# Patient Record
Sex: FEMALE | Race: WHITE | NOT HISPANIC OR LATINO | Employment: OTHER | URBAN - METROPOLITAN AREA
[De-identification: names, ages, dates, MRNs, and addresses within clinical notes are randomized per-mention and may not be internally consistent; named-entity substitution may affect disease eponyms.]

---

## 2018-11-09 ENCOUNTER — TRANSCRIBE ORDERS (OUTPATIENT)
Dept: ADMINISTRATIVE | Facility: HOSPITAL | Age: 68
End: 2018-11-09

## 2018-11-09 DIAGNOSIS — Z12.2 ENCOUNTER FOR SCREENING FOR LUNG CANCER: Primary | ICD-10-CM

## 2019-11-13 ENCOUNTER — APPOINTMENT (OUTPATIENT)
Dept: GENERAL RADIOLOGY | Facility: HOSPITAL | Age: 69
End: 2019-11-13

## 2019-11-13 ENCOUNTER — HOSPITAL ENCOUNTER (INPATIENT)
Facility: HOSPITAL | Age: 69
LOS: 5 days | Discharge: HOME HEALTH | End: 2019-11-19
Attending: EMERGENCY MEDICINE | Admitting: HOSPITALIST

## 2019-11-13 DIAGNOSIS — J44.1 COPD WITH ACUTE EXACERBATION (HCC): ICD-10-CM

## 2019-11-13 DIAGNOSIS — I33.9 ACUTE ENDOCARDITIS, UNSPECIFIED ENDOCARDITIS TYPE: Primary | ICD-10-CM

## 2019-11-13 DIAGNOSIS — I48.91 NEW ONSET ATRIAL FIBRILLATION (HCC): ICD-10-CM

## 2019-11-13 DIAGNOSIS — I33.0 ACUTE BACTERIAL ENDOCARDITIS: ICD-10-CM

## 2019-11-13 LAB
ALBUMIN SERPL-MCNC: 3.4 G/DL (ref 3.5–5.2)
ALBUMIN/GLOB SERPL: 1.3 G/DL
ALP SERPL-CCNC: 55 U/L (ref 39–117)
ALT SERPL W P-5'-P-CCNC: 23 U/L (ref 1–33)
ANION GAP SERPL CALCULATED.3IONS-SCNC: 11.3 MMOL/L (ref 5–15)
AST SERPL-CCNC: 15 U/L (ref 1–32)
BASOPHILS # BLD AUTO: 0.07 10*3/MM3 (ref 0–0.2)
BASOPHILS NFR BLD AUTO: 0.3 % (ref 0–1.5)
BILIRUB SERPL-MCNC: 0.4 MG/DL (ref 0.2–1.2)
BUN BLD-MCNC: 28 MG/DL (ref 8–23)
BUN/CREAT SERPL: 43.8 (ref 7–25)
CALCIUM SPEC-SCNC: 8.3 MG/DL (ref 8.6–10.5)
CHLORIDE SERPL-SCNC: 98 MMOL/L (ref 98–107)
CO2 SERPL-SCNC: 29.7 MMOL/L (ref 22–29)
CREAT BLD-MCNC: 0.64 MG/DL (ref 0.57–1)
DEPRECATED RDW RBC AUTO: 41.7 FL (ref 37–54)
EOSINOPHIL # BLD AUTO: 0 10*3/MM3 (ref 0–0.4)
EOSINOPHIL NFR BLD AUTO: 0 % (ref 0.3–6.2)
ERYTHROCYTE [DISTWIDTH] IN BLOOD BY AUTOMATED COUNT: 13.3 % (ref 12.3–15.4)
GFR SERPL CREATININE-BSD FRML MDRD: 92 ML/MIN/1.73
GLOBULIN UR ELPH-MCNC: 2.7 GM/DL
GLUCOSE BLD-MCNC: 176 MG/DL (ref 65–99)
HCT VFR BLD AUTO: 38 % (ref 34–46.6)
HGB BLD-MCNC: 13.4 G/DL (ref 12–15.9)
IMM GRANULOCYTES # BLD AUTO: 0.41 10*3/MM3 (ref 0–0.05)
IMM GRANULOCYTES NFR BLD AUTO: 1.9 % (ref 0–0.5)
LYMPHOCYTES # BLD AUTO: 0.89 10*3/MM3 (ref 0.7–3.1)
LYMPHOCYTES NFR BLD AUTO: 4.2 % (ref 19.6–45.3)
MAGNESIUM SERPL-MCNC: 2.3 MG/DL (ref 1.6–2.4)
MCH RBC QN AUTO: 30.7 PG (ref 26.6–33)
MCHC RBC AUTO-ENTMCNC: 35.3 G/DL (ref 31.5–35.7)
MCV RBC AUTO: 87.2 FL (ref 79–97)
MONOCYTES # BLD AUTO: 0.8 10*3/MM3 (ref 0.1–0.9)
MONOCYTES NFR BLD AUTO: 3.8 % (ref 5–12)
NEUTROPHILS # BLD AUTO: 19.16 10*3/MM3 (ref 1.7–7)
NEUTROPHILS NFR BLD AUTO: 89.8 % (ref 42.7–76)
NRBC BLD AUTO-RTO: 0 /100 WBC (ref 0–0.2)
NT-PROBNP SERPL-MCNC: 1716 PG/ML (ref 5–900)
PLATELET # BLD AUTO: 216 10*3/MM3 (ref 140–450)
PMV BLD AUTO: 10.2 FL (ref 6–12)
POTASSIUM BLD-SCNC: 4.3 MMOL/L (ref 3.5–5.2)
PROT SERPL-MCNC: 6.1 G/DL (ref 6–8.5)
RBC # BLD AUTO: 4.36 10*6/MM3 (ref 3.77–5.28)
SODIUM BLD-SCNC: 139 MMOL/L (ref 136–145)
WBC NRBC COR # BLD: 21.33 10*3/MM3 (ref 3.4–10.8)

## 2019-11-13 PROCEDURE — 80053 COMPREHEN METABOLIC PANEL: CPT | Performed by: EMERGENCY MEDICINE

## 2019-11-13 PROCEDURE — G0378 HOSPITAL OBSERVATION PER HR: HCPCS

## 2019-11-13 PROCEDURE — 71046 X-RAY EXAM CHEST 2 VIEWS: CPT

## 2019-11-13 PROCEDURE — 93005 ELECTROCARDIOGRAM TRACING: CPT | Performed by: EMERGENCY MEDICINE

## 2019-11-13 PROCEDURE — 93010 ELECTROCARDIOGRAM REPORT: CPT | Performed by: INTERNAL MEDICINE

## 2019-11-13 PROCEDURE — 83735 ASSAY OF MAGNESIUM: CPT | Performed by: EMERGENCY MEDICINE

## 2019-11-13 PROCEDURE — 85025 COMPLETE CBC W/AUTO DIFF WBC: CPT | Performed by: EMERGENCY MEDICINE

## 2019-11-13 PROCEDURE — 99284 EMERGENCY DEPT VISIT MOD MDM: CPT

## 2019-11-13 PROCEDURE — 25010000002 CEFEPIME PER 500 MG: Performed by: EMERGENCY MEDICINE

## 2019-11-13 PROCEDURE — 83880 ASSAY OF NATRIURETIC PEPTIDE: CPT | Performed by: EMERGENCY MEDICINE

## 2019-11-13 RX ORDER — DILTIAZEM HYDROCHLORIDE 5 MG/ML
10 INJECTION INTRAVENOUS ONCE
Status: COMPLETED | OUTPATIENT
Start: 2019-11-13 | End: 2019-11-13

## 2019-11-13 RX ORDER — ONDANSETRON 2 MG/ML
4 INJECTION INTRAMUSCULAR; INTRAVENOUS EVERY 6 HOURS PRN
Status: DISCONTINUED | OUTPATIENT
Start: 2019-11-13 | End: 2019-11-19 | Stop reason: HOSPADM

## 2019-11-13 RX ORDER — CALCIUM CARBONATE 200(500)MG
2 TABLET,CHEWABLE ORAL 2 TIMES DAILY PRN
Status: DISCONTINUED | OUTPATIENT
Start: 2019-11-13 | End: 2019-11-19 | Stop reason: HOSPADM

## 2019-11-13 RX ORDER — ONDANSETRON 4 MG/1
4 TABLET, FILM COATED ORAL EVERY 6 HOURS PRN
Status: DISCONTINUED | OUTPATIENT
Start: 2019-11-13 | End: 2019-11-19 | Stop reason: HOSPADM

## 2019-11-13 RX ORDER — SODIUM CHLORIDE 0.9 % (FLUSH) 0.9 %
10 SYRINGE (ML) INJECTION EVERY 12 HOURS SCHEDULED
Status: DISCONTINUED | OUTPATIENT
Start: 2019-11-14 | End: 2019-11-19 | Stop reason: HOSPADM

## 2019-11-13 RX ORDER — ACETAMINOPHEN 160 MG/5ML
650 SOLUTION ORAL EVERY 4 HOURS PRN
Status: DISCONTINUED | OUTPATIENT
Start: 2019-11-13 | End: 2019-11-19 | Stop reason: HOSPADM

## 2019-11-13 RX ORDER — SODIUM CHLORIDE 0.9 % (FLUSH) 0.9 %
10 SYRINGE (ML) INJECTION AS NEEDED
Status: DISCONTINUED | OUTPATIENT
Start: 2019-11-13 | End: 2019-11-19 | Stop reason: HOSPADM

## 2019-11-13 RX ORDER — PREDNISONE 20 MG/1
20 TABLET ORAL DAILY
COMMUNITY
End: 2019-11-19 | Stop reason: HOSPADM

## 2019-11-13 RX ORDER — BUDESONIDE 0.5 MG/2ML
0.5 INHALANT ORAL
COMMUNITY

## 2019-11-13 RX ORDER — ACETAMINOPHEN 325 MG/1
650 TABLET ORAL EVERY 4 HOURS PRN
Status: DISCONTINUED | OUTPATIENT
Start: 2019-11-13 | End: 2019-11-19 | Stop reason: HOSPADM

## 2019-11-13 RX ORDER — ACETAMINOPHEN 650 MG/1
650 SUPPOSITORY RECTAL EVERY 4 HOURS PRN
Status: DISCONTINUED | OUTPATIENT
Start: 2019-11-13 | End: 2019-11-19 | Stop reason: HOSPADM

## 2019-11-13 RX ORDER — VANCOMYCIN HYDROCHLORIDE 1 G/200ML
1000 INJECTION, SOLUTION INTRAVENOUS ONCE
Status: COMPLETED | OUTPATIENT
Start: 2019-11-13 | End: 2019-11-14

## 2019-11-13 RX ORDER — BISACODYL 5 MG/1
5 TABLET, DELAYED RELEASE ORAL DAILY PRN
Status: DISCONTINUED | OUTPATIENT
Start: 2019-11-13 | End: 2019-11-19 | Stop reason: HOSPADM

## 2019-11-13 RX ORDER — DILTIAZEM HYDROCHLORIDE 60 MG/1
60 TABLET, FILM COATED ORAL 4 TIMES DAILY
COMMUNITY
End: 2019-11-19 | Stop reason: HOSPADM

## 2019-11-13 RX ORDER — ROPINIROLE 2 MG/1
2 TABLET, FILM COATED ORAL 2 TIMES DAILY
COMMUNITY
End: 2022-03-07 | Stop reason: DRUGHIGH

## 2019-11-13 RX ORDER — NITROGLYCERIN 0.4 MG/1
0.4 TABLET SUBLINGUAL
Status: DISCONTINUED | OUTPATIENT
Start: 2019-11-13 | End: 2019-11-19 | Stop reason: HOSPADM

## 2019-11-13 RX ORDER — SENNA AND DOCUSATE SODIUM 50; 8.6 MG/1; MG/1
1 TABLET, FILM COATED ORAL DAILY
COMMUNITY
End: 2019-11-26 | Stop reason: HOSPADM

## 2019-11-13 RX ORDER — CEFEPIME HYDROCHLORIDE 2 G/1
INJECTION, POWDER, FOR SOLUTION INTRAVENOUS
COMMUNITY
End: 2019-11-19 | Stop reason: HOSPADM

## 2019-11-13 RX ORDER — METRONIDAZOLE 500 MG/1
500 TABLET ORAL 3 TIMES DAILY
COMMUNITY
End: 2019-11-19 | Stop reason: HOSPADM

## 2019-11-13 RX ADMIN — CEFEPIME HYDROCHLORIDE 2 G: 2 INJECTION, POWDER, FOR SOLUTION INTRAVENOUS at 21:08

## 2019-11-13 RX ADMIN — METRONIDAZOLE 500 MG: 500 INJECTION, SOLUTION INTRAVENOUS at 21:11

## 2019-11-13 RX ADMIN — DILTIAZEM HYDROCHLORIDE 10 MG: 5 INJECTION INTRAVENOUS at 20:23

## 2019-11-13 NOTE — ED PROVIDER NOTES
EMERGENCY DEPARTMENT ENCOUNTER    CHIEF COMPLAINT  Chief Complaint: resolved SOA  History given by: patient, travel RN  History limited by: nothing  Room Number: E655/1  PMD: Jose R Franks MD      HPI:  Pt is a 69 y.o. female with a history of COPD who presents complaining of SOA, now resolved, that started while she was on a cruise about 1.5 weeks ago. Travelling RN at bedside assisting with history. She was febrile (105F), fatigued and had a cough on the cruise. Pt was admitted in the [facility] ICU for 5 days and received IV antibiotics and diagonses of influenza A, PNA, endocarditis, and new onset a-fib (admitted November 3, discharged today). She was flown home to [place] this afternoon. She was instructed to present to the ED on arrival to orchestrate Home Health and prescription management. She received doses of all abx this morning-- Flagyl at 800, Cefepime and Vancomycin at 1000. She was not started on anticoagulation, but she was started on Diltiazem. She has a PICC line in her right arm. Pt denies palpitations, CP, abdominal pain, fever and all other complaints at this time. She wears oxygen at nighttime.     Medical Record Review from patient's admission to [facility] ICU:  Discharged today on Flagyl, Cefepime and Vancomycin to continue for 37 days. She was not started on anticoagulation. During admission, workup found a vegetation on the posterior leaflet of the mitral valve     Duration:  1.5 weeks  Onset: gradual  Timing: constant  Location: lungs  Radiation: none  Quality: SOA  Intensity/Severity: moderate  Progression: resolved  Associated Symptoms: none  Aggravating Factors: none  Alleviating Factors: none  Previous Episodes: admitted for 10 days, discharged today  Treatment before arrival: Flagyl, Cefepime and Vancomycin    PAST MEDICAL HISTORY  Active Ambulatory Problems     Diagnosis Date Noted   • No Active Ambulatory Problems     Resolved Ambulatory Problems      Diagnosis Date Noted   • No Resolved Ambulatory Problems     Past Medical History:   Diagnosis Date   • COPD (chronic obstructive pulmonary disease) (CMS/[facility])    • Renal disorder    • Restless leg syndrome        PAST SURGICAL HISTORY  Past Surgical History:   Procedure Laterality Date   • CHOLECYSTECTOMY     • KNEE ARTHROPLASTY         FAMILY HISTORY  History reviewed. No pertinent family history.    SOCIAL HISTORY  Social History     Socioeconomic History   • Marital status:      Spouse name: Not on file   • Number of children: Not on file   • Years of education: Not on file   • Highest education level: Not on file   Tobacco Use   • Smoking status: Current Every Day Smoker     Packs/day: 0.50   • Smokeless tobacco: Former User   Substance and Sexual Activity   • Alcohol use: Yes   • Drug use: No       ALLERGIES  Penicillins    REVIEW OF SYSTEMS  Review of Systems   Constitutional: Negative for fever.   HENT: Negative for sore throat.    Eyes: Negative.    Respiratory: Negative for cough and shortness of breath.    Cardiovascular: Negative for chest pain.   Gastrointestinal: Negative for abdominal pain, diarrhea and vomiting.   Genitourinary: Negative for dysuria.   Musculoskeletal: Negative for neck pain.   Skin: Negative for rash.   Allergic/Immunologic: Negative.    Neurological: Negative for weakness, numbness and headaches.   Hematological: Negative.    Psychiatric/Behavioral: Negative.    All other systems reviewed and are negative.      PHYSICAL EXAM  ED Triage Vitals [11/13/19 1800]   Temp Pulse Resp BP SpO2   98.7 °F (37.1 °C) -- 20 -- --       Physical Exam   Constitutional: She is oriented to person, place, and time. No distress.   HENT:   Head: Normocephalic and atraumatic.   Eyes: EOM are normal. Pupils are equal, round, and reactive to light.   Neck: Normal range of motion. Neck supple.   Cardiovascular: Normal heart sounds. An irregularly irregular rhythm present. Tachycardia present.    Irregularly irregular rhythm with tachycardia.   Pulmonary/Chest: Effort normal and breath sounds normal. No respiratory distress.   CTAB.   Abdominal: Soft. There is no tenderness. There is no rebound and no guarding.   Abdomen is soft and non-tender.   Musculoskeletal: Normal range of motion. She exhibits no edema.   PICC line in RUE. No pedal edema.   Neurological: She is alert and oriented to person, place, and time. She has normal sensation and normal strength.   Skin: Skin is warm and dry. No rash noted.   Psychiatric: Mood and affect normal.   Nursing note and vitals reviewed.      LAB RESULTS  Lab Results (last 24 hours)     Procedure Component Value Units Date/Time    CBC & Differential [[id number]] Collected:  11/13/19 1955    Specimen:  Blood Updated:  11/13/19 2005    Narrative:       The following orders were created for panel order CBC & Differential.  Procedure                               Abnormality         Status                     ---------                               -----------         ------                     CBC Auto Differential[[id number]]        Abnormal            Final result                 Please view results for these tests on the individual orders.    CBC Auto Differential [[id number]]  (Abnormal) Collected:  11/13/19 1955    Specimen:  Blood Updated:  11/13/19 2005     WBC 21.33 10*3/mm3      RBC 4.36 10*6/mm3      Hemoglobin 13.4 g/dL      Hematocrit 38.0 %      MCV 87.2 fL      MCH 30.7 pg      MCHC 35.3 g/dL      RDW 13.3 %      RDW-SD 41.7 fl      MPV 10.2 fL      Platelets 216 10*3/mm3      Neutrophil % 89.8 %      Lymphocyte % 4.2 %      Monocyte % 3.8 %      Eosinophil % 0.0 %      Basophil % 0.3 %      Immature Grans % 1.9 %      Neutrophils, Absolute 19.16 10*3/mm3      Lymphocytes, Absolute 0.89 10*3/mm3      Monocytes, Absolute 0.80 10*3/mm3      Eosinophils, Absolute 0.00 10*3/mm3      Basophils, Absolute 0.07 10*3/mm3      Immature Grans, Absolute 0.41 10*3/mm3       nRBC 0.0 /100 WBC     Comprehensive Metabolic Panel [[id number]]  (Abnormal) Collected:  11/13/19 1956    Specimen:  Blood Updated:  11/13/19 2037     Glucose 176 mg/dL      BUN 28 mg/dL      Creatinine 0.64 mg/dL      Sodium 139 mmol/L      Potassium 4.3 mmol/L      Chloride 98 mmol/L      CO2 29.7 mmol/L      Calcium 8.3 mg/dL      Total Protein 6.1 g/dL      Albumin 3.40 g/dL      ALT (SGPT) 23 U/L      AST (SGOT) 15 U/L      Alkaline Phosphatase 55 U/L      Total Bilirubin 0.4 mg/dL      eGFR Non African Amer 92 mL/min/1.73      Globulin 2.7 gm/dL      A/G Ratio 1.3 g/dL      BUN/Creatinine Ratio 43.8     Anion Gap 11.3 mmol/L     Narrative:       GFR Normal >60  Chronic Kidney Disease <60  Kidney Failure <15    BNP [[id number]]  (Abnormal) Collected:  11/13/19 1956    Specimen:  Blood Updated:  11/13/19 2035     proBNP 1,716.0 pg/mL     Narrative:       Among patients with dyspnea, NT-proBNP is highly sensitive for the detection of acute congestive heart failure. In addition NT-proBNP of <300 pg/ml effectively rules out acute congestive heart failure with 99% negative predictive value.    Magnesium [[id number]]  (Normal) Collected:  11/13/19 1956    Specimen:  Blood Updated:  11/13/19 2037     Magnesium 2.3 mg/dL           I ordered the above labs and reviewed the results.      RADIOLOGY  XR Chest 2 View   Final Result         Reviewed CXR.   FINDINGS: There is a PICC line now in position tip superimposes projected area of the superior vena cava. There is atherosclerotic calcification of the aorta. Cardiac size within normal limits. Mitral  annulus calcification seen.     There is a small opacity within the right mid lung laterally superimposing the posterior right eighth rib. Difficult to determine if this represents callus formation from previous rib injury or parenchymal scarring, pulmonary nodule, atelectasis or small infiltrate. The left lung is clear. No pleural effusion identified. No gross pulmonary edema  seen. Mild interstitial prominence, cannot exclude minimal vascular congestion.    Independently viewed by me. Interpreted by radiologist Dr Flood.       I ordered the above noted radiological studies. Interpreted by radiologist. Reviewed by me in PACS.       PROCEDURES  Procedures    See ED course for interpretation of EKG.       PROGRESS AND CONSULTS  ED Course as of Nov 13 2213 Wed Nov 13, 2019   1845 Old records reviewed.  Patient was last admitted here in January 2016 for sepsis secondary to pyelonephritis and E. coli septicemia.  She had an obstructing renal stone and underwent ureteral stent placement.  She then had a nephrostomy tube placed.  Patient was treated with IV ceftriaxone at that time.  [WH]   1905 I spoke with Jessi, [facility] nurse, and she states there is no way to get the patient's medications ordered and to have home health care arranged tonight.  [WH]   1907 EKG          EKG time: 1858  Rhythm/Rate: A. fib rate 125  P waves and IN: Irregular, varying  QRS, axis: LAD  ST and T waves: Normal    Interpreted Contemporaneously by me, independently viewed  EKG is changed compared to prior EKG done 1/28/2016.  Sinus rhythm was present at that time.    [WH]   2041 Test results and plan for admission discussed with the patient and her family.  Patient is still in A. fib but her heart rate is currently staying between 100-110.  Call will be placed to [facility] for admission.  [WH]      ED Course User Index  [WH] David Mcqueen MD     1911. Ordered labs, EKG and CXR. Ordered Cardizem.      2039. Rechecked the patient who is stable and resting comfortably. Informed the patient of the plan to admit for orchestration of medications and treatment plan. Pt understands and agrees with the plan, all questions answered.    2045. Placed call to [facility].     2055. Spoke with Dr Kearney, [facility], who agrees to admit the patient.         MEDICAL DECISION MAKING  Results were reviewed/discussed with the patient and they were  also made aware of online access. Pt also made aware that some labs, such as cultures, will not be resulted during ER visit and follow up with PMD is necessary.     Patient was diagnosed with influenza [place].  She was subsequently admitted to the [facility] in the ICU.  She was found to have pneumonia as well as endocarditis.  She was treated with IV Flagyl, cefepime, and vancomycin.  She was also found to have new onset atrial fibrillation but was not started on anticoagulation due to concerns of possible septic emboli.  Patient was discharged from the [facility] earlier today and flew home.  Patient was advised to come to the emergency department when she arrived in [place].  Patient denied chest pain or shortness of breath upon arrival here.  She was in atrial fibrillation and was tachycardic.  Labs were remarkable for an elevated white blood cell count.  Chest x-ray showed possible small area of infiltrate in the right lung.  Patient was given her nighttime doses of Flagyl, cefepime, and vancomycin.  Case was discussed with Dr. Kearney and he agreed to admit the patient.  Arrangements will be made for the patient to start on home IV antibiotics.    MDM  Number of Diagnoses or Management Options  Acute endocarditis, unspecified endocarditis type:   New onset atrial fibrillation (CMS/HCC):      Amount and/or Complexity of Data Reviewed  Clinical lab tests: ordered and reviewed (WBC 21.33)  Tests in the radiology section of CPT®: ordered and reviewed (Possible small atelectasis seen on CXR)  Tests in the medicine section of CPT®: reviewed and ordered (See EKG procedure note.)  Decide to obtain previous medical records or to obtain history from someone other than the patient: yes (Epic)  Obtain history from someone other than the patient: yes ([employer] RN)  Review and summarize past medical records: yes (Patient was last admitted here in January 2016  for sepsis secondary to pyelonephritis and E. coli septicemia.  She had an obstructing renal stone and underwent ureteral stent placement.  She then had a nephrostomy tube placed.  Patient was treated with IV ceftriaxone at that time. Discharged today on Flagyl, Cefepime and Vancomycin to continue for 37 days. She was not started on anticoagulation. During admission, workup found a vegetation on the posterior leaflet of the mitral valve.)  Discuss the patient with other providers: yes (Dr Kearney, [facility])    Patient Progress  Patient progress: stable         DIAGNOSIS  Final diagnoses:   Acute endocarditis, unspecified endocarditis type   New onset atrial fibrillation (CMS/HCC)       DISPOSITION  ADMISSION    Discussed treatment plan and reason for admission with pt/family and admitting physician.  Pt/family voiced understanding of the plan for admission for further testing/treatment as needed.       Latest Documented Vital Signs:  As of 10:13 PM  BP- 138/96 HR- (!) 125 Temp- 98.7 °F (37.1 °C) O2 sat- 97%    --  Documentation assistance provided by rogerio Cohen for Dr David Mcqueen MD.  Information recorded by the scribe was done at my direction and has been verified and validated by me.       Vidya Cohen  11/13/19 2102       Daivd Mcqueen MD  11/13/19 3286

## 2019-11-14 ENCOUNTER — APPOINTMENT (OUTPATIENT)
Dept: CARDIOLOGY | Facility: HOSPITAL | Age: 69
End: 2019-11-14

## 2019-11-14 PROBLEM — B37.0 THRUSH, ORAL: Status: ACTIVE | Noted: 2019-11-14

## 2019-11-14 PROBLEM — I48.91 ATRIAL FIBRILLATION WITH RVR: Status: ACTIVE | Noted: 2019-11-14

## 2019-11-14 PROBLEM — J44.1 COPD WITH ACUTE EXACERBATION: Status: ACTIVE | Noted: 2019-11-14

## 2019-11-14 PROBLEM — J11.1 INFLUENZA: Status: ACTIVE | Noted: 2019-11-14

## 2019-11-14 LAB
ANION GAP SERPL CALCULATED.3IONS-SCNC: 8.2 MMOL/L (ref 5–15)
AORTIC DIMENSIONLESS INDEX: 1.1 (DI)
BH CV ECHO MEAS - ACS: 1.3 CM
BH CV ECHO MEAS - AO MEAN PG (FULL): 0 MMHG
BH CV ECHO MEAS - AO MEAN PG: 2 MMHG
BH CV ECHO MEAS - AO ROOT AREA (BSA CORRECTED): 1.6
BH CV ECHO MEAS - AO ROOT AREA: 5.3 CM^2
BH CV ECHO MEAS - AO ROOT DIAM: 2.6 CM
BH CV ECHO MEAS - AO V2 MAX: 83 CM/SEC
BH CV ECHO MEAS - AO V2 MEAN: 60 CM/SEC
BH CV ECHO MEAS - AO V2 VTI: 17.9 CM
BH CV ECHO MEAS - ASC AORTA: 2.5 CM
BH CV ECHO MEAS - AVA(I,A): 3.4 CM^2
BH CV ECHO MEAS - AVA(I,D): 3.4 CM^2
BH CV ECHO MEAS - BSA(HAYCOCK): 1.7 M^2
BH CV ECHO MEAS - BSA: 1.6 M^2
BH CV ECHO MEAS - BZI_BMI: 25.9 KILOGRAMS/M^2
BH CV ECHO MEAS - BZI_METRIC_HEIGHT: 154.9 CM
BH CV ECHO MEAS - BZI_METRIC_WEIGHT: 62.1 KG
BH CV ECHO MEAS - EDV(CUBED): 46.7 ML
BH CV ECHO MEAS - EDV(MOD-SP2): 70 ML
BH CV ECHO MEAS - EDV(MOD-SP4): 69 ML
BH CV ECHO MEAS - EDV(TEICH): 54.4 ML
BH CV ECHO MEAS - EF(CUBED): 47.7 %
BH CV ECHO MEAS - EF(MOD-BP): 71 %
BH CV ECHO MEAS - EF(MOD-SP2): 70 %
BH CV ECHO MEAS - EF(MOD-SP4): 69.6 %
BH CV ECHO MEAS - EF(TEICH): 40.8 %
BH CV ECHO MEAS - ESV(CUBED): 24.4 ML
BH CV ECHO MEAS - ESV(MOD-SP2): 21 ML
BH CV ECHO MEAS - ESV(MOD-SP4): 21 ML
BH CV ECHO MEAS - ESV(TEICH): 32.2 ML
BH CV ECHO MEAS - FS: 19.4 %
BH CV ECHO MEAS - IVS/LVPW: 1
BH CV ECHO MEAS - IVSD: 0.8 CM
BH CV ECHO MEAS - LAT PEAK E' VEL: 9 CM/SEC
BH CV ECHO MEAS - LV DIASTOLIC VOL/BSA (35-75): 42.9 ML/M^2
BH CV ECHO MEAS - LV MASS(C)D: 78.8 GRAMS
BH CV ECHO MEAS - LV MASS(C)DI: 49 GRAMS/M^2
BH CV ECHO MEAS - LV MEAN PG: 2 MMHG
BH CV ECHO MEAS - LV SYSTOLIC VOL/BSA (12-30): 13.1 ML/M^2
BH CV ECHO MEAS - LV V1 MAX: 87 CM/SEC
BH CV ECHO MEAS - LV V1 MEAN: 65.6 CM/SEC
BH CV ECHO MEAS - LV V1 VTI: 19.1 CM
BH CV ECHO MEAS - LVIDD: 3.6 CM
BH CV ECHO MEAS - LVIDS: 2.9 CM
BH CV ECHO MEAS - LVLD AP2: 6.4 CM
BH CV ECHO MEAS - LVLD AP4: 7 CM
BH CV ECHO MEAS - LVLS AP2: 5.6 CM
BH CV ECHO MEAS - LVLS AP4: 5.9 CM
BH CV ECHO MEAS - LVOT AREA (M): 3.1 CM^2
BH CV ECHO MEAS - LVOT AREA: 3.1 CM^2
BH CV ECHO MEAS - LVOT DIAM: 2 CM
BH CV ECHO MEAS - LVPWD: 0.8 CM
BH CV ECHO MEAS - MED PEAK E' VEL: 7 CM/SEC
BH CV ECHO MEAS - MR MAX PG: 88.7 MMHG
BH CV ECHO MEAS - MR MAX VEL: 471 CM/SEC
BH CV ECHO MEAS - MV DEC SLOPE: 713 CM/SEC^2
BH CV ECHO MEAS - MV DEC TIME: 246 SEC
BH CV ECHO MEAS - MV E MAX VEL: 176 CM/SEC
BH CV ECHO MEAS - MV MEAN PG: 5 MMHG
BH CV ECHO MEAS - MV P1/2T MAX VEL: 173 CM/SEC
BH CV ECHO MEAS - MV P1/2T: 71.1 MSEC
BH CV ECHO MEAS - MV V2 MEAN: 95.6 CM/SEC
BH CV ECHO MEAS - MV V2 VTI: 30.8 CM
BH CV ECHO MEAS - MVA P1/2T LCG: 1.3 CM^2
BH CV ECHO MEAS - MVA(P1/2T): 3.1 CM^2
BH CV ECHO MEAS - MVA(VTI): 1.9 CM^2
BH CV ECHO MEAS - PA ACC SLOPE: 799 CM/SEC^2
BH CV ECHO MEAS - PA ACC TIME: 0.11 SEC
BH CV ECHO MEAS - PA MAX PG: 3.2 MMHG
BH CV ECHO MEAS - PA PR(ACCEL): 28.2 MMHG
BH CV ECHO MEAS - PA V2 MAX: 89.2 CM/SEC
BH CV ECHO MEAS - QP/QS: 0.64
BH CV ECHO MEAS - RAP SYSTOLE: 8 MMHG
BH CV ECHO MEAS - RV MEAN PG: 1 MMHG
BH CV ECHO MEAS - RV V1 MEAN: 43.5 CM/SEC
BH CV ECHO MEAS - RV V1 VTI: 12.3 CM
BH CV ECHO MEAS - RVOT AREA: 3.1 CM^2
BH CV ECHO MEAS - RVOT DIAM: 2 CM
BH CV ECHO MEAS - RVSP: 40.7 MMHG
BH CV ECHO MEAS - SI(AO): 59.1 ML/M^2
BH CV ECHO MEAS - SI(CUBED): 13.8 ML/M^2
BH CV ECHO MEAS - SI(LVOT): 37.3 ML/M^2
BH CV ECHO MEAS - SI(MOD-SP2): 30.5 ML/M^2
BH CV ECHO MEAS - SI(MOD-SP4): 29.8 ML/M^2
BH CV ECHO MEAS - SI(TEICH): 13.8 ML/M^2
BH CV ECHO MEAS - SV(AO): 95 ML
BH CV ECHO MEAS - SV(CUBED): 22.3 ML
BH CV ECHO MEAS - SV(LVOT): 60 ML
BH CV ECHO MEAS - SV(MOD-SP2): 49 ML
BH CV ECHO MEAS - SV(MOD-SP4): 48 ML
BH CV ECHO MEAS - SV(RVOT): 38.6 ML
BH CV ECHO MEAS - SV(TEICH): 22.2 ML
BH CV ECHO MEAS - TAPSE (>1.6): 2.3 CM2
BH CV ECHO MEAS - TR MAX PG: 33
BH CV ECHO MEAS - TR MAX VEL: 286 CM/SEC
BH CV ECHO MEASUREMENTS AVERAGE E/E' RATIO: 22
BH CV VAS BP RIGHT ARM: NORMAL MMHG
BH CV XLRA - RV BASE: 3.7 CM
BH CV XLRA - TDI S': 10.5 CM/SEC
BUN BLD-MCNC: 27 MG/DL (ref 8–23)
BUN/CREAT SERPL: 47.4 (ref 7–25)
CALCIUM SPEC-SCNC: 8.1 MG/DL (ref 8.6–10.5)
CHLORIDE SERPL-SCNC: 100 MMOL/L (ref 98–107)
CO2 SERPL-SCNC: 31.8 MMOL/L (ref 22–29)
CREAT BLD-MCNC: 0.57 MG/DL (ref 0.57–1)
D-LACTATE SERPL-SCNC: 1.8 MMOL/L (ref 0.5–2)
DEPRECATED RDW RBC AUTO: 40.1 FL (ref 37–54)
ERYTHROCYTE [DISTWIDTH] IN BLOOD BY AUTOMATED COUNT: 13 % (ref 12.3–15.4)
GFR SERPL CREATININE-BSD FRML MDRD: 105 ML/MIN/1.73
GLUCOSE BLD-MCNC: 105 MG/DL (ref 65–99)
HCT VFR BLD AUTO: 35.6 % (ref 34–46.6)
HGB BLD-MCNC: 12.4 G/DL (ref 12–15.9)
LEFT ATRIUM VOLUME INDEX: 32 ML/M2
LV EF 2D ECHO EST: 70 %
MAXIMAL PREDICTED HEART RATE: 150 BPM
MCH RBC QN AUTO: 30.2 PG (ref 26.6–33)
MCHC RBC AUTO-ENTMCNC: 34.8 G/DL (ref 31.5–35.7)
MCV RBC AUTO: 86.6 FL (ref 79–97)
PLATELET # BLD AUTO: 202 10*3/MM3 (ref 140–450)
PMV BLD AUTO: 10.3 FL (ref 6–12)
POTASSIUM BLD-SCNC: 4 MMOL/L (ref 3.5–5.2)
PROCALCITONIN SERPL-MCNC: 0.07 NG/ML (ref 0.1–0.25)
RBC # BLD AUTO: 4.11 10*6/MM3 (ref 3.77–5.28)
SODIUM BLD-SCNC: 140 MMOL/L (ref 136–145)
STRESS TARGET HR: 128 BPM
WBC NRBC COR # BLD: 20.09 10*3/MM3 (ref 3.4–10.8)

## 2019-11-14 PROCEDURE — 94640 AIRWAY INHALATION TREATMENT: CPT

## 2019-11-14 PROCEDURE — 99204 OFFICE O/P NEW MOD 45 MIN: CPT | Performed by: INTERNAL MEDICINE

## 2019-11-14 PROCEDURE — 25010000002 VANCOMYCIN 750 MG RECONSTITUTED SOLUTION: Performed by: NURSE PRACTITIONER

## 2019-11-14 PROCEDURE — 94799 UNLISTED PULMONARY SVC/PX: CPT

## 2019-11-14 PROCEDURE — 84145 PROCALCITONIN (PCT): CPT | Performed by: NURSE PRACTITIONER

## 2019-11-14 PROCEDURE — 80048 BASIC METABOLIC PNL TOTAL CA: CPT | Performed by: NURSE PRACTITIONER

## 2019-11-14 PROCEDURE — 99223 1ST HOSP IP/OBS HIGH 75: CPT | Performed by: INTERNAL MEDICINE

## 2019-11-14 PROCEDURE — 25010000002 CEFEPIME PER 500 MG: Performed by: NURSE PRACTITIONER

## 2019-11-14 PROCEDURE — 93306 TTE W/DOPPLER COMPLETE: CPT | Performed by: INTERNAL MEDICINE

## 2019-11-14 PROCEDURE — 25010000002 ENOXAPARIN PER 10 MG: Performed by: INTERNAL MEDICINE

## 2019-11-14 PROCEDURE — 83605 ASSAY OF LACTIC ACID: CPT | Performed by: NURSE PRACTITIONER

## 2019-11-14 PROCEDURE — 25010000003 CEFTRIAXONE PER 250 MG: Performed by: INTERNAL MEDICINE

## 2019-11-14 PROCEDURE — 93306 TTE W/DOPPLER COMPLETE: CPT

## 2019-11-14 PROCEDURE — 25010000002 VANCOMYCIN PER 500 MG: Performed by: EMERGENCY MEDICINE

## 2019-11-14 PROCEDURE — 25010000002 ENOXAPARIN PER 10 MG: Performed by: NURSE PRACTITIONER

## 2019-11-14 PROCEDURE — 85027 COMPLETE CBC AUTOMATED: CPT | Performed by: NURSE PRACTITIONER

## 2019-11-14 PROCEDURE — 25010000002 METHYLPREDNISOLONE PER 40 MG: Performed by: NURSE PRACTITIONER

## 2019-11-14 RX ORDER — SENNA AND DOCUSATE SODIUM 50; 8.6 MG/1; MG/1
1 TABLET, FILM COATED ORAL DAILY
Status: DISCONTINUED | OUTPATIENT
Start: 2019-11-14 | End: 2019-11-19 | Stop reason: HOSPADM

## 2019-11-14 RX ORDER — ROPINIROLE 2 MG/1
2 TABLET, FILM COATED ORAL 2 TIMES DAILY
Status: DISCONTINUED | OUTPATIENT
Start: 2019-11-14 | End: 2019-11-14

## 2019-11-14 RX ORDER — ROPINIROLE 2 MG/1
2 TABLET, FILM COATED ORAL 2 TIMES DAILY
Status: DISCONTINUED | OUTPATIENT
Start: 2019-11-14 | End: 2019-11-19 | Stop reason: HOSPADM

## 2019-11-14 RX ORDER — CEFTRIAXONE SODIUM 2 G/50ML
2 INJECTION, SOLUTION INTRAVENOUS EVERY 24 HOURS
Status: DISCONTINUED | OUTPATIENT
Start: 2019-11-14 | End: 2019-11-19 | Stop reason: HOSPADM

## 2019-11-14 RX ORDER — METHYLPREDNISOLONE SODIUM SUCCINATE 40 MG/ML
40 INJECTION, POWDER, LYOPHILIZED, FOR SOLUTION INTRAMUSCULAR; INTRAVENOUS EVERY 8 HOURS
Status: DISCONTINUED | OUTPATIENT
Start: 2019-11-14 | End: 2019-11-16

## 2019-11-14 RX ORDER — CLOTRIMAZOLE 10 MG/1
10 LOZENGE ORAL; TOPICAL
Status: DISCONTINUED | OUTPATIENT
Start: 2019-11-14 | End: 2019-11-19 | Stop reason: HOSPADM

## 2019-11-14 RX ORDER — ARFORMOTEROL TARTRATE 15 UG/2ML
15 SOLUTION RESPIRATORY (INHALATION)
Status: DISCONTINUED | OUTPATIENT
Start: 2019-11-14 | End: 2019-11-19 | Stop reason: HOSPADM

## 2019-11-14 RX ORDER — BUDESONIDE 0.5 MG/2ML
0.5 INHALANT ORAL
Status: DISCONTINUED | OUTPATIENT
Start: 2019-11-14 | End: 2019-11-19 | Stop reason: HOSPADM

## 2019-11-14 RX ORDER — OXYBUTYNIN CHLORIDE 10 MG/1
10 TABLET, EXTENDED RELEASE ORAL DAILY
Status: DISCONTINUED | OUTPATIENT
Start: 2019-11-14 | End: 2019-11-19 | Stop reason: HOSPADM

## 2019-11-14 RX ADMIN — CLOTRIMAZOLE 10 MG: 10 LOZENGE ORAL; TOPICAL at 21:50

## 2019-11-14 RX ADMIN — CLOTRIMAZOLE 10 MG: 10 LOZENGE ORAL; TOPICAL at 12:52

## 2019-11-14 RX ADMIN — ARFORMOTEROL TARTRATE 15 MCG: 15 SOLUTION RESPIRATORY (INHALATION) at 19:32

## 2019-11-14 RX ADMIN — SODIUM CHLORIDE, PRESERVATIVE FREE 10 ML: 5 INJECTION INTRAVENOUS at 00:55

## 2019-11-14 RX ADMIN — ROPINIROLE 2 MG: 2 TABLET, FILM COATED ORAL at 05:00

## 2019-11-14 RX ADMIN — VANCOMYCIN HYDROCHLORIDE 750 MG: 750 INJECTION, POWDER, LYOPHILIZED, FOR SOLUTION INTRAVENOUS at 23:54

## 2019-11-14 RX ADMIN — SODIUM CHLORIDE, PRESERVATIVE FREE 10 ML: 5 INJECTION INTRAVENOUS at 21:50

## 2019-11-14 RX ADMIN — CEFTRIAXONE SODIUM 2 G: 2 INJECTION, SOLUTION INTRAVENOUS at 12:53

## 2019-11-14 RX ADMIN — METRONIDAZOLE 500 MG: 500 INJECTION, SOLUTION INTRAVENOUS at 02:58

## 2019-11-14 RX ADMIN — METHYLPREDNISOLONE SODIUM SUCCINATE 40 MG: 40 INJECTION, POWDER, FOR SOLUTION INTRAMUSCULAR; INTRAVENOUS at 23:54

## 2019-11-14 RX ADMIN — METOPROLOL TARTRATE 25 MG: 25 TABLET ORAL at 17:41

## 2019-11-14 RX ADMIN — OXYBUTYNIN CHLORIDE 10 MG: 10 TABLET, EXTENDED RELEASE ORAL at 17:41

## 2019-11-14 RX ADMIN — METHYLPREDNISOLONE SODIUM SUCCINATE 40 MG: 40 INJECTION, POWDER, FOR SOLUTION INTRAMUSCULAR; INTRAVENOUS at 16:08

## 2019-11-14 RX ADMIN — CLOTRIMAZOLE 10 MG: 10 LOZENGE ORAL; TOPICAL at 17:41

## 2019-11-14 RX ADMIN — ENOXAPARIN SODIUM 60 MG: 60 INJECTION SUBCUTANEOUS at 23:54

## 2019-11-14 RX ADMIN — METOPROLOL TARTRATE 25 MG: 25 TABLET ORAL at 10:37

## 2019-11-14 RX ADMIN — SODIUM CHLORIDE, PRESERVATIVE FREE 10 ML: 5 INJECTION INTRAVENOUS at 10:37

## 2019-11-14 RX ADMIN — IPRATROPIUM BROMIDE 0.5 MG: 0.5 SOLUTION RESPIRATORY (INHALATION) at 15:32

## 2019-11-14 RX ADMIN — ENOXAPARIN SODIUM 40 MG: 40 INJECTION SUBCUTANEOUS at 07:52

## 2019-11-14 RX ADMIN — BUDESONIDE 0.5 MG: 0.5 INHALANT RESPIRATORY (INHALATION) at 15:32

## 2019-11-14 RX ADMIN — SODIUM CHLORIDE 5 MG/HR: 900 INJECTION, SOLUTION INTRAVENOUS at 02:57

## 2019-11-14 RX ADMIN — ENOXAPARIN SODIUM 20 MG: 30 INJECTION SUBCUTANEOUS at 12:53

## 2019-11-14 RX ADMIN — SODIUM CHLORIDE 5 MG/HR: 900 INJECTION, SOLUTION INTRAVENOUS at 17:41

## 2019-11-14 RX ADMIN — VANCOMYCIN HYDROCHLORIDE 750 MG: 750 INJECTION, POWDER, LYOPHILIZED, FOR SOLUTION INTRAVENOUS at 13:46

## 2019-11-14 RX ADMIN — CEFEPIME HYDROCHLORIDE 2 G: 2 INJECTION, POWDER, FOR SOLUTION INTRAVENOUS at 07:52

## 2019-11-14 RX ADMIN — ROPINIROLE 2 MG: 2 TABLET, FILM COATED ORAL at 21:50

## 2019-11-14 RX ADMIN — VANCOMYCIN HYDROCHLORIDE 1000 MG: 1 INJECTION, SOLUTION INTRAVENOUS at 00:54

## 2019-11-14 NOTE — PLAN OF CARE
Problem: Patient Care Overview  Goal: Plan of Care Review  Outcome: Ongoing (interventions implemented as appropriate)   11/14/19 0435   Coping/Psychosocial   Plan of Care Reviewed With patient   Plan of Care Review   Progress no change   OTHER   Outcome Summary No c/o pain or SOA. Daughter at bedside. PICC dressing C/D/I. IV abx administered per orders. Cardizem drip started, BP monitoring, stable. A&Ox4. Up with assist to bathroom. Cardiology and Infectious Disease consulted. HR ranging from 100-160's, asymptomatic. No s/s of distress at this time. Will continue to monitor.      Goal: Individualization and Mutuality  Outcome: Ongoing (interventions implemented as appropriate)    Goal: Discharge Needs Assessment  Outcome: Ongoing (interventions implemented as appropriate)    Goal: Interprofessional Rounds/Family Conf  Outcome: Ongoing (interventions implemented as appropriate)      Problem: Sepsis/Septic Shock (Adult)  Goal: Signs and Symptoms of Listed Potential Problems Will be Absent, Minimized or Managed (Sepsis/Septic Shock)  Outcome: Ongoing (interventions implemented as appropriate)      Problem: Arrhythmia/Dysrhythmia (Symptomatic) (Adult)  Goal: Signs and Symptoms of Listed Potential Problems Will be Absent, Minimized or Managed (Arrhythmia/Dysrhythmia)  Outcome: Ongoing (interventions implemented as appropriate)

## 2019-11-14 NOTE — PROGRESS NOTES
Continued Stay Note  [facility]     Patient Name: Shani Phillip  MRN: [id number]  Today's Date: 11/14/2019    Admit Date: 11/13/2019    Discharge Plan     Row Name 11/14/19 0823       Plan    Plan  Plan home with [facility].   JAMISON Castaneda RN    Patient/Family in Agreement with Plan  yes    Plan Comments  FACE SHEET VERIFIED/ KHAN SIGNED.  Spoke to pt and pt's daughter  (Lottie Arroyo) at bedside with pt's permission.  Pt's PCP is Dr. Jose R Franks.   Pt lives in a single story house with spouse  ( Willis Phillip [phone]).  Pt is independent with ADL's.  Pt has home O2 thru [employer] and a nebulizer.   Pt gets prescriptions at [facility] in [facility].   Pt denies any issues affording medications.  Pt is not current with HH.  Pt has not been in SNF.  Pt provided a HH list and SNF list.  Pt's choice [facility].  [facility] ([id number]) called to follow.  Plan home with [facility].  JAMISON Castaneda RN        Discharge Codes    No documentation.             Ally Castaneda, RN

## 2019-11-14 NOTE — CONSULTS
Patient Name: Shani Phillip  :1950  69 y.o.    Date of Admission: 2019  Encounter Provider: Zenia Tinajero MD  Date of Encounter Visit: 19  Place of Service: [facility] CARDIOLOGY  Referring Provider: Migel Kearney MD  Patient Care Team:  Jose R Franks MD as PCP - General  Jose R Franks MD as PCP - Family Medicine      Chief complaint: Endocarditis    Reason for Consult: Atrial Fibrillation    History of Present Illness:    Shani Phillip is a 69-year-old female with a history of COPD (nocturnal home O2), former smoker, restless leg syndrome, overactive bladder, and chronic pain.  She has no history of any cardiac problems.  She was on a cruise when she had the sudden onset of fevers and chills.  She went to the [facility] and had a temperature 105.  She was taken to [facility] where she was hospitalized from November 3 to .  Yesterday, she was put on a plane and sent home and told to immediately go to emergency department.  She was not sent with any medical records.  She is given me permission to look at her records from the [facility].     I have reviewed the records that I have been able to pull through Care Everywhere. She had an echocardiogram on 2019 which showed normal LV function with an ejection fraction of 75%. She was in atrial fibrillation at that time and her diastolic function could not be determined. She had a calcified mitral annulus causing mild mitral stenosis. There was trace mitral tricuspid. A mass was seen on the anterior mitral valve leaflet suggestive of vegetation. It measured at 1.4 cm. The tricuspid valve had mild tricuspid regurgitation with a right ventricular systolic pressure of 40-45 mmHg. She had a transesophageal echo on 2019. This again showed normal left ventricular systolic function. There was no thrombus in the left atrium. The aortic valve was normal. Again, on the  mitral valve a 1.4 cm vegetation which could be attached to the annulus at the T3 scallop was mobile. On 11/09/2019 she had a 6-minute walk test and at baseline her O2 sat was 83% and she was maintained on 2L of nasal cannula oxygen to maintain her oxygen saturations. Unfortunately, I do not see any notes in Care Everywhere or any laboratory data. They did not send any records with her or a CD of the transthoracic or transesophageal echo.       Currently, she is unaware of the atrial fibrillation.  She denies palpitations.  She feels like her breathing is at baseline.  She denies chest pain or lower extremity edema.    Past Medical History:   Diagnosis Date   • COPD (chronic obstructive pulmonary disease) (CMS/[facility])    • Renal disorder    • Restless leg syndrome        Past Surgical History:   Procedure Laterality Date   • CHOLECYSTECTOMY     • KNEE ARTHROPLASTY     • LAPAROSCOPIC GASTRIC BANDING           Prior to Admission medications    Medication Sig Start Date End Date Taking? Authorizing Provider   budesonide (PULMICORT) 0.5 MG/2ML nebulizer solution Take 0.5 mg by nebulization Daily.   Yes Marnie Monroy MD   cefepime (MAXIPIME) 2 g injection Inject  into the appropriate muscle as directed by prescriber.   Yes Marnie Monroy MD   dilTIAZem (CARDIZEM) 60 MG tablet Take 60 mg by mouth 4 (Four) Times a Day.   Yes Marnie Monroy MD   ipratropium (ATROVENT) 0.02 % nebulizer solution Take 500 mcg by nebulization 4 (Four) Times a Day.   Yes Marnie Monroy MD   metroNIDAZOLE (FLAGYL) 500 MG tablet Take 500 mg by mouth 3 (Three) Times a Day.   Yes Marnie Monroy MD   predniSONE (DELTASONE) 20 MG tablet Take 20 mg by mouth Daily.   Yes Marnie Monroy MD   rOPINIRole (REQUIP) 2 MG tablet Take 2 mg by mouth 2 (Two) Times a Day.   Yes Marnie Monroy MD   TROSPIUM CHLORIDE PO Take 20 mg by mouth 2 (Two) Times a Day.   Yes Marnie Monroy MD   senna-docusate sodium  (SENOKOT-S) 8.6-50 MG tablet Take 1 tablet by mouth Daily.    Provider, Historical, MD       Allergies   Allergen Reactions   • Penicillins Rash     RASH 30 YRS AGO NO HOSPITALIZATION       Social History     Socioeconomic History   • Marital status:      Spouse name: Not on file   • Number of children: Not on file   • Years of education: Not on file   • Highest education level: Not on file   Tobacco Use   • Smoking status: Current Every Day Smoker     Packs/day: 0.50     Types: Cigarettes   • Smokeless tobacco: Former User   • Tobacco comment: LAST CIG NOV 02, 2019   Substance and Sexual Activity   • Alcohol use: No     Frequency: Never   • Drug use: No   • Sexual activity: Defer       History reviewed. No pertinent family history.    REVIEW OF SYSTEMS:   All other systems reviewed and negative.        Objective:     Vitals:    11/14/19 0452 11/14/19 0538 11/14/19 0634 11/14/19 0737   BP: 137/77 128/74 136/90 109/97   BP Location: Left arm Left arm Left arm Left arm   Patient Position:    Lying   Pulse: 115 101 120 (!) 136   Resp:    18   Temp:    98.3 °F (36.8 °C)   TempSrc:    Oral   SpO2: 97%  98% 94%   Weight:       Height:         Body mass index is 25.89 kg/m².    Intake/Output Summary (Last 24 hours) at 11/14/2019 0842  Last data filed at 11/14/2019 0752  Gross per 24 hour   Intake 200 ml   Output --   Net 200 ml     Constitutional: She is oriented to person, place, and time. She appears well-developed. She does not appear ill.   HENT:   Head: Normocephalic and atraumatic. Head is without contusion.   Right Ear: Hearing normal. No drainage.   Left Ear: Hearing normal. No drainage.   Nose: No nasal deformity. No epistaxis.   Eyes: Lids are normal. Right eye exhibits no exudate. Left eye exhibits no exudate.  Neck: No JVD present. Carotid bruit is not present. No tracheal deviation present. No thyroid mass and no thyromegaly present.   Cardiovascular: irreg irreg and tachy  Pulmonary/Chest: wheezes B    Abdominal: Soft. Normal appearance and bowel sounds are normal. There is no tenderness.   Musculoskeletal: Normal range of motion.        Right shoulder: She exhibits no deformity.        Left shoulder: She exhibits no deformity.   Neurological: She is alert and oriented to person, place, and time. She has normal strength.   Skin: Skin is warm, dry and intact. No rash noted.   Psychiatric: She has a normal mood and affect. Her behavior is normal. Thought content normal.   Vitals reviewed      Lab Review:     Results from last 7 days   Lab Units 11/14/19  0543 11/13/19 1956   SODIUM mmol/L 140 139   POTASSIUM mmol/L 4.0 4.3   CHLORIDE mmol/L 100 98   CO2 mmol/L 31.8* 29.7*   BUN mg/dL 27* 28*   CREATININE mg/dL 0.57 0.64   CALCIUM mg/dL 8.1* 8.3*   BILIRUBIN mg/dL  --  0.4   ALK PHOS U/L  --  55   ALT (SGPT) U/L  --  23   AST (SGOT) U/L  --  15   GLUCOSE mg/dL 105* 176*         Results from last 7 days   Lab Units 11/14/19  0543   WBC 10*3/mm3 20.09*   HEMOGLOBIN g/dL 12.4   HEMATOCRIT % 35.6   PLATELETS 10*3/mm3 202         Results from last 7 days   Lab Units 11/13/19 1956   MAGNESIUM mg/dL 2.3           XRAY CHEST 11/13/2019  FINDINGS: There is a PICC line now in position tip superimposes  projected area of the superior vena cava. There is atherosclerotic  calcification of the aorta. Cardiac size within normal limits. Mitral  annulus calcification seen.     There is a small opacity within the right mid lung laterally  superimposing the posterior right eighth rib. Difficult to determine if  this represents callus formation from previous rib injury or parenchymal  scarring, pulmonary nodule, atelectasis or small infiltrate. The left  lung is clear. No pleural effusion identified. No gross pulmonary edema  seen. Mild interstitial prominence, cannot exclude minimal vascular  congestion.    EKG        I personally viewed and interpreted the patient's EKG/Telemetry data.        Assessment and Plan:       1. Atrial  fibrillation. Tachycardic. She is on diltiazem. Lovenox needs to be increased. I am going to add metoprolol. As it seems she has had a lapse in anticoagulation since her transesophageal echo in [place], she will not be able to be cardioverted without a repeat REDD.   2. Presumed endocarditis. Records from [place] are lacking in crucial information. I am going to repeat a transthoracic echocardiogram today and try to get a CD copy of her transesophageal echo from [place].   3. History of COPD.   4. Influenza.       Zenia Tinajero MD  11/14/19  8:42 AM

## 2019-11-14 NOTE — DISCHARGE PLACEMENT REQUEST
"Mehran Phillip (69 y.o. Female)     Date of Birth Social Security Number Address Home Phone MRN    1950  [address]  [place] [id number] [phone] [id number]    Orthodox Marital Status          [place]        Admission Date Admission Type Admitting Provider Attending Provider Department, Room/Bed    11/13/19 Emergency Rodríguez Sandoval MD Lykins, Matthew D, MD [address], [address]/1    Discharge Date Discharge Disposition Discharge Destination                       Attending Provider:  Rodríguez Sandoval MD    Allergies:  Penicillins    Isolation:  None   Infection:  None   Code Status:  CPR    Ht:  154.9 cm (61\")   Wt:  62.1 kg (137 lb)    Admission Cmt:  None   Principal Problem:  None                Active Insurance as of 11/13/2019     Primary Coverage     Payor Plan Insurance Group Employer/Plan Group    MEDICARE MEDICARE A & B      Payor Plan Address Payor Plan Phone Number Payor Plan Fax Number Effective Dates    [address] [phone]  9/1/2014 - None Entered    [facility] [id number]       Subscriber Name Subscriber Birth Date Member ID       MEHRAN PHILLIP 1950 [id number]           Secondary Coverage     Payor Plan Insurance Group Employer/Plan Group    AARP MED SUPP AARP HEALTH CARE OPTIONS      Payor Plan Address Payor Plan Phone Number Payor Plan Fax Number Effective Dates    [facility] [phone]  1/1/2016 - None Entered    [address]       [facility] [id number]       Subscriber Name Subscriber Birth Date Member ID       MEHRAN PHILLIP 1950 [id number]                 Emergency Contacts      (Rel.) Home Phone Work Phone Mobile Phone    Willis Phillip (Spouse) [phone] -- --    Cruz Lottie (Daughter) -- -- [phone]    Cruz Pro (Son) -- -- [phone]              "

## 2019-11-14 NOTE — NURSING NOTE
Spoke with IV Nurse, CRESENCIO Knutson, about existing PICC. IV nurse looked at xray and stated it was okay to use PICC that pt came to ER with. IV nurse stated that if there was a date on the PICC dressing and a Biopatch in place, then the dressing will be due to be changed 7 days from last dressing change.     Biopatch in place. Date on dressing was 11/12/19. Will continue to monitor.

## 2019-11-14 NOTE — ED NOTES
Per MD Richar after reviewing xray. Okay to use Picc line for medications.      Gris Corbin RN  11/13/19 6722

## 2019-11-14 NOTE — CONSULTS
Referring Provider: GENET Bergman    Subjective   History of present illness:    This very nice 69-year-old woman we are asked to provide evaluation opinion regarding sepsis.    Per review of the past medical records, she has a history of a E. coli septicemia managed by my partner Dr. Dilan Parrish and 2016.  She done well since then and went [place] in late October/early November.  Unfortunately while in port in [place], [place] she developed acute onset of shortness of breath and high fevers to 105.  She initially saw the cruise doctor but was then transferred to [facility].  And evaluation the emergency department she was in atrial fibrillation with rapid ventricular response.  She was started on diltiazem drip and became hypotensive.  She required CPAP therapy and was diagnosed with influenza as well as UTI and pneumonia.  She was started on vancomycin, cefepime and metronidazole per infectious diseases recommendations.  A surface echocardiogram was abnormal and a transesophageal echocardiogram was eventually obtained.  Findings were notable for a mobile 1.4 cm vegetation versus calcification.  Blood cultures were negative at [place], but apparently the patient had received antibiotics prior to presentation at their facility.  She was discharged on 6 weeks of vancomycin, cefepime and metronidazole all IV and was told to present to a local ER to establish home health.  CT surgery had evaluated the patient and out of the diagnosis of endocarditis at [place].    Past medical history: Restless leg syndrome, E. coli sepsis from nephrolithiasis, COPD, atrial fibrillation, restless leg, right total knee arthroplasty, cholecystectomy, gastric banding  No family history of infectious diseases  Allergies: Penicillin   Social history: She lives with her  in [place], [place].  Smoker.    Review of Systems  Pertinent items are noted in HPI, all other systems  reviewed and negative    Objective     Physical Exam:   Vital Signs   Temp:  [97.5 °F (36.4 °C)-98.7 °F (37.1 °C)] 98.3 °F (36.8 °C)  Heart Rate:  [101-137] 136  Resp:  [18-20] 18  BP: (106-140)/() 109/97    GENERAL: Awake and alert, in no acute distress.   HEENT: Oropharynx with thrush. Hearing is grossly normal.   EYES: PERRL. No conjunctival injection. No lid lag.   LYMPHATICS: No lymphadenopathy of the neck or inguinal regions.   HEART: Tachycardic and irregular. No peripheral edema.   LUNGS: Coarse with normal respiratory effort.   GI: Soft, nontender, nondistended. No appreciable organomegaly.   SKIN: Warm and dry without cutaneous eruptions   PSYCHIATRIC: Appropriate mood, affect, insight, and judgment.     Results Review:     Creatinine 0.57  White count 20.1  Platelets 202  Hemoglobin 12.4    Microbiology:      Radiology:  Chest x-ray, independently interpreted: No amyank pneumonia.  There may be a very small opacity versus rib abnormality in the right lateral midline    Assessment/Plan   1.  Influenza A status post 7 days of tamiflu  2. Afib RVR  3. COPD exacerbation on steroids  4. Leukocytosis related to above  5. Abnormal MV--endocarditis vs. Calcified mitral plaque.  6. Thrush    She has possible diagnosis of endocarditis at [facility].  Is really paramount that we get this diagnosed accurately to save her a couple months of antibiotics.  The REDD was somewhat equivocal and CT surgery at [place] thought it was a calcified mobile plaque.  Clinically it would not be common to present with influenza A/COPD exacerbation +/- PNA and endocarditis.  I am going to see if cardiology could review the REDD here and if not then I think REDD should be repeated.  If she truly has a 1.8 cm left sided vegetation then she would be probably benefit from surgery. I'll check strep and legionella ag.  I'll keep her on vanc for goal level of 15-20. Anaerobic endocarditis isn't really a thing so we can stop  metronidazole.  She doesn't have impressive pseudomonal risk factors so can also change cefepime to ceftriaxone.  Clotrimazole for thrush ordered    Thank you for this consult.  We will continue to follow along and tailor antibiotics as the patient's clinical course evolves.      Mike Vidal MD  11/14/19  9:16 AM

## 2019-11-14 NOTE — NURSING NOTE
Called Infectious Disease consult and spoke to answering service.    Called Cardiology consult and spoke with Lisa

## 2019-11-14 NOTE — H&P
Patient Name:  Shani Phillip  YOB: 1950  MRN:  [id number]  Admit Date:  11/13/2019  Patient Care Team:  Jose R Franks MD as PCP - General  Jose R Franks MD as PCP - Family Medicine      Subjective   History Present Illness     Chief Complaint   Patient presents with   • Shortness of Breath     HPI  Ms. Phillip is a 69 y.o. current everyday smoker with a history of atrial fibrillation and COPD, that presents to [facility] for ongoing treatment.  Patient went on a cruise earlier this month.  Unfortunately while she was on the cruise she developed shortness of breath and had a fever of 105.  She was transferred to [facility] where she was found to have pneumonia, influenza A, UTI and atrial fibrillation with RVR.  She was admitted to their ICU, started on a diltiazem drip and did not tolerate as she became hypotensive.  Was also placed on a CPAP machine.  Echocardiogram there showed mobile 1.4 cm vegetation versus calcification.  She was able to be safely discharged and was told she needed to be on 6 weeks of vancomycin, cefepime and metronidazole.  She was also instructed to come to an ED immediately after she landed for ongoing treatment of suspected endocarditis possibly requiring surgery.  At present she denies chest pain, palpitations, edema, fever, chills, nausea and vomiting.  She does report wheezing and shortness of breath especially on exertion.       Review of Systems   Constitutional: Negative for chills and fever.   HENT: Negative for congestion and dental problem.    Eyes: Negative for discharge and itching.   Respiratory: Positive for shortness of breath and wheezing.    Cardiovascular: Negative.    Gastrointestinal: Negative for abdominal distention and abdominal pain.   Endocrine: Negative.    Genitourinary: Negative for difficulty urinating and dysuria.   Musculoskeletal: Negative for back pain and gait problem.   Skin: Negative.     Allergic/Immunologic: Negative.    Neurological: Negative for dizziness and headaches.   Hematological: Negative.    Psychiatric/Behavioral: Negative for agitation and behavioral problems.        Personal History     Past Medical History:   Diagnosis Date   • Atrial fibrillation with RVR (CMS/[facility]) 11/14/2019   • COPD (chronic obstructive pulmonary disease) (CMS/[facility])    • COPD with acute exacerbation (CMS/[facility]) 11/14/2019   • Influenza 11/14/2019   • Renal disorder    • Restless leg syndrome    • Thrush, oral 11/14/2019     Past Surgical History:   Procedure Laterality Date   • CHOLECYSTECTOMY     • KNEE ARTHROPLASTY     • LAPAROSCOPIC GASTRIC BANDING       History reviewed. No pertinent family history.  Social History     Tobacco Use   • Smoking status: Current Every Day Smoker     Packs/day: 0.50     Types: Cigarettes   • Smokeless tobacco: Former User   • Tobacco comment: LAST CIG NOV 02, 2019   Substance Use Topics   • Alcohol use: No     Frequency: Never   • Drug use: No     No current facility-administered medications on file prior to encounter.      Current Outpatient Medications on File Prior to Encounter   Medication Sig Dispense Refill   • budesonide (PULMICORT) 0.5 MG/2ML nebulizer solution Take 0.5 mg by nebulization Daily.     • cefepime (MAXIPIME) 2 g injection Inject  into the appropriate muscle as directed by prescriber.     • dilTIAZem (CARDIZEM) 60 MG tablet Take 60 mg by mouth 4 (Four) Times a Day.     • ipratropium (ATROVENT) 0.02 % nebulizer solution Take 500 mcg by nebulization 4 (Four) Times a Day.     • metroNIDAZOLE (FLAGYL) 500 MG tablet Take 500 mg by mouth 3 (Three) Times a Day.     • predniSONE (DELTASONE) 20 MG tablet Take 20 mg by mouth Daily.     • rOPINIRole (REQUIP) 2 MG tablet Take 2 mg by mouth 2 (Two) Times a Day.     • TROSPIUM CHLORIDE PO Take 20 mg by mouth 2 (Two) Times a Day.     • senna-docusate sodium (SENOKOT-S) 8.6-50 MG tablet Take 1 tablet by mouth Daily.        Allergies   Allergen Reactions   • Penicillins Rash     RASH 30 YRS AGO NO HOSPITALIZATION       Objective    Objective     Vital Signs  Temp:  [97.5 °F (36.4 °C)-98.7 °F (37.1 °C)] 97.6 °F (36.4 °C)  Heart Rate:  [101-152] 110  Resp:  [18-20] 18  BP: (100-140)/() 109/96  SpO2:  [91 %-100 %] 91 %  on  Flow (L/min):  [1-2.5] 1;   Device (Oxygen Therapy): nasal cannula  Body mass index is 25.89 kg/m².    Physical Exam   Constitutional: She is oriented to person, place, and time. She appears well-developed and well-nourished. No distress.   HENT:   Head: Normocephalic and atraumatic.   Mouth/Throat: She has dentures.   thrush   Eyes: Conjunctivae and EOM are normal.   Neck: Normal range of motion. Neck supple.   Cardiovascular: Normal rate. An irregularly irregular rhythm present.   Pulmonary/Chest: Effort normal. No respiratory distress. She has wheezes. She has rales.   Abdominal: Soft. Bowel sounds are normal. She exhibits no distension. There is no tenderness.   Musculoskeletal: Normal range of motion. She exhibits no tenderness.   Neurological: She is alert and oriented to person, place, and time.   Skin: Skin is warm and dry.   Psychiatric: She has a normal mood and affect. Her behavior is normal.   Nursing note and vitals reviewed.      Results Review:  I reviewed the patient's new clinical results.  I reviewed the patient's new imaging results and agree with the interpretation.  I reviewed the patient's other test results and agree with the interpretation  I personally viewed and interpreted the patient's EKG/Telemetry data  Discussed with ED provider.    Lab Results (last 24 hours)     Procedure Component Value Units Date/Time    CBC & Differential [[id number]] Collected:  11/13/19 1955    Specimen:  Blood Updated:  11/13/19 2005    Narrative:       The following orders were created for panel order CBC & Differential.  Procedure                               Abnormality         Status                      ---------                               -----------         ------                     CBC Auto Differential[[id number]]        Abnormal            Final result                 Please view results for these tests on the individual orders.    CBC Auto Differential [[id number]]  (Abnormal) Collected:  11/13/19 1955    Specimen:  Blood Updated:  11/13/19 2005     WBC 21.33 10*3/mm3      RBC 4.36 10*6/mm3      Hemoglobin 13.4 g/dL      Hematocrit 38.0 %      MCV 87.2 fL      MCH 30.7 pg      MCHC 35.3 g/dL      RDW 13.3 %      RDW-SD 41.7 fl      MPV 10.2 fL      Platelets 216 10*3/mm3      Neutrophil % 89.8 %      Lymphocyte % 4.2 %      Monocyte % 3.8 %      Eosinophil % 0.0 %      Basophil % 0.3 %      Immature Grans % 1.9 %      Neutrophils, Absolute 19.16 10*3/mm3      Lymphocytes, Absolute 0.89 10*3/mm3      Monocytes, Absolute 0.80 10*3/mm3      Eosinophils, Absolute 0.00 10*3/mm3      Basophils, Absolute 0.07 10*3/mm3      Immature Grans, Absolute 0.41 10*3/mm3      nRBC 0.0 /100 WBC     Comprehensive Metabolic Panel [[id number]]  (Abnormal) Collected:  11/13/19 1956    Specimen:  Blood Updated:  11/13/19 2037     Glucose 176 mg/dL      BUN 28 mg/dL      Creatinine 0.64 mg/dL      Sodium 139 mmol/L      Potassium 4.3 mmol/L      Chloride 98 mmol/L      CO2 29.7 mmol/L      Calcium 8.3 mg/dL      Total Protein 6.1 g/dL      Albumin 3.40 g/dL      ALT (SGPT) 23 U/L      AST (SGOT) 15 U/L      Alkaline Phosphatase 55 U/L      Total Bilirubin 0.4 mg/dL      eGFR Non African Amer 92 mL/min/1.73      Globulin 2.7 gm/dL      A/G Ratio 1.3 g/dL      BUN/Creatinine Ratio 43.8     Anion Gap 11.3 mmol/L     Narrative:       GFR Normal >60  Chronic Kidney Disease <60  Kidney Failure <15    BNP [[id number]]  (Abnormal) Collected:  11/13/19 1956    Specimen:  Blood Updated:  11/13/19 2035     proBNP 1,716.0 pg/mL     Narrative:       Among patients with dyspnea, NT-proBNP is highly sensitive for the detection of acute  "congestive heart failure. In addition NT-proBNP of <300 pg/ml effectively rules out acute congestive heart failure with 99% negative predictive value.    Magnesium [[id number]]  (Normal) Collected:  11/13/19 1956    Specimen:  Blood Updated:  11/13/19 2037     Magnesium 2.3 mg/dL     Lactic Acid, Plasma [[id number]]  (Normal) Collected:  11/14/19 0053    Specimen:  Blood Updated:  11/14/19 0127     Lactate 1.8 mmol/L     Procalcitonin [[id number]]  (Abnormal) Collected:  11/14/19 0053    Specimen:  Blood Updated:  11/14/19 0144     Procalcitonin 0.07 ng/mL     Narrative:       As a Marker for Sepsis (Non-Neonates):   1. <0.5 ng/mL represents a low risk of severe sepsis and/or septic shock.  1. >2 ng/mL represents a high risk of severe sepsis and/or septic shock.    As a Marker for Lower Respiratory Tract Infections that require antibiotic therapy:  PCT on Admission     Antibiotic Therapy             6-12 Hrs later  > 0.5                Strongly Recommended            >0.25 - <0.5         Recommended  0.1 - 0.25           Discouraged                   Remeasure/reassess PCT  <0.1                 Strongly Discouraged          Remeasure/reassess PCT      As 28 day mortality risk marker: \"Change in Procalcitonin Result\" (> 80 % or <=80 %) if Day 0 (or Day 1) and Day 4 values are available. Refer to http://www.[employer]-pct-calculator.com/   Change in PCT <=80 %   A decrease of PCT levels below or equal to 80 % defines a positive change in PCT test result representing a higher risk for 28-day all-cause mortality of patients diagnosed with severe sepsis or septic shock.  Change in PCT > 80 %   A decrease of PCT levels of more than 80 % defines a negative change in PCT result representing a lower risk for 28-day all-cause mortality of patients diagnosed with severe sepsis or septic shock.                Basic Metabolic Panel [[id number]]  (Abnormal) Collected:  11/14/19 0543    Specimen:  Blood Updated:  11/14/19 0648     Glucose " 105 mg/dL      BUN 27 mg/dL      Creatinine 0.57 mg/dL      Sodium 140 mmol/L      Potassium 4.0 mmol/L      Chloride 100 mmol/L      CO2 31.8 mmol/L      Calcium 8.1 mg/dL      eGFR Non African Amer 105 mL/min/1.73      BUN/Creatinine Ratio 47.4     Anion Gap 8.2 mmol/L     Narrative:       GFR Normal >60  Chronic Kidney Disease <60  Kidney Failure <15    CBC (No Diff) [[id number]]  (Abnormal) Collected:  11/14/19 0543    Specimen:  Blood Updated:  11/14/19 0626     WBC 20.09 10*3/mm3      RBC 4.11 10*6/mm3      Hemoglobin 12.4 g/dL      Hematocrit 35.6 %      MCV 86.6 fL      MCH 30.2 pg      MCHC 34.8 g/dL      RDW 13.0 %      RDW-SD 40.1 fl      MPV 10.3 fL      Platelets 202 10*3/mm3           Imaging Results (Last 24 Hours)     Procedure Component Value Units Date/Time    XR Chest 2 View [[id number]] Collected:  11/13/19 1941     Updated:  11/1950    Narrative:       XR CHEST 2 VW-     Clinical: Shortness of breath     COMPARISON 1/8/2016     FINDINGS: There is a PICC line now in position tip superimposes  projected area of the superior vena cava. There is atherosclerotic  calcification of the aorta. Cardiac size within normal limits. Mitral  annulus calcification seen.     There is a small opacity within the right mid lung laterally  superimposing the posterior right eighth rib. Difficult to determine if  this represents callus formation from previous rib injury or parenchymal  scarring, pulmonary nodule, atelectasis or small infiltrate. The left  lung is clear. No pleural effusion identified. No gross pulmonary edema  seen. Mild interstitial prominence, cannot exclude minimal vascular  congestion.     This report was finalized on 11/13/2019 7:46 PM by Dr. Julio Flood M.D.                  SCANNED EKG   Final Result      ECG 12 Lead   Preliminary Result   HEART RATE= 125  bpm   RR Interval= 480  ms   WV Interval= 133  ms   P Horizontal Axis=   deg   P Front Axis= 261  deg   QRSD Interval= 98  ms   QT  Interval= 310  ms   QRS Axis= -56  deg   T Wave Axis= 22  deg   - ABNORMAL ECG -   Sinus or ectopic atrial tachycardia   Multiple premature complexes, vent & supraven   Left axis deviation   Consider right ventricular hypertrophy   Electronically Signed By:    Date and Time of Study: 2019-11-13 18:58:42           Assessment/Plan     Active Hospital Problems    Diagnosis POA   • **Acute endocarditis [I33.9] Yes   • Influenza [J11.1] Yes   • Thrush, oral [B37.0] Yes   • COPD with acute exacerbation (CMS/[facility]) [J44.1] Yes   • Atrial fibrillation with RVR (CMS/[facility]) [I48.91] Yes     Presumed Endocarditis   - appreciate cardiology   - TTE pending ?REDD  - appreciate ID, abx per them    Influenza A  - s/p Tamiflu    COPD with exacerbation   - wean o2 as tolerated  - steroids, brovana,  IS, mucinex, duo-nebs    Atrial Fibrillation  - metoprolol added per cardiology  - continue diltiazem gtt  - lovenox increased    Oral Thrush  - continue clotrimazole     · I discussed the patient's findings and my recommendations with patient, family and nursing staff.    VTE Prophylaxis - SCDs.  Code Status - Full code.       GENET Ponce  [facility]  11/14/19  2:13 PM

## 2019-11-14 NOTE — PROGRESS NOTES
Discharge Planning Assessment  [facility]     Patient Name: Shani Phillip  MRN: [id number]  Today's Date: 11/14/2019    Admit Date: 11/13/2019    Discharge Needs Assessment     Row Name 11/14/19 0821       Living Environment    Lives With  spouse    Name(s) of Who Lives With Patient  Spouse  ( Willis Phillip [phone])    Current Living Arrangements  home/apartment/condo    Primary Care Provided by  self    Provides Primary Care For  no one    Family Caregiver if Needed  child(susana), adult;spouse    Family Caregiver Names  Spouse  ( Willis Phillip [phone])  and Daughter  (Lottie Arroyo)    Quality of Family Relationships  involved;supportive    Able to Return to Prior Arrangements  yes    Living Arrangement Comments  Pt lives in a single story house with spouse  ( Willis Phillip [phone]).       Resource/Environmental Concerns    Resource/Environmental Concerns  none    Transportation Concerns  car, none       Transition Planning    Patient/Family Anticipates Transition to  home with family    Patient/Family Anticipated Services at Transition  none    Transportation Anticipated  family or friend will provide       Discharge Needs Assessment    Readmission Within the Last 30 Days  previous discharge plan unsuccessful    Concerns to be Addressed  denies needs/concerns at this time    Equipment Currently Used at Home  nebulizer;oxygen    Anticipated Changes Related to Illness  none    Equipment Needed After Discharge  nebulizer;oxygen    Offered/Gave Vendor List  yes        Discharge Plan     Row Name 11/14/19 0823       Plan    Plan  Plan home with [facility] MIRTA Castaneda RN    Patient/Family in Agreement with Plan  yes    Plan Comments  FACE SHEET VERIFIED/ KHAN SIGNED.  Spoke to pt and pt's daughter  (Lottie Arroyo) at bedside with pt's permission.  Pt's PCP is Dr. Jose R Franks.   Pt lives in a single story house with spouse  ( Willis Phillip [phone]).  Pt is independent with ADL's.   Pt has home O2 thru [employer] and a nebulizer.   Pt gets prescriptions at [facility] in [facility].   Pt denies any issues affording medications.  Pt is not current with HH.  Pt has not been in SNF.  Pt provided a HH list and SNF list.  Pt's choice [facility].  [facility] ([id number]) called to follow.  Plan home with [facility].  JAMISON Castaneda RN        Destination      No service coordination in this encounter.      Durable Medical Equipment      No service coordination in this encounter.      Dialysis/Infusion      No service coordination in this encounter.      Home Medical Care      Service Provider Request Status Selected Services Address Phone Number Fax Number    [facility] Pending - Request Sent N/A [address] [address] [phone]      Therapy      No service coordination in this encounter.      Community Resources      No service coordination in this encounter.          Demographic Summary     Row Name 11/14/19 0821       General Information    Admission Type  observation    Arrived From  emergency department    Required Notices Provided  Observation Status Notice    Referral Source  admission list    Preferred Language  English     Used During This Interaction  no        Functional Status     Row Name 11/14/19 0821       Functional Status    Usual Activity Tolerance  good    Current Activity Tolerance  moderate       Functional Status, IADL    Medications  independent    Meal Preparation  independent    Housekeeping  independent    Laundry  independent    Shopping  independent       Mental Status    General Appearance WDL  WDL       Mental Status Summary    Recent Changes in Mental Status/Cognitive Functioning  no changes        Psychosocial    No documentation.       Abuse/Neglect    No documentation.       Legal    No documentation.       Substance Abuse    No documentation.       Patient Forms    No documentation.           Ally Castaneda,  RN

## 2019-11-14 NOTE — PROGRESS NOTES
"Pharmacy Consult - Lovenox    Shani Phillip has been consulted for pharmacy to dose enoxaparin for VTE prophylaxis per Zenia Drew's request.         Relevant clinical data and objective history reviewed:  69 y.o. female 154.9 cm (61\") 62.1 kg (137 lb)    Home Anticoagulation: none    Past Medical History:   Diagnosis Date   • COPD (chronic obstructive pulmonary disease) (CMS/HCC)    • Renal disorder    • Restless leg syndrome      is allergic to penicillins.    Lab Results   Component Value Date    WBC 21.33 (H) 11/13/2019    HGB 13.4 11/13/2019    HCT 38.0 11/13/2019    MCV 87.2 11/13/2019     11/13/2019     Lab Results   Component Value Date    GLUCOSE 176 (H) 11/13/2019    CALCIUM 8.3 (L) 11/13/2019     11/13/2019    K 4.3 11/13/2019    CO2 29.7 (H) 11/13/2019    CL 98 11/13/2019    BUN 28 (H) 11/13/2019    CREATININE 0.64 11/13/2019    EGFRIFNONA 92 11/13/2019    BCR 43.8 (H) 11/13/2019    ANIONGAP 11.3 11/13/2019       Estimated Creatinine Clearance: 55.3 mL/min (by C-G formula based on SCr of 0.64 mg/dL).    Active Inpatient Anticoagulation Orders: none    Assessment/Plan    Will start patient on 40 mg subcutaneous every 24 hours, adjusted for renal function. Labs to be ordered by clinical pharmacist in morning. Pharmacy will discontinue the Pharmacy to Dose consult at this time.  Renal function will continue to be monitored and dosing adjustments will be made by pharmacy based on renal function if necessary.       Magdaleno Tobias, [facility]    "

## 2019-11-14 NOTE — PROGRESS NOTES
"Pharmacokinetic Consult - Vancomycin and Cefepime and Metronidazole Dosing (Initial Note)    Shani Kenji has been consulted for pharmacy to dose vancomycin, cefepime and metronidazole for endocarditis.  Pharmacy dosing antibiotics per Zenia Drew's request.   Vancomycin goal trough: 15-20 mg/L   Other antimicrobials: none    Relevant clinical data and objective history reviewed:  69 y.o. female 154.9 cm (61\") 62.1 kg (137 lb)    Past Medical History:   Diagnosis Date   • COPD (chronic obstructive pulmonary disease) (CMS/HCC)    • Renal disorder    • Restless leg syndrome      Creatinine   Date Value Ref Range Status   11/13/2019 0.64 0.57 - 1.00 mg/dL Final   02/02/2016 0.86 0.57 - 1.00 mg/dL Final   01/28/2016 0.80 0.57 - 1.00 mg/dL Final   01/18/2016 0.66 0.57 - 1.00 mg/dL Final     BUN   Date Value Ref Range Status   11/13/2019 28 (H) 8 - 23 mg/dL Final     Estimated Creatinine Clearance: 55.3 mL/min (by C-G formula based on SCr of 0.64 mg/dL).    Lab Results   Component Value Date    WBC 21.33 (H) 11/13/2019     Temp Readings from Last 3 Encounters:   11/14/19 97.6 °F (36.4 °C) (Oral)        Past medical history:@[facility]@  [unfilled]    Lab Results   Component Value Date    GLUCOSE 176 (H) 11/13/2019    CALCIUM 8.3 (L) 11/13/2019     11/13/2019    K 4.3 11/13/2019    CO2 29.7 (H) 11/13/2019    CL 98 11/13/2019    BUN 28 (H) 11/13/2019    EGFRIFNONA 92 11/13/2019    BCR 43.8 (H) 11/13/2019    ANIONGAP 11.3 11/13/2019     Lab Results   Component Value Date    WBC 21.33 (H) 11/13/2019    HGB 13.4 11/13/2019    HCT 38.0 11/13/2019    MCV 87.2 11/13/2019     11/13/2019       No intake/output data recorded.  Blood pressure 140/89, pulse 110, temperature 97.6 °F (36.4 °C), temperature source Oral, resp. rate 18, height 154.9 cm (61\"), weight 62.1 kg (137 lb), SpO2 98 %.          Assessment/Plan for vancomycin  Will start vancomycin 750 mg IV Q12h (LD 1,000mg). Will monitor serum creatinine every 24 " hours for the first 3 days then at least every 48 hours per dosing recommendations. Vancomycin level prior to 4th dose. Pharmacy will continue to follow daily while on vancomycin and adjust as needed.     Assessment/Plan for Cefepime  Will start patient on 2gm Q12h. Pharmacy will discontinue the Pharmacy to Dose consult at this time.  Renal function will continue to be monitored and dosing adjustments will be made by pharmacy based on renal function if necessary.    Assessment/Plan for Metronidazole   Will start at 500mg Q6h. Pharmacy will discontinue the Pharmacy to Dose consult at this time.  Renal function will continue to be monitored and dosing adjustments will be made by pharmacy based on renal function if necessary.        Magdaleno Tobias, [facility]

## 2019-11-14 NOTE — NURSING NOTE
Spoke with GENET Bergman, about pt screening sepsis positive d/t pt's heart rate and WBC count. Made her aware that a lactate was not drawn in the ER. Stated she will let Dr. Kearney know. Will continue to monitor.

## 2019-11-14 NOTE — PLAN OF CARE
Problem: Patient Care Overview  Goal: Plan of Care Review  Outcome: Ongoing (interventions implemented as appropriate)   11/14/19 1640   Coping/Psychosocial   Plan of Care Reviewed With patient   Plan of Care Review   Progress improving   OTHER   Outcome Summary Patient alert and oriented. Up with minimal assist. Family at bedside. Patient went for an echo with an EF of 70%. Patient on solu-medrol. Heart rate and blood pressure better controlled with metoprolol and cardizem drip. No complaints of pain. Vital signs are stable. No acute distress noted. Will continue to monitor.      Goal: Individualization and Mutuality  Outcome: Ongoing (interventions implemented as appropriate)    Goal: Discharge Needs Assessment  Outcome: Ongoing (interventions implemented as appropriate)    Goal: Interprofessional Rounds/Family Conf  Outcome: Ongoing (interventions implemented as appropriate)      Problem: Sepsis/Septic Shock (Adult)  Goal: Signs and Symptoms of Listed Potential Problems Will be Absent, Minimized or Managed (Sepsis/Septic Shock)  Outcome: Ongoing (interventions implemented as appropriate)      Problem: Arrhythmia/Dysrhythmia (Symptomatic) (Adult)  Goal: Signs and Symptoms of Listed Potential Problems Will be Absent, Minimized or Managed (Arrhythmia/Dysrhythmia)  Outcome: Ongoing (interventions implemented as appropriate)

## 2019-11-15 ENCOUNTER — APPOINTMENT (OUTPATIENT)
Dept: CARDIOLOGY | Facility: HOSPITAL | Age: 69
End: 2019-11-15

## 2019-11-15 LAB
BH CV ECHO MEAS - BSA(HAYCOCK): 1.6 M^2
BH CV ECHO MEAS - BSA: 1.6 M^2
BH CV ECHO MEAS - BZI_BMI: 24 KILOGRAMS/M^2
BH CV ECHO MEAS - BZI_METRIC_HEIGHT: 154.9 CM
BH CV ECHO MEAS - BZI_METRIC_WEIGHT: 57.6 KG
BH CV ECHO MEAS - MV MAX PG: 11.4 MMHG
BH CV ECHO MEAS - MV MEAN PG: 4 MMHG
BH CV ECHO MEAS - MV V2 MAX: 169 CM/SEC
BH CV ECHO MEAS - MV V2 MEAN: 82.2 CM/SEC
BH CV ECHO MEAS - MV V2 VTI: 39.2 CM
VANCOMYCIN TROUGH SERPL-MCNC: 17.2 MCG/ML (ref 5–20)

## 2019-11-15 PROCEDURE — 94799 UNLISTED PULMONARY SVC/PX: CPT

## 2019-11-15 PROCEDURE — 93320 DOPPLER ECHO COMPLETE: CPT | Performed by: INTERNAL MEDICINE

## 2019-11-15 PROCEDURE — 93325 DOPPLER ECHO COLOR FLOW MAPG: CPT

## 2019-11-15 PROCEDURE — 92960 CARDIOVERSION ELECTRIC EXT: CPT | Performed by: INTERNAL MEDICINE

## 2019-11-15 PROCEDURE — 93312 ECHO TRANSESOPHAGEAL: CPT

## 2019-11-15 PROCEDURE — 99221 1ST HOSP IP/OBS SF/LOW 40: CPT | Performed by: THORACIC SURGERY (CARDIOTHORACIC VASCULAR SURGERY)

## 2019-11-15 PROCEDURE — 25010000002 METHYLPREDNISOLONE PER 40 MG: Performed by: NURSE PRACTITIONER

## 2019-11-15 PROCEDURE — 93005 ELECTROCARDIOGRAM TRACING: CPT | Performed by: INTERNAL MEDICINE

## 2019-11-15 PROCEDURE — 99152 MOD SED SAME PHYS/QHP 5/>YRS: CPT

## 2019-11-15 PROCEDURE — 25010000003 CEFTRIAXONE PER 250 MG: Performed by: INTERNAL MEDICINE

## 2019-11-15 PROCEDURE — 93010 ELECTROCARDIOGRAM REPORT: CPT | Performed by: INTERNAL MEDICINE

## 2019-11-15 PROCEDURE — 25010000002 VANCOMYCIN 750 MG RECONSTITUTED SOLUTION: Performed by: NURSE PRACTITIONER

## 2019-11-15 PROCEDURE — 93320 DOPPLER ECHO COMPLETE: CPT

## 2019-11-15 PROCEDURE — 25010000002 MIDAZOLAM PER 1 MG: Performed by: INTERNAL MEDICINE

## 2019-11-15 PROCEDURE — 99232 SBSQ HOSP IP/OBS MODERATE 35: CPT | Performed by: INTERNAL MEDICINE

## 2019-11-15 PROCEDURE — 99233 SBSQ HOSP IP/OBS HIGH 50: CPT | Performed by: INTERNAL MEDICINE

## 2019-11-15 PROCEDURE — 80202 ASSAY OF VANCOMYCIN: CPT | Performed by: NURSE PRACTITIONER

## 2019-11-15 PROCEDURE — 93312 ECHO TRANSESOPHAGEAL: CPT | Performed by: INTERNAL MEDICINE

## 2019-11-15 PROCEDURE — 25010000002 FENTANYL CITRATE (PF) 100 MCG/2ML SOLUTION: Performed by: INTERNAL MEDICINE

## 2019-11-15 PROCEDURE — 92960 CARDIOVERSION ELECTRIC EXT: CPT

## 2019-11-15 PROCEDURE — 93325 DOPPLER ECHO COLOR FLOW MAPG: CPT | Performed by: INTERNAL MEDICINE

## 2019-11-15 RX ORDER — FENTANYL CITRATE 50 UG/ML
INJECTION, SOLUTION INTRAMUSCULAR; INTRAVENOUS
Status: DISCONTINUED | OUTPATIENT
Start: 2019-11-15 | End: 2019-11-18

## 2019-11-15 RX ORDER — METOPROLOL TARTRATE 50 MG/1
50 TABLET, FILM COATED ORAL EVERY 8 HOURS
Status: DISCONTINUED | OUTPATIENT
Start: 2019-11-15 | End: 2019-11-16

## 2019-11-15 RX ORDER — LIDOCAINE HYDROCHLORIDE 20 MG/ML
SOLUTION OROPHARYNGEAL
Status: DISCONTINUED | OUTPATIENT
Start: 2019-11-15 | End: 2019-11-19 | Stop reason: HOSPADM

## 2019-11-15 RX ORDER — MIDAZOLAM HYDROCHLORIDE 1 MG/ML
INJECTION INTRAMUSCULAR; INTRAVENOUS
Status: DISCONTINUED | OUTPATIENT
Start: 2019-11-15 | End: 2019-11-18

## 2019-11-15 RX ADMIN — IPRATROPIUM BROMIDE 0.5 MG: 0.5 SOLUTION RESPIRATORY (INHALATION) at 16:12

## 2019-11-15 RX ADMIN — OXYBUTYNIN CHLORIDE 10 MG: 10 TABLET, EXTENDED RELEASE ORAL at 09:48

## 2019-11-15 RX ADMIN — CLOTRIMAZOLE 10 MG: 10 LOZENGE ORAL; TOPICAL at 17:50

## 2019-11-15 RX ADMIN — VANCOMYCIN HYDROCHLORIDE 750 MG: 750 INJECTION, POWDER, LYOPHILIZED, FOR SOLUTION INTRAVENOUS at 13:14

## 2019-11-15 RX ADMIN — SODIUM CHLORIDE, PRESERVATIVE FREE 10 ML: 5 INJECTION INTRAVENOUS at 21:19

## 2019-11-15 RX ADMIN — CLOTRIMAZOLE 10 MG: 10 LOZENGE ORAL; TOPICAL at 06:29

## 2019-11-15 RX ADMIN — IPRATROPIUM BROMIDE 0.5 MG: 0.5 SOLUTION RESPIRATORY (INHALATION) at 10:59

## 2019-11-15 RX ADMIN — FENTANYL CITRATE 50 MCG: 50 INJECTION INTRAMUSCULAR; INTRAVENOUS at 14:07

## 2019-11-15 RX ADMIN — CLOTRIMAZOLE 10 MG: 10 LOZENGE ORAL; TOPICAL at 21:18

## 2019-11-15 RX ADMIN — SODIUM CHLORIDE, PRESERVATIVE FREE 10 ML: 5 INJECTION INTRAVENOUS at 09:47

## 2019-11-15 RX ADMIN — METOPROLOL TARTRATE 50 MG: 25 TABLET ORAL at 09:48

## 2019-11-15 RX ADMIN — CLOTRIMAZOLE 10 MG: 10 LOZENGE ORAL; TOPICAL at 12:16

## 2019-11-15 RX ADMIN — CEFTRIAXONE SODIUM 2 G: 2 INJECTION, SOLUTION INTRAVENOUS at 12:17

## 2019-11-15 RX ADMIN — METHOHEXITAL SODIUM 10 MG: 500 INJECTION, POWDER, LYOPHILIZED, FOR SOLUTION INTRAMUSCULAR; INTRAVENOUS; RECTAL at 14:23

## 2019-11-15 RX ADMIN — ROPINIROLE 2 MG: 2 TABLET, FILM COATED ORAL at 09:48

## 2019-11-15 RX ADMIN — METHYLPREDNISOLONE SODIUM SUCCINATE 40 MG: 40 INJECTION, POWDER, FOR SOLUTION INTRAMUSCULAR; INTRAVENOUS at 09:47

## 2019-11-15 RX ADMIN — ARFORMOTEROL TARTRATE 15 MCG: 15 SOLUTION RESPIRATORY (INHALATION) at 07:52

## 2019-11-15 RX ADMIN — MIDAZOLAM 2 MG: 1 INJECTION INTRAMUSCULAR; INTRAVENOUS at 14:07

## 2019-11-15 RX ADMIN — BUDESONIDE 0.5 MG: 0.5 INHALANT RESPIRATORY (INHALATION) at 07:51

## 2019-11-15 RX ADMIN — METOPROLOL TARTRATE 25 MG: 25 TABLET ORAL at 02:11

## 2019-11-15 RX ADMIN — BENZOCAINE, BUTAMBEN, AND TETRACAINE HYDROCHLORIDE 1 SPRAY: .028; .004; .004 AEROSOL, SPRAY TOPICAL at 14:04

## 2019-11-15 RX ADMIN — LIDOCAINE HYDROCHLORIDE 10 ML: 20 SOLUTION ORAL; TOPICAL at 13:51

## 2019-11-15 RX ADMIN — METOPROLOL TARTRATE 50 MG: 25 TABLET ORAL at 17:49

## 2019-11-15 RX ADMIN — ROPINIROLE 2 MG: 2 TABLET, FILM COATED ORAL at 21:18

## 2019-11-15 RX ADMIN — BENZOCAINE, BUTAMBEN, AND TETRACAINE HYDROCHLORIDE 2 SPRAY: .028; .004; .004 AEROSOL, SPRAY TOPICAL at 13:54

## 2019-11-15 RX ADMIN — METHYLPREDNISOLONE SODIUM SUCCINATE 40 MG: 40 INJECTION, POWDER, FOR SOLUTION INTRAMUSCULAR; INTRAVENOUS at 17:49

## 2019-11-15 NOTE — NURSING NOTE
Received pt in AM, A&O, VSS, afebrile, monitor tracing afib with VR 90s-110, denies c/o, lungs with crackles r base and expiratory wheezes throughout, denies SOA, Cardizem drip infusing at 5mg/hr, voiding qs, spouse at bedside, cardizem drip d/c'd at 1000, PO metoprolol given, afib rate somewhat more uncontrolled, pt sent to cath lab at 1300, transport present to take patient, report given to Agueda DUPONT, received pt back at 1500h, lethargic but arousable to verbal stimuli, VSS, monitor tracing afib rate 100s,  at bedside, maintained NPO, O2 at 2l in place because O2 sats 90, monitored closely.

## 2019-11-15 NOTE — PROGRESS NOTES
Name: Shani Phillip ADMIT: 2019   : 1950  PCP: Jose R Franks MD    MRN: [id number] LOS: 1 days   AGE/SEX: 69 y.o. female  ROOM: [facility]     Subjective   Subjective   no new complaints. NPO for REDD.    Review of Systems   Respiratory: Positive for cough and wheezing. Negative for chest tightness and shortness of breath.    Cardiovascular: Negative for chest pain and palpitations.        Objective   Objective   Vital Signs  Temp:  [97.5 °F (36.4 °C)-97.6 °F (36.4 °C)] 97.6 °F (36.4 °C)  Heart Rate:  [] 119  Resp:  [16-20] 18  BP: (100-127)/(65-96) 127/70  SpO2:  [91 %-100 %] 94 %  on  Flow (L/min):  [1] 1;   Device (Oxygen Therapy): room air  Body mass index is 25.89 kg/m².  Physical Exam   Constitutional: She is oriented to person, place, and time.   Cardiovascular: An irregularly irregular rhythm present. Tachycardia present.   Pulmonary/Chest: Effort normal. She has wheezes. She has rales.   Musculoskeletal: Normal range of motion. She exhibits no edema.   Neurological: She is alert and oriented to person, place, and time.   Nursing note and vitals reviewed.      Results Review:       I reviewed the patient's new clinical results.  Results from last 7 days   Lab Units 19  0519   WBC 10*3/mm3 20.09* 21.33*   HEMOGLOBIN g/dL 12.4 13.4   PLATELETS 10*3/mm3 202 216     Results from last 7 days   Lab Units 19  0543 19   SODIUM mmol/L 140 139   POTASSIUM mmol/L 4.0 4.3   CHLORIDE mmol/L 100 98   CO2 mmol/L 31.8* 29.7*   BUN mg/dL 27* 28*   CREATININE mg/dL 0.57 0.64   GLUCOSE mg/dL 105* 176*   Estimated Creatinine Clearance: 55.3 mL/min (by C-G formula based on SCr of 0.57 mg/dL).  Results from last 7 days   Lab Units 19   ALBUMIN g/dL 3.40*   BILIRUBIN mg/dL 0.4   ALK PHOS U/L 55   AST (SGOT) U/L 15   ALT (SGPT) U/L 23     Results from last 7 days   Lab Units 19  0543 196   CALCIUM mg/dL 8.1* 8.3*   ALBUMIN g/dL  --   3.40*   MAGNESIUM mg/dL  --  2.3     Results from last 7 days   Lab Units 11/14/19  0053   PROCALCITONIN ng/mL 0.07*   LACTATE mmol/L 1.8     No results found for: HGBA1C, POCGLU      arformoterol 15 mcg Nebulization BID - RT   budesonide 0.5 mg Nebulization Daily - RT   cefTRIAXone 2 g Intravenous Q24H   clotrimazole 10 mg Oral 5x Daily   enoxaparin 1 mg/kg Subcutaneous Q12H   ipratropium 500 mcg Nebulization 4x Daily - RT   methylPREDNISolone sodium succinate 40 mg Intravenous Q8H   metoprolol tartrate 50 mg Oral Q8H   oxybutynin XL 10 mg Oral Daily   rOPINIRole 2 mg Oral BID   senna-docusate sodium 1 tablet Oral Daily   sodium chloride 10 mL Intravenous Q12H   vancomycin 12 mg/kg Intravenous Q12H       Pharmacy to dose vancomycin    NPO Diet       Assessment/Plan     Active Hospital Problems    Diagnosis  POA   • **Acute endocarditis [I33.9]  Yes   • Influenza [J11.1]  Yes   • Thrush, oral [B37.0]  Yes   • COPD with acute exacerbation (CMS/HCC) [J44.1]  Yes   • Atrial fibrillation with RVR (CMS/HCC) [I48.91]  Yes      Resolved Hospital Problems   No resolved problems to display.     Presumed Endocarditis   - appreciate cardiology   - TTE noted  - plan for REDD today  - appreciate ID, abx per them     Influenza A  - s/p Tamiflu     COPD with exacerbation   - wean o2 as tolerated  - steroids, brovana,  IS, mucinex, duo-nebs     Atrial Fibrillation  - metoprolol added per cardiology  - continue diltiazem gtt  - lovenox increased, cardiology to transition to oral AC later today  - ? cardioversion      Oral Thrush  - continue clotrimazole       · SCDs for DVT prophylaxis.  · Full code.  · Discussed with patient and nursing staff.  · Anticipate discharge tbd      GENET Ponce  [facility]  11/15/19  1:02 PM

## 2019-11-15 NOTE — PLAN OF CARE
Problem: Patient Care Overview  Goal: Plan of Care Review  Outcome: Ongoing (interventions implemented as appropriate)   11/15/19 2717   Coping/Psychosocial   Plan of Care Reviewed With patient;spouse   Plan of Care Review   Progress no change   OTHER   Outcome Summary See nurse's note for today's events and data, pt resting comfortably with stable VS.     Goal: Individualization and Mutuality  Outcome: Ongoing (interventions implemented as appropriate)    Goal: Discharge Needs Assessment  Outcome: Ongoing (interventions implemented as appropriate)    Goal: Interprofessional Rounds/Family Conf  Outcome: Ongoing (interventions implemented as appropriate)      Problem: Sepsis/Septic Shock (Adult)  Goal: Signs and Symptoms of Listed Potential Problems Will be Absent, Minimized or Managed (Sepsis/Septic Shock)  Outcome: Ongoing (interventions implemented as appropriate)      Problem: Arrhythmia/Dysrhythmia (Symptomatic) (Adult)  Goal: Signs and Symptoms of Listed Potential Problems Will be Absent, Minimized or Managed (Arrhythmia/Dysrhythmia)  Outcome: Ongoing (interventions implemented as appropriate)

## 2019-11-15 NOTE — PROGRESS NOTES
INFECTIOUS DISEASES PROGRESS NOTE    CC: f/u sepsis?    S:   Thursh improved  BReathing better  No f/c/ns    O:  Physical Exam:  Temp:  [97.5 °F (36.4 °C)-97.6 °F (36.4 °C)] 97.6 °F (36.4 °C)  Heart Rate:  [] 112  Resp:  [16-20] 16  BP: (100-127)/(65-96) 127/70  Physical Exam  GENERAL: Awake and alert, in no acute distress.   HEENT: Oropharynx is clear. Hearing is grossly normal.   EYES: . No conjunctival injection. No lid lag.    HEART:  Tachy and irr irr rhythm. 1+ peripheral edema.   LUNGS: basilar rales with normal respiratory effort.   GI: Soft, nontender, nondistended. No appreciable organomegaly.   SKIN: Warm and dry without cutaneous eruptions   PSYCHIATRIC: Appropriate mood, affect, insight, and judgment.        Diagnostics:      BCx from OSH  11/3 BCx 2/2 Neg  11/5 BCx 2/2 Neg        Assessment/Plan   1.  Influenza A status post 7 days of tamiflu  2. Afib RVR  3. COPD exacerbation on steroids  4. Leukocytosis related to above  5. Abnormal MV--endocarditis vs. Calcified mitral plaque.  6. Thrush    TTE with mobile MV mass. D/w Dr Tinajero this AM and tenatively planning for repeat REDD later this afternoon.  It would clinically make more sense for this to be a calcified plaque but if concern for endocarditis we will need to treat empirically.  If negative, we could stop all abx  Cont ceftriaxone and vancomycin for now      Mike Vdial MD  9:43 AM  11/15/19

## 2019-11-15 NOTE — CONSULTS
Patient Care Team:  Jose R Franks MD as PCP - General  Jose R Franks MD as PCP - Family Medicine    Chief complaint: endocarditis     Subjective     History of Present Illness   Ms. Amaral is a 69 year old female we have been asked to see in consultation for cardiac surgery evaluation for mitral valve endocarditis.     Review of Systems   Constitutional: Positive for activity change, chills and fatigue.   HENT: Negative.    Eyes: Negative.    Respiratory: Positive for shortness of breath.    Cardiovascular: Positive for palpitations.   Gastrointestinal: Negative.    Endocrine: Negative.    Genitourinary: Negative.    Musculoskeletal: Negative.    Skin: Negative.    Allergic/Immunologic: Negative.    Neurological: Negative.    Hematological: Negative.    Psychiatric/Behavioral: Negative.         Past Medical History:   Diagnosis Date   • Atrial fibrillation with RVR (CMS/[facility]) 11/14/2019   • COPD (chronic obstructive pulmonary disease) (CMS/[facility])    • COPD with acute exacerbation (CMS/[facility]) 11/14/2019   • Influenza 11/14/2019   • Renal disorder    • Restless leg syndrome    • Thrush, oral 11/14/2019     Past Surgical History:   Procedure Laterality Date   • CHOLECYSTECTOMY     • KNEE ARTHROPLASTY     • LAPAROSCOPIC GASTRIC BANDING       History reviewed. No pertinent family history.  Social History     Tobacco Use   • Smoking status: Current Every Day Smoker     Packs/day: 0.50     Types: Cigarettes   • Smokeless tobacco: Former User   • Tobacco comment: LAST CIG NOV 02, 2019   Substance Use Topics   • Alcohol use: No     Frequency: Never   • Drug use: No     Medications Prior to Admission   Medication Sig Dispense Refill Last Dose   • budesonide (PULMICORT) 0.5 MG/2ML nebulizer solution Take 0.5 mg by nebulization Daily.      • cefepime (MAXIPIME) 2 g injection Inject  into the appropriate muscle as directed by prescriber.      • dilTIAZem (CARDIZEM) 60 MG tablet Take 60 mg by mouth 4 (Four) Times a  "Day.      • ipratropium (ATROVENT) 0.02 % nebulizer solution Take 500 mcg by nebulization 4 (Four) Times a Day.      • metroNIDAZOLE (FLAGYL) 500 MG tablet Take 500 mg by mouth 3 (Three) Times a Day.      • predniSONE (DELTASONE) 20 MG tablet Take 20 mg by mouth Daily.      • rOPINIRole (REQUIP) 2 MG tablet Take 2 mg by mouth 2 (Two) Times a Day.      • TROSPIUM CHLORIDE PO Take 20 mg by mouth 2 (Two) Times a Day.      • senna-docusate sodium (SENOKOT-S) 8.6-50 MG tablet Take 1 tablet by mouth Daily.          arformoterol 15 mcg Nebulization BID - RT   budesonide 0.5 mg Nebulization Daily - RT   cefTRIAXone 2 g Intravenous Q24H   clotrimazole 10 mg Oral 5x Daily   enoxaparin 1 mg/kg Subcutaneous Q12H   ipratropium 500 mcg Nebulization 4x Daily - RT   methylPREDNISolone sodium succinate 40 mg Intravenous Q8H   metoprolol tartrate 50 mg Oral Q8H   oxybutynin XL 10 mg Oral Daily   rOPINIRole 2 mg Oral BID   senna-docusate sodium 1 tablet Oral Daily   sodium chloride 10 mL Intravenous Q12H   vancomycin 12 mg/kg Intravenous Q12H     Allergies:  Penicillins    Objective      Vital Signs  Temp:  [97.5 °F (36.4 °C)-98.2 °F (36.8 °C)] 98.2 °F (36.8 °C)  Heart Rate:  [] 114  Resp:  [2-20] 18  BP: (100-132)/(65-99) 119/81    Flowsheet Rows      First Filed Value   Admission Height  154.9 cm (61\") Documented at 11/13/2019 1800   Admission Weight  62.1 kg (137 lb) Documented at 11/13/2019 1800        154.9 cm (61\")    Physical Exam   Constitutional: She is oriented to person, place, and time. She appears well-developed and well-nourished. No distress.   HENT:   Head: Normocephalic and atraumatic.   Nose: Nose normal.   Mouth/Throat: Oropharynx is clear and moist. No oropharyngeal exudate.   Eyes: Conjunctivae and EOM are normal. Pupils are equal, round, and reactive to light. No scleral icterus.   Neck: Normal range of motion. Neck supple. No JVD present.   Cardiovascular: An irregular rhythm present. Tachycardia present. "   Pulmonary/Chest: Effort normal and breath sounds normal. No respiratory distress.   Abdominal: Soft. Bowel sounds are normal. There is no guarding.   Musculoskeletal: Normal range of motion. She exhibits no edema.   Neurological: She is alert and oriented to person, place, and time. No cranial nerve deficit.   Skin: Skin is warm and dry. She is not diaphoretic. No erythema. No pallor.   Psychiatric: She has a normal mood and affect. Her behavior is normal. Judgment and thought content normal.       Results Review:   Lab Results (last 24 hours)     Procedure Component Value Units Date/Time    Vancomycin, Trough [[id number]]  (Normal) Collected:  11/15/19 1217    Specimen:  Blood Updated:  11/15/19 1314     Vancomycin Trough 17.20 mcg/mL               Assessment/Plan       Acute endocarditis    Influenza    Thrush, oral    COPD with acute exacerbation (CMS/HCC)    Atrial fibrillation with RVR (CMS/HCC)      Assessment & Plan    -Acute endocarditis--Mobile mass mitral valve mass  -COPD   -Atrial fibrillation-new onset  -Influenza A- completed tamiflu  -oral thrush    Dr. Briones to evaluate and provide recommendations.  Will need a cardiac cath if Dr. Briones recommends cardiac surgery.       Lucila Alexx, APRN  11/15/19  4:14 PM  Addendum  Patient seen and examined, note verified by me.  Agree with findings.  I reviewed the REDD and are not very impressive mild amount of mitral regurgitation.  The vegetation in the posterior part of the mitral valve is not large but is mobile.  I believe there is a significant amount of stenosis.  I would favor to manage with IV antibiotics for another 4 weeks and repeat a REDD to address status of the vegetation and mitral valve in general.  Hayes Briones M.D.

## 2019-11-15 NOTE — PROGRESS NOTES
"Pharmacokinetic Consult - Vancomycin Dosing (Follow-up Note)    Shani Phillip is on day 2 pharmacy to dose vancomycin for endocarditis.  Pharmacy dosing vancomycin per Zenia Drew's request.   ID following - Dr. Vidal  Goal trough: 15-20 mg/L   Other Abx: Ceftriaxone 2gm q24h    Relevant clinical data and objective history reviewed:  69 y.o. female 154.9 cm (61\") 62.1 kg (137 lb)    Past Medical History:   Diagnosis Date   • Atrial fibrillation with RVR (CMS/[facility]) 11/14/2019   • COPD (chronic obstructive pulmonary disease) (CMS/[facility])    • COPD with acute exacerbation (CMS/[facility]) 11/14/2019   • Influenza 11/14/2019   • Renal disorder    • Restless leg syndrome    • Thrush, oral 11/14/2019     Creatinine   Date Value Ref Range Status   11/14/2019 0.57 0.57 - 1.00 mg/dL Final   11/13/2019 0.64 0.57 - 1.00 mg/dL Final     BUN   Date Value Ref Range Status   11/14/2019 27 (H) 8 - 23 mg/dL Final     Estimated Creatinine Clearance: 55.3 mL/min (by C-G formula based on SCr of 0.57 mg/dL).    Lab Results   Component Value Date    WBC 20.09 (H) 11/14/2019     Temp Readings from Last 3 Encounters:   11/15/19 98.2 °F (36.8 °C) (Oral)        Vancomycin Dosing History:    11/14 @ 0054 - Vancomycin 1000mg (16mg/kg) once  11/14 @ 1346 - Vancomycin 750mg q12h  11/14 @ 2354 - Vancomycin 750mg q12h            @ 1217 - Vancomycin trough = 17.2mcg/ml      Assessment/Plan    Trough drawn appropriately prior to 4th dose within therapeutic range. Renal function stable. Continue current regimen of vancomycin 750mg q12h.     Will order vancomycin level when therapeutically appropriate.     Will monitor serum creatinine at least every 48 hours per dosing recommendations. Pharmacy will continue to follow daily while on vancomycin and adjust as needed.     Babatunde Whatley, [facility]    "

## 2019-11-15 NOTE — PROGRESS NOTES
"Patient Name: Shani Phillip  :1950  69 y.o.      Patient Care Team:  Jose R Franks MD as PCP - General  Jose R Franks MD as PCP - Family Medicine    Interval History:   Repeat echocardiogram does show a mobile mass on the mitral valve.    Subjective:  Following for atrial fibrillation and endocarditis.    Objective   Vital Signs  Temp:  [97.5 °F (36.4 °C)-97.6 °F (36.4 °C)] 97.6 °F (36.4 °C)  Heart Rate:  [] 104  Resp:  [16-20] 16  BP: (100-127)/(65-96) 127/70    Intake/Output Summary (Last 24 hours) at 11/15/2019 0813  Last data filed at 2019 1346  Gross per 24 hour   Intake 250 ml   Output --   Net 250 ml     Flowsheet Rows      First Filed Value   Admission Height  154.9 cm (61\") Documented at 2019 1800   Admission Weight  62.1 kg (137 lb) Documented at 2019 1800          Physical Exam:   General Appearance:    Alert, cooperative, in no acute distress   Lungs:    Wheezes bilaterally normal respiratory effort and rate.      Heart:   Irregularly irregular and tachycardic.     Chest Wall:    No abnormalities observed   Abdomen:     Soft, nontender, positive bowel sounds.     Extremities:   no cyanosis, clubbing or edema.  No marked joint deformities.  Adequate musculoskeletal strength.       Results Review:    Results from last 7 days   Lab Units 19  0543   SODIUM mmol/L 140   POTASSIUM mmol/L 4.0   CHLORIDE mmol/L 100   CO2 mmol/L 31.8*   BUN mg/dL 27*   CREATININE mg/dL 0.57   GLUCOSE mg/dL 105*   CALCIUM mg/dL 8.1*         Results from last 7 days   Lab Units 19  0543   WBC 10*3/mm3 20.09*   HEMOGLOBIN g/dL 12.4   HEMATOCRIT % 35.6   PLATELETS 10*3/mm3 202             Results from last 7 days   Lab Units 19  1956   MAGNESIUM mg/dL 2.3             Medication Review:     arformoterol 15 mcg Nebulization BID - RT   budesonide 0.5 mg Nebulization Daily - RT   cefTRIAXone 2 g Intravenous Q24H   clotrimazole 10 mg Oral 5x Daily   enoxaparin 1 mg/kg " Subcutaneous Q12H   ipratropium 500 mcg Nebulization 4x Daily - RT   methylPREDNISolone sodium succinate 40 mg Intravenous Q8H   metoprolol tartrate 25 mg Oral Q8H   oxybutynin XL 10 mg Oral Daily   rOPINIRole 2 mg Oral BID   senna-docusate sodium 1 tablet Oral Daily   sodium chloride 10 mL Intravenous Q12H   vancomycin 12 mg/kg Intravenous Q12H          dilTIAZem 5 mg/hr Last Rate: 5 mg/hr (11/14/19 1089)   Pharmacy to dose vancomycin         Assessment/Plan     1.  Atrial fibrillation.  On full dose Lovenox, diltiazem drip and oral metoprolol.  Heart rate is better controlled.  Increase metoprolol today.  2.  Possible endocarditis  3.  COPD exacerbation  4.  Influenza    -Her transesophageal echocardiogram was supposed to be overnighted to me.  -I am leaning towards repeating a transesophageal echocardiogram today to look at this mobile mass and see if there is been any change.  Also, if she does not have a left atrial appendage thrombus, I could cardiovert her.  -N.p.o. as of now.  -Probably will repeat the REDD this afternoon.  -I will change her to oral anticoagulation later today.    Addendum: REDD showed a highly mobile mass on the mitral valve annulus consistent with a vegetation.  I will have cardiothoracic surgery give their opinion.  I think we will have to treat this like it is endocarditis though.  She did not have a left atrial appendage thrombus.  Cardioversion was unsuccessful.  Continue with medical management.    Zenia Tinajero MD, [facility]  11/15/19  8:13 AM

## 2019-11-15 NOTE — PLAN OF CARE
Problem: Patient Care Overview  Goal: Plan of Care Review  Outcome: Ongoing (interventions implemented as appropriate)   11/15/19 0332   Coping/Psychosocial   Plan of Care Reviewed With patient   Plan of Care Review   Progress improving   OTHER   Outcome Summary Denies pain and SOA. A-fib on monitor, rate better controlled. Cardizem drip continued, tolerating well. A&Ox4.  at bedside, helping pt to bathroom. Pt denies dizziness. Medications administered per orders. Rest of VSS. No s/s of distress at this time. Will continue to monitor.     Goal: Individualization and Mutuality  Outcome: Ongoing (interventions implemented as appropriate)    Goal: Discharge Needs Assessment  Outcome: Ongoing (interventions implemented as appropriate)    Goal: Interprofessional Rounds/Family Conf  Outcome: Ongoing (interventions implemented as appropriate)      Problem: Sepsis/Septic Shock (Adult)  Goal: Signs and Symptoms of Listed Potential Problems Will be Absent, Minimized or Managed (Sepsis/Septic Shock)  Outcome: Ongoing (interventions implemented as appropriate)      Problem: Arrhythmia/Dysrhythmia (Symptomatic) (Adult)  Goal: Signs and Symptoms of Listed Potential Problems Will be Absent, Minimized or Managed (Arrhythmia/Dysrhythmia)  Outcome: Ongoing (interventions implemented as appropriate)

## 2019-11-16 LAB
ANION GAP SERPL CALCULATED.3IONS-SCNC: 13.5 MMOL/L (ref 5–15)
BASOPHILS # BLD AUTO: 0.04 10*3/MM3 (ref 0–0.2)
BASOPHILS NFR BLD AUTO: 0.2 % (ref 0–1.5)
BUN BLD-MCNC: 21 MG/DL (ref 8–23)
BUN/CREAT SERPL: 37.5 (ref 7–25)
CALCIUM SPEC-SCNC: 8.4 MG/DL (ref 8.6–10.5)
CHLORIDE SERPL-SCNC: 102 MMOL/L (ref 98–107)
CO2 SERPL-SCNC: 27.5 MMOL/L (ref 22–29)
CREAT BLD-MCNC: 0.56 MG/DL (ref 0.57–1)
DEPRECATED RDW RBC AUTO: 43.3 FL (ref 37–54)
EOSINOPHIL # BLD AUTO: 0 10*3/MM3 (ref 0–0.4)
EOSINOPHIL NFR BLD AUTO: 0 % (ref 0.3–6.2)
ERYTHROCYTE [DISTWIDTH] IN BLOOD BY AUTOMATED COUNT: 13.4 % (ref 12.3–15.4)
GFR SERPL CREATININE-BSD FRML MDRD: 107 ML/MIN/1.73
GLUCOSE BLD-MCNC: 160 MG/DL (ref 65–99)
HCT VFR BLD AUTO: 36.4 % (ref 34–46.6)
HGB BLD-MCNC: 12.1 G/DL (ref 12–15.9)
IMM GRANULOCYTES # BLD AUTO: 0.24 10*3/MM3 (ref 0–0.05)
IMM GRANULOCYTES NFR BLD AUTO: 1.3 % (ref 0–0.5)
LYMPHOCYTES # BLD AUTO: 0.79 10*3/MM3 (ref 0.7–3.1)
LYMPHOCYTES NFR BLD AUTO: 4.4 % (ref 19.6–45.3)
MCH RBC QN AUTO: 29.7 PG (ref 26.6–33)
MCHC RBC AUTO-ENTMCNC: 33.2 G/DL (ref 31.5–35.7)
MCV RBC AUTO: 89.2 FL (ref 79–97)
MONOCYTES # BLD AUTO: 0.48 10*3/MM3 (ref 0.1–0.9)
MONOCYTES NFR BLD AUTO: 2.7 % (ref 5–12)
NEUTROPHILS # BLD AUTO: 16.45 10*3/MM3 (ref 1.7–7)
NEUTROPHILS NFR BLD AUTO: 91.4 % (ref 42.7–76)
NRBC BLD AUTO-RTO: 0 /100 WBC (ref 0–0.2)
PLATELET # BLD AUTO: 233 10*3/MM3 (ref 140–450)
PMV BLD AUTO: 10.3 FL (ref 6–12)
POTASSIUM BLD-SCNC: 4.2 MMOL/L (ref 3.5–5.2)
RBC # BLD AUTO: 4.08 10*6/MM3 (ref 3.77–5.28)
SODIUM BLD-SCNC: 143 MMOL/L (ref 136–145)
VANCOMYCIN TROUGH SERPL-MCNC: 16.2 MCG/ML (ref 5–20)
WBC NRBC COR # BLD: 18 10*3/MM3 (ref 3.4–10.8)

## 2019-11-16 PROCEDURE — 80202 ASSAY OF VANCOMYCIN: CPT | Performed by: NURSE PRACTITIONER

## 2019-11-16 PROCEDURE — 25010000003 CEFTRIAXONE PER 250 MG: Performed by: INTERNAL MEDICINE

## 2019-11-16 PROCEDURE — 99232 SBSQ HOSP IP/OBS MODERATE 35: CPT | Performed by: NURSE PRACTITIONER

## 2019-11-16 PROCEDURE — 80048 BASIC METABOLIC PNL TOTAL CA: CPT | Performed by: NURSE PRACTITIONER

## 2019-11-16 PROCEDURE — 25010000002 METHYLPREDNISOLONE PER 40 MG: Performed by: NURSE PRACTITIONER

## 2019-11-16 PROCEDURE — 25010000002 ENOXAPARIN PER 10 MG: Performed by: INTERNAL MEDICINE

## 2019-11-16 PROCEDURE — 25010000002 VANCOMYCIN 750 MG RECONSTITUTED SOLUTION: Performed by: NURSE PRACTITIONER

## 2019-11-16 PROCEDURE — 85025 COMPLETE CBC W/AUTO DIFF WBC: CPT | Performed by: NURSE PRACTITIONER

## 2019-11-16 PROCEDURE — 94799 UNLISTED PULMONARY SVC/PX: CPT

## 2019-11-16 PROCEDURE — 99232 SBSQ HOSP IP/OBS MODERATE 35: CPT | Performed by: INTERNAL MEDICINE

## 2019-11-16 RX ORDER — FAMOTIDINE 20 MG/1
20 TABLET, FILM COATED ORAL
Status: DISCONTINUED | OUTPATIENT
Start: 2019-11-16 | End: 2019-11-19 | Stop reason: HOSPADM

## 2019-11-16 RX ORDER — PREDNISONE 20 MG/1
40 TABLET ORAL
Status: DISCONTINUED | OUTPATIENT
Start: 2019-11-17 | End: 2019-11-19

## 2019-11-16 RX ADMIN — IPRATROPIUM BROMIDE 0.5 MG: 0.5 SOLUTION RESPIRATORY (INHALATION) at 12:30

## 2019-11-16 RX ADMIN — METOPROLOL TARTRATE 62.5 MG: 25 TABLET ORAL at 17:17

## 2019-11-16 RX ADMIN — IPRATROPIUM BROMIDE 0.5 MG: 0.5 SOLUTION RESPIRATORY (INHALATION) at 20:35

## 2019-11-16 RX ADMIN — METHYLPREDNISOLONE SODIUM SUCCINATE 40 MG: 40 INJECTION, POWDER, FOR SOLUTION INTRAMUSCULAR; INTRAVENOUS at 00:50

## 2019-11-16 RX ADMIN — VANCOMYCIN HYDROCHLORIDE 750 MG: 750 INJECTION, POWDER, LYOPHILIZED, FOR SOLUTION INTRAVENOUS at 00:50

## 2019-11-16 RX ADMIN — SODIUM CHLORIDE, PRESERVATIVE FREE 10 ML: 5 INJECTION INTRAVENOUS at 09:17

## 2019-11-16 RX ADMIN — ARFORMOTEROL TARTRATE 15 MCG: 15 SOLUTION RESPIRATORY (INHALATION) at 08:57

## 2019-11-16 RX ADMIN — CLOTRIMAZOLE 10 MG: 10 LOZENGE ORAL; TOPICAL at 06:05

## 2019-11-16 RX ADMIN — SODIUM CHLORIDE, PRESERVATIVE FREE 10 ML: 5 INJECTION INTRAVENOUS at 20:17

## 2019-11-16 RX ADMIN — METOPROLOL TARTRATE 50 MG: 25 TABLET ORAL at 02:32

## 2019-11-16 RX ADMIN — ARFORMOTEROL TARTRATE 15 MCG: 15 SOLUTION RESPIRATORY (INHALATION) at 20:35

## 2019-11-16 RX ADMIN — CLOTRIMAZOLE 10 MG: 10 LOZENGE ORAL; TOPICAL at 17:17

## 2019-11-16 RX ADMIN — METHYLPREDNISOLONE SODIUM SUCCINATE 40 MG: 40 INJECTION, POWDER, FOR SOLUTION INTRAMUSCULAR; INTRAVENOUS at 08:53

## 2019-11-16 RX ADMIN — CLOTRIMAZOLE 10 MG: 10 LOZENGE ORAL; TOPICAL at 11:44

## 2019-11-16 RX ADMIN — OXYBUTYNIN CHLORIDE 10 MG: 10 TABLET, EXTENDED RELEASE ORAL at 08:53

## 2019-11-16 RX ADMIN — FAMOTIDINE 20 MG: 20 TABLET, FILM COATED ORAL at 20:16

## 2019-11-16 RX ADMIN — IPRATROPIUM BROMIDE 0.5 MG: 0.5 SOLUTION RESPIRATORY (INHALATION) at 08:56

## 2019-11-16 RX ADMIN — METOPROLOL TARTRATE 50 MG: 25 TABLET ORAL at 09:17

## 2019-11-16 RX ADMIN — ROPINIROLE 2 MG: 2 TABLET, FILM COATED ORAL at 08:53

## 2019-11-16 RX ADMIN — BUDESONIDE 0.5 MG: 0.5 INHALANT RESPIRATORY (INHALATION) at 08:58

## 2019-11-16 RX ADMIN — CLOTRIMAZOLE 10 MG: 10 LOZENGE ORAL; TOPICAL at 20:16

## 2019-11-16 RX ADMIN — ROPINIROLE 2 MG: 2 TABLET, FILM COATED ORAL at 20:16

## 2019-11-16 RX ADMIN — ENOXAPARIN SODIUM 60 MG: 60 INJECTION SUBCUTANEOUS at 11:44

## 2019-11-16 RX ADMIN — CEFTRIAXONE SODIUM 2 G: 2 INJECTION, SOLUTION INTRAVENOUS at 11:44

## 2019-11-16 RX ADMIN — METHYLPREDNISOLONE SODIUM SUCCINATE 40 MG: 40 INJECTION, POWDER, FOR SOLUTION INTRAMUSCULAR; INTRAVENOUS at 17:17

## 2019-11-16 RX ADMIN — VANCOMYCIN HYDROCHLORIDE 750 MG: 750 INJECTION, POWDER, LYOPHILIZED, FOR SOLUTION INTRAVENOUS at 12:53

## 2019-11-16 RX ADMIN — ENOXAPARIN SODIUM 60 MG: 60 INJECTION SUBCUTANEOUS at 00:50

## 2019-11-16 NOTE — PROGRESS NOTES
Name: Shani Phillip ADMIT: 2019   : 1950  PCP: Jose R Franks MD    MRN: [id number] LOS: 2 days   AGE/SEX: 69 y.o. female  ROOM: [facility]     Subjective   Subjective   CC: dyspnea  No acute events.  Patient has no new complaints.  Dyspnea is improved.  No CP/palpitations/f/c/n/v/d.    Objective   Objective   Vital Signs  Temp:  [97.4 °F (36.3 °C)-98.9 °F (37.2 °C)] 97.4 °F (36.3 °C)  Heart Rate:  [106-128] 122  Resp:  [16-18] 18  BP: (102-131)/() 109/73  SpO2:  [97 %-99 %] 99 %  on  Flow (L/min):  [1] 1;   Device (Oxygen Therapy): nasal cannula  Body mass index is 25.89 kg/m².  Physical Exam   Constitutional: She is oriented to person, place, and time. No distress.   HENT:   Head: Normocephalic and atraumatic.   Mouth/Throat: Oropharynx is clear and moist.   Eyes: Conjunctivae and EOM are normal. Pupils are equal, round, and reactive to light.   Neck: Normal range of motion. Neck supple. No JVD present.   Cardiovascular: Intact distal pulses. An irregularly irregular rhythm present. Tachycardia present.   Pulmonary/Chest: Effort normal. She has wheezes. She has rales.   Abdominal: Soft. Bowel sounds are normal. There is no tenderness.   Musculoskeletal: Normal range of motion. She exhibits no edema.   Neurological: She is alert and oriented to person, place, and time.   Skin: Skin is warm and dry. Capillary refill takes less than 2 seconds. She is not diaphoretic.   Psychiatric: She has a normal mood and affect. Her behavior is normal.   Nursing note and vitals reviewed.      Results Review:       I reviewed the patient's new clinical results.  Results from last 7 days   Lab Units 19  0453 19  0543 19   WBC 10*3/mm3 18.00* 20.09* 21.33*   HEMOGLOBIN g/dL 12.1 12.4 13.4   PLATELETS 10*3/mm3 233 202 216     Results from last 7 days   Lab Units 19  0453 19  0543 19  1956   SODIUM mmol/L 143 140 139   POTASSIUM mmol/L 4.2 4.0 4.3   CHLORIDE mmol/L  102 100 98   CO2 mmol/L 27.5 31.8* 29.7*   BUN mg/dL 21 27* 28*   CREATININE mg/dL 0.56* 0.57 0.64   GLUCOSE mg/dL 160* 105* 176*   Estimated Creatinine Clearance: 55.3 mL/min (A) (by C-G formula based on SCr of 0.56 mg/dL (L)).  Results from last 7 days   Lab Units 11/13/19 1956   ALBUMIN g/dL 3.40*   BILIRUBIN mg/dL 0.4   ALK PHOS U/L 55   AST (SGOT) U/L 15   ALT (SGPT) U/L 23     Results from last 7 days   Lab Units 11/16/19  0453 11/14/19  0543 11/13/19 1956   CALCIUM mg/dL 8.4* 8.1* 8.3*   ALBUMIN g/dL  --   --  3.40*   MAGNESIUM mg/dL  --   --  2.3     Results from last 7 days   Lab Units 11/14/19  0053   PROCALCITONIN ng/mL 0.07*   LACTATE mmol/L 1.8     No results found for: HGBA1C, POCGLU      arformoterol 15 mcg Nebulization BID - RT   budesonide 0.5 mg Nebulization Daily - RT   cefTRIAXone 2 g Intravenous Q24H   clotrimazole 10 mg Oral 5x Daily   enoxaparin 1 mg/kg Subcutaneous Q12H   ipratropium 500 mcg Nebulization 4x Daily - RT   methylPREDNISolone sodium succinate 40 mg Intravenous Q8H   metoprolol tartrate 62.5 mg Oral Q8H   oxybutynin XL 10 mg Oral Daily   rOPINIRole 2 mg Oral BID   senna-docusate sodium 1 tablet Oral Daily   sodium chloride 10 mL Intravenous Q12H   vancomycin 12 mg/kg Intravenous Q12H       Pharmacy to dose vancomycin    Diet Regular       Assessment/Plan     Active Hospital Problems    Diagnosis  POA   • **Acute endocarditis [I33.9]  Yes   • Influenza [J11.1]  Yes   • Thrush, oral [B37.0]  Yes   • COPD with acute exacerbation (CMS/HCC) [J44.1]  Yes   • Atrial fibrillation with RVR (CMS/HCC) [I48.91]  Yes      Resolved Hospital Problems   No resolved problems to display.   Endocarditis  - had REDD on 11/15/19 showing small mobile vegetation on the mitral valve-there is some stenosis but otherwise valve is functioning well  - 6 weeks of vancomycin and rocephin per ID, appreciate recs  - CT surgery has evaluated and favoring holding off on surgery and repeating REDD following course  of abx-appreciate recs    Influenza A  - s/p Tamiflu     COPD with exacerbation   - doing better  - will transition to oral steroids tomorrow morning, famotidine for GI prophylaxis  - continue nebulized steroids and bronchodilators  - wean oxygen as tolerated     Atrial Fibrillation  - off diltiazem drip, on oral metoprolol-increased to 62.5mg Q8H  - had REDD 11/15/19-no thrombus and cardioversion was attempted but unsuccessful     Oral Thrush  - continue clotrimazole       SCDs for DVT prophylaxis.  Full code.  Discussed with patient and nursing staff.  Anticipate discharge tbd      Rodríguez Sandoval MD  [facility]  11/16/19  6:46 PM

## 2019-11-16 NOTE — PROGRESS NOTES
"Patient Name: Shani Phillip  :1950  69 y.o.      Patient Care Team:  Jose R Franks MD as PCP - General  Jose R Franks MD as PCP - Family Medicine    Interval History:   Repeat echocardiogram does show a mobile mass on the mitral valve. Unable to cardiovert after no ANGUS thrombis noted on REDD.  Plan rate control for now and AC. Awaiting CT surgery consult for their recommendations.     Subjective:  Following for atrial fibrillation and endocarditis.  No issues with swallowing post REDD.    No fever or chills.  Appetite ok.      Objective   Vital Signs  Temp:  [97.6 °F (36.4 °C)-98.9 °F (37.2 °C)] 97.6 °F (36.4 °C)  Heart Rate:  [] 111  Resp:  [16-18] 18  BP: (102-127)/(68-83) 123/68    Intake/Output Summary (Last 24 hours) at 2019 1544  Last data filed at 2019 1300  Gross per 24 hour   Intake 1500 ml   Output --   Net 1500 ml     Flowsheet Rows      First Filed Value   Admission Height  154.9 cm (61\") Documented at 2019 1800   Admission Weight  62.1 kg (137 lb) Documented at 2019 1800          Physical Exam:   General Appearance:    Alert, cooperative, in no acute distress   Lungs:    Wheezes bilaterally normal respiratory effort and rate.      Heart:   Irregularly irregular and tachycardic.   Soft ZINA apical, no rub or gallop    Chest Wall:    No abnormalities observed   Abdomen:     Soft, nontender, positive bowel sounds.     Extremities:   no cyanosis, clubbing or edema.  No marked joint deformities.  Adequate musculoskeletal strength.       Results Review:    Results from last 7 days   Lab Units 19  0453   SODIUM mmol/L 143   POTASSIUM mmol/L 4.2   CHLORIDE mmol/L 102   CO2 mmol/L 27.5   BUN mg/dL 21   CREATININE mg/dL 0.56*   GLUCOSE mg/dL 160*   CALCIUM mg/dL 8.4*         Results from last 7 days   Lab Units 19  0453   WBC 10*3/mm3 18.00*   HEMOGLOBIN g/dL 12.1   HEMATOCRIT % 36.4   PLATELETS 10*3/mm3 233             Results from last 7 days "   Lab Units 11/13/19 1956   MAGNESIUM mg/dL 2.3            Transesophageal Echo Interpretation Summary 11/15/19     · Estimated EF appears to be in the range of 56 - 60%.  · Right ventricular cavity is mildly dilated.  · Left atrial cavity size is dilated.  · Right atrial cavity size is dilated.  · There is mild calcification of the aortic valve.  · Mild to moderate tricuspid valve regurgitation is present.  · Estimated right ventricular systolic pressure from tricuspid regurgitation is mildly elevated (35-45 mmHg).  · There is a 1.2 cm highly mobile mass which appears to be attached to the posterior mitral valve annulus. Cannot exclude an infectious vegetation.  · Moderate MAC is present.Moderate MAC is present.  · Moderate mitral valve regurgitation is present. Mild mitral valve stenosis is present.         Interpretation Summary TTE    · Estimated EF = 70%.  · Left ventricular systolic function is normal.  · Right ventricular cavity is mildly dilated.  · There is calcification of the aortic valve.  · Moderate MAC is present. There is a small mobile element on the atrial side of the mitral valve. It is difficult to tell where it is attached.  · Mild mitral valve regurgitation is present  · Mild mitral valve stenosis is present  · The mitral valve mean gradient is 5.0 mmHg.  · Mild tricuspid valve regurgitation is present.  · Calculated right ventricular systolic pressure from tricuspid regurgitation is 40.7 mmHg.             Medication Review:     arformoterol 15 mcg Nebulization BID - RT   budesonide 0.5 mg Nebulization Daily - RT   cefTRIAXone 2 g Intravenous Q24H   clotrimazole 10 mg Oral 5x Daily   enoxaparin 1 mg/kg Subcutaneous Q12H   ipratropium 500 mcg Nebulization 4x Daily - RT   methylPREDNISolone sodium succinate 40 mg Intravenous Q8H   metoprolol tartrate 50 mg Oral Q8H   oxybutynin XL 10 mg Oral Daily   rOPINIRole 2 mg Oral BID   senna-docusate sodium 1 tablet Oral Daily   sodium chloride 10 mL  Intravenous Q12H   vancomycin 12 mg/kg Intravenous Q12H      Estimated Creatinine Clearance: 55.3 mL/min (A) (by C-G formula based on SCr of 0.56 mg/dL (L)).      Pharmacy to dose vancomycin        Assessment/Plan     1.  Atrial fibrillation.  On full dose Lovenox, diltiazem drip stopped yesterday.  Is on  oral metoprolol 50  q 8 will try and increase to 62.5 mg q8 with hold parameters.  I think going up to 75 mg may be a bit too much with her BP.  She reported noting some swelling to upper thighs while on cardizem gtt.  This was dc'd yesterday. CRCL id 55.3 could consider dig if uptitration of BB not successful.   Heart rate less controlled today up to 130's at times. BP: (102-127)/(68-83) 123/68  NO ANGUS thrombus noted on REDD.   DCCV unsuccessful 11/15.  2.  Possible endocarditis with mobile MV mass noted c/w vegetation. Awaiting CT surgery consult. Continue lovenox q 12 until CT surgery DR. Briones give recommendations.  ID is following and recommending 6 weeks of vanc and rocephin   3.  COPD exacerbation is on steroids.   4.  Influenza      GENET Alves,   Cardiology Service  11/16/19  3:44 PM

## 2019-11-16 NOTE — PROGRESS NOTES
"Pharmacokinetic Consult - Vancomycin Dosing (Follow-up Note)    Shani Phillip is on day 3 pharmacy to dose vancomycin for endocarditis.  Pharmacy dosing vancomycin per Zenia Drew's request.   ID following - Dr. Vidal  Goal trough: 15-20 mg/L   Other Abx: Ceftriaxone 2gm q24h    Relevant clinical data and objective history reviewed:  69 y.o. female 154.9 cm (61\") 62.1 kg (137 lb)  Pt PMH of COPD, former smoker, afib, presented to [facility] with fevers, chill, and pneumonia. after going on a cruise. Pt was found to possibly have endocarditis on 11/9 on her REDD although questionable if not a calcified mobile plaque given the patient's presentation.     Past Medical History:   Diagnosis Date   • Atrial fibrillation with RVR (CMS/[facility]) 11/14/2019   • COPD (chronic obstructive pulmonary disease) (CMS/[facility])    • COPD with acute exacerbation (CMS/[facility]) 11/14/2019   • Influenza 11/14/2019   • Renal disorder    • Restless leg syndrome    • Thrush, oral 11/14/2019     Creatinine   Date Value Ref Range Status   11/16/2019 0.56 (L) 0.57 - 1.00 mg/dL Final   11/14/2019 0.57 0.57 - 1.00 mg/dL Final   11/13/2019 0.64 0.57 - 1.00 mg/dL Final     BUN   Date Value Ref Range Status   11/16/2019 21 8 - 23 mg/dL Final     Estimated Creatinine Clearance: 55.3 mL/min (A) (by C-G formula based on SCr of 0.56 mg/dL (L)).    Lab Results   Component Value Date    WBC 18.00 (H) 11/16/2019     Temp Readings from Last 3 Encounters:   11/16/19 97.6 °F (36.4 °C) (Oral)        Vancomycin Dosing History:    11/14 @ 0054 - Vancomycin 1000mg (16mg/kg) once  11/14 @ 1346 - Vancomycin 750mg q12h  11/14 @ 2354 - Vancomycin 750mg q12h  11/15 @ 1314 - Vancomycin 750mg q12h   11/15 @ 1217 - Vancomycin trough = 17.2mcg/ml  11/15 @ 0050 - Vancomycin 750mg q12h   11/16 @1215  - Vancomycin trough = 16.2 mcg/ml    Assessment/Plan    Trough drawn prior to 4th dose within therapeutic range however, I questioned if the patient was truly at steady state " given the low loading dose that was provided on 11/14 (~16 mg/kg). Renal function stable and the patient's trough that I subsequently ordered was still therapeutic at 16.2 mcg/ml. Given the length of the consult and DOT, will not plan to order any further vancomycin levels unless clinically indicated.     Will monitor serum creatinine at least every 48 hours per dosing recommendations. Pharmacy will continue to follow daily while on vancomycin and adjust as needed.     Andrei Romano, PharmD, BCPS  Clinical Staff Pharmacist

## 2019-11-16 NOTE — PLAN OF CARE
Problem: Patient Care Overview  Goal: Plan of Care Review  Outcome: Ongoing (interventions implemented as appropriate)  Cont treatments as needed - watch HR closely    11/16/19 7858   Coping/Psychosocial   Plan of Care Reviewed With patient;spouse   OTHER   Outcome Summary cont treatments as needed

## 2019-11-16 NOTE — PLAN OF CARE
Problem: Patient Care Overview  Goal: Plan of Care Review  Outcome: Ongoing (interventions implemented as appropriate)   11/16/19 3401   Coping/Psychosocial   Plan of Care Reviewed With patient;spouse   Plan of Care Review   Progress no change   OTHER   Outcome Summary A&O, neuro statues intact,VSS, afebrile, monitor tracing afib with VR 90s to 130s, denies CP, SOA, palpitations. lungs diminished TO, receiving IV antibx, appetite good, +BM, voiding qs, monitored closely.     Goal: Individualization and Mutuality  Outcome: Ongoing (interventions implemented as appropriate)    Goal: Discharge Needs Assessment  Outcome: Ongoing (interventions implemented as appropriate)    Goal: Interprofessional Rounds/Family Conf  Outcome: Ongoing (interventions implemented as appropriate)      Problem: Sepsis/Septic Shock (Adult)  Goal: Signs and Symptoms of Listed Potential Problems Will be Absent, Minimized or Managed (Sepsis/Septic Shock)  Outcome: Ongoing (interventions implemented as appropriate)      Problem: Arrhythmia/Dysrhythmia (Symptomatic) (Adult)  Goal: Signs and Symptoms of Listed Potential Problems Will be Absent, Minimized or Managed (Arrhythmia/Dysrhythmia)  Outcome: Ongoing (interventions implemented as appropriate)

## 2019-11-16 NOTE — PROGRESS NOTES
INFECTIOUS DISEASES PROGRESS NOTE    CC: f/u endocarditis    S:   Feels okay  In RVR but no sx, palpitation, cp  No f/c/ns    O:  Physical Exam:  Temp:  [97.6 °F (36.4 °C)-98.9 °F (37.2 °C)] 97.6 °F (36.4 °C)  Heart Rate:  [] 110  Resp:  [2-20] 18  BP: (100-132)/(68-99) 123/68  Physical Exam   Constitutional: She appears well-developed. No distress.   Pulmonary/Chest: Effort normal and breath sounds normal.   Abdominal: Soft. She exhibits no distension. There is no tenderness.   Neurological: She is alert.   Skin: Skin is warm and dry.   Psychiatric: She has a normal mood and affect. Her behavior is normal.        Diagnostics:    WBC 18  Cr 0.56      Assessment/Plan   1.  Influenza A status post 7 days of tamiflu  2. Afib RVR  3. COPD exacerbation on steroids  4. Leukocytosis related to above  5. Abnormal MV--endocarditis vs. Calcified mitral plaque.  6. Thrush     REDD with apparent MV vegetation.  Odd presentation but there is enough there that we should for culture negative endocarditis.  Recommend 6 weeks of vanc and rocephin.  CV surgery eval underway    Mike Vidal MD  12:28 PM  11/16/19

## 2019-11-16 NOTE — PLAN OF CARE
Problem: Patient Care Overview  Goal: Plan of Care Review  Outcome: Ongoing (interventions implemented as appropriate)   11/16/19 0408   Coping/Psychosocial   Plan of Care Reviewed With patient   Plan of Care Review   Progress no change   OTHER   Outcome Summary Patient alert and oriented. No complaints of pain, SOB or nausea. Continue IV Vancomycin. IV Solu-medrol. Up with stand by assist to toilet. A-fib on monitor, heart rate ranging 100-120's. Vital signs stable. No acute distress noted. Will continue to monitor.        Problem: Sepsis/Septic Shock (Adult)  Goal: Signs and Symptoms of Listed Potential Problems Will be Absent, Minimized or Managed (Sepsis/Septic Shock)  Outcome: Ongoing (interventions implemented as appropriate)      Problem: Arrhythmia/Dysrhythmia (Symptomatic) (Adult)  Goal: Signs and Symptoms of Listed Potential Problems Will be Absent, Minimized or Managed (Arrhythmia/Dysrhythmia)  Outcome: Ongoing (interventions implemented as appropriate)

## 2019-11-17 PROCEDURE — 25010000002 VANCOMYCIN 750 MG RECONSTITUTED SOLUTION: Performed by: NURSE PRACTITIONER

## 2019-11-17 PROCEDURE — 25010000002 ENOXAPARIN PER 10 MG: Performed by: INTERNAL MEDICINE

## 2019-11-17 PROCEDURE — 25010000003 CEFTRIAXONE PER 250 MG: Performed by: INTERNAL MEDICINE

## 2019-11-17 PROCEDURE — 99232 SBSQ HOSP IP/OBS MODERATE 35: CPT | Performed by: INTERNAL MEDICINE

## 2019-11-17 PROCEDURE — 63710000001 PREDNISONE PER 1 MG: Performed by: INTERNAL MEDICINE

## 2019-11-17 PROCEDURE — 94799 UNLISTED PULMONARY SVC/PX: CPT

## 2019-11-17 PROCEDURE — 99232 SBSQ HOSP IP/OBS MODERATE 35: CPT | Performed by: NURSE PRACTITIONER

## 2019-11-17 RX ORDER — METOPROLOL TARTRATE 50 MG/1
50 TABLET, FILM COATED ORAL EVERY 8 HOURS
Status: DISCONTINUED | OUTPATIENT
Start: 2019-11-17 | End: 2019-11-19 | Stop reason: HOSPADM

## 2019-11-17 RX ADMIN — VANCOMYCIN HYDROCHLORIDE 750 MG: 750 INJECTION, POWDER, LYOPHILIZED, FOR SOLUTION INTRAVENOUS at 23:54

## 2019-11-17 RX ADMIN — ENOXAPARIN SODIUM 60 MG: 60 INJECTION SUBCUTANEOUS at 00:24

## 2019-11-17 RX ADMIN — PREDNISONE 40 MG: 20 TABLET ORAL at 08:19

## 2019-11-17 RX ADMIN — IPRATROPIUM BROMIDE 0.5 MG: 0.5 SOLUTION RESPIRATORY (INHALATION) at 19:38

## 2019-11-17 RX ADMIN — ROPINIROLE 2 MG: 2 TABLET, FILM COATED ORAL at 20:45

## 2019-11-17 RX ADMIN — CEFTRIAXONE SODIUM 2 G: 2 INJECTION, SOLUTION INTRAVENOUS at 11:51

## 2019-11-17 RX ADMIN — CLOTRIMAZOLE 10 MG: 10 LOZENGE ORAL; TOPICAL at 22:10

## 2019-11-17 RX ADMIN — IPRATROPIUM BROMIDE 0.5 MG: 0.5 SOLUTION RESPIRATORY (INHALATION) at 07:17

## 2019-11-17 RX ADMIN — IPRATROPIUM BROMIDE 0.5 MG: 0.5 SOLUTION RESPIRATORY (INHALATION) at 14:54

## 2019-11-17 RX ADMIN — IPRATROPIUM BROMIDE 0.5 MG: 0.5 SOLUTION RESPIRATORY (INHALATION) at 10:54

## 2019-11-17 RX ADMIN — VANCOMYCIN HYDROCHLORIDE 750 MG: 750 INJECTION, POWDER, LYOPHILIZED, FOR SOLUTION INTRAVENOUS at 00:24

## 2019-11-17 RX ADMIN — ROPINIROLE 2 MG: 2 TABLET, FILM COATED ORAL at 08:19

## 2019-11-17 RX ADMIN — METOPROLOL TARTRATE 62.5 MG: 25 TABLET ORAL at 02:28

## 2019-11-17 RX ADMIN — CLOTRIMAZOLE 10 MG: 10 LOZENGE ORAL; TOPICAL at 11:52

## 2019-11-17 RX ADMIN — ENOXAPARIN SODIUM 60 MG: 60 INJECTION SUBCUTANEOUS at 11:52

## 2019-11-17 RX ADMIN — METOPROLOL TARTRATE 62.5 MG: 25 TABLET ORAL at 09:15

## 2019-11-17 RX ADMIN — ARFORMOTEROL TARTRATE 15 MCG: 15 SOLUTION RESPIRATORY (INHALATION) at 19:38

## 2019-11-17 RX ADMIN — FAMOTIDINE 20 MG: 20 TABLET, FILM COATED ORAL at 18:54

## 2019-11-17 RX ADMIN — CLOTRIMAZOLE 10 MG: 10 LOZENGE ORAL; TOPICAL at 06:41

## 2019-11-17 RX ADMIN — CLOTRIMAZOLE 10 MG: 10 LOZENGE ORAL; TOPICAL at 18:01

## 2019-11-17 RX ADMIN — OXYBUTYNIN CHLORIDE 10 MG: 10 TABLET, EXTENDED RELEASE ORAL at 08:20

## 2019-11-17 RX ADMIN — SODIUM CHLORIDE, PRESERVATIVE FREE 10 ML: 5 INJECTION INTRAVENOUS at 08:21

## 2019-11-17 RX ADMIN — METOPROLOL TARTRATE 50 MG: 50 TABLET, FILM COATED ORAL at 18:01

## 2019-11-17 RX ADMIN — BUDESONIDE 0.5 MG: 0.5 INHALANT RESPIRATORY (INHALATION) at 07:17

## 2019-11-17 RX ADMIN — VANCOMYCIN HYDROCHLORIDE 750 MG: 750 INJECTION, POWDER, LYOPHILIZED, FOR SOLUTION INTRAVENOUS at 12:47

## 2019-11-17 RX ADMIN — SODIUM CHLORIDE, PRESERVATIVE FREE 10 ML: 5 INJECTION INTRAVENOUS at 20:45

## 2019-11-17 RX ADMIN — ARFORMOTEROL TARTRATE 15 MCG: 15 SOLUTION RESPIRATORY (INHALATION) at 07:18

## 2019-11-17 RX ADMIN — DILTIAZEM HYDROCHLORIDE 30 MG: 30 TABLET, FILM COATED ORAL at 23:54

## 2019-11-17 RX ADMIN — DILTIAZEM HYDROCHLORIDE 30 MG: 30 TABLET, FILM COATED ORAL at 18:00

## 2019-11-17 RX ADMIN — FAMOTIDINE 20 MG: 20 TABLET, FILM COATED ORAL at 06:41

## 2019-11-17 RX ADMIN — ENOXAPARIN SODIUM 60 MG: 60 INJECTION SUBCUTANEOUS at 22:40

## 2019-11-17 NOTE — PROGRESS NOTES
INFECTIOUS DISEASES PROGRESS NOTE    CC: f/u sepsis    S:   Feels okay  No palpitations  No f/c/sn    O:  Physical Exam:  Temp:  [97.2 °F (36.2 °C)-98.9 °F (37.2 °C)] 97.2 °F (36.2 °C)  Heart Rate:  [104-130] 111  Resp:  [16-18] 16  BP: (109-154)/() 154/84  Physical Exam   Constitutional: She appears well-developed. No distress.   Cardiovascular: An irregular rhythm present. Tachycardia present.   Pulmonary/Chest: Effort normal.   Neurological: She is alert.   Skin: Skin is warm and dry.   Psychiatric: She has a normal mood and affect. Her behavior is normal.        Diagnostics:  WBC 18           Assessment/Plan   1.  Influenza A status post 7 days of tamiflu  2. Afib RVR  3. COPD exacerbation on steroids  4. Leukocytosis related to above  5. Abnormal MV--endocarditis vs. Calcified mitral plaque.  6. Thrush     CV surgery recommends medical mgmt and they will repeat REDD later  We will keep on vanc and rocephin for 6 weeks as below  No objection to d/c when okay with others    Final Infectious Diseases treatment recommendations:  The patient should receive the following antibiotics:  1. Vancoymcin for goal level of 15-20  2. Ceftriaxone 2g IV q24  Stop date 12/7/19    Laboratory monitoring:  It is recommended that the patient have the following laboratories while he/she is receiving antibiotic therapy as an inpatient and outpatient:CBC with differential weekly, serum creatinine weekly, vancomycin trough weekly    Please fax the results of all labs to [facility] clinic at [phone]    Follow up:  The patient will follow-up in the Infectious Disease clinic on 12/6         Thank you for allowing us to participate in the care of this patient. [facility] consult service will sign off. Please call or re-consult if we can be of further assistance.         Mike Vidal MD  9:01 AM  11/17/19

## 2019-11-17 NOTE — PLAN OF CARE
Problem: Patient Care Overview  Goal: Plan of Care Review  Outcome: Ongoing (interventions implemented as appropriate)   11/17/19 4555   Coping/Psychosocial   Plan of Care Reviewed With family   Plan of Care Review   Progress no change   OTHER   Outcome Summary A&O, VSS, afebrile, monitor tracing afib with VR 90s-120s, denies CP, SOB, palpitations, lungs coarse but clear, O2 at 1l in place, voiding qs, OOB to BR and encouraged her to sit in chair, receiving IV antibx, Dr Briones in, unit secretary working on getting records and echo results from [facility], monitored closely.     Goal: Individualization and Mutuality  Outcome: Ongoing (interventions implemented as appropriate)    Goal: Discharge Needs Assessment  Outcome: Ongoing (interventions implemented as appropriate)    Goal: Interprofessional Rounds/Family Conf  Outcome: Ongoing (interventions implemented as appropriate)      Problem: Sepsis/Septic Shock (Adult)  Goal: Signs and Symptoms of Listed Potential Problems Will be Absent, Minimized or Managed (Sepsis/Septic Shock)  Outcome: Ongoing (interventions implemented as appropriate)      Problem: Arrhythmia/Dysrhythmia (Symptomatic) (Adult)  Goal: Signs and Symptoms of Listed Potential Problems Will be Absent, Minimized or Managed (Arrhythmia/Dysrhythmia)  Outcome: Ongoing (interventions implemented as appropriate)      Problem: Infection, Risk/Actual (Adult)  Goal: Identify Related Risk Factors and Signs and Symptoms  Outcome: Ongoing (interventions implemented as appropriate)    Goal: Infection Prevention/Resolution  Outcome: Ongoing (interventions implemented as appropriate)

## 2019-11-17 NOTE — PROGRESS NOTES
LOS: 3 days   Patient Care Team:  Jose R Franks MD as PCP - General  Jose R Franks MD as PCP - Family Medicine    Chief Complaint: MV endocarditis    Subjective      Vital Signs  Temp:  [97.2 °F (36.2 °C)-98.9 °F (37.2 °C)] 97.2 °F (36.2 °C)  Heart Rate:  [104-130] 111  Resp:  [16-18] 16  BP: (109-154)/() 154/84  Body mass index is 25.89 kg/m².    Intake/Output Summary (Last 24 hours) at 11/17/2019 0945  Last data filed at 11/17/2019 0550  Gross per 24 hour   Intake 940 ml   Output --   Net 940 ml     No intake/output data recorded.            11/13/19  1800 11/14/19  1229   Weight: 62.1 kg (137 lb) 62.1 kg (137 lb)         Objective    Results Review:        WBC WBC   Date Value Ref Range Status   11/16/2019 18.00 (H) 3.40 - 10.80 10*3/mm3 Final      HGB Hemoglobin   Date Value Ref Range Status   11/16/2019 12.1 12.0 - 15.9 g/dL Final      HCT Hematocrit   Date Value Ref Range Status   11/16/2019 36.4 34.0 - 46.6 % Final      Platelets Platelets   Date Value Ref Range Status   11/16/2019 233 140 - 450 10*3/mm3 Final        PT/INR:  No results found for: PROTIME/No results found for: INR    Sodium Sodium   Date Value Ref Range Status   11/16/2019 143 136 - 145 mmol/L Final      Potassium Potassium   Date Value Ref Range Status   11/16/2019 4.2 3.5 - 5.2 mmol/L Final      Chloride Chloride   Date Value Ref Range Status   11/16/2019 102 98 - 107 mmol/L Final      Bicarbonate CO2   Date Value Ref Range Status   11/16/2019 27.5 22.0 - 29.0 mmol/L Final      BUN BUN   Date Value Ref Range Status   11/16/2019 21 8 - 23 mg/dL Final      Creatinine Creatinine   Date Value Ref Range Status   11/16/2019 0.56 (L) 0.57 - 1.00 mg/dL Final      Calcium Calcium   Date Value Ref Range Status   11/16/2019 8.4 (L) 8.6 - 10.5 mg/dL Final      Magnesium No results found for: MG         arformoterol 15 mcg Nebulization BID - RT   budesonide 0.5 mg Nebulization Daily - RT   cefTRIAXone 2 g Intravenous Q24H    clotrimazole 10 mg Oral 5x Daily   enoxaparin 1 mg/kg Subcutaneous Q12H   famotidine 20 mg Oral BID AC   ipratropium 500 mcg Nebulization 4x Daily - RT   metoprolol tartrate 62.5 mg Oral Q8H   oxybutynin XL 10 mg Oral Daily   predniSONE 40 mg Oral Daily With Breakfast   rOPINIRole 2 mg Oral BID   senna-docusate sodium 1 tablet Oral Daily   sodium chloride 10 mL Intravenous Q12H   vancomycin 12 mg/kg Intravenous Q12H       Pharmacy to dose vancomycin            Patient Active Problem List   Diagnosis Code   • Acute endocarditis I33.9   • Influenza J11.1   • Thrush, oral B37.0   • COPD with acute exacerbation (CMS/[facility]) J44.1   • Atrial fibrillation with RVR (CMS/[facility]) I48.91       Assessment & Plan       -Acute endocarditis--Mobile mass mitral valve mass  -COPD   -Atrial fibrillation-new onset  -Influenza A- completed tamiflu  -oral thrush    Dr. Briones would like to wait and medically manage.  Will need a cath prior to surgery.  Will try to get echo from [facility]  11/17/19  9:45 AM

## 2019-11-17 NOTE — PROGRESS NOTES
LOS: 3 days   Patient Care Team:  Jose R Franks MD as PCP - General  Jose R Franks MD as PCP - Family Medicine    Chief Complaint:  Atrial fibrillation with rapid ventricular response    Interval History:   Patient with a peripheral fibrillation with rapid ventricular response today.  Denies any symptoms.    Objective   Vital Signs  Temp:  [97.2 °F (36.2 °C)-98.9 °F (37.2 °C)] 98.2 °F (36.8 °C)  Heart Rate:  [104-130] 119  Resp:  [16-18] 16  BP: (120-156)/() 156/90    Intake/Output Summary (Last 24 hours) at 11/17/2019 1742  Last data filed at 11/17/2019 1359  Gross per 24 hour   Intake 1300 ml   Output --   Net 1300 ml       Review of Systems   Constitution: Negative.   Cardiovascular: Negative.    Respiratory: Negative.    Gastrointestinal: Negative.        Physical Exam   Constitutional: She is oriented to person, place, and time. No distress.   HENT:   Mouth/Throat: No oropharyngeal exudate.   Eyes: No scleral icterus.   Neck: No tracheal deviation present.   Cardiovascular: An irregularly irregular rhythm present. Tachycardia present.   Pulmonary/Chest: Effort normal and breath sounds normal. No respiratory distress.   Abdominal: Soft. She exhibits no distension.   Neurological: She is alert and oriented to person, place, and time.   Skin: Skin is warm and dry. She is not diaphoretic.   Psychiatric: She has a normal mood and affect. Her behavior is normal. Judgment and thought content normal.   Nursing note and vitals reviewed.        Results Review:      Results from last 7 days   Lab Units 11/16/19 0453 11/14/19 0543 11/13/19 1956   SODIUM mmol/L 143 140 139   POTASSIUM mmol/L 4.2 4.0 4.3   CHLORIDE mmol/L 102 100 98   CO2 mmol/L 27.5 31.8* 29.7*   BUN mg/dL 21 27* 28*   CREATININE mg/dL 0.56* 0.57 0.64   GLUCOSE mg/dL 160* 105* 176*   CALCIUM mg/dL 8.4* 8.1* 8.3*         Results from last 7 days   Lab Units 11/16/19 0453 11/14/19 0543 11/13/19 1955   WBC 10*3/mm3 18.00*  20.09* 21.33*   HEMOGLOBIN g/dL 12.1 12.4 13.4   HEMATOCRIT % 36.4 35.6 38.0   PLATELETS 10*3/mm3 233 202 216             Results from last 7 days   Lab Units 11/13/19 1956   MAGNESIUM mg/dL 2.3           I reviewed the patient's new clinical results.  I personally viewed and interpreted the patient's EKG/Telemetry data        Medication Review:     arformoterol 15 mcg Nebulization BID - RT   budesonide 0.5 mg Nebulization Daily - RT   cefTRIAXone 2 g Intravenous Q24H   clotrimazole 10 mg Oral 5x Daily   dilTIAZem 30 mg Oral Q6H   enoxaparin 1 mg/kg Subcutaneous Q12H   famotidine 20 mg Oral BID AC   ipratropium 500 mcg Nebulization 4x Daily - RT   metoprolol tartrate 50 mg Oral Q8H   oxybutynin XL 10 mg Oral Daily   predniSONE 40 mg Oral Daily With Breakfast   rOPINIRole 2 mg Oral BID   senna-docusate sodium 1 tablet Oral Daily   sodium chloride 10 mL Intravenous Q12H   vancomycin 12 mg/kg Intravenous Q12H         Pharmacy to dose vancomycin        Assessment/Plan   1.  Atrial fibrillation with rapid ventricular response  We will reduce metoprolol to 50 mg twice daily, added Cardizem.  I find a combination often works better than either alone.  If continued issues, or intolerant to oral Cardizem, consider digoxin.  She is currently anticoagulated with Lovenox.  If no plans for surgery, consider transition to NOAC.    2.  Possible endocarditis  Mass noted on mitral valve.  From a review of the outside records, it appears that there is never been any positive blood cultures.  Plan is for 6 weeks of empiric antibiotic treatment, consideration for surgery in the future.    Patient anxious to get home, we can get her rate controlled I suspect she will go home in the next couple of days which I discussed with her.    Keith Burr MD  11/17/19  5:42 PM

## 2019-11-17 NOTE — PLAN OF CARE
Problem: Patient Care Overview  Goal: Plan of Care Review  Outcome: Ongoing (interventions implemented as appropriate)   11/17/19 0650   Coping/Psychosocial   Plan of Care Reviewed With patient   Plan of Care Review   Progress no change   OTHER   Outcome Summary VSS, continiue IV antibiotics, Afib , start Prednisone in am, no complaints of pain, will continue to monitor       Problem: Sepsis/Septic Shock (Adult)  Goal: Signs and Symptoms of Listed Potential Problems Will be Absent, Minimized or Managed (Sepsis/Septic Shock)  Outcome: Ongoing (interventions implemented as appropriate)      Problem: Arrhythmia/Dysrhythmia (Symptomatic) (Adult)  Goal: Signs and Symptoms of Listed Potential Problems Will be Absent, Minimized or Managed (Arrhythmia/Dysrhythmia)  Outcome: Ongoing (interventions implemented as appropriate)

## 2019-11-17 NOTE — PROGRESS NOTES
LOS: 3 days     Name: Shani Phillip  Age/Sex: 69 y.o. female  :  1950        PCP: Jose R Franks MD  Chief Complaint   Patient presents with   • Shortness of Breath      Subjective   Overall she is feeling better she actually sitting today she wants to go home.  She denies any new issues or complaints today.  No chest pain shortness of breath palpitations  General: No Fever or Chills, Cardiac: No Chest Pain or Palpitations, Resp: No Cough or SOA, GI: No Nausea, Vomiting, or Diarrhea and Other: No bleeding      arformoterol 15 mcg Nebulization BID - RT   budesonide 0.5 mg Nebulization Daily - RT   cefTRIAXone 2 g Intravenous Q24H   clotrimazole 10 mg Oral 5x Daily   enoxaparin 1 mg/kg Subcutaneous Q12H   famotidine 20 mg Oral BID AC   ipratropium 500 mcg Nebulization 4x Daily - RT   metoprolol tartrate 62.5 mg Oral Q8H   oxybutynin XL 10 mg Oral Daily   predniSONE 40 mg Oral Daily With Breakfast   rOPINIRole 2 mg Oral BID   senna-docusate sodium 1 tablet Oral Daily   sodium chloride 10 mL Intravenous Q12H   vancomycin 12 mg/kg Intravenous Q12H       Pharmacy to dose vancomycin        Objective   Vital Signs  Temp:  [97.2 °F (36.2 °C)-98.9 °F (37.2 °C)] 98.2 °F (36.8 °C)  Heart Rate:  [104-130] 119  Resp:  [16-18] 16  BP: (109-156)/() 156/90  Body mass index is 25.89 kg/m².    Intake/Output Summary (Last 24 hours) at 2019 1545  Last data filed at 2019 1359  Gross per 24 hour   Intake 1300 ml   Output --   Net 1300 ml       Physical Exam   Constitutional: She appears well-developed and well-nourished. No distress.   HENT:   Head: Normocephalic and atraumatic.   Eyes: EOM are normal.   Neck: Neck supple.   Cardiovascular: An irregular rhythm present. Tachycardia present.   Murmur heard.  Pulmonary/Chest: Effort normal and breath sounds normal.   Abdominal: Soft. Bowel sounds are normal.   Musculoskeletal: She exhibits no edema.   PICC line in right upper extremity   Neurological:  She is alert.   Skin: Skin is warm. Capillary refill takes less than 2 seconds.   Psychiatric: She has a normal mood and affect. Her behavior is normal. Thought content normal.   Nursing note and vitals reviewed.        Results Review:       I reviewed the patient's new clinical results.  Results from last 7 days   Lab Units 11/16/19 0453 11/14/19 0543 11/13/19 1955   WBC 10*3/mm3 18.00* 20.09* 21.33*   HEMOGLOBIN g/dL 12.1 12.4 13.4   PLATELETS 10*3/mm3 233 202 216     Results from last 7 days   Lab Units 11/16/19 0453 11/14/19 0543 11/13/19 1956   SODIUM mmol/L 143 140 139   POTASSIUM mmol/L 4.2 4.0 4.3   CHLORIDE mmol/L 102 100 98   CO2 mmol/L 27.5 31.8* 29.7*   BUN mg/dL 21 27* 28*   CREATININE mg/dL 0.56* 0.57 0.64   CALCIUM mg/dL 8.4* 8.1* 8.3*   MAGNESIUM mg/dL  --   --  2.3   Estimated Creatinine Clearance: 55.3 mL/min (A) (by C-G formula based on SCr of 0.56 mg/dL (L)).      Assessment/Plan     Acute endocarditis    Influenza    Thrush, oral    COPD with acute exacerbation (CMS/[facility])    Atrial fibrillation with RVR (CMS/[facility])      PLAN  Endocarditis  - had REDD on 11/15/19 showing small mobile vegetation on the mitral valve-there is some stenosis but otherwise valve is functioning well  - 6 weeks of vancomycin and rocephin per ID, appreciate recs  - CT surgery has evaluated and favoring holding off on surgery and repeating REDD following course of abx-appreciate recs     Influenza A  - s/p Tamiflu     COPD with exacerbation   - doing better  - will transition to oral steroids tomorrow morning, famotidine for GI prophylaxis  - continue nebulized steroids and bronchodilators  - wean oxygen as tolerated     Atrial Fibrillation  - off diltiazem drip, on oral metoprolol-increased to 62.5mg Q8H  - had REDD 11/15/19-no thrombus and cardioversion was attempted but unsuccessful  - Heart rate still up in the 120s at times.  Blood pressures too low to up titrate the beta-blocker any further.  Could probably benefit  from addition of digoxin but cardiology has some concerns about this.  Deferring rate control to their expertise.     Oral Thrush  - continue clotrimazole      Disposition  Home with IV antibiotics in a day or 2      Migel Olson MD  [facility]  11/17/19  3:45 PM

## 2019-11-18 LAB
ALBUMIN SERPL-MCNC: 3.2 G/DL (ref 3.5–5.2)
ANION GAP SERPL CALCULATED.3IONS-SCNC: 9.9 MMOL/L (ref 5–15)
BUN BLD-MCNC: 22 MG/DL (ref 8–23)
BUN/CREAT SERPL: 36.1 (ref 7–25)
CALCIUM SPEC-SCNC: 8.5 MG/DL (ref 8.6–10.5)
CHLORIDE SERPL-SCNC: 103 MMOL/L (ref 98–107)
CO2 SERPL-SCNC: 28.1 MMOL/L (ref 22–29)
CREAT BLD-MCNC: 0.61 MG/DL (ref 0.57–1)
DEPRECATED RDW RBC AUTO: 45.8 FL (ref 37–54)
ERYTHROCYTE [DISTWIDTH] IN BLOOD BY AUTOMATED COUNT: 13.8 % (ref 12.3–15.4)
GFR SERPL CREATININE-BSD FRML MDRD: 97 ML/MIN/1.73
GLUCOSE BLD-MCNC: 110 MG/DL (ref 65–99)
HCT VFR BLD AUTO: 38.6 % (ref 34–46.6)
HGB BLD-MCNC: 12.2 G/DL (ref 12–15.9)
MAGNESIUM SERPL-MCNC: 2 MG/DL (ref 1.6–2.4)
MCH RBC QN AUTO: 28.6 PG (ref 26.6–33)
MCHC RBC AUTO-ENTMCNC: 31.6 G/DL (ref 31.5–35.7)
MCV RBC AUTO: 90.6 FL (ref 79–97)
PHOSPHATE SERPL-MCNC: 2.5 MG/DL (ref 2.5–4.5)
PLATELET # BLD AUTO: 211 10*3/MM3 (ref 140–450)
PMV BLD AUTO: 10.2 FL (ref 6–12)
POTASSIUM BLD-SCNC: 4.5 MMOL/L (ref 3.5–5.2)
RBC # BLD AUTO: 4.26 10*6/MM3 (ref 3.77–5.28)
SODIUM BLD-SCNC: 141 MMOL/L (ref 136–145)
WBC NRBC COR # BLD: 22.04 10*3/MM3 (ref 3.4–10.8)

## 2019-11-18 PROCEDURE — 99232 SBSQ HOSP IP/OBS MODERATE 35: CPT | Performed by: INTERNAL MEDICINE

## 2019-11-18 PROCEDURE — 25010000002 VANCOMYCIN 750 MG RECONSTITUTED SOLUTION: Performed by: NURSE PRACTITIONER

## 2019-11-18 PROCEDURE — 85027 COMPLETE CBC AUTOMATED: CPT | Performed by: HOSPITALIST

## 2019-11-18 PROCEDURE — 94640 AIRWAY INHALATION TREATMENT: CPT

## 2019-11-18 PROCEDURE — 25010000003 CEFTRIAXONE PER 250 MG: Performed by: INTERNAL MEDICINE

## 2019-11-18 PROCEDURE — 99232 SBSQ HOSP IP/OBS MODERATE 35: CPT | Performed by: PHYSICIAN ASSISTANT

## 2019-11-18 PROCEDURE — 83735 ASSAY OF MAGNESIUM: CPT | Performed by: HOSPITALIST

## 2019-11-18 PROCEDURE — 94799 UNLISTED PULMONARY SVC/PX: CPT

## 2019-11-18 PROCEDURE — 80069 RENAL FUNCTION PANEL: CPT | Performed by: HOSPITALIST

## 2019-11-18 PROCEDURE — 63710000001 PREDNISONE PER 1 MG: Performed by: INTERNAL MEDICINE

## 2019-11-18 RX ORDER — DILTIAZEM HYDROCHLORIDE 180 MG/1
180 CAPSULE, COATED, EXTENDED RELEASE ORAL
Status: DISCONTINUED | OUTPATIENT
Start: 2019-11-18 | End: 2019-11-19 | Stop reason: HOSPADM

## 2019-11-18 RX ADMIN — OXYBUTYNIN CHLORIDE 10 MG: 10 TABLET, EXTENDED RELEASE ORAL at 07:59

## 2019-11-18 RX ADMIN — DILTIAZEM HYDROCHLORIDE 30 MG: 30 TABLET, FILM COATED ORAL at 06:40

## 2019-11-18 RX ADMIN — METOPROLOL TARTRATE 50 MG: 50 TABLET, FILM COATED ORAL at 02:27

## 2019-11-18 RX ADMIN — APIXABAN 5 MG: 5 TABLET, FILM COATED ORAL at 10:00

## 2019-11-18 RX ADMIN — CLOTRIMAZOLE 10 MG: 10 LOZENGE ORAL; TOPICAL at 21:58

## 2019-11-18 RX ADMIN — IPRATROPIUM BROMIDE 0.5 MG: 0.5 SOLUTION RESPIRATORY (INHALATION) at 11:31

## 2019-11-18 RX ADMIN — SODIUM CHLORIDE, PRESERVATIVE FREE 10 ML: 5 INJECTION INTRAVENOUS at 08:00

## 2019-11-18 RX ADMIN — IPRATROPIUM BROMIDE 0.5 MG: 0.5 SOLUTION RESPIRATORY (INHALATION) at 15:14

## 2019-11-18 RX ADMIN — CLOTRIMAZOLE 10 MG: 10 LOZENGE ORAL; TOPICAL at 13:08

## 2019-11-18 RX ADMIN — APIXABAN 5 MG: 5 TABLET, FILM COATED ORAL at 21:58

## 2019-11-18 RX ADMIN — METOPROLOL TARTRATE 50 MG: 50 TABLET, FILM COATED ORAL at 07:59

## 2019-11-18 RX ADMIN — SODIUM CHLORIDE, PRESERVATIVE FREE 10 ML: 5 INJECTION INTRAVENOUS at 21:58

## 2019-11-18 RX ADMIN — CLOTRIMAZOLE 10 MG: 10 LOZENGE ORAL; TOPICAL at 06:40

## 2019-11-18 RX ADMIN — BUDESONIDE 0.5 MG: 0.5 INHALANT RESPIRATORY (INHALATION) at 07:40

## 2019-11-18 RX ADMIN — ARFORMOTEROL TARTRATE 15 MCG: 15 SOLUTION RESPIRATORY (INHALATION) at 21:12

## 2019-11-18 RX ADMIN — PREDNISONE 40 MG: 20 TABLET ORAL at 07:59

## 2019-11-18 RX ADMIN — CLOTRIMAZOLE 10 MG: 10 LOZENGE ORAL; TOPICAL at 10:00

## 2019-11-18 RX ADMIN — DILTIAZEM HYDROCHLORIDE 180 MG: 180 CAPSULE, COATED, EXTENDED RELEASE ORAL at 17:22

## 2019-11-18 RX ADMIN — FAMOTIDINE 20 MG: 20 TABLET, FILM COATED ORAL at 17:22

## 2019-11-18 RX ADMIN — IPRATROPIUM BROMIDE 0.5 MG: 0.5 SOLUTION RESPIRATORY (INHALATION) at 07:40

## 2019-11-18 RX ADMIN — ROPINIROLE 2 MG: 2 TABLET, FILM COATED ORAL at 07:59

## 2019-11-18 RX ADMIN — VANCOMYCIN HYDROCHLORIDE 750 MG: 750 INJECTION, POWDER, LYOPHILIZED, FOR SOLUTION INTRAVENOUS at 11:21

## 2019-11-18 RX ADMIN — METOPROLOL TARTRATE 50 MG: 50 TABLET, FILM COATED ORAL at 17:22

## 2019-11-18 RX ADMIN — IPRATROPIUM BROMIDE 0.5 MG: 0.5 SOLUTION RESPIRATORY (INHALATION) at 21:12

## 2019-11-18 RX ADMIN — CEFTRIAXONE SODIUM 2 G: 2 INJECTION, SOLUTION INTRAVENOUS at 10:00

## 2019-11-18 RX ADMIN — ARFORMOTEROL TARTRATE 15 MCG: 15 SOLUTION RESPIRATORY (INHALATION) at 07:40

## 2019-11-18 RX ADMIN — FAMOTIDINE 20 MG: 20 TABLET, FILM COATED ORAL at 06:40

## 2019-11-18 RX ADMIN — SENNOSIDES AND DOCUSATE SODIUM 1 TABLET: 8.6; 5 TABLET ORAL at 07:59

## 2019-11-18 RX ADMIN — ROPINIROLE 2 MG: 2 TABLET, FILM COATED ORAL at 21:58

## 2019-11-18 RX ADMIN — CLOTRIMAZOLE 10 MG: 10 LOZENGE ORAL; TOPICAL at 17:22

## 2019-11-18 NOTE — PROGRESS NOTES
"Pharmacokinetic Consult - Vancomycin Dosing (Follow-up Note)    Shani Phillip is on day 5 pharmacy to dose vancomycin for endocarditis per Zenia Drew's request.    ID following - Dr. Vidal (consult extended to 12/7/2019)  Goal trough: 15-20 mg/L   Other Abx: Ceftriaxone 2gm q24h    Relevant clinical data and objective history reviewed:  69 y.o. female 154.9 cm (61\") 62.1 kg (137 lb)    Past Medical History:   Diagnosis Date   • Atrial fibrillation with RVR (CMS/[facility]) 11/14/2019   • COPD (chronic obstructive pulmonary disease) (CMS/[facility])    • COPD with acute exacerbation (CMS/[facility]) 11/14/2019   • Influenza 11/14/2019   • Renal disorder    • Restless leg syndrome    • Thrush, oral 11/14/2019     Creatinine   Date Value Ref Range Status   11/18/2019 0.61 0.57 - 1.00 mg/dL Final   11/16/2019 0.56 (L) 0.57 - 1.00 mg/dL Final   11/14/2019 0.57 0.57 - 1.00 mg/dL Final     BUN   Date Value Ref Range Status   11/18/2019 22 8 - 23 mg/dL Final     Estimated Creatinine Clearance: 55.3 mL/min (by C-G formula based on SCr of 0.61 mg/dL).    Lab Results   Component Value Date    WBC 22.04 (H) 11/18/2019     Temp Readings from Last 3 Encounters:   11/18/19 97.3 °F (36.3 °C) (Oral)       Anti-Infectives (From admission, onward)      Ordered     Dose/Rate Route Frequency Start Stop    11/14/19 0117  vancomycin 750 mg/250 mL 0.9% NS add-vantage     Gregory Francois, [facility] reviewed the order on 11/18/19 0546.   Ordering Provider:  Mike Vidal MD    12 mg/kg × 62.1 kg  over 75 Minutes Intravenous Every 12 Hours 11/14/19 1200 12/07/19 2909    11/14/19 0940  cefTRIAXone (ROCEPHIN) IVPB 2 g     Mike Vidal MD reviewed the order on 11/18/19 0951.   Ordering Provider:  Mike Vidal MD    2 g  100 mL/hr over 30 Minutes Intravenous Every 24 Hours 11/14/19 1100 12/07/19 4879    11/13/19 2341  Pharmacy to dose vancomycin     Gregory Francois [facility] reviewed the order on 11/18/19 2418.   Ordering " "Provider:  Mike Vidal MD     Does not apply Continuous PRN 11/13/19 2340 12/07/19 2359    11/13/19 2050  metroNIDAZOLE (FLAGYL) 500 mg/100mL IVPB     Ordering Provider:  David Mcqueen MD    500 mg  over 60 Minutes Intravenous Once 11/13/19 2051 11/14/19 0106    11/13/19 2050  vancomycin (VANCOCIN) in iso-osmotic dextrose IVPB 1 g (premix) 200 mL     Ordering Provider:  David Mcqueen MD    1,000 mg Intravenous Once 11/13/19 2051 11/14/19 0306    11/13/19 2050  cefepime (MAXIPIME) 2 g/100 mL 0.9% NS (mbp)     Ordering Provider:  David Mcqueen MD    2 g  200 mL/hr over 30 Minutes Intravenous Once 11/13/19 2051 11/13/19 2143           Lab Results   Component Value Date    [facility] 16.20 11/16/2019     Vancomycin Dosing History  11/14 @ 0054 - Vancomycin 1000mg (16mg/kg) once  11/14 @ 1346 - Vancomycin 750mg q12h  11/14 @ 2354 - Vancomycin 750mg q12h  11/15 @ 1314 - Vancomycin 750mg q12h              11/15 @ 1217 - Vancomycin trough = 17.2mcg/ml  11/15 @ 0050 - Vancomycin 750mg q12h              11/16 @ 1215  - Vancomycin trough = 16.2 mcg/ml  11/16 @ 1253 - Vancomycin 750 mg q12h  11/17 @ 0024, 1247, 2354 - Vancomycin 750 mg q12h   11/18 @ 1121 - Vancomycin 750 mg IV q12h       Assessment  Per ID 11/18: \"Continue vancomycin as dosed for goal level of 15-20 and ceftriaxone 2 g IV every 24 hours through 12/7/2019.\"    Plan  1. Will check a trough level 11/19 @ 1130 to ensure therapeutic dosing prior to discharge.  2. Renal function stable w/ most recent Scr= 0.61 mg/dL.  3. Pharmacy will continue to follow daily while on vancomycin and adjust as needed.     Thank you for allowing me to participate in the care of your patient,    Gregory Francois, PharmD      "

## 2019-11-18 NOTE — PLAN OF CARE
Problem: Patient Care Overview  Goal: Plan of Care Review  Outcome: Ongoing (interventions implemented as appropriate)   11/18/19 0320   Coping/Psychosocial   Plan of Care Reviewed With patient   Plan of Care Review   Progress improving   OTHER   Outcome Summary Pt A & O, up ad emiliana to the bathroom, family remained at bedside throughout the night. IV abx cont. No c/o pain or SOB, 1 L of O2 worn throughout the night. A call was made to [facility] to request medical record and results from echo. No s/s of distress at this time, manual BPs performed, will cont to monitor.        Problem: Sepsis/Septic Shock (Adult)  Goal: Signs and Symptoms of Listed Potential Problems Will be Absent, Minimized or Managed (Sepsis/Septic Shock)  Outcome: Ongoing (interventions implemented as appropriate)   11/17/19 1445   Goal/Outcome Evaluation   Problems Assessed (Sepsis) all   Problems Present (Sepsis) situational response       Problem: Arrhythmia/Dysrhythmia (Symptomatic) (Adult)  Goal: Signs and Symptoms of Listed Potential Problems Will be Absent, Minimized or Managed (Arrhythmia/Dysrhythmia)  Outcome: Ongoing (interventions implemented as appropriate)   11/17/19 1445 11/18/19 0320   Goal/Outcome Evaluation   Problems Assessed (Arrhythmia/Dysrhythmia) --  all   Problems Present (Dysrhythmia) electrophysiologic conduction defect;situational response --        Problem: Infection, Risk/Actual (Adult)  Goal: Identify Related Risk Factors and Signs and Symptoms  Outcome: Outcome(s) achieved Date Met: 11/18/19 11/17/19 1445   Infection, Risk/Actual (Adult)   Related Risk Factors (Infection, Risk/Actual) blood disorder;exposure to microbes   Signs and Symptoms (Infection, Risk/Actual) body temperature changes;heart rate increase     Goal: Infection Prevention/Resolution  Outcome: Ongoing (interventions implemented as appropriate)   11/18/19 0320   Infection, Risk/Actual (Adult)   Infection Prevention/Resolution making  progress toward outcome

## 2019-11-18 NOTE — PROGRESS NOTES
"Patient Name: Shani Phillip  :1950  69 y.o.      Patient Care Team:  Jose R Franks MD as PCP - General  Jose R Franks MD as PCP - Family Medicine    Interval History:   Medication adjusted for rate control.  Failed cardioversion on Friday.    Subjective:  Following for atrial fibrillation and endocarditis    Objective   Vital Signs  Temp:  [97.2 °F (36.2 °C)-98.2 °F (36.8 °C)] 97.4 °F (36.3 °C)  Heart Rate:  [] 110  Resp:  [16-20] 18  BP: (124-156)/() 128/76    Intake/Output Summary (Last 24 hours) at 2019 1001  Last data filed at 2019 0933  Gross per 24 hour   Intake 1050 ml   Output --   Net 1050 ml     Flowsheet Rows      First Filed Value   Admission Height  154.9 cm (61\") Documented at 2019 1800   Admission Weight  62.1 kg (137 lb) Documented at 2019 1800          Physical Exam:   General Appearance:    Alert, cooperative, in no acute distress   Lungs:     Clear to auscultation.  Normal respiratory effort and rate.      Heart:   Irregularly irregular.     Chest Wall:    No abnormalities observed   Abdomen:     Soft, nontender, positive bowel sounds.     Extremities:   no cyanosis, clubbing or edema.  No marked joint deformities.  Adequate musculoskeletal strength.       Results Review:    Results from last 7 days   Lab Units 19  0642   SODIUM mmol/L 141   POTASSIUM mmol/L 4.5   CHLORIDE mmol/L 103   CO2 mmol/L 28.1   BUN mg/dL 22   CREATININE mg/dL 0.61   GLUCOSE mg/dL 110*   CALCIUM mg/dL 8.5*         Results from last 7 days   Lab Units 19  0642   WBC 10*3/mm3 22.04*   HEMOGLOBIN g/dL 12.2   HEMATOCRIT % 38.6   PLATELETS 10*3/mm3 211             Results from last 7 days   Lab Units 19  0642   MAGNESIUM mg/dL 2.0             Medication Review:     apixaban 5 mg Oral Q12H   arformoterol 15 mcg Nebulization BID - RT   budesonide 0.5 mg Nebulization Daily - RT   cefTRIAXone 2 g Intravenous Q24H   clotrimazole 10 mg Oral 5x Daily "   dilTIAZem 30 mg Oral Q6H   famotidine 20 mg Oral BID AC   ipratropium 500 mcg Nebulization 4x Daily - RT   metoprolol tartrate 50 mg Oral Q8H   oxybutynin XL 10 mg Oral Daily   predniSONE 40 mg Oral Daily With Breakfast   rOPINIRole 2 mg Oral BID   senna-docusate sodium 1 tablet Oral Daily   sodium chloride 10 mL Intravenous Q12H   vancomycin 12 mg/kg Intravenous Q12H          Pharmacy to dose vancomycin    Pharmacy to dose vancomycin        Assessment/Plan     1.  Atrial fibrillation.  On Eliquis.  Heart rate is better.  Currently on diltiazem and metoprolol.  Blood pressure is stable.  2.  Mitral valve vegetation.  Plan is for medical therapy.  Follow-up with me and with cardiothoracic surgery as an outpatient.  3.  COPD exacerbation  4.  Influenza    -I am going to switch over to long-acting diltiazem for better compliance at home.  Slightly increase the dose as well.  -I will arrange for follow-up in my office next week with 1 of our nurse practitioners and I will plan on seeing her back in 6 weeks.  -Okay with discharge today from my standpoint.    Zenia Tinajero MD, [facility]  11/18/19  10:01 AM

## 2019-11-18 NOTE — PROGRESS NOTES
INFECTIOUS DISEASES PROGRESS NOTE    CC: Follow-up endocarditis     S:   She says she wants to go home.  She denies any fevers or chills or NS  Still in afib      O:  Physical Exam:  Temp:  [97.2 °F (36.2 °C)-98.2 °F (36.8 °C)] 97.4 °F (36.3 °C)  Heart Rate:  [] 110  Resp:  [16-20] 18  BP: (124-156)/() 128/76  Physical Exam   Constitutional: She appears well-developed. No distress.   Cardiovascular: An irregularly irregular rhythm present. Tachycardia present.   Pulmonary/Chest: Effort normal.   Abdominal: Soft. She exhibits no distension.   Neurological: She is alert.   Skin: Skin is warm and dry.   Psychiatric: She has a normal mood and affect. Her behavior is normal.        Diagnostics:    Creatinine 0.61  White count 22  Platelets 211  Hemoglobin 12.2    Assessment/Plan   1.  Influenza A status post 7 days of tamiflu  2.  Afib RVR  3. COPD exacerbation on steroids  4. Leukocytosis related to above  5. Abnormal MV--endocarditis vs. Calcified mitral plaque.  6. Thrush     Her white count is a touch high but this is probably secondary to the steroid she is on for COPD exacerbation.  Continue vancomycin as dosed for goal level of 15-20 and ceftriaxone 2 g IV every 24 hours through 12/7/2019.  No objection to discharge when okay with others.    Mike Vidal MD  9:49 AM  11/18/19

## 2019-11-18 NOTE — PROGRESS NOTES
" LOS: 4 days   Patient Care Team:  Jose R Franks MD as PCP - General  Jose R Franks MD as PCP - Family Medicine    Chief Complaint: MV endocarditis    Subjective  No complaints. Hopes to go home soon.     Vital Signs  Temp:  [97.2 °F (36.2 °C)-97.5 °F (36.4 °C)] 97.3 °F (36.3 °C)  Heart Rate:  [] 123  Resp:  [16-20] 18  BP: (124-148)/(70-82) 132/78  Body mass index is 25.89 kg/m².    Intake/Output Summary (Last 24 hours) at 11/18/2019 1518  Last data filed at 11/18/2019 1350  Gross per 24 hour   Intake 930 ml   Output --   Net 930 ml     I/O this shift:  In: 570 [P.O.:570]  Out: -             11/13/19  1800 11/14/19  1229   Weight: 62.1 kg (137 lb) 62.1 kg (137 lb)         Objective:  General Appearance:  Comfortable, well-appearing and in no acute distress.    Vital signs: (most recent): Blood pressure 132/78, pulse (!) 128, temperature 97.3 °F (36.3 °C), temperature source Oral, resp. rate 18, height 154.9 cm (61\"), weight 62.1 kg (137 lb), SpO2 98 %.  Vital signs are normal.    Output: Producing urine.    Lungs:  Normal effort and normal respiratory rate.  Breath sounds clear to auscultation.    Heart: Normal rate.  Irregular rhythm.    Abdomen: Abdomen is soft.    Extremities: There is no dependent edema.    Neurological: Patient is alert and oriented to person, place and time.    Skin:  Warm and dry.            Results Review:        WBC WBC   Date Value Ref Range Status   11/18/2019 22.04 (H) 3.40 - 10.80 10*3/mm3 Final   11/16/2019 18.00 (H) 3.40 - 10.80 10*3/mm3 Final      HGB Hemoglobin   Date Value Ref Range Status   11/18/2019 12.2 12.0 - 15.9 g/dL Final   11/16/2019 12.1 12.0 - 15.9 g/dL Final      HCT Hematocrit   Date Value Ref Range Status   11/18/2019 38.6 34.0 - 46.6 % Final   11/16/2019 36.4 34.0 - 46.6 % Final      Platelets Platelets   Date Value Ref Range Status   11/18/2019 211 140 - 450 10*3/mm3 Final   11/16/2019 233 140 - 450 10*3/mm3 Final        PT/INR:  No " results found for: PROTIME/No results found for: INR    Sodium Sodium   Date Value Ref Range Status   11/18/2019 141 136 - 145 mmol/L Final   11/16/2019 143 136 - 145 mmol/L Final      Potassium Potassium   Date Value Ref Range Status   11/18/2019 4.5 3.5 - 5.2 mmol/L Final   11/16/2019 4.2 3.5 - 5.2 mmol/L Final      Chloride Chloride   Date Value Ref Range Status   11/18/2019 103 98 - 107 mmol/L Final   11/16/2019 102 98 - 107 mmol/L Final      Bicarbonate CO2   Date Value Ref Range Status   11/18/2019 28.1 22.0 - 29.0 mmol/L Final   11/16/2019 27.5 22.0 - 29.0 mmol/L Final      BUN BUN   Date Value Ref Range Status   11/18/2019 22 8 - 23 mg/dL Final   11/16/2019 21 8 - 23 mg/dL Final      Creatinine Creatinine   Date Value Ref Range Status   11/18/2019 0.61 0.57 - 1.00 mg/dL Final   11/16/2019 0.56 (L) 0.57 - 1.00 mg/dL Final      Calcium Calcium   Date Value Ref Range Status   11/18/2019 8.5 (L) 8.6 - 10.5 mg/dL Final   11/16/2019 8.4 (L) 8.6 - 10.5 mg/dL Final      Magnesium Magnesium   Date Value Ref Range Status   11/18/2019 2.0 1.6 - 2.4 mg/dL Final            apixaban 5 mg Oral Q12H   arformoterol 15 mcg Nebulization BID - RT   budesonide 0.5 mg Nebulization Daily - RT   cefTRIAXone 2 g Intravenous Q24H   clotrimazole 10 mg Oral 5x Daily   dilTIAZem  mg Oral Q24H   famotidine 20 mg Oral BID AC   ipratropium 500 mcg Nebulization 4x Daily - RT   metoprolol tartrate 50 mg Oral Q8H   oxybutynin XL 10 mg Oral Daily   predniSONE 40 mg Oral Daily With Breakfast   rOPINIRole 2 mg Oral BID   senna-docusate sodium 1 tablet Oral Daily   sodium chloride 10 mL Intravenous Q12H   vancomycin 12 mg/kg Intravenous Q12H       Pharmacy to dose vancomycin            Patient Active Problem List   Diagnosis Code   • Acute endocarditis I33.9   • Influenza J11.1   • Thrush, oral B37.0   • COPD with acute exacerbation (CMS/[facility]) J44.1   • Atrial fibrillation with RVR (CMS/[facility]) I48.91       Assessment & Plan       -Acute  endocarditis---mobile mitral valve mass  -COPD   -Atrial fibrillation-new onset  -Influenza A- completed tamiflu  -oral thrush      Patient seen and evaluated by Dr. Briones who recommends medical management for now and then repeating an echocardiogram in 2-3 weeks. If surgery is pursued, patient will need a cardiac cath prior to surgical intervention.       TAMAR Monterroso  11/18/19  3:18 PM

## 2019-11-18 NOTE — PROGRESS NOTES
LOS: 4 days     Name: Shani Phillip  Age/Sex: 69 y.o. female  :  1950        PCP: Jose R Franks MD  Chief Complaint   Patient presents with   • Shortness of Breath      Subjective   Overall she is feeling better she actually sitting today she wants to go home.  She denies any new issues or complaints today.  No chest pain shortness of breath palpitations  General: No Fever or Chills, Cardiac: No Chest Pain or Palpitations, Resp: No Cough or SOA, GI: No Nausea, Vomiting, or Diarrhea and Other: No bleeding      apixaban 5 mg Oral Q12H   arformoterol 15 mcg Nebulization BID - RT   budesonide 0.5 mg Nebulization Daily - RT   cefTRIAXone 2 g Intravenous Q24H   clotrimazole 10 mg Oral 5x Daily   dilTIAZem  mg Oral Q24H   famotidine 20 mg Oral BID AC   ipratropium 500 mcg Nebulization 4x Daily - RT   metoprolol tartrate 50 mg Oral Q8H   oxybutynin XL 10 mg Oral Daily   predniSONE 40 mg Oral Daily With Breakfast   rOPINIRole 2 mg Oral BID   senna-docusate sodium 1 tablet Oral Daily   sodium chloride 10 mL Intravenous Q12H   vancomycin 12 mg/kg Intravenous Q12H       Pharmacy to dose vancomycin    [START ON 2019] Pharmacy to dose vancomycin        Objective   Vital Signs  Temp:  [97.2 °F (36.2 °C)-98.2 °F (36.8 °C)] 97.4 °F (36.3 °C)  Heart Rate:  [] 110  Resp:  [16-20] 18  BP: (124-156)/() 128/76  Body mass index is 25.89 kg/m².    Intake/Output Summary (Last 24 hours) at 2019 1139  Last data filed at 2019 0933  Gross per 24 hour   Intake 1050 ml   Output --   Net 1050 ml       Physical Exam   Constitutional: She appears well-developed and well-nourished. No distress.   HENT:   Head: Normocephalic and atraumatic.   Eyes: EOM are normal.   Neck: Neck supple.   Cardiovascular: An irregular rhythm present. Tachycardia present.   Murmur heard.  Pulmonary/Chest: Effort normal and breath sounds normal.   Abdominal: Soft. Bowel sounds are normal.   Musculoskeletal: She  exhibits no edema.   PICC line in right upper extremity   Neurological: She is alert.   Skin: Skin is warm. Capillary refill takes less than 2 seconds.   Psychiatric: She has a normal mood and affect. Her behavior is normal. Thought content normal.   Nursing note and vitals reviewed.        Results Review:       I reviewed the patient's new clinical results.  Results from last 7 days   Lab Units 11/18/19 0642 11/16/19 0453 11/14/19 0543 11/13/19 1955   WBC 10*3/mm3 22.04* 18.00* 20.09* 21.33*   HEMOGLOBIN g/dL 12.2 12.1 12.4 13.4   PLATELETS 10*3/mm3 211 233 202 216     Results from last 7 days   Lab Units 11/18/19 0642 11/16/19 0453 11/14/19 0543 11/13/19 1956   SODIUM mmol/L 141 143 140 139   POTASSIUM mmol/L 4.5 4.2 4.0 4.3   CHLORIDE mmol/L 103 102 100 98   CO2 mmol/L 28.1 27.5 31.8* 29.7*   BUN mg/dL 22 21 27* 28*   CREATININE mg/dL 0.61 0.56* 0.57 0.64   CALCIUM mg/dL 8.5* 8.4* 8.1* 8.3*   MAGNESIUM mg/dL 2.0  --   --  2.3   PHOSPHORUS mg/dL 2.5  --   --   --    Estimated Creatinine Clearance: 55.3 mL/min (by C-G formula based on SCr of 0.61 mg/dL).      Assessment/Plan     Acute endocarditis    Influenza    Thrush, oral    COPD with acute exacerbation (CMS/[facility])    Atrial fibrillation with RVR (CMS/[facility])      PLAN  Endocarditis  - had REDD on 11/15/19 showing small mobile vegetation on the mitral valve-there is some stenosis but otherwise valve is functioning well  - 6 weeks of vancomycin and rocephin per ID, appreciate recs  - CT surgery has evaluated and favoring holding off on surgery and repeating REDD following course of abx-appreciate recs     Influenza A  - s/p Tamiflu     COPD with exacerbation   - doing better  - will transition to oral steroids tomorrow morning, famotidine for GI prophylaxis  - continue nebulized steroids and bronchodilators  - wean oxygen as tolerated     Atrial Fibrillation  - off diltiazem drip, on oral metoprolol and Cardizem  - had REDD 11/15/19-no thrombus and cardioversion  was attempted but unsuccessful  - Heart rate is improved currently.  Cardizem increased today     Oral Thrush  - continue clotrimazole      Disposition  Home with IV antibiotics in a day or 2      Migel Olson MD  [facility]  11/18/19  11:39 AM

## 2019-11-18 NOTE — PROGRESS NOTES
Continued Stay Note  [facility]     Patient Name: Shani Phillip  MRN: [id number]  Today's Date: 11/18/2019    Admit Date: 11/13/2019    Discharge Plan     Row Name 11/18/19 1019       Plan    Plan  Plan home with [facility].  JAMISON Castaneda RN    Patient/Family in Agreement with Plan  yes    Plan Comments  Spoke with pt and pt's spouse  ( Willis Phillip [phone]) at bedside.  Per Zita with [facility] pt's antibiotics will be $400.  Pt and spouse is agreeable to cost.  Plan home with [facility].  JAMISON Castaneda RN        Discharge Codes    No documentation.             Ally Castaneda, RN

## 2019-11-18 NOTE — NURSING NOTE
Pt's afib rate remains 100-140, pt denies palpitations, Dr Burr at bedside and aware, orders noted, Automatic BP reading inaccurate, manual BPs charted, pt ambulated to BR off O2, +JARVIS, O2 sats on r/a high 80s, put back on O2 at 1l, sats increased, lungs with slight expiratory wheezing, monitored closely.

## 2019-11-18 NOTE — PLAN OF CARE
Problem: Patient Care Overview  Goal: Plan of Care Review  Outcome: Ongoing (interventions implemented as appropriate)   11/18/19 1601   Coping/Psychosocial   Plan of Care Reviewed With patient   Plan of Care Review   Progress no change   OTHER   Outcome Summary Pt has not c/o pain or nausea. Cardizem changed to 180 mg Daily. Pt tolerating. ABX TX given. Possible DC tomorrow home if all goes well. VSS. Will cont to monitor.       Problem: Fall Risk (Adult)  Goal: Identify Related Risk Factors and Signs and Symptoms  Outcome: Ongoing (interventions implemented as appropriate)    Goal: Absence of Fall  Outcome: Ongoing (interventions implemented as appropriate)      Problem: Skin Injury Risk (Adult)  Goal: Identify Related Risk Factors and Signs and Symptoms  Outcome: Ongoing (interventions implemented as appropriate)    Goal: Skin Health and Integrity  Outcome: Ongoing (interventions implemented as appropriate)

## 2019-11-19 VITALS
HEIGHT: 61 IN | SYSTOLIC BLOOD PRESSURE: 112 MMHG | WEIGHT: 137 LBS | TEMPERATURE: 97.8 F | DIASTOLIC BLOOD PRESSURE: 80 MMHG | HEART RATE: 86 BPM | RESPIRATION RATE: 18 BRPM | OXYGEN SATURATION: 92 % | BODY MASS INDEX: 25.86 KG/M2

## 2019-11-19 LAB — VANCOMYCIN TROUGH SERPL-MCNC: 17 MCG/ML (ref 5–20)

## 2019-11-19 PROCEDURE — 63710000001 PREDNISONE PER 1 MG: Performed by: INTERNAL MEDICINE

## 2019-11-19 PROCEDURE — 94799 UNLISTED PULMONARY SVC/PX: CPT

## 2019-11-19 PROCEDURE — 25010000003 CEFTRIAXONE PER 250 MG: Performed by: INTERNAL MEDICINE

## 2019-11-19 PROCEDURE — 25010000002 VANCOMYCIN 750 MG RECONSTITUTED SOLUTION: Performed by: INTERNAL MEDICINE

## 2019-11-19 PROCEDURE — 80202 ASSAY OF VANCOMYCIN: CPT | Performed by: INTERNAL MEDICINE

## 2019-11-19 PROCEDURE — 99232 SBSQ HOSP IP/OBS MODERATE 35: CPT | Performed by: INTERNAL MEDICINE

## 2019-11-19 RX ORDER — PREDNISONE 1 MG/1
TABLET ORAL
Qty: 21 TABLET | Refills: 0 | Status: SHIPPED | OUTPATIENT
Start: 2019-11-19 | End: 2020-01-17

## 2019-11-19 RX ORDER — CEFTRIAXONE SODIUM 2 G/50ML
2 INJECTION, SOLUTION INTRAVENOUS EVERY 24 HOURS
Qty: 900 ML | Refills: 0
Start: 2019-11-19 | End: 2019-12-07

## 2019-11-19 RX ORDER — CLOTRIMAZOLE 10 MG/1
10 LOZENGE ORAL; TOPICAL
Qty: 48 TABLET | Refills: 0 | Status: SHIPPED | OUTPATIENT
Start: 2019-11-19 | End: 2019-11-29

## 2019-11-19 RX ORDER — FLUCONAZOLE 150 MG/1
150 TABLET ORAL ONCE
Status: COMPLETED | OUTPATIENT
Start: 2019-11-19 | End: 2019-11-19

## 2019-11-19 RX ORDER — FAMOTIDINE 20 MG/1
20 TABLET, FILM COATED ORAL
Qty: 60 TABLET | Refills: 0 | Status: SHIPPED | OUTPATIENT
Start: 2019-11-19 | End: 2020-01-14 | Stop reason: SDUPTHER

## 2019-11-19 RX ORDER — DILTIAZEM HYDROCHLORIDE 180 MG/1
180 CAPSULE, COATED, EXTENDED RELEASE ORAL
Qty: 30 CAPSULE | Refills: 0 | Status: SHIPPED | OUTPATIENT
Start: 2019-11-20 | End: 2019-11-26 | Stop reason: SDUPTHER

## 2019-11-19 RX ORDER — METOPROLOL TARTRATE 50 MG/1
50 TABLET, FILM COATED ORAL EVERY 8 HOURS
Qty: 90 TABLET | Refills: 0 | Status: SHIPPED | OUTPATIENT
Start: 2019-11-19 | End: 2019-12-16 | Stop reason: SDUPTHER

## 2019-11-19 RX ADMIN — DILTIAZEM HYDROCHLORIDE 180 MG: 180 CAPSULE, COATED, EXTENDED RELEASE ORAL at 08:03

## 2019-11-19 RX ADMIN — APIXABAN 5 MG: 5 TABLET, FILM COATED ORAL at 08:03

## 2019-11-19 RX ADMIN — ARFORMOTEROL TARTRATE 15 MCG: 15 SOLUTION RESPIRATORY (INHALATION) at 08:44

## 2019-11-19 RX ADMIN — BUDESONIDE 0.5 MG: 0.5 INHALANT RESPIRATORY (INHALATION) at 08:44

## 2019-11-19 RX ADMIN — METOPROLOL TARTRATE 50 MG: 50 TABLET, FILM COATED ORAL at 02:13

## 2019-11-19 RX ADMIN — FAMOTIDINE 20 MG: 20 TABLET, FILM COATED ORAL at 06:32

## 2019-11-19 RX ADMIN — CLOTRIMAZOLE 10 MG: 10 LOZENGE ORAL; TOPICAL at 09:59

## 2019-11-19 RX ADMIN — SODIUM CHLORIDE, PRESERVATIVE FREE 10 ML: 5 INJECTION INTRAVENOUS at 08:18

## 2019-11-19 RX ADMIN — OXYBUTYNIN CHLORIDE 10 MG: 10 TABLET, EXTENDED RELEASE ORAL at 08:03

## 2019-11-19 RX ADMIN — VANCOMYCIN HYDROCHLORIDE 750 MG: 750 INJECTION, POWDER, LYOPHILIZED, FOR SOLUTION INTRAVENOUS at 12:01

## 2019-11-19 RX ADMIN — CLOTRIMAZOLE 10 MG: 10 LOZENGE ORAL; TOPICAL at 06:33

## 2019-11-19 RX ADMIN — IPRATROPIUM BROMIDE 0.5 MG: 0.5 SOLUTION RESPIRATORY (INHALATION) at 11:39

## 2019-11-19 RX ADMIN — CEFTRIAXONE SODIUM 2 G: 2 INJECTION, SOLUTION INTRAVENOUS at 09:59

## 2019-11-19 RX ADMIN — METOPROLOL TARTRATE 50 MG: 50 TABLET, FILM COATED ORAL at 08:03

## 2019-11-19 RX ADMIN — VANCOMYCIN HYDROCHLORIDE 750 MG: 750 INJECTION, POWDER, LYOPHILIZED, FOR SOLUTION INTRAVENOUS at 00:06

## 2019-11-19 RX ADMIN — ROPINIROLE 2 MG: 2 TABLET, FILM COATED ORAL at 08:03

## 2019-11-19 RX ADMIN — FLUCONAZOLE 150 MG: 150 TABLET ORAL at 10:34

## 2019-11-19 RX ADMIN — PREDNISONE 40 MG: 20 TABLET ORAL at 08:03

## 2019-11-19 NOTE — PROGRESS NOTES
Continued Stay Note  [facility]     Patient Name: Shani Phillip  MRN: [id number]  Today's Date: 11/19/2019    Admit Date: 11/13/2019    Discharge Plan     Row Name 11/19/19 1146       Plan    Plan  Plan home with [facility] GLORIA.   JAMISON Castaneda RN    Patient/Family in Agreement with Plan  yes    Plan Comments  Spoke with pt at bedside.  Pt voices interest in changing providers for O2.  Pt requesting [facility] to provide O2.  Risa  ( [phone]) called to follow pt and determine if pt can switch providers for O2.   Pt with discharge order.   Zita  ( [phone]) called to follow and arrange the delivery of pt's outpatient antibiotics to room.   Plan home with [facility] MIRTA Castaneda RN        Discharge Codes    No documentation.       Expected Discharge Date and Time     Expected Discharge Date Expected Discharge Time    Nov 19, 2019             Ally Castaneda RN

## 2019-11-19 NOTE — DISCHARGE PLACEMENT REQUEST
"Mehran Phillip (69 y.o. Female)     Date of Birth Social Security Number Address Home Phone MRN    1950  [address]  [place] [id number] [phone] [id number]    Christianity Marital Status          Bahai        Admission Date Admission Type Admitting Provider Attending Provider Department, Room/Bed    11/13/19 Emergency ShannaingMigel MD Richards, Stephen J, MD [address], [address]    Discharge Date Discharge Disposition Discharge Destination         [facility]              Attending Provider:  Migel Olson MD    Allergies:  Penicillins    Isolation:  None   Infection:  None   Code Status:  CPR    Ht:  154.9 cm (61\")   Wt:  62.1 kg (137 lb)    Admission Cmt:  None   Principal Problem:  Acute endocarditis [I33.9]                 Active Insurance as of 11/13/2019     Primary Coverage     Payor Plan Insurance Group Employer/Plan Group    MEDICARE MEDICARE A & B      Payor Plan Address Payor Plan Phone Number Payor Plan Fax Number Effective Dates    [address] [phone]  9/1/2014 - None Entered    [facility] [id number]       Subscriber Name Subscriber Birth Date Member ID       MEHRAN PHILLIP 1950 [id number]           Secondary Coverage     Payor Plan Insurance Group Employer/Plan Group    AARP MED SUPP AARP HEALTH CARE OPTIONS      Payor Plan Address Payor Plan Phone Number Payor Plan Fax Number Effective Dates    [facility] [phone]  1/1/2016 - None Entered    [address]       [facility] [address]       Subscriber Name Subscriber Birth Date Member ID       MEHRAN PHILLIP 1950 [id number]                 Emergency Contacts      (Rel.) Home Phone Work Phone Mobile Phone    Willis Phillip (Spouse) [phone] -- --    CruzAdamie (Daughter) -- -- [phone]    AidenPro jara (Son) -- -- [phone]              "

## 2019-11-19 NOTE — PLAN OF CARE
Problem: Patient Care Overview  Goal: Plan of Care Review  Outcome: Ongoing (interventions implemented as appropriate)   11/19/19 0308   Coping/Psychosocial   Plan of Care Reviewed With patient   Plan of Care Review   Progress no change      11/19/19 0308   Coping/Psychosocial   Plan of Care Reviewed With patient   Plan of Care Review   Progress no change   OTHER   Outcome Summary no complications pt slept most the night will continue to monitor      Goal: Individualization and Mutuality  Outcome: Ongoing (interventions implemented as appropriate)    Goal: Discharge Needs Assessment  Outcome: Ongoing (interventions implemented as appropriate)    Goal: Interprofessional Rounds/Family Conf  Outcome: Ongoing (interventions implemented as appropriate)      Problem: Sepsis/Septic Shock (Adult)  Goal: Signs and Symptoms of Listed Potential Problems Will be Absent, Minimized or Managed (Sepsis/Septic Shock)  Outcome: Ongoing (interventions implemented as appropriate)      Problem: Arrhythmia/Dysrhythmia (Symptomatic) (Adult)  Goal: Signs and Symptoms of Listed Potential Problems Will be Absent, Minimized or Managed (Arrhythmia/Dysrhythmia)  Outcome: Ongoing (interventions implemented as appropriate)      Problem: Infection, Risk/Actual (Adult)  Goal: Infection Prevention/Resolution  Outcome: Ongoing (interventions implemented as appropriate)      Problem: Fall Risk (Adult)  Goal: Identify Related Risk Factors and Signs and Symptoms  Outcome: Ongoing (interventions implemented as appropriate)    Goal: Absence of Fall  Outcome: Ongoing (interventions implemented as appropriate)      Problem: Skin Injury Risk (Adult)  Goal: Identify Related Risk Factors and Signs and Symptoms  Outcome: Ongoing (interventions implemented as appropriate)    Goal: Skin Health and Integrity  Outcome: Ongoing (interventions implemented as appropriate)

## 2019-11-19 NOTE — PROGRESS NOTES
"Pharmacokinetic Consult - Vancomycin Dosing (Follow-up Note)    Shani Phillip is on day 5 pharmacy to dose vancomycin for endocarditis per Zenia Drew's request.     ID following - Dr. Vidal (consult extended to 12/7/2019)  Goal trough: 15-20 mg/L   Other Abx: Ceftriaxone 2gm q24h     Relevant clinical data and objective history reviewed:  69 y.o. female 154.9 cm (61\") 62.1 kg (137 lb)    Past Medical History:   Diagnosis Date   • Atrial fibrillation with RVR (CMS/[facility]) 11/14/2019   • COPD (chronic obstructive pulmonary disease) (CMS/[facility])    • COPD with acute exacerbation (CMS/[facility]) 11/14/2019   • Influenza 11/14/2019   • Renal disorder    • Restless leg syndrome    • Thrush, oral 11/14/2019     Creatinine   Date Value Ref Range Status   11/18/2019 0.61 0.57 - 1.00 mg/dL Final     BUN   Date Value Ref Range Status   11/18/2019 22 8 - 23 mg/dL Final     Estimated Creatinine Clearance: 55.3 mL/min (by C-G formula based on SCr of 0.61 mg/dL).    Lab Results   Component Value Date    WBC 22.04 (H) 11/18/2019     Temp Readings from Last 3 Encounters:   11/19/19 97.8 °F (36.6 °C) (Oral)       Anti-Infectives (From admission, onward)    Ordered     Dose/Rate Route Frequency Start Stop    11/19/19 0858  fluconazole (DIFLUCAN) tablet 150 mg     Ordering Provider:  Mike Vidal MD    150 mg Oral Once 11/19/19 0945 11/19/19 1034    11/19/19 1039  cefTRIAXone (ROCEPHIN) 40 MG/ML IVPB     Ordering Provider:  Migel Olson MD    2 g  100 mL/hr over 30 Minutes Intravenous Every 24 Hours 11/19/19 0000 12/07/19 2359    11/19/19 1039  vancomycin 750 mg/250 mL 0.9% NS add-vantage     Ordering Provider:  Migel Olson MD    750 mg  over 75 Minutes Intravenous Every 12 Hours 11/19/19 0000 12/08/19 2359 11/14/19 0117  vancomycin 750 mg/250 mL 0.9% NS add-vantage     Gregory Francois, [facility] reviewed the order on 11/18/19 9951.   Ordering Provider:  Mike Vidal MD    12 mg/kg × 62.1 kg  over " "75 Minutes Intravenous Every 12 Hours 11/14/19 1200 12/07/19 2359    11/14/19 0940  cefTRIAXone (ROCEPHIN) IVPB 2 g     Mike Vidal MD reviewed the order on 11/18/19 0951.   Ordering Provider:  Mike Vidal MD    2 g  100 mL/hr over 30 Minutes Intravenous Every 24 Hours 11/14/19 1100 12/07/19 2359    11/13/19 2341  Pharmacy to dose vancomycin     Gregory Francois [facility] reviewed the order on 11/18/19 1454.   Ordering Provider:  Mike Vidal MD     Does not apply Continuous PRN 11/13/19 2340 12/07/19 2359 11/13/19 2050  metroNIDAZOLE (FLAGYL) 500 mg/100mL IVPB     Ordering Provider:  David Mcqueen MD    500 mg  over 60 Minutes Intravenous Once 11/13/19 2051 11/14/19 0106    11/13/19 2050  vancomycin (VANCOCIN) in iso-osmotic dextrose IVPB 1 g (premix) 200 mL     Ordering Provider:  David Mcqueen MD    1,000 mg Intravenous Once 11/13/19 2051 11/14/19 0306    11/13/19 2050  cefepime (MAXIPIME) 2 g/100 mL 0.9% NS (mbp)     Ordering Provider:  David Mcqueen MD    2 g  200 mL/hr over 30 Minutes Intravenous Once 11/13/19 2051 11/13/19 2143         Lab Results   Component Value Date    [facility] 17.00 11/19/2019     Vancomycin Dosing History  11/14 @ 0054 - Vancomycin 1000mg (16mg/kg) once  11/14 @ 1346 - Vancomycin 750mg q12h  11/14 @ 2354 - Vancomycin 750mg q12h  11/15 @ 1314 - Vancomycin 750mg q12h              11/15 @ 1217 - Vancomycin trough = 17.2mcg/ml  11/15 @ 0050 - Vancomycin 750mg q12h              11/16 @ 1215  - Vancomycin trough = 16.2 mcg/ml  11/16 @ 1253 - Vancomycin 750 mg q12h  11/17 @ 0024, 1247, 2354 - Vancomycin 750 mg q12h   11/18 @ 1121 - Vancomycin 750 mg IV q12h   11/19 @ 1129 - Vancomycin trough = 17.0 mcg/mL  11/19 @ 0006, 1201 - Vancomycin 750 mg IV q12h        Assessment  Per ID 11/18: \"Continue vancomycin as dosed for goal level of 15-20 and ceftriaxone 2 g IV every 24 hours through 12/7/2019.\"    Plan  1. Vancomycin trough was " drawn appropriately this afternoon ~12 hrs after the previous dose and resulted therapeutic= 17.0 mcg/mL.   2. Will continue current dosing regimen of vancomycin 750 mg IV q12h with no plans for repeat drug levels due to patient planned discharge.  3. Renal function stable w/ most recent Scr=0.61 mg/dL.   4. Pharmacy will continue to follow daily while on vancomycin and adjust as needed.     Thank you for allowing me to participate in the care of your patient,    Gregory Francois, PharmD  11/19/2019  1:16 PM

## 2019-11-19 NOTE — PROGRESS NOTES
Case Management Discharge Note      Final Note: Pt discharged home with [facility] MIRTA Castaneda RN         Destination      No service has been selected for the patient.      Durable Medical Equipment      No service has been selected for the patient.      Dialysis/Infusion      Service Provider Request Status Selected Services Address Phone Number Fax Number    [facility] INFUSION Selected Infusion and IV Therapy [address], [facility] [address] [phone]      Home Medical Care - Selection Complete      Service Provider Request Status Selected Services Address Phone Number Fax Number    [facility] [place] Selected Home Health Services [address] [address] [phone]      Therapy      No service has been selected for the patient.      Community Resources      No service has been selected for the patient.        Transportation Services  Private: Car    Final Discharge Disposition Code: 06 - home with home health care

## 2019-11-19 NOTE — PROGRESS NOTES
INFECTIOUS DISEASES PROGRESS NOTE    CC: Follow-up endocarditis    S:   Complains of vaginal itching consistent with yeast infection.  No fevers or chills or night sweats.  PICC line okay.  No abdominal pain.  Anxious to go to the hospital.    O:  Physical Exam:  Temp:  [97.3 °F (36.3 °C)-98.1 °F (36.7 °C)] 97.8 °F (36.6 °C)  Heart Rate:  [] 96  Resp:  [18-20] 20  BP: (109-136)/(78-94) 112/80  Physical Exam  No acute distress, no significant swelling, abdomen soft nontender nondistended, appropriate mood and affect, alert, pulmonary effort normal, picc in RUE looks okay       Assessment/Plan   1.  Influenza A status post 7 days of tamiflu  2.  Afib RVR  3. COPD exacerbation on steroids  4. Leukocytosis related to above  5. Abnormal MV--endocarditis vs. Calcified mitral plaque.  6. Thrush    Continue vancomycin as dose for goal level of 15-20 as well as ceftriaxone 2 g IV every day through 12/7/2019.  I will give her a dose of oral fluconazole for candidal vulvovaginitis.  No objection to discharge when okay with others.    Mike Vidal MD  8:56 AM  11/19/19

## 2019-11-19 NOTE — DISCHARGE SUMMARY
Patient Name: Shani Phillip  : 1950  MRN: [id number]    Date of Admission: 2019  Date of Discharge:  2019  Primary Care Physician: Jose R Franks MD      Chief Complaint:   Shortness of Breath      Discharge Diagnoses     Active Hospital Problems    Diagnosis  POA   • **Acute endocarditis [I33.9]  Yes   • Influenza [J11.1]  Yes   • Thrush, oral [B37.0]  Yes   • COPD with acute exacerbation (CMS/[facility]) [J44.1]  Yes   • Atrial fibrillation with RVR (CMS/[facility]) [I48.91]  Yes      Resolved Hospital Problems   No resolved problems to display.        Hospital Course     Ms. Phillip is a 69 y.o. female with a history of COPD, and atrial fibrillation who presented to [facility] initially complaining of endocarditis.  Please see the admitting history and physical for further details.  She was found to have endocarditis and was admitted to the hospital for further evaluation and treatment.  She was admitted with mitral valve endocarditis.  She been seen down in [place] at the [facility] after she got sick when she was on a cruise ship.  She was diagnosed with the endocarditis given IV antibiotics at that facility but then discharged to come to the emergency room here to get home health set up and IV antibiotics.  She was started on vancomycin and Rocephin per infectious disease.  Her cultures were negative at the outside hospital but question of her having received antibiotics prior to the cultures.  This made it somewhat difficult work-up.  We are also having some difficulty obtaining records from the outside facility.  She had a transesophageal echo done here that showed a small mobile mitral valve vegetation.  Following this cardiothoracic surgery evaluated the patient and is currently recommending against surgical intervention until antibiotics have been completed.  At that point they recommend repeating her echocardiogram to determine if dysfunction is persistent or if  vegetation is persistent at that point surgery can be considered.  Infectious diseases recommended 6 weeks of IV antibiotic therapy with vancomycin and Rocephin.  PICC line is been placed in the right upper extremity and this is been arranged to be provided through home health services.  She also had some difficulty with atrial fibrillation and rapid ventricular response here in the hospital.  She was started initially on Lovenox for anticoagulation and rate control medications were titrated.  She seems to stabilized on metoprolol and Cardizem with heart rates in the low 90s to low 100s.  She is currently denying any symptoms of cardiac issues no shortness of breath chest pain or other new illness.  She is been fever free all through the output hospitalization.  The plan is for her to discharge home today.  She should have labs drawn weekly through home health and faxed to the infectious disease physician.  Otherwise follow-ups have been arranged with cardiology in 2 weeks to assess her rate control and follow-up with thoracic surgery in 6 weeks after antibiotics have been completed and echocardiogram has been repeated.  During the course the hospitalization she did develop an acute COPD exacerbation secondary to viral upper respiratory infection.  This was treated over the course the hospitalization supportively with breathing treatments and steroids.  She is been provided with a steroid taper at discharge as well.  The plans been discussed with the patient and her family at the bedside she is agreeable with discharge home today    Day of Discharge     She is feeling well wants to go home denies any chest pain or cardiac symptoms.    Physical Exam:  Temp:  [97.5 °F (36.4 °C)-98.1 °F (36.7 °C)] 97.8 °F (36.6 °C)  Heart Rate:  [] 86  Resp:  [18-20] 18  BP: (109-136)/(80-94) 112/80  Body mass index is 25.89 kg/m².  Physical Exam   Constitutional: She is oriented to person, place, and time. She appears  well-developed and well-nourished.   Cardiovascular:   Irregularly irregular rate controlled   Pulmonary/Chest: Effort normal and breath sounds normal.   Abdominal: Soft. Bowel sounds are normal.   Musculoskeletal:   PICC line in right upper extremity   Neurological: She is alert and oriented to person, place, and time.   Skin: Skin is warm.   Psychiatric: She has a normal mood and affect. Her behavior is normal.   Nursing note and vitals reviewed.      Consultants     Consult Orders (all) (From admission, onward)    Start     Ordered    11/15/19 1456  Inpatient Cardiothoracic Surgery Consult  Once     Specialty:  Cardiothoracic Surgery  Provider:  Hayes Briones MD    11/15/19 1456    11/13/19 2338  Inpatient Cardiology Consult  Once     Specialty:  Cardiology  Provider:  Saundra Petit MD    11/13/19 2340 11/13/19 2337  Inpatient Infectious Diseases Consult  Once     Specialty:  Infectious Diseases  Provider:  Dilan Parrish MD    11/13/19 2340 11/13/19 2043  [facility] (on-call MD unless specified) Details  Once     Specialty:  Hospitalist  Provider:  Migel Kearney MD    11/13/19 2042        Procedures     * Surgery not found *      Imaging Results (All)     Procedure Component Value Units Date/Time    XR Chest 2 View [[id number]] Collected:  11/13/19 1941     Updated:  11/1950    Narrative:       XR CHEST 2 VW-     Clinical: Shortness of breath     COMPARISON 1/8/2016     FINDINGS: There is a PICC line now in position tip superimposes  projected area of the superior vena cava. There is atherosclerotic  calcification of the aorta. Cardiac size within normal limits. Mitral  annulus calcification seen.     There is a small opacity within the right mid lung laterally  superimposing the posterior right eighth rib. Difficult to determine if  this represents callus formation from previous rib injury or parenchymal  scarring, pulmonary nodule, atelectasis or small infiltrate. The  left  lung is clear. No pleural effusion identified. No gross pulmonary edema  seen. Mild interstitial prominence, cannot exclude minimal vascular  congestion.     This report was finalized on 11/13/2019 7:46 PM by Dr. Julio Flood M.D.              Results for orders placed during the hospital encounter of 11/13/19   Adult Transesophageal Echo (REDD) W/ Cont if Necessary Per Protocol    Narrative · Estimated EF appears to be in the range of 56 - 60%.  · Right ventricular cavity is mildly dilated.  · Left atrial cavity size is dilated.  · Right atrial cavity size is dilated.  · There is mild calcification of the aortic valve.  · Mild to moderate tricuspid valve regurgitation is present.  · Estimated right ventricular systolic pressure from tricuspid   regurgitation is mildly elevated (35-45 mmHg).  · There is a 1.2 cm highly mobile mass which appears to be attached to the   posterior mitral valve annulus. Cannot exclude an infectious vegetation.  · Moderate MAC is present.Moderate MAC is present.  · Moderate mitral valve regurgitation is present. Mild mitral valve   stenosis is present.        Pertinent Labs     Results from last 7 days   Lab Units 11/18/19  0642 11/16/19 0453 11/14/19 0543 11/13/19 1955   WBC 10*3/mm3 22.04* 18.00* 20.09* 21.33*   HEMOGLOBIN g/dL 12.2 12.1 12.4 13.4   PLATELETS 10*3/mm3 211 233 202 216     Results from last 7 days   Lab Units 11/18/19  0642 11/16/19 0453 11/14/19  0543 11/13/19 1956   SODIUM mmol/L 141 143 140 139   POTASSIUM mmol/L 4.5 4.2 4.0 4.3   CHLORIDE mmol/L 103 102 100 98   CO2 mmol/L 28.1 27.5 31.8* 29.7*   BUN mg/dL 22 21 27* 28*   CREATININE mg/dL 0.61 0.56* 0.57 0.64   GLUCOSE mg/dL 110* 160* 105* 176*   Estimated Creatinine Clearance: 55.3 mL/min (by C-G formula based on SCr of 0.61 mg/dL).  Results from last 7 days   Lab Units 11/18/19  0642 11/13/19 1956   ALBUMIN g/dL 3.20* 3.40*   BILIRUBIN mg/dL  --  0.4   ALK PHOS U/L  --  55   AST (SGOT) U/L  --  15    ALT (SGPT) U/L  --  23     Results from last 7 days   Lab Units 11/18/19  0642 11/16/19  0453 11/14/19  0543 11/13/19 1956   CALCIUM mg/dL 8.5* 8.4* 8.1* 8.3*   ALBUMIN g/dL 3.20*  --   --  3.40*   MAGNESIUM mg/dL 2.0  --   --  2.3   PHOSPHORUS mg/dL 2.5  --   --   --        Results from last 7 days   Lab Units 11/13/19 1956   PROBNP pg/mL 1,716.0*           Invalid input(s): LDLCALC        Test Results Pending at Discharge       Discharge Details        Discharge Medications      New Medications      Instructions Start Date   apixaban 5 MG tablet tablet  Commonly known as:  ELIQUIS   5 mg, Oral, Every 12 Hours Scheduled      cefTRIAXone 40 MG/ML IVPB  Commonly known as:  ROCEPHIN   2 g, Intravenous, Every 24 Hours      clotrimazole 10 MG mayank  Commonly known as:  MYCELEX   10 mg, Oral, 5 Times Daily      dilTIAZem  MG 24 hr capsule  Commonly known as:  CARDIZEM CD   180 mg, Oral, Every 24 Hours Scheduled   Start Date:  11/20/2019     famotidine 20 MG tablet  Commonly known as:  PEPCID   20 mg, Oral, 2 Times Daily Before Meals      metoprolol tartrate 50 MG tablet  Commonly known as:  LOPRESSOR   50 mg, Oral, Every 8 Hours      vancomycin   750 mg, Intravenous, Every 12 Hours         Changes to Medications      Instructions Start Date   predniSONE 5 MG tablet  Commonly known as:  DELTASONE  What changed:    · medication strength  · how much to take  · how to take this  · when to take this  · additional instructions   Take 6 tablets starting tomorrow then 5 tablets then 4 tablets then 3 tablets then 2 tablets then 1 tablet then stop         Continue These Medications      Instructions Start Date   budesonide 0.5 MG/2ML nebulizer solution  Commonly known as:  PULMICORT   0.5 mg, Nebulization, Daily - RT      ipratropium 0.02 % nebulizer solution  Commonly known as:  ATROVENT   500 mcg, Nebulization, 4 Times Daily - RT      rOPINIRole 2 MG tablet  Commonly known as:  REQUIP   2 mg, Oral, 2 Times Daily       senna-docusate sodium 8.6-50 MG tablet  Commonly known as:  SENOKOT-S   1 tablet, Oral, Daily      TROSPIUM CHLORIDE PO   20 mg, Oral, 2 Times Daily         Stop These Medications    cefepime 2 g injection  Commonly known as:  MAXIPIME     dilTIAZem 60 MG tablet  Commonly known as:  CARDIZEM     metroNIDAZOLE 500 MG tablet  Commonly known as:  FLAGYL            Allergies   Allergen Reactions   • Penicillins Rash     RASH 30 YRS AGO NO HOSPITALIZATION         Discharge Disposition:  Home-Health Care Svc    Discharge Diet:  Diet Order   Procedures   • Diet Regular       Discharge Activity:   Activity Instructions     Activity as Tolerated            CODE STATUS:    Code Status and Medical Interventions:   Ordered at: 11/13/19 7467     Code Status:    CPR     Medical Interventions (Level of Support Prior to Arrest):    Full       Future Appointments   Date Time Provider Department Center   11/26/2019  1:00 PM Angy Smith APRN [facility] None   12/6/2019  1:00 PM Mike Vidal MD [facility] None     Additional Instructions for the Follow-ups that You Need to Schedule     Discharge Follow-up with PCP   As directed       Currently Documented PCP:    Jose R Soto MD    PCP Phone Number:    [phone]     Follow Up Details:  2-4 weeks         Discharge Follow-up with Specified Provider: [facility]; 2 Weeks   As directed      To:  [facility]    Follow Up:  2 Weeks         Discharge Follow-up with Specified Provider: [facility]; 6 Weeks   As directed      To:  CTS    Follow Up:  6 Weeks         Discharge Follow-up with Specified Provider: ID; 6 Weeks   As directed      To:  ID    Follow Up:  6 Weeks         Referral to Home Health   As directed      Face to Face Visit Date:  11/19/2019    Follow-up provider for Plan of Care?:  I treated the patient in an acute care facility and will not continue treatment after discharge.    Follow-up provider:  JOSE R SOTO [[id number]]    Reason/Clinical  Findings:  endocarditis    Describe mobility limitations that make leaving home difficult:  IV abx at home    Nursing/Therapeutic Services Requested:  Skilled Nursing    Skilled nursing orders:  Infusion therapy    Frequency:  1 Week 1            Contact information for follow-up providers     Jose R Franks MD Follow up.    Specialties:  Emergency Medicine, General Practice  Why:  2-4 weeks  Contact information:  [address]  [facility] [id number] [phone]             Mike Vidal MD. Schedule an appointment as soon as possible for a visit in 4 week(s).    Specialty:  Infectious Diseases  Why:  Please call for a follow-up appointment 4 weeks out from Discharge.  Contact information:  [address]  [place] [address]  [phone]             Hayes Briones MD. Schedule an appointment as soon as possible for a visit in 6 week(s).    Specialty:  Cardiothoracic Surgery  Why:  Please call to make a follow-up appointment 6 weeks out from Discharge  Contact information:  [address]  [place] [address]  [phone]             Zenia Tinajero MD. Schedule an appointment as soon as possible for a visit in 2 week(s).    Specialty:  Cardiology  Why:  Please call to make a follow-up appointment 2 weeks out from Discharge.  Contact information:  [address]  [place] [address]  [phone]                   Contact information for after-discharge care     Dialysis/Infusion     [facility] .    Service:  Infusion and IV Therapy  Contact information:  [address]  [place] [address]  [phone]                             Additional Instructions for the Follow-ups that You Need to Schedule     Discharge Follow-up with PCP   As directed       Currently Documented PCP:    Jose R Franks MD    PCP Phone Number:    [phone]     Follow Up Details:  2-4 weeks         Discharge Follow-up with Specified  Provider: [facility]; 2 Weeks   As directed      To:  [facility]    Follow Up:  2 Weeks         Discharge Follow-up with Specified Provider: CTS; 6 Weeks   As directed      To:  CTS    Follow Up:  6 Weeks         Discharge Follow-up with Specified Provider: ID; 6 Weeks   As directed      To:  ID    Follow Up:  6 Weeks         Referral to Home Health   As directed      Face to Face Visit Date:  11/19/2019    Follow-up provider for Plan of Care?:  I treated the patient in an acute care facility and will not continue treatment after discharge.    Follow-up provider:  KECIA SOTO [[id number]]    Reason/Clinical Findings:  endocarditis    Describe mobility limitations that make leaving home difficult:  IV abx at home    Nursing/Therapeutic Services Requested:  Skilled Nursing    Skilled nursing orders:  Infusion therapy    Frequency:  1 Week 1           Time Spent on Discharge:  Greater than 30 minutes      Migel Olson MD  [facility]  11/19/19  2:12 PM

## 2019-11-19 NOTE — NURSING NOTE
Pt was sitting on the side of the bed on room air and Oxygen was satting 88%.  Pt was then placed on 2 lpm of Oxygen and satted 94%.

## 2019-11-20 ENCOUNTER — READMISSION MANAGEMENT (OUTPATIENT)
Dept: CALL CENTER | Facility: HOSPITAL | Age: 69
End: 2019-11-20

## 2019-11-20 NOTE — OUTREACH NOTE
Prep Survey      Responses   Facility patient discharged from?  [facility]   Is patient eligible?  Yes   Discharge diagnosis  COPD with acute exacerbation, Acute endocarditis, Influenza   Does the patient have one of the following disease processes/diagnoses(primary or secondary)?  COPD/Pneumonia   Does the patient have Home health ordered?  Yes   What is the [facility] health agency?   [facility]   What DME was ordered?  Nebulizer, Oxygen   Comments regarding appointments  Nov 26 @ 1:00 Angysa Smith   Medication alerts for this patient  Eliquis started see AVS for other meds   Prep survey completed?  Yes          Anna Herrera LPN

## 2019-11-21 ENCOUNTER — READMISSION MANAGEMENT (OUTPATIENT)
Dept: CALL CENTER | Facility: HOSPITAL | Age: 69
End: 2019-11-21

## 2019-11-21 NOTE — OUTREACH NOTE
COPD/PN Week 1 Survey      Responses   Facility patient discharged from?  [facility]   Does the patient have one of the following disease processes/diagnoses(primary or secondary)?  COPD/Pneumonia   Is there a successful TCM telephone encounter documented?  No   Was the primary reason for admission:  COPD exacerbation   Week 1 attempt successful?  No   Unsuccessful attempts  Attempt 1          Connie Menezes RN

## 2019-11-22 ENCOUNTER — READMISSION MANAGEMENT (OUTPATIENT)
Dept: CALL CENTER | Facility: HOSPITAL | Age: 69
End: 2019-11-22

## 2019-11-22 NOTE — OUTREACH NOTE
COPD/PN Week 1 Survey      Responses   Facility patient discharged from?  [facility]   Does the patient have one of the following disease processes/diagnoses(primary or secondary)?  COPD/Pneumonia   Is there a successful TCM telephone encounter documented?  No   Was the primary reason for admission:  COPD exacerbation   Week 1 attempt successful?  Yes   Call start time  1436   Call end time  1439   Discharge diagnosis  COPD with acute exacerbation, Acute endocarditis, Influenza   Meds reviewed with patient/caregiver?  Yes   Is the patient having any side effects they believe may be caused by any medication additions or changes?  No   Does the patient have all medications ordered at discharge?  Yes   Is the patient taking all medications as directed (includes completed medication regime)?  Yes   Does the patient have a primary care provider?   Yes   Comments regarding PCP  seeing ID MD because she is on IV antibiotics   Has the patient kept scheduled appointments due by today?  N/A   What is the Home health agency?   [facility]   Has home health visited the patient within 72 hours of discharge?  Yes   What DME was ordered?  Nebulizer, Oxygen   Has all DME been delivered?  Yes   Psychosocial issues?  No   Did the patient receive a copy of their discharge instructions?  Yes   Nursing interventions  Reviewed instructions with patient   What is the patient's perception of their health status since discharge?  Same   Nursing Interventions  Nurse provided patient education   Is the patient/caregiver able to teach back the hierarchy of who to call/visit for symptoms/problems? PCP, Specialist, Home health nurse, Urgent Care, ED, 911  Yes   Additional teach back comments  Pt says she is still feeling crummy possibly from all the medications she is on, she said the  nurse has been by to see her, no questions or concerns   Week 1 call completed?  Yes   Revoked  No further contact(revokes)-requires comment   Graduated/Revoked  comments  pt is requesting no further calls          Connie Menezes RN

## 2019-11-26 ENCOUNTER — HOSPITAL ENCOUNTER (OUTPATIENT)
Dept: CARDIOLOGY | Facility: HOSPITAL | Age: 69
Discharge: HOME | End: 2019-11-26
Admitting: NURSE PRACTITIONER

## 2019-11-26 ENCOUNTER — TELEPHONE (OUTPATIENT)
Dept: CARDIOLOGY | Facility: CLINIC | Age: 69
End: 2019-11-26

## 2019-11-26 ENCOUNTER — OFFICE VISIT (OUTPATIENT)
Dept: CARDIOLOGY | Facility: CLINIC | Age: 69
End: 2019-11-26

## 2019-11-26 VITALS
SYSTOLIC BLOOD PRESSURE: 130 MMHG | HEART RATE: 91 BPM | BODY MASS INDEX: 30.93 KG/M2 | WEIGHT: 163.8 LBS | DIASTOLIC BLOOD PRESSURE: 68 MMHG | HEIGHT: 61 IN

## 2019-11-26 DIAGNOSIS — Z51.81 ENCOUNTER FOR THERAPEUTIC DRUG LEVEL MONITORING: ICD-10-CM

## 2019-11-26 DIAGNOSIS — I33.0 ACUTE BACTERIAL ENDOCARDITIS: ICD-10-CM

## 2019-11-26 DIAGNOSIS — Z51.81 ENCOUNTER FOR THERAPEUTIC DRUG LEVEL MONITORING: Primary | ICD-10-CM

## 2019-11-26 DIAGNOSIS — I48.19 ATRIAL FIBRILLATION, PERSISTENT (HCC): ICD-10-CM

## 2019-11-26 DIAGNOSIS — J43.9 PULMONARY EMPHYSEMA, UNSPECIFIED EMPHYSEMA TYPE (HCC): ICD-10-CM

## 2019-11-26 PROBLEM — I48.91 ATRIAL FIBRILLATION WITH RVR: Status: RESOLVED | Noted: 2019-11-14 | Resolved: 2019-11-26

## 2019-11-26 PROBLEM — J44.1 COPD WITH ACUTE EXACERBATION (HCC): Status: RESOLVED | Noted: 2019-11-14 | Resolved: 2019-11-26

## 2019-11-26 LAB
ANION GAP SERPL CALCULATED.3IONS-SCNC: 10.1 MMOL/L (ref 5–15)
BASOPHILS # BLD AUTO: 0.03 10*3/MM3 (ref 0–0.2)
BASOPHILS NFR BLD AUTO: 0.2 % (ref 0–1.5)
BUN BLD-MCNC: 10 MG/DL (ref 8–23)
BUN/CREAT SERPL: 16.4 (ref 7–25)
CALCIUM SPEC-SCNC: 8.8 MG/DL (ref 8.6–10.5)
CHLORIDE SERPL-SCNC: 100 MMOL/L (ref 98–107)
CO2 SERPL-SCNC: 29.9 MMOL/L (ref 22–29)
CREAT BLD-MCNC: 0.61 MG/DL (ref 0.57–1)
DEPRECATED RDW RBC AUTO: 44.9 FL (ref 37–54)
EOSINOPHIL # BLD AUTO: 0.17 10*3/MM3 (ref 0–0.4)
EOSINOPHIL NFR BLD AUTO: 1.3 % (ref 0.3–6.2)
ERYTHROCYTE [DISTWIDTH] IN BLOOD BY AUTOMATED COUNT: 13.7 % (ref 12.3–15.4)
GFR SERPL CREATININE-BSD FRML MDRD: 97 ML/MIN/1.73
GLUCOSE BLD-MCNC: 85 MG/DL (ref 65–99)
HCT VFR BLD AUTO: 38.3 % (ref 34–46.6)
HGB BLD-MCNC: 12.5 G/DL (ref 12–15.9)
IMM GRANULOCYTES # BLD AUTO: 0.08 10*3/MM3 (ref 0–0.05)
IMM GRANULOCYTES NFR BLD AUTO: 0.6 % (ref 0–0.5)
LYMPHOCYTES # BLD AUTO: 2.69 10*3/MM3 (ref 0.7–3.1)
LYMPHOCYTES NFR BLD AUTO: 20.1 % (ref 19.6–45.3)
MCH RBC QN AUTO: 29.6 PG (ref 26.6–33)
MCHC RBC AUTO-ENTMCNC: 32.6 G/DL (ref 31.5–35.7)
MCV RBC AUTO: 90.5 FL (ref 79–97)
MONOCYTES # BLD AUTO: 0.61 10*3/MM3 (ref 0.1–0.9)
MONOCYTES NFR BLD AUTO: 4.5 % (ref 5–12)
NEUTROPHILS # BLD AUTO: 9.83 10*3/MM3 (ref 1.7–7)
NEUTROPHILS NFR BLD AUTO: 73.3 % (ref 42.7–76)
NRBC BLD AUTO-RTO: 0 /100 WBC (ref 0–0.2)
PLATELET # BLD AUTO: 112 10*3/MM3 (ref 140–450)
PMV BLD AUTO: 10.5 FL (ref 6–12)
POTASSIUM BLD-SCNC: 4.6 MMOL/L (ref 3.5–5.2)
RBC # BLD AUTO: 4.23 10*6/MM3 (ref 3.77–5.28)
SODIUM BLD-SCNC: 140 MMOL/L (ref 136–145)
VANCOMYCIN TROUGH SERPL-MCNC: 13.3 MCG/ML (ref 5–20)
WBC NRBC COR # BLD: 13.41 10*3/MM3 (ref 3.4–10.8)

## 2019-11-26 PROCEDURE — 85025 COMPLETE CBC W/AUTO DIFF WBC: CPT | Performed by: NURSE PRACTITIONER

## 2019-11-26 PROCEDURE — 80048 BASIC METABOLIC PNL TOTAL CA: CPT | Performed by: NURSE PRACTITIONER

## 2019-11-26 PROCEDURE — 80202 ASSAY OF VANCOMYCIN: CPT | Performed by: NURSE PRACTITIONER

## 2019-11-26 PROCEDURE — 99214 OFFICE O/P EST MOD 30 MIN: CPT | Performed by: NURSE PRACTITIONER

## 2019-11-26 PROCEDURE — 93000 ELECTROCARDIOGRAM COMPLETE: CPT | Performed by: NURSE PRACTITIONER

## 2019-11-26 PROCEDURE — 36415 COLL VENOUS BLD VENIPUNCTURE: CPT

## 2019-11-26 RX ORDER — FUROSEMIDE 20 MG/1
20 TABLET ORAL DAILY
Qty: 14 TABLET | Refills: 0 | Status: SHIPPED | OUTPATIENT
Start: 2019-11-26 | End: 2019-12-09 | Stop reason: SDUPTHER

## 2019-11-26 RX ORDER — POTASSIUM CHLORIDE 750 MG/1
10 TABLET, FILM COATED, EXTENDED RELEASE ORAL DAILY
Qty: 14 TABLET | Refills: 0 | Status: SHIPPED | OUTPATIENT
Start: 2019-11-26 | End: 2019-12-09 | Stop reason: SDUPTHER

## 2019-11-26 RX ORDER — DILTIAZEM HYDROCHLORIDE 120 MG/1
120 CAPSULE, COATED, EXTENDED RELEASE ORAL DAILY
Qty: 30 CAPSULE | Refills: 11 | Status: SHIPPED | OUTPATIENT
Start: 2019-11-26 | End: 2020-03-06

## 2019-11-26 NOTE — PROGRESS NOTES
Date of Office Visit: 19  Encounter Provider: GENET Sanchez  Place of Service: [facility] CARDIOLOGY  Patient Name: Shani Phillip  :1950    Chief Complaint   Patient presents with   • Follow-up   • Atrial Fibrillation   :     HPI: Shani Phillip is a 69 y.o. female  with history of atrial fibrillation, mitral valve endocarditis, COPD, overactive bladder    She was on a cruise and suddenly developed fevers and chills.  She went to [facility] where she was found to have a  temperature of 105 and was taken to [facility] where she was hospitalized 11/3/2019-2019.  She had an echocardiogram which showed normal left ventricular systolic function with an ejection fraction of 75%.  She was in atrial fibrillation at that time and diastolic function could not be determined.  Transesophageal echocardiogram was completed and she was found to have a 1.8 cm vegetation on the P3 scallop of the posterior mitral leaflet which is very mobile flickering between the atrial and ventricular chamber.  Infectious disease also followed.  It was documented that she converted to sinus rhythm without cardioversion anticoagulation was held.  She was also noted to have pneumonia and severe sepsis.  She had a PICC line placed and is being treated with vancomycin, cefepime and Flagyl intravenously.  On  she felt better and was eager to get back to [place] has spoke with her travel insurance company who arrange for flight home.  She had an oxygen concentrator.  She had negative blood cultures.    She returned home and was admitted in 2019 with atrial fibrillation with rapid ventricular response.  She also had COPD exacerbation and influenza.  Cardioversion was unsuccessful with 200 J delivered to patient.  She was treated with metoprolol and diltiazem.  Dr. Arora with electrophysiology also saw her.  Echocardiogram showed normal left ventricular systolic function with  calcification of the aortic valve.  There was moderate mitral annular calcification with mild mitral regurgitation and mild mitral stenosis.  There was a small mobile element on the atrial side of the mitral valve which was difficult to visualize.  RVSP was elevated at 40.7 mmHg.  Blood cultures were negative.  Transesophageal echocardiogram showed ejection fraction 56-60% with mildly dilated right ventricular cavity, mild calcification of the aortic valve with mild to moderate tricuspid regurgitation and mildly elevated RVSP.  There was a 1.2 cm highly mobile mass which appeared to be attached to the posterior mitral valve annulus and infectious vegetation could not be excluded.  Mild mitral stenosis was present with moderate regurgitation.  Infectious disease followed and started her on empiric antibiotics.  She presents today for hospital discharge follow-up.  She has home health services.  She is accompanied by her .  There is issues with the PICC line so she still needs follow-up blood work today.  Her palpitations are only occasional.  She denies shortness of breath, chest pain, fatigue, orthopnea, PND, near-syncope, or syncope.  She has noticed increased lower extremity swelling since hospitalization.  Her legs have started to weep over the last week.    Allergies   Allergen Reactions   • Penicillins Rash     RASH 30 YRS AGO NO HOSPITALIZATION       Past Medical History:   Diagnosis Date   • Acute endocarditis    • Atrial fibrillation with RVR (CMS/HCC) 11/14/2019   • COPD (chronic obstructive pulmonary disease) (CMS/[facility])    • COPD with acute exacerbation (CMS/[facility]) 11/14/2019   • Influenza 11/14/2019   • Renal disorder    • Restless leg syndrome    • Thrush, oral 11/14/2019       Past Surgical History:   Procedure Laterality Date   • CHOLECYSTECTOMY     • KNEE ARTHROPLASTY     • LAPAROSCOPIC GASTRIC BANDING           Family and social history reviewed.     Review of Systems   Cardiovascular: Positive  "for leg swelling and palpitations.   Neurological: Positive for light-headedness.     All other systems were reviewed and are negative          Objective:     Vitals:    11/26/19 1253   BP: 130/68   Pulse: 91   Weight: 74.3 kg (163 lb 12.8 oz)   Height: 154.9 cm (61\")     Body mass index is 30.95 kg/m².    PHYSICAL EXAM:  Physical Exam   Constitutional: She is oriented to person, place, and time. She appears well-developed and well-nourished. No distress.   HENT:   Head: Normocephalic.   Eyes: Conjunctivae are normal.   Neck: Normal range of motion. No JVD present.   Cardiovascular: Normal rate, normal heart sounds and intact distal pulses. An irregularly irregular rhythm present.   No murmur heard.  Pulses:       Carotid pulses are 2+ on the right side, and 2+ on the left side.       Radial pulses are 2+ on the right side, and 2+ on the left side.        Posterior tibial pulses are 2+ on the right side, and 2+ on the left side.   Pulmonary/Chest: Effort normal. No respiratory distress. She has decreased breath sounds in the right lower field and the left lower field. She has no wheezes. She has no rhonchi. She has no rales. She exhibits no tenderness.   Abdominal: Soft. Bowel sounds are normal. She exhibits no distension.   Musculoskeletal: Normal range of motion. She exhibits edema (2+ BLE weeping pedal to tibial bilateral).   Neurological: She is alert and oriented to person, place, and time.   Skin: Skin is warm, dry and intact. No rash noted. She is not diaphoretic. No cyanosis.   Psychiatric: She has a normal mood and affect. Her behavior is normal. Judgment and thought content normal.         ECG 12 Lead  Date/Time: 11/26/2019 5:39 PM  Performed by: Angy Smith APRN  Authorized by: Angy Smith APRN   Comparison: compared with previous ECG   Similar to previous ECG  Rhythm: atrial fibrillation  Rate: normal    Clinical impression: abnormal EKG            Current Outpatient Medications   Medication Sig " Dispense Refill   • apixaban (ELIQUIS) 5 MG tablet tablet Take 1 tablet by mouth Every 12 (Twelve) Hours. 60 tablet 0   • budesonide (PULMICORT) 0.5 MG/2ML nebulizer solution Take 0.5 mg by nebulization Daily.     • cefTRIAXone (ROCEPHIN) 40 MG/ML IVPB Infuse 50 mL into a venous catheter Daily for 18 doses. 900 mL 0   • clotrimazole (MYCELEX) 10 MG mayank Take 1 tablet by mouth 5 (Five) Times a Day for 48 doses. 48 tablet 0   • dilTIAZem CD (CARDIZEM CD) 120 MG 24 hr capsule Take 1 capsule by mouth Daily. 30 capsule 11   • famotidine (PEPCID) 20 MG tablet Take 1 tablet by mouth 2 (Two) Times a Day Before Meals. 60 tablet 0   • ipratropium (ATROVENT) 0.02 % nebulizer solution Take 500 mcg by nebulization 4 (Four) Times a Day.     • metoprolol tartrate (LOPRESSOR) 50 MG tablet Take 1 tablet by mouth Every 8 (Eight) Hours. 90 tablet 0   • predniSONE (DELTASONE) 5 MG tablet Take 6 tablets starting tomorrow then 5 tablets then 4 tablets then 3 tablets then 2 tablets then 1 tablet then stop 21 tablet 0   • rOPINIRole (REQUIP) 2 MG tablet Take 2 mg by mouth 2 (Two) Times a Day.     • TROSPIUM CHLORIDE PO Take 20 mg by mouth 2 (Two) Times a Day.     • vancomycin 750 mg/250 mL 0.9% NS add-vantage Infuse 250 mL into a venous catheter Every 12 (Twelve) Hours for 37 doses.     • furosemide (LASIX) 20 MG tablet Take 1 tablet by mouth Daily. 14 tablet 0   • potassium chloride (K-DUR) 10 MEQ CR tablet Take 1 tablet by mouth Daily. 14 tablet 0     No current facility-administered medications for this visit.      Assessment:       Diagnosis Plan   1. Encounter for therapeutic drug level monitoring  CBC & Differential    Basic Metabolic Panel    Vancomycin, Trough   2. Atrial fibrillation, persistent  Holter Monitor - 24 Hour   3. Acute bacterial endocarditis     4. Pulmonary emphysema, unspecified emphysema type (CMS/HCC)          Orders Placed This Encounter   Procedures   • Basic Metabolic Panel     Standing Status:   Future      Number of Occurrences:   1     Standing Expiration Date:   11/26/2020   • Vancomycin, Trough     Standing Status:   Future     Number of Occurrences:   1     Standing Expiration Date:   11/26/2020   • Holter Monitor - 24 Hour     Standing Status:   Future     Number of Occurrences:   1     Standing Expiration Date:   11/25/2020     Order Specific Question:   Reason for exam?     Answer:   Palpitations     Order Specific Question:   Reason for exam?     Answer:   AFib     Order Specific Question:   AFib specification?     Answer:   Persistent   • ECG 12 Lead     This order was created via procedure documentation   • CBC & Differential     Standing Status:   Future     Number of Occurrences:   1     Standing Expiration Date:   11/26/2020     Order Specific Question:   Manual Differential     Answer:   No         Plan:       1.  69-year-old female with atrial fibrillation and unsuccessful cardioversion November 2019 normal left ventricular systolic function anticoagulated with Eliquis.  Atrial Fibrillation and Atrial Flutter  Assessment  • The patient has persistent atrial fibrillation  • The transient or reversible cause of the patient's arrhythmia is pneumonia  • The patient's CHADS2-VASc score is 3  • A [id number] score of 2 or more is considered a high risk for a thromboembolic event    -Concerned some of her new lower extremity edema is secondary to fluid retention from atrial fibrillation and rapid heart rate as well as calcium channel blocker.  We will increase metoprolol from 50 mg 3 times a day to 100 mg twice daily and cut back on diltiazem from 180 mg daily to 120 mg daily.  She will return for 24-hour Holter monitor next week to evaluate average heart rate.  If needed would add digoxin    2.  endocarditis of the mitral valve with mitral stenosis, moderate insufficiency and calcification.  Blood cultures were negative.  She is under medical therapy being followed by infectious disease.  She is to also  follow-up with cardiothoracic surgery.  They have requested a heart catheterization however we will hold off on that until she is cleared by infectious disease.  I ordered the labs that home health could not obtain today and have forwarded those stable results to infectious disease to review. we are contacting [facility] to obtain copies of her REDD for our review  3.COPD with recent exacerbation she is on nocturnal oxygen    4.Influenza A completed Tamiflu    5.  History of overactive bladder being treated with trospium chloride      6.  Severe restless leg syndrome on therapy with Requip     7. Lower extremity edema with weeping could be related to atrial fibrillation and elevated heart rate but also decreasing diltiazem as listed above.  We will trial furosemide with low-dose potassium for a week.  I have asked her to elevate the lower extremities as often as possible.  She is to call me if symptoms worsen otherwise we will have telephone encounter on Monday she will see me in the office in 2 weeks and we will check BMP.  She will follow-up with Dr. Tinajero in 6 weeks.        It has been a pleasure to participate in this patient's care.      Thank you,  GENET Sanchez      **I used Dragon to dictate this note:**

## 2019-11-26 NOTE — TELEPHONE ENCOUNTER
Fadumo- will you please help me with Dr. Tinajero's patient and call [facility] and obtain the DC summary from 11/3-11/9/2019. We also need copies of the echo and REDD reports AND both CDs from the [facility]TE and [facility].  These were previously requested but Dr. Tinajero stated the CDs that were sent to us only had the chest x-ray.

## 2019-12-02 ENCOUNTER — LAB REQUISITION (OUTPATIENT)
Dept: LAB | Facility: HOSPITAL | Age: 69
End: 2019-12-02

## 2019-12-02 ENCOUNTER — TELEPHONE (OUTPATIENT)
Dept: CARDIOLOGY | Facility: CLINIC | Age: 69
End: 2019-12-02

## 2019-12-02 DIAGNOSIS — Z00.00 ENCOUNTER FOR GENERAL ADULT MEDICAL EXAMINATION WITHOUT ABNORMAL FINDINGS: ICD-10-CM

## 2019-12-02 LAB
ALBUMIN SERPL-MCNC: 3.5 G/DL (ref 3.5–5.2)
ALBUMIN/GLOB SERPL: 1.1 G/DL
ALP SERPL-CCNC: 84 U/L (ref 39–117)
ALT SERPL W P-5'-P-CCNC: 14 U/L (ref 1–33)
ANION GAP SERPL CALCULATED.3IONS-SCNC: 13.9 MMOL/L (ref 5–15)
AST SERPL-CCNC: 18 U/L (ref 1–32)
BASOPHILS # BLD AUTO: 0.02 10*3/MM3 (ref 0–0.2)
BASOPHILS NFR BLD AUTO: 0.4 % (ref 0–1.5)
BILIRUB SERPL-MCNC: 0.3 MG/DL (ref 0.2–1.2)
BUN BLD-MCNC: 15 MG/DL (ref 8–23)
BUN/CREAT SERPL: 18.1 (ref 7–25)
CALCIUM SPEC-SCNC: 9.1 MG/DL (ref 8.6–10.5)
CHLORIDE SERPL-SCNC: 99 MMOL/L (ref 98–107)
CO2 SERPL-SCNC: 28.1 MMOL/L (ref 22–29)
CREAT BLD-MCNC: 0.83 MG/DL (ref 0.57–1)
DEPRECATED RDW RBC AUTO: 45.1 FL (ref 37–54)
EOSINOPHIL # BLD AUTO: 0.24 10*3/MM3 (ref 0–0.4)
EOSINOPHIL NFR BLD AUTO: 4.2 % (ref 0.3–6.2)
ERYTHROCYTE [DISTWIDTH] IN BLOOD BY AUTOMATED COUNT: 13.8 % (ref 12.3–15.4)
GFR SERPL CREATININE-BSD FRML MDRD: 68 ML/MIN/1.73
GLOBULIN UR ELPH-MCNC: 3.2 GM/DL
GLUCOSE BLD-MCNC: 79 MG/DL (ref 65–99)
HCT VFR BLD AUTO: 33.8 % (ref 34–46.6)
HGB BLD-MCNC: 11.1 G/DL (ref 12–15.9)
IMM GRANULOCYTES # BLD AUTO: 0.02 10*3/MM3 (ref 0–0.05)
IMM GRANULOCYTES NFR BLD AUTO: 0.4 % (ref 0–0.5)
LYMPHOCYTES # BLD AUTO: 1.11 10*3/MM3 (ref 0.7–3.1)
LYMPHOCYTES NFR BLD AUTO: 19.6 % (ref 19.6–45.3)
MCH RBC QN AUTO: 29.8 PG (ref 26.6–33)
MCHC RBC AUTO-ENTMCNC: 32.8 G/DL (ref 31.5–35.7)
MCV RBC AUTO: 90.6 FL (ref 79–97)
MONOCYTES # BLD AUTO: 0.48 10*3/MM3 (ref 0.1–0.9)
MONOCYTES NFR BLD AUTO: 8.5 % (ref 5–12)
NEUTROPHILS # BLD AUTO: 3.79 10*3/MM3 (ref 1.7–7)
NEUTROPHILS NFR BLD AUTO: 66.9 % (ref 42.7–76)
NRBC BLD AUTO-RTO: 0 /100 WBC (ref 0–0.2)
PLATELET # BLD AUTO: 154 10*3/MM3 (ref 140–450)
PMV BLD AUTO: 11 FL (ref 6–12)
POTASSIUM BLD-SCNC: 4.2 MMOL/L (ref 3.5–5.2)
PROT SERPL-MCNC: 6.7 G/DL (ref 6–8.5)
RBC # BLD AUTO: 3.73 10*6/MM3 (ref 3.77–5.28)
SODIUM BLD-SCNC: 141 MMOL/L (ref 136–145)
VANCOMYCIN TROUGH SERPL-MCNC: 14 MCG/ML (ref 5–20)
WBC NRBC COR # BLD: 5.66 10*3/MM3 (ref 3.4–10.8)

## 2019-12-02 PROCEDURE — 80053 COMPREHEN METABOLIC PANEL: CPT | Performed by: INTERNAL MEDICINE

## 2019-12-02 PROCEDURE — 80202 ASSAY OF VANCOMYCIN: CPT | Performed by: INTERNAL MEDICINE

## 2019-12-02 PROCEDURE — 85025 COMPLETE CBC W/AUTO DIFF WBC: CPT | Performed by: INTERNAL MEDICINE

## 2019-12-06 ENCOUNTER — OFFICE VISIT (OUTPATIENT)
Dept: INFECTIOUS DISEASES | Facility: CLINIC | Age: 69
End: 2019-12-06

## 2019-12-06 VITALS
DIASTOLIC BLOOD PRESSURE: 67 MMHG | SYSTOLIC BLOOD PRESSURE: 102 MMHG | HEART RATE: 88 BPM | TEMPERATURE: 97.6 F | BODY MASS INDEX: 30.78 KG/M2 | HEIGHT: 61 IN | WEIGHT: 163 LBS

## 2019-12-06 DIAGNOSIS — Z79.2 LONG TERM (CURRENT) USE OF ANTIBIOTICS: ICD-10-CM

## 2019-12-06 DIAGNOSIS — I05.9 MITRAL VALVE DISORDER: ICD-10-CM

## 2019-12-06 DIAGNOSIS — I33.0 ACUTE BACTERIAL ENDOCARDITIS: Primary | ICD-10-CM

## 2019-12-06 PROCEDURE — 99214 OFFICE O/P EST MOD 30 MIN: CPT | Performed by: INTERNAL MEDICINE

## 2019-12-06 NOTE — PROGRESS NOTES
"cc: f/u endocarditis    Patient was hospitalized at [facility] and found to have influenza A and COPD exacerbation.  Echo showed possible calcified mitral plaque versus endocarditis and she was discharged on vancomycin, cefepime and metronidazole told to follow-up in the emergency department here.  She was admitted on 11/13 and repeat REDD showed probable vegetation.  She was switched to vancomycin and ceftriaxone through 12/7/2019.  Since discharge she is done well.  Her prior admission was complicated by atrial fibrillation but this is now controlled.  She has had some lower extremity edema but no constitutional symptoms of infection/fever, chills, night sweats.  She is tolerated long-term use antibiotics without missed doses or side effects.    Past medical history: Restless leg syndrome, E. coli sepsis from nephrolithiasis, COPD, atrial fibrillation, restless leg, right total knee arthroplasty, cholecystectomy, gastric banding  No family history of infectious diseases  Allergies: Penicillin   Social history: She lives with her  in [place], [place].  Smoker.    Review of Systems: All other reviewed and negative except as per HPI    Blood pressure 102/67, pulse 88, temperature 97.6 °F (36.4 °C), height 154.9 cm (60.98\"), weight 73.9 kg (163 lb).  GENERAL: Awake and alert, in no acute distress.   HEENT: hearing is grossly normal.   HEART: Regular rate and rhythm.  2+ lower extremity peripheral edema.   SKIN: Warm and dry without cutaneous eruptions   PSYCHIATRIC: Appropriate mood, affect, insight, and judgment.       DIAGNOSTICS:  Antibiotic monitoring labs reviewed.  Creatinine 0.83.  White count 5.66  Platelets 154  Hematocrit 34  Vancomycin level 13.3 -17    Assessment and Plan  1.  Mitral valve endocarditis  --Status post approximately 5 weeks IV antibiotics.  Discontinue PICC line and IV antibiotics today.  -- Discussed with patient signs and symptoms readdress infection when " to seek medical attention  --Discussed need for prophylactic antibiotics ahead of invasive dental procedures    2.  Long-term current use antibiotics requiring intensive drug monitoring to avoid toxicity  -- She has done very well with her antibiotics.  Discontinue as above    3. Mod MV stenosis and regurgitation  --Cont f/u with cardiology, next appt 12/11  --I would rec repeat REDD if worsening

## 2019-12-08 ENCOUNTER — DOCUMENTATION (OUTPATIENT)
Dept: CARDIOLOGY | Facility: CLINIC | Age: 69
End: 2019-12-08

## 2019-12-09 ENCOUNTER — TELEPHONE (OUTPATIENT)
Dept: CARDIOLOGY | Facility: CLINIC | Age: 69
End: 2019-12-09

## 2019-12-09 RX ORDER — FUROSEMIDE 20 MG/1
20 TABLET ORAL DAILY
Qty: 60 TABLET | Refills: 11 | Status: SHIPPED | OUTPATIENT
Start: 2019-12-09 | End: 2020-01-14

## 2019-12-09 RX ORDER — POTASSIUM CHLORIDE 750 MG/1
10 TABLET, FILM COATED, EXTENDED RELEASE ORAL DAILY
Qty: 60 TABLET | Refills: 11 | Status: SHIPPED | OUTPATIENT
Start: 2019-12-09 | End: 2020-01-14

## 2019-12-09 NOTE — PROGRESS NOTES
Patient called reporting SOA and palpitations. She stated that she has no BP cuff to check bp and HR. Also with mild orthopnea.     I recommended 40mg Lasix po and monitoring of breathing over the following hour. If she did not notice improvement, I recommended coming to the ER as afib with rvr may also be the problem.     She is agreeable to this plan

## 2019-12-09 NOTE — TELEPHONE ENCOUNTER
12/09/19  11:05 AM  Shani Phillip  1950  Home Phone [phone]   Mobile [phone]     Shani Connelly called and spoke to Dr. Acevedo last night. Here is the conversation documentation:      This morning, Shani's HH nurse called and spoke with me. Shani took the extra 40 mg of Lasix last night. She felt better and did not go to the ER. Today she is still SOA and still has 2+ pitting BLE edema. Her B/P is 132/70, HR 88, O2 Sats 93%. She has not been doing daily weights, but the HH RN is starting her on daily wts today.    Shani did not take any extra potassium with the extra doses of furosemide last night. She only has two tablets left of the furosemide and a couple potassium tablets as well. She has no refills. She wanted to know if she could take an extra dose of furosemide tonight also? Does she need any extra potassium doses? Could you give her a small refill on both meds until she sees Angy this Thursday? (Shani wants to stay out of the hospital as long as possible.)    Thank you!    Heidi Abreu, RN  Triage [facility]

## 2019-12-09 NOTE — TELEPHONE ENCOUNTER
Notified pt of orders and recommendations. She verbalized understanding.    Thank you!    Heidi Abreu, RN  Triage [facility]

## 2019-12-11 ENCOUNTER — TELEPHONE (OUTPATIENT)
Dept: CARDIOLOGY | Facility: CLINIC | Age: 69
End: 2019-12-11

## 2019-12-11 ENCOUNTER — OFFICE VISIT (OUTPATIENT)
Dept: CARDIOLOGY | Facility: CLINIC | Age: 69
End: 2019-12-11

## 2019-12-11 VITALS — HEIGHT: 61 IN | BODY MASS INDEX: 28.7 KG/M2 | WEIGHT: 152 LBS

## 2019-12-11 DIAGNOSIS — I48.19 ATRIAL FIBRILLATION, PERSISTENT (HCC): ICD-10-CM

## 2019-12-11 DIAGNOSIS — I50.32 CHRONIC DIASTOLIC HEART FAILURE (HCC): Primary | ICD-10-CM

## 2019-12-11 DIAGNOSIS — I48.0 PAF (PAROXYSMAL ATRIAL FIBRILLATION) (HCC): ICD-10-CM

## 2019-12-11 DIAGNOSIS — Z51.81 ENCOUNTER FOR THERAPEUTIC DRUG LEVEL MONITORING: ICD-10-CM

## 2019-12-11 DIAGNOSIS — I33.0 ACUTE BACTERIAL ENDOCARDITIS: ICD-10-CM

## 2019-12-11 PROCEDURE — 99214 OFFICE O/P EST MOD 30 MIN: CPT | Performed by: NURSE PRACTITIONER

## 2019-12-11 PROCEDURE — 93000 ELECTROCARDIOGRAM COMPLETE: CPT | Performed by: NURSE PRACTITIONER

## 2019-12-11 NOTE — PROGRESS NOTES
Date of Office Visit: 19  Encounter Provider: GENET Sanchez  Place of Service: [facility] CARDIOLOGY  Patient Name: Shani Phillip  :1950    Chief Complaint   Patient presents with   • Atrial Fibrillation     2 week follow up   :     HPI: Shani Phillip is a 69 y.o. female  with history of atrial fibrillation, mitral valve endocarditis, diastolic congestive heart failure, COPD, and overactive bladder.  She is followed by Dr. Tinajero.  I will see her in follow-up today and have reviewed her medical record.     She was on a cruise and suddenly developed fevers and chills.  She went to the [facility] where she was found to have a  temperature of 105 and was taken to [facility] where she was hospitalized 11/3/2019-2019.  She had an echocardiogram which showed normal left ventricular systolic function with an ejection fraction of 75%.  She was in atrial fibrillation at that time and diastolic function could not be determined.  Transesophageal echocardiogram was completed and she was found to have a 1.8 cm vegetation on the P3 scallop of the posterior mitral leaflet which is very mobile flickering between the atrial and ventricular chamber.  Infectious disease also followed.  It was documented that she converted to sinus rhythm without cardioversion anticoagulation was held.  She was also noted to have pneumonia and severe sepsis.  She had a PICC line placed and is being treated with vancomycin, cefepime and Flagyl intravenously.  On  she felt better and was eager to get back to [place] has spoke with her travel insurance company who arrange for flight home.  She had an oxygen concentrator.  She had negative blood cultures.     She returned home and was admitted in 2019 with atrial fibrillation with rapid ventricular response.  She also had COPD exacerbation and influenza.  Cardioversion was unsuccessful with 200 J delivered to patient.  She was  treated with metoprolol and diltiazem.  Dr. Arora with electrophysiology also saw her.  Echocardiogram showed normal left ventricular systolic function with calcification of the aortic valve.  There was moderate mitral annular calcification with mild mitral regurgitation and mild mitral stenosis.  There was a small mobile element on the atrial side of the mitral valve which was difficult to visualize.  RVSP was elevated at 40.7 mmHg.  Blood cultures were negative.  Transesophageal echocardiogram showed ejection fraction 56-60% with mildly dilated right ventricular cavity, mild calcification of the aortic valve with mild to moderate tricuspid regurgitation and mildly elevated RVSP.  There was a 1.2 cm highly mobile mass which appeared to be attached to the posterior mitral valve annulus and infectious vegetation could not be excluded.  Mild mitral stenosis was present with moderate regurgitation.  Infectious disease followed and started her on empiric antibiotics.  At her 1 week follow-up she had new lower extremity edema and was weeping.  Her diltiazem was cut back and metoprolol was increased.  She was prescribed Lasix and potassium x1 week.  Follow-up 24-hour Holter revealed rate controlled atrial fibrillation.    She presents today for 2-week hospital discharge follow-up.  She called with increased shortness of breath and lower extremity swelling after trial of Lasix so we resumed that daily.  There was a misunderstanding so she has not doubled that.  The weeping in her lower extremities has started to dry up.  Her weight is down approximately 11 pounds she no longer has her PICC line and has completed antibiotic therapy.  She has been discharged from infectious disease.  She denies chest pain tightness pressure, lightheadedness, palpitation.  She continues to have some shortness of breath and has some new wheezing today.        Allergies   Allergen Reactions   • Penicillins Rash     RASH 30 YRS AGO NO  "HOSPITALIZATION       Past Medical History:   Diagnosis Date   • Acute endocarditis    • Atrial fibrillation with RVR (CMS/[facility]) 11/14/2019   • COPD (chronic obstructive pulmonary disease) (CMS/[facility])    • COPD with acute exacerbation (CMS/[facility]) 11/14/2019   • Influenza 11/14/2019   • Renal disorder    • Restless leg syndrome    • Thrush, oral 11/14/2019       Past Surgical History:   Procedure Laterality Date   • CHOLECYSTECTOMY     • KNEE ARTHROPLASTY     • LAPAROSCOPIC GASTRIC BANDING           Family and social history reviewed.     Review of Systems   Constitution: Positive for malaise/fatigue.   Cardiovascular: Positive for leg swelling.   Respiratory: Positive for shortness of breath.      All other systems were reviewed and are negative          Objective:     Vitals:    12/11/19 1255   Weight: 68.9 kg (152 lb)   Height: 154.9 cm (61\")     Body mass index is 28.72 kg/m².    PHYSICAL EXAM:  Physical Exam   Constitutional: She is oriented to person, place, and time. She appears well-developed and well-nourished. No distress.   HENT:   Head: Normocephalic.   Eyes: Conjunctivae are normal.   Neck: Normal range of motion. No JVD present.   Cardiovascular: Normal rate, regular rhythm, normal heart sounds and intact distal pulses.   No murmur heard.  Pulses:       Carotid pulses are 2+ on the right side, and 2+ on the left side.       Radial pulses are 2+ on the right side, and 2+ on the left side.        Posterior tibial pulses are 2+ on the right side, and 2+ on the left side.   Pulmonary/Chest: Effort normal. No respiratory distress. She has decreased breath sounds in the right lower field and the left lower field. She has wheezes in the left upper field, the left middle field and the left lower field. She has no rhonchi. She has no rales. She exhibits no tenderness.   Abdominal: Soft. Bowel sounds are normal. She exhibits no distension.   Musculoskeletal: Normal range of motion. She exhibits edema (1-2+ non weeping " BLE to tibial).   Wheelchair    Neurological: She is alert and oriented to person, place, and time.   Skin: Skin is warm, dry and intact. No rash noted. She is not diaphoretic. No cyanosis.   Psychiatric: She has a normal mood and affect. Her behavior is normal. Judgment and thought content normal.         ECG 12 Lead  Date/Time: 12/11/2019 1:12 PM  Performed by: Angy Smith APRN  Authorized by: Angy Smith APRN   Comparison: compared with previous ECG   Similar to previous ECG  Rate: normal  T flattening: III  QRS axis: normal    Clinical impression: non-specific ECG            Current Outpatient Medications   Medication Sig Dispense Refill   • apixaban (ELIQUIS) 5 MG tablet tablet Take 1 tablet by mouth Every 12 (Twelve) Hours. 60 tablet 0   • budesonide (PULMICORT) 0.5 MG/2ML nebulizer solution Take 0.5 mg by nebulization Daily.     • dilTIAZem CD (CARDIZEM CD) 120 MG 24 hr capsule Take 1 capsule by mouth Daily. 30 capsule 11   • famotidine (PEPCID) 20 MG tablet Take 1 tablet by mouth 2 (Two) Times a Day Before Meals. 60 tablet 0   • furosemide (LASIX) 20 MG tablet Take 1 tablet by mouth Daily. 60 tablet 11   • ipratropium (ATROVENT) 0.02 % nebulizer solution Take 500 mcg by nebulization 4 (Four) Times a Day.     • metoprolol tartrate (LOPRESSOR) 50 MG tablet Take 1 tablet by mouth Every 8 (Eight) Hours. 90 tablet 0   • potassium chloride (K-DUR) 10 MEQ CR tablet Take 1 tablet by mouth Daily. 60 tablet 11   • predniSONE (DELTASONE) 5 MG tablet Take 6 tablets starting tomorrow then 5 tablets then 4 tablets then 3 tablets then 2 tablets then 1 tablet then stop 21 tablet 0   • rOPINIRole (REQUIP) 2 MG tablet Take 2 mg by mouth 2 (Two) Times a Day.     • TROSPIUM CHLORIDE PO Take 20 mg by mouth 2 (Two) Times a Day.       No current facility-administered medications for this visit.      Assessment:       Diagnosis Plan   1. Chronic diastolic heart failure (CMS/HCC)     2. Atrial fibrillation, persistent     3.  Acute bacterial endocarditis          No orders of the defined types were placed in this encounter.        Plan:     1.  69-year-old female with atrial fibrillation and unsuccessful cardioversion November 2019 normal left ventricular systolic function anticoagulated with Eliquis.  24-hour Holter confirmed rate controlled paroxysmal atrial fibrillation  Atrial Fibrillation and Atrial Flutter  Assessment  • The patient has persistent atrial fibrillation  • The transient or reversible cause of the patient's arrhythmia is pneumonia  • The patient's CHADS2-VASc score is 3  • A [id number] score of 2 or more is considered a high risk for a thromboembolic event    2.  Endocarditis of the mitral valve with mitral stenosis, moderate insufficiency and calcification.  She has completed antibiotic therapy per infectious disease.  She no longer has PICC line. She is to also follow-up with cardiothoracic surgery who requested a heart catheterization. [facility] was previously contacted for transesophageal echocardiogram CT  3.COPD with recent exacerbation she is on nocturnal oxygen  4.  Congestive heart failure with preserved left ventricular systolic function now on Lasix she is to increase that to 40 mg twice daily with increased potassium and have BMP on Monday.  If that is stable and her weights have decreased will then  arrange for heart catheterization     5.  History of overactive bladder being treated with trospium chloride     6.  Severe restless leg syndrome on therapy with Requip      Follow-up in 1 month as scheduled call with questions or concerns.            It has been a pleasure to participate in this patient's care.      Thank you,  GENET Sanchez      **I used Dragon to dictate this note:**

## 2019-12-12 NOTE — TELEPHONE ENCOUNTER
Called [facility] and gave verbal order for BMP DX- CHF.  Called patient and notified her [facility] will be there on Monday to draw labs.  She verbalized understanding.   [facility] to fax results to us [phone]. / MONO

## 2019-12-16 ENCOUNTER — TELEPHONE (OUTPATIENT)
Dept: CARDIOLOGY | Facility: HOSPITAL | Age: 69
End: 2019-12-16

## 2019-12-16 ENCOUNTER — LAB REQUISITION (OUTPATIENT)
Dept: LAB | Facility: HOSPITAL | Age: 69
End: 2019-12-16

## 2019-12-16 ENCOUNTER — TELEPHONE (OUTPATIENT)
Dept: CARDIOLOGY | Facility: CLINIC | Age: 69
End: 2019-12-16

## 2019-12-16 DIAGNOSIS — Z00.00 ENCOUNTER FOR GENERAL ADULT MEDICAL EXAMINATION WITHOUT ABNORMAL FINDINGS: ICD-10-CM

## 2019-12-16 LAB
ANION GAP SERPL CALCULATED.3IONS-SCNC: 12 MMOL/L (ref 5–15)
BUN BLD-MCNC: 18 MG/DL (ref 8–23)
BUN/CREAT SERPL: 22.5 (ref 7–25)
CALCIUM SPEC-SCNC: 9.4 MG/DL (ref 8.6–10.5)
CHLORIDE SERPL-SCNC: 94 MMOL/L (ref 98–107)
CO2 SERPL-SCNC: 30 MMOL/L (ref 22–29)
CREAT BLD-MCNC: 0.8 MG/DL (ref 0.57–1)
GFR SERPL CREATININE-BSD FRML MDRD: 71 ML/MIN/1.73
GLUCOSE BLD-MCNC: 76 MG/DL (ref 65–99)
POTASSIUM BLD-SCNC: 4.7 MMOL/L (ref 3.5–5.2)
SODIUM BLD-SCNC: 136 MMOL/L (ref 136–145)

## 2019-12-16 PROCEDURE — 80048 BASIC METABOLIC PNL TOTAL CA: CPT | Performed by: INTERNAL MEDICINE

## 2019-12-16 RX ORDER — METOPROLOL TARTRATE 100 MG/1
100 TABLET ORAL 2 TIMES DAILY
Qty: 60 TABLET | Refills: 11 | Status: SHIPPED | OUTPATIENT
Start: 2019-12-16 | End: 2020-12-15

## 2019-12-16 NOTE — TELEPHONE ENCOUNTER
Metoprolol should be 100 mg twice daily in addition to diltiazem 120 mg daily. Furosemide should be 40 mg twice daily which was started 12/11/2019 when I saw her. She should be taking 40 mg twice daily through this morning and once I have her BMP will know if she can stay on 40 BID or if she needs to decrease. I will call patient once I have her BMP results.       AL

## 2019-12-16 NOTE — TELEPHONE ENCOUNTER
S/W patient.  Her legs continue to improve but still not at baseline.  She started weighing herself and of the week and weights are listed below.    12/14 weight 145  12/16 weight 143   Weight today is 143    She confirms taking 100 mg metoprolol tartrate twice daily in addition to diltiazem 120 mg daily.  She is currently taking 40 mg of furosemide twice daily.  She understands I will call her once I receive her BMP results and give further recommendation regarding furosemide. Refill on metoprolol sent in at 100 mg tablet twice daily

## 2019-12-19 ENCOUNTER — TELEPHONE (OUTPATIENT)
Dept: CARDIOLOGY | Facility: CLINIC | Age: 69
End: 2019-12-19

## 2019-12-19 ENCOUNTER — HOSPITAL ENCOUNTER (EMERGENCY)
Facility: HOSPITAL | Age: 69
Discharge: HOME | End: 2019-12-19
Attending: EMERGENCY MEDICINE | Admitting: EMERGENCY MEDICINE

## 2019-12-19 ENCOUNTER — APPOINTMENT (OUTPATIENT)
Dept: CT IMAGING | Facility: HOSPITAL | Age: 69
End: 2019-12-19

## 2019-12-19 ENCOUNTER — APPOINTMENT (OUTPATIENT)
Dept: GENERAL RADIOLOGY | Facility: HOSPITAL | Age: 69
End: 2019-12-19

## 2019-12-19 VITALS
BODY MASS INDEX: 27.34 KG/M2 | OXYGEN SATURATION: 97 % | RESPIRATION RATE: 16 BRPM | SYSTOLIC BLOOD PRESSURE: 102 MMHG | HEART RATE: 70 BPM | DIASTOLIC BLOOD PRESSURE: 52 MMHG | TEMPERATURE: 99.1 F | HEIGHT: 61 IN | WEIGHT: 144.8 LBS

## 2019-12-19 DIAGNOSIS — R09.1 PLEURISY: ICD-10-CM

## 2019-12-19 DIAGNOSIS — I05.9 ENDOCARDITIS OF MITRAL VALVE: Primary | ICD-10-CM

## 2019-12-19 DIAGNOSIS — R50.9 FEVER AND CHILLS: Primary | ICD-10-CM

## 2019-12-19 LAB
ALBUMIN SERPL-MCNC: 4.3 G/DL (ref 3.5–5.2)
ALBUMIN/GLOB SERPL: 1 G/DL
ALP SERPL-CCNC: 118 U/L (ref 39–117)
ALT SERPL W P-5'-P-CCNC: 13 U/L (ref 1–33)
ANION GAP SERPL CALCULATED.3IONS-SCNC: 13.5 MMOL/L (ref 5–15)
AST SERPL-CCNC: 21 U/L (ref 1–32)
B PARAPERT DNA SPEC QL NAA+PROBE: NOT DETECTED
B PERT DNA SPEC QL NAA+PROBE: NOT DETECTED
BACTERIA UR QL AUTO: ABNORMAL /HPF
BASOPHILS # BLD AUTO: 0.06 10*3/MM3 (ref 0–0.2)
BASOPHILS NFR BLD AUTO: 0.3 % (ref 0–1.5)
BILIRUB SERPL-MCNC: 0.6 MG/DL (ref 0.2–1.2)
BILIRUB UR QL STRIP: NEGATIVE
BUN BLD-MCNC: 20 MG/DL (ref 8–23)
BUN/CREAT SERPL: 19.2 (ref 7–25)
C PNEUM DNA NPH QL NAA+NON-PROBE: NOT DETECTED
CALCIUM SPEC-SCNC: 9.9 MG/DL (ref 8.6–10.5)
CHLORIDE SERPL-SCNC: 91 MMOL/L (ref 98–107)
CLARITY UR: CLEAR
CO2 SERPL-SCNC: 31.5 MMOL/L (ref 22–29)
COLOR UR: YELLOW
CREAT BLD-MCNC: 1.04 MG/DL (ref 0.57–1)
D-LACTATE SERPL-SCNC: 1.9 MMOL/L (ref 0.5–2)
DEPRECATED RDW RBC AUTO: 45.5 FL (ref 37–54)
EOSINOPHIL # BLD AUTO: 0.04 10*3/MM3 (ref 0–0.4)
EOSINOPHIL NFR BLD AUTO: 0.2 % (ref 0.3–6.2)
ERYTHROCYTE [DISTWIDTH] IN BLOOD BY AUTOMATED COUNT: 14.3 % (ref 12.3–15.4)
FLUAV H1 2009 PAND RNA NPH QL NAA+PROBE: NOT DETECTED
FLUAV H1 HA GENE NPH QL NAA+PROBE: NOT DETECTED
FLUAV H3 RNA NPH QL NAA+PROBE: NOT DETECTED
FLUAV SUBTYP SPEC NAA+PROBE: NOT DETECTED
FLUBV RNA ISLT QL NAA+PROBE: NOT DETECTED
GFR SERPL CREATININE-BSD FRML MDRD: 52 ML/MIN/1.73
GLOBULIN UR ELPH-MCNC: 4.5 GM/DL
GLUCOSE BLD-MCNC: 120 MG/DL (ref 65–99)
GLUCOSE UR STRIP-MCNC: NEGATIVE MG/DL
HADV DNA SPEC NAA+PROBE: NOT DETECTED
HCOV 229E RNA SPEC QL NAA+PROBE: NOT DETECTED
HCOV HKU1 RNA SPEC QL NAA+PROBE: NOT DETECTED
HCOV NL63 RNA SPEC QL NAA+PROBE: NOT DETECTED
HCOV OC43 RNA SPEC QL NAA+PROBE: NOT DETECTED
HCT VFR BLD AUTO: 40.8 % (ref 34–46.6)
HGB BLD-MCNC: 13.4 G/DL (ref 12–15.9)
HGB UR QL STRIP.AUTO: ABNORMAL
HMPV RNA NPH QL NAA+NON-PROBE: NOT DETECTED
HOLD SPECIMEN: NORMAL
HOLD SPECIMEN: NORMAL
HPIV1 RNA SPEC QL NAA+PROBE: NOT DETECTED
HPIV2 RNA SPEC QL NAA+PROBE: NOT DETECTED
HPIV3 RNA NPH QL NAA+PROBE: NOT DETECTED
HPIV4 P GENE NPH QL NAA+PROBE: NOT DETECTED
HYALINE CASTS UR QL AUTO: ABNORMAL /LPF
IMM GRANULOCYTES # BLD AUTO: 0.15 10*3/MM3 (ref 0–0.05)
IMM GRANULOCYTES NFR BLD AUTO: 0.8 % (ref 0–0.5)
KETONES UR QL STRIP: NEGATIVE
LEUKOCYTE ESTERASE UR QL STRIP.AUTO: ABNORMAL
LYMPHOCYTES # BLD AUTO: 1.95 10*3/MM3 (ref 0.7–3.1)
LYMPHOCYTES NFR BLD AUTO: 10.1 % (ref 19.6–45.3)
M PNEUMO IGG SER IA-ACNC: NOT DETECTED
MCH RBC QN AUTO: 28.6 PG (ref 26.6–33)
MCHC RBC AUTO-ENTMCNC: 32.8 G/DL (ref 31.5–35.7)
MCV RBC AUTO: 87 FL (ref 79–97)
MONOCYTES # BLD AUTO: 1.13 10*3/MM3 (ref 0.1–0.9)
MONOCYTES NFR BLD AUTO: 5.9 % (ref 5–12)
NEUTROPHILS # BLD AUTO: 15.91 10*3/MM3 (ref 1.7–7)
NEUTROPHILS NFR BLD AUTO: 82.7 % (ref 42.7–76)
NITRITE UR QL STRIP: NEGATIVE
NRBC BLD AUTO-RTO: 0 /100 WBC (ref 0–0.2)
PH UR STRIP.AUTO: <=5 [PH] (ref 5–8)
PLATELET # BLD AUTO: 419 10*3/MM3 (ref 140–450)
PMV BLD AUTO: 9.3 FL (ref 6–12)
POTASSIUM BLD-SCNC: 4.7 MMOL/L (ref 3.5–5.2)
PROCALCITONIN SERPL-MCNC: 0.1 NG/ML (ref 0.1–0.25)
PROT SERPL-MCNC: 8.8 G/DL (ref 6–8.5)
PROT UR QL STRIP: NEGATIVE
RBC # BLD AUTO: 4.69 10*6/MM3 (ref 3.77–5.28)
RBC # UR: ABNORMAL /HPF
REF LAB TEST METHOD: ABNORMAL
RHINOVIRUS RNA SPEC NAA+PROBE: NOT DETECTED
RSV RNA NPH QL NAA+NON-PROBE: NOT DETECTED
SODIUM BLD-SCNC: 136 MMOL/L (ref 136–145)
SP GR UR STRIP: 1.02 (ref 1–1.03)
SQUAMOUS #/AREA URNS HPF: ABNORMAL /HPF
TROPONIN T SERPL-MCNC: <0.01 NG/ML (ref 0–0.03)
UROBILINOGEN UR QL STRIP: ABNORMAL
WBC NRBC COR # BLD: 19.24 10*3/MM3 (ref 3.4–10.8)
WBC UR QL AUTO: ABNORMAL /HPF
WHOLE BLOOD HOLD SPECIMEN: NORMAL
WHOLE BLOOD HOLD SPECIMEN: NORMAL

## 2019-12-19 PROCEDURE — 0 IOPAMIDOL PER 1 ML: Performed by: EMERGENCY MEDICINE

## 2019-12-19 PROCEDURE — 85025 COMPLETE CBC W/AUTO DIFF WBC: CPT | Performed by: EMERGENCY MEDICINE

## 2019-12-19 PROCEDURE — 96375 TX/PRO/DX INJ NEW DRUG ADDON: CPT

## 2019-12-19 PROCEDURE — 74177 CT ABD & PELVIS W/CONTRAST: CPT

## 2019-12-19 PROCEDURE — 84145 PROCALCITONIN (PCT): CPT | Performed by: EMERGENCY MEDICINE

## 2019-12-19 PROCEDURE — 25010000002 ONDANSETRON PER 1 MG: Performed by: EMERGENCY MEDICINE

## 2019-12-19 PROCEDURE — 84484 ASSAY OF TROPONIN QUANT: CPT | Performed by: EMERGENCY MEDICINE

## 2019-12-19 PROCEDURE — 71275 CT ANGIOGRAPHY CHEST: CPT

## 2019-12-19 PROCEDURE — 25010000002 MORPHINE PER 10 MG: Performed by: EMERGENCY MEDICINE

## 2019-12-19 PROCEDURE — 0100U HC BIOFIRE FILMARRAY RESP PANEL 2: CPT | Performed by: EMERGENCY MEDICINE

## 2019-12-19 PROCEDURE — 93010 ELECTROCARDIOGRAM REPORT: CPT | Performed by: INTERNAL MEDICINE

## 2019-12-19 PROCEDURE — 36415 COLL VENOUS BLD VENIPUNCTURE: CPT

## 2019-12-19 PROCEDURE — 87040 BLOOD CULTURE FOR BACTERIA: CPT | Performed by: EMERGENCY MEDICINE

## 2019-12-19 PROCEDURE — 93005 ELECTROCARDIOGRAM TRACING: CPT | Performed by: EMERGENCY MEDICINE

## 2019-12-19 PROCEDURE — 83605 ASSAY OF LACTIC ACID: CPT | Performed by: EMERGENCY MEDICINE

## 2019-12-19 PROCEDURE — 71045 X-RAY EXAM CHEST 1 VIEW: CPT

## 2019-12-19 PROCEDURE — 80053 COMPREHEN METABOLIC PANEL: CPT | Performed by: EMERGENCY MEDICINE

## 2019-12-19 PROCEDURE — 96365 THER/PROPH/DIAG IV INF INIT: CPT

## 2019-12-19 PROCEDURE — 81001 URINALYSIS AUTO W/SCOPE: CPT | Performed by: EMERGENCY MEDICINE

## 2019-12-19 PROCEDURE — 25010000002 AZITHROMYCIN PER 500 MG: Performed by: EMERGENCY MEDICINE

## 2019-12-19 PROCEDURE — 99285 EMERGENCY DEPT VISIT HI MDM: CPT

## 2019-12-19 RX ORDER — ACETAMINOPHEN 500 MG
1000 TABLET ORAL ONCE
Status: COMPLETED | OUTPATIENT
Start: 2019-12-19 | End: 2019-12-19

## 2019-12-19 RX ORDER — DOXYCYCLINE 100 MG/1
100 CAPSULE ORAL 2 TIMES DAILY
Qty: 20 CAPSULE | Refills: 0 | Status: SHIPPED | OUTPATIENT
Start: 2019-12-19 | End: 2020-01-17

## 2019-12-19 RX ORDER — SODIUM CHLORIDE 0.9 % (FLUSH) 0.9 %
10 SYRINGE (ML) INJECTION AS NEEDED
Status: DISCONTINUED | OUTPATIENT
Start: 2019-12-19 | End: 2019-12-19 | Stop reason: HOSPADM

## 2019-12-19 RX ORDER — DOXYCYCLINE 100 MG/1
100 CAPSULE ORAL ONCE
Status: COMPLETED | OUTPATIENT
Start: 2019-12-19 | End: 2019-12-19

## 2019-12-19 RX ORDER — ONDANSETRON 2 MG/ML
4 INJECTION INTRAMUSCULAR; INTRAVENOUS ONCE
Status: COMPLETED | OUTPATIENT
Start: 2019-12-19 | End: 2019-12-19

## 2019-12-19 RX ORDER — HYDROCODONE BITARTRATE AND ACETAMINOPHEN 5; 325 MG/1; MG/1
1 TABLET ORAL EVERY 6 HOURS PRN
Qty: 20 TABLET | Refills: 0 | Status: SHIPPED | OUTPATIENT
Start: 2019-12-19 | End: 2020-02-28

## 2019-12-19 RX ORDER — MORPHINE SULFATE 2 MG/ML
2 INJECTION, SOLUTION INTRAMUSCULAR; INTRAVENOUS ONCE
Status: COMPLETED | OUTPATIENT
Start: 2019-12-19 | End: 2019-12-19

## 2019-12-19 RX ADMIN — MORPHINE SULFATE 2 MG: 2 INJECTION, SOLUTION INTRAMUSCULAR; INTRAVENOUS at 16:43

## 2019-12-19 RX ADMIN — ACETAMINOPHEN 1000 MG: 500 TABLET, FILM COATED ORAL at 15:32

## 2019-12-19 RX ADMIN — DOXYCYCLINE 100 MG: 100 CAPSULE ORAL at 19:33

## 2019-12-19 RX ADMIN — ONDANSETRON 4 MG: 2 INJECTION INTRAMUSCULAR; INTRAVENOUS at 16:43

## 2019-12-19 RX ADMIN — IOPAMIDOL 95 ML: 755 INJECTION, SOLUTION INTRAVENOUS at 17:44

## 2019-12-19 RX ADMIN — SODIUM CHLORIDE, PRESERVATIVE FREE 10 ML: 5 INJECTION INTRAVENOUS at 15:33

## 2019-12-19 RX ADMIN — AZITHROMYCIN 500 MG: 500 INJECTION, POWDER, LYOPHILIZED, FOR SOLUTION INTRAVENOUS at 19:34

## 2019-12-19 NOTE — TELEPHONE ENCOUNTER
Spoke with patient BMP stable from 12/16/2019.  She will continue on 40 mg furosemide morning and night.  Potassium is stable.  Reviewed cardiothoracic surgery note from inpatient.  They have recommended repeat REDD to address status of education and mitral valve in general after completing antibiotics.  There was also mention of cardiac catheterization if cardiac surgery was recommended so we will await results of REDD then share those with Dr. Briones for further recommendation.  Patient is agreeable.       Lamar/ efe please call patient to set up REDD outpatient. She is a [facility] patient.       AL

## 2019-12-19 NOTE — ED PROVIDER NOTES
EMERGENCY DEPARTMENT ENCOUNTER    Room Number:  11/11  Date of encounter:  12/19/2019  PCP: Jose R Franks MD  Historian: Patient      HPI:  Chief Complaint: Fever, short of breath and chest pain  A complete HPI/ROS/PMH/PSH/SH/FH are unobtainable due to: Nothing    Context: Shani Phillip is a 69 y.o. female who presents to the ED c/o shortness of breath and chest pain along with a fever that began yesterday.  Patient said that the pain is currently severe, stabbing and nonradiating.  Her shortness of breath is worse with exertion, and she has a nonproductive cough.  She had not taken her temperature at home but had been feeling chills.      PAST MEDICAL HISTORY  Active Ambulatory Problems     Diagnosis Date Noted   • Acute endocarditis 11/13/2019   • Influenza 11/14/2019   • Thrush, oral 11/14/2019     Resolved Ambulatory Problems     Diagnosis Date Noted   • COPD with acute exacerbation (CMS/[facility]) 11/14/2019   • Atrial fibrillation with RVR (CMS/[facility]) 11/14/2019     Past Medical History:   Diagnosis Date   • COPD (chronic obstructive pulmonary disease) (CMS/[facility])    • Renal disorder    • Restless leg syndrome          PAST SURGICAL HISTORY  Past Surgical History:   Procedure Laterality Date   • CHOLECYSTECTOMY     • KNEE ARTHROPLASTY     • LAPAROSCOPIC GASTRIC BANDING           FAMILY HISTORY  Family History   Problem Relation Age of Onset   • Lung cancer Mother          SOCIAL HISTORY  Social History     Socioeconomic History   • Marital status:      Spouse name: Not on file   • Number of children: Not on file   • Years of education: Not on file   • Highest education level: Not on file   Tobacco Use   • Smoking status: Former Smoker     Packs/day: 0.50     Types: Cigarettes     Start date: 10/3/2019   • Smokeless tobacco: Former User   • Tobacco comment: LAST CIG NOV 02, 2019   Substance and Sexual Activity   • Alcohol use: No     Frequency: Never   • Drug use: No   • Sexual activity: Defer    Lifestyle   • Physical activity:     Days per week: 0 days     Minutes per session: 0 min   • Stress: Not on file         ALLERGIES  Penicillins        REVIEW OF SYSTEMS  Review of Systems     All systems reviewed and negative except for those discussed in HPI.       PHYSICAL EXAM    I have reviewed the triage vital signs and nursing notes.    ED Triage Vitals [12/19/19 1442]   Temp Pulse Resp BP SpO2   (!) 102.3 °F (39.1 °C) -- -- -- --      Temp src Heart Rate Source Patient Position BP Location FiO2 (%)   Tympanic -- -- -- --       Physical Exam  GENERAL: Anxious and in distress  HENT: nares patent  EYES: no scleral icterus  CV: regular rhythm, regular rate  RESPIRATORY: normal effort, occasional coarse breath sounds, especially on the left  ABDOMEN: soft  MUSCULOSKELETAL: no deformity  NEURO: alert, moves all extremities, follows commands  SKIN: warm, dry        LAB RESULTS  Recent Results (from the past 24 hour(s))   Comprehensive Metabolic Panel    Collection Time: 12/19/19  3:01 PM   Result Value Ref Range    Glucose 120 (H) 65 - 99 mg/dL    BUN 20 8 - 23 mg/dL    Creatinine 1.04 (H) 0.57 - 1.00 mg/dL    Sodium 136 136 - 145 mmol/L    Potassium 4.7 3.5 - 5.2 mmol/L    Chloride 91 (L) 98 - 107 mmol/L    CO2 31.5 (H) 22.0 - 29.0 mmol/L    Calcium 9.9 8.6 - 10.5 mg/dL    Total Protein 8.8 (H) 6.0 - 8.5 g/dL    Albumin 4.30 3.50 - 5.20 g/dL    ALT (SGPT) 13 1 - 33 U/L    AST (SGOT) 21 1 - 32 U/L    Alkaline Phosphatase 118 (H) 39 - 117 U/L    Total Bilirubin 0.6 0.2 - 1.2 mg/dL    eGFR Non African Amer 52 (L) >60 mL/min/1.73    Globulin 4.5 gm/dL    A/G Ratio 1.0 g/dL    BUN/Creatinine Ratio 19.2 7.0 - 25.0    Anion Gap 13.5 5.0 - 15.0 mmol/L   Troponin    Collection Time: 12/19/19  3:01 PM   Result Value Ref Range    Troponin T <0.010 0.000 - 0.030 ng/mL   Light Blue Top    Collection Time: 12/19/19  3:01 PM   Result Value Ref Range    Extra Tube hold for add-on    Green Top (Gel)    Collection Time: 12/19/19   3:01 PM   Result Value Ref Range    Extra Tube Hold for add-ons.    Lavender Top    Collection Time: 12/19/19  3:01 PM   Result Value Ref Range    Extra Tube hold for add-on    Gold Top - SST    Collection Time: 12/19/19  3:01 PM   Result Value Ref Range    Extra Tube Hold for add-ons.    CBC Auto Differential    Collection Time: 12/19/19  3:01 PM   Result Value Ref Range    WBC 19.24 (H) 3.40 - 10.80 10*3/mm3    RBC 4.69 3.77 - 5.28 10*6/mm3    Hemoglobin 13.4 12.0 - 15.9 g/dL    Hematocrit 40.8 34.0 - 46.6 %    MCV 87.0 79.0 - 97.0 fL    MCH 28.6 26.6 - 33.0 pg    MCHC 32.8 31.5 - 35.7 g/dL    RDW 14.3 12.3 - 15.4 %    RDW-SD 45.5 37.0 - 54.0 fl    MPV 9.3 6.0 - 12.0 fL    Platelets 419 140 - 450 10*3/mm3    Neutrophil % 82.7 (H) 42.7 - 76.0 %    Lymphocyte % 10.1 (L) 19.6 - 45.3 %    Monocyte % 5.9 5.0 - 12.0 %    Eosinophil % 0.2 (L) 0.3 - 6.2 %    Basophil % 0.3 0.0 - 1.5 %    Immature Grans % 0.8 (H) 0.0 - 0.5 %    Neutrophils, Absolute 15.91 (H) 1.70 - 7.00 10*3/mm3    Lymphocytes, Absolute 1.95 0.70 - 3.10 10*3/mm3    Monocytes, Absolute 1.13 (H) 0.10 - 0.90 10*3/mm3    Eosinophils, Absolute 0.04 0.00 - 0.40 10*3/mm3    Basophils, Absolute 0.06 0.00 - 0.20 10*3/mm3    Immature Grans, Absolute 0.15 (H) 0.00 - 0.05 10*3/mm3    nRBC 0.0 0.0 - 0.2 /100 WBC   Lactic Acid, Plasma    Collection Time: 12/19/19  3:01 PM   Result Value Ref Range    Lactate 1.9 0.5 - 2.0 mmol/L   Procalcitonin    Collection Time: 12/19/19  3:01 PM   Result Value Ref Range    Procalcitonin 0.10 0.10 - 0.25 ng/mL   Respiratory Panel, PCR - Swab, Nasopharynx    Collection Time: 12/19/19  3:32 PM   Result Value Ref Range    ADENOVIRUS, PCR Not Detected Not Detected    Coronavirus 229E Not Detected Not Detected    Coronavirus HKU1 Not Detected Not Detected    Coronavirus NL63 Not Detected Not Detected    Coronavirus OC43 Not Detected Not Detected    Human Metapneumovirus Not Detected Not Detected    Human Rhinovirus/Enterovirus Not Detected  Not Detected    Influenza B PCR Not Detected Not Detected    Parainfluenza Virus 1 Not Detected Not Detected    Parainfluenza Virus 2 Not Detected Not Detected    Parainfluenza Virus 3 Not Detected Not Detected    Parainfluenza Virus 4 Not Detected Not Detected    Bordetella pertussis pcr Not Detected Not Detected    Influenza A H1 2009 PCR Not Detected Not Detected    Chlamydophila pneumoniae PCR Not Detected Not Detected    Mycoplasma pneumo by PCR Not Detected Not Detected    Influenza A PCR Not Detected Not Detected    Influenza A H3 Not Detected Not Detected    Influenza A H1 Not Detected Not Detected    RSV, PCR Not Detected Not Detected    Bordetella parapertussis PCR Not Detected Not Detected   Urinalysis With Microscopic If Indicated (No Culture) - Urine, Clean Catch    Collection Time: 12/19/19  6:15 PM   Result Value Ref Range    Color, UA Yellow Yellow, Straw    Appearance, UA Clear Clear    pH, UA <=5.0 5.0 - 8.0    Specific Gravity, UA 1.024 1.005 - 1.030    Glucose, UA Negative Negative    Ketones, UA Negative Negative    Bilirubin, UA Negative Negative    Blood, UA Large (3+) (A) Negative    Protein, UA Negative Negative    Leuk Esterase, UA Moderate (2+) (A) Negative    Nitrite, UA Negative Negative    Urobilinogen, UA 0.2 E.U./dL 0.2 - 1.0 E.U./dL   Urinalysis, Microscopic Only - Urine, Clean Catch    Collection Time: 12/19/19  6:15 PM   Result Value Ref Range    RBC, UA Too Numerous to Count (A) None Seen, 0-2 /HPF    WBC, UA 13-20 (A) None Seen, 0-2 /HPF    Bacteria, UA None Seen None Seen /HPF    Squamous Epithelial Cells, UA 7-12 (A) None Seen, 0-2 /HPF    Hyaline Casts, UA 3-6 None Seen /LPF    Methodology Automated Microscopy        Ordered the above labs and independently reviewed the results.        RADIOLOGY  Ct Abdomen Pelvis With Contrast    Result Date: 12/19/2019  CT ABDOMEN PELVIS W CONTRAST-  CLINICAL HISTORY: Fever. Upper abdominal pain.  TECHNIQUE: Spiral CT images were obtained  through the abdomen and pelvis with IV contrast only and were reconstructed in 3 mm thick axial slices.  Radiation dose reduction techniques were utilized, including automated exposure control and exposure modulation based on body size.  COMPARISON: CT scan of the abdomen and pelvis without contrast dated 01/06/2016.  FINDINGS: The liver, spleen, and pancreas appear within normal limits. Slight soft tissue fullness of the left adrenal gland is noted and is likely due to an adenoma. This is unchanged. 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 The left kidney is atrophic and contains multiple large calculi. This includes an approximately 6 mm diameter calculus at the left ureteropelvic junction. However, there is no hydronephrosis. No calculi are present within the right kidney. There is a tiny cortical cyst in the upper pole of the right kidney. The kidneys are otherwise unremarkable. The previous CT scan demonstrated multiple mild to moderately enlarged lymph nodes in the retroperitoneum to the left of the aorta. These have resolved. Currently there is no lymphadenopathy in the retroperitoneum. A lap band device is in place in satisfactory position. The small bowel is within normal limits. There are multiple diverticuli in the sigmoid colon. There is no CT evidence of diverticulitis.      Horseshoe kidneys. Atrophic left kidney containing numerous calculi including an approximately 6 mm diameter calculus at the left ureteropelvic junction. However, there is no definite evidence of obstruction. There is no hydronephrosis. There is moderate sigmoid diverticulosis without evidence of diverticulitis. Otherwise unremarkable CT scan of the abdomen and pelvis. No acute process is identified.  This report was finalized on 12/19/2019 6:58 PM by Dr. Willis Stein M.D.      Xr Chest 1 View    Result Date: 12/19/2019  XR CHEST 1 VW-  HISTORY: Female who is 69 years-old, short of breath  TECHNIQUE: Frontal view of the chest   COMPARISON: 11/13/2019  FINDINGS: Heart size is normal. Mild prominence of vascular and interstitial markings. No focal pulmonary consolidation, pleural effusion, or pneumothorax. Gastric band procedure changes noted. Chronic right rib deformities.      No focal pulmonary consolidation. Mild prominence of vascular and interstitial markings. Follow-up as clinical indications persist.  This report was finalized on 12/19/2019 3:37 PM by Dr. Atilio Cruz M.D.      Ct Angiogram Chest    Result Date: 12/19/2019  CT ANGIOGRAM CHEST-  CLINICAL HISTORY: Chest pain. Fever.  TECHNIQUE: Spiral CT images were obtained through the chest during rapid IV injection of contrast and were reconstructed in 2 mm thick axial slices. Multiple coronal and sagittal and 3-D reconstructions were obtained.  Radiation dose reduction techniques were utilized, including automated exposure control and exposure modulation based on body size.  COMPARISON: None  FINDINGS: The main pulmonary arteries and their lobar and segmental branches are well opacified, and demonstrate no filling defects. There is no CT evidence of pulmonary embolism. The thoracic aorta was also fairly well opacified and is unremarkable except for multiple calcified atherosclerotic plaques. The heart is mildly. There is no mediastinal or hilar or axillary lymphadenopathy. Lung window images demonstrate moderate to severe emphysematous changes that are most prominent in the upper lung zones. Lung window images demonstrate no areas of focal consolidation. There is patchy ill-defined increased density in the left lower lobe that could represent pneumonia. There are no pleural effusions. Images through the upper abdomen partially show an approximately 6 mm diameter nonobstructing left renal calculus.      There is no CT evidence of pulmonary embolism. There are moderately severe emphysematous changes. There is patchy ill-defined increased density in the left lower lobe that  could represent pneumonia. Correlation with clinical findings is recommended.  This report was finalized on 12/19/2019 6:36 PM by Dr. Willis Stein M.D.        I ordered the above noted radiological studies. Reviewed by me and discussed with radiologist.  See dictation for official radiology interpretation.      PROCEDURES    Procedures      MEDICATIONS GIVEN IN ER    Medications   sodium chloride 0.9 % flush 10 mL (10 mL Intravenous Given 12/19/19 1533)   acetaminophen (TYLENOL) tablet 1,000 mg (1,000 mg Oral Given 12/19/19 1532)   morphine injection 2 mg (2 mg Intravenous Given 12/19/19 1643)   ondansetron (ZOFRAN) injection 4 mg (4 mg Intravenous Given 12/19/19 1643)   iopamidol (ISOVUE-370) 76 % injection 100 mL (95 mL Intravenous Given by Other 12/19/19 1744)   azithromycin (ZITHROMAX) 500 mg 0.9% NaCl (Add-vantage) 250 mL (0 mg Intravenous Stopped 12/19/19 2052)   doxycycline (MONODOX) capsule 100 mg (100 mg Oral Given 12/19/19 1933)         PROGRESS, DATA ANALYSIS, CONSULTS, AND MEDICAL DECISION MAKING    All labs have been independently reviewed by me.  All radiology studies have been reviewed by me and discussed with radiologist dictating the report.   EKG's independently viewed and interpreted by me.  Discussion below represents my analysis of pertinent findings related to patient's condition, differential diagnosis, treatment plan and final disposition.        ED Course as of Dec 19 2232   Thu Dec 19, 2019   2229 CBC shows a leukocytosis, unremarkable chemistry, negative lactic acid, negative troponin, and microscopic hematuria in the urinalysis.    [DP]   2230 Chest x-ray was normal    [DP]   2230 CT scan of the chest showed no PE, there were some increased scattered lung markings on the left, reticular in the left base which could represent a developing pneumonia.  CT scan of the abdomen pelvis showed no active disease.    [DP]   2230 EKG performed at 1457  Sinus rhythm at 80  Normal HI, QRS and  QT  There is significant artifact on the tracing, but I see no acute ST segment abnormalities and this is unchanged when compared to an EKG from 1115 of 2019    [DP]   2231 Patient was given IV fluids and pain medicine here.  She is now resting comfortably.    Her O2 sats are within normal limits, she is in no respiratory distress, and this may represent an early atypical pneumonia.  I have given her a dose of Zithromax here and will discharge her home with doxycycline for close follow-up with her PCP.    [DP]      ED Course User Index  [DP] Wu Chao MD           AS OF 10:32 PM VITALS:    BP - 102/52  HR - 70  TEMP - 99.1 °F (37.3 °C) (Tympanic)  O2 SATS - 97%        DIAGNOSIS  Final diagnoses:   Fever and chills   Pleurisy         DISPOSITION  Discharge           Wu Chao MD  12/19/19 2232

## 2019-12-19 NOTE — ED NOTES
Pt reports substernal chest pressure started at 1pm. Pt reports sudden onset difficulty breathing and recent swelling in bilateral ankles. Hisotry of COPD and CHF per pt.      Lyubov García, RN  12/19/19 [id number]

## 2019-12-24 LAB
BACTERIA SPEC AEROBE CULT: NORMAL
BACTERIA SPEC AEROBE CULT: NORMAL

## 2020-01-09 ENCOUNTER — LAB (OUTPATIENT)
Dept: LAB | Facility: HOSPITAL | Age: 70
End: 2020-01-09

## 2020-01-09 ENCOUNTER — TRANSCRIBE ORDERS (OUTPATIENT)
Dept: LAB | Facility: HOSPITAL | Age: 70
End: 2020-01-09

## 2020-01-09 DIAGNOSIS — Z00.00 ROUTINE GENERAL MEDICAL EXAMINATION AT A HEALTH CARE FACILITY: ICD-10-CM

## 2020-01-09 DIAGNOSIS — Z00.00 ROUTINE GENERAL MEDICAL EXAMINATION AT A HEALTH CARE FACILITY: Primary | ICD-10-CM

## 2020-01-09 LAB
ANION GAP SERPL CALCULATED.3IONS-SCNC: 11 MMOL/L (ref 5–15)
BUN BLD-MCNC: 16 MG/DL (ref 8–23)
BUN/CREAT SERPL: 20.3 (ref 7–25)
CALCIUM SPEC-SCNC: 9.2 MG/DL (ref 8.6–10.5)
CHLORIDE SERPL-SCNC: 92 MMOL/L (ref 98–107)
CO2 SERPL-SCNC: 29 MMOL/L (ref 22–29)
CREAT BLD-MCNC: 0.79 MG/DL (ref 0.57–1)
GFR SERPL CREATININE-BSD FRML MDRD: 72 ML/MIN/1.73
GLUCOSE BLD-MCNC: 114 MG/DL (ref 65–99)
POTASSIUM BLD-SCNC: 4.6 MMOL/L (ref 3.5–5.2)
SODIUM BLD-SCNC: 132 MMOL/L (ref 136–145)

## 2020-01-09 PROCEDURE — 80048 BASIC METABOLIC PNL TOTAL CA: CPT

## 2020-01-09 PROCEDURE — 36415 COLL VENOUS BLD VENIPUNCTURE: CPT

## 2020-01-14 ENCOUNTER — TELEPHONE (OUTPATIENT)
Dept: CARDIOLOGY | Facility: CLINIC | Age: 70
End: 2020-01-14

## 2020-01-14 ENCOUNTER — HOSPITAL ENCOUNTER (OUTPATIENT)
Dept: CARDIOLOGY | Facility: HOSPITAL | Age: 70
Discharge: HOME | End: 2020-01-14
Admitting: NURSE PRACTITIONER

## 2020-01-14 ENCOUNTER — HOSPITAL ENCOUNTER (OUTPATIENT)
Dept: CARDIOLOGY | Facility: HOSPITAL | Age: 70
Discharge: HOME | End: 2020-01-14

## 2020-01-14 VITALS
SYSTOLIC BLOOD PRESSURE: 95 MMHG | DIASTOLIC BLOOD PRESSURE: 68 MMHG | RESPIRATION RATE: 18 BRPM | HEART RATE: 101 BPM | OXYGEN SATURATION: 95 %

## 2020-01-14 VITALS
DIASTOLIC BLOOD PRESSURE: 73 MMHG | HEART RATE: 61 BPM | OXYGEN SATURATION: 100 % | SYSTOLIC BLOOD PRESSURE: 104 MMHG | RESPIRATION RATE: 16 BRPM

## 2020-01-14 DIAGNOSIS — I05.9 ENDOCARDITIS OF MITRAL VALVE: Primary | ICD-10-CM

## 2020-01-14 DIAGNOSIS — I05.9 ENDOCARDITIS OF MITRAL VALVE: ICD-10-CM

## 2020-01-14 LAB
BH CV ECHO MEAS - BSA(HAYCOCK): 1.7 M^2
BH CV ECHO MEAS - BSA: 1.7 M^2
BH CV ECHO MEAS - BZI_BMI: 28.2 KILOGRAMS/M^2
BH CV ECHO MEAS - BZI_METRIC_HEIGHT: 154.9 CM
BH CV ECHO MEAS - BZI_METRIC_WEIGHT: 67.6 KG

## 2020-01-14 PROCEDURE — 93321 DOPPLER ECHO F-UP/LMTD STD: CPT

## 2020-01-14 PROCEDURE — 94760 N-INVAS EAR/PLS OXIMETRY 1: CPT

## 2020-01-14 PROCEDURE — 93312 ECHO TRANSESOPHAGEAL: CPT

## 2020-01-14 PROCEDURE — 93312 ECHO TRANSESOPHAGEAL: CPT | Performed by: INTERNAL MEDICINE

## 2020-01-14 PROCEDURE — 93325 DOPPLER ECHO COLOR FLOW MAPG: CPT | Performed by: INTERNAL MEDICINE

## 2020-01-14 PROCEDURE — 93321 DOPPLER ECHO F-UP/LMTD STD: CPT | Performed by: INTERNAL MEDICINE

## 2020-01-14 PROCEDURE — 25010000002 FENTANYL CITRATE (PF) 100 MCG/2ML SOLUTION: Performed by: INTERNAL MEDICINE

## 2020-01-14 PROCEDURE — 93325 DOPPLER ECHO COLOR FLOW MAPG: CPT

## 2020-01-14 PROCEDURE — 25010000002 MIDAZOLAM PER 1 MG: Performed by: INTERNAL MEDICINE

## 2020-01-14 PROCEDURE — 99152 MOD SED SAME PHYS/QHP 5/>YRS: CPT

## 2020-01-14 RX ORDER — FAMOTIDINE 20 MG/1
20 TABLET, FILM COATED ORAL
Qty: 180 TABLET | Refills: 3 | Status: SHIPPED | OUTPATIENT
Start: 2020-01-14 | End: 2022-03-30 | Stop reason: SDUPTHER

## 2020-01-14 RX ORDER — POTASSIUM CHLORIDE 750 MG/1
10 TABLET, FILM COATED, EXTENDED RELEASE ORAL 2 TIMES DAILY
Qty: 180 TABLET | Refills: 3 | Status: SHIPPED | OUTPATIENT
Start: 2020-01-14 | End: 2020-03-02 | Stop reason: SDUPTHER

## 2020-01-14 RX ORDER — FENTANYL CITRATE 50 UG/ML
INJECTION, SOLUTION INTRAMUSCULAR; INTRAVENOUS
Status: COMPLETED | OUTPATIENT
Start: 2020-01-14 | End: 2020-01-14

## 2020-01-14 RX ORDER — FUROSEMIDE 20 MG/1
20 TABLET ORAL 2 TIMES DAILY
Qty: 180 TABLET | Refills: 3 | Status: SHIPPED | OUTPATIENT
Start: 2020-01-14 | End: 2020-01-17 | Stop reason: SDUPTHER

## 2020-01-14 RX ORDER — MIDAZOLAM HYDROCHLORIDE 1 MG/ML
INJECTION INTRAMUSCULAR; INTRAVENOUS
Status: COMPLETED | OUTPATIENT
Start: 2020-01-14 | End: 2020-01-14

## 2020-01-14 RX ORDER — POTASSIUM CHLORIDE 750 MG/1
10 TABLET, FILM COATED, EXTENDED RELEASE ORAL 2 TIMES DAILY
Qty: 180 TABLET | Refills: 3 | Status: SHIPPED | OUTPATIENT
Start: 2020-01-14 | End: 2020-01-14 | Stop reason: SDUPTHER

## 2020-01-14 RX ORDER — FUROSEMIDE 20 MG/1
20 TABLET ORAL 2 TIMES DAILY
Qty: 180 TABLET | Refills: 3 | Status: SHIPPED | OUTPATIENT
Start: 2020-01-14 | End: 2020-01-14 | Stop reason: SDUPTHER

## 2020-01-14 RX ORDER — FAMOTIDINE 20 MG/1
20 TABLET, FILM COATED ORAL
Qty: 180 TABLET | Refills: 3 | Status: SHIPPED | OUTPATIENT
Start: 2020-01-14 | End: 2020-01-14 | Stop reason: SDUPTHER

## 2020-01-14 RX ADMIN — MIDAZOLAM 1 MG: 1 INJECTION INTRAMUSCULAR; INTRAVENOUS at 08:25

## 2020-01-14 RX ADMIN — MIDAZOLAM 1 MG: 1 INJECTION INTRAMUSCULAR; INTRAVENOUS at 08:30

## 2020-01-14 RX ADMIN — MIDAZOLAM 1 MG: 1 INJECTION INTRAMUSCULAR; INTRAVENOUS at 08:34

## 2020-01-14 RX ADMIN — FENTANYL CITRATE 25 MCG: 50 INJECTION INTRAMUSCULAR; INTRAVENOUS at 08:25

## 2020-01-14 RX ADMIN — FENTANYL CITRATE 25 MCG: 50 INJECTION INTRAMUSCULAR; INTRAVENOUS at 08:30

## 2020-01-14 NOTE — PROGRESS NOTES
"0910 Patient arrived s/p REDD. Vital signs obtained.  Unable to perform O2 Sat at present since hands very cold.  She stated that \"my hands stay cold and discolored all of the time\".  Placed on monitor.  She reports that she is using O2 at 2l/m at home, so O2 placed on patient.    0945 Discharged via wheelchair with .  She plans to contact PCP regarding portable tank for O2 at home and for travel.  "

## 2020-01-14 NOTE — PATIENT INSTRUCTIONS
Transesophageal Echocardiogram  Transesophageal echocardiogram (REDD) is a test that uses sound waves to take pictures of your heart. REDD is done by passing a flexible tube down the esophagus. The esophagus is the tube that carries food from the throat to the stomach. The pictures give detailed images of your heart. This can help your doctor see if there are problems with your heart.  What happens before the procedure?  Staying hydrated  Follow instructions from your doctor about hydration, which may include:  · Up to 3 hours before the procedure - you may continue to drink clear liquids, such as:  ? Water.  ? Clear fruit juice.  ? Black coffee.  ? Plain tea.    Eating and drinking  Follow instructions from your doctor about eating and drinking, which may include:  · 8 hours before the procedure - stop eating heavy meals or foods such as meat, fried foods, or fatty foods.  · 6 hours before the procedure - stop eating light meals or foods, such as toast or cereal.  · 6 hours before the procedure - stop drinking milk or drinks that contain milk.  · 3 hours before the procedure - stop drinking clear liquids.  General instructions  · You will need to take out any dentures or retainers.  · Plan to have someone take you home from the hospital or clinic.  · If you will be going home right after the procedure, plan to have someone with you for 24 hours.  · Ask your doctor about:  ? Changing or stopping your normal medicines. This is important if you take diabetes medicines or blood thinners.  ? Taking over-the-counter medicines, vitamins, herbs, and supplements.  ? Taking medicines such as aspirin and ibuprofen. These medicines can thin your blood. Do not take these medicines unless your doctor tells you to take them.  What happens during the procedure?  · To lower your risk of infection, your doctors will wash or clean their hands.  · An IV will be put into one of your veins.  · You will be given a medicine to help you  relax (sedative).  · A medicine may be sprayed or gargled. This numbs the back of your throat.  · Your blood pressure, heart rate, and breathing will be watched.  · You may be asked to lay on your left side.  · A bite block will be placed in your mouth. This keeps you from biting the tube.  · The tip of the REDD probe will be placed into the back of your mouth.  · You will be asked to swallow.  · Your doctor will take pictures of your heart.  · The probe and bite block will be taken out.  The procedure may vary among doctors and hospitals.  What happens after the procedure?    · Your blood pressure, heart rate, breathing rate, and blood oxygen level will be watched until the medicines you were given have worn off.  · When you first wake up, your throat may feel sore and numb. This will get better over time. You will not be allowed to eat or drink until the numbness has gone away.  · Do not drive for 24 hours if you were given a medicine to help you relax.  Summary  · REDD is a test that uses sound waves to take pictures of your heart.  · You will be given a medicine to help you relax.  · Do not drive for 24 hours if you were given a medicine to help you relax.  This information is not intended to replace advice given to you by your health care provider. Make sure you discuss any questions you have with your health care provider.  Document Released: 10/15/2010 Document Revised: 09/06/2019 Document Reviewed: 03/21/2018  [employer] Interactive Patient Education © 2019 [employer]    Transesophageal Echocardiogram  Transesophageal echocardiogram (REDD) is a test that uses sound waves to take pictures of your heart. REDD is done by passing a flexible tube down the esophagus. The esophagus is the tube that carries food from the throat to the stomach. The pictures give detailed images of your heart. This can help your doctor see if there are problems with your heart.  What happens before the procedure?  Staying  hydrated  Follow instructions from your doctor about hydration, which may include:  · Up to 3 hours before the procedure - you may continue to drink clear liquids, such as:  ? Water.  ? Clear fruit juice.  ? Black coffee.  ? Plain tea.    Eating and drinking  Follow instructions from your doctor about eating and drinking, which may include:  · 8 hours before the procedure - stop eating heavy meals or foods such as meat, fried foods, or fatty foods.  · 6 hours before the procedure - stop eating light meals or foods, such as toast or cereal.  · 6 hours before the procedure - stop drinking milk or drinks that contain milk.  · 3 hours before the procedure - stop drinking clear liquids.  General instructions  · You will need to take out any dentures or retainers.  · Plan to have someone take you home from the hospital or clinic.  · If you will be going home right after the procedure, plan to have someone with you for 24 hours.  · Ask your doctor about:  ? Changing or stopping your normal medicines. This is important if you take diabetes medicines or blood thinners.  ? Taking over-the-counter medicines, vitamins, herbs, and supplements.  ? Taking medicines such as aspirin and ibuprofen. These medicines can thin your blood. Do not take these medicines unless your doctor tells you to take them.  What happens during the procedure?  · To lower your risk of infection, your doctors will wash or clean their hands.  · An IV will be put into one of your veins.  · You will be given a medicine to help you relax (sedative).  · A medicine may be sprayed or gargled. This numbs the back of your throat.  · Your blood pressure, heart rate, and breathing will be watched.  · You may be asked to lay on your left side.  · A bite block will be placed in your mouth. This keeps you from biting the tube.  · The tip of the REDD probe will be placed into the back of your mouth.  · You will be asked to swallow.  · Your doctor will take pictures of  your heart.  · The probe and bite block will be taken out.  The procedure may vary among doctors and hospitals.  What happens after the procedure?    · Your blood pressure, heart rate, breathing rate, and blood oxygen level will be watched until the medicines you were given have worn off.  · When you first wake up, your throat may feel sore and numb. This will get better over time. You will not be allowed to eat or drink until the numbness has gone away.  · Do not drive for 24 hours if you were given a medicine to help you relax.  Summary  · REDD is a test that uses sound waves to take pictures of your heart.  · You will be given a medicine to help you relax.  · Do not drive for 24 hours if you were given a medicine to help you relax.  This information is not intended to replace advice given to you by your health care provider. Make sure you discuss any questions you have with your health care provider.  Document Released: 10/15/2010 Document Revised: 09/06/2019 Document Reviewed: 03/21/2018  [employer] Interactive Patient Education © 2019 [employer]    Moderate Conscious Sedation, Adult, Care After  These instructions provide you with information about caring for yourself after your procedure. Your health care provider may also give you more specific instructions. Your treatment has been planned according to current medical practices, but problems sometimes occur. Call your health care provider if you have any problems or questions after your procedure.  What can I expect after the procedure?  After your procedure, it is common:  · To feel sleepy for several hours.  · To feel clumsy and have poor balance for several hours.  · To have poor judgment for several hours.  · To vomit if you eat too soon.  Follow these instructions at home:  For at least 24 hours after the procedure:    · Do not:  ? Participate in activities where you could fall or become injured.  ? Drive.  ? Use heavy machinery.  ? Drink  alcohol.  ? Take sleeping pills or medicines that cause drowsiness.  ? Make important decisions or sign legal documents.  ? Take care of children on your own.  · Rest.  Eating and drinking  · Follow the diet recommended by your health care provider.  · If you vomit:  ? Drink water, juice, or soup when you can drink without vomiting.  ? Make sure you have little or no nausea before eating solid foods.  General instructions  · Have a responsible adult stay with you until you are awake and alert.  · Take over-the-counter and prescription medicines only as told by your health care provider.  · If you smoke, do not smoke without supervision.  · Keep all follow-up visits as told by your health care provider. This is important.  Contact a health care provider if:  · You keep feeling nauseous or you keep vomiting.  · You feel light-headed.  · You develop a rash.  · You have a fever.  Get help right away if:  · You have trouble breathing.  This information is not intended to replace advice given to you by your health care provider. Make sure you discuss any questions you have with your health care provider.  Document Released: 10/08/2014 Document Revised: 05/22/2017 Document Reviewed: 04/08/2017  [employer] Interactive Patient Education © 2019 [employer]

## 2020-01-14 NOTE — TELEPHONE ENCOUNTER
S/w patient on REDD results. Needs heart cath for mitral assessment and preop prior to CT surgery appt.  Dr. Tinajero ordered cath. She verbalized understanding and needs refills.

## 2020-01-17 ENCOUNTER — OFFICE VISIT (OUTPATIENT)
Dept: CARDIOLOGY | Facility: CLINIC | Age: 70
End: 2020-01-17

## 2020-01-17 VITALS
BODY MASS INDEX: 29.64 KG/M2 | DIASTOLIC BLOOD PRESSURE: 68 MMHG | SYSTOLIC BLOOD PRESSURE: 112 MMHG | HEART RATE: 64 BPM | HEIGHT: 61 IN | WEIGHT: 157 LBS

## 2020-01-17 DIAGNOSIS — I48.19 OTHER PERSISTENT ATRIAL FIBRILLATION (HCC): ICD-10-CM

## 2020-01-17 DIAGNOSIS — J96.21 ACUTE ON CHRONIC RESPIRATORY FAILURE WITH HYPOXIA (HCC): ICD-10-CM

## 2020-01-17 DIAGNOSIS — I39 ENDOCARDITIS DUE TO OTHER ORGANISM, UNSPECIFIED CHRONICITY: Primary | ICD-10-CM

## 2020-01-17 DIAGNOSIS — J43.8 OTHER EMPHYSEMA (HCC): ICD-10-CM

## 2020-01-17 PROBLEM — I33.0 INFECTIVE ENDOCARDITIS: Status: ACTIVE | Noted: 2020-01-17

## 2020-01-17 PROCEDURE — 93000 ELECTROCARDIOGRAM COMPLETE: CPT | Performed by: INTERNAL MEDICINE

## 2020-01-17 PROCEDURE — 99214 OFFICE O/P EST MOD 30 MIN: CPT | Performed by: INTERNAL MEDICINE

## 2020-01-17 RX ORDER — FUROSEMIDE 40 MG/1
40 TABLET ORAL 2 TIMES DAILY
Qty: 180 TABLET | Refills: 3 | Status: SHIPPED | OUTPATIENT
Start: 2020-01-17 | End: 2020-03-02 | Stop reason: SDUPTHER

## 2020-01-17 NOTE — PROGRESS NOTES
PATIENTINFORMATION    Date of Office Visit: 20  Encounter Provider: Zenia Tinajero MD  Place of Service: [facility] CARDIOLOGY  Patient Name: Shani Phillip  : 1950    Subjective:         Patient ID: Shani Phillip is a 69 y.o. female.      History of Present Illness    This is a lady who was on a cruise ship when she developed a febrile illness.  She was taken to [facility] where she was diagnosed with endocarditis.  She left the hospital to return home to [place] I first saw her in 2019.  She has a history of COPD on oxygen, former smoker, restless leg syndrome and chronic pain.  She had a transthoracic echocardiogram suggestive of vegetation on the mitral valve annulus.  She had a transesophageal echo which delineated this more clearly.  Dr. Briones saw her in consultation.  The plan was to treat her with IV antibiotics which occurred.  She came in for a repeat transesophageal echo on 2020 and this was pretty much unchanged.  The mobile element attached to the posterior mitral valve annulus annulus was unchanged.    She comes in today for follow-up.  She is wheezing, tachypneic and hypoxic.  Off oxygen her oxygen sats were 38%.  With 4 L nasal cannula her oxygen sat was 72%.  She had a repeat echocardiogram in May 2018 which put her ejection fraction at 45 to 50% with grade 1A diastolic dysfunction, moderate aortic stenosis, moderate aortic regurgitation, mild tricuspid regurgitation with a normal right ventricular systolic pressure.  She had an echocardiogram in May 2018.  She had an echocardiogram in May 2018.    Review of Systems   Constitution: Positive for malaise/fatigue and weight gain. Negative for fever and weight loss.   HENT: Negative for ear pain, hearing loss, nosebleeds and sore throat.    Eyes: Positive for blurred vision. Negative for double vision, pain, vision loss in left eye and vision loss in right eye.    "  Cardiovascular: Positive for leg swelling.        See history of present illness.   Respiratory: Positive for shortness of breath and wheezing. Negative for cough, sleep disturbances due to breathing and snoring.    Endocrine: Negative for cold intolerance, heat intolerance and polyuria.   Skin: Negative for itching, poor wound healing and rash.   Musculoskeletal: Negative for joint pain, joint swelling and myalgias.   Gastrointestinal: Negative for abdominal pain, diarrhea, hematochezia, nausea and vomiting.   Genitourinary: Negative for hematuria and hesitancy.   Neurological: Negative for numbness, paresthesias and seizures.   Psychiatric/Behavioral: Positive for depression. The patient is not nervous/anxious.            ECG 12 Lead  Date/Time: 1/17/2020 2:12 PM  Performed by: Zenia Tinajero MD  Authorized by: Zenia Tinajero MD   Comparison: compared with previous ECG from 12/11/2019  Similar to previous ECG  Rhythm: sinus rhythm  BPM: 75  Conduction: conduction normal  ST Segments: ST segments normal  T Waves: T waves normal    Clinical impression: normal ECG               Objective:     /68 (BP Location: Left arm)   Pulse 64   Ht 154.9 cm (61\")   Wt 71.2 kg (157 lb)   BMI 29.66 kg/m²  Body mass index is 29.66 kg/m².     Physical Exam   Constitutional: She appears well-developed.   HENT:   Head: Normocephalic and atraumatic.   Eyes: Pupils are equal, round, and reactive to light. Conjunctivae and lids are normal. Lids are everted and swept, no foreign bodies found.   Neck: Normal range of motion. No JVD present. Carotid bruit is not present. No tracheal deviation present. No thyroid mass present.   Cardiovascular: Normal rate, regular rhythm and normal heart sounds.   Pulses:       Dorsalis pedis pulses are 2+ on the right side, and 2+ on the left side.   Pulmonary/Chest: Effort normal and breath sounds normal.   Abdominal: Normal appearance and bowel sounds are normal.   Musculoskeletal: " Normal range of motion.   Neurological: She is alert. She has normal strength.   Skin: Skin is warm, dry and intact.   Psychiatric: She has a normal mood and affect. Her behavior is normal.   Vitals reviewed.          Assessment/Plan:       1.  Atrial fibrillation.  On Eliquis. Cardioversion 11/15/19. Currently in NSR.  2.  Mitral valve vegetation.  S/P antibiotics. Repeat REDD in 1/20 shows no change to the mobile mass on the MV annulus. Will ask her to see Dr Briones again.  3. Lower extremity edema. Increase lasix to 40mg BID.   4.  COPD exacerbation. Not wheezing when I saw her. O2 sat in the upper 80's with finger nail polish on. She needs portable O2.     Orders Placed This Encounter   Procedures   • Ambulatory Referral to Cardiothoracic Surgery     Referral Priority:   Routine     Referral Type:   Consultation     Referral Reason:   Specialty Services Required     Referred to Provider:   Hayes Briones MD     Requested Specialty:   Cardiothoracic Surgery     Number of Visits Requested:   1   • ECG 12 Lead     This order was created via procedure documentation        Discharge Medications           Accurate as of January 17, 2020  2:13 PM. If you have any questions, ask your nurse or doctor.               Changes to Medications      Instructions Start Date   furosemide 40 MG tablet  Commonly known as:  LASIX  What changed:    · medication strength  · how much to take  Changed by:  Zenia Tinajero MD   40 mg, Oral, 2 Times Daily         Continue These Medications      Instructions Start Date   apixaban 5 MG tablet tablet  Commonly known as:  ELIQUIS   5 mg, Oral, Every 12 Hours Scheduled      budesonide 0.5 MG/2ML nebulizer solution  Commonly known as:  PULMICORT   0.5 mg, Nebulization, Daily - RT      dilTIAZem  MG 24 hr capsule  Commonly known as:  CARDIZEM CD   120 mg, Oral, Daily      famotidine 20 MG tablet  Commonly known as:  PEPCID   20 mg, Oral, 2 Times Daily Before Meals       HYDROcodone-acetaminophen 5-325 MG per tablet  Commonly known as:  NORCO   1 tablet, Oral, Every 6 Hours PRN      ipratropium 0.02 % nebulizer solution  Commonly known as:  ATROVENT   500 mcg, Nebulization, 4 Times Daily - RT      metoprolol tartrate 100 MG tablet  Commonly known as:  LOPRESSOR   100 mg, Oral, 2 Times Daily      potassium chloride 10 MEQ CR tablet  Commonly known as:  K-DUR   10 mEq, Oral, 2 Times Daily      rOPINIRole 2 MG tablet  Commonly known as:  REQUIP   2 mg, Oral, 2 Times Daily      TROSPIUM CHLORIDE PO   20 mg, Oral, 2 Times Daily         Stop These Medications    doxycycline 100 MG capsule  Commonly known as:  MONODOX  Stopped by:  Zenia Tinajero MD     predniSONE 5 MG tablet  Commonly known as:  DELTASONE  Stopped by:  MD Zenia Willis MD  01/17/20  2:13 PM

## 2020-01-17 NOTE — H&P (VIEW-ONLY)
PATIENTINFORMATION    Date of Office Visit: 20  Encounter Provider: Zenia Tinajero MD  Place of Service: [facility] CARDIOLOGY  Patient Name: Shani Phillip  : 1950    Subjective:         Patient ID: Shani Phillip is a 69 y.o. female.      History of Present Illness    This is a lady who was on a cruise ship when she developed a febrile illness.  She was taken to [facility] where she was diagnosed with endocarditis.  She left the hospital to return home to [place] I first saw her in 2019.  She has a history of COPD on oxygen, former smoker, restless leg syndrome and chronic pain.  She had a transthoracic echocardiogram suggestive of vegetation on the mitral valve annulus.  She had a transesophageal echo which delineated this more clearly.  Dr. Briones saw her in consultation.  The plan was to treat her with IV antibiotics which occurred.  She came in for a repeat transesophageal echo on 2020 and this was pretty much unchanged.  The mobile element attached to the posterior mitral valve annulus annulus was unchanged.    She comes in today for follow-up.  She is wheezing, tachypneic and hypoxic.  Off oxygen her oxygen sats were 38%.  With 4 L nasal cannula her oxygen sat was 72%.  She had a repeat echocardiogram in May 2018 which put her ejection fraction at 45 to 50% with grade 1A diastolic dysfunction, moderate aortic stenosis, moderate aortic regurgitation, mild tricuspid regurgitation with a normal right ventricular systolic pressure.  She had an echocardiogram in May 2018.  She had an echocardiogram in May 2018.    Review of Systems   Constitution: Positive for malaise/fatigue and weight gain. Negative for fever and weight loss.   HENT: Negative for ear pain, hearing loss, nosebleeds and sore throat.    Eyes: Positive for blurred vision. Negative for double vision, pain, vision loss in left eye and vision loss in right eye.    "  Cardiovascular: Positive for leg swelling.        See history of present illness.   Respiratory: Positive for shortness of breath and wheezing. Negative for cough, sleep disturbances due to breathing and snoring.    Endocrine: Negative for cold intolerance, heat intolerance and polyuria.   Skin: Negative for itching, poor wound healing and rash.   Musculoskeletal: Negative for joint pain, joint swelling and myalgias.   Gastrointestinal: Negative for abdominal pain, diarrhea, hematochezia, nausea and vomiting.   Genitourinary: Negative for hematuria and hesitancy.   Neurological: Negative for numbness, paresthesias and seizures.   Psychiatric/Behavioral: Positive for depression. The patient is not nervous/anxious.            ECG 12 Lead  Date/Time: 1/17/2020 2:12 PM  Performed by: Zenia Tinajero MD  Authorized by: Zenia Tinajero MD   Comparison: compared with previous ECG from 12/11/2019  Similar to previous ECG  Rhythm: sinus rhythm  BPM: 75  Conduction: conduction normal  ST Segments: ST segments normal  T Waves: T waves normal    Clinical impression: normal ECG               Objective:     /68 (BP Location: Left arm)   Pulse 64   Ht 154.9 cm (61\")   Wt 71.2 kg (157 lb)   BMI 29.66 kg/m²  Body mass index is 29.66 kg/m².     Physical Exam   Constitutional: She appears well-developed.   HENT:   Head: Normocephalic and atraumatic.   Eyes: Pupils are equal, round, and reactive to light. Conjunctivae and lids are normal. Lids are everted and swept, no foreign bodies found.   Neck: Normal range of motion. No JVD present. Carotid bruit is not present. No tracheal deviation present. No thyroid mass present.   Cardiovascular: Normal rate, regular rhythm and normal heart sounds.   Pulses:       Dorsalis pedis pulses are 2+ on the right side, and 2+ on the left side.   Pulmonary/Chest: Effort normal and breath sounds normal.   Abdominal: Normal appearance and bowel sounds are normal.   Musculoskeletal: " Normal range of motion.   Neurological: She is alert. She has normal strength.   Skin: Skin is warm, dry and intact.   Psychiatric: She has a normal mood and affect. Her behavior is normal.   Vitals reviewed.          Assessment/Plan:       1.  Atrial fibrillation.  On Eliquis. Cardioversion 11/15/19. Currently in NSR.  2.  Mitral valve vegetation.  S/P antibiotics. Repeat REDD in 1/20 shows no change to the mobile mass on the MV annulus. Will ask her to see Dr Briones again.  3. Lower extremity edema. Increase lasix to 40mg BID.   4.  COPD exacerbation. Not wheezing when I saw her. O2 sat in the upper 80's with finger nail polish on. She needs portable O2.     Orders Placed This Encounter   Procedures   • Ambulatory Referral to Cardiothoracic Surgery     Referral Priority:   Routine     Referral Type:   Consultation     Referral Reason:   Specialty Services Required     Referred to Provider:   Hayes Briones MD     Requested Specialty:   Cardiothoracic Surgery     Number of Visits Requested:   1   • ECG 12 Lead     This order was created via procedure documentation        Discharge Medications           Accurate as of January 17, 2020  2:13 PM. If you have any questions, ask your nurse or doctor.               Changes to Medications      Instructions Start Date   furosemide 40 MG tablet  Commonly known as:  LASIX  What changed:    · medication strength  · how much to take  Changed by:  Zenia Tinajero MD   40 mg, Oral, 2 Times Daily         Continue These Medications      Instructions Start Date   apixaban 5 MG tablet tablet  Commonly known as:  ELIQUIS   5 mg, Oral, Every 12 Hours Scheduled      budesonide 0.5 MG/2ML nebulizer solution  Commonly known as:  PULMICORT   0.5 mg, Nebulization, Daily - RT      dilTIAZem  MG 24 hr capsule  Commonly known as:  CARDIZEM CD   120 mg, Oral, Daily      famotidine 20 MG tablet  Commonly known as:  PEPCID   20 mg, Oral, 2 Times Daily Before Meals       HYDROcodone-acetaminophen 5-325 MG per tablet  Commonly known as:  NORCO   1 tablet, Oral, Every 6 Hours PRN      ipratropium 0.02 % nebulizer solution  Commonly known as:  ATROVENT   500 mcg, Nebulization, 4 Times Daily - RT      metoprolol tartrate 100 MG tablet  Commonly known as:  LOPRESSOR   100 mg, Oral, 2 Times Daily      potassium chloride 10 MEQ CR tablet  Commonly known as:  K-DUR   10 mEq, Oral, 2 Times Daily      rOPINIRole 2 MG tablet  Commonly known as:  REQUIP   2 mg, Oral, 2 Times Daily      TROSPIUM CHLORIDE PO   20 mg, Oral, 2 Times Daily         Stop These Medications    doxycycline 100 MG capsule  Commonly known as:  MONODOX  Stopped by:  Zenia Tinajero MD     predniSONE 5 MG tablet  Commonly known as:  DELTASONE  Stopped by:  MD Zenia Willis MD  01/17/20  2:13 PM

## 2020-01-20 ENCOUNTER — TRANSCRIBE ORDERS (OUTPATIENT)
Dept: CARDIOLOGY | Facility: CLINIC | Age: 70
End: 2020-01-20

## 2020-01-20 ENCOUNTER — TELEPHONE (OUTPATIENT)
Dept: CARDIOLOGY | Facility: CLINIC | Age: 70
End: 2020-01-20

## 2020-01-20 DIAGNOSIS — I05.9 ENDOCARDITIS OF MITRAL VALVE: ICD-10-CM

## 2020-01-20 DIAGNOSIS — Z13.6 SCREENING FOR ISCHEMIC HEART DISEASE: ICD-10-CM

## 2020-01-20 DIAGNOSIS — Z01.810 PRE-OPERATIVE CARDIOVASCULAR EXAMINATION: Primary | ICD-10-CM

## 2020-01-20 RX ORDER — APIXABAN 5 MG/1
TABLET, FILM COATED ORAL
Qty: 60 TABLET | Refills: 0 | Status: SHIPPED | OUTPATIENT
Start: 2020-01-20 | End: 2020-02-18

## 2020-01-20 NOTE — TELEPHONE ENCOUNTER
Generally it says it in the note, but if its not clear or not documented then a problem list would be fine.   Thanks

## 2020-01-20 NOTE — TELEPHONE ENCOUNTER
How do I figure what they are on it for? Or would a picture of their problem list be ok? Thank you

## 2020-01-20 NOTE — TELEPHONE ENCOUNTER
Pt is scheduled for a hearth cath with you on 1/23/2020. She is on eliquis. How long does she need to hold her eliquis? Thank you

## 2020-01-20 NOTE — TELEPHONE ENCOUNTER
48 hours. It is helpful if when you send these messages about cath you say why the patient is on the blood thinner (diagnosis).

## 2020-01-21 ENCOUNTER — LAB (OUTPATIENT)
Dept: LAB | Facility: HOSPITAL | Age: 70
End: 2020-01-21

## 2020-01-21 DIAGNOSIS — Z13.6 SCREENING FOR ISCHEMIC HEART DISEASE: ICD-10-CM

## 2020-01-21 DIAGNOSIS — I05.9 ENDOCARDITIS OF MITRAL VALVE: ICD-10-CM

## 2020-01-21 DIAGNOSIS — Z01.810 PRE-OPERATIVE CARDIOVASCULAR EXAMINATION: ICD-10-CM

## 2020-01-21 LAB
ANION GAP SERPL CALCULATED.3IONS-SCNC: 11.4 MMOL/L (ref 5–15)
BASOPHILS # BLD AUTO: 0.03 10*3/MM3 (ref 0–0.2)
BASOPHILS NFR BLD AUTO: 0.4 % (ref 0–1.5)
BUN BLD-MCNC: 14 MG/DL (ref 8–23)
BUN/CREAT SERPL: 20.3 (ref 7–25)
CALCIUM SPEC-SCNC: 9.3 MG/DL (ref 8.6–10.5)
CHLORIDE SERPL-SCNC: 90 MMOL/L (ref 98–107)
CO2 SERPL-SCNC: 31.6 MMOL/L (ref 22–29)
CREAT BLD-MCNC: 0.69 MG/DL (ref 0.57–1)
DEPRECATED RDW RBC AUTO: 40.8 FL (ref 37–54)
EOSINOPHIL # BLD AUTO: 0.14 10*3/MM3 (ref 0–0.4)
EOSINOPHIL NFR BLD AUTO: 1.7 % (ref 0.3–6.2)
ERYTHROCYTE [DISTWIDTH] IN BLOOD BY AUTOMATED COUNT: 13.9 % (ref 12.3–15.4)
GFR SERPL CREATININE-BSD FRML MDRD: 84 ML/MIN/1.73
GLUCOSE BLD-MCNC: 93 MG/DL (ref 65–99)
HCT VFR BLD AUTO: 31.8 % (ref 34–46.6)
HGB BLD-MCNC: 9.9 G/DL (ref 12–15.9)
IMM GRANULOCYTES # BLD AUTO: 0.03 10*3/MM3 (ref 0–0.05)
IMM GRANULOCYTES NFR BLD AUTO: 0.4 % (ref 0–0.5)
LYMPHOCYTES # BLD AUTO: 1.57 10*3/MM3 (ref 0.7–3.1)
LYMPHOCYTES NFR BLD AUTO: 18.8 % (ref 19.6–45.3)
MCH RBC QN AUTO: 25.4 PG (ref 26.6–33)
MCHC RBC AUTO-ENTMCNC: 31.1 G/DL (ref 31.5–35.7)
MCV RBC AUTO: 81.7 FL (ref 79–97)
MONOCYTES # BLD AUTO: 0.63 10*3/MM3 (ref 0.1–0.9)
MONOCYTES NFR BLD AUTO: 7.6 % (ref 5–12)
NEUTROPHILS # BLD AUTO: 5.93 10*3/MM3 (ref 1.7–7)
NEUTROPHILS NFR BLD AUTO: 71.1 % (ref 42.7–76)
NRBC BLD AUTO-RTO: 0 /100 WBC (ref 0–0.2)
PLATELET # BLD AUTO: 339 10*3/MM3 (ref 140–450)
PMV BLD AUTO: 9.1 FL (ref 6–12)
POTASSIUM BLD-SCNC: 4.3 MMOL/L (ref 3.5–5.2)
RBC # BLD AUTO: 3.89 10*6/MM3 (ref 3.77–5.28)
SODIUM BLD-SCNC: 133 MMOL/L (ref 136–145)
WBC NRBC COR # BLD: 8.33 10*3/MM3 (ref 3.4–10.8)

## 2020-01-21 PROCEDURE — 36415 COLL VENOUS BLD VENIPUNCTURE: CPT

## 2020-01-21 PROCEDURE — 85025 COMPLETE CBC W/AUTO DIFF WBC: CPT

## 2020-01-21 PROCEDURE — 80048 BASIC METABOLIC PNL TOTAL CA: CPT

## 2020-01-23 ENCOUNTER — HOSPITAL ENCOUNTER (OUTPATIENT)
Facility: HOSPITAL | Age: 70
Setting detail: HOSPITAL OUTPATIENT SURGERY
Discharge: HOME | End: 2020-01-23
Attending: INTERNAL MEDICINE | Admitting: INTERNAL MEDICINE

## 2020-01-23 VITALS
DIASTOLIC BLOOD PRESSURE: 62 MMHG | BODY MASS INDEX: 28.32 KG/M2 | WEIGHT: 150 LBS | SYSTOLIC BLOOD PRESSURE: 115 MMHG | HEART RATE: 59 BPM | HEIGHT: 61 IN | OXYGEN SATURATION: 95 % | RESPIRATION RATE: 18 BRPM | TEMPERATURE: 97.6 F

## 2020-01-23 DIAGNOSIS — I05.9 ENDOCARDITIS OF MITRAL VALVE: ICD-10-CM

## 2020-01-23 PROCEDURE — 25010000002 HEPARIN (PORCINE) PER 1000 UNITS: Performed by: INTERNAL MEDICINE

## 2020-01-23 PROCEDURE — 93458 L HRT ARTERY/VENTRICLE ANGIO: CPT | Performed by: INTERNAL MEDICINE

## 2020-01-23 PROCEDURE — C1769 GUIDE WIRE: HCPCS | Performed by: INTERNAL MEDICINE

## 2020-01-23 PROCEDURE — 25010000002 MIDAZOLAM PER 1 MG: Performed by: INTERNAL MEDICINE

## 2020-01-23 PROCEDURE — 99152 MOD SED SAME PHYS/QHP 5/>YRS: CPT | Performed by: INTERNAL MEDICINE

## 2020-01-23 PROCEDURE — 0 IOPAMIDOL PER 1 ML: Performed by: INTERNAL MEDICINE

## 2020-01-23 PROCEDURE — C1894 INTRO/SHEATH, NON-LASER: HCPCS | Performed by: INTERNAL MEDICINE

## 2020-01-23 PROCEDURE — 25010000002 FENTANYL CITRATE (PF) 100 MCG/2ML SOLUTION: Performed by: INTERNAL MEDICINE

## 2020-01-23 RX ORDER — LIDOCAINE HYDROCHLORIDE 10 MG/ML
0.1 INJECTION, SOLUTION EPIDURAL; INFILTRATION; INTRACAUDAL; PERINEURAL ONCE AS NEEDED
Status: DISCONTINUED | OUTPATIENT
Start: 2020-01-23 | End: 2020-01-23 | Stop reason: HOSPADM

## 2020-01-23 RX ORDER — LIDOCAINE HYDROCHLORIDE 20 MG/ML
INJECTION, SOLUTION INFILTRATION; PERINEURAL AS NEEDED
Status: DISCONTINUED | OUTPATIENT
Start: 2020-01-23 | End: 2020-01-23 | Stop reason: HOSPADM

## 2020-01-23 RX ORDER — SODIUM CHLORIDE 0.9 % (FLUSH) 0.9 %
3 SYRINGE (ML) INJECTION EVERY 12 HOURS SCHEDULED
Status: DISCONTINUED | OUTPATIENT
Start: 2020-01-23 | End: 2020-01-23 | Stop reason: HOSPADM

## 2020-01-23 RX ORDER — SODIUM CHLORIDE 9 MG/ML
75 INJECTION, SOLUTION INTRAVENOUS CONTINUOUS
Status: DISCONTINUED | OUTPATIENT
Start: 2020-01-23 | End: 2020-01-23 | Stop reason: HOSPADM

## 2020-01-23 RX ORDER — FENTANYL CITRATE 50 UG/ML
INJECTION, SOLUTION INTRAMUSCULAR; INTRAVENOUS AS NEEDED
Status: DISCONTINUED | OUTPATIENT
Start: 2020-01-23 | End: 2020-01-23 | Stop reason: HOSPADM

## 2020-01-23 RX ORDER — MIDAZOLAM HYDROCHLORIDE 1 MG/ML
INJECTION INTRAMUSCULAR; INTRAVENOUS AS NEEDED
Status: DISCONTINUED | OUTPATIENT
Start: 2020-01-23 | End: 2020-01-23 | Stop reason: HOSPADM

## 2020-01-23 RX ORDER — SODIUM CHLORIDE 0.9 % (FLUSH) 0.9 %
10 SYRINGE (ML) INJECTION AS NEEDED
Status: DISCONTINUED | OUTPATIENT
Start: 2020-01-23 | End: 2020-01-23 | Stop reason: HOSPADM

## 2020-01-23 RX ADMIN — SODIUM CHLORIDE 75 ML/HR: 9 INJECTION, SOLUTION INTRAVENOUS at 09:55

## 2020-01-23 NOTE — INTERVAL H&P NOTE
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 The patient was examined and there are no changes to the H&P.

## 2020-01-23 NOTE — DISCHARGE INSTRUCTIONS
[facility]  [address] [place] [address]    Coronary Angiogram (Radial/Ulnar Approach) After Care    Refer to this sheet in the next few weeks. These instructions provide you with information on caring for yourself after your procedure. Your caregiver may also give you more specific instructions. Your treatment has been planned according to current medical practices, but problems sometimes occur. Call your caregiver if you have any problems or questions after your procedure.    Home Care Instructions:  · You may shower the day after the procedure. Remove the bandage (dressing) and gently wash the site with plain soap and water. Gently pat the site dry. You may apply a band aid daily for 2 days if desired.    · Do not apply powder or lotion to the site.  · Do not submerge the affected site in water for 3 to 5 days or until the site is completely healed.   · Do not lift, push or pull anything over 5 pounds for 5 days after your procedure.  · Inspect the site at least twice daily. You may notice some bruising at the site and it may be tender for 1 to 2 weeks.     · Increase your fluid intake for the next 2 days.    · Keep arm elevated for 24 hours. For the remainder of the day, keep your arm in “Pledge of Allegiance” position when up and about.     · You may drive 24 hours after the procedure unless otherwise instructed by your caregiver.  · Do not operate machinery or power tools for 24 hours.  · A responsible adult should be with you for the first 24 hours after you arrive home. Do not make any important legal decisions or sign legal papers for 24 hours.  Do not drink alcohol for 24 hours.    · Metformin or any medications containing Metformin should not be taken for 48 hours after your procedure.      Call Your Doctor if:   · You have unusual pain at the radial/ulnar (wrist) site.  · You have redness, warmth, swelling, or pain at the radial/ulnar (wrist) site.  · You have drainage (other than  a small amount of blood on the dressing).  · You have chills or a fever > 101.  · Your arm becomes pale or dark, cool, tingly, or numb.  · You have heavy bleeding from the site, hold pressure on the site for 20 minutes.  If the bleeding stops, apply a fresh bandage and call your cardiologist.  However, if you continue to have bleeding, call 911.          SEDATION DISCHARGE INSTRUCTIONS.    IMPORTANT: The following information will help you return to your best level of health.  Moderate Conscious Sedation.  You have had a procedure that called for some medicine to reduce anxiety and pain. This medicine (or medicines) is called  sedation. After receiving the medicine, you may be sleepy, but able to breathe on your own. The effects of the medicine may last for several hours.    Follow these instructions after sedation:   Go right home. Rest quietly at home today, then you can be up and about.   Do not drink alcohol, drive or operate machinery for 24 hours.   Do not do anything where light-headedness or clumsiness would be dangerous.   Do not make important decisions or sign any legal papers for the next 24 hours.   Make sure A RESPONSIBLE PERSON stays with you the rest of today and overnight for your protection and safety.   Start your diet with fluids and light foods (jello, soup, juice, toast). Then, slowly progress to your usual diet if you are not sick to your stomach.  · If you have sleep apnea, surgery and certain medicines can increase your risk for breathing problems. Follow instructions from your health care provider about wearing your sleep device:  ? Anytime you are sleeping, including during daytime naps.  ? While taking prescription pain medicines, sleeping medicines, or medicines that make you drowsy.      Call your doctor if you have:   a gray or blue skin color.   excess sleepiness.   repeated vomiting.   trouble breathing.   any new problems or concerns.

## 2020-01-28 ENCOUNTER — OFFICE VISIT (OUTPATIENT)
Dept: CARDIAC SURGERY | Facility: CLINIC | Age: 70
End: 2020-01-28

## 2020-01-28 VITALS
WEIGHT: 150 LBS | SYSTOLIC BLOOD PRESSURE: 108 MMHG | OXYGEN SATURATION: 91 % | DIASTOLIC BLOOD PRESSURE: 42 MMHG | HEART RATE: 59 BPM | BODY MASS INDEX: 28.32 KG/M2 | HEIGHT: 61 IN

## 2020-01-28 DIAGNOSIS — J96.21 ACUTE ON CHRONIC RESPIRATORY FAILURE WITH HYPOXIA (HCC): ICD-10-CM

## 2020-01-28 DIAGNOSIS — I05.9 ENDOCARDITIS OF MITRAL VALVE: ICD-10-CM

## 2020-01-28 DIAGNOSIS — I33.0 MITRAL VALVE VEGETATION: ICD-10-CM

## 2020-01-28 DIAGNOSIS — I39 ENDOCARDITIS DUE TO OTHER ORGANISM, UNSPECIFIED CHRONICITY: Primary | ICD-10-CM

## 2020-01-28 PROCEDURE — 99214 OFFICE O/P EST MOD 30 MIN: CPT | Performed by: THORACIC SURGERY (CARDIOTHORACIC VASCULAR SURGERY)

## 2020-01-29 PROBLEM — I33.0 MITRAL VALVE VEGETATION: Status: ACTIVE | Noted: 2020-01-29

## 2020-01-29 NOTE — PROGRESS NOTES
1/29/2020    Seen on 1/28/20    Chief Complaint:     Follow-up evaluation of mitral valve vegetation    History of Present Illness:       Dear Dr. Tinajero, Zenia ENGLE MD and Colleagues,  It was nice to see Shani Phillip in follow up evaluation for her presumed mitral valve endocarditis. She is a 69 y.o. female with a history of COPD, restless leg syndrome, atrial fibrillation and endocarditis who was treated medically and we have been following her with echocardiograms.  I saw her 3 months ago after she finished the antibiotics and we discussed the possibility of surgery due to the still presence of a mobile mass in the posterior annulus of the mitral valve.  Apparently the process started during a cruise trip to [place] where she developed flulike and gastrointestinal infection.  There was no evidence of active infection at that time on my repeat visit.  The repeat echocardiogram shows small mobile most likely healed vegetation attached to the valve with mild only MR the ventricular function is normal.  Right ventricle is mildly dilated and there is only mild tricuspid regurgitation.  The estimated right ventricular systolic pressure from tricuspid regurgitation is normal that means less than 35 mmHg she had a cardiac cath that showed no significant coronary artery disease. She denies fever, chills, malaise, embolic signs but she has significant dyspnea and she is using home oxygen.  She has 2+ edema as well.     Patient Active Problem List   Diagnosis   • Acute endocarditis   • Influenza   • Thrush, oral   • Other persistent atrial fibrillation   • Acute on chronic respiratory failure with hypoxia (CMS/Formerly [facility])   • Infective endocarditis   • Endocarditis of mitral valve   • Mitral valve vegetation       Past Medical History:   Diagnosis Date   • Acute endocarditis    • Atrial fibrillation with RVR (CMS/[facility]) 11/14/2019   • COPD (chronic obstructive pulmonary disease) (CMS/[facility])    • Influenza 11/14/2019   • Renal disorder     • Restless leg syndrome    • Thrush, oral 11/14/2019       Past Surgical History:   Procedure Laterality Date   • CARDIAC CATHETERIZATION N/A 1/23/2020    Procedure: Coronary angiography;  Surgeon: Svetlana Grayson MD;  Location:  [facility];  Service: Cardiovascular   • CARDIAC CATHETERIZATION N/A 1/23/2020    Procedure: Left ventriculography;  Surgeon: Svetlana Grayson MD;  Location:  [facility];  Service: Cardiovascular   • CARDIAC CATHETERIZATION N/A 1/23/2020    Procedure: Left Heart Cath;  Surgeon: Svetlana Grayson MD;  Location:  [facility];  Service: Cardiovascular   • CHOLECYSTECTOMY     • KNEE ARTHROPLASTY Right    • LAPAROSCOPIC GASTRIC BANDING         Allergies   Allergen Reactions   • Penicillins Rash     RASH 30 YRS AGO NO HOSPITALIZATION         Current Outpatient Medications:   •  budesonide (PULMICORT) 0.5 MG/2ML nebulizer solution, Take 0.5 mg by nebulization Daily., Disp: , Rfl:   •  dilTIAZem CD (CARDIZEM CD) 120 MG 24 hr capsule, Take 1 capsule by mouth Daily., Disp: 30 capsule, Rfl: 11  •  ELIQUIS 5 MG tablet tablet, TAKE 1 TABLET BY MOUTH EVERY 12 HOURS (Patient taking differently: Take 5 mg by mouth Every 12 (Twelve) Hours.), Disp: 60 tablet, Rfl: 0  •  famotidine (PEPCID) 20 MG tablet, Take 1 tablet by mouth 2 (Two) Times a Day Before Meals., Disp: 180 tablet, Rfl: 3  •  furosemide (LASIX) 40 MG tablet, Take 1 tablet by mouth 2 (Two) Times a Day., Disp: 180 tablet, Rfl: 3  •  HYDROcodone-acetaminophen (NORCO) 5-325 MG per tablet, Take 1 tablet by mouth Every 6 (Six) Hours As Needed for Severe Pain ., Disp: 20 tablet, Rfl: 0  •  ipratropium (ATROVENT) 0.02 % nebulizer solution, Take 500 mcg by nebulization 4 (Four) Times a Day., Disp: , Rfl:   •  metoprolol tartrate (LOPRESSOR) 100 MG tablet, Take 1 tablet by mouth 2 (Two) Times a Day., Disp: 60 tablet, Rfl: 11  •  potassium chloride (K-DUR) 10 MEQ CR tablet, Take 1 tablet by mouth 2 (Two)  Times a Day., Disp: 180 tablet, Rfl: 3  •  rOPINIRole (REQUIP) 2 MG tablet, Take 2 mg by mouth 2 (Two) Times a Day., Disp: , Rfl:   •  TROSPIUM CHLORIDE PO, Take 20 mg by mouth 2 (Two) Times a Day., Disp: , Rfl:     Social History     Socioeconomic History   • Marital status:      Spouse name: Not on file   • Number of children: Not on file   • Years of education: Not on file   • Highest education level: Not on file   Tobacco Use   • Smoking status: Former Smoker     Packs/day: 0.50     Types: Cigarettes     Start date: 10/3/2019   • Smokeless tobacco: Former User   • Tobacco comment: LAST CIG NOV 02, 2019   Substance and Sexual Activity   • Alcohol use: No     Frequency: Never   • Drug use: No   • Sexual activity: Defer   Lifestyle   • Physical activity:     Days per week: 0 days     Minutes per session: 0 min   • Stress: Not on file       Family History   Problem Relation Age of Onset   • Lung cancer Mother      Review of Systems   Constitutional: Positive for activity change and fatigue. Negative for chills and fever.   Respiratory: Positive for shortness of breath.    Cardiovascular: Positive for palpitations and leg swelling. Negative for chest pain.   Gastrointestinal: Negative for blood in stool.   Genitourinary: Negative for dysuria.   Neurological: Positive for weakness. Negative for syncope.   All other systems reviewed and are negative.      Physical Exam:    Vital Signs:  Weight: 68 kg (150 lb)   Body mass index is 28.34 kg/m².      Heart Rate: 59   BP: 108/42     Physical Exam   Constitutional: She is oriented to person, place, and time. She appears well-developed and well-nourished.   HENT:   Head: Normocephalic and atraumatic.   Nose: Nose normal.   Mouth/Throat: Oropharynx is clear and moist.   Eyes: Pupils are equal, round, and reactive to light. Conjunctivae are normal.   Neck: Normal range of motion. Neck supple. No JVD present. No thyromegaly present.   Cardiovascular: Normal rate, regular  rhythm, normal heart sounds and intact distal pulses. Exam reveals no gallop and no friction rub.   No murmur heard.  Pulses:       Radial pulses are 2+ on the right side, and 2+ on the left side.        Posterior tibial pulses are 2+ on the right side, and 2+ on the left side.   Pulmonary/Chest: Effort normal and breath sounds normal. She has no rales.   Abdominal: Soft. Bowel sounds are normal. She exhibits no distension and no mass. There is no tenderness.   Musculoskeletal: Normal range of motion. She exhibits no tenderness or deformity.   Lymphadenopathy:     She has no cervical adenopathy.   Neurological: She is alert and oriented to person, place, and time. She has normal reflexes.   Skin: Skin is warm and dry. No rash noted. No erythema.   Psychiatric: She has a normal mood and affect. Her behavior is normal.   2+ edema in both lower extremities     Assessment:     Problem List Items Addressed This Visit        Cardiovascular and Mediastinum    Infective endocarditis - Primary    Endocarditis of mitral valve    Mitral valve vegetation       Respiratory    Acute on chronic respiratory failure with hypoxia (CMS/HCC)          1. Most likely he will mitral valve endocarditis but with a mobile small vegetation, unchanged  2. No evidence of active infective process  3. Dyspnea and lower extremity edema of unknown origin  4. Respiratory failure, chronic on home oxygen    Recommendation/Plan:     I have reviewed the studies specifically REDD and I believe she has healed endocarditis.  There is no functional involvement of the valve but there is a small mobile vegetation.  She seems to have deteriorated in terms of pulmonary function and dyspnea symptoms.  It seems to me that they are more likely related to lung disease and may be right ventricular dysfunction.  The echocardiogram did not show significant pulmonary hypertension or RV changes.  Investigations for caval obstruction or DVT should be initiated.  I am not  sure she will tolerate any surgery at this point and the risk of operating to prevent embolization from the mitral valve may be higher than treating her medically at this point.  I will refer her to a pulmonary specialist and will treat her medically for now.  Repeat a 2D echocardiogram in 6 months and make further recommendations    Thank you for allowing me to participate in her care.    Regards,    Hayes Briones M.D.

## 2020-02-18 RX ORDER — APIXABAN 5 MG/1
TABLET, FILM COATED ORAL
Qty: 60 TABLET | Refills: 0 | Status: SHIPPED | OUTPATIENT
Start: 2020-02-18 | End: 2020-03-16

## 2020-02-28 ENCOUNTER — HOSPITAL ENCOUNTER (OUTPATIENT)
Dept: CARDIOLOGY | Facility: HOSPITAL | Age: 70
Discharge: HOME | End: 2020-02-28

## 2020-02-28 ENCOUNTER — OFFICE VISIT (OUTPATIENT)
Dept: CARDIOLOGY | Facility: CLINIC | Age: 70
End: 2020-02-28

## 2020-02-28 ENCOUNTER — HOSPITAL ENCOUNTER (OUTPATIENT)
Dept: CARDIOLOGY | Facility: HOSPITAL | Age: 70
Discharge: HOME | End: 2020-02-28
Admitting: NURSE PRACTITIONER

## 2020-02-28 VITALS
WEIGHT: 163 LBS | SYSTOLIC BLOOD PRESSURE: 120 MMHG | BODY MASS INDEX: 30.78 KG/M2 | HEART RATE: 53 BPM | HEIGHT: 61 IN | DIASTOLIC BLOOD PRESSURE: 64 MMHG

## 2020-02-28 DIAGNOSIS — I05.9 ENDOCARDITIS OF MITRAL VALVE: ICD-10-CM

## 2020-02-28 DIAGNOSIS — R06.02 SHORTNESS OF BREATH: ICD-10-CM

## 2020-02-28 DIAGNOSIS — M79.89 PAIN AND SWELLING OF LOWER EXTREMITY, UNSPECIFIED LATERALITY: Primary | ICD-10-CM

## 2020-02-28 DIAGNOSIS — M79.606 PAIN AND SWELLING OF LOWER EXTREMITY, UNSPECIFIED LATERALITY: ICD-10-CM

## 2020-02-28 DIAGNOSIS — M79.606 PAIN AND SWELLING OF LOWER EXTREMITY, UNSPECIFIED LATERALITY: Primary | ICD-10-CM

## 2020-02-28 DIAGNOSIS — I48.0 PAF (PAROXYSMAL ATRIAL FIBRILLATION) (HCC): ICD-10-CM

## 2020-02-28 DIAGNOSIS — I50.33 ACUTE ON CHRONIC DIASTOLIC CHF (CONGESTIVE HEART FAILURE) (HCC): Primary | ICD-10-CM

## 2020-02-28 DIAGNOSIS — M79.89 PAIN AND SWELLING OF LOWER EXTREMITY, UNSPECIFIED LATERALITY: ICD-10-CM

## 2020-02-28 LAB
ANION GAP SERPL CALCULATED.3IONS-SCNC: 13.4 MMOL/L (ref 5–15)
BH CV LOWER VASCULAR LEFT COMMON FEMORAL AUGMENT: NORMAL
BH CV LOWER VASCULAR LEFT COMMON FEMORAL COMPETENT: NORMAL
BH CV LOWER VASCULAR LEFT COMMON FEMORAL COMPRESS: NORMAL
BH CV LOWER VASCULAR LEFT COMMON FEMORAL PHASIC: NORMAL
BH CV LOWER VASCULAR LEFT COMMON FEMORAL SPONT: NORMAL
BH CV LOWER VASCULAR LEFT DISTAL FEMORAL COMPRESS: NORMAL
BH CV LOWER VASCULAR LEFT GASTRONEMIUS COMPRESS: NORMAL
BH CV LOWER VASCULAR LEFT GREATER SAPH AK COMPRESS: NORMAL
BH CV LOWER VASCULAR LEFT GREATER SAPH BK COMPRESS: NORMAL
BH CV LOWER VASCULAR LEFT MID FEMORAL AUGMENT: NORMAL
BH CV LOWER VASCULAR LEFT MID FEMORAL COMPETENT: NORMAL
BH CV LOWER VASCULAR LEFT MID FEMORAL COMPRESS: NORMAL
BH CV LOWER VASCULAR LEFT MID FEMORAL PHASIC: NORMAL
BH CV LOWER VASCULAR LEFT MID FEMORAL SPONT: NORMAL
BH CV LOWER VASCULAR LEFT PERONEAL COMPRESS: NORMAL
BH CV LOWER VASCULAR LEFT POPLITEAL AUGMENT: NORMAL
BH CV LOWER VASCULAR LEFT POPLITEAL COMPETENT: NORMAL
BH CV LOWER VASCULAR LEFT POPLITEAL COMPRESS: NORMAL
BH CV LOWER VASCULAR LEFT POPLITEAL PHASIC: NORMAL
BH CV LOWER VASCULAR LEFT POPLITEAL SPONT: NORMAL
BH CV LOWER VASCULAR LEFT POSTERIOR TIBIAL COMPRESS: NORMAL
BH CV LOWER VASCULAR LEFT PROFUNDA FEMORAL COMPRESS: NORMAL
BH CV LOWER VASCULAR LEFT PROXIMAL FEMORAL COMPRESS: NORMAL
BH CV LOWER VASCULAR LEFT SAPHENOFEMORAL JUNCTION COMPRESS: NORMAL
BH CV LOWER VASCULAR RIGHT COMMON FEMORAL AUGMENT: NORMAL
BH CV LOWER VASCULAR RIGHT COMMON FEMORAL COMPETENT: NORMAL
BH CV LOWER VASCULAR RIGHT COMMON FEMORAL COMPRESS: NORMAL
BH CV LOWER VASCULAR RIGHT COMMON FEMORAL PHASIC: NORMAL
BH CV LOWER VASCULAR RIGHT COMMON FEMORAL SPONT: NORMAL
BH CV LOWER VASCULAR RIGHT DISTAL FEMORAL COMPRESS: NORMAL
BH CV LOWER VASCULAR RIGHT GASTRONEMIUS COMPRESS: NORMAL
BH CV LOWER VASCULAR RIGHT GREATER SAPH AK COMPRESS: NORMAL
BH CV LOWER VASCULAR RIGHT GREATER SAPH BK COMPRESS: NORMAL
BH CV LOWER VASCULAR RIGHT MID FEMORAL AUGMENT: NORMAL
BH CV LOWER VASCULAR RIGHT MID FEMORAL COMPETENT: NORMAL
BH CV LOWER VASCULAR RIGHT MID FEMORAL COMPRESS: NORMAL
BH CV LOWER VASCULAR RIGHT MID FEMORAL PHASIC: NORMAL
BH CV LOWER VASCULAR RIGHT MID FEMORAL SPONT: NORMAL
BH CV LOWER VASCULAR RIGHT PERONEAL COMPRESS: NORMAL
BH CV LOWER VASCULAR RIGHT POPLITEAL AUGMENT: NORMAL
BH CV LOWER VASCULAR RIGHT POPLITEAL COMPETENT: NORMAL
BH CV LOWER VASCULAR RIGHT POPLITEAL COMPRESS: NORMAL
BH CV LOWER VASCULAR RIGHT POPLITEAL PHASIC: NORMAL
BH CV LOWER VASCULAR RIGHT POPLITEAL SPONT: NORMAL
BH CV LOWER VASCULAR RIGHT POSTERIOR TIBIAL COMPRESS: NORMAL
BH CV LOWER VASCULAR RIGHT PROFUNDA FEMORAL COMPRESS: NORMAL
BH CV LOWER VASCULAR RIGHT PROXIMAL FEMORAL COMPRESS: NORMAL
BH CV LOWER VASCULAR RIGHT SAPHENOFEMORAL JUNCTION COMPRESS: NORMAL
BUN BLD-MCNC: 16 MG/DL (ref 8–23)
BUN/CREAT SERPL: 20.3 (ref 7–25)
CALCIUM SPEC-SCNC: 9.2 MG/DL (ref 8.6–10.5)
CHLORIDE SERPL-SCNC: 96 MMOL/L (ref 98–107)
CO2 SERPL-SCNC: 29.6 MMOL/L (ref 22–29)
CREAT BLD-MCNC: 0.79 MG/DL (ref 0.57–1)
GFR SERPL CREATININE-BSD FRML MDRD: 72 ML/MIN/1.73
GLUCOSE BLD-MCNC: 95 MG/DL (ref 65–99)
NT-PROBNP SERPL-MCNC: 1639 PG/ML (ref 5–900)
POTASSIUM BLD-SCNC: 4.1 MMOL/L (ref 3.5–5.2)
SODIUM BLD-SCNC: 139 MMOL/L (ref 136–145)

## 2020-02-28 PROCEDURE — 93000 ELECTROCARDIOGRAM COMPLETE: CPT | Performed by: NURSE PRACTITIONER

## 2020-02-28 PROCEDURE — 99214 OFFICE O/P EST MOD 30 MIN: CPT | Performed by: NURSE PRACTITIONER

## 2020-02-28 PROCEDURE — 83880 ASSAY OF NATRIURETIC PEPTIDE: CPT | Performed by: NURSE PRACTITIONER

## 2020-02-28 PROCEDURE — 93970 EXTREMITY STUDY: CPT

## 2020-02-28 PROCEDURE — 80048 BASIC METABOLIC PNL TOTAL CA: CPT | Performed by: NURSE PRACTITIONER

## 2020-02-28 PROCEDURE — 36415 COLL VENOUS BLD VENIPUNCTURE: CPT

## 2020-03-01 PROBLEM — R60.0 BILATERAL LOWER EXTREMITY EDEMA: Status: ACTIVE | Noted: 2020-03-01

## 2020-03-01 PROBLEM — I48.0 PAF (PAROXYSMAL ATRIAL FIBRILLATION) (HCC): Status: ACTIVE | Noted: 2020-03-01

## 2020-03-01 PROBLEM — J96.11 CHRONIC RESPIRATORY FAILURE WITH HYPOXIA: Status: ACTIVE | Noted: 2020-01-17

## 2020-03-01 PROBLEM — I50.33 ACUTE ON CHRONIC DIASTOLIC CHF (CONGESTIVE HEART FAILURE): Status: ACTIVE | Noted: 2020-03-01

## 2020-03-02 ENCOUNTER — TELEPHONE (OUTPATIENT)
Dept: CARDIOLOGY | Facility: CLINIC | Age: 70
End: 2020-03-02

## 2020-03-02 DIAGNOSIS — I50.33 ACUTE ON CHRONIC DIASTOLIC CHF (CONGESTIVE HEART FAILURE) (HCC): Primary | ICD-10-CM

## 2020-03-02 RX ORDER — POTASSIUM CHLORIDE 750 MG/1
TABLET, FILM COATED, EXTENDED RELEASE ORAL
Qty: 90 TABLET | Refills: 0 | Status: SHIPPED | OUTPATIENT
Start: 2020-03-02 | End: 2020-03-30

## 2020-03-02 RX ORDER — FUROSEMIDE 40 MG/1
TABLET ORAL
Qty: 90 TABLET | Refills: 0 | Status: SHIPPED | OUTPATIENT
Start: 2020-03-02 | End: 2020-03-12

## 2020-03-02 NOTE — TELEPHONE ENCOUNTER
Called pt. Went over results and recommendations. She verbalized understanding. She got compression socks, has been elevating her feet and started taking the extra furosemide yesterday. She said she has finally started noticing a little difference with the swelling.    She is not having any palpitations. B/P and HR have been good since stopping diltiazem:     120/62, 74  125/51, 65  137/50. 63

## 2020-03-02 NOTE — TELEPHONE ENCOUNTER
3/2/20  I spoke with patient - she will keep her weight and bp/hr log and call back on Fri, or sooner if problems.  She will get labs drawn on Friday also./elizabeth    See other ph note and ofc visit with Dr. Tinajero/elizabeth - Done

## 2020-03-02 NOTE — TELEPHONE ENCOUNTER
3/2/20  mahsa left at 2:00 - returning our call ( in [facility] )  Her ph [phone]/elizabeth

## 2020-03-02 NOTE — TELEPHONE ENCOUNTER
Continue with current regimen and call with updated heart rate and blood pressure readings on Friday or sooner for high or low readings or new or worsening issues.    Please have her stop by outpatient lab this Friday to recheck BMP and proBNP.     Please remind her to check daily weights and call if she gains 2 or more pounds in 1 day or 5 more pounds in a week.  Please find out what weight is running today and have her call with updated weights on Friday as well when she calls with heart rate and blood pressure readings.

## 2020-03-02 NOTE — TELEPHONE ENCOUNTER
Please call patient and let her know that lower extremity ultrasounds were normal, no signs of blood clots.    Fluid level test was elevated indicating that swelling is likely due to excess fluid on board.  Kidney function and potassium level stable.      Please see if she has had improvement in her swelling over the weekend with increasing Lasix to 80 mg in the morning with 20 mEq potassium in the morning and continuing 40 mg in the p.m. with 10 mEq potassium.  Please confirm that she has been elevating her lower extremities and using compression socks.  Please also remind her to check daily weights and call if she gains 2 or more pounds in 1 day or 5 or more pounds in a week.  Avoid salt.    Please see how heart rate and blood pressure have been running over the weekend since holding diltiazem.  Please make sure she is not having any palpitations.

## 2020-03-06 ENCOUNTER — LAB (OUTPATIENT)
Dept: LAB | Facility: HOSPITAL | Age: 70
End: 2020-03-06

## 2020-03-06 ENCOUNTER — TELEPHONE (OUTPATIENT)
Dept: CARDIOLOGY | Facility: CLINIC | Age: 70
End: 2020-03-06

## 2020-03-06 DIAGNOSIS — I50.33 ACUTE ON CHRONIC DIASTOLIC CHF (CONGESTIVE HEART FAILURE) (HCC): ICD-10-CM

## 2020-03-06 LAB
ANION GAP SERPL CALCULATED.3IONS-SCNC: 10.8 MMOL/L (ref 5–15)
BUN BLD-MCNC: 23 MG/DL (ref 8–23)
BUN/CREAT SERPL: 27.1 (ref 7–25)
CALCIUM SPEC-SCNC: 9.4 MG/DL (ref 8.6–10.5)
CHLORIDE SERPL-SCNC: 93 MMOL/L (ref 98–107)
CO2 SERPL-SCNC: 31.2 MMOL/L (ref 22–29)
CREAT BLD-MCNC: 0.85 MG/DL (ref 0.57–1)
GFR SERPL CREATININE-BSD FRML MDRD: 66 ML/MIN/1.73
GLUCOSE BLD-MCNC: 94 MG/DL (ref 65–99)
POTASSIUM BLD-SCNC: 4.3 MMOL/L (ref 3.5–5.2)
SODIUM BLD-SCNC: 135 MMOL/L (ref 136–145)

## 2020-03-06 PROCEDURE — 36415 COLL VENOUS BLD VENIPUNCTURE: CPT

## 2020-03-06 PROCEDURE — 80048 BASIC METABOLIC PNL TOTAL CA: CPT

## 2020-03-06 NOTE — TELEPHONE ENCOUNTER
Less cautiously continue the same medication dosages at this time until her follow-up 3/12 with Dr. Tinajero as she is starting to see some improvement in her swelling and weight has been staying the same recently.    However if she starts to notice any lightheadedness or systolic blood pressure readings less than 100 would recommend that she call the office and we will likely decrease diuretic dosages at that time.    Keep follow-up with Dr. Tinajero 3/12 as scheduled for further evaluation and recommendations at that time.

## 2020-03-06 NOTE — TELEPHONE ENCOUNTER
3/6/20  I spoke with patient, reviewed these instructions with her and she voiced her understanding/олег

## 2020-03-06 NOTE — TELEPHONE ENCOUNTER
3/6/20  I spoke with patient - Her weight has gone down 2# and staying there.  She is wearing compression hose and actually sees a slight ankle.  Her bp/hr is:    3/1 125/51  HR 65  3/2 137/50  HR 63  3/3 115/56  HR 63  3/4 110/52  HR 63    Her ph [phone]/elizabeth

## 2020-03-06 NOTE — TELEPHONE ENCOUNTER
Please call patient to find out how heart rate and blood pressure running since stopping diltiazem and make sure she is not having any palpitations.    Please also find out how daily weights have been running and how swelling is doing.    Kidney function is stable on increased Lasix dosing and potassium level is stable.  However sodium level is slightly decreased.  (If she has had significant improvement in swelling and if weight is trending down then may decrease back to 40 mg twice daily).

## 2020-03-12 ENCOUNTER — OFFICE VISIT (OUTPATIENT)
Dept: CARDIOLOGY | Facility: CLINIC | Age: 70
End: 2020-03-12

## 2020-03-12 VITALS
DIASTOLIC BLOOD PRESSURE: 70 MMHG | OXYGEN SATURATION: 96 % | SYSTOLIC BLOOD PRESSURE: 110 MMHG | HEART RATE: 75 BPM | WEIGHT: 161 LBS | BODY MASS INDEX: 30.4 KG/M2 | HEIGHT: 61 IN

## 2020-03-12 DIAGNOSIS — I50.33 ACUTE ON CHRONIC DIASTOLIC CHF (CONGESTIVE HEART FAILURE) (HCC): ICD-10-CM

## 2020-03-12 DIAGNOSIS — I48.0 PAF (PAROXYSMAL ATRIAL FIBRILLATION) (HCC): ICD-10-CM

## 2020-03-12 DIAGNOSIS — I33.0 MITRAL VALVE VEGETATION: ICD-10-CM

## 2020-03-12 DIAGNOSIS — I48.19 OTHER PERSISTENT ATRIAL FIBRILLATION (HCC): Primary | ICD-10-CM

## 2020-03-12 DIAGNOSIS — I39 ENDOCARDITIS DUE TO OTHER ORGANISM, UNSPECIFIED CHRONICITY: ICD-10-CM

## 2020-03-12 PROCEDURE — 99214 OFFICE O/P EST MOD 30 MIN: CPT | Performed by: INTERNAL MEDICINE

## 2020-03-12 PROCEDURE — 93000 ELECTROCARDIOGRAM COMPLETE: CPT | Performed by: INTERNAL MEDICINE

## 2020-03-12 RX ORDER — TORSEMIDE 20 MG/1
40 TABLET ORAL 2 TIMES DAILY
Qty: 120 TABLET | Refills: 3 | Status: SHIPPED | OUTPATIENT
Start: 2020-03-12 | End: 2020-03-12

## 2020-03-12 RX ORDER — FUROSEMIDE 40 MG/1
TABLET ORAL
Qty: 90 TABLET | Refills: 0 | Status: SHIPPED | OUTPATIENT
Start: 2020-03-12 | End: 2020-03-12

## 2020-03-12 RX ORDER — TORSEMIDE 20 MG/1
40 TABLET ORAL DAILY
Qty: 120 TABLET | Refills: 3
Start: 2020-03-12 | End: 2020-05-26 | Stop reason: SDUPTHER

## 2020-03-12 NOTE — PROGRESS NOTES
PATIENTINFORMATION    Date of Office Visit: 20  Encounter Provider: Zenia Tinajero MD  Place of Service: [facility] CARDIOLOGY  Patient Name: Shani Phillip  : 1950    Subjective:         Patient ID: Shani Phillip is a 69 y.o. female.      History of Present Illness    This is a lady who was on a cruise ship when she developed a febrile illness.  She was taken to [facility] where she was diagnosed with endocarditis.  She left the hospital to return home to [place] I first saw her in 2019.  She has a history of COPD on oxygen, former smoker, restless leg syndrome and chronic pain.  She had a transthoracic echocardiogram suggestive of vegetation on the mitral valve annulus.  She had a transesophageal echo which delineated this more clearly.  Dr. Briones saw her in consultation.  The plan was to treat her with IV antibiotics which occurred.  She came in for a repeat transesophageal echo on 2020 and this was pretty much unchanged.  The mobile element attached to the posterior mitral valve annulus annulus was unchanged.     She comes in today for follow-up.  She is wheezing, tachypneic and hypoxic.  Off oxygen her oxygen sats were 38%.  With 4 L nasal cannula her oxygen sat was 72%.  She had a repeat echocardiogram in May 2018 which put her ejection fraction at 45 to 50% with grade 1A diastolic dysfunction, moderate aortic stenosis, moderate aortic regurgitation, mild tricuspid regurgitation with a normal right ventricular systolic pressure.  She had an echocardiogram in May 2018    She is having some increased lower extremity edema.  She denies chest pain or shortness of breath.  She always sleeps propped up.  She does not have paroxysmal nocturnal dyspnea.    Review of Systems   Constitution: Positive for malaise/fatigue and weight gain. Negative for fever and weight loss.   HENT: Negative for ear pain, hearing loss, nosebleeds and  "sore throat.    Eyes: Positive for blurred vision. Negative for double vision, pain, vision loss in left eye and vision loss in right eye.   Cardiovascular:        See history of present illness.   Respiratory: Negative for cough, shortness of breath, sleep disturbances due to breathing, snoring and wheezing.    Endocrine: Negative for cold intolerance, heat intolerance and polyuria.   Skin: Negative for itching, poor wound healing and rash.   Musculoskeletal: Negative for joint pain, joint swelling and myalgias.   Gastrointestinal: Negative for abdominal pain, diarrhea, hematochezia, nausea and vomiting.   Genitourinary: Negative for hematuria and hesitancy.   Neurological: Negative for numbness, paresthesias and seizures.   Psychiatric/Behavioral: Negative for depression. The patient is not nervous/anxious.            ECG 12 Lead  Date/Time: 3/12/2020 12:22 PM  Performed by: Zenia Tinajero MD  Authorized by: Zenia Tinajero MD   Comparison: compared with previous ECG from 1/17/2020  Similar to previous ECG  Rhythm: sinus rhythm  BPM: 51  Conduction: conduction normal  ST Segments: ST segments normal  T Waves: T waves normal    Clinical impression: normal ECG               Objective:     /70 (BP Location: Left arm)   Pulse 75   Ht 154.9 cm (61\")   Wt 73 kg (161 lb)   SpO2 96%   BMI 30.42 kg/m²  Body mass index is 30.42 kg/m².     Physical Exam   Constitutional: She appears well-developed.   HENT:   Head: Normocephalic and atraumatic.   Eyes: Pupils are equal, round, and reactive to light. Conjunctivae and lids are normal. Lids are everted and swept, no foreign bodies found.   Neck: Normal range of motion. No JVD present. Carotid bruit is not present. No tracheal deviation present. No thyroid mass present.   Cardiovascular: Normal rate, regular rhythm and normal heart sounds.   Pulses:       Dorsalis pedis pulses are 2+ on the right side, and 2+ on the left side.   Pulmonary/Chest: Effort normal and " breath sounds normal.   Abdominal: Normal appearance and bowel sounds are normal.   Musculoskeletal: Normal range of motion.   Neurological: She is alert. She has normal strength.   Skin: Skin is warm, dry and intact.   Psychiatric: She has a normal mood and affect. Her behavior is normal.   Vitals reviewed.        Assessment/Plan:       1.  Atrial fibrillation.  On Eliquis. Cardioversion 11/15/19. Currently in NSR.  2.  Mitral valve vegetation.  S/P antibiotics. Repeat REDD in 1/20 shows no change to the mobile mass on the MV annulus.   3. Lower extremity edema.  She continues to complain of lower extremity edema.  I am going to switch her over to torsemide 40 mg a day.  I told her to call the office on Monday and let me know if her swelling is getting better/worse/the same and we can adjust her diuretics based upon that.  4.  COPD exacerbation. Not wheezing when I saw her. O2 sat in the upper 80's with finger nail polish on. She needs portable O2.     I will see her back in 4 weeks to see how she is doing as far as her edema.    Orders Placed This Encounter   Procedures   • ECG 12 Lead     This order was created via procedure documentation        Discharge Medications           Accurate as of March 12, 2020 12:23 PM. If you have any questions, ask your nurse or doctor.               New Medications      Instructions Start Date   torsemide 20 MG tablet  Commonly known as:  DEMADEX  Started by:  Zenia Tinajero MD   40 mg, Oral, Daily         Continue These Medications      Instructions Start Date   budesonide 0.5 MG/2ML nebulizer solution  Commonly known as:  PULMICORT   0.5 mg, Nebulization, Daily - RT      ELIQUIS 5 MG tablet tablet  Generic drug:  apixaban   TAKE 1 TABLET BY MOUTH EVERY 12 HOURS      famotidine 20 MG tablet  Commonly known as:  PEPCID   20 mg, Oral, 2 Times Daily Before Meals      ipratropium 0.02 % nebulizer solution  Commonly known as:  ATROVENT   500 mcg, Nebulization, 4 Times Daily - RT        metoprolol tartrate 100 MG tablet  Commonly known as:  LOPRESSOR   100 mg, Oral, 2 Times Daily      O2  Commonly known as:  OXYGEN   2 L/min, Inhalation, Once      potassium chloride 10 MEQ CR tablet  Commonly known as:  K-DUR   Take 20 mEq (two tablets) in AM and 10 mEq (one tablet) in PM.      rOPINIRole 2 MG tablet  Commonly known as:  REQUIP   2 mg, Oral, 2 Times Daily      TROSPIUM CHLORIDE PO   20 mg, Oral, 2 Times Daily         Stop These Medications    furosemide 40 MG tablet  Commonly known as:  LASIX  Stopped by:  MD Zenia Willis MD  03/12/20  12:23

## 2020-03-16 ENCOUNTER — TELEPHONE (OUTPATIENT)
Dept: CARDIOLOGY | Facility: CLINIC | Age: 70
End: 2020-03-16

## 2020-03-16 RX ORDER — APIXABAN 5 MG/1
TABLET, FILM COATED ORAL
Qty: 60 TABLET | Refills: 4 | Status: SHIPPED | OUTPATIENT
Start: 2020-03-16 | End: 2020-10-27

## 2020-03-16 NOTE — TELEPHONE ENCOUNTER
I spoke with the patient and she is ok with taking torsemide 40mg. She will call back Friday to let us know how things are going and she will also keep her appointment for 4/9/20

## 2020-03-16 NOTE — TELEPHONE ENCOUNTER
The patient called and as I spoke with her she stated that the Torsemide 20mg is not working and she is still retaining water in her legs. She did state that she is not having any SOA.

## 2020-03-16 NOTE — TELEPHONE ENCOUNTER
Okay to increase torsemide to 40 mg a day.  I reviewed her heart studies.  Normal ejection fraction.  No significant valve disease.  No significant coronary artery disease.  If she continues to have issues with fluid overload, I may need to repeat her echocardiogram.  Keep appointment with me on April 9.

## 2020-03-30 DIAGNOSIS — I50.33 ACUTE ON CHRONIC DIASTOLIC CHF (CONGESTIVE HEART FAILURE) (HCC): ICD-10-CM

## 2020-03-30 RX ORDER — POTASSIUM CHLORIDE 750 MG/1
TABLET, FILM COATED, EXTENDED RELEASE ORAL
Qty: 90 TABLET | Refills: 5 | Status: SHIPPED | OUTPATIENT
Start: 2020-03-30 | End: 2021-02-26 | Stop reason: SDUPTHER

## 2020-04-17 ENCOUNTER — OFFICE VISIT (OUTPATIENT)
Dept: CARDIOLOGY | Facility: CLINIC | Age: 70
End: 2020-04-17

## 2020-04-17 VITALS
DIASTOLIC BLOOD PRESSURE: 58 MMHG | HEIGHT: 61 IN | SYSTOLIC BLOOD PRESSURE: 130 MMHG | BODY MASS INDEX: 28.13 KG/M2 | WEIGHT: 149 LBS | HEART RATE: 77 BPM

## 2020-04-17 DIAGNOSIS — R06.02 SHORTNESS OF BREATH: ICD-10-CM

## 2020-04-17 DIAGNOSIS — I05.9 ENDOCARDITIS OF MITRAL VALVE: ICD-10-CM

## 2020-04-17 DIAGNOSIS — I39 ENDOCARDITIS DUE TO OTHER ORGANISM, UNSPECIFIED CHRONICITY: Primary | ICD-10-CM

## 2020-04-17 PROCEDURE — 99212 OFFICE O/P EST SF 10 MIN: CPT | Performed by: INTERNAL MEDICINE

## 2020-04-17 RX ORDER — FUROSEMIDE 20 MG/1
20 TABLET ORAL DAILY
COMMUNITY
Start: 2020-04-04 | End: 2020-04-17

## 2020-04-17 NOTE — PROGRESS NOTES
Palps no  SOA no  Edema yes legs  Lightheaded no  Chest pain no  Fatigue yes has been having issues getting energy.     On O2 2 liters 24/7

## 2020-04-17 NOTE — PROGRESS NOTES
"PATIENTINFORMATION    Date of Office Visit: 20  Encounter Provider: Zenia Tinajero MD  Place of Service: [facility] CARDIOLOGY  Patient Name: Shani Phillip  : 1950    Subjective:         Patient ID: Shani Phillip is a 69 y.o. female.      History of Present Illness    This is a lady who was on a cruise ship when she developed a febrile illness.  She was taken to [facility] where she was diagnosed with endocarditis.  She left the hospital to return home to [place] I first saw her in 2019.  She has a history of COPD on oxygen, former smoker, restless leg syndrome and chronic pain.  She had a transthoracic echocardiogram suggestive of vegetation on the mitral valve annulus.  She had a transesophageal echo which delineated this more clearly.  Dr. Briones saw her in consultation.  The plan was to treat her with IV antibiotics which occurred.  She came in for a repeat transesophageal echo on 2020 and this was pretty much unchanged.  The mobile element attached to the posterior mitral valve annulus annulus was unchanged.    She has been doing well.  She still has some edema in her left leg but overall it is much better on the torsemide.  She has not had any strokelike symptoms.  She denies chest pain.  Her breathing is at baseline.  She is on home oxygen.  She has not seen any hypoxia.     Objective:     /58   Pulse 77   Ht 154.9 cm (61\")   Wt 67.6 kg (149 lb)   BMI 28.15 kg/m²  Body mass index is 28.15 kg/m².       Lab Review:  Reviewed labs from 20      Assessment/Plan:       1.  Atrial fibrillation.  On Eliquis. Cardioversion 11/15/19.  She has apparently maintained sinus rhythm.  She asked about coming off the Eliquis but I recommended that she continue it for now.  2.  Mitral valve vegetation.  S/P antibiotics. Repeat REDD in  shows no change to the mobile mass on the MV annulus. Dr Briones does not recommend " surgery.  3. Lower extremity edema.    Her BNP was elevated at the end of February.  I changed her over to torsemide 40 mg a day.  Her edema is much better and her breathing is also better with no hypoxia.  Continue the same.  Repeat BNP showed kidney function was doing well on the torsemide.  4.  COPD on home oxygen.    You have chosen to receive care through a video telemedicine visit today. Do you consent to use a video telemedicine visit for your medical care today? Yes.    Video telemedicine visit lasted 6 minutes.    Follow-up with Heidi in July.  Repeat echocardiogram in July 2020.    Orders Placed This Encounter   Procedures   • Adult Transthoracic Echo Complete W/ Cont if Necessary Per Protocol     Standing Status:   Future     Standing Expiration Date:   4/17/2021     Order Specific Question:   Reason for exam?     Answer:   Murmur or Click     Order Specific Question:   Reason for exam?     Answer:   Valvular Function        Discharge Medications           Accurate as of April 17, 2020 11:40 AM. If you have any questions, ask your nurse or doctor.               Continue These Medications      Instructions Start Date   budesonide 0.5 MG/2ML nebulizer solution  Commonly known as:  PULMICORT   0.5 mg, Nebulization, Daily - RT      Eliquis 5 MG tablet tablet  Generic drug:  apixaban   TAKE 1 TABLET BY MOUTH EVERY 12 HOURS      famotidine 20 MG tablet  Commonly known as:  PEPCID   20 mg, Oral, 2 Times Daily Before Meals      ipratropium 0.02 % nebulizer solution  Commonly known as:  ATROVENT   500 mcg, Nebulization, 4 Times Daily - RT      metoprolol tartrate 100 MG tablet  Commonly known as:  LOPRESSOR   100 mg, Oral, 2 Times Daily      O2  Commonly known as:  OXYGEN   2 L/min, Inhalation, Once      potassium chloride 10 MEQ CR tablet  Commonly known as:  K-DUR   TAKE 2 TABLETS BY MOUTH EVERY MORNING AND 1 TABLET IN THE EVENING DAILY      rOPINIRole 2 MG tablet  Commonly known as:  REQUIP   2 mg, Oral, 2  Times Daily      torsemide 20 MG tablet  Commonly known as:  DEMADEX   40 mg, Oral, Daily      TROSPIUM CHLORIDE PO   20 mg, Oral, 2 Times Daily         Stop These Medications    furosemide 20 MG tablet  Commonly known as:  LASIX  Stopped by:  MD Zenia Willis MD  04/17/20  11:40

## 2020-04-28 DIAGNOSIS — I50.33 ACUTE ON CHRONIC DIASTOLIC CHF (CONGESTIVE HEART FAILURE) (HCC): ICD-10-CM

## 2020-04-28 RX ORDER — FUROSEMIDE 40 MG/1
TABLET ORAL
Qty: 90 TABLET | Refills: 0 | OUTPATIENT
Start: 2020-04-28

## 2020-05-26 ENCOUNTER — TELEPHONE (OUTPATIENT)
Dept: CARDIOLOGY | Facility: CLINIC | Age: 70
End: 2020-05-26

## 2020-05-26 RX ORDER — TORSEMIDE 20 MG/1
40 TABLET ORAL DAILY
Qty: 120 TABLET | Refills: 3
Start: 2020-05-26 | End: 2020-07-02

## 2020-05-26 NOTE — TELEPHONE ENCOUNTER
The patient called and said she is out of the Torsemide and the pharmacy said the insurance will not cover until June as it is to early. Can she take Furosamide instead? She said she has some of the Furosamide. I am going to try and send a renew of the torsemide to her pharmacy and see if they will fill it that way in the mean time.

## 2020-07-02 RX ORDER — TORSEMIDE 20 MG/1
TABLET ORAL
Qty: 360 TABLET | Refills: 1 | Status: SHIPPED | OUTPATIENT
Start: 2020-07-02 | End: 2021-02-26 | Stop reason: SDUPTHER

## 2020-07-08 ENCOUNTER — TELEPHONE (OUTPATIENT)
Dept: CARDIOLOGY | Facility: CLINIC | Age: 70
End: 2020-07-08

## 2020-07-08 NOTE — TELEPHONE ENCOUNTER
I spoke with the patient and she states that she will wait till her appointment with Heidi in August to talk to her. I will supply her with some samples to hold her till then. rojas

## 2020-07-08 NOTE — TELEPHONE ENCOUNTER
Future Appointments       Provider Department Center    8/3/2020 10:45 AM [facility] ECHO/[facility] OUTPATIENT ECHOCARDIOGRAPHY [facility]    8/3/2020 11:30 AM [facility]us D[facility]           She will need to come in to discuss this as this is a life and death decision.  See if her appointment with Heidi can be moved sooner.

## 2020-07-27 DIAGNOSIS — I50.33 ACUTE ON CHRONIC DIASTOLIC CHF (CONGESTIVE HEART FAILURE) (HCC): ICD-10-CM

## 2020-07-27 RX ORDER — FUROSEMIDE 40 MG/1
TABLET ORAL
Qty: 90 TABLET | Refills: 0 | OUTPATIENT
Start: 2020-07-27

## 2020-08-03 ENCOUNTER — OFFICE VISIT (OUTPATIENT)
Dept: CARDIOLOGY | Facility: CLINIC | Age: 70
End: 2020-08-03

## 2020-08-03 ENCOUNTER — HOSPITAL ENCOUNTER (OUTPATIENT)
Dept: CARDIOLOGY | Facility: HOSPITAL | Age: 70
Discharge: HOME | End: 2020-08-03
Admitting: INTERNAL MEDICINE

## 2020-08-03 VITALS
DIASTOLIC BLOOD PRESSURE: 58 MMHG | HEIGHT: 61 IN | HEART RATE: 55 BPM | SYSTOLIC BLOOD PRESSURE: 129 MMHG | BODY MASS INDEX: 28.13 KG/M2 | WEIGHT: 149 LBS

## 2020-08-03 VITALS
HEIGHT: 61 IN | SYSTOLIC BLOOD PRESSURE: 142 MMHG | WEIGHT: 153 LBS | BODY MASS INDEX: 28.89 KG/M2 | OXYGEN SATURATION: 98 % | DIASTOLIC BLOOD PRESSURE: 72 MMHG | HEART RATE: 59 BPM

## 2020-08-03 DIAGNOSIS — R60.0 BILATERAL LOWER EXTREMITY EDEMA: ICD-10-CM

## 2020-08-03 DIAGNOSIS — I33.0 MITRAL VALVE VEGETATION: ICD-10-CM

## 2020-08-03 DIAGNOSIS — R06.02 SHORTNESS OF BREATH: ICD-10-CM

## 2020-08-03 DIAGNOSIS — I48.0 PAF (PAROXYSMAL ATRIAL FIBRILLATION) (HCC): Primary | ICD-10-CM

## 2020-08-03 DIAGNOSIS — I05.9 ENDOCARDITIS OF MITRAL VALVE: ICD-10-CM

## 2020-08-03 DIAGNOSIS — I39 ENDOCARDITIS DUE TO OTHER ORGANISM, UNSPECIFIED CHRONICITY: ICD-10-CM

## 2020-08-03 DIAGNOSIS — R42 INTERMITTENT LIGHTHEADEDNESS: ICD-10-CM

## 2020-08-03 PROCEDURE — 93306 TTE W/DOPPLER COMPLETE: CPT | Performed by: INTERNAL MEDICINE

## 2020-08-03 PROCEDURE — 93000 ELECTROCARDIOGRAM COMPLETE: CPT | Performed by: NURSE PRACTITIONER

## 2020-08-03 PROCEDURE — 99214 OFFICE O/P EST MOD 30 MIN: CPT | Performed by: NURSE PRACTITIONER

## 2020-08-03 PROCEDURE — 93306 TTE W/DOPPLER COMPLETE: CPT

## 2020-08-03 NOTE — PROGRESS NOTES
Date of Office Visit: 2020  Encounter Provider: Billie Kraus, RICO, APRN  Place of Service: [facility] CARDIOLOGY  Patient Name: Shani Phillip  :1950        Subjective:     Chief Complaint:  Follow-up, mitral valve vegetation, atrial fibrillation      History of Present Illness:  Shani Phillip is a 69 y.o. female patient of Dr. Tinajero.  I am seeing this patient in the office today and I have reviewed her records.    Patient has a history of atrial fibrillation, mitral valve endocarditis, diastolic CHF, COPD, overactive bladder.     PER PREVIOUS OFFICE NOTE:  This is a lady who was on a cruise ship when she developed a febrile illness.  She was taken to [facility] where she was diagnosed with endocarditis.  She left the hospital to return home to [place] I first saw her in 2019.  She has a history of COPD on oxygen, former smoker, restless leg syndrome and chronic pain.  She had a transthoracic echocardiogram suggestive of vegetation on the mitral valve annulus.  She had a transesophageal echo which delineated this more clearly.  Dr. Briones saw her in consultation.  The plan was to treat her with IV antibiotics which occurred.  She came in for a repeat transesophageal echo on 2020 and this was pretty much unchanged.  The mobile element attached to the posterior mitral valve annulus annulus was unchanged. THE FOLLOWING IS MY CONTRIBUTION TO OFFICE NOTE: Patient had REDD 2020 showing normal LV systolic function with EF 56-60%, mildly dilated RV cavity with normal systolic function, no evidence of left atrial appendage thrombus, dilated right atrial cavity, aortic valve sclerosis, moderate MAC with mild mitral regurgitation and stenosis, mild tricuspid regurgitation with normal RVSP. Mobile element attached to posterior mitral valve annulus was felt to be unchanged from 2019 echocardiogram. Patient was seen in office by   Lash 1/17/2020 at which point she exhibited lower extremity edema and Lasix was increased to 40mg BID.    Frusemide was later changed to torsemide.  She was instructed to follow-up with Dr. Briones regarding recent REDD results.      Patient presents to office today for follow-up appointment.  Significant other with patient in the office today, per patient preference.  Patient reports she has been doing okay overall since last visit.  She continues to have some chronic shortness of breath which is not new or worsening since last visit.  She remains on 2 L nasal cannula oxygen.  She reports that her lower extremity swelling has pretty much resolved.  She might get some trace swelling to her left ankle by the end of the day however much improved.  She has been getting some lightheadedness with rapid position changes which she feels like may be occurring more frequently.  She checks daily weights and has been running around 153 pounds.  She denies any chest pain/discomfort, palpitations, symptoms of recurrent atrial fibrillation, falls, or abnormal bleeding.  She continues to have significant chronic fatigue, not new or worsening since last visit.        Past Medical History:   Diagnosis Date   • Acute endocarditis    • Atrial fibrillation with RVR (CMS/[facility]) 11/14/2019   • COPD (chronic obstructive pulmonary disease) (CMS/[facility])    • Influenza 11/14/2019   • Renal disorder    • Restless leg syndrome    • Thrush, oral 11/14/2019     Past Surgical History:   Procedure Laterality Date   • CARDIAC CATHETERIZATION N/A 1/23/2020    Procedure: Coronary angiography;  Surgeon: Svetlana Grayson MD;  Location: [facility] INVASIVE LOCATION;  Service: Cardiovascular   • CARDIAC CATHETERIZATION N/A 1/23/2020    Procedure: Left ventriculography;  Surgeon: Svetlana Grayson MD;  Location: [facility] CATH INVASIVE LOCATION;  Service: Cardiovascular   • CARDIAC CATHETERIZATION N/A 1/23/2020    Procedure: Left Heart Cath;  Surgeon: Svetlana Grayson MD;   Location: [facility] INVASIVE LOCATION;  Service: Cardiovascular   • CHOLECYSTECTOMY     • KNEE ARTHROPLASTY Right    • LAPAROSCOPIC GASTRIC BANDING       Outpatient Medications Prior to Visit   Medication Sig Dispense Refill   • budesonide (PULMICORT) 0.5 MG/2ML nebulizer solution Take 0.5 mg by nebulization Daily.     • ELIQUIS 5 MG tablet tablet TAKE 1 TABLET BY MOUTH EVERY 12 HOURS 60 tablet 4   • famotidine (PEPCID) 20 MG tablet Take 1 tablet by mouth 2 (Two) Times a Day Before Meals. 180 tablet 3   • ipratropium (ATROVENT) 0.02 % nebulizer solution Take 500 mcg by nebulization 4 (Four) Times a Day.     • metoprolol tartrate (LOPRESSOR) 100 MG tablet Take 1 tablet by mouth 2 (Two) Times a Day. 60 tablet 11   • potassium chloride (K-DUR) 10 MEQ CR tablet TAKE 2 TABLETS BY MOUTH EVERY MORNING AND 1 TABLET IN THE EVENING DAILY 90 tablet 5   • rOPINIRole (REQUIP) 2 MG tablet Take 2 mg by mouth 2 (Two) Times a Day.     • torsemide (DEMADEX) 20 MG tablet TAKE 2 TABLETS BY MOUTH TWICE A  tablet 1   • TROSPIUM CHLORIDE PO Take 20 mg by mouth 2 (Two) Times a Day.     • O2 (OXYGEN) Inhale 2 L/min 1 (One) Time.       No facility-administered medications prior to visit.        Allergies as of 08/03/2020 - Reviewed 08/03/2020   Allergen Reaction Noted   • Penicillins Rash 02/26/2013     Social History     Socioeconomic History   • Marital status:      Spouse name: Not on file   • Number of children: Not on file   • Years of education: Not on file   • Highest education level: Not on file   Tobacco Use   • Smoking status: Former Smoker     Packs/day: 0.50     Types: Cigarettes     Start date: 10/3/2019   • Smokeless tobacco: Former User   • Tobacco comment: LAST CIG NOV 02, 2019   Substance and Sexual Activity   • Alcohol use: No     Frequency: Never   • Drug use: No   • Sexual activity: Defer   Lifestyle   • Physical activity:     Days per week: 0 days     Minutes per session: 0 min   • Stress: Not on file     "    Family History   Problem Relation Age of Onset   • Lung cancer Mother        Review of Systems   Constitution: Positive for malaise/fatigue (Chronic, not new or worsening).   HENT:        NC O2   Cardiovascular: Positive for leg swelling (Occasional trace left ankle by end of the day, much improved). Negative for chest pain, palpitations and syncope.   Respiratory: Positive for shortness of breath (Chronic, unchanged since last visit).    Hematologic/Lymphatic: Negative for bleeding problem.   Neurological: Positive for dizziness (With rapid position changes).   Psychiatric/Behavioral: Negative for altered mental status.          Objective:     Vitals:    08/03/20 1207   BP: 142/72   Pulse: 59   SpO2: 98%   Weight: 69.4 kg (153 lb)   Height: 154.9 cm (61\")     Body mass index is 28.91 kg/m².      PHYSICAL EXAM:  Physical Exam   Constitutional: She is oriented to person, place, and time. She appears well-developed and well-nourished. No distress.   HENT:   Head: Normocephalic and atraumatic.   NC O2 in place   Eyes: Pupils are equal, round, and reactive to light.   Neck: Neck supple. No JVD present. Carotid bruit is not present.   Cardiovascular: Normal rate, regular rhythm, normal heart sounds and intact distal pulses. Exam reveals no gallop and no friction rub.   No murmur heard.  Pulses:       Radial pulses are 2+ on the right side, and 2+ on the left side.        Posterior tibial pulses are 2+ on the right side, and 2+ on the left side.   Pulmonary/Chest: Effort normal and breath sounds normal. No respiratory distress. She has no wheezes. She has no rales.   Abdominal: Soft. Bowel sounds are normal. She exhibits no distension.   Musculoskeletal: She exhibits no edema, tenderness or deformity.   Neurological: She is alert and oriented to person, place, and time.   Skin: Skin is warm and dry. She is not diaphoretic. No erythema.   Psychiatric: She has a normal mood and affect. Her behavior is normal. Judgment " normal.           ECG 12 Lead  Date/Time: 8/3/2020 12:14 PM  Performed by: Billie Kraus DNP, GENET  Authorized by: Billie Kraus DNP, APRN   Comparison: compared with previous ECG from 3/1/2020  Similar to previous ECG  Comparison to previous ECG: HR slightly higher on EKG today compared with previous  Rhythm: sinus rhythm  Rate: bradycardic  BPM: 59  Comments: No significant changes from previous EKG              Assessment:       Diagnosis Plan   1. PAF (paroxysmal atrial fibrillation) (CMS/HCC)     2. Bilateral lower extremity edema     3. Mitral valve vegetation     4. Intermittent lightheadedness           Plan:     1. Lightheadedness: Occurring more often with position changes.  Edema has all but resolved.  Weight has remained stable and shortness of breath improved.  At this point I think that she is starting to get a little bit of dry on her current torsemide dose.  We will decrease torsemide and potassium to 2 tablets each in the morning.  Check daily weights, change positions slowly, monitor swelling and shortness of breath symptoms closely.  Call on Friday with an update or call sooner for weight gain of 2 or more pounds in 1 day, 5 more pounds in a week, or increased swelling or shortness of breath.  Discussed in detail with patient she verbalized understanding.  If she remains stable on decreased dose and we will look at checking a BMP in approximately 2 weeks.  2. Elevated blood pressure reading: Recommended that she check blood pressure at home daily for the next week at least 1 to 2 hours after morning medications and after resting calmly about 10 minutes, keep a log, and provide updated readings on Friday when she calls in.  3. Paroxysmal atrial fibrillation: On Eliquis.  Cardioversion done 11/15/2019.  Maintaining sinus rhythm.  Denies falls or abnormal bleeding.  She is currently in the donut hole for Eliquis but she is not out yet.  I asked that she call [phone] to see if she is  eligible for reduced pricing.  If not and if she is not able to afford the donut hole pricing then she will call her insurance company and see if they will cover Xarelto.  If not and unable to afford them may need to switch to warfarin though discussed that this would require more frequent blood draws and dietary restrictions.  We are out of Eliquis samples today but she will call back on Friday to see if we have received more.  4. Mitral valve vegetation: Status post antibiotics.  Repeat REDD 1/20 showed no change to mobile mass on the MV annulus.  Dr. Briones did not recommend surgery.  Patient had a repeat echocardiogram today--- results still pending.  5. Lower extremity edema: BNP was elevated at the end of February.  Patient was changed to torsemide.  Edema improved and breathing improved.  6. COPD on home oxygen.    Patient to follow-up with Dr. Tinajero in 6 months or sooner if needed for any new, recurrent, or worsening symptoms or other issues/concerns.           Your medication list           Accurate as of August 3, 2020 12:44 PM. If you have any questions, ask your nurse or doctor.               CONTINUE taking these medications      Instructions Last Dose Given Next Dose Due   budesonide 0.5 MG/2ML nebulizer solution  Commonly known as:  PULMICORT      Take 0.5 mg by nebulization Daily.       Eliquis 5 MG tablet tablet  Generic drug:  apixaban      TAKE 1 TABLET BY MOUTH EVERY 12 HOURS       famotidine 20 MG tablet  Commonly known as:  PEPCID      Take 1 tablet by mouth 2 (Two) Times a Day Before Meals.       ipratropium 0.02 % nebulizer solution  Commonly known as:  ATROVENT      Take 500 mcg by nebulization 4 (Four) Times a Day.       metoprolol tartrate 100 MG tablet  Commonly known as:  LOPRESSOR      Take 1 tablet by mouth 2 (Two) Times a Day.       O2  Commonly known as:  OXYGEN      Inhale 2 L/min 1 (One) Time.       potassium chloride 10 MEQ CR tablet  Commonly known as:  K-DUR      TAKE 2 TABLETS BY  MOUTH EVERY MORNING AND 1 TABLET IN THE EVENING DAILY       rOPINIRole 2 MG tablet  Commonly known as:  REQUIP      Take 2 mg by mouth 2 (Two) Times a Day.       torsemide 20 MG tablet  Commonly known as:  DEMADEX      TAKE 2 TABLETS BY MOUTH TWICE A DAY       TROSPIUM CHLORIDE PO      Take 20 mg by mouth 2 (Two) Times a Day.              The above medication changes may not have been made by this provider.  Patient's medication list was updated to reflect medications they are currently taking including medication changes made by other providers.        Thanks,    Billie Kraus, RICO, APRN  08/03/2020         Dictated utilizing Dragon dictation

## 2020-08-04 LAB
ASCENDING AORTA: 2.9 CM
BH CV ECHO MEAS - ACS: 1.3 CM
BH CV ECHO MEAS - AO MAX PG (FULL): 3.2 MMHG
BH CV ECHO MEAS - AO MAX PG: 7.4 MMHG
BH CV ECHO MEAS - AO MEAN PG (FULL): 2 MMHG
BH CV ECHO MEAS - AO MEAN PG: 4 MMHG
BH CV ECHO MEAS - AO ROOT AREA (BSA CORRECTED): 1.4
BH CV ECHO MEAS - AO ROOT AREA: 4.5 CM^2
BH CV ECHO MEAS - AO ROOT DIAM: 2.4 CM
BH CV ECHO MEAS - AO V2 MAX: 136 CM/SEC
BH CV ECHO MEAS - AO V2 MEAN: 96.6 CM/SEC
BH CV ECHO MEAS - AO V2 VTI: 37 CM
BH CV ECHO MEAS - ASC AORTA: 2.9 CM
BH CV ECHO MEAS - AVA(I,A): 1.8 CM^2
BH CV ECHO MEAS - AVA(I,D): 1.8 CM^2
BH CV ECHO MEAS - AVA(V,A): 2.1 CM^2
BH CV ECHO MEAS - AVA(V,D): 2.1 CM^2
BH CV ECHO MEAS - BSA(HAYCOCK): 1.7 M^2
BH CV ECHO MEAS - BSA: 1.7 M^2
BH CV ECHO MEAS - BZI_BMI: 28.2 KILOGRAMS/M^2
BH CV ECHO MEAS - BZI_METRIC_HEIGHT: 154.9 CM
BH CV ECHO MEAS - BZI_METRIC_WEIGHT: 67.6 KG
BH CV ECHO MEAS - EDV(MOD-SP2): 48 ML
BH CV ECHO MEAS - EDV(MOD-SP4): 51 ML
BH CV ECHO MEAS - EDV(TEICH): 74.2 ML
BH CV ECHO MEAS - EF(CUBED): 79.9 %
BH CV ECHO MEAS - EF(MOD-BP): 66 %
BH CV ECHO MEAS - EF(MOD-SP2): 68.8 %
BH CV ECHO MEAS - EF(MOD-SP4): 64.7 %
BH CV ECHO MEAS - EF(TEICH): 72.8 %
BH CV ECHO MEAS - ESV(MOD-SP2): 15 ML
BH CV ECHO MEAS - ESV(MOD-SP4): 18 ML
BH CV ECHO MEAS - ESV(TEICH): 20.2 ML
BH CV ECHO MEAS - FS: 41.5 %
BH CV ECHO MEAS - IVS/LVPW: 1
BH CV ECHO MEAS - IVSD: 0.9 CM
BH CV ECHO MEAS - LAT PEAK E' VEL: 6.6 CM/SEC
BH CV ECHO MEAS - LV DIASTOLIC VOL/BSA (35-75): 30.6 ML/M^2
BH CV ECHO MEAS - LV MASS(C)D: 114.1 GRAMS
BH CV ECHO MEAS - LV MASS(C)DI: 68.5 GRAMS/M^2
BH CV ECHO MEAS - LV MAX PG: 4.2 MMHG
BH CV ECHO MEAS - LV MEAN PG: 2 MMHG
BH CV ECHO MEAS - LV SYSTOLIC VOL/BSA (12-30): 10.8 ML/M^2
BH CV ECHO MEAS - LV V1 MAX: 102 CM/SEC
BH CV ECHO MEAS - LV V1 MEAN: 76.4 CM/SEC
BH CV ECHO MEAS - LV V1 VTI: 23.9 CM
BH CV ECHO MEAS - LVIDD: 4.1 CM
BH CV ECHO MEAS - LVIDS: 2.4 CM
BH CV ECHO MEAS - LVLD AP2: 6.8 CM
BH CV ECHO MEAS - LVLD AP4: 6.9 CM
BH CV ECHO MEAS - LVLS AP2: 6 CM
BH CV ECHO MEAS - LVLS AP4: 5.6 CM
BH CV ECHO MEAS - LVOT AREA (M): 2.8 CM^2
BH CV ECHO MEAS - LVOT AREA: 2.8 CM^2
BH CV ECHO MEAS - LVOT DIAM: 1.9 CM
BH CV ECHO MEAS - LVPWD: 0.9 CM
BH CV ECHO MEAS - MED PEAK E' VEL: 5.6 CM/SEC
BH CV ECHO MEAS - MR MAX PG: 57.2 MMHG
BH CV ECHO MEAS - MR MAX VEL: 378 CM/SEC
BH CV ECHO MEAS - MV A DUR: 0.11 SEC
BH CV ECHO MEAS - MV A MAX VEL: 156 CM/SEC
BH CV ECHO MEAS - MV DEC SLOPE: 316 CM/SEC^2
BH CV ECHO MEAS - MV DEC TIME: 0.36 SEC
BH CV ECHO MEAS - MV E MAX VEL: 146 CM/SEC
BH CV ECHO MEAS - MV E/A: 0.94
BH CV ECHO MEAS - MV MAX PG: 11 MMHG
BH CV ECHO MEAS - MV MEAN PG: 4 MMHG
BH CV ECHO MEAS - MV P1/2T MAX VEL: 143 CM/SEC
BH CV ECHO MEAS - MV P1/2T: 132.5 MSEC
BH CV ECHO MEAS - MV V2 MAX: 166 CM/SEC
BH CV ECHO MEAS - MV V2 MEAN: 98.8 CM/SEC
BH CV ECHO MEAS - MV V2 VTI: 58.1 CM
BH CV ECHO MEAS - MVA P1/2T LCG: 1.5 CM^2
BH CV ECHO MEAS - MVA(P1/2T): 1.7 CM^2
BH CV ECHO MEAS - MVA(VTI): 1.2 CM^2
BH CV ECHO MEAS - PA ACC TIME: 0.13 SEC
BH CV ECHO MEAS - PA MAX PG (FULL): 0.86 MMHG
BH CV ECHO MEAS - PA MAX PG: 1.9 MMHG
BH CV ECHO MEAS - PA PR(ACCEL): 19.6 MMHG
BH CV ECHO MEAS - PA V2 MAX: 68.8 CM/SEC
BH CV ECHO MEAS - PULM A REVS DUR: 0.11 SEC
BH CV ECHO MEAS - PULM A REVS VEL: 23.8 CM/SEC
BH CV ECHO MEAS - PULM DIAS VEL: 37.3 CM/SEC
BH CV ECHO MEAS - PULM S/D: 1.6
BH CV ECHO MEAS - PULM SYS VEL: 59.2 CM/SEC
BH CV ECHO MEAS - PVA(V,A): 1.5 CM^2
BH CV ECHO MEAS - PVA(V,D): 1.5 CM^2
BH CV ECHO MEAS - QP/QS: 0.37
BH CV ECHO MEAS - RAP SYSTOLE: 3 MMHG
BH CV ECHO MEAS - RV MAX PG: 1 MMHG
BH CV ECHO MEAS - RV MEAN PG: 1 MMHG
BH CV ECHO MEAS - RV V1 MAX: 50.9 CM/SEC
BH CV ECHO MEAS - RV V1 MEAN: 38.8 CM/SEC
BH CV ECHO MEAS - RV V1 VTI: 12.6 CM
BH CV ECHO MEAS - RVOT AREA: 2 CM^2
BH CV ECHO MEAS - RVOT DIAM: 1.6 CM
BH CV ECHO MEAS - RVSP: 35 MMHG
BH CV ECHO MEAS - SI(AO): 100.4 ML/M^2
BH CV ECHO MEAS - SI(CUBED): 33.1 ML/M^2
BH CV ECHO MEAS - SI(LVOT): 40.7 ML/M^2
BH CV ECHO MEAS - SI(MOD-SP2): 19.8 ML/M^2
BH CV ECHO MEAS - SI(MOD-SP4): 19.8 ML/M^2
BH CV ECHO MEAS - SI(TEICH): 32.4 ML/M^2
BH CV ECHO MEAS - SUP REN AO DIAM: 1.5 CM
BH CV ECHO MEAS - SV(AO): 167.4 ML
BH CV ECHO MEAS - SV(CUBED): 55.1 ML
BH CV ECHO MEAS - SV(LVOT): 67.8 ML
BH CV ECHO MEAS - SV(MOD-SP2): 33 ML
BH CV ECHO MEAS - SV(MOD-SP4): 33 ML
BH CV ECHO MEAS - SV(RVOT): 25.3 ML
BH CV ECHO MEAS - SV(TEICH): 54.1 ML
BH CV ECHO MEAS - TAPSE (>1.6): 2 CM
BH CV ECHO MEAS - TR MAX VEL: 282 CM/SEC
BH CV ECHO MEASUREMENTS AVERAGE E/E' RATIO: 23.93
BH CV XLRA - RV BASE: 2.9 CM
BH CV XLRA - RV LENGTH: 5.4 CM
BH CV XLRA - RV MID: 2.4 CM
BH CV XLRA - TDI S': 12 CM/SEC
LEFT ATRIUM VOLUME INDEX: 35 ML/M2
LV EF 2D ECHO EST: 66 %
MAXIMAL PREDICTED HEART RATE: 150 BPM
SINUS: 2.8 CM
STJ: 2.5 CM
STRESS TARGET HR: 128 BPM

## 2020-08-07 ENCOUNTER — TELEPHONE (OUTPATIENT)
Dept: CARDIOLOGY | Facility: CLINIC | Age: 70
End: 2020-08-07

## 2020-08-07 DIAGNOSIS — R09.89 OTHER SPECIFIED SYMPTOMS AND SIGNS INVOLVING THE CIRCULATORY AND RESPIRATORY SYSTEMS: ICD-10-CM

## 2020-08-07 DIAGNOSIS — G25.81 RESTLESS LEG: ICD-10-CM

## 2020-08-07 DIAGNOSIS — R20.2 PARESTHESIAS: Primary | ICD-10-CM

## 2020-08-07 NOTE — TELEPHONE ENCOUNTER
Patient calling in her weight and BP as you had ask her to  Tue 8/4/20 133/64 p 105 wt 151  Wed 8/5/20 121/66 p 84 wt 151  Thur 8/6/20  101/63 p 70 wt 151  fri  8/7/20  112/65 p 73 wt 153    Patient said she feels about the same  She states she has left leg swelling and numbness for about a month.    [phone]

## 2020-08-10 NOTE — TELEPHONE ENCOUNTER
I called and spoke with patient.  She reports wt this AM 152lb.  No worsening swelling (only trace dependent ankle by end of day) or shortness of breath.  She continues to have some lightheadedness however now she reports that she feels it is occurring mostly when she is walking.  She has not checked her oxygen levels with walking.  She denies any chest pain/discomfort or increased shortness of breath.  At this point I asked her to check her oxygen levels when she is walking to ensure that they are not getting low and follow-up with her pulmonologist given her chronic oxygen use.  She denies any anginal symptoms or concerns and she also had a recent heart cath that looked good.  I also recommended that when she follows up with her pulmonologist she discuss possible sleep apnea testing given history of LVH/ diastolic dysfunction.  She reports a history of restless leg syndrome that PCP managnig and the tingling symptoms are happening at night.  She follows with her primary care provider on this.  To follow-up on this as she feels that symptoms have worsened.  We will also go ahead and check some ABIs.  She will call Friday with update heart rate, blood pressure, and weights or sooner if she develops issues or concerns.

## 2020-08-13 ENCOUNTER — HOSPITAL ENCOUNTER (OUTPATIENT)
Dept: CARDIOLOGY | Facility: HOSPITAL | Age: 70
Discharge: HOME | End: 2020-08-13
Admitting: NURSE PRACTITIONER

## 2020-08-13 ENCOUNTER — TELEPHONE (OUTPATIENT)
Dept: CARDIOLOGY | Facility: CLINIC | Age: 70
End: 2020-08-13

## 2020-08-13 DIAGNOSIS — G25.81 RESTLESS LEG: ICD-10-CM

## 2020-08-13 DIAGNOSIS — R09.89 OTHER SPECIFIED SYMPTOMS AND SIGNS INVOLVING THE CIRCULATORY AND RESPIRATORY SYSTEMS: ICD-10-CM

## 2020-08-13 DIAGNOSIS — R20.2 PARESTHESIAS: ICD-10-CM

## 2020-08-13 LAB
BH CV LOWER ARTERIAL LEFT ABI RATIO: 0.98
BH CV LOWER ARTERIAL LEFT HIGH THIGH SYS MAX: 134 MMHG
BH CV LOWER ARTERIAL LEFT POPLITEAL SYS MAX: 129 MMHG
BH CV LOWER ARTERIAL LEFT POST TIBIAL SYS MAX: 117 MMHG
BH CV LOWER ARTERIAL RIGHT ABI RATIO: 1.15
BH CV LOWER ARTERIAL RIGHT HIGH THIGH SYS MAX: 144 MMHG
BH CV LOWER ARTERIAL RIGHT POPLITEAL SYS MAX: 143 MMHG
BH CV LOWER ARTERIAL RIGHT POST TIBIAL SYS MAX: 138 MMHG
UPPER ARTERIAL LEFT ARM BRACHIAL SYS MAX: 119 MMHG
UPPER ARTERIAL RIGHT ARM BRACHIAL SYS MAX: 120 MMHG

## 2020-08-13 PROCEDURE — 93923 UPR/LXTR ART STDY 3+ LVLS: CPT | Performed by: INTERNAL MEDICINE

## 2020-08-13 PROCEDURE — 93922 UPR/L XTREMITY ART 2 LEVELS: CPT

## 2020-08-13 PROCEDURE — 93923 UPR/LXTR ART STDY 3+ LVLS: CPT

## 2020-08-13 NOTE — TELEPHONE ENCOUNTER
Please let patient know that her arterial ultrasounds looked normal.  She needs to follow-up with her primary care provider on her chronic restless leg symptoms.

## 2020-08-17 NOTE — TELEPHONE ENCOUNTER
8/17/20  I spoke with patient - gave her this information.  She voiced her understanding/elizabeth

## 2020-09-02 DIAGNOSIS — I50.33 ACUTE ON CHRONIC DIASTOLIC CHF (CONGESTIVE HEART FAILURE) (HCC): ICD-10-CM

## 2020-09-04 RX ORDER — FUROSEMIDE 40 MG/1
TABLET ORAL
Qty: 90 TABLET | Refills: 0 | OUTPATIENT
Start: 2020-09-04

## 2020-09-25 ENCOUNTER — HOSPITAL ENCOUNTER (OUTPATIENT)
Dept: CARDIOLOGY | Facility: HOSPITAL | Age: 70
Discharge: HOME | End: 2020-09-25
Admitting: THORACIC SURGERY (CARDIOTHORACIC VASCULAR SURGERY)

## 2020-09-25 VITALS
DIASTOLIC BLOOD PRESSURE: 71 MMHG | SYSTOLIC BLOOD PRESSURE: 152 MMHG | BODY MASS INDEX: 28.89 KG/M2 | WEIGHT: 153 LBS | HEIGHT: 61 IN | HEART RATE: 63 BPM

## 2020-09-25 DIAGNOSIS — I33.0 MITRAL VALVE VEGETATION: ICD-10-CM

## 2020-09-25 DIAGNOSIS — I05.9 ENDOCARDITIS OF MITRAL VALVE: ICD-10-CM

## 2020-09-25 LAB
AORTIC ARCH: 2.2 CM
ASCENDING AORTA: 2.6 CM
BH CV ECHO MEAS - ACS: 1.4 CM
BH CV ECHO MEAS - AO MAX PG (FULL): 9.1 MMHG
BH CV ECHO MEAS - AO MAX PG: 12.8 MMHG
BH CV ECHO MEAS - AO MEAN PG (FULL): 4 MMHG
BH CV ECHO MEAS - AO MEAN PG: 6 MMHG
BH CV ECHO MEAS - AO ROOT AREA (BSA CORRECTED): 1.4
BH CV ECHO MEAS - AO ROOT AREA: 4.5 CM^2
BH CV ECHO MEAS - AO ROOT DIAM: 2.4 CM
BH CV ECHO MEAS - AO V2 MAX: 179 CM/SEC
BH CV ECHO MEAS - AO V2 MEAN: 111 CM/SEC
BH CV ECHO MEAS - AO V2 VTI: 40.3 CM
BH CV ECHO MEAS - ASC AORTA: 2.6 CM
BH CV ECHO MEAS - AVA(I,A): 1.4 CM^2
BH CV ECHO MEAS - AVA(I,D): 1.4 CM^2
BH CV ECHO MEAS - AVA(V,A): 1.7 CM^2
BH CV ECHO MEAS - AVA(V,D): 1.7 CM^2
BH CV ECHO MEAS - BSA(HAYCOCK): 1.8 M^2
BH CV ECHO MEAS - BSA: 1.7 M^2
BH CV ECHO MEAS - BZI_BMI: 28.9 KILOGRAMS/M^2
BH CV ECHO MEAS - BZI_METRIC_HEIGHT: 154.9 CM
BH CV ECHO MEAS - BZI_METRIC_WEIGHT: 69.4 KG
BH CV ECHO MEAS - EDV(CUBED): 32.8 ML
BH CV ECHO MEAS - EDV(MOD-SP2): 43 ML
BH CV ECHO MEAS - EDV(MOD-SP4): 57 ML
BH CV ECHO MEAS - EDV(TEICH): 41 ML
BH CV ECHO MEAS - EF(CUBED): 67.5 %
BH CV ECHO MEAS - EF(MOD-BP): 64 %
BH CV ECHO MEAS - EF(MOD-SP2): 60.5 %
BH CV ECHO MEAS - EF(MOD-SP4): 66.7 %
BH CV ECHO MEAS - EF(TEICH): 60.4 %
BH CV ECHO MEAS - ESV(CUBED): 10.6 ML
BH CV ECHO MEAS - ESV(MOD-SP2): 17 ML
BH CV ECHO MEAS - ESV(MOD-SP4): 19 ML
BH CV ECHO MEAS - ESV(TEICH): 16.2 ML
BH CV ECHO MEAS - FS: 31.3 %
BH CV ECHO MEAS - IVS/LVPW: 0.91
BH CV ECHO MEAS - IVSD: 1 CM
BH CV ECHO MEAS - LAT PEAK E' VEL: 5.3 CM/SEC
BH CV ECHO MEAS - LV DIASTOLIC VOL/BSA (35-75): 33.8 ML/M^2
BH CV ECHO MEAS - LV MASS(C)D: 97.2 GRAMS
BH CV ECHO MEAS - LV MASS(C)DI: 57.7 GRAMS/M^2
BH CV ECHO MEAS - LV MAX PG: 3.8 MMHG
BH CV ECHO MEAS - LV MEAN PG: 2 MMHG
BH CV ECHO MEAS - LV SYSTOLIC VOL/BSA (12-30): 11.3 ML/M^2
BH CV ECHO MEAS - LV V1 MAX: 97 CM/SEC
BH CV ECHO MEAS - LV V1 MEAN: 64.3 CM/SEC
BH CV ECHO MEAS - LV V1 VTI: 18.2 CM
BH CV ECHO MEAS - LVIDD: 3.2 CM
BH CV ECHO MEAS - LVIDS: 2.2 CM
BH CV ECHO MEAS - LVLD AP2: 6.1 CM
BH CV ECHO MEAS - LVLD AP4: 6.3 CM
BH CV ECHO MEAS - LVLS AP2: 5 CM
BH CV ECHO MEAS - LVLS AP4: 5.4 CM
BH CV ECHO MEAS - LVOT AREA (M): 3.1 CM^2
BH CV ECHO MEAS - LVOT AREA: 3.1 CM^2
BH CV ECHO MEAS - LVOT DIAM: 2 CM
BH CV ECHO MEAS - LVPWD: 1.1 CM
BH CV ECHO MEAS - MED PEAK E' VEL: 3.9 CM/SEC
BH CV ECHO MEAS - MR MAX PG: 62.7 MMHG
BH CV ECHO MEAS - MR MAX VEL: 396 CM/SEC
BH CV ECHO MEAS - MV A DUR: 0.13 SEC
BH CV ECHO MEAS - MV A MAX VEL: 153 CM/SEC
BH CV ECHO MEAS - MV DEC SLOPE: 300 CM/SEC^2
BH CV ECHO MEAS - MV DEC TIME: 0.42 SEC
BH CV ECHO MEAS - MV E MAX VEL: 154 CM/SEC
BH CV ECHO MEAS - MV E/A: 1
BH CV ECHO MEAS - MV MAX PG: 12.4 MMHG
BH CV ECHO MEAS - MV MEAN PG: 5 MMHG
BH CV ECHO MEAS - MV P1/2T MAX VEL: 155 CM/SEC
BH CV ECHO MEAS - MV P1/2T: 151.3 MSEC
BH CV ECHO MEAS - MV V2 MAX: 176 CM/SEC
BH CV ECHO MEAS - MV V2 MEAN: 106 CM/SEC
BH CV ECHO MEAS - MV V2 VTI: 71.8 CM
BH CV ECHO MEAS - MVA P1/2T LCG: 1.4 CM^2
BH CV ECHO MEAS - MVA(P1/2T): 1.5 CM^2
BH CV ECHO MEAS - MVA(VTI): 0.8 CM^2
BH CV ECHO MEAS - PA ACC TIME: 0.13 SEC
BH CV ECHO MEAS - PA MAX PG (FULL): 0.38 MMHG
BH CV ECHO MEAS - PA MAX PG: 1.7 MMHG
BH CV ECHO MEAS - PA PR(ACCEL): 21 MMHG
BH CV ECHO MEAS - PA V2 MAX: 65.2 CM/SEC
BH CV ECHO MEAS - PULM A REVS DUR: 0.14 SEC
BH CV ECHO MEAS - PULM A REVS VEL: 17.5 CM/SEC
BH CV ECHO MEAS - PULM DIAS VEL: 37.2 CM/SEC
BH CV ECHO MEAS - PULM S/D: 1.8
BH CV ECHO MEAS - PULM SYS VEL: 65.1 CM/SEC
BH CV ECHO MEAS - PVA(V,A): 2.8 CM^2
BH CV ECHO MEAS - PVA(V,D): 2.8 CM^2
BH CV ECHO MEAS - QP/QS: 0.84
BH CV ECHO MEAS - RAP SYSTOLE: 8 MMHG
BH CV ECHO MEAS - RV MAX PG: 1.3 MMHG
BH CV ECHO MEAS - RV MEAN PG: 1 MMHG
BH CV ECHO MEAS - RV V1 MAX: 57.4 CM/SEC
BH CV ECHO MEAS - RV V1 MEAN: 41.1 CM/SEC
BH CV ECHO MEAS - RV V1 VTI: 15.3 CM
BH CV ECHO MEAS - RVOT AREA: 3.1 CM^2
BH CV ECHO MEAS - RVOT DIAM: 2 CM
BH CV ECHO MEAS - RVSP: 41.4 MMHG
BH CV ECHO MEAS - SI(AO): 108.1 ML/M^2
BH CV ECHO MEAS - SI(CUBED): 13.1 ML/M^2
BH CV ECHO MEAS - SI(LVOT): 33.9 ML/M^2
BH CV ECHO MEAS - SI(MOD-SP2): 15.4 ML/M^2
BH CV ECHO MEAS - SI(MOD-SP4): 22.5 ML/M^2
BH CV ECHO MEAS - SI(TEICH): 14.7 ML/M^2
BH CV ECHO MEAS - SUP REN AO DIAM: 1.8 CM
BH CV ECHO MEAS - SV(AO): 182.3 ML
BH CV ECHO MEAS - SV(CUBED): 22.1 ML
BH CV ECHO MEAS - SV(LVOT): 57.2 ML
BH CV ECHO MEAS - SV(MOD-SP2): 26 ML
BH CV ECHO MEAS - SV(MOD-SP4): 38 ML
BH CV ECHO MEAS - SV(RVOT): 48.1 ML
BH CV ECHO MEAS - SV(TEICH): 24.8 ML
BH CV ECHO MEAS - TAPSE (>1.6): 2 CM
BH CV ECHO MEAS - TR MAX VEL: 289 CM/SEC
BH CV ECHO MEASUREMENTS AVERAGE E/E' RATIO: 33.48
BH CV XLRA - RV BASE: 2.6 CM
BH CV XLRA - RV LENGTH: 5.9 CM
BH CV XLRA - RV MID: 2.9 CM
BH CV XLRA - TDI S': 11 CM/SEC
LEFT ATRIUM VOLUME INDEX: 33 ML/M2
MAXIMAL PREDICTED HEART RATE: 150 BPM
SINUS: 2.4 CM
STJ: 2.7 CM
STRESS TARGET HR: 128 BPM

## 2020-09-25 PROCEDURE — 93306 TTE W/DOPPLER COMPLETE: CPT | Performed by: INTERNAL MEDICINE

## 2020-09-25 PROCEDURE — 93306 TTE W/DOPPLER COMPLETE: CPT

## 2020-10-22 ENCOUNTER — OFFICE VISIT (OUTPATIENT)
Dept: FAMILY MEDICINE CLINIC | Facility: CLINIC | Age: 70
End: 2020-10-22

## 2020-10-22 VITALS
DIASTOLIC BLOOD PRESSURE: 80 MMHG | OXYGEN SATURATION: 98 % | SYSTOLIC BLOOD PRESSURE: 132 MMHG | HEIGHT: 61 IN | BODY MASS INDEX: 29.53 KG/M2 | HEART RATE: 64 BPM | TEMPERATURE: 97.7 F | WEIGHT: 156.4 LBS

## 2020-10-22 DIAGNOSIS — M54.42 CHRONIC MIDLINE LOW BACK PAIN WITH LEFT-SIDED SCIATICA: Primary | ICD-10-CM

## 2020-10-22 DIAGNOSIS — J96.11 CHRONIC RESPIRATORY FAILURE WITH HYPOXIA (HCC): ICD-10-CM

## 2020-10-22 DIAGNOSIS — Z99.81 OXYGEN DEPENDENT: ICD-10-CM

## 2020-10-22 DIAGNOSIS — G89.29 CHRONIC MIDLINE LOW BACK PAIN WITH LEFT-SIDED SCIATICA: Primary | ICD-10-CM

## 2020-10-22 DIAGNOSIS — J44.9 CHRONIC OBSTRUCTIVE PULMONARY DISEASE, UNSPECIFIED COPD TYPE (HCC): ICD-10-CM

## 2020-10-22 PROBLEM — F32.A DEPRESSION: Status: ACTIVE | Noted: 2020-10-22

## 2020-10-22 PROBLEM — N20.0 NEPHROLITHIASIS: Status: ACTIVE | Noted: 2020-10-22

## 2020-10-22 PROCEDURE — 99204 OFFICE O/P NEW MOD 45 MIN: CPT | Performed by: INTERNAL MEDICINE

## 2020-10-22 PROCEDURE — 90694 VACC AIIV4 NO PRSRV 0.5ML IM: CPT | Performed by: INTERNAL MEDICINE

## 2020-10-22 PROCEDURE — G0008 ADMIN INFLUENZA VIRUS VAC: HCPCS | Performed by: INTERNAL MEDICINE

## 2020-10-22 RX ORDER — ALBUTEROL SULFATE 90 UG/1
2 AEROSOL, METERED RESPIRATORY (INHALATION)
COMMUNITY
Start: 2020-10-21

## 2020-10-22 RX ORDER — BUPROPION HYDROCHLORIDE 300 MG/1
300 TABLET ORAL DAILY
COMMUNITY
End: 2021-04-12 | Stop reason: SDUPTHER

## 2020-10-22 RX ORDER — MELATONIN
2000 DAILY
COMMUNITY

## 2020-10-22 RX ORDER — HYDROCODONE BITARTRATE AND ACETAMINOPHEN 7.5; 325 MG/1; MG/1
1 TABLET ORAL EVERY 6 HOURS PRN
Qty: 20 TABLET | Refills: 0 | Status: SHIPPED | OUTPATIENT
Start: 2020-10-22 | End: 2020-10-23 | Stop reason: SDUPTHER

## 2020-10-22 RX ORDER — CYCLOBENZAPRINE HCL 10 MG
10 TABLET ORAL EVERY 8 HOURS PRN
COMMUNITY
Start: 2020-10-14 | End: 2020-10-22 | Stop reason: SDUPTHER

## 2020-10-22 RX ORDER — HYDROCODONE BITARTRATE AND ACETAMINOPHEN 7.5; 325 MG/1; MG/1
1 TABLET ORAL EVERY 6 HOURS PRN
COMMUNITY
End: 2020-10-22 | Stop reason: SDUPTHER

## 2020-10-22 RX ORDER — CYCLOBENZAPRINE HCL 10 MG
10 TABLET ORAL 3 TIMES DAILY PRN
Qty: 40 TABLET | Refills: 1 | Status: SHIPPED | OUTPATIENT
Start: 2020-10-22 | End: 2021-04-12 | Stop reason: SDUPTHER

## 2020-10-22 NOTE — PROGRESS NOTES
Subjective   Shani Phillip is a 70 y.o. female.     Chief Complaint   Patient presents with   • Flu Vaccine       History of Present Illness   This is a 70-year-old female new patient to our group but not to [facility] [place].  Has an extensive history of COPD, atrial fibrillation, history of CHF and endocarditis the end of 2019 (following a pneumonia found while in a cruise and admitted in [facility]) with a mobile mitral valve vegetation and mitral valve regurgitation.  She is currently establishing with our group.  No new issues.  Has been on oxygen 2 L continuous.    Previously had been seeing Dr Franks in [facility].  Has been on flexeril and hydrocodone 5/325 for chronic low back pain with radiculopathy.      The following portions of the patient's history were reviewed and updated as appropriate: allergies, current medications, past family history, past medical history, past social history, past surgical history and problem list.    Depression Screen:  PHQ-2/PHQ-9 Depression Screening 10/22/2020   Little interest or pleasure in doing things 0   Feeling down, depressed, or hopeless 0   Total Score 0       Past Medical History:   Diagnosis Date   • Acute endocarditis    • Atrial fibrillation with RVR (CMS/[facility]) 11/14/2019   • COPD (chronic obstructive pulmonary disease) (CMS/[facility])    • Influenza 11/14/2019   • Renal disorder    • Restless leg syndrome    • Thrush, oral 11/14/2019       Past Surgical History:   Procedure Laterality Date   • CARDIAC CATHETERIZATION N/A 1/23/2020    Procedure: Coronary angiography;  Surgeon: Svetlana Grayson MD;  Location:  [facility];  Service: Cardiovascular   • CARDIAC CATHETERIZATION N/A 1/23/2020    Procedure: Left ventriculography;  Surgeon: Svetlana Grayson MD;  Location:  [facility];  Service: Cardiovascular   • CARDIAC CATHETERIZATION N/A 1/23/2020    Procedure: Left Heart Cath;  Surgeon: Svetlana Grayson MD;  Location:  [facility];  Service: Cardiovascular   • CHOLECYSTECTOMY     • KNEE ARTHROPLASTY Right    • LAPAROSCOPIC GASTRIC BANDING         Family History   Problem Relation Age of Onset   • Lung cancer Mother        Social History     Socioeconomic History   • Marital status:      Spouse name: Not on file   • Number of children: Not on file   • Years of education: Not on file   • Highest education level: Not on file   Tobacco Use   • Smoking status: Former Smoker     Packs/day: 0.50     Types: Cigarettes     Start date: 10/3/2019   • Smokeless tobacco: Former User   • Tobacco comment: LAST CIG NOV 02, 2019   Substance and Sexual Activity   • Alcohol use: No     Frequency: Never   • Drug use: No   • Sexual activity: Defer   Lifestyle   • Physical activity     Days per week: 0 days     Minutes per session: 0 min   • Stress: Not on file       Current Outpatient Medications   Medication Sig Dispense Refill   • budesonide (PULMICORT) 0.5 MG/2ML nebulizer solution Take 0.5 mg by nebulization Daily.     • buPROPion XL (WELLBUTRIN XL) 300 MG 24 hr tablet Take 300 mg by mouth Daily.     • cholecalciferol (VITAMIN D3) 25 MCG (1000 UT) tablet Take 2,000 Units by mouth Daily.     • cyclobenzaprine (FLEXERIL) 10 MG tablet Take 10 mg by mouth Every 8 (Eight) Hours As Needed.     • ELIQUIS 5 MG tablet tablet TAKE 1 TABLET BY MOUTH EVERY 12 HOURS 60 tablet 4   • famotidine (PEPCID) 20 MG tablet Take 1 tablet by mouth 2 (Two) Times a Day Before Meals. 180 tablet 3   • HYDROcodone-acetaminophen (NORCO) 7.5-325 MG per tablet Take 1 tablet by mouth Every 6 (Six) Hours As Needed for Moderate Pain .     • ipratropium (ATROVENT) 0.02 % nebulizer solution Take 500 mcg by nebulization 4 (Four) Times a Day.     • metoprolol tartrate (LOPRESSOR) 100 MG tablet Take 1 tablet by mouth 2 (Two) Times a Day. 60 tablet 11   • O2 (OXYGEN) Inhale 2 L/min 1 (One) Time.     • potassium chloride (K-DUR) 10 MEQ CR tablet TAKE 2 TABLETS BY MOUTH EVERY  "MORNING AND 1 TABLET IN THE EVENING DAILY 90 tablet 5   • rOPINIRole (REQUIP) 2 MG tablet Take 2 mg by mouth 2 (Two) Times a Day.     • torsemide (DEMADEX) 20 MG tablet TAKE 2 TABLETS BY MOUTH TWICE A  tablet 1   • TROSPIUM CHLORIDE PO Take 20 mg by mouth 2 (Two) Times a Day.     • Ventolin  (90 Base) MCG/ACT inhaler        No current facility-administered medications for this visit.        Review of Systems   Constitutional: Negative for activity change, appetite change, fatigue, fever, unexpected weight gain and unexpected weight loss.   HENT: Negative for nosebleeds, rhinorrhea, trouble swallowing and voice change.    Eyes: Negative for visual disturbance.   Respiratory: Negative for cough, chest tightness, shortness of breath and wheezing.    Cardiovascular: Negative for chest pain, palpitations and leg swelling.   Gastrointestinal: Negative for abdominal pain, blood in stool, constipation, diarrhea, nausea, vomiting, GERD and indigestion.   Genitourinary: Negative for dysuria, frequency and hematuria.   Musculoskeletal: Negative for arthralgias, back pain and myalgias.   Skin: Negative for rash and bruise.   Neurological: Negative for dizziness, tremors, weakness, light-headedness, numbness, headache and memory problem.   Hematological: Negative for adenopathy. Does not bruise/bleed easily.   Psychiatric/Behavioral: Negative for sleep disturbance and depressed mood. The patient is not nervous/anxious.        Objective   /80 (BP Location: Left arm, Patient Position: Sitting, Cuff Size: Adult)   Pulse 64   Temp 97.7 °F (36.5 °C) (Temporal)   Ht 154.9 cm (61\")   Wt 70.9 kg (156 lb 6.4 oz)   SpO2 98%   BMI 29.55 kg/m²     Physical Exam  Vitals signs and nursing note reviewed.   Constitutional:       General: She is not in acute distress.     Appearance: She is well-developed. She is not diaphoretic.   HENT:      Head: Normocephalic and atraumatic.      Right Ear: External ear normal.      " Left Ear: External ear normal.      Nose: Nose normal.   Eyes:      Conjunctiva/sclera: Conjunctivae normal.      Pupils: Pupils are equal, round, and reactive to light.   Neck:      Musculoskeletal: Normal range of motion and neck supple.      Thyroid: No thyromegaly.      Trachea: No tracheal deviation.   Cardiovascular:      Rate and Rhythm: Normal rate and regular rhythm.      Heart sounds: Normal heart sounds. No murmur. No friction rub. No gallop.    Pulmonary:      Effort: Pulmonary effort is normal. No respiratory distress.      Breath sounds: Normal breath sounds.      Comments: On 2 L oxygen with o2 concentrator  Abdominal:      General: Bowel sounds are normal.      Palpations: Abdomen is soft. There is no mass.      Tenderness: There is no abdominal tenderness. There is no guarding.   Musculoskeletal: Normal range of motion.   Lymphadenopathy:      Cervical: No cervical adenopathy.   Skin:     General: Skin is warm and dry.      Capillary Refill: Capillary refill takes less than 2 seconds.      Findings: No rash.   Neurological:      Mental Status: She is alert and oriented to person, place, and time.      Motor: No abnormal muscle tone.      Deep Tendon Reflexes: Reflexes normal.   Psychiatric:         Behavior: Behavior normal.         Thought Content: Thought content normal.         Judgment: Judgment normal.         Recent Results (from the past 2016 hour(s))   Adult Transthoracic Echo Complete W/ Cont if Necessary Per Protocol    Collection Time: 08/03/20 11:50 AM   Result Value Ref Range    BSA 1.7 m^2    IVSd 0.9 cm    LVIDd 4.1 cm    LVIDs 2.4 cm    LVPWd 0.9 cm    IVS/LVPW 1.0     FS 41.5 %    EDV(Teich) 74.2 ml    ESV(Teich) 20.2 ml    EF(Teich) 72.8 %    EF(cubed) 79.9 %    LV mass(C)d 114.1 grams    LV mass(C)dI 68.5 grams/m^2    SV(Teich) 54.1 ml    SI(Teich) 32.4 ml/m^2    SV(cubed) 55.1 ml    SI(cubed) 33.1 ml/m^2    Ao root diam 2.4 cm    Ao root area 4.5 cm^2    ACS 1.3 cm    asc Aorta  Diam 2.9 cm    LVOT diam 1.9 cm    LVOT area 2.8 cm^2    LVOT area(traced) 2.8 cm^2    RVOT diam 1.6 cm    RVOT area 2.0 cm^2    LVLd ap4 6.9 cm    EDV(MOD-sp4) 51.0 ml    LVLs ap4 5.6 cm    ESV(MOD-sp4) 18.0 ml    EF(MOD-sp4) 64.7 %    LVLd ap2 6.8 cm    EDV(MOD-sp2) 48.0 ml    LVLs ap2 6.0 cm    ESV(MOD-sp2) 15.0 ml    EF(MOD-sp2) 68.8 %    SV(MOD-sp4) 33.0 ml    SI(MOD-sp4) 19.8 ml/m^2    SV(MOD-sp2) 33.0 ml    SI(MOD-sp2) 19.8 ml/m^2    Ao root area (BSA corrected) 1.4     LV [place] Vol (BSA corrected) 30.6 ml/m^2    LV Sys Vol (BSA corrected) 10.8 ml/m^2    TAPSE (>1.6) 2.0 cm    EF(MOD-bp) 66.0 %    MV A dur 0.11 sec    MV E max shyam 146.0 cm/sec    MV A max shyam 156.0 cm/sec    MV E/A 0.94     MV V2 max 166.0 cm/sec    MV max PG 11.0 mmHg    MV V2 mean 98.8 cm/sec    MV mean PG 4.0 mmHg    MV V2 VTI 58.1 cm    MVA(VTI) 1.2 cm^2    MV P1/2t max shyam 143.0 cm/sec    MV P1/2t 132.5 msec    MVA(P1/2t) 1.7 cm^2    MV dec slope 316.0 cm/sec^2    MV dec time 0.36 sec    Ao pk shyam 136.0 cm/sec    Ao max PG 7.4 mmHg    Ao max PG (full) 3.2 mmHg    Ao V2 mean 96.6 cm/sec    Ao mean PG 4.0 mmHg    Ao mean PG (full) 2.0 mmHg    Ao V2 VTI 37.0 cm    VERENA(I,A) 1.8 cm^2    VERENA(I,D) 1.8 cm^2    VERENA(V,A) 2.1 cm^2    VERENA(V,D) 2.1 cm^2    LV V1 max PG 4.2 mmHg    LV V1 mean PG 2.0 mmHg    LV V1 max 102.0 cm/sec    LV V1 mean 76.4 cm/sec    LV V1 VTI 23.9 cm    MR max shyam 378.0 cm/sec    MR max PG 57.2 mmHg    SV(Ao) 167.4 ml    SI(Ao) 100.4 ml/m^2    SV(LVOT) 67.8 ml    SV(RVOT) 25.3 ml    SI(LVOT) 40.7 ml/m^2    PA V2 max 68.8 cm/sec    PA max PG 1.9 mmHg    PA max PG (full) 0.86 mmHg    BH CV ECHO EM - PVA(V,A) 1.5 cm^2    BH CV ECHO EM - PVA(V,D) 1.5 cm^2    PA acc time 0.13 sec    RV V1 max PG 1.0 mmHg    RV V1 mean PG 1.0 mmHg    RV V1 max 50.9 cm/sec    RV V1 mean 38.8 cm/sec    RV V1 VTI 12.6 cm    TR max shyam 282.0 cm/sec    PA pr(Accel) 19.6 mmHg    Pulm Sys Shyam 59.2 cm/sec    Pulm [place] Shyam 37.3 cm/sec    Pulm S/D 1.6      Qp/Qs 0.37     Pulm A Revs Dur 0.11 sec    Pulm A Revs Shyam 23.8 cm/sec    MVA P1/2T LCG 1.5 cm^2    RV Base 2.9 cm    RV Length 5.4 cm    RV Mid 2.4 cm    Lat Peak E' Shyam 6.6 cm/sec    Med Peak E' Shyam 5.6 cm/sec     CV ECHO EM - BZI_BMI 28.2 kilograms/m^2     CV ECHO EM - BSA([facility]) 1.7 m^2     CV ECHO EM - BZI_METRIC_WEIGHT 67.6 kg     CV ECHO EM - BZI_METRIC_HEIGHT 154.9 cm    Avg E/e' ratio 23.93     RV S' 12.00 cm/sec    Sinus 2.80 cm    STJ 2.50 cm    Ascending aorta 2.90 cm    LA Volume Index 35.0 mL/m2    RAP systole 3 mmHg    RVSP(TR) 35 mmHg    Abdo Ao Diam 1.50 cm    Echo EF Estimated 66 %    Target HR (85%) 128 bpm    Max. Pred. HR (100%) 150 bpm   Doppler Arterial Multi Level Lower Extremity - Bilateral CAR    Collection Time: 08/13/20  1:35 PM   Result Value Ref Range    RIGHT HIGH THIGH SYS  mmHg    RIGHT POPLITEAL SYS  mmHg    RIGHT POST TIBIAL SYS  mmHg    RIGHT RAY RATIO 1.15     LEFT HIGH THIGH SYS  mmHg    LEFT POPLITEAL SYS  mmHg    LEFT POST TIBIAL SYS  mmHg    LEFT RAY RATIO 0.98     Upper arterial right arm brachial sys max 120 mmHg    Upper arterial left arm brachial sys max 119 mmHg   Adult Transthoracic Echo Complete W/ Cont if Necessary Per Protocol    Collection Time: 09/25/20  2:24 PM   Result Value Ref Range    BSA 1.7 m^2    IVSd 1.0 cm    LVIDd 3.2 cm    LVIDs 2.2 cm    LVPWd 1.1 cm    IVS/LVPW 0.91     FS 31.3 %    EDV(Teich) 41.0 ml    ESV(Teich) 16.2 ml    EF(Teich) 60.4 %    EDV(cubed) 32.8 ml    ESV(cubed) 10.6 ml    EF(cubed) 67.5 %    LV mass(C)d 97.2 grams    LV mass(C)dI 57.7 grams/m^2    SV(Teich) 24.8 ml    SI(Teich) 14.7 ml/m^2    SV(cubed) 22.1 ml    SI(cubed) 13.1 ml/m^2    Ao root diam 2.4 cm    Ao root area 4.5 cm^2    ACS 1.4 cm    asc Aorta Diam 2.6 cm    LVOT diam 2.0 cm    LVOT area 3.1 cm^2    LVOT area(traced) 3.1 cm^2    RVOT diam 2.0 cm    RVOT area 3.1 cm^2    LVLd ap4 6.3 cm    EDV(MOD-sp4) 57.0 ml     LVLs ap4 5.4 cm    ESV(MOD-sp4) 19.0 ml    EF(MOD-sp4) 66.7 %    LVLd ap2 6.1 cm    EDV(MOD-sp2) 43.0 ml    LVLs ap2 5.0 cm    ESV(MOD-sp2) 17.0 ml    EF(MOD-sp2) 60.5 %    SV(MOD-sp4) 38.0 ml    SI(MOD-sp4) 22.5 ml/m^2    SV(MOD-sp2) 26.0 ml    SI(MOD-sp2) 15.4 ml/m^2    Ao root area (BSA corrected) 1.4     LV [place] Vol (BSA corrected) 33.8 ml/m^2    LV Sys Vol (BSA corrected) 11.3 ml/m^2    TAPSE (>1.6) 2.0 cm    EF(MOD-bp) 64 %    MV A dur 0.13 sec    MV E max shyam 154.0 cm/sec    MV A max shyam 153.0 cm/sec    MV E/A 1.0     MV V2 max 176.0 cm/sec    MV max PG 12.4 mmHg    MV V2 mean 106.0 cm/sec    MV mean PG 5.0 mmHg    MV V2 VTI 71.8 cm    MVA(VTI) 0.8 cm^2    MV P1/2t max shyam 155.0 cm/sec    MV P1/2t 151.3 msec    MVA(P1/2t) 1.5 cm^2    MV dec slope 300.0 cm/sec^2    MV dec time 0.42 sec    Ao pk shyam 179.0 cm/sec    Ao max PG 12.8 mmHg    Ao max PG (full) 9.1 mmHg    Ao V2 mean 111.0 cm/sec    Ao mean PG 6.0 mmHg    Ao mean PG (full) 4.0 mmHg    Ao V2 VTI 40.3 cm    VERENA(I,A) 1.4 cm^2    VERENA(I,D) 1.4 cm^2    VERENA(V,A) 1.7 cm^2    VERENA(V,D) 1.7 cm^2    LV V1 max PG 3.8 mmHg    LV V1 mean PG 2.0 mmHg    LV V1 max 97.0 cm/sec    LV V1 mean 64.3 cm/sec    LV V1 VTI 18.2 cm    MR max shyam 396.0 cm/sec    MR max PG 62.7 mmHg    SV(Ao) 182.3 ml    SI(Ao) 108.1 ml/m^2    SV(LVOT) 57.2 ml    SV(RVOT) 48.1 ml    SI(LVOT) 33.9 ml/m^2    PA V2 max 65.2 cm/sec    PA max PG 1.7 mmHg    PA max PG (full) 0.38 mmHg    BH CV ECHO EM - PVA(V,A) 2.8 cm^2    BH CV ECHO EM - PVA(V,D) 2.8 cm^2    PA acc time 0.13 sec    RV V1 max PG 1.3 mmHg    RV V1 mean PG 1.0 mmHg    RV V1 max 57.4 cm/sec    RV V1 mean 41.1 cm/sec    RV V1 VTI 15.3 cm    TR max shyam 289.0 cm/sec    RVSP(TR) 41.4 mmHg    RAP systole 8.0 mmHg    PA pr(Accel) 21.0 mmHg    Pulm Sys Shyam 65.1 cm/sec    Pulm [place] Shyam 37.2 cm/sec    Pulm S/D 1.8     Qp/Qs 0.84     Pulm A Revs Dur 0.14 sec    Pulm A Revs Shyam 17.5 cm/sec    MVA P1/2T LCG 1.4 cm^2    RV Base 2.6 cm    RV  Length 5.9 cm    RV Mid 2.9 cm    Lat Peak E' Shyam 5.3 cm/sec    Med Peak E' Shyam 3.9 cm/sec    RV S' 11.0 cm/sec     CV ECHO EM - BZI_BMI 28.9 kilograms/m^2     CV ECHO EM - BSA([facility]) 1.8 m^2     CV ECHO EM - BZI_METRIC_WEIGHT 69.4 kg     CV ECHO EM - BZI_METRIC_HEIGHT 154.9 cm    Avg E/e' ratio 33.48     Target HR (85%) 128 bpm    Max. Pred. HR (100%) 150 bpm    Sinus 2.4 cm    STJ 2.7 cm    Ascending aorta 2.6 cm    Aortic arch 2.2 cm    LA Volume Index 33.0 mL/m2    Abdo Ao Diam 1.8 cm     Assessment/Plan   Diagnoses and all orders for this visit:    1. Chronic midline low back pain with left-sided sciatica (Primary)    2. Chronic obstructive pulmonary disease, unspecified COPD type (CMS/HCC)    3. Chronic respiratory failure with hypoxia (CMS/HCC)    4. Oxygen dependent    Other orders  -     Fluad Quad 65+ yrs ([id number])      Discussed and reviewed history with patient.  Will continue the oxygen and current medications.  Reviewed pain control and will continue the hydrocodone PRN with flexeril.    Controlled substance agreement reviewed with patient and completed in office.  ZHANE run and reviewed.  Risks of the medication include but are not limited to fatigue, somnolence, increased risk of falls, constipation, allergic reaction, dependence, and addiction.  Patient has used only 20 tabs in 10 months of hydrocodone.  Drug screen today and monitor closely.  Will obtain records from Dr Franks previous pmd.  Follow up with cardiology and pulmonology.  Is not a surgical candidate.  Maximal medical treatment at this time.

## 2020-10-23 ENCOUNTER — TELEPHONE (OUTPATIENT)
Dept: FAMILY MEDICINE CLINIC | Facility: CLINIC | Age: 70
End: 2020-10-23

## 2020-10-23 DIAGNOSIS — G89.29 CHRONIC MIDLINE LOW BACK PAIN WITH LEFT-SIDED SCIATICA: ICD-10-CM

## 2020-10-23 DIAGNOSIS — M54.42 CHRONIC MIDLINE LOW BACK PAIN WITH LEFT-SIDED SCIATICA: ICD-10-CM

## 2020-10-23 RX ORDER — HYDROCODONE BITARTRATE AND ACETAMINOPHEN 7.5; 325 MG/1; MG/1
1 TABLET ORAL EVERY 6 HOURS PRN
Qty: 20 TABLET | Refills: 0 | Status: SHIPPED | OUTPATIENT
Start: 2020-10-23 | End: 2021-04-12 | Stop reason: SDUPTHER

## 2020-10-23 RX ORDER — HYDROCODONE BITARTRATE AND ACETAMINOPHEN 7.5; 325 MG/1; MG/1
1 TABLET ORAL EVERY 6 HOURS PRN
Qty: 20 TABLET | Refills: 0 | Status: SHIPPED | OUTPATIENT
Start: 2020-10-23 | End: 2020-10-23 | Stop reason: SDUPTHER

## 2020-10-23 NOTE — TELEPHONE ENCOUNTER
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  Please send to [facility]     HYDROcodone-acetaminophen (NORCO) 7.5-325 MG per tablet [[id number]]     Order Details  Dose: 1 tablet Route: Oral Frequency: Every 6 Hours PRN for Moderate Pain    Dispense Quantity: 20 tablet Refills: 0 Fills remaining: --           Sig: Take 1 tablet by mouth Every 6 (Six) Hours As Needed for Moderate Pain .          Written Date: 10/22/20 Expiration Date: 12/21/20     Start Date: 10/22/20 End Date: --     Earliest Fill Date: 10/22/20             Ordering Provider: Rodríguez Walker MD Phone:  [phone] Fax:  [phone]    Address:  [address] [place] [address] NPI:  [id number]            Authorizing Provider: Rodríguez Walker MD Phone:  [phone] Fax:  [phone]    Address:  [address] [place] [address] NPI:  [id number]

## 2020-10-27 LAB — DRUGS UR: NORMAL

## 2020-10-27 RX ORDER — APIXABAN 5 MG/1
TABLET, FILM COATED ORAL
Qty: 60 TABLET | Refills: 4 | Status: SHIPPED | OUTPATIENT
Start: 2020-10-27 | End: 2021-02-26

## 2020-12-16 RX ORDER — METOPROLOL TARTRATE 100 MG/1
TABLET ORAL
Qty: 180 TABLET | Refills: 1 | Status: SHIPPED | OUTPATIENT
Start: 2020-12-16 | End: 2021-02-26

## 2021-02-26 ENCOUNTER — OFFICE VISIT (OUTPATIENT)
Dept: CARDIOLOGY | Facility: CLINIC | Age: 71
End: 2021-02-26

## 2021-02-26 VITALS
DIASTOLIC BLOOD PRESSURE: 80 MMHG | WEIGHT: 150 LBS | SYSTOLIC BLOOD PRESSURE: 124 MMHG | HEIGHT: 61 IN | TEMPERATURE: 95.4 F | BODY MASS INDEX: 28.32 KG/M2

## 2021-02-26 DIAGNOSIS — I05.9 ENDOCARDITIS OF MITRAL VALVE: ICD-10-CM

## 2021-02-26 DIAGNOSIS — I39 ENDOCARDITIS DUE TO OTHER ORGANISM, UNSPECIFIED CHRONICITY: ICD-10-CM

## 2021-02-26 DIAGNOSIS — I50.33 ACUTE ON CHRONIC DIASTOLIC CHF (CONGESTIVE HEART FAILURE) (HCC): ICD-10-CM

## 2021-02-26 DIAGNOSIS — I48.0 PAF (PAROXYSMAL ATRIAL FIBRILLATION) (HCC): Primary | ICD-10-CM

## 2021-02-26 DIAGNOSIS — I33.0 MITRAL VALVE VEGETATION: ICD-10-CM

## 2021-02-26 PROCEDURE — 99214 OFFICE O/P EST MOD 30 MIN: CPT | Performed by: INTERNAL MEDICINE

## 2021-02-26 RX ORDER — TORSEMIDE 20 MG/1
20 TABLET ORAL DAILY
Qty: 90 TABLET | Refills: 3 | Status: SHIPPED | OUTPATIENT
Start: 2021-02-26 | End: 2021-03-01

## 2021-02-26 RX ORDER — WARFARIN SODIUM 2.5 MG/1
5 TABLET ORAL DAILY
Qty: 60 TABLET | Refills: 3 | Status: SHIPPED | OUTPATIENT
Start: 2021-02-26 | End: 2021-05-21

## 2021-02-26 RX ORDER — POTASSIUM CHLORIDE 750 MG/1
10 TABLET, FILM COATED, EXTENDED RELEASE ORAL DAILY
Qty: 90 TABLET | Refills: 5 | Status: SHIPPED | OUTPATIENT
Start: 2021-02-26 | End: 2021-08-27 | Stop reason: SDUPTHER

## 2021-02-26 NOTE — PROGRESS NOTES
CARDIOLOGY    Zenia Tinajero MD    ENCOUNTER DATE:  02/26/2021    Shani Phillip / 70 y.o. / female        CHIEF COMPLAINT / REASON FOR OFFICE VISIT     PAF (6 month follow up)      HISTORY OF PRESENT ILLNESS       HPI    Shani Phillip is a 70 y.o. female     This is a lady who was on a cruise ship when she developed a febrile illness.  She was taken to [facility] where she was diagnosed with endocarditis.  She left the hospital to return home to [place] I first saw her in January 14, 2019.  She has a history of COPD on oxygen, former smoker, restless leg syndrome and chronic pain.  She had a transthoracic echocardiogram suggestive of vegetation on the mitral valve annulus.  She had a transesophageal echo which delineated this more clearly.  Dr. Briones saw her in consultation.  The plan was to treat her with IV antibiotics which occurred.  She came in for a repeat transesophageal echo on January 14, 2020 and this was pretty much unchanged.  The mobile element attached to the posterior mitral valve annulus annulus was unchanged.    In August 2020, she had normal LV functions and ejection fraction of 66%, moderate left atrial enlargement, severe calcification of the aortic valve without regurgitation or stenosis.  There is moderate bileaflet mitral valve thickening with trace regurgitation and mild stenosis with a mean gradient of 4 mmHg.  There was mild tricuspid regurgitation with a normal right ventricular systolic pressure.  She was seen in the office in August of 2020 and was having lower extremity edema.  Heidi Ambrocio ordered an arterial Doppler which was normal.  She had another echocardiogram in September 2020, again normal left ventricular systolic function, and again bileaflet thickening with this time, mild mitral regurgitation and mitral stenosis was noted and a vegetation on the atrial side of the posterior mitral valve leaflet was also noted.        REVIEW OF SYSTEMS  "    ROS      VITAL SIGNS     Visit Vitals  /80 (BP Location: Left arm)   Temp 95.4 °F (35.2 °C)   Ht 154.9 cm (61\")   Wt 68 kg (150 lb)   BMI 28.34 kg/m²         Wt Readings from Last 3 Encounters:   02/26/21 68 kg (150 lb)   10/22/20 70.9 kg (156 lb 6.4 oz)   09/25/20 69.4 kg (153 lb)     Body mass index is 28.34 kg/m².      PHYSICAL EXAMINATION     Physical Exam      REVIEWED DATA     Procedures          Lab Results   Component Value Date    GLUCOSE 94 03/06/2020    BUN 23 03/06/2020    CREATININE 0.85 03/06/2020    EGFRIFNONA 66 03/06/2020    BCR 27.1 (H) 03/06/2020    K 4.3 03/06/2020    CO2 31.2 (H) 03/06/2020    CALCIUM 9.4 03/06/2020    ALBUMIN 4.30 12/19/2019    AST 21 12/19/2019    ALT 13 12/19/2019       ASSESSMENT & PLAN      Diagnosis Plan   1. PAF (paroxysmal atrial fibrillation) (CMS/[facility])  Ambulatory Referral to Anticoagulation Monitoring   2. Acute on chronic diastolic CHF (congestive heart failure) (CMS/[facility])  potassium chloride 10 MEQ CR tablet   3. Endocarditis of mitral valve     4. Mitral valve vegetation     5. Endocarditis due to other organism, unspecified chronicity         1.  Atrial fibrillation.  On Eliquis. Cardioversion 11/15/19.   No recurrence.  She is having trouble affording Eliquis.  We are going to transition to warfarin.  2.  Mitral valve vegetation.  S/P antibiotics. Repeat REDD in 1/20 shows no change to the mobile mass on the MV annulus. Dr Briones does not recommend surgery.  Follow up echoes continue to show thickening of the valve but no issue with valve function.  3. Lower extremity edema.   This has resolved.  She takes torsemide once a day.  4.  COPD on home oxygen.    Again to have her follow-up in 3 months.  If she is tolerating the warfarin, then we can offer her a home INR monitor.    Orders Placed This Encounter   Procedures   • Ambulatory Referral to Anticoagulation Monitoring     Referral Priority:   Routine     Referral Type:   Monitoring     Referral Reason:   " Specialty Services Required     Number of Visits Requested:   1           MEDICATIONS         Discharge Medications          Accurate as of February 26, 2021  1:46 PM. If you have any questions, ask your nurse or doctor.            New Medications      Instructions Start Date   warfarin 2.5 MG tablet  Commonly known as: COUMADIN  Started by: Zenia Tinajero MD   5 mg, Oral, Daily         Changes to Medications      Instructions Start Date   potassium chloride 10 MEQ CR tablet  What changed:   · how much to take  · how to take this  · when to take this  · additional instructions  Changed by: Zenia Tinajero MD   10 mEq, Oral, Daily      torsemide 20 MG tablet  Commonly known as: DEMADEX  What changed:   · how much to take  · when to take this  Changed by: Zenia Tinajero MD   20 mg, Oral, Daily         Continue These Medications      Instructions Start Date   budesonide 0.5 MG/2ML nebulizer solution  Commonly known as: PULMICORT   0.5 mg, Nebulization, Daily - RT      buPROPion  MG 24 hr tablet  Commonly known as: WELLBUTRIN XL   300 mg, Oral, Daily      cholecalciferol 25 MCG (1000 UT) tablet  Commonly known as: VITAMIN D3   2,000 Units, Oral, Daily      cyclobenzaprine 10 MG tablet  Commonly known as: FLEXERIL   10 mg, Oral, 3 Times Daily PRN      famotidine 20 MG tablet  Commonly known as: PEPCID   20 mg, Oral, 2 Times Daily Before Meals      HYDROcodone-acetaminophen 7.5-325 MG per tablet  Commonly known as: NORCO   1 tablet, Oral, Every 6 Hours PRN      O2  Commonly known as: OXYGEN   2 L/min, Inhalation, Once      rOPINIRole 2 MG tablet  Commonly known as: REQUIP   2 mg, Oral, 2 Times Daily      TROSPIUM CHLORIDE PO   20 mg, Oral, 2 Times Daily      Ventolin  (90 Base) MCG/ACT inhaler  Generic drug: albuterol sulfate HFA   2 puffs, Inhalation, 4 Times Daily - RT         Stop These Medications    Eliquis 5 MG tablet tablet  Generic drug: apixaban  Stopped by: Zenia Tinajero MD      ipratropium 0.02 % nebulizer solution  Commonly known as: ATROVENT  Stopped by: Zenia Tinajero MD     metoprolol tartrate 100 MG tablet  Commonly known as: LOPRESSOR  Stopped by: MD Zenia Willis MD  02/26/21  13:46 EST    **Dragon Disclaimer:   Much of this encounter note is an electronic transcription/translation of spoken language to printed text. The electronic translation of spoken language may permit erroneous, or at times, nonsensical words or phrases to be inadvertently transcribed. Although I have reviewed the note for such errors, some may still exist.

## 2021-03-01 ENCOUNTER — TELEPHONE (OUTPATIENT)
Dept: CARDIOLOGY | Facility: CLINIC | Age: 71
End: 2021-03-01

## 2021-03-01 RX ORDER — FUROSEMIDE 40 MG/1
40 TABLET ORAL DAILY
Qty: 90 TABLET | Refills: 3 | Status: SHIPPED | OUTPATIENT
Start: 2021-03-01 | End: 2021-07-01 | Stop reason: SDUPTHER

## 2021-03-01 NOTE — TELEPHONE ENCOUNTER
I spoke with the patient and she said at her appointment you sent in a Rx for torsemide 20 mg and she said she has been taking furosemide 40 mg. She would like to know what she needs to be taking. She would like the 40 mg of furosemide as it works better. If she can have that can you please send that in to her pharmacy.?

## 2021-03-03 ENCOUNTER — ANTICOAGULATION VISIT (OUTPATIENT)
Dept: PHARMACY | Facility: HOSPITAL | Age: 71
End: 2021-03-03

## 2021-03-03 DIAGNOSIS — I48.0 PAF (PAROXYSMAL ATRIAL FIBRILLATION) (HCC): ICD-10-CM

## 2021-03-03 LAB
INR PPP: 4.8 (ref 0.91–1.09)
PROTHROMBIN TIME: 57.7 SECONDS (ref 10–13.8)

## 2021-03-03 PROCEDURE — G0463 HOSPITAL OUTPT CLINIC VISIT: HCPCS

## 2021-03-03 PROCEDURE — 85610 PROTHROMBIN TIME: CPT

## 2021-03-03 PROCEDURE — 36416 COLLJ CAPILLARY BLOOD SPEC: CPT

## 2021-03-03 NOTE — PROGRESS NOTES
Anticoagulation Clinic Progress Note  Anticoagulation Summary  As of 3/3/2021    INR goal:  2.0-3.0   TTR:  --   INR used for dosin.8 (3/3/2021)   Warfarin maintenance plan:  No maintenance plan   Next INR check:  3/8/2021   Target end date:      Indications    PAF (paroxysmal atrial fibrillation) (CMS/[facility]) [I48.0]             Anticoagulation Episode Summary     INR check location:      Preferred lab:      Send INR reminders to:  [facility]    Comments:  On apixaban; switching to warfarin due to cost; started warfarin per Dr. Tinajero Rx on  at 2x 2.5mg tabs daily.  Still on Eliquis with one tab left      Anticoagulation Care Providers     Provider Role Specialty Phone number    Zenia Tinajero MD Referring Cardiology [phone]          Clinic Interview:  Patient Findings     Positives:  Other complaints    Negatives:  Signs/symptoms of thrombosis, Signs/symptoms of bleeding,   Laboratory test error suspected, Change in health, Change in alcohol use,   Change in activity, Upcoming invasive procedure, Emergency department   visit, Upcoming dental procedure, Missed doses, Extra doses, Change in   medications, Change in diet/appetite, Hospital admission, Bruising    Comments:  Pt reports she has been overlapping warfarin 5 mg QAM and   apixaban since 21. She has already taken today's dose of warfarin.       Clinical Outcomes     Negatives:  Major bleeding event, Thromboembolic event,   Anticoagulation-related hospital admission, Anticoagulation-related ED   visit, Anticoagulation-related fatality    Comments:  Pt reports she has been overlapping warfarin 5 mg QAM and   apixaban since 21. She has already taken today's dose of warfarin.         Education:  Shani Phillip is a new start in the [facility]. We discussed the followin) Warfarin's indication, mechanism, and dosing  2) Enforced the importance of taking warfarin as instructed and at the same time  every day, preferably in the evening so that we can make dose adjustments more easily following subsequent clinic visits  3) What she should do about a missed dose; pts can take missed doses within about 12 hours of their usual scheduled dose, but she was instructed on the importance of not doubling up on doses unless told to do so by the [facility]  4) Explained possible side effects of warfarin therapy, including increased risk of bleeding, s/sx of bleeding and s/sx of any additional clots/PE/CVA.   5) Discussed monitoring of warfarin, the INR, goal INR range, and the frequency of monitoring  6) Reviewed drug/food/tobacco/EtOH interactions and provided written information covering these topics in more detail, explaining that green, leafy vegetables interact most heavily with warfarin  7) Instructed the pt not to take or discontinue any medications without informing her physician/pharmacist and reminded her to inform us of any dietary changes, as well  8) Explained that she would be coming into the clinic more frequently in these first few weeks of therapy as we try to adjust her dose and achieve a therapeutic INR x 2 consecutive readings. Once that is achieved, patient will follow up in clinic every 4 weeks, on average.    She stated no problems with transportation or scheduling clinic appts in this manner. she expressed understanding of the information provided and has no additional questions at this time.    Shani Phillip was presented with a copy of the Patients Rights and Responsibilities. she expressed verbal consent and agreement to receive care in the [facility] under the current collaborative care agreement with [facility].       INR History:  3/3/21 4.8 - started warfarin 5 mg daily on 2/28/21    Plan:  1. INR is Supratherapeutic today- see above in Anticoagulation Summary.   Will instruct Shani Phillip to HOLD their warfarin regimen until INR check in clinic  in 5 days - see above in Anticoagulation Summary.  2. DISCONTINUE apixaban  3. Follow up in 5 days in clinic  4. Patient declines warfarin refills.  5. Verbal and written information provided. To seek immediate medical attention if s/sx of bleeding or fall occurs. Patient expresses understanding and has no further questions at this time.    Keshawn Castaneda [facility]

## 2021-03-03 NOTE — PATIENT INSTRUCTIONS
Please STOP TAKING apixaban (Eliquis)  Do NOT take any warfarin until we check your INR/warfarin on Monday, 3/8/21, at 3 pm.

## 2021-03-08 ENCOUNTER — ANTICOAGULATION VISIT (OUTPATIENT)
Dept: PHARMACY | Facility: HOSPITAL | Age: 71
End: 2021-03-08

## 2021-03-08 DIAGNOSIS — I48.0 PAF (PAROXYSMAL ATRIAL FIBRILLATION) (HCC): ICD-10-CM

## 2021-03-08 LAB
INR PPP: 1.2 (ref 0.91–1.09)
PROTHROMBIN TIME: 14.2 SECONDS (ref 10–13.8)

## 2021-03-08 PROCEDURE — G0463 HOSPITAL OUTPT CLINIC VISIT: HCPCS

## 2021-03-08 PROCEDURE — 36416 COLLJ CAPILLARY BLOOD SPEC: CPT

## 2021-03-08 PROCEDURE — 85610 PROTHROMBIN TIME: CPT

## 2021-03-08 NOTE — PROGRESS NOTES
Anticoagulation Clinic Progress Note    Anticoagulation Summary  As of 3/8/2021    INR goal:  2.0-3.0   TTR:  --   INR used for dosin.2 (3/8/2021)   Warfarin maintenance plan:  2.5 mg every day   Weekly warfarin total:  17.5 mg   Plan last modified:  Nallely Rob RPH (3/8/2021)   Next INR check:  3/12/2021   Target end date:      Indications    PAF (paroxysmal atrial fibrillation) (CMS/HCC) [I48.0]             Anticoagulation Episode Summary     INR check location:      Preferred lab:      Send INR reminders to:   [facility]    Comments:  Apixaban too costly      Anticoagulation Care Providers     Provider Role Specialty Phone number    Zenia Tinajero MD Referring Cardiology [phone]          Clinic Interview:  Patient Findings     Negatives:  Signs/symptoms of thrombosis, Signs/symptoms of bleeding,   Laboratory test error suspected, Change in health, Change in alcohol use,   Change in activity, Upcoming invasive procedure, Emergency department   visit, Upcoming dental procedure, Missed doses, Extra doses, Change in   medications, Change in diet/appetite, Hospital admission, Bruising, Other   complaints      Clinical Outcomes     Negatives:  Major bleeding event, Thromboembolic event,   Anticoagulation-related hospital admission, Anticoagulation-related ED   visit, Anticoagulation-related fatality        INR History:  Anticoagulation Monitoring 3/3/2021 3/8/2021   INR 4.8 1.2   INR Date 3/3/2021 3/8/2021   INR Goal 2.0-3.0 2.0-3.0   Last Week Total 15 mg 15 mg   Next Week Total 5 mg 17.5 mg   Sun Hold (3/7) -   Mon - 2.5 mg   Tue - 2.5 mg   Wed 5 mg (3/3) 2.5 mg   Thu Hold (3/4) 2.5 mg   Fri Hold (3/5) -   Sat Hold (3/6) -   Visit Report - -       Plan:  1. INR is Subtherapeutic today- see above in Anticoagulation Summary.  Will instruct Shani Phillip to Change their warfarin regimen- see above in Anticoagulation Summary.  2. Follow up in 4 days  3. Patient declines warfarin  refills.  4. Verbal and written information provided. Patient expresses understanding and has no further questions at this time.    Nallely Rob [facility]

## 2021-03-12 ENCOUNTER — ANTICOAGULATION VISIT (OUTPATIENT)
Dept: PHARMACY | Facility: HOSPITAL | Age: 71
End: 2021-03-12

## 2021-03-12 DIAGNOSIS — I48.0 PAF (PAROXYSMAL ATRIAL FIBRILLATION) (HCC): ICD-10-CM

## 2021-03-12 LAB
INR PPP: 1.3 (ref 0.91–1.09)
PROTHROMBIN TIME: 16.1 SECONDS (ref 10–13.8)

## 2021-03-12 PROCEDURE — G0463 HOSPITAL OUTPT CLINIC VISIT: HCPCS

## 2021-03-12 PROCEDURE — 85610 PROTHROMBIN TIME: CPT

## 2021-03-12 PROCEDURE — 36416 COLLJ CAPILLARY BLOOD SPEC: CPT

## 2021-03-12 NOTE — PROGRESS NOTES
Anticoagulation Clinic Progress Note    Anticoagulation Summary  As of 3/12/2021    INR goal:  2.0-3.0   TTR:  0.0 % (4 d)   INR used for dosin.3 (3/12/2021)   Warfarin maintenance plan:  2.5 mg every day   Weekly warfarin total:  17.5 mg   Plan last modified:  Nallely Rob RPH (3/8/2021)   Next INR check:  3/16/2021   Target end date:      Indications    PAF (paroxysmal atrial fibrillation) (CMS/HCC) [I48.0]             Anticoagulation Episode Summary     INR check location:      Preferred lab:      [facility]    Comments:  Apixaban too costly      Anticoagulation Care Providers     Provider Role Specialty Phone number    Zenia Tinajero MD Referring Cardiology [phone]          Clinic Interview:  Patient Findings     Negatives:  Signs/symptoms of thrombosis, Signs/symptoms of bleeding,   Laboratory test error suspected, Change in health, Change in alcohol use,   Change in activity, Upcoming invasive procedure, Emergency department   visit, Upcoming dental procedure, Missed doses, Extra doses, Change in   medications, Change in diet/appetite, Hospital admission, Bruising, Other   complaints      Clinical Outcomes     Negatives:  Major bleeding event, Thromboembolic event,   Anticoagulation-related hospital admission, Anticoagulation-related ED   visit, Anticoagulation-related fatality        INR History:  Anticoagulation Monitoring 3/3/2021 3/8/2021 3/12/2021   INR 4.8 1.2 1.3   INR Date 3/3/2021 3/8/2021 3/12/2021   INR Goal 2.0-3.0 2.0-3.0 2.0-3.0   Trend - - Same   Last Week Total 15 mg 15 mg 10 mg   Next Week Total 5 mg 17.5 mg 22.5 mg   Sun Hold (3/7) - 3.75 mg (3/14)   Mon - 2.5 mg 3.75 mg (3/15)   Tue - 2.5 mg -   Wed 5 mg (3/3) 2.5 mg -   Thu Hold (3/4) 2.5 mg -   Fri Hold (3/5) - 3.75 mg (3/12)   Sat Hold (3/6) - 3.75 mg (3/13)   Visit Report - - -   Some recent data might be hidden       Plan:  1. INR is Subtherapeutic today- see above in  Anticoagulation Summary.  Will instruct Shani Phillip to Increase their warfarin regimen- see above in Anticoagulation Summary.  2. Follow up in 5 days  3. Patient declines warfarin refills.  4. Verbal and written information provided. Patient expresses understanding and has no further questions at this time.    Nallely Rob [facility]

## 2021-03-16 ENCOUNTER — ANTICOAGULATION VISIT (OUTPATIENT)
Dept: PHARMACY | Facility: HOSPITAL | Age: 71
End: 2021-03-16

## 2021-03-16 DIAGNOSIS — I48.0 PAF (PAROXYSMAL ATRIAL FIBRILLATION) (HCC): ICD-10-CM

## 2021-03-16 LAB
INR PPP: 1.9 (ref 0.91–1.09)
PROTHROMBIN TIME: 22.9 SECONDS (ref 10–13.8)

## 2021-03-16 PROCEDURE — G0463 HOSPITAL OUTPT CLINIC VISIT: HCPCS

## 2021-03-16 PROCEDURE — 36416 COLLJ CAPILLARY BLOOD SPEC: CPT

## 2021-03-16 PROCEDURE — 85610 PROTHROMBIN TIME: CPT

## 2021-03-16 NOTE — PROGRESS NOTES
Anticoagulation Clinic Progress Note    Anticoagulation Summary  As of 3/16/2021    INR goal:  2.0-3.0   TTR:  0.0 % (1.1 wk)   INR used for dosin.9 (3/16/2021)   Warfarin maintenance plan:  2.5 mg every Wed, Sat; 3.75 mg all other days   Weekly warfarin total:  23.75 mg   Plan last modified:  Nallely Rob RPH (3/16/2021)   Next INR check:  3/22/2021   Target end date:      Indications    PAF (paroxysmal atrial fibrillation) (CMS/HCC) [I48.0]             Anticoagulation Episode Summary     INR check location:      Preferred lab:      Send INR reminders to:   [facility]    Comments:  Apixaban too costly      Anticoagulation Care Providers     Provider Role Specialty Phone number    Zenia Tinajero MD Referring Cardiology [phone]          Clinic Interview:  Patient Findings     Negatives:  Signs/symptoms of thrombosis, Signs/symptoms of bleeding,   Laboratory test error suspected, Change in health, Change in alcohol use,   Change in activity, Upcoming invasive procedure, Emergency department   visit, Upcoming dental procedure, Missed doses, Extra doses, Change in   medications, Change in diet/appetite, Hospital admission, Bruising, Other   complaints      Clinical Outcomes     Negatives:  Major bleeding event, Thromboembolic event,   Anticoagulation-related hospital admission, Anticoagulation-related ED   visit, Anticoagulation-related fatality        INR History:  Anticoagulation Monitoring 3/8/2021 3/12/2021 3/16/2021   INR 1.2 1.3 1.9   INR Date 3/8/2021 3/12/2021 3/16/2021   INR Goal 2.0-3.0 2.0-3.0 2.0-3.0   Trend - Same Up   Last Week Total 15 mg 10 mg 22.5 mg   Next Week Total 17.5 mg 22.5 mg 23.75 mg   Sun - 3.75 mg (3/14) 3.75 mg   Mon 2.5 mg 3.75 mg (3/15) -   Tue 2.5 mg - 3.75 mg   Wed 2.5 mg - 2.5 mg   Thu 2.5 mg - 3.75 mg   Fri - 3.75 mg (3/12) 3.75 mg   Sat - 3.75 mg (3/13) 2.5 mg   Visit Report - - -   Some recent data might be hidden       Plan:  1. INR is Subtherapeutic  today- see above in Anticoagulation Summary.  Will instruct Shani Phillip to Change their warfarin regimen- see above in Anticoagulation Summary.  2. Follow up in 6 days  3. Patient declines warfarin refills.  4. Verbal and written information provided. Patient expresses understanding and has no further questions at this time.    Nallely Rob [facility]

## 2021-03-22 ENCOUNTER — ANTICOAGULATION VISIT (OUTPATIENT)
Dept: PHARMACY | Facility: HOSPITAL | Age: 71
End: 2021-03-22

## 2021-03-22 DIAGNOSIS — I48.0 PAF (PAROXYSMAL ATRIAL FIBRILLATION) (HCC): ICD-10-CM

## 2021-03-22 LAB
INR PPP: 2.3 (ref 0.91–1.09)
PROTHROMBIN TIME: 27.1 SECONDS (ref 10–13.8)

## 2021-03-22 PROCEDURE — 36416 COLLJ CAPILLARY BLOOD SPEC: CPT

## 2021-03-22 PROCEDURE — 85610 PROTHROMBIN TIME: CPT

## 2021-03-22 NOTE — PROGRESS NOTES
Anticoagulation Clinic Progress Note    Anticoagulation Summary  As of 3/22/2021    INR goal:  2.0-3.0   TTR:  31.0 % (2 wk)   INR used for dosin.3 (3/22/2021)   Warfarin maintenance plan:  2.5 mg every Wed, Sat; 3.75 mg all other days   Weekly warfarin total:  23.75 mg   No change documented:  Felicia Benson   Plan last modified:  Nallely Rob RPH (3/16/2021)   Next INR check:  3/29/2021   Target end date:      Indications    PAF (paroxysmal atrial fibrillation) (CMS/[facility]) [I48.0]             Anticoagulation Episode Summary     INR check location:      Preferred lab:      Send INR reminders to:   [facility]    Comments:  Apixaban too costly      Anticoagulation Care Providers     Provider Role Specialty Phone number    Zenia Tinajero MD Referring Cardiology [phone]          Clinic Interview:  Patient Findings     Negatives:  Signs/symptoms of thrombosis, Signs/symptoms of bleeding,   Laboratory test error suspected, Change in health, Change in alcohol use,   Change in activity, Upcoming invasive procedure, Emergency department   visit, Upcoming dental procedure, Missed doses, Extra doses, Change in   medications, Change in diet/appetite, Hospital admission, Bruising, Other   complaints      Clinical Outcomes     Negatives:  Major bleeding event, Thromboembolic event,   Anticoagulation-related hospital admission, Anticoagulation-related ED   visit, Anticoagulation-related fatality        INR History:  Anticoagulation Monitoring 3/12/2021 3/16/2021 3/22/2021   INR 1.3 1.9 2.3   INR Date 3/12/2021 3/16/2021 3/22/2021   INR Goal 2.0-3.0 2.0-3.0 2.0-3.0   Trend Same Up Same   Last Week Total 10 mg 22.5 mg 23.75 mg   Next Week Total 22.5 mg 23.75 mg 23.75 mg   Sun 3.75 mg (3/14) 3.75 mg 3.75 mg   Mon 3.75 mg (3/15) - 3.75 mg   Tue - 3.75 mg 3.75 mg   Wed - 2.5 mg 2.5 mg   Thu - 3.75 mg 3.75 mg   Fri 3.75 mg (3/12) 3.75 mg 3.75 mg   Sat 3.75 mg (3/13) 2.5 mg 2.5 mg   Visit Report - - -    Some recent data might be hidden       Plan:  1. INR is therapeutic today- see above in Anticoagulation Summary.   Will instruct Shani Phillip to continue their warfarin regimen- see above in Anticoagulation Summary.  2. Follow up in 1 weeks.  3. Patient declines warfarin refills.  4. Verbal and written information provided. Patient expresses understanding and has no further questions at this time.    Felicia Benson

## 2021-03-30 ENCOUNTER — ANTICOAGULATION VISIT (OUTPATIENT)
Dept: PHARMACY | Facility: HOSPITAL | Age: 71
End: 2021-03-30

## 2021-03-30 DIAGNOSIS — I48.0 PAF (PAROXYSMAL ATRIAL FIBRILLATION) (HCC): ICD-10-CM

## 2021-03-30 LAB
INR PPP: 2.7 (ref 0.91–1.09)
PROTHROMBIN TIME: 32.5 SECONDS (ref 10–13.8)

## 2021-03-30 PROCEDURE — 85610 PROTHROMBIN TIME: CPT

## 2021-03-30 PROCEDURE — 36416 COLLJ CAPILLARY BLOOD SPEC: CPT

## 2021-03-30 NOTE — PROGRESS NOTES
Anticoagulation Clinic Progress Note    Anticoagulation Summary  As of 3/30/2021    INR goal:  2.0-3.0   TTR:  55.5 % (3.1 wk)   INR used for dosin.7 (3/30/2021)   Warfarin maintenance plan:  2.5 mg every Wed, Sat; 3.75 mg all other days   Weekly warfarin total:  23.75 mg   No change documented:  Gabbi Cooley, Pharmacy Intern   Plan last modified:  Nallely Rob RPH (3/16/2021)   Next INR check:  2021   Target end date:      Indications    PAF (paroxysmal atrial fibrillation) (CMS/HCC) [I48.0]             Anticoagulation Episode Summary     INR check location:      Preferred lab:      Send INR reminders to:  [facility]    Comments:  Apixaban too costly      Anticoagulation Care Providers     Provider Role Specialty Phone number    Zenia Tinajero MD Referring Cardiology [phone]          Clinic Interview:  Patient Findings     Negatives:  Signs/symptoms of thrombosis, Signs/symptoms of bleeding,   Laboratory test error suspected, Change in health, Change in alcohol use,   Change in activity, Upcoming invasive procedure, Emergency department   visit, Upcoming dental procedure, Missed doses, Extra doses, Change in   medications, Change in diet/appetite, Hospital admission, Bruising, Other   complaints      Clinical Outcomes     Negatives:  Major bleeding event, Thromboembolic event,   Anticoagulation-related hospital admission, Anticoagulation-related ED   visit, Anticoagulation-related fatality        INR History:  Anticoagulation Monitoring 3/16/2021 3/22/2021 3/30/2021   INR 1.9 2.3 2.7   INR Date 3/16/2021 3/22/2021 3/30/2021   INR Goal 2.0-3.0 2.0-3.0 2.0-3.0   Trend Up Same Same   Last Week Total 22.5 mg 23.75 mg 23.75 mg   Next Week Total 23.75 mg 23.75 mg 23.75 mg   Sun 3.75 mg 3.75 mg 3.75 mg   Mon - 3.75 mg 3.75 mg   Tue 3.75 mg 3.75 mg 3.75 mg   Wed 2.5 mg 2.5 mg 2.5 mg   Thu 3.75 mg 3.75 mg 3.75 mg   Fri 3.75 mg 3.75 mg 3.75 mg   Sat 2.5 mg 2.5 mg 2.5 mg   Visit Report  - - -   Some recent data might be hidden       Plan:  1. INR is Therapeutic today- see above in Anticoagulation Summary.  Will instruct Shani Phillip to Continue their warfarin regimen- see above in Anticoagulation Summary.  2. Follow up in 2 weeks  3. Patient declines warfarin refills.  4. Verbal and written information provided. Patient expresses understanding and has no further questions at this time.    Keshawn Castaneda [facility]

## 2021-04-12 ENCOUNTER — OFFICE VISIT (OUTPATIENT)
Dept: FAMILY MEDICINE CLINIC | Facility: CLINIC | Age: 71
End: 2021-04-12

## 2021-04-12 VITALS
HEART RATE: 62 BPM | BODY MASS INDEX: 28.32 KG/M2 | SYSTOLIC BLOOD PRESSURE: 118 MMHG | HEIGHT: 61 IN | DIASTOLIC BLOOD PRESSURE: 68 MMHG | WEIGHT: 150 LBS | TEMPERATURE: 97.4 F | OXYGEN SATURATION: 100 %

## 2021-04-12 DIAGNOSIS — G89.29 CHRONIC MIDLINE LOW BACK PAIN WITH LEFT-SIDED SCIATICA: ICD-10-CM

## 2021-04-12 DIAGNOSIS — Z78.0 POST-MENOPAUSAL: ICD-10-CM

## 2021-04-12 DIAGNOSIS — Z12.31 SCREENING MAMMOGRAM, ENCOUNTER FOR: ICD-10-CM

## 2021-04-12 DIAGNOSIS — R73.01 ELEVATED FASTING BLOOD SUGAR: ICD-10-CM

## 2021-04-12 DIAGNOSIS — M54.42 CHRONIC MIDLINE LOW BACK PAIN WITH LEFT-SIDED SCIATICA: ICD-10-CM

## 2021-04-12 DIAGNOSIS — I50.33 ACUTE ON CHRONIC DIASTOLIC CHF (CONGESTIVE HEART FAILURE) (HCC): ICD-10-CM

## 2021-04-12 DIAGNOSIS — R60.0 BILATERAL LOWER EXTREMITY EDEMA: ICD-10-CM

## 2021-04-12 DIAGNOSIS — Z23 IMMUNIZATION DUE: ICD-10-CM

## 2021-04-12 DIAGNOSIS — F32.A DEPRESSION, UNSPECIFIED DEPRESSION TYPE: ICD-10-CM

## 2021-04-12 DIAGNOSIS — Z11.59 ENCOUNTER FOR HEPATITIS C SCREENING TEST FOR LOW RISK PATIENT: ICD-10-CM

## 2021-04-12 DIAGNOSIS — Z00.00 MEDICARE ANNUAL WELLNESS VISIT, SUBSEQUENT: Primary | ICD-10-CM

## 2021-04-12 PROCEDURE — 90732 PPSV23 VACC 2 YRS+ SUBQ/IM: CPT | Performed by: INTERNAL MEDICINE

## 2021-04-12 PROCEDURE — G0009 ADMIN PNEUMOCOCCAL VACCINE: HCPCS | Performed by: INTERNAL MEDICINE

## 2021-04-12 PROCEDURE — G0439 PPPS, SUBSEQ VISIT: HCPCS | Performed by: INTERNAL MEDICINE

## 2021-04-12 RX ORDER — BUPROPION HYDROCHLORIDE 300 MG/1
300 TABLET ORAL DAILY
Qty: 30 TABLET | Refills: 5 | Status: SHIPPED | OUTPATIENT
Start: 2021-04-12 | End: 2022-08-25 | Stop reason: SDUPTHER

## 2021-04-12 RX ORDER — CYCLOBENZAPRINE HCL 10 MG
10 TABLET ORAL 3 TIMES DAILY PRN
Qty: 40 TABLET | Refills: 1 | Status: SHIPPED | OUTPATIENT
Start: 2021-04-12 | End: 2022-08-25 | Stop reason: SDUPTHER

## 2021-04-12 RX ORDER — HYDROCODONE BITARTRATE AND ACETAMINOPHEN 7.5; 325 MG/1; MG/1
1 TABLET ORAL EVERY 6 HOURS PRN
Qty: 20 TABLET | Refills: 0 | Status: SHIPPED | OUTPATIENT
Start: 2021-04-12 | End: 2022-08-25 | Stop reason: SDUPTHER

## 2021-04-12 NOTE — PATIENT INSTRUCTIONS
Medicare Wellness  Personal Prevention Plan of Service     Date of Office Visit:  2021  Encounter Provider:  Rodríguez Walker MD  Place of Service:  [facility]  Patient Name: Shani BLANDON:  1950    As part of the Medicare Wellness portion of your visit today, we are providing you with this personalized preventive plan of services (PPPS). This plan is based upon recommendations of the [place] Preventive Services Task Force (USPSTF) and the Advisory Committee on Immunization Practices (ACIP).    This lists the preventive care services that should be considered, and provides dates of when you are due. Items listed as completed are up-to-date and do not require any further intervention.    Health Maintenance   Topic Date Due   • MAMMOGRAM  Never done   • DXA SCAN  Never done   • COLONOSCOPY  Never done   • TDAP/TD VACCINES (1 - Tdap) Never done   • ZOSTER VACCINE (1 of 2) Never done   • Pneumococcal Vaccine 65+ (2 of 2 - PPSV23) 2017   • HEPATITIS C SCREENING  Never done   • INFLUENZA VACCINE  2021   • ANNUAL WELLNESS VISIT  2022   • COVID-19 Vaccine  Completed   • MENINGOCOCCAL VACCINE  Aged Out       Orders Placed This Encounter   Procedures   • Mammo Screening Bilateral With CAD     Standing Status:   Future     Standing Expiration Date:   2022     Order Specific Question:   Reason for Exam:     Answer:   screening mammogram   • DEXA Bone Density Axial     Standing Status:   Future     Standing Expiration Date:   2022     Order Specific Question:   Reason for Exam:     Answer:   post menopause   • Pneumococcal Polysaccharide Vaccine 23-Valent Greater Than or Equal To 1yo Subcutaneous / IM   • Hepatitis C Antibody     Order Specific Question:   Release to patient     Answer:   Immediate   • Comprehensive Metabolic Panel     Order Specific Question:   Release to patient     Answer:   Immediate   • Lipid Panel   • Hemoglobin A1c      Order Specific Question:   Release to patient     Answer:   Immediate       Return in about 6 months (around 10/12/2021) for Next scheduled follow up.

## 2021-04-12 NOTE — PROGRESS NOTES
Subsequent Medicare Wellness Visit   The ABC's of the Annual Wellness Visit    Chief Complaint   Patient presents with   • Annual Exam       HPI:  Shani Phillip, -1950, is a 70 y.o. female who presents for a Subsequent Medicare Wellness Visit.    Recent Hospitalizations:  No hospitalization(s) within the last year..    Current Medical Providers:  Patient Care Team:  Rodríguez Walker MD as PCP - General (Internal Medicine)  Mike Atkins MD as Surgeon (General Surgery)  Hayes Briones MD as Surgeon (Cardiothoracic Surgery)  Zenia Tinajero MD as Consulting Physician (Cardiology)    Health Habits and Functional and Cognitive Screening and Depression Screening:  Functional & Cognitive Status 2021   Do you have difficulty preparing food and eating? No   Do you have difficulty bathing yourself, getting dressed or grooming yourself? No   Do you have difficulty using the toilet? No   Do you have difficulty moving around from place to place? No   Do you have trouble with steps or getting out of a bed or a chair? No   Current Diet Well Balanced Diet   Dental Exam Not up to date   Eye Exam Not up to date   Exercise (times per week) 0 times per week   Do you need help using the phone?  No   Are you deaf or do you have serious difficulty hearing?  No   Do you need help with transportation? No   Do you need help shopping? No   Do you need help preparing meals?  No   Do you need help with housework?  No   Do you need help with laundry? No   Do you need help taking your medications? No   Do you need help managing money? No   Do you ever drive or ride in a car without wearing a seat belt? No   Have you felt unusual stress, anger or loneliness in the last month? No   Who do you live with? Spouse   If you need help, do you have trouble finding someone available to you? No   Have you been bothered in the last four weeks by sexual problems? No   Do you have difficulty concentrating, remembering or making  decisions? No       Compared to one year ago, the patient feels her physical health is the same and her mental health is the same.    Depression Screen:  PHQ-2/PHQ-9 Depression Screening 4/12/2021   Little interest or pleasure in doing things 0   Feeling down, depressed, or hopeless 0   Trouble falling or staying asleep, or sleeping too much 0   Feeling tired or having little energy 0   Poor appetite or overeating 0   Feeling bad about yourself - or that you are a failure or have let yourself or your family down 0   Trouble concentrating on things, such as reading the newspaper or watching television 0   Moving or speaking so slowly that other people could have noticed. Or the opposite - being so fidgety or restless that you have been moving around a lot more than usual 0   Thoughts that you would be better off dead, or of hurting yourself in some way 0   Total Score 0       Falls Risk Assessment:  ROSSI Fall Risk Clinician Key Questions   Have you fallen in the past year?: No  Do you feel unsteady with walking?: No  Are you worried about falling?: No      Past Medical/Family/Social History:  The following portions of the patient's history were reviewed and updated as appropriate: allergies, current medications, past family history, past medical history, past social history, past surgical history and problem list.    Allergies   Allergen Reactions   • Penicillins Rash     RASH 30 YRS AGO NO HOSPITALIZATION         Current Outpatient Medications:   •  budesonide (PULMICORT) 0.5 MG/2ML nebulizer solution, Take 0.5 mg by nebulization Daily., Disp: , Rfl:   •  buPROPion XL (WELLBUTRIN XL) 300 MG 24 hr tablet, Take 1 tablet by mouth Daily., Disp: 30 tablet, Rfl: 5  •  cholecalciferol (VITAMIN D3) 25 MCG (1000 UT) tablet, Take 2,000 Units by mouth Daily., Disp: , Rfl:   •  cyclobenzaprine (FLEXERIL) 10 MG tablet, Take 1 tablet by mouth 3 (Three) Times a Day As Needed (back pain)., Disp: 40 tablet, Rfl: 1  •  famotidine  (PEPCID) 20 MG tablet, Take 1 tablet by mouth 2 (Two) Times a Day Before Meals., Disp: 180 tablet, Rfl: 3  •  furosemide (LASIX) 40 MG tablet, Take 1 tablet by mouth Daily., Disp: 90 tablet, Rfl: 3  •  HYDROcodone-acetaminophen (NORCO) 7.5-325 MG per tablet, Take 1 tablet by mouth Every 6 (Six) Hours As Needed for Moderate Pain ., Disp: 20 tablet, Rfl: 0  •  O2 (OXYGEN), Inhale 2 L/min 1 (One) Time., Disp: , Rfl:   •  potassium chloride 10 MEQ CR tablet, Take 1 tablet by mouth Daily., Disp: 90 tablet, Rfl: 5  •  rOPINIRole (REQUIP) 2 MG tablet, Take 2 mg by mouth 2 (Two) Times a Day., Disp: , Rfl:   •  TROSPIUM CHLORIDE PO, Take 20 mg by mouth 2 (Two) Times a Day., Disp: , Rfl:   •  Ventolin  (90 Base) MCG/ACT inhaler, Inhale 2 puffs 4 (Four) Times a Day., Disp: , Rfl:   •  warfarin (COUMADIN) 2.5 MG tablet, Take 2 tablets by mouth Daily., Disp: 60 tablet, Rfl: 3    Aspirin use counseling: Does not need ASA (and currently is not on it)    Current medication list contains no high risk medications.  No harmful drug interactions have been identified.     Family History   Problem Relation Age of Onset   • Lung cancer Mother        Social History     Tobacco Use   • Smoking status: Former Smoker     Packs/day: 0.50     Types: Cigarettes     Start date: 10/3/2019   • Smokeless tobacco: Former User   • Tobacco comment: LAST CIG NOV 02, 2019   Substance Use Topics   • Alcohol use: No     Comment: daily caffine       Past Surgical History:   Procedure Laterality Date   • CARDIAC CATHETERIZATION N/A 1/23/2020    Procedure: Coronary angiography;  Surgeon: Svetlana Grayson MD;  Location:  [facility];  Service: Cardiovascular   • CARDIAC CATHETERIZATION N/A 1/23/2020    Procedure: Left ventriculography;  Surgeon: Svetlana Grayson MD;  Location:  [facility];  Service: Cardiovascular   • CARDIAC CATHETERIZATION N/A 1/23/2020    Procedure: Left Heart Cath;  Surgeon: Svetlana Grayson MD;   Location: [facility] INVASIVE LOCATION;  Service: Cardiovascular   • CHOLECYSTECTOMY     • KNEE ARTHROPLASTY Right    • LAPAROSCOPIC GASTRIC BANDING         Patient Active Problem List   Diagnosis   • Acute endocarditis   • Influenza   • Thrush, oral   • COPD (chronic obstructive pulmonary disease) (CMS/HCC)   • Other persistent atrial fibrillation (CMS/HCC)   • Chronic respiratory failure with hypoxia (CMS/HCC)   • Infective endocarditis   • Endocarditis of mitral valve   • Mitral valve vegetation   • Acute on chronic diastolic CHF (congestive heart failure) (CMS/HCC)   • PAF (paroxysmal atrial fibrillation) (CMS/[facility])   • Bilateral lower extremity edema   • Intermittent lightheadedness   • Depression   • Chronic midline low back pain with left-sided sciatica   • Nephrolithiasis   • Oxygen dependent   • Medicare annual wellness visit, subsequent       Review of Systems   Constitutional: Negative for activity change, appetite change, fatigue, fever, unexpected weight gain and unexpected weight loss.   HENT: Negative for nosebleeds, rhinorrhea, trouble swallowing and voice change.    Eyes: Negative for visual disturbance.   Respiratory: Negative for cough, chest tightness, shortness of breath and wheezing.    Cardiovascular: Negative for chest pain, palpitations and leg swelling.   Gastrointestinal: Negative for abdominal pain, blood in stool, constipation, diarrhea, nausea, vomiting, GERD and indigestion.   Genitourinary: Negative for dysuria, frequency and hematuria.   Musculoskeletal: Negative for arthralgias, back pain and myalgias.   Skin: Negative for rash and bruise.   Neurological: Negative for dizziness, tremors, weakness, light-headedness, numbness, headache and memory problem.   Hematological: Negative for adenopathy. Does not bruise/bleed easily.   Psychiatric/Behavioral: Negative for sleep disturbance and depressed mood. The patient is not nervous/anxious.        Objective     Vitals:    04/12/21 1510  "  BP: 118/68   BP Location: Right arm   Patient Position: Sitting   Cuff Size: Adult   Pulse: 62   Temp: 97.4 °F (36.3 °C)   TempSrc: Temporal   SpO2: 100%   Weight: 68 kg (150 lb)   Height: 154.9 cm (60.98\")       Patient's Body mass index is 28.36 kg/m². BMI is above normal parameters. Recommendations include: exercise counseling and nutrition counseling.      No exam data present    The patient has no evidence of cognitve impairment.     Physical Exam  Vitals and nursing note reviewed.   Constitutional:       General: She is not in acute distress.     Appearance: She is well-developed. She is not diaphoretic.   HENT:      Head: Normocephalic and atraumatic.      Right Ear: External ear normal.      Left Ear: External ear normal.      Nose: Nose normal.   Eyes:      Conjunctiva/sclera: Conjunctivae normal.      Pupils: Pupils are equal, round, and reactive to light.   Neck:      Thyroid: No thyromegaly.      Trachea: No tracheal deviation.   Cardiovascular:      Rate and Rhythm: Normal rate and regular rhythm.      Heart sounds: Normal heart sounds. No murmur heard.   No friction rub. No gallop.    Pulmonary:      Effort: Pulmonary effort is normal. No respiratory distress.      Breath sounds: Normal breath sounds.      Comments: On oxygen concentrator per nasal canula.  Abdominal:      General: Bowel sounds are normal.      Palpations: Abdomen is soft. There is no mass.      Tenderness: There is no abdominal tenderness. There is no guarding.   Musculoskeletal:         General: Normal range of motion.      Cervical back: Normal range of motion and neck supple.   Lymphadenopathy:      Cervical: No cervical adenopathy.   Skin:     General: Skin is warm and dry.      Capillary Refill: Capillary refill takes less than 2 seconds.      Findings: No rash.   Neurological:      Mental Status: She is alert and oriented to person, place, and time.      Motor: No abnormal muscle tone.      Deep Tendon Reflexes: Reflexes " normal.   Psychiatric:         Behavior: Behavior normal.         Thought Content: Thought content normal.         Judgment: Judgment normal.         Recent Lab Results:          Assessment/Plan   Age-appropriate Screening Schedule:  Refer to the list below for future screening recommendations based on patient's age, sex and/or medical conditions.      Health Maintenance   Topic Date Due   • MAMMOGRAM  Never done   • DXA SCAN  Never done   • COLONOSCOPY  Never done   • TDAP/TD VACCINES (1 - Tdap) Never done   • ZOSTER VACCINE (1 of 2) Never done   • INFLUENZA VACCINE  08/01/2021       Medicare Risks and Personalized Health Plan:  Fall Risk  Glaucoma Risk  Immunizations Discussed/Encouraged (specific immunizations; adacel Tdap, Pneumococcal 23, Shingrix and COVID-19 )  Obesity/Overweight       CMS-Preventive Services Quick Reference  Medicare Preventive Services Addressed:  Annual Wellness Visit (AWV)  Cardiovascular Disease Screening Tests (may do this order every 5 years in beneficiaries without signs or symptoms of cardiovascular disease)  Colorectal Cancer Screening, Cologuard Test   Glaucoma screening (for individuals with diabetes mellitus, family history of glaucoma, -Americans (> or =) age 50, -Americans (> or =) age 65)    Advance Care Planning:  ACP discussion was held with the patient during this visit. Patient has an advance directive in EMR which is still valid.     Diagnoses and all orders for this visit:    1. Medicare annual wellness visit, subsequent (Primary)  -     Comprehensive Metabolic Panel  -     Lipid Panel    2. Chronic midline low back pain with left-sided sciatica  -     cyclobenzaprine (FLEXERIL) 10 MG tablet; Take 1 tablet by mouth 3 (Three) Times a Day As Needed (back pain).  Dispense: 40 tablet; Refill: 1  -     HYDROcodone-acetaminophen (NORCO) 7.5-325 MG per tablet; Take 1 tablet by mouth Every 6 (Six) Hours As Needed for Moderate Pain .  Dispense: 20 tablet; Refill:  0    3. Depression, unspecified depression type  -     buPROPion XL (WELLBUTRIN XL) 300 MG 24 hr tablet; Take 1 tablet by mouth Daily.  Dispense: 30 tablet; Refill: 5    4. Bilateral lower extremity edema  -     Comprehensive Metabolic Panel    5. Acute on chronic diastolic CHF (congestive heart failure) (CMS/HCC)  -     Comprehensive Metabolic Panel  -     Lipid Panel    6. Elevated fasting blood sugar  -     Comprehensive Metabolic Panel  -     Lipid Panel  -     Hemoglobin A1c    7. Screening mammogram, encounter for  -     Mammo Screening Bilateral With CAD; Future    8. Post-menopausal  -     DEXA Bone Density Axial; Future    9. Encounter for hepatitis C screening test for low risk patient  -     Hepatitis C Antibody    10. Immunization due  -     Pneumococcal Polysaccharide Vaccine 23-Valent Greater Than or Equal To 1yo Subcutaneous / IM      Reviewed history and annual wellness visit with patient during office time.  Medications reviewed as appropriate.  Discussed advanced directives and living will.  Patient has living will: Living will: Directive already scanned in chart.  Discussed fall risk and precautions encourage removing throw rugs and using grab bars within the home and bathroom.  Will check the labs as ordered above to evaluate the blood sugars, kidney, liver, cholesterol for screening.  Discussed flu shot recommended to get the high-dose influenza vaccine annually in the fall.  Prevnar-13 up to date and appropriate but no evidence of a Pneumovax 23 in the past.  Will give the pneumovax 23 today.  Tdap and Shingrix vaccination series recommended.  Encourage follow-up with the eye doctor on annual basis for glaucoma evaluation.  Discussed weight and encouraged exercise as tolerated while following a healthy diet.  Colon cancer screening discussed and current status:  cologuard done in the past.  Recommend to get annual mammograms.    We discussed the medications specifically the bupropion and the  cost.  It is costing her over $100 for a month supply at the local pharmacy with her insurance.  However it appears that with the good Rx savings card she can likely get this for about $20 at [facility].  Given a prescription to take with her to try and get that at a cheaper price and get it restarted.    An After Visit Summary and PPPS with all of these plans were given to the patient.      Follow Up:  No follow-ups on file.           · COVID-19 Precautions - Patient was compliant in wearing a mask. When I saw the patient, I used appropriate personal protective equipment (PPE) including mask and eye shield (standard procedure).  Additionally, I used gown and gloves if indicated.  Hand hygiene was completed before and after seeing the patient.  · Dictated utilizing Dragon Dictation

## 2021-04-13 ENCOUNTER — ANTICOAGULATION VISIT (OUTPATIENT)
Dept: PHARMACY | Facility: HOSPITAL | Age: 71
End: 2021-04-13

## 2021-04-13 ENCOUNTER — TELEPHONE (OUTPATIENT)
Dept: FAMILY MEDICINE CLINIC | Facility: CLINIC | Age: 71
End: 2021-04-13

## 2021-04-13 DIAGNOSIS — I48.0 PAF (PAROXYSMAL ATRIAL FIBRILLATION) (HCC): Primary | ICD-10-CM

## 2021-04-13 LAB
ALBUMIN SERPL-MCNC: 4 G/DL (ref 3.7–4.7)
ALBUMIN/GLOB SERPL: 1.5 {RATIO} (ref 1.2–2.2)
ALP SERPL-CCNC: 133 IU/L (ref 39–117)
ALT SERPL-CCNC: 16 IU/L (ref 0–32)
AST SERPL-CCNC: 22 IU/L (ref 0–40)
BILIRUB SERPL-MCNC: 0.4 MG/DL (ref 0–1.2)
BUN SERPL-MCNC: 18 MG/DL (ref 8–27)
BUN/CREAT SERPL: 14 (ref 12–28)
CALCIUM SERPL-MCNC: 9.3 MG/DL (ref 8.7–10.3)
CHLORIDE SERPL-SCNC: 98 MMOL/L (ref 96–106)
CHOLEST SERPL-MCNC: 194 MG/DL (ref 100–199)
CO2 SERPL-SCNC: 28 MMOL/L (ref 20–29)
CREAT SERPL-MCNC: 1.33 MG/DL (ref 0.57–1)
GLOBULIN SER CALC-MCNC: 2.7 G/DL (ref 1.5–4.5)
GLUCOSE SERPL-MCNC: 92 MG/DL (ref 65–99)
HBA1C MFR BLD: 5.6 % (ref 4.8–5.6)
HCV AB S/CO SERPL IA: <0.1 S/CO RATIO (ref 0–0.9)
HDLC SERPL-MCNC: 48 MG/DL
INR PPP: 1.8 (ref 0.91–1.09)
LDLC SERPL CALC-MCNC: 115 MG/DL (ref 0–99)
POTASSIUM SERPL-SCNC: 3.8 MMOL/L (ref 3.5–5.2)
PROT SERPL-MCNC: 6.7 G/DL (ref 6–8.5)
PROTHROMBIN TIME: 21.1 SECONDS (ref 10–13.8)
SODIUM SERPL-SCNC: 142 MMOL/L (ref 134–144)
TRIGL SERPL-MCNC: 174 MG/DL (ref 0–149)
VLDLC SERPL CALC-MCNC: 31 MG/DL (ref 5–40)

## 2021-04-13 PROCEDURE — G0463 HOSPITAL OUTPT CLINIC VISIT: HCPCS

## 2021-04-13 PROCEDURE — 85610 PROTHROMBIN TIME: CPT

## 2021-04-13 PROCEDURE — 36416 COLLJ CAPILLARY BLOOD SPEC: CPT

## 2021-04-13 NOTE — TELEPHONE ENCOUNTER
OK FOR HUB TO READ    LMFCB      Your blood work returned as normal findings other than the cholesterol slightly high and your kidney function is decreased from your baseline from 1 year ago.  This may be due to your diuretics or water pills.  I would recommend repeating these levels for your kidneys with a BMP in approximately 1 month.

## 2021-04-13 NOTE — PROGRESS NOTES
Anticoagulation Clinic Progress Note    Anticoagulation Summary  As of 2021    INR goal:  2.0-3.0   TTR:  64.0 % (1.2 mo)   INR used for dosin.8 (2021)   Warfarin maintenance plan:  2.5 mg every Wed, Sat; 3.75 mg all other days   Weekly warfarin total:  23.75 mg   Plan last modified:  Nallely Rob RPH (3/16/2021)   Next INR check:  2021   Target end date:      Indications    PAF (paroxysmal atrial fibrillation) (CMS/HCC) [I48.0]             Anticoagulation Episode Summary     INR check location:      Preferred lab:      Send INR reminders to:  [facility]    Comments:  Apixaban too costly      Anticoagulation Care Providers     Provider Role Specialty Phone number    Zenia Tinajero MD Referring Cardiology [phone]          Clinic Interview:  Patient Findings     Positives:  Change in diet/appetite    Negatives:  Signs/symptoms of thrombosis, Signs/symptoms of bleeding,   Laboratory test error suspected, Change in health, Change in alcohol use,   Change in activity, Upcoming invasive procedure, Emergency department   visit, Upcoming dental procedure, Missed doses, Extra doses, Change in   medications, Hospital admission, Bruising, Other complaints    Comments:  More vit k than usual      Clinical Outcomes     Negatives:  Major bleeding event, Thromboembolic event,   Anticoagulation-related hospital admission, Anticoagulation-related ED   visit, Anticoagulation-related fatality    Comments:  More vit k than usual        INR History:  Anticoagulation Monitoring 3/22/2021 3/30/2021 2021   INR 2.3 2.7 1.8   INR Date 3/22/2021 3/30/2021 2021   INR Goal 2.0-3.0 2.0-3.0 2.0-3.0   Trend Same Same Same   Last Week Total 23.75 mg 23.75 mg 23.75 mg   Next Week Total 23.75 mg 23.75 mg 25 mg   Sun 3.75 mg 3.75 mg 3.75 mg   Mon 3.75 mg 3.75 mg 3.75 mg   Tue 3.75 mg 3.75 mg 5 mg (); Otherwise 3.75 mg   Wed 2.5 mg 2.5 mg 2.5 mg   Thu 3.75 mg 3.75 mg 3.75 mg   Fri 3.75 mg  3.75 mg 3.75 mg   Sat 2.5 mg 2.5 mg 2.5 mg   Visit Report - - -   Some recent data might be hidden       Plan:  1. INR is Subtherapeutic today- see above in Anticoagulation Summary.  Will instruct Shani Phillip to Change their warfarin regimen- see above in Anticoagulation Summary.  2. Follow up in 2 weeks  3. Patient declines warfarin refills.  4. Verbal and written information provided. Patient expresses understanding and has no further questions at this time.    Keshawn Castaneda [facility]

## 2021-04-15 ENCOUNTER — TRANSCRIBE ORDERS (OUTPATIENT)
Dept: FAMILY MEDICINE CLINIC | Facility: CLINIC | Age: 71
End: 2021-04-15

## 2021-04-15 DIAGNOSIS — Z12.31 ENCOUNTER FOR SCREENING MAMMOGRAM FOR HIGH-RISK PATIENT: Primary | ICD-10-CM

## 2021-04-27 ENCOUNTER — ANTICOAGULATION VISIT (OUTPATIENT)
Dept: PHARMACY | Facility: HOSPITAL | Age: 71
End: 2021-04-27

## 2021-04-27 DIAGNOSIS — I48.0 PAF (PAROXYSMAL ATRIAL FIBRILLATION) (HCC): Primary | ICD-10-CM

## 2021-04-27 LAB
INR PPP: 2.8 (ref 0.91–1.09)
PROTHROMBIN TIME: 33.8 SECONDS (ref 10–13.8)

## 2021-04-27 PROCEDURE — 36416 COLLJ CAPILLARY BLOOD SPEC: CPT

## 2021-04-27 PROCEDURE — 85610 PROTHROMBIN TIME: CPT

## 2021-04-27 NOTE — PROGRESS NOTES
Anticoagulation Clinic Progress Note    Anticoagulation Summary  As of 2021    INR goal:  2.0-3.0   TTR:  68.4 % (1.7 mo)   INR used for dosin.8 (2021)   Warfarin maintenance plan:  2.5 mg every Wed, Sat; 3.75 mg all other days   Weekly warfarin total:  23.75 mg   No change documented:  Jaida Delgado   Plan last modified:  Nallely Rob RP (3/16/2021)   Next INR check:  2021   Priority:  High   Target end date:      Indications    PAF (paroxysmal atrial fibrillation) (CMS/HCC) [I48.0]             Anticoagulation Episode Summary     INR check location:      Preferred lab:      Send INR reminders to:   [facility]    Comments:  Apixaban too costly      Anticoagulation Care Providers     Provider Role Specialty Phone number    Zenia Tinajero MD Referring Cardiology [phone]          Clinic Interview:      INR History:  Anticoagulation Monitoring 3/30/2021 2021 2021   INR 2.7 1.8 2.8   INR Date 3/30/2021 2021 2021   INR Goal 2.0-3.0 2.0-3.0 2.0-3.0   Trend Same Same Same   Last Week Total 23.75 mg 23.75 mg 23.75 mg   Next Week Total 23.75 mg 25 mg 23.75 mg   Sun 3.75 mg 3.75 mg 3.75 mg   Mon 3.75 mg 3.75 mg 3.75 mg   Tue 3.75 mg 5 mg (); Otherwise 3.75 mg 3.75 mg   Wed 2.5 mg 2.5 mg 2.5 mg   Thu 3.75 mg 3.75 mg 3.75 mg   Fri 3.75 mg 3.75 mg 3.75 mg   Sat 2.5 mg 2.5 mg 2.5 mg   Visit Report - - -   Some recent data might be hidden       Plan:  1. INR is therapeutic today- see above in Anticoagulation Summary.   Will instruct Shani Phillip to continue their warfarin regimen- see above in Anticoagulation Summary.  2. Follow up in 2 weeks.  3. Patient declines warfarin refills.  4. Verbal and written information provided. Patient expresses understanding and has no further questions at this time.    Jaida Delgado

## 2021-05-11 ENCOUNTER — ANTICOAGULATION VISIT (OUTPATIENT)
Dept: PHARMACY | Facility: HOSPITAL | Age: 71
End: 2021-05-11

## 2021-05-11 DIAGNOSIS — I48.0 PAF (PAROXYSMAL ATRIAL FIBRILLATION) (HCC): Primary | ICD-10-CM

## 2021-05-11 LAB
INR PPP: 3.8 (ref 0.91–1.09)
PROTHROMBIN TIME: 45.3 SECONDS (ref 10–13.8)

## 2021-05-11 PROCEDURE — 85610 PROTHROMBIN TIME: CPT

## 2021-05-11 PROCEDURE — G0463 HOSPITAL OUTPT CLINIC VISIT: HCPCS

## 2021-05-11 PROCEDURE — 36416 COLLJ CAPILLARY BLOOD SPEC: CPT

## 2021-05-11 NOTE — PROGRESS NOTES
Anticoagulation Clinic Progress Note    Anticoagulation Summary  As of 5/11/2021    INR goal:  2.0-3.0   TTR:  57.9 % (2.1 mo)   INR used for dosing:     Warfarin maintenance plan:  2.5 mg every Wed, Sat; 3.75 mg all other days   Weekly warfarin total:  23.75 mg   Plan last modified:  Nallely Rob RPH (3/16/2021)   Next INR check:  5/18/2021   Priority:  High   Target end date:      Indications    PAF (paroxysmal atrial fibrillation) (CMS/HCC) [I48.0]             Anticoagulation Episode Summary     INR check location:      Preferred lab:      [facility]    Comments:  Apixaban too costly      Anticoagulation Care Providers     Provider Role Specialty Phone number    Zenia Tinajero MD Referring Cardiology [phone]          Clinic Interview:      INR History:  Anticoagulation Monitoring 4/13/2021 4/27/2021 5/11/2021   INR 1.8 2.8 -   INR Date 4/13/2021 4/27/2021 -   INR Goal 2.0-3.0 2.0-3.0 2.0-3.0   Trend Same Same Same   Last Week Total 23.75 mg 23.75 mg 23.75 mg   Next Week Total 25 mg 23.75 mg 21.25 mg   Sun 3.75 mg 3.75 mg 3.75 mg   Mon 3.75 mg 3.75 mg 3.75 mg   Tue 5 mg (4/13); Otherwise 3.75 mg 3.75 mg 1.25 mg (5/11)   Wed 2.5 mg 2.5 mg 2.5 mg   Thu 3.75 mg 3.75 mg 3.75 mg   Fri 3.75 mg 3.75 mg 3.75 mg   Sat 2.5 mg 2.5 mg 2.5 mg   Visit Report - - -   Some recent data might be hidden        Plan:  1. INR is Supratherapeutic today- see above in Anticoagulation Summary.  Will instruct Shani Phillip to Continue their warfarin regimen with the exception of giving 1.25 mg today instead of 3.75- see above in Anticoagulation Summary.  2. Follow up in 1 week  3. Patient declines warfarin refills.  4. Verbal and written information provided. Patient expresses understanding and has no further questions at this time.    Andrei Romano [facility]

## 2021-05-18 ENCOUNTER — ANTICOAGULATION VISIT (OUTPATIENT)
Dept: PHARMACY | Facility: HOSPITAL | Age: 71
End: 2021-05-18

## 2021-05-18 DIAGNOSIS — I48.0 PAF (PAROXYSMAL ATRIAL FIBRILLATION) (HCC): Primary | ICD-10-CM

## 2021-05-18 LAB
INR PPP: 3.9 (ref 0.91–1.09)
PROTHROMBIN TIME: 47.2 SECONDS (ref 10–13.8)

## 2021-05-18 PROCEDURE — G0463 HOSPITAL OUTPT CLINIC VISIT: HCPCS

## 2021-05-18 PROCEDURE — 85610 PROTHROMBIN TIME: CPT

## 2021-05-18 PROCEDURE — 36416 COLLJ CAPILLARY BLOOD SPEC: CPT

## 2021-05-18 NOTE — PROGRESS NOTES
Anticoagulation Clinic Progress Note    Anticoagulation Summary  As of 5/18/2021    INR goal:  2.0-3.0   TTR:  52.2 % (2.4 mo)   INR used for dosing:  3.9 (5/18/2021)   Warfarin maintenance plan:  3.75 mg every Sun, Tue, Thu; 2.5 mg all other days   Weekly warfarin total:  21.25 mg   Plan last modified:  Keshawn Castaneda RPH (5/18/2021)   Next INR check:  5/26/2021   Priority:  High   Target end date:      Indications    PAF (paroxysmal atrial fibrillation) (CMS/[facility]) [I48.0]             Anticoagulation Episode Summary     INR check location:      Preferred lab:      Send INR reminders to:   [facility]    Comments:  Apixaban too costly      Anticoagulation Care Providers     Provider Role Specialty Phone number    Zenia Tinajero MD Referring Cardiology [phone]          Clinic Interview:  Patient Findings     Negatives:  Signs/symptoms of thrombosis, Signs/symptoms of bleeding,   Laboratory test error suspected, Change in health, Change in alcohol use,   Change in activity, Upcoming invasive procedure, Emergency department   visit, Upcoming dental procedure, Missed doses, Extra doses, Change in   medications, Change in diet/appetite, Hospital admission, Bruising, Other   complaints      Clinical Outcomes     Negatives:  Major bleeding event, Thromboembolic event,   Anticoagulation-related hospital admission, Anticoagulation-related ED   visit, Anticoagulation-related fatality        INR History:  Anticoagulation Monitoring 4/27/2021 5/11/2021 5/18/2021   INR 2.8 3.8 3.9   INR Date 4/27/2021 5/11/2021 5/18/2021   INR Goal 2.0-3.0 2.0-3.0 2.0-3.0   Trend Same Same Down   Last Week Total 23.75 mg 23.75 mg 21.25 mg   Next Week Total 23.75 mg 21.25 mg 18.75 mg   Sun 3.75 mg 3.75 mg 3.75 mg   Mon 3.75 mg 3.75 mg 2.5 mg   Tue 3.75 mg 1.25 mg (5/11) 1.25 mg (5/18); Otherwise 3.75 mg   Wed 2.5 mg 2.5 mg 2.5 mg   Thu 3.75 mg 3.75 mg 3.75 mg   Fri 3.75 mg 3.75 mg 2.5 mg   Sat 2.5 mg 2.5 mg 2.5 mg   Visit  Report - - -   Some recent data might be hidden       Plan:  1. INR is Supratherapeutic today- see above in Anticoagulation Summary.  Will instruct Shani Phillip to Change their warfarin regimen- see above in Anticoagulation Summary.  2. Follow up in 1 week  3. Patient declines warfarin refills.  4. Verbal and written information provided. Patient expresses understanding and has no further questions at this time.    Keshawn Castaneda [facility]

## 2021-05-21 RX ORDER — WARFARIN SODIUM 2.5 MG/1
TABLET ORAL
Qty: 120 TABLET | Refills: 0 | Status: SHIPPED | OUTPATIENT
Start: 2021-05-21 | End: 2021-08-16

## 2021-05-26 ENCOUNTER — OFFICE VISIT (OUTPATIENT)
Dept: CARDIOLOGY | Facility: CLINIC | Age: 71
End: 2021-05-26

## 2021-05-26 ENCOUNTER — ANTICOAGULATION VISIT (OUTPATIENT)
Dept: PHARMACY | Facility: HOSPITAL | Age: 71
End: 2021-05-26

## 2021-05-26 VITALS
DIASTOLIC BLOOD PRESSURE: 100 MMHG | HEIGHT: 61 IN | HEART RATE: 59 BPM | WEIGHT: 148.4 LBS | BODY MASS INDEX: 28.02 KG/M2 | SYSTOLIC BLOOD PRESSURE: 138 MMHG

## 2021-05-26 DIAGNOSIS — R94.31 ABNORMAL EKG: ICD-10-CM

## 2021-05-26 DIAGNOSIS — I48.0 PAF (PAROXYSMAL ATRIAL FIBRILLATION) (HCC): Primary | ICD-10-CM

## 2021-05-26 DIAGNOSIS — I48.0 PAF (PAROXYSMAL ATRIAL FIBRILLATION) (HCC): ICD-10-CM

## 2021-05-26 DIAGNOSIS — I34.0 MITRAL VALVE INSUFFICIENCY, UNSPECIFIED ETIOLOGY: Primary | ICD-10-CM

## 2021-05-26 DIAGNOSIS — I05.0 MITRAL VALVE STENOSIS, UNSPECIFIED ETIOLOGY: ICD-10-CM

## 2021-05-26 LAB
INR PPP: 2.7 (ref 0.91–1.09)
PROTHROMBIN TIME: 32.9 SECONDS (ref 10–13.8)

## 2021-05-26 PROCEDURE — 85610 PROTHROMBIN TIME: CPT

## 2021-05-26 PROCEDURE — 99214 OFFICE O/P EST MOD 30 MIN: CPT | Performed by: NURSE PRACTITIONER

## 2021-05-26 PROCEDURE — 93000 ELECTROCARDIOGRAM COMPLETE: CPT | Performed by: NURSE PRACTITIONER

## 2021-05-26 PROCEDURE — 36416 COLLJ CAPILLARY BLOOD SPEC: CPT

## 2021-05-26 NOTE — PROGRESS NOTES
Date of Office Visit: 2021  Encounter Provider: Billie Kraus, RICO, APRN  Place of Service: [facility] CARDIOLOGY  Patient Name: Shani Phillip  :1950        Subjective:     Chief Complaint:  Paroxysmal atrial fibrillation, hx mitral valve vegetation, incomplete RBBB      History of Present Illness:  Shani Phillip is a 70 y.o. female patient of Dr. Tinajero.  I am seeing this patient in the office today and I have reviewed her records.    Patient has a history of atrial fibrillation, mitral valve endocarditis, diastolic CHF, COPD, overactive bladder.     PER PREVIOUS OFFICE NOTE:  This is a lady who was on a cruise ship when she developed a febrile illness.  She was taken to [facility] where she was diagnosed with endocarditis.  She left the hospital to return home to [place] I first saw her in 2019.  She has a history of COPD on oxygen, former smoker, restless leg syndrome and chronic pain.  She had a transthoracic echocardiogram suggestive of vegetation on the mitral valve annulus.  She had a transesophageal echo which delineated this more clearly.  Dr. Briones saw her in consultation.  The plan was to treat her with IV antibiotics which occurred.  She came in for a repeat transesophageal echo on 2020 and this was pretty much unchanged.  The mobile element attached to the posterior mitral valve annulus annulus was unchanged. THE FOLLOWING IS MY CONTRIBUTION TO OFFICE NOTE: Patient had REDD 2020 showing normal LV systolic function with EF 56-60%, mildly dilated RV cavity with normal systolic function, no evidence of left atrial appendage thrombus, dilated right atrial cavity, aortic valve sclerosis, moderate MAC with mild mitral regurgitation and stenosis, mild tricuspid regurgitation with normal RVSP. Mobile element attached to posterior mitral valve annulus was felt to be unchanged from 2019 echocardiogram. Patient was seen  in office by Dr. Tinajero 1/17/2020 at which point she exhibited lower extremity edema and Lasix was increased to 40mg BID.    Furosemide was later changed to torsemide.  She was instructed to follow-up with Dr. Briones regarding recent REDD results.  She had bilateral normal arterial Doppler 8/2020.  Patient had a repeat echo 9/2020 through cardiothoracic surgery showing normal LV systolic function with EF of 64%, grade 1A diastolic dysfunction, mild to moderately dilated left atrial cavity, moderate mitral annular calcification with bileaflet mitral valve thickening and mild mitral regurgitation with mild mitral stenosis and mobile vegetation of the left atrial surface of the posterior mitral valve leaflet.  She was last seen in the office 2/2021 by Dr. Tinajero at which point she was having issues with Eliquis coverage and was changed to warfarin.      Patient presents to office today for follow-up appointment.  Significant other is with patient in the office today, per patient preference.  Patient reports she is doing well overall since last visit.  She feels like her chronic shortness of breath is at baseline.  She remains on 2 L nasal cannula oxygen.  She continues to follow with pulmonology though does not believe she has seen them recently.  Over the last 4 months or so she has noticed some lightheadedness at times, sometimes with position changes.  Recent labs showed elevated creatinine from baseline and she has not had any lower extremity swelling since last visit, this is completely resolved without recurrence.  It looks like she might be starting to get a little dry though not currently orthostatic in the office though also not lightheaded with position changes today.  Denies any chest pain or discomfort, palpitations, racing heartbeat sensation, syncope, near syncope, falls, or abnormal bleeding.        Past Medical History:   Diagnosis Date   • Acute endocarditis    • Atrial fibrillation with RVR (CMS/[facility])  11/14/2019   • COPD (chronic obstructive pulmonary disease) (CMS/[facility])    • Influenza 11/14/2019   • Renal disorder    • Restless leg syndrome    • Thrush, oral 11/14/2019     Past Surgical History:   Procedure Laterality Date   • CARDIAC CATHETERIZATION N/A 1/23/2020    Procedure: Coronary angiography;  Surgeon: Svetlana Grayson MD;  Location:  [facility];  Service: Cardiovascular   • CARDIAC CATHETERIZATION N/A 1/23/2020    Procedure: Left ventriculography;  Surgeon: Svetlana Grayson MD;  Location:  [facility];  Service: Cardiovascular   • CARDIAC CATHETERIZATION N/A 1/23/2020    Procedure: Left Heart Cath;  Surgeon: Svetlana Grayson MD;  Location:  [facility];  Service: Cardiovascular   • CHOLECYSTECTOMY     • KNEE ARTHROPLASTY Right    • LAPAROSCOPIC GASTRIC BANDING       Outpatient Medications Prior to Visit   Medication Sig Dispense Refill   • budesonide (PULMICORT) 0.5 MG/2ML nebulizer solution Take 0.5 mg by nebulization Daily.     • buPROPion XL (WELLBUTRIN XL) 300 MG 24 hr tablet Take 1 tablet by mouth Daily. 30 tablet 5   • cholecalciferol (VITAMIN D3) 25 MCG (1000 UT) tablet Take 2,000 Units by mouth Daily.     • cyclobenzaprine (FLEXERIL) 10 MG tablet Take 1 tablet by mouth 3 (Three) Times a Day As Needed (back pain). 40 tablet 1   • famotidine (PEPCID) 20 MG tablet Take 1 tablet by mouth 2 (Two) Times a Day Before Meals. 180 tablet 3   • furosemide (LASIX) 40 MG tablet Take 1 tablet by mouth Daily. 90 tablet 3   • HYDROcodone-acetaminophen (NORCO) 7.5-325 MG per tablet Take 1 tablet by mouth Every 6 (Six) Hours As Needed for Moderate Pain . 20 tablet 0   • O2 (OXYGEN) Inhale 2 L/min 1 (One) Time.     • potassium chloride 10 MEQ CR tablet Take 1 tablet by mouth Daily. 90 tablet 5   • rOPINIRole (REQUIP) 2 MG tablet Take 2 mg by mouth 2 (Two) Times a Day.     • TROSPIUM CHLORIDE PO Take 20 mg by mouth 2 (Two) Times a Day.     • Ventolin  (90 Base)  "MCG/ACT inhaler Inhale 2 puffs 4 (Four) Times a Day.     • warfarin (COUMADIN) 2.5 MG tablet Take one and one-half tablets (3.75mg) by mouth every Sunday, Tuesday, and Thursday and take one tablet (2.5mg) all other days of the week or as directed 120 tablet 0     No facility-administered medications prior to visit.       Allergies as of 05/26/2021 - Reviewed 05/26/2021   Allergen Reaction Noted   • Penicillins Rash 02/26/2013     Social History     Socioeconomic History   • Marital status:      Spouse name: Not on file   • Number of children: Not on file   • Years of education: Not on file   • Highest education level: Not on file   Tobacco Use   • Smoking status: Former Smoker     Packs/day: 0.50     Types: Cigarettes     Start date: 10/3/2019   • Smokeless tobacco: Former User   • Tobacco comment: LAST CIG NOV 02, 2019   Substance and Sexual Activity   • Alcohol use: No     Comment: daily caffine   • Drug use: No   • Sexual activity: Defer     Family History   Problem Relation Age of Onset   • Lung cancer Mother        Review of Systems   Constitutional: Positive for malaise/fatigue.   Cardiovascular:        SEE HPI   Respiratory: Positive for shortness of breath (Chronic, at baseline).    Hematologic/Lymphatic: Negative for bleeding problem.   Musculoskeletal: Negative for falls.   Gastrointestinal: Negative for melena.   Genitourinary: Negative for hematuria.   Neurological: Positive for light-headedness.   Psychiatric/Behavioral: Negative for altered mental status.          Objective:     Vitals:    05/26/21 1440   BP: 138/100   BP Location: Left arm   Patient Position: Sitting   Cuff Size: Adult   Pulse: 59   Weight: 67.3 kg (148 lb 6.4 oz)   Height: 154.9 cm (61\")     Body mass index is 28.04 kg/m².      PHYSICAL EXAM:  Constitutional:       General: Not in acute distress.     Appearance: Well-developed. Not diaphoretic.   Eyes:      Pupils: Pupils are equal, round, and reactive to light.   HENT:      " Head: Normocephalic and atraumatic.   Neck:      Vascular: No carotid bruit or JVD.   Pulmonary:      Effort: Pulmonary effort is normal. No respiratory distress.      Breath sounds: Normal breath sounds. No wheezing. No rales.   Cardiovascular:      Normal rate. Regular rhythm.      Murmurs: There is no murmur.      No gallop. No click. No rub.   Edema:     Peripheral edema absent.   Abdominal:      General: Bowel sounds are normal. There is no distension.      Palpations: Abdomen is soft.   Musculoskeletal:         General: No tenderness or deformity.      Cervical back: Neck supple. Skin:     General: Skin is warm and dry.      Findings: No erythema or rash.   Neurological:      Mental Status: Alert and oriented to person, place, and time.   Psychiatric:         Behavior: Behavior normal.         Judgment: Judgment normal.              ECG 12 Lead    Date/Time: 5/26/2021 3:53 PM  Performed by: Billie Kraus DNP, APRN  Authorized by: Billie Kraus DNP, APRN   Comparison: compared with previous ECG from 2/26/2021  Rhythm: sinus rhythm  BPM: 58  Conduction: incomplete right bundle branch block    Clinical impression: abnormal EKG              Assessment:       Diagnosis Plan   1. Mitral valve insufficiency, unspecified etiology  Adult Transthoracic Echo Complete w/ Color, Spectral and Contrast if Necessary Per Protocol   2. Mitral valve stenosis, unspecified etiology  Adult Transthoracic Echo Complete w/ Color, Spectral and Contrast if Necessary Per Protocol   3. Abnormal EKG  Adult Transthoracic Echo Complete w/ Color, Spectral and Contrast if Necessary Per Protocol         Plan:     1. Lightheadedness: Sounds like she may be getting a little dry as this can happen sometimes when she stands up quickly.  She does have recent elevated creatinine and no recent edema, has completely resolved.  I think she may be getting a little dry.  We are going to decrease her Lasix to 20 mg a day.  She is going to check  daily weights and call if she gains more than 2 pounds in 1 day or 5 pounds in a week where she has recurrent swelling or increased shortness of breath after decreasing the diuretic.  She will call in 1 week with an update or sooner for issues or concerns.  May be able to decrease further if she does well on the decreased dose.    2. Incomplete right bundle branch block: In the setting of known lung disease but also with hx valvular heart disease.  Discussed with Dr. Tinajero and we will look at repeating an echocardiogram given her history.  If no significant changes and will have her follow-up with pulmonology on history of sleep apnea and COPD.  3. Paroxysmal atrial fibrillation: Status post cardioversion 11/2019.  Maintaining sinus rhythm in the office today.  Now anticoagulated with warfarin given cost issues with Eliquis.  Last several INRs have been therapeutic.  4. Mitral valve vegetation: Status post antibiotics.  Follow-up echo did not show a change.  Cardiothoracic surgeon did not recommend surgery.  Last echo 9/2020 showed some mitral valve regurgitation and mild stenosis  5. Diastolic dysfunction: Grade 1 and 9/2020 echo.  Recheck echo as above.  Actually looks like she is getting a little dry, as above.  6. Lower extremity edema: Resolved with torsemide  7. COPD on home oxygen: Due for follow-up with pulmonology.  Recommended calling and scheduling this.  Also recommended continuing to monitor pulse ox at home to ensure O2 sats are stable on current regimen.    Plan of care discussed with Dr. Lior MD.    Patient to follow-up with Dr. Tinajero in 3 months or sooner if needed for any new, recurrent, or worsening symptoms or other issues or concerns.  Discussed in detail signs/symptoms that warrant sooner call or follow-up to the office or ER visit.      Records reviewed including but not limited to 2/2021 EKG, 8/2020 EKG, 3/2020 EKG, 9/2020 echo, 4/2021 metabolic panel.           Your medication list           Accurate as of May 26, 2021  4:19 PM. If you have any questions, ask your nurse or doctor.            CONTINUE taking these medications      Instructions Last Dose Given Next Dose Due   budesonide 0.5 MG/2ML nebulizer solution  Commonly known as: PULMICORT      Take 0.5 mg by nebulization Daily.       buPROPion  MG 24 hr tablet  Commonly known as: WELLBUTRIN XL      Take 1 tablet by mouth Daily.       cholecalciferol 25 MCG (1000 UT) tablet  Commonly known as: VITAMIN D3      Take 2,000 Units by mouth Daily.       cyclobenzaprine 10 MG tablet  Commonly known as: FLEXERIL      Take 1 tablet by mouth 3 (Three) Times a Day As Needed (back pain).       famotidine 20 MG tablet  Commonly known as: PEPCID      Take 1 tablet by mouth 2 (Two) Times a Day Before Meals.       furosemide 40 MG tablet  Commonly known as: LASIX      Take 1 tablet by mouth Daily.       HYDROcodone-acetaminophen 7.5-325 MG per tablet  Commonly known as: NORCO      Take 1 tablet by mouth Every 6 (Six) Hours As Needed for Moderate Pain .       O2  Commonly known as: OXYGEN      Inhale 2 L/min 1 (One) Time.       potassium chloride 10 MEQ CR tablet      Take 1 tablet by mouth Daily.       rOPINIRole 2 MG tablet  Commonly known as: REQUIP      Take 2 mg by mouth 2 (Two) Times a Day.       TROSPIUM CHLORIDE PO      Take 20 mg by mouth 2 (Two) Times a Day.       Ventolin  (90 Base) MCG/ACT inhaler  Generic drug: albuterol sulfate HFA      Inhale 2 puffs 4 (Four) Times a Day.       warfarin 2.5 MG tablet  Commonly known as: COUMADIN      Take one and one-half tablets (3.75mg) by mouth every Sunday, Tuesday, and Thursday and take one tablet (2.5mg) all other days of the week or as directed              The above medication changes may not have been made by this provider.  Patient's medication list was updated to reflect medications they are currently taking including medication changes made by other providers.            Thanks,    Billie ERIC  RICO Kraus, APRN  05/26/2021         Dictated utilizing Dragon dictation

## 2021-05-26 NOTE — PROGRESS NOTES
Anticoagulation Clinic Progress Note    Anticoagulation Summary  As of 2021    INR goal:  2.0-3.0   TTR:  49.5 % (2.6 mo)   INR used for dosin.7 (2021)   Warfarin maintenance plan:  3.75 mg every Sun, Tue, Thu; 2.5 mg all other days   Weekly warfarin total:  21.25 mg   No change documented:  Keshawn Castaneda RPH   Plan last modified:  Keshawn Castaneda RPH (2021)   Next INR check:  2021   Priority:  High   Target end date:      Indications    PAF (paroxysmal atrial fibrillation) (CMS/[facility]) [I48.0]             Anticoagulation Episode Summary     INR check location:      Preferred lab:      Send INR reminders to:   [facility]    Comments:  Apixaban too costly      Anticoagulation Care Providers     Provider Role Specialty Phone number    Zenia Tinajero MD Referring Cardiology [phone]          Clinic Interview:  Patient Findings     Negatives:  Signs/symptoms of thrombosis, Signs/symptoms of bleeding,   Laboratory test error suspected, Change in health, Change in alcohol use,   Change in activity, Upcoming invasive procedure, Emergency department   visit, Upcoming dental procedure, Missed doses, Extra doses, Change in   medications, Change in diet/appetite, Hospital admission, Bruising, Other   complaints      Clinical Outcomes     Negatives:  Major bleeding event, Thromboembolic event,   Anticoagulation-related hospital admission, Anticoagulation-related ED   visit, Anticoagulation-related fatality        INR History:  Anticoagulation Monitoring 2021   INR 3.8 3.9 2.7   INR Date 2021   INR Goal 2.0-3.0 2.0-3.0 2.0-3.0   Trend Same Down Same   Last Week Total 23.75 mg 21.25 mg 21.25 mg   Next Week Total 21.25 mg 18.75 mg 21.25 mg   Sun 3.75 mg 3.75 mg 3.75 mg   Mon 3.75 mg 2.5 mg 2.5 mg   Tue 1.25 mg () 1.25 mg (); Otherwise 3.75 mg 3.75 mg   Wed 2.5 mg 2.5 mg 2.5 mg   Thu 3.75 mg 3.75 mg 3.75 mg   Fri 3.75 mg 2.5 mg 2.5  mg   Sat 2.5 mg 2.5 mg 2.5 mg   Visit Report - - -   Some recent data might be hidden       Plan:  1. INR is Therapeutic today- see above in Anticoagulation Summary.  Will instruct Shani Phillip to Continue their warfarin regimen- see above in Anticoagulation Summary.  2. Follow up in 2 weeks  3. Patient declines warfarin refills.  4. Verbal and written information provided. Patient expresses understanding and has no further questions at this time.    Keshawn Castaneda [facility]

## 2021-06-09 RX ORDER — METOPROLOL TARTRATE 100 MG/1
TABLET ORAL
Qty: 180 TABLET | Refills: 1 | OUTPATIENT
Start: 2021-06-09

## 2021-06-17 ENCOUNTER — ANTICOAGULATION VISIT (OUTPATIENT)
Dept: PHARMACY | Facility: HOSPITAL | Age: 71
End: 2021-06-17

## 2021-06-17 DIAGNOSIS — I48.0 PAF (PAROXYSMAL ATRIAL FIBRILLATION) (HCC): Primary | ICD-10-CM

## 2021-06-17 LAB
INR PPP: 1.8 (ref 0.91–1.09)
PROTHROMBIN TIME: 21.3 SECONDS (ref 10–13.8)

## 2021-06-17 PROCEDURE — G0463 HOSPITAL OUTPT CLINIC VISIT: HCPCS

## 2021-06-17 PROCEDURE — 36416 COLLJ CAPILLARY BLOOD SPEC: CPT

## 2021-06-17 PROCEDURE — 85610 PROTHROMBIN TIME: CPT

## 2021-06-17 NOTE — PROGRESS NOTES
Anticoagulation Clinic Progress Note    Anticoagulation Summary  As of 2021    INR goal:  2.0-3.0   TTR:  55.6 % (3.4 mo)   INR used for dosin.8 (2021)   Warfarin maintenance plan:  2.5 mg every Mon, Wed, Fri; 3.75 mg all other days   Weekly warfarin total:  22.5 mg   Plan last modified:  Keshawn Castaneda RPH (2021)   Next INR check:  2021   Priority:  High   Target end date:      Indications    PAF (paroxysmal atrial fibrillation) (CMS/HCC) [I48.0]             Anticoagulation Episode Summary     INR check location:      Preferred lab:      Send INR reminders to:   [facility]    Comments:  Apixaban too costly      Anticoagulation Care Providers     Provider Role Specialty Phone number    Zenia Tinajero MD Referring Cardiology [phone]          Clinic Interview:  Patient Findings     Positives:  Change in diet/appetite    Negatives:  Signs/symptoms of thrombosis, Signs/symptoms of bleeding,   Laboratory test error suspected, Change in health, Change in alcohol use,   Change in activity, Upcoming invasive procedure, Emergency department   visit, Upcoming dental procedure, Missed doses, Extra doses, Change in   medications, Hospital admission, Bruising, Other complaints    Comments:  A little more cabello than usual.       Clinical Outcomes     Negatives:  Major bleeding event, Thromboembolic event,   Anticoagulation-related hospital admission, Anticoagulation-related ED   visit, Anticoagulation-related fatality    Comments:  A little more cabello than usual.         INR History:  Anticoagulation Monitoring 2021   INR 3.9 2.7 1.8   INR Date 2021   INR Goal 2.0-3.0 2.0-3.0 2.0-3.0   Trend Down Same Up   Last Week Total 21.25 mg 21.25 mg 21.25 mg   Next Week Total 18.75 mg 21.25 mg 22.5 mg   Sun 3.75 mg 3.75 mg 3.75 mg   Mon 2.5 mg 2.5 mg 2.5 mg   Tue 1.25 mg (); Otherwise 3.75 mg 3.75 mg 3.75 mg   Wed 2.5 mg 2.5 mg 2.5 mg      Thu 3.75 mg 3.75 mg 3.75 mg   Fri 2.5 mg 2.5 mg 2.5 mg   Sat 2.5 mg 2.5 mg 3.75 mg   Visit Report - - -   Some recent data might be hidden       Plan:  1. INR is Subtherapeutic today- see above in Anticoagulation Summary.  Will instruct Shani Phillip to Increase their warfarin regimen- see above in Anticoagulation Summary.  2. Follow up in 2 weeks  3. Patient declines warfarin refills.  4. Verbal and written information provided. Patient expresses understanding and has no further questions at this time.    Keshawn Castaneda [facility]

## 2021-06-30 ENCOUNTER — HOSPITAL ENCOUNTER (OUTPATIENT)
Dept: CARDIOLOGY | Facility: HOSPITAL | Age: 71
Discharge: HOME | End: 2021-06-30
Admitting: NURSE PRACTITIONER

## 2021-06-30 ENCOUNTER — ANTICOAGULATION VISIT (OUTPATIENT)
Dept: PHARMACY | Facility: HOSPITAL | Age: 71
End: 2021-06-30

## 2021-06-30 VITALS
DIASTOLIC BLOOD PRESSURE: 70 MMHG | HEART RATE: 70 BPM | SYSTOLIC BLOOD PRESSURE: 136 MMHG | BODY MASS INDEX: 27.94 KG/M2 | WEIGHT: 148 LBS | HEIGHT: 61 IN

## 2021-06-30 DIAGNOSIS — I48.0 PAF (PAROXYSMAL ATRIAL FIBRILLATION) (HCC): Primary | ICD-10-CM

## 2021-06-30 DIAGNOSIS — R94.31 ABNORMAL EKG: ICD-10-CM

## 2021-06-30 DIAGNOSIS — I34.0 MITRAL VALVE INSUFFICIENCY, UNSPECIFIED ETIOLOGY: ICD-10-CM

## 2021-06-30 DIAGNOSIS — I05.0 MITRAL VALVE STENOSIS, UNSPECIFIED ETIOLOGY: ICD-10-CM

## 2021-06-30 LAB
AORTIC DIMENSIONLESS INDEX: 0.6 (DI)
ASCENDING AORTA: 2.7 CM
BH CV ECHO MEAS - ACS: 1.7 CM
BH CV ECHO MEAS - AI DEC SLOPE: 73.8 CM/SEC^2
BH CV ECHO MEAS - AI MAX PG: 33.2 MMHG
BH CV ECHO MEAS - AI MAX VEL: 288 CM/SEC
BH CV ECHO MEAS - AI P1/2T: 1143 MSEC
BH CV ECHO MEAS - AO MAX PG (FULL): 5.5 MMHG
BH CV ECHO MEAS - AO MAX PG: 9.1 MMHG
BH CV ECHO MEAS - AO MEAN PG (FULL): 2 MMHG
BH CV ECHO MEAS - AO MEAN PG: 4 MMHG
BH CV ECHO MEAS - AO ROOT AREA (BSA CORRECTED): 2
BH CV ECHO MEAS - AO ROOT AREA: 8.6 CM^2
BH CV ECHO MEAS - AO ROOT DIAM: 3.3 CM
BH CV ECHO MEAS - AO V2 MAX: 151 CM/SEC
BH CV ECHO MEAS - AO V2 MEAN: 92.9 CM/SEC
BH CV ECHO MEAS - AO V2 VTI: 35.1 CM
BH CV ECHO MEAS - ASC AORTA: 2.7 CM
BH CV ECHO MEAS - AVA(I,A): 1.6 CM^2
BH CV ECHO MEAS - AVA(I,D): 1.6 CM^2
BH CV ECHO MEAS - AVA(V,A): 1.6 CM^2
BH CV ECHO MEAS - AVA(V,D): 1.6 CM^2
BH CV ECHO MEAS - BSA(HAYCOCK): 1.7 M^2
BH CV ECHO MEAS - BSA: 1.7 M^2
BH CV ECHO MEAS - BZI_BMI: 28 KILOGRAMS/M^2
BH CV ECHO MEAS - BZI_METRIC_HEIGHT: 154.9 CM
BH CV ECHO MEAS - BZI_METRIC_WEIGHT: 67.1 KG
BH CV ECHO MEAS - EDV(MOD-SP2): 30 ML
BH CV ECHO MEAS - EDV(MOD-SP4): 36 ML
BH CV ECHO MEAS - EDV(TEICH): 50.9 ML
BH CV ECHO MEAS - EF(CUBED): 67.8 %
BH CV ECHO MEAS - EF(MOD-BP): 72 %
BH CV ECHO MEAS - EF(MOD-SP2): 63.3 %
BH CV ECHO MEAS - EF(MOD-SP4): 77.8 %
BH CV ECHO MEAS - EF(TEICH): 60.4 %
BH CV ECHO MEAS - ESV(MOD-SP2): 11 ML
BH CV ECHO MEAS - ESV(MOD-SP4): 8 ML
BH CV ECHO MEAS - ESV(TEICH): 20.2 ML
BH CV ECHO MEAS - FS: 31.4 %
BH CV ECHO MEAS - IVS/LVPW: 1.1
BH CV ECHO MEAS - IVSD: 0.8 CM
BH CV ECHO MEAS - LAT PEAK E' VEL: 6.6 CM/SEC
BH CV ECHO MEAS - LV DIASTOLIC VOL/BSA (35-75): 21.7 ML/M^2
BH CV ECHO MEAS - LV MASS(C)D: 68.9 GRAMS
BH CV ECHO MEAS - LV MASS(C)DI: 41.5 GRAMS/M^2
BH CV ECHO MEAS - LV MAX PG: 3.6 MMHG
BH CV ECHO MEAS - LV MEAN PG: 2 MMHG
BH CV ECHO MEAS - LV SYSTOLIC VOL/BSA (12-30): 4.8 ML/M^2
BH CV ECHO MEAS - LV V1 MAX: 95.5 CM/SEC
BH CV ECHO MEAS - LV V1 MEAN: 64.1 CM/SEC
BH CV ECHO MEAS - LV V1 VTI: 22.3 CM
BH CV ECHO MEAS - LVIDD: 3.5 CM
BH CV ECHO MEAS - LVIDS: 2.4 CM
BH CV ECHO MEAS - LVLD AP2: 5.4 CM
BH CV ECHO MEAS - LVLD AP4: 6.1 CM
BH CV ECHO MEAS - LVLS AP2: 4.7 CM
BH CV ECHO MEAS - LVLS AP4: 5 CM
BH CV ECHO MEAS - LVOT AREA (M): 2.5 CM^2
BH CV ECHO MEAS - LVOT AREA: 2.5 CM^2
BH CV ECHO MEAS - LVOT DIAM: 1.8 CM
BH CV ECHO MEAS - LVPWD: 0.7 CM
BH CV ECHO MEAS - MED PEAK E' VEL: 5.1 CM/SEC
BH CV ECHO MEAS - MR MAX PG: 52.7 MMHG
BH CV ECHO MEAS - MR MAX VEL: 363 CM/SEC
BH CV ECHO MEAS - MV A DUR: 0.15 SEC
BH CV ECHO MEAS - MV A MAX VEL: 144 CM/SEC
BH CV ECHO MEAS - MV DEC SLOPE: 573.5 CM/SEC^2
BH CV ECHO MEAS - MV DEC TIME: 0.33 SEC
BH CV ECHO MEAS - MV E MAX VEL: 161.5 CM/SEC
BH CV ECHO MEAS - MV E/A: 1.1
BH CV ECHO MEAS - MV MAX PG: 11.8 MMHG
BH CV ECHO MEAS - MV MEAN PG: 5 MMHG
BH CV ECHO MEAS - MV P1/2T MAX VEL: 180 CM/SEC
BH CV ECHO MEAS - MV P1/2T: 91.9 MSEC
BH CV ECHO MEAS - MV V2 MAX: 172 CM/SEC
BH CV ECHO MEAS - MV V2 MEAN: 107 CM/SEC
BH CV ECHO MEAS - MV V2 VTI: 65.9 CM
BH CV ECHO MEAS - MVA P1/2T LCG: 1.2 CM^2
BH CV ECHO MEAS - MVA(P1/2T): 2.4 CM^2
BH CV ECHO MEAS - MVA(VTI): 0.86 CM^2
BH CV ECHO MEAS - PA MAX PG (FULL): 0.79 MMHG
BH CV ECHO MEAS - PA MAX PG: 2.5 MMHG
BH CV ECHO MEAS - PA V2 MAX: 78.7 CM/SEC
BH CV ECHO MEAS - PULM A REVS DUR: 0.11 SEC
BH CV ECHO MEAS - PULM A REVS VEL: 30.3 CM/SEC
BH CV ECHO MEAS - PULM DIAS VEL: 49.9 CM/SEC
BH CV ECHO MEAS - PULM S/D: 0.97
BH CV ECHO MEAS - PULM SYS VEL: 48.3 CM/SEC
BH CV ECHO MEAS - PVA(V,A): 1.9 CM^2
BH CV ECHO MEAS - PVA(V,D): 1.9 CM^2
BH CV ECHO MEAS - QP/QS: 0.54
BH CV ECHO MEAS - RAP SYSTOLE: 3 MMHG
BH CV ECHO MEAS - RV MAX PG: 1.7 MMHG
BH CV ECHO MEAS - RV MEAN PG: 1 MMHG
BH CV ECHO MEAS - RV V1 MAX: 65 CM/SEC
BH CV ECHO MEAS - RV V1 MEAN: 46.4 CM/SEC
BH CV ECHO MEAS - RV V1 VTI: 13.6 CM
BH CV ECHO MEAS - RVOT AREA: 2.3 CM^2
BH CV ECHO MEAS - RVOT DIAM: 1.7 CM
BH CV ECHO MEAS - RVSP: 39.2 MMHG
BH CV ECHO MEAS - SI(AO): 180.6 ML/M^2
BH CV ECHO MEAS - SI(CUBED): 17.5 ML/M^2
BH CV ECHO MEAS - SI(LVOT): 34.1 ML/M^2
BH CV ECHO MEAS - SI(MOD-SP2): 11.4 ML/M^2
BH CV ECHO MEAS - SI(MOD-SP4): 16.8 ML/M^2
BH CV ECHO MEAS - SI(TEICH): 18.5 ML/M^2
BH CV ECHO MEAS - SUP REN AO DIAM: 1.4 CM
BH CV ECHO MEAS - SV(AO): 300.2 ML
BH CV ECHO MEAS - SV(CUBED): 29.1 ML
BH CV ECHO MEAS - SV(LVOT): 56.7 ML
BH CV ECHO MEAS - SV(MOD-SP2): 19 ML
BH CV ECHO MEAS - SV(MOD-SP4): 28 ML
BH CV ECHO MEAS - SV(RVOT): 30.9 ML
BH CV ECHO MEAS - SV(TEICH): 30.7 ML
BH CV ECHO MEAS - TAPSE (>1.6): 1.8 CM
BH CV ECHO MEAS - TR MAX VEL: 301 CM/SEC
BH CV ECHO MEASUREMENTS AVERAGE E/E' RATIO: 27.61
BH CV XLRA - RV BASE: 3.4 CM
BH CV XLRA - RV LENGTH: 5.6 CM
BH CV XLRA - RV MID: 2.3 CM
BH CV XLRA - TDI S': 8.8 CM/SEC
INR PPP: 3.4 (ref 0.91–1.09)
LEFT ATRIUM VOLUME INDEX: 35 ML/M2
LV EF 2D ECHO EST: 70 %
PROTHROMBIN TIME: 40.8 SECONDS (ref 10–13.8)
SINUS: 2.5 CM
STJ: 2.5 CM

## 2021-06-30 PROCEDURE — 93306 TTE W/DOPPLER COMPLETE: CPT

## 2021-06-30 PROCEDURE — 85610 PROTHROMBIN TIME: CPT

## 2021-06-30 PROCEDURE — G0463 HOSPITAL OUTPT CLINIC VISIT: HCPCS

## 2021-06-30 PROCEDURE — 36416 COLLJ CAPILLARY BLOOD SPEC: CPT

## 2021-06-30 PROCEDURE — 93306 TTE W/DOPPLER COMPLETE: CPT | Performed by: INTERNAL MEDICINE

## 2021-06-30 NOTE — PROGRESS NOTES
Anticoagulation Clinic Progress Note    Anticoagulation Summary  As of 6/30/2021    INR goal:  2.0-3.0   TTR:  56.4 % (3.8 mo)   INR used for dosing:  3.4 (6/30/2021)   Warfarin maintenance plan:  3.75 mg every Sun, Tue, Thu; 2.5 mg all other days   Weekly warfarin total:  21.25 mg   Plan last modified:  Keshawn Castaneda RPH (6/30/2021)   Next INR check:  7/14/2021   Priority:  High   Target end date:      Indications    PAF (paroxysmal atrial fibrillation) (CMS/[facility]) [I48.0]             Anticoagulation Episode Summary     INR check location:      Preferred lab:      Send INR reminders to:   [facility]    Comments:  Apixaban too costly      Anticoagulation Care Providers     Provider Role Specialty Phone number    Zenia Tinajero MD Referring Cardiology [phone]          Clinic Interview:  Patient Findings     Negatives:  Signs/symptoms of thrombosis, Signs/symptoms of bleeding,   Laboratory test error suspected, Change in health, Change in alcohol use,   Change in activity, Upcoming invasive procedure, Emergency department   visit, Upcoming dental procedure, Missed doses, Extra doses, Change in   medications, Change in diet/appetite, Hospital admission, Bruising, Other   complaints      Clinical Outcomes     Negatives:  Major bleeding event, Thromboembolic event,   Anticoagulation-related hospital admission, Anticoagulation-related ED   visit, Anticoagulation-related fatality        INR History:  Anticoagulation Monitoring 5/26/2021 6/17/2021 6/30/2021   INR 2.7 1.8 3.4   INR Date 5/26/2021 6/17/2021 6/30/2021   INR Goal 2.0-3.0 2.0-3.0 2.0-3.0   Trend Same Up Down   Last Week Total 21.25 mg 21.25 mg 22.5 mg   Next Week Total 21.25 mg 22.5 mg 21.25 mg   Sun 3.75 mg 3.75 mg 3.75 mg   Mon 2.5 mg 2.5 mg 2.5 mg   Tue 3.75 mg 3.75 mg 3.75 mg   Wed 2.5 mg 2.5 mg 2.5 mg   Thu 3.75 mg 3.75 mg 3.75 mg   Fri 2.5 mg 2.5 mg 2.5 mg   Sat 2.5 mg 3.75 mg 2.5 mg   Visit Report - - -   Some recent data might be  hidden       Plan:  1. INR is Supratherapeutic today- see above in Anticoagulation Summary.  Will instruct Shani Phillip to Change their warfarin regimen- see above in Anticoagulation Summary.  2. Follow up in 2 weeks  3. Patient declines warfarin refills.  4. Verbal and written information provided. Patient expresses understanding and has no further questions at this time.    Keshawn Castaneda RP

## 2021-07-01 ENCOUNTER — TELEPHONE (OUTPATIENT)
Dept: CARDIOLOGY | Facility: CLINIC | Age: 71
End: 2021-07-01

## 2021-07-01 RX ORDER — FUROSEMIDE 20 MG/1
20 TABLET ORAL DAILY
Qty: 30 TABLET | Refills: 1 | Status: SHIPPED | OUTPATIENT
Start: 2021-07-01 | End: 2021-07-29

## 2021-07-01 NOTE — TELEPHONE ENCOUNTER
Called and discussed echo results with patient.  Diastolic dysfunction a little bit worse.  She reports blood pressure on average is well controlled.  She will continue to monitor and call if staying greater than 130/80 on average.  She is going to avoid the high salt foods as this could be why she occasionally will see a spike.  I also highly recommended that she get back into see her pulmonologist and discuss possible sleep apnea testing.  This definitely could be playing a role here as well.  She thinks she is doing well on the decreased dose of Lasix at 20 mg daily.  Feels like her lightheadedness with position changes has improved.  She is going to continue to monitor and will call if she has any recurrence of swelling or any increase in her chronic shortness of breath or if she gains more than 2 pounds in 1 day or 5 pounds in a week.        BP readings:  122/52, 125/57, 141/57, 148/56, 126/52, 140/57, 125/50, 116/55.

## 2021-07-12 ENCOUNTER — TELEPHONE (OUTPATIENT)
Dept: FAMILY MEDICINE CLINIC | Facility: CLINIC | Age: 71
End: 2021-07-12

## 2021-07-12 NOTE — TELEPHONE ENCOUNTER
OK for HUB to read and schedule:    LMTCB-  Your provider recommended a 6 month follow up during your last visit.  Please call our office to schedule.

## 2021-07-14 ENCOUNTER — ANTICOAGULATION VISIT (OUTPATIENT)
Dept: PHARMACY | Facility: HOSPITAL | Age: 71
End: 2021-07-14

## 2021-07-14 DIAGNOSIS — I48.0 PAF (PAROXYSMAL ATRIAL FIBRILLATION) (HCC): Primary | ICD-10-CM

## 2021-07-14 LAB
INR PPP: 3.3 (ref 0.91–1.09)
PROTHROMBIN TIME: 39.8 SECONDS (ref 10–13.8)

## 2021-07-14 PROCEDURE — 85610 PROTHROMBIN TIME: CPT

## 2021-07-14 PROCEDURE — G0463 HOSPITAL OUTPT CLINIC VISIT: HCPCS

## 2021-07-14 PROCEDURE — 36416 COLLJ CAPILLARY BLOOD SPEC: CPT

## 2021-07-14 NOTE — PROGRESS NOTES
Anticoagulation Clinic Progress Note    Anticoagulation Summary  As of 7/14/2021    INR goal:  2.0-3.0   TTR:  50.2 % (4.3 mo)   INR used for dosing:  3.3 (7/14/2021)   Warfarin maintenance plan:  3.75 mg every Tue, Sat; 2.5 mg all other days   Weekly warfarin total:  20 mg   Plan last modified:  Lizandro Ramirez, PharmD (7/14/2021)   Next INR check:  7/23/2021   Priority:  High   Target end date:      Indications    PAF (paroxysmal atrial fibrillation) (CMS/HCC) [I48.0]             Anticoagulation Episode Summary     INR check location:      Preferred lab:      Send INR reminders to:   [facility]    Comments:  Apixaban too costly      Anticoagulation Care Providers     Provider Role Specialty Phone number    Zenia Tinajero MD Referring Cardiology [phone]        Clinic Interview:  Patient Findings     Negatives:  Signs/symptoms of thrombosis, Signs/symptoms of bleeding,   Laboratory test error suspected, Change in health, Change in alcohol use,   Change in activity, Upcoming invasive procedure, Emergency department   visit, Upcoming dental procedure, Missed doses, Extra doses, Change in   medications, Change in diet/appetite, Hospital admission, Bruising, Other   complaints    Comments:  Accompanied by .  Denies any bleeding at this time      Clinical Outcomes     Negatives:  Major bleeding event, Thromboembolic event,   Anticoagulation-related hospital admission, Anticoagulation-related ED   visit, Anticoagulation-related fatality      INR History:  Anticoagulation Monitoring 6/17/2021 6/30/2021 7/14/2021   INR 1.8 3.4 3.3   INR Date 6/17/2021 6/30/2021 7/14/2021   INR Goal 2.0-3.0 2.0-3.0 2.0-3.0   Trend Up Down Down   Last Week Total 21.25 mg 22.5 mg 21.25 mg   Next Week Total 22.5 mg 21.25 mg 18.75 mg   Sun 3.75 mg 3.75 mg 2.5 mg   Mon 2.5 mg 2.5 mg 2.5 mg   Tue 3.75 mg 3.75 mg 3.75 mg   Wed 2.5 mg 2.5 mg 1.25 mg (7/14); Otherwise 2.5 mg   Thu 3.75 mg 3.75 mg 2.5 mg   Fri 2.5 mg  2.5 mg 2.5 mg   Sat 3.75 mg 2.5 mg 3.75 mg   Visit Report - - -   Some recent data might be hidden     Plan:  1. INR is Supratherapeutic today- see above in Anticoagulation Summary.  Will instruct Shani Phillip to Change their warfarin regimen- see above in Anticoagulation Summary. Reduce Warfarin to 1.25mg today vs usual 2.5mg dose, starting 7/15 adjust to 3.75mg on Tuesdays and Saturdays, 2.5mg all other days of the week for a weekly reduction of 6%  2. Follow up in ~1.5 weeks  3. Patient declines warfarin refills.  4. Verbal and written information provided. Patient expresses understanding and has no further questions at this time.    Lizandro Ramirez, PharmD

## 2021-07-23 ENCOUNTER — ANTICOAGULATION VISIT (OUTPATIENT)
Dept: PHARMACY | Facility: HOSPITAL | Age: 71
End: 2021-07-23

## 2021-07-23 DIAGNOSIS — I48.0 PAF (PAROXYSMAL ATRIAL FIBRILLATION) (HCC): Primary | ICD-10-CM

## 2021-07-23 LAB
INR PPP: 2.3 (ref 0.91–1.09)
PROTHROMBIN TIME: 27.3 SECONDS (ref 10–13.8)

## 2021-07-23 PROCEDURE — 36416 COLLJ CAPILLARY BLOOD SPEC: CPT

## 2021-07-23 PROCEDURE — 85610 PROTHROMBIN TIME: CPT

## 2021-07-23 NOTE — PROGRESS NOTES
Anticoagulation Clinic Progress Note    Anticoagulation Summary  As of 2021    INR goal:  2.0-3.0   TTR:  51.5 % (4.6 mo)   INR used for dosin.3 (2021)   Warfarin maintenance plan:  3.75 mg every Tue, Sat; 2.5 mg all other days   Weekly warfarin total:  20 mg   No change documented:  Felicia Benson   Plan last modified:  Lizandro Ramirez, PharmD (2021)   Next INR check:  2021   Priority:  High   Target end date:      Indications    PAF (paroxysmal atrial fibrillation) (CMS/HCC) [I48.0]             Anticoagulation Episode Summary     INR check location:      Preferred lab:      Send INR reminders to:   [facility]    Comments:  Apixaban too costly      Anticoagulation Care Providers     Provider Role Specialty Phone number    Zenia Tinajero MD Referring Cardiology [phone]          Clinic Interview:      INR History:  Anticoagulation Monitoring 2021   INR 3.4 3.3 2.3   INR Date 2021   INR Goal 2.0-3.0 2.0-3.0 2.0-3.0   Trend Down Down Same   Last Week Total 22.5 mg 21.25 mg 20 mg   Next Week Total 21.25 mg 18.75 mg 20 mg   Sun 3.75 mg 2.5 mg 2.5 mg   Mon 2.5 mg 2.5 mg 2.5 mg   Tue 3.75 mg 3.75 mg 3.75 mg   Wed 2.5 mg 1.25 mg (); Otherwise 2.5 mg 2.5 mg   Thu 3.75 mg 2.5 mg 2.5 mg   Fri 2.5 mg 2.5 mg 2.5 mg   Sat 2.5 mg 3.75 mg 3.75 mg   Visit Report - - -   Some recent data might be hidden       Plan:  1. INR is therapeutic today- see above in Anticoagulation Summary.   Will instruct Shani Phillip to continue their warfarin regimen- see above in Anticoagulation Summary.  2. Follow up in 1 weeks.  3. Patient declines warfarin refills.  4. Verbal and written information provided. Patient expresses understanding and has no further questions at this time.    Felicia Benson

## 2021-07-29 ENCOUNTER — ANTICOAGULATION VISIT (OUTPATIENT)
Dept: PHARMACY | Facility: HOSPITAL | Age: 71
End: 2021-07-29

## 2021-07-29 DIAGNOSIS — I48.0 PAF (PAROXYSMAL ATRIAL FIBRILLATION) (HCC): Primary | ICD-10-CM

## 2021-07-29 RX ORDER — FUROSEMIDE 20 MG/1
TABLET ORAL
Qty: 30 TABLET | Refills: 1 | Status: SHIPPED | OUTPATIENT
Start: 2021-07-29 | End: 2021-08-23

## 2021-07-30 ENCOUNTER — ANTICOAGULATION VISIT (OUTPATIENT)
Dept: PHARMACY | Facility: HOSPITAL | Age: 71
End: 2021-07-30

## 2021-07-30 DIAGNOSIS — I48.0 PAF (PAROXYSMAL ATRIAL FIBRILLATION) (HCC): Primary | ICD-10-CM

## 2021-07-30 LAB — INR PPP: 2.7

## 2021-07-30 PROCEDURE — G0249 PROVIDE INR TEST MATER/EQUIP: HCPCS

## 2021-07-30 PROCEDURE — G0248 DEMONSTRATE USE HOME INR MON: HCPCS

## 2021-07-30 NOTE — PROGRESS NOTES
Anticoagulation Clinic Progress Note - Initial Training for Home Testing    Anticoagulation Summary  As of 2021    INR goal:  2.0-3.0   TTR:  53.7 % (4.8 mo)   INR used for dosin.70 (2021)   Warfarin maintenance plan:  3.75 mg every Tue, Sat; 2.5 mg all other days   Weekly warfarin total:  20 mg   No change documented:  Jaida Delgado   Plan last modified:  Lizandro Ramirez, PharmD (2021)   Next INR check:  2021   Priority:  High   Target end date:      Indications    PAF (paroxysmal atrial fibrillation) (CMS/[facility]) [I48.0]             Anticoagulation Episode Summary     INR check location:      Preferred lab:      Send INR reminders to:   [facility]    Comments:   Home Testing as of 21      Anticoagulation Care Providers     Provider Role Specialty Phone number    Zenia Tinajero MD Referring Cardiology [phone]          Clinic Interview:      INR History:  Anticoagulation Monitoring 2021   INR - 2.70 -   INR Date - 2021 -   INR Goal 2.0-3.0 2.0-3.0 2.0-3.0   Trend Same Same Same   Last Week Total 17.5 mg 18.75 mg 18.75 mg   Next Week Total 21.25 mg 20 mg 20 mg   Sun - 2.5 mg -   Mon - 2.5 mg -   Tue - 3.75 mg -   Wed - 2.5 mg -   Thu 3.75 mg () 2.5 mg -   Fri - 2.5 mg -   Sat - 3.75 mg -   Visit Report - - -   Some recent data might be hidden       Equipment Provided to Patient:   Coag-Sense - Serial Number: [id number]    Coag-Sense POC INR performed by patient and/or caregiver: 2.7  Patient successfully completed self test on 1st attempt.    Education:  Shani Phillip is newly starting home testing through the [facility] home testing service. she expressed consent to adhere to the Home INR Monitoring guidelines as prescribed by the [facility].      Home testing training was provided in clinic. The following items were discussed and demonstrated:  1. Explained to patient that any  cost not covered by her insurance is the responsibility of the patient. It is the responsibility of Shani Phillip to determine the cost of the service prior to proceeding.  2. Reviewed contents included within the Coag-Sense testing box including reference materials, meter, power cord, carrying case, and lancets. Discussed proper usage of all materials including turning on and setting up meter.  3. Instructed patient to get supplies out and ready prior to testing, including lancet, test strip, transfer tube, alcohol pad, bandage (optional). Instructed patient that if alcohol pads not available to wash hands with soap and water.  4. Reviewed use of test strips and process to confirm correct lot number and barcode number.   5. Reviewed how to use lancet including where on the finger to prick and recommendations for collecting the sample.  6. Instructed patient to contact [facility] after first failed attempt for further instruction in order to avoid wasting supplies.  7. Reviewed how to dispose of all materials used during testing (i.e. sharps container or strong plastic container)  8. Explained that weekly testing is required, results should be reported to the [facility], and INR results will be addressed within one business day.  9. Instructed patient to call her referring physician if INR results require immediate attention and it is after clinic hours.  10. Informed patient that the meter is a loan rather than being owned by the patient. Should home testing services be discontinued for any reason, the meter must be returned to the [facility]. Repeated failure to comply with recommended testing intervals or non-adherence to recommendations will result in the patient being transitioned back to in-clinic visits.    Patient and/or caregiver successfully demonstrated correct use of Coag-Sense machine. All patient questions were answered satisfactorily.     Patient  has agreed to only be called if INR is out of range - Call every time for now (except in the case when patient reports a change or concern that would affect warfarin management upon submitting her INR result).     Plan:  1. INR is Therapeutic today. Instructed Shani Phillip to Continue their warfarin regimen- see above in Anticoagulation Summary.  2. Follow-up with home test in 1 week. Follow-up in clinic at least once per year.   3. Coag-Sense machine was loaned to patient as above. Test strips (#6), lancets (#8), transfer tubes (#54) were provided.   4. Patient declines warfarin refills.  5. Verbal and printed information was provided. They have been instructed to call if any changes in medications, doses, concerns, etc. Patient expresses understanding and has no further questions at this time.    Jaida Delgado

## 2021-07-30 NOTE — PROGRESS NOTES
I have supervised and reviewed the notes, assessments, and/or procedures performed by our Jaida Delgado. I concur with the documentation of this patient encounter.    Keshawn Castaneda, PharmD

## 2021-07-30 NOTE — PROGRESS NOTES
Anticoagulation Clinic Progress Note    Clinic Interview:  Patient Findings     Positives:  Missed doses    Negatives:  Signs/symptoms of thrombosis, Signs/symptoms of bleeding,   Laboratory test error suspected, Change in health, Change in alcohol use,   Change in activity, Upcoming invasive procedure, Emergency department   visit, Upcoming dental procedure, Extra doses, Change in medications,   Change in diet/appetite, Hospital admission, Bruising, Other complaints      Clinical Outcomes     Negatives:  Major bleeding event, Thromboembolic event,   Anticoagulation-related hospital admission, Anticoagulation-related ED   visit, Anticoagulation-related fatality        INR History:  Anticoagulation Monitoring 7/23/2021 7/29/2021 7/30/2021   INR 2.3 - -   INR Date 7/23/2021 - -   INR Goal 2.0-3.0 2.0-3.0 2.0-3.0   Trend Same Same Same   Last Week Total 20 mg 17.5 mg 18.75 mg   Next Week Total 20 mg 21.25 mg 20 mg   Sun 2.5 mg - 2.5 mg   Mon 2.5 mg - 2.5 mg   Tue 3.75 mg - 3.75 mg   Wed 2.5 mg - 2.5 mg   Thu 2.5 mg 3.75 mg (7/29) 2.5 mg   Fri 2.5 mg - 2.5 mg   Sat 3.75 mg - 3.75 mg   Visit Report - - -   Some recent data might be hidden         Plan:  Patient called and reported missing her warfarin dose on 7/28.  Returned call and instructed Shani Phillip to take an additional half tablet on 7/29 (3.75 mg).  Patient has an appointment scheduled for 7/30 to have INR checked. Will provide further instruction at this time if needed.    Gayatri Escobar, ShavonD

## 2021-08-05 ENCOUNTER — ANTICOAGULATION VISIT (OUTPATIENT)
Dept: PHARMACY | Facility: HOSPITAL | Age: 71
End: 2021-08-05

## 2021-08-05 DIAGNOSIS — I48.0 PAF (PAROXYSMAL ATRIAL FIBRILLATION) (HCC): Primary | ICD-10-CM

## 2021-08-05 LAB — INR PPP: 5.1

## 2021-08-05 NOTE — PROGRESS NOTES
Anticoagulation Clinic Progress Note    Anticoagulation Summary  As of 2021    INR goal:  2.0-3.0   TTR:  52.1 % (5 mo)   INR used for dosin.10 (2021)   Warfarin maintenance plan:  2.5 mg every day   Weekly warfarin total:  17.5 mg   Plan last modified:  Keshawn Castaneda, PharmD (2021)   Next INR check:  2021   Priority:  High   Target end date:      Indications    PAF (paroxysmal atrial fibrillation) (CMS/formerly [facility]) [I48.0]             Anticoagulation Episode Summary     INR check location:      Preferred lab:      Send INR reminders to:   [facility] [employer] [facility] POOL    Comments:   Home Testing as of 21 *call every time for now*      Anticoagulation Care Providers     Provider Role Specialty Phone number    Zenia Tinajero MD Referring Cardiology [phone]          Clinic Interview:  Patient Findings     Positives:  Other complaints    Negatives:  Signs/symptoms of thrombosis, Signs/symptoms of bleeding,   Laboratory test error suspected, Change in health, Change in alcohol use,   Change in activity, Upcoming invasive procedure, Emergency department   visit, Upcoming dental procedure, Missed doses, Extra doses, Change in   medications, Change in diet/appetite, Hospital admission, Bruising    Comments:  Denies any changes. Confirmed tablet strength/color. Pt   concerned that her home testing machine may be inaccurate. Requests to   bring her machine to clinic next week to compare its result to ours.   However, first she requested that she check her INR at home again   tomorrow.      Clinical Outcomes     Negatives:  Major bleeding event, Thromboembolic event,   Anticoagulation-related hospital admission, Anticoagulation-related ED   visit, Anticoagulation-related fatality    Comments:  Denies any changes. Confirmed tablet strength/color. Pt   concerned that her home testing machine may be inaccurate. Requests to   bring her machine to clinic next week to compare its result to ours.    However, first she requested that she check her INR at home again   tomorrow.        INR History:  Anticoagulation Monitoring 7/30/2021 7/30/2021 8/5/2021   INR 2.7 - 5.10   INR Date 7/30/2021 - 8/5/2021   INR Goal 2.0-3.0 2.0-3.0 2.0-3.0   Trend Same Same Down   Last Week Total 18.75 mg 18.75 mg 21.25 mg   Next Week Total 20 mg 20 mg 13.75 mg   Sun 2.5 mg - -   Mon 2.5 mg - -   Tue 3.75 mg - -   Wed 2.5 mg - -   Thu 2.5 mg - Hold (8/5)   Fri 2.5 mg - -   Sat 3.75 mg - -   Visit Report - - -   Some recent data might be hidden       Plan:  1. INR is Supratherapeutic today- see above in Anticoagulation Summary.   Will instruct Shani Phillip to HOLD their warfarin regimen today- see above in Anticoagulation Summary.  2. Follow up in 1 day with another home test. Per pt request, will plan for visit to clinic next week to compare her Coag-Sense machine to our clinic's Coag-Sense machine to confirm accuracy.   3. They have been instructed to call if any changes in medications, doses, concerns, etc. Patient expresses understanding and has no further questions at this time.    Keshawn aCstaneda, PharmD

## 2021-08-06 ENCOUNTER — ANTICOAGULATION VISIT (OUTPATIENT)
Dept: PHARMACY | Facility: HOSPITAL | Age: 71
End: 2021-08-06

## 2021-08-06 DIAGNOSIS — I48.0 PAF (PAROXYSMAL ATRIAL FIBRILLATION) (HCC): Primary | ICD-10-CM

## 2021-08-06 LAB — INR PPP: 3.6

## 2021-08-06 NOTE — PROGRESS NOTES
Anticoagulation Clinic Progress Note    Anticoagulation Summary  As of 8/6/2021    INR goal:  2.0-3.0   TTR:  51.7 % (5 mo)   INR used for dosing:  3.60 (8/6/2021)   Warfarin maintenance plan:  2.5 mg every day   Weekly warfarin total:  17.5 mg   No change documented:  Keshawn Castaneda PharmD   Plan last modified:  Keshawn Castaneda PharmD (8/5/2021)   Next INR check:  8/10/2021   Priority:  High   Target end date:      Indications    PAF (paroxysmal atrial fibrillation) (CMS/HCC) [I48.0]             Anticoagulation Episode Summary     INR check location:      Preferred lab:      Send INR reminders to:   [email or web address] HOME TEST POOL    Comments:   Home Testing as of 7/30/21 *call every time for now*      Anticoagulation Care Providers     Provider Role Specialty Phone number    Zenia Tinajero MD Referring Cardiology [phone]          Clinic Interview:  Patient Findings     Positives:  Other complaints    Negatives:  Signs/symptoms of thrombosis, Signs/symptoms of bleeding,   Laboratory test error suspected, Change in health, Change in alcohol use,   Change in activity, Upcoming invasive procedure, Emergency department   visit, Upcoming dental procedure, Missed doses, Extra doses, Change in   medications, Change in diet/appetite, Hospital admission, Bruising    Comments:  As noted previously, pt concerned that her machine may be   inaccurate. Requests to bring her machine to clinic to compare its result   to ours.       Clinical Outcomes     Negatives:  Major bleeding event, Thromboembolic event,   Anticoagulation-related hospital admission, Anticoagulation-related ED   visit, Anticoagulation-related fatality    Comments:  As noted previously, pt concerned that her machine may be   inaccurate. Requests to bring her machine to clinic to compare its result   to ours.         INR History:  Anticoagulation Monitoring 7/30/2021 8/5/2021 8/6/2021 8/6/2021   INR - 5.10 - 3.60   INR Date - 8/5/2021 - 8/6/2021   INR Goal  2.0-3.0 2.0-3.0 2.0-3.0 2.0-3.0   Trend Same Down - Same   Last Week Total 18.75 mg 21.25 mg - 17.5 mg   Next Week Total 20 mg 13.75 mg - 16.25 mg   Sun - - - 2.5 mg   Mon - - - 2.5 mg   Tue - - - -   Wed - - - -   Thu - Hold (8/5) - -   Fri - - - 1.25 mg (8/6)   Sat - - - 2.5 mg   Visit Report - - - -   Some recent data might be hidden       Plan:  1. INR is Supratherapeutic today- see above in Anticoagulation Summary.   Will instruct Shani Phillip to Change their warfarin regimen- see above in Anticoagulation Summary.  2. Follow up in clinic in 4 days to compare result of her Coag-Sense machine to our machine per pt request.  3. They have been instructed to call if any changes in medications, doses, concerns, etc. Patient expresses understanding and has no further questions at this time.     Keshawn Castaneda, PharmD

## 2021-08-10 ENCOUNTER — HOSPITAL ENCOUNTER (OUTPATIENT)
Dept: MAMMOGRAPHY | Facility: HOSPITAL | Age: 71
Discharge: HOME | End: 2021-08-10
Admitting: INTERNAL MEDICINE

## 2021-08-10 ENCOUNTER — ANTICOAGULATION VISIT (OUTPATIENT)
Dept: PHARMACY | Facility: HOSPITAL | Age: 71
End: 2021-08-10

## 2021-08-10 ENCOUNTER — HOSPITAL ENCOUNTER (OUTPATIENT)
Dept: BONE DENSITY | Facility: HOSPITAL | Age: 71
Discharge: HOME | End: 2021-08-10
Admitting: INTERNAL MEDICINE

## 2021-08-10 DIAGNOSIS — Z78.0 POST-MENOPAUSAL: ICD-10-CM

## 2021-08-10 DIAGNOSIS — I48.0 PAF (PAROXYSMAL ATRIAL FIBRILLATION) (HCC): Primary | ICD-10-CM

## 2021-08-10 DIAGNOSIS — Z12.31 ENCOUNTER FOR SCREENING MAMMOGRAM FOR HIGH-RISK PATIENT: ICD-10-CM

## 2021-08-10 LAB
INR PPP: 2.6 (ref 0.91–1.09)
PROTHROMBIN TIME: 31 SECONDS (ref 10–13.8)

## 2021-08-10 PROCEDURE — 36416 COLLJ CAPILLARY BLOOD SPEC: CPT

## 2021-08-10 PROCEDURE — 77063 BREAST TOMOSYNTHESIS BI: CPT

## 2021-08-10 PROCEDURE — 77067 SCR MAMMO BI INCL CAD: CPT

## 2021-08-10 PROCEDURE — 77080 DXA BONE DENSITY AXIAL: CPT

## 2021-08-10 PROCEDURE — 85610 PROTHROMBIN TIME: CPT

## 2021-08-10 NOTE — PROGRESS NOTES
Anticoagulation Clinic Progress Note    Anticoagulation Summary  As of 8/10/2021    INR goal:  2.0-3.0   TTR:  51.4 % (5.2 mo)   INR used for dosin.6 (8/10/2021)   Warfarin maintenance plan:  2.5 mg every day   Weekly warfarin total:  17.5 mg   No change documented:  Jaida Delgado   Plan last modified:  Keshawn Castaneda, PharmD (2021)   Next INR check:  2021   Priority:  High   Target end date:      Indications    PAF (paroxysmal atrial fibrillation) (CMS/HCC) [I48.0]             Anticoagulation Episode Summary     INR check location:      Preferred lab:      Send INR reminders to:  [facility] ANTICOAG HOME TEST POOL    Comments:   Home Testing as of 21 *call every time for now*      Anticoagulation Care Providers     Provider Role Specialty Phone number    Zenia Tinajero MD Referring Cardiology [phone]          Clinic Interview:      INR History:  Anticoagulation Monitoring 2021 2021 2021 8/10/2021   INR 5.10 - 3.60 2.6   INR Date 2021 - 2021 8/10/2021   INR Goal 2.0-3.0 2.0-3.0 2.0-3.0 2.0-3.0   Trend Down - Same Same   Last Week Total 21.25 mg - 17.5 mg 15 mg   Next Week Total 13.75 mg - 16.25 mg 17.5 mg   Sun - - 2.5 mg -   Mon - - 2.5 mg -   Tue - - - 2.5 mg   Wed - - - 2.5 mg   Thu Hold () - - 2.5 mg   Fri - - 1.25 mg () -   Sat - - 2.5 mg -   Visit Report - - - -   Some recent data might be hidden       Plan:  1. INR is therapeutic today- see above in Anticoagulation Summary.   Will instruct Shani Phillip to continue their warfarin regimen- see above in Anticoagulation Summary.  2. Follow up in 3 days.  3. Patient declines warfarin refills.  4. Verbal and written information provided. Patient expresses understanding and has no further questions at this time.    Jaida Delgado

## 2021-08-11 ENCOUNTER — ANTICOAGULATION VISIT (OUTPATIENT)
Dept: PHARMACY | Facility: HOSPITAL | Age: 71
End: 2021-08-11

## 2021-08-11 DIAGNOSIS — M81.0 AGE-RELATED OSTEOPOROSIS WITHOUT CURRENT PATHOLOGICAL FRACTURE: Primary | ICD-10-CM

## 2021-08-11 DIAGNOSIS — N18.30 STAGE 3 CHRONIC KIDNEY DISEASE, UNSPECIFIED WHETHER STAGE 3A OR 3B CKD (HCC): ICD-10-CM

## 2021-08-11 DIAGNOSIS — M81.0 AGE-RELATED OSTEOPOROSIS WITHOUT CURRENT PATHOLOGICAL FRACTURE: ICD-10-CM

## 2021-08-11 DIAGNOSIS — I48.0 PAF (PAROXYSMAL ATRIAL FIBRILLATION) (HCC): Primary | ICD-10-CM

## 2021-08-11 LAB — INR PPP: 2.3

## 2021-08-11 NOTE — PROGRESS NOTES
Anticoagulation Clinic Progress Note    Anticoagulation Summary  As of 2021    INR goal:  2.0-3.0   TTR:  51.5 % (5.2 mo)   INR used for dosin.30 (2021)   Warfarin maintenance plan:  2.5 mg every day   Weekly warfarin total:  17.5 mg   No change documented:  Keshawn Castaneda PharmD   Plan last modified:  Keshawn Castaneda PharmD (2021)   Next INR check:  2021   Priority:  High   Target end date:      Indications    PAF (paroxysmal atrial fibrillation) (CMS/HCC) [I48.0]             Anticoagulation Episode Summary     INR check location:      Preferred lab:      Send INR reminders to:   [facility] TEST PO[facility]    Comments:   Home Testing as of 21 *call every time for now*      Anticoagulation Care Providers     Provider Role Specialty Phone number    Zenia Tinajero MD Referring Cardiology [phone]          Clinic Interview:  Patient Findings     Negatives:  Signs/symptoms of thrombosis, Signs/symptoms of bleeding,   Laboratory test error suspected, Change in health, Change in alcohol use,   Change in activity, Upcoming invasive procedure, Emergency department   visit, Upcoming dental procedure, Missed doses, Extra doses, Change in   medications, Change in diet/appetite, Hospital admission, Bruising, Other   complaints      Clinical Outcomes     Negatives:  Major bleeding event, Thromboembolic event,   Anticoagulation-related hospital admission, Anticoagulation-related ED   visit, Anticoagulation-related fatality        INR History:  Anticoagulation Monitoring 2021 8/10/2021 2021   INR 3.60 2.6 2.30   INR Date 2021 8/10/2021 2021   INR Goal 2.0-3.0 2.0-3.0 2.0-3.0   Trend Same Same Same   Last Week Total 17.5 mg 15 mg 13.75 mg   Next Week Total 16.25 mg 17.5 mg 17.5 mg   Sun 2.5 mg - 2.5 mg   Mon 2.5 mg - 2.5 mg   Tue - 2.5 mg 2.5 mg   Wed - 2.5 mg 2.5 mg   Thu - 2.5 mg 2.5 mg   Fri 1.25 mg () - 2.5 mg   Sat 2.5 mg - 2.5 mg   Visit Report - - -   Some recent  data might be hidden       Plan:  1. INR is Therapeutic today- see above in Anticoagulation Summary.   Will instruct Shani Phillip to Continue their warfarin regimen- see above in Anticoagulation Summary.  2. Follow up in 1 week  3. They have been instructed to call if any changes in medications, doses, concerns, etc. Patient expresses understanding and has no further questions at this time.    Keshawn Castaneda, PharmD

## 2021-08-12 DIAGNOSIS — M81.0 AGE-RELATED OSTEOPOROSIS WITHOUT CURRENT PATHOLOGICAL FRACTURE: Primary | ICD-10-CM

## 2021-08-12 LAB — 25(OH)D3+25(OH)D2 SERPL-MCNC: 47.1 NG/ML (ref 30–100)

## 2021-08-16 RX ORDER — WARFARIN SODIUM 2.5 MG/1
TABLET ORAL
Qty: 90 TABLET | Refills: 1 | Status: SHIPPED | OUTPATIENT
Start: 2021-08-16 | End: 2022-03-31 | Stop reason: SDUPTHER

## 2021-08-19 ENCOUNTER — ANTICOAGULATION VISIT (OUTPATIENT)
Dept: PHARMACY | Facility: HOSPITAL | Age: 71
End: 2021-08-19

## 2021-08-19 DIAGNOSIS — I48.0 PAF (PAROXYSMAL ATRIAL FIBRILLATION) (HCC): Primary | ICD-10-CM

## 2021-08-19 LAB — INR PPP: 2.4

## 2021-08-19 NOTE — PROGRESS NOTES
Anticoagulation Clinic Progress Note    Anticoagulation Summary  As of 2021    INR goal:  2.0-3.0   TTR:  53.9 % (5.5 mo)   INR used for dosin.40 (2021)   Warfarin maintenance plan:  2.5 mg every day   Weekly warfarin total:  17.5 mg   No change documented:  Keshawn Castaneda PharmD   Plan last modified:  Keshawn Castaneda PharmD (2021)   Next INR check:  2021   Priority:  High   Target end date:      Indications    PAF (paroxysmal atrial fibrillation) (CMS/HCC) [I48.0]             Anticoagulation Episode Summary     INR check location:      Preferred lab:      Send INR reminders to:   [facility] HOME TEST POOL    Comments:   Home Testing as of 21 *call every time for now*      Anticoagulation Care Providers     Provider Role Specialty Phone number    Zenia Tinajero MD Referring Cardiology [phone]          Clinic Interview:  Patient Findings     Negatives:  Signs/symptoms of thrombosis, Signs/symptoms of bleeding,   Laboratory test error suspected, Change in health, Change in alcohol use,   Change in activity, Upcoming invasive procedure, Emergency department   visit, Upcoming dental procedure, Missed doses, Extra doses, Change in   medications, Change in diet/appetite, Hospital admission, Bruising, Other   complaints      Clinical Outcomes     Negatives:  Major bleeding event, Thromboembolic event,   Anticoagulation-related hospital admission, Anticoagulation-related ED   visit, Anticoagulation-related fatality        INR History:  Anticoagulation Monitoring 8/10/2021 2021 2021   INR 2.6 2.30 2.40   INR Date 8/10/2021 2021 2021   INR Goal 2.0-3.0 2.0-3.0 2.0-3.0   Trend Same Same Same   Last Week Total 15 mg 13.75 mg 17.5 mg   Next Week Total 17.5 mg 17.5 mg 17.5 mg   Sun - 2.5 mg 2.5 mg   Mon - 2.5 mg 2.5 mg   Tue 2.5 mg 2.5 mg 2.5 mg   Wed 2.5 mg 2.5 mg 2.5 mg   Thu 2.5 mg 2.5 mg 2.5 mg   Fri - 2.5 mg 2.5 mg   Sat - 2.5 mg 2.5 mg   Visit Report - - -   Some  recent data might be hidden       Plan:  1. INR is Therapeutic today- see above in Anticoagulation Summary.   Will instruct Shani Phillip to Continue their warfarin regimen- see above in Anticoagulation Summary.  2. Follow up in 1 week  3. They have been instructed to call if any changes in medications, doses, concerns, etc. Patient expresses understanding and has no further questions at this time.    Ksehawn Castaneda, PharmD

## 2021-08-23 ENCOUNTER — OFFICE VISIT (OUTPATIENT)
Dept: CARDIOLOGY | Facility: CLINIC | Age: 71
End: 2021-08-23

## 2021-08-23 VITALS
HEIGHT: 61 IN | WEIGHT: 148 LBS | BODY MASS INDEX: 27.94 KG/M2 | DIASTOLIC BLOOD PRESSURE: 60 MMHG | HEART RATE: 84 BPM | SYSTOLIC BLOOD PRESSURE: 140 MMHG

## 2021-08-23 DIAGNOSIS — I05.9 ENDOCARDITIS OF MITRAL VALVE: ICD-10-CM

## 2021-08-23 DIAGNOSIS — I48.19 OTHER PERSISTENT ATRIAL FIBRILLATION (HCC): ICD-10-CM

## 2021-08-23 DIAGNOSIS — Z79.01 LONG TERM (CURRENT) USE OF ANTICOAGULANTS: ICD-10-CM

## 2021-08-23 DIAGNOSIS — I50.33 ACUTE ON CHRONIC DIASTOLIC CHF (CONGESTIVE HEART FAILURE) (HCC): Primary | ICD-10-CM

## 2021-08-23 PROBLEM — I33.9 ACUTE ENDOCARDITIS: Status: RESOLVED | Noted: 2019-11-13 | Resolved: 2021-08-23

## 2021-08-23 PROCEDURE — 99214 OFFICE O/P EST MOD 30 MIN: CPT | Performed by: INTERNAL MEDICINE

## 2021-08-23 PROCEDURE — 93000 ELECTROCARDIOGRAM COMPLETE: CPT | Performed by: INTERNAL MEDICINE

## 2021-08-23 RX ORDER — FUROSEMIDE 40 MG/1
40 TABLET ORAL DAILY
Qty: 90 TABLET | Refills: 3 | Status: SHIPPED | OUTPATIENT
Start: 2021-08-23 | End: 2022-03-30 | Stop reason: SDUPTHER

## 2021-08-23 NOTE — PROGRESS NOTES
CARDIOLOGY    Zenia Tinajero MD    ENCOUNTER DATE:  08/23/2021    Shani Phillip / 70 y.o. / female        CHIEF COMPLAINT / REASON FOR OFFICE VISIT     Acute on chronic diastolic CHF (congestive heart failure; PAF (paroxysmal atrial fibrillation) (CMS/[facility]); Mitral valve insufficiency, unspecified etiology; and Follow-up      HISTORY OF PRESENT ILLNESS       HPI    Shani Phillip is a 70 y.o. female     This is a lady who was on a cruise ship when she developed a febrile illness.  She was taken to [facility] where she was diagnosed with endocarditis.  She left the hospital to return home to [place] I first saw her in January 14, 2019.  She has a history of COPD on oxygen, former smoker, restless leg syndrome and chronic pain.  She had a transthoracic echocardiogram suggestive of vegetation on the mitral valve annulus.  She had a transesophageal echo which delineated this more clearly.  Dr. Briones saw her in consultation.  The plan was to treat her with IV antibiotics which occurred.  She came in for a repeat transesophageal echo on January 14, 2020 and this was pretty much unchanged.  The mobile element attached to the posterior mitral valve annulus annulus was unchanged.     In August 2020, she had normal LV functions and ejection fraction of 66%, moderate left atrial enlargement, severe calcification of the aortic valve without regurgitation or stenosis.  There is moderate bileaflet mitral valve thickening with trace regurgitation and mild stenosis with a mean gradient of 4 mmHg.  There was mild tricuspid regurgitation with a normal right ventricular systolic pressure.  She was seen in the office in August of 2020 and was having lower extremity edema.  Heidi Ambrocio ordered an arterial Doppler which was normal.  She had another echocardiogram in September 2020, again normal left ventricular systolic function, and again bileaflet thickening with this time, mild mitral regurgitation and  "mitral stenosis was noted and a vegetation on the atrial side of the posterior mitral valve leaflet was also noted.    Repeat echo in June 2021 showed normal LV systolic function, grade 2 diastolic dysfunction, mild left atrial enlargement, calcification of the mitral leaflets with mild mitral stenosis.    She is not having any chest pain or palpitations.  She denies fatigue.  She has had worsening lower extremity edema since her Lasix was cut back to 20 mg a day.  She would like to go back up to 40 mg a day.  She has chronic shortness of breath and is on oxygen.    REVIEW OF SYSTEMS     Review of Systems   Constitutional: Positive for weight gain. Negative for fever, malaise/fatigue and weight loss.   HENT: Negative for ear pain, hearing loss, nosebleeds and sore throat.    Eyes: Negative for double vision, pain, vision loss in left eye and vision loss in right eye.   Cardiovascular: Positive for leg swelling.        See history of present illness.   Respiratory: Negative for cough, shortness of breath, sleep disturbances due to breathing, snoring and wheezing.    Endocrine: Negative for cold intolerance, heat intolerance and polyuria.   Skin: Negative for itching, poor wound healing and rash.   Musculoskeletal: Negative for joint pain, joint swelling and myalgias.   Gastrointestinal: Negative for abdominal pain, diarrhea, hematochezia, nausea and vomiting.   Genitourinary: Negative for hematuria and hesitancy.   Neurological: Negative for numbness, paresthesias and seizures.   Psychiatric/Behavioral: Negative for depression. The patient is not nervous/anxious.          VITAL SIGNS     Visit Vitals  /60 (BP Location: Left arm, Patient Position: Sitting)   Pulse 84   Ht 154.9 cm (61\")   Wt 67.1 kg (148 lb)   BMI 27.96 kg/m²         Wt Readings from Last 3 Encounters:   08/23/21 67.1 kg (148 lb)   06/30/21 67.1 kg (148 lb)   05/26/21 67.3 kg (148 lb 6.4 oz)     Body mass index is 27.96 kg/m².      PHYSICAL " EXAMINATION     Constitutional:       General: Not in acute distress.  Neck:      Vascular: No carotid bruit or JVD.   Pulmonary:      Effort: Pulmonary effort is normal.      Breath sounds: Normal breath sounds.   Cardiovascular:      Normal rate. Regular rhythm.      Murmurs: There is no murmur.   Psychiatric:         Mood and Affect: Mood and affect normal.           REVIEWED DATA       ECG 12 Lead    Date/Time: 8/23/2021 12:22 PM  Performed by: Zenia Tinajero MD  Authorized by: Zenia Tinajero MD   Comparison: compared with previous ECG from 5/26/2021  Similar to previous ECG  Rhythm: sinus rhythm  BPM: 84  Conduction: conduction normal  ST Segments: ST segments normal  T Waves: T waves normal    Clinical impression: normal ECG              Lipid Panel    Lipid Panel 4/12/21   Total Cholesterol 194   Triglycerides 174 (A)   HDL Cholesterol 48   VLDL Cholesterol 31   LDL Cholesterol  115 (A)   (A) Abnormal value              Lab Results   Component Value Date    GLUCOSE 94 03/06/2020    BUN 18 04/12/2021    CREATININE 1.33 (H) 04/12/2021    EGFRIFNONA 40 (L) 04/12/2021    EGFRIFAFRI 46 (L) 04/12/2021    BCR 14 04/12/2021    K 3.8 04/12/2021    CO2 28 04/12/2021    CALCIUM 9.3 04/12/2021    PROTENTOTREF 6.7 04/12/2021    ALBUMIN 4.0 04/12/2021    LABIL2 1.5 04/12/2021    AST 22 04/12/2021    ALT 16 04/12/2021       ASSESSMENT & PLAN      Diagnosis Plan   1. Acute on chronic diastolic CHF (congestive heart failure) (CMS/HCC)     2. Endocarditis of mitral valve     3. Other persistent atrial fibrillation (CMS/[facility])     4. Long term (current) use of anticoagulants         1.  Atrial fibrillation.  She is on warfarin and has a home monitor and is followed by the anticoagulation clinic.  Cardioversion 11/15/19.   No recurrence.    She had trouble affording Eliquis.    2.  Mitral valve vegetation.  S/P antibiotics.  Repeat echoes have shown this to be stable.  No fever or chills.  3. Lower extremity edema.  Increase  Lasix back to 40 mg a day.  4.  COPD on home oxygen.    Follow-up in 6 months.    Orders Placed This Encounter   Procedures   • ECG 12 Lead     This order was created via procedure documentation     Order Specific Question:   Release to patient     Answer:   Immediate           MEDICATIONS         Discharge Medications          Accurate as of August 23, 2021 12:25 PM. If you have any questions, ask your nurse or doctor.            Changes to Medications      Instructions Start Date   furosemide 40 MG tablet  Commonly known as: LASIX  What changed:   · medication strength  · how much to take  Changed by: Zenia Tinajero MD   40 mg, Oral, Daily         Continue These Medications      Instructions Start Date   budesonide 0.5 MG/2ML nebulizer solution  Commonly known as: PULMICORT   0.5 mg, Nebulization, Daily - RT      buPROPion  MG 24 hr tablet  Commonly known as: WELLBUTRIN XL   300 mg, Oral, Daily      cholecalciferol 25 MCG (1000 UT) tablet  Commonly known as: VITAMIN D3   2,000 Units, Oral, Daily      cyclobenzaprine 10 MG tablet  Commonly known as: FLEXERIL   10 mg, Oral, 3 Times Daily PRN      famotidine 20 MG tablet  Commonly known as: PEPCID   20 mg, Oral, 2 Times Daily Before Meals      HYDROcodone-acetaminophen 7.5-325 MG per tablet  Commonly known as: NORCO   1 tablet, Oral, Every 6 Hours PRN      O2  Commonly known as: OXYGEN   2 L/min, Inhalation, Once      potassium chloride 10 MEQ CR tablet   10 mEq, Oral, Daily      rOPINIRole 2 MG tablet  Commonly known as: REQUIP   2 mg, Oral, 2 Times Daily      TROSPIUM CHLORIDE PO   20 mg, Oral, 2 Times Daily      Ventolin  (90 Base) MCG/ACT inhaler  Generic drug: albuterol sulfate HFA   2 puffs, Inhalation, 4 Times Daily - RT      warfarin 2.5 MG tablet  Commonly known as: COUMADIN   Take one tablet by mouth daily or as directed.               Zenia Tinajero MD  08/23/21  12:25 EDT    **Dominick Disclaimer:   Much of this encounter note is an  electronic transcription/translation of spoken language to printed text. The electronic translation of spoken language may permit erroneous, or at times, nonsensical words or phrases to be inadvertently transcribed. Although I have reviewed the note for such errors, some may still exist.

## 2021-08-24 RX ORDER — FUROSEMIDE 20 MG/1
TABLET ORAL
Qty: 30 TABLET | Refills: 1 | OUTPATIENT
Start: 2021-08-24

## 2021-08-26 ENCOUNTER — INFUSION (OUTPATIENT)
Dept: ONCOLOGY | Facility: HOSPITAL | Age: 71
End: 2021-08-26

## 2021-08-26 ENCOUNTER — TELEPHONE (OUTPATIENT)
Dept: CARDIOLOGY | Facility: CLINIC | Age: 71
End: 2021-08-26

## 2021-08-26 ENCOUNTER — ANTICOAGULATION VISIT (OUTPATIENT)
Dept: PHARMACY | Facility: HOSPITAL | Age: 71
End: 2021-08-26

## 2021-08-26 ENCOUNTER — LAB (OUTPATIENT)
Dept: OTHER | Facility: HOSPITAL | Age: 71
End: 2021-08-26

## 2021-08-26 VITALS — BODY MASS INDEX: 27.96 KG/M2 | TEMPERATURE: 96.9 F | RESPIRATION RATE: 18 BRPM | HEIGHT: 61 IN

## 2021-08-26 DIAGNOSIS — I50.33 ACUTE ON CHRONIC DIASTOLIC CHF (CONGESTIVE HEART FAILURE) (HCC): ICD-10-CM

## 2021-08-26 DIAGNOSIS — I48.0 PAF (PAROXYSMAL ATRIAL FIBRILLATION) (HCC): Primary | ICD-10-CM

## 2021-08-26 DIAGNOSIS — M81.0 AGE-RELATED OSTEOPOROSIS WITHOUT CURRENT PATHOLOGICAL FRACTURE: Primary | ICD-10-CM

## 2021-08-26 LAB — INR PPP: 2.5

## 2021-08-26 PROCEDURE — 84100 ASSAY OF PHOSPHORUS: CPT | Performed by: INTERNAL MEDICINE

## 2021-08-26 PROCEDURE — 96372 THER/PROPH/DIAG INJ SC/IM: CPT

## 2021-08-26 PROCEDURE — 80053 COMPREHEN METABOLIC PANEL: CPT | Performed by: INTERNAL MEDICINE

## 2021-08-26 PROCEDURE — 83735 ASSAY OF MAGNESIUM: CPT | Performed by: INTERNAL MEDICINE

## 2021-08-26 PROCEDURE — 25010000002 DENOSUMAB 60 MG/ML SOLUTION PREFILLED SYRINGE: Performed by: INTERNAL MEDICINE

## 2021-08-26 RX ADMIN — DENOSUMAB 60 MG: 60 INJECTION SUBCUTANEOUS at 14:05

## 2021-08-26 NOTE — PROGRESS NOTES
Anticoagulation Clinic Progress Note    Anticoagulation Summary  As of 2021    INR goal:  2.0-3.0   TTR:  55.8 % (5.7 mo)   INR used for dosin.50 (2021)   Warfarin maintenance plan:  2.5 mg every day   Weekly warfarin total:  17.5 mg   No change documented:  Marleny Black RPH   Plan last modified:  Keshawn Castaneda, PharmD (2021)   Next INR check:  2021   Priority:  High   Target end date:      Indications    PAF (paroxysmal atrial fibrillation) (CMS/HCC) [I48.0]             Anticoagulation Episode Summary     INR check location:      Preferred lab:      Send INR reminders to:  [facility] [employer] HOME TEST POOL    Comments:   Home Testing as of 21 *call every time for now*      Anticoagulation Care Providers     Provider Role Specialty Phone number    Zenia Tinajero MD Referring Cardiology [phone]          Clinic Interview:  No pertinent clinical findings have been reported.    INR History:  Anticoagulation Monitoring 2021   INR 2.30 2.40 2.50   INR Date 2021   INR Goal 2.0-3.0 2.0-3.0 2.0-3.0   Trend Same Same Same   Last Week Total 13.75 mg 17.5 mg 17.5 mg   Next Week Total 17.5 mg 17.5 mg 17.5 mg   Sun 2.5 mg 2.5 mg 2.5 mg   Mon 2.5 mg 2.5 mg 2.5 mg   Tue 2.5 mg 2.5 mg 2.5 mg   Wed 2.5 mg 2.5 mg 2.5 mg   Thu 2.5 mg 2.5 mg 2.5 mg   Fri 2.5 mg 2.5 mg 2.5 mg   Sat 2.5 mg 2.5 mg 2.5 mg   Visit Report - - -   Some recent data might be hidden       Plan:  1. INR is therapeutic today- see above in Anticoagulation Summary.    Shani Phillip to continue their warfarin regimen- see above in Anticoagulation Summary.  2. Follow up in 1 week  3. Pt has agreed to only be called if INR out of range. They have been instructed to call if any changes in medications, doses, concerns, etc. Patient expresses understanding and has no further questions at this time.    Marleny Black, RP

## 2021-08-26 NOTE — TELEPHONE ENCOUNTER
Can you confirm if she is taking potassium replacement.  Looks like maybe 10 mEq a day?  She may need to go up on that dose.  JL

## 2021-08-26 NOTE — TELEPHONE ENCOUNTER
Debbie from [facility] [place] called and states that they were doing labs for a Prolia injection and the want to report her potassium is a 3.3. She is on Lasix, is there anything you would like for me to advise the patient on?

## 2021-08-26 NOTE — TELEPHONE ENCOUNTER
I called and left a voice mail for her to call me back and left me know if she is taking a potassium tablet and how much if so.

## 2021-08-27 RX ORDER — POTASSIUM CHLORIDE 1500 MG/1
20 TABLET, FILM COATED, EXTENDED RELEASE ORAL DAILY
Qty: 90 TABLET | Refills: 3 | Status: SHIPPED | OUTPATIENT
Start: 2021-08-27 | End: 2022-03-30 | Stop reason: SDUPTHER

## 2021-08-27 NOTE — TELEPHONE ENCOUNTER
Spoke with the patient and informed her to increase her potassium to 20 meq. I advised her that she should take 2 tablets of the 10 meq until she runs out and that I am sending a refill of 20 amber that she can take once she runs out of the 10 meq

## 2021-09-02 ENCOUNTER — APPOINTMENT (OUTPATIENT)
Dept: PHARMACY | Facility: HOSPITAL | Age: 71
End: 2021-09-02

## 2021-09-10 ENCOUNTER — ANTICOAGULATION VISIT (OUTPATIENT)
Dept: PHARMACY | Facility: HOSPITAL | Age: 71
End: 2021-09-10

## 2021-09-10 DIAGNOSIS — I48.0 PAF (PAROXYSMAL ATRIAL FIBRILLATION) (HCC): Primary | ICD-10-CM

## 2021-09-10 LAB — INR PPP: 3

## 2021-09-10 NOTE — PROGRESS NOTES
Anticoagulation Clinic Progress Note    Anticoagulation Summary  As of 9/10/2021    INR goal:  2.0-3.0   TTR:  59.3 % (6.2 mo)   INR used for dosing:  3.00 (9/10/2021)   Warfarin maintenance plan:  2.5 mg every day   Weekly warfarin total:  17.5 mg   No change documented:  Marleny Black RPH   Plan last modified:  Keshawn Castaneda, PharmD (8/5/2021)   Next INR check:  9/17/2021   Priority:  High   Target end date:      Indications    PAF (paroxysmal atrial fibrillation) (CMS/HCC) [I48.0]             Anticoagulation Episode Summary     INR check location:      Preferred lab:      Send INR reminders to:  [facility] [employer] HOME TEST POOL    Comments:   Home Testing as of 7/30/21 *call every time for now*      Anticoagulation Care Providers     Provider Role Specialty Phone number    Zenia Tinajero MD Referring Cardiology [phone]          Clinic Interview:  No pertinent clinical findings have been reported.    INR History:  Anticoagulation Monitoring 8/19/2021 8/26/2021 9/10/2021   INR 2.40 2.50 3.00   INR Date 8/19/2021 8/26/2021 9/10/2021   INR Goal 2.0-3.0 2.0-3.0 2.0-3.0   Trend Same Same Same   Last Week Total 17.5 mg 17.5 mg 17.5 mg   Next Week Total 17.5 mg 17.5 mg 17.5 mg   Sun 2.5 mg 2.5 mg 2.5 mg   Mon 2.5 mg 2.5 mg 2.5 mg   Tue 2.5 mg 2.5 mg 2.5 mg   Wed 2.5 mg 2.5 mg 2.5 mg   Thu 2.5 mg 2.5 mg 2.5 mg   Fri 2.5 mg 2.5 mg 2.5 mg   Sat 2.5 mg 2.5 mg 2.5 mg   Visit Report - - -   Some recent data might be hidden       Plan:  1. INR is therapeutic today- see above in Anticoagulation Summary.    Shani Phillip to continue their warfarin regimen- see above in Anticoagulation Summary.  2. Follow up in 1 week  3. Pt has agreed to only be called if INR out of range. They have been instructed to call if any changes in medications, doses, concerns, etc. Patient expresses understanding and has no further questions at this time.    Marleny Black, RP

## 2021-09-16 ENCOUNTER — ANTICOAGULATION VISIT (OUTPATIENT)
Dept: PHARMACY | Facility: HOSPITAL | Age: 71
End: 2021-09-16

## 2021-09-16 DIAGNOSIS — I48.0 PAF (PAROXYSMAL ATRIAL FIBRILLATION) (HCC): Primary | ICD-10-CM

## 2021-09-16 LAB — INR PPP: 3.1

## 2021-09-16 NOTE — PROGRESS NOTES
Anticoagulation Clinic Progress Note    Anticoagulation Summary  As of 9/16/2021    INR goal:  2.0-3.0   TTR:  57.5 % (6.4 mo)   INR used for dosing:  3.10 (9/16/2021)   Warfarin maintenance plan:  2.5 mg every day   Weekly warfarin total:  17.5 mg   No change documented:  Keshawn Castaneda PharmD   Plan last modified:  Keshawn Castaneda PharmD (8/5/2021)   Next INR check:  9/23/2021   Priority:  High   Target end date:      Indications    PAF (paroxysmal atrial fibrillation) (CMS/HCC) [I48.0]             Anticoagulation Episode Summary     INR check location:      Preferred lab:      Send INR reminders to:   [facility] [employer] HOME TEST POOL    Comments:   Home Testing as of 7/30/21 *call every time for now*      Anticoagulation Care Providers     Provider Role Specialty Phone number    Zenia Tinajero MD Referring Cardiology [phone]          Clinic Interview:  Patient Findings     Negatives:  Signs/symptoms of thrombosis, Signs/symptoms of bleeding,   Laboratory test error suspected, Change in health, Change in alcohol use,   Change in activity, Upcoming invasive procedure, Emergency department   visit, Upcoming dental procedure, Missed doses, Extra doses, Change in   medications, Change in diet/appetite, Hospital admission, Bruising, Other   complaints      Clinical Outcomes     Negatives:  Major bleeding event, Thromboembolic event,   Anticoagulation-related hospital admission, Anticoagulation-related ED   visit, Anticoagulation-related fatality        INR History:  Anticoagulation Monitoring 8/26/2021 9/10/2021 9/16/2021   INR 2.50 3.00 3.10   INR Date 8/26/2021 9/10/2021 9/16/2021   INR Goal 2.0-3.0 2.0-3.0 2.0-3.0   Trend Same Same Same   Last Week Total 17.5 mg 17.5 mg 17.5 mg   Next Week Total 17.5 mg 17.5 mg 17.5 mg   Sun 2.5 mg 2.5 mg 2.5 mg   Mon 2.5 mg 2.5 mg 2.5 mg   Tue 2.5 mg 2.5 mg 2.5 mg   Wed 2.5 mg 2.5 mg 2.5 mg   Thu 2.5 mg 2.5 mg 2.5 mg   Fri 2.5 mg 2.5 mg 2.5 mg   Sat 2.5 mg 2.5 mg 2.5 mg   Visit  Report - - -   Some recent data might be hidden       Plan:  1. INR is Supratherapeutic today- see above in Anticoagulation Summary.   Will instruct Shani Phillip to Continue their warfarin regimen- see above in Anticoagulation Summary.  2. Follow up in 1 week  3. They have been instructed to call if any changes in medications, doses, concerns, etc. Patient expresses understanding and has no further questions at this time.    Keshawn Castaneda, PharmD

## 2021-09-23 ENCOUNTER — ANTICOAGULATION VISIT (OUTPATIENT)
Dept: PHARMACY | Facility: HOSPITAL | Age: 71
End: 2021-09-23

## 2021-09-23 DIAGNOSIS — I48.0 PAF (PAROXYSMAL ATRIAL FIBRILLATION) (HCC): Primary | ICD-10-CM

## 2021-09-23 LAB — INR PPP: 2.2

## 2021-09-23 NOTE — PROGRESS NOTES
Anticoagulation Clinic Progress Note    Anticoagulation Summary  As of 2021    INR goal:  2.0-3.0   TTR:  58.6 % (6.6 mo)   INR used for dosin.20 (2021)   Warfarin maintenance plan:  2.5 mg every day   Weekly warfarin total:  17.5 mg   No change documented:  Marleny Black RPH   Plan last modified:  Keshawn Castaneda, PharmD (2021)   Next INR check:  2021   Priority:  High   Target end date:      Indications    PAF (paroxysmal atrial fibrillation) (CMS/HCC) [I48.0]             Anticoagulation Episode Summary     INR check location:      Preferred lab:      Send INR reminders to:  [facility] [employer] HOME TEST POOL    Comments:   Home Testing as of 21 *call every time for now*      Anticoagulation Care Providers     Provider Role Specialty Phone number    Zenia Tinajero MD Referring Cardiology [phone]          Clinic Interview:  No pertinent clinical findings have been reported.    INR History:  Anticoagulation Monitoring 9/10/2021 2021 2021   INR 3.00 3.10 2.20   INR Date 9/10/2021 2021 2021   INR Goal 2.0-3.0 2.0-3.0 2.0-3.0   Trend Same Same Same   Last Week Total 17.5 mg 17.5 mg 17.5 mg   Next Week Total 17.5 mg 17.5 mg 17.5 mg   Sun 2.5 mg 2.5 mg 2.5 mg   Mon 2.5 mg 2.5 mg 2.5 mg   Tue 2.5 mg 2.5 mg 2.5 mg   Wed 2.5 mg 2.5 mg 2.5 mg   Thu 2.5 mg 2.5 mg 2.5 mg   Fri 2.5 mg 2.5 mg 2.5 mg   Sat 2.5 mg 2.5 mg 2.5 mg   Visit Report - - -   Some recent data might be hidden       Plan:  1. INR is therapeutic today- see above in Anticoagulation Summary.    Shani Phillip to continue their warfarin regimen- see above in Anticoagulation Summary.  2. Follow up in 1 week  3. Pt has agreed to only be called if INR out of range. They have been instructed to call if any changes in medications, doses, concerns, etc. Patient expresses understanding and has no further questions at this time.    Marleny Black, RP

## 2021-09-30 ENCOUNTER — ANTICOAGULATION VISIT (OUTPATIENT)
Dept: PHARMACY | Facility: HOSPITAL | Age: 71
End: 2021-09-30

## 2021-09-30 DIAGNOSIS — I48.0 PAF (PAROXYSMAL ATRIAL FIBRILLATION) (HCC): Primary | ICD-10-CM

## 2021-09-30 LAB — INR PPP: 2.8

## 2021-09-30 PROCEDURE — G0249 PROVIDE INR TEST MATER/EQUIP: HCPCS

## 2021-09-30 NOTE — PROGRESS NOTES
Anticoagulation Clinic Progress Note    Anticoagulation Summary  As of 2021    INR goal:  2.0-3.0   TTR:  60.0 % (6.9 mo)   INR used for dosin.80 (2021)   Warfarin maintenance plan:  2.5 mg every day   Weekly warfarin total:  17.5 mg   No change documented:  Gayatri Escobar, Fahad   Plan last modified:  Keshawn Castaneda PharmD (2021)   Next INR check:  10/7/2021   Priority:  High   Target end date:      Indications    PAF (paroxysmal atrial fibrillation) (CMS/[facility]) [I48.0]             Anticoagulation Episode Summary     INR check location:      Preferred lab:      Send INR reminders to:   [facility] [employer] HOME TEST POOL    Comments:   Home Testing as of 21 *call every time for now*      Anticoagulation Care Providers     Provider Role Specialty Phone number    Zenia Tinajero MD Referring Cardiology [phone]          Clinic Interview:  Patient Findings     Negatives:  Signs/symptoms of thrombosis, Signs/symptoms of bleeding,   Laboratory test error suspected, Change in health, Change in alcohol use,   Change in activity, Upcoming invasive procedure, Emergency department   visit, Upcoming dental procedure, Missed doses, Extra doses, Change in   medications, Change in diet/appetite, Hospital admission, Bruising, Other   complaints      Clinical Outcomes     Negatives:  Major bleeding event, Thromboembolic event,   Anticoagulation-related hospital admission, Anticoagulation-related ED   visit, Anticoagulation-related fatality        INR History:  Anticoagulation Monitoring 2021   INR 3.10 2.20 2.80   INR Date 2021   INR Goal 2.0-3.0 2.0-3.0 2.0-3.0   Trend Same Same Same   Last Week Total 17.5 mg 17.5 mg 17.5 mg   Next Week Total 17.5 mg 17.5 mg 17.5 mg   Sun 2.5 mg 2.5 mg 2.5 mg   Mon 2.5 mg 2.5 mg 2.5 mg   Tue 2.5 mg 2.5 mg 2.5 mg   Wed 2.5 mg 2.5 mg 2.5 mg   Thu 2.5 mg 2.5 mg 2.5 mg   Fri 2.5 mg 2.5 mg 2.5 mg   Sat 2.5 mg 2.5 mg 2.5 mg    Visit Report - - -   Some recent data might be hidden       Plan:  1. INR is Therapeutic today- see above in Anticoagulation Summary.   Will instruct Shani Phillip to Continue their warfarin regimen- see above in Anticoagulation Summary.  2. Follow up in 1 week  3. Pt has agreed to only be called if INR out of range. They have been instructed to call if any changes in medications, doses, concerns, etc. Patient expresses understanding and has no further questions at this time.    Gayatri Escobar, PharmD

## 2021-10-07 ENCOUNTER — ANTICOAGULATION VISIT (OUTPATIENT)
Dept: PHARMACY | Facility: HOSPITAL | Age: 71
End: 2021-10-07

## 2021-10-07 DIAGNOSIS — I48.0 PAF (PAROXYSMAL ATRIAL FIBRILLATION) (HCC): Primary | ICD-10-CM

## 2021-10-07 LAB — INR PPP: 2.2

## 2021-10-07 NOTE — PROGRESS NOTES
Anticoagulation Clinic Progress Note    Anticoagulation Summary  As of 10/7/2021    INR goal:  2.0-3.0   TTR:  61.3 % (7.1 mo)   INR used for dosin.20 (10/7/2021)   Warfarin maintenance plan:  2.5 mg every day   Weekly warfarin total:  17.5 mg   No change documented:  Marleny Black RPH   Plan last modified:  Keshawn Castaneda, PharmD (2021)   Next INR check:  10/14/2021   Priority:  High   Target end date:      Indications    PAF (paroxysmal atrial fibrillation) (HCC) [I48.0]             Anticoagulation Episode Summary     INR check location:      Preferred lab:      Send INR reminders to:  [facility] [employer] HOME TEST POOL    Comments:   Home Testing as of 21 *call only when out of range*      Anticoagulation Care Providers     Provider Role Specialty Phone number    Zenia Tinajero MD Referring Cardiology [phone]          Clinic Interview:  No pertinent clinical findings have been reported.    INR History:  Anticoagulation Monitoring 2021 2021 10/7/2021   INR 2.20 2.80 2.20   INR Date 2021 2021 10/7/2021   INR Goal 2.0-3.0 2.0-3.0 2.0-3.0   Trend Same Same Same   Last Week Total 17.5 mg 17.5 mg 17.5 mg   Next Week Total 17.5 mg 17.5 mg 17.5 mg   Sun 2.5 mg 2.5 mg 2.5 mg   Mon 2.5 mg 2.5 mg 2.5 mg   Tue 2.5 mg 2.5 mg 2.5 mg   Wed 2.5 mg 2.5 mg 2.5 mg   Thu 2.5 mg 2.5 mg 2.5 mg   Fri 2.5 mg 2.5 mg 2.5 mg   Sat 2.5 mg 2.5 mg 2.5 mg   Visit Report - - -   Some recent data might be hidden       Plan:  1. INR is therapeutic today- see above in Anticoagulation Summary.    Shani Phillip to continue their warfarin regimen- see above in Anticoagulation Summary.  2. Follow up in 1 week  3. Pt has agreed to only be called if INR out of range. They have been instructed to call if any changes in medications, doses, concerns, etc. Patient expresses understanding and has no further questions at this time.    Marleny Black, RP

## 2021-10-14 ENCOUNTER — ANTICOAGULATION VISIT (OUTPATIENT)
Dept: PHARMACY | Facility: HOSPITAL | Age: 71
End: 2021-10-14

## 2021-10-14 DIAGNOSIS — I48.0 PAF (PAROXYSMAL ATRIAL FIBRILLATION) (HCC): Primary | ICD-10-CM

## 2021-10-14 LAB — INR PPP: 2.8

## 2021-10-14 NOTE — PROGRESS NOTES
Anticoagulation Clinic Progress Note    Anticoagulation Summary  As of 10/14/2021    INR goal:  2.0-3.0   TTR:  62.5 % (7.3 mo)   INR used for dosin.80 (10/14/2021)   Warfarin maintenance plan:  2.5 mg every day   Weekly warfarin total:  17.5 mg   No change documented:  Keshawn Castaneda PharmD   Plan last modified:  Keshawn Castaneda PharmD (2021)   Next INR check:  10/21/2021   Priority:  High   Target end date:      Indications    PAF (paroxysmal atrial fibrillation) (HCC) [I48.0]             Anticoagulation Episode Summary     INR check location:      Preferred lab:      Send INR reminders to:   [facility] [employer] HOME TEST POOL    Comments:   Home Testing as of 21 *call only when out of range*      Anticoagulation Care Providers     Provider Role Specialty Phone number    Zenia Tinajero MD Referring Cardiology [phone]          Clinic Interview:  Patient Findings     Negatives:  Signs/symptoms of thrombosis, Signs/symptoms of bleeding,   Laboratory test error suspected, Change in health, Change in alcohol use,   Change in activity, Upcoming invasive procedure, Emergency department   visit, Upcoming dental procedure, Missed doses, Extra doses, Change in   medications, Change in diet/appetite, Hospital admission, Bruising, Other   complaints      Clinical Outcomes     Negatives:  Major bleeding event, Thromboembolic event,   Anticoagulation-related hospital admission, Anticoagulation-related ED   visit, Anticoagulation-related fatality        INR History:  Anticoagulation Monitoring 2021 10/7/2021 10/14/2021   INR 2.80 2.20 2.80   INR Date 2021 10/7/2021 10/14/2021   INR Goal 2.0-3.0 2.0-3.0 2.0-3.0   Trend Same Same Same   Last Week Total 17.5 mg 17.5 mg 17.5 mg   Next Week Total 17.5 mg 17.5 mg 17.5 mg   Sun 2.5 mg 2.5 mg 2.5 mg   Mon 2.5 mg 2.5 mg 2.5 mg   Tue 2.5 mg 2.5 mg 2.5 mg   Wed 2.5 mg 2.5 mg 2.5 mg   Thu 2.5 mg 2.5 mg 2.5 mg   Fri 2.5 mg 2.5 mg 2.5 mg   Sat 2.5 mg 2.5 mg 2.5 mg    Visit Report - - -   Some recent data might be hidden       Plan:  1. INR is Therapeutic today- see above in Anticoagulation Summary.   Will instruct Shani Phillip to Continue their warfarin regimen- see above in Anticoagulation Summary.  2. Follow up in 1 week  3. They have been instructed to call if any changes in medications, doses, concerns, etc. Patient expresses understanding and has no further questions at this time.    Keshawn Castaneda, PharmD

## 2021-10-22 ENCOUNTER — ANTICOAGULATION VISIT (OUTPATIENT)
Dept: PHARMACY | Facility: HOSPITAL | Age: 71
End: 2021-10-22

## 2021-10-22 DIAGNOSIS — I48.0 PAF (PAROXYSMAL ATRIAL FIBRILLATION) (HCC): Primary | ICD-10-CM

## 2021-10-22 LAB — INR PPP: 2

## 2021-10-22 NOTE — PROGRESS NOTES
Anticoagulation Clinic Progress Note    Anticoagulation Summary  As of 10/22/2021    INR goal:  2.0-3.0   TTR:  63.8 % (7.6 mo)   INR used for dosin.00 (10/22/2021)   Warfarin maintenance plan:  2.5 mg every day   Weekly warfarin total:  17.5 mg   No change documented:  Keshawn Castaneda PharmD   Plan last modified:  Keshawn Castaneda PharmD (2021)   Next INR check:  10/28/2021   Priority:  High   Target end date:      Indications    PAF (paroxysmal atrial fibrillation) (HCC) [I48.0]             Anticoagulation Episode Summary     INR check location:      Preferred lab:      Send INR reminders to:  [facility] [employer] HOME TEST POOL    Comments:   Home Testing as of 21 *call only when out of range*      Anticoagulation Care Providers     Provider Role Specialty Phone number    Zenia Tinajero MD Referring Cardiology [phone]          Clinic Interview:  No pertinent clinical findings have been reported.    INR History:  Anticoagulation Monitoring 10/7/2021 10/14/2021 10/22/2021   INR 2.20 2.80 2.00   INR Date 10/7/2021 10/14/2021 10/22/2021   INR Goal 2.0-3.0 2.0-3.0 2.0-3.0   Trend Same Same Same   Last Week Total 17.5 mg 17.5 mg 17.5 mg   Next Week Total 17.5 mg 17.5 mg 17.5 mg   Sun 2.5 mg 2.5 mg 2.5 mg   Mon 2.5 mg 2.5 mg 2.5 mg   Tue 2.5 mg 2.5 mg 2.5 mg   Wed 2.5 mg 2.5 mg 2.5 mg   Thu 2.5 mg 2.5 mg -   Fri 2.5 mg 2.5 mg 2.5 mg   Sat 2.5 mg 2.5 mg 2.5 mg   Visit Report - - -   Some recent data might be hidden       Plan:  1. INR is therapeutic today- see above in Anticoagulation Summary.    Shani Phillip to continue their warfarin regimen- see above in Anticoagulation Summary.  2. Follow up in 1 week  3. Pt has agreed to only be called if INR out of range. They have been instructed to call if any changes in medications, doses, concerns, etc. Patient expresses understanding and has no further questions at this time.    Keshawn Castaneda, PharmD

## 2021-10-28 ENCOUNTER — ANTICOAGULATION VISIT (OUTPATIENT)
Dept: PHARMACY | Facility: HOSPITAL | Age: 71
End: 2021-10-28

## 2021-10-28 DIAGNOSIS — I48.0 PAF (PAROXYSMAL ATRIAL FIBRILLATION) (HCC): Primary | ICD-10-CM

## 2021-10-28 LAB — INR PPP: 1.8

## 2021-10-28 PROCEDURE — G0249 PROVIDE INR TEST MATER/EQUIP: HCPCS

## 2021-10-28 NOTE — PROGRESS NOTES
Anticoagulation Clinic Progress Note  Anticoagulation Summary  As of 10/28/2021    INR goal:  2.0-3.0   TTR:  62.2 % (7.8 mo)   INR used for dosin.80 (10/28/2021)   Warfarin maintenance plan:  2.5 mg every day   Weekly warfarin total:  17.5 mg   Plan last modified:  Keshawn Castaneda, PharmD (2021)   Next INR check:  2021   Priority:  High   Target end date:      Indications    PAF (paroxysmal atrial fibrillation) (HCC) [I48.0]             Anticoagulation Episode Summary     INR check location:      Preferred lab:      Send INR reminders to:   [facility] [employer] HOME TEST POOL    Comments:   Home Testing as of 21 *call only when out of range*      Anticoagulation Care Providers     Provider Role Specialty Phone number    Zenia Tinajero MD Referring Cardiology [phone]        Clinic Interview:  Patient Findings     Positives:  Change in diet/appetite    Negatives:  Signs/symptoms of thrombosis, Signs/symptoms of bleeding,   Laboratory test error suspected, Change in health, Change in alcohol use,   Change in activity, Upcoming invasive procedure, Emergency department   visit, Upcoming dental procedure, Missed doses, Extra doses, Change in   medications, Hospital admission, Bruising, Other complaints    Comments:  Had Tuna with Herrera two times last week.  Denies any other diet   changes, medication changes, or bleeding.        Clinical Outcomes     Negatives:  Major bleeding event, Thromboembolic event,   Anticoagulation-related hospital admission, Anticoagulation-related ED   visit, Anticoagulation-related fatality      INR History:  Anticoagulation Monitoring 10/14/2021 10/22/2021 10/28/2021   INR 2.80 2.00 1.80   INR Date 10/14/2021 10/22/2021 10/28/2021   INR Goal 2.0-3.0 2.0-3.0 2.0-3.0   Trend Same Same Same   Last Week Total 17.5 mg 17.5 mg 17.5 mg   Next Week Total 17.5 mg 17.5 mg 18.75 mg   Sun 2.5 mg 2.5 mg 2.5 mg   Mon 2.5 mg 2.5 mg 2.5 mg   Tue 2.5 mg 2.5 mg 2.5 mg   Wed 2.5 mg 2.5 mg 2.5  mg   Thu 2.5 mg - 3.75 mg (10/28)   Fri 2.5 mg 2.5 mg 2.5 mg   Sat 2.5 mg 2.5 mg 2.5 mg   Visit Report - - -   Some recent data might be hidden     Plan:  1. INR is Subtherapeutic today- see above in Anticoagulation Summary.   Will instruct Shani Phillip to Change their warfarin regimen- see above in Anticoagulation Summary.  2. Follow up in 1 week  3. They have been instructed to call if any changes in medications, doses, concerns, etc. Patient expresses understanding and has no further questions at this time.    Lizandro Ramirez, PharmD

## 2021-11-04 ENCOUNTER — ANTICOAGULATION VISIT (OUTPATIENT)
Dept: PHARMACY | Facility: HOSPITAL | Age: 71
End: 2021-11-04

## 2021-11-04 DIAGNOSIS — I48.0 PAF (PAROXYSMAL ATRIAL FIBRILLATION) (HCC): Primary | ICD-10-CM

## 2021-11-04 LAB — INR PPP: 1.7

## 2021-11-04 NOTE — PROGRESS NOTES
Anticoagulation Clinic Progress Note    Anticoagulation Summary  As of 2021    INR goal:  2.0-3.0   TTR:  60.4 % (8 mo)   INR used for dosin.70 (2021)   Warfarin maintenance plan:  3.75 mg every Mon; 2.5 mg all other days   Weekly warfarin total:  18.75 mg   Plan last modified:  Radha Santos, Fahad (2021)   Next INR check:  2021   Priority:  High   Target end date:      Indications    PAF (paroxysmal atrial fibrillation) (HCC) [I48.0]             Anticoagulation Episode Summary     INR check location:      Preferred lab:      Send INR reminders to:   [facility] [employer] HOME TEST POOL    Comments:   Home Testing as of 21 *call only when out of range*      Anticoagulation Care Providers     Provider Role Specialty Phone number    Zenia Tinajero MD Referring Cardiology [phone]          Clinic Interview:  Patient Findings     Negatives:  Signs/symptoms of thrombosis, Signs/symptoms of bleeding,   Laboratory test error suspected, Change in health, Change in alcohol use,   Change in activity, Upcoming invasive procedure, Emergency department   visit, Upcoming dental procedure, Missed doses, Extra doses, Change in   medications, Change in diet/appetite, Hospital admission, Bruising, Other   complaints      Clinical Outcomes     Negatives:  Major bleeding event, Thromboembolic event,   Anticoagulation-related hospital admission, Anticoagulation-related ED   visit, Anticoagulation-related fatality        INR History:  Anticoagulation Monitoring 10/22/2021 10/28/2021 2021   INR 2.00 1.80 1.70   INR Date 10/22/2021 10/28/2021 2021   INR Goal 2.0-3.0 2.0-3.0 2.0-3.0   Trend Same Same Up   Last Week Total 17.5 mg 17.5 mg 18.75 mg   Next Week Total 17.5 mg 18.75 mg 21.25 mg   Sun 2.5 mg 2.5 mg 2.5 mg   Mon 2.5 mg 2.5 mg 3.75 mg   Tue 2.5 mg 2.5 mg 2.5 mg   Wed 2.5 mg 2.5 mg 2.5 mg   Thu - 3.75 mg (10/28) 5 mg ()   Fri 2.5 mg 2.5 mg 2.5 mg   Sat 2.5 mg 2.5 mg 2.5 mg   Visit  Report - - -   Some recent data might be hidden       Plan:  1. INR is Subtherapeutic today- see above in Anticoagulation Summary.   Will instruct Shani Phillip to Increase their warfarin regimen- see above in Anticoagulation Summary. Give 5 mg boost dose today and then increase regimen to 3.75 mg on Mondays and 2.5 mg on AOD  2. Follow up in 1 week  3. Pt has agreed to only be called if INR out of range. They have been instructed to call if any changes in medications, doses, concerns, etc. Patient expresses understanding and has no further questions at this time.    Radha Santos, PharmD

## 2021-11-11 ENCOUNTER — ANTICOAGULATION VISIT (OUTPATIENT)
Dept: PHARMACY | Facility: HOSPITAL | Age: 71
End: 2021-11-11

## 2021-11-11 DIAGNOSIS — I48.0 PAF (PAROXYSMAL ATRIAL FIBRILLATION) (HCC): Primary | ICD-10-CM

## 2021-11-11 LAB — INR PPP: 1.7

## 2021-11-11 NOTE — PROGRESS NOTES
Anticoagulation Clinic Progress Note    Anticoagulation Summary  As of 2021    INR goal:  2.0-3.0   TTR:  58.7 % (8.3 mo)   INR used for dosin.70 (2021)   Warfarin maintenance plan:  3.75 mg every Mon; 2.5 mg all other days   Weekly warfarin total:  18.75 mg   Plan last modified:  Radha Santos, PharmD (2021)   Next INR check:  2021   Priority:  High   Target end date:      Indications    PAF (paroxysmal atrial fibrillation) (HCC) [I48.0]             Anticoagulation Episode Summary     INR check location:      Preferred lab:      Send INR reminders to:   [facility] [employer] HOME TEST POOL    Comments:   Home Testing as of 21 *call only when out of range*      Anticoagulation Care Providers     Provider Role Specialty Phone number    Zenia Tinajero MD Referring Cardiology [phone]          Clinic Interview:  Patient Findings     Negatives:  Signs/symptoms of thrombosis, Signs/symptoms of bleeding,   Laboratory test error suspected, Change in health, Change in alcohol use,   Change in activity, Upcoming invasive procedure, Emergency department   visit, Upcoming dental procedure, Missed doses, Extra doses, Change in   medications, Change in diet/appetite, Hospital admission, Bruising, Other   complaints      Clinical Outcomes     Negatives:  Major bleeding event, Thromboembolic event,   Anticoagulation-related hospital admission, Anticoagulation-related ED   visit, Anticoagulation-related fatality        INR History:  Anticoagulation Monitoring 10/28/2021 2021 2021   INR 1.80 1.70 1.70   INR Date 10/28/2021 2021 2021   INR Goal 2.0-3.0 2.0-3.0 2.0-3.0   Trend Same Up Same   Last Week Total 17.5 mg 18.75 mg 21.25 mg   Next Week Total 18.75 mg 21.25 mg 21.25 mg   Sun 2.5 mg 2.5 mg 2.5 mg   Mon 2.5 mg 3.75 mg 3.75 mg   Tue 2.5 mg 2.5 mg 2.5 mg   Wed 2.5 mg 2.5 mg 2.5 mg   Thu 3.75 mg (10/28) 5 mg () 5 mg ()   Fri 2.5 mg 2.5 mg 2.5 mg   Sat 2.5 mg 2.5 mg  2.5 mg   Visit Report - - -   Some recent data might be hidden       Plan:  1. INR is Subtherapeutic today- see above in Anticoagulation Summary.   Will instruct Shani Phillip to Increase their warfarin regimen- see above in Anticoagulation Summary.  2. Follow up in 1 week  3. Pt has agreed to only be called if INR out of range. They have been instructed to call if any changes in medications, doses, concerns, etc. Patient expresses understanding and has no further questions at this time.    Marleny Black, [facility]

## 2021-11-18 ENCOUNTER — ANTICOAGULATION VISIT (OUTPATIENT)
Dept: PHARMACY | Facility: HOSPITAL | Age: 71
End: 2021-11-18

## 2021-11-18 DIAGNOSIS — I48.0 PAF (PAROXYSMAL ATRIAL FIBRILLATION) (HCC): Primary | ICD-10-CM

## 2021-11-18 LAB — INR PPP: 1.9

## 2021-11-22 NOTE — PROGRESS NOTES
Anticoagulation Clinic Progress Note    Anticoagulation Summary  As of 2021    INR goal:  2.0-3.0   TTR:  57.1 % (8.5 mo)   INR used for dosin.90 (2021)   Warfarin maintenance plan:  3.75 mg every Mon; 2.5 mg all other days   Weekly warfarin total:  18.75 mg   Plan last modified:  Radha Santos, PharmD (2021)   Next INR check:  2021   Priority:  High   Target end date:      Indications    PAF (paroxysmal atrial fibrillation) (HCC) [I48.0]             Anticoagulation Episode Summary     INR check location:      Preferred lab:      Send INR reminders to:   [facility] [employer] HOME TEST POOL    Comments:   Home Testing as of 21 *call only when out of range*      Anticoagulation Care Providers     Provider Role Specialty Phone number    Zenia Tinajero MD Referring Cardiology [phone]          Clinic Interview:  Patient Findings     Negatives:  Signs/symptoms of thrombosis, Signs/symptoms of bleeding,   Laboratory test error suspected, Change in health, Change in alcohol use,   Change in activity, Upcoming invasive procedure, Emergency department   visit, Upcoming dental procedure, Missed doses, Extra doses, Change in   medications, Change in diet/appetite, Hospital admission, Bruising, Other   complaints      Clinical Outcomes     Negatives:  Major bleeding event, Thromboembolic event,   Anticoagulation-related hospital admission, Anticoagulation-related ED   visit, Anticoagulation-related fatality        INR History:  Anticoagulation Monitoring 2021   INR 1.70 1.70 1.90   INR Date 2021   INR Goal 2.0-3.0 2.0-3.0 2.0-3.0   Trend Up Same Same   Last Week Total 18.75 mg 21.25 mg 21.25 mg   Next Week Total 21.25 mg 21.25 mg 22.5 mg   Sun 2.5 mg 2.5 mg 3.75 mg ()   Mon 3.75 mg 3.75 mg 3.75 mg   Tue 2.5 mg 2.5 mg 2.5 mg   Wed 2.5 mg 2.5 mg -   Thu 5 mg () 5 mg () 2.5 mg   Fri 2.5 mg 2.5 mg 5 mg ()   Sat 2.5 mg 2.5  mg 2.5 mg   Visit Report - - -   Some recent data might be hidden       Plan:  1. INR is Subtherapeutic today- see above in Anticoagulation Summary.   Will instruct Shani Phillip to Change their warfarin regimen- see above in Anticoagulation Summary.  2. Follow up in 2 days (was unable to reach until 11/22)   3. They have been instructed to call if any changes in medications, doses, concerns, etc. Patient expresses understanding and has no further questions at this time.    Marleny Black, [facility]

## 2021-11-24 ENCOUNTER — ANTICOAGULATION VISIT (OUTPATIENT)
Dept: PHARMACY | Facility: HOSPITAL | Age: 71
End: 2021-11-24

## 2021-11-24 DIAGNOSIS — I48.0 PAF (PAROXYSMAL ATRIAL FIBRILLATION) (HCC): Primary | ICD-10-CM

## 2021-11-24 LAB — INR PPP: 1.9

## 2021-11-24 NOTE — PROGRESS NOTES
Anticoagulation Clinic Progress Note    Anticoagulation Summary  As of 2021    INR goal:  2.0-3.0   TTR:  55.8 % (8.7 mo)   INR used for dosin.90 (2021)   Warfarin maintenance plan:  2.5 mg every Tue, Thu, Sat; 3.75 mg all other days   Weekly warfarin total:  22.5 mg   Plan last modified:  Marleny Black RPH (2021)   Next INR check:  2021   Priority:  High   Target end date:      Indications    PAF (paroxysmal atrial fibrillation) (HCC) [I48.0]             Anticoagulation Episode Summary     INR check location:      Preferred lab:      Send INR reminders to:   [facility] [employer] HOME TEST POOL    Comments:   Home Testing as of 21 *call only when out of range*      Anticoagulation Care Providers     Provider Role Specialty Phone number    Zenia Tinajero MD Referring Cardiology [phone]          Clinic Interview:  Patient Findings     Negatives:  Signs/symptoms of thrombosis, Signs/symptoms of bleeding,   Laboratory test error suspected, Change in health, Change in alcohol use,   Change in activity, Upcoming invasive procedure, Emergency department   visit, Upcoming dental procedure, Missed doses, Extra doses, Change in   medications, Change in diet/appetite, Hospital admission, Bruising, Other   complaints      Clinical Outcomes     Negatives:  Major bleeding event, Thromboembolic event,   Anticoagulation-related hospital admission, Anticoagulation-related ED   visit, Anticoagulation-related fatality        INR History:  Anticoagulation Monitoring 2021   INR 1.70 1.90 1.90   INR Date 2021   INR Goal 2.0-3.0 2.0-3.0 2.0-3.0   Trend Same Same Up   Last Week Total 21.25 mg 21.25 mg 22.5 mg   Next Week Total 21.25 mg 22.5 mg 22.5 mg   Sun 2.5 mg 3.75 mg () 3.75 mg   Mon 3.75 mg 3.75 mg 3.75 mg   Tue 2.5 mg 2.5 mg 2.5 mg   Wed 2.5 mg - 3.75 mg ()   Thu 5 mg () 2.5 mg 2.5 mg   Fri 2.5 mg 5 mg () 3.75 mg  (11/26)   Sat 2.5 mg 2.5 mg 2.5 mg   Visit Report - - -   Some recent data might be hidden       Plan:  1. INR is Subtherapeutic today- see above in Anticoagulation Summary.   Will instruct Shani Phillip to Increase their warfarin regimen- see above in Anticoagulation Summary.  2. Follow up in 1 weeks  3.  They have been instructed to call if any changes in medications, doses, concerns, etc. Patient expresses understanding and has no further questions at this time.    Marleny Black [facility]

## 2021-12-02 ENCOUNTER — ANTICOAGULATION VISIT (OUTPATIENT)
Dept: PHARMACY | Facility: HOSPITAL | Age: 71
End: 2021-12-02

## 2021-12-02 DIAGNOSIS — I48.0 PAF (PAROXYSMAL ATRIAL FIBRILLATION) (HCC): Primary | ICD-10-CM

## 2021-12-02 LAB — INR PPP: 1.6

## 2021-12-02 PROCEDURE — G0249 PROVIDE INR TEST MATER/EQUIP: HCPCS

## 2021-12-02 NOTE — PROGRESS NOTES
Anticoagulation Clinic Progress Note    Anticoagulation Summary  As of 2021    INR goal:  2.0-3.0   TTR:  54.1 % (9 mo)   INR used for dosin.60 (2021)   Warfarin maintenance plan:  2.5 mg every Tue, Thu, Sat; 3.75 mg all other days   Weekly warfarin total:  22.5 mg   Plan last modified:  Marleny Black RPH (2021)   Next INR check:  2021   Priority:  High   Target end date:      Indications    PAF (paroxysmal atrial fibrillation) (HCC) [I48.0]             Anticoagulation Episode Summary     INR check location:      Preferred lab:      Send INR reminders to:   [facility] HOME TEST POOL    Comments:   Home Testing as of 21 *call only when out of range*      Anticoagulation Care Providers     Provider Role Specialty Phone number    Zenia Tinajero MD Referring Cardiology [phone]          Clinic Interview:  Patient Findings     Positives:  Change in diet/appetite    Negatives:  Signs/symptoms of thrombosis, Signs/symptoms of bleeding,   Laboratory test error suspected, Change in health, Change in alcohol use,   Change in activity, Upcoming invasive procedure, Emergency department   visit, Upcoming dental procedure, Missed doses, Extra doses, Change in   medications, Hospital admission, Bruising, Other complaints    Comments:  Increase in greens last week       Clinical Outcomes     Negatives:  Major bleeding event, Thromboembolic event,   Anticoagulation-related hospital admission, Anticoagulation-related ED   visit, Anticoagulation-related fatality    Comments:  Increase in greens last week         INR History:  Anticoagulation Monitoring 2021   INR 1.90 1.90 1.60   INR Date 2021   INR Goal 2.0-3.0 2.0-3.0 2.0-3.0   Trend Same Up Same   Last Week Total 21.25 mg 22.5 mg 22.5 mg   Next Week Total 22.5 mg 22.5 mg 25 mg   Sun 3.75 mg () 3.75 mg 3.75 mg   Mon 3.75 mg 3.75 mg 3.75 mg   Tue 2.5 mg 2.5 mg 2.5 mg   Wed - 3.75  mg (11/24) 3.75 mg   Thu 2.5 mg 2.5 mg 5 mg (12/2)   Fri 5 mg (11/19) 3.75 mg (11/26) 3.75 mg   Sat 2.5 mg 2.5 mg 2.5 mg   Visit Report - - -   Some recent data might be hidden       Plan:  1. INR is Subtherapeutic today- see above in Anticoagulation Summary.   Will instruct Shani Phillip to Increase their warfarin regimen- see above in Anticoagulation Summary.  2. Follow up in 1 week  3. They have been instructed to call if any changes in medications, doses, concerns, etc. Patient expresses understanding and has no further questions at this time.    Marleny Black [facility]

## 2021-12-09 ENCOUNTER — ANTICOAGULATION VISIT (OUTPATIENT)
Dept: PHARMACY | Facility: HOSPITAL | Age: 71
End: 2021-12-09

## 2021-12-09 DIAGNOSIS — I48.0 PAF (PAROXYSMAL ATRIAL FIBRILLATION) (HCC): Primary | ICD-10-CM

## 2021-12-09 LAB — INR PPP: 2.1

## 2021-12-09 NOTE — PROGRESS NOTES
Anticoagulation Clinic Progress Note    Anticoagulation Summary  As of 2021    INR goal:  2.0-3.0   TTR:  53.2 % (9.2 mo)   INR used for dosin.10 (2021)   Warfarin maintenance plan:  2.5 mg every Tue, Thu, Sat; 3.75 mg all other days   Weekly warfarin total:  22.5 mg   No change documented:  Marleny Black RPH   Plan last modified:  Marleny Black RPH (2021)   Next INR check:  2021   Priority:  High   Target end date:      Indications    PAF (paroxysmal atrial fibrillation) (HCC) [I48.0]             Anticoagulation Episode Summary     INR check location:      Preferred lab:      [facility]   [facility]    Comments:   Home Testing as of 21 *call only when out of range*      Anticoagulation Care Providers     Provider Role Specialty Phone number    Zenia Tinajero MD Referring Cardiology [phone]          Clinic Interview:  No pertinent clinical findings have been reported.    INR History:  Anticoagulation Monitoring 2021   INR 1.90 1.60 2.10   INR Date 2021   INR Goal 2.0-3.0 2.0-3.0 2.0-3.0   Trend Up Same Same   Last Week Total 22.5 mg 22.5 mg 25 mg   Next Week Total 22.5 mg 25 mg 22.5 mg   Sun 3.75 mg 3.75 mg 3.75 mg   Mon 3.75 mg 3.75 mg 3.75 mg   Tue 2.5 mg 2.5 mg 2.5 mg   Wed 3.75 mg () 3.75 mg 3.75 mg   Thu 2.5 mg 5 mg () 2.5 mg   Fri 3.75 mg () 3.75 mg 3.75 mg   Sat 2.5 mg 2.5 mg 2.5 mg   Visit Report - - -   Some recent data might be hidden       Plan:  1. INR is therapeutic today- see above in Anticoagulation Summary.    Shani Phillip to continue their warfarin regimen of 2.5 mg on tues, th, and  and 3.75 mg all other days of the week - see above in Anticoagulation Summary.  2. Follow up in 1 week  3. They have been instructed to call if any changes in medications, doses, concerns, etc. Patient expresses understanding and has no further questions at this  time.    Marleny Black, [facility]

## 2021-12-16 ENCOUNTER — ANTICOAGULATION VISIT (OUTPATIENT)
Dept: PHARMACY | Facility: HOSPITAL | Age: 71
End: 2021-12-16

## 2021-12-16 DIAGNOSIS — I48.0 PAF (PAROXYSMAL ATRIAL FIBRILLATION) (HCC): Primary | ICD-10-CM

## 2021-12-16 LAB — INR PPP: 2.4

## 2021-12-16 NOTE — PROGRESS NOTES
Anticoagulation Clinic Progress Note    Anticoagulation Summary  As of 2021    INR goal:  2.0-3.0   TTR:  54.4 % (9.4 mo)   INR used for dosin.40 (2021)   Warfarin maintenance plan:  2.5 mg every Tue, Thu, Sat; 3.75 mg all other days   Weekly warfarin total:  22.5 mg   No change documented:  Marleny Black RPH   Plan last modified:  Marleny Black RPH (2021)   Next INR check:  2021   Priority:  High   Target end date:      Indications    PAF (paroxysmal atrial fibrillation) (HCC) [I48.0]             Anticoagulation Episode Summary     INR check location:      Preferred lab:      Send INR reminders to:   [facility] HOME TEST POOL    Comments:   Home Testing as of 21 *call only when out of range*      Anticoagulation Care Providers     Provider Role Specialty Phone number    Zenia Tinajero MD Referring Cardiology [phone]          Clinic Interview:  No pertinent clinical findings have been reported.    INR History:  Anticoagulation Monitoring 2021   INR 1.60 2.10 2.40   INR Date 2021   INR Goal 2.0-3.0 2.0-3.0 2.0-3.0   Trend Same Same Same   Last Week Total 22.5 mg 25 mg 22.5 mg   Next Week Total 25 mg 22.5 mg 22.5 mg   Sun 3.75 mg 3.75 mg 3.75 mg   Mon 3.75 mg 3.75 mg 3.75 mg   Tue 2.5 mg 2.5 mg 2.5 mg   Wed 3.75 mg 3.75 mg 3.75 mg   Thu 5 mg () 2.5 mg 2.5 mg   Fri 3.75 mg 3.75 mg 3.75 mg   Sat 2.5 mg 2.5 mg 2.5 mg   Visit Report - - -   Some recent data might be hidden       Plan:  1. INR is therapeutic today- see above in Anticoagulation Summary.    Shani Kenji to continue their warfarin regimen- see above in Anticoagulation Summary.  2. Follow up in 1 week  3. Pt has agreed to only be called if INR out of range. They have been instructed to call if any changes in medications, doses, concerns, etc. Patient expresses understanding and has no further questions at this time.    Marleny Black, [facility]

## 2021-12-23 ENCOUNTER — ANTICOAGULATION VISIT (OUTPATIENT)
Dept: PHARMACY | Facility: HOSPITAL | Age: 71
End: 2021-12-23

## 2021-12-23 DIAGNOSIS — I48.0 PAF (PAROXYSMAL ATRIAL FIBRILLATION) (HCC): Primary | ICD-10-CM

## 2021-12-23 LAB — INR PPP: 2.1

## 2021-12-23 NOTE — PROGRESS NOTES
Anticoagulation Clinic Progress Note    Anticoagulation Summary  As of 2021    INR goal:  2.0-3.0   TTR:  55.5 % (9.7 mo)   INR used for dosin.10 (2021)   Warfarin maintenance plan:  2.5 mg every Tue, Thu, Sat; 3.75 mg all other days   Weekly warfarin total:  22.5 mg   No change documented:  Marleny Black RPH   Plan last modified:  Marleny Black RPH (2021)   Next INR check:  2021   Priority:  High   Target end date:      Indications    PAF (paroxysmal atrial fibrillation) (HCC) [I48.0]             Anticoagulation Episode Summary     INR check location:      Preferred lab:      Send INR reminders to:   [facility] HOME TEST POOL    Comments:   Home Testing as of 21 *call only when out of range*      Anticoagulation Care Providers     Provider Role Specialty Phone number    Zenia Tinajero MD Referring Cardiology [phone]          Clinic Interview:  No pertinent clinical findings have been reported.    INR History:  Anticoagulation Monitoring 2021   INR 2.10 2.40 2.10   INR Date 2021   INR Goal 2.0-3.0 2.0-3.0 2.0-3.0   Trend Same Same Same   Last Week Total 25 mg 22.5 mg 22.5 mg   Next Week Total 22.5 mg 22.5 mg 22.5 mg   Sun 3.75 mg 3.75 mg 3.75 mg   Mon 3.75 mg 3.75 mg 3.75 mg   Tue 2.5 mg 2.5 mg 2.5 mg   Wed 3.75 mg 3.75 mg 3.75 mg   Thu 2.5 mg 2.5 mg 2.5 mg   Fri 3.75 mg 3.75 mg 3.75 mg   Sat 2.5 mg 2.5 mg 2.5 mg   Visit Report - - -   Some recent data might be hidden       Plan:  1. INR is therapeutic today- see above in Anticoagulation Summary.    Shani Phillip to continue their warfarin regimen- see above in Anticoagulation Summary.  2. Follow up in 1 week  3. Pt has agreed to only be called if INR out of range. They have been instructed to call if any changes in medications, doses, concerns, etc. Patient expresses understanding and has no further questions at this time.    Marleny Black, [facility]

## 2021-12-30 ENCOUNTER — ANTICOAGULATION VISIT (OUTPATIENT)
Dept: PHARMACY | Facility: HOSPITAL | Age: 71
End: 2021-12-30

## 2021-12-30 DIAGNOSIS — I48.0 PAF (PAROXYSMAL ATRIAL FIBRILLATION) (HCC): Primary | ICD-10-CM

## 2021-12-30 LAB — INR PPP: 3.3

## 2021-12-30 PROCEDURE — G0249 PROVIDE INR TEST MATER/EQUIP: HCPCS

## 2021-12-30 NOTE — PROGRESS NOTES
Anticoagulation Clinic Progress Note    Anticoagulation Summary  As of 12/30/2021    INR goal:  2.0-3.0   TTR:  56.0 % (9.9 mo)   INR used for dosing:  3.30 (12/30/2021)   Warfarin maintenance plan:  2.5 mg every Tue, Thu, Sat; 3.75 mg all other days   Weekly warfarin total:  22.5 mg   Plan last modified:  Marleny Black RPH (11/24/2021)   Next INR check:  1/6/2022   Priority:  High   Target end date:      Indications    PAF (paroxysmal atrial fibrillation) (HCC) [I48.0]             Anticoagulation Episode Summary     INR check location:      Preferred lab:      Send INR reminders to:  [facility] ANTICOAG HOME TEST POOL    Comments:   Home Testing as of 7/30/21 *call only when out of range*      Anticoagulation Care Providers     Provider Role Specialty Phone number    Zenia Tinajero MD Referring Cardiology [phone]          Clinic Interview:  Patient Findings     Positives:  Change in diet/appetite    Negatives:  Signs/symptoms of thrombosis, Signs/symptoms of bleeding,   Laboratory test error suspected, Change in health, Change in alcohol use,   Change in activity, Upcoming invasive procedure, Emergency department   visit, Upcoming dental procedure, Missed doses, Extra doses, Change in   medications, Hospital admission, Bruising, Other complaints    Comments:  Reports more salad than usual actually.      Clinical Outcomes     Negatives:  Major bleeding event, Thromboembolic event,   Anticoagulation-related hospital admission, Anticoagulation-related ED   visit, Anticoagulation-related fatality    Comments:  Reports more salad than usual actually.        INR History:  Anticoagulation Monitoring 12/16/2021 12/23/2021 12/30/2021   INR 2.40 2.10 3.30   INR Date 12/16/2021 12/23/2021 12/30/2021   INR Goal 2.0-3.0 2.0-3.0 2.0-3.0   Trend Same Same Same   Last Week Total 22.5 mg 22.5 mg 22.5 mg   Next Week Total 22.5 mg 22.5 mg 21.25 mg   Sun 3.75 mg 3.75 mg 3.75 mg   Mon 3.75 mg 3.75 mg 3.75 mg   Tue 2.5 mg 2.5  mg 2.5 mg   Wed 3.75 mg 3.75 mg 3.75 mg   Thu 2.5 mg 2.5 mg 1.25 mg (12/30)   Fri 3.75 mg 3.75 mg 3.75 mg   Sat 2.5 mg 2.5 mg 2.5 mg   Visit Report - - -   Some recent data might be hidden       Plan:  1. INR is Supratherapeutic today- see above in Anticoagulation Summary.   Will instruct Shani Phillip to Change their warfarin regimen- see above in Anticoagulation Summary.  2. Follow up in 1 week  3. They have been instructed to call if any changes in medications, doses, concerns, etc. Patient expresses understanding and has no further questions at this time.    Keshawn Castaneda, PharmD

## 2022-01-06 ENCOUNTER — ANTICOAGULATION VISIT (OUTPATIENT)
Dept: PHARMACY | Facility: HOSPITAL | Age: 72
End: 2022-01-06

## 2022-01-06 DIAGNOSIS — I48.0 PAF (PAROXYSMAL ATRIAL FIBRILLATION): Primary | ICD-10-CM

## 2022-01-06 LAB — INR PPP: 2.1

## 2022-01-06 NOTE — PROGRESS NOTES
Anticoagulation Clinic Progress Note    Anticoagulation Summary  As of 2022    INR goal:  2.0-3.0   TTR:  56.4 % (10.1 mo)   INR used for dosin.10 (2022)   Warfarin maintenance plan:  2.5 mg every Tue, Thu, Sat; 3.75 mg all other days   Weekly warfarin total:  22.5 mg   No change documented:  Keshawn Castaneda, PharmD   Plan last modified:  Marleny Black RP (2021)   Next INR check:  2022   Priority:  High   Target end date:      Indications    PAF (paroxysmal atrial fibrillation) (HCC) [I48.0]             Anticoagulation Episode Summary     INR check location:      Preferred lab:      [facility]    Comments:   Home Testing as of 21 *call only when out of range*      Anticoagulation Care Providers     Provider Role Specialty Phone number    Zenia Tinajero MD Referring Cardiology [phone]          Clinic Interview:  No pertinent clinical findings have been reported.    INR History:  Anticoagulation Monitoring 2021   INR 2.10 3.30 2.10   INR Date 2021   INR Goal 2.0-3.0 2.0-3.0 2.0-3.0   Trend Same Same Same   Last Week Total 22.5 mg 22.5 mg 21.25 mg   Next Week Total 22.5 mg 21.25 mg 22.5 mg   Sun 3.75 mg 3.75 mg 3.75 mg   Mon 3.75 mg 3.75 mg 3.75 mg   Tue 2.5 mg 2.5 mg 2.5 mg   Wed 3.75 mg 3.75 mg 3.75 mg   Thu 2.5 mg 1.25 mg () 2.5 mg   Fri 3.75 mg 3.75 mg 3.75 mg   Sat 2.5 mg 2.5 mg 2.5 mg   Visit Report - - -   Some recent data might be hidden       Plan:  1. INR is therapeutic today- see above in Anticoagulation Summary.    Shani Kenji to continue their warfarin regimen- see above in Anticoagulation Summary.  2. Follow up in 1 week  3. Pt has agreed to only be called if INR out of range. They have been instructed to call if any changes in medications, doses, concerns, etc. Patient expresses understanding and has no further questions at this time.    Keshawn Castaneda, PharmD

## 2022-01-13 ENCOUNTER — ANTICOAGULATION VISIT (OUTPATIENT)
Dept: PHARMACY | Facility: HOSPITAL | Age: 72
End: 2022-01-13

## 2022-01-13 DIAGNOSIS — I48.0 PAF (PAROXYSMAL ATRIAL FIBRILLATION): Primary | ICD-10-CM

## 2022-01-13 LAB — INR PPP: 2.5

## 2022-01-13 NOTE — PROGRESS NOTES
Anticoagulation Clinic Progress Note    Anticoagulation Summary  As of 2022    INR goal:  2.0-3.0   TTR:  57.4 % (10.4 mo)   INR used for dosin.50 (2022)   Warfarin maintenance plan:  2.5 mg every Tue, Thu, Sat; 3.75 mg all other days   Weekly warfarin total:  22.5 mg   No change documented:  Marleny Black RPH   Plan last modified:  Marleny Black RPH (2021)   Next INR check:  2022   Priority:  High   Target end date:      Indications    PAF (paroxysmal atrial fibrillation) (HCC) [I48.0]             Anticoagulation Episode Summary     INR check location:      Preferred lab:      [facility]    Comments:   Home Testing as of 21 *call only when out of range*      Anticoagulation Care Providers     Provider Role Specialty Phone number    Zenia Tinajero MD Referring Cardiology [phone]          Clinic Interview:  No pertinent clinical findings have been reported.    INR History:  Anticoagulation Monitoring 2021   INR 3.30 2.10 2.50   INR Date 2021   INR Goal 2.0-3.0 2.0-3.0 2.0-3.0   Trend Same Same Same   Last Week Total 22.5 mg 21.25 mg 22.5 mg   Next Week Total 21.25 mg 22.5 mg 22.5 mg   Sun 3.75 mg 3.75 mg 3.75 mg   Mon 3.75 mg 3.75 mg 3.75 mg   Tue 2.5 mg 2.5 mg 2.5 mg   Wed 3.75 mg 3.75 mg 3.75 mg   Thu 1.25 mg () 2.5 mg 2.5 mg   Fri 3.75 mg 3.75 mg 3.75 mg   Sat 2.5 mg 2.5 mg 2.5 mg   Visit Report - - -   Some recent data might be hidden       Plan:  1. INR is therapeutic today- see above in Anticoagulation Summary.    Shani Kenji to continue their warfarin regimen- see above in Anticoagulation Summary.  2. Follow up in 1 week  3. Pt has agreed to only be called if INR out of range. They have been instructed to call if any changes in medications, doses, concerns, etc. Patient expresses understanding and has no further questions at this time.    Marleny Black RP

## 2022-01-20 ENCOUNTER — ANTICOAGULATION VISIT (OUTPATIENT)
Dept: PHARMACY | Facility: HOSPITAL | Age: 72
End: 2022-01-20

## 2022-01-20 DIAGNOSIS — I48.0 PAF (PAROXYSMAL ATRIAL FIBRILLATION): Primary | ICD-10-CM

## 2022-01-20 LAB — INR PPP: 3.8

## 2022-01-20 NOTE — PROGRESS NOTES
Anticoagulation Clinic Progress Note    Anticoagulation Summary  As of 1/20/2022    INR goal:  2.0-3.0   TTR:  57.0 % (10.6 mo)   INR used for dosing:  3.80 (1/20/2022)   Warfarin maintenance plan:  2.5 mg every Tue, Thu, Sat; 3.75 mg all other days   Weekly warfarin total:  22.5 mg   Plan last modified:  Marleny Black RPH (11/24/2021)   Next INR check:  1/27/2022   Priority:  High   Target end date:      Indications    PAF (paroxysmal atrial fibrillation) (HCC) [I48.0]             Anticoagulation Episode Summary     INR check location:      Preferred lab:      Send INR reminders to:   [facility] HOME TEST POOL    Comments:   Home Testing as of 7/30/21 *call only when out of range*      Anticoagulation Care Providers     Provider Role Specialty Phone number    Zenia Tinajero MD Referring Cardiology [phone]          Clinic Interview:  Patient Findings     Negatives:  Signs/symptoms of thrombosis, Signs/symptoms of bleeding,   Laboratory test error suspected, Change in health, Change in alcohol use,   Change in activity, Upcoming invasive procedure, Emergency department   visit, Upcoming dental procedure, Missed doses, Extra doses, Change in   medications, Change in diet/appetite, Hospital admission, Bruising, Other   complaints      Clinical Outcomes     Negatives:  Major bleeding event, Thromboembolic event,   Anticoagulation-related hospital admission, Anticoagulation-related ED   visit, Anticoagulation-related fatality        INR History:  Anticoagulation Monitoring 1/6/2022 1/13/2022 1/20/2022   INR 2.10 2.50 3.80   INR Date 1/6/2022 1/13/2022 1/20/2022   INR Goal 2.0-3.0 2.0-3.0 2.0-3.0   Trend Same Same Same   Last Week Total 21.25 mg 22.5 mg 22.5 mg   Next Week Total 22.5 mg 22.5 mg 20 mg   Sun 3.75 mg 3.75 mg 3.75 mg   Mon 3.75 mg 3.75 mg 3.75 mg   Tue 2.5 mg 2.5 mg 2.5 mg   Wed 3.75 mg 3.75 mg 3.75 mg   Thu 2.5 mg 2.5 mg Hold (1/20)   Fri 3.75 mg 3.75 mg 3.75 mg   Sat 2.5 mg 2.5 mg 2.5 mg    Visit Report - - -   Some recent data might be hidden       Plan:  1. INR is Supratherapeutic today- see above in Anticoagulation Summary.   Will instruct Shani Phillip to Change their warfarin regimen- see above in Anticoagulation Summary.  2. Follow up in 1 week  3.  They have been instructed to call if any changes in medications, doses, concerns, etc. Patient expresses understanding and has no further questions at this time.    Marleny Black, [facility]

## 2022-01-27 ENCOUNTER — ANTICOAGULATION VISIT (OUTPATIENT)
Dept: PHARMACY | Facility: HOSPITAL | Age: 72
End: 2022-01-27

## 2022-01-27 DIAGNOSIS — I48.0 PAF (PAROXYSMAL ATRIAL FIBRILLATION): Primary | ICD-10-CM

## 2022-01-27 LAB — INR PPP: 3.2

## 2022-01-27 PROCEDURE — G0249 PROVIDE INR TEST MATER/EQUIP: HCPCS

## 2022-01-27 NOTE — PROGRESS NOTES
Anticoagulation Clinic Progress Note    Anticoagulation Summary  As of 1/27/2022    INR goal:  2.0-3.0   TTR:  55.7 % (10.8 mo)   INR used for dosing:  3.20 (1/27/2022)   Warfarin maintenance plan:  3.75 mg every Mon, Wed, Fri; 2.5 mg all other days   Weekly warfarin total:  21.25 mg   Plan last modified:  Marleny Black RPH (1/27/2022)   Next INR check:  2/3/2022   Priority:  High   Target end date:      Indications    PAF (paroxysmal atrial fibrillation) (HCC) [I48.0]             Anticoagulation Episode Summary     INR check location:      Preferred lab:      Send INR reminders to:   [facility] ANTICOAG HOME TEST POOL    Comments:   Home Testing as of 7/30/21 *call only when out of range*      Anticoagulation Care Providers     Provider Role Specialty Phone number    Zenia Tinajero MD Referring Cardiology [phone]          Clinic Interview:  Patient Findings     Negatives:  Signs/symptoms of thrombosis, Signs/symptoms of bleeding,   Laboratory test error suspected, Change in health, Change in alcohol use,   Change in activity, Upcoming invasive procedure, Emergency department   visit, Upcoming dental procedure, Missed doses, Extra doses, Change in   medications, Change in diet/appetite, Hospital admission, Bruising, Other   complaints      Clinical Outcomes     Negatives:  Major bleeding event, Thromboembolic event,   Anticoagulation-related hospital admission, Anticoagulation-related ED   visit, Anticoagulation-related fatality        INR History:  Anticoagulation Monitoring 1/13/2022 1/20/2022 1/27/2022   INR 2.50 3.80 3.20   INR Date 1/13/2022 1/20/2022 1/27/2022   INR Goal 2.0-3.0 2.0-3.0 2.0-3.0   Trend Same Same Down   Last Week Total 22.5 mg 22.5 mg 20 mg   Next Week Total 22.5 mg 20 mg 20 mg   Sun 3.75 mg 3.75 mg 2.5 mg   Mon 3.75 mg 3.75 mg 3.75 mg   Tue 2.5 mg 2.5 mg 2.5 mg   Wed 3.75 mg 3.75 mg 3.75 mg   Thu 2.5 mg Hold (1/20) 1.25 mg (1/27)   Fri 3.75 mg 3.75 mg 3.75 mg   Sat 2.5 mg 2.5 mg 2.5 mg    Visit Report - - -   Some recent data might be hidden       Plan:  1. INR is Supratherapeutic today- see above in Anticoagulation Summary.   Will instruct Shani Phillip to Change their warfarin regimen- see above in Anticoagulation Summary.  2. Follow up in 1 weeks  3. They have been instructed to call if any changes in medications, doses, concerns, etc. Patient expresses understanding and has no further questions at this time.    Marleny Black, [facility]

## 2022-02-03 ENCOUNTER — ANTICOAGULATION VISIT (OUTPATIENT)
Dept: PHARMACY | Facility: HOSPITAL | Age: 72
End: 2022-02-03

## 2022-02-03 DIAGNOSIS — I48.0 PAF (PAROXYSMAL ATRIAL FIBRILLATION): Primary | ICD-10-CM

## 2022-02-03 LAB — INR PPP: 3.3

## 2022-02-03 NOTE — PROGRESS NOTES
Anticoagulation Clinic Progress Note    Anticoagulation Summary  As of 2/3/2022    INR goal:  2.0-3.0   TTR:  54.6 % (11.1 mo)   INR used for dosing:  3.30 (2/3/2022)   Warfarin maintenance plan:  3.75 mg every Mon, Wed; 2.5 mg all other days   Weekly warfarin total:  20 mg   Plan last modified:  Keshawn Castaneda, PharmD (2/3/2022)   Next INR check:  2/10/2022   Priority:  High   Target end date:      Indications    PAF (paroxysmal atrial fibrillation) (HCC) [I48.0]             Anticoagulation Episode Summary     INR check location:      Preferred lab:      Send INR reminders to:   [facility] HOME TEST POOL    Comments:   Home Testing as of 7/30/21 *call only when out of range*      Anticoagulation Care Providers     Provider Role Specialty Phone number    Zenia Tinajero MD Referring Cardiology [phone]          Clinic Interview:  Patient Findings     Negatives:  Signs/symptoms of thrombosis, Signs/symptoms of bleeding,   Laboratory test error suspected, Change in health, Change in alcohol use,   Change in activity, Upcoming invasive procedure, Emergency department   visit, Upcoming dental procedure, Missed doses, Extra doses, Change in   medications, Change in diet/appetite, Hospital admission, Bruising, Other   complaints      Clinical Outcomes     Negatives:  Major bleeding event, Thromboembolic event,   Anticoagulation-related hospital admission, Anticoagulation-related ED   visit, Anticoagulation-related fatality        INR History:  Anticoagulation Monitoring 1/20/2022 1/27/2022 2/3/2022   INR 3.80 3.20 3.30   INR Date 1/20/2022 1/27/2022 2/3/2022   INR Goal 2.0-3.0 2.0-3.0 2.0-3.0   Trend Same Down Down   Last Week Total 22.5 mg 20 mg 20 mg   Next Week Total 20 mg 20 mg 20 mg   Sun 3.75 mg 2.5 mg 2.5 mg   Mon 3.75 mg 3.75 mg 3.75 mg   Tue 2.5 mg 2.5 mg 2.5 mg   Wed 3.75 mg 3.75 mg 3.75 mg   Thu Hold (1/20) 1.25 mg (1/27) 2.5 mg   Fri 3.75 mg 3.75 mg 2.5 mg   Sat 2.5 mg 2.5 mg 2.5 mg   Visit Report -  - -   Some recent data might be hidden       Plan:  1. INR is Supratherapeutic today- see above in Anticoagulation Summary.   Will instruct Shani Phillip to Change their warfarin regimen- see above in Anticoagulation Summary.  2. Follow up in 1 week  3. They have been instructed to call if any changes in medications, doses, concerns, etc. Patient expresses understanding and has no further questions at this time.    Keshawn Castaneda, PharmD

## 2022-02-10 ENCOUNTER — ANTICOAGULATION VISIT (OUTPATIENT)
Dept: PHARMACY | Facility: HOSPITAL | Age: 72
End: 2022-02-10

## 2022-02-10 DIAGNOSIS — I48.0 PAF (PAROXYSMAL ATRIAL FIBRILLATION): Primary | ICD-10-CM

## 2022-02-10 LAB — INR PPP: 2.9

## 2022-02-10 NOTE — PROGRESS NOTES
Anticoagulation Clinic Progress Note    Anticoagulation Summary  As of 2/10/2022    INR goal:  2.0-3.0   TTR:  54.0 % (11.3 mo)   INR used for dosin.90 (2/10/2022)   Warfarin maintenance plan:  3.75 mg every Mon, Wed; 2.5 mg all other days   Weekly warfarin total:  20 mg   No change documented:  Keshawn Castaneda PharmD   Plan last modified:  Keshawn Castaneda PharmD (2/3/2022)   Next INR check:  2022   Priority:  High   Target end date:      Indications    PAF (paroxysmal atrial fibrillation) (HCC) [I48.0]             Anticoagulation Episode Summary     INR check location:      Preferred lab:      Send INR reminders to:   [facility] HOME TEST POOL    Comments:   Home Testing as of 21 *call only when out of range*      Anticoagulation Care Providers     Provider Role Specialty Phone number    Zenia Tinajero MD Referring Cardiology [phone]          Clinic Interview:  Patient Findings     Positives:  Other complaints    Negatives:  Signs/symptoms of thrombosis, Signs/symptoms of bleeding,   Laboratory test error suspected, Change in health, Change in alcohol use,   Change in activity, Upcoming invasive procedure, Emergency department   visit, Upcoming dental procedure, Missed doses, Extra doses, Change in   medications, Change in diet/appetite, Hospital admission, Bruising    Comments:  She cannot remember if she took 2.5 mg or 3.75 mg on Fri.      Clinical Outcomes     Negatives:  Major bleeding event, Thromboembolic event,   Anticoagulation-related hospital admission, Anticoagulation-related ED   visit, Anticoagulation-related fatality    Comments:  She cannot remember if she took 2.5 mg or 3.75 mg on Fri.        INR History:  Anticoagulation Monitoring 2022 2/3/2022 2/10/2022   INR 3.20 3.30 2.90   INR Date 2022 2/3/2022 2/10/2022   INR Goal 2.0-3.0 2.0-3.0 2.0-3.0   Trend Down Down Same   Last Week Total 20 mg 20 mg 20 mg   Next Week Total 20 mg 20 mg 20 mg   Sun 2.5 mg 2.5 mg 2.5 mg    Mon 3.75 mg 3.75 mg 3.75 mg   Tue 2.5 mg 2.5 mg 2.5 mg   Wed 3.75 mg 3.75 mg 3.75 mg   Thu 1.25 mg (1/27) 2.5 mg 2.5 mg   Fri 3.75 mg 2.5 mg 2.5 mg   Sat 2.5 mg 2.5 mg 2.5 mg   Visit Report - - -   Some recent data might be hidden       Plan:  1. INR is Therapeutic today- see above in Anticoagulation Summary.   Will instruct Shani Phillip to take warfarin 3.75 mg Mon/Wed, and 2.5 mg all other days - see above in Anticoagulation Summary.  2. Follow up in 1 week  3. They have been instructed to call if any changes in medications, doses, concerns, etc. Patient expresses understanding and has no further questions at this time.    Keshawn Castaneda, PharmD

## 2022-02-17 ENCOUNTER — ANTICOAGULATION VISIT (OUTPATIENT)
Dept: PHARMACY | Facility: HOSPITAL | Age: 72
End: 2022-02-17

## 2022-02-17 DIAGNOSIS — I48.0 PAF (PAROXYSMAL ATRIAL FIBRILLATION): Primary | ICD-10-CM

## 2022-02-17 LAB — INR PPP: 2.5

## 2022-02-17 NOTE — PROGRESS NOTES
Anticoagulation Clinic Progress Note    Anticoagulation Summary  As of 2022    INR goal:  2.0-3.0   TTR:  54.9 % (11.5 mo)   INR used for dosin.50 (2022)   Warfarin maintenance plan:  3.75 mg every Mon, Wed; 2.5 mg all other days   Weekly warfarin total:  20 mg   No change documented:  Marleny Black RPH   Plan last modified:  Keshawn Castaneda, PharmD (2/3/2022)   Next INR check:  2022   Priority:  High   Target end date:      Indications    PAF (paroxysmal atrial fibrillation) (HCC) [I48.0]             Anticoagulation Episode Summary     INR check location:      Preferred lab:      Send INR reminders to:   [facility] HOME TEST POOL    Comments:   Home Testing as of 21 *call only when out of range*      Anticoagulation Care Providers     Provider Role Specialty Phone number    Zenia Tinajero MD Referring Cardiology [phone]          Clinic Interview:  Patient Findings     Positives:  Missed doses    Negatives:  Signs/symptoms of thrombosis, Signs/symptoms of bleeding,   Laboratory test error suspected, Change in health, Change in alcohol use,   Change in activity, Upcoming invasive procedure, Emergency department   visit, Upcoming dental procedure, Extra doses, Change in medications,   Change in diet/appetite, Hospital admission, Bruising, Other complaints    Comments:  Missed dose on Friday       Clinical Outcomes     Negatives:  Major bleeding event, Thromboembolic event,   Anticoagulation-related hospital admission, Anticoagulation-related ED   visit, Anticoagulation-related fatality    Comments:  Missed dose on Friday         INR History:  Anticoagulation Monitoring 2/3/2022 2/10/2022 2022   INR 3.30 2.90 2.50   INR Date 2/3/2022 2/10/2022 2022   INR Goal 2.0-3.0 2.0-3.0 2.0-3.0   Trend Down Same Same   Last Week Total 20 mg 20 mg 17.5 mg   Next Week Total 20 mg 20 mg 20 mg   Sun 2.5 mg 2.5 mg 2.5 mg   Mon 3.75 mg 3.75 mg 3.75 mg   Tue 2.5 mg 2.5 mg 2.5 mg   Wed 3.75  mg 3.75 mg 3.75 mg   Thu 2.5 mg 2.5 mg 2.5 mg   Fri 2.5 mg 2.5 mg 2.5 mg   Sat 2.5 mg 2.5 mg 2.5 mg   Visit Report - - -   Some recent data might be hidden       Plan:  1. INR is Therapeutic today- see above in Anticoagulation Summary.   Will instruct Shani Phillip to Continue their warfarin regimen- see above in Anticoagulation Summary.  2. Follow up in 1 weeks  3.  They have been instructed to call if any changes in medications, doses, concerns, etc. Patient expresses understanding and has no further questions at this time.    Marleny Black, [facility]

## 2022-02-21 ENCOUNTER — TELEPHONE (OUTPATIENT)
Dept: FAMILY MEDICINE CLINIC | Facility: CLINIC | Age: 72
End: 2022-02-21

## 2022-02-21 DIAGNOSIS — Z12.11 SCREENING FOR COLON CANCER: Primary | ICD-10-CM

## 2022-02-21 NOTE — TELEPHONE ENCOUNTER
Caller: Shani Phillip    Relationship to patient: Self    Best call back number: [phone]     Patient is needing: PATIENT IS CALLING TO REQUEST A REFERRAL FOR A COLOGUARD TEST.      PLEASE ADVISE.

## 2022-02-24 ENCOUNTER — OFFICE VISIT (OUTPATIENT)
Dept: CARDIOLOGY | Facility: CLINIC | Age: 72
End: 2022-02-24

## 2022-02-24 VITALS
DIASTOLIC BLOOD PRESSURE: 76 MMHG | HEIGHT: 61 IN | WEIGHT: 159 LBS | HEART RATE: 135 BPM | BODY MASS INDEX: 30.02 KG/M2 | SYSTOLIC BLOOD PRESSURE: 130 MMHG

## 2022-02-24 DIAGNOSIS — I48.0 PAF (PAROXYSMAL ATRIAL FIBRILLATION): Primary | ICD-10-CM

## 2022-02-24 DIAGNOSIS — Z79.01 CHRONIC ANTICOAGULATION: ICD-10-CM

## 2022-02-24 LAB — INR PPP: 2.2

## 2022-02-24 PROCEDURE — 99214 OFFICE O/P EST MOD 30 MIN: CPT | Performed by: NURSE PRACTITIONER

## 2022-02-24 PROCEDURE — 93000 ELECTROCARDIOGRAM COMPLETE: CPT | Performed by: NURSE PRACTITIONER

## 2022-02-24 NOTE — PROGRESS NOTES
Date of Office Visit: 2022  Encounter Provider: Billie Kraus, R[place], APRN  Place of Service: [facility] CARDIOLOGY  Patient Name: Shani Phillip  :1950        Subjective:     Chief Complaint:  Atrial fibrillation, chronic anticoagulation, hx mitral valve endocarditis       History of Present Illness:  Shani Phillip is a 71 y.o. female patient of Dr. Tinajero.  I am seeing this patient in the office today and I reviewed her records.    Patient has a history of atrial fibrillation, mitral valve endocarditis, diastolic CHF, COPD, overactive bladder.     PER PREVIOUS OFFICE NOTE:  This is a lady who was on a cruise ship when she developed a febrile illness.  She was taken to [facility] where she was diagnosed with endocarditis.  She left the hospital to return home to [place] I first saw her in 2019.  She has a history of COPD on oxygen, former smoker, restless leg syndrome and chronic pain.  She had a transthoracic echocardiogram suggestive of vegetation on the mitral valve annulus.  She had a transesophageal echo which delineated this more clearly.  Dr. Briones saw her in consultation.  The plan was to treat her with IV antibiotics which occurred.  She came in for a repeat transesophageal echo on 2020 and this was pretty much unchanged.  The mobile element attached to the posterior mitral valve annulus annulus was unchanged. THE FOLLOWING IS MY CONTRIBUTION TO OFFICE NOTE: Patient had REDD 2020 showing normal LV systolic function with EF 56-60%, mildly dilated RV cavity with normal systolic function, no evidence of left atrial appendage thrombus, dilated right atrial cavity, aortic valve sclerosis, moderate MAC with mild mitral regurgitation and stenosis, mild tricuspid regurgitation with normal RVSP. Mobile element attached to posterior mitral valve annulus was felt to be unchanged from 2019 echocardiogram. Patient was seen in  office by Dr. Tinajero 1/17/2020 at which point she exhibited lower extremity edema and Lasix was increased to 40mg BID.    Furosemide was later changed to torsemide.  She was instructed to follow-up with Dr. Briones regarding recent REDD results.  She had bilateral normal arterial Doppler 8/2020.  Patient had a repeat echo 9/2020 through cardiothoracic surgery showing normal LV systolic function with EF of 64%, grade 1A diastolic dysfunction, mild to moderately dilated left atrial cavity, moderate mitral annular calcification with bileaflet mitral valve thickening and mild mitral regurgitation with mild mitral stenosis and mobile vegetation of the left atrial surface of the posterior mitral valve leaflet.  She was last seen in the office 2/2021 by Dr. Tinajero at which point she was having issues with Eliquis coverage and was changed to warfarin.  Repeat echo 6/2021 showed normal LV systolic function with grade 2 diastolic dysfunction, mild left atrial enlargement, calcification of the mitral valve leaflets with mild mitral stenosis.  She was seen in the office 8/2021 by Dr. Tinajero which point Lasix was increased back to 40 mg a day due to edema.      Patient presents to office today for follow-up appointment.  Patient reports she is doing okay overall since last visit.  She continues to deal with chronic COPD.  She has chronic dyspnea which improves with neb treatments.  She reports she is now on 3 L of nasal cannula oxygen.  She has an appointment with pulmonary next month.  She is not sure of the name of her pulmonologist.  She denies any chest pain or discomfort, palpitations, racing heartbeat sensation, syncope, near syncope, falls, or abnormal bleeding.  She continues to have chronic bilateral lower extremity edema which has been somewhat worse in the last couple of weeks.  She has compression socks but has not been using them.  Denies orthopnea.           Past Medical History:   Diagnosis Date   • Acute endocarditis    •  Atrial fibrillation with RVR ([facility]) 11/14/2019   • CKD (chronic kidney disease) stage 3, GFR 30-59 ml/min ([facility])    • COPD (chronic obstructive pulmonary disease) ([facility])    • Influenza 11/14/2019   • Osteoporosis    • Renal disorder    • Restless leg syndrome    • Thrush, oral 11/14/2019     Past Surgical History:   Procedure Laterality Date   • CARDIAC CATHETERIZATION N/A 1/23/2020    Procedure: Coronary angiography;  Surgeon: Svetlana Grayson MD;  Location:  [facility] CATH INVASIVE [facility];  Service: Cardiovascular   • CARDIAC CATHETERIZATION N/A 1/23/2020    Procedure: Left ventriculography;  Surgeon: Svetlana Grayson MD;  Location:  [facility];  Service: Cardiovascular   • CARDIAC CATHETERIZATION N/A 1/23/2020    Procedure: Left Heart Cath;  Surgeon: Svetlana Grayson MD;  Location:  [facility] CATH [facility]VASIVE [facility]ATION;  Service: Cardiovascular   • CHOLECYSTECTOMY     • KNEE ARTHROPLASTY Right    • LAPAROSCOPIC GASTRIC BANDING       Outpatient Medications Prior to Visit   Medication Sig Dispense Refill   • budesonide (PULMICORT) 0.5 MG/2ML nebulizer solution Take 0.5 mg by nebulization Daily.     • buPROPion XL (WELLBUTRIN XL) 300 MG 24 hr tablet Take 1 tablet by mouth Daily. 30 tablet 5   • cholecalciferol (VITAMIN D3) 25 MCG (1000 UT) tablet Take 2,000 Units by mouth Daily.     • cyclobenzaprine (FLEXERIL) 10 MG tablet Take 1 tablet by mouth 3 (Three) Times a Day As Needed (back pain). 40 tablet 1   • famotidine (PEPCID) 20 MG tablet Take 1 tablet by mouth 2 (Two) Times a Day Before Meals. 180 tablet 3   • furosemide (LASIX) 40 MG tablet Take 1 tablet by mouth Daily. 90 tablet 3   • HYDROcodone-acetaminophen (NORCO) 7.5-325 MG per tablet Take 1 tablet by mouth Every 6 (Six) Hours As Needed for Moderate Pain . 20 tablet 0   • O2 (OXYGEN) Inhale 2 L/min 1 (One) Time.     • potassium chloride (K-TAB) 20 MEQ tablet controlled-release ER tablet Take 1 tablet by mouth Daily. 90 tablet 3   • rOPINIRole (REQUIP) 2 MG  "tablet Take 2 mg by mouth 2 (Two) Times a Day.     • TROSPIUM CHLORIDE PO Take 20 mg by mouth 2 (Two) Times a Day.     • Ventolin  (90 Base) MCG/ACT inhaler Inhale 2 puffs 4 (Four) Times a Day.     • warfarin (COUMADIN) 2.5 MG tablet Take one tablet by mouth daily or as directed. 90 tablet 1     No facility-administered medications prior to visit.       Allergies as of 02/24/2022 - Reviewed 02/24/2022   Allergen Reaction Noted   • Penicillins Rash 02/26/2013     Social History     Socioeconomic History   • Marital status:    Tobacco Use   • Smoking status: Former Smoker     Packs/day: 0.50     Types: Cigarettes     Start date: 10/3/2019   • Smokeless tobacco: Former User   • Tobacco comment: LAST CIG NOV 02, 2019   Substance and Sexual Activity   • Alcohol use: No     Comment: caffeine - 1/2 cup coffee daily    • Drug use: No   • Sexual activity: Defer     Family History   Problem Relation Age of Onset   • Lung cancer Mother          Review of Systems   Constitutional: Positive for malaise/fatigue (Chronic).   Cardiovascular:        SEE HPI   Respiratory: Positive for shortness of breath (Chronic).    Hematologic/Lymphatic: Negative for bleeding problem.   Musculoskeletal: Negative for falls.   Gastrointestinal: Negative for melena.   Genitourinary: Negative for hematuria.   Psychiatric/Behavioral: Negative for altered mental status.            Objective:     Vitals:    02/24/22 1304   BP: 130/76   BP Location: Left arm   Patient Position: Sitting   Pulse: (!) 135   Weight: 72.1 kg (159 lb)   Height: 154.9 cm (61\")     Body mass index is 30.04 kg/m².      PHYSICAL EXAM:  Constitutional:       General: Not in acute distress.     Appearance: Well-developed. Not diaphoretic.   Eyes:      Pupils: Pupils are equal, round, and reactive to light.   HENT:      Head: Normocephalic and atraumatic.   Neck:      Comments: NC O2 in place  Pulmonary:      Effort: Pulmonary effort is normal. No respiratory distress. "      Breath sounds: Normal breath sounds. No wheezing. No rales.   Cardiovascular:      Tachycardia present. Irregularly irregular rhythm.      Murmurs: There is no murmur.      No gallop. No click. No rub.   Edema:     Peripheral edema (Trace, bilateral ankles) present.  Abdominal:      General: Bowel sounds are normal. There is no distension.      Palpations: Abdomen is soft.   Musculoskeletal:         General: No tenderness or deformity.      Cervical back: Neck supple. Skin:     General: Skin is warm and dry.      Findings: No erythema or rash.   Neurological:      Mental Status: Alert and oriented to person, place, and time.   Psychiatric:         Behavior: Behavior normal.         Judgment: Judgment normal.             ECG 12 Lead    Date/Time: 2/24/2022 1:31 PM  Performed by: Billie Kraus DNP, APRN  Authorized by: Billie Kraus DNP, APRN   Comparison: compared with previous ECG from 8/23/2021  Rhythm: atrial fibrillation  Ectopy comments: PVC  Rate: tachycardic  BPM: 135  Conduction: incomplete right bundle branch block    Clinical impression: abnormal EKG  Comments: Now in rapid atrial fibrillation on EKG              Assessment:       Diagnosis Plan   1. PAF (paroxysmal atrial fibrillation) (formerly [facility])     2. Chronic anticoagulation           Plan:     1. Paroxysmal atrial fibrillation: With history of cardioversion in 2019.  Could not afford Eliquis.  Anticoagulated with warfarin.  Last INRs have been therapeutic.  Patient is in atrial fibrillation with rapid rates in the office today, completely unaware and overall asymptomatic.  Does not appear in overt heart failure.  /76.  Would like to avoid beta-blocker given lung disease if possible.  Will start short acting diltiazem and increase as need for better rate control, as BP tolerates.  Will have her come back on Monday for follow-up BP/ EKG check.  ER if she becomes symptomatic over the weekend.     2. Mitral valve endocarditis: Status post  antibiotics.  Repeat echo felt to be stable 6/2021.   3. Mitral stenosis: mild on 6/2021 echo  4. Diastolic dysfunction  5. Chronic bilateral lower extremity edema: on lasix.  Recommend resuming use of compression socks. Recheck BMP.  Ok to take an extra half of a lasix pill in the PM for the next 2-3 days if swelling fails to improve with compression socks but watch for lightheadedness.  Call if symptoms worsen or fail to improve.  6. Normal bilateral lower extremity arterial ultrasounds 8/2020  7. COPD on home oxygen: has follow-up with [facility] this month.  She is not sure of name of cardiologist.   8. Chronic renal insufficiency    Plan of care reviewed with Dr. Lior MD.    Patient to follow-up on Monday or sooner if needed for any new, recurrent, or worsening symptoms or concerns.  Discussed in detail signs/symptoms that warrant sooner call or follow-up to the office or ER visit.             Your medication list          Accurate as of February 24, 2022  1:54 PM. If you have any questions, ask your nurse or doctor.            CONTINUE taking these medications      Instructions Last Dose Given Next Dose Due   budesonide 0.5 MG/2ML nebulizer solution  Commonly known as: PULMICORT      Take 0.5 mg by nebulization Daily.       buPROPion  MG 24 hr tablet  Commonly known as: WELLBUTRIN XL      Take 1 tablet by mouth Daily.       cholecalciferol 25 MCG (1000 UT) tablet  Commonly known as: VITAMIN D3      Take 2,000 Units by mouth Daily.       cyclobenzaprine 10 MG tablet  Commonly known as: FLEXERIL      Take 1 tablet by mouth 3 (Three) Times a Day As Needed (back pain).       famotidine 20 MG tablet  Commonly known as: PEPCID      Take 1 tablet by mouth 2 (Two) Times a Day Before Meals.       furosemide 40 MG tablet  Commonly known as: LASIX      Take 1 tablet by mouth Daily.       HYDROcodone-acetaminophen 7.5-325 MG per tablet  Commonly known as: NORCO      Take 1 tablet by mouth Every 6 (Six)  Hours As Needed for Moderate Pain .       O2  Commonly known as: OXYGEN      Inhale 2 L/min 1 (One) Time.       potassium chloride ER 20 MEQ tablet controlled-release ER tablet  Commonly known as: K-TAB      Take 1 tablet by mouth Daily.       rOPINIRole 2 MG tablet  Commonly known as: REQUIP      Take 2 mg by mouth 2 (Two) Times a Day.       TROSPIUM CHLORIDE PO      Take 20 mg by mouth 2 (Two) Times a Day.       Ventolin  (90 Base) MCG/ACT inhaler  Generic drug: albuterol sulfate HFA      Inhale 2 puffs 4 (Four) Times a Day.       warfarin 2.5 MG tablet  Commonly known as: COUMADIN      Take one tablet by mouth daily or as directed.              The above medication changes may not have been made by this provider.  Patient's medication list was updated to reflect medications they are currently taking including medication changes made by other providers.            Thanks,    Billie Kraus, RICO, APRN  02/24/2022         Dictated utilizing Dragon dictation

## 2022-02-25 ENCOUNTER — ANTICOAGULATION VISIT (OUTPATIENT)
Dept: PHARMACY | Facility: HOSPITAL | Age: 72
End: 2022-02-25

## 2022-02-25 DIAGNOSIS — I48.0 PAF (PAROXYSMAL ATRIAL FIBRILLATION): Primary | ICD-10-CM

## 2022-02-25 PROCEDURE — G0249 PROVIDE INR TEST MATER/EQUIP: HCPCS

## 2022-02-25 NOTE — PROGRESS NOTES
Anticoagulation Clinic Progress Note    Anticoagulation Summary  As of 2022    INR goal:  2.0-3.0   TTR:  55.8 % (11.8 mo)   INR used for dosin.20 (2022)   Warfarin maintenance plan:  3.75 mg every Mon, Wed; 2.5 mg all other days   Weekly warfarin total:  20 mg   No change documented:  Risa Adams [facility]   Plan last modified:  Keshawn Castaneda, PharmD (2/3/2022)   Next INR check:  3/4/2022   Priority:  High   Target end date:      Indications    PAF (paroxysmal atrial fibrillation) (HCC) [I48.0]             Anticoagulation Episode Summary     INR check location:      Preferred lab:      Send INR reminders to:  [facility] HOME TEST POOL    Comments:   Home Testing as of 21 *call only when out of range*      Anticoagulation Care Providers     Provider Role Specialty Phone number    Zenia Tinajero MD Referring Cardiology [phone]          Clinic Interview:  No pertinent clinical findings have been reported.    INR History:  Anticoagulation Monitoring 2/10/2022 2022 2022   INR 2.90 2.50 2.20   INR Date 2/10/2022 2022 2022   INR Goal 2.0-3.0 2.0-3.0 2.0-3.0   Trend Same Same Same   Last Week Total 20 mg 17.5 mg 20 mg   Next Week Total 20 mg 20 mg 20 mg   Sun 2.5 mg 2.5 mg 2.5 mg   Mon 3.75 mg 3.75 mg 3.75 mg   Tue 2.5 mg 2.5 mg 2.5 mg   Wed 3.75 mg 3.75 mg 3.75 mg   Thu 2.5 mg 2.5 mg 2.5 mg   Fri 2.5 mg 2.5 mg 2.5 mg   Sat 2.5 mg 2.5 mg 2.5 mg   Visit Report - - -   Some recent data might be hidden       Plan:  1. INR is therapeutic today- see above in Anticoagulation Summary.    Shani Phillip to continue their warfarin regimen- see above in Anticoagulation Summary.  2. Follow up in 1 week  3. Pt has agreed to only be called if INR out of range. They have been instructed to call if any changes in medications, doses, concerns, etc.     Risa Adams [facility]

## 2022-02-28 ENCOUNTER — HOSPITAL ENCOUNTER (OUTPATIENT)
Dept: CARDIOLOGY | Facility: HOSPITAL | Age: 72
Discharge: HOME | End: 2022-02-28
Admitting: NURSE PRACTITIONER

## 2022-02-28 ENCOUNTER — OFFICE VISIT (OUTPATIENT)
Dept: CARDIOLOGY | Facility: CLINIC | Age: 72
End: 2022-02-28

## 2022-02-28 VITALS
HEIGHT: 61 IN | SYSTOLIC BLOOD PRESSURE: 110 MMHG | DIASTOLIC BLOOD PRESSURE: 78 MMHG | WEIGHT: 159 LBS | BODY MASS INDEX: 30.02 KG/M2 | HEART RATE: 128 BPM

## 2022-02-28 DIAGNOSIS — I34.2 NONRHEUMATIC MITRAL VALVE STENOSIS: ICD-10-CM

## 2022-02-28 DIAGNOSIS — I48.91 ATRIAL FIBRILLATION WITH RVR: Primary | ICD-10-CM

## 2022-02-28 LAB
ANION GAP SERPL CALCULATED.3IONS-SCNC: 12.5 MMOL/L (ref 5–15)
BUN SERPL-MCNC: 19 MG/DL (ref 8–23)
BUN/CREAT SERPL: 17.9 (ref 7–25)
CALCIUM SPEC-SCNC: 9.6 MG/DL (ref 8.6–10.5)
CHLORIDE SERPL-SCNC: 97 MMOL/L (ref 98–107)
CO2 SERPL-SCNC: 29.5 MMOL/L (ref 22–29)
CREAT SERPL-MCNC: 1.06 MG/DL (ref 0.57–1)
EGFRCR SERPLBLD CKD-EPI 2021: 55.9 ML/MIN/1.73
GLUCOSE SERPL-MCNC: 107 MG/DL (ref 65–99)
POTASSIUM SERPL-SCNC: 3.8 MMOL/L (ref 3.5–5.2)
SODIUM SERPL-SCNC: 139 MMOL/L (ref 136–145)

## 2022-02-28 PROCEDURE — 99214 OFFICE O/P EST MOD 30 MIN: CPT | Performed by: NURSE PRACTITIONER

## 2022-02-28 PROCEDURE — 36415 COLL VENOUS BLD VENIPUNCTURE: CPT

## 2022-02-28 PROCEDURE — 93000 ELECTROCARDIOGRAM COMPLETE: CPT | Performed by: NURSE PRACTITIONER

## 2022-02-28 PROCEDURE — 80048 BASIC METABOLIC PNL TOTAL CA: CPT | Performed by: NURSE PRACTITIONER

## 2022-02-28 RX ORDER — DILTIAZEM HYDROCHLORIDE 60 MG/1
60 TABLET, FILM COATED ORAL 3 TIMES DAILY
Qty: 90 TABLET | Refills: 0 | Status: SHIPPED | OUTPATIENT
Start: 2022-02-28 | End: 2022-03-03 | Stop reason: SDUPTHER

## 2022-02-28 NOTE — PROGRESS NOTES
Date of Office Visit: 2022  Encounter Provider: Billie Kraus, R[place], APRN  Place of Service: [facility] CARDIOLOGY  Patient Name: Shani Phillip  :1950        Subjective:     Chief Complaint:  Atrial fibrillation with RVR, chronic anticoagulation      History of Present Illness:  Shani Phillip is a 71 y.o. female patient of Dr. Tinajero.  I am seeing this patient in the office today and I have reviewed her records    Patient has a history of atrial fibrillation, mitral valve endocarditis, diastolic CHF, COPD, overactive bladder.     PER PREVIOUS OFFICE NOTE:  This is a lady who was on a cruise ship when she developed a febrile illness.  She was taken to [facility] where she was diagnosed with endocarditis.  She left the hospital to return home to [place] I first saw her in 2019.  She has a history of COPD on oxygen, former smoker, restless leg syndrome and chronic pain.  She had a transthoracic echocardiogram suggestive of vegetation on the mitral valve annulus.  She had a transesophageal echo which delineated this more clearly.  Dr. Briones saw her in consultation.  The plan was to treat her with IV antibiotics which occurred.  She came in for a repeat transesophageal echo on 2020 and this was pretty much unchanged.  The mobile element attached to the posterior mitral valve annulus annulus was unchanged. THE FOLLOWING IS MY CONTRIBUTION TO OFFICE NOTE: Patient had REDD 2020 showing normal LV systolic function with EF 56-60%, mildly dilated RV cavity with normal systolic function, no evidence of left atrial appendage thrombus, dilated right atrial cavity, aortic valve sclerosis, moderate MAC with mild mitral regurgitation and stenosis, mild tricuspid regurgitation with normal RVSP. Mobile element attached to posterior mitral valve annulus was felt to be unchanged from 2019 echocardiogram. Patient was seen in office by Dr. Tinajero  1/17/2020 at which point she exhibited lower extremity edema and Lasix was increased to 40mg BID.    Furosemide was later changed to torsemide.  She was instructed to follow-up with Dr. Briones regarding recent REDD results.  She had bilateral normal arterial Doppler 8/2020.  Patient had a repeat echo 9/2020 through cardiothoracic surgery showing normal LV systolic function with EF of 64%, grade 1A diastolic dysfunction, mild to moderately dilated left atrial cavity, moderate mitral annular calcification with bileaflet mitral valve thickening and mild mitral regurgitation with mild mitral stenosis and mobile vegetation of the left atrial surface of the posterior mitral valve leaflet.  She was last seen in the office 2/2021 by Dr. Tinajero at which point she was having issues with Eliquis coverage and was changed to warfarin.  Repeat echo 6/2021 showed normal LV systolic function with grade 2 diastolic dysfunction, mild left atrial enlargement, calcification of the mitral valve leaflets with mild mitral stenosis.  She was seen in the office 8/2021 by Dr. Tinajero which point Lasix was increased back to 40 mg a day due to edema.    When patient was seen in the office 2/24/2022 she was noted to be in atrial fibrillation with rapid rates of unknown duration.  She reported she could not tell she was in atrial fibrillation.  She strongly felt she could be treated as an outpatient.  She was started on short acting diltiazem with plans to increase further.  Beta-blocker was avoided due to chronic lung issues.  She remained on anticoagulation with warfarin.        Patient presents to office today for follow-up appointment.  Significant other with patient in the office today, per patient preference.  Patient reports she is doing well since last visit.  Heart rate at home ranging , however most readings below 120.  BP mostly 120s-140s systolic.  She continues to get some chronic fatigue with activities.  She has chronic shortness  of breath which she feels is at baseline (COPD, follows with pulmonology).  She has chronic lower extremity edema bilaterally which appears to have improved with compression socks.  She denies chest pain, palpitations, syncope, near syncope, or abnormal bleeding.  Appears euvolemic on exam.          Past Medical History:   Diagnosis Date   • Acute endocarditis    • Atrial fibrillation with RVR ([facility]) 11/14/2019   • CKD (chronic kidney disease) stage 3, GFR 30-59 ml/min ([facility])    • COPD (chronic obstructive pulmonary disease) ([facility])    • Influenza 11/14/2019   • Osteoporosis    • Renal disorder    • Restless leg syndrome    • Thrush, oral 11/14/2019     Past Surgical History:   Procedure Laterality Date   • CARDIAC CATHETERIZATION N/A 1/23/2020    Procedure: Coronary angiography;  Surgeon: Svetlana Grayson MD;  Location:  [facility];  Service: Cardiovascular   • CARDIAC CATHETERIZATION N/A 1/23/2020    Procedure: Left ventriculography;  Surgeon: Svetlana Grayson MD;  Location:  [facility];  Service: Cardiovascular   • CARDIAC CATHETERIZATION N/A 1/23/2020    Procedure: Left Heart Cath;  Surgeon: Svetlana Grayson MD;  Location:  [facility];  Service: Cardiovascular   • CHOLECYSTECTOMY     • KNEE ARTHROPLASTY Right    • LAPAROSCOPIC GASTRIC BANDING       Outpatient Medications Prior to Visit   Medication Sig Dispense Refill   • budesonide (PULMICORT) 0.5 MG/2ML nebulizer solution Take 0.5 mg by nebulization Daily.     • buPROPion XL (WELLBUTRIN XL) 300 MG 24 hr tablet Take 1 tablet by mouth Daily. 30 tablet 5   • cholecalciferol (VITAMIN D3) 25 MCG (1000 UT) tablet Take 2,000 Units by mouth Daily.     • cyclobenzaprine (FLEXERIL) 10 MG tablet Take 1 tablet by mouth 3 (Three) Times a Day As Needed (back pain). 40 tablet 1   • famotidine (PEPCID) 20 MG tablet Take 1 tablet by mouth 2 (Two) Times a Day Before Meals. 180 tablet 3   • furosemide (LASIX) 40 MG tablet Take 1 tablet  by mouth Daily. 90 tablet 3   • HYDROcodone-acetaminophen (NORCO) 7.5-325 MG per tablet Take 1 tablet by mouth Every 6 (Six) Hours As Needed for Moderate Pain . 20 tablet 0   • O2 (OXYGEN) Inhale 2 L/min 1 (One) Time.     • potassium chloride (K-TAB) 20 MEQ tablet controlled-release ER tablet Take 1 tablet by mouth Daily. 90 tablet 3   • rOPINIRole (REQUIP) 2 MG tablet Take 2 mg by mouth 2 (Two) Times a Day.     • TROSPIUM CHLORIDE PO Take 20 mg by mouth 2 (Two) Times a Day.     • Ventolin  (90 Base) MCG/ACT inhaler Inhale 2 puffs 4 (Four) Times a Day.     • warfarin (COUMADIN) 2.5 MG tablet Take one tablet by mouth daily or as directed. 90 tablet 1   • dilTIAZem (Cardizem) 30 MG tablet Take 1 tablet by mouth 3 (Three) Times a Day. 90 tablet 0     No facility-administered medications prior to visit.       Allergies as of 02/28/2022 - Reviewed 02/28/2022   Allergen Reaction Noted   • Penicillins Rash 02/26/2013     Social History     Socioeconomic History   • Marital status:    Tobacco Use   • Smoking status: Former Smoker     Packs/day: 0.50     Types: Cigarettes     Start date: 10/3/2019   • Smokeless tobacco: Former User   • Tobacco comment: LAST CIG NOV 02, 2019   Substance and Sexual Activity   • Alcohol use: No     Comment: caffeine - 1/2 cup coffee daily    • Drug use: No   • Sexual activity: Defer     Family History   Problem Relation Age of Onset   • Lung cancer Mother        Review of Systems   Constitutional: Positive for malaise/fatigue (Chronic).   Cardiovascular:        SEE HPI   Respiratory: Positive for shortness of breath (Chronic, at baseline (COPD, on home O2)).    Hematologic/Lymphatic: Negative for bleeding problem.   Musculoskeletal: Negative for falls.   Gastrointestinal: Negative for melena.   Genitourinary: Negative for hematuria.   Psychiatric/Behavioral: Negative for altered mental status.          Objective:     Vitals:    02/28/22 1043   BP: 110/78   BP Location: Left arm  "  Patient Position: Sitting   Pulse: (!) 128   Weight: 72.1 kg (159 lb)   Height: 154.9 cm (61\")     Body mass index is 30.04 kg/m².      PHYSICAL EXAM:  Constitutional:       General: Not in acute distress.     Appearance: Well-developed. Not diaphoretic.   Eyes:      Pupils: Pupils are equal, round, and reactive to light.   HENT:      Head: Normocephalic and atraumatic.      Comments: NC O2 in place  Neck:      Vascular: No JVD.   Pulmonary:      Effort: Pulmonary effort is normal. No respiratory distress.      Breath sounds: Normal breath sounds. No wheezing. No rales.   Cardiovascular:      Tachycardia present. Irregularly irregular rhythm.      Murmurs: There is no murmur.      No gallop. No click. No rub.   Edema:     Peripheral edema absent.   Abdominal:      General: Bowel sounds are normal. There is no distension.      Palpations: Abdomen is soft.   Musculoskeletal:         General: No tenderness or deformity.      Cervical back: Neck supple. Skin:     General: Skin is warm and dry.      Findings: No erythema or rash.   Neurological:      Mental Status: Alert and oriented to person, place, and time.   Psychiatric:         Behavior: Behavior normal.         Judgment: Judgment normal.             ECG 12 Lead    Date/Time: 2/28/2022 12:28 PM  Performed by: Billie Kraus DNP, APRN  Authorized by: Billie Kraus DNP, GENET   Comparison: compared with previous ECG from 2/24/2022  Rhythm: atrial fibrillation  BPM: 126  Conduction: incomplete right bundle branch block  Comments: No significant changes from previous EKG              Assessment:       Diagnosis Plan   1. Atrial fibrillation with RVR ([facility])     2. Nonrheumatic mitral valve stenosis           Plan:     1. Atrial fibrillation with RVR: Patient has known history of paroxysmal atrial fibrillation.  Anticoagulated with warfarin.  Had cardioversion in 2019 and seemed to be maintaining sinus rhythm thereafter.  Noted to be back in atrial fibrillation " with rapid rates of unknown duration last visit.  Started on diltiazem.  Remains in atrial fibrillation with rapid rates in the office today. HR has had improvement at home but still above goal.  Patient case and EKG reviewed with Dr. Lior MD.  Patient denies any significant symptoms, reports she really can not tell she is in a fib.  Heart rate with some improvement at home though still above goal.  Will increase diltiazem to 60 mg 3 times a day.  Watch for low blood pressure readings.  Will check BMP level as well today.  Could consider digoxin if kidney function okay if rate remains elevated despite increase diltiazem dose or if BP does not tolerate.  She will call with some updated heart rate and blood pressure readings in a couple of days and we will look at rechecking EKG later this week or early next week.  Patient to call sooner for any symptoms or concerns.    2. Chronic anticoagulation: With warfarin.  Follows with anticoagulation clinic.  Denies bleeding issues.  Weekly INRs have been therapeutic since 12/9/2021.  3. Mitral stenosis: Mild on 6/2021 echo  4. History of mitral valve endocarditis: Status post antibiotics.  Repeat echo 6/2021 was felt to be stable  5. Diastolic dysfunction  6. Chronic bilateral lower extremity edema  7. Normal bilateral lower extremity arterial ultrasound 8/2021  8. COPD on home oxygen: Follows with local pulmonary  9. Chronic renal insufficiency    EKG and plan of care reviewed with Dr. Lior MD.  Patient will call us with updated heart rate and blood pressure readings in a couple of days checking EKG later this week early next week.  She will call sooner if she develops symptoms or concerns.  ER for any significant or unrelieved symptoms.             Your medication list          Accurate as of February 28, 2022 11:16 AM. If you have any questions, ask your nurse or doctor.            CHANGE how you take these medications      Instructions Last Dose Given Next Dose Due    dilTIAZem 60 MG tablet  Commonly known as: Cardizem  What changed:   · medication strength  · how much to take  Changed by: Billie Kraus, DNP, APRN      Take 1 tablet by mouth 3 (Three) Times a Day.          CONTINUE taking these medications      Instructions Last Dose Given Next Dose Due   budesonide 0.5 MG/2ML nebulizer solution  Commonly known as: PULMICORT      Take 0.5 mg by nebulization Daily.       buPROPion  MG 24 hr tablet  Commonly known as: WELLBUTRIN XL      Take 1 tablet by mouth Daily.       cholecalciferol 25 MCG (1000 UT) tablet  Commonly known as: VITAMIN D3      Take 2,000 Units by mouth Daily.       cyclobenzaprine 10 MG tablet  Commonly known as: FLEXERIL      Take 1 tablet by mouth 3 (Three) Times a Day As Needed (back pain).       famotidine 20 MG tablet  Commonly known as: PEPCID      Take 1 tablet by mouth 2 (Two) Times a Day Before Meals.       furosemide 40 MG tablet  Commonly known as: LASIX      Take 1 tablet by mouth Daily.       HYDROcodone-acetaminophen 7.5-325 MG per tablet  Commonly known as: NORCO      Take 1 tablet by mouth Every 6 (Six) Hours As Needed for Moderate Pain .       O2  Commonly known as: OXYGEN      Inhale 2 L/min 1 (One) Time.       potassium chloride ER 20 MEQ tablet controlled-release ER tablet  Commonly known as: K-TAB      Take 1 tablet by mouth Daily.       rOPINIRole 2 MG tablet  Commonly known as: REQUIP      Take 2 mg by mouth 2 (Two) Times a Day.       TROSPIUM CHLORIDE PO      Take 20 mg by mouth 2 (Two) Times a Day.       Ventolin  (90 Base) MCG/ACT inhaler  Generic drug: albuterol sulfate HFA      Inhale 2 puffs 4 (Four) Times a Day.       warfarin 2.5 MG tablet  Commonly known as: COUMADIN      Take one tablet by mouth daily or as directed.             Where to Get Your Medications      These medications were sent to [facility]/pharmacy #[id number] - [place], [place] - [address] - [phone]  - [phone]   [address],  [facility] [address]    Phone: [phone]   · dilTIAZem 60 MG tablet         The above medication changes may not have been made by this provider.  Patient's medication list was updated to reflect medications they are currently taking including medication changes made by other providers.            Thanks,    Billie Kraus, DNP, APRN  02/28/2022         Dictated utilizing Dragon dictation

## 2022-03-03 ENCOUNTER — ANTICOAGULATION VISIT (OUTPATIENT)
Dept: PHARMACY | Facility: HOSPITAL | Age: 72
End: 2022-03-03

## 2022-03-03 ENCOUNTER — TELEPHONE (OUTPATIENT)
Dept: CARDIOLOGY | Facility: CLINIC | Age: 72
End: 2022-03-03

## 2022-03-03 DIAGNOSIS — I48.0 PAF (PAROXYSMAL ATRIAL FIBRILLATION): Primary | ICD-10-CM

## 2022-03-03 LAB — INR PPP: 2

## 2022-03-03 RX ORDER — DILTIAZEM HCL 90 MG
90 TABLET ORAL 3 TIMES DAILY
Qty: 90 TABLET | Refills: 0 | Status: SHIPPED | OUTPATIENT
Start: 2022-03-03 | End: 2022-03-07

## 2022-03-03 NOTE — PROGRESS NOTES
Anticoagulation Clinic Progress Note    Anticoagulation Summary  As of 3/3/2022    INR goal:  2.0-3.0   TTR:  56.6 % (1 y)   INR used for dosin.00 (3/3/2022)   Warfarin maintenance plan:  3.75 mg every Mon, Wed; 2.5 mg all other days   Weekly warfarin total:  20 mg   No change documented:  Marleny Black RPH   Plan last modified:  Keshawn Castaneda, PharmD (2/3/2022)   Next INR check:  3/10/2022   Priority:  High   Target end date:      Indications    PAF (paroxysmal atrial fibrillation) (HCC) [I48.0]             Anticoagulation Episode Summary     INR check location:      Preferred lab:      Send INR reminders to:  [facility] [employer] HOME TEST POOL    Comments:   Home Testing as of 21 *call only when out of range*      Anticoagulation Care Providers     Provider Role Specialty Phone number    Zenia Tinajero MD Referring Cardiology [phone]          Clinic Interview:  No pertinent clinical findings have been reported.    INR History:  Anticoagulation Monitoring 2022 2022 3/3/2022   INR 2.50 2.20 2.00   INR Date 2022 2022 3/3/2022   INR Goal 2.0-3.0 2.0-3.0 2.0-3.0   Trend Same Same Same   Last Week Total 17.5 mg 20 mg 20 mg   Next Week Total 20 mg 20 mg 20 mg   Sun 2.5 mg 2.5 mg 2.5 mg   Mon 3.75 mg 3.75 mg 3.75 mg   Tue 2.5 mg 2.5 mg 2.5 mg   Wed 3.75 mg 3.75 mg 3.75 mg   Thu 2.5 mg 2.5 mg 2.5 mg   Fri 2.5 mg 2.5 mg 2.5 mg   Sat 2.5 mg 2.5 mg 2.5 mg   Visit Report - - -   Some recent data might be hidden       Plan:  1. INR is therapeutic today- see above in Anticoagulation Summary.    Shani Phillip to continue their warfarin regimen- see above in Anticoagulation Summary.  2. Follow up in 1 week  3. Pt has agreed to only be called if INR out of range. They have been instructed to call if any changes in medications, doses, concerns, etc. Patient expresses understanding and has no further questions at this time.    Marleny Black RPH

## 2022-03-07 ENCOUNTER — CLINICAL SUPPORT (OUTPATIENT)
Dept: CARDIOLOGY | Facility: CLINIC | Age: 72
End: 2022-03-07

## 2022-03-07 VITALS — HEART RATE: 139 BPM

## 2022-03-07 DIAGNOSIS — I48.91 ATRIAL FIBRILLATION WITH RVR: Primary | ICD-10-CM

## 2022-03-07 PROCEDURE — 93000 ELECTROCARDIOGRAM COMPLETE: CPT | Performed by: NURSE PRACTITIONER

## 2022-03-07 RX ORDER — DILTIAZEM HYDROCHLORIDE 360 MG/1
360 CAPSULE, EXTENDED RELEASE ORAL DAILY
Qty: 30 CAPSULE | Refills: 0 | Status: SHIPPED | OUTPATIENT
Start: 2022-03-07 | End: 2022-03-30

## 2022-03-07 RX ORDER — ROPINIROLE 3 MG/1
3 TABLET, FILM COATED ORAL 3 TIMES DAILY
COMMUNITY
Start: 2021-12-06 | End: 2022-06-28 | Stop reason: SDUPTHER

## 2022-03-07 NOTE — PROGRESS NOTES
Procedure     ECG 12 Lead    Date/Time: 3/7/2022 11:47 AM  Performed by: Billie Kraus DNP, APRN  Authorized by: Billie Kraus DNP, GENET   Comparison: compared with previous ECG from 2/28/2022  Rhythm: atrial fibrillation  Ectopy comments: PVC  Rate: tachycardic  Comments: No significant changes from previous EKG                  Dr. Tinjaero out of the office today.  Reviewed EKGs and patient case with EP provider, Dr. Phan.  HR still high in the office but seeing some improvement at home (into the 80s at times but other times still tachycardic).  BP tolerating diltiazem.  Will increase diltiazem from 90mg TID to 360mg ER once daily.  Will check 24 hour holter in a few days.  For this patient would set HR goal of <110 bpm, per MD.  If HR above goal on 24 hour holter then could consider adding low dose digoxin 0.125 every other day, per MD.  Patient to call sooner if she becomes symptomatic, discussed in detail.        (Patient remains anticoagulated with warfarin).

## 2022-03-10 ENCOUNTER — ANTICOAGULATION VISIT (OUTPATIENT)
Dept: PHARMACY | Facility: HOSPITAL | Age: 72
End: 2022-03-10

## 2022-03-10 DIAGNOSIS — I48.0 PAF (PAROXYSMAL ATRIAL FIBRILLATION): Primary | ICD-10-CM

## 2022-03-10 LAB — INR PPP: 2.6

## 2022-03-10 NOTE — PROGRESS NOTES
Anticoagulation Clinic Progress Note    Anticoagulation Summary  As of 3/10/2022    INR goal:  2.0-3.0   TTR:  57.6 % (1 y)   INR used for dosin.60 (3/10/2022)   Warfarin maintenance plan:  3.75 mg every Mon, Wed; 2.5 mg all other days   Weekly warfarin total:  20 mg   No change documented:  Vidya Nuñez, [facility]   Plan last modified:  Keshawn Castaneda, PharmD (2/3/2022)   Next INR check:  3/17/2022   Priority:  High   Target end date:      Indications    PAF (paroxysmal atrial fibrillation) (HCC) [I48.0]             Anticoagulation Episode Summary     INR check location:      Preferred lab:      Send INR reminders to:  [facility] [employer] HOME TEST POOL    Comments:   Home Testing as of 21 *call only when out of range*      Anticoagulation Care Providers     Provider Role Specialty Phone number    Zenia Tinajero MD Referring Cardiology [phone]          Clinic Interview:  No pertinent clinical findings have been reported.    INR History:  Anticoagulation Monitoring 2022 3/3/2022 3/10/2022   INR 2.20 2.00 2.60   INR Date 2022 3/3/2022 3/10/2022   INR Goal 2.0-3.0 2.0-3.0 2.0-3.0   Trend Same Same Same   Last Week Total 20 mg 20 mg 20 mg   Next Week Total 20 mg 20 mg 20 mg   Sun 2.5 mg 2.5 mg 2.5 mg   Mon 3.75 mg 3.75 mg 3.75 mg   Tue 2.5 mg 2.5 mg 2.5 mg   Wed 3.75 mg 3.75 mg 3.75 mg   Thu 2.5 mg 2.5 mg 2.5 mg   Fri 2.5 mg 2.5 mg 2.5 mg   Sat 2.5 mg 2.5 mg 2.5 mg   Visit Report - - -   Some recent data might be hidden       Plan:  1. INR is therapeutic today- see above in Anticoagulation Summary.    Shani Phillip to continue their warfarin regimen- see above in Anticoagulation Summary.  2. Follow up in 1 week  3. Pt has agreed to only be called if INR out of range. They have been instructed to call if any changes in medications, doses, concerns, etc. Patient expresses understanding and has no further questions at this time.    Vidya Nuñez, [facility]

## 2022-03-17 ENCOUNTER — ANTICOAGULATION VISIT (OUTPATIENT)
Dept: PHARMACY | Facility: HOSPITAL | Age: 72
End: 2022-03-17

## 2022-03-17 DIAGNOSIS — I48.0 PAF (PAROXYSMAL ATRIAL FIBRILLATION): Primary | ICD-10-CM

## 2022-03-17 LAB — INR PPP: 3.8

## 2022-03-17 NOTE — PROGRESS NOTES
Anticoagulation Clinic Progress Note    Anticoagulation Summary  As of 3/17/2022    INR goal:  2.0-3.0   TTR:  57.1 % (1 y)   INR used for dosing:  3.80 (3/17/2022)   Warfarin maintenance plan:  3.75 mg every Mon, Wed; 2.5 mg all other days   Weekly warfarin total:  20 mg   Plan last modified:  Keshawn Castaneda, PharmD (2/3/2022)   Next INR check:  3/24/2022   Priority:  High   Target end date:      Indications    PAF (paroxysmal atrial fibrillation) (HCC) [I48.0]             Anticoagulation Episode Summary     INR check location:      Preferred lab:      Send INR reminders to:   [facility] [employer] HOME TEST POOL    Comments:   Home Testing as of 7/30/21 *call only when out of range*      Anticoagulation Care Providers     Provider Role Specialty Phone number    Zenia Tinajero MD Referring Cardiology [phone]          Clinic Interview:  Patient Findings     Positives:  Change in health    Negatives:  Signs/symptoms of thrombosis, Signs/symptoms of bleeding,   Laboratory test error suspected, Change in alcohol use, Change in   activity, Upcoming invasive procedure, Emergency department visit,   Upcoming dental procedure, Missed doses, Extra doses, Change in   medications, Change in diet/appetite, Hospital admission, Bruising, Other   complaints    Comments:  Increase in swelling       Clinical Outcomes     Negatives:  Major bleeding event, Thromboembolic event,   Anticoagulation-related hospital admission, Anticoagulation-related ED   visit, Anticoagulation-related fatality    Comments:  Increase in swelling         INR History:  Anticoagulation Monitoring 3/3/2022 3/10/2022 3/17/2022   INR 2.00 2.60 3.80   INR Date 3/3/2022 3/10/2022 3/17/2022   INR Goal 2.0-3.0 2.0-3.0 2.0-3.0   Trend Same Same Same   Last Week Total 20 mg 20 mg 20 mg   Next Week Total 20 mg 20 mg 17.5 mg   Sun 2.5 mg 2.5 mg 2.5 mg   Mon 3.75 mg 3.75 mg 3.75 mg   Tue 2.5 mg 2.5 mg 2.5 mg   Wed 3.75 mg 3.75 mg 3.75 mg   Thu 2.5 mg 2.5 mg Hold  (3/17)   Fri 2.5 mg 2.5 mg 2.5 mg   Sat 2.5 mg 2.5 mg 2.5 mg   Visit Report - - -   Some recent data might be hidden       Plan:  1. INR is Supratherapeutic today- see above in Anticoagulation Summary.   Will instruct Shani Phillip to Change their warfarin regimen- see above in Anticoagulation Summary.  2. Follow up in 1 week  3.  They have been instructed to call if any changes in medications, doses, concerns, etc. Patient expresses understanding and has no further questions at this time.    Marleny Black, [facility]

## 2022-03-18 ENCOUNTER — TELEPHONE (OUTPATIENT)
Dept: CARDIOLOGY | Facility: CLINIC | Age: 72
End: 2022-03-18

## 2022-03-18 NOTE — TELEPHONE ENCOUNTER
Please let patient know that monitor study shows average heart rate is well controlled.    Please find out how blood pressure running on diltiazem.    Would have her keep upcoming appointment with Dr. Tinajero as scheduled or follow-up sooner for issues or concerns.

## 2022-03-18 NOTE — TELEPHONE ENCOUNTER
These are fluctuating somewhat but overall appears well controlled.    Would continue current regimen and would keep upcoming appointment with Dr. Tinajero as scheduled or call sooner for issues or concerns.  Would call if blood pressure staying greater than 130/80 on average or for systolic blood pressure less than 100.

## 2022-03-18 NOTE — TELEPHONE ENCOUNTER
Reviewed recommendations with Shani Phillip and the patient verbalized understanding.    Patient stated that she has noticed increased swelling in her legs and feet over the past couple of weeks. Patient also reports increased shortness of breath.  Patient is requesting that you increase her dosage of furosemide.      Patient does not weigh daily.  Educated patient on the purpose and importance of daily weights.  Patient verbalized understanding.     Please let me know how you would like me to proceed.    Thank you,  Tawanna Hyde RN  Triage Nurse [facility]

## 2022-03-18 NOTE — TELEPHONE ENCOUNTER
Called and spoke with patient.  Diltiazem can worsen swelling.  Was trying to avoid beta-blocker due to chronic lung issues.  Recommend compression socks and lower extremity elevation.  Avoid high salt foods.  Check daily weights and call if she gains more than 2 pounds in 1 day or 5 pounds in a week.    She will increase lasix to 40mg BID today and tomorrow only then resume normal once a day dosing thereafter.  She will call with an update next week.    We will also call in a request copy of last note from pulmonology.  She cannot remember who she sees but sees [facility].

## 2022-03-18 NOTE — TELEPHONE ENCOUNTER
Please call and request copy of last pulmonology note from [facility].  Patient believes she was last seen in February

## 2022-03-18 NOTE — TELEPHONE ENCOUNTER
Reviewed results and recommendations with Shani Phillip.  Patient verbalized understanding.    3/11: /87, HR 70; /68, HR 87  3/12: /64, HR 79  3/13: /112, HR 62  3/14: /67, HR 64  3/15: /63, HR 82; /54, HR 80  3/16: /71, ; /77, HR 95  3/17: /78, HR 91    Thank you,  Tawanna Hyde RN  Triage Nurse [facility]

## 2022-03-25 ENCOUNTER — ANTICOAGULATION VISIT (OUTPATIENT)
Dept: PHARMACY | Facility: HOSPITAL | Age: 72
End: 2022-03-25

## 2022-03-25 DIAGNOSIS — I48.0 PAF (PAROXYSMAL ATRIAL FIBRILLATION): Primary | ICD-10-CM

## 2022-03-25 LAB — INR PPP: 2.9

## 2022-03-25 PROCEDURE — G0249 PROVIDE INR TEST MATER/EQUIP: HCPCS

## 2022-03-25 NOTE — PROGRESS NOTES
Anticoagulation Clinic Progress Note    Anticoagulation Summary  As of 3/25/2022    INR goal:  2.0-3.0   TTR:  56.2 % (1 y)   INR used for dosin.90 (3/25/2022)   Warfarin maintenance plan:  3.75 mg every Mon, Wed; 2.5 mg all other days   Weekly warfarin total:  20 mg   No change documented:  Marleny Black RPH   Plan last modified:  Keshawn Castaneda, PharmD (2/3/2022)   Next INR check:  2022   Priority:  High   Target end date:      Indications    PAF (paroxysmal atrial fibrillation) (HCC) [I48.0]             Anticoagulation Episode Summary     INR check location:      Preferred lab:      Send INR reminders to:  [facility] [employer] HOME TEST POOL    Comments:   Home Testing as of 21 *call only when out of range*      Anticoagulation Care Providers     Provider Role Specialty Phone number    Zenia Tinajero MD Referring Cardiology [phone]          Clinic Interview:  No pertinent clinical findings have been reported.    INR History:  Anticoagulation Monitoring 3/10/2022 3/17/2022 3/25/2022   INR 2.60 3.80 2.90   INR Date 3/10/2022 3/17/2022 3/25/2022   INR Goal 2.0-3.0 2.0-3.0 2.0-3.0   Trend Same Same Same   Last Week Total 20 mg 20 mg 20 mg   Next Week Total 20 mg 17.5 mg 20 mg   Sun 2.5 mg 2.5 mg 2.5 mg   Mon 3.75 mg 3.75 mg 3.75 mg   Tue 2.5 mg 2.5 mg 2.5 mg   Wed 3.75 mg 3.75 mg 3.75 mg   Thu 2.5 mg Hold (3/17) 2.5 mg   Fri 2.5 mg 2.5 mg 2.5 mg   Sat 2.5 mg 2.5 mg 2.5 mg   Visit Report - - -   Some recent data might be hidden       Plan:  1. INR is therapeutic today- see above in Anticoagulation Summary.    Shani Phillip to continue their warfarin regimen- see above in Anticoagulation Summary.  2. Follow up in 1 week  3.  They have been instructed to call if any changes in medications, doses, concerns, etc. Patient expresses understanding and has no further questions at this time.    Marleny Black RPH

## 2022-03-30 ENCOUNTER — OFFICE VISIT (OUTPATIENT)
Dept: CARDIOLOGY | Facility: CLINIC | Age: 72
End: 2022-03-30

## 2022-03-30 VITALS
BODY MASS INDEX: 29.83 KG/M2 | HEART RATE: 112 BPM | HEIGHT: 61 IN | SYSTOLIC BLOOD PRESSURE: 146 MMHG | WEIGHT: 158 LBS | DIASTOLIC BLOOD PRESSURE: 82 MMHG

## 2022-03-30 DIAGNOSIS — Z79.01 CHRONIC ANTICOAGULATION: ICD-10-CM

## 2022-03-30 DIAGNOSIS — I48.20 ATRIAL FIBRILLATION, CHRONIC: Primary | ICD-10-CM

## 2022-03-30 DIAGNOSIS — I50.33 ACUTE ON CHRONIC DIASTOLIC CHF (CONGESTIVE HEART FAILURE): ICD-10-CM

## 2022-03-30 DIAGNOSIS — Z99.81 OXYGEN DEPENDENT: ICD-10-CM

## 2022-03-30 DIAGNOSIS — J44.9 CHRONIC OBSTRUCTIVE PULMONARY DISEASE, UNSPECIFIED COPD TYPE: ICD-10-CM

## 2022-03-30 DIAGNOSIS — R60.0 BILATERAL LOWER EXTREMITY EDEMA: ICD-10-CM

## 2022-03-30 DIAGNOSIS — I05.9 ENDOCARDITIS OF MITRAL VALVE: ICD-10-CM

## 2022-03-30 PROBLEM — I48.19 OTHER PERSISTENT ATRIAL FIBRILLATION (HCC): Status: RESOLVED | Noted: 2020-01-17 | Resolved: 2022-03-30

## 2022-03-30 PROBLEM — I48.0 PAF (PAROXYSMAL ATRIAL FIBRILLATION) (HCC): Status: RESOLVED | Noted: 2020-03-01 | Resolved: 2022-03-30

## 2022-03-30 PROBLEM — I33.0 INFECTIVE ENDOCARDITIS: Status: RESOLVED | Noted: 2020-01-17 | Resolved: 2022-03-30

## 2022-03-30 PROCEDURE — 93000 ELECTROCARDIOGRAM COMPLETE: CPT | Performed by: INTERNAL MEDICINE

## 2022-03-30 PROCEDURE — 99214 OFFICE O/P EST MOD 30 MIN: CPT | Performed by: INTERNAL MEDICINE

## 2022-03-30 RX ORDER — DILTIAZEM HYDROCHLORIDE 360 MG/1
CAPSULE, EXTENDED RELEASE ORAL
Qty: 30 CAPSULE | Refills: 0 | Status: SHIPPED | OUTPATIENT
Start: 2022-03-30 | End: 2022-03-30 | Stop reason: SDUPTHER

## 2022-03-30 RX ORDER — POTASSIUM CHLORIDE 1500 MG/1
20 TABLET, FILM COATED, EXTENDED RELEASE ORAL DAILY
Qty: 90 TABLET | Refills: 3 | Status: SHIPPED | OUTPATIENT
Start: 2022-03-30 | End: 2022-05-26 | Stop reason: SDUPTHER

## 2022-03-30 RX ORDER — FAMOTIDINE 20 MG/1
20 TABLET, FILM COATED ORAL
Qty: 180 TABLET | Refills: 3 | Status: SHIPPED | OUTPATIENT
Start: 2022-03-30 | End: 2023-04-03

## 2022-03-30 RX ORDER — FUROSEMIDE 40 MG/1
40 TABLET ORAL DAILY
Qty: 90 TABLET | Refills: 3 | Status: SHIPPED | OUTPATIENT
Start: 2022-03-30 | End: 2022-05-26 | Stop reason: SDUPTHER

## 2022-03-30 RX ORDER — DILTIAZEM HYDROCHLORIDE 360 MG/1
360 CAPSULE, EXTENDED RELEASE ORAL DAILY
Qty: 90 CAPSULE | Refills: 3 | Status: SHIPPED | OUTPATIENT
Start: 2022-03-30 | End: 2022-04-25

## 2022-03-30 NOTE — PROGRESS NOTES
CARDIOLOGY    Zenia Tinajero MD    ENCOUNTER DATE:  03/30/2022    Shani Phillip / 71 y.o. / female        CHIEF COMPLAINT / REASON FOR OFFICE VISIT     Atrial Fibrillation      HISTORY OF PRESENT ILLNESS       HPI    Shani Phillip is a 71 y.o. female     This is a lady who was on a cruise ship when she developed a febrile illness.  She was taken to [facility] where she was diagnosed with endocarditis.  She left the hospital to return home to [place] I first saw her in January 14, 2019.  She has a history of COPD on oxygen, former smoker, restless leg syndrome and chronic pain.  She had a transthoracic echocardiogram suggestive of vegetation on the mitral valve annulus.  She had a transesophageal echo which delineated this more clearly.  Dr. Briones saw her in consultation.  The plan was to treat her with IV antibiotics which occurred.  She came in for a repeat transesophageal echo on January 14, 2020 and this was pretty much unchanged.  The mobile element attached to the posterior mitral valve annulus annulus was unchanged.     In August 2020, she had normal LV functions and ejection fraction of 66%, moderate left atrial enlargement, severe calcification of the aortic valve without regurgitation or stenosis.  There is moderate bileaflet mitral valve thickening with trace regurgitation and mild stenosis with a mean gradient of 4 mmHg.  There was mild tricuspid regurgitation with a normal right ventricular systolic pressure.  She was seen in the office in August of 2020 and was having lower extremity edema.  Heidi Ambrocio ordered an arterial Doppler which was normal.  She had another echocardiogram in September 2020, again normal left ventricular systolic function, and again bileaflet thickening with this time, mild mitral regurgitation and mitral stenosis was noted and a vegetation on the atrial side of the posterior mitral valve leaflet was also noted.     Repeat echo in June 2021 showed  "normal LV systolic function, grade 2 diastolic dysfunction, mild left atrial enlargement, calcification of the mitral leaflets with mild mitral stenosis.    She saw Heidi Kraus in February 2022 and was found to be in asymptomatic atrial fibrillation with rapid ventricular response.  Her INRs have been therapeutic.  Diltiazem was resumed.  24-hour monitor in March 2022 showed that she was predominantly in atrial fibrillation.  Her average heart rate was 86 bpm with a range from 50 to 124 bpm.    She is sometimes aware of palpitation sensation.  She has chronic shortness of breath due to COPD and oxygen dependence.    REVIEW OF SYSTEMS     Review of Systems   Constitutional: Positive for malaise/fatigue. Negative for chills, fever, weight gain and weight loss.   Cardiovascular: Negative for leg swelling.   Respiratory: Positive for shortness of breath. Negative for cough, snoring and wheezing.    Hematologic/Lymphatic: Negative for bleeding problem. Does not bruise/bleed easily.   Skin: Negative for color change.   Musculoskeletal: Positive for joint pain and myalgias. Negative for falls.   Gastrointestinal: Negative for melena.   Genitourinary: Negative for hematuria.   Neurological: Negative for excessive daytime sleepiness.   Psychiatric/Behavioral: Negative for depression. The patient is not nervous/anxious.          VITAL SIGNS     Visit Vitals  /82   Pulse 112   Ht 154.9 cm (61\")   Wt 71.7 kg (158 lb)   BMI 29.85 kg/m²         Wt Readings from Last 3 Encounters:   03/30/22 71.7 kg (158 lb)   02/28/22 72.1 kg (159 lb)   02/24/22 72.1 kg (159 lb)     Body mass index is 29.85 kg/m².      PHYSICAL EXAMINATION     Constitutional:       General: Not in acute distress.  Neck:      Vascular: No carotid bruit or JVD.   Pulmonary:      Effort: Pulmonary effort is normal.      Breath sounds: Wheezing present.   Cardiovascular:      Normal rate. Irregularly irregular rhythm.      Murmurs: There is no murmur. "   Psychiatric:         Mood and Affect: Mood and affect normal.           REVIEWED DATA       ECG 12 Lead    Date/Time: 3/30/2022 11:08 AM  Performed by: Zenia Tinajero MD  Authorized by: Zenia Tinajero MD   Comparison: compared with previous ECG from 3/7/2022  Rhythm: atrial fibrillation  BPM: 112  Conduction: incomplete right bundle branch block    Clinical impression: abnormal EKG              Lipid Panel    Lipid Panel 4/12/21   Total Cholesterol 194   Triglycerides 174 (A)   HDL Cholesterol 48   VLDL Cholesterol 31   LDL Cholesterol  115 (A)   (A) Abnormal value              Lab Results   Component Value Date    GLUCOSE 107 (H) 02/28/2022    BUN 19 02/28/2022    CREATININE 1.06 (H) 02/28/2022    EGFRIFNONA 48 (L) 08/26/2021    EGFRIFAFRI 46 (L) 04/12/2021    BCR 17.9 02/28/2022    K 3.8 02/28/2022    CO2 29.5 (H) 02/28/2022    CALCIUM 9.6 02/28/2022    PROTENTOTREF 6.7 04/12/2021    ALBUMIN 3.90 08/26/2021    LABIL2 1.5 04/12/2021    AST 18 08/26/2021    ALT 8 08/26/2021       ASSESSMENT & PLAN      Diagnosis Plan   1. Atrial fibrillation, chronic (HCC)  ECG 12 Lead   2. Acute on chronic diastolic CHF (congestive heart failure) ([facility])  potassium chloride ER (K-TAB) 20 MEQ tablet controlled-release ER tablet    ECG 12 Lead   3. Endocarditis of mitral valve     4. Bilateral lower extremity edema     5. Chronic anticoagulation     6. Chronic obstructive pulmonary disease, unspecified COPD type ([facility])     7. Oxygen dependent           1.  Atrial fibrillation.  She is on warfarin and has a home monitor and is followed by the anticoagulation clinic.  Cardioversion 11/15/19.  She had trouble affording Eliquis.    She was back in A. fib in February 2022 and diltiazem was adjusted and Holter monitor in March 2022 confirmed that her heart rate is controlled.  I also reviewed blood pressure and heart rate numbers from home that shows that her heart rate is overall controlled.  2.  Mitral valve vegetation.  S/P  antibiotics.  Repeat echoes have shown this to be stable.  No fever or chills.  3. Lower extremity edema.   4.  COPD on home oxygen.  Avoid beta-blockers.     Follow-up with Angy in 8 weeks.  Continue anticoagulation with warfarin and rate controlled with diltiazem.  I told her if she has worsening edema she could take an extra Lasix 6 hours after her initial dose.  She should always take an additional potassium with her Lasix.    Orders Placed This Encounter   Procedures   • ECG 12 Lead     This order was created via procedure documentation     Order Specific Question:   Release to patient     Answer:   Immediate           MEDICATIONS         Discharge Medications          Accurate as of March 30, 2022 11:10 AM. If you have any questions, ask your nurse or doctor.            Continue These Medications      Instructions Start Date   budesonide 0.5 MG/2ML nebulizer solution  Commonly known as: PULMICORT   0.5 mg, Nebulization, Daily - RT      buPROPion  MG 24 hr tablet  Commonly known as: WELLBUTRIN XL   300 mg, Oral, Daily      cholecalciferol 25 MCG (1000 UT) tablet  Commonly known as: VITAMIN D3   2,000 Units, Oral, Daily      cyclobenzaprine 10 MG tablet  Commonly known as: FLEXERIL   10 mg, Oral, 3 Times Daily PRN      dilTIAZem  MG 24 hr capsule  Commonly known as: CARDIZEM CD   360 mg, Oral, Daily      famotidine 20 MG tablet  Commonly known as: PEPCID   20 mg, Oral, 2 Times Daily Before Meals      furosemide 40 MG tablet  Commonly known as: LASIX   40 mg, Oral, Daily      HYDROcodone-acetaminophen 7.5-325 MG per tablet  Commonly known as: NORCO   1 tablet, Oral, Every 6 Hours PRN      O2  Commonly known as: OXYGEN   2 L/min, Inhalation, Once      potassium chloride ER 20 MEQ tablet controlled-release ER tablet  Commonly known as: K-TAB   20 mEq, Oral, Daily      rOPINIRole 3 MG tablet  Commonly known as: REQUIP   3 mg, Oral, 3 Times Daily      TROSPIUM CHLORIDE PO   20 mg, Oral, 2 Times Daily       Ventolin  (90 Base) MCG/ACT inhaler  Generic drug: albuterol sulfate HFA   2 puffs, Inhalation, 4 Times Daily - RT      warfarin 2.5 MG tablet  Commonly known as: COUMADIN   Take one tablet by mouth daily or as directed.               Zenia Tinajero MD  03/30/22  11:10 EDT    **Dominick Disclaimer:   Much of this encounter note is an electronic transcription/translation of spoken language to printed text. The electronic translation of spoken language may permit erroneous, or at times, nonsensical words or phrases to be inadvertently transcribed. Although I have reviewed the note for such errors, some may still exist.

## 2022-03-31 ENCOUNTER — ANTICOAGULATION VISIT (OUTPATIENT)
Dept: PHARMACY | Facility: HOSPITAL | Age: 72
End: 2022-03-31

## 2022-03-31 LAB — INR PPP: 2.7

## 2022-03-31 RX ORDER — WARFARIN SODIUM 2.5 MG/1
TABLET ORAL
Qty: 105 TABLET | Refills: 1 | Status: SHIPPED | OUTPATIENT
Start: 2022-03-31 | End: 2022-05-17 | Stop reason: SDUPTHER

## 2022-03-31 NOTE — PROGRESS NOTES
Anticoagulation Clinic Progress Note    Anticoagulation Summary  As of 3/31/2022    INR goal:  2.0-3.0   TTR:  56.9 % (1.1 y)   INR used for dosin.70 (3/31/2022)   Warfarin maintenance plan:  3.75 mg every Mon, Wed; 2.5 mg all other days   Weekly warfarin total:  20 mg   No change documented:  Marleny Black RPH   Plan last modified:  Keshawn Castaneda, PharmD (2/3/2022)   Next INR check:  2022   Priority:  High   Target end date:      Indications    PAF (paroxysmal atrial fibrillation) (HCC) (Resolved) [I48.0]             Anticoagulation Episode Summary     INR check location:      Preferred lab:      Send INR reminders to:  [facility] HOME TEST POOL    Comments:   Home Testing as of 21 *call only when out of range*      Anticoagulation Care Providers     Provider Role Specialty Phone number    Zenia Tinajero MD Referring Cardiology [phone]          Clinic Interview:  No pertinent clinical findings have been reported.    INR History:  Anticoagulation Monitoring 3/17/2022 3/25/2022 3/31/2022   INR 3.80 2.90 2.70   INR Date 3/17/2022 3/25/2022 3/31/2022   INR Goal 2.0-3.0 2.0-3.0 2.0-3.0   Trend Same Same Same   Last Week Total 20 mg 20 mg 20 mg   Next Week Total 17.5 mg 20 mg 20 mg   Sun 2.5 mg 2.5 mg 2.5 mg   Mon 3.75 mg 3.75 mg 3.75 mg   Tue 2.5 mg 2.5 mg 2.5 mg   Wed 3.75 mg 3.75 mg 3.75 mg   Thu Hold (3/17) 2.5 mg 2.5 mg   Fri 2.5 mg 2.5 mg 2.5 mg   Sat 2.5 mg 2.5 mg 2.5 mg   Visit Report - - -   Some recent data might be hidden       Plan:  1. INR is therapeutic today- see above in Anticoagulation Summary.    Shani Phillip to continue their warfarin regimen- see above in Anticoagulation Summary.  2. Follow up in 1 week  3. Pt has agreed to only be called if INR out of range. They have been instructed to call if any changes in medications, doses, concerns, etc. Patient expresses understanding and has no further questions at this time.    Marleny Black, [facility]

## 2022-04-07 ENCOUNTER — ANTICOAGULATION VISIT (OUTPATIENT)
Dept: PHARMACY | Facility: HOSPITAL | Age: 72
End: 2022-04-07

## 2022-04-07 LAB — INR PPP: 2.6

## 2022-04-07 NOTE — PROGRESS NOTES
Anticoagulation Clinic Progress Note    Anticoagulation Summary  As of 2022    INR goal:  2.0-3.0   TTR:  57.7 % (1.1 y)   INR used for dosin.60 (2022)   Warfarin maintenance plan:  3.75 mg every Mon, Wed; 2.5 mg all other days   Weekly warfarin total:  20 mg   No change documented:  Marleny Black RPH   Plan last modified:  Keshawn Castaneda, PharmD (2/3/2022)   Next INR check:  2022   Priority:  High   Target end date:      Indications    PAF (paroxysmal atrial fibrillation) (HCC) (Resolved) [I48.0]             Anticoagulation Episode Summary     INR check location:      Preferred lab:      Send INR reminders to:  [facility] [employer] HOME TEST POOL    Comments:   Home Testing as of 21 *call only when out of range*      Anticoagulation Care Providers     Provider Role Specialty Phone number    Zenia Tinajero MD Referring Cardiology [phone]          Clinic Interview:  No pertinent clinical findings have been reported.    INR History:  Anticoagulation Monitoring 3/25/2022 3/31/2022 2022   INR 2.90 2.70 2.60   INR Date 3/25/2022 3/31/2022 2022   INR Goal 2.0-3.0 2.0-3.0 2.0-3.0   Trend Same Same Same   Last Week Total 20 mg 20 mg 20 mg   Next Week Total 20 mg 20 mg 20 mg   Sun 2.5 mg 2.5 mg 2.5 mg   Mon 3.75 mg 3.75 mg 3.75 mg   Tue 2.5 mg 2.5 mg 2.5 mg   Wed 3.75 mg 3.75 mg 3.75 mg   Thu 2.5 mg 2.5 mg 2.5 mg   Fri 2.5 mg 2.5 mg 2.5 mg   Sat 2.5 mg 2.5 mg 2.5 mg   Visit Report - - -   Some recent data might be hidden       Plan:  1. INR is therapeutic today- see above in Anticoagulation Summary.    Shani Phillip to continue their warfarin regimen- see above in Anticoagulation Summary.  2. Follow up in 1 week  3. Pt has agreed to only be called if INR out of range. They have been instructed to call if any changes in medications, doses, concerns, etc. Patient expresses understanding and has no further questions at this time.    Marleny Black, RP

## 2022-04-14 ENCOUNTER — ANTICOAGULATION VISIT (OUTPATIENT)
Dept: PHARMACY | Facility: HOSPITAL | Age: 72
End: 2022-04-14

## 2022-04-14 LAB — INR PPP: 2.2

## 2022-04-14 NOTE — PROGRESS NOTES
Anticoagulation Clinic Progress Note    Anticoagulation Summary  As of 2022    INR goal:  2.0-3.0   TTR:  58.4 % (1.1 y)   INR used for dosin.20 (2022)   Warfarin maintenance plan:  3.75 mg every Mon, Wed; 2.5 mg all other days   Weekly warfarin total:  20 mg   No change documented:  Marleny Black RPH   Plan last modified:  Keshawn Castaneda, PharmD (2/3/2022)   Next INR check:  2022   Priority:  High   Target end date:      Indications    PAF (paroxysmal atrial fibrillation) (HCC) (Resolved) [I48.0]             Anticoagulation Episode Summary     INR check location:      Preferred lab:      Send INR reminders to:  [facility] [employer] HOME TEST POOL    Comments:   Home Testing as of 21 *call only when out of range*      Anticoagulation Care Providers     Provider Role Specialty Phone number    Zenia Tinajero MD Referring Cardiology [phone]          Clinic Interview:  No pertinent clinical findings have been reported.    INR History:  Anticoagulation Monitoring 3/31/2022 2022 2022   INR 2.70 2.60 2.20   INR Date 3/31/2022 2022 2022   INR Goal 2.0-3.0 2.0-3.0 2.0-3.0   Trend Same Same Same   Last Week Total 20 mg 20 mg 20 mg   Next Week Total 20 mg 20 mg 20 mg   Sun 2.5 mg 2.5 mg 2.5 mg   Mon 3.75 mg 3.75 mg 3.75 mg   Tue 2.5 mg 2.5 mg 2.5 mg   Wed 3.75 mg 3.75 mg 3.75 mg   Thu 2.5 mg 2.5 mg 2.5 mg   Fri 2.5 mg 2.5 mg 2.5 mg   Sat 2.5 mg 2.5 mg 2.5 mg   Visit Report - - -   Some recent data might be hidden       Plan:  1. INR is therapeutic today- see above in Anticoagulation Summary.    Shani Phillip to continue their warfarin regimen- see above in Anticoagulation Summary.  2. Follow up in 1 week  3. Pt has agreed to only be called if INR out of range. They have been instructed to call if any changes in medications, doses, concerns, etc. Patient expresses understanding and has no further questions at this time.    Marleny Black, RP

## 2022-04-21 ENCOUNTER — ANTICOAGULATION VISIT (OUTPATIENT)
Dept: PHARMACY | Facility: HOSPITAL | Age: 72
End: 2022-04-21

## 2022-04-21 LAB — INR PPP: 2.5

## 2022-04-21 NOTE — PROGRESS NOTES
Anticoagulation Clinic Progress Note    Anticoagulation Summary  As of 2022    INR goal:  2.0-3.0   TTR:  59.1 % (1.1 y)   INR used for dosin.50 (2022)   Warfarin maintenance plan:  3.75 mg every Mon, Wed; 2.5 mg all other days   Weekly warfarin total:  20 mg   No change documented:  Marleny Black RPH   Plan last modified:  Keshawn Castaneda, PharmD (2/3/2022)   Next INR check:  2022   Priority:  High   Target end date:      Indications    PAF (paroxysmal atrial fibrillation) (HCC) (Resolved) [I48.0]             Anticoagulation Episode Summary     INR check location:      Preferred lab:      Send INR reminders to:  [facility] [employer] HOME TEST POOL    Comments:   Home Testing as of 21 *call only when out of range*      Anticoagulation Care Providers     Provider Role Specialty Phone number    Zenia Tinajero MD Referring Cardiology [phone]          Clinic Interview:  No pertinent clinical findings have been reported.    INR History:  Anticoagulation Monitoring 2022   INR 2.60 2.20 2.50   INR Date 2022   INR Goal 2.0-3.0 2.0-3.0 2.0-3.0   Trend Same Same Same   Last Week Total 20 mg 20 mg 20 mg   Next Week Total 20 mg 20 mg 20 mg   Sun 2.5 mg 2.5 mg 2.5 mg   Mon 3.75 mg 3.75 mg 3.75 mg   Tue 2.5 mg 2.5 mg 2.5 mg   Wed 3.75 mg 3.75 mg 3.75 mg   Thu 2.5 mg 2.5 mg 2.5 mg   Fri 2.5 mg 2.5 mg 2.5 mg   Sat 2.5 mg 2.5 mg 2.5 mg   Visit Report - - -   Some recent data might be hidden       Plan:  1. INR is therapeutic today- see above in Anticoagulation Summary.    hSani Phillip to continue their warfarin regimen- see above in Anticoagulation Summary.  2. Follow up in 1 week  3. Pt has agreed to only be called if INR out of range. They have been instructed to call if any changes in medications, doses, concerns, etc. Patient expresses understanding and has no further questions at this time.    Marleny Black, RP

## 2022-04-25 RX ORDER — DILTIAZEM HYDROCHLORIDE 360 MG/1
CAPSULE, EXTENDED RELEASE ORAL
Qty: 30 CAPSULE | Refills: 2 | Status: SHIPPED | OUTPATIENT
Start: 2022-04-25 | End: 2022-05-06 | Stop reason: SDUPTHER

## 2022-04-28 ENCOUNTER — ANTICOAGULATION VISIT (OUTPATIENT)
Dept: PHARMACY | Facility: HOSPITAL | Age: 72
End: 2022-04-28

## 2022-04-28 LAB — INR PPP: 2.2

## 2022-04-28 PROCEDURE — G0249 PROVIDE INR TEST MATER/EQUIP: HCPCS

## 2022-04-28 NOTE — PROGRESS NOTES
Anticoagulation Clinic Progress Note    Anticoagulation Summary  As of 2022    INR goal:  2.0-3.0   TTR:  59.7 % (1.1 y)   INR used for dosin.20 (2022)   Warfarin maintenance plan:  3.75 mg every Mon, Wed; 2.5 mg all other days   Weekly warfarin total:  20 mg   No change documented:  Zenia Myers RPH   Plan last modified:  Keshawn Castaneda, PharmD (2/3/2022)   Next INR check:  2022   Priority:  High   Target end date:      Indications    PAF (paroxysmal atrial fibrillation) (HCC) (Resolved) [I48.0]             Anticoagulation Episode Summary     INR check location:      Preferred lab:      Send INR reminders to:   [facility] [employer] HOME TEST POOL    Comments:   Home Testing as of 21 *call only when out of range*      Anticoagulation Care Providers     Provider Role Specialty Phone number    Zenia Tinajero MD Referring Cardiology [phone]          Clinic Interview:  Patient Findings     Negatives:  Signs/symptoms of thrombosis, Signs/symptoms of bleeding,   Laboratory test error suspected, Change in health, Change in alcohol use,   Change in activity, Upcoming invasive procedure, Emergency department   visit, Upcoming dental procedure, Missed doses, Extra doses, Change in   medications, Change in diet/appetite, Hospital admission, Bruising, Other   complaints      Clinical Outcomes     Negatives:  Major bleeding event, Thromboembolic event,   Anticoagulation-related hospital admission, Anticoagulation-related ED   visit, Anticoagulation-related fatality        INR History:  Anticoagulation Monitoring 2022   INR 2.20 2.50 2.20   INR Date 2022   INR Goal 2.0-3.0 2.0-3.0 2.0-3.0   Trend Same Same Same   Last Week Total 20 mg 20 mg 20 mg   Next Week Total 20 mg 20 mg 20 mg   Sun 2.5 mg 2.5 mg 2.5 mg   Mon 3.75 mg 3.75 mg 3.75 mg   Tue 2.5 mg 2.5 mg 2.5 mg   Wed 3.75 mg 3.75 mg 3.75 mg   Thu 2.5 mg 2.5 mg 2.5 mg   Fri 2.5 mg 2.5 mg 2.5 mg    Sat 2.5 mg 2.5 mg 2.5 mg   Visit Report - - -   Some recent data might be hidden       Plan:  1. INR is Therapeutic today- see above in Anticoagulation Summary.   Will instruct Shani Phillip to Continue their warfarin regimen- see above in Anticoagulation Summary.  2. Follow up in 1 weeks  3.  They have been instructed to call if any changes in medications, doses, concerns, etc. Patient expresses understanding and has no further questions at this time.    Zenia Myers RP

## 2022-05-05 ENCOUNTER — ANTICOAGULATION VISIT (OUTPATIENT)
Dept: PHARMACY | Facility: HOSPITAL | Age: 72
End: 2022-05-05

## 2022-05-05 LAB — INR PPP: 1.8

## 2022-05-05 NOTE — PROGRESS NOTES
Anticoagulation Clinic Progress Note    Anticoagulation Summary  As of 2022    INR goal:  2.0-3.0   TTR:  59.5 % (1.2 y)   INR used for dosin.80 (2022)   Warfarin maintenance plan:  3.75 mg every Mon, Wed; 2.5 mg all other days   Weekly warfarin total:  20 mg   Plan last modified:  Keshawn Castaneda, PharmD (2/3/2022)   Next INR check:  2022   Priority:  High   Target end date:      Indications    PAF (paroxysmal atrial fibrillation) (HCC) (Resolved) [I48.0]             Anticoagulation Episode Summary     INR check location:      Preferred lab:      Send INR reminders to:   [facility] [employer] HOME TEST POOL    Comments:   Home Testing as of 21 *call only when out of range*      Anticoagulation Care Providers     Provider Role Specialty Phone number    Zenia Tinajero MD Referring Cardiology [phone]          Clinic Interview:  Patient Findings     Positives:  Change in diet/appetite    Negatives:  Signs/symptoms of thrombosis, Signs/symptoms of bleeding,   Laboratory test error suspected, Change in health, Change in alcohol use,   Change in activity, Upcoming invasive procedure, Emergency department   visit, Upcoming dental procedure, Missed doses, Extra doses, Change in   medications, Hospital admission, Bruising, Other complaints    Comments:  She has been craving salads this week, thinks that may   decreasing her numbers.       Clinical Outcomes     Negatives:  Major bleeding event, Thromboembolic event,   Anticoagulation-related hospital admission, Anticoagulation-related ED   visit, Anticoagulation-related fatality    Comments:  She has been craving salads this week, thinks that may   decreasing her numbers.         INR History:  Anticoagulation Monitoring 2022   INR 2.50 2.20 1.80   INR Date 2022   INR Goal 2.0-3.0 2.0-3.0 2.0-3.0   Trend Same Same Same   Last Week Total 20 mg 20 mg 20 mg   Next Week Total 20 mg 20 mg 21.25 mg   Sun 2.5 mg  2.5 mg 2.5 mg   Mon 3.75 mg 3.75 mg 3.75 mg   Tue 2.5 mg 2.5 mg 2.5 mg   Wed 3.75 mg 3.75 mg 3.75 mg   Thu 2.5 mg 2.5 mg 3.75 mg (5/5)   Fri 2.5 mg 2.5 mg 2.5 mg   Sat 2.5 mg 2.5 mg 2.5 mg   Visit Report - - -   Some recent data might be hidden       Plan:  1. INR is Subtherapeutic today- see above in Anticoagulation Summary.   Will instruct Shani Phillip to Increase their warfarin regimen- see above in Anticoagulation Summary.  2. Follow up in 1 weeks  3. They have been instructed to call if any changes in medications, doses, concerns, etc. Patient expresses understanding and has no further questions at this time.    Zenia Myers [facility]

## 2022-05-06 RX ORDER — DILTIAZEM HYDROCHLORIDE 360 MG/1
360 CAPSULE, EXTENDED RELEASE ORAL DAILY
Qty: 30 CAPSULE | Refills: 2 | Status: SHIPPED | OUTPATIENT
Start: 2022-05-06 | End: 2022-06-28 | Stop reason: DRUGHIGH

## 2022-05-06 NOTE — TELEPHONE ENCOUNTER
Rx Refill Note  Requested Prescriptions     Pending Prescriptions Disp Refills   • dilTIAZem CD (CARDIZEM CD) 360 MG 24 hr capsule 30 capsule 2     Sig: Take 1 capsule by mouth Daily.      Last office visit with prescribing clinician: 3/30/2022      Next office visit with prescribing clinician: Visit date not found            Laisha Franco MA  05/06/22, 11:40 EDT       Patient requesting medication be sent to [facility] in [facility]. States it will only cost her a little over $100 there using [facility]. [facility] was going to charge her over $400. -- EKH

## 2022-05-12 ENCOUNTER — ANTICOAGULATION VISIT (OUTPATIENT)
Dept: PHARMACY | Facility: HOSPITAL | Age: 72
End: 2022-05-12

## 2022-05-12 LAB — INR PPP: 1.6

## 2022-05-12 NOTE — PROGRESS NOTES
Anticoagulation Clinic Progress Note    Anticoagulation Summary  As of 2022    INR goal:  2.0-3.0   TTR:  58.6 % (1.2 y)   INR used for dosin.60 (2022)   Warfarin maintenance plan:  2.5 mg every Tue, Thu, Sat; 3.75 mg all other days   Weekly warfarin total:  22.5 mg   Plan last modified:  Zenia Myers, PharmD (2022)   Next INR check:  2022   Priority:  High   Target end date:      Indications    PAF (paroxysmal atrial fibrillation) (HCC) (Resolved) [I48.0]             Anticoagulation Episode Summary     INR check location:      Preferred lab:      Send INR reminders to:  [facility] HOME TEST POOL    Comments:   Home Testing as of 21 *call only when out of range*      Anticoagulation Care Providers     Provider Role Specialty Phone number    Zenia Tinajero MD Referring Cardiology [phone]          Clinic Interview:  Patient Findings     Negatives:  Signs/symptoms of thrombosis, Signs/symptoms of bleeding,   Laboratory test error suspected, Change in health, Change in alcohol use,   Change in activity, Upcoming invasive procedure, Emergency department   visit, Upcoming dental procedure, Missed doses, Extra doses, Change in   medications, Change in diet/appetite, Hospital admission, Bruising, Other   complaints      Clinical Outcomes     Negatives:  Major bleeding event, Thromboembolic event,   Anticoagulation-related hospital admission, Anticoagulation-related ED   visit, Anticoagulation-related fatality        INR History:  Anticoagulation Monitoring 2022   INR 2.20 1.80 1.60   INR Date 2022   INR Goal 2.0-3.0 2.0-3.0 2.0-3.0   Trend Same Same Up   Last Week Total 20 mg 20 mg 21.25 mg   Next Week Total 20 mg 21.25 mg 23.75 mg   Sun 2.5 mg 2.5 mg 3.75 mg   Mon 3.75 mg 3.75 mg 3.75 mg   Tue 2.5 mg 2.5 mg 2.5 mg   Wed 3.75 mg 3.75 mg 3.75 mg   Thu 2.5 mg 3.75 mg () 3.75 mg ()   Fri 2.5 mg 2.5 mg 3.75 mg   Sat 2.5 mg 2.5  mg 2.5 mg   Visit Report - - -   Some recent data might be hidden       Plan:  1. INR is Subtherapeutic today- see above in Anticoagulation Summary.   Will instruct Shani Phillip to Change their warfarin regimen- see above in Anticoagulation Summary.  2. Follow up in 1 weeks  3. They have been instructed to call if any changes in medications, doses, concerns, etc. Patient expresses understanding and has no further questions at this time.    Zenia Myers, PharmD

## 2022-05-17 RX ORDER — WARFARIN SODIUM 2.5 MG/1
TABLET ORAL
Qty: 105 TABLET | Refills: 1 | Status: SHIPPED | OUTPATIENT
Start: 2022-05-17 | End: 2022-12-22

## 2022-05-19 ENCOUNTER — ANTICOAGULATION VISIT (OUTPATIENT)
Dept: PHARMACY | Facility: HOSPITAL | Age: 72
End: 2022-05-19

## 2022-05-19 LAB — INR PPP: 2.1

## 2022-05-19 NOTE — PROGRESS NOTES
Anticoagulation Clinic Progress Note    Anticoagulation Summary  As of 2022    INR goal:  2.0-3.0   TTR:  58.0 % (1.2 y)   INR used for dosin.10 (2022)   Warfarin maintenance plan:  2.5 mg every Tue, Sat; 3.75 mg all other days   Weekly warfarin total:  23.75 mg   Plan last modified:  Keshawn Castaneda, PharmD (2022)   Next INR check:  2022   Priority:  High   Target end date:      Indications    PAF (paroxysmal atrial fibrillation) (HCC) (Resolved) [I48.0]             Anticoagulation Episode Summary     INR check location:      Preferred lab:      Send INR reminders to:   [facility] HOME TEST POOL    Comments:   Home Testing as of 21 *call only when out of range*      Anticoagulation Care Providers     Provider Role Specialty Phone number    Zenia Tinajero MD Referring Cardiology [phone]          Clinic Interview:  Patient Findings     Negatives:  Signs/symptoms of thrombosis, Signs/symptoms of bleeding,   Laboratory test error suspected, Change in health, Change in alcohol use,   Change in activity, Upcoming invasive procedure, Emergency department   visit, Upcoming dental procedure, Missed doses, Extra doses, Change in   medications, Change in diet/appetite, Hospital admission, Bruising, Other   complaints      Clinical Outcomes     Negatives:  Major bleeding event, Thromboembolic event,   Anticoagulation-related hospital admission, Anticoagulation-related ED   visit, Anticoagulation-related fatality        INR History:  Anticoagulation Monitoring 2022   INR 1.80 1.60 2.10   INR Date 2022   INR Goal 2.0-3.0 2.0-3.0 2.0-3.0   Trend Same Up Up   Last Week Total 20 mg 21.25 mg 23.75 mg   Next Week Total 21.25 mg 23.75 mg 23.75 mg   Sun 2.5 mg 3.75 mg 3.75 mg   Mon 3.75 mg 3.75 mg 3.75 mg   Tue 2.5 mg 2.5 mg 2.5 mg   Wed 3.75 mg 3.75 mg 3.75 mg   Thu 3.75 mg () 3.75 mg () 3.75 mg   Fri 2.5 mg 3.75 mg 3.75 mg   Sat 2.5 mg 2.5  mg 2.5 mg   Visit Report - - -   Some recent data might be hidden       Plan:  1. INR is Therapeutic today- see above in Anticoagulation Summary.   Will instruct Shani Phillip to Increase their warfarin regimen- see above in Anticoagulation Summary. Increase to 2.5 mg Tues/Sat, 3.75 mg all other days (same weekly dose as taken over past 7 days).  2. Follow up in 1 week  3. They have been instructed to call if any changes in medications, doses, concerns, etc. Patient expresses understanding and has no further questions at this time.    Keshawn Castaneda, PharmD

## 2022-05-25 ENCOUNTER — OFFICE VISIT (OUTPATIENT)
Dept: CARDIOLOGY | Facility: CLINIC | Age: 72
End: 2022-05-25

## 2022-05-25 ENCOUNTER — HOSPITAL ENCOUNTER (OUTPATIENT)
Dept: CARDIOLOGY | Facility: HOSPITAL | Age: 72
Discharge: HOME | End: 2022-05-25
Admitting: NURSE PRACTITIONER

## 2022-05-25 ENCOUNTER — TELEPHONE (OUTPATIENT)
Dept: CARDIOLOGY | Facility: CLINIC | Age: 72
End: 2022-05-25

## 2022-05-25 VITALS
BODY MASS INDEX: 30.21 KG/M2 | SYSTOLIC BLOOD PRESSURE: 150 MMHG | HEIGHT: 61 IN | DIASTOLIC BLOOD PRESSURE: 80 MMHG | WEIGHT: 160 LBS

## 2022-05-25 DIAGNOSIS — I50.31 ACUTE DIASTOLIC CHF (CONGESTIVE HEART FAILURE): ICD-10-CM

## 2022-05-25 DIAGNOSIS — I50.33 ACUTE ON CHRONIC DIASTOLIC CHF (CONGESTIVE HEART FAILURE): ICD-10-CM

## 2022-05-25 DIAGNOSIS — I48.20 ATRIAL FIBRILLATION, CHRONIC: ICD-10-CM

## 2022-05-25 DIAGNOSIS — I48.20 ATRIAL FIBRILLATION, CHRONIC: Primary | ICD-10-CM

## 2022-05-25 LAB
ANION GAP SERPL CALCULATED.3IONS-SCNC: 10 MMOL/L (ref 5–15)
BUN SERPL-MCNC: 17 MG/DL (ref 8–23)
BUN/CREAT SERPL: 17.3 (ref 7–25)
CALCIUM SPEC-SCNC: 9.7 MG/DL (ref 8.6–10.5)
CHLORIDE SERPL-SCNC: 96 MMOL/L (ref 98–107)
CO2 SERPL-SCNC: 31 MMOL/L (ref 22–29)
CREAT SERPL-MCNC: 0.98 MG/DL (ref 0.57–1)
EGFRCR SERPLBLD CKD-EPI 2021: 61.5 ML/MIN/1.73
GLUCOSE SERPL-MCNC: 82 MG/DL (ref 65–99)
POTASSIUM SERPL-SCNC: 3.4 MMOL/L (ref 3.5–5.2)
SODIUM SERPL-SCNC: 137 MMOL/L (ref 136–145)

## 2022-05-25 PROCEDURE — 99214 OFFICE O/P EST MOD 30 MIN: CPT | Performed by: NURSE PRACTITIONER

## 2022-05-25 PROCEDURE — 93000 ELECTROCARDIOGRAM COMPLETE: CPT | Performed by: NURSE PRACTITIONER

## 2022-05-25 PROCEDURE — 80048 BASIC METABOLIC PNL TOTAL CA: CPT | Performed by: NURSE PRACTITIONER

## 2022-05-25 PROCEDURE — 36415 COLL VENOUS BLD VENIPUNCTURE: CPT

## 2022-05-25 NOTE — TELEPHONE ENCOUNTER
Left voicemail for patient.  I will try again tomorrow to discuss her lab results, potassium dosing as well as dosing for Lasix from here on out

## 2022-05-25 NOTE — PROGRESS NOTES
Date of Office Visit: 22  Encounter Provider: GENET Sanchez  Place of Service: [facility] CARDIOLOGY  Patient Name: Shani Phillip  :1950    Chief Complaint   Patient presents with   • Chronic diastolic heart failure    • Atrial fibrillation, chronic    • Congestive Heart Failure   • Shortness of Breath   • Edema   • Follow-up   :     HPI: Shani Phillip is a 71 y.o. female  with chronic atrial fibrillation, mitral valve vegetation, COPD, lower extremity edema, diastolic congestive heart failure, and hypertension.      She is followed by Dr. Tinajero.  I will visit her for the first time have reviewed her medical record.  She has history of mitral valve endocarditis which was treated with antibiotics.  In 2021 she had an echocardiogram which showed normal left ventricular systolic function, grade 2 diastolic dysfunction, moderate calcification of the aortic valve, moderate calcification of the mitral valve and mild mitral valve stenoses.  She had cardioversion 2019 but then had recurrent atrial fibrillation 2022.  Her Lasix was increased to 2 tablets every other day.  She presents today for reassessment.  Her swelling reportedly has not improved with increased Lasix.  Her weight actually remains the same as it was roughly 2 months ago.  She denies chest pain or shortness of breath.  She remains on 3 L of oxygen which is her normal dosing.  She does not exercise.  She checks her blood pressure and heart rate at home and reports her heart rate is mostly less than 100 bpm and her blood pressure is usually 130-140 systolic.  She no longer has weeping in her legs which shows improvement.      Allergies   Allergen Reactions   • Penicillins Rash     RASH 30 YRS AGO NO HOSPITALIZATION           Family and social history reviewed.     Review of Systems   Cardiovascular: Positive for dyspnea on exertion and leg swelling.   Respiratory: Positive for wheezing.   "    All other systems were reviewed and are negative          Objective:     Vitals:    05/25/22 1343   BP: 150/80   BP Location: Left arm   Patient Position: Sitting   Weight: 72.6 kg (160 lb)   Height: 154.9 cm (61\")     Body mass index is 30.23 kg/m².    PHYSICAL EXAM:  Pulmonary:      Breath sounds: Examination of the right-lower field reveals decreased breath sounds. Examination of the left-lower field reveals decreased breath sounds. Decreased breath sounds present. Wheezing present.      Comments: Scattered wheeze expiratory   Cardiovascular:      Irregularly irregular rhythm.      Murmurs: There is a grade 2/6 mid frequency systolic murmur at the LLSB.      Comments: No more weeping  Edema:     Pretibial: bilateral 1+ edema of the pretibial area.     Ankle: bilateral 1+ edema of the ankle.          ECG 12 Lead    Date/Time: 5/25/2022 2:37 PM  Performed by: Angy Smith APRN  Authorized by: Angy Smith APRN   Comparison: compared with previous ECG   Similar to previous ECG  Rhythm: atrial fibrillation  Rate: tachycardic    Clinical impression: abnormal EKG              Current Outpatient Medications   Medication Sig Dispense Refill   • budesonide (PULMICORT) 0.5 MG/2ML nebulizer solution Take 0.5 mg by nebulization Daily.     • buPROPion XL (WELLBUTRIN XL) 300 MG 24 hr tablet Take 1 tablet by mouth Daily. 30 tablet 5   • cholecalciferol (VITAMIN D3) 25 MCG (1000 UT) tablet Take 2,000 Units by mouth Daily.     • cyclobenzaprine (FLEXERIL) 10 MG tablet Take 1 tablet by mouth 3 (Three) Times a Day As Needed (back pain). 40 tablet 1   • dilTIAZem CD (CARDIZEM CD) 360 MG 24 hr capsule Take 1 capsule by mouth Daily. 30 capsule 2   • famotidine (PEPCID) 20 MG tablet Take 1 tablet by mouth 2 (Two) Times a Day Before Meals. 180 tablet 3   • furosemide (LASIX) 40 MG tablet Take 1 tablet by mouth Daily. 90 tablet 3   • HYDROcodone-acetaminophen (NORCO) 7.5-325 MG per tablet Take 1 tablet by mouth Every 6 (Six) " Hours As Needed for Moderate Pain . 20 tablet 0   • O2 (OXYGEN) Inhale 2 L/min 1 (One) Time.     • potassium chloride ER (K-TAB) 20 MEQ tablet controlled-release ER tablet Take 1 tablet by mouth Daily. 90 tablet 3   • rOPINIRole (REQUIP) 3 MG tablet Take 3 mg by mouth 3 (Three) Times a Day.     • TROSPIUM CHLORIDE PO Take 20 mg by mouth 2 (Two) Times a Day.     • Ventolin  (90 Base) MCG/ACT inhaler Inhale 2 puffs 4 (Four) Times a Day.     • warfarin (COUMADIN) 2.5 MG tablet Take 2.5mg (1 Tablet) on Tues, Thursday and Saturday; Take 3.75mg (1 and 1/2 Tablets) on all other days OR as directed by the [facility]. 105 tablet 1     No current facility-administered medications for this visit.     Assessment:       Diagnosis Plan   1. Atrial fibrillation, chronic (HCC)  Basic Metabolic Panel    ECG 12 Lead   2. Acute diastolic CHF (congestive heart failure) (HCC)  Basic Metabolic Panel        Orders Placed This Encounter   Procedures   • Basic Metabolic Panel     Standing Status:   Future     Number of Occurrences:   1     Standing Expiration Date:   5/25/2023     Order Specific Question:   Release to patient     Answer:   Immediate   • ECG 12 Lead     This order was created via procedure documentation     Order Specific Question:   Release to patient     Answer:   Immediate         Plan:       1.  71-year-old female with chronic atrial fibrillation.  Anticoagulated with warfarin.  She had a cardioversion November 2019.  She remains in atrial fibrillation.  She is following her heart rate at home.  Believe the higher dose of diltiazem is causing increased lower extremity edema.  Renal function today is stable despite mildly low potassium.  See plan below/ follow up telephone encounter  2.  History of mitral valve vegetation in 2019 treated with antibiotics.  Last echo June 2021 showed no evidence of vegetation.  She has mild mitral valve stenoses  3.  COPD on home oxygen-avoid beta-blockers  4.  Grade 2  diastolic dysfunction with lower extremity edema and chronic diastolic heart failure.  She is on Lasix.  She is alternating between 40 mg and 80 mg every other day with 20 mg and alternating 40 mg respectively.  As above I believe higher dose diltiazem is contributing to lower extremity swelling.  We will try furosemide 80 mg daily with 40 M EQ potassium daily and see her back in 2 weeks.  5.  No evidence of obstructive coronary artery disease on cardiac cath June 2020  See me again in 2 weeks.  May need to consider lowering diltiazem and initiating digoxin            It has been a pleasure to participate in this patient's care.      Thank you,  GENET Sanchez      **I used Dragon to dictate this note:**

## 2022-05-26 ENCOUNTER — ANTICOAGULATION VISIT (OUTPATIENT)
Dept: PHARMACY | Facility: HOSPITAL | Age: 72
End: 2022-05-26

## 2022-05-26 LAB — INR PPP: 2.4

## 2022-05-26 PROCEDURE — G0249 PROVIDE INR TEST MATER/EQUIP: HCPCS

## 2022-05-26 RX ORDER — POTASSIUM CHLORIDE 1500 MG/1
20 TABLET, FILM COATED, EXTENDED RELEASE ORAL 2 TIMES DAILY
Qty: 180 TABLET | Refills: 2
Start: 2022-05-26 | End: 2022-07-25 | Stop reason: SDUPTHER

## 2022-05-26 RX ORDER — FUROSEMIDE 40 MG/1
40 TABLET ORAL 2 TIMES DAILY
Qty: 180 TABLET | Refills: 1
Start: 2022-05-26 | End: 2023-04-03

## 2022-05-26 NOTE — TELEPHONE ENCOUNTER
Called and spoke with patient.  Reviewed labs and low potassium.  She will increase Lasix to 2 tablets daily as well as potassium will be increased to 2 tablets daily.  We will plan to recheck renal function in 2 weeks    Please call to schedule II week follow-up with me

## 2022-05-26 NOTE — PROGRESS NOTES
Anticoagulation Clinic Progress Note    Anticoagulation Summary  As of 2022    INR goal:  2.0-3.0   TTR:  58.8 % (1.2 y)   INR used for dosin.40 (2022)   Warfarin maintenance plan:  2.5 mg every Tue, Sat; 3.75 mg all other days   Weekly warfarin total:  23.75 mg   No change documented:  Zenia Myers PharmD   Plan last modified:  Keshawn Castaneda PharmD (2022)   Next INR check:  2022   Priority:  High   Target end date:      Indications    PAF (paroxysmal atrial fibrillation) (HCC) (Resolved) [I48.0]             Anticoagulation Episode Summary     INR check location:      Preferred lab:      Send INR reminders to:   [facility] HOME TEST POOL    Comments:   Home Testing as of 21 *call only when out of range*      Anticoagulation Care Providers     Provider Role Specialty Phone number    Zenia Tinajero MD Referring Cardiology [phone]          Clinic Interview:  Patient Findings     Negatives:  Signs/symptoms of thrombosis, Signs/symptoms of bleeding,   Laboratory test error suspected, Change in health, Change in alcohol use,   Change in activity, Upcoming invasive procedure, Emergency department   visit, Upcoming dental procedure, Missed doses, Extra doses, Change in   medications, Change in diet/appetite, Hospital admission, Bruising, Other   complaints      Clinical Outcomes     Negatives:  Major bleeding event, Thromboembolic event,   Anticoagulation-related hospital admission, Anticoagulation-related ED   visit, Anticoagulation-related fatality        INR History:  Anticoagulation Monitoring 2022   INR 1.60 2.10 2.40   INR Date 2022   INR Goal 2.0-3.0 2.0-3.0 2.0-3.0   Trend Up Up Same   Last Week Total 21.25 mg 23.75 mg 23.75 mg   Next Week Total 23.75 mg 23.75 mg 23.75 mg   Sun 3.75 mg 3.75 mg 3.75 mg   Mon 3.75 mg 3.75 mg 3.75 mg   Tue 2.5 mg 2.5 mg 2.5 mg   Wed 3.75 mg 3.75 mg 3.75 mg   Thu 3.75 mg () 3.75 mg 3.75  mg   Fri 3.75 mg 3.75 mg 3.75 mg   Sat 2.5 mg 2.5 mg 2.5 mg   Visit Report - - -   Some recent data might be hidden       Plan:  1. INR is Therapeutic today- see above in Anticoagulation Summary.   Will instruct Shani Phillip to Continue their warfarin regimen- see above in Anticoagulation Summary.  2. Follow up in 1 weeks  3.They have been instructed to call if any changes in medications, doses, concerns, etc. Patient expresses understanding and has no further questions at this time.    Zenia Myers, PharmD

## 2022-06-02 ENCOUNTER — ANTICOAGULATION VISIT (OUTPATIENT)
Dept: PHARMACY | Facility: HOSPITAL | Age: 72
End: 2022-06-02

## 2022-06-02 LAB — INR PPP: 2.7

## 2022-06-02 NOTE — PROGRESS NOTES
Anticoagulation Clinic Progress Note    Anticoagulation Summary  As of 2022    INR goal:  2.0-3.0   TTR:  59.4 % (1.2 y)   INR used for dosin.70 (2022)   Warfarin maintenance plan:  2.5 mg every Tue, Sat; 3.75 mg all other days   Weekly warfarin total:  23.75 mg   No change documented:  Marleny Black RPH   Plan last modified:  Keshawn Castaneda, PharmD (2022)   Next INR check:  2022   Priority:  High   Target end date:      Indications    PAF (paroxysmal atrial fibrillation) (HCC) (Resolved) [I48.0]             Anticoagulation Episode Summary     INR check location:      Preferred lab:      Send INR reminders to:   [facility] HOME TEST POOL    Comments:   Home Testing as of 21 *call only when out of range*      Anticoagulation Care Providers     Provider Role Specialty Phone number    Zenia Tinajero MD Referring Cardiology [phone]          Clinic Interview:  No pertinent clinical findings have been reported.    INR History:  Anticoagulation Monitoring 2022   INR 2.10 2.40 2.70   INR Date 2022   INR Goal 2.0-3.0 2.0-3.0 2.0-3.0   Trend Up Same Same   Last Week Total 23.75 mg 23.75 mg 23.75 mg   Next Week Total 23.75 mg 23.75 mg 23.75 mg   Sun 3.75 mg 3.75 mg 3.75 mg   Mon 3.75 mg 3.75 mg 3.75 mg   Tue 2.5 mg 2.5 mg 2.5 mg   Wed 3.75 mg 3.75 mg 3.75 mg   Thu 3.75 mg 3.75 mg 3.75 mg   Fri 3.75 mg 3.75 mg 3.75 mg   Sat 2.5 mg 2.5 mg 2.5 mg   Visit Report - - -   Some recent data might be hidden       Plan:  1. INR is therapeutic today- see above in Anticoagulation Summary.    Shani Kenji to continue their warfarin regimen- see above in Anticoagulation Summary.  2. Follow up in 1 week  3. Pt has agreed to only be called if INR out of range. They have been instructed to call if any changes in medications, doses, concerns, etc. Patient expresses understanding and has no further questions at this time.    Marleny Black, [facility]

## 2022-06-09 ENCOUNTER — ANTICOAGULATION VISIT (OUTPATIENT)
Dept: PHARMACY | Facility: HOSPITAL | Age: 72
End: 2022-06-09

## 2022-06-09 LAB — INR PPP: 2.1

## 2022-06-09 NOTE — PROGRESS NOTES
Anticoagulation Clinic Progress Note    Anticoagulation Summary  As of 2022    INR goal:  2.0-3.0   TTR:  60.1 % (1.3 y)   INR used for dosin.10 (2022)   Warfarin maintenance plan:  2.5 mg every Tue, Sat; 3.75 mg all other days   Weekly warfarin total:  23.75 mg   Plan last modified:  Keshawn Castaneda, PharmD (2022)   Next INR check:  2022   Priority:  High   Target end date:      Indications    PAF (paroxysmal atrial fibrillation) (HCC) (Resolved) [I48.0]             Anticoagulation Episode Summary     INR check location:      Preferred lab:      Send INR reminders to:   [facility] [employer] HOME TEST POOL    Comments:   Home Testing as of 21 *call only when out of range*      Anticoagulation Care Providers     Provider Role Specialty Phone number    Zenia Tinajero MD Referring Cardiology [phone]          Clinic Interview:  Patient Findings     Negatives:  Signs/symptoms of thrombosis, Signs/symptoms of bleeding,   Laboratory test error suspected, Change in health, Change in alcohol use,   Change in activity, Upcoming invasive procedure, Emergency department   visit, Upcoming dental procedure, Missed doses, Extra doses, Change in   medications, Change in diet/appetite, Hospital admission, Bruising, Other   complaints      Clinical Outcomes     Negatives:  Major bleeding event, Thromboembolic event,   Anticoagulation-related hospital admission, Anticoagulation-related ED   visit, Anticoagulation-related fatality        INR History:  Anticoagulation Monitoring 2022   INR 2.40 2.70 2.10   INR Date 2022   INR Goal 2.0-3.0 2.0-3.0 2.0-3.0   Trend Same Same Same   Last Week Total 23.75 mg 23.75 mg 23.75 mg   Next Week Total 23.75 mg 23.75 mg 23.75 mg   Sun 3.75 mg 3.75 mg 3.75 mg   Mon 3.75 mg 3.75 mg 3.75 mg   Tue 2.5 mg 2.5 mg 2.5 mg   Wed 3.75 mg 3.75 mg 3.75 mg   Thu 3.75 mg 3.75 mg 3.75 mg   Fri 3.75 mg 3.75 mg 3.75 mg   Sat 2.5 mg 2.5 mg 2.5  mg   Visit Report - - -   Some recent data might be hidden       Plan:  1. INR is Therapeutic today- see above in Anticoagulation Summary.   Will instruct Shani Phillip to Continue their warfarin regimen- see above in Anticoagulation Summary.  2. Follow up in 1 weeks  3. Pt has agreed to only be called if INR out of range. They have been instructed to call if any changes in medications, doses, concerns, etc. Patient expresses understanding and has no further questions at this time.    Zenia Myers, PharmD

## 2022-06-15 ENCOUNTER — HOSPITAL ENCOUNTER (OUTPATIENT)
Dept: CARDIOLOGY | Facility: HOSPITAL | Age: 72
Discharge: HOME | End: 2022-06-15
Admitting: NURSE PRACTITIONER

## 2022-06-15 ENCOUNTER — OFFICE VISIT (OUTPATIENT)
Dept: CARDIOLOGY | Facility: CLINIC | Age: 72
End: 2022-06-15

## 2022-06-15 ENCOUNTER — TELEPHONE (OUTPATIENT)
Dept: CARDIOLOGY | Facility: CLINIC | Age: 72
End: 2022-06-15

## 2022-06-15 VITALS
SYSTOLIC BLOOD PRESSURE: 121 MMHG | BODY MASS INDEX: 29.68 KG/M2 | WEIGHT: 157.2 LBS | HEART RATE: 112 BPM | HEIGHT: 61 IN | DIASTOLIC BLOOD PRESSURE: 60 MMHG

## 2022-06-15 DIAGNOSIS — I50.31 ACUTE DIASTOLIC CHF (CONGESTIVE HEART FAILURE): Primary | ICD-10-CM

## 2022-06-15 DIAGNOSIS — I48.91 UNCONTROLLED ATRIAL FIBRILLATION: Primary | ICD-10-CM

## 2022-06-15 DIAGNOSIS — Z51.81 ENCOUNTER FOR THERAPEUTIC DRUG LEVEL MONITORING: ICD-10-CM

## 2022-06-15 DIAGNOSIS — I50.31 ACUTE DIASTOLIC CHF (CONGESTIVE HEART FAILURE): ICD-10-CM

## 2022-06-15 DIAGNOSIS — J44.9 CHRONIC OBSTRUCTIVE PULMONARY DISEASE, UNSPECIFIED COPD TYPE: ICD-10-CM

## 2022-06-15 DIAGNOSIS — R06.09 DOE (DYSPNEA ON EXERTION): ICD-10-CM

## 2022-06-15 LAB
ANION GAP SERPL CALCULATED.3IONS-SCNC: 11 MMOL/L (ref 5–15)
BUN SERPL-MCNC: 15 MG/DL (ref 8–23)
BUN/CREAT SERPL: 13.9 (ref 7–25)
CALCIUM SPEC-SCNC: 9.3 MG/DL (ref 8.6–10.5)
CHLORIDE SERPL-SCNC: 98 MMOL/L (ref 98–107)
CO2 SERPL-SCNC: 27 MMOL/L (ref 22–29)
CREAT SERPL-MCNC: 1.08 MG/DL (ref 0.57–1)
EGFRCR SERPLBLD CKD-EPI 2021: 54.7 ML/MIN/1.73
GLUCOSE SERPL-MCNC: 83 MG/DL (ref 65–99)
NT-PROBNP SERPL-MCNC: 856 PG/ML (ref 0–900)
POTASSIUM SERPL-SCNC: 3.9 MMOL/L (ref 3.5–5.2)
SODIUM SERPL-SCNC: 136 MMOL/L (ref 136–145)

## 2022-06-15 PROCEDURE — 93000 ELECTROCARDIOGRAM COMPLETE: CPT | Performed by: NURSE PRACTITIONER

## 2022-06-15 PROCEDURE — 99214 OFFICE O/P EST MOD 30 MIN: CPT | Performed by: NURSE PRACTITIONER

## 2022-06-15 PROCEDURE — 83880 ASSAY OF NATRIURETIC PEPTIDE: CPT | Performed by: NURSE PRACTITIONER

## 2022-06-15 PROCEDURE — 36415 COLL VENOUS BLD VENIPUNCTURE: CPT

## 2022-06-15 PROCEDURE — 80048 BASIC METABOLIC PNL TOTAL CA: CPT | Performed by: NURSE PRACTITIONER

## 2022-06-15 RX ORDER — DIGOXIN 125 MCG
TABLET ORAL
Qty: 30 TABLET | Refills: 11 | Status: SHIPPED | OUTPATIENT
Start: 2022-06-15 | End: 2022-06-28 | Stop reason: SDUPTHER

## 2022-06-15 RX ORDER — DILTIAZEM HYDROCHLORIDE 180 MG/1
180 CAPSULE, EXTENDED RELEASE ORAL DAILY
Qty: 30 CAPSULE | Refills: 11 | Status: SHIPPED | OUTPATIENT
Start: 2022-06-15 | End: 2022-06-28 | Stop reason: SDUPTHER

## 2022-06-15 NOTE — PROGRESS NOTES
"Date of Office Visit: 06/15/22  Encounter Provider: GENET Sanchez  Place of Service: [facility] CARDIOLOGY  Patient Name: Shani Phillip  :1950    Chief Complaint   Patient presents with   • Leg Swelling     Both ,mostly more in left them her right   • Shortness of Breath   • Follow-up   :     HPI: Shani Phillip is a 71 y.o. female  with chronic atrial fibrillation, mitral valve vegetation, COPD, lower extremity edema, diastolic congestive heart failure, and hypertension.        She is followed by Dr. Tinajero.  I will visit her for the first time have reviewed her medical record.  She has history of mitral valve endocarditis which was treated with antibiotics.  In 2021 she had an echocardiogram which showed normal left ventricular systolic function, grade 2 diastolic dysfunction, moderate calcification of the aortic valve, moderate calcification of the mitral valve and mild mitral valve stenoses.  She had cardioversion 2019 but then had recurrent atrial fibrillation 2022.  Her Lasix was increased to 40 mg twice daily in late 2022 due to continued swelling.     She presents today in follow up. Her heart rate at home has been mainly 90 - low 100s. She continues to complain that despite increased lasix, swelling remains the same. She has no weeping. No chest pain. Unchanged shortness of breath. She has wheezing which is now chronic.                   Allergies   Allergen Reactions   • Penicillins Rash     RASH 30 YRS AGO NO HOSPITALIZATION           Family and social history reviewed.     ROS  All other systems were reviewed and are negative          Objective:     Vitals:    06/15/22 1311   BP: 121/60   BP Location: Left arm   Patient Position: Sitting   Cuff Size: Adult   Pulse: 112   Weight: 71.3 kg (157 lb 3.2 oz)   Height: 154.9 cm (61\")     Body mass index is 29.7 kg/m².    PHYSICAL EXAM:  Pulmonary:      Breath sounds: Examination of the right-lower " field reveals decreased breath sounds. Examination of the left-lower field reveals decreased breath sounds. Decreased breath sounds present. Wheezing present.      Comments: Expiratory wheezing  Oxygen in place  Cardiovascular:      Tachycardia present. Irregularly irregular rhythm.      Comments: Cap refill toes > 3 secs  Edema:     Ankle: bilateral 1+ edema of the ankle.     Feet: bilateral 1+ edema of the feet.          ECG 12 Lead    Date/Time: 6/15/2022 1:23 PM  Performed by: Angy Smith APRN  Authorized by: Angy Smith APRN   Comparison: compared with previous ECG   Similar to previous ECG  Rhythm: atrial fibrillation  Rate: tachycardic    Clinical impression: abnormal EKG              Current Outpatient Medications   Medication Sig Dispense Refill   • budesonide (PULMICORT) 0.5 MG/2ML nebulizer solution Take 0.5 mg by nebulization Daily.     • buPROPion XL (WELLBUTRIN XL) 300 MG 24 hr tablet Take 1 tablet by mouth Daily. 30 tablet 5   • cholecalciferol (VITAMIN D3) 25 MCG (1000 UT) tablet Take 2,000 Units by mouth Daily.     • cyclobenzaprine (FLEXERIL) 10 MG tablet Take 1 tablet by mouth 3 (Three) Times a Day As Needed (back pain). 40 tablet 1   • dilTIAZem CD (CARDIZEM CD) 360 MG 24 hr capsule Take 1 capsule by mouth Daily. 30 capsule 2   • famotidine (PEPCID) 20 MG tablet Take 1 tablet by mouth 2 (Two) Times a Day Before Meals. 180 tablet 3   • furosemide (LASIX) 40 MG tablet Take 1 tablet by mouth 2 (Two) Times a Day. 180 tablet 1   • HYDROcodone-acetaminophen (NORCO) 7.5-325 MG per tablet Take 1 tablet by mouth Every 6 (Six) Hours As Needed for Moderate Pain . 20 tablet 0   • O2 (OXYGEN) Inhale 2 L/min 1 (One) Time.     • potassium chloride ER (K-TAB) 20 MEQ tablet controlled-release ER tablet Take 1 tablet by mouth 2 (Two) Times a Day. 180 tablet 2   • rOPINIRole (REQUIP) 3 MG tablet Take 3 mg by mouth 3 (Three) Times a Day.     • TROSPIUM CHLORIDE PO Take 20 mg by mouth 2 (Two) Times a Day.     •  Ventolin  (90 Base) MCG/ACT inhaler Inhale 2 puffs 4 (Four) Times a Day.     • warfarin (COUMADIN) 2.5 MG tablet Take 2.5mg (1 Tablet) on Tues, Thursday and Saturday; Take 3.75mg (1 and 1/2 Tablets) on all other days OR as directed by the [facility]. 105 tablet 1   • digoxin (LANOXIN) 125 MCG tablet TAke 2 tablets for the first 3 days then one tablet daily. 30 tablet 11   • dilTIAZem XR (DILACOR XR) 180 MG 24 hr capsule Take 1 capsule by mouth Daily. 30 capsule 11     No current facility-administered medications for this visit.     Assessment:       Diagnosis Plan   1. Uncontrolled atrial fibrillation (HCC)  ECG 12 Lead    Basic Metabolic Panel   2. JARVIS (dyspnea on exertion)  proBNP   3. Chronic obstructive pulmonary disease, unspecified COPD type (HCC)          Orders Placed This Encounter   Procedures   • Basic Metabolic Panel     Standing Status:   Future     Standing Expiration Date:   6/15/2023     Order Specific Question:   Release to patient     Answer:   Immediate   • proBNP     Order Specific Question:   Release to patient     Answer:   Immediate   • ECG 12 Lead     This order was created via procedure documentation     Order Specific Question:   Release to patient     Answer:   Immediate         Plan:           1.  71-year-old female with chronic atrial fibrillation.  Anticoagulated with warfarin.  She had a cardioversion November 2019 but now was predominately atrial fibrillation 93 % of the time on 2 week mobile teletry 03/2022 with average HR 86. Concerned some swelling is result of higher dose diltiazem I will decrease dose to 180 mg daily and add digoxin. She will see me again in 2 weeks to follow up and track BP and pulse at home   2.  History of mitral valve vegetation in 2019 treated with antibiotics.  Last echo June 2021 showed no evidence of vegetation.  She has mild mitral valve stenoses  3.  COPD on home oxygen-avoid beta-blockers  4.  Grade 2 diastolic dysfunction with lower  extremity edema and chronic diastolic heart failure.  She is on Lasix 40 mg BID. Will check renal function today and if stable continue current dose  5.  No evidence of obstructive coronary artery disease on cardiac cath June 2020  6. JARVIS- likely COPD but will check PRoBNP  See me again in 2 weeks.               It has been a pleasure to participate in this patient's care.      Thank you,  GENET Sanchez      **I used Dragon to dictate this note:**

## 2022-06-15 NOTE — TELEPHONE ENCOUNTER
Please inform patient probnp normal and kidney function and potassium are ok on current regimen. I want her to continue lasix 40 twice daily including potassium as she is taking them. She needs to start new diltiazem dose and digoxin which is completely new and see me as scheduled in 2 weeks.

## 2022-06-16 ENCOUNTER — ANTICOAGULATION VISIT (OUTPATIENT)
Dept: PHARMACY | Facility: HOSPITAL | Age: 72
End: 2022-06-16

## 2022-06-16 LAB — INR PPP: 2.8

## 2022-06-16 NOTE — TELEPHONE ENCOUNTER
Reviewed results and recommendations with Shani Phillip.  Patient verbalized understanding of results and recommendations, with repeat back.    Angy,  Did you want the patient to have the BMP drawn before her appointment on the 28th?      Thank you,  Tawanna Hyde RN  Triage Nurse [facility]

## 2022-06-23 ENCOUNTER — ANTICOAGULATION VISIT (OUTPATIENT)
Dept: PHARMACY | Facility: HOSPITAL | Age: 72
End: 2022-06-23

## 2022-06-23 LAB — INR PPP: 2.1

## 2022-06-23 PROCEDURE — G0249 PROVIDE INR TEST MATER/EQUIP: HCPCS

## 2022-06-23 NOTE — PROGRESS NOTES
Anticoagulation Clinic Progress Note    Anticoagulation Summary  As of 2022    INR goal:  2.0-3.0   TTR:  61.2 % (1.3 y)   INR used for dosin.10 (2022)   Warfarin maintenance plan:  2.5 mg every Tue, Sat; 3.75 mg all other days   Weekly warfarin total:  23.75 mg   No change documented:  Keshawn Castaneda, Fahad   Plan last modified:  Keshawn Castaneda PharmD (2022)   Next INR check:  2022   Priority:  High   Target end date:      Indications    PAF (paroxysmal atrial fibrillation) (HCC) (Resolved) [I48.0]             Anticoagulation Episode Summary     INR check location:      Preferred lab:      Send INR reminders to:   [facility] HOME TEST POOL    Comments:   Home Testing as of 21 *call only when out of range*      Anticoagulation Care Providers     Provider Role Specialty Phone number    Zenia Tinajero MD Referring Cardiology [phone]          Clinic Interview:  No pertinent clinical findings have been reported.    INR History:  Anticoagulation Monitoring 2022   INR 2.10 2.80 2.10   INR Date 2022   INR Goal 2.0-3.0 2.0-3.0 2.0-3.0   Trend Same Same Same   Last Week Total 23.75 mg 23.75 mg 23.75 mg   Next Week Total 23.75 mg 23.75 mg 23.75 mg   Sun 3.75 mg 3.75 mg 3.75 mg   Mon 3.75 mg 3.75 mg 3.75 mg   Tue 2.5 mg 2.5 mg 2.5 mg   Wed 3.75 mg 3.75 mg 3.75 mg   Thu 3.75 mg 3.75 mg 3.75 mg   Fri 3.75 mg 3.75 mg 3.75 mg   Sat 2.5 mg 2.5 mg 2.5 mg   Visit Report - - -   Some recent data might be hidden       Plan:  1. INR is therapeutic today- see above in Anticoagulation Summary.    Shani Kenji to continue their warfarin regimen- see above in Anticoagulation Summary.  2. Follow up in 1 week  3. Pt has agreed to only be called if INR out of range. They have been instructed to call if any changes in medications, doses, concerns, etc. Patient expresses understanding and has no further questions at this time.    Keshawn Castaneda, PharmD

## 2022-06-28 ENCOUNTER — OFFICE VISIT (OUTPATIENT)
Dept: CARDIOLOGY | Facility: CLINIC | Age: 72
End: 2022-06-28

## 2022-06-28 VITALS
DIASTOLIC BLOOD PRESSURE: 66 MMHG | HEART RATE: 60 BPM | BODY MASS INDEX: 28.7 KG/M2 | SYSTOLIC BLOOD PRESSURE: 140 MMHG | HEIGHT: 61 IN | WEIGHT: 152 LBS

## 2022-06-28 DIAGNOSIS — R07.89 CHEST PAIN, ATYPICAL: ICD-10-CM

## 2022-06-28 DIAGNOSIS — I25.810 CORONARY ARTERY DISEASE INVOLVING CORONARY BYPASS GRAFT OF NATIVE HEART WITHOUT ANGINA PECTORIS: ICD-10-CM

## 2022-06-28 DIAGNOSIS — I25.708 CORONARY ARTERY DISEASE INVOLVING CORONARY BYPASS GRAFT OF NATIVE HEART WITH OTHER FORMS OF ANGINA PECTORIS: ICD-10-CM

## 2022-06-28 DIAGNOSIS — I48.91 UNCONTROLLED ATRIAL FIBRILLATION: Primary | ICD-10-CM

## 2022-06-28 PROCEDURE — 93000 ELECTROCARDIOGRAM COMPLETE: CPT | Performed by: NURSE PRACTITIONER

## 2022-06-28 PROCEDURE — 99214 OFFICE O/P EST MOD 30 MIN: CPT | Performed by: NURSE PRACTITIONER

## 2022-06-28 RX ORDER — DIGOXIN 125 MCG
125 TABLET ORAL DAILY
Qty: 90 TABLET | Refills: 2 | Status: SHIPPED | OUTPATIENT
Start: 2022-06-28 | End: 2023-04-03

## 2022-06-28 RX ORDER — DILTIAZEM HYDROCHLORIDE 180 MG/1
180 CAPSULE, EXTENDED RELEASE ORAL DAILY
Qty: 90 CAPSULE | Refills: 3 | Status: SHIPPED | OUTPATIENT
Start: 2022-06-28 | End: 2023-01-12

## 2022-06-28 RX ORDER — ROPINIROLE 4 MG/1
4 TABLET, FILM COATED ORAL 3 TIMES DAILY
Qty: 90 TABLET | Refills: 5 | Status: SHIPPED | OUTPATIENT
Start: 2022-06-28 | End: 2022-09-20

## 2022-06-28 NOTE — PROGRESS NOTES
Date of Office Visit: 22  Encounter Provider: GENET Sanchez  Place of Service: [facility] CARDIOLOGY  Patient Name: Shani Phillip  :1950    Chief Complaint   Patient presents with   • Atrial Fibrillation   • Congestive Heart Failure   • Follow-up   :     HPI: Shani Phillip is a 71 y.o. female  with chronic atrial fibrillation, mitral valve vegetation, COPD, lower extremity edema, diastolic congestive heart failure, and hypertension.        She is followed by Dr. Tinajero.  I will visit her for the first time have reviewed her medical record.  She has history of mitral valve endocarditis which was treated with antibiotics.  In 2021 she had an echocardiogram which showed normal left ventricular systolic function, grade 2 diastolic dysfunction, moderate calcification of the aortic valve, moderate calcification of the mitral valve and mild mitral valve stenoses.  She had cardioversion 2019 but then had recurrent atrial fibrillation 2022.  Her Lasix was increased to 40 mg twice daily in late 2022 due to continued swelling.  Her swelling did not significantly initially change with that it was felt that higher dose diltiazem was contributing.  I lowered diltiazem to 180 mg daily and started digoxin with a load x3 days.    She presents today for 2-week follow-up.  Her swelling has improved on lower dose diltiazem.  She brought a list of her blood pressure readings which are anywhere from 113-123/60-80 with pulse most often 80-90.  On Saturday morning she woke up with chest pain her blood pressure was 180/.  She does not recall what she ate that night.  It has not happened since.  She denies near-syncope or syncope.          Allergies   Allergen Reactions   • Penicillins Rash     RASH 30 YRS AGO NO HOSPITALIZATION           Family and social history reviewed.     ROS  All other systems were reviewed and are negative          Objective:     Vitals:     "06/28/22 1209   BP: 140/66   BP Location: Left arm   Patient Position: Sitting   Pulse: 60   Weight: 68.9 kg (152 lb)   Height: 154.9 cm (61\")     Body mass index is 28.72 kg/m².    PHYSICAL EXAM:  Cardiovascular:      Normal rate. Irregularly irregular rhythm.           ECG 12 Lead    Date/Time: 6/28/2022 1:19 PM  Performed by: Angy Smith APRN  Authorized by: Angy Smith APRN   Comparison: compared with previous ECG   Similar to previous ECG  Rhythm: atrial fibrillation  Conduction: incomplete right bundle branch block and left anterior fascicular block    Clinical impression: abnormal EKG              Current Outpatient Medications   Medication Sig Dispense Refill   • budesonide (PULMICORT) 0.5 MG/2ML nebulizer solution Take 0.5 mg by nebulization Daily.     • buPROPion XL (WELLBUTRIN XL) 300 MG 24 hr tablet Take 1 tablet by mouth Daily. 30 tablet 5   • cholecalciferol (VITAMIN D3) 25 MCG (1000 UT) tablet Take 2,000 Units by mouth Daily.     • cyclobenzaprine (FLEXERIL) 10 MG tablet Take 1 tablet by mouth 3 (Three) Times a Day As Needed (back pain). 40 tablet 1   • digoxin (LANOXIN) 125 MCG tablet Take 1 tablet by mouth Daily. 90 tablet 2   • dilTIAZem XR (DILACOR XR) 180 MG 24 hr capsule Take 1 capsule by mouth Daily. 90 capsule 3   • famotidine (PEPCID) 20 MG tablet Take 1 tablet by mouth 2 (Two) Times a Day Before Meals. 180 tablet 3   • furosemide (LASIX) 40 MG tablet Take 1 tablet by mouth 2 (Two) Times a Day. 180 tablet 1   • HYDROcodone-acetaminophen (NORCO) 7.5-325 MG per tablet Take 1 tablet by mouth Every 6 (Six) Hours As Needed for Moderate Pain . 20 tablet 0   • O2 (OXYGEN) Inhale 2 L/min 1 (One) Time.     • potassium chloride ER (K-TAB) 20 MEQ tablet controlled-release ER tablet Take 1 tablet by mouth 2 (Two) Times a Day. 180 tablet 2   • rOPINIRole (REQUIP) 4 MG tablet Take 1 tablet by mouth 3 (Three) Times a Day. 90 tablet 5   • TROSPIUM CHLORIDE PO Take 20 mg by mouth 2 (Two) Times a Day.   "   • Ventolin  (90 Base) MCG/ACT inhaler Inhale 2 puffs 4 (Four) Times a Day.     • warfarin (COUMADIN) 2.5 MG tablet Take 2.5mg (1 Tablet) on Tues, Thursday and Saturday; Take 3.75mg (1 and 1/2 Tablets) on all other days OR as directed by the [facility]. 105 tablet 1     No current facility-administered medications for this visit.     Assessment:       Diagnosis Plan   1. Uncontrolled atrial fibrillation (HCC)     2. Coronary artery disease involving coronary bypass graft of native heart without angina pectoris  Stress Test With Myocardial Perfusion One Day   3. Chest pain, atypical  Stress Test With Myocardial Perfusion One Day        Orders Placed This Encounter   Procedures   • Stress Test With Myocardial Perfusion One Day     Standing Status:   Future     Standing Expiration Date:   6/28/2023     Order Specific Question:   What stress agent will be used?     Answer:   Regadenoson (Lexiscan)     Order Specific Question:   Difficulty walking criteria?     Answer:   Poor Exercise Tolerance     Order Specific Question:   Difficulty walking criteria?     Answer:   Severe COPD O2 dependence, CHF, Dyspnea     Order Specific Question:   Reason for exam?     Answer:   Chest Pain     Order Specific Question:   Chest pain specification?     Answer:   Ischemic Equivalent     Order Specific Question:   Release to patient     Answer:   Immediate   • ECG 12 Lead     This order was created via procedure documentation     Order Specific Question:   Release to patient     Answer:   Immediate         Plan:       1.  71-year-old female with chronic atrial fibrillation.  Anticoagulated with warfarin.  She had a cardioversion November 2019 but now was predominately atrial fibrillation 93 % of the time on 2 week mobile teletry 03/2022 with average HR 86.   Swelling is better on low-dose diltiazem 180 mg daily.  She will continue digoxin in addition.2.  History of mitral valve vegetation in 2019 treated with  antibiotics.  Last echo June 2021 showed no evidence of vegetation.  She has mild mitral valve stenoses  3.  COPD on home oxygen-avoid beta-blockers  4.  Grade 2 diastolic dysfunction with lower extremity edema and chronic diastolic heart failure.  She is on Lasix 40 mg BID.   Continue the same  5.  Mild, not on obstructive coronary artery disease on cardiac cath June 2020 now with atypical chest pain complaint within the past week.  No ischemic changes on EKG plan nonwalking perfusion stress test  Providing perfusion stress test negative for ischemia she will follow-up in 4 to 6 months          It has been a pleasure to participate in this patient's care.      Thank you,  GENET Sanchez      **I used Dragon to dictate this note:**

## 2022-06-30 ENCOUNTER — ANTICOAGULATION VISIT (OUTPATIENT)
Dept: PHARMACY | Facility: HOSPITAL | Age: 72
End: 2022-06-30

## 2022-06-30 LAB — INR PPP: 2.1

## 2022-06-30 NOTE — PROGRESS NOTES
Anticoagulation Clinic Progress Note    Anticoagulation Summary  As of 2022    INR goal:  2.0-3.0   TTR:  61.8 % (1.3 y)   INR used for dosin.10 (2022)   Warfarin maintenance plan:  2.5 mg every Tue, Sat; 3.75 mg all other days   Weekly warfarin total:  23.75 mg   Plan last modified:  Keshawn Castaneda, PharmD (2022)   Next INR check:  2022   Priority:  High   Target end date:      Indications    PAF (paroxysmal atrial fibrillation) (HCC) (Resolved) [I48.0]             Anticoagulation Episode Summary     INR check location:      Preferred lab:      Send INR reminders to:   [facility] HOME TEST POOL    Comments:   Home Testing as of 21 *call only when out of range*      Anticoagulation Care Providers     Provider Role Specialty Phone number    Zenia Tinajero MD Referring Cardiology [phone]          Clinic Interview:  Patient Findings     Negatives:  Signs/symptoms of thrombosis, Signs/symptoms of bleeding,   Laboratory test error suspected, Change in health, Change in alcohol use,   Change in activity, Upcoming invasive procedure, Emergency department   visit, Upcoming dental procedure, Missed doses, Extra doses, Change in   medications, Change in diet/appetite, Hospital admission, Bruising, Other   complaints      Clinical Outcomes     Negatives:  Major bleeding event, Thromboembolic event,   Anticoagulation-related hospital admission, Anticoagulation-related ED   visit, Anticoagulation-related fatality        INR History:  Anticoagulation Monitoring 2022   INR 2.80 2.10 2.10   INR Date 2022   INR Goal 2.0-3.0 2.0-3.0 2.0-3.0   Trend Same Same Same   Last Week Total 23.75 mg 23.75 mg 23.75 mg   Next Week Total 23.75 mg 23.75 mg 23.75 mg   Sun 3.75 mg 3.75 mg 3.75 mg   Mon 3.75 mg 3.75 mg 3.75 mg   Tue 2.5 mg 2.5 mg 2.5 mg   Wed 3.75 mg 3.75 mg 3.75 mg   Thu 3.75 mg 3.75 mg 3.75 mg   Fri 3.75 mg 3.75 mg 3.75 mg   Sat 2.5 mg 2.5 mg  2.5 mg   Visit Report - - -   Some recent data might be hidden       Plan:  1. INR is Therapeutic today- see above in Anticoagulation Summary.   Will instruct Shani Phillip to Continue their warfarin regimen- see above in Anticoagulation Summary.  2. Follow up in 1 weeks  3. Pt has agreed to only be called if INR out of range. They have been instructed to call if any changes in medications, doses, concerns, etc. Patient expresses understanding and has no further questions at this time.    Zenia Myers, PharmD

## 2022-07-07 ENCOUNTER — ANTICOAGULATION VISIT (OUTPATIENT)
Dept: PHARMACY | Facility: HOSPITAL | Age: 72
End: 2022-07-07

## 2022-07-07 LAB — INR PPP: 2.1

## 2022-07-07 NOTE — PROGRESS NOTES
Anticoagulation Clinic Progress Note    Anticoagulation Summary  As of 2022    INR goal:  2.0-3.0   TTR:  62.3 % (1.3 y)   INR used for dosin.10 (2022)   Warfarin maintenance plan:  2.5 mg every Tue, Sat; 3.75 mg all other days   Weekly warfarin total:  23.75 mg   No change documented:  Marleny Black RPH   Plan last modified:  Keshawn Castaneda, PharmD (2022)   Next INR check:  2022   Priority:  High   Target end date:      Indications    PAF (paroxysmal atrial fibrillation) (HCC) (Resolved) [I48.0]             Anticoagulation Episode Summary     INR check location:      Preferred lab:      Send INR reminders to:   [facility] HOME TEST POOL    Comments:   Home Testing as of 21 *call only when out of range*      Anticoagulation Care Providers     Provider Role Specialty Phone number    Zenia Tinajero MD Referring Cardiology [phone]          Clinic Interview:  No pertinent clinical findings have been reported.    INR History:  Anticoagulation Monitoring 2022   INR 2.10 2.10 2.10   INR Date 2022   INR Goal 2.0-3.0 2.0-3.0 2.0-3.0   Trend Same Same Same   Last Week Total 23.75 mg 23.75 mg 23.75 mg   Next Week Total 23.75 mg 23.75 mg 23.75 mg   Sun 3.75 mg 3.75 mg 3.75 mg   Mon 3.75 mg 3.75 mg 3.75 mg   Tue 2.5 mg 2.5 mg 2.5 mg   Wed 3.75 mg 3.75 mg 3.75 mg   Thu 3.75 mg 3.75 mg 3.75 mg   Fri 3.75 mg 3.75 mg 3.75 mg   Sat 2.5 mg 2.5 mg 2.5 mg   Visit Report - - -   Some recent data might be hidden       Plan:  1. INR is therapeutic today- see above in Anticoagulation Summary.    Shani Kenji to continue their warfarin regimen- see above in Anticoagulation Summary.  2. Follow up in 1 week  3. Pt has agreed to only be called if INR out of range. They have been instructed to call if any changes in medications, doses, concerns, etc. Patient expresses understanding and has no further questions at this time.    Marleny Black, [facility]

## 2022-07-08 ENCOUNTER — HOSPITAL ENCOUNTER (OUTPATIENT)
Dept: CARDIOLOGY | Facility: HOSPITAL | Age: 72
Discharge: HOME | End: 2022-07-08
Admitting: NURSE PRACTITIONER

## 2022-07-08 VITALS — HEIGHT: 59 IN | BODY MASS INDEX: 30.62 KG/M2 | WEIGHT: 151.9 LBS

## 2022-07-08 DIAGNOSIS — I25.810 CORONARY ARTERY DISEASE INVOLVING CORONARY BYPASS GRAFT OF NATIVE HEART WITHOUT ANGINA PECTORIS: ICD-10-CM

## 2022-07-08 DIAGNOSIS — R07.89 CHEST PAIN, ATYPICAL: ICD-10-CM

## 2022-07-08 LAB
BH CV NUCLEAR PRIOR STUDY: 2
BH CV REST NUCLEAR ISOTOPE DOSE: 60 MCI
BH CV STRESS BP STAGE 1: NORMAL
BH CV STRESS COMMENTS STAGE 1: NORMAL
BH CV STRESS DOSE REGADENOSON STAGE 1: 0.4
BH CV STRESS DURATION MIN STAGE 1: 0
BH CV STRESS DURATION SEC STAGE 1: 10
BH CV STRESS HR STAGE 1: 93
BH CV STRESS NUCLEAR ISOTOPE DOSE: 60 MCI
BH CV STRESS PROTOCOL 1: NORMAL
BH CV STRESS RECOVERY BP: NORMAL MMHG
BH CV STRESS RECOVERY HR: 86 BPM
BH CV STRESS STAGE 1: 1
LV EF NUC BP: 89 %
MAXIMAL PREDICTED HEART RATE: 148 BPM
PERCENT MAX PREDICTED HR: 62.84 %
STRESS BASELINE BP: NORMAL MMHG
STRESS BASELINE HR: 89 BPM
STRESS PERCENT HR: 74 %
STRESS POST EXERCISE DUR SEC: 10 SEC
STRESS POST PEAK BP: NORMAL MMHG
STRESS POST PEAK HR: 93 BPM
STRESS TARGET HR: 126 BPM

## 2022-07-08 PROCEDURE — 0 RUBIDIUM CHLORIDE: Performed by: NURSE PRACTITIONER

## 2022-07-08 PROCEDURE — 93016 CV STRESS TEST SUPVJ ONLY: CPT | Performed by: INTERNAL MEDICINE

## 2022-07-08 PROCEDURE — 78492 MYOCRD IMG PET MLT RST&STRS: CPT | Performed by: INTERNAL MEDICINE

## 2022-07-08 PROCEDURE — 93017 CV STRESS TEST TRACING ONLY: CPT

## 2022-07-08 PROCEDURE — 78492 MYOCRD IMG PET MLT RST&STRS: CPT

## 2022-07-08 PROCEDURE — A9555 RB82 RUBIDIUM: HCPCS | Performed by: NURSE PRACTITIONER

## 2022-07-08 PROCEDURE — 25010000002 REGADENOSON 0.4 MG/5ML SOLUTION: Performed by: NURSE PRACTITIONER

## 2022-07-08 PROCEDURE — 93018 CV STRESS TEST I&R ONLY: CPT | Performed by: INTERNAL MEDICINE

## 2022-07-08 RX ADMIN — REGADENOSON 0.4 MG: 0.08 INJECTION, SOLUTION INTRAVENOUS at 08:50

## 2022-07-14 ENCOUNTER — ANTICOAGULATION VISIT (OUTPATIENT)
Dept: PHARMACY | Facility: HOSPITAL | Age: 72
End: 2022-07-14

## 2022-07-14 LAB — INR PPP: 2.1

## 2022-07-14 NOTE — PROGRESS NOTES
Anticoagulation Clinic Progress Note    Anticoagulation Summary  As of 2022    INR goal:  2.0-3.0   TTR:  62.9 % (1.4 y)   INR used for dosin.10 (2022)   Warfarin maintenance plan:  2.5 mg every Tue, Sat; 3.75 mg all other days   Weekly warfarin total:  23.75 mg   No change documented:  Marleny Black RPH   Plan last modified:  Keshawn Castaneda, PharmD (2022)   Next INR check:  2022   Priority:  High   Target end date:      Indications    PAF (paroxysmal atrial fibrillation) (HCC) (Resolved) [I48.0]             Anticoagulation Episode Summary     INR check location:      Preferred lab:      Send INR reminders to:   [facility] HOME TEST POOL    Comments:   Home Testing as of 21 *call only when out of range*      Anticoagulation Care Providers     Provider Role Specialty Phone number    Zenia Tinajero MD Referring Cardiology [phone]          Clinic Interview:  No pertinent clinical findings have been reported.    INR History:  Anticoagulation Monitoring 2022   INR 2.10 2.10 2.10   INR Date 2022   INR Goal 2.0-3.0 2.0-3.0 2.0-3.0   Trend Same Same Same   Last Week Total 23.75 mg 23.75 mg 23.75 mg   Next Week Total 23.75 mg 23.75 mg 23.75 mg   Sun 3.75 mg 3.75 mg 3.75 mg   Mon 3.75 mg 3.75 mg 3.75 mg   Tue 2.5 mg 2.5 mg 2.5 mg   Wed 3.75 mg 3.75 mg 3.75 mg   Thu 3.75 mg 3.75 mg 3.75 mg   Fri 3.75 mg 3.75 mg 3.75 mg   Sat 2.5 mg 2.5 mg 2.5 mg   Visit Report - - -   Some recent data might be hidden       Plan:  1. INR is therapeutic today- see above in Anticoagulation Summary.    Shani Kenji to continue their warfarin regimen- see above in Anticoagulation Summary.  2. Follow up in 1 week  3.They have been instructed to call if any changes in medications, doses, concerns, etc. Patient expresses understanding and has no further questions at this time.    Marleny Black, RP

## 2022-07-21 ENCOUNTER — ANTICOAGULATION VISIT (OUTPATIENT)
Dept: PHARMACY | Facility: HOSPITAL | Age: 72
End: 2022-07-21

## 2022-07-21 LAB — INR PPP: 3.2

## 2022-07-21 PROCEDURE — G0249 PROVIDE INR TEST MATER/EQUIP: HCPCS

## 2022-07-21 NOTE — PROGRESS NOTES
Anticoagulation Clinic Progress Note    Anticoagulation Summary  As of 7/21/2022    INR goal:  2.0-3.0   TTR:  63.1 % (1.4 y)   INR used for dosing:  3.20 (7/21/2022)   Warfarin maintenance plan:  2.5 mg every Tue, Sat; 3.75 mg all other days   Weekly warfarin total:  23.75 mg   Plan last modified:  Keshawn Castaneda, PharmD (5/19/2022)   Next INR check:  7/28/2022   Priority:  High   Target end date:      Indications    PAF (paroxysmal atrial fibrillation) (HCC) (Resolved) [I48.0]             Anticoagulation Episode Summary     INR check location:      Preferred lab:      Send INR reminders to:   [facility] [place] HOME TEST POOL    Comments:   Home Testing as of 7/30/21 *call only when out of range*      Anticoagulation Care Providers     Provider Role Specialty Phone number    Zenia Tinajero MD Referring Cardiology [phone]          Clinic Interview:  Patient Findings     Negatives:  Signs/symptoms of thrombosis, Signs/symptoms of bleeding,   Laboratory test error suspected, Change in health, Change in alcohol use,   Change in activity, Upcoming invasive procedure, Emergency department   visit, Upcoming dental procedure, Missed doses, Extra doses, Change in   medications, Change in diet/appetite, Hospital admission, Bruising, Other   complaints      Clinical Outcomes     Negatives:  Major bleeding event, Thromboembolic event,   Anticoagulation-related hospital admission, Anticoagulation-related ED   visit, Anticoagulation-related fatality        INR History:  Anticoagulation Monitoring 7/7/2022 7/14/2022 7/21/2022   INR 2.10 2.10 3.20   INR Date 7/7/2022 7/14/2022 7/21/2022   INR Goal 2.0-3.0 2.0-3.0 2.0-3.0   Trend Same Same Same   Last Week Total 23.75 mg 23.75 mg 23.75 mg   Next Week Total 23.75 mg 23.75 mg 22.5 mg   Sun 3.75 mg 3.75 mg 3.75 mg   Mon 3.75 mg 3.75 mg 3.75 mg   Tue 2.5 mg 2.5 mg 2.5 mg   Wed 3.75 mg 3.75 mg 3.75 mg   Thu 3.75 mg 3.75 mg 2.5 mg (7/21)   Fri 3.75 mg 3.75 mg 3.75 mg   Sat 2.5 mg  2.5 mg 2.5 mg   Visit Report - - -   Some recent data might be hidden       Plan:  1. INR is Supratherapeutic today- see above in Anticoagulation Summary.   Will instruct Shani Phillip to Change their warfarin regimen- see above in Anticoagulation Summary.  2. Follow up in 1 week  3. They have been instructed to call if any changes in medications, doses, concerns, etc. Patient expresses understanding and has no further questions at this time.    Marleny Black, [facility]

## 2022-07-25 ENCOUNTER — TELEPHONE (OUTPATIENT)
Dept: CARDIOLOGY | Facility: CLINIC | Age: 72
End: 2022-07-25

## 2022-07-25 DIAGNOSIS — I50.33 ACUTE ON CHRONIC DIASTOLIC CHF (CONGESTIVE HEART FAILURE): ICD-10-CM

## 2022-07-25 RX ORDER — POTASSIUM CHLORIDE 1500 MG/1
20 TABLET, FILM COATED, EXTENDED RELEASE ORAL 2 TIMES DAILY
Qty: 180 TABLET | Refills: 2 | Status: SHIPPED | OUTPATIENT
Start: 2022-07-25 | End: 2023-04-03

## 2022-07-25 NOTE — TELEPHONE ENCOUNTER
Pt states that she needs a Rx for Potassium Chloride ER (k-tab) 2 meq tab controlled - release  1 tab by mouth 2x's a day .

## 2022-07-28 ENCOUNTER — TELEPHONE (OUTPATIENT)
Dept: FAMILY MEDICINE CLINIC | Facility: CLINIC | Age: 72
End: 2022-07-28

## 2022-07-28 ENCOUNTER — ANTICOAGULATION VISIT (OUTPATIENT)
Dept: PHARMACY | Facility: HOSPITAL | Age: 72
End: 2022-07-28

## 2022-07-28 DIAGNOSIS — L98.9 SKIN LESION OF LEFT ARM: Primary | ICD-10-CM

## 2022-07-28 LAB — INR PPP: 2.9

## 2022-07-28 NOTE — PROGRESS NOTES
Anticoagulation Clinic Progress Note    Anticoagulation Summary  As of 2022    INR goal:  2.0-3.0   TTR:  62.7 % (1.4 y)   INR used for dosin.90 (2022)   Warfarin maintenance plan:  2.5 mg every Tue, Sat; 3.75 mg all other days   Weekly warfarin total:  23.75 mg   No change documented:  Zenia Myers, [place]   Plan last modified:  Keshawn Castaneda PharmD (2022)   Next INR check:  2022   Priority:  High   Target end date:      Indications    PAF (paroxysmal atrial fibrillation) (HCC) (Resolved) [I48.0]             Anticoagulation Episode Summary     INR check location:      Preferred lab:      Send INR reminders to:   [facility] HOME TEST POOL    Comments:   Home Testing as of 21 *call only when out of range*      Anticoagulation Care Providers     Provider Role Specialty Phone number    Zenia Tinajero MD Referring Cardiology [phone]          Clinic Interview:  Patient Findings     Negatives:  Signs/symptoms of thrombosis, Signs/symptoms of bleeding,   Laboratory test error suspected, Change in health, Change in alcohol use,   Change in activity, Upcoming invasive procedure, Emergency department   visit, Upcoming dental procedure, Missed doses, Extra doses, Change in   medications, Change in diet/appetite, Hospital admission, Bruising, Other   complaints      Clinical Outcomes     Negatives:  Major bleeding event, Thromboembolic event,   Anticoagulation-related hospital admission, Anticoagulation-related ED   visit, Anticoagulation-related fatality        INR History:  Anticoagulation Monitoring 2022   INR 2.10 3.20 2.90   INR Date 2022   INR Goal 2.0-3.0 2.0-3.0 2.0-3.0   Trend Same Same Same   Last Week Total 23.75 mg 23.75 mg 22.5 mg   Next Week Total 23.75 mg 22.5 mg 23.75 mg   Sun 3.75 mg 3.75 mg 3.75 mg   Mon 3.75 mg 3.75 mg 3.75 mg   Tue 2.5 mg 2.5 mg 2.5 mg   Wed 3.75 mg 3.75 mg 3.75 mg   Thu 3.75 mg 2.5 mg () 3.75  mg   Fri 3.75 mg 3.75 mg 3.75 mg   Sat 2.5 mg 2.5 mg 2.5 mg   Visit Report - - -   Some recent data might be hidden       Plan:  1. INR is Therapeutic today- see above in Anticoagulation Summary.   Will instruct Shani Phillip to Continue their warfarin regimen- see above in Anticoagulation Summary.  2. Follow up in 1 weeks  3. They have been instructed to call if any changes in medications, doses, concerns, etc. Patient expresses understanding and has no further questions at this time.    Zenia Myers, PharmD

## 2022-08-04 ENCOUNTER — ANTICOAGULATION VISIT (OUTPATIENT)
Dept: PHARMACY | Facility: HOSPITAL | Age: 72
End: 2022-08-04

## 2022-08-04 LAB — INR PPP: 2.2

## 2022-08-11 ENCOUNTER — ANTICOAGULATION VISIT (OUTPATIENT)
Dept: PHARMACY | Facility: HOSPITAL | Age: 72
End: 2022-08-11

## 2022-08-11 LAB — INR PPP: 4.4

## 2022-08-11 NOTE — PROGRESS NOTES
Anticoagulation Clinic Progress Note    Anticoagulation Summary  As of 2022    INR goal:  2.0-3.0   TTR:  62.8 % (1.4 y)   INR used for dosin.40 (2022)   Warfarin maintenance plan:  2.5 mg every Tue, Sat; 3.75 mg all other days   Weekly warfarin total:  23.75 mg   Plan last modified:  Keshawn Castaneda, PharmD (2022)   Next INR check:  2022   Priority:  High   Target end date:      Indications    PAF (paroxysmal atrial fibrillation) (HCC) (Resolved) [I48.0]             Anticoagulation Episode Summary     INR check location:      Preferred lab:      Send INR reminders to:   [facility] HOME TEST POOL    Comments:   Home Testing as of 21 *call only when out of range*      Anticoagulation Care Providers     Provider Role Specialty Phone number    Zenia Tinajero MD Referring Cardiology [phone]          Clinic Interview:  Patient Findings     Positives:  Change in alcohol use    Negatives:  Signs/symptoms of thrombosis, Signs/symptoms of bleeding,   Laboratory test error suspected, Change in health, Change in activity,   Upcoming invasive procedure, Emergency department visit, Upcoming dental   procedure, Missed doses, Extra doses, Change in medications, Change in   diet/appetite, Hospital admission, Bruising, Other complaints    Comments:  Reports enjoying a cindy with her  on Tuesday.       Clinical Outcomes     Negatives:  Major bleeding event, Thromboembolic event,   Anticoagulation-related hospital admission, Anticoagulation-related ED   visit, Anticoagulation-related fatality    Comments:  Reports enjoying a cindy with her  on Tuesday.         INR History:  Anticoagulation Monitoring 2022   INR 2.90 2.20 4.40   INR Date 2022   INR Goal 2.0-3.0 2.0-3.0 2.0-3.0   Trend Same Same Same   Last Week Total 22.5 mg 23.75 mg 23.75 mg   Next Week Total 23.75 mg 23.75 mg 18.75 mg   Sun 3.75 mg 3.75 mg 3.75 mg   Mon 3.75  mg 3.75 mg 3.75 mg   Tue 2.5 mg 2.5 mg -   Wed 3.75 mg 3.75 mg -   Thu 3.75 mg 3.75 mg Hold (8/11)   Fri 3.75 mg 3.75 mg 2.5 mg (8/12)   Sat 2.5 mg 2.5 mg 2.5 mg   Visit Report - - -   Some recent data might be hidden       Plan:  1. INR is Supratherapeutic today- see above in Anticoagulation Summary.   Will instruct Shani Phillip to Change their warfarin regimen- see above in Anticoagulation Summary.  Hold today's dose, then take only 2.5mg tomorrow and then resume previous weekly dosing until next INR.  2. Follow up in 4 days   3. They have been instructed to call if any changes in medications, doses, concerns, etc. Patient expresses understanding and has no further questions at this time.    Zenia Myers, PharmD

## 2022-08-15 ENCOUNTER — ANTICOAGULATION VISIT (OUTPATIENT)
Dept: PHARMACY | Facility: HOSPITAL | Age: 72
End: 2022-08-15

## 2022-08-15 LAB — INR PPP: 2.5

## 2022-08-15 NOTE — PROGRESS NOTES
Anticoagulation Clinic Progress Note    Anticoagulation Summary  As of 8/15/2022    INR goal:  2.0-3.0   TTR:  62.6 % (1.4 y)   INR used for dosin.50 (8/15/2022)   Warfarin maintenance plan:  2.5 mg every Tue, Sat; 3.75 mg all other days   Weekly warfarin total:  23.75 mg   No change documented:  Zenia Myers, [place]   Plan last modified:  Keshawn Castaneda PharmD (2022)   Next INR check:  2022   Priority:  High   Target end date:      Indications    PAF (paroxysmal atrial fibrillation) (HCC) (Resolved) [I48.0]             Anticoagulation Episode Summary     INR check location:      Preferred lab:      Send INR reminders to:   [facility] [employer] HOME TEST POOL    Comments:   Home Testing as of 21 *call only when out of range*      Anticoagulation Care Providers     Provider Role Specialty Phone number    Zenia Tinajero MD Referring Cardiology [phone]          Clinic Interview:  Patient Findings     Negatives:  Signs/symptoms of thrombosis, Signs/symptoms of bleeding,   Laboratory test error suspected, Change in health, Change in alcohol use,   Change in activity, Upcoming invasive procedure, Emergency department   visit, Upcoming dental procedure, Missed doses, Extra doses, Change in   medications, Change in diet/appetite, Hospital admission, Bruising, Other   complaints      Clinical Outcomes     Negatives:  Major bleeding event, Thromboembolic event,   Anticoagulation-related hospital admission, Anticoagulation-related ED   visit, Anticoagulation-related fatality        INR History:  Anticoagulation Monitoring 2022 2022 8/15/2022   INR 2.20 4.40 2.50   INR Date 2022 2022 8/15/2022   INR Goal 2.0-3.0 2.0-3.0 2.0-3.0   Trend Same Same Same   Last Week Total 23.75 mg 23.75 mg 18.75 mg   Next Week Total 23.75 mg 18.75 mg 23.75 mg   Sun 3.75 mg 3.75 mg 3.75 mg   Mon 3.75 mg 3.75 mg 3.75 mg   Tue 2.5 mg - 2.5 mg   Wed 3.75 mg - 3.75 mg   Thu 3.75 mg Hold () 3.75 mg   Fri  3.75 mg 2.5 mg (8/12) 3.75 mg   Sat 2.5 mg 2.5 mg 2.5 mg   Visit Report - - -   Some recent data might be hidden       Plan:  1. INR is Therapeutic today- see above in Anticoagulation Summary.   Will instruct Shani Phillip to Continue their warfarin regimen- see above in Anticoagulation Summary.  2. Follow up in 1 weeks  3. Pt has agreed to only be called if INR out of range. They have been instructed to call if any changes in medications, doses, concerns, etc. Patient expresses understanding and has no further questions at this time.    Zenia Myers, PharmD

## 2022-08-18 ENCOUNTER — ANTICOAGULATION VISIT (OUTPATIENT)
Dept: PHARMACY | Facility: HOSPITAL | Age: 72
End: 2022-08-18

## 2022-08-18 LAB — INR PPP: 3

## 2022-08-18 PROCEDURE — G0249 PROVIDE INR TEST MATER/EQUIP: HCPCS

## 2022-08-18 NOTE — PROGRESS NOTES
Anticoagulation Clinic Progress Note    Anticoagulation Summary  As of 8/18/2022    INR goal:  2.0-3.0   TTR:  62.8 % (1.4 y)   INR used for dosing:  3.00 (8/18/2022)   Warfarin maintenance plan:  2.5 mg every Tue, Thu, Sat; 3.75 mg all other days   Weekly warfarin total:  22.5 mg   Plan last modified:  Marleny Black RPH (8/18/2022)   Next INR check:  8/25/2022   Priority:  High   Target end date:      Indications    PAF (paroxysmal atrial fibrillation) (HCC) (Resolved) [I48.0]             Anticoagulation Episode Summary     INR check location:      Preferred lab:      Send INR reminders to:   [facility] TEST POOL    Comments:   Home Testing as of 7/30/21 *call only when out of range*      Anticoagulation Care Providers     Provider Role Specialty Phone number    Zenia Tinajero MD Referring Cardiology [phone]          Clinic Interview:  Patient Findings     Negatives:  Signs/symptoms of thrombosis, Signs/symptoms of bleeding,   Laboratory test error suspected, Change in health, Change in alcohol use,   Change in activity, Upcoming invasive procedure, Emergency department   visit, Upcoming dental procedure, Missed doses, Extra doses, Change in   medications, Change in diet/appetite, Hospital admission, Bruising, Other   complaints      Clinical Outcomes     Negatives:  Major bleeding event, Thromboembolic event,   Anticoagulation-related hospital admission, Anticoagulation-related ED   visit, Anticoagulation-related fatality        INR History:  Anticoagulation Monitoring 8/11/2022 8/15/2022 8/18/2022   INR 4.40 2.50 3.00   INR Date 8/11/2022 8/15/2022 8/18/2022   INR Goal 2.0-3.0 2.0-3.0 2.0-3.0   Trend Same Same Down   Last Week Total 23.75 mg 18.75 mg 18.75 mg   Next Week Total 18.75 mg 23.75 mg 22.5 mg   Sun 3.75 mg 3.75 mg 3.75 mg   Mon 3.75 mg 3.75 mg 3.75 mg   Tue - 2.5 mg 2.5 mg   Wed - 3.75 mg 3.75 mg   Thu Hold (8/11) 3.75 mg 2.5 mg   Fri 2.5 mg (8/12) 3.75 mg 3.75 mg   Sat 2.5 mg 2.5  Patient is a 51y old  Female who presents with a chief complaint of sob, cough, weakness (14 Feb 2020 21:19)      SUBJECTIVE / OVERNIGHT EVENTS: Patient seen and examined at bedside. She complains of productive cough, states there is improvement ins SOB. Denies any CP, Chills/fevers, abd pain and n/v.    ROS:  All other review of systems negative    Allergies    morphine (Anaphylaxis)  nickel, copper , brass (Rash)    Intolerances        MEDICATIONS  (STANDING):  albuterol/ipratropium for Nebulization 3 milliLiter(s) Nebulizer every 6 hours  dextrose 50% Injectable 12.5 Gram(s) IV Push once  gabapentin 800 milliGRAM(s) Oral two times a day  insulin lispro (HumaLOG) corrective regimen sliding scale   SubCutaneous three times a day before meals  insulin lispro (HumaLOG) corrective regimen sliding scale   SubCutaneous at bedtime  lamoTRIgine 150 milliGRAM(s) Oral at bedtime  levoFLOXacin IVPB 500 milliGRAM(s) IV Intermittent every 24 hours  levoFLOXacin IVPB      oseltamivir 75 milliGRAM(s) Oral two times a day  pantoprazole    Tablet 40 milliGRAM(s) Oral before breakfast  venlafaxine XR. 225 milliGRAM(s) Oral daily    MEDICATIONS  (PRN):  clonazePAM  Tablet 1 milliGRAM(s) Oral two times a day PRN anxiety  dextrose 40% Gel 15 Gram(s) Oral once PRN Blood Glucose LESS THAN 70 milliGRAM(s)/deciliter  glucagon  Injectable 1 milliGRAM(s) IntraMuscular once PRN Glucose LESS THAN 70 milligrams/deciliter  guaiFENesin   Syrup  (Sugar-Free) 200 milliGRAM(s) Oral every 6 hours PRN Cough      Vital Signs Last 24 Hrs  T(C): 36.7 (15 Feb 2020 11:29), Max: 36.8 (14 Feb 2020 19:37)  T(F): 98.1 (15 Feb 2020 11:29), Max: 98.3 (14 Feb 2020 19:37)  HR: 78 (15 Feb 2020 11:29) (71 - 98)  BP: 117/70 (15 Feb 2020 11:29) (103/68 - 133/85)  BP(mean): --  RR: 20 (15 Feb 2020 11:29) (20 - 20)  SpO2: 96% (15 Feb 2020 11:29) (96% - 99%)  CAPILLARY BLOOD GLUCOSE      POCT Blood Glucose.: 94 mg/dL (15 Feb 2020 14:13)  POCT Blood Glucose.: 91 mg/dL (15 Feb 2020 09:04)  POCT Blood Glucose.: 91 mg/dL (14 Feb 2020 22:51)    I&O's Summary    14 Feb 2020 07:01  -  15 Feb 2020 07:00  --------------------------------------------------------  IN: 280 mL / OUT: 0 mL / NET: 280 mL        PHYSICAL EXAM:  GENERAL: NAD, well-developed  HEAD:  Atraumatic, Normocephalic  EYES: EOMI, PERRLA, conjunctiva and sclera clear  NECK: Supple, No JVD  CHEST/LUNG: + rhonchi LLL, not in distress, saturating 96% on 2L NC  HEART: Regular rate and rhythm; No murmurs, rubs, or gallops  ABDOMEN: Soft, Nontender, Nondistended; Bowel sounds present  EXTREMITIES:  2+ Peripheral Pulses, No clubbing, cyanosis, or edema  NEUROLOGY: AAOx3, non-focal  PSYCH: calm  SKIN: No rashes or lesions    LABS:                        11.9   3.30  )-----------( 215      ( 15 Feb 2020 05:32 )             38.4     02-15    141  |  105  |  12  ----------------------------<  107<H>  3.8   |  23  |  0.77    Ca    8.5      15 Feb 2020 05:32  Phos  4.1     02-15  Mg     2.2     02-15    TPro  6.1  /  Alb  3.6  /  TBili  0.2  /  DBili  x   /  AST  42<H>  /  ALT  45  /  AlkPhos  108  02-15              RADIOLOGY & ADDITIONAL TESTS:    Imaging Personally Reviewed: CT chest reviewed     Care Discussed with Consultants/Other Providers: Medicine PA mg 2.5 mg   Visit Report - - -   Some recent data might be hidden       Plan:  1. INR is Therapeutic today- see above in Anticoagulation Summary.   Will instruct Shnai Phillip to change their warfarin regimen- see above in Anticoagulation Summary.  2. Follow up in 1 week  3.They have been instructed to call if any changes in medications, doses, concerns, etc. Patient expresses understanding and has no further questions at this time.    Marleny Black [facility]

## 2022-08-25 ENCOUNTER — OFFICE VISIT (OUTPATIENT)
Dept: FAMILY MEDICINE CLINIC | Facility: CLINIC | Age: 72
End: 2022-08-25

## 2022-08-25 ENCOUNTER — ANTICOAGULATION VISIT (OUTPATIENT)
Dept: PHARMACY | Facility: HOSPITAL | Age: 72
End: 2022-08-25

## 2022-08-25 VITALS
BODY MASS INDEX: 27.75 KG/M2 | HEIGHT: 61 IN | HEART RATE: 96 BPM | OXYGEN SATURATION: 98 % | SYSTOLIC BLOOD PRESSURE: 120 MMHG | DIASTOLIC BLOOD PRESSURE: 82 MMHG | TEMPERATURE: 97.8 F | WEIGHT: 147 LBS

## 2022-08-25 DIAGNOSIS — G25.81 RLS (RESTLESS LEGS SYNDROME): ICD-10-CM

## 2022-08-25 DIAGNOSIS — M54.42 CHRONIC MIDLINE LOW BACK PAIN WITH LEFT-SIDED SCIATICA: ICD-10-CM

## 2022-08-25 DIAGNOSIS — F32.A DEPRESSION, UNSPECIFIED DEPRESSION TYPE: ICD-10-CM

## 2022-08-25 DIAGNOSIS — G89.29 CHRONIC MIDLINE LOW BACK PAIN WITH LEFT-SIDED SCIATICA: ICD-10-CM

## 2022-08-25 DIAGNOSIS — Z00.00 MEDICARE ANNUAL WELLNESS VISIT, SUBSEQUENT: Primary | ICD-10-CM

## 2022-08-25 LAB — INR PPP: 4.9

## 2022-08-25 PROCEDURE — G0439 PPPS, SUBSEQ VISIT: HCPCS | Performed by: INTERNAL MEDICINE

## 2022-08-25 PROCEDURE — 99213 OFFICE O/P EST LOW 20 MIN: CPT | Performed by: INTERNAL MEDICINE

## 2022-08-25 PROCEDURE — 1170F FXNL STATUS ASSESSED: CPT | Performed by: INTERNAL MEDICINE

## 2022-08-25 PROCEDURE — 1159F MED LIST DOCD IN RCRD: CPT | Performed by: INTERNAL MEDICINE

## 2022-08-25 RX ORDER — PRAMIPEXOLE DIHYDROCHLORIDE 0.5 MG/1
0.5 TABLET ORAL NIGHTLY
Qty: 30 TABLET | Refills: 1 | Status: SHIPPED | OUTPATIENT
Start: 2022-08-25 | End: 2022-09-20

## 2022-08-25 RX ORDER — HYDROCODONE BITARTRATE AND ACETAMINOPHEN 7.5; 325 MG/1; MG/1
1 TABLET ORAL EVERY 6 HOURS PRN
Qty: 20 TABLET | Refills: 0 | Status: SHIPPED | OUTPATIENT
Start: 2022-08-25 | End: 2022-11-17 | Stop reason: SDUPTHER

## 2022-08-25 RX ORDER — BUPROPION HYDROCHLORIDE 300 MG/1
300 TABLET ORAL DAILY
Qty: 30 TABLET | Refills: 5 | Status: SHIPPED | OUTPATIENT
Start: 2022-08-25 | End: 2022-09-20 | Stop reason: SDUPTHER

## 2022-08-25 RX ORDER — CYCLOBENZAPRINE HCL 10 MG
10 TABLET ORAL 3 TIMES DAILY PRN
Qty: 40 TABLET | Refills: 1 | Status: SHIPPED | OUTPATIENT
Start: 2022-08-25 | End: 2022-10-07 | Stop reason: SDUPTHER

## 2022-08-25 RX ORDER — BUPROPION HYDROCHLORIDE 300 MG/1
300 TABLET ORAL DAILY
Qty: 30 TABLET | Refills: 5 | Status: SHIPPED | OUTPATIENT
Start: 2022-08-25 | End: 2022-08-25

## 2022-08-25 NOTE — PROGRESS NOTES
Subsequent Medicare Wellness Visit   The ABC's of the Annual Wellness Visit    Chief Complaint   Patient presents with   • Medicare Wellness-subsequent       HPI:  Shani Phillip, -1950, is a 71 y.o. female who presents for a Subsequent Medicare Wellness Visit.  Patient with afib and CHF and followed by cardiology.  Was having CP but then diltiazem and dig added and feeling well at this time.  COPD with chronic resp failure with hypoxia requiring oxygen and on 2L NC per oxygen concentrator through [facility].  Doing well and no new issues.  Patient with RLS and already on requip 4 mg TID and still with severe RLS issues that are not improving.    Recent Hospitalizations:  No hospitalization(s) within the last year..    Current Medical Providers:  Patient Care Team:  Rodríguez Walker MD as PCP - General (Internal Medicine)  Mike Atkins MD as Surgeon (General Surgery)  Hayes Briones MD as Surgeon (Cardiothoracic Surgery)  Zenia Tinajero MD as Consulting Physician (Cardiology)    Health Habits and Functional and Cognitive Screening and Depression Screening:  Functional & Cognitive Status 2022   Do you have difficulty preparing food and eating? No   Do you have difficulty bathing yourself, getting dressed or grooming yourself? No   Do you have difficulty using the toilet? No   Do you have difficulty moving around from place to place? No   Do you have trouble with steps or getting out of a bed or a chair? Yes   Current Diet Other   Dental Exam Up to date   Eye Exam Up to date   Exercise (times per week) 0 times per week   Current Exercises Include No Regular Exercise   Do you need help using the phone?  No   Are you deaf or do you have serious difficulty hearing?  No   Do you need help with transportation? Yes   Do you need help shopping? Yes   Do you need help preparing meals?  No   Do you need help with housework?  Yes   Do you need help with laundry? Yes   Do you need help taking your  medications? No   Do you need help managing money? No   Do you ever drive or ride in a car without wearing a seat belt? No   Have you felt unusual stress, anger or loneliness in the last month? No   Who do you live with? Spouse   If you need help, do you have trouble finding someone available to you? No   Have you been bothered in the last four weeks by sexual problems? -   Do you have difficulty concentrating, remembering or making decisions? No       Compared to one year ago, the patient feels her physical health is the same and her mental health is the same.    Depression Screen:  PHQ-2/PHQ-9 Depression Screening 8/25/2022   Retired PHQ-9 Total Score -   Retired Total Score -   Little Interest or Pleasure in Doing Things 0-->not at all   Feeling Down, Depressed or Hopeless 1-->several days   Trouble Falling or Staying Asleep, or Sleeping Too Much 3-->nearly every day   Feeling Tired or Having Little Energy 0-->not at all   Poor Appetite or Overeating 0-->not at all   Feeling Bad about Yourself - or that You are a Failure or Have Let Yourself or Your Family Down 0-->not at all   Trouble Concentrating on Things, Such as Reading the Newspaper or Watching Television 0-->not at all   Moving or Speaking So Slowly that Other People Could Have Noticed? Or the Opposite - Being So Fidgety 0-->not at all   Thoughts that You Would be Better Off Dead or of Hurting Yourself in Some Way 0-->not at all   PHQ-9: Brief Depression Severity Measure Score 4   If You Checked Off Any Problems, How Difficult Have These Problems Made It For You to Do Your Work, Take Care of Things at Home, or Get Along with Other People? not difficult at all     Patient screened positive for depression based on a PHQ-9 score of  on . Follow-up recommendations include: PCP managing depression.  Screening is scued secondary to the RLS disrupting sleep.    Falls Risk Assessment:  ROSSI Fall Risk Clinician Key Questions   Have you fallen in the past year?:  No  Do you feel unsteady with walking?: Yes  Are you worried about falling?: Yes      Past Medical/Family/Social History:  The following portions of the patient's history were reviewed and updated as appropriate: allergies, current medications, past family history, past medical history, past social history, past surgical history and problem list.    Allergies   Allergen Reactions   • Penicillins Rash     RASH 30 YRS AGO NO HOSPITALIZATION         Current Outpatient Medications:   •  budesonide (PULMICORT) 0.5 MG/2ML nebulizer solution, Take 0.5 mg by nebulization Daily., Disp: , Rfl:   •  buPROPion XL (WELLBUTRIN XL) 300 MG 24 hr tablet, Take 1 tablet by mouth Daily., Disp: 30 tablet, Rfl: 5  •  cholecalciferol (VITAMIN D3) 25 MCG (1000 UT) tablet, Take 2,000 Units by mouth Daily., Disp: , Rfl:   •  cyclobenzaprine (FLEXERIL) 10 MG tablet, Take 1 tablet by mouth 3 (Three) Times a Day As Needed (back pain)., Disp: 40 tablet, Rfl: 1  •  digoxin (LANOXIN) 125 MCG tablet, Take 1 tablet by mouth Daily., Disp: 90 tablet, Rfl: 2  •  dilTIAZem XR (DILACOR XR) 180 MG 24 hr capsule, Take 1 capsule by mouth Daily., Disp: 90 capsule, Rfl: 3  •  famotidine (PEPCID) 20 MG tablet, Take 1 tablet by mouth 2 (Two) Times a Day Before Meals., Disp: 180 tablet, Rfl: 3  •  furosemide (LASIX) 40 MG tablet, Take 1 tablet by mouth 2 (Two) Times a Day., Disp: 180 tablet, Rfl: 1  •  HYDROcodone-acetaminophen (NORCO) 7.5-325 MG per tablet, Take 1 tablet by mouth Every 6 (Six) Hours As Needed for Moderate Pain ., Disp: 20 tablet, Rfl: 0  •  O2 (OXYGEN), Inhale 2 L/min 1 (One) Time., Disp: , Rfl:   •  potassium chloride ER (K-TAB) 20 MEQ tablet controlled-release ER tablet, Take 1 tablet by mouth 2 (Two) Times a Day., Disp: 180 tablet, Rfl: 2  •  rOPINIRole (REQUIP) 4 MG tablet, Take 1 tablet by mouth 3 (Three) Times a Day., Disp: 90 tablet, Rfl: 5  •  TROSPIUM CHLORIDE PO, Take 20 mg by mouth 2 (Two) Times a Day., Disp: , Rfl:   •  warfarin  (COUMADIN) 2.5 MG tablet, Take 2.5mg (1 Tablet) on Tues, Thursday and Saturday; Take 3.75mg (1 and 1/2 Tablets) on all other days OR as directed by the [facility]., Disp: 105 tablet, Rfl: 1  •  pramipexole (Mirapex) 0.5 MG tablet, Take 1 tablet by mouth Every Night., Disp: 30 tablet, Rfl: 1  •  Ventolin  (90 Base) MCG/ACT inhaler, Inhale 2 puffs 4 (Four) Times a Day., Disp: , Rfl:     Aspirin use counseling: Does not need ASA (and currently is not on it)    Current medication list contains no high risk medications.  No harmful drug interactions have been identified.     Family History   Problem Relation Age of Onset   • Lung cancer Mother        Social History     Tobacco Use   • Smoking status: Former Smoker     Packs/day: 0.50     Years: 50.00     Pack years: 25.00     Types: Cigarettes     Start date: 10/3/2019   • Smokeless tobacco: Never Used   • Tobacco comment: LAST CIG NOV 02, 2019   Substance Use Topics   • Alcohol use: No     Comment: caffeine - 1/2 cup coffee daily        Past Surgical History:   Procedure Laterality Date   • CARDIAC CATHETERIZATION N/A 1/23/2020    Procedure: Coronary angiography;  Surgeon: Svetlana Grayson MD;  Location: [facility] CATH INVASIVE LOCATION;  Service: Cardiovascular   • CARDIAC CATHETERIZATION N/A 1/23/2020    Procedure: Left ventriculography;  Surgeon: Svetlana Grayson MD;  Location: [facility] CATH INVASIVE LOCATION;  Service: Cardiovascular   • CARDIAC CATHETERIZATION N/A 1/23/2020    Procedure: Left Heart Cath;  Surgeon: Svetlana Grayson MD;  Location: [facility] CATH INVASIVE LOCATION;  Service: Cardiovascular   • CHOLECYSTECTOMY     • KNEE ARTHROPLASTY Right    • LAPAROSCOPIC GASTRIC BANDING         Patient Active Problem List   Diagnosis   • Influenza   • Thrush, oral   • COPD (chronic obstructive pulmonary disease) (HCC)   • Atrial fibrillation with RVR (HCC)   • Chronic respiratory failure with hypoxia (HCC)   • Endocarditis of mitral valve   • Mitral valve  "vegetation   • Acute on chronic diastolic CHF (congestive heart failure) ([facility])   • Bilateral lower extremity edema   • Intermittent lightheadedness   • Depression   • Chronic midline low back pain with left-sided sciatica   • Nephrolithiasis   • Oxygen dependent   • Medicare annual wellness visit, subsequent   • Osteoporosis   • CKD (chronic kidney disease) stage 3, GFR 30-59 ml/min ([facility])   • Chronic anticoagulation   • Nonrheumatic mitral valve stenosis       Review of Systems   Constitutional: Positive for fatigue. Negative for activity change, appetite change, fever, unexpected weight gain and unexpected weight loss.   HENT: Negative for nosebleeds, rhinorrhea, trouble swallowing and voice change.    Eyes: Negative for visual disturbance.   Respiratory: Positive for shortness of breath. Negative for cough, chest tightness and wheezing.    Cardiovascular: Negative for chest pain, palpitations and leg swelling.   Gastrointestinal: Negative for abdominal pain, blood in stool, constipation, diarrhea, nausea, vomiting, GERD and indigestion.   Genitourinary: Negative for dysuria, frequency and hematuria.   Musculoskeletal: Negative for arthralgias, back pain and myalgias.        Chronic restless leg syndrome and pain.   Skin: Negative for rash and wound.   Neurological: Positive for weakness. Negative for dizziness, tremors, light-headedness, numbness, headache and memory problem.   Hematological: Negative for adenopathy. Does not bruise/bleed easily.   Psychiatric/Behavioral: Negative for sleep disturbance and depressed mood. The patient is not nervous/anxious.        Objective     Vitals:    08/25/22 1602   BP: 120/82   Pulse: 96   Temp: 97.8 °F (36.6 °C)   TempSrc: Infrared   SpO2: 98%   Weight: 66.7 kg (147 lb)   Height: 154.9 cm (61\")       BMI is >= 25 and <30. (Overweight) The following options were offered after discussion;: weight loss educational material (shared in after visit summary)    The patient has no " evidence of cognitve impairment.     Physical Exam  Vitals and nursing note reviewed.   Constitutional:       General: She is not in acute distress.     Appearance: She is well-developed. She is not diaphoretic.   HENT:      Head: Normocephalic and atraumatic.      Right Ear: External ear normal.      Left Ear: External ear normal.      Nose: Nose normal.   Eyes:      Conjunctiva/sclera: Conjunctivae normal.      Pupils: Pupils are equal, round, and reactive to light.   Neck:      Thyroid: No thyromegaly.      Trachea: No tracheal deviation.   Cardiovascular:      Rate and Rhythm: Normal rate and regular rhythm.      Heart sounds: Normal heart sounds. No murmur heard.    No friction rub. No gallop.   Pulmonary:      Effort: Pulmonary effort is normal. No respiratory distress.      Breath sounds: Wheezing present.      Comments: Utilizing oxygen nasal cannula 2 L via oxygen concentrator.  Has bilateral end expiratory wheezes but no crackles.  Abdominal:      General: Bowel sounds are normal.      Palpations: Abdomen is soft. There is no mass.      Tenderness: There is no abdominal tenderness. There is no guarding.   Musculoskeletal:         General: Normal range of motion.      Cervical back: Normal range of motion and neck supple.      Comments: Utilizing wheelchair and unable to walk on her own.   Lymphadenopathy:      Cervical: No cervical adenopathy.   Skin:     General: Skin is warm and dry.      Capillary Refill: Capillary refill takes less than 2 seconds.      Findings: No rash.   Neurological:      Mental Status: She is alert and oriented to person, place, and time.      Motor: No abnormal muscle tone.      Deep Tendon Reflexes: Reflexes normal.   Psychiatric:         Behavior: Behavior normal.         Thought Content: Thought content normal.         Judgment: Judgment normal.     Recent Lab Results:  Lab Results   Component Value Date     (H) 11/12/2019     Lab Results   Component Value Date    TRIG  174 (H) 04/12/2021    HDL 48 04/12/2021    VLDL 31 04/12/2021       Assessment & Plan   Age-appropriate Screening Schedule:  Refer to the list below for future screening recommendations based on patient's age, sex and/or medical conditions.      Health Maintenance   Topic Date Due   • TDAP/TD VACCINES (1 - Tdap) Never done   • ZOSTER VACCINE (1 of 2) Never done   • INFLUENZA VACCINE  10/01/2022   • MAMMOGRAM  08/10/2023   • DXA SCAN  08/10/2023       Medicare Risks and Personalized Health Plan:  Advance Directive Discussion  Fall Risk  Glaucoma Risk  Immunizations Discussed/Encouraged (specific immunizations; Tdap, Shingrix and COVID19 )      CMS-Preventive Services Quick Reference  Medicare Preventive Services Addressed:  Annual Wellness Visit (AWV)  Glaucoma screening (for individuals with diabetes mellitus, family history of glaucoma, -Americans (> or =) age 50, -Americans (> or =) age 65)    Advance Care Planning:  ACP discussion was held with the patient during this visit. Patient has an advance directive (not in EMR), copy requested.    Diagnoses and all orders for this visit:    1. Medicare annual wellness visit, subsequent (Primary)    2. Depression, unspecified depression type  -     Discontinue: buPROPion XL (WELLBUTRIN XL) 300 MG 24 hr tablet; Take 1 tablet by mouth Daily.  Dispense: 30 tablet; Refill: 5  -     buPROPion XL (WELLBUTRIN XL) 300 MG 24 hr tablet; Take 1 tablet by mouth Daily.  Dispense: 30 tablet; Refill: 5    3. Chronic midline low back pain with left-sided sciatica  -     cyclobenzaprine (FLEXERIL) 10 MG tablet; Take 1 tablet by mouth 3 (Three) Times a Day As Needed (back pain).  Dispense: 40 tablet; Refill: 1  -     HYDROcodone-acetaminophen (NORCO) 7.5-325 MG per tablet; Take 1 tablet by mouth Every 6 (Six) Hours As Needed for Moderate Pain .  Dispense: 20 tablet; Refill: 0  -     ToxASSURE Select 13 (MW) - Urine, Clean Catch    4. RLS (restless legs syndrome)  -      pramipexole (Mirapex) 0.5 MG tablet; Take 1 tablet by mouth Every Night.  Dispense: 30 tablet; Refill: 1      Reviewed history and annual wellness visit with patient during office time.  Medications reviewed as appropriate.  Discussed advanced directives and living will.  Patient has living will: Living will: yes and patient will bring copy to office.  Discussed fall risk and precautions encourage removing throw rugs and using grab bars within the home and bathroom.  Will check the labs as ordered above to evaluate the blood sugars, kidney, liver, cholesterol for screening.  Discussed flu shot recommended to get the high-dose influenza vaccine annually in the fall.  The patient has started, but not completed, their COVID-19 vaccination series.  Prevnar-13 and pneumovax-23 up to date and appropriate.  Tdap, covid, Shingrix vaccination series recommended.  Encourage follow-up with the eye doctor on annual basis for glaucoma evaluation.  Discussed weight and encouraged exercise as tolerated while following a healthy diet.  Colon cancer screening discussed and current status: cologuard 3/15/22 completed and negative.  Recommend to get annual mammograms last completed 8/10/21.  Follow-up with gynecology and specialists as scheduled.      Uncontrolled RLS Will try mirapex at night for the RLS and not take the requip at night.  If she has significant improvement with the Mirapex then we will discontinue the Requip and we will continue with the Mirapex.  Continue other medications unchanged.  Continue follow-up with her specialist.    ZHANE run and reviewed.  Risks of the medication include but are not limited to fatigue, somnolence, increased risk of falls, constipation, allergic reaction, dependence, and addiction.    An After Visit Summary and PPPS with all of these plans were given to the patient.      Follow Up:  No follow-ups on file.           · COVID-19 Precautions - Patient was compliant in wearing a mask. When I  saw the patient, I used appropriate personal protective equipment (PPE) including mask and eye shield (standard procedure).  Additionally, I used gown and gloves if indicated.  Hand hygiene was completed before and after seeing the patient.  · Dictated utilizing Dragon Dictation

## 2022-08-25 NOTE — PATIENT INSTRUCTIONS
Medicare Wellness  Personal Prevention Plan of Service     Date of Office Visit:    Encounter Provider:  Rodríguez Walker MD  Place of Service:  [facility]  Patient Name: Shani Phillip  :  1950    As part of the Medicare Wellness portion of your visit today, we are providing you with this personalized preventive plan of services (PPPS). This plan is based upon recommendations of the [place] Preventive Services Task Force (USPSTF) and the Advisory Committee on Immunization Practices (ACIP).    This lists the preventive care services that should be considered, and provides dates of when you are due. Items listed as completed are up-to-date and do not require any further intervention.    Health Maintenance   Topic Date Due    TDAP/TD VACCINES (1 - Tdap) Never done    ZOSTER VACCINE (1 of 2) Never done    COVID-19 Vaccine (3 - Booster for Paul series) 2022    INFLUENZA VACCINE  10/01/2022    MAMMOGRAM  08/10/2023    DXA SCAN  08/10/2023    ANNUAL WELLNESS VISIT  2023    COLORECTAL CANCER SCREENING  03/15/2025    HEPATITIS C SCREENING  Completed    Pneumococcal Vaccine 65+  Completed       Orders Placed This Encounter   Procedures    ToxASSURE Select 13 (MW) - Urine, Clean Catch     Order Specific Question:   Release to patient     Answer:   Routine Release       Return if symptoms worsen or fail to improve.

## 2022-08-25 NOTE — PROGRESS NOTES
Anticoagulation Clinic Progress Note    Anticoagulation Summary  As of 2022    INR goal:  2.0-3.0   TTR:  62.0 % (1.5 y)   INR used for dosin.90 (2022)   Warfarin maintenance plan:  3.75 mg every Sun, Wed; 2.5 mg all other days   Weekly warfarin total:  20 mg   Plan last modified:  Keshawn Castaneda, PharmD (2022)   Next INR check:  2022   Priority:  High   Target end date:      Indications    PAF (paroxysmal atrial fibrillation) (HCC) (Resolved) [I48.0]             Anticoagulation Episode Summary     INR check location:      Preferred lab:      Send INR reminders to:   [facility] HOME TEST POOL    Comments:   Home Testing as of 21 *call only when out of range*      Anticoagulation Care Providers     Provider Role Specialty Phone number    Zenia Tinajero MD Referring Cardiology [phone]          Clinic Interview:  Patient Findings     Positives:  Change in health, Change in medications    Negatives:  Signs/symptoms of thrombosis, Signs/symptoms of bleeding,   Laboratory test error suspected, Change in alcohol use, Change in   activity, Upcoming invasive procedure, Emergency department visit,   Upcoming dental procedure, Missed doses, Extra doses, Change in   diet/appetite, Hospital admission, Bruising, Other complaints    Comments:  Denies alcohol this past week. Reports diarrhea several times   in the middle of the night last night. Reports she took ASA 81 mg x 3   doses one day due to chest pain she was experiencing.       Clinical Outcomes     Negatives:  Major bleeding event, Thromboembolic event,   Anticoagulation-related hospital admission, Anticoagulation-related ED   visit, Anticoagulation-related fatality    Comments:  Denies alcohol this past week. Reports diarrhea several times   in the middle of the night last night. Reports she took ASA 81 mg x 3   doses one day due to chest pain she was experiencing.         INR History:  Anticoagulation Monitoring 8/15/2022  8/18/2022 8/25/2022   INR 2.50 3.00 4.90   INR Date 8/15/2022 8/18/2022 8/25/2022   INR Goal 2.0-3.0 2.0-3.0 2.0-3.0   Trend Same Down Down   Last Week Total 18.75 mg 18.75 mg 22.5 mg   Next Week Total 23.75 mg 22.5 mg 17.5 mg   Sun 3.75 mg 3.75 mg 3.75 mg   Mon 3.75 mg 3.75 mg 2.5 mg   Tue 2.5 mg 2.5 mg 2.5 mg   Wed 3.75 mg 3.75 mg -   Thu 3.75 mg 2.5 mg Hold (8/25)   Fri 3.75 mg 3.75 mg 2.5 mg   Sat 2.5 mg 2.5 mg 2.5 mg   Visit Report - - -   Some recent data might be hidden       Plan:  1. INR is Supratherapeutic today- see above in Anticoagulation Summary.   Will instruct Shani Phillip to Change their warfarin regimen- see above in Anticoagulation Summary.  2. Follow up in 6 days.  3. They have been instructed to call if any changes in medications, doses, concerns, etc. To seek immediate medical attention if s/sx of bleeding develop or fall occurs. Patient expresses understanding and has no further questions at this time.    Keshawn Castaneda, PharmD

## 2022-08-30 ENCOUNTER — PRIOR AUTHORIZATION (OUTPATIENT)
Dept: FAMILY MEDICINE CLINIC | Facility: CLINIC | Age: 72
End: 2022-08-30

## 2022-08-31 ENCOUNTER — ANTICOAGULATION VISIT (OUTPATIENT)
Dept: PHARMACY | Facility: HOSPITAL | Age: 72
End: 2022-08-31

## 2022-08-31 LAB — INR PPP: 4.1

## 2022-08-31 NOTE — PROGRESS NOTES
Anticoagulation Clinic Progress Note    Anticoagulation Summary  As of 2022    INR goal:  2.0-3.0   TTR:  61.3 % (1.5 y)   INR used for dosin.10 (2022)   Warfarin maintenance plan:  3.75 mg every Wed; 2.5 mg all other days   Weekly warfarin total:  18.75 mg   Plan last modified:  Zenia Myers, PharmD (2022)   Next INR check:  2022   Priority:  High   Target end date:      Indications    PAF (paroxysmal atrial fibrillation) (HCC) (Resolved) [I48.0]             Anticoagulation Episode Summary     INR check location:      Preferred lab:      Send INR reminders to:   [facility] HOME TEST POOL    Comments:   Home Testing as of 21 *call only when out of range*      Anticoagulation Care Providers     Provider Role Specialty Phone number    Zenia Tinajero MD Referring Cardiology [phone]          Clinic Interview:  Patient Findings     Positives:  Change in health, Change in medications    Negatives:  Signs/symptoms of thrombosis, Signs/symptoms of bleeding,   Laboratory test error suspected, Change in alcohol use, Change in   activity, Upcoming invasive procedure, Emergency department visit,   Upcoming dental procedure, Missed doses, Extra doses, Change in   diet/appetite, Hospital admission, Bruising, Other complaints    Comments:  Reports still feeling poorly; still having diarrhea and   yesterday had nausea as well.  Reports chest pain has resolved.  Started   on pramiprexole about 4 days ago (no interaction).       Clinical Outcomes     Negatives:  Major bleeding event, Thromboembolic event,   Anticoagulation-related hospital admission, Anticoagulation-related ED   visit, Anticoagulation-related fatality    Comments:  Reports still feeling poorly; still having diarrhea and   yesterday had nausea as well.  Reports chest pain has resolved.  Started   on pramiprexole about 4 days ago (no interaction).         INR History:  Anticoagulation Monitoring 2022  8/31/2022   INR 3.00 4.90 4.10   INR Date 8/18/2022 8/25/2022 8/31/2022   INR Goal 2.0-3.0 2.0-3.0 2.0-3.0   Trend Down Down Down   Last Week Total 18.75 mg 22.5 mg 17.5 mg   Next Week Total 22.5 mg 17.5 mg 15 mg   Sun 3.75 mg 3.75 mg 2.5 mg   Mon 3.75 mg 2.5 mg 2.5 mg   Tue 2.5 mg 2.5 mg 2.5 mg   Wed 3.75 mg - Hold (8/31)   Thu 2.5 mg Hold (8/25) 2.5 mg   Fri 3.75 mg 2.5 mg 2.5 mg   Sat 2.5 mg 2.5 mg 2.5 mg   Visit Report - - -   Some recent data might be hidden       Plan:  1. INR is Supratherapeutic today- see above in Anticoagulation Summary.   Will instruct Shani Phillip to Change their warfarin regimen- see above in Anticoagulation Summary.  2. Follow up in 1 weeks  3. They have been instructed to call if any changes in medications, doses, concerns, etc. Patient expresses understanding and has no further questions at this time.    Zenia Myers, PharmD

## 2022-09-02 LAB — DRUGS UR: NORMAL

## 2022-09-08 ENCOUNTER — ANTICOAGULATION VISIT (OUTPATIENT)
Dept: PHARMACY | Facility: HOSPITAL | Age: 72
End: 2022-09-08

## 2022-09-08 LAB — INR PPP: 3.6

## 2022-09-08 NOTE — PROGRESS NOTES
Anticoagulation Clinic Progress Note    Anticoagulation Summary  As of 9/8/2022    INR goal:  2.0-3.0   TTR:  60.3 % (1.5 y)   INR used for dosing:  3.60 (9/8/2022)   Warfarin maintenance plan:  2.5 mg every day   Weekly warfarin total:  17.5 mg   Plan last modified:  Marleny Black RPH (9/8/2022)   Next INR check:  9/15/2022   Priority:  High   Target end date:      Indications    PAF (paroxysmal atrial fibrillation) (HCC) (Resolved) [I48.0]             Anticoagulation Episode Summary     INR check location:      Preferred lab:      Send INR reminders to:   [employer] HOME TEST POOL    Comments:   Home Testing as of 7/30/21 *call only when out of range*      Anticoagulation Care Providers     Provider Role Specialty Phone number    Zenia Tinajero MD Referring Cardiology [phone]          Clinic Interview:  Patient Findings     Negatives:  Signs/symptoms of thrombosis, Signs/symptoms of bleeding,   Laboratory test error suspected, Change in health, Change in alcohol use,   Change in activity, Upcoming invasive procedure, Emergency department   visit, Upcoming dental procedure, Missed doses, Extra doses, Change in   medications, Change in diet/appetite, Hospital admission, Bruising, Other   complaints      Clinical Outcomes     Negatives:  Major bleeding event, Thromboembolic event,   Anticoagulation-related hospital admission, Anticoagulation-related ED   visit, Anticoagulation-related fatality        INR History:  Anticoagulation Monitoring 8/25/2022 8/31/2022 9/8/2022   INR 4.90 4.10 3.60   INR Date 8/25/2022 8/31/2022 9/8/2022   INR Goal 2.0-3.0 2.0-3.0 2.0-3.0   Trend Down Down Down   Last Week Total 22.5 mg 17.5 mg 18.75 mg   Next Week Total 17.5 mg 15 mg 15 mg   Sun 3.75 mg 2.5 mg 2.5 mg   Mon 2.5 mg 2.5 mg 2.5 mg   Tue 2.5 mg 2.5 mg 2.5 mg   Wed - Hold (8/31) 2.5 mg   Thu Hold (8/25) 2.5 mg Hold (9/8)   Fri 2.5 mg 2.5 mg 2.5 mg   Sat 2.5 mg 2.5 mg 2.5 mg   Visit Report - - -   Some recent data  might be hidden       Plan:  1. INR is Supratherapeutic today- see above in Anticoagulation Summary.   Will instruct Shani Phillip to Change their warfarin regimen- see above in Anticoagulation Summary.  2. Follow up in 1 week  3. They have been instructed to call if any changes in medications, doses, concerns, etc. Patient expresses understanding and has no further questions at this time.    Marleny Black, [facility]

## 2022-09-15 ENCOUNTER — ANTICOAGULATION VISIT (OUTPATIENT)
Dept: PHARMACY | Facility: HOSPITAL | Age: 72
End: 2022-09-15

## 2022-09-15 LAB — INR PPP: 1.4

## 2022-09-15 PROCEDURE — G0463 HOSPITAL OUTPT CLINIC VISIT: HCPCS

## 2022-09-15 NOTE — PROGRESS NOTES
Anticoagulation Clinic Progress Note    Anticoagulation Summary  As of 9/15/2022    INR goal:  2.0-3.0   TTR:  60.1 % (1.5 y)   INR used for dosin.40 (9/15/2022)   Warfarin maintenance plan:  2.5 mg every day   Weekly warfarin total:  17.5 mg   Plan last modified:  Marleny Black RPH (2022)   Next INR check:  2022   Priority:  High   Target end date:      Indications    PAF (paroxysmal atrial fibrillation) (HCC) (Resolved) [I48.0]             Anticoagulation Episode Summary     INR check location:      Preferred lab:      Send INR reminders to:   [employer] HOME TEST POOL    Comments:   Home Testing as of 21 *call only when out of range*      Anticoagulation Care Providers     Provider Role Specialty Phone number    Zenia Tinajero MD Referring Cardiology [phone]          Clinic Interview:  Patient Findings     Negatives:  Signs/symptoms of thrombosis, Signs/symptoms of bleeding,   Laboratory test error suspected, Change in health, Change in alcohol use,   Change in activity, Upcoming invasive procedure, Emergency department   visit, Upcoming dental procedure, Missed doses, Extra doses, Change in   medications, Change in diet/appetite, Hospital admission, Bruising, Other   complaints      Clinical Outcomes     Negatives:  Major bleeding event, Thromboembolic event,   Anticoagulation-related hospital admission, Anticoagulation-related ED   visit, Anticoagulation-related fatality        INR History:  Anticoagulation Monitoring 2022 2022 9/15/2022   INR 4.10 3.60 1.40   INR Date 2022 2022 9/15/2022   INR Goal 2.0-3.0 2.0-3.0 2.0-3.0   Trend Down Down Same   Last Week Total 17.5 mg 18.75 mg 15 mg   Next Week Total 15 mg 15 mg 18.75 mg   Sun 2.5 mg 2.5 mg 2.5 mg   Mon 2.5 mg 2.5 mg 2.5 mg   Tue 2.5 mg 2.5 mg 2.5 mg   Wed Hold () 2.5 mg 2.5 mg   Thu 2.5 mg Hold () 3.75 mg (9/15)   Fri 2.5 mg 2.5 mg 2.5 mg   Sat 2.5 mg 2.5 mg 2.5 mg   Visit Report - - -   Some  recent data might be hidden       Plan:  1. INR is Subtherapeutic today- see above in Anticoagulation Summary.   Will instruct Shani Phillip to Change their warfarin regimen- see above in Anticoagulation Summary.  2. Follow up in 1 weeks  3. They have been instructed to call if any changes in medications, doses, concerns, etc. Patient expresses understanding and has no further questions at this time.    Zenia Myers, PharmD

## 2022-09-17 DIAGNOSIS — G25.81 RLS (RESTLESS LEGS SYNDROME): ICD-10-CM

## 2022-09-20 DIAGNOSIS — F32.A DEPRESSION, UNSPECIFIED DEPRESSION TYPE: ICD-10-CM

## 2022-09-20 RX ORDER — PRAMIPEXOLE DIHYDROCHLORIDE 0.5 MG/1
TABLET ORAL
Qty: 30 TABLET | Refills: 1 | Status: SHIPPED | OUTPATIENT
Start: 2022-09-20 | End: 2022-10-13

## 2022-09-20 RX ORDER — BUPROPION HYDROCHLORIDE 300 MG/1
300 TABLET ORAL DAILY
Qty: 90 TABLET | Refills: 3 | Status: SHIPPED | OUTPATIENT
Start: 2022-09-20

## 2022-09-20 NOTE — TELEPHONE ENCOUNTER
Rx Refill Note  Requested Prescriptions     Pending Prescriptions Disp Refills   • buPROPion XL (WELLBUTRIN XL) 300 MG 24 hr tablet 90 tablet 1     Sig: Take 1 tablet by mouth Daily.      Last office visit with prescribing clinician: 8/25/2022 [facility] [place]     Next office visit with prescribing clinician: jovanni 8/26/23    Patito Pringle MA  09/20/22, 16:07 EDT

## 2022-09-22 ENCOUNTER — ANTICOAGULATION VISIT (OUTPATIENT)
Dept: PHARMACY | Facility: HOSPITAL | Age: 72
End: 2022-09-22

## 2022-09-22 LAB — INR PPP: 1.8

## 2022-09-22 NOTE — PROGRESS NOTES
Anticoagulation Clinic Progress Note    Anticoagulation Summary  As of 2022    INR goal:  2.0-3.0   TTR:  59.4 % (1.5 y)   INR used for dosin.80 (2022)   Warfarin maintenance plan:  2.5 mg every day   Weekly warfarin total:  17.5 mg   Plan last modified:  Marleny Black RPH (2022)   Next INR check:  2022   Priority:  High   Target end date:      Indications    PAF (paroxysmal atrial fibrillation) (HCC) (Resolved) [I48.0]             Anticoagulation Episode Summary     INR check location:      Preferred lab:      Send INR reminders to:   [facility] [employer] HOME TEST POOL    Comments:   Home Testing as of 21 *call only when out of range*      Anticoagulation Care Providers     Provider Role Specialty Phone number    Zenia Tinajero MD Referring Cardiology [phone]          Clinic Interview:  Patient Findings     Negatives:  Signs/symptoms of thrombosis, Signs/symptoms of bleeding,   Laboratory test error suspected, Change in health, Change in alcohol use,   Change in activity, Upcoming invasive procedure, Emergency department   visit, Upcoming dental procedure, Missed doses, Extra doses, Change in   medications, Change in diet/appetite, Hospital admission, Bruising, Other   complaints    Comments:  Patient states she is feeling much better, no missed doses,   new meds, and diet is the same.      Clinical Outcomes     Negatives:  Major bleeding event, Thromboembolic event,   Anticoagulation-related hospital admission, Anticoagulation-related ED   visit, Anticoagulation-related fatality    Comments:  Patient states she is feeling much better, no missed doses,   new meds, and diet is the same.        INR History:  Anticoagulation Monitoring 2022 9/15/2022 2022   INR 3.60 1.40 1.80   INR Date 2022 9/15/2022 2022   INR Goal 2.0-3.0 2.0-3.0 2.0-3.0   Trend Down Same Same   Last Week Total 18.75 mg 15 mg 18.75 mg   Next Week Total 15 mg 18.75 mg 17.5 mg   Sun 2.5 mg 2.5 mg  2.5 mg   Mon 2.5 mg 2.5 mg 2.5 mg   Tue 2.5 mg 2.5 mg 2.5 mg   Wed 2.5 mg 2.5 mg 2.5 mg   Thu Hold (9/8) 3.75 mg (9/15) 2.5 mg   Fri 2.5 mg 2.5 mg 2.5 mg   Sat 2.5 mg 2.5 mg 2.5 mg   Visit Report - - -   Some recent data might be hidden       Plan:  1. INR is Subtherapeutic today- see above in Anticoagulation Summary.   Will instruct Shani Phillip to Change their warfarin regimen- see above in Anticoagulation Summary. Will boost with half a tablet today (3.75mg).  2. Follow up in 1 weeks  3. Pt has agreed to only be called if INR out of range. They have been instructed to call if any changes in medications, doses, concerns, etc. Patient expresses understanding and has no further questions at this time.    Sin Alanis, PharmD  Pharmacy Resident

## 2022-09-29 ENCOUNTER — ANTICOAGULATION VISIT (OUTPATIENT)
Dept: PHARMACY | Facility: HOSPITAL | Age: 72
End: 2022-09-29

## 2022-09-29 LAB — INR PPP: 1.1

## 2022-09-29 NOTE — PROGRESS NOTES
Anticoagulation Clinic Progress Note    Anticoagulation Summary  As of 2022    INR goal:  2.0-3.0   TTR:  58.6 % (1.6 y)   INR used for dosin.1 (2022)   Warfarin maintenance plan:  2.5 mg every day   Weekly warfarin total:  17.5 mg   Plan last modified:  Marleny Black RPH (2022)   Next INR check:     Priority:  High   Target end date:      Indications    PAF (paroxysmal atrial fibrillation) (HCC) (Resolved) [I48.0]             Anticoagulation Episode Summary     INR check location:      Preferred lab:      Send INR reminders to:   [facility] [employer] HOME TEST POOL    Comments:   Home Testing as of 21 *call only when out of range*      Anticoagulation Care Providers     Provider Role Specialty Phone number    Zenia Tinajero MD Referring Cardiology [phone]          Clinic Interview:  Patient Findings     Negatives:  Signs/symptoms of thrombosis, Signs/symptoms of bleeding,   Laboratory test error suspected, Change in health, Change in alcohol use,   Change in activity, Upcoming invasive procedure, Emergency department   visit, Upcoming dental procedure, Missed doses, Extra doses, Change in   medications, Change in diet/appetite, Hospital admission, Bruising, Other   complaints    Comments:  patient states no missed doses, and no greens intake.   Confirmed dosing with the patient and she states she has been taking her   medication every night. Reports no other changes.      Clinical Outcomes     Negatives:  Major bleeding event, Thromboembolic event,   Anticoagulation-related hospital admission, Anticoagulation-related ED   visit, Anticoagulation-related fatality    Comments:  patient states no missed doses, and no greens intake.   Confirmed dosing with the patient and she states she has been taking her   medication every night. Reports no other changes.        INR History:  Anticoagulation Monitoring 9/15/2022 2022 2022   INR 1.40 1.80 1.1   INR Date 9/15/2022 2022  9/29/2022   INR Goal 2.0-3.0 2.0-3.0 2.0-3.0   Trend Same Same Same   Last Week Total 15 mg 18.75 mg 18.75 mg   Next Week Total 18.75 mg 18.75 mg 22.5 mg   Sun 2.5 mg 2.5 mg 2.5 mg   Mon 2.5 mg 2.5 mg 2.5 mg   Tue 2.5 mg 2.5 mg 2.5 mg   Wed 2.5 mg 2.5 mg 2.5 mg   Thu 3.75 mg (9/15) 3.75 mg (9/22) 5 mg (9/29)   Fri 2.5 mg 2.5 mg 5 mg (9/30)   Sat 2.5 mg 2.5 mg 2.5 mg   Visit Report - - -   Some recent data might be hidden       Plan:  1. INR is Subtherapeutic today- see above in Anticoagulation Summary.   Will instruct Shani Phillip to Change their warfarin regimen- see above in Anticoagulation Summary.  2. Follow up in 4 days  3. Pt has agreed to only be called if INR out of range. They have been instructed to call if any changes in medications, doses, concerns, etc. Patient expresses understanding and has no further questions at this time.    Sin Alanis, PharmD  Pharmacy Resident

## 2022-09-29 NOTE — PROGRESS NOTES
I have supervised and reviewed the notes, assessments, and/or procedures performed by our pharmacy resident, Sin Alanis.. The documented assessment and plan were developed cooperatively, and the plan was not implemented in my presence. I concur with the documentation of this patient encounter.

## 2022-10-03 ENCOUNTER — ANTICOAGULATION VISIT (OUTPATIENT)
Dept: PHARMACY | Facility: HOSPITAL | Age: 72
End: 2022-10-03

## 2022-10-03 LAB — INR PPP: 2.1

## 2022-10-03 NOTE — PROGRESS NOTES
Anticoagulation Clinic Progress Note    Anticoagulation Summary  As of 10/3/2022    INR goal:  2.0-3.0   TTR:  58.5 % (1.6 y)   INR used for dosin.10 (10/3/2022)   Warfarin maintenance plan:  5 mg every Mon; 2.5 mg all other days   Weekly warfarin total:  20 mg   Plan last modified:  Marleny Black RPH (10/3/2022)   Next INR check:  10/6/2022   Priority:  High   Target end date:      Indications    PAF (paroxysmal atrial fibrillation) (HCC) (Resolved) [I48.0]             Anticoagulation Episode Summary     INR check location:      Preferred lab:      Send INR reminders to:   [facility] [employer] HOME TEST POOL    Comments:   Home Testing as of 21 *call only when out of range*      Anticoagulation Care Providers     Provider Role Specialty Phone number    Zenia Tinajero MD Referring Cardiology [phone]          Clinic Interview:  Patient Findings     Negatives:  Signs/symptoms of thrombosis, Signs/symptoms of bleeding,   Laboratory test error suspected, Change in health, Change in alcohol use,   Change in activity, Upcoming invasive procedure, Emergency department   visit, Upcoming dental procedure, Missed doses, Extra doses, Change in   medications, Change in diet/appetite, Hospital admission, Bruising, Other   complaints      Clinical Outcomes     Negatives:  Major bleeding event, Thromboembolic event,   Anticoagulation-related hospital admission, Anticoagulation-related ED   visit, Anticoagulation-related fatality        INR History:  Anticoagulation Monitoring 2022 2022 10/3/2022   INR 1.80 1.1 2.10   INR Date 2022 2022 10/3/2022   INR Goal 2.0-3.0 2.0-3.0 2.0-3.0   Trend Same Same Up   Last Week Total 18.75 mg 18.75 mg 22.5 mg   Next Week Total 18.75 mg 22.5 mg 20 mg   Sun 2.5 mg 2.5 mg -   Mon 2.5 mg - 5 mg   Tue 2.5 mg - 2.5 mg   Wed 2.5 mg - 2.5 mg   Thu 3.75 mg () 5 mg () -   Fri 2.5 mg 5 mg () -   Sat 2.5 mg 2.5 mg -   Visit Report - - -   Some recent data  might be hidden       Plan:  1. INR is Therapeutic today- see above in Anticoagulation Summary.   Will instruct Shani Phillip to Change their warfarin regimen- see above in Anticoagulation Summary. INR increased very quickly so hesitant to continue 2 days of 5 mg. Trial 5 mg on Mondays and 2.5 mg all other days of the week.   2. Follow up in 3 days   3.  They have been instructed to call if any changes in medications, doses, concerns, etc. Patient expresses understanding and has no further questions at this time.    Marleny Black, [facility]

## 2022-10-06 ENCOUNTER — ANTICOAGULATION VISIT (OUTPATIENT)
Dept: PHARMACY | Facility: HOSPITAL | Age: 72
End: 2022-10-06

## 2022-10-06 LAB — INR PPP: 2.6

## 2022-10-07 ENCOUNTER — TELEPHONE (OUTPATIENT)
Dept: FAMILY MEDICINE CLINIC | Facility: CLINIC | Age: 72
End: 2022-10-07

## 2022-10-07 DIAGNOSIS — M54.42 CHRONIC MIDLINE LOW BACK PAIN WITH LEFT-SIDED SCIATICA: ICD-10-CM

## 2022-10-07 DIAGNOSIS — G89.29 CHRONIC MIDLINE LOW BACK PAIN WITH LEFT-SIDED SCIATICA: ICD-10-CM

## 2022-10-07 NOTE — TELEPHONE ENCOUNTER
Rx Refill Note  Requested Prescriptions     Pending Prescriptions Disp Refills   • cyclobenzaprine (FLEXERIL) 10 MG tablet 40 tablet 1     Sig: Take 1 tablet by mouth 3 (Three) Times a Day As Needed (back pain).      Last office visit with prescribing clinician: 8/25/2022      Next office visit with prescribing clinician: Visit date not found  LAST REFILL 08/25/2022    I Don't see where our office every refilled this medication after 08/25/2022

## 2022-10-07 NOTE — TELEPHONE ENCOUNTER
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 SHE HAS BEEN OUT OF MEDICATION FOR 1 MONTH     Do you require a callback: YES         [facility]/pharmacy #[id number] - [place], [place] - [address] - [phone] [facility] [phone]   [phone]

## 2022-10-10 RX ORDER — CYCLOBENZAPRINE HCL 10 MG
10 TABLET ORAL 3 TIMES DAILY PRN
Qty: 40 TABLET | Refills: 1 | Status: SHIPPED | OUTPATIENT
Start: 2022-10-10

## 2022-10-13 ENCOUNTER — ANTICOAGULATION VISIT (OUTPATIENT)
Dept: PHARMACY | Facility: HOSPITAL | Age: 72
End: 2022-10-13

## 2022-10-13 DIAGNOSIS — G25.81 RLS (RESTLESS LEGS SYNDROME): ICD-10-CM

## 2022-10-13 LAB — INR PPP: 1.9

## 2022-10-13 PROCEDURE — G0249 PROVIDE INR TEST MATER/EQUIP: HCPCS

## 2022-10-13 RX ORDER — PRAMIPEXOLE DIHYDROCHLORIDE 0.5 MG/1
TABLET ORAL
Qty: 30 TABLET | Refills: 3 | Status: SHIPPED | OUTPATIENT
Start: 2022-10-13 | End: 2023-01-09

## 2022-10-13 NOTE — TELEPHONE ENCOUNTER
Rx Refill Note  Requested Prescriptions     Pending Prescriptions Disp Refills   • pramipexole (MIRAPEX) 0.5 MG tablet [Pharmacy Med Name: PRAMIPEXOLE 0.5 MG TABLET] 30 tablet 1     Sig: TAKE 1 TABLET BY MOUTH EVERY DAY AT NIGHT      Last office visit with prescribing clinician: 8/25/2022      Next office visit with prescribing clinician: Visit date not found  LAST REFILL 09/20/2022     Hypertension Diabetes mellitus

## 2022-10-13 NOTE — PROGRESS NOTES
Anticoagulation Clinic Progress Note    Anticoagulation Summary  As of 10/13/2022    INR goal:  2.0-3.0   TTR:  59.0 % (1.6 y)   INR used for dosin.90 (10/13/2022)   Warfarin maintenance plan:  5 mg every Mon; 2.5 mg all other days   Weekly warfarin total:  20 mg   No change documented:  Marleny Black RPH   Plan last modified:  Marleny Black RPH (10/3/2022)   Next INR check:  10/20/2022   Priority:  High   Target end date:      Indications    PAF (paroxysmal atrial fibrillation) (HCC) (Resolved) [I48.0]             Anticoagulation Episode Summary     INR check location:      Preferred lab:      Send INR reminders to:   [facility] [employer] HOME TEST POOL    Comments:   Home Testing as of 21 *call only when out of range*      Anticoagulation Care Providers     Provider Role Specialty Phone number    Zenia Tinajero MD Referring Cardiology [phone]          Clinic Interview:  Patient Findings     Positives:  Missed doses    Negatives:  Signs/symptoms of thrombosis, Signs/symptoms of bleeding,   Laboratory test error suspected, Change in health, Change in alcohol use,   Change in activity, Upcoming invasive procedure, Emergency department   visit, Upcoming dental procedure, Extra doses, Change in medications,   Change in diet/appetite, Hospital admission, Bruising, Other complaints    Comments:  Only took 2.5 mg on Monday instead of 5 mg       Clinical Outcomes     Negatives:  Major bleeding event, Thromboembolic event,   Anticoagulation-related hospital admission, Anticoagulation-related ED   visit, Anticoagulation-related fatality    Comments:  Only took 2.5 mg on Monday instead of 5 mg         INR History:  Anticoagulation Monitoring 10/3/2022 10/6/2022 10/13/2022   INR 2.10 2.6 1.90   INR Date 10/3/2022 10/6/2022 10/13/2022   INR Goal 2.0-3.0 2.0-3.0 2.0-3.0   Trend Up Same Same   Last Week Total 22.5 mg 25 mg 17.5 mg   Next Week Total 20 mg 20 mg 20 mg   Sun - 2.5 mg 2.5 mg   Mon 5 mg 5 mg 5 mg    Tue 2.5 mg 2.5 mg 2.5 mg   Wed 2.5 mg 2.5 mg 2.5 mg   Thu - 2.5 mg 2.5 mg   Fri - 2.5 mg 2.5 mg   Sat - 2.5 mg 2.5 mg   Visit Report - - -   Some recent data might be hidden       Plan:  1. INR is Subtherapeutic today- see above in Anticoagulation Summary.   Will instruct Shani Phillip to Change their warfarin regimen- see above in Anticoagulation Summary. Trial 5 mg on Monday, 2.5 mg all other days as previously instructed   2. Follow up in 1 week  3. They have been instructed to call if any changes in medications, doses, concerns, etc. Patient expresses understanding and has no further questions at this time.    Marleny Black [facility]

## 2022-10-20 ENCOUNTER — ANTICOAGULATION VISIT (OUTPATIENT)
Dept: PHARMACY | Facility: HOSPITAL | Age: 72
End: 2022-10-20

## 2022-10-20 LAB — INR PPP: 2.9

## 2022-10-20 NOTE — PROGRESS NOTES
Anticoagulation Clinic Progress Note    Anticoagulation Summary  As of 10/20/2022    INR goal:  2.0-3.0   TTR:  59.3 % (1.6 y)   INR used for dosin.90 (10/20/2022)   Warfarin maintenance plan:  5 mg every Mon; 2.5 mg all other days   Weekly warfarin total:  20 mg   No change documented:  Marleny Black RPH   Plan last modified:  Marleny Black RPH (10/3/2022)   Next INR check:  10/27/2022   Priority:  High   Target end date:      Indications    PAF (paroxysmal atrial fibrillation) (HCC) (Resolved) [I48.0]             Anticoagulation Episode Summary     INR check location:      Preferred lab:      Send INR reminders to:  [facility] [employer] HOME TEST POOL    Comments:   Home Testing as of 21 *call only when out of range*      Anticoagulation Care Providers     Provider Role Specialty Phone number    Zenia Tinajero MD Referring Cardiology [phone]          Clinic Interview:  No pertinent clinical findings have been reported.    INR History:  Anticoagulation Monitoring 10/6/2022 10/13/2022 10/20/2022   INR 2.6 1.90 2.90   INR Date 10/6/2022 10/13/2022 10/20/2022   INR Goal 2.0-3.0 2.0-3.0 2.0-3.0   Trend Same Same Same   Last Week Total 25 mg 17.5 mg 20 mg   Next Week Total 20 mg 20 mg 20 mg   Sun 2.5 mg 2.5 mg 2.5 mg   Mon 5 mg 5 mg 5 mg   Tue 2.5 mg 2.5 mg 2.5 mg   Wed 2.5 mg 2.5 mg 2.5 mg   Thu 2.5 mg 2.5 mg 2.5 mg   Fri 2.5 mg 2.5 mg 2.5 mg   Sat 2.5 mg 2.5 mg 2.5 mg   Visit Report - - -   Some recent data might be hidden       Plan:  1. INR is therapeutic today- see above in Anticoagulation Summary.    Shani Kenji to continue their warfarin regimen- see above in Anticoagulation Summary.  2. Follow up in 1 week  3. They have been instructed to call if any changes in medications, doses, concerns, etc. Patient expresses understanding and has no further questions at this time.    Marleny Black RPH

## 2022-10-28 ENCOUNTER — ANTICOAGULATION VISIT (OUTPATIENT)
Dept: PHARMACY | Facility: HOSPITAL | Age: 72
End: 2022-10-28

## 2022-10-28 LAB — INR PPP: 2

## 2022-10-28 NOTE — PROGRESS NOTES
Anticoagulation Clinic Progress Note    Anticoagulation Summary  As of 10/28/2022    INR goal:  2.0-3.0   TTR:  59.9 % (1.6 y)   INR used for dosin.00 (10/28/2022)   Warfarin maintenance plan:  5 mg every Mon; 2.5 mg all other days   Weekly warfarin total:  20 mg   No change documented:  Marleny Black RPH   Plan last modified:  Marleny Black RPH (10/3/2022)   Next INR check:  2022   Priority:  High   Target end date:      Indications    PAF (paroxysmal atrial fibrillation) (HCC) (Resolved) [I48.0]             Anticoagulation Episode Summary     INR check location:      Preferred lab:      Send INR reminders to:  [facility] [employer] HOME TEST POOL    Comments:   Home Testing as of 21 *call only when out of range*      Anticoagulation Care Providers     Provider Role Specialty Phone number    Zenia Tinajero MD Referring Cardiology [phone]          Clinic Interview:  No pertinent clinical findings have been reported.    INR History:  Anticoagulation Monitoring 10/13/2022 10/20/2022 10/28/2022   INR 1.90 2.90 2.00   INR Date 10/13/2022 10/20/2022 10/28/2022   INR Goal 2.0-3.0 2.0-3.0 2.0-3.0   Trend Same Same Same   Last Week Total 17.5 mg 20 mg 20 mg   Next Week Total 20 mg 20 mg 20 mg   Sun 2.5 mg 2.5 mg 2.5 mg   Mon 5 mg 5 mg 5 mg   Tue 2.5 mg 2.5 mg 2.5 mg   Wed 2.5 mg 2.5 mg 2.5 mg   Thu 2.5 mg 2.5 mg 2.5 mg   Fri 2.5 mg 2.5 mg 2.5 mg   Sat 2.5 mg 2.5 mg 2.5 mg   Visit Report - - -   Some recent data might be hidden       Plan:  1. INR is therapeutic today- see above in Anticoagulation Summary.    Shani Kenji to continue their warfarin regimen- see above in Anticoagulation Summary.  2. Follow up in 1 week  3. Pt has agreed to only be called if INR out of range. They have been instructed to call if any changes in medications, doses, concerns, etc. Patient expresses understanding and has no further questions at this time.    Marleny Black RPH

## 2022-11-03 ENCOUNTER — ANTICOAGULATION VISIT (OUTPATIENT)
Dept: PHARMACY | Facility: HOSPITAL | Age: 72
End: 2022-11-03

## 2022-11-03 LAB — INR PPP: 2.4

## 2022-11-03 NOTE — PROGRESS NOTES
Anticoagulation Clinic Progress Note    Anticoagulation Summary  As of 11/3/2022    INR goal:  2.0-3.0   TTR:  60.2 % (1.7 y)   INR used for dosin.40 (11/3/2022)   Warfarin maintenance plan:  5 mg every Mon; 2.5 mg all other days   Weekly warfarin total:  20 mg   No change documented:  Marleny Black RPH   Plan last modified:  Marleny Black RPH (10/3/2022)   Next INR check:  11/10/2022   Priority:  High   Target end date:      Indications    PAF (paroxysmal atrial fibrillation) (HCC) (Resolved) [I48.0]             Anticoagulation Episode Summary     INR check location:      Preferred lab:      Send INR reminders to:  [facility] [employer] HOME TEST POOL    Comments:   Home Testing as of 21 *call only when out of range*      Anticoagulation Care Providers     Provider Role Specialty Phone number    Zenia Tinajero MD Referring Cardiology [phone]          Clinic Interview:  No pertinent clinical findings have been reported.    INR History:  Anticoagulation Monitoring 10/20/2022 10/28/2022 11/3/2022   INR 2.90 2.00 2.40   INR Date 10/20/2022 10/28/2022 11/3/2022   INR Goal 2.0-3.0 2.0-3.0 2.0-3.0   Trend Same Same Same   Last Week Total 20 mg 20 mg 20 mg   Next Week Total 20 mg 20 mg 20 mg   Sun 2.5 mg 2.5 mg 2.5 mg   Mon 5 mg 5 mg 5 mg   Tue 2.5 mg 2.5 mg 2.5 mg   Wed 2.5 mg 2.5 mg 2.5 mg   Thu 2.5 mg 2.5 mg 2.5 mg   Fri 2.5 mg 2.5 mg 2.5 mg   Sat 2.5 mg 2.5 mg 2.5 mg   Visit Report - - -   Some recent data might be hidden       Plan:  1. INR is therapeutic today- see above in Anticoagulation Summary.    Shani Phillip to continue their warfarin regimen- see above in Anticoagulation Summary.  2. Follow up in 1 week  3. Pt has agreed to only be called if INR out of range. They have been instructed to call if any changes in medications, doses, concerns, etc. Patient expresses understanding and has no further questions at this time.    Marleny Black RPH

## 2022-11-12 LAB — INR PPP: 1.7

## 2022-11-14 ENCOUNTER — OFFICE VISIT (OUTPATIENT)
Dept: CARDIOLOGY | Facility: CLINIC | Age: 72
End: 2022-11-14

## 2022-11-14 ENCOUNTER — ANTICOAGULATION VISIT (OUTPATIENT)
Dept: PHARMACY | Facility: HOSPITAL | Age: 72
End: 2022-11-14

## 2022-11-14 VITALS
SYSTOLIC BLOOD PRESSURE: 118 MMHG | RESPIRATION RATE: 16 BRPM | DIASTOLIC BLOOD PRESSURE: 82 MMHG | BODY MASS INDEX: 27.38 KG/M2 | OXYGEN SATURATION: 100 % | HEART RATE: 66 BPM | HEIGHT: 61 IN | WEIGHT: 145 LBS

## 2022-11-14 DIAGNOSIS — I50.33 ACUTE ON CHRONIC DIASTOLIC CHF (CONGESTIVE HEART FAILURE): Primary | ICD-10-CM

## 2022-11-14 DIAGNOSIS — Z99.81 OXYGEN DEPENDENT: ICD-10-CM

## 2022-11-14 DIAGNOSIS — I33.0 MITRAL VALVE VEGETATION: ICD-10-CM

## 2022-11-14 DIAGNOSIS — I34.2 NONRHEUMATIC MITRAL VALVE STENOSIS: ICD-10-CM

## 2022-11-14 DIAGNOSIS — I48.91 ATRIAL FIBRILLATION WITH RVR: ICD-10-CM

## 2022-11-14 DIAGNOSIS — I05.9 ENDOCARDITIS OF MITRAL VALVE: ICD-10-CM

## 2022-11-14 PROCEDURE — G0249 PROVIDE INR TEST MATER/EQUIP: HCPCS

## 2022-11-14 PROCEDURE — 99214 OFFICE O/P EST MOD 30 MIN: CPT | Performed by: INTERNAL MEDICINE

## 2022-11-14 RX ORDER — ROPINIROLE 4 MG/1
4 TABLET, FILM COATED ORAL 3 TIMES DAILY
COMMUNITY
Start: 2022-10-02 | End: 2023-01-09

## 2022-11-14 NOTE — PROGRESS NOTES
CARDIOLOGY    Zenia Tinajero MD    ENCOUNTER DATE:  11/14/2022    Shani Phillip / 72 y.o. / female        CHIEF COMPLAINT / REASON FOR OFFICE VISIT     Heart Problem (4 Month follow up /Atrial Fib/PAF /)      HISTORY OF PRESENT ILLNESS       HPI    Shani Phillip is a 72 y.o. female     This is a lady who was on a cruise ship when she developed a febrile illness.  She was taken to [facility] where she was diagnosed with endocarditis.  She left the hospital to return home to [place] I first saw her in January 14, 2019.  She has a history of COPD on oxygen, former smoker, restless leg syndrome and chronic pain.  She had a transthoracic echocardiogram suggestive of vegetation on the mitral valve annulus.  She had a transesophageal echo which delineated this more clearly.  Dr. Briones saw her in consultation.  The plan was to treat her with IV antibiotics which occurred.  She came in for a repeat transesophageal echo on January 14, 2020 and this was pretty much unchanged.  The mobile element attached to the posterior mitral valve annulus annulus was unchanged.     In August 2020, she had normal LV functions and ejection fraction of 66%, moderate left atrial enlargement, severe calcification of the aortic valve without regurgitation or stenosis.  There is moderate bileaflet mitral valve thickening with trace regurgitation and mild stenosis with a mean gradient of 4 mmHg.  There was mild tricuspid regurgitation with a normal right ventricular systolic pressure.  She was seen in the office in August of 2020 and was having lower extremity edema.  Heidi Ambrocio ordered an arterial Doppler which was normal.  She had another echocardiogram in September 2020, again normal left ventricular systolic function, and again bileaflet thickening with this time, mild mitral regurgitation and mitral stenosis was noted and a vegetation on the atrial side of the posterior mitral valve leaflet was also  "noted.     Repeat echo in June 2021 showed normal LV systolic function, grade 2 diastolic dysfunction, mild left atrial enlargement, calcification of the mitral leaflets with mild mitral stenosis.     She saw Heidi Kraus in February 2022 and was found to be in asymptomatic atrial fibrillation with rapid ventricular response.  Her INRs have been therapeutic.  Diltiazem was resumed.  24-hour monitor in March 2022 showed that she was predominantly in atrial fibrillation.  Her average heart rate was 86 bpm with a range from 50 to 124 bpm.    She is having problems with left greater than right lower extremity edema.  She takes Lasix but does not feel like it helps.  She denies any palpitations or chest pain.     REVIEW OF SYSTEMS     Review of Systems   Constitutional: Negative for chills, fever, weight gain and weight loss.   Cardiovascular: Negative for leg swelling.   Respiratory: Negative for cough, snoring and wheezing.    Hematologic/Lymphatic: Negative for bleeding problem. Does not bruise/bleed easily.   Skin: Negative for color change.   Musculoskeletal: Negative for falls, joint pain and myalgias.   Gastrointestinal: Negative for melena.   Genitourinary: Negative for hematuria.   Neurological: Negative for excessive daytime sleepiness.   Psychiatric/Behavioral: Negative for depression. The patient is not nervous/anxious.          VITAL SIGNS     Visit Vitals  /82   Pulse 66   Resp 16   Ht 154.9 cm (61\")   Wt 65.8 kg (145 lb)   SpO2 100%   BMI 27.40 kg/m²         Wt Readings from Last 3 Encounters:   11/14/22 65.8 kg (145 lb)   08/25/22 66.7 kg (147 lb)   07/08/22 68.9 kg (151 lb 14.4 oz)     Body mass index is 27.4 kg/m².      PHYSICAL EXAMINATION     Constitutional:       General: Not in acute distress.  Neck:      Vascular: No carotid bruit or JVD.   Pulmonary:      Effort: Pulmonary effort is normal.      Breath sounds: Normal breath sounds.   Cardiovascular:      Normal rate. Irregularly irregular " rhythm.      Murmurs: There is no murmur.   Psychiatric:         Mood and Affect: Mood and affect normal.           REVIEWED DATA     Procedures    We attempted an EKG but the leads would not stick to her chest.  I reviewed her old EKG from June 2022       Lab Results   Component Value Date    GLUCOSE 83 06/15/2022    BUN 15 06/15/2022    CREATININE 1.08 (H) 06/15/2022    EGFRIFNONA 48 (L) 08/26/2021    EGFRIFAFRI 46 (L) 04/12/2021    BCR 13.9 06/15/2022    K 3.9 06/15/2022    CO2 27.0 06/15/2022    CALCIUM 9.3 06/15/2022    PROTENTOTREF 6.7 04/12/2021    ALBUMIN 3.90 08/26/2021    LABIL2 1.5 04/12/2021    AST 18 08/26/2021    ALT 8 08/26/2021       ASSESSMENT & PLAN      Diagnosis Plan   1. Acute on chronic diastolic CHF (congestive heart failure) ([facility])        2. Atrial fibrillation with RVR ([facility])        3. Endocarditis of mitral valve        4. Mitral valve vegetation        5. Nonrheumatic mitral valve stenosis        6. Oxygen dependent              1.  Atrial fibrillation.  She is on warfarin and has a home monitor and is followed by the anticoagulation clinic.  Cardioversion 11/15/19.  She had trouble affording Eliquis. She was back in A. fib in February 2022 and diltiazem was adjusted and Holter monitor in March 2022 confirmed that her heart rate is controlled.   2.  Mitral valve vegetation.  S/P antibiotics.  Repeat echoes have shown this to be stable.  No fever or chills.  3. Lower extremity edema.  I am going to stop the diltiazem for a week and she is going to call me with heart rate and blood pressure numbers and let me know if her swelling has improved at all off of diltiazem.  I am reluctant to do more diuretics as the diuretic she is on right now do not seem to help her edema and I am concerned about her kidney function  4.  COPD on home oxygen.  Avoid beta-blockers.    Follow-up with Angy in about 6 weeks to see if she is able to stay off diltiazem or if we need to get it restarted and see how her  edema is doing.    No orders of the defined types were placed in this encounter.          MEDICATIONS         Discharge Medications          Accurate as of November 14, 2022  2:01 PM. If you have any questions, ask your nurse or doctor.            Continue These Medications      Instructions Start Date   budesonide 0.5 MG/2ML nebulizer solution  Commonly known as: PULMICORT   0.5 mg, Nebulization, Daily - RT      buPROPion  MG 24 hr tablet  Commonly known as: WELLBUTRIN XL   300 mg, Oral, Daily      cholecalciferol 25 MCG (1000 UT) tablet  Commonly known as: VITAMIN D3   2,000 Units, Oral, Daily      cyclobenzaprine 10 MG tablet  Commonly known as: FLEXERIL   10 mg, Oral, 3 Times Daily PRN      digoxin 125 MCG tablet  Commonly known as: LANOXIN   125 mcg, Oral, Daily      dilTIAZem  MG 24 hr capsule  Commonly known as: DILACOR XR   180 mg, Oral, Daily      famotidine 20 MG tablet  Commonly known as: PEPCID   20 mg, Oral, 2 Times Daily Before Meals      furosemide 40 MG tablet  Commonly known as: LASIX   40 mg, Oral, 2 Times Daily      HYDROcodone-acetaminophen 7.5-325 MG per tablet  Commonly known as: NORCO   1 tablet, Oral, Every 6 Hours PRN      O2  Commonly known as: OXYGEN   2 L/min, Inhalation, Once      potassium chloride ER 20 MEQ tablet controlled-release ER tablet  Commonly known as: K-TAB   20 mEq, Oral, 2 Times Daily      pramipexole 0.5 MG tablet  Commonly known as: MIRAPEX   TAKE 1 TABLET BY MOUTH EVERY DAY AT NIGHT      rOPINIRole 4 MG tablet  Commonly known as: REQUIP   4 mg, Oral, 3 Times Daily      TROSPIUM CHLORIDE PO   20 mg, Oral, 2 Times Daily      Ventolin  (90 Base) MCG/ACT inhaler  Generic drug: albuterol sulfate HFA   2 puffs, Inhalation, 4 Times Daily - RT      warfarin 2.5 MG tablet  Commonly known as: COUMADIN   Take 2.5mg (1 Tablet) on Tues, Thursday and Saturday; Take 3.75mg (1 and 1/2 Tablets) on all other days OR as directed by the [facility].                Zenia Tinajero MD  11/14/22  14:01 EST    Part of this note may be an electronic transcription/translation of spoken language to printed text using the Dragon dictation system.

## 2022-11-14 NOTE — PROGRESS NOTES
Anticoagulation Clinic Progress Note    Anticoagulation Summary  As of 2022    INR goal:  2.0-3.0   TTR:  60.1 % (1.7 y)   INR used for dosin.70 (2022)   Warfarin maintenance plan:  5 mg every Mon; 2.5 mg all other days; Starting 2022   Weekly warfarin total:  20 mg   Plan last modified:  Marleny Black RPH (10/3/2022)   Next INR check:  2022   Priority:  High   Target end date:      Indications    PAF (paroxysmal atrial fibrillation) (HCC) (Resolved) [I48.0]             Anticoagulation Episode Summary     INR check location:      Preferred lab:      Send INR reminders to:   [facility] [employer] HOME TEST POOL    Comments:   Home Testing as of 21 *call only when out of range*      Anticoagulation Care Providers     Provider Role Specialty Phone number    Zenia Tinajero MD Referring Cardiology [phone]          Clinic Interview:  Patient Findings     Negatives:  Signs/symptoms of thrombosis, Signs/symptoms of bleeding,   Laboratory test error suspected, Change in health, Change in alcohol use,   Change in activity, Upcoming invasive procedure, Emergency department   visit, Upcoming dental procedure, Missed doses, Extra doses, Change in   medications, Change in diet/appetite, Hospital admission, Bruising, Other   complaints      Clinical Outcomes     Negatives:  Major bleeding event, Thromboembolic event,   Anticoagulation-related hospital admission, Anticoagulation-related ED   visit, Anticoagulation-related fatality        INR History:  Anticoagulation Monitoring 10/28/2022 11/3/2022 2022   INR 2.00 2.40 1.70   INR Date 10/28/2022 11/3/2022 2022   INR Goal 2.0-3.0 2.0-3.0 2.0-3.0   Trend Same Same Same   Last Week Total 20 mg 20 mg 20 mg   Next Week Total 20 mg 20 mg 22.5 mg   Sun 2.5 mg 2.5 mg -   Mon 5 mg 5 mg 5 mg   Tue 2.5 mg 2.5 mg 5 mg (11/15)   Wed 2.5 mg 2.5 mg 2.5 mg   Thu 2.5 mg 2.5 mg 2.5 mg   Fri 2.5 mg 2.5 mg 2.5 mg   Sat 2.5 mg 2.5 mg -   Visit Report  - - -   Some recent data might be hidden       Plan:  1. INR is Subtherapeutic today- see above in Anticoagulation Summary.   Will instruct Shani Phillip to Change their warfarin regimen- see above in Anticoagulation Summary.  2. Follow up in 1 weeks  3. They have been instructed to call if any changes in medications, doses, concerns, etc. Patient expresses understanding and has no further questions at this time.    Zenia Myers, PharmD

## 2022-11-17 ENCOUNTER — ANTICOAGULATION VISIT (OUTPATIENT)
Dept: PHARMACY | Facility: HOSPITAL | Age: 72
End: 2022-11-17

## 2022-11-17 DIAGNOSIS — G89.29 CHRONIC MIDLINE LOW BACK PAIN WITH LEFT-SIDED SCIATICA: ICD-10-CM

## 2022-11-17 DIAGNOSIS — M54.42 CHRONIC MIDLINE LOW BACK PAIN WITH LEFT-SIDED SCIATICA: ICD-10-CM

## 2022-11-17 LAB — INR PPP: 4.2

## 2022-11-17 RX ORDER — HYDROCODONE BITARTRATE AND ACETAMINOPHEN 7.5; 325 MG/1; MG/1
1 TABLET ORAL EVERY 6 HOURS PRN
Qty: 20 TABLET | Refills: 0 | Status: SHIPPED | OUTPATIENT
Start: 2022-11-17

## 2022-11-17 NOTE — TELEPHONE ENCOUNTER
Caller: Shani Phillip    Relationship: Self    Best call back number: [phone]    Requested Prescriptions:   Requested Prescriptions     Pending Prescriptions Disp Refills   • HYDROcodone-acetaminophen (NORCO) 7.5-325 MG per tablet 20 tablet 0     Sig: Take 1 tablet by mouth Every 6 (Six) Hours As Needed for Moderate Pain.        Pharmacy where request should be sent: [facility] #[id number] [place], [place] - [address] .  [phone] [facility] [phone] FX     Additional details provided by patient: THE PATIENT IS CURRENTLY OUT OF THIS MEDICATION.    Does the patient have less than a 3 day supply:  [x] Yes  [] No    Tran Wren Rep   11/17/22 13:06 EST

## 2022-11-17 NOTE — PROGRESS NOTES
Anticoagulation Clinic Progress Note    Anticoagulation Summary  As of 2022    INR goal:  2.0-3.0   TTR:  59.9 % (1.7 y)   INR used for dosin.20 (2022)   Warfarin maintenance plan:  5 mg every Mon; 2.5 mg all other days; Starting 2022   Weekly warfarin total:  20 mg   Plan last modified:  Marleny Black RPH (10/3/2022)   Next INR check:  2022   Priority:  High   Target end date:      Indications    PAF (paroxysmal atrial fibrillation) (HCC) (Resolved) [I48.0]             Anticoagulation Episode Summary     INR check location:      Preferred lab:      Send INR reminders to:   [facility] [employer] HOME TEST POOL    Comments:   Home Testing as of 21 *call only when out of range*      Anticoagulation Care Providers     Provider Role Specialty Phone number    Zenia Tinajero MD Referring Cardiology [phone]          Clinic Interview:  Patient Findings     Negatives:  Signs/symptoms of thrombosis, Signs/symptoms of bleeding,   Laboratory test error suspected, Change in health, Change in alcohol use,   Change in activity, Upcoming invasive procedure, Emergency department   visit, Upcoming dental procedure, Missed doses, Extra doses, Change in   medications, Change in diet/appetite, Hospital admission, Bruising, Other   complaints      Clinical Outcomes     Negatives:  Major bleeding event, Thromboembolic event,   Anticoagulation-related hospital admission, Anticoagulation-related ED   visit, Anticoagulation-related fatality        INR History:  Anticoagulation Monitoring 11/3/2022 2022 2022   INR 2.40 1.70 4.20   INR Date 11/3/2022 2022 2022   INR Goal 2.0-3.0 2.0-3.0 2.0-3.0   Trend Same Same Same   Last Week Total 20 mg 20 mg 22.5 mg   Next Week Total 20 mg 22.5 mg 17.5 mg   Sun 2.5 mg - 2.5 mg   Mon 5 mg 5 mg 5 mg   Tue 2.5 mg 5 mg (11/15) 2.5 mg   Wed 2.5 mg 2.5 mg -   Thu 2.5 mg 2.5 mg Hold ()   Fri 2.5 mg 2.5 mg 2.5 mg   Sat 2.5 mg - 2.5 mg   Visit  Report - - -   Some recent data might be hidden       Plan:  1. INR is Supratherapeutic today- see above in Anticoagulation Summary.   Will instruct Shani Phillip to Change their warfarin regimen (HOLD today, then resume 5 mg Mon, 2.5 mg all other days) - see above in Anticoagulation Summary.  2. Follow up in 1 week  3. They have been instructed to call if any changes in medications, doses, concerns, etc. Patient expresses understanding and has no further questions at this time.    Keshawn Castaneda, PharmD

## 2022-11-22 ENCOUNTER — ANTICOAGULATION VISIT (OUTPATIENT)
Dept: PHARMACY | Facility: HOSPITAL | Age: 72
End: 2022-11-22

## 2022-11-22 ENCOUNTER — TELEPHONE (OUTPATIENT)
Dept: CARDIOLOGY | Facility: CLINIC | Age: 72
End: 2022-11-22

## 2022-11-22 LAB — INR PPP: 1.7

## 2022-11-22 NOTE — TELEPHONE ENCOUNTER
Stay off the diltiazem.  Continue to monitor blood pressure.  Bring those numbers to her follow-up appointment with Angy in January

## 2022-11-22 NOTE — TELEPHONE ENCOUNTER
Overall heart rate and blood pressure look to be okay.  Is the swelling any better since she stopped diltiazem?

## 2022-11-22 NOTE — TELEPHONE ENCOUNTER
Spoke with patient and she states the swelling has got much better since stopping the Diltiazem. States she is still having some swelling, but it is nothing compared to what is was previously. Patient is asking if she needs to resume taking the Diltiazem?     Please advise  JAVIER Interiano   11/22/2022

## 2022-11-22 NOTE — PROGRESS NOTES
Anticoagulation Clinic Progress Note    Anticoagulation Summary  As of 2022    INR goal:  2.0-3.0   TTR:  59.7 % (1.7 y)   INR used for dosin.70 (2022)   Warfarin maintenance plan:  5 mg every Mon; 2.5 mg all other days; Starting 2022   Weekly warfarin total:  20 mg   Plan last modified:  Marleny Black RPH (10/3/2022)   Next INR check:  2022   Priority:  High   Target end date:      Indications    PAF (paroxysmal atrial fibrillation) (HCC) (Resolved) [I48.0]             Anticoagulation Episode Summary     INR check location:      Preferred lab:      Send INR reminders to:   [facility] [employer] HOME TEST POOL    Comments:   Home Testing as of 21 *call only when out of range*      Anticoagulation Care Providers     Provider Role Specialty Phone number    Zenia Tinajero MD Referring Cardiology [phone]          Clinic Interview:  Patient Findings     Negatives:  Signs/symptoms of thrombosis, Signs/symptoms of bleeding,   Laboratory test error suspected, Change in health, Change in alcohol use,   Change in activity, Upcoming invasive procedure, Emergency department   visit, Upcoming dental procedure, Missed doses, Extra doses, Change in   medications, Change in diet/appetite, Hospital admission, Bruising, Other   complaints      Clinical Outcomes     Negatives:  Major bleeding event, Thromboembolic event,   Anticoagulation-related hospital admission, Anticoagulation-related ED   visit, Anticoagulation-related fatality        INR History:  Anticoagulation Monitoring 2022   INR 1.70 4.20 1.70   INR Date 2022   INR Goal 2.0-3.0 2.0-3.0 2.0-3.0   Trend Same Same Same   Last Week Total 20 mg 22.5 mg 20 mg   Next Week Total 22.5 mg 17.5 mg 22.5 mg   Sun - 2.5 mg 2.5 mg   Mon 5 mg 5 mg 5 mg   Tue 5 mg (11/15) 2.5 mg 2.5 mg   Wed 2.5 mg - 2.5 mg   Thu 2.5 mg Hold () 2.5 mg   Fri 2.5 mg 2.5 mg 5 mg ()   Sat - 2.5 mg 2.5 mg    Visit Report - - -   Some recent data might be hidden       Plan:  1. INR is Subtherapeutic today- see above in Anticoagulation Summary.   Will instruct Shani Phillip to Change their warfarin regimen- see above in Anticoagulation Summary.  Take 5 mg on Friday and Monday, and take 2.5 mg on all other days until next INR.  2. Follow up in 1 weeks  3.  They have been instructed to call if any changes in medications, doses, concerns, etc. Patient expresses understanding and has no further questions at this time.    Zenia Myers, PharmD

## 2022-11-22 NOTE — TELEPHONE ENCOUNTER
Patient called to give blood pressure readings for the past week    135/66 Pulse 90  128/64 Pulse 87  136/63 Pulse 86  127/70 Pulse 93  150/79 Pulse 90  133/64 Pulse 86  136/68 Pulse 83  139/74 Pulse 96  149/71 Pulse 92  146/69 Pulse 88  163/76 Pulse 95  135/65 Pulse 85

## 2022-12-01 ENCOUNTER — ANTICOAGULATION VISIT (OUTPATIENT)
Dept: PHARMACY | Facility: HOSPITAL | Age: 72
End: 2022-12-01

## 2022-12-01 LAB — INR PPP: 2.2

## 2022-12-01 NOTE — PROGRESS NOTES
Anticoagulation Clinic Progress Note    Anticoagulation Summary  As of 2022    INR goal:  2.0-3.0   TTR:  59.5 % (1.7 y)   INR used for dosin.20 (2022)   Warfarin maintenance plan:  5 mg every Mon; 2.5 mg all other days; Starting 2022   Weekly warfarin total:  20 mg   No change documented:  Marleny Black RPH   Plan last modified:  Marleny Black RPH (10/3/2022)   Next INR check:  2022   Priority:  High   Target end date:      Indications    PAF (paroxysmal atrial fibrillation) (HCC) (Resolved) [I48.0]             Anticoagulation Episode Summary     INR check location:      Preferred lab:      Send INR reminders to:   [facility] HOME TEST POOL    Comments:   Home Testing as of 21 *call only when out of range*      Anticoagulation Care Providers     Provider Role Specialty Phone number    Zenia Tinajero MD Referring Cardiology [phone]          Clinic Interview:  No pertinent clinical findings have been reported.    INR History:  Anticoagulation Monitoring 2022   INR 4.20 1.70 2.20   INR Date 2022   INR Goal 2.0-3.0 2.0-3.0 2.0-3.0   Trend Same Same Same   Last Week Total 22.5 mg 20 mg 22.5 mg   Next Week Total 17.5 mg 22.5 mg 20 mg   Sun 2.5 mg 2.5 mg 2.5 mg   Mon 5 mg 5 mg 5 mg   Tue 2.5 mg 2.5 mg 2.5 mg   Wed - 2.5 mg 2.5 mg   Thu Hold () 2.5 mg 2.5 mg   Fri 2.5 mg 5 mg () 2.5 mg   Sat 2.5 mg 2.5 mg 2.5 mg   Visit Report - - -   Some recent data might be hidden       Plan:  1. INR is therapeutic today- see above in Anticoagulation Summary.    Shani Phillip to continue their warfarin regimen- see above in Anticoagulation Summary.  2. Follow up in 1 week  3. They have been instructed to call if any changes in medications, doses, concerns, etc. Patient expresses understanding and has no further questions at this time.    Marleny Black RPH

## 2022-12-08 ENCOUNTER — ANTICOAGULATION VISIT (OUTPATIENT)
Dept: PHARMACY | Facility: HOSPITAL | Age: 72
End: 2022-12-08

## 2022-12-08 LAB — INR PPP: 2.7

## 2022-12-08 NOTE — PROGRESS NOTES
Anticoagulation Clinic Progress Note    Anticoagulation Summary  As of 2022    INR goal:  2.0-3.0   TTR:  59.9 % (1.8 y)   INR used for dosin.70 (2022)   Warfarin maintenance plan:  5 mg every Mon; 2.5 mg all other days; Starting 2022   Weekly warfarin total:  20 mg   No change documented:  Marleny Black RPH   Plan last modified:  Marleny Black RPH (10/3/2022)   Next INR check:  12/15/2022   Priority:  High   Target end date:      Indications    PAF (paroxysmal atrial fibrillation) (HCC) (Resolved) [I48.0]             Anticoagulation Episode Summary     INR check location:      Preferred lab:      Send INR reminders to:   [facility] [employer] HOME TEST POOL    Comments:   Home Testing as of 21 *call only when out of range*      Anticoagulation Care Providers     Provider Role Specialty Phone number    Zenia Tinajero MD Referring Cardiology [phone]          Clinic Interview:  No pertinent clinical findings have been reported.    INR History:  Anticoagulation Monitoring 2022   INR 1.70 2.20 2.70   INR Date 2022   INR Goal 2.0-3.0 2.0-3.0 2.0-3.0   Trend Same Same Same   Last Week Total 20 mg 22.5 mg 20 mg   Next Week Total 22.5 mg 20 mg 20 mg   Sun 2.5 mg 2.5 mg 2.5 mg   Mon 5 mg 5 mg 5 mg   Tue 2.5 mg 2.5 mg 2.5 mg   Wed 2.5 mg 2.5 mg 2.5 mg   Thu 2.5 mg 2.5 mg 2.5 mg   Fri 5 mg () 2.5 mg 2.5 mg   Sat 2.5 mg 2.5 mg 2.5 mg   Visit Report - - -   Some recent data might be hidden       Plan:  1. INR is therapeutic today- see above in Anticoagulation Summary.    Shani Phillip to continue their warfarin regimen- see above in Anticoagulation Summary.  2. Follow up in 1 week  3. Pt has agreed to only be called if INR out of range. They have been instructed to call if any changes in medications, doses, concerns, etc. Patient expresses understanding and has no further questions at this time.    Marleny Black, [facility]

## 2022-12-15 ENCOUNTER — ANTICOAGULATION VISIT (OUTPATIENT)
Dept: PHARMACY | Facility: HOSPITAL | Age: 72
End: 2022-12-15

## 2022-12-15 LAB — INR PPP: 3.6

## 2022-12-15 PROCEDURE — G0249 PROVIDE INR TEST MATER/EQUIP: HCPCS

## 2022-12-15 NOTE — PROGRESS NOTES
Anticoagulation Clinic Progress Note    Anticoagulation Summary  As of 12/15/2022    INR goal:  2.0-3.0   TTR:  59.6 % (1.8 y)   INR used for dosing:  3.60 (12/15/2022)   Warfarin maintenance plan:  5 mg every Mon; 2.5 mg all other days; Starting 12/15/2022   Weekly warfarin total:  20 mg   Plan last modified:  Marleny Black RPH (10/3/2022)   Next INR check:  12/22/2022   Priority:  High   Target end date:      Indications    PAF (paroxysmal atrial fibrillation) (HCC) (Resolved) [I48.0]             Anticoagulation Episode Summary     INR check location:      Preferred lab:      Send INR reminders to:   [facility] [employer] HOME TEST POOL    Comments:   Home Testing as of 7/30/21 *call only when out of range*      Anticoagulation Care Providers     Provider Role Specialty Phone number    Zenia Tinajero MD Referring Cardiology [phone]          Clinic Interview:  Patient Findings     Negatives:  Signs/symptoms of thrombosis, Signs/symptoms of bleeding,   Laboratory test error suspected, Change in health, Change in alcohol use,   Change in activity, Upcoming invasive procedure, Emergency department   visit, Upcoming dental procedure, Missed doses, Extra doses, Change in   medications, Change in diet/appetite, Hospital admission, Bruising, Other   complaints      Clinical Outcomes     Negatives:  Major bleeding event, Thromboembolic event,   Anticoagulation-related hospital admission, Anticoagulation-related ED   visit, Anticoagulation-related fatality        INR History:  Anticoagulation Monitoring 12/1/2022 12/8/2022 12/15/2022   INR 2.20 2.70 3.60   INR Date 12/1/2022 12/8/2022 12/15/2022   INR Goal 2.0-3.0 2.0-3.0 2.0-3.0   Trend Same Same Same   Last Week Total 22.5 mg 20 mg 20 mg   Next Week Total 20 mg 20 mg 17.5 mg   Sun 2.5 mg 2.5 mg 2.5 mg   Mon 5 mg 5 mg 5 mg   Tue 2.5 mg 2.5 mg 2.5 mg   Wed 2.5 mg 2.5 mg 2.5 mg   Thu 2.5 mg 2.5 mg Hold (12/15)   Fri 2.5 mg 2.5 mg 2.5 mg   Sat 2.5 mg 2.5 mg 2.5 mg    Visit Report - - -   Some recent data might be hidden       Plan:  1. INR is Supratherapeutic today- see above in Anticoagulation Summary.   Will instruct Shani Phillip to Change their warfarin regimen- see above in Anticoagulation Summary.  2. Follow up in 1 weeks  3. They have been instructed to call if any changes in medications, doses, concerns, etc. Patient expresses understanding and has no further questions at this time.    Zenia Myers, PharmD

## 2022-12-22 ENCOUNTER — ANTICOAGULATION VISIT (OUTPATIENT)
Dept: PHARMACY | Facility: HOSPITAL | Age: 72
End: 2022-12-22

## 2022-12-22 LAB — INR PPP: 3.4

## 2022-12-22 RX ORDER — WARFARIN SODIUM 2.5 MG/1
TABLET ORAL
Qty: 105 TABLET | Refills: 1 | Status: SHIPPED | OUTPATIENT
Start: 2022-12-22 | End: 2023-01-16 | Stop reason: SDUPTHER

## 2022-12-22 NOTE — PROGRESS NOTES
Anticoagulation Clinic Progress Note    Anticoagulation Summary  As of 12/22/2022    INR goal:  2.0-3.0   TTR:  58.9 % (1.8 y)   INR used for dosing:  3.40 (12/22/2022)   Warfarin maintenance plan:  2.5 mg every day; Starting 12/22/2022   Weekly warfarin total:  17.5 mg   Plan last modified:  Marleny Black RPH (12/22/2022)   Next INR check:  12/29/2022   Priority:  High   Target end date:      Indications    PAF (paroxysmal atrial fibrillation) (HCC) (Resolved) [I48.0]             Anticoagulation Episode Summary     INR check location:      Preferred lab:      Send INR reminders to:   [id number] HOME TEST POOL    Comments:   Home Testing as of 7/30/21 *call only when out of range*      Anticoagulation Care Providers     Provider Role Specialty Phone number    Zenia Tinajero MD Referring Cardiology [phone]          Clinic Interview:  Patient Findings     Negatives:  Signs/symptoms of thrombosis, Signs/symptoms of bleeding,   Laboratory test error suspected, Change in health, Change in alcohol use,   Change in activity, Upcoming invasive procedure, Emergency department   visit, Upcoming dental procedure, Missed doses, Extra doses, Change in   medications, Change in diet/appetite, Hospital admission, Bruising, Other   complaints      Clinical Outcomes     Negatives:  Major bleeding event, Thromboembolic event,   Anticoagulation-related hospital admission, Anticoagulation-related ED   visit, Anticoagulation-related fatality        INR History:  Anticoagulation Monitoring 12/8/2022 12/15/2022 12/22/2022   INR 2.70 3.60 3.40   INR Date 12/8/2022 12/15/2022 12/22/2022   INR Goal 2.0-3.0 2.0-3.0 2.0-3.0   Trend Same Same Down   Last Week Total 20 mg 20 mg 17.5 mg   Next Week Total 20 mg 17.5 mg 15 mg   Sun 2.5 mg 2.5 mg 2.5 mg   Mon 5 mg 5 mg 2.5 mg   Tue 2.5 mg 2.5 mg 2.5 mg   Wed 2.5 mg 2.5 mg 2.5 mg   Thu 2.5 mg Hold (12/15) Hold (12/22)   Fri 2.5 mg 2.5 mg 2.5 mg   Sat 2.5 mg 2.5 mg 2.5 mg   Visit  Report - - -   Some recent data might be hidden       Plan:  1. INR is Supratherapeutic today- see above in Anticoagulation Summary.   Will instruct Shani Phillip to Change their warfarin regimen- see above in Anticoagulation Summary.  2. Follow up in 1 week  3.  They have been instructed to call if any changes in medications, doses, concerns, etc. Patient expresses understanding and has no further questions at this time.    Marleny Black, [facility]

## 2022-12-29 ENCOUNTER — ANTICOAGULATION VISIT (OUTPATIENT)
Dept: PHARMACY | Facility: HOSPITAL | Age: 72
End: 2022-12-29

## 2022-12-29 LAB — INR PPP: 2.5

## 2022-12-29 NOTE — PROGRESS NOTES
Anticoagulation Clinic Progress Note    Anticoagulation Summary  As of 2022    INR goal:  2.0-3.0   TTR:  59.0 % (1.8 y)   INR used for dosin.50 (2022)   Warfarin maintenance plan:  2.5 mg every day; Starting 2022   Weekly warfarin total:  17.5 mg   No change documented:  Marleny Black RPH   Plan last modified:  Marleny Black RPH (2022)   Next INR check:  2023   Priority:  High   Target end date:      Indications    PAF (paroxysmal atrial fibrillation) (HCC) (Resolved) [I48.0]             Anticoagulation Episode Summary     INR check location:      Preferred lab:      Send INR reminders to:  [facility] [place] HOME TEST POOL    Comments:   Home Testing as of 21 *call only when out of range*      Anticoagulation Care Providers     Provider Role Specialty Phone number    Zenia Tinajero MD Referring Cardiology [phone]          Clinic Interview:  No pertinent clinical findings have been reported.    INR History:  Anticoagulation Monitoring 12/15/2022 2022 2022   INR 3.60 3.40 2.50   INR Date 12/15/2022 2022 2022   INR Goal 2.0-3.0 2.0-3.0 2.0-3.0   Trend Same Down Same   Last Week Total 20 mg 17.5 mg 15 mg   Next Week Total 17.5 mg 15 mg 17.5 mg   Sun 2.5 mg 2.5 mg 2.5 mg   Mon 5 mg 2.5 mg 2.5 mg   Tue 2.5 mg 2.5 mg 2.5 mg   Wed 2.5 mg 2.5 mg 2.5 mg   Thu Hold (12/15) Hold () 2.5 mg   Fri 2.5 mg 2.5 mg 2.5 mg   Sat 2.5 mg 2.5 mg 2.5 mg   Visit Report - - -   Some recent data might be hidden       Plan:  1. INR is therapeutic today- see above in Anticoagulation Summary.    Shani Phillip to continue their warfarin regimen- see above in Anticoagulation Summary.  2. Follow up in 1 week  3. They have been instructed to call if any changes in medications, doses, concerns, etc. Patient expresses understanding and has no further questions at this time.    Marleny Black RPH

## 2023-01-05 ENCOUNTER — ANTICOAGULATION VISIT (OUTPATIENT)
Dept: PHARMACY | Facility: HOSPITAL | Age: 73
End: 2023-01-05
Payer: MEDICARE

## 2023-01-05 LAB — INR PPP: 2.6

## 2023-01-05 NOTE — PROGRESS NOTES
Anticoagulation Clinic Progress Note    Anticoagulation Summary  As of 2023    INR goal:  2.0-3.0   TTR:  59.4 % (1.8 y)   INR used for dosin.60 (2023)   Warfarin maintenance plan:  2.5 mg every day; Starting 2023   Weekly warfarin total:  17.5 mg   No change documented:  Marleny Black RPH   Plan last modified:  Marleny Black RPH (2022)   Next INR check:  2023   Priority:  High   Target end date:      Indications    PAF (paroxysmal atrial fibrillation) (HCC) (Resolved) [I48.0]             Anticoagulation Episode Summary     INR check location:      Preferred lab:      [facility]   [facility]    Comments:   Home Testing as of 21 *call only when out of range*      Anticoagulation Care Providers     Provider Role Specialty Phone number    Zenia Tinajero MD Referring Cardiology [phone]          Clinic Interview:  No pertinent clinical findings have been reported.    INR History:  Anticoagulation Monitoring 2022   INR 3.40 2.50 2.60   INR Date 2022   INR Goal 2.0-3.0 2.0-3.0 2.0-3.0   Trend Down Same Same   Last Week Total 17.5 mg 15 mg 17.5 mg   Next Week Total 15 mg 17.5 mg 17.5 mg   Sun 2.5 mg 2.5 mg 2.5 mg   Mon 2.5 mg 2.5 mg 2.5 mg   Tue 2.5 mg 2.5 mg 2.5 mg   Wed 2.5 mg 2.5 mg 2.5 mg   Thu Hold () 2.5 mg 2.5 mg   Fri 2.5 mg 2.5 mg 2.5 mg   Sat 2.5 mg 2.5 mg 2.5 mg   Visit Report - - -   Some recent data might be hidden       Plan:  1. INR is therapeutic today- see above in Anticoagulation Summary.    Shani Phillip to continue their warfarin regimen- see above in Anticoagulation Summary.  2. Follow up in 1 week  3. Pt has agreed to only be called if INR out of range. They have been instructed to call if any changes in medications, doses, concerns, etc. Patient expresses understanding and has no further questions at this time.    Marleny Black, [facility]

## 2023-01-09 DIAGNOSIS — G25.81 RLS (RESTLESS LEGS SYNDROME): ICD-10-CM

## 2023-01-09 RX ORDER — PRAMIPEXOLE DIHYDROCHLORIDE 0.5 MG/1
TABLET ORAL
Qty: 90 TABLET | Refills: 1 | Status: SHIPPED | OUTPATIENT
Start: 2023-01-09

## 2023-01-09 NOTE — TELEPHONE ENCOUNTER
Rx Refill Note  Requested Prescriptions     Pending Prescriptions Disp Refills   • pramipexole (MIRAPEX) 0.5 MG tablet [Pharmacy Med Name: PRAMIPEXOLE 0.5 MG TABLET] 90 tablet 1     Sig: TAKE 1 TABLET BY MOUTH EVERY DAY AT NIGHT      Last office visit with prescribing clinician: 8/25/2022   Last telemedicine visit with prescribing clinician: Visit date not found   Next office visit with prescribing clinician: Visit date not found  Last refill 10/13/2022

## 2023-01-12 ENCOUNTER — OFFICE VISIT (OUTPATIENT)
Dept: CARDIOLOGY | Facility: CLINIC | Age: 73
End: 2023-01-12
Payer: MEDICARE

## 2023-01-12 VITALS
HEIGHT: 61 IN | WEIGHT: 140 LBS | HEART RATE: 96 BPM | BODY MASS INDEX: 26.43 KG/M2 | SYSTOLIC BLOOD PRESSURE: 121 MMHG | DIASTOLIC BLOOD PRESSURE: 70 MMHG

## 2023-01-12 DIAGNOSIS — I34.2 NONRHEUMATIC MITRAL VALVE STENOSIS: ICD-10-CM

## 2023-01-12 DIAGNOSIS — I48.20 CHRONIC ATRIAL FIBRILLATION: ICD-10-CM

## 2023-01-12 DIAGNOSIS — R06.09 DOE (DYSPNEA ON EXERTION): ICD-10-CM

## 2023-01-12 DIAGNOSIS — I05.9 ENDOCARDITIS OF MITRAL VALVE: ICD-10-CM

## 2023-01-12 DIAGNOSIS — I25.810 CORONARY ARTERY DISEASE INVOLVING CORONARY BYPASS GRAFT OF NATIVE HEART WITHOUT ANGINA PECTORIS: Primary | ICD-10-CM

## 2023-01-12 LAB — INR PPP: 2.4

## 2023-01-12 PROCEDURE — 93000 ELECTROCARDIOGRAM COMPLETE: CPT | Performed by: NURSE PRACTITIONER

## 2023-01-12 PROCEDURE — 99214 OFFICE O/P EST MOD 30 MIN: CPT | Performed by: NURSE PRACTITIONER

## 2023-01-12 NOTE — PROGRESS NOTES
Date of Office Visit: 23  Encounter Provider: GENET Sanchez  Place of Service: [facility] CARDIOLOGY  Patient Name: Shani Phillip  :1950    Chief Complaint   Patient presents with   • Uncontrolled atrial fibrillation (HCC)   • Atrial fibrillation, chronic (HCC   • Acute on chronic diastolic CHF (congestive heart failure) (   • Atrial fibrillation with RVR (HCC   • Follow-up   :     HPI: Shani Phillip is a 72 y.o. female  with  chronic atrial fibrillation, mitral valve vegetation, COPD, lower extremity edema, diastolic congestive heart failure, and hypertension.        She is followed by Dr. Tinajero.  I will visit with her in follow-up and have reviewed her medical record.  She has history of mitral valve endocarditis which was treated with antibiotics.  In 2021 she had an echocardiogram which showed normal left ventricular systolic function, grade 2 diastolic dysfunction, moderate calcification of the aortic valve, moderate calcification of the mitral valve and mild mitral valve stenoses.  She had cardioversion 2019 but then had recurrent atrial fibrillation 2022.  Her Lasix was increased to 40 mg twice daily in late 2022 due to continued swelling.  Her swelling did not significantly initially change with that it was felt that higher dose diltiazem was contributing.  I lowered diltiazem to 180 mg daily and started digoxin with a load x3 days.      She continues to have issues with swelling with diltiazem so that was stopped in November.  She sent a list of her blood pressure and pulse readings which showed pulse 80s to 90s.  She reported her swelling was better.    She presents today for reassessment.  She remains on 2 L of oxygen .  She has no shortness of breath complaint.  She denies current chest pain.  She reports intermittent left-sided chest pain that radiates from the left across her upper chest.  This is not new.  She has had this  "intermittently for months.  Her last episode was 1 week ago when she took a baby aspirin and symptoms subsided.  She denies that these atypical chest pains or any more frequent than in the past.  She reports lightheadedness at times but no near-syncope or syncope.  She has very minimal swelling since she has been off diltiazem.  No blood in the urine or stool.     Allergies   Allergen Reactions   • Penicillins Rash     RASH 30 YRS AGO NO HOSPITALIZATION           Family and social history reviewed.     ROS  All other systems were reviewed and are negative          Objective:     Vitals:    01/12/23 1108   BP: 121/70   BP Location: Left arm   Patient Position: Sitting   Pulse: 96   Weight: 63.5 kg (140 lb)   Height: 154.9 cm (61\")     Body mass index is 26.45 kg/m².    PHYSICAL EXAM:  Cardiovascular:      Normal rate. Irregularly irregular rhythm.      Murmurs: There is a grade 1/6 systolic murmur at the LLSB and ULSB.   Edema:     Ankle: bilateral trace edema of the ankle.          ECG 12 Lead    Date/Time: 1/12/2023 11:33 AM  Performed by: Angy Smith APRN  Authorized by: Angy Smith APRN   Comparison: compared with previous ECG   Similar to previous ECG  Rhythm: atrial fibrillation  Rate: normal  Conduction: right bundle branch block  QRS axis: left    Clinical impression: abnormal EKG              Current Outpatient Medications   Medication Sig Dispense Refill   • budesonide (PULMICORT) 0.5 MG/2ML nebulizer solution Take 0.5 mg by nebulization Daily.     • buPROPion XL (WELLBUTRIN XL) 300 MG 24 hr tablet Take 1 tablet by mouth Daily. 90 tablet 3   • cholecalciferol (VITAMIN D3) 25 MCG (1000 UT) tablet Take 2,000 Units by mouth Daily.     • cyclobenzaprine (FLEXERIL) 10 MG tablet Take 1 tablet by mouth 3 (Three) Times a Day As Needed (back pain). 40 tablet 1   • digoxin (LANOXIN) 125 MCG tablet Take 1 tablet by mouth Daily. 90 tablet 2   • famotidine (PEPCID) 20 MG tablet Take 1 tablet by mouth 2 (Two) Times " a Day Before Meals. 180 tablet 3   • furosemide (LASIX) 40 MG tablet Take 1 tablet by mouth 2 (Two) Times a Day. 180 tablet 1   • HYDROcodone-acetaminophen (NORCO) 7.5-325 MG per tablet Take 1 tablet by mouth Every 6 (Six) Hours As Needed for Moderate Pain. 20 tablet 0   • O2 (OXYGEN) Inhale 2 L/min 1 (One) Time.     • potassium chloride ER (K-TAB) 20 MEQ tablet controlled-release ER tablet Take 1 tablet by mouth 2 (Two) Times a Day. 180 tablet 2   • pramipexole (MIRAPEX) 0.5 MG tablet TAKE 1 TABLET BY MOUTH EVERY DAY AT NIGHT 90 tablet 1   • TROSPIUM CHLORIDE PO Take 20 mg by mouth 2 (Two) Times a Day.     • Ventolin  (90 Base) MCG/ACT inhaler Inhale 2 puffs 4 (Four) Times a Day.     • warfarin (COUMADIN) 2.5 MG tablet TAKE TWO TABLETS ON MON, AND TAKE ONE TABLET BY MOUTH ALL OTHER DAYS OR AS DIRECTED 105 tablet 1     No current facility-administered medications for this visit.     Assessment:       Diagnosis Plan   1. Coronary artery disease involving coronary bypass graft of native heart without angina pectoris  ECG 12 Lead      2. Chronic atrial fibrillation (HCC)  ECG 12 Lead      3. Endocarditis of mitral valve  ECG 12 Lead    Adult Transthoracic Echo Complete W/ Cont if Necessary Per Protocol      4. Nonrheumatic mitral valve stenosis  Adult Transthoracic Echo Complete W/ Cont if Necessary Per Protocol      5. JARVIS (dyspnea on exertion)  Adult Transthoracic Echo Complete W/ Cont if Necessary Per Protocol           Orders Placed This Encounter   Procedures   • ECG 12 Lead     This order was created via procedure documentation     Order Specific Question:   Release to patient     Answer:   Routine Release   • Adult Transthoracic Echo Complete W/ Cont if Necessary Per Protocol     Standing Status:   Future     Standing Expiration Date:   1/12/2024     Order Specific Question:   Reason for exam?     Answer:   Valvular Function         Plan:       1.  72-year-old female with chronic atrial fibrillation.   Anticoagulated with warfarin and INR is followed in our anticoagulation clinic.  She had a cardioversion November 2019 but now was predominately atrial fibrillation 93 % of the time on 2 week mobile teletry 03/2022 with average HR 86.     She is off diltiazem due to swelling.    2.  History of mitral valve vegetation in 2019 treated with antibiotics.  Last echo June 2021 showed no evidence of vegetation.  She has mild mitral valve stenoses  - she requires antibiotic for all dental work and verbalized understanding of this today.  3.  COPD on home oxygen-avoid beta-blockers.  She is on 24-hour oxygen  4.  Grade 2 diastolic dysfunction with lower extremity edema and chronic diastolic heart failure.  She is on Lasix 40 mg BID.   Continue the same  5.  Mild, non obstructive coronary artery disease on cardiac cath June 2020.  Negative perfusion stress test July 2022  6.  Mitral valve stenosis.  She will have repeat echocardiogram when she returns in 6 months       Call questions or concerns            It has been a pleasure to participate in this patient's care.      Thank you,  GENET Sanchez      **I used Dragon to dictate this note:**

## 2023-01-13 ENCOUNTER — ANTICOAGULATION VISIT (OUTPATIENT)
Dept: PHARMACY | Facility: HOSPITAL | Age: 73
End: 2023-01-13
Payer: MEDICARE

## 2023-01-13 PROCEDURE — G0249 PROVIDE INR TEST MATER/EQUIP: HCPCS

## 2023-01-13 NOTE — PROGRESS NOTES
Anticoagulation Clinic Progress Note    Anticoagulation Summary  As of 2023    INR goal:  2.0-3.0   TTR:  59.8 % (1.8 y)   INR used for dosin.40 (2023)   Warfarin maintenance plan:  2.5 mg every day; Starting 2023   Weekly warfarin total:  17.5 mg   No change documented:  Marleny Black RPH   Plan last modified:  Marleny Black RPH (2022)   Next INR check:  2023   Priority:  High   Target end date:      Indications    PAF (paroxysmal atrial fibrillation) (HCC) (Resolved) [I48.0]             Anticoagulation Episode Summary     INR check location:      Preferred lab:      [facility]   [facility]    Comments:   Home Testing as of 21 *call only when out of range*      Anticoagulation Care Providers     Provider Role Specialty Phone number    Zenia Tinajero MD Referring Cardiology [phone]          Clinic Interview:  No pertinent clinical findings have been reported.    INR History:  Anticoagulation Monitoring 2022   INR 2.50 2.60 2.40   INR Date 2022   INR Goal 2.0-3.0 2.0-3.0 2.0-3.0   Trend Same Same Same   Last Week Total 15 mg 17.5 mg 17.5 mg   Next Week Total 17.5 mg 17.5 mg 17.5 mg   Sun 2.5 mg 2.5 mg 2.5 mg   Mon 2.5 mg 2.5 mg 2.5 mg   Tue 2.5 mg 2.5 mg 2.5 mg   Wed 2.5 mg 2.5 mg 2.5 mg   Thu 2.5 mg 2.5 mg -   Fri 2.5 mg 2.5 mg 2.5 mg   Sat 2.5 mg 2.5 mg 2.5 mg   Visit Report - - -   Some recent data might be hidden       Plan:  1. INR is therapeutic today- see above in Anticoagulation Summary.    Shani Phillip to continue their warfarin regimen- see above in Anticoagulation Summary.  2. Follow up in 1 week  3. Pt has agreed to only be called if INR out of range. They have been instructed to call if any changes in medications, doses, concerns, etc. Patient expresses understanding and has no further questions at this time.    Marleny Black, [facility]

## 2023-01-16 RX ORDER — WARFARIN SODIUM 2.5 MG/1
TABLET ORAL
Qty: 90 TABLET | Refills: 1 | Status: SHIPPED | OUTPATIENT
Start: 2023-01-16

## 2023-01-19 ENCOUNTER — ANTICOAGULATION VISIT (OUTPATIENT)
Dept: PHARMACY | Facility: HOSPITAL | Age: 73
End: 2023-01-19
Payer: MEDICARE

## 2023-01-19 LAB — INR PPP: 3.5

## 2023-01-19 NOTE — PROGRESS NOTES
Anticoagulation Clinic Progress Note    Anticoagulation Summary  As of 1/19/2023    INR goal:  2.0-3.0   TTR:  59.6 % (1.9 y)   INR used for dosing:  3.50 (1/19/2023)   Warfarin maintenance plan:  2.5 mg every day; Starting 1/19/2023   Weekly warfarin total:  17.5 mg   Plan last modified:  Marleny Black RPH (12/22/2022)   Next INR check:  1/26/2023   Priority:  High   Target end date:      Indications    PAF (paroxysmal atrial fibrillation) (HCC) (Resolved) [I48.0]             Anticoagulation Episode Summary     INR check location:      Preferred lab:      Send INR reminders to:   [facility] [employer] HOME TEST POOL    Comments:   Home Testing as of 7/30/21 *call only when out of range*      Anticoagulation Care Providers     Provider Role Specialty Phone number    Zenia Tinajero MD Referring Cardiology [phone]          Clinic Interview:  Patient Findings     Negatives:  Signs/symptoms of thrombosis, Signs/symptoms of bleeding,   Laboratory test error suspected, Change in health, Change in alcohol use,   Change in activity, Upcoming invasive procedure, Emergency department   visit, Upcoming dental procedure, Missed doses, Extra doses, Change in   medications, Change in diet/appetite, Hospital admission, Bruising, Other   complaints      Clinical Outcomes     Negatives:  Major bleeding event, Thromboembolic event,   Anticoagulation-related hospital admission, Anticoagulation-related ED   visit, Anticoagulation-related fatality        INR History:  Anticoagulation Monitoring 1/5/2023 1/13/2023 1/19/2023   INR 2.60 2.40 3.50   INR Date 1/5/2023 1/12/2023 1/19/2023   INR Goal 2.0-3.0 2.0-3.0 2.0-3.0   Trend Same Same Same   Last Week Total 17.5 mg 17.5 mg 17.5 mg   Next Week Total 17.5 mg 17.5 mg 15 mg   Sun 2.5 mg 2.5 mg 2.5 mg   Mon 2.5 mg 2.5 mg 2.5 mg   Tue 2.5 mg 2.5 mg 2.5 mg   Wed 2.5 mg 2.5 mg 2.5 mg   Thu 2.5 mg - Hold (1/19)   Fri 2.5 mg 2.5 mg 2.5 mg   Sat 2.5 mg 2.5 mg 2.5 mg   Visit Report - - -    Some recent data might be hidden       Plan:  1. INR is Supratherapeutic today- see above in Anticoagulation Summary.   Will instruct Shani Phillip to Change their warfarin regimen- see above in Anticoagulation Summary.  2. Follow up in 1 week.  3. They have been instructed to call if any changes in medications, doses, concerns, etc. Patient expresses understanding and has no further questions at this time.    Keshawn Castaneda, PharmD

## 2023-01-26 ENCOUNTER — ANTICOAGULATION VISIT (OUTPATIENT)
Dept: PHARMACY | Facility: HOSPITAL | Age: 73
End: 2023-01-26
Payer: MEDICARE

## 2023-01-26 LAB — INR PPP: 3.5

## 2023-01-26 NOTE — PROGRESS NOTES
Anticoagulation Clinic Progress Note    Anticoagulation Summary  As of 1/26/2023    INR goal:  2.0-3.0   TTR:  59.0 % (1.9 y)   INR used for dosing:  3.50 (1/26/2023)   Warfarin maintenance plan:  2.5 mg every day; Starting 1/26/2023   Weekly warfarin total:  17.5 mg   Plan last modified:  Marleny Black RPH (12/22/2022)   Next INR check:     Priority:  High   Target end date:      Indications    PAF (paroxysmal atrial fibrillation) (HCC) (Resolved) [I48.0]             Anticoagulation Episode Summary     INR check location:      Preferred lab:      Send INR reminders to:   [facility] HOME TEST POOL    Comments:   Home Testing as of 7/30/21 *call only when out of range*      Anticoagulation Care Providers     Provider Role Specialty Phone number    Zenia Tinajero MD Referring Cardiology [phone]          Clinic Interview:  Patient Findings     Negatives:  Signs/symptoms of thrombosis, Signs/symptoms of bleeding,   Laboratory test error suspected, Change in health, Change in alcohol use,   Change in activity, Upcoming invasive procedure, Emergency department   visit, Upcoming dental procedure, Missed doses, Extra doses, Change in   medications, Change in diet/appetite, Hospital admission, Bruising, Other   complaints      Clinical Outcomes     Negatives:  Major bleeding event, Thromboembolic event,   Anticoagulation-related hospital admission, Anticoagulation-related ED   visit, Anticoagulation-related fatality        INR History:  Anticoagulation Monitoring 1/13/2023 1/19/2023 1/26/2023   INR 2.40 3.50 3.50   INR Date 1/12/2023 1/19/2023 1/26/2023   INR Goal 2.0-3.0 2.0-3.0 2.0-3.0   Trend Same Same Same   Last Week Total 17.5 mg 17.5 mg 15 mg   Next Week Total 17.5 mg 15 mg 13.75 mg   Sun 2.5 mg 2.5 mg 2.5 mg   Mon 2.5 mg 2.5 mg 1.25 mg (1/30)   Tue 2.5 mg 2.5 mg 2.5 mg   Wed 2.5 mg 2.5 mg 2.5 mg   Thu - Hold (1/19) Hold (1/26)   Fri 2.5 mg 2.5 mg 2.5 mg   Sat 2.5 mg 2.5 mg 2.5 mg   Visit Report - - -    Some recent data might be hidden       Plan:  1. INR is Supratherapeutic today- see above in Anticoagulation Summary.   Will instruct Shani Phillip to Change their warfarin regimen- see above in Anticoagulation Summary.  2. Follow up in 1 weeks  3.  They have been instructed to call if any changes in medications, doses, concerns, etc. Patient expresses understanding and has no further questions at this time.    Zenia Myers, PharmD

## 2023-02-02 ENCOUNTER — ANTICOAGULATION VISIT (OUTPATIENT)
Dept: PHARMACY | Facility: HOSPITAL | Age: 73
End: 2023-02-02
Payer: MEDICARE

## 2023-02-02 LAB — INR PPP: 3.1

## 2023-02-02 NOTE — PROGRESS NOTES
Anticoagulation Clinic Progress Note    Anticoagulation Summary  As of 2/2/2023    INR goal:  2.0-3.0   TTR:  58.5 % (1.9 y)   INR used for dosing:  3.10 (2/2/2023)   Warfarin maintenance plan:  2.5 mg every day; Starting 2/2/2023   Weekly warfarin total:  17.5 mg   Plan last modified:  Marleny Black RPH (12/22/2022)   Next INR check:  2/9/2023   Priority:  High   Target end date:      Indications    PAF (paroxysmal atrial fibrillation) (HCC) (Resolved) [I48.0]             Anticoagulation Episode Summary     INR check location:      Preferred lab:      Send INR reminders to:   [facility] [employer] HOME TEST POOL    Comments:   Home Testing as of 7/30/21 *call only when out of range*      Anticoagulation Care Providers     Provider Role Specialty Phone number    Zenia Tinajero MD Referring Cardiology [phone]          Clinic Interview:  Patient Findings     Negatives:  Signs/symptoms of thrombosis, Signs/symptoms of bleeding,   Laboratory test error suspected, Change in health, Change in alcohol use,   Change in activity, Upcoming invasive procedure, Emergency department   visit, Upcoming dental procedure, Missed doses, Extra doses, Change in   medications, Change in diet/appetite, Hospital admission, Bruising, Other   complaints      Clinical Outcomes     Negatives:  Major bleeding event, Thromboembolic event,   Anticoagulation-related hospital admission, Anticoagulation-related ED   visit, Anticoagulation-related fatality        INR History:  Anticoagulation Monitoring 1/19/2023 1/26/2023 2/2/2023   INR 3.50 3.50 3.10   INR Date 1/19/2023 1/26/2023 2/2/2023   INR Goal 2.0-3.0 2.0-3.0 2.0-3.0   Trend Same Same Same   Last Week Total 17.5 mg 15 mg 13.75 mg   Next Week Total 15 mg 13.75 mg 15 mg   Sun 2.5 mg 2.5 mg 2.5 mg   Mon 2.5 mg 1.25 mg (1/30) 1.25 mg (2/6)   Tue 2.5 mg 2.5 mg 2.5 mg   Wed 2.5 mg 2.5 mg 2.5 mg   Thu Hold (1/19) Hold (1/26) 1.25 mg (2/2)   Fri 2.5 mg 2.5 mg 2.5 mg   Sat 2.5 mg 2.5 mg 2.5 mg    Visit Report - - -   Some recent data might be hidden       Plan:  1. INR is Supratherapeutic today- see above in Anticoagulation Summary.   Will instruct Shani Phillip to Change their warfarin regimen- see above in Anticoagulation Summary.  Take 1.25 mg today, then continue 1.25 mg on Monday, and take 2.5 mg on all other days.   2. Follow up in 1 weeks  3. They have been instructed to call if any changes in medications, doses, concerns, etc. Patient expresses understanding and has no further questions at this time.    Zenia Myers, PharmD

## 2023-02-09 LAB — INR PPP: 2.8

## 2023-02-10 ENCOUNTER — ANTICOAGULATION VISIT (OUTPATIENT)
Dept: PHARMACY | Facility: HOSPITAL | Age: 73
End: 2023-02-10
Payer: MEDICARE

## 2023-02-10 PROCEDURE — G0249 PROVIDE INR TEST MATER/EQUIP: HCPCS

## 2023-02-10 NOTE — PROGRESS NOTES
Anticoagulation Clinic Progress Note    Anticoagulation Summary  As of 2/10/2023    INR goal:  2.0-3.0   TTR:  58.6 % (1.9 y)   INR used for dosin.80 (2023)   Warfarin maintenance plan:  1.25 mg every Mon; 2.5 mg all other days; Starting 2/10/2023   Weekly warfarin total:  16.25 mg   Plan last modified:  Zenia Myers, PharmD (2/10/2023)   Next INR check:  2023   Priority:  High   Target end date:      Indications    PAF (paroxysmal atrial fibrillation) (HCC) (Resolved) [I48.0]             Anticoagulation Episode Summary     INR check location:      Preferred lab:      Send INR reminders to:  [facility] [employer] HOME TEST POOL    Comments:   Home Testing as of 21 *call only when out of range*      Anticoagulation Care Providers     Provider Role Specialty Phone number    Zenia Tinajero MD Referring Cardiology [phone]          Clinic Interview:  Patient Findings     Negatives:  Signs/symptoms of thrombosis, Signs/symptoms of bleeding,   Laboratory test error suspected, Change in health, Change in alcohol use,   Change in activity, Upcoming invasive procedure, Emergency department   visit, Upcoming dental procedure, Missed doses, Extra doses, Change in   medications, Change in diet/appetite, Hospital admission, Bruising, Other   complaints      Clinical Outcomes     Negatives:  Major bleeding event, Thromboembolic event,   Anticoagulation-related hospital admission, Anticoagulation-related ED   visit, Anticoagulation-related fatality        INR History:  Anticoagulation Monitoring 2023 2023 2/10/2023   INR 3.50 3.10 2.80   INR Date 2023   INR Goal 2.0-3.0 2.0-3.0 2.0-3.0   Trend Same Same Down   Last Week Total 15 mg 13.75 mg 16.25 mg   Next Week Total 13.75 mg 15 mg 16.25 mg   Sun 2.5 mg 2.5 mg 2.5 mg   Mon 1.25 mg () 1.25 mg () 1.25 mg   Tue 2.5 mg 2.5 mg 2.5 mg   Wed 2.5 mg 2.5 mg 2.5 mg   Thu Hold () 1.25 mg () 2.5 mg   Fri 2.5 mg 2.5 mg 2.5  mg   Sat 2.5 mg 2.5 mg 2.5 mg   Visit Report - - -   Some recent data might be hidden       Plan:  1. INR is Therapeutic today- see above in Anticoagulation Summary.   Will instruct Shani Phillip to Continue their warfarin regimen- see above in Anticoagulation Summary.  2. Follow up in 1 weeks  3. They have been instructed to call if any changes in medications, doses, concerns, etc. Patient expresses understanding and has no further questions at this time.    Zenia Myers, PharmD

## 2023-02-16 LAB — INR PPP: 2.6

## 2023-02-17 ENCOUNTER — ANTICOAGULATION VISIT (OUTPATIENT)
Dept: PHARMACY | Facility: HOSPITAL | Age: 73
End: 2023-02-17
Payer: MEDICARE

## 2023-02-17 NOTE — PROGRESS NOTES
Anticoagulation Clinic Progress Note    Anticoagulation Summary  As of 2023    INR goal:  2.0-3.0   TTR:  59.0 % (1.9 y)   INR used for dosin.60 (2023)   Warfarin maintenance plan:  1.25 mg every Mon; 2.5 mg all other days; Starting 2023   Weekly warfarin total:  16.25 mg   No change documented:  Marleny Black RPH   Plan last modified:  Zenia Myers, PharmD (2/10/2023)   Next INR check:  2023   Priority:  High   Target end date:      Indications    PAF (paroxysmal atrial fibrillation) (HCC) (Resolved) [I48.0]             Anticoagulation Episode Summary     INR check location:      Preferred lab:      [facility]   [facility]    Comments:   Home Testing as of 21 *call only when out of range*      Anticoagulation Care Providers     Provider Role Specialty Phone number    Zenia Tinajero MD Referring Cardiology [phone]          Clinic Interview:  No pertinent clinical findings have been reported.    INR History:  Anticoagulation Monitoring 2023 2/10/2023 2023   INR 3.10 2.80 2.60   INR Date 2023   INR Goal 2.0-3.0 2.0-3.0 2.0-3.0   Trend Same Down Same   Last Week Total 13.75 mg 16.25 mg 16.25 mg   Next Week Total 15 mg 16.25 mg 16.25 mg   Sun 2.5 mg 2.5 mg 2.5 mg   Mon 1.25 mg () 1.25 mg 1.25 mg   Tue 2.5 mg 2.5 mg 2.5 mg   Wed 2.5 mg 2.5 mg 2.5 mg   Thu 1.25 mg () 2.5 mg -   Fri 2.5 mg 2.5 mg 2.5 mg   Sat 2.5 mg 2.5 mg 2.5 mg   Visit Report - - -   Some recent data might be hidden       Plan:  1. INR is therapeutic today- see above in Anticoagulation Summary.    Shani Kenji to continue their warfarin regimen- see above in Anticoagulation Summary.  2. Follow up in 1 week  3. Pt has agreed to only be called if INR out of range. They have been instructed to call if any changes in medications, doses, concerns, etc. Patient expresses understanding and has no further questions at this time.    Marleny Black,  RPH

## 2023-02-23 ENCOUNTER — ANTICOAGULATION VISIT (OUTPATIENT)
Dept: PHARMACY | Facility: HOSPITAL | Age: 73
End: 2023-02-23
Payer: MEDICARE

## 2023-02-23 LAB — INR PPP: 2.4

## 2023-02-23 NOTE — PROGRESS NOTES
Anticoagulation Clinic Progress Note    Anticoagulation Summary  As of 2023    INR goal:  2.0-3.0   TTR:  59.4 % (2 y)   INR used for dosin.40 (2023)   Warfarin maintenance plan:  1.25 mg every Mon; 2.5 mg all other days; Starting 2023   Weekly warfarin total:  16.25 mg   No change documented:  Marleny Black RPH   Plan last modified:  Zenia Myers, PharmD (2/10/2023)   Next INR check:  3/2/2023   Priority:  High   Target end date:      Indications    PAF (paroxysmal atrial fibrillation) (HCC) (Resolved) [I48.0]             Anticoagulation Episode Summary     INR check location:      Preferred lab:      [facility]   [facility]    Comments:   Home Testing as of 21 *call only when out of range*      Anticoagulation Care Providers     Provider Role Specialty Phone number    Zenia Tinajero MD Referring Cardiology [phone]          Clinic Interview:  No pertinent clinical findings have been reported.    INR History:  Anticoagulation Monitoring 2/10/2023 2023 2023   INR 2.80 2.60 2.40   INR Date 2023   INR Goal 2.0-3.0 2.0-3.0 2.0-3.0   Trend Down Same Same   Last Week Total 16.25 mg 16.25 mg 16.25 mg   Next Week Total 16.25 mg 16.25 mg 16.25 mg   Sun 2.5 mg 2.5 mg 2.5 mg   Mon 1.25 mg 1.25 mg 1.25 mg   Tue 2.5 mg 2.5 mg 2.5 mg   Wed 2.5 mg 2.5 mg 2.5 mg   Thu 2.5 mg - 2.5 mg   Fri 2.5 mg 2.5 mg 2.5 mg   Sat 2.5 mg 2.5 mg 2.5 mg   Visit Report - - -   Some recent data might be hidden       Plan:  1. INR is therapeutic today- see above in Anticoagulation Summary.    Shani Kenji to continue their warfarin regimen- see above in Anticoagulation Summary.  2. Follow up in 1 week  3. Pt has agreed to only be called if INR out of range. They have been instructed to call if any changes in medications, doses, concerns, etc. Patient expresses understanding and has no further questions at this time.    Marleny Black, [facility]

## 2023-03-02 ENCOUNTER — ANTICOAGULATION VISIT (OUTPATIENT)
Dept: PHARMACY | Facility: HOSPITAL | Age: 73
End: 2023-03-02
Payer: MEDICARE

## 2023-03-02 LAB — INR PPP: 1.8

## 2023-03-02 NOTE — PROGRESS NOTES
Anticoagulation Clinic Progress Note    Anticoagulation Summary  As of 3/2/2023    INR goal:  2.0-3.0   TTR:  59.4 % (2 y)   INR used for dosin.80 (3/2/2023)   Warfarin maintenance plan:  1.25 mg every Mon; 2.5 mg all other days; Starting 3/2/2023   Weekly warfarin total:  16.25 mg   Plan last modified:  Zenia Myers, PharmD (2/10/2023)   Next INR check:     Priority:  High   Target end date:      Indications    PAF (paroxysmal atrial fibrillation) (HCC) (Resolved) [I48.0]             Anticoagulation Episode Summary     INR check location:      Preferred lab:      Send INR reminders to:   [facility] [employer] HOME TEST POOL    Comments:   Home Testing as of 21 *call only when out of range*      Anticoagulation Care Providers     Provider Role Specialty Phone number    Zenia Tinajero MD Referring Cardiology [phone]          Clinic Interview:  Patient Findings     Positives:  Missed doses    Negatives:  Signs/symptoms of thrombosis, Signs/symptoms of bleeding,   Laboratory test error suspected, Change in health, Change in alcohol use,   Change in activity, Upcoming invasive procedure, Emergency department   visit, Upcoming dental procedure, Extra doses, Change in medications,   Change in diet/appetite, Hospital admission, Bruising, Other complaints      Clinical Outcomes     Negatives:  Major bleeding event, Thromboembolic event,   Anticoagulation-related hospital admission, Anticoagulation-related ED   visit, Anticoagulation-related fatality        INR History:  Anticoagulation Monitoring 2023 2023 3/2/2023   INR 2.60 2.40 1.80   INR Date 2023 2023 3/2/2023   INR Goal 2.0-3.0 2.0-3.0 2.0-3.0   Trend Same Same Same   Last Week Total 16.25 mg 16.25 mg 13.75 mg   Next Week Total 16.25 mg 16.25 mg 18.75 mg   Sun 2.5 mg 2.5 mg 2.5 mg   Mon 1.25 mg 1.25 mg 1.25 mg   Tue 2.5 mg 2.5 mg 2.5 mg   Wed 2.5 mg 2.5 mg 2.5 mg   Thu - 2.5 mg 5 mg (3/2)   Fri 2.5 mg 2.5 mg 2.5 mg   Sat 2.5 mg 2.5  mg 2.5 mg   Visit Report - - -   Some recent data might be hidden       Plan:  1. INR is Subtherapeutic today- see above in Anticoagulation Summary.   Will instruct Shani Phillip to Change their warfarin regimen- see above in Anticoagulation Summary.  2. Follow up in 1 weeks  3.  They have been instructed to call if any changes in medications, doses, concerns, etc. Patient expresses understanding and has no further questions at this time.    Zenia Myers, PharmD

## 2023-03-09 ENCOUNTER — ANTICOAGULATION VISIT (OUTPATIENT)
Dept: PHARMACY | Facility: HOSPITAL | Age: 73
End: 2023-03-09
Payer: MEDICARE

## 2023-03-09 LAB — INR PPP: 1

## 2023-03-09 NOTE — PROGRESS NOTES
Anticoagulation Clinic Progress Note    Anticoagulation Summary  As of 3/9/2023    INR goal:  2.0-3.0   TTR:  58.8 % (2 y)   INR used for dosin.00 (3/9/2023)   Warfarin maintenance plan:  1.25 mg every Mon; 2.5 mg all other days; Starting 3/9/2023   Weekly warfarin total:  16.25 mg   Plan last modified:  Zenia Myers, PharmD (2/10/2023)   Next INR check:  3/13/2023   Priority:  High   Target end date:      Indications    PAF (paroxysmal atrial fibrillation) (HCC) (Resolved) [I48.0]             Anticoagulation Episode Summary     INR check location:      Preferred lab:      Send INR reminders to:  [facility] [employer] HOME TEST POOL    Comments:   Home Testing as of 21 *call only when out of range*      Anticoagulation Care Providers     Provider Role Specialty Phone number    Zenia Tinajero MD Referring Cardiology [phone]          Clinic Interview:  Patient Findings     Negatives:  Signs/symptoms of thrombosis, Signs/symptoms of bleeding,   Laboratory test error suspected, Change in health, Change in alcohol use,   Change in activity, Upcoming invasive procedure, Emergency department   visit, Upcoming dental procedure, Missed doses, Extra doses, Change in   medications, Change in diet/appetite, Hospital admission, Bruising, Other   complaints      Clinical Outcomes     Negatives:  Major bleeding event, Thromboembolic event,   Anticoagulation-related hospital admission, Anticoagulation-related ED   visit, Anticoagulation-related fatality        INR History:  Anticoagulation Monitoring 2023 3/2/2023 3/9/2023   INR 2.40 1.80 1.00   INR Date 2023 3/2/2023 3/9/2023   INR Goal 2.0-3.0 2.0-3.0 2.0-3.0   Trend Same Same Same   Last Week Total 16.25 mg 13.75 mg 16.25 mg   Next Week Total 16.25 mg 18.75 mg 21.25 mg   Sun 2.5 mg 2.5 mg 2.5 mg   Mon 1.25 mg 1.25 mg -   Tue 2.5 mg 2.5 mg -   Wed 2.5 mg 2.5 mg -   Thu 2.5 mg 5 mg (3/2) 5 mg (3/9)   Fri 2.5 mg 2.5 mg 5 mg (3/10)   Sat 2.5 mg 2.5 mg 2.5  mg   Visit Report - - -   Some recent data might be hidden       Plan:  1. INR is Subtherapeutic today- see above in Anticoagulation Summary.   Will instruct Shani Phillip to Change their warfarin regimen- see above in Anticoagulation Summary.  2. Follow up in 4 days   3. They have been instructed to call if any changes in medications, doses, concerns, etc. Patient expresses understanding and has no further questions at this time.    Zenia Myers, PharmD

## 2023-03-13 ENCOUNTER — ANTICOAGULATION VISIT (OUTPATIENT)
Dept: PHARMACY | Facility: HOSPITAL | Age: 73
End: 2023-03-13
Payer: MEDICARE

## 2023-03-13 LAB — INR PPP: 2.4

## 2023-03-13 PROCEDURE — G0249 PROVIDE INR TEST MATER/EQUIP: HCPCS

## 2023-03-13 NOTE — PROGRESS NOTES
Anticoagulation Clinic Progress Note    Anticoagulation Summary  As of 3/13/2023    INR goal:  2.0-3.0   TTR:  58.7 % (2 y)   INR used for dosin.40 (3/13/2023)   Warfarin maintenance plan:  1.25 mg every Mon; 2.5 mg all other days; Starting 3/13/2023   Weekly warfarin total:  16.25 mg   No change documented:  Zenia Myers PharmD   Plan last modified:  Zenia Myers PharmD (2/10/2023)   Next INR check:  3/20/2023   Priority:  High   Target end date:      Indications    PAF (paroxysmal atrial fibrillation) (HCC) (Resolved) [I48.0]             Anticoagulation Episode Summary     INR check location:      Preferred lab:      Send INR reminders to:   [facility] HOME TEST POOL    Comments:   Home Testing as of 21 *call only when out of range*      Anticoagulation Care Providers     Provider Role Specialty Phone number    Zenia Tinajero MD Referring Cardiology [phone]          Clinic Interview:  Patient Findings     Negatives:  Signs/symptoms of thrombosis, Signs/symptoms of bleeding,   Laboratory test error suspected, Change in health, Change in alcohol use,   Change in activity, Upcoming invasive procedure, Emergency department   visit, Upcoming dental procedure, Missed doses, Extra doses, Change in   medications, Change in diet/appetite, Hospital admission, Bruising, Other   complaints      Clinical Outcomes     Negatives:  Major bleeding event, Thromboembolic event,   Anticoagulation-related hospital admission, Anticoagulation-related ED   visit, Anticoagulation-related fatality        INR History:  Anticoagulation Monitoring 3/2/2023 3/9/2023 3/13/2023   INR 1.80 1.00 2.40   INR Date 3/2/2023 3/9/2023 3/13/2023   INR Goal 2.0-3.0 2.0-3.0 2.0-3.0   Trend Same Same Same   Last Week Total 13.75 mg 16.25 mg 18.75 mg   Next Week Total 18.75 mg 21.25 mg 16.25 mg   Sun 2.5 mg 2.5 mg 2.5 mg   Mon 1.25 mg - 1.25 mg   Tue 2.5 mg - 2.5 mg   Wed 2.5 mg - 2.5 mg   Thu 5 mg (3/2) 5 mg (3/9) 2.5 mg    Fri 2.5 mg 5 mg (3/10) 2.5 mg   Sat 2.5 mg 2.5 mg 2.5 mg   Visit Report - - -   Some recent data might be hidden       Plan:  1. INR is Therapeutic today- see above in Anticoagulation Summary.   Will instruct Shani Phillip to Continue their warfarin regimen- see above in Anticoagulation Summary.  2. Follow up in 1 weeks  3. Pt has agreed to only be called if INR out of range. They have been instructed to call if any changes in medications, doses, concerns, etc. Patient expresses understanding and has no further questions at this time.    Zenia Myers, PharmD

## 2023-03-16 LAB — INR PPP: 3.9

## 2023-03-17 ENCOUNTER — ANTICOAGULATION VISIT (OUTPATIENT)
Dept: PHARMACY | Facility: HOSPITAL | Age: 73
End: 2023-03-17
Payer: MEDICARE

## 2023-03-17 NOTE — PROGRESS NOTES
Anticoagulation Clinic Progress Note    Anticoagulation Summary  As of 3/17/2023    INR goal:  2.0-3.0   TTR:  58.6 % (2 y)   INR used for dosing:  3.90 (3/16/2023)   Warfarin maintenance plan:  1.25 mg every Mon; 2.5 mg all other days; Starting 3/17/2023   Weekly warfarin total:  16.25 mg   Plan last modified:  Zenia Myers, PharmD (2/10/2023)   Next INR check:  3/23/2023   Priority:  High   Target end date:      Indications    PAF (paroxysmal atrial fibrillation) (HCC) (Resolved) [I48.0]             Anticoagulation Episode Summary     INR check location:      Preferred lab:      Send INR reminders to:  [facility] [employer] HOME TEST POOL    Comments:   Home Testing as of 7/30/21 *call only when out of range*      Anticoagulation Care Providers     Provider Role Specialty Phone number    Zenia Tinajero MD Referring Cardiology [phone]          Clinic Interview:  Patient Findings     Negatives:  Signs/symptoms of thrombosis, Signs/symptoms of bleeding,   Laboratory test error suspected, Change in health, Change in alcohol use,   Change in activity, Upcoming invasive procedure, Emergency department   visit, Upcoming dental procedure, Missed doses, Extra doses, Change in   medications, Change in diet/appetite, Hospital admission, Bruising, Other   complaints      Clinical Outcomes     Negatives:  Major bleeding event, Thromboembolic event,   Anticoagulation-related hospital admission, Anticoagulation-related ED   visit, Anticoagulation-related fatality        INR History:  Anticoagulation Monitoring 3/9/2023 3/13/2023 3/17/2023   INR 1.00 2.40 3.90   INR Date 3/9/2023 3/13/2023 3/16/2023   INR Goal 2.0-3.0 2.0-3.0 2.0-3.0   Trend Same Same Same   Last Week Total 16.25 mg 18.75 mg 18.75 mg   Next Week Total 21.25 mg 16.25 mg 13.75 mg   Sun 2.5 mg 2.5 mg 2.5 mg   Mon - 1.25 mg 1.25 mg   Tue - 2.5 mg 2.5 mg   Wed - 2.5 mg 2.5 mg   Thu 5 mg (3/9) 2.5 mg -   Fri 5 mg (3/10) 2.5 mg Hold (3/17)   Sat 2.5 mg 2.5 mg 2.5  mg   Visit Report - - -   Some recent data might be hidden       Plan:  1. INR was Supratherapeutic yesterday- see above in Anticoagulation Summary.   Will instruct Shani Phillip to Change their warfarin regimen (HOLD today, then resume 1.25 mg Mon, 2.5 mg all other days) - see above in Anticoagulation Summary.  2. Follow up in 1 week.  3. They have been instructed to call if any changes in medications, doses, concerns, etc. Patient expresses understanding and has no further questions at this time.    Keshawn Castaneda, PharmD

## 2023-03-23 ENCOUNTER — ANTICOAGULATION VISIT (OUTPATIENT)
Dept: PHARMACY | Facility: HOSPITAL | Age: 73
End: 2023-03-23
Payer: MEDICARE

## 2023-03-23 LAB — INR PPP: 2.9

## 2023-03-23 NOTE — PROGRESS NOTES
Anticoagulation Clinic Progress Note    Anticoagulation Summary  As of 3/23/2023    INR goal:  2.0-3.0   TTR:  58.3 % (2 y)   INR used for dosin.90 (3/23/2023)   Warfarin maintenance plan:  1.25 mg every Mon; 2.5 mg all other days; Starting 3/23/2023   Weekly warfarin total:  16.25 mg   No change documented:  Marleny Black RPH   Plan last modified:  Zenia Myers, PharmD (2/10/2023)   Next INR check:  3/30/2023   Priority:  High   Target end date:      Indications    PAF (paroxysmal atrial fibrillation) (HCC) (Resolved) [I48.0]             Anticoagulation Episode Summary     INR check location:      Preferred lab:      [facility]   [facility]    Comments:   Home Testing as of 21 *call only when out of range*      Anticoagulation Care Providers     Provider Role Specialty Phone number    Zenia Tinajero MD Referring Cardiology [phone]          Clinic Interview:  No pertinent clinical findings have been reported.    INR History:  Anticoagulation Monitoring 3/13/2023 3/17/2023 3/23/2023   INR 2.40 3.90 2.90   INR Date 3/13/2023 3/16/2023 3/23/2023   INR Goal 2.0-3.0 2.0-3.0 2.0-3.0   Trend Same Same Same   Last Week Total 18.75 mg 18.75 mg 13.75 mg   Next Week Total 16.25 mg 13.75 mg 16.25 mg   Sun 2.5 mg 2.5 mg 2.5 mg   Mon 1.25 mg 1.25 mg 1.25 mg   Tue 2.5 mg 2.5 mg 2.5 mg   Wed 2.5 mg 2.5 mg 2.5 mg   Thu 2.5 mg - 2.5 mg   Fri 2.5 mg Hold (3/17) 2.5 mg   Sat 2.5 mg 2.5 mg 2.5 mg   Visit Report - - -   Some recent data might be hidden       Plan:  1. INR is therapeutic today- see above in Anticoagulation Summary.    Shani Kenji to continue their warfarin regimen- see above in Anticoagulation Summary.  2. Follow up in 1 week  3. Pt has agreed to only be called if INR out of range. They have been instructed to call if any changes in medications, doses, concerns, etc. Patient expresses understanding and has no further questions at this time.    Marleny Black,  RPH

## 2023-03-30 ENCOUNTER — ANTICOAGULATION VISIT (OUTPATIENT)
Dept: PHARMACY | Facility: HOSPITAL | Age: 73
End: 2023-03-30
Payer: MEDICARE

## 2023-03-30 LAB — INR PPP: 2.9

## 2023-03-30 NOTE — PROGRESS NOTES
Anticoagulation Clinic Progress Note    Anticoagulation Summary  As of 3/30/2023    INR goal:  2.0-3.0   TTR:  58.7 % (2.1 y)   INR used for dosin.90 (3/30/2023)   Warfarin maintenance plan:  1.25 mg every Mon; 2.5 mg all other days; Starting 3/30/2023   Weekly warfarin total:  16.25 mg   No change documented:  Marleny Black RPH   Plan last modified:  Zenia Myers, PharmD (2/10/2023)   Next INR check:  2023   Priority:  High   Target end date:      Indications    PAF (paroxysmal atrial fibrillation) (HCC) (Resolved) [I48.0]             Anticoagulation Episode Summary     INR check location:      Preferred lab:      [facility]   [facility]    Comments:   Home Testing as of 21 *call only when out of range*      Anticoagulation Care Providers     Provider Role Specialty Phone number    Zenia Tinajero MD Referring Cardiology [phone]          Clinic Interview:  No pertinent clinical findings have been reported.    INR History:  Anticoagulation Monitoring 3/17/2023 3/23/2023 3/30/2023   INR 3.90 2.90 2.90   INR Date 3/16/2023 3/23/2023 3/30/2023   INR Goal 2.0-3.0 2.0-3.0 2.0-3.0   Trend Same Same Same   Last Week Total 18.75 mg 13.75 mg 16.25 mg   Next Week Total 13.75 mg 16.25 mg 16.25 mg   Sun 2.5 mg 2.5 mg 2.5 mg   Mon 1.25 mg 1.25 mg 1.25 mg   Tue 2.5 mg 2.5 mg 2.5 mg   Wed 2.5 mg 2.5 mg 2.5 mg   Thu - 2.5 mg 2.5 mg   Fri Hold (3/17) 2.5 mg 2.5 mg   Sat 2.5 mg 2.5 mg 2.5 mg   Visit Report - - -   Some recent data might be hidden       Plan:  1. INR is therapeutic today- see above in Anticoagulation Summary.    Shani Kenji to continue their warfarin regimen- see above in Anticoagulation Summary.  2. Follow up in 1 week  3. Pt has agreed to only be called if INR out of range. They have been instructed to call if any changes in medications, doses, concerns, etc. Patient expresses understanding and has no further questions at this time.    Marleny Black,  RPH

## 2023-04-02 DIAGNOSIS — I50.33 ACUTE ON CHRONIC DIASTOLIC CHF (CONGESTIVE HEART FAILURE): ICD-10-CM

## 2023-04-03 RX ORDER — POTASSIUM CHLORIDE 1500 MG/1
TABLET, FILM COATED, EXTENDED RELEASE ORAL
Qty: 90 TABLET | Refills: 3 | Status: SHIPPED | OUTPATIENT
Start: 2023-04-03

## 2023-04-03 RX ORDER — DIGOXIN 125 MCG
TABLET ORAL
Qty: 90 TABLET | Refills: 2 | Status: SHIPPED | OUTPATIENT
Start: 2023-04-03

## 2023-04-03 RX ORDER — FAMOTIDINE 20 MG/1
TABLET, FILM COATED ORAL
Qty: 180 TABLET | Refills: 3 | Status: SHIPPED | OUTPATIENT
Start: 2023-04-03

## 2023-04-03 RX ORDER — FUROSEMIDE 40 MG/1
TABLET ORAL
Qty: 90 TABLET | Refills: 3 | Status: SHIPPED | OUTPATIENT
Start: 2023-04-03

## 2023-04-03 NOTE — TELEPHONE ENCOUNTER
Rx Refill Note  Requested Prescriptions     Pending Prescriptions Disp Refills    furosemide (LASIX) 40 MG tablet [Pharmacy Med Name: FUROSEMIDE 40 MG TABLET] 90 tablet 3     Sig: TAKE 1 TABLET BY MOUTH EVERY DAY    potassium chloride ER (K-TAB) 20 MEQ tablet controlled-release ER tablet [Pharmacy Med Name: POTASSIUM CL ER 20 MEQ TABLET] 90 tablet 3     Sig: TAKE 1 TABLET BY MOUTH EVERY DAY    famotidine (PEPCID) 20 MG tablet [Pharmacy Med Name: FAMOTIDINE 20 MG TABLET] 180 tablet 3     Sig: TAKE 1 TABLET BY MOUTH 2 TIMES A DAY BEFORE MEALS.      Last office visit with prescribing clinician: 11/14/2022   Last telemedicine visit with prescribing clinician: 4/2/2023   Next office visit with prescribing clinician: 8/2/2023                         Would you like a call back once the refill request has been completed: [] Yes [] No    If the office needs to give you a call back, can they leave a voicemail: [] Yes [] No    Laisha Franco MA  04/03/23, 13:33 EDT

## 2023-04-06 LAB — INR PPP: 3.7

## 2023-04-07 ENCOUNTER — ANTICOAGULATION VISIT (OUTPATIENT)
Dept: PHARMACY | Facility: HOSPITAL | Age: 73
End: 2023-04-07
Payer: MEDICARE

## 2023-04-07 NOTE — PROGRESS NOTES
Anticoagulation Clinic Progress Note    Anticoagulation Summary  As of 4/7/2023    INR goal:  2.0-3.0   TTR:  58.3 % (2.1 y)   INR used for dosing:  3.70 (4/6/2023)   Warfarin maintenance plan:  1.25 mg every Mon; 2.5 mg all other days; Starting 4/7/2023   Weekly warfarin total:  16.25 mg   Plan last modified:  Zenia Myers, PharmD (2/10/2023)   Next INR check:  4/13/2023   Priority:  High   Target end date:      Indications    PAF (paroxysmal atrial fibrillation) (HCC) (Resolved) [I48.0]             Anticoagulation Episode Summary     INR check location:      Preferred lab:      Send INR reminders to:   [facility] [employer] HOME TEST POOL    Comments:   Home Testing as of 7/30/21 *call only when out of range*      Anticoagulation Care Providers     Provider Role Specialty Phone number    Zenia Tinajero MD Referring Cardiology [phone]          Clinic Interview:  Patient Findings     Negatives:  Signs/symptoms of thrombosis, Signs/symptoms of bleeding,   Laboratory test error suspected, Change in health, Change in alcohol use,   Change in activity, Upcoming invasive procedure, Emergency department   visit, Upcoming dental procedure, Missed doses, Extra doses, Change in   medications, Change in diet/appetite, Hospital admission, Bruising, Other   complaints      Clinical Outcomes     Negatives:  Major bleeding event, Thromboembolic event,   Anticoagulation-related hospital admission, Anticoagulation-related ED   visit, Anticoagulation-related fatality        INR History:  Anticoagulation Monitoring 3/23/2023 3/30/2023 4/7/2023   INR 2.90 2.90 3.70   INR Date 3/23/2023 3/30/2023 4/6/2023   INR Goal 2.0-3.0 2.0-3.0 2.0-3.0   Trend Same Same Same   Last Week Total 13.75 mg 16.25 mg 16.25 mg   Next Week Total 16.25 mg 16.25 mg 13.75 mg   Sun 2.5 mg 2.5 mg 2.5 mg   Mon 1.25 mg 1.25 mg 1.25 mg   Tue 2.5 mg 2.5 mg 2.5 mg   Wed 2.5 mg 2.5 mg 2.5 mg   Thu 2.5 mg 2.5 mg -   Fri 2.5 mg 2.5 mg Hold (4/7)   Sat 2.5 mg 2.5  mg 2.5 mg   Visit Report - - -   Some recent data might be hidden       Plan:  1. INR is Supratherapeutic today- see above in Anticoagulation Summary.   Will instruct Shani Phillip to Change their warfarin regimen- see above in Anticoagulation Summary.  Hold warfarin tonight then resume regular warfarin dosing   2. Follow up in 1 weeks  3. They have been instructed to call if any changes in medications, doses, concerns, etc. Patient expresses understanding and has no further questions at this time.    Marleny Black, [facility]

## 2023-04-13 ENCOUNTER — ANTICOAGULATION VISIT (OUTPATIENT)
Dept: PHARMACY | Facility: HOSPITAL | Age: 73
End: 2023-04-13
Payer: MEDICARE

## 2023-04-13 LAB — INR PPP: 2.3

## 2023-04-13 PROCEDURE — G0249 PROVIDE INR TEST MATER/EQUIP: HCPCS

## 2023-04-13 NOTE — PROGRESS NOTES
Anticoagulation Clinic Progress Note    Anticoagulation Summary  As of 2023    INR goal:  2.0-3.0   TTR:  58.2 % (2.1 y)   INR used for dosin.30 (2023)   Warfarin maintenance plan:  1.25 mg every Mon; 2.5 mg all other days; Starting 2023   Weekly warfarin total:  16.25 mg   No change documented:  Marleny Black RPH   Plan last modified:  Zenia Myers, PharmD (2/10/2023)   Next INR check:  2023   Priority:  High   Target end date:      Indications    PAF (paroxysmal atrial fibrillation) (HCC) (Resolved) [I48.0]             Anticoagulation Episode Summary     INR check location:      Preferred lab:      Send INR reminders to:   [facility] [employer] HOME TEST POOL    Comments:   Home Testing as of 21 *call only when out of range*      Anticoagulation Care Providers     Provider Role Specialty Phone number    Zenia Tinajero MD Referring Cardiology [phone]          Clinic Interview:  Patient Findings     Negatives:  Signs/symptoms of thrombosis, Signs/symptoms of bleeding,   Laboratory test error suspected, Change in health, Change in alcohol use,   Change in activity, Upcoming invasive procedure, Emergency department   visit, Upcoming dental procedure, Missed doses, Extra doses, Change in   medications, Change in diet/appetite, Hospital admission, Bruising, Other   complaints      Clinical Outcomes     Negatives:  Major bleeding event, Thromboembolic event,   Anticoagulation-related hospital admission, Anticoagulation-related ED   visit, Anticoagulation-related fatality        INR History:      3/23/2023    10:42 AM 3/30/2023    12:00 AM 3/30/2023     9:45 AM 2023    12:00 AM 2023     3:53 PM 2023    12:00 AM 2023     3:30 PM   Anticoagulation Monitoring   INR 2.90  2.90  3.70  2.30   INR Date 3/23/2023  3/30/2023  2023  2023   INR Goal 2.0-3.0  2.0-3.0  2.0-3.0  2.0-3.0   Trend Same  Same  Same  Same   Last Week Total 13.75 mg  16.25 mg  16.25 mg  13.75  mg   Next Week Total 16.25 mg  16.25 mg  13.75 mg  16.25 mg   Sun 2.5 mg  2.5 mg  2.5 mg  2.5 mg   Mon 1.25 mg  1.25 mg  1.25 mg  1.25 mg   Tue 2.5 mg  2.5 mg  2.5 mg  2.5 mg   Wed 2.5 mg  2.5 mg  2.5 mg  2.5 mg   Thu 2.5 mg  2.5 mg  -  2.5 mg   Fri 2.5 mg  2.5 mg  Hold (4/7)  2.5 mg   Sat 2.5 mg  2.5 mg  2.5 mg  2.5 mg   Historical INR  2.90       3.70  C     2.30            C Corrected result    This result is from an external source.       Plan:  1. INR is Therapeutic today- see above in Anticoagulation Summary.   Will instruct Shani Phillip to Continue their warfarin regimen- see above in Anticoagulation Summary.  2. Follow up in 1 weeks  3. They have been instructed to call if any changes in medications, doses, concerns, etc. Patient expresses understanding and has no further questions at this time.    Marleny Black [facility]

## 2023-04-19 ENCOUNTER — TELEPHONE (OUTPATIENT)
Dept: FAMILY MEDICINE CLINIC | Facility: CLINIC | Age: 73
End: 2023-04-19
Payer: MEDICARE

## 2023-04-20 ENCOUNTER — ANTICOAGULATION VISIT (OUTPATIENT)
Dept: PHARMACY | Facility: HOSPITAL | Age: 73
End: 2023-04-20
Payer: MEDICARE

## 2023-04-20 DIAGNOSIS — F32.A DEPRESSION, UNSPECIFIED DEPRESSION TYPE: ICD-10-CM

## 2023-04-20 LAB — INR PPP: 3.4

## 2023-04-20 RX ORDER — BUPROPION HYDROCHLORIDE 300 MG/1
300 TABLET ORAL DAILY
Qty: 90 TABLET | Refills: 0 | Status: SHIPPED | OUTPATIENT
Start: 2023-04-20

## 2023-04-20 RX ORDER — WARFARIN SODIUM 2.5 MG/1
TABLET ORAL
Qty: 80 TABLET | Refills: 1 | Status: SHIPPED | OUTPATIENT
Start: 2023-04-20

## 2023-04-20 RX ORDER — BUDESONIDE 0.5 MG/2ML
0.5 INHALANT ORAL
Qty: 60 EACH | Refills: 1 | Status: SHIPPED | OUTPATIENT
Start: 2023-04-20 | End: 2023-05-01 | Stop reason: SDUPTHER

## 2023-04-20 NOTE — TELEPHONE ENCOUNTER
Patient has appointment on 06/08/2023   and is going to be out of these medications.   Patient reported she takes Wellbutrin 4 times a day.  Please advise.

## 2023-04-28 ENCOUNTER — ANTICOAGULATION VISIT (OUTPATIENT)
Dept: PHARMACY | Facility: HOSPITAL | Age: 73
End: 2023-04-28
Payer: MEDICARE

## 2023-04-28 LAB — INR PPP: 2.7

## 2023-04-28 NOTE — PROGRESS NOTES
Anticoagulation Clinic Progress Note    Anticoagulation Summary  As of 2023    INR goal:  2.0-3.0   TTR:  58.1 % (2.1 y)   INR used for dosin.70 (2023)   Warfarin maintenance plan:  1.25 mg every Mon, Thu; 2.5 mg all other days; Starting 2023   Weekly warfarin total:  15 mg   No change documented:  Zenia Myers, PharmD   Plan last modified:  Marleny Black RPH (2023)   Next INR check:  2023   Priority:  High   Target end date:      Indications    PAF (paroxysmal atrial fibrillation) (HCC) (Resolved) [I48.0]             Anticoagulation Episode Summary     INR check location:      Preferred lab:      Send INR reminders to:   [facility] [employer] HOME TEST POOL    Comments:   Home Testing as of 21 *call only when out of range*      Anticoagulation Care Providers     Provider Role Specialty Phone number    Zenia Tinajero MD Referring Cardiology [phone]          Clinic Interview:  Patient Findings     Negatives:  Signs/symptoms of thrombosis, Signs/symptoms of bleeding,   Laboratory test error suspected, Change in health, Change in alcohol use,   Change in activity, Upcoming invasive procedure, Emergency department   visit, Upcoming dental procedure, Missed doses, Extra doses, Change in   medications, Change in diet/appetite, Hospital admission, Bruising, Other   complaints      Clinical Outcomes     Negatives:  Major bleeding event, Thromboembolic event,   Anticoagulation-related hospital admission, Anticoagulation-related ED   visit, Anticoagulation-related fatality        INR History:      2023     3:53 PM 2023    12:00 AM 2023     3:30 PM 2023    12:00 AM 2023     3:43 PM 2023    12:00 AM 2023     1:05 PM   Anticoagulation Monitoring   INR 3.70  2.30  3.40  2.70   INR Date 2023   INR Goal 2.0-3.0  2.0-3.0  2.0-3.0  2.0-3.0   Trend Same  Same  Down  Same   Last Week Total 16.25 mg  13.75 mg  16.25 mg  15  mg   Next Week Total 13.75 mg  16.25 mg  15 mg  15 mg   Sun 2.5 mg  2.5 mg  2.5 mg  2.5 mg   Mon 1.25 mg  1.25 mg  1.25 mg  1.25 mg   Tue 2.5 mg  2.5 mg  2.5 mg  2.5 mg   Wed 2.5 mg  2.5 mg  2.5 mg  2.5 mg   Thu -  2.5 mg  1.25 mg  1.25 mg   Fri Hold (4/7)  2.5 mg  2.5 mg  2.5 mg   Sat 2.5 mg  2.5 mg  2.5 mg  2.5 mg   Historical INR  2.30       3.40       2.70             This result is from an external source.       Plan:  1. INR is Therapeutic today- see above in Anticoagulation Summary.   Will instruct Shani Phillip to Continue their warfarin regimen- see above in Anticoagulation Summary.  2. Follow up in 1 weeks  3. Pt has agreed to only be called if INR out of range. They have been instructed to call if any changes in medications, doses, concerns, etc. Patient expresses understanding and has no further questions at this time.    Zenia Myers, PharmD

## 2023-05-01 RX ORDER — BUDESONIDE 0.5 MG/2ML
0.5 INHALANT ORAL
Qty: 60 EACH | Refills: 1 | Status: SHIPPED | OUTPATIENT
Start: 2023-05-01

## 2023-05-01 RX ORDER — ALBUTEROL SULFATE 90 UG/1
2 AEROSOL, METERED RESPIRATORY (INHALATION)
Qty: 18 G | Refills: 2 | Status: SHIPPED | OUTPATIENT
Start: 2023-05-01

## 2023-05-01 NOTE — TELEPHONE ENCOUNTER
Caller: Shani Phillip     Relationship: Self     Best call back number: [phone]     Requested Prescriptions: budesonide (PULMICORT) 0.5 MG/2ML nebulizer    Requested Prescriptions             Pending Prescriptions Disp Refills                    Pharmacy where request should be sent: [place] [id number] [facility] [place]. - [phone]  - [phone] FX      Last office visit with prescribing clinician: 8/25/2022   Last telemedicine visit with prescribing clinician: Visit date not found   Next office visit with prescribing clinician: 6/8/2023      Additional details provided by patient: OUT     Does the patient have less than a 3 day supply:  [x]? Yes  []? No     Would you like a call back once the refill request has been completed: []? Yes [x]? No     If the office needs to give you a call back, can they leave a voicemail: []? Yes [x]? No     Santino Gould   05/01/23 11:47 EDT

## 2023-05-01 NOTE — TELEPHONE ENCOUNTER
Caller: Shani Phillip    Relationship: Self    Best call back number: [phone]    Requested Prescriptions:   Requested Prescriptions     Pending Prescriptions Disp Refills   • Ventolin  (90 Base) MCG/ACT inhaler       Sig: Inhale 2 puffs 4 (Four) Times a Day.        Pharmacy where request should be sent: [facility] [place] [facility] [place] [id number] [facility]. - [phone]  - [phone] FX     Last office visit with prescribing clinician: 8/25/2022   Last telemedicine visit with prescribing clinician: Visit date not found   Next office visit with prescribing clinician: 6/8/2023     Additional details provided by patient: OUT    Does the patient have less than a 3 day supply:  [x] Yes  [] No    Would you like a call back once the refill request has been completed: [] Yes [x] No    If the office needs to give you a call back, can they leave a voicemail: [] Yes [x] No    Santino Gould   05/01/23 11:47 EDT

## 2023-05-01 NOTE — TELEPHONE ENCOUNTER
LOV             8/25/2022   NOV            6/8/2023   Last RF       Ventolin 11/21/20                       Budesonide  Signed 1 week ago (4/20/2023):   budesonide (PULMICORT) 0.5 MG/2ML nebulizer solution   Sig: Take 2 mL by nebulization Daily.   Disp:  60 each    Refills:  1   Signed by: Rodríguez Walker MD Donna King, NRCMA/LMR

## 2023-05-04 ENCOUNTER — ANTICOAGULATION VISIT (OUTPATIENT)
Dept: PHARMACY | Facility: HOSPITAL | Age: 73
End: 2023-05-04
Payer: MEDICARE

## 2023-05-04 LAB — INR PPP: 4.2

## 2023-05-04 NOTE — PROGRESS NOTES
Anticoagulation Clinic Progress Note    Anticoagulation Summary  As of 2023    INR goal:  2.0-3.0   TTR:  57.8 % (2.2 y)   INR used for dosin.20 (2023)   Warfarin maintenance plan:  1.25 mg every Mon, Thu; 2.5 mg all other days; Starting 2023   Weekly warfarin total:  15 mg   Plan last modified:  Marleny Black RPH (2023)   Next INR check:  2023   Priority:  High   Target end date:      Indications    PAF (paroxysmal atrial fibrillation) (HCC) (Resolved) [I48.0]             Anticoagulation Episode Summary     INR check location:      Preferred lab:      Send INR reminders to:  [facility] [email or web address] HOME TEST POOL    Comments:   Home Testing as of 21 *call only when out of range*      Anticoagulation Care Providers     Provider Role Specialty Phone number    Zenia Tinajero MD Referring Cardiology [phone]          Clinic Interview:  Patient Findings     Negatives:  Signs/symptoms of thrombosis, Signs/symptoms of bleeding,   Laboratory test error suspected, Change in health, Change in alcohol use,   Change in activity, Upcoming invasive procedure, Emergency department   visit, Upcoming dental procedure, Missed doses, Extra doses, Change in   medications, Change in diet/appetite, Hospital admission, Bruising, Other   complaints      Clinical Outcomes     Negatives:  Major bleeding event, Thromboembolic event,   Anticoagulation-related hospital admission, Anticoagulation-related ED   visit, Anticoagulation-related fatality        INR History:      2023     3:30 PM 2023    12:00 AM 2023     3:43 PM 2023    12:00 AM 2023     1:05 PM 2023    12:00 AM 2023     2:34 PM   Anticoagulation Monitoring   INR 2.30  3.40  2.70  4.20   INR Date 2023   INR Goal 2.0-3.0  2.0-3.0  2.0-3.0  2.0-3.0   Trend Same  Down  Same  Same   Last Week Total 13.75 mg  16.25 mg  15 mg  15 mg   Next Week Total 16.25 mg  15 mg  15 mg  12.5 mg    Sun 2.5 mg  2.5 mg  2.5 mg  2.5 mg   Mon 1.25 mg  1.25 mg  1.25 mg  1.25 mg   Tue 2.5 mg  2.5 mg  2.5 mg  2.5 mg   Wed 2.5 mg  2.5 mg  2.5 mg  2.5 mg   Thu 2.5 mg  1.25 mg  1.25 mg  Hold (5/4)   Fri 2.5 mg  2.5 mg  2.5 mg  1.25 mg (5/5)   Sat 2.5 mg  2.5 mg  2.5 mg  2.5 mg   Historical INR  3.40       2.70       4.20             This result is from an external source.       Plan:  1. INR is Supratherapeutic today- see above in Anticoagulation Summary.   Will instruct Shani Phillip to Change their warfarin regimen- see above in Anticoagulation Summary.  2. Follow up in 1 weeks  3. They have been instructed to call if any changes in medications, doses, concerns, etc. Patient expresses understanding and has no further questions at this time.    Zenia Myers, PharmD

## 2023-05-05 ENCOUNTER — TELEPHONE (OUTPATIENT)
Dept: FAMILY MEDICINE CLINIC | Facility: CLINIC | Age: 73
End: 2023-05-05

## 2023-05-05 ENCOUNTER — TELEPHONE (OUTPATIENT)
Dept: FAMILY MEDICINE CLINIC | Facility: CLINIC | Age: 73
End: 2023-05-05
Payer: MEDICARE

## 2023-05-05 NOTE — TELEPHONE ENCOUNTER
Patient was prescribed rx of Albuterol by Dr Odell but had picked up Rx of Budesonide by Dr Walker, she has an appt 6/8/2023.  Mr Phillip is wanting to know which one to take because he believes she needs to take the Albuterol not the Budesonide.  Please advise.  Patient/spouse can be reached at [phone].

## 2023-05-05 NOTE — TELEPHONE ENCOUNTER
Caller: Shani Phillip    Relationship: Self    Best call back number: [phone]     What is the best time to reach you: ANY TIME    Who are you requesting to speak with (clinical staff, provider,  specific staff member): DR JACKSON'S MA    Do you know the name of the person who called: SHANI PHILLIP    What was the call regarding: MEDICATION CLARIFICATION.    Do you require a callback: YES

## 2023-05-10 ENCOUNTER — TELEPHONE (OUTPATIENT)
Dept: FAMILY MEDICINE CLINIC | Facility: CLINIC | Age: 73
End: 2023-05-10
Payer: MEDICARE

## 2023-05-10 NOTE — TELEPHONE ENCOUNTER
Spoke with patient, she's upset that her pulmicort nebulizer solution was sent into pharmacy wrong, patient states she takes this four times a day, please resend to her mail in pharmacy [facility] the corrected dose and please send in an extra one to her local [facility] on file so that she doesn't run out of this medication.

## 2023-05-10 NOTE — TELEPHONE ENCOUNTER
Patient picked up her inhaler and the directions say only one time per day.  She is used to taking it 4 times per day.  Please advise.

## 2023-05-11 ENCOUNTER — TELEPHONE (OUTPATIENT)
Dept: FAMILY MEDICINE CLINIC | Facility: CLINIC | Age: 73
End: 2023-05-11
Payer: MEDICARE

## 2023-05-11 ENCOUNTER — ANTICOAGULATION VISIT (OUTPATIENT)
Dept: PHARMACY | Facility: HOSPITAL | Age: 73
End: 2023-05-11
Payer: MEDICARE

## 2023-05-11 LAB — INR PPP: 2.2

## 2023-05-11 PROCEDURE — G0249 PROVIDE INR TEST MATER/EQUIP: HCPCS

## 2023-05-11 NOTE — PROGRESS NOTES
Anticoagulation Clinic Progress Note    Anticoagulation Summary  As of 2023    INR goal:  2.0-3.0   TTR:  57.7 % (2.2 y)   INR used for dosin.20 (2023)   Warfarin maintenance plan:  1.25 mg every Mon, Thu; 2.5 mg all other days; Starting 2023   Weekly warfarin total:  15 mg   No change documented:  Marleny Black RPH   Plan last modified:  Marleny Black RPH (2023)   Next INR check:  2023   Priority:  High   Target end date:      Indications    PAF (paroxysmal atrial fibrillation) (HCC) (Resolved) [I48.0]             Anticoagulation Episode Summary     INR check location:      Preferred lab:      Send INR reminders to:   [facility] [employer] HOME TEST POOL    Comments:   Home Testing as of 21 *call only when out of range*      Anticoagulation Care Providers     Provider Role Specialty Phone number    Zenia Tinajero MD Referring Cardiology [phone]          Clinic Interview:  No pertinent clinical findings have been reported.    INR History:      2023     3:43 PM 2023    12:00 AM 2023     1:05 PM 2023    12:00 AM 2023     2:34 PM 2023    12:00 AM 2023     2:30 PM   Anticoagulation Monitoring   INR 3.40  2.70  4.20  2.20   INR Date 2023   INR Goal 2.0-3.0  2.0-3.0  2.0-3.0  2.0-3.0   Trend Down  Same  Same  Same   Last Week Total 16.25 mg  15 mg  15 mg  12.5 mg   Next Week Total 15 mg  15 mg  12.5 mg  15 mg   Sun 2.5 mg  2.5 mg  2.5 mg  2.5 mg   Mon 1.25 mg  1.25 mg  1.25 mg  1.25 mg   Tue 2.5 mg  2.5 mg  2.5 mg  2.5 mg   Wed 2.5 mg  2.5 mg  2.5 mg  2.5 mg   Thu 1.25 mg  1.25 mg  Hold ()  1.25 mg   Fri 2.5 mg  2.5 mg  1.25 mg (5/5)  2.5 mg   Sat 2.5 mg  2.5 mg  2.5 mg  2.5 mg   Historical INR  2.70       4.20       2.20             This result is from an external source.       Plan:  1. INR is therapeutic today- see above in Anticoagulation Summary.    Shani Phillip to continue their warfarin regimen- see  above in Anticoagulation Summary.  2. Follow up in 1 week  3. Pt has agreed to only be called if INR out of range. They have been instructed to call if any changes in medications, doses, concerns, etc. Patient expresses understanding and has no further questions at this time.    Marleny Black, [facility]

## 2023-05-11 NOTE — TELEPHONE ENCOUNTER
Patient called with [facility] on the line wanting to know if you could switch her back to her original inhaler because her insurance does not cover her new one.  Patient can be reached at [phone].

## 2023-05-12 NOTE — TELEPHONE ENCOUNTER
I scanned to your email the list of medication patients pharmacy sent to office  PLEASE call patient and discuss with patient.

## 2023-05-16 NOTE — TELEPHONE ENCOUNTER
Caller: Shani Phillip    Relationship: Self    Best call back number: [phone]    What is the best time to reach you: ANY    Who are you requesting to speak with (clinical staff, provider,  specific staff member): CLINICAL STAFF    What was the call regarding: PATIENT IS STILL WAITING TO HEAR BACK ABOUT THE NEBULIZER. PATIENT STATES SHE NEEDS IT 4 TIMES A DAY, NOT ONCE DAILY.     Do you require a callback: YES

## 2023-05-17 ENCOUNTER — TELEPHONE (OUTPATIENT)
Dept: FAMILY MEDICINE CLINIC | Facility: CLINIC | Age: 73
End: 2023-05-17

## 2023-05-17 NOTE — TELEPHONE ENCOUNTER
PATIENT'S DAUGHTER LINDSEY CALLED AND WANTED TO DISCUSS THE MEDICATION   budesonide (PULMICORT) 0.5 MG/2ML nebulizer solution    SHE IS WANTING TO KNOW WHY THE DOSAGE HAS BEEN CHANGED FROM 4 TIMES A DAY TO ONE TIME A DAY.     PLEASE CALL [phone]    SHE IS LISTED ON  [facility] BUT CHECK BOXES ARE NOT CHECKED

## 2023-05-18 ENCOUNTER — ANTICOAGULATION VISIT (OUTPATIENT)
Dept: PHARMACY | Facility: HOSPITAL | Age: 73
End: 2023-05-18
Payer: MEDICARE

## 2023-05-18 LAB — INR PPP: 4.3

## 2023-05-18 NOTE — PROGRESS NOTES
Anticoagulation Clinic Progress Note    Anticoagulation Summary  As of 2023    INR goal:  2.0-3.0   TTR:  57.4 % (2.2 y)   INR used for dosin.30 (2023)   Warfarin maintenance plan:  1.25 mg every Mon, Wed, Fri; 2.5 mg all other days; Starting 2023   Weekly warfarin total:  13.75 mg   Plan last modified:  Marleny Black RPH (2023)   Next INR check:  2023   Priority:  High   Target end date:      Indications    PAF (paroxysmal atrial fibrillation) (HCC) (Resolved) [I48.0]             Anticoagulation Episode Summary     INR check location:      Preferred lab:      Send INR reminders to:   [email or web address] [email or web address] HOME TEST POOL    Comments:   Home Testing as of 21 *call only when out of range*      Anticoagulation Care Providers     Provider Role Specialty Phone number    Zenia Tinajero MD Referring Cardiology [phone]          Clinic Interview:  Patient Findings     Negatives:  Signs/symptoms of thrombosis, Signs/symptoms of bleeding,   Laboratory test error suspected, Change in health, Change in alcohol use,   Change in activity, Upcoming invasive procedure, Emergency department   visit, Upcoming dental procedure, Missed doses, Extra doses, Change in   medications, Change in diet/appetite, Hospital admission, Bruising, Other   complaints      Clinical Outcomes     Negatives:  Major bleeding event, Thromboembolic event,   Anticoagulation-related hospital admission, Anticoagulation-related ED   visit, Anticoagulation-related fatality        INR History:      2023     1:05 PM 2023    12:00 AM 2023     2:34 PM 2023    12:00 AM 2023     2:30 PM 2023    12:00 AM 2023     1:30 PM   Anticoagulation Monitoring   INR 2.70  4.20  2.20  4.30   INR Date 2023   INR Goal 2.0-3.0  2.0-3.0  2.0-3.0  2.0-3.0   Trend Same  Same  Same  Down   Last Week Total 15 mg  15 mg  12.5 mg  15 mg   Next Week Total 15 mg  12.5 mg  15 mg  11.25  mg   Sun 2.5 mg  2.5 mg  2.5 mg  2.5 mg   Mon 1.25 mg  1.25 mg  1.25 mg  1.25 mg   Tue 2.5 mg  2.5 mg  2.5 mg  2.5 mg   Wed 2.5 mg  2.5 mg  2.5 mg  1.25 mg   Thu 1.25 mg  Hold (5/4)  1.25 mg  Hold (5/18)   Fri 2.5 mg  1.25 mg (5/5)  2.5 mg  1.25 mg   Sat 2.5 mg  2.5 mg  2.5 mg  2.5 mg   Historical INR  4.20       2.20       4.30             This result is from an external source.       Plan:  1. INR is Supratherapeutic today- see above in Anticoagulation Summary.   Will instruct Shani Phillip to Change their warfarin regimen- see above in Anticoagulation Summary.  Hold and then trial on 1.25 mg MWF, 2.5 mg AOD, rck 1 week   2. Follow up in 1 weeks  3. They have been instructed to call if any changes in medications, doses, concerns, etc. Patient expresses understanding and has no further questions at this time.    Marleny Black [facility]

## 2023-05-25 ENCOUNTER — ANTICOAGULATION VISIT (OUTPATIENT)
Dept: PHARMACY | Facility: HOSPITAL | Age: 73
End: 2023-05-25
Payer: MEDICARE

## 2023-05-25 LAB — INR PPP: 1.4

## 2023-05-25 NOTE — PROGRESS NOTES
Anticoagulation Clinic Progress Note    Anticoagulation Summary  As of 2023    INR goal:  2.0-3.0   TTR:  57.2 % (2.2 y)   INR used for dosin.40 (2023)   Warfarin maintenance plan:  1.25 mg every Mon, Wed, Fri; 2.5 mg all other days; Starting 2023   Weekly warfarin total:  13.75 mg   Plan last modified:  Marleny Black RPH (2023)   Next INR check:  2023   Priority:  High   Target end date:      Indications    PAF (paroxysmal atrial fibrillation) (HCC) (Resolved) [I48.0]             Anticoagulation Episode Summary     INR check location:      Preferred lab:      Send INR reminders to:   [facility] [employer] HOME TEST POOL    Comments:   Home Testing as of 21 *call only when out of range*      Anticoagulation Care Providers     Provider Role Specialty Phone number    Zenia Tinajero MD Referring Cardiology [phone]          Clinic Interview:  Patient Findings     Positives:  Change in diet/appetite    Negatives:  Signs/symptoms of thrombosis, Signs/symptoms of bleeding,   Laboratory test error suspected, Change in health, Change in alcohol use,   Change in activity, Upcoming invasive procedure, Emergency department   visit, Upcoming dental procedure, Missed doses, Extra doses, Change in   medications, Hospital admission, Bruising, Other complaints    Comments:  Reports more salads than usual this past week, but she   indicates that may become more routine.       Clinical Outcomes     Negatives:  Major bleeding event, Thromboembolic event,   Anticoagulation-related hospital admission, Anticoagulation-related ED   visit, Anticoagulation-related fatality    Comments:  Reports more salads than usual this past week, but she   indicates that may become more routine.         INR History:      2023     2:34 PM 2023    12:00 AM 2023     2:30 PM 2023    12:00 AM 2023     1:30 PM 2023    12:00 AM 2023    11:58 AM   Anticoagulation Monitoring   INR 4.20  2.20   4.30  1.40   INR Date 5/4/2023 5/11/2023 5/18/2023 5/25/2023   INR Goal 2.0-3.0  2.0-3.0  2.0-3.0  2.0-3.0   Trend Same  Same  Down  Same   Last Week Total 15 mg  12.5 mg  15 mg  11.25 mg   Next Week Total 12.5 mg  15 mg  11.25 mg  15 mg   Sun 2.5 mg  2.5 mg  2.5 mg  2.5 mg   Mon 1.25 mg  1.25 mg  1.25 mg  1.25 mg   Tue 2.5 mg  2.5 mg  2.5 mg  2.5 mg   Wed 2.5 mg  2.5 mg  1.25 mg  1.25 mg   Thu Hold (5/4)  1.25 mg  Hold (5/18)  3.75 mg (5/25)   Fri 1.25 mg (5/5)  2.5 mg  1.25 mg  1.25 mg   Sat 2.5 mg  2.5 mg  2.5 mg  2.5 mg   Historical INR  2.20       4.30       1.40             This result is from an external source.       Plan:  1. INR is Subtherapeutic today- see above in Anticoagulation Summary.   Will instruct Shani Phillip to Change their warfarin regimen (3.75 mg today, then resume trial of 1.25 mg MWF, 2.5 mg all other days) - see above in Anticoagulation Summary.  2. Follow up in 1 week.  3. They have been instructed to call if any changes in medications, doses, concerns, etc. Patient expresses understanding and has no further questions at this time.    Keshawn Castaneda, PharmD

## 2023-06-01 ENCOUNTER — ANTICOAGULATION VISIT (OUTPATIENT)
Dept: PHARMACY | Facility: HOSPITAL | Age: 73
End: 2023-06-01

## 2023-06-01 LAB — INR PPP: 2.2

## 2023-06-01 NOTE — PROGRESS NOTES
Anticoagulation Clinic Progress Note    Anticoagulation Summary  As of 2023    INR goal:  2.0-3.0   TTR:  57.0 % (2.2 y)   INR used for dosin.20 (2023)   Warfarin maintenance plan:  1.25 mg every Mon, Wed, Fri; 2.5 mg all other days; Starting 2023   Weekly warfarin total:  13.75 mg   No change documented:  Zenia Myers, Fahad   Plan last modified:  Marleny Black RPH (2023)   Next INR check:     Priority:  High   Target end date:      Indications    PAF (paroxysmal atrial fibrillation) (HCC) (Resolved) [I48.0]             Anticoagulation Episode Summary     INR check location:      Preferred lab:      Send INR reminders to:   [facility]    Comments:   Home Testing as of 21 *call only when out of range*      Anticoagulation Care Providers     Provider Role Specialty Phone number    Zenia Tinajero MD Referring Cardiology [phone]          Clinic Interview:  Patient Findings     Negatives:  Signs/symptoms of thrombosis, Signs/symptoms of bleeding,   Laboratory test error suspected, Change in health, Change in alcohol use,   Change in activity, Upcoming invasive procedure, Emergency department   visit, Upcoming dental procedure, Missed doses, Extra doses, Change in   medications, Change in diet/appetite, Hospital admission, Bruising, Other   complaints      Clinical Outcomes     Negatives:  Major bleeding event, Thromboembolic event,   Anticoagulation-related hospital admission, Anticoagulation-related ED   visit, Anticoagulation-related fatality        INR History:      2023     2:30 PM 2023    12:00 AM 2023     1:30 PM 2023    12:00 AM 2023    11:58 AM 2023    12:00 AM 2023    11:29 AM   Anticoagulation Monitoring   INR 2.20  4.30  1.40  2.20   INR Date 2023   INR Goal 2.0-3.0  2.0-3.0  2.0-3.0  2.0-3.0   Trend Same  Down  Same  Same   Last Week Total 12.5 mg  15 mg  11.25 mg  15 mg   Next  Week Total 15 mg  11.25 mg  15 mg  13.75 mg   Sun 2.5 mg  2.5 mg  2.5 mg  2.5 mg   Mon 1.25 mg  1.25 mg  1.25 mg  1.25 mg   Tue 2.5 mg  2.5 mg  2.5 mg  2.5 mg   Wed 2.5 mg  1.25 mg  1.25 mg  1.25 mg   Thu 1.25 mg  Hold (5/18)  3.75 mg (5/25)  2.5 mg   Fri 2.5 mg  1.25 mg  1.25 mg  1.25 mg   Sat 2.5 mg  2.5 mg  2.5 mg  2.5 mg   Historical INR  4.30       1.40       2.20             This result is from an external source.       Plan:  1. INR is Therapeutic today- see above in Anticoagulation Summary.   Will instruct Shani Phillip to Continue their warfarin regimen- see above in Anticoagulation Summary.  2. Follow up in 1 weeks  3. Pt has agreed to only be called if INR out of range. They have been instructed to call if any changes in medications, doses, concerns, etc. Patient expresses understanding and has no further questions at this time.    Zenia Myers, PharmD

## 2023-06-04 ENCOUNTER — APPOINTMENT (OUTPATIENT)
Dept: GENERAL RADIOLOGY | Facility: HOSPITAL | Age: 73
End: 2023-06-04
Payer: MEDICARE

## 2023-06-04 ENCOUNTER — HOSPITAL ENCOUNTER (OUTPATIENT)
Facility: HOSPITAL | Age: 73
Setting detail: OBSERVATION
Discharge: HOME | End: 2023-06-05
Attending: EMERGENCY MEDICINE | Admitting: HOSPITALIST
Payer: MEDICARE

## 2023-06-04 DIAGNOSIS — Z86.79 HISTORY OF CHF (CONGESTIVE HEART FAILURE): ICD-10-CM

## 2023-06-04 DIAGNOSIS — Z87.09 HISTORY OF COPD: ICD-10-CM

## 2023-06-04 DIAGNOSIS — R07.9 CHEST PAIN, UNSPECIFIED TYPE: Primary | ICD-10-CM

## 2023-06-04 LAB
ALBUMIN SERPL-MCNC: 4.3 G/DL (ref 3.5–5.2)
ALBUMIN/GLOB SERPL: 1.2 G/DL
ALP SERPL-CCNC: 147 U/L (ref 39–117)
ALT SERPL W P-5'-P-CCNC: 14 U/L (ref 1–33)
ANION GAP SERPL CALCULATED.3IONS-SCNC: 10.1 MMOL/L (ref 5–15)
AST SERPL-CCNC: 18 U/L (ref 1–32)
B PARAPERT DNA SPEC QL NAA+PROBE: NOT DETECTED
B PERT DNA SPEC QL NAA+PROBE: NOT DETECTED
BASOPHILS # BLD AUTO: 0.03 10*3/MM3 (ref 0–0.2)
BASOPHILS NFR BLD AUTO: 0.3 % (ref 0–1.5)
BILIRUB SERPL-MCNC: 0.4 MG/DL (ref 0–1.2)
BUN SERPL-MCNC: 13 MG/DL (ref 8–23)
BUN/CREAT SERPL: 11.1 (ref 7–25)
C PNEUM DNA NPH QL NAA+NON-PROBE: NOT DETECTED
CALCIUM SPEC-SCNC: 9.4 MG/DL (ref 8.6–10.5)
CHLORIDE SERPL-SCNC: 96 MMOL/L (ref 98–107)
CO2 SERPL-SCNC: 30.9 MMOL/L (ref 22–29)
CREAT SERPL-MCNC: 1.17 MG/DL (ref 0.57–1)
D DIMER PPP FEU-MCNC: 0.33 MCGFEU/ML (ref 0–0.73)
DEPRECATED RDW RBC AUTO: 43.6 FL (ref 37–54)
DIGOXIN SERPL-MCNC: 1.6 NG/ML (ref 0.6–1.2)
EGFRCR SERPLBLD CKD-EPI 2021: 49.4 ML/MIN/1.73
EOSINOPHIL # BLD AUTO: 0.15 10*3/MM3 (ref 0–0.4)
EOSINOPHIL NFR BLD AUTO: 1.5 % (ref 0.3–6.2)
ERYTHROCYTE [DISTWIDTH] IN BLOOD BY AUTOMATED COUNT: 14.7 % (ref 12.3–15.4)
FLUAV SUBTYP SPEC NAA+PROBE: NOT DETECTED
FLUBV RNA ISLT QL NAA+PROBE: NOT DETECTED
GEN 5 2HR TROPONIN T REFLEX: 26 NG/L
GLOBULIN UR ELPH-MCNC: 3.7 GM/DL
GLUCOSE SERPL-MCNC: 113 MG/DL (ref 65–99)
HADV DNA SPEC NAA+PROBE: NOT DETECTED
HCOV 229E RNA SPEC QL NAA+PROBE: NOT DETECTED
HCOV HKU1 RNA SPEC QL NAA+PROBE: NOT DETECTED
HCOV NL63 RNA SPEC QL NAA+PROBE: NOT DETECTED
HCOV OC43 RNA SPEC QL NAA+PROBE: NOT DETECTED
HCT VFR BLD AUTO: 40.5 % (ref 34–46.6)
HGB BLD-MCNC: 12.8 G/DL (ref 12–15.9)
HMPV RNA NPH QL NAA+NON-PROBE: NOT DETECTED
HPIV1 RNA ISLT QL NAA+PROBE: NOT DETECTED
HPIV2 RNA SPEC QL NAA+PROBE: NOT DETECTED
HPIV3 RNA NPH QL NAA+PROBE: NOT DETECTED
HPIV4 P GENE NPH QL NAA+PROBE: NOT DETECTED
IMM GRANULOCYTES # BLD AUTO: 0.03 10*3/MM3 (ref 0–0.05)
IMM GRANULOCYTES NFR BLD AUTO: 0.3 % (ref 0–0.5)
INR PPP: 1.85 (ref 0.9–1.1)
INR PPP: 1.98 (ref 0.9–1.1)
LYMPHOCYTES # BLD AUTO: 0.89 10*3/MM3 (ref 0.7–3.1)
LYMPHOCYTES NFR BLD AUTO: 9.1 % (ref 19.6–45.3)
M PNEUMO IGG SER IA-ACNC: NOT DETECTED
MCH RBC QN AUTO: 26.1 PG (ref 26.6–33)
MCHC RBC AUTO-ENTMCNC: 31.6 G/DL (ref 31.5–35.7)
MCV RBC AUTO: 82.7 FL (ref 79–97)
MONOCYTES # BLD AUTO: 0.38 10*3/MM3 (ref 0.1–0.9)
MONOCYTES NFR BLD AUTO: 3.9 % (ref 5–12)
NEUTROPHILS NFR BLD AUTO: 8.35 10*3/MM3 (ref 1.7–7)
NEUTROPHILS NFR BLD AUTO: 84.9 % (ref 42.7–76)
NRBC BLD AUTO-RTO: 0 /100 WBC (ref 0–0.2)
NT-PROBNP SERPL-MCNC: 523 PG/ML (ref 0–900)
PLATELET # BLD AUTO: 216 10*3/MM3 (ref 140–450)
PMV BLD AUTO: 9.6 FL (ref 6–12)
POTASSIUM SERPL-SCNC: 3.9 MMOL/L (ref 3.5–5.2)
PROT SERPL-MCNC: 8 G/DL (ref 6–8.5)
PROTHROMBIN TIME: 21.7 SECONDS (ref 11.7–14.2)
PROTHROMBIN TIME: 22.9 SECONDS (ref 11.7–14.2)
RBC # BLD AUTO: 4.9 10*6/MM3 (ref 3.77–5.28)
RHINOVIRUS RNA SPEC NAA+PROBE: NOT DETECTED
RSV RNA NPH QL NAA+NON-PROBE: NOT DETECTED
SARS-COV-2 RNA NPH QL NAA+NON-PROBE: NOT DETECTED
SODIUM SERPL-SCNC: 137 MMOL/L (ref 136–145)
TROPONIN T DELTA: -1 NG/L
TROPONIN T SERPL HS-MCNC: 27 NG/L
WBC NRBC COR # BLD: 9.83 10*3/MM3 (ref 3.4–10.8)

## 2023-06-04 PROCEDURE — 0202U NFCT DS 22 TRGT SARS-COV-2: CPT | Performed by: EMERGENCY MEDICINE

## 2023-06-04 PROCEDURE — G0378 HOSPITAL OBSERVATION PER HR: HCPCS

## 2023-06-04 PROCEDURE — 83880 ASSAY OF NATRIURETIC PEPTIDE: CPT | Performed by: EMERGENCY MEDICINE

## 2023-06-04 PROCEDURE — 94799 UNLISTED PULMONARY SVC/PX: CPT

## 2023-06-04 PROCEDURE — 84484 ASSAY OF TROPONIN QUANT: CPT | Performed by: EMERGENCY MEDICINE

## 2023-06-04 PROCEDURE — 93005 ELECTROCARDIOGRAM TRACING: CPT

## 2023-06-04 PROCEDURE — 85025 COMPLETE CBC W/AUTO DIFF WBC: CPT | Performed by: EMERGENCY MEDICINE

## 2023-06-04 PROCEDURE — 94640 AIRWAY INHALATION TREATMENT: CPT

## 2023-06-04 PROCEDURE — 80162 ASSAY OF DIGOXIN TOTAL: CPT | Performed by: EMERGENCY MEDICINE

## 2023-06-04 PROCEDURE — 71045 X-RAY EXAM CHEST 1 VIEW: CPT

## 2023-06-04 PROCEDURE — 85379 FIBRIN DEGRADATION QUANT: CPT | Performed by: EMERGENCY MEDICINE

## 2023-06-04 PROCEDURE — 80053 COMPREHEN METABOLIC PANEL: CPT | Performed by: EMERGENCY MEDICINE

## 2023-06-04 PROCEDURE — 94761 N-INVAS EAR/PLS OXIMETRY MLT: CPT

## 2023-06-04 PROCEDURE — 93005 ELECTROCARDIOGRAM TRACING: CPT | Performed by: EMERGENCY MEDICINE

## 2023-06-04 PROCEDURE — 85610 PROTHROMBIN TIME: CPT | Performed by: INTERNAL MEDICINE

## 2023-06-04 PROCEDURE — 99285 EMERGENCY DEPT VISIT HI MDM: CPT

## 2023-06-04 PROCEDURE — 85610 PROTHROMBIN TIME: CPT | Performed by: EMERGENCY MEDICINE

## 2023-06-04 PROCEDURE — 94760 N-INVAS EAR/PLS OXIMETRY 1: CPT

## 2023-06-04 RX ORDER — ACETAMINOPHEN 325 MG/1
650 TABLET ORAL EVERY 4 HOURS PRN
Status: DISCONTINUED | OUTPATIENT
Start: 2023-06-04 | End: 2023-06-05 | Stop reason: HOSPADM

## 2023-06-04 RX ORDER — CYCLOBENZAPRINE HCL 10 MG
10 TABLET ORAL 3 TIMES DAILY PRN
Status: DISCONTINUED | OUTPATIENT
Start: 2023-06-04 | End: 2023-06-05 | Stop reason: HOSPADM

## 2023-06-04 RX ORDER — BISACODYL 5 MG/1
5 TABLET, DELAYED RELEASE ORAL DAILY PRN
Status: DISCONTINUED | OUTPATIENT
Start: 2023-06-04 | End: 2023-06-05 | Stop reason: HOSPADM

## 2023-06-04 RX ORDER — SODIUM CHLORIDE 0.9 % (FLUSH) 0.9 %
10 SYRINGE (ML) INJECTION AS NEEDED
Status: DISCONTINUED | OUTPATIENT
Start: 2023-06-04 | End: 2023-06-05 | Stop reason: HOSPADM

## 2023-06-04 RX ORDER — BUPROPION HYDROCHLORIDE 300 MG/1
300 TABLET ORAL DAILY
Status: DISCONTINUED | OUTPATIENT
Start: 2023-06-05 | End: 2023-06-05 | Stop reason: HOSPADM

## 2023-06-04 RX ORDER — MELATONIN
2000 DAILY
Status: DISCONTINUED | OUTPATIENT
Start: 2023-06-05 | End: 2023-06-05 | Stop reason: HOSPADM

## 2023-06-04 RX ORDER — HYDROCODONE BITARTRATE AND ACETAMINOPHEN 7.5; 325 MG/1; MG/1
1 TABLET ORAL EVERY 6 HOURS PRN
Status: DISCONTINUED | OUTPATIENT
Start: 2023-06-04 | End: 2023-06-05 | Stop reason: HOSPADM

## 2023-06-04 RX ORDER — UREA 10 %
3 LOTION (ML) TOPICAL NIGHTLY PRN
Status: DISCONTINUED | OUTPATIENT
Start: 2023-06-04 | End: 2023-06-05 | Stop reason: HOSPADM

## 2023-06-04 RX ORDER — WARFARIN SODIUM 3 MG/1
3 TABLET ORAL
Status: DISCONTINUED | OUTPATIENT
Start: 2023-06-04 | End: 2023-06-04 | Stop reason: DRUGHIGH

## 2023-06-04 RX ORDER — ALBUTEROL SULFATE 2.5 MG/3ML
2.5 SOLUTION RESPIRATORY (INHALATION)
Status: DISCONTINUED | OUTPATIENT
Start: 2023-06-04 | End: 2023-06-05 | Stop reason: HOSPADM

## 2023-06-04 RX ORDER — WARFARIN SODIUM 2.5 MG/1
1.25 TABLET ORAL
Status: DISCONTINUED | OUTPATIENT
Start: 2023-06-05 | End: 2023-06-05 | Stop reason: HOSPADM

## 2023-06-04 RX ORDER — AMOXICILLIN 250 MG
2 CAPSULE ORAL 2 TIMES DAILY
Status: DISCONTINUED | OUTPATIENT
Start: 2023-06-04 | End: 2023-06-05 | Stop reason: HOSPADM

## 2023-06-04 RX ORDER — FUROSEMIDE 40 MG/1
40 TABLET ORAL DAILY
Status: DISCONTINUED | OUTPATIENT
Start: 2023-06-05 | End: 2023-06-05 | Stop reason: HOSPADM

## 2023-06-04 RX ORDER — ONDANSETRON 2 MG/ML
4 INJECTION INTRAMUSCULAR; INTRAVENOUS EVERY 6 HOURS PRN
Status: DISCONTINUED | OUTPATIENT
Start: 2023-06-04 | End: 2023-06-05 | Stop reason: HOSPADM

## 2023-06-04 RX ORDER — ACETAMINOPHEN 325 MG/1
650 TABLET ORAL ONCE
Status: COMPLETED | OUTPATIENT
Start: 2023-06-04 | End: 2023-06-04

## 2023-06-04 RX ORDER — BUDESONIDE 0.5 MG/2ML
0.5 INHALANT ORAL
Status: DISCONTINUED | OUTPATIENT
Start: 2023-06-05 | End: 2023-06-05 | Stop reason: HOSPADM

## 2023-06-04 RX ORDER — POLYETHYLENE GLYCOL 3350 17 G/17G
17 POWDER, FOR SOLUTION ORAL DAILY PRN
Status: DISCONTINUED | OUTPATIENT
Start: 2023-06-04 | End: 2023-06-05 | Stop reason: HOSPADM

## 2023-06-04 RX ORDER — FAMOTIDINE 20 MG/1
20 TABLET, FILM COATED ORAL
Status: DISCONTINUED | OUTPATIENT
Start: 2023-06-05 | End: 2023-06-05

## 2023-06-04 RX ORDER — WARFARIN SODIUM 2.5 MG/1
2.5 TABLET ORAL
Status: DISCONTINUED | OUTPATIENT
Start: 2023-06-04 | End: 2023-06-05 | Stop reason: HOSPADM

## 2023-06-04 RX ORDER — ONDANSETRON 4 MG/1
4 TABLET, FILM COATED ORAL EVERY 6 HOURS PRN
Status: DISCONTINUED | OUTPATIENT
Start: 2023-06-04 | End: 2023-06-05 | Stop reason: HOSPADM

## 2023-06-04 RX ORDER — PRAMIPEXOLE DIHYDROCHLORIDE 0.5 MG/1
0.5 TABLET ORAL NIGHTLY
Status: DISCONTINUED | OUTPATIENT
Start: 2023-06-04 | End: 2023-06-05 | Stop reason: HOSPADM

## 2023-06-04 RX ORDER — DIGOXIN 125 MCG
125 TABLET ORAL DAILY
Status: DISCONTINUED | OUTPATIENT
Start: 2023-06-05 | End: 2023-06-05 | Stop reason: HOSPADM

## 2023-06-04 RX ORDER — POTASSIUM CHLORIDE 750 MG/1
20 TABLET, FILM COATED, EXTENDED RELEASE ORAL DAILY
Status: DISCONTINUED | OUTPATIENT
Start: 2023-06-05 | End: 2023-06-05 | Stop reason: HOSPADM

## 2023-06-04 RX ORDER — BISACODYL 10 MG
10 SUPPOSITORY, RECTAL RECTAL DAILY PRN
Status: DISCONTINUED | OUTPATIENT
Start: 2023-06-04 | End: 2023-06-05 | Stop reason: HOSPADM

## 2023-06-04 RX ADMIN — PRAMIPEXOLE DIHYDROCHLORIDE 0.5 MG: 0.5 TABLET ORAL at 23:18

## 2023-06-04 RX ADMIN — WARFARIN 2.5 MG: 2.5 TABLET ORAL at 23:18

## 2023-06-04 RX ADMIN — ALBUTEROL SULFATE 2.5 MG: 2.5 SOLUTION RESPIRATORY (INHALATION) at 23:22

## 2023-06-04 RX ADMIN — DOCUSATE SODIUM 50MG AND SENNOSIDES 8.6MG 2 TABLET: 8.6; 5 TABLET, FILM COATED ORAL at 23:19

## 2023-06-04 RX ADMIN — ACETAMINOPHEN 650 MG: 325 TABLET ORAL at 16:50

## 2023-06-04 NOTE — H&P
HISTORY AND PHYSICAL   [facility]        Date of Admission: 2023  Patient Identification:  Name: Shani Phillip  Age: 72 y.o.  Sex: female  :  1950  MRN: [id number]                     Primary Care Physician: Rodríguez Walker MD    Chief Complaint:  72 year old female who presented to the emergency room with chest pain which started prior to coming in and lasted for an hour or so; she took aspirin which relieved the pain; she has had leg swelling as well; she has had a cough but denies fever or chills; she has a history of atrial fibrillation;     History of Present Illness:   As above    Past Medical History:  Past Medical History:   Diagnosis Date    Acute endocarditis     Atrial fibrillation with RVR 2019    CKD (chronic kidney disease) stage 3, GFR 30-59 ml/min     COPD (chronic obstructive pulmonary disease)     Influenza 2019    Osteoporosis     Renal disorder     Restless leg syndrome     Thrush, oral 2019     Past Surgical History:  Past Surgical History:   Procedure Laterality Date    CARDIAC CATHETERIZATION N/A 2020    Procedure: Coronary angiography;  Surgeon: Svetlana Grayson MD;  Location:  [facility];  Service: Cardiovascular    CARDIAC CATHETERIZATION N/A 2020    Procedure: Left ventriculography;  Surgeon: Svetlana Grayson MD;  Location:  [facility];  Service: Cardiovascular    CARDIAC CATHETERIZATION N/A 2020    Procedure: Left Heart Cath;  Surgeon: Svetlana Grayson MD;  Location:  [facility];  Service: Cardiovascular    CHOLECYSTECTOMY      KNEE ARTHROPLASTY Right     LAPAROSCOPIC GASTRIC BANDING        Home Meds:  (Not in a hospital admission)      Allergies:  Allergies   Allergen Reactions    Penicillins Rash     RASH 30 YRS AGO NO HOSPITALIZATION     Immunizations:  Immunization History   Administered Date(s) Administered    COVID-19 (AMAIRANI) 03/10/2021    COVID-19 (MODERNA) Monovalent  Original Booster 2021    Fluad Quad 65+ 10/22/2020    Fluzone High Dose =>65 Years ([employer] ONLY) 2021    Influenza TIV (IM) 10/01/2019    Influenza, Unspecified 10/14/2022    Pneumococcal Conjugate 13-Valent (PCV13) 2016    Pneumococcal Polysaccharide (PPSV23) 2021     Social History:   Social History     Social History Narrative    Not on file     Social History     Socioeconomic History    Marital status:    Tobacco Use    Smoking status: Former     Packs/day: 0.50     Years: 50.00     Pack years: 25.00     Types: Cigarettes     Quit date: 11/3/2019     Years since quitting: 3.5    Smokeless tobacco: Never    Tobacco comments:     LAST CIG 2019   Substance and Sexual Activity    Alcohol use: No     Comment: caffeine - 1/2 cup coffee daily     Drug use: No    Sexual activity: Defer       Family History:  Family History   Problem Relation Age of Onset    Lung cancer Mother         Review of Systems  See history of present illness and past medical history.  Patient denies headache, dizziness, syncope, falls, trauma, change in vision, change in hearing, change in taste, changes in weight, changes in appetite, focal weakness, numbness, or paresthesia.  Patient denies   sinus pressure, rhinorrhea, epistaxis, hemoptysis, nausea, vomiting,hematemesis, diarrhea, constipation or hematochezia.  Denies cold or heat intolerance, polydipsia, polyuria, polyphagia. Denies hematuria, pyuria, dysuria, hesitancy, frequency or urgency. Denies consumption of raw and under cooked meats foods or change in water source.  Denies fever, chills, sweats, night sweats.  Denies missing any routine medications. Remainder of ROS is negative.    Objective:  T Max 24 hrs: Temp (24hrs), Av.2 °F (35.7 °C), Min:96.2 °F (35.7 °C), Max:96.2 °F (35.7 °C)    Vitals Ranges:   Temp:  [96.2 °F (35.7 °C)] 96.2 °F (35.7 °C)  Heart Rate:  [] 102  Resp:  [16] 16  BP: (112-129)/(59-84) 122/80      Exam:  BP  "122/80   Pulse 102   Temp 96.2 °F (35.7 °C) (Tympanic)   Resp 16   Ht 154.9 cm (61\")   Wt 63.5 kg (140 lb)   SpO2 97%   BMI 26.45 kg/m²     General Appearance:    Alert, cooperative, no distress, appears stated age   Head:    Normocephalic, without obvious abnormality, atraumatic   Eyes:    PERRL, conjunctivae/corneas clear, EOM's intact, both eyes   Ears:    Normal external ear canals, both ears   Nose:   Nares normal, septum midline, mucosa normal, no drainage    or sinus tenderness   Throat:   Lips, mucosa, and tongue normal   Neck:   Supple, symmetrical, trachea midline, no adenopathy;     thyroid:  no enlargement/tenderness/nodules; no carotid    bruit or JVD   Back:     Symmetric, no curvature, ROM normal, no CVA tenderness   Lungs:     Clear to auscultation bilaterally, respirations unlabored   Chest Wall:    No tenderness or deformity    Heart:    Regular rate and rhythm, S1 and S2 normal, no murmur, rub   or gallop   Abdomen:     Soft, nontender, bowel sounds active all four quadrants,     no masses, no hepatomegaly, no splenomegaly   Extremities:   Extremities normal, atraumatic, no cyanosis or edema                       .    Data Review:  Labs in chart were reviewed.  WBC   Date Value Ref Range Status   06/04/2023 9.83 3.40 - 10.80 10*3/mm3 Final     Hemoglobin   Date Value Ref Range Status   06/04/2023 12.8 12.0 - 15.9 g/dL Final     Hematocrit   Date Value Ref Range Status   06/04/2023 40.5 34.0 - 46.6 % Final     Platelets   Date Value Ref Range Status   06/04/2023 216 140 - 450 10*3/mm3 Final     Sodium   Date Value Ref Range Status   06/04/2023 137 136 - 145 mmol/L Final     Potassium   Date Value Ref Range Status   06/04/2023 3.9 3.5 - 5.2 mmol/L Final     Chloride   Date Value Ref Range Status   06/04/2023 96 (L) 98 - 107 mmol/L Final     CO2   Date Value Ref Range Status   06/04/2023 30.9 (H) 22.0 - 29.0 mmol/L Final     BUN   Date Value Ref Range Status   06/04/2023 13 8 - 23 mg/dL Final "     Creatinine   Date Value Ref Range Status   06/04/2023 1.17 (H) 0.57 - 1.00 mg/dL Final     Glucose   Date Value Ref Range Status   06/04/2023 113 (H) 65 - 99 mg/dL Final     Calcium   Date Value Ref Range Status   06/04/2023 9.4 8.6 - 10.5 mg/dL Final                Imaging Results (All)       Procedure Component Value Units Date/Time    XR Chest 1 View [[id number]] Collected: 06/04/23 1542     Updated: 06/04/23 1546    Narrative:      XR CHEST 1 VW-     HISTORY: Female who is 72 years-old,  chest pain     TECHNIQUE: Frontal view of the chest     COMPARISON: 12/19/2019     FINDINGS: The heart size is borderline. Annular mitral calcification is  apparent. Aorta is calcified. Pulmonary vasculature is unremarkable. No  focal pulmonary consolidation, pleural effusion, or pneumothorax.  Chronic rib deformities are apparent. No acute osseous process.       Impression:      No focal pulmonary consolidation. Borderline heart size.  Follow-up as clinical indications persist.     This report was finalized on 6/4/2023 3:43 PM by Dr. Atilio Cruz M.D.                 Assessment:  Active Hospital Problems    Diagnosis  POA    **Chest pain, unspecified type [R07.9]  Yes      Resolved Hospital Problems   No resolved problems to display.   Ckd3  Atrial fibrillation  Chronic hypoxic respiratory failure  hyperglycemia    Plan:  Has ruled out for acs  Negative stress test in July 22  Will await cardiology input  Monitor on telemetry  Trend blood sugar  Dw patient and ed provider as well as family     Raquel Senior MD  6/4/2023  19:00 EDT

## 2023-06-04 NOTE — ED PROVIDER NOTES
Subjective   History of Present Illness  71-year-old female with past medical history of atrial fibrillation, CKD, stage III, COPD for chief complaint of chest pain.  History was obtained from the patient and the daughter.  According them, earlier today patient had some substernal chest pain that lasted for an hour or so.  She states that the pain went away after she took multiple baby aspirins.  She states that this is happened previously.  Of note, patient is on 2 L nasal cannula for COPD.  She is on warfarin for A-fib according to her her last INR was 2.2.  Patient states that she always has some swelling in her legs and is on Lasix.  She states that her left leg swelling is always worse than the right one and the daughter confirms that this is not new.  Denies any nausea, vomiting, abdominal pain, fevers, chills, rashes, or other symptoms.  Patient states that she does have a clear productive cough.    Review of Systems   All other systems reviewed and are negative.    Past Medical History:   Diagnosis Date    Acute endocarditis     Atrial fibrillation with RVR 11/14/2019    CKD (chronic kidney disease) stage 3, GFR 30-59 ml/min     COPD (chronic obstructive pulmonary disease)     Influenza 11/14/2019    Osteoporosis     Renal disorder     Restless leg syndrome     Thrush, oral 11/14/2019       Allergies   Allergen Reactions    Penicillins Rash     RASH 30 YRS AGO NO HOSPITALIZATION       Past Surgical History:   Procedure Laterality Date    CARDIAC CATHETERIZATION N/A 1/23/2020    Procedure: Coronary angiography;  Surgeon: Svetlana Grayson MD;  Location: [facility] CATH INVASIVE LOCATION;  Service: Cardiovascular    CARDIAC CATHETERIZATION N/A 1/23/2020    Procedure: Left ventriculography;  Surgeon: Svetlana Grayson MD;  Location: [facility] CATH INVASIVE LOCATION;  Service: Cardiovascular    CARDIAC CATHETERIZATION N/A 1/23/2020    Procedure: Left Heart Cath;  Surgeon: Svetlana Grayson MD;  Location: [facility] CATH INVASIVE  LOCATION;  Service: Cardiovascular    CHOLECYSTECTOMY      KNEE ARTHROPLASTY Right     LAPAROSCOPIC GASTRIC BANDING         Family History   Problem Relation Age of Onset    Lung cancer Mother        Social History     Socioeconomic History    Marital status:    Tobacco Use    Smoking status: Former     Packs/day: 0.50     Years: 50.00     Pack years: 25.00     Types: Cigarettes     Quit date: 11/3/2019     Years since quitting: 3.5    Smokeless tobacco: Never    Tobacco comments:     LAST CIG NOV 02, 2019   Substance and Sexual Activity    Alcohol use: No     Comment: caffeine - 1/2 cup coffee daily     Drug use: No    Sexual activity: Defer           Objective   Physical Exam  Constitutional:       General: She is not in acute distress.     Appearance: She is not toxic-appearing or diaphoretic.      Comments: chronically sick appearing   HENT:      Head: Normocephalic and atraumatic.   Neck:      Vascular: No JVD.   Cardiovascular:      Rate and Rhythm: Normal rate and regular rhythm.      Pulses:           Carotid pulses are 2+ on the right side and 2+ on the left side.       Radial pulses are 2+ on the right side and 2+ on the left side.        Dorsalis pedis pulses are 2+ on the right side and 2+ on the left side.        Posterior tibial pulses are 2+ on the right side and 2+ on the left side.      Heart sounds: Normal heart sounds. Heart sounds not distant. No murmur heard.    No friction rub. No gallop.   Pulmonary:      Effort: Pulmonary effort is normal. No tachypnea, accessory muscle usage or respiratory distress.      Breath sounds: Normal breath sounds. No stridor. No decreased breath sounds, wheezing, rhonchi or rales.   Chest:      Chest wall: No deformity, tenderness, crepitus or edema.   Abdominal:      Palpations: Abdomen is soft. There is no fluid wave.      Tenderness: There is no abdominal tenderness. There is no guarding or rebound.   Musculoskeletal:         General: Normal range of  motion.      Cervical back: Normal range of motion and neck supple.      Right lower leg: No tenderness. Edema (1+) present.      Left lower leg: No tenderness. Edema (1+) present.   Skin:     General: Skin is warm and dry.      Coloration: Skin is not cyanotic.   Neurological:      General: No focal deficit present.      Mental Status: She is alert and oriented to person, place, and time.   Psychiatric:         Mood and Affect: Mood normal.       Procedures           ED Course  ED Course as of 06/04/23 1808   Sun Jun 04, 2023   1417 I reviewed the EKG, this was a poor test, the patient has ripen rash block that is previously present. [KS]      ED Course User Index  [KS] Keven Gordillo MD                                           Medical Decision Making  When I first saw the patient, the patient appeared nontoxic.  Patient's chest pain had resolved.  She had already taken multiple baby aspirin's prior to coming.  IV was started labs were obtained.  Patient's high since her troponin was elevated.  I got a second troponin that had a delta of -1.  Patient's D-dimer was added on after the INR was not therapeutic.  Patient's D-dimer was negative therefore I think PE is less likely cause.  Patient's chest x-ray was negative for pneumonia pneumothorax.  I think dissection is less likely given that the patient's pain was substernal and nonradiating and was not sudden onset and has improved on its own.  I think ACS is a possibility.  Given this, I spoke to Dr. Ray with [place] [facility].  He was going to get Dr. Tinajero to see the patient tomorrow.  He was comfortable with the patient being admitted.  At this point the patient is admitted to the hospital given her history of CHF, COPD, and chest pain with slightly elevated troponin.  I spoke with Dr. Senior with [facility]. She accepted the patient.  She is aware of the pending cardiology consultation and will followed up.  I defer all further management of this patient to  the inpatient hospitalist and cardiologist.    Problems Addressed:  Chest pain, unspecified type: complicated acute illness or injury  History of CHF (congestive heart failure): complicated acute illness or injury  History of COPD: complicated acute illness or injury    Amount and/or Complexity of Data Reviewed  Labs: ordered.  Radiology: ordered.  ECG/medicine tests: ordered.    Risk  OTC drugs.  Prescription drug management.  Decision regarding hospitalization.        Final diagnoses:   Chest pain, unspecified type   History of COPD   History of CHF (congestive heart failure)       ED Disposition  ED Disposition       ED Disposition   Decision to Admit    Condition   --    Comment   Level of Care: Telemetry [5]   Diagnosis: Chest pain, unspecified type [[id number]]   Admitting Physician: ANALI MCFARLANE [[id number]]   Attending Physician: ANALI MCFARLANE [[id number]]                 No follow-up provider specified.       Medication List      No changes were made to your prescriptions during this visit.            Keven Gordillo MD  06/04/23 9232

## 2023-06-04 NOTE — ED NOTES
Nursing report ED to floor  Shani Phillip  72 y.o.  female    HPI (triage note):   Chief Complaint   Patient presents with    Chest Pain       Admitting doctor:   Raquel Senior MD    Admitting diagnosis:   The primary encounter diagnosis was Chest pain, unspecified type. Diagnoses of History of COPD and History of CHF (congestive heart failure) were also pertinent to this visit.    Code status:   Current Code Status       Date Active Code Status Order ID Comments User Context       Prior            Allergies:   Penicillins    Past Medical History:  Past Medical History:   Diagnosis Date    Acute endocarditis     Atrial fibrillation with RVR 11/14/2019    CKD (chronic kidney disease) stage 3, GFR 30-59 ml/min     COPD (chronic obstructive pulmonary disease)     Influenza 11/14/2019    Osteoporosis     Renal disorder     Restless leg syndrome     Thrush, oral 11/14/2019        Weight:       06/04/23  1221   Weight: 63.5 kg (140 lb)       Most recent vitals:   Vitals:    06/04/23 1415 06/04/23 1516 06/04/23 1701 06/04/23 1731   BP:   112/79 118/59   Pulse: 106 90 110 98   Resp:       Temp:       TempSrc:       SpO2: 95% 100% 97% 100%   Weight:       Height:           Active LDAs/IV Access:   Lines, Drains & Airways       Active LDAs       Name Placement date Placement time Site Days    Peripheral IV 06/04/23 1410 Anterior;Right Forearm 06/04/23  1410  Forearm  less than 1                    Labs (abnormal labs have a star):   Labs Reviewed   COMPREHENSIVE METABOLIC PANEL - Abnormal; Notable for the following components:       Result Value    Glucose 113 (*)     Creatinine 1.17 (*)     Chloride 96 (*)     CO2 30.9 (*)     Alkaline Phosphatase 147 (*)     eGFR 49.4 (*)     All other components within normal limits    Narrative:     GFR Normal >60  Chronic Kidney Disease <60  Kidney Failure <15    The GFR formula is only valid for adults with stable renal function between ages 18 and 70.   PROTIME-INR - Abnormal;  Notable for the following components:    Protime 21.7 (*)     INR 1.85 (*)     All other components within normal limits   DIGOXIN LEVEL - Abnormal; Notable for the following components:    Digoxin 1.60 (*)     All other components within normal limits   CBC WITH AUTO DIFFERENTIAL - Abnormal; Notable for the following components:    MCH 26.1 (*)     Neutrophil % 84.9 (*)     Lymphocyte % 9.1 (*)     Monocyte % 3.9 (*)     Neutrophils, Absolute 8.35 (*)     All other components within normal limits   TROPONIN - Abnormal; Notable for the following components:    HS Troponin T 27 (*)     All other components within normal limits    Narrative:     High Sensitive Troponin T Reference Range:  <10.0 ng/L- Negative Female for AMI  <15.0 ng/L- Negative Male for AMI  >=10 - Abnormal Female indicating possible myocardial injury.  >=15 - Abnormal Male indicating possible myocardial injury.   Clinicians would have to utilize clinical acumen, EKG, Troponin, and serial changes to determine if it is an Acute Myocardial Infarction or myocardial injury due to an underlying chronic condition.        HIGH SENSITIVITIY TROPONIN T 2HR - Abnormal; Notable for the following components:    HS Troponin T 26 (*)     All other components within normal limits    Narrative:     High Sensitive Troponin T Reference Range:  <10.0 ng/L- Negative Female for AMI  <15.0 ng/L- Negative Male for AMI  >=10 - Abnormal Female indicating possible myocardial injury.  >=15 - Abnormal Male indicating possible myocardial injury.   Clinicians would have to utilize clinical acumen, EKG, Troponin, and serial changes to determine if it is an Acute Myocardial Infarction or myocardial injury due to an underlying chronic condition.        RESPIRATORY PANEL PCR W/ COVID-19 (SARS-COV-2) [facility] IN-HOUSE, NP [facility] IN [facility], 3-4 HR TAT - Normal    Narrative:     In the setting of a positive respiratory panel with a viral infection PLUS a negative  "procalcitonin without other underlying concern for bacterial infection, consider observing off antibiotics or discontinuation of antibiotics and continue supportive care. If the respiratory panel is positive for atypical bacterial infection (Bordetella pertussis, Chlamydophila pneumoniae, or Mycoplasma pneumoniae), consider antibiotic de-escalation to target atypical bacterial infection.   BNP (IN-HOUSE) - Normal    Narrative:     Among patients with dyspnea, NT-proBNP is highly sensitive for the detection of acute congestive heart failure. In addition NT-proBNP of <300 pg/ml effectively rules out acute congestive heart failure with 99% negative predictive value.    Results may be falsely decreased if patient taking Biotin.     D-DIMER, QUANTITATIVE - Normal    Narrative:     According to the assay 's published package insert, a normal (<0.50 MCGFEU/mL) D-dimer result in conjunction with a non-high clinical probability assessment, excludes deep vein thrombosis (DVT) and pulmonary embolism (PE) with high sensitivity.    D-dimer values increase with age and this can make VTE exclusion of an older population difficult. To address this, the [employer], based on best available evidence and recent guidelines, recommends that clinicians use age-adjusted D-dimer thresholds in patients greater than 50 years of age with: a) a low probability of PE who do not meet all Pulmonary Embolism Rule Out Criteria, or b) in those with intermediate probability of PE.   The formula for an age-adjusted D-dimer cut-off is \"age/100\".  For example, a 60 year old patient would have an age-adjusted cut-off of 0.60 MCGFEU/mL and an 80 year old 0.80 MCGFEU/mL.   CBC AND DIFFERENTIAL    Narrative:     The following orders were created for panel order CBC & Differential.  Procedure                               Abnormality         Status                     ---------                               -----------         " ------                     CBC Auto Differential[[id number]]        Abnormal            Final result                 Please view results for these tests on the individual orders.       EKG:   ECG 12 Lead Chest Pain   Preliminary Result   HEART RATE= 97  bpm   RR Interval= 619  ms   NM Interval=   ms   P Horizontal Axis=   deg   P Front Axis=   deg   QRSD Interval= 127  ms   QT Interval= 356  ms   QRS Axis= -15  deg   T Wave Axis=   deg   - ABNORMAL ECG -   Incomplete analysis due to missing data in precordial lead(s)   Atrial fibrillation   Right bundle branch block   Nonspecific T abnormalities, lateral leads   Baseline wander in lead(s) V1   Missing lead(s): V5   Electronically Signed By:    Date and Time of Study: 2023-06-04 12:35:57          Meds given in ED:   Medications   sodium chloride 0.9 % flush 10 mL (has no administration in time range)   acetaminophen (TYLENOL) tablet 650 mg (650 mg Oral Given 6/4/23 1650)       Imaging results:  XR Chest 1 View    Result Date: 6/4/2023  No focal pulmonary consolidation. Borderline heart size. Follow-up as clinical indications persist.  This report was finalized on 6/4/2023 3:43 PM by Dr. Atilio Cruz M.D.       Ambulatory status:   - assist x1, uses wheelchair for longer distances    Social issues:   Social History     Socioeconomic History    Marital status:    Tobacco Use    Smoking status: Former     Packs/day: 0.50     Years: 50.00     Pack years: 25.00     Types: Cigarettes     Quit date: 11/3/2019     Years since quitting: 3.5    Smokeless tobacco: Never    Tobacco comments:     LAST CIG NOV 02, 2019   Substance and Sexual Activity    Alcohol use: No     Comment: caffeine - 1/2 cup coffee daily     Drug use: No    Sexual activity: Defer          NIH Stroke Scale:         Babatunde Johnson RN  06/04/23 18:11 EDT

## 2023-06-05 ENCOUNTER — APPOINTMENT (OUTPATIENT)
Dept: NUCLEAR MEDICINE | Facility: HOSPITAL | Age: 73
End: 2023-06-05
Payer: MEDICARE

## 2023-06-05 ENCOUNTER — READMISSION MANAGEMENT (OUTPATIENT)
Dept: CALL CENTER | Facility: HOSPITAL | Age: 73
End: 2023-06-05
Payer: MEDICARE

## 2023-06-05 VITALS
OXYGEN SATURATION: 99 % | WEIGHT: 145.2 LBS | DIASTOLIC BLOOD PRESSURE: 66 MMHG | HEART RATE: 95 BPM | RESPIRATION RATE: 17 BRPM | TEMPERATURE: 98.8 F | BODY MASS INDEX: 27.41 KG/M2 | SYSTOLIC BLOOD PRESSURE: 131 MMHG | HEIGHT: 61 IN

## 2023-06-05 PROBLEM — I50.32 CHRONIC DIASTOLIC CHF (CONGESTIVE HEART FAILURE): Status: ACTIVE | Noted: 2020-03-01

## 2023-06-05 PROBLEM — I50.32 CHRONIC HEART FAILURE WITH PRESERVED EJECTION FRACTION (HFPEF): Status: ACTIVE | Noted: 2023-06-05

## 2023-06-05 PROBLEM — I48.20 ATRIAL FIBRILLATION, CHRONIC: Chronic | Status: ACTIVE | Noted: 2020-03-01

## 2023-06-05 LAB
ANION GAP SERPL CALCULATED.3IONS-SCNC: 9.4 MMOL/L (ref 5–15)
BH CV REST NUCLEAR ISOTOPE DOSE: 10.5 MCI
BH CV STRESS BP STAGE 1: NORMAL
BH CV STRESS COMMENTS STAGE 1: NORMAL
BH CV STRESS DOSE REGADENOSON STAGE 1: 0.4
BH CV STRESS DURATION MIN STAGE 1: 3
BH CV STRESS DURATION SEC STAGE 1: 59
BH CV STRESS HR STAGE 1: 102
BH CV STRESS NUCLEAR ISOTOPE DOSE: 33.7 MCI
BH CV STRESS PROTOCOL 1: NORMAL
BH CV STRESS RECOVERY BP: NORMAL MMHG
BH CV STRESS RECOVERY HR: 94 BPM
BH CV STRESS STAGE 1: 1
BUN SERPL-MCNC: 13 MG/DL (ref 8–23)
BUN/CREAT SERPL: 12 (ref 7–25)
CALCIUM SPEC-SCNC: 9.1 MG/DL (ref 8.6–10.5)
CHLORIDE SERPL-SCNC: 101 MMOL/L (ref 98–107)
CO2 SERPL-SCNC: 30.6 MMOL/L (ref 22–29)
CREAT SERPL-MCNC: 1.08 MG/DL (ref 0.57–1)
DEPRECATED RDW RBC AUTO: 43.4 FL (ref 37–54)
EGFRCR SERPLBLD CKD-EPI 2021: 54.3 ML/MIN/1.73
ERYTHROCYTE [DISTWIDTH] IN BLOOD BY AUTOMATED COUNT: 14.2 % (ref 12.3–15.4)
GLUCOSE SERPL-MCNC: 102 MG/DL (ref 65–99)
HCT VFR BLD AUTO: 36.3 % (ref 34–46.6)
HGB BLD-MCNC: 11.7 G/DL (ref 12–15.9)
INR PPP: 2.13 (ref 0.9–1.1)
LV EF NUC BP: 70 %
MAXIMAL PREDICTED HEART RATE: 147 BPM
MCH RBC QN AUTO: 27 PG (ref 26.6–33)
MCHC RBC AUTO-ENTMCNC: 32.2 G/DL (ref 31.5–35.7)
MCV RBC AUTO: 83.8 FL (ref 79–97)
PERCENT MAX PREDICTED HR: 72.11 %
PLATELET # BLD AUTO: 198 10*3/MM3 (ref 140–450)
PMV BLD AUTO: 10.2 FL (ref 6–12)
POTASSIUM SERPL-SCNC: 3.9 MMOL/L (ref 3.5–5.2)
PROTHROMBIN TIME: 24.2 SECONDS (ref 11.7–14.2)
QT INTERVAL: 356 MS
RBC # BLD AUTO: 4.33 10*6/MM3 (ref 3.77–5.28)
SODIUM SERPL-SCNC: 141 MMOL/L (ref 136–145)
STRESS BASELINE BP: NORMAL MMHG
STRESS BASELINE HR: 85 BPM
STRESS PERCENT HR: 85 %
STRESS POST PEAK BP: NORMAL MMHG
STRESS POST PEAK HR: 106 BPM
STRESS TARGET HR: 125 BPM
WBC NRBC COR # BLD: 7.34 10*3/MM3 (ref 3.4–10.8)

## 2023-06-05 PROCEDURE — 85027 COMPLETE CBC AUTOMATED: CPT | Performed by: INTERNAL MEDICINE

## 2023-06-05 PROCEDURE — 36415 COLL VENOUS BLD VENIPUNCTURE: CPT | Performed by: INTERNAL MEDICINE

## 2023-06-05 PROCEDURE — 25010000002 REGADENOSON 0.4 MG/5ML SOLUTION: Performed by: HOSPITALIST

## 2023-06-05 PROCEDURE — 85610 PROTHROMBIN TIME: CPT | Performed by: INTERNAL MEDICINE

## 2023-06-05 PROCEDURE — 94799 UNLISTED PULMONARY SVC/PX: CPT

## 2023-06-05 PROCEDURE — 93017 CV STRESS TEST TRACING ONLY: CPT

## 2023-06-05 PROCEDURE — G0378 HOSPITAL OBSERVATION PER HR: HCPCS

## 2023-06-05 PROCEDURE — 94664 DEMO&/EVAL PT USE INHALER: CPT

## 2023-06-05 PROCEDURE — 0 TECHNETIUM SESTAMIBI: Performed by: HOSPITALIST

## 2023-06-05 PROCEDURE — 78452 HT MUSCLE IMAGE SPECT MULT: CPT

## 2023-06-05 PROCEDURE — A9500 TC99M SESTAMIBI: HCPCS | Performed by: HOSPITALIST

## 2023-06-05 PROCEDURE — 94761 N-INVAS EAR/PLS OXIMETRY MLT: CPT

## 2023-06-05 PROCEDURE — 80048 BASIC METABOLIC PNL TOTAL CA: CPT | Performed by: INTERNAL MEDICINE

## 2023-06-05 RX ORDER — DIGOXIN 125 MCG
125 TABLET ORAL EVERY OTHER DAY
Qty: 90 TABLET | Refills: 2 | Status: SHIPPED | OUTPATIENT
Start: 2023-06-05

## 2023-06-05 RX ORDER — REGADENOSON 0.08 MG/ML
0.4 INJECTION, SOLUTION INTRAVENOUS
Status: COMPLETED | OUTPATIENT
Start: 2023-06-05 | End: 2023-06-05

## 2023-06-05 RX ORDER — FAMOTIDINE 20 MG/1
20 TABLET, FILM COATED ORAL DAILY
Status: DISCONTINUED | OUTPATIENT
Start: 2023-06-06 | End: 2023-06-05 | Stop reason: HOSPADM

## 2023-06-05 RX ORDER — PANTOPRAZOLE SODIUM 40 MG/1
40 TABLET, DELAYED RELEASE ORAL DAILY
Qty: 30 TABLET | Refills: 1 | Status: SHIPPED | OUTPATIENT
Start: 2023-06-05 | End: 2023-06-08 | Stop reason: SDUPTHER

## 2023-06-05 RX ORDER — BUDESONIDE 0.5 MG/2ML
INHALANT ORAL
Qty: 30 EACH | Refills: 11 | OUTPATIENT
Start: 2023-06-05

## 2023-06-05 RX ADMIN — Medication 2000 UNITS: at 08:11

## 2023-06-05 RX ADMIN — REGADENOSON 0.4 MG: 0.08 INJECTION, SOLUTION INTRAVENOUS at 13:30

## 2023-06-05 RX ADMIN — BUDESONIDE 0.5 MG: 0.5 INHALANT RESPIRATORY (INHALATION) at 06:56

## 2023-06-05 RX ADMIN — ALBUTEROL SULFATE 2.5 MG: 2.5 SOLUTION RESPIRATORY (INHALATION) at 10:44

## 2023-06-05 RX ADMIN — BUPROPION HYDROCHLORIDE 300 MG: 300 TABLET, EXTENDED RELEASE ORAL at 08:11

## 2023-06-05 RX ADMIN — TECHNETIUM TC 99M SESTAMIBI 1 DOSE: 1 INJECTION INTRAVENOUS at 13:30

## 2023-06-05 RX ADMIN — ALBUTEROL SULFATE 2.5 MG: 2.5 SOLUTION RESPIRATORY (INHALATION) at 14:51

## 2023-06-05 RX ADMIN — POTASSIUM CHLORIDE 20 MEQ: 750 TABLET, EXTENDED RELEASE ORAL at 08:11

## 2023-06-05 RX ADMIN — DOCUSATE SODIUM 50MG AND SENNOSIDES 8.6MG 2 TABLET: 8.6; 5 TABLET, FILM COATED ORAL at 08:11

## 2023-06-05 RX ADMIN — FAMOTIDINE 20 MG: 20 TABLET, FILM COATED ORAL at 06:31

## 2023-06-05 RX ADMIN — TECHNETIUM TC 99M SESTAMIBI 1 DOSE: 1 INJECTION INTRAVENOUS at 10:14

## 2023-06-05 RX ADMIN — ALBUTEROL SULFATE 2.5 MG: 2.5 SOLUTION RESPIRATORY (INHALATION) at 06:56

## 2023-06-05 RX ADMIN — DIGOXIN 125 MCG: 125 TABLET ORAL at 08:11

## 2023-06-05 RX ADMIN — FUROSEMIDE 40 MG: 40 TABLET ORAL at 08:11

## 2023-06-05 NOTE — CONSULTS
Patient Name: Shani Phillip  :1950  72 y.o.    Date of Admission: 2023  Encounter Provider: Zenia Tinajero MD  Date of Encounter Visit: 23  Place of Service: [facility] CARDIOLOGY  Referring Provider: Raquel Senior MD  Patient Care Team:  Rodríguez Walker MD as PCP - General (Internal Medicine)  Mike Atkins MD as Surgeon (General Surgery)  Hayes Briones MD as Surgeon (Cardiothoracic Surgery)  Zenia Tinajero MD as Consulting Physician (Cardiology)      Chief complaint: chest pain       History of Present Illness:     This is a patient of mine with atrial fibrillation on warfarin, chronic kidney disease stage III, COPD on home oxygen.    This is a lady who was on a cruise ship when she developed a febrile illness.  She was taken to [facility] where she was diagnosed with endocarditis.  She left the hospital to return home to [place] I first saw her in 2019.  She has a history of COPD on oxygen, former smoker, restless leg syndrome and chronic pain.  She had a transthoracic echocardiogram suggestive of vegetation on the mitral valve annulus.  She had a transesophageal echo which delineated this more clearly.  Dr. Briones saw her in consultation.  The plan was to treat her with IV antibiotics which occurred.  She came in for a repeat transesophageal echo on 2020 and this was pretty much unchanged.  The mobile element attached to the posterior mitral valve annulus annulus was unchanged.     In 2020, she had normal LV functions and ejection fraction of 66%, moderate left atrial enlargement, severe calcification of the aortic valve without regurgitation or stenosis.  There is moderate bileaflet mitral valve thickening with trace regurgitation and mild stenosis with a mean gradient of 4 mmHg.  There was mild tricuspid regurgitation with a normal right ventricular systolic pressure.  She was seen in  the office in August of 2020 and was having lower extremity edema.  Heidi Ryansavana ordered an arterial Doppler which was normal.  She had another echocardiogram in September 2020, again normal left ventricular systolic function, and again bileaflet thickening with this time, mild mitral regurgitation and mitral stenosis was noted and a vegetation on the atrial side of the posterior mitral valve leaflet was also noted.     Repeat echo in June 2021 showed normal LV systolic function, grade 2 diastolic dysfunction, mild left atrial enlargement, calcification of the mitral leaflets with mild mitral stenosis.     She saw Heidi Kraus in February 2022 and was found to be in asymptomatic atrial fibrillation with rapid ventricular response.  Her INRs have been therapeutic.  Diltiazem was resumed.  24-hour monitor in March 2022 showed that she was predominantly in atrial fibrillation.  Her average heart rate was 86 bpm with a range from 50 to 124 bpm.    She was admitted on June 4 with chest pain lasting about an hour.  She took a baby aspirin.  She continues to complain of right greater than left lower extremity edema.  High-sensitivity troponin was slightly elevated but no significant delta.  proBNP normal.  EKG shows A-fib but no ST or T wave abnormalities.  The chest pain came on suddenly.  There were no associated symptoms.  It resolved on its own.  She has not had any other episodes of chest pain of late.        Stress test 7/8/22  Baseline EKG is atrial fibrillation with right bundle branch block. Post regadenoson EKG is negative for myocardial ischemia.  Left ventricular ejection fraction is hyperdynamic (Calculated EF > 70%). .  Myocardial perfusion imaging indicates a normal myocardial perfusion study with no evidence of ischemia.  Impressions are consistent with a low risk study.  There is no prior study available for comparison.    ECHO 6/30/21  Mild mitral valve stenosis is present.  There is moderate calcification  of the aortic valve.  There is moderate calcification of the mitral valve anterior and posterior leaflet(s).  Estimated left ventricular EF = 70% Left ventricular systolic function is normal.  Left ventricular diastolic function is consistent with (grade II w/high LAP) pseudonormalization.  Left atrial volume is mildly increased.      CATH 1/23/20  SUMMARY: No evidence of obstructive coronary artery disease.  Normal LV filling pressures and function.     RECOMMENDATIONS: Continue evaluation for mitral valve replacement/repair    Past Medical History:   Diagnosis Date    Acute endocarditis     Atrial fibrillation with RVR 11/14/2019    CKD (chronic kidney disease) stage 3, GFR 30-59 ml/min     COPD (chronic obstructive pulmonary disease)     Influenza 11/14/2019    Osteoporosis     Renal disorder     Restless leg syndrome     Thrush, oral 11/14/2019       Past Surgical History:   Procedure Laterality Date    CARDIAC CATHETERIZATION N/A 1/23/2020    Procedure: Coronary angiography;  Surgeon: Svetlana Grayson MD;  Location:  [facility]AS[facility];  Service: Cardiovascular    CARDIAC CATHETERIZATION N/A 1/23/2020    Procedure: Left ventriculography;  Surgeon: Svetlana Grayson MD;  Location:  [facility];  Service: Cardiovascular    CARDIAC CATHETERIZATION N/A 1/23/2020    Procedure: Left Heart Cath;  Surgeon: Svetlana Grayson MD;  Location:  [facility]VAS[facility];  Service: Cardiovascular    CHOLECYSTECTOMY      KNEE ARTHROPLASTY Right     LAPAROSCOPIC GASTRIC BANDING           Prior to Admission medications    Medication Sig Start Date End Date Taking? Authorizing Provider   budesonide (PULMICORT) 0.5 MG/2ML nebulizer solution Take 2 mL by nebulization Daily. 5/1/23  Yes Rodríguez Walker MD   buPROPion XL (WELLBUTRIN XL) 300 MG 24 hr tablet Take 1 tablet by mouth Daily. 4/20/23  Yes Rodríguez Walker MD   cholecalciferol (VITAMIN D3) 25 MCG (1000 UT) tablet Take 2 tablets by mouth Daily.    Yes Marnie Monroy MD   cyclobenzaprine (FLEXERIL) 10 MG tablet Take 1 tablet by mouth 3 (Three) Times a Day As Needed (back pain). 10/10/22  Yes Rodríguez Walker MD   digoxin (LANOXIN) 125 MCG tablet TAKE 1 TABLET BY MOUTH EVERY DAY 4/3/23  Yes Angy Smith APRN   famotidine (PEPCID) 20 MG tablet TAKE 1 TABLET BY MOUTH 2 TIMES A DAY BEFORE MEALS. 4/3/23  Yes Zenia Tinajero MD   furosemide (LASIX) 40 MG tablet TAKE 1 TABLET BY MOUTH EVERY DAY 4/3/23  Yes Zenia Tinajero MD   HYDROcodone-acetaminophen (NORCO) 7.5-325 MG per tablet Take 1 tablet by mouth Every 6 (Six) Hours As Needed for Moderate Pain. 11/17/22  Yes Rodríguez Walker MD   O2 (OXYGEN) Inhale 2 L/min 1 (One) Time.   Yes Marnie Monroy MD   potassium chloride ER (K-TAB) 20 MEQ tablet controlled-release ER tablet TAKE 1 TABLET BY MOUTH EVERY DAY 4/3/23  Yes Zenia Tinajero MD   pramipexole (MIRAPEX) 0.5 MG tablet TAKE 1 TABLET BY MOUTH EVERY DAY AT NIGHT 1/9/23  Yes Rodríguez Walker MD   TROSPIUM CHLORIDE PO Take 20 mg by mouth 2 (Two) Times a Day.   Yes Marnie Monroy MD   Ventolin  (90 Base) MCG/ACT inhaler Inhale 2 puffs 4 (Four) Times a Day. 5/1/23  Yes Rodríguez Walker MD   warfarin (COUMADIN) 2.5 MG tablet Take one-half of a tablet by mouth on mon and thurs and take one tablet by mouth all other days or as directed 4/20/23  Yes Zenia Tinajero MD       Allergies   Allergen Reactions    Penicillins Rash     RASH 30 YRS AGO NO HOSPITALIZATION       Social History     Socioeconomic History    Marital status:    Tobacco Use    Smoking status: Former     Packs/day: 0.50     Years: 50.00     Pack years: 25.00     Types: Cigarettes     Quit date: 11/3/2019     Years since quitting: 3.5    Smokeless tobacco: Never    Tobacco comments:     LAST CIG NOV 02, 2019   Substance and Sexual Activity    Alcohol use: No     Comment: caffeine - 1/2 cup coffee daily     Drug use: No    Sexual activity: Defer        Family History   Problem Relation Age of Onset    Lung cancer Mother        REVIEW OF SYSTEMS:   All other systems reviewed and negative.        Objective:     Vitals:    06/04/23 2322 06/04/23 2326 06/05/23 0555 06/05/23 0656   BP:       BP Location:       Patient Position:       Pulse:       Resp: 16 16  16   Temp:       TempSrc:       SpO2:       Weight:   65.9 kg (145 lb 3.2 oz)    Height:         Body mass index is 27.44 kg/m².  No intake or output data in the 24 hours ending 06/05/23 0846    Constitutional: She is oriented to person, place, and time. She appears well-developed. She does not appear ill.   HENT:   Head: Normocephalic and atraumatic. Head is without contusion.   Right Ear: Hearing normal. No drainage.   Left Ear: Hearing normal. No drainage.   Nose: No nasal deformity. No epistaxis.   Eyes: Lids are normal. Right eye exhibits no exudate. Left eye exhibits no exudate.  Neck: No JVD present. Carotid bruit is not present. No tracheal deviation present. No thyroid mass and no thyromegaly present.   Cardiovascular: Normal rate, regular rhythm and normal heart sounds.    Pulses:       Posterior tibial pulses are 2+ on the right side, and 2+ on the left side.   Pulmonary/Chest: Effort normal and breath sounds normal.   Abdominal: Soft. Normal appearance and bowel sounds are normal. There is no tenderness.   Musculoskeletal: Normal range of motion.        Right shoulder: She exhibits no deformity.        Left shoulder: She exhibits no deformity.   Neurological: She is alert and oriented to person, place, and time. She has normal strength.   Skin: Skin is warm, dry and intact. No rash noted.   Psychiatric: She has a normal mood and affect. Her behavior is normal. Thought content normal.   Vitals reviewed      Lab Review:     Results from last 7 days   Lab Units 06/05/23  0434 06/04/23  1408   SODIUM mmol/L 141 137   POTASSIUM mmol/L 3.9 3.9   CHLORIDE mmol/L 101 96*   CO2 mmol/L 30.6* 30.9*   BUN  mg/dL 13 13   CREATININE mg/dL 1.08* 1.17*   CALCIUM mg/dL 9.1 9.4   BILIRUBIN mg/dL  --  0.4   ALK PHOS U/L  --  147*   ALT (SGPT) U/L  --  14   AST (SGOT) U/L  --  18   GLUCOSE mg/dL 102* 113*     Results from last 7 days   Lab Units 06/04/23  1702 06/04/23  1408   HSTROP T ng/L 26* 27*     Results from last 7 days   Lab Units 06/05/23  0434   WBC 10*3/mm3 7.34   HEMOGLOBIN g/dL 11.7*   HEMATOCRIT % 36.3   PLATELETS 10*3/mm3 198     Results from last 7 days   Lab Units 06/05/23  0434 06/04/23  2142 06/04/23  1408   INR  2.13* 1.98* 1.85*                               I personally viewed and interpreted the patient's EKG/Telemetry data.        Assessment and Plan:       1.  Chest pain.  No significant delta.  No ST or T wave changes.  Heart cath in 2020 showed only mild RCA disease.  Negative stress test July 2022.  2.  Permanent atrial fibrillation.  Rate controlled.  On warfarin.  INR therapeutic.  3.  Right bundle branch block  4.  Mitral valve vegetation status post antibiotics.  Repeat echoes have shown this to be stable.  5.  Chronic lower extremity edema.  6.  COPD on home oxygen.    -Lexiscan nuclear stress test today.  Discharge if normal.    Zenia Tinajero MD  06/05/23  08:46 EDT    Addendum: Nuclear stress test is normal.  Okay for discharge from a cardiac standpoint.  Zenia Tinajero MD

## 2023-06-05 NOTE — PLAN OF CARE
Goal Outcome Evaluation:  Plan of Care Reviewed With: patient        Progress: no change  Outcome Evaluation: VSS. Alert and oriented x4. Pt denies CP this shift. Pt turning self. Pt reports ability to walk some at home but mainly wheelchair dependent r/t generalized weakness. 2L O2 nc at baseline. Daughter remains at bedside. Cardiology consulted and will see this AM. Will continue to provide supportive care.

## 2023-06-05 NOTE — DISCHARGE SUMMARY
Patient Name: Shani Phillip  : 1950  MRN: [id number]    Date of Admission: 2023  Date of Discharge:  2023  Primary Care Physician: Rodríguez Walker MD      Chief Complaint:   Chest Pain      Discharge Diagnoses     Active Hospital Problems    Diagnosis  POA    **Chest pain, unspecified type [R07.9]  Yes    Chronic anticoagulation [Z79.01]  Not Applicable    CKD (chronic kidney disease) stage 3, GFR 30-59 ml/min [N18.30]  Yes    Atrial fibrillation, chronic [I48.20]  Yes    Chronic diastolic CHF (congestive heart failure) [I50.32]  Yes    Chronic respiratory failure with hypoxia [J96.11]  Yes    COPD (chronic obstructive pulmonary disease) [J44.9]  Yes      Resolved Hospital Problems   No resolved problems to display.        Hospital Course     Ms. Phillip is a 72 y.o. female with a history of chronic A-fib, diastolic CHF, COPD with chronic hypoxic respiratory failure and CKD 3 who presented to [facility] initially complaining of chest pain which lasted about an hour.  Please see the admitting history and physical for further details.  She was found to have troponin of 27 with subsequent recheck at 26.  D-dimer was normal.  EKG showed no acute changes.  Her INR was nearly therapeutic at 1.85 and today came up to 2.1.  Digoxin level was mildly elevated at 1.6.  She experienced no further chest pain after admission.  She was seen by cardiology who performed stress test today which was unremarkable.  She should be stable for discharge with outpatient management at this time.  Would recommend that she start a PPI and follow-up with PCP in the next week or so.      Day of Discharge     Subjective:  No chest pain today    Physical Exam:  Temp:  [97.8 °F (36.6 °C)-98.1 °F (36.7 °C)] 97.8 °F (36.6 °C)  Heart Rate:  [] 88  Resp:  [16-20] 16  BP: (100-151)/(57-80) 121/59  Body mass index is 27.44 kg/m².  Physical Exam  Vitals and nursing note reviewed.   Cardiovascular:      Rate and  Rhythm: Normal rate. Rhythm irregular.   Pulmonary:      Effort: No respiratory distress.      Breath sounds: No wheezing.      Comments: Diminished breath sounds  Abdominal:      General: Bowel sounds are normal. There is no distension.      Palpations: Abdomen is soft.      Tenderness: There is no abdominal tenderness.   Neurological:      Mental Status: She is alert and oriented to person, place, and time.   Psychiatric:         Mood and Affect: Mood normal.       Consultants     Consult Orders (all) (From admission, onward)       Start     Ordered    06/04/23 1734  [facility] (on-call MD unless specified) Details  Once        Specialty:  Hospitalist  Provider:  Raquel Senior MD    06/04/23 1733    06/04/23 1716  Cardiology (on-call MD unless specified)  Once        Specialty:  Cardiology  Provider:  Magdaleno Ray MD    06/04/23 1715                  Procedures       Imaging Results (All)       Procedure Component Value Units Date/Time    XR Chest 1 View [[id number]] Collected: 06/04/23 1542     Updated: 06/04/23 1546    Narrative:      XR CHEST 1 VW-     HISTORY: Female who is 72 years-old,  chest pain     TECHNIQUE: Frontal view of the chest     COMPARISON: 12/19/2019     FINDINGS: The heart size is borderline. Annular mitral calcification is  apparent. Aorta is calcified. Pulmonary vasculature is unremarkable. No  focal pulmonary consolidation, pleural effusion, or pneumothorax.  Chronic rib deformities are apparent. No acute osseous process.       Impression:      No focal pulmonary consolidation. Borderline heart size.  Follow-up as clinical indications persist.     This report was finalized on 6/4/2023 3:43 PM by Dr. Atilio Cruz M.D.               Pertinent Labs     Results from last 7 days   Lab Units 06/05/23  0434 06/04/23  1408   WBC 10*3/mm3 7.34 9.83   HEMOGLOBIN g/dL 11.7* 12.8   PLATELETS 10*3/mm3 198 216     Results from last 7 days   Lab Units 06/05/23  0434 06/04/23  1408   SODIUM  mmol/L 141 137   POTASSIUM mmol/L 3.9 3.9   CHLORIDE mmol/L 101 96*   CO2 mmol/L 30.6* 30.9*   BUN mg/dL 13 13   CREATININE mg/dL 1.08* 1.17*   GLUCOSE mg/dL 102* 113*   EGFR mL/min/1.73 54.3* 49.4*     Results from last 7 days   Lab Units 06/04/23  1408   ALBUMIN g/dL 4.3   BILIRUBIN mg/dL 0.4   ALK PHOS U/L 147*   AST (SGOT) U/L 18   ALT (SGPT) U/L 14     Results from last 7 days   Lab Units 06/05/23  0434 06/04/23  1408   CALCIUM mg/dL 9.1 9.4   ALBUMIN g/dL  --  4.3       Results from last 7 days   Lab Units 06/04/23  1702 06/04/23  1408   HSTROP T ng/L 26* 27*   PROBNP pg/mL  --  523.0   D DIMER QUANT MCGFEU/mL  --  0.33   DIGOXIN LVL ng/mL  --  1.60*           Invalid input(s): LDLCALC      Results from last 7 days   Lab Units 06/04/23  1409   COVID19  Not Detected       Test Results Pending at Discharge       Discharge Details        Discharge Medications        New Medications        Instructions Start Date   pantoprazole 40 MG EC tablet  Commonly known as: PROTONIX   40 mg, Oral, Daily             Changes to Medications        Instructions Start Date   digoxin 125 MCG tablet  Commonly known as: LANOXIN  What changed: when to take this   125 mcg, Oral, Every Other Day             Continue These Medications        Instructions Start Date   budesonide 0.5 MG/2ML nebulizer solution  Commonly known as: PULMICORT   0.5 mg, Nebulization, Daily - RT      buPROPion  MG 24 hr tablet  Commonly known as: WELLBUTRIN XL   300 mg, Oral, Daily      cholecalciferol 25 MCG (1000 UT) tablet  Commonly known as: VITAMIN D3   2,000 Units, Oral, Daily      cyclobenzaprine 10 MG tablet  Commonly known as: FLEXERIL   10 mg, Oral, 3 Times Daily PRN      furosemide 40 MG tablet  Commonly known as: LASIX   TAKE 1 TABLET BY MOUTH EVERY DAY      HYDROcodone-acetaminophen 7.5-325 MG per tablet  Commonly known as: NORCO   1 tablet, Oral, Every 6 Hours PRN      O2  Commonly known as: OXYGEN   2 L/min, Inhalation, Once      potassium  chloride ER 20 MEQ tablet controlled-release ER tablet  Commonly known as: K-TAB   TAKE 1 TABLET BY MOUTH EVERY DAY      pramipexole 0.5 MG tablet  Commonly known as: MIRAPEX   TAKE 1 TABLET BY MOUTH EVERY DAY AT NIGHT      TROSPIUM CHLORIDE PO   20 mg, Oral, 2 Times Daily      Ventolin  (90 Base) MCG/ACT inhaler  Generic drug: albuterol sulfate HFA   2 puffs, Inhalation, 4 Times Daily - RT      warfarin 2.5 MG tablet  Commonly known as: COUMADIN   Take one-half of a tablet by mouth on mon and thurs and take one tablet by mouth all other days or as directed             Stop These Medications      famotidine 20 MG tablet  Commonly known as: PEPCID              Allergies   Allergen Reactions    Penicillins Rash     RASH 30 YRS AGO NO HOSPITALIZATION       Discharge Disposition:  Home or Self Care      Discharge Diet:  Diet Order   Procedures    Diet: Regular/House Diet, Cardiac Diets; Healthy Heart (2-3 Na+); Texture: Regular Texture (IDDSI 7); Fluid Consistency: Thin (IDDSI 0)       Discharge Activity:       CODE STATUS:    Code Status and Medical Interventions:   Ordered at: 06/04/23 1812     Code Status (Patient has no pulse and is not breathing):    CPR (Attempt to Resuscitate)     Medical Interventions (Patient has pulse or is breathing):    Full       Future Appointments   Date Time Provider Department Center   6/8/2023  1:15 PM [facility]uez Walker [facility]   8/2/2023  8:30 AM [facility] ECHO/VAS FRONT [facility]   8/2/2023  9:00 AM [facility]      Follow-up Information       Rodríguez Walker MD .    Specialty: Internal Medicine  Contact information:  [address]  [address]  [place] [address] [phone]                             Time Spent on Discharge:  Greater than 30 minutes      Rodríguez Sears MD  [facility]  06/05/23  15:35 EDT

## 2023-06-05 NOTE — PROGRESS NOTES
[facility] Clinical Pharmacy Services: Warfarin Dosing/Monitoring Consult    Shani Phillip is a 72 y.o. female, estimated creatinine clearance is 40.3 mL/min (A) (by C-G formula based on SCr of 1.08 mg/dL (H)). weighing 66.4 kg (146 lb 4.8 oz).    Results from last 7 days   Lab Units 06/05/23  0434 06/04/23  2142 06/04/23  1408 06/01/23  0000   INR  2.13* 1.98* 1.85* 2.20   HEMOGLOBIN g/dL 11.7*  --  12.8  --    HEMATOCRIT % 36.3  --  40.5  --    PLATELETS 10*3/mm3 198  --  216  --      Prior to admission anticoagulation:  Warfarin 1.25 mg every Mon, Wed, Fri; 2.5 mg all other days; Starting 6/1/2023     Hospital Anticoagulation:  Consulting provider: Dr. Senior  Start date: 6/4  Indication: A Fib - requiring full anticoagulation  Target INR: 2 - 3  Expected duration: indefininte   Bridge Therapy: No      Potential food or drug interactions: NO new medications that can potentially interact with Warfarin    Education complete?/Date: Yes; followed at [facility]    Assessment/Plan:  Dose: INR therapeutic, continue previous home regimen  Monitor for any signs or symptoms of bleeding  Follow up daily INRs and dose adjustments    Pharmacy will continue to follow until discharge or discontinuation of warfarin.     Babatunde Whatley, [facility]  Clinical Pharmacist

## 2023-06-05 NOTE — CASE MANAGEMENT/SOCIAL WORK
Discharge Planning Assessment  [facility]     Patient Name: Shani Phillip  MRN: [id number]  Today's Date: 6/5/2023    Admit Date: 6/4/2023    Plan: Home with family   Discharge Needs Assessment       Row Name 06/05/23 5908       Living Environment    People in Home spouse    Name(s) of People in Home Willis Rodriguez    Current Living Arrangements home    Potentially Unsafe Housing Conditions none    Primary Care Provided by self    Provides Primary Care For no one    Family Caregiver if Needed child(susana), adult;spouse    Family Caregiver Names SpouseWillis [phone] and DaughterLottie [phone]    Quality of Family Relationships helpful;involved;supportive    Able to Return to Prior Arrangements yes       Resource/Environmental Concerns    Resource/Environmental Concerns none       Transition Planning    Patient/Family Anticipates Transition to home with family    Patient/Family Anticipated Services at Transition none    Transportation Anticipated family or friend will provide       Discharge Needs Assessment    Equipment Currently Used at Home oxygen;walker, rolling    Concerns to be Addressed discharge planning                   Discharge Plan       Row Name 06/05/23 0963       Plan    Plan Home with family    Patient/Family in Agreement with Plan yes    Plan Comments CCP spoke with patient, patient's spouse, Willis, and patient's daughter, Lottie, at bedside; CCP role explained, face sheet verified,and discharge plan discussed. Patient resides with spouse in two level home with four steps to enter. Patient reports she no longer goes into basement. Patient uses walker and 2L continuous oxygen supplied through [employer]. Confirms pharmacy is [facility] on [address]. Patient has used HH services in the past but cannot recall name of agency. Patient denies any history of SNF. Patient denies any known discharge needs at this time and anticipates home with family. Family will transport home at discharge.  CCP to follow. Rinku ERIC LCSW                  Continued Care and Services - Admitted Since 6/4/2023    Coordination has not been started for this encounter.       Expected Discharge Date and Time       Expected Discharge Date Expected Discharge Time    Jun 5, 2023            Demographic Summary       Row Name 06/05/23 1533       General Information    Admission Type observation    Arrived From home    Reason for Consult discharge planning    Preferred Language English                   Functional Status       Row Name 06/05/23 1533       Functional Status    Usual Activity Tolerance moderate    Current Activity Tolerance moderate       Functional Status, IADL    Medications assistive equipment    Meal Preparation assistive equipment    Housekeeping assistive equipment    Laundry assistive equipment    Shopping assistive equipment       Mental Status    General Appearance WDL WDL                   Psychosocial    No documentation.                  Abuse/Neglect    No documentation.                  Legal    No documentation.                  Substance Abuse    No documentation.                  Patient Forms    No documentation.                     Rinku ERIC LCSW

## 2023-06-05 NOTE — OUTREACH NOTE
Prep Survey      Flowsheet Row Responses   [facility] facility patient discharged from? [facility]   Is LACE score < 7 ? No   Eligibility [facility]   Date of Admission 06/04/23   Date of Discharge 06/05/23   Discharge Disposition Home or Self Care   Discharge diagnosis chest pain, stress test unremarkable   Does the patient have one of the following disease processes/diagnoses(primary or secondary)? Other   Does the patient have Home health ordered? No   Is there a DME ordered? No   Prep survey completed? Yes            Lorena SEALS - Registered Nurse

## 2023-06-05 NOTE — PROGRESS NOTES
[facility] Clinical Pharmacy Services: Warfarin Dosing/Monitoring Consult    Shani Phillip is a 72 y.o. female, estimated creatinine clearance is 37.3 mL/min (A) (by C-G formula based on SCr of 1.17 mg/dL (H)). weighing 66.4 kg (146 lb 4.8 oz).    Results from last 7 days   Lab Units 06/04/23  1408 06/01/23  0000   INR  1.85* 2.20   HEMOGLOBIN g/dL 12.8  --    HEMATOCRIT % 40.5  --    PLATELETS 10*3/mm3 216  --      Prior to admission anticoagulation:  Warfarin 1.25 mg every Mon, Wed, Fri; 2.5 mg all other days; Starting 6/1/2023     Hospital Anticoagulation:  Consulting provider: Dr. Senior  Start date: 6/4  Indication: A Fib - requiring full anticoagulation  Target INR: 2 - 3  Expected duration: indefininte   Bridge Therapy: No      Potential food or drug interactions: NO new medications that can potentially interact with Warfarin    Education complete?/Date: Yes; followed at [facility]    Assessment/Plan:  Dose: Will restart her home regime  Monitor for any signs or symptoms of bleeding  Follow up daily INRs and dose adjustments    Pharmacy will continue to follow until discharge or discontinuation of warfarin.     Faustino Andujar [facility]  Clinical Pharmacist

## 2023-06-06 ENCOUNTER — TRANSITIONAL CARE MANAGEMENT TELEPHONE ENCOUNTER (OUTPATIENT)
Dept: CALL CENTER | Facility: HOSPITAL | Age: 73
End: 2023-06-06
Payer: MEDICARE

## 2023-06-06 NOTE — CASE MANAGEMENT/SOCIAL WORK
Case Management Discharge Note      Final Note: Discharged home         Selected Continued Care - Discharged on 6/5/2023 Admission date: 6/4/2023 - Discharge disposition: Home or Self Care      Destination    No services have been selected for the patient.                Durable Medical Equipment    No services have been selected for the patient.                Dialysis/Infusion    No services have been selected for the patient.                Home Medical Care    No services have been selected for the patient.                Therapy    No services have been selected for the patient.                Community Resources    No services have been selected for the patient.                Community & DME    No services have been selected for the patient.                    Transportation Services  Private: Car    Final Discharge Disposition Code: 01 - home or self-care

## 2023-06-06 NOTE — OUTREACH NOTE
Call Center TCM Note      Flowsheet Row Responses   [facility] patient discharged from? [place]   Does the patient have one of the following disease processes/diagnoses(primary or secondary)? Other   TCM attempt successful? Yes   Call start time 0956   Call end time 0959   Discharge diagnosis chest pain, stress test unremarkable   Meds reviewed with patient/caregiver? Yes   Is the patient having any side effects they believe may be caused by any medication additions or changes? No   Does the patient have all medications ordered at discharge? Yes   Is the patient taking all medications as directed (includes completed medication regime)? Yes   Comments appt with Dr. Walker on 6/8 @ 1:15   Does the patient have an appointment with their PCP within 7 days of discharge? Yes   Psychosocial issues? No   Did the patient receive a copy of their discharge instructions? Yes   Nursing interventions Reviewed instructions with patient   What is the patient's perception of their health status since discharge? Same   Is the patient/caregiver able to teach back signs and symptoms related to disease process for when to call PCP? Yes   Is the patient/caregiver able to teach back signs and symptoms related to disease process for when to call 911? Yes   Is the patient/caregiver able to teach back the hierarchy of who to call/visit for symptoms/problems? PCP, Specialist, Home health nurse, Urgent Care, ED, 911 Yes   If the patient is a current smoker, are they able to teach back resources for cessation? Not a smoker   Additional teach back comments States she is about the same but denies chest pain.   TCM call completed? Yes   Wrap up additional comments Denie questions or needs at this time.   Call end time 0959            Anna Middleton LPN    6/6/2023, 09:59 EDT

## 2023-06-08 ENCOUNTER — ANTICOAGULATION VISIT (OUTPATIENT)
Dept: PHARMACY | Facility: HOSPITAL | Age: 73
End: 2023-06-08
Payer: MEDICARE

## 2023-06-08 ENCOUNTER — OFFICE VISIT (OUTPATIENT)
Dept: FAMILY MEDICINE CLINIC | Facility: CLINIC | Age: 73
End: 2023-06-08
Payer: MEDICARE

## 2023-06-08 VITALS
HEIGHT: 61 IN | BODY MASS INDEX: 26.13 KG/M2 | DIASTOLIC BLOOD PRESSURE: 72 MMHG | HEART RATE: 57 BPM | SYSTOLIC BLOOD PRESSURE: 124 MMHG | OXYGEN SATURATION: 99 % | WEIGHT: 138.4 LBS

## 2023-06-08 DIAGNOSIS — J96.11 CHRONIC RESPIRATORY FAILURE WITH HYPOXIA: ICD-10-CM

## 2023-06-08 DIAGNOSIS — K21.9 GASTROESOPHAGEAL REFLUX DISEASE WITHOUT ESOPHAGITIS: ICD-10-CM

## 2023-06-08 DIAGNOSIS — J44.9 CHRONIC OBSTRUCTIVE PULMONARY DISEASE, UNSPECIFIED COPD TYPE: Primary | ICD-10-CM

## 2023-06-08 DIAGNOSIS — G25.81 RLS (RESTLESS LEGS SYNDROME): ICD-10-CM

## 2023-06-08 DIAGNOSIS — I48.20 ATRIAL FIBRILLATION, CHRONIC: Primary | Chronic | ICD-10-CM

## 2023-06-08 DIAGNOSIS — Z99.81 OXYGEN DEPENDENT: ICD-10-CM

## 2023-06-08 DIAGNOSIS — F32.A DEPRESSION, UNSPECIFIED DEPRESSION TYPE: ICD-10-CM

## 2023-06-08 LAB — INR PPP: 2.9

## 2023-06-08 PROCEDURE — G0249 PROVIDE INR TEST MATER/EQUIP: HCPCS

## 2023-06-08 RX ORDER — IPRATROPIUM BROMIDE AND ALBUTEROL SULFATE 2.5; .5 MG/3ML; MG/3ML
3 SOLUTION RESPIRATORY (INHALATION) EVERY 4 HOURS PRN
Qty: 360 ML | Refills: 1 | Status: SHIPPED | OUTPATIENT
Start: 2023-06-08

## 2023-06-08 RX ORDER — REVEFENACIN 175 UG/3ML
175 SOLUTION RESPIRATORY (INHALATION)
COMMUNITY

## 2023-06-08 RX ORDER — KETOCONAZOLE 20 MG/ML
SHAMPOO TOPICAL
COMMUNITY
Start: 2023-05-23

## 2023-06-08 RX ORDER — BUPROPION HYDROCHLORIDE 300 MG/1
300 TABLET ORAL DAILY
Qty: 90 TABLET | Refills: 1 | Status: SHIPPED | OUTPATIENT
Start: 2023-06-08

## 2023-06-08 RX ORDER — PRAMIPEXOLE DIHYDROCHLORIDE 0.5 MG/1
0.5 TABLET ORAL 2 TIMES DAILY
Qty: 180 TABLET | Refills: 1 | Status: SHIPPED | OUTPATIENT
Start: 2023-06-08

## 2023-06-08 RX ORDER — PANTOPRAZOLE SODIUM 40 MG/1
40 TABLET, DELAYED RELEASE ORAL DAILY
Qty: 90 TABLET | Refills: 1 | Status: SHIPPED | OUTPATIENT
Start: 2023-06-08

## 2023-06-08 NOTE — PROGRESS NOTES
Subjective   Shani Phillip is a 72 y.o. female.     Chief Complaint   Patient presents with    Hospital Follow Up Visit       History of Present Illness   Within 48 business hours after discharge our office contacted her via telephone to coordinate her care and needs.      6/5/2023     6:07 PM   Date of TCM Phone Call   [facility]   Date of Admission 6/4/2023   Date of Discharge 6/5/2023   Discharge Disposition Home or Self Care     Risk for Readmission (LACE) Score: 7 (6/5/2023  6:00 AM)      Patient was admitted to [facility]  ALL records were obtained and reviewed and /or discussed with admitting physician  Date of admission 6/4/2023  Date of discharge 6/5/2023  Diagnosis Chest pain with chronic kidney disease and atrial fibrillation and chronic diastolic congestive heart failure with COPD.  Hospital Course Ms. Phillip is a 72 y.o. female with a history of chronic A-fib, diastolic CHF, COPD with chronic hypoxic respiratory failure and CKD 3 who presented to [facility] initially complaining of chest pain which lasted about an hour.  Please see the admitting history and physical for further details.  She was found to have troponin of 27 with subsequent recheck at 26.  D-dimer was normal.  EKG showed no acute changes.  Her INR was nearly therapeutic at 1.85 and today came up to 2.1.  Digoxin level was mildly elevated at 1.6.  She experienced no further chest pain after admission.  She was seen by cardiology who performed stress test today which was unremarkable.  She should be stable for discharge with outpatient management at this time.  Would recommend that she start a PPI and follow-up with PCP in the next week or so.   Medications upon discharge Pantoprazole 40 mg daily, digoxin 125 mcg daily, Pulmicort nebulized daily, bupropion  mg daily, vitamin D 1000 units daily, Flexeril 10 mg as needed, furosemide 40 mg daily, potassium chloride 20 mEq daily, Mirapex 0.5 mg  nightly, warfarin 2.5 mg half tab Monday and Thursday and full tab rest of the week  Disposition Home with family  Follow up with PCP only  Currently c/o No further chest pain.  Has chronic short of breath on oxygen.  Condition stable    I reviewed and requested records labs and diagnostics from the hospital with the patient and family.  The patient is to follow-up with specialist as discussed and directed.  If any problems arise or further questions develop patient is to call or to contact us for any needs.    Patient with afib and CHF and followed by cardiology.  Was having CP but then diltiazem and dig added and feeling well at this time.  COPD with chronic resp failure with hypoxia requiring oxygen and on 2L NC per oxygen concentrator through [facility].  Doing well and no new issues.  Patient with RLS and already on requip 4 mg TID and still with severe RLS issues that were not improving.  Changed to Mirapex 0.5 mg nightly 12/2022  History of depression utilizing the Wellbutrin is tolerating well without any problems.    The following portions of the patient's history were reviewed and updated as appropriate: allergies, current medications, past family history, past medical history, past social history, past surgical history and problem list.    Past Medical History:   Diagnosis Date    Acute endocarditis     Atrial fibrillation with RVR 11/14/2019    CKD (chronic kidney disease) stage 3, GFR 30-59 ml/min     COPD (chronic obstructive pulmonary disease)     Influenza 11/14/2019    Osteoporosis     Renal disorder     Restless leg syndrome     Thrush, oral 11/14/2019       Past Surgical History:   Procedure Laterality Date    CARDIAC CATHETERIZATION N/A 1/23/2020    Procedure: Coronary angiography;  Surgeon: Sveltana Grayson MD;  Location: [facility] CATH INVASIVE LOCATION;  Service: Cardiovascular    CARDIAC CATHETERIZATION N/A 1/23/2020    Procedure: Left ventriculography;  Surgeon: Svetlana Grayson MD;  Location: [facility]  CATH INVASIVE LOCATION;  Service: Cardiovascular    CARDIAC CATHETERIZATION N/A 1/23/2020    Procedure: Left Heart Cath;  Surgeon: Svetlaan Grayson MD;  Location: [facility] CATH INVASIVE LOCATION;  Service: Cardiovascular    CHOLECYSTECTOMY      KNEE ARTHROPLASTY Right     LAPAROSCOPIC GASTRIC BANDING         Family History   Problem Relation Age of Onset    Lung cancer Mother        Social History     Socioeconomic History    Marital status:    Tobacco Use    Smoking status: Former     Packs/day: 0.50     Years: 50.00     Pack years: 25.00     Types: Cigarettes     Quit date: 11/3/2019     Years since quitting: 3.5    Smokeless tobacco: Never    Tobacco comments:     LAST CIG NOV 02, 2019   Substance and Sexual Activity    Alcohol use: No     Comment: caffeine - 1/2 cup coffee daily     Drug use: No    Sexual activity: Defer       Current Outpatient Medications   Medication Sig Dispense Refill    budesonide (PULMICORT) 0.5 MG/2ML nebulizer solution Take 2 mL by nebulization Daily. 60 each 1    buPROPion XL (WELLBUTRIN XL) 300 MG 24 hr tablet Take 1 tablet by mouth Daily. 90 tablet 1    cholecalciferol (VITAMIN D3) 25 MCG (1000 UT) tablet Take 2 tablets by mouth Daily.      cyclobenzaprine (FLEXERIL) 10 MG tablet Take 1 tablet by mouth 3 (Three) Times a Day As Needed (back pain). 40 tablet 1    digoxin (LANOXIN) 125 MCG tablet Take 1 tablet by mouth Every Other Day. 90 tablet 2    furosemide (LASIX) 40 MG tablet TAKE 1 TABLET BY MOUTH EVERY DAY 90 tablet 3    HYDROcodone-acetaminophen (NORCO) 7.5-325 MG per tablet Take 1 tablet by mouth Every 6 (Six) Hours As Needed for Moderate Pain. 20 tablet 0    ketoconazole (NIZORAL) 2 % shampoo WASH SCALP TWICE WEEKLY. LET SIT FOR 5 MIN THEN RINSE OUT      O2 (OXYGEN) Inhale 2 L/min 1 (One) Time.      pantoprazole (PROTONIX) 40 MG EC tablet Take 1 tablet by mouth Daily. 90 tablet 1    potassium chloride ER (K-TAB) 20 MEQ tablet controlled-release ER tablet TAKE 1 TABLET BY  MOUTH EVERY DAY 90 tablet 3    pramipexole (MIRAPEX) 0.5 MG tablet Take 1 tablet by mouth 2 (Two) Times a Day. 180 tablet 1    TROSPIUM CHLORIDE PO Take 20 mg by mouth 2 (Two) Times a Day.      warfarin (COUMADIN) 2.5 MG tablet Take one-half of a tablet by mouth on mon and thurs and take one tablet by mouth all other days or as directed 80 tablet 1    ipratropium-albuterol (DUO-NEB) 0.5-2.5 mg/3 ml nebulizer Take 3 mL by nebulization Every 4 (Four) Hours As Needed for Wheezing. 360 mL 1    revefenacin (Yupelri) 175 MCG/3ML nebulizer solution Take 3 mL by nebulization Daily.       No current facility-administered medications for this visit.       Review of Systems   Constitutional:  Positive for fatigue. Negative for activity change, appetite change, fever, unexpected weight gain and unexpected weight loss.   HENT:  Negative for nosebleeds, rhinorrhea, trouble swallowing and voice change.    Eyes:  Negative for visual disturbance.   Respiratory:  Positive for shortness of breath. Negative for cough, chest tightness and wheezing.    Cardiovascular:  Negative for chest pain, palpitations and leg swelling.   Gastrointestinal:  Negative for abdominal pain, blood in stool, constipation, diarrhea, nausea, vomiting, GERD and indigestion.   Genitourinary:  Negative for dysuria, frequency and hematuria.   Musculoskeletal:  Negative for arthralgias, back pain and myalgias.   Skin:  Negative for rash and wound.   Neurological:  Positive for weakness. Negative for dizziness, tremors, light-headedness, numbness, headache and memory problem.   Hematological:  Negative for adenopathy. Does not bruise/bleed easily.   Psychiatric/Behavioral:  Negative for sleep disturbance and depressed mood. The patient is not nervous/anxious.      Objective   Vitals:    06/08/23 1324   BP: 124/72   Pulse: 57   SpO2: 99%     Body mass index is 26.15 kg/m².  Physical Exam  Vitals and nursing note reviewed.   Constitutional:       General: She is not  in acute distress.     Appearance: She is well-developed. She is not diaphoretic.   HENT:      Head: Normocephalic and atraumatic.      Right Ear: External ear normal.      Left Ear: External ear normal.      Nose: Nose normal.   Eyes:      Conjunctiva/sclera: Conjunctivae normal.      Pupils: Pupils are equal, round, and reactive to light.   Neck:      Thyroid: No thyromegaly.      Trachea: No tracheal deviation.   Cardiovascular:      Rate and Rhythm: Normal rate and regular rhythm.      Heart sounds: Normal heart sounds. No murmur heard.    No friction rub. No gallop.   Pulmonary:      Effort: Pulmonary effort is normal. No respiratory distress.      Breath sounds: Normal breath sounds.      Comments: Using oxygen per nasal canula  Abdominal:      General: Bowel sounds are normal.      Palpations: Abdomen is soft. There is no mass.      Tenderness: There is no abdominal tenderness. There is no guarding.   Musculoskeletal:         General: Normal range of motion.      Cervical back: Normal range of motion and neck supple.      Comments: Using wheelchair   Lymphadenopathy:      Cervical: No cervical adenopathy.   Skin:     General: Skin is warm and dry.      Capillary Refill: Capillary refill takes less than 2 seconds.      Findings: No rash.   Neurological:      Mental Status: She is alert and oriented to person, place, and time.      Motor: No abnormal muscle tone.      Deep Tendon Reflexes: Reflexes normal.   Psychiatric:         Behavior: Behavior normal.         Thought Content: Thought content normal.         Judgment: Judgment normal.     Assessment & Plan   Diagnoses and all orders for this visit:    1. Chronic obstructive pulmonary disease, unspecified COPD type (Primary)  -     ipratropium-albuterol (DUO-NEB) 0.5-2.5 mg/3 ml nebulizer; Take 3 mL by nebulization Every 4 (Four) Hours As Needed for Wheezing.  Dispense: 360 mL; Refill: 1    2. RLS (restless legs syndrome)  -     pramipexole (MIRAPEX) 0.5 MG  tablet; Take 1 tablet by mouth 2 (Two) Times a Day.  Dispense: 180 tablet; Refill: 1    3. Depression, unspecified depression type  -     buPROPion XL (WELLBUTRIN XL) 300 MG 24 hr tablet; Take 1 tablet by mouth Daily.  Dispense: 90 tablet; Refill: 1    4. Chronic respiratory failure with hypoxia    5. Oxygen dependent    6. Gastroesophageal reflux disease without esophagitis  -     pantoprazole (PROTONIX) 40 MG EC tablet; Take 1 tablet by mouth Daily.  Dispense: 90 tablet; Refill: 1    Increase the mirapex dosing.  Discussed the hospitalization and records reviewed.  Continue the current medications and try to get her duoneb back again.  Follow up with Dr Zamora.          COVID-19 Precautions - Patient was compliant in wearing a mask. When I saw the patient, I used appropriate personal protective equipment (PPE) including mask and eye shield (standard procedure).  Additionally, I used gown and gloves if indicated.  Hand hygiene was completed before and after seeing the patient.  Dictated utilizing Dragon Dictation

## 2023-06-08 NOTE — PROGRESS NOTES
Anticoagulation Clinic Progress Note    Anticoagulation Summary  As of 2023      INR goal:  2.0-3.0   TTR:  57.2 % (2.2 y)   INR used for dosin.90 (2023)   Warfarin maintenance plan:  1.25 mg every Mon, Wed, Fri; 2.5 mg all other days; Starting 2023   Weekly warfarin total:  13.75 mg   No change documented:  Zenia Myers, PharmD   Plan last modified:  Marleny Black RPH (2023)   Next INR check:  6/15/2023   Priority:  High   Target end date:      Indications    Atrial fibrillation  chronic [I48.20]                 Anticoagulation Episode Summary       INR check location:      Preferred lab:      Send INR reminders to:   [facility] TEST POOL    Comments:   Home Testing as of 21 *call only when out of range*          Anticoagulation Care Providers       Provider Role Specialty Phone number    Zenia Tinajero MD Referring Cardiology [phone]            Clinic Interview:  Patient Findings     Negatives:  Signs/symptoms of thrombosis, Signs/symptoms of bleeding,   Laboratory test error suspected, Change in health, Change in alcohol use,   Change in activity, Upcoming invasive procedure, Emergency department   visit, Upcoming dental procedure, Missed doses, Extra doses, Change in   medications, Change in diet/appetite, Hospital admission, Bruising, Other   complaints      Clinical Outcomes     Negatives:  Major bleeding event, Thromboembolic event,   Anticoagulation-related hospital admission, Anticoagulation-related ED   visit, Anticoagulation-related fatality        INR History:      2023    12:00 AM 2023    11:29 AM 2023     2:08 PM 2023     9:42 PM 2023     4:34 AM 2023    12:00 AM 2023    12:00 PM   Anticoagulation Monitoring   INR  2.20     2.90   INR Date  2023   INR Goal  2.0-3.0     2.0-3.0   Trend  Same     Same   Last Week Total  15 mg     13.75 mg   Next Week Total  13.75 mg     13.75 mg   Sun  2.5 mg     2.5 mg   Mon   1.25 mg     1.25 mg   Tue  2.5 mg     2.5 mg   Wed  1.25 mg     1.25 mg   Thu  2.5 mg     2.5 mg   Fri  1.25 mg     1.25 mg   Sat  2.5 mg     2.5 mg   Historical INR 2.20      1.85  1.98  2.13  2.90            This result is from an external source.       Plan:  1. INR is Therapeutic today- see above in Anticoagulation Summary.   Will instruct Shani Phillip to Continue their warfarin regimen- see above in Anticoagulation Summary.  2. Follow up in 1 weeks  3. Pt has agreed to only be called if INR out of range. They have been instructed to call if any changes in medications, doses, concerns, etc. Patient expresses understanding and has no further questions at this time.    Zenia Myers, PharmD

## 2023-06-15 ENCOUNTER — ANTICOAGULATION VISIT (OUTPATIENT)
Dept: PHARMACY | Facility: HOSPITAL | Age: 73
End: 2023-06-15
Payer: MEDICARE

## 2023-06-15 ENCOUNTER — TELEPHONE (OUTPATIENT)
Dept: FAMILY MEDICINE CLINIC | Facility: CLINIC | Age: 73
End: 2023-06-15

## 2023-06-15 DIAGNOSIS — I48.20 ATRIAL FIBRILLATION, CHRONIC: Primary | Chronic | ICD-10-CM

## 2023-06-15 LAB — INR PPP: 2.7

## 2023-06-15 NOTE — PROGRESS NOTES
Anticoagulation Clinic Progress Note    Anticoagulation Summary  As of 6/15/2023      INR goal:  2.0-3.0   TTR:  57.6 % (2.3 y)   INR used for dosin.70 (6/15/2023)   Warfarin maintenance plan:  1.25 mg every Mon, Wed, Fri; 2.5 mg all other days; Starting 6/15/2023   Weekly warfarin total:  13.75 mg   No change documented:  Marleny Black RPH   Plan last modified:  Marleny Black RPH (2023)   Next INR check:  2023   Priority:  High   Target end date:      Indications    Atrial fibrillation  chronic [I48.20]                 Anticoagulation Episode Summary       INR check location:      Preferred lab:      Send INR reminders to:   [facility] TEST POOL    Comments:   Home Testing as of 21 *call only when out of range*          Anticoagulation Care Providers       Provider Role Specialty Phone number    Zenia Tinajero MD Referring Cardiology [phone]            Clinic Interview:  No pertinent clinical findings have been reported.    INR History:      2023     2:08 PM 2023     9:42 PM 2023     4:34 AM 2023    12:00 AM 2023    12:00 PM 6/15/2023    12:00 AM 6/15/2023    11:02 AM   Anticoagulation Monitoring   INR     2.90  2.70   INR Date     2023  6/15/2023   INR Goal     2.0-3.0  2.0-3.0   Trend     Same  Same   Last Week Total     13.75 mg  13.75 mg   Next Week Total     13.75 mg  13.75 mg   Sun     2.5 mg  2.5 mg   Mon     1.25 mg  1.25 mg   Tue     2.5 mg  2.5 mg   Wed     1.25 mg  1.25 mg   Thu     2.5 mg  2.5 mg   Fri     1.25 mg  1.25 mg   Sat     2.5 mg  2.5 mg   Historical INR 1.85  1.98  2.13  2.90      2.70        Visit Report    Report Report         This result is from an external source.       Plan:  1. INR is therapeutic today- see above in Anticoagulation Summary.    Shani Kenji to continue their warfarin regimen- see above in Anticoagulation Summary.  2. Follow up in 1 week  3. Pt has agreed to only be called if INR out of range. They  have been instructed to call if any changes in medications, doses, concerns, etc. Patient expresses understanding and has no further questions at this time.    Marleny Black RP

## 2023-06-15 NOTE — TELEPHONE ENCOUNTER
Caller: Shani Phillip    Relationship to patient: Self      Patient is needing: PATIENT WANTS DR. JACKSON TO KNOW THAT HER BREATHING MEDICINE, ALBUTEROL, SHE TAKES 4 TIMES PER DAY, BUT THE PHARMACY RAN OUT AND SHE STATES THAT THE PHARMACY IS SENDING A MESSAGE TO DR. JACKSON THAT IT IS BACK IN AND THEY PHARMACY WILL BE CONTACTING HIM TO GET THIS MEDICATION REFILLED FOR THE PATIENT.

## 2023-06-27 NOTE — TELEPHONE ENCOUNTER
Patient informed prescription was sent to pharmacy    [Time Spent: ___ minutes] : I have spent [unfilled] minutes of time on the encounter.

## 2023-06-28 ENCOUNTER — TELEPHONE (OUTPATIENT)
Dept: FAMILY MEDICINE CLINIC | Facility: CLINIC | Age: 73
End: 2023-06-28

## 2023-06-28 NOTE — TELEPHONE ENCOUNTER
Caller: Shani Phillip    Relationship: Self    Best call back number:     What is the best time to reach you:     Who are you requesting to speak with (clinical staff, provider,  specific staff member):     Do you know the name of the person who called:     What was the call regarding: PATIENT IS CALLING IN TO REQUEST A CALL BACK FROM DR JACKSON OR STAFF TO DISCUSS A MEDICATION THAT WAS SENT IN BY [facility].  SHE SAID THAT ITS IN REGARDS TO HER NEBULIZER.    Is it okay if the provider responds through MyChart:

## 2023-06-29 NOTE — TELEPHONE ENCOUNTER
SPOKE WITH SAMIR AT [facility] THEY DID RECEIVE MEDICATION FROM THE 06/26 AND WILL BE FILLING IT FOR PATENT

## 2023-06-29 NOTE — TELEPHONE ENCOUNTER
PATIENT CALLED BACK AND STATED THAT [facility] HAS NOT RECEIVED THE ORDER FOR THE PATIENT'S NEBULIZER MEDICATION. PLEASE SEND MEDICATION TO [facility].

## 2023-06-29 NOTE — TELEPHONE ENCOUNTER
Patient states the pharmacy did not get her medication the duoneb sent. Informed her that it was confirmed by the pharmacy and she would need to call them and see

## 2023-07-27 ENCOUNTER — ANTICOAGULATION VISIT (OUTPATIENT)
Dept: PHARMACY | Facility: HOSPITAL | Age: 73
End: 2023-07-27
Payer: MEDICARE

## 2023-07-27 DIAGNOSIS — I48.20 ATRIAL FIBRILLATION, CHRONIC: Primary | Chronic | ICD-10-CM

## 2023-07-27 LAB — INR PPP: 2.1

## 2023-07-27 NOTE — PROGRESS NOTES
Anticoagulation Clinic Progress Note    Anticoagulation Summary  As of 2023      INR goal:  2.0-3.0   TTR:  56.7 % (2.4 y)   INR used for dosin.10 (2023)   Warfarin maintenance plan:  1.25 mg every Wed, Fri; 2.5 mg all other days   Weekly warfarin total:  15 mg   No change documented:  Marleny Black RPH   Plan last modified:  Zenia Myers PharmD (2023)   Next INR check:  8/3/2023   Priority:  High   Target end date:      Indications    Atrial fibrillation  chronic [I48.20]                 Anticoagulation Episode Summary       INR check location:      Preferred lab:      Send INR reminders to:   [facility] TEST POOL    Comments:   Home Testing as of 21 *call only when out of range*          Anticoagulation Care Providers       Provider Role Specialty Phone number    Zenia Tinajero MD Referring Cardiology [phone]            Clinic Interview:  No pertinent clinical findings have been reported.    INR History:      2023    11:30 AM 2023    12:00 AM 2023     1:17 PM 2023    12:00 AM 2023     1:19 PM 2023    12:00 AM 2023    11:51 AM   Anticoagulation Monitoring   INR 2.10  2.00  1.90  2.10   INR Date 2023   INR Goal 2.0-3.0  2.0-3.0  2.0-3.0  2.0-3.0   Trend Same  Same  Same  Same   Last Week Total 16.25 mg  15 mg  12.5 mg  16.25 mg   Next Week Total 15 mg  15 mg  16.25 mg  15 mg   Sun 2.5 mg  2.5 mg  2.5 mg  2.5 mg   Mon 2.5 mg  2.5 mg  2.5 mg  2.5 mg   Tue 2.5 mg  2.5 mg  2.5 mg  2.5 mg   Wed 1.25 mg  1.25 mg  1.25 mg  1.25 mg   Thu 2.5 mg  2.5 mg  3.75 mg ()  2.5 mg   Fri 1.25 mg  1.25 mg  1.25 mg  1.25 mg   Sat 2.5 mg  2.5 mg  2.5 mg  2.5 mg   Historical INR  2.00      1.90      2.10            This result is from an external source.       Plan:  1. INR is therapeutic today- see above in Anticoagulation Summary.    Shani Phillip to continue their warfarin regimen- see above in Anticoagulation  Summary.  2. Follow up in 1 week  3. Pt has agreed to only be called if INR out of range. They have been instructed to call if any changes in medications, doses, concerns, etc. Patient expresses understanding and has no further questions at this time.    Marleny Black, [facility]

## 2023-08-02 ENCOUNTER — HOSPITAL ENCOUNTER (OUTPATIENT)
Dept: CARDIOLOGY | Facility: HOSPITAL | Age: 73
Discharge: HOME | End: 2023-08-02
Admitting: NURSE PRACTITIONER
Payer: MEDICARE

## 2023-08-02 ENCOUNTER — TELEPHONE (OUTPATIENT)
Dept: CARDIOLOGY | Facility: CLINIC | Age: 73
End: 2023-08-02

## 2023-08-02 VITALS
WEIGHT: 138 LBS | DIASTOLIC BLOOD PRESSURE: 60 MMHG | HEIGHT: 61 IN | HEART RATE: 93 BPM | SYSTOLIC BLOOD PRESSURE: 120 MMHG | BODY MASS INDEX: 26.06 KG/M2

## 2023-08-02 DIAGNOSIS — I34.2 NONRHEUMATIC MITRAL VALVE STENOSIS: ICD-10-CM

## 2023-08-02 DIAGNOSIS — R06.09 DOE (DYSPNEA ON EXERTION): ICD-10-CM

## 2023-08-02 DIAGNOSIS — I05.9 ENDOCARDITIS OF MITRAL VALVE: ICD-10-CM

## 2023-08-02 LAB
AORTIC ARCH: 2.5 CM
AORTIC DIMENSIONLESS INDEX: 0.6 (DI)
ASCENDING AORTA: 2.7 CM
AV HCM GRAD VALS: 15 MMHG
AV LVOT PEAK GRADIENT: 12 MMHG
BH CV ECHO LEFT VENTRICLE GLOBAL LONGITUDINAL STRAIN: -20 %
BH CV ECHO MEAS - ACS: 1.17 CM
BH CV ECHO MEAS - AO MAX PG: 11.8 MMHG
BH CV ECHO MEAS - AO MEAN PG: 6.6 MMHG
BH CV ECHO MEAS - AO ROOT DIAM: 2.5 CM
BH CV ECHO MEAS - AO V2 MAX: 171.9 CM/SEC
BH CV ECHO MEAS - AO V2 VTI: 25.5 CM
BH CV ECHO MEAS - AVA(I,D): 1.54 CM2
BH CV ECHO MEAS - EDV(CUBED): 32.8 ML
BH CV ECHO MEAS - EDV(MOD-SP2): 34 ML
BH CV ECHO MEAS - EDV(MOD-SP4): 33 ML
BH CV ECHO MEAS - EF(MOD-BP): 67.8 %
BH CV ECHO MEAS - EF(MOD-SP2): 76.5 %
BH CV ECHO MEAS - EF(MOD-SP4): 63.6 %
BH CV ECHO MEAS - ESV(CUBED): 14.2 ML
BH CV ECHO MEAS - ESV(MOD-SP2): 8 ML
BH CV ECHO MEAS - ESV(MOD-SP4): 12 ML
BH CV ECHO MEAS - FS: 24.3 %
BH CV ECHO MEAS - IVS/LVPW: 1 CM
BH CV ECHO MEAS - IVSD: 1 CM
BH CV ECHO MEAS - LAT PEAK E' VEL: 5.3 CM/SEC
BH CV ECHO MEAS - LV DIASTOLIC VOL/BSA (35-75): 20.5 CM2
BH CV ECHO MEAS - LV MASS(C)D: 90.3 GRAMS
BH CV ECHO MEAS - LV MAX PG: 3.8 MMHG
BH CV ECHO MEAS - LV MEAN PG: 2.6 MMHG
BH CV ECHO MEAS - LV SYSTOLIC VOL/BSA (12-30): 7.4 CM2
BH CV ECHO MEAS - LV V1 MAX: 98.1 CM/SEC
BH CV ECHO MEAS - LV V1 VTI: 16.2 CM
BH CV ECHO MEAS - LVIDD: 3.2 CM
BH CV ECHO MEAS - LVIDS: 2.42 CM
BH CV ECHO MEAS - LVOT AREA: 2.42 CM2
BH CV ECHO MEAS - LVOT DIAM: 1.76 CM
BH CV ECHO MEAS - LVPWD: 1 CM
BH CV ECHO MEAS - MED PEAK E' VEL: 5.5 CM/SEC
BH CV ECHO MEAS - MR MAX PG: 40.1 MMHG
BH CV ECHO MEAS - MR MAX VEL: 316.7 CM/SEC
BH CV ECHO MEAS - MV DEC SLOPE: 522.5 CM/SEC2
BH CV ECHO MEAS - MV DEC TIME: 0.35 MSEC
BH CV ECHO MEAS - MV E MAX VEL: 164 CM/SEC
BH CV ECHO MEAS - MV MAX PG: 11.6 MMHG
BH CV ECHO MEAS - MV MEAN PG: 4.4 MMHG
BH CV ECHO MEAS - MV P1/2T: 93.8 MSEC
BH CV ECHO MEAS - MV V2 VTI: 35.9 CM
BH CV ECHO MEAS - MVA(P1/2T): 2.34 CM2
BH CV ECHO MEAS - MVA(VTI): 1.1 CM2
BH CV ECHO MEAS - PA ACC TIME: 0.08 SEC
BH CV ECHO MEAS - PA V2 MAX: 85.5 CM/SEC
BH CV ECHO MEAS - PULM DIAS VEL: 53.6 CM/SEC
BH CV ECHO MEAS - PULM S/D: 0.94
BH CV ECHO MEAS - PULM SYS VEL: 50.1 CM/SEC
BH CV ECHO MEAS - QP/QS: 0.79
BH CV ECHO MEAS - RAP SYSTOLE: 3 MMHG
BH CV ECHO MEAS - RV MAX PG: 2.07 MMHG
BH CV ECHO MEAS - RV V1 MAX: 72 CM/SEC
BH CV ECHO MEAS - RV V1 VTI: 12 CM
BH CV ECHO MEAS - RVOT DIAM: 1.81 CM
BH CV ECHO MEAS - RVSP: 42.3 MMHG
BH CV ECHO MEAS - SI(MOD-SP2): 16.1 ML/M2
BH CV ECHO MEAS - SI(MOD-SP4): 13 ML/M2
BH CV ECHO MEAS - SUP REN AO DIAM: 1.7 CM
BH CV ECHO MEAS - SV(LVOT): 39.4 ML
BH CV ECHO MEAS - SV(MOD-SP2): 26 ML
BH CV ECHO MEAS - SV(MOD-SP4): 21 ML
BH CV ECHO MEAS - SV(RVOT): 31.1 ML
BH CV ECHO MEAS - TAPSE (>1.6): 1.96 CM
BH CV ECHO MEAS - TR MAX PG: 39.3 MMHG
BH CV ECHO MEAS - TR MAX VEL: 313.6 CM/SEC
BH CV ECHO MEASUREMENTS AVERAGE E/E' RATIO: 30.37
BH CV VAS BP LEFT ARM: NORMAL MMHG
BH CV XLRA - RV BASE: 2.31 CM
BH CV XLRA - RV LENGTH: 5.3 CM
BH CV XLRA - RV MID: 2.19 CM
BH CV XLRA - TDI S': 8.4 CM/SEC
LEFT ATRIUM VOLUME INDEX: 32.1 ML/M2
SINUS: 2.7 CM
STJ: 2.5 CM

## 2023-08-02 PROCEDURE — 93356 MYOCRD STRAIN IMG SPCKL TRCK: CPT

## 2023-08-02 PROCEDURE — 93306 TTE W/DOPPLER COMPLETE: CPT

## 2023-08-03 ENCOUNTER — ANTICOAGULATION VISIT (OUTPATIENT)
Dept: PHARMACY | Facility: HOSPITAL | Age: 73
End: 2023-08-03
Payer: MEDICARE

## 2023-08-03 DIAGNOSIS — I48.20 ATRIAL FIBRILLATION, CHRONIC: Primary | Chronic | ICD-10-CM

## 2023-08-03 LAB — INR PPP: 3.2

## 2023-08-03 PROCEDURE — G0249 PROVIDE INR TEST MATER/EQUIP: HCPCS

## 2023-08-09 ENCOUNTER — TELEPHONE (OUTPATIENT)
Dept: FAMILY MEDICINE CLINIC | Facility: CLINIC | Age: 73
End: 2023-08-09
Payer: MEDICARE

## 2023-08-09 DIAGNOSIS — G89.29 CHRONIC MIDLINE LOW BACK PAIN WITH LEFT-SIDED SCIATICA: ICD-10-CM

## 2023-08-09 DIAGNOSIS — M54.42 CHRONIC MIDLINE LOW BACK PAIN WITH LEFT-SIDED SCIATICA: ICD-10-CM

## 2023-08-09 RX ORDER — CYCLOBENZAPRINE HCL 10 MG
10 TABLET ORAL 3 TIMES DAILY PRN
Qty: 40 TABLET | Refills: 1 | Status: SHIPPED | OUTPATIENT
Start: 2023-08-09

## 2023-08-09 NOTE — TELEPHONE ENCOUNTER
Caller: Shani Phillip    Relationship: Self    Requested Prescriptions:   Requested Prescriptions     Pending Prescriptions Disp Refills    cyclobenzaprine (FLEXERIL) 10 MG tablet 40 tablet 1     Sig: Take 1 tablet by mouth 3 (Three) Times a Day As Needed (back pain).      Pharmacy where request should be sent: [facility] #[id number] - [place], [place] - [address] . - [phone]  - [phone] FX     Last office visit with prescribing clinician: 6/8/2023   Last telemedicine visit with prescribing clinician: Visit date not found   Next office visit with prescribing clinician: 10/5/2023     Additional details provided by patient: PATIENT HAS 2 LEFT.     Does the patient have less than a 3 day supply:  [x] Yes  [] No    Would you like a call back once the refill request has been completed: [] Yes [x] No    If the office needs to give you a call back, can they leave a voicemail: [] Yes [x] No    Tran Boothe Rep   08/09/23 11:57 EDT

## 2023-08-10 ENCOUNTER — PRIOR AUTHORIZATION (OUTPATIENT)
Dept: FAMILY MEDICINE CLINIC | Facility: CLINIC | Age: 73
End: 2023-08-10
Payer: MEDICARE

## 2023-08-10 ENCOUNTER — ANTICOAGULATION VISIT (OUTPATIENT)
Dept: PHARMACY | Facility: HOSPITAL | Age: 73
End: 2023-08-10
Payer: MEDICARE

## 2023-08-10 DIAGNOSIS — I48.20 ATRIAL FIBRILLATION, CHRONIC: Primary | Chronic | ICD-10-CM

## 2023-08-10 LAB — INR PPP: 3.5

## 2023-08-10 NOTE — TELEPHONE ENCOUNTER
I put in a PA for her Flexeril and it has been approved. I called and informed the patient.     PA Approved

## 2023-08-10 NOTE — PROGRESS NOTES
Anticoagulation Clinic Progress Note    Anticoagulation Summary  As of 8/10/2023      INR goal:  2.0-3.0   TTR:  56.4 % (2.4 y)   INR used for dosing:  3.50 (8/10/2023)   Warfarin maintenance plan:  1.25 mg every Mon, Wed, Fri; 2.5 mg all other days   Weekly warfarin total:  13.75 mg   Plan last modified:  Zenia Myers, PharmD (8/10/2023)   Next INR check:  8/17/2023   Priority:  High   Target end date:      Indications    Atrial fibrillation  chronic [I48.20]                 Anticoagulation Episode Summary       INR check location:      Preferred lab:      Send INR reminders to:   [facility] TEST POOL    Comments:   Home Testing as of 7/30/21 *call only when out of range*          Anticoagulation Care Providers       Provider Role Specialty Phone number    Zenia Tinajero MD Referring Cardiology [phone]            Clinic Interview:  Patient Findings     Negatives:  Signs/symptoms of thrombosis, Signs/symptoms of bleeding,   Laboratory test error suspected, Change in health, Change in alcohol use,   Change in activity, Upcoming invasive procedure, Emergency department   visit, Upcoming dental procedure, Missed doses, Extra doses, Change in   medications, Change in diet/appetite, Hospital admission, Bruising, Other   complaints      Clinical Outcomes     Negatives:  Major bleeding event, Thromboembolic event,   Anticoagulation-related hospital admission, Anticoagulation-related ED   visit, Anticoagulation-related fatality        INR History:      7/20/2023     1:19 PM 7/27/2023    12:00 AM 7/27/2023    11:51 AM 8/3/2023    12:00 AM 8/3/2023    10:00 AM 8/10/2023    12:00 AM 8/10/2023     3:18 PM   Anticoagulation Monitoring   INR 1.90  2.10  3.20  3.50   INR Date 7/20/2023  7/27/2023  8/3/2023  8/10/2023   INR Goal 2.0-3.0  2.0-3.0  2.0-3.0  2.0-3.0   Trend Same  Same  Same  Down   Last Week Total 12.5 mg  16.25 mg  15 mg  13.75 mg   Next Week Total 16.25 mg  15 mg  13.75 mg  11.25 mg   Sun 2.5  mg  2.5 mg  2.5 mg  2.5 mg   Mon 2.5 mg  2.5 mg  2.5 mg  1.25 mg   Tue 2.5 mg  2.5 mg  2.5 mg  2.5 mg   Wed 1.25 mg  1.25 mg  1.25 mg  1.25 mg   Thu 3.75 mg (7/20)  2.5 mg  1.25 mg (8/3)  Hold (8/10)   Fri 1.25 mg  1.25 mg  1.25 mg  1.25 mg   Sat 2.5 mg  2.5 mg  2.5 mg  2.5 mg   Historical INR  2.10      3.20      3.50            This result is from an external source.       Plan:  1. INR is Supratherapeutic today- see above in Anticoagulation Summary.   Will instruct Shani Phillip to Change their warfarin regimen- see above in Anticoagulation Summary.  Hold today, then decrease to 1.25mg Monday,Wednesday,Friday and  2.5 mg all other days.   2. Follow up in 1 weeks  3.  They have been instructed to call if any changes in medications, doses, concerns, etc. Patient expresses understanding and has no further questions at this time.    Zenia Myers, PharmD

## 2023-08-11 DIAGNOSIS — G25.81 RLS (RESTLESS LEGS SYNDROME): ICD-10-CM

## 2023-08-11 RX ORDER — PRAMIPEXOLE DIHYDROCHLORIDE 0.5 MG/1
TABLET ORAL
Qty: 90 TABLET | Refills: 1 | OUTPATIENT
Start: 2023-08-11

## 2023-08-11 NOTE — TELEPHONE ENCOUNTER
Tom from [facility] was calling about the patient's Flexeril. He said that it was contraindicated for increased risk for heart failure and wanted to make sure that Dr. Walker wanted to proceed with the medication. Please advise [phone].

## 2023-08-14 ENCOUNTER — TELEPHONE (OUTPATIENT)
Dept: FAMILY MEDICINE CLINIC | Facility: CLINIC | Age: 73
End: 2023-08-14

## 2023-08-14 DIAGNOSIS — G89.29 CHRONIC MIDLINE LOW BACK PAIN WITH LEFT-SIDED SCIATICA: ICD-10-CM

## 2023-08-14 DIAGNOSIS — M54.42 CHRONIC MIDLINE LOW BACK PAIN WITH LEFT-SIDED SCIATICA: ICD-10-CM

## 2023-08-14 NOTE — TELEPHONE ENCOUNTER
Caller: Shani Phillip    Relationship: Self    Best call back number: [phone]    What medication are you requesting: PAIN MEDICINE    If a prescription is needed, what is your preferred pharmacy and phone number: [facility] #[id number] - [place], [place] - [address] - [phone] [facility] [phone] FX     Additional notes: PATIENT STATED SHE DOES NOT WANT THE CYCLOBENZAPRINE, SHE WOULD LIKE TO REQUEST PAIN MEDICATION.    PLEASE CALL.

## 2023-08-14 NOTE — TELEPHONE ENCOUNTER
Called the patient to inform her that we could not prescribe her pain medication without seeing her first. I have scheduled her for an appointment with Dr Walker for Aug 28th. Until then she says she does not want the flexeril she wants to wait and see if he will prescribe something for pain.

## 2023-08-14 NOTE — TELEPHONE ENCOUNTER
Patient is no longer wanting the flexeril she is wanting to come back in and see him to see about getting pain medication.

## 2023-08-18 ENCOUNTER — ANTICOAGULATION VISIT (OUTPATIENT)
Dept: PHARMACY | Facility: HOSPITAL | Age: 73
End: 2023-08-18
Payer: MEDICARE

## 2023-08-18 DIAGNOSIS — I48.20 ATRIAL FIBRILLATION, CHRONIC: Primary | Chronic | ICD-10-CM

## 2023-08-18 LAB — INR PPP: 1.7

## 2023-08-18 NOTE — PROGRESS NOTES
Anticoagulation Clinic Progress Note    Anticoagulation Summary  As of 2023      INR goal:  2.0-3.0   TTR:  56.4 % (2.4 y)   INR used for dosin.70 (2023)   Warfarin maintenance plan:  1.25 mg every Mon, Wed, Fri; 2.5 mg all other days   Weekly warfarin total:  13.75 mg   Plan last modified:  Zenia Myers, PharmD (8/10/2023)   Next INR check:  2023   Priority:  High   Target end date:      Indications    Atrial fibrillation  chronic [I48.20]                 Anticoagulation Episode Summary       INR check location:      Preferred lab:      Send INR reminders to:   [facility] [id number] HOME TEST POOL    Comments:   Home Testing as of 21 *call only when out of range*          Anticoagulation Care Providers       Provider Role Specialty Phone number    Zenia Tinajero MD Referring Cardiology [phone]            Clinic Interview:  Patient Findings     Negatives:  Signs/symptoms of thrombosis, Signs/symptoms of bleeding,   Laboratory test error suspected, Change in health, Change in alcohol use,   Change in activity, Upcoming invasive procedure, Emergency department   visit, Upcoming dental procedure, Missed doses, Extra doses, Change in   medications, Change in diet/appetite, Hospital admission, Bruising, Other   complaints      Clinical Outcomes     Negatives:  Major bleeding event, Thromboembolic event,   Anticoagulation-related hospital admission, Anticoagulation-related ED   visit, Anticoagulation-related fatality        INR History:      2023    11:51 AM 8/3/2023    12:00 AM 8/3/2023    10:00 AM 8/10/2023    12:00 AM 8/10/2023     3:18 PM 2023    12:00 AM 2023     9:11 AM   Anticoagulation Monitoring   INR 2.10  3.20  3.50  1.70   INR Date 2023  8/3/2023  8/10/2023  2023   INR Goal 2.0-3.0  2.0-3.0  2.0-3.0  2.0-3.0   Trend Same  Same  Down  Same   Last Week Total 16.25 mg  15 mg  13.75 mg  13.75 mg   Next Week Total 15 mg  13.75 mg  11.25 mg  15 mg   Sun 2.5 mg   2.5 mg  2.5 mg  2.5 mg   Mon 2.5 mg  2.5 mg  1.25 mg  1.25 mg   Tue 2.5 mg  2.5 mg  2.5 mg  2.5 mg   Wed 1.25 mg  1.25 mg  1.25 mg  1.25 mg   Thu 2.5 mg  1.25 mg (8/3)  Hold (8/10)  2.5 mg   Fri 1.25 mg  1.25 mg  1.25 mg  2.5 mg (8/18)   Sat 2.5 mg  2.5 mg  2.5 mg  2.5 mg   Historical INR  3.20      3.50      1.70            This result is from an external source.       Plan:  1. INR is Subtherapeutic today- see above in Anticoagulation Summary.   Will instruct Shani Phillip to Change their warfarin regimen- see above in Anticoagulation Summary.  boost to 2.5 mg today then resume, rck 1 week. May consider a different tablet strength, Appears that 13.75 mg is too much but 12.5 is too little. Could do a 2 mg tablet to trial 13 mg per week (1 day of 1 mg and 6 days of 2 mg)   2. Follow up in 1 weeks  3. They have been instructed to call if any changes in medications, doses, concerns, etc. Patient expresses understanding and has no further questions at this time.    Marleny Black RP

## 2023-08-19 DIAGNOSIS — G25.81 RLS (RESTLESS LEGS SYNDROME): ICD-10-CM

## 2023-08-21 RX ORDER — PRAMIPEXOLE DIHYDROCHLORIDE 0.5 MG/1
TABLET ORAL
Qty: 90 TABLET | Refills: 1 | OUTPATIENT
Start: 2023-08-21

## 2023-08-24 ENCOUNTER — ANTICOAGULATION VISIT (OUTPATIENT)
Dept: PHARMACY | Facility: HOSPITAL | Age: 73
End: 2023-08-24
Payer: MEDICARE

## 2023-08-24 DIAGNOSIS — I48.20 ATRIAL FIBRILLATION, CHRONIC: Primary | Chronic | ICD-10-CM

## 2023-08-24 LAB — INR PPP: 2

## 2023-08-24 NOTE — PROGRESS NOTES
Anticoagulation Clinic Progress Note    Anticoagulation Summary  As of 2023      INR goal:  2.0-3.0   TTR:  56.0 % (2.5 y)   INR used for dosin.00 (2023)   Warfarin maintenance plan:  1.25 mg every Mon, Wed; 2.5 mg all other days   Weekly warfarin total:  15 mg   Plan last modified:  Zenia Myers, PharmD (2023)   Next INR check:  2023   Priority:  High   Target end date:      Indications    Atrial fibrillation  chronic [I48.20]                 Anticoagulation Episode Summary       INR check location:      Preferred lab:      Send INR reminders to:   [facility] [employer] HOME TEST POOL    Comments:   Home Testing as of 21 *call only when out of range*          Anticoagulation Care Providers       Provider Role Specialty Phone number    Zenia Tinajero MD Referring Cardiology [phone]            Clinic Interview:  Patient Findings     Negatives:  Signs/symptoms of thrombosis, Signs/symptoms of bleeding,   Laboratory test error suspected, Change in health, Change in alcohol use,   Change in activity, Upcoming invasive procedure, Emergency department   visit, Upcoming dental procedure, Missed doses, Extra doses, Change in   medications, Change in diet/appetite, Hospital admission, Bruising, Other   complaints      Clinical Outcomes     Negatives:  Major bleeding event, Thromboembolic event,   Anticoagulation-related hospital admission, Anticoagulation-related ED   visit, Anticoagulation-related fatality        INR History:      8/3/2023    10:00 AM 8/10/2023    12:00 AM 8/10/2023     3:18 PM 2023    12:00 AM 2023     9:11 AM 2023    12:00 AM 2023    12:25 PM   Anticoagulation Monitoring   INR 3.20  3.50  1.70  2.00   INR Date 8/3/2023  8/10/2023  2023  2023   INR Goal 2.0-3.0  2.0-3.0  2.0-3.0  2.0-3.0   Trend Same  Down  Same  Up   Last Week Total 15 mg  13.75 mg  13.75 mg  15 mg   Next Week Total 13.75 mg  11.25 mg  15 mg  15 mg   Sun 2.5 mg  2.5 mg  2.5  mg  2.5 mg   Mon 2.5 mg  1.25 mg  1.25 mg  1.25 mg   Tue 2.5 mg  2.5 mg  2.5 mg  2.5 mg   Wed 1.25 mg  1.25 mg  1.25 mg  1.25 mg   Thu 1.25 mg (8/3)  Hold (8/10)  2.5 mg  2.5 mg   Fri 1.25 mg  1.25 mg  2.5 mg (8/18)  2.5 mg (8/25)   Sat 2.5 mg  2.5 mg  2.5 mg  2.5 mg   Historical INR  3.50      1.70      2.00            This result is from an external source.       Plan:  1. INR is Therapeutic today- see above in Anticoagulation Summary.   Will instruct Shani Phillip to Continue their warfarin regimen- see above in Anticoagulation Summary.  2. Follow up in 1 weeks  3.They have been instructed to call if any changes in medications, doses, concerns, etc. Patient expresses understanding and has no further questions at this time.    Zenia Myers, PharmD

## 2023-08-28 ENCOUNTER — TELEPHONE (OUTPATIENT)
Dept: FAMILY MEDICINE CLINIC | Facility: CLINIC | Age: 73
End: 2023-08-28

## 2023-08-28 DIAGNOSIS — G25.81 RLS (RESTLESS LEGS SYNDROME): ICD-10-CM

## 2023-08-28 DIAGNOSIS — M54.42 CHRONIC MIDLINE LOW BACK PAIN WITH LEFT-SIDED SCIATICA: ICD-10-CM

## 2023-08-28 DIAGNOSIS — G89.29 CHRONIC MIDLINE LOW BACK PAIN WITH LEFT-SIDED SCIATICA: ICD-10-CM

## 2023-08-28 RX ORDER — CYCLOBENZAPRINE HCL 10 MG
10 TABLET ORAL 3 TIMES DAILY PRN
Qty: 40 TABLET | Refills: 1 | Status: SHIPPED | OUTPATIENT
Start: 2023-08-28

## 2023-08-28 RX ORDER — PRAMIPEXOLE DIHYDROCHLORIDE 0.5 MG/1
0.5 TABLET ORAL 2 TIMES DAILY
Qty: 180 TABLET | Refills: 1 | Status: SHIPPED | OUTPATIENT
Start: 2023-08-28

## 2023-08-28 NOTE — TELEPHONE ENCOUNTER
The patient is needing a refill on her Flexeril 10 MG and her Pramipexole 0.5 MG.  Please send those to the pharmacy on file. She is completely out of both medications.

## 2023-08-31 ENCOUNTER — OFFICE VISIT (OUTPATIENT)
Dept: FAMILY MEDICINE CLINIC | Facility: CLINIC | Age: 73
End: 2023-08-31
Payer: MEDICARE

## 2023-08-31 ENCOUNTER — ANTICOAGULATION VISIT (OUTPATIENT)
Dept: PHARMACY | Facility: HOSPITAL | Age: 73
End: 2023-08-31
Payer: MEDICARE

## 2023-08-31 VITALS
HEIGHT: 61 IN | OXYGEN SATURATION: 96 % | BODY MASS INDEX: 26.06 KG/M2 | WEIGHT: 138 LBS | SYSTOLIC BLOOD PRESSURE: 118 MMHG | HEART RATE: 61 BPM | DIASTOLIC BLOOD PRESSURE: 62 MMHG

## 2023-08-31 DIAGNOSIS — L85.3 XEROSIS OF SKIN: ICD-10-CM

## 2023-08-31 DIAGNOSIS — I48.20 ATRIAL FIBRILLATION, CHRONIC: Primary | Chronic | ICD-10-CM

## 2023-08-31 DIAGNOSIS — E66.3 OVERWEIGHT WITH BODY MASS INDEX (BMI) 25.0-29.9: ICD-10-CM

## 2023-08-31 DIAGNOSIS — G25.81 RLS (RESTLESS LEGS SYNDROME): Primary | ICD-10-CM

## 2023-08-31 DIAGNOSIS — M54.42 CHRONIC MIDLINE LOW BACK PAIN WITH LEFT-SIDED SCIATICA: ICD-10-CM

## 2023-08-31 DIAGNOSIS — G89.29 CHRONIC MIDLINE LOW BACK PAIN WITH LEFT-SIDED SCIATICA: ICD-10-CM

## 2023-08-31 DIAGNOSIS — Z78.0 POSTMENOPAUSAL: ICD-10-CM

## 2023-08-31 DIAGNOSIS — Z12.31 SCREENING MAMMOGRAM FOR BREAST CANCER: ICD-10-CM

## 2023-08-31 LAB — INR PPP: 2.5

## 2023-08-31 PROCEDURE — G0249 PROVIDE INR TEST MATER/EQUIP: HCPCS

## 2023-08-31 RX ORDER — AMMONIUM LACTATE 12 G/100G
LOTION TOPICAL AS NEEDED
Qty: 225 G | Refills: 6 | Status: SHIPPED | OUTPATIENT
Start: 2023-08-31

## 2023-08-31 RX ORDER — ONDANSETRON 4 MG/1
4 TABLET, FILM COATED ORAL EVERY 8 HOURS PRN
Qty: 30 TABLET | Refills: 1 | Status: SHIPPED | OUTPATIENT
Start: 2023-08-31

## 2023-08-31 RX ORDER — HYDROCODONE BITARTRATE AND ACETAMINOPHEN 7.5; 325 MG/1; MG/1
1 TABLET ORAL EVERY 6 HOURS PRN
Qty: 20 TABLET | Refills: 0 | Status: SHIPPED | OUTPATIENT
Start: 2023-08-31

## 2023-08-31 NOTE — PROGRESS NOTES
Subjective   Shani Phillip is a 72 y.o. female.     Chief Complaint   Patient presents with    Med Refill       History of Present Illness   History of RLS, chronic A-fib, diastolic CHF, COPD with chronic hypoxic respiratory failure/oxygen dependent and CKD 3, chronic back pain with sciatica for which she uses the hydrocodone rarely (maybe once a week.  Patient with history of lap band and should not vomit.  Sometimes has nausea and asking for phenergan for nausea as needed.    The following portions of the patient's history were reviewed and updated as appropriate: allergies, current medications, past family history, past medical history, past social history, past surgical history and problem list.    Depression Screen:      8/31/2023     3:48 PM   PHQ-2/PHQ-9 Depression Screening   Little Interest or Pleasure in Doing Things 0-->not at all   Feeling Down, Depressed or Hopeless 0-->not at all   PHQ-9: Brief Depression Severity Measure Score 0       Past Medical History:   Diagnosis Date    Acute endocarditis     Atrial fibrillation with RVR 11/14/2019    CKD (chronic kidney disease) stage 3, GFR 30-59 ml/min     COPD (chronic obstructive pulmonary disease)     Influenza 11/14/2019    Osteoporosis     Renal disorder     Restless leg syndrome     Thrush, oral 11/14/2019       Past Surgical History:   Procedure Laterality Date    CARDIAC CATHETERIZATION N/A 1/23/2020    Procedure: Coronary angiography;  Surgeon: Svetlana Grayson MD;  Location: [facility] CATH INVASIVE LOCATION;  Service: Cardiovascular    CARDIAC CATHETERIZATION N/A 1/23/2020    Procedure: Left ventriculography;  Surgeon: Svetlana Grayson MD;  Location:  [facility] CATH INVASIVE LOCATION;  Service: Cardiovascular    CARDIAC CATHETERIZATION N/A 1/23/2020    Procedure: Left Heart Cath;  Surgeon: vSetlana Grayson MD;  Location: [facility] CATH INVASIVE LOCATION;  Service: Cardiovascular    CHOLECYSTECTOMY      KNEE ARTHROPLASTY Right     LAPAROSCOPIC GASTRIC BANDING          Family History   Problem Relation Age of Onset    Lung cancer Mother        Social History     Socioeconomic History    Marital status:    Tobacco Use    Smoking status: Former     Packs/day: 0.50     Years: 50.00     Pack years: 25.00     Types: Cigarettes     Quit date: 11/3/2019     Years since quitting: 3.8    Smokeless tobacco: Never    Tobacco comments:     LAST CIG NOV 02, 2019   Substance and Sexual Activity    Alcohol use: No     Comment: caffeine - 1/2 cup coffee daily     Drug use: No    Sexual activity: Defer       Current Outpatient Medications   Medication Sig Dispense Refill    HYDROcodone-acetaminophen (NORCO) 7.5-325 MG per tablet Take 1 tablet by mouth Every 6 (Six) Hours As Needed for Moderate Pain. 20 tablet 0    ammonium lactate (AmLactin) 12 % lotion Apply  topically to the appropriate area as directed As Needed for Dry Skin. 225 g 6    budesonide (PULMICORT) 0.5 MG/2ML nebulizer solution Take 2 mL by nebulization Daily. 60 each 1    buPROPion XL (WELLBUTRIN XL) 300 MG 24 hr tablet Take 1 tablet by mouth Daily. 90 tablet 1    cholecalciferol (VITAMIN D3) 25 MCG (1000 UT) tablet Take 2 tablets by mouth Daily.      cyclobenzaprine (FLEXERIL) 10 MG tablet Take 1 tablet by mouth 3 (Three) Times a Day As Needed (back pain). 40 tablet 1    digoxin (LANOXIN) 125 MCG tablet Take 1 tablet by mouth Every Other Day. 90 tablet 2    furosemide (LASIX) 40 MG tablet TAKE 1 TABLET BY MOUTH EVERY DAY 90 tablet 3    ipratropium-albuterol (DUO-NEB) 0.5-2.5 mg/3 ml nebulizer Take 3 mL by nebulization Every 4 (Four) Hours As Needed for Wheezing. 360 mL 1    ketoconazole (NIZORAL) 2 % shampoo WASH SCALP TWICE WEEKLY. LET SIT FOR 5 MIN THEN RINSE OUT      O2 (OXYGEN) Inhale 2 L/min 1 (One) Time.      ondansetron (ZOFRAN) 4 MG tablet Take 1 tablet by mouth Every 8 (Eight) Hours As Needed for Nausea or Vomiting. 30 tablet 1    pantoprazole (PROTONIX) 40 MG EC tablet Take 1 tablet by mouth Daily. 90 tablet  "1    potassium chloride ER (K-TAB) 20 MEQ tablet controlled-release ER tablet TAKE 1 TABLET BY MOUTH EVERY DAY 90 tablet 3    pramipexole (MIRAPEX) 0.5 MG tablet Take 1 tablet by mouth 2 (Two) Times a Day. 180 tablet 1    revefenacin (Yupelri) 175 MCG/3ML nebulizer solution Take 3 mL by nebulization Daily.      TROSPIUM CHLORIDE PO Take 20 mg by mouth 2 (Two) Times a Day.      warfarin (COUMADIN) 2.5 MG tablet Take one-half of a tablet by mouth on mon and thurs and take one tablet by mouth all other days or as directed 80 tablet 1     No current facility-administered medications for this visit.       Review of Systems   Constitutional:  Negative for activity change, appetite change, fatigue, fever, unexpected weight gain and unexpected weight loss.   HENT:  Negative for nosebleeds, rhinorrhea, trouble swallowing and voice change.    Eyes:  Negative for visual disturbance.   Respiratory:  Positive for shortness of breath. Negative for cough, chest tightness and wheezing.    Cardiovascular:  Negative for chest pain, palpitations and leg swelling.   Gastrointestinal:  Negative for abdominal pain, blood in stool, constipation, diarrhea, nausea, vomiting, GERD and indigestion.   Genitourinary:  Negative for dysuria, frequency and hematuria.   Musculoskeletal:  Positive for back pain. Negative for arthralgias and myalgias.   Skin:  Negative for rash and wound.        Dry skin in legs and feet.  Cyanotic toes bilaterally   Neurological:  Negative for dizziness, tremors, weakness, light-headedness, numbness, headache and memory problem.   Hematological:  Negative for adenopathy. Does not bruise/bleed easily.   Psychiatric/Behavioral:  Negative for sleep disturbance and depressed mood. The patient is not nervous/anxious.      Objective   /62 (BP Location: Left arm, Patient Position: Sitting, Cuff Size: Adult)   Pulse 61   Ht 154.9 cm (61\")   Wt 62.6 kg (138 lb) Comment: wheelchair  SpO2 96% Comment: 2 liters  BMI " 26.07 kg/mý     Physical Exam  Vitals and nursing note reviewed.   Constitutional:       General: She is not in acute distress.     Appearance: She is well-developed. She is not diaphoretic.   HENT:      Head: Normocephalic and atraumatic.      Right Ear: External ear normal.      Left Ear: External ear normal.      Nose: Nose normal.   Eyes:      Conjunctiva/sclera: Conjunctivae normal.      Pupils: Pupils are equal, round, and reactive to light.   Neck:      Thyroid: No thyromegaly.      Trachea: No tracheal deviation.   Cardiovascular:      Rate and Rhythm: Normal rate and regular rhythm. Rhythm irregular.      Heart sounds: Normal heart sounds. No murmur heard.    No friction rub. No gallop.   Pulmonary:      Effort: Pulmonary effort is normal. No respiratory distress.      Breath sounds: Normal breath sounds.      Comments: On Oxygen.  Faint expiratory wheeze in bases.  Abdominal:      General: Bowel sounds are normal.      Palpations: Abdomen is soft. There is no mass.      Tenderness: There is no abdominal tenderness. There is no guarding.   Musculoskeletal:         General: Normal range of motion.      Cervical back: Normal range of motion and neck supple.   Lymphadenopathy:      Cervical: No cervical adenopathy.   Skin:     General: Skin is warm and dry.      Capillary Refill: Capillary refill takes less than 2 seconds.      Findings: No rash.      Comments: Severe dry skin in legs and feet.   Neurological:      Mental Status: She is alert and oriented to person, place, and time.      Motor: No abnormal muscle tone.      Deep Tendon Reflexes: Reflexes normal.   Psychiatric:         Behavior: Behavior normal.         Thought Content: Thought content normal.         Judgment: Judgment normal.     Recent Results (from the past 2016 hour(s))   Protime-INR    Collection Time: 06/15/23 12:00 AM    Specimen: Blood   Result Value Ref Range    INR 2.70    Protime-INR    Collection Time: 06/22/23 12:00 AM     Specimen: Blood   Result Value Ref Range    INR 1.90    Protime-INR    Collection Time: 06/29/23 12:00 AM    Specimen: Blood   Result Value Ref Range    INR 1.40    Protime-INR    Collection Time: 07/06/23 12:00 AM    Specimen: Blood   Result Value Ref Range    INR 2.10    Protime-INR    Collection Time: 07/13/23 12:00 AM    Specimen: Blood   Result Value Ref Range    INR 2.00    Protime-INR    Collection Time: 07/20/23 12:00 AM    Specimen: Blood   Result Value Ref Range    INR 1.90    Protime-INR    Collection Time: 07/27/23 12:00 AM    Specimen: Blood   Result Value Ref Range    INR 2.10    Adult Transthoracic Echo Complete W/ Cont if Necessary Per Protocol    Collection Time: 08/02/23  9:02 AM   Result Value Ref Range    EF(MOD-bp) 67.8 %    LV GLOBAL STRAIN  -20.0 %    LVIDd 3.2 cm    LVIDs 2.42 cm    IVSd 1.00 cm    LVPWd 1.00 cm    FS 24.3 %    IVS/LVPW 1.00 cm    ESV(cubed) 14.2 ml    LV Sys Vol (BSA corrected) 7.4 cm2    EDV(cubed) 32.8 ml    LV [place] Vol (BSA corrected) 20.5 cm2    LVOT area 2.42 cm2    LV mass(C)d 90.3 grams    LVOT diam 1.76 cm    EDV(MOD-sp2) 34.0 ml    EDV(MOD-sp4) 33.0 ml    ESV(MOD-sp2) 8.0 ml    ESV(MOD-sp4) 12.0 ml    SV(MOD-sp2) 26.0 ml    SV(MOD-sp4) 21.0 ml    SI(MOD-sp2) 16.1 ml/m2    SI(MOD-sp4) 13.0 ml/m2    EF(MOD-sp2) 76.5 %    EF(MOD-sp4) 63.6 %    MV E max shyam 164.0 cm/sec    MV dec time 0.35 msec    LA ESV Index (BP) 32.1 ml/m2    Med Peak E' Shyam 5.5 cm/sec    Lat Peak E' Shyam 5.3 cm/sec    Avg E/e' ratio 30.37     SV(LVOT) 39.4 ml    SV(RVOT) 31.1 ml    Qp/Qs 0.79     RV Base 2.31 cm    RV Mid 2.19 cm    RV Length 5.3 cm    TAPSE (>1.6) 1.96 cm    RV S' 8.4 cm/sec    Pulm Sys Shyam 50.1 cm/sec    Pulm [place] Shyam 53.6 cm/sec    Pulm S/D 0.94     LV V1 max 98.1 cm/sec    LV V1 max PG 3.8 mmHg    LV V1 mean PG 2.6 mmHg    LV V1 VTI 16.2 cm    Ao pk shyam 171.9 cm/sec    Ao max PG 11.8 mmHg    Ao mean PG 6.6 mmHg    Ao V2 VTI 25.5 cm    VERENA(I,D) 1.54 cm2    MV max PG 11.6 mmHg     MV mean PG 4.4 mmHg    MV V2 VTI 35.9 cm    MV P1/2t 93.8 msec    MVA(P1/2t) 2.34 cm2    MVA(VTI) 1.10 cm2    MV dec slope 522.5 cm/sec2    MR max lucius 316.7 cm/sec    MR max PG 40.1 mmHg    TR max lucius 313.6 cm/sec    TR max PG 39.3 mmHg    RVSP(TR) 42.3 mmHg    RAP systole 3.0 mmHg    RVOT diam 1.81 cm    RV V1 max PG 2.07 mmHg    RV V1 max 72.0 cm/sec    RV V1 VTI 12.0 cm    PA V2 max 85.5 cm/sec    PA acc time 0.08 sec    Ao root diam 2.5 cm    ACS 1.17 cm    Sinus 2.7 cm    STJ 2.5 cm    AV LVOT peak gradient 12 mmHg    LVOT peak grad valsalva 15 mmHg    Dimensionless Index 0.60 (DI)    Ascending aorta 2.7 cm    Aortic arch 2.5 cm    Abdo Ao Diam 1.7 cm    BH CV VAS BP LEFT /60 mmHg   Protime-INR    Collection Time: 08/03/23 12:00 AM    Specimen: Blood   Result Value Ref Range    INR 3.20    Protime-INR    Collection Time: 08/10/23 12:00 AM    Specimen: Blood   Result Value Ref Range    INR 3.50    Protime-INR    Collection Time: 08/18/23 12:00 AM    Specimen: Blood   Result Value Ref Range    INR 1.70    Protime-INR    Collection Time: 08/24/23 12:00 AM    Specimen: Blood   Result Value Ref Range    INR 2.00    Protime-INR    Collection Time: 08/31/23 12:00 AM    Specimen: Blood   Result Value Ref Range    INR 2.50    BMI is >= 25 and <30. (Overweight) The following options were offered after discussion;: exercise counseling/recommendations and nutrition counseling/recommendations   Assessment & Plan   Diagnoses and all orders for this visit:    1. RLS (restless legs syndrome) (Primary)    2. Chronic midline low back pain with left-sided sciatica  -     HYDROcodone-acetaminophen (NORCO) 7.5-325 MG per tablet; Take 1 tablet by mouth Every 6 (Six) Hours As Needed for Moderate Pain.  Dispense: 20 tablet; Refill: 0    3. Screening mammogram for breast cancer  -     Mammo Screening Digital Tomosynthesis Bilateral With CAD; Future    4. Postmenopausal  -     DEXA Bone Density Axial; Future    5. Overweight with  body mass index (BMI) 25.0-29.9    6. Xerosis of skin  -     ammonium lactate (AmLactin) 12 % lotion; Apply  topically to the appropriate area as directed As Needed for Dry Skin.  Dispense: 225 g; Refill: 6    Other orders  -     ondansetron (ZOFRAN) 4 MG tablet; Take 1 tablet by mouth Every 8 (Eight) Hours As Needed for Nausea or Vomiting.  Dispense: 30 tablet; Refill: 1    We discussed restless leg syndrome and is usually well-tolerated when using the pramipexole will continue at this time.  Chronic midline low back pain with sciatica.  Utilizing hydrocodone as needed.  He is using very seldomly.  Discussed risk medication.  ZHANE run and reviewed.  Risks of the medication include but are not limited to fatigue, somnolence, increased risk of falls, constipation, allergic reaction, dependence, and addiction.  Dry skin we will utilize the Lac-Hydrin lotion.           COVID-19 Precautions - Patient was compliant in wearing a mask. When I saw the patient, I used appropriate personal protective equipment (PPE) including mask and eye shield (standard procedure).  Additionally, I used gown and gloves if indicated.  Hand hygiene was completed before and after seeing the patient.  Dictated utilizing Dragon Dictation

## 2023-08-31 NOTE — PROGRESS NOTES
Anticoagulation Clinic Progress Note    Anticoagulation Summary  As of 2023      INR goal:  2.0-3.0   TTR:  56.4 % (2.5 y)   INR used for dosin.50 (2023)   Warfarin maintenance plan:  1.25 mg every Mon, Wed; 2.5 mg all other days   Weekly warfarin total:  15 mg   No change documented:  Marleny Black RPH   Plan last modified:  Zenia Myers, PharmD (2023)   Next INR check:  2023   Priority:  High   Target end date:      Indications    Atrial fibrillation  chronic [I48.20]                 Anticoagulation Episode Summary       INR check location:      Preferred lab:      Send INR reminders to:   [facility] [employer] HOME TEST POOL    Comments:   Home Testing as of 21 *call only when out of range*          Anticoagulation Care Providers       Provider Role Specialty Phone number    Zenia Tinajero MD Referring Cardiology [phone]            Clinic Interview:  No pertinent clinical findings have been reported.    INR History:      8/10/2023     3:18 PM 2023    12:00 AM 2023     9:11 AM 2023    12:00 AM 2023    12:25 PM 2023    12:00 AM 2023    11:15 AM   Anticoagulation Monitoring   INR 3.50  1.70  2.00  2.50   INR Date 8/10/2023  2023  2023  2023   INR Goal 2.0-3.0  2.0-3.0  2.0-3.0  2.0-3.0   Trend Down  Same  Up  Same   Last Week Total 13.75 mg  13.75 mg  15 mg  15 mg   Next Week Total 11.25 mg  15 mg  15 mg  15 mg   Sun 2.5 mg  2.5 mg  2.5 mg  2.5 mg   Mon 1.25 mg  1.25 mg  1.25 mg  1.25 mg   Tue 2.5 mg  2.5 mg  2.5 mg  2.5 mg   Wed 1.25 mg  1.25 mg  1.25 mg  1.25 mg   Thu Hold (8/10)  2.5 mg  2.5 mg  2.5 mg   Fri 1.25 mg  2.5 mg ()  2.5 mg ()  2.5 mg   Sat 2.5 mg  2.5 mg  2.5 mg  2.5 mg   Historical INR  1.70      2.00      2.50            This result is from an external source.       Plan:  1. INR is therapeutic today- see above in Anticoagulation Summary.    Shani Phillip to continue their warfarin regimen- see above in  Anticoagulation Summary.  2. Follow up in 1 week  3. Pt has agreed to only be called if INR out of range. They have been instructed to call if any changes in medications, doses, concerns, etc. Patient expresses understanding and has no further questions at this time.    Marleny Black, [facility]

## 2023-09-08 ENCOUNTER — ANTICOAGULATION VISIT (OUTPATIENT)
Dept: PHARMACY | Facility: HOSPITAL | Age: 73
End: 2023-09-08
Payer: MEDICARE

## 2023-09-08 DIAGNOSIS — I48.20 ATRIAL FIBRILLATION, CHRONIC: Primary | Chronic | ICD-10-CM

## 2023-09-08 LAB — INR PPP: 2

## 2023-09-08 NOTE — PROGRESS NOTES
Anticoagulation Clinic Progress Note    Anticoagulation Summary  As of 2023      INR goal:  2.0-3.0   TTR:  56.8 % (2.5 y)   INR used for dosin.00 (2023)   Warfarin maintenance plan:  1.25 mg every Mon, Wed; 2.5 mg all other days   Weekly warfarin total:  15 mg   No change documented:  Marleny Black RPH   Plan last modified:  Zenia Myers, PharmD (2023)   Next INR check:  9/15/2023   Priority:  High   Target end date:      Indications    Atrial fibrillation  chronic [I48.20]                 Anticoagulation Episode Summary       INR check location:      Preferred lab:      Send INR reminders to:  [facility] [employer] HOME TEST POOL    Comments:   Home Testing as of 21 *call only when out of range*          Anticoagulation Care Providers       Provider Role Specialty Phone number    Zenia Tinajero MD Referring Cardiology [phone]            Clinic Interview:  No pertinent clinical findings have been reported.    INR History:      2023     9:11 AM 2023    12:00 AM 2023    12:25 PM 2023    12:00 AM 2023    11:15 AM 2023    12:00 AM 2023    11:31 AM   Anticoagulation Monitoring   INR 1.70  2.00  2.50  2.00   INR Date 2023   INR Goal 2.0-3.0  2.0-3.0  2.0-3.0  2.0-3.0   Trend Same  Up  Same  Same   Last Week Total 13.75 mg  15 mg  15 mg  15 mg   Next Week Total 15 mg  15 mg  15 mg  15 mg   Sun 2.5 mg  2.5 mg  2.5 mg  2.5 mg   Mon 1.25 mg  1.25 mg  1.25 mg  1.25 mg   Tue 2.5 mg  2.5 mg  2.5 mg  2.5 mg   Wed 1.25 mg  1.25 mg  1.25 mg  1.25 mg   Thu 2.5 mg  2.5 mg  2.5 mg  2.5 mg   Fri 2.5 mg ()  2.5 mg ()  2.5 mg  2.5 mg   Sat 2.5 mg  2.5 mg  2.5 mg  2.5 mg   Historical INR  2.00      2.50      2.00        Visit Report    Report Report         This result is from an external source.       Plan:  1. INR is therapeutic today- see above in Anticoagulation Summary.    Shani Phillip to continue their warfarin regimen-  see above in Anticoagulation Summary.  2. Follow up in 1 week  3. Pt has agreed to only be called if INR out of range. They have been instructed to call if any changes in medications, doses, concerns, etc. Patient expresses understanding and has no further questions at this time.    Marleny Black [facility]

## 2023-09-14 ENCOUNTER — ANTICOAGULATION VISIT (OUTPATIENT)
Dept: PHARMACY | Facility: HOSPITAL | Age: 73
End: 2023-09-14
Payer: MEDICARE

## 2023-09-14 DIAGNOSIS — I48.20 ATRIAL FIBRILLATION, CHRONIC: Primary | Chronic | ICD-10-CM

## 2023-09-14 LAB — INR PPP: 1.6

## 2023-09-14 NOTE — PROGRESS NOTES
Anticoagulation Clinic Progress Note    Anticoagulation Summary  As of 2023      INR goal:  2.0-3.0   TTR:  56.4 % (2.5 y)   INR used for dosin.60 (2023)   Warfarin maintenance plan:  1.25 mg every Mon, Wed; 2.5 mg all other days   Weekly warfarin total:  15 mg   Plan last modified:  Zenia Myers, PharmD (2023)   Next INR check:  2023   Priority:  High   Target end date:      Indications    Atrial fibrillation  chronic [I48.20]                 Anticoagulation Episode Summary       INR check location:      Preferred lab:      Send INR reminders to:   [facility] [employer] HOME TEST POOL    Comments:   Home Testing as of 21 *call only when out of range*          Anticoagulation Care Providers       Provider Role Specialty Phone number    Zenia Tinajero MD Referring Cardiology [phone]            Clinic Interview:  Patient Findings     Negatives:  Signs/symptoms of thrombosis, Signs/symptoms of bleeding,   Laboratory test error suspected, Change in health, Change in alcohol use,   Change in activity, Upcoming invasive procedure, Emergency department   visit, Upcoming dental procedure, Missed doses, Extra doses, Change in   medications, Change in diet/appetite, Hospital admission, Bruising, Other   complaints      Clinical Outcomes     Negatives:  Major bleeding event, Thromboembolic event,   Anticoagulation-related hospital admission, Anticoagulation-related ED   visit, Anticoagulation-related fatality        INR History:      2023    12:25 PM 2023    12:00 AM 2023    11:15 AM 2023    12:00 AM 2023    11:31 AM 2023    12:00 AM 2023    11:23 AM   Anticoagulation Monitoring   INR 2.00  2.50  2.00  1.60   INR Date 2023   INR Goal 2.0-3.0  2.0-3.0  2.0-3.0  2.0-3.0   Trend Up  Same  Same  Same   Last Week Total 15 mg  15 mg  15 mg  15 mg   Next Week Total 15 mg  15 mg  15 mg  16.25 mg   Sun 2.5 mg  2.5 mg  2.5 mg  2.5  mg   Mon 1.25 mg  1.25 mg  1.25 mg  1.25 mg   Tue 2.5 mg  2.5 mg  2.5 mg  2.5 mg   Wed 1.25 mg  1.25 mg  1.25 mg  1.25 mg   Thu 2.5 mg  2.5 mg  2.5 mg  3.75 mg (9/14)   Fri 2.5 mg (8/25)  2.5 mg  2.5 mg  2.5 mg   Sat 2.5 mg  2.5 mg  2.5 mg  2.5 mg   Historical INR  2.50      2.00      1.60        Visit Report  Report Report           This result is from an external source.       Plan:  1. INR is Subtherapeutic today- see above in Anticoagulation Summary.   Will instruct Shani Phillip to Change their warfarin regimen- see above in Anticoagulation Summary.  2. Follow up in 1 weeks  3.  They have been instructed to call if any changes in medications, doses, concerns, etc. Patient expresses understanding and has no further questions at this time.    Zenia Myers, PharmD

## 2023-09-21 ENCOUNTER — ANTICOAGULATION VISIT (OUTPATIENT)
Dept: PHARMACY | Facility: HOSPITAL | Age: 73
End: 2023-09-21
Payer: MEDICARE

## 2023-09-21 DIAGNOSIS — I48.20 ATRIAL FIBRILLATION, CHRONIC: Primary | Chronic | ICD-10-CM

## 2023-09-21 LAB — INR PPP: 2.4

## 2023-09-21 NOTE — PROGRESS NOTES
Anticoagulation Clinic Progress Note    Anticoagulation Summary  As of 2023      INR goal:  2.0-3.0   TTR:  56.3 % (2.5 y)   INR used for dosin.40 (2023)   Warfarin maintenance plan:  1.25 mg every Wed; 2.5 mg all other days   Weekly warfarin total:  16.25 mg   Plan last modified:  Marleny Black RPH (2023)   Next INR check:  2023   Priority:  High   Target end date:      Indications    Atrial fibrillation  chronic [I48.20]                 Anticoagulation Episode Summary       INR check location:      Preferred lab:      Send INR reminders to:   [facility] [employer] HOME TEST POOL    Comments:   Home Testing as of 21 *call only when out of range*          Anticoagulation Care Providers       Provider Role Specialty Phone number    Zenia Tinajero MD Referring Cardiology [phone]            Clinic Interview:  Patient Findings     Negatives:  Signs/symptoms of thrombosis, Signs/symptoms of bleeding,   Laboratory test error suspected, Change in health, Change in alcohol use,   Change in activity, Upcoming invasive procedure, Emergency department   visit, Upcoming dental procedure, Missed doses, Extra doses, Change in   medications, Change in diet/appetite, Hospital admission, Bruising, Other   complaints      Clinical Outcomes     Negatives:  Major bleeding event, Thromboembolic event,   Anticoagulation-related hospital admission, Anticoagulation-related ED   visit, Anticoagulation-related fatality        INR History:      2023    11:15 AM 2023    12:00 AM 2023    11:31 AM 2023    12:00 AM 2023    11:23 AM 2023    12:00 AM 2023     9:31 AM   Anticoagulation Monitoring   INR 2.50  2.00  1.60  2.40   INR Date 2023   INR Goal 2.0-3.0  2.0-3.0  2.0-3.0  2.0-3.0   Trend Same  Same  Same  Up   Last Week Total 15 mg  15 mg  15 mg  16.25 mg   Next Week Total 15 mg  15 mg  16.25 mg  16.25 mg   Sun 2.5 mg  2.5 mg  2.5 mg  2.5  mg   Mon 1.25 mg  1.25 mg  1.25 mg  2.5 mg   Tue 2.5 mg  2.5 mg  2.5 mg  2.5 mg   Wed 1.25 mg  1.25 mg  1.25 mg  1.25 mg   Thu 2.5 mg  2.5 mg  3.75 mg (9/14)  2.5 mg   Fri 2.5 mg  2.5 mg  2.5 mg  2.5 mg   Sat 2.5 mg  2.5 mg  2.5 mg  2.5 mg   Historical INR  2.00      1.60      2.40        Visit Report Report             This result is from an external source.       Plan:  1. INR is Therapeutic today- see above in Anticoagulation Summary.   Will instruct Shani Phillip to Change their warfarin regimen- see above in Anticoagulation Summary.   trial on 1.25 wed, 2.5 mg sheeba KEARNS 1 week   2. Follow up in 1 weeks  3.  They have been instructed to call if any changes in medications, doses, concerns, etc. Patient expresses understanding and has no further questions at this time.    Marleny Black [facility]

## 2023-09-28 ENCOUNTER — ANTICOAGULATION VISIT (OUTPATIENT)
Dept: PHARMACY | Facility: HOSPITAL | Age: 73
End: 2023-09-28
Payer: MEDICARE

## 2023-09-28 DIAGNOSIS — I48.20 ATRIAL FIBRILLATION, CHRONIC: Primary | Chronic | ICD-10-CM

## 2023-09-28 LAB — INR PPP: 2.8

## 2023-09-28 PROCEDURE — G0249 PROVIDE INR TEST MATER/EQUIP: HCPCS

## 2023-09-28 NOTE — PROGRESS NOTES
Anticoagulation Clinic Progress Note    Anticoagulation Summary  As of 2023      INR goal:  2.0-3.0   TTR:  56.7 % (2.6 y)   INR used for dosin.80 (2023)   Warfarin maintenance plan:  1.25 mg every Wed; 2.5 mg all other days   Weekly warfarin total:  16.25 mg   No change documented:  Marleny Black RPH   Plan last modified:  Marleny Black RPH (2023)   Next INR check:  10/5/2023   Priority:  High   Target end date:      Indications    Atrial fibrillation  chronic [I48.20]                 Anticoagulation Episode Summary       INR check location:      Preferred lab:      Send INR reminders to:   [facility] [employer] HOME TEST POOL    Comments:   Home Testing as of 21 *call only when out of range*          Anticoagulation Care Providers       Provider Role Specialty Phone number    Zenia Tinajero MD Referring Cardiology [phone]            Clinic Interview:  No pertinent clinical findings have been reported.    INR History:      2023    11:31 AM 2023    12:00 AM 2023    11:23 AM 2023    12:00 AM 2023     9:31 AM 2023    12:00 AM 2023    11:15 AM   Anticoagulation Monitoring   INR 2.00  1.60  2.40  2.80   INR Date 2023   INR Goal 2.0-3.0  2.0-3.0  2.0-3.0  2.0-3.0   Trend Same  Same  Up  Same   Last Week Total 15 mg  15 mg  16.25 mg  16.25 mg   Next Week Total 15 mg  16.25 mg  16.25 mg  16.25 mg   Sun 2.5 mg  2.5 mg  2.5 mg  2.5 mg   Mon 1.25 mg  1.25 mg  2.5 mg  2.5 mg   Tue 2.5 mg  2.5 mg  2.5 mg  2.5 mg   Wed 1.25 mg  1.25 mg  1.25 mg  1.25 mg   Thu 2.5 mg  3.75 mg ()  2.5 mg  2.5 mg   Fri 2.5 mg  2.5 mg  2.5 mg  2.5 mg   Sat 2.5 mg  2.5 mg  2.5 mg  2.5 mg   Historical INR  1.60      2.40      2.80            This result is from an external source.       Plan:  1. INR is therapeutic today- see above in Anticoagulation Summary.    Shani Phillip to continue their warfarin regimen- see above in Anticoagulation  Summary.  2. Follow up in 1 week  3. Pt has agreed to only be called if INR out of range. They have been instructed to call if any changes in medications, doses, concerns, etc. Patient expresses understanding and has no further questions at this time.    Marleny Black, [facility]

## 2023-10-05 ENCOUNTER — ANTICOAGULATION VISIT (OUTPATIENT)
Dept: PHARMACY | Facility: HOSPITAL | Age: 73
End: 2023-10-05
Payer: MEDICARE

## 2023-10-05 ENCOUNTER — OFFICE VISIT (OUTPATIENT)
Dept: FAMILY MEDICINE CLINIC | Facility: CLINIC | Age: 73
End: 2023-10-05
Payer: MEDICARE

## 2023-10-05 VITALS
WEIGHT: 140.2 LBS | TEMPERATURE: 97.7 F | SYSTOLIC BLOOD PRESSURE: 118 MMHG | OXYGEN SATURATION: 92 % | BODY MASS INDEX: 26.47 KG/M2 | HEART RATE: 99 BPM | DIASTOLIC BLOOD PRESSURE: 54 MMHG | HEIGHT: 61 IN

## 2023-10-05 DIAGNOSIS — Z00.00 MEDICARE ANNUAL WELLNESS VISIT, SUBSEQUENT: Primary | ICD-10-CM

## 2023-10-05 DIAGNOSIS — I48.20 ATRIAL FIBRILLATION, CHRONIC: Primary | Chronic | ICD-10-CM

## 2023-10-05 LAB — INR PPP: 1.9

## 2023-10-05 PROCEDURE — G0439 PPPS, SUBSEQ VISIT: HCPCS | Performed by: INTERNAL MEDICINE

## 2023-10-05 PROCEDURE — 1159F MED LIST DOCD IN RCRD: CPT | Performed by: INTERNAL MEDICINE

## 2023-10-05 PROCEDURE — 1160F RVW MEDS BY RX/DR IN RCRD: CPT | Performed by: INTERNAL MEDICINE

## 2023-10-05 PROCEDURE — 1170F FXNL STATUS ASSESSED: CPT | Performed by: INTERNAL MEDICINE

## 2023-10-05 RX ORDER — WARFARIN SODIUM 2.5 MG/1
TABLET ORAL
Qty: 85 TABLET | Refills: 1 | Status: SHIPPED | OUTPATIENT
Start: 2023-10-05

## 2023-10-05 RX ORDER — FAMOTIDINE 20 MG/1
TABLET, FILM COATED ORAL
COMMUNITY
Start: 2023-10-01

## 2023-10-05 RX ORDER — TROSPIUM CHLORIDE 20 MG/1
20 TABLET, FILM COATED ORAL 2 TIMES DAILY
Qty: 90 TABLET | Refills: 1 | Status: SHIPPED | OUTPATIENT
Start: 2023-10-05

## 2023-10-05 NOTE — PROGRESS NOTES
Anticoagulation Clinic Progress Note    Anticoagulation Summary  As of 10/5/2023      INR goal:  2.0-3.0   TTR:  56.9 % (2.6 y)   INR used for dosin.90 (10/5/2023)   Warfarin maintenance plan:  1.25 mg every Wed; 2.5 mg all other days   Weekly warfarin total:  16.25 mg   No change documented:  Marleny Black RPH   Plan last modified:  Marleny Black RPH (2023)   Next INR check:  10/12/2023   Priority:  High   Target end date:      Indications    Atrial fibrillation  chronic [I48.20]                 Anticoagulation Episode Summary       INR check location:      Preferred lab:      Send INR reminders to:   [facility] [employer] HOME TEST POOL    Comments:   Home Testing as of 21 *call only when out of range*          Anticoagulation Care Providers       Provider Role Specialty Phone number    Zenia Tinajero MD Referring Cardiology [phone]            Clinic Interview:  Patient Findings     Negatives:  Signs/symptoms of thrombosis, Signs/symptoms of bleeding,   Laboratory test error suspected, Change in health, Change in alcohol use,   Change in activity, Upcoming invasive procedure, Emergency department   visit, Upcoming dental procedure, Missed doses, Extra doses, Change in   medications, Change in diet/appetite, Hospital admission, Bruising, Other   complaints      Clinical Outcomes     Negatives:  Major bleeding event, Thromboembolic event,   Anticoagulation-related hospital admission, Anticoagulation-related ED   visit, Anticoagulation-related fatality        INR History:      2023    11:23 AM 2023    12:00 AM 2023     9:31 AM 2023    12:00 AM 2023    11:15 AM 10/5/2023    12:00 AM 10/5/2023     9:23 AM   Anticoagulation Monitoring   INR 1.60  2.40  2.80  1.90   INR Date 2023  2023  2023  10/5/2023   INR Goal 2.0-3.0  2.0-3.0  2.0-3.0  2.0-3.0   Trend Same  Up  Same  Same   Last Week Total 15 mg  16.25 mg  16.25 mg  16.25 mg   Next Week Total 16.25 mg   16.25 mg  16.25 mg  16.25 mg   Sun 2.5 mg  2.5 mg  2.5 mg  2.5 mg   Mon 1.25 mg  2.5 mg  2.5 mg  2.5 mg   Tue 2.5 mg  2.5 mg  2.5 mg  2.5 mg   Wed 1.25 mg  1.25 mg  1.25 mg  1.25 mg   Thu 3.75 mg (9/14)  2.5 mg  2.5 mg  2.5 mg   Fri 2.5 mg  2.5 mg  2.5 mg  2.5 mg   Sat 2.5 mg  2.5 mg  2.5 mg  2.5 mg   Historical INR  2.40      2.80      1.90        Visit Report      Report Report       This result is from an external source.       Plan:  1. INR is Subtherapeutic today- see above in Anticoagulation Summary.   Will instruct Shani Phillip to Continue their warfarin regimen as INR is very close to goal- see above in Anticoagulation Summary.  2. Follow up in 1 weeks  3. They have been instructed to call if any changes in medications, doses, concerns, etc. Patient expresses understanding and has no further questions at this time.    Marleny Black [facility]

## 2023-10-05 NOTE — PATIENT INSTRUCTIONS
Medicare Wellness  Personal Prevention Plan of Service     Date of Office Visit:    Encounter Provider:  Rodríguez Walker MD  Place of Service:  [facility]  Patient Name: Shani Phillip YOB: 1950    As part of the Medicare Wellness portion of your visit today, we are providing you with this personalized preventive plan of services (PPPS). This plan is based upon recommendations of the [place] Preventive Services Task Force (USPSTF) and the Advisory Committee on Immunization Practices (ACIP).    This lists the preventive care services that should be considered, and provides dates of when you are due. Items listed as completed are up-to-date and do not require any further intervention.    Health Maintenance   Topic Date Due    TDAP/TD VACCINES (1 - Tdap) Never done    ZOSTER VACCINE (1 of 2) Never done    INFLUENZA VACCINE  2023    MAMMOGRAM  08/10/2023    DXA SCAN  08/10/2023    COVID-19 Vaccine (4 - -24 season) 2023    BMI FOLLOWUP  2024    ANNUAL WELLNESS VISIT  10/05/2024    COLORECTAL CANCER SCREENING  2025    HEPATITIS C SCREENING  Completed    Pneumococcal Vaccine 65+  Completed    LUNG CANCER SCREENING  Discontinued       No orders of the defined types were placed in this encounter.      Return in about 1 year (around 10/5/2024) for Medicare Wellness.

## 2023-10-05 NOTE — PROGRESS NOTES
The ABCs of the Annual Wellness Visit  Subsequent Medicare Wellness Visit    Subjective    Shani Phillip is a 73 y.o. female who presents for a Subsequent Medicare Wellness Visit.    History of RLS, chronic A-fib, diastolic CHF, COPD with chronic hypoxic respiratory failure/oxygen dependent and CKD 3, chronic back pain with sciatica for which she uses the hydrocodone rarely (maybe once a week.  Patient with history of lap band and should not vomit.     The following portions of the patient's history were reviewed and   updated as appropriate: allergies, current medications, past family history, past medical history, past social history, past surgical history, and problem list.    Compared to one year ago, the patient feels her physical   health is the same.    Compared to one year ago, the patient feels her mental   health is the same.    Recent Hospitalizations:  This patient has had a [facility] admission record on file within the last 365 days.  Admission to [facility] 6/4/2023 with chest pain and unremarkable stress test.    Current Medical Providers:  Patient Care Team:  Rodríguez Walker MD as PCP - General (Internal Medicine)  Mike Atkins MD as Surgeon (General Surgery)  Hayes Briones MD as Surgeon (Cardiothoracic Surgery)  Zenia Tinajero MD as Consulting Physician (Cardiology)  Clover Zamora MD as Consulting Physician (Pulmonary Disease)    Outpatient Medications Prior to Visit   Medication Sig Dispense Refill    ammonium lactate (AmLactin) 12 % lotion Apply  topically to the appropriate area as directed As Needed for Dry Skin. 225 g 6    budesonide (PULMICORT) 0.5 MG/2ML nebulizer solution Take 2 mL by nebulization Daily. 60 each 1    buPROPion XL (WELLBUTRIN XL) 300 MG 24 hr tablet Take 1 tablet by mouth Daily. 90 tablet 1    cholecalciferol (VITAMIN D3) 25 MCG (1000 UT) tablet Take 2 tablets by mouth Daily.      cyclobenzaprine (FLEXERIL) 10 MG tablet Take 1 tablet by mouth 3  (Three) Times a Day As Needed (back pain). 40 tablet 1    digoxin (LANOXIN) 125 MCG tablet Take 1 tablet by mouth Every Other Day. 90 tablet 2    famotidine (PEPCID) 20 MG tablet       furosemide (LASIX) 40 MG tablet TAKE 1 TABLET BY MOUTH EVERY DAY 90 tablet 3    HYDROcodone-acetaminophen (NORCO) 7.5-325 MG per tablet Take 1 tablet by mouth Every 6 (Six) Hours As Needed for Moderate Pain. 20 tablet 0    ipratropium-albuterol (DUO-NEB) 0.5-2.5 mg/3 ml nebulizer Take 3 mL by nebulization Every 4 (Four) Hours As Needed for Wheezing. 360 mL 1    O2 (OXYGEN) Inhale 2 L/min 1 (One) Time.      ondansetron (ZOFRAN) 4 MG tablet Take 1 tablet by mouth Every 8 (Eight) Hours As Needed for Nausea or Vomiting. 30 tablet 1    pantoprazole (PROTONIX) 40 MG EC tablet Take 1 tablet by mouth Daily. 90 tablet 1    potassium chloride ER (K-TAB) 20 MEQ tablet controlled-release ER tablet TAKE 1 TABLET BY MOUTH EVERY DAY 90 tablet 3    pramipexole (MIRAPEX) 0.5 MG tablet Take 1 tablet by mouth 2 (Two) Times a Day. 180 tablet 1    revefenacin (Yupelri) 175 MCG/3ML nebulizer solution Take 3 mL by nebulization Daily.      warfarin (COUMADIN) 2.5 MG tablet Take one-half of a tablet by mouth on wed, take one tablet by mouth all other days or as directed 85 tablet 1    ketoconazole (NIZORAL) 2 % shampoo WASH SCALP TWICE WEEKLY. LET SIT FOR 5 MIN THEN RINSE OUT      TROSPIUM CHLORIDE PO Take 20 mg by mouth 2 (Two) Times a Day.       No facility-administered medications prior to visit.       Opioid medication/s are on active medication list.  and I have evaluated her active treatment plan and pain score trends (see table).  There were no vitals filed for this visit.  I have reviewed the chart for potential of high risk medication and harmful drug interactions in the elderly.        Aspirin is not on active medication list.  Aspirin use is not indicated based on review of current medical condition/s. Risk of harm outweighs potential benefits.   ".    Patient Active Problem List   Diagnosis    Influenza    Thrush, oral    COPD (chronic obstructive pulmonary disease)    Atrial fibrillation with RVR    Chronic respiratory failure with hypoxia    Endocarditis of mitral valve    Mitral valve vegetation    Chronic diastolic CHF (congestive heart failure)    Atrial fibrillation, chronic    Bilateral lower extremity edema    Intermittent lightheadedness    Depression    Chronic midline low back pain with left-sided sciatica    Nephrolithiasis    Oxygen dependent    Medicare annual wellness visit, subsequent    Osteoporosis    CKD (chronic kidney disease) stage 3, GFR 30-59 ml/min    Chronic anticoagulation    Nonrheumatic mitral valve stenosis    Chest pain, unspecified type    Chronic heart failure with preserved ejection fraction (HFpEF)    Overweight with body mass index (BMI) 25.0-29.9     Advance Care Planning   Advance Care Planning     Advance Directive is on file.  ACP discussion was held with the patient during this visit. Patient has an advance directive in EMR which is still valid.      Objective    Vitals:    10/05/23 0917   BP: 118/54   BP Location: Left arm   Patient Position: Sitting   Cuff Size: Adult   Pulse: 99   Temp: 97.7 °F (36.5 °C)   TempSrc: Temporal   SpO2: 92%   Weight: 63.6 kg (140 lb 3.2 oz)   Height: 154.9 cm (60.98\")     Estimated body mass index is 26.5 kg/m² as calculated from the following:    Height as of this encounter: 154.9 cm (60.98\").    Weight as of this encounter: 63.6 kg (140 lb 3.2 oz).    Does the patient have evidence of cognitive impairment? No        HEALTH RISK ASSESSMENT    Smoking Status:  Social History     Tobacco Use   Smoking Status Former    Packs/day: 0.50    Years: 50.00    Pack years: 25.00    Types: Cigarettes    Quit date: 11/3/2019    Years since quitting: 3.9   Smokeless Tobacco Never   Tobacco Comments    LAST CIG NOV 02, 2019     Alcohol Consumption:  Social History     Substance and Sexual Activity "   Alcohol Use No    Comment: caffeine - 1/2 cup coffee daily      Fall Risk Screen:    ROSSI Fall Risk Assessment was completed, and patient is at MODERATE risk for falls. Assessment completed on:10/5/2023  Depression Screening:      10/5/2023     9:22 AM   PHQ-2/PHQ-9 Depression Screening   Little Interest or Pleasure in Doing Things 0-->not at all   Feeling Down, Depressed or Hopeless 0-->not at all   PHQ-9: Brief Depression Severity Measure Score 0     Health Habits and Functional and Cognitive Screening:      10/5/2023     9:21 AM   Functional & Cognitive Status   Do you have difficulty preparing food and eating? Yes   Do you have difficulty bathing yourself, getting dressed or grooming yourself? No   Do you have difficulty using the toilet? No   Do you have difficulty moving around from place to place? No   Do you have trouble with steps or getting out of a bed or a chair? Yes   Current Diet Well Balanced Diet   Dental Exam Up to date   Eye Exam Up to date   Exercise (times per week) 0 times per week   Current Exercises Include No Regular Exercise   Do you need help using the phone?  No   Are you deaf or do you have serious difficulty hearing?  No   Do you need help to go to places out of walking distance? Yes   Do you need help shopping? No   Do you need help preparing meals?  Yes   Do you need help with housework?  Yes   Do you need help with laundry? Yes   Do you need help taking your medications? No   Do you need help managing money? No   Do you ever drive or ride in a car without wearing a seat belt? No   Have you felt unusual stress, anger or loneliness in the last month? No   Who do you live with? Spouse   If you need help, do you have trouble finding someone available to you? No   Have you been bothered in the last four weeks by sexual problems? No   Do you have difficulty concentrating, remembering or making decisions? No    cares for patient in the home    Age-appropriate Screening  Schedule:  Refer to the list below for future screening recommendations based on patient's age, sex and/or medical conditions. Orders for these recommended tests are listed in the plan section. The patient has been provided with a written plan.    Health Maintenance   Topic Date Due    TDAP/TD VACCINES (1 - Tdap) Never done    ZOSTER VACCINE (1 of 2) Never done    INFLUENZA VACCINE  08/01/2023    MAMMOGRAM  08/10/2023    DXA SCAN  08/10/2023    COVID-19 Vaccine (4 - 2023-24 season) 09/01/2023    BMI FOLLOWUP  08/31/2024    ANNUAL WELLNESS VISIT  10/05/2024    COLORECTAL CANCER SCREENING  03/21/2025    HEPATITIS C SCREENING  Completed    Pneumococcal Vaccine 65+  Completed    LUNG CANCER SCREENING  Discontinued        CMS Preventative Services Quick Reference  Risk Factors Identified During Encounter  Immunizations Discussed/Encouraged: Tdap, Shingrix, COVID19, and RSV  The above risks/problems have been discussed with the patient.  Pertinent information has been shared with the patient in the After Visit Summary.  An After Visit Summary and PPPS were made available to the patient.    Reviewed history and annual wellness visit with patient during office time.  Medications reviewed as appropriate.  Discussed advanced directives and living will.  Patient has living will: Living will: Directive already scanned in chart.  Discussed fall risk and precautions encourage removing throw rugs and using grab bars within the home and bathroom.  Will check the labs as ordered above to evaluate the blood sugars, kidney, liver, cholesterol for screening.  Discussed flu shot recommended to get the high-dose influenza vaccine annually in the fall.  The patient has started, but not completed, their COVID-19 vaccination series..  Prevnar-13 and pneumovax-23 up to date and appropriate.  Tdap, COVID booster, RSV, and Shingrix vaccination series recommended.  Encourage follow-up with the eye doctor on annual basis for glaucoma evaluation.   Discussed weight and encouraged exercise as tolerated while following a healthy diet.  Colon cancer screening discussed and current status: marlin completed 3/21/2022 with repeat after 3 years recommended.  Recommend to get annual mammograms and patient has mammogram and bone density apparently already scheduled.  History of past smoking for which she has not smoked any cigarettes since 2019 and formerly a 25-pack-year smoker.  Followed by and to follow up with pulmonology and cardiology.        Follow Up:   Next Medicare Wellness visit to be scheduled in 1 year.

## 2023-10-12 ENCOUNTER — ANTICOAGULATION VISIT (OUTPATIENT)
Dept: PHARMACY | Facility: HOSPITAL | Age: 73
End: 2023-10-12
Payer: MEDICARE

## 2023-10-12 DIAGNOSIS — I48.20 ATRIAL FIBRILLATION, CHRONIC: Primary | Chronic | ICD-10-CM

## 2023-10-12 LAB — INR PPP: 2.6

## 2023-10-12 NOTE — PROGRESS NOTES
Anticoagulation Clinic Progress Note    Anticoagulation Summary  As of 10/12/2023      INR goal:  2.0-3.0   TTR:  57.1% (2.6 y)   INR used for dosin.60 (10/12/2023)   Warfarin maintenance plan:  1.25 mg every Wed; 2.5 mg all other days   Weekly warfarin total:  16.25 mg   No change documented:  Zenia Myers, PharmD   Plan last modified:  Marleny Black RPH (2023)   Next INR check:  10/19/2023   Priority:  High   Target end date:      Indications    Atrial fibrillation  chronic [I48.20]                 Anticoagulation Episode Summary       INR check location:      Preferred lab:      Send INR reminders to:   [facility] HOME TEST POOL    Comments:   Home Testing as of 21 *call only when out of range*          Anticoagulation Care Providers       Provider Role Specialty Phone number    Zenia Tinajero MD Referring Cardiology [phone]            Clinic Interview:  No pertinent clinical findings have been reported.    INR History:      2023     9:31 AM 2023    12:00 AM 2023    11:15 AM 10/5/2023    12:00 AM 10/5/2023     9:23 AM 10/12/2023    12:00 AM 10/12/2023    10:56 AM   Anticoagulation Monitoring   INR 2.40  2.80  1.90  2.60   INR Date 2023  2023  10/5/2023  10/12/2023   INR Goal 2.0-3.0  2.0-3.0  2.0-3.0  2.0-3.0   Trend Up  Same  Same  Same   Last Week Total 16.25 mg  16.25 mg  16.25 mg  16.25 mg   Next Week Total 16.25 mg  16.25 mg  16.25 mg  16.25 mg   Sun 2.5 mg  2.5 mg  2.5 mg  2.5 mg   Mon 2.5 mg  2.5 mg  2.5 mg  2.5 mg   Tue 2.5 mg  2.5 mg  2.5 mg  2.5 mg   Wed 1.25 mg  1.25 mg  1.25 mg  1.25 mg   Thu 2.5 mg  2.5 mg  2.5 mg  2.5 mg   Fri 2.5 mg  2.5 mg  2.5 mg  2.5 mg   Sat 2.5 mg  2.5 mg  2.5 mg  2.5 mg   Historical INR  2.80      1.90      2.60        Visit Report    Report Report         This result is from an external source.       Plan:  1. INR is therapeutic today- see above in Anticoagulation Summary.    Shani Phillip to continue their warfarin  regimen- see above in Anticoagulation Summary.  2. Follow up in 1 week  3. Pt has agreed to only be called if INR out of range. They have been instructed to call if any changes in medications, doses, concerns, etc. Patient expresses understanding and has no further questions at this time.    Zenia Myers, PharmD

## 2023-10-19 ENCOUNTER — ANTICOAGULATION VISIT (OUTPATIENT)
Dept: PHARMACY | Facility: HOSPITAL | Age: 73
End: 2023-10-19
Payer: MEDICARE

## 2023-10-19 DIAGNOSIS — I48.20 ATRIAL FIBRILLATION, CHRONIC: Primary | Chronic | ICD-10-CM

## 2023-10-19 LAB — INR PPP: 1.7

## 2023-10-19 NOTE — PROGRESS NOTES
Anticoagulation Clinic Progress Note    Anticoagulation Summary  As of 10/19/2023      INR goal:  2.0-3.0   TTR:  57.2% (2.6 y)   INR used for dosin.70 (10/19/2023)   Warfarin maintenance plan:  1.25 mg every Wed; 2.5 mg all other days   Weekly warfarin total:  16.25 mg   Plan last modified:  Marleny Black RPH (2023)   Next INR check:  10/26/2023   Priority:  High   Target end date:      Indications    Atrial fibrillation  chronic [I48.20]                 Anticoagulation Episode Summary       INR check location:      Preferred lab:      Send INR reminders to:   [facility] HOME TEST POOL    Comments:   Home Testing as of 21 *call only when out of range*          Anticoagulation Care Providers       Provider Role Specialty Phone number    Zenia Tinajero MD Referring Cardiology [phone]            Clinic Interview:  Patient Findings     Negatives:  Signs/symptoms of thrombosis, Signs/symptoms of bleeding,   Laboratory test error suspected, Change in health, Change in alcohol use,   Change in activity, Upcoming invasive procedure, Emergency department   visit, Upcoming dental procedure, Missed doses, Extra doses, Change in   medications, Change in diet/appetite, Hospital admission, Bruising, Other   complaints      Clinical Outcomes     Negatives:  Major bleeding event, Thromboembolic event,   Anticoagulation-related hospital admission, Anticoagulation-related ED   visit, Anticoagulation-related fatality        INR History:      2023    11:15 AM 10/5/2023    12:00 AM 10/5/2023     9:23 AM 10/12/2023    12:00 AM 10/12/2023    10:56 AM 10/19/2023    12:00 AM 10/19/2023    11:19 AM   Anticoagulation Monitoring   INR 2.80  1.90  2.60  1.70   INR Date 2023  10/5/2023  10/12/2023  10/19/2023   INR Goal 2.0-3.0  2.0-3.0  2.0-3.0  2.0-3.0   Trend Same  Same  Same  Same   Last Week Total 16.25 mg  16.25 mg  16.25 mg  16.25 mg   Next Week Total 16.25 mg  16.25 mg  16.25 mg  17.5 mg   Sun  2.5 mg  2.5 mg  2.5 mg  2.5 mg   Mon 2.5 mg  2.5 mg  2.5 mg  2.5 mg   Tue 2.5 mg  2.5 mg  2.5 mg  2.5 mg   Wed 1.25 mg  1.25 mg  1.25 mg  1.25 mg   Thu 2.5 mg  2.5 mg  2.5 mg  3.75 mg (10/19)   Fri 2.5 mg  2.5 mg  2.5 mg  2.5 mg   Sat 2.5 mg  2.5 mg  2.5 mg  2.5 mg   Historical INR  1.90      2.60      1.70        Visit Report  Report Report           This result is from an external source.       Plan:  1. INR is Subtherapeutic today- see above in Anticoagulation Summary.   Will instruct Shani Phillip to Increase their warfarin regimen- see above in Anticoagulation Summary.  Boost to 3.75 mg today, then resume 1.25 mg on wed, 2.5 mg AOD, rck 1 week   2. Follow up in 1 weeks  3.They have been instructed to call if any changes in medications, doses, concerns, etc. Patient expresses understanding and has no further questions at this time.    Marleny Black [facility]

## 2023-10-26 ENCOUNTER — ANTICOAGULATION VISIT (OUTPATIENT)
Dept: PHARMACY | Facility: HOSPITAL | Age: 73
End: 2023-10-26
Payer: MEDICARE

## 2023-10-26 DIAGNOSIS — I48.20 ATRIAL FIBRILLATION, CHRONIC: Primary | Chronic | ICD-10-CM

## 2023-10-26 LAB — INR PPP: 1.1

## 2023-10-26 PROCEDURE — G0249 PROVIDE INR TEST MATER/EQUIP: HCPCS

## 2023-10-26 NOTE — PROGRESS NOTES
Anticoagulation Clinic Progress Note    Anticoagulation Summary  As of 10/26/2023      INR goal:  2.0-3.0   TTR:  56.8% (2.6 y)   INR used for dosin.10 (10/26/2023)   Warfarin maintenance plan:  3.75 mg every Wed, Sat; 2.5 mg all other days   Weekly warfarin total:  20 mg   Plan last modified:  Marleny Black RPH (10/26/2023)   Next INR check:  2023   Priority:  High   Target end date:      Indications    Atrial fibrillation  chronic [I48.20]                 Anticoagulation Episode Summary       INR check location:      Preferred lab:      Send INR reminders to:   [facility] HOME TEST POOL    Comments:   Home Testing as of 21 *call only when out of range*          Anticoagulation Care Providers       Provider Role Specialty Phone number    Zenia Tinajero MD Referring Cardiology [phone]            Clinic Interview:  Patient Findings     Positives:  Extra doses    Negatives:  Signs/symptoms of thrombosis, Signs/symptoms of bleeding,   Laboratory test error suspected, Change in health, Change in alcohol use,   Change in activity, Upcoming invasive procedure, Emergency department   visit, Upcoming dental procedure, Missed doses, Change in medications,   Change in diet/appetite, Hospital admission, Bruising, Other complaints    Comments:  Confirmed she boosted to 3.75 mg on Thursday. She reports she   has been taking 3.75 mg on wed, 2.5 mg AOD,       Clinical Outcomes     Negatives:  Major bleeding event, Thromboembolic event,   Anticoagulation-related hospital admission, Anticoagulation-related ED   visit, Anticoagulation-related fatality    Comments:  Confirmed she boosted to 3.75 mg on Thursday. She reports she   has been taking 3.75 mg on wed, 2.5 mg AOD       INR History:      10/5/2023     9:23 AM 10/12/2023    12:00 AM 10/12/2023    10:56 AM 10/19/2023    12:00 AM 10/19/2023    11:19 AM 10/26/2023    12:00 AM 10/26/2023    10:45 AM   Anticoagulation Monitoring   INR 1.90  2.60  1.70   1.10   INR Date 10/5/2023  10/12/2023  10/19/2023  10/26/2023   INR Goal 2.0-3.0  2.0-3.0  2.0-3.0  2.0-3.0   Trend Same  Same  Same  Up   Last Week Total 16.25 mg  16.25 mg  16.25 mg  20 mg   Next Week Total 16.25 mg  16.25 mg  17.5 mg  22.5 mg   Sun 2.5 mg  2.5 mg  2.5 mg  2.5 mg   Mon 2.5 mg  2.5 mg  2.5 mg  2.5 mg   Tue 2.5 mg  2.5 mg  2.5 mg  2.5 mg   Wed 1.25 mg  1.25 mg  1.25 mg  3.75 mg   Thu 2.5 mg  2.5 mg  3.75 mg (10/19)  5 mg (10/26)   Fri 2.5 mg  2.5 mg  2.5 mg  2.5 mg   Sat 2.5 mg  2.5 mg  2.5 mg  3.75 mg   Historical INR  2.60      1.70      1.10        Visit Report Report             This result is from an external source.       Plan:  1. INR is Subtherapeutic today- see above in Anticoagulation Summary.   Will instruct Shani Phillip to Increase their warfarin regimen- see above in Anticoagulation Summary.  boost to 5 mg today then trial on 3.75 mg on sat and wed, 2.5 mg AOD.   2. Follow up in 1 weeks  3. They have been instructed to call if any changes in medications, doses, concerns, etc. Patient expresses understanding and has no further questions at this time.    Marleny Black [facility]

## 2023-11-02 ENCOUNTER — ANTICOAGULATION VISIT (OUTPATIENT)
Dept: PHARMACY | Facility: HOSPITAL | Age: 73
End: 2023-11-02
Payer: MEDICARE

## 2023-11-02 DIAGNOSIS — I48.20 ATRIAL FIBRILLATION, CHRONIC: Primary | Chronic | ICD-10-CM

## 2023-11-02 LAB — INR PPP: 2.5

## 2023-11-02 NOTE — PROGRESS NOTES
Anticoagulation Clinic Progress Note    Anticoagulation Summary  As of 2023      INR goal:  2.0-3.0   TTR:  56.6% (2.7 y)   INR used for dosin.50 (2023)   Warfarin maintenance plan:  3.75 mg every Wed, Sat; 2.5 mg all other days   Weekly warfarin total:  20 mg   No change documented:  Zenia Myers, PharmD   Plan last modified:  Marleny Black RPH (10/26/2023)   Next INR check:  2023   Priority:  High   Target end date:      Indications    Atrial fibrillation  chronic [I48.20]                 Anticoagulation Episode Summary       INR check location:      Preferred lab:      Send INR reminders to:   [facility] HOME TEST POOL    Comments:   Home Testing as of 21 *call only when out of range*          Anticoagulation Care Providers       Provider Role Specialty Phone number    Zenia Tinajero MD Referring Cardiology [phone]            Clinic Interview:  No pertinent clinical findings have been reported.    INR History:      10/12/2023    10:56 AM 10/19/2023    12:00 AM 10/19/2023    11:19 AM 10/26/2023    12:00 AM 10/26/2023    10:45 AM 2023    12:00 AM 2023     2:48 PM   Anticoagulation Monitoring   INR 2.60  1.70  1.10  2.50   INR Date 10/12/2023  10/19/2023  10/26/2023  2023   INR Goal 2.0-3.0  2.0-3.0  2.0-3.0  2.0-3.0   Trend Same  Same  Up  Same   Last Week Total 16.25 mg  16.25 mg  20 mg  22.5 mg   Next Week Total 16.25 mg  17.5 mg  22.5 mg  20 mg   Sun 2.5 mg  2.5 mg  2.5 mg  2.5 mg   Mon 2.5 mg  2.5 mg  2.5 mg  2.5 mg   Tue 2.5 mg  2.5 mg  2.5 mg  2.5 mg   Wed 1.25 mg  1.25 mg  3.75 mg  3.75 mg   Thu 2.5 mg  3.75 mg (10/19)  5 mg (10/26)  2.5 mg   Fri 2.5 mg  2.5 mg  2.5 mg  2.5 mg   Sat 2.5 mg  2.5 mg  3.75 mg  3.75 mg   Historical INR  1.70      1.10      2.50            This result is from an external source.       Plan:  1. INR is therapeutic today- see above in Anticoagulation Summary.    Shani Phillip to continue their warfarin regimen- see above  in Anticoagulation Summary.  2. Follow up in 1 week  3. Pt has agreed to only be called if INR out of range. They have been instructed to call if any changes in medications, doses, concerns, etc. Patient expresses understanding and has no further questions at this time.    Zenia Myers, PharmD

## 2023-11-09 ENCOUNTER — ANTICOAGULATION VISIT (OUTPATIENT)
Dept: PHARMACY | Facility: HOSPITAL | Age: 73
End: 2023-11-09
Payer: MEDICARE

## 2023-11-09 DIAGNOSIS — I48.20 ATRIAL FIBRILLATION, CHRONIC: Primary | Chronic | ICD-10-CM

## 2023-11-09 LAB — INR PPP: 3.7

## 2023-11-09 NOTE — PROGRESS NOTES
Anticoagulation Clinic Progress Note    Anticoagulation Summary  As of 11/9/2023      INR goal:  2.0-3.0   TTR:  56.5% (2.7 y)   INR used for dosing:  3.70 (11/9/2023)   Warfarin maintenance plan:  3.75 mg every Mon, Wed; 2.5 mg all other days   Weekly warfarin total:  20 mg   Plan last modified:  Marleny Black RPH (11/9/2023)   Next INR check:  11/16/2023   Priority:  High   Target end date:      Indications    Atrial fibrillation  chronic [I48.20]                 Anticoagulation Episode Summary       INR check location:      Preferred lab:      Send INR reminders to:   [facility] HOME TEST POOL    Comments:   Home Testing as of 7/30/21 *call only when out of range*          Anticoagulation Care Providers       Provider Role Specialty Phone number    Zenia Tinajero MD Referring Cardiology [phone]            Clinic Interview:  Patient Findings     Negatives:  Signs/symptoms of thrombosis, Signs/symptoms of bleeding,   Laboratory test error suspected, Change in health, Change in alcohol use,   Change in activity, Upcoming invasive procedure, Emergency department   visit, Upcoming dental procedure, Missed doses, Extra doses, Change in   medications, Change in diet/appetite, Hospital admission, Bruising, Other   complaints      Clinical Outcomes     Negatives:  Major bleeding event, Thromboembolic event,   Anticoagulation-related hospital admission, Anticoagulation-related ED   visit, Anticoagulation-related fatality        INR History:      10/19/2023    11:19 AM 10/26/2023    12:00 AM 10/26/2023    10:45 AM 11/2/2023    12:00 AM 11/2/2023     2:48 PM 11/9/2023    12:00 AM 11/9/2023     1:13 PM   Anticoagulation Monitoring   INR 1.70  1.10  2.50  3.70   INR Date 10/19/2023  10/26/2023  11/2/2023  11/9/2023   INR Goal 2.0-3.0  2.0-3.0  2.0-3.0  2.0-3.0   Trend Same  Up  Same  Same   Last Week Total 16.25 mg  20 mg  22.5 mg  20 mg   Next Week Total 17.5 mg  22.5 mg  20 mg  17.5 mg   Sun 2.5 mg  2.5 mg   2.5 mg  2.5 mg   Mon 2.5 mg  2.5 mg  2.5 mg  3.75 mg   Tue 2.5 mg  2.5 mg  2.5 mg  2.5 mg   Wed 1.25 mg  3.75 mg  3.75 mg  3.75 mg   Thu 3.75 mg (10/19)  5 mg (10/26)  2.5 mg  Hold (11/9)   Fri 2.5 mg  2.5 mg  2.5 mg  2.5 mg   Sat 2.5 mg  3.75 mg  3.75 mg  2.5 mg   Historical INR  1.10      2.50      3.70            This result is from an external source.       Plan:  1. INR is Supratherapeutic today- see above in Anticoagulation Summary.   Will instruct Shani Phillip to Change their warfarin regimen- see above in Anticoagulation Summary.  patient reports she has been taking 3.75 mg on mon and wed, 2.5 mg AOD, hold and rck 1 week   2. Follow up in 1 weeks  3. They have been instructed to call if any changes in medications, doses, concerns, etc. Patient expresses understanding and has no further questions at this time.    Marleny Black [facility]

## 2023-11-16 ENCOUNTER — ANTICOAGULATION VISIT (OUTPATIENT)
Dept: PHARMACY | Facility: HOSPITAL | Age: 73
End: 2023-11-16
Payer: MEDICARE

## 2023-11-16 DIAGNOSIS — I48.20 ATRIAL FIBRILLATION, CHRONIC: Primary | Chronic | ICD-10-CM

## 2023-11-16 LAB — INR PPP: 3.7

## 2023-11-16 NOTE — PROGRESS NOTES
Anticoagulation Clinic Progress Note    Anticoagulation Summary  As of 11/16/2023      INR goal:  2.0-3.0   TTR:  56.1% (2.7 y)   INR used for dosing:  3.70 (11/16/2023)   Warfarin maintenance plan:  3.75 mg every Wed; 2.5 mg all other days   Weekly warfarin total:  18.75 mg   Plan last modified:  Zenia Myers, PharmD (11/16/2023)   Next INR check:  11/22/2023   Priority:  High   Target end date:      Indications    Atrial fibrillation  chronic [I48.20]                 Anticoagulation Episode Summary       INR check location:      Preferred lab:      Send INR reminders to:   [facility] [employer] HOME TEST POOL    Comments:   Home Testing as of 7/30/21 *call only when out of range*          Anticoagulation Care Providers       Provider Role Specialty Phone number    Zenia Tinajero MD Referring Cardiology [phone]            Clinic Interview:  Patient Findings     Negatives:  Signs/symptoms of thrombosis, Signs/symptoms of bleeding,   Laboratory test error suspected, Change in health, Change in alcohol use,   Change in activity, Upcoming invasive procedure, Emergency department   visit, Upcoming dental procedure, Missed doses, Extra doses, Change in   medications, Change in diet/appetite, Hospital admission, Bruising, Other   complaints      Clinical Outcomes     Negatives:  Major bleeding event, Thromboembolic event,   Anticoagulation-related hospital admission, Anticoagulation-related ED   visit, Anticoagulation-related fatality        INR History:      10/26/2023    10:45 AM 11/2/2023    12:00 AM 11/2/2023     2:48 PM 11/9/2023    12:00 AM 11/9/2023     1:13 PM 11/16/2023    12:00 AM 11/16/2023     9:01 AM   Anticoagulation Monitoring   INR 1.10  2.50  3.70  3.70   INR Date 10/26/2023  11/2/2023  11/9/2023  11/16/2023   INR Goal 2.0-3.0  2.0-3.0  2.0-3.0  2.0-3.0   Trend Up  Same  Same  Down   Last Week Total 20 mg  22.5 mg  20 mg  17.5 mg   Next Week Total 22.5 mg  20 mg  17.5 mg  16.25 mg   Sun 2.5 mg  2.5  mg  2.5 mg  2.5 mg   Mon 2.5 mg  2.5 mg  3.75 mg  2.5 mg   Tue 2.5 mg  2.5 mg  2.5 mg  2.5 mg   Wed 3.75 mg  3.75 mg  3.75 mg  -   Thu 5 mg (10/26)  2.5 mg  Hold (11/9)  Hold (11/16)   Fri 2.5 mg  2.5 mg  2.5 mg  2.5 mg   Sat 3.75 mg  3.75 mg  2.5 mg  2.5 mg   Historical INR  2.50      3.70      3.70            This result is from an external source.       Plan:  1. INR is Supratherapeutic today- see above in Anticoagulation Summary.   Will instruct Shani Phillip to Change their warfarin regimen- see above in Anticoagulation Summary.  2. Follow up in 1 weeks  3. They have been instructed to call if any changes in medications, doses, concerns, etc. Patient expresses understanding and has no further questions at this time.    Zenia Myers, PharmD

## 2023-11-22 ENCOUNTER — ANTICOAGULATION VISIT (OUTPATIENT)
Dept: PHARMACY | Facility: HOSPITAL | Age: 73
End: 2023-11-22
Payer: MEDICARE

## 2023-11-22 DIAGNOSIS — I48.20 ATRIAL FIBRILLATION, CHRONIC: Primary | Chronic | ICD-10-CM

## 2023-11-22 LAB — INR PPP: 3.1

## 2023-11-22 NOTE — PROGRESS NOTES
Anticoagulation Clinic Progress Note    Anticoagulation Summary  As of 11/22/2023      INR goal:  2.0-3.0   TTR:  55.8% (2.7 y)   INR used for dosing:  3.10 (11/22/2023)   Warfarin maintenance plan:  1.25 mg every Wed; 2.5 mg all other days   Weekly warfarin total:  16.25 mg   Plan last modified:  Keshawn Castaneda, PharmD (11/22/2023)   Next INR check:  11/29/2023   Priority:  High   Target end date:      Indications    Atrial fibrillation  chronic [I48.20]                 Anticoagulation Episode Summary       INR check location:      Preferred lab:      Send INR reminders to:   [facility] [employer] HOME TEST POOL    Comments:   Home Testing as of 7/30/21 *call only when out of range*          Anticoagulation Care Providers       Provider Role Specialty Phone number    Zenia Tinajero MD Referring Cardiology [phone]            Clinic Interview:  Patient Findings     Negatives:  Signs/symptoms of thrombosis, Signs/symptoms of bleeding,   Laboratory test error suspected, Change in health, Change in alcohol use,   Change in activity, Upcoming invasive procedure, Emergency department   visit, Upcoming dental procedure, Missed doses, Extra doses, Change in   medications, Change in diet/appetite, Hospital admission, Bruising, Other   complaints      Clinical Outcomes     Negatives:  Major bleeding event, Thromboembolic event,   Anticoagulation-related hospital admission, Anticoagulation-related ED   visit, Anticoagulation-related fatality        INR History:      11/2/2023     2:48 PM 11/9/2023    12:00 AM 11/9/2023     1:13 PM 11/16/2023    12:00 AM 11/16/2023     9:01 AM 11/22/2023    12:00 AM 11/22/2023    10:10 AM   Anticoagulation Monitoring   INR 2.50  3.70  3.70  3.10   INR Date 11/2/2023 11/9/2023 11/16/2023 11/22/2023   INR Goal 2.0-3.0  2.0-3.0  2.0-3.0  2.0-3.0   Trend Same  Same  Down  Down   Last Week Total 22.5 mg  20 mg  17.5 mg  16.25 mg   Next Week Total 20 mg  17.5 mg  16.25 mg  16.25 mg   Sun 2.5 mg   2.5 mg  2.5 mg  2.5 mg   Mon 2.5 mg  3.75 mg  2.5 mg  2.5 mg   Tue 2.5 mg  2.5 mg  2.5 mg  2.5 mg   Wed 3.75 mg  3.75 mg  -  1.25 mg   Thu 2.5 mg  Hold (11/9)  Hold (11/16)  2.5 mg   Fri 2.5 mg  2.5 mg  2.5 mg  2.5 mg   Sat 3.75 mg  2.5 mg  2.5 mg  2.5 mg   Historical INR  3.70      3.70      3.10            This result is from an external source.       Plan:  1. INR is Supratherapeutic today (after receiving 16.25 mg over the past 7 days) - see above in Anticoagulation Summary.   Will instruct Shani Phillip to Change their warfarin regimen to 1.25 mg Wed, 2.5 mg all other days (16.25 mg/week) - see above in Anticoagulation Summary.  2. Follow up in 1 week.  3. They have been instructed to call if any changes in medications, doses, concerns, etc. Patient expresses understanding and has no further questions at this time.    Keshawn Castaneda, PharmD

## 2023-11-30 ENCOUNTER — ANTICOAGULATION VISIT (OUTPATIENT)
Dept: PHARMACY | Facility: HOSPITAL | Age: 73
End: 2023-11-30
Payer: MEDICARE

## 2023-11-30 DIAGNOSIS — I48.20 ATRIAL FIBRILLATION, CHRONIC: Primary | Chronic | ICD-10-CM

## 2023-11-30 LAB — INR PPP: 2.2

## 2023-11-30 PROCEDURE — G0249 PROVIDE INR TEST MATER/EQUIP: HCPCS

## 2023-11-30 NOTE — PROGRESS NOTES
Anticoagulation Clinic Progress Note    Anticoagulation Summary  As of 2023      INR goal:  2.0-3.0   TTR:  56.0% (2.7 y)   INR used for dosin.20 (2023)   Warfarin maintenance plan:  1.25 mg every Wed; 2.5 mg all other days   Weekly warfarin total:  16.25 mg   No change documented:  Marleny Black RPH   Plan last modified:  Keshawn Castaneda, PharmD (2023)   Next INR check:  2023   Priority:  High   Target end date:      Indications    Atrial fibrillation  chronic [I48.20]                 Anticoagulation Episode Summary       INR check location:      Preferred lab:      Send INR reminders to:  [facility] HOME TEST PO[facility]    Comments:   Home Testing as of 21 *call only when out of range*          Anticoagulation Care Providers       Provider Role Specialty Phone number    Zenia Tinajero MD Referring Cardiology [phone]            Clinic Interview:  No pertinent clinical findings have been reported.    INR History:      2023     1:13 PM 2023    12:00 AM 2023     9:01 AM 2023    12:00 AM 2023    10:10 AM 2023    12:00 AM 2023     1:15 PM   Anticoagulation Monitoring   INR 3.70  3.70  3.10  2.20   INR Date 2023   INR Goal 2.0-3.0  2.0-3.0  2.0-3.0  2.0-3.0   Trend Same  Down  Down  Same   Last Week Total 20 mg  17.5 mg  16.25 mg  16.25 mg   Next Week Total 17.5 mg  16.25 mg  16.25 mg  16.25 mg   Sun 2.5 mg  2.5 mg  2.5 mg  2.5 mg   Mon 3.75 mg  2.5 mg  2.5 mg  2.5 mg   Tue 2.5 mg  2.5 mg  2.5 mg  2.5 mg   Wed 3.75 mg  -  1.25 mg  1.25 mg   Thu Hold ()  Hold ()  2.5 mg  2.5 mg   Fri 2.5 mg  2.5 mg  2.5 mg  2.5 mg   Sat 2.5 mg  2.5 mg  2.5 mg  2.5 mg   Historical INR  3.70      3.10      2.20            This result is from an external source.       Plan:  1. INR is therapeutic today- see above in Anticoagulation Summary.    Shani Phillip to continue their warfarin regimen- see above in  Anticoagulation Summary.  2. Follow up in 1 week  3. Pt has agreed to only be called if INR out of range. They have been instructed to call if any changes in medications, doses, concerns, etc. Patient expresses understanding and has no further questions at this time.    Marleny Black, [facility]

## 2023-12-07 ENCOUNTER — ANTICOAGULATION VISIT (OUTPATIENT)
Dept: PHARMACY | Facility: HOSPITAL | Age: 73
End: 2023-12-07
Payer: MEDICARE

## 2023-12-07 DIAGNOSIS — I48.20 ATRIAL FIBRILLATION, CHRONIC: Primary | Chronic | ICD-10-CM

## 2023-12-07 LAB — INR PPP: 3.5

## 2023-12-07 NOTE — PROGRESS NOTES
Anticoagulation Clinic Progress Note    Anticoagulation Summary  As of 12/7/2023      INR goal:  2.0-3.0   TTR:  56.1% (2.8 y)   INR used for dosing:  3.50 (12/7/2023)   Warfarin maintenance plan:  1.25 mg every Wed; 2.5 mg all other days   Weekly warfarin total:  16.25 mg   Plan last modified:  Keshawn Castaneda, PharmD (11/22/2023)   Next INR check:  12/14/2023   Priority:  High   Target end date:      Indications    Atrial fibrillation  chronic [I48.20]                 Anticoagulation Episode Summary       INR check location:      Preferred lab:      Send INR reminders to:   [facility] [employer] HOME TEST POOL    Comments:   Home Testing as of 7/30/21 *call only when out of range*          Anticoagulation Care Providers       Provider Role Specialty Phone number    Zenia Tinajero MD Referring Cardiology [phone]            Clinic Interview:  Patient Findings     Negatives:  Signs/symptoms of thrombosis, Signs/symptoms of bleeding,   Laboratory test error suspected, Change in health, Change in alcohol use,   Change in activity, Upcoming invasive procedure, Emergency department   visit, Upcoming dental procedure, Missed doses, Extra doses, Change in   medications, Change in diet/appetite, Hospital admission, Bruising, Other   complaints      Clinical Outcomes     Negatives:  Major bleeding event, Thromboembolic event,   Anticoagulation-related hospital admission, Anticoagulation-related ED   visit, Anticoagulation-related fatality        INR History:      11/16/2023     9:01 AM 11/22/2023    12:00 AM 11/22/2023    10:10 AM 11/30/2023    12:00 AM 11/30/2023     1:15 PM 12/7/2023    12:00 AM 12/7/2023     1:18 PM   Anticoagulation Monitoring   INR 3.70  3.10  2.20  3.50   INR Date 11/16/2023 11/22/2023 11/30/2023 12/7/2023   INR Goal 2.0-3.0  2.0-3.0  2.0-3.0  2.0-3.0   Trend Down  Down  Same  Same   Last Week Total 17.5 mg  16.25 mg  16.25 mg  16.25 mg   Next Week Total 16.25 mg  16.25 mg  16.25 mg  15 mg   Sun 2.5  mg  2.5 mg  2.5 mg  2.5 mg   Mon 2.5 mg  2.5 mg  2.5 mg  2.5 mg   Tue 2.5 mg  2.5 mg  2.5 mg  2.5 mg   Wed -  1.25 mg  1.25 mg  1.25 mg   Thu Hold (11/16)  2.5 mg  2.5 mg  1.25 mg (12/7)   Fri 2.5 mg  2.5 mg  2.5 mg  2.5 mg   Sat 2.5 mg  2.5 mg  2.5 mg  2.5 mg   Historical INR  3.10      2.20      3.50            This result is from an external source.       Plan:  1. INR is Supratherapeutic today- see above in Anticoagulation Summary.   Will instruct Shani Phillip to Change their warfarin regimen- see above in Anticoagulation Summary.  partial to 1.25 mg then resume, rck 1 week    2. Follow up in 1 weeks  3. They have been instructed to call if any changes in medications, doses, concerns, etc. Patient expresses understanding and has no further questions at this time.    Marleny Black [facility]

## 2023-12-14 ENCOUNTER — ANTICOAGULATION VISIT (OUTPATIENT)
Dept: PHARMACY | Facility: HOSPITAL | Age: 73
End: 2023-12-14
Payer: MEDICARE

## 2023-12-14 DIAGNOSIS — I48.20 ATRIAL FIBRILLATION, CHRONIC: Primary | Chronic | ICD-10-CM

## 2023-12-14 LAB — INR PPP: 1.3

## 2023-12-14 NOTE — PROGRESS NOTES
Anticoagulation Clinic Progress Note    Anticoagulation Summary  As of 2023      INR goal:  2.0-3.0   TTR:  56.0% (2.8 y)   INR used for dosin.30 (2023)   Warfarin maintenance plan:  1.25 mg every Wed; 2.5 mg all other days   Weekly warfarin total:  16.25 mg   Plan last modified:  Keshawn Castaneda, PharmD (2023)   Next INR check:  2023   Priority:  High   Target end date:      Indications    Atrial fibrillation  chronic [I48.20]                 Anticoagulation Episode Summary       INR check location:      Preferred lab:      Send INR reminders to:   [facility] [employer] HOME TEST POOL    Comments:   Home Testing as of 21 *call only when out of range*          Anticoagulation Care Providers       Provider Role Specialty Phone number    Zenia Tinajero MD Referring Cardiology [phone]            Clinic Interview:  Patient Findings     Negatives:  Signs/symptoms of thrombosis, Signs/symptoms of bleeding,   Laboratory test error suspected, Change in health, Change in alcohol use,   Change in activity, Upcoming invasive procedure, Emergency department   visit, Upcoming dental procedure, Missed doses, Extra doses, Change in   medications, Change in diet/appetite, Hospital admission, Bruising, Other   complaints    Comments:  Denies missed doses or any changes in diet or meds.       Clinical Outcomes     Negatives:  Major bleeding event, Thromboembolic event,   Anticoagulation-related hospital admission, Anticoagulation-related ED   visit, Anticoagulation-related fatality    Comments:  Denies missed doses or any changes in diet or meds.         INR History:      2023    10:10 AM 2023    12:00 AM 2023     1:15 PM 2023    12:00 AM 2023     1:18 PM 2023    12:00 AM 2023    11:19 AM   Anticoagulation Monitoring   INR 3.10  2.20  3.50  1.30   INR Date 2023   INR Goal 2.0-3.0  2.0-3.0  2.0-3.0  2.0-3.0   Trend Down   Same  Same  Same   Last Week Total 16.25 mg  16.25 mg  16.25 mg  15 mg   Next Week Total 16.25 mg  16.25 mg  15 mg  17.5 mg   Sun 2.5 mg  2.5 mg  2.5 mg  2.5 mg   Mon 2.5 mg  2.5 mg  2.5 mg  2.5 mg   Tue 2.5 mg  2.5 mg  2.5 mg  2.5 mg   Wed 1.25 mg  1.25 mg  1.25 mg  1.25 mg   Thu 2.5 mg  2.5 mg  1.25 mg (12/7)  3.75 mg (12/14)   Fri 2.5 mg  2.5 mg  2.5 mg  2.5 mg   Sat 2.5 mg  2.5 mg  2.5 mg  2.5 mg   Historical INR  2.20      3.50      1.30            This result is from an external source.       Plan:  1. INR is Subtherapeutic today- see above in Anticoagulation Summary.   Will instruct Shani Phillip to Change their warfarin regimen (3.75 mg today, then resume 1.25 mg Wed, 2.5 mg all other days) - see above in Anticoagulation Summary.  2. Follow up in 1 week.  3. They have been instructed to call if any changes in medications, doses, concerns, etc. Patient expresses understanding and has no further questions at this time.    Keshawn Castaneda, PharmD

## 2023-12-21 ENCOUNTER — ANTICOAGULATION VISIT (OUTPATIENT)
Dept: PHARMACY | Facility: HOSPITAL | Age: 73
End: 2023-12-21
Payer: MEDICARE

## 2023-12-21 DIAGNOSIS — I48.20 ATRIAL FIBRILLATION, CHRONIC: Primary | Chronic | ICD-10-CM

## 2023-12-21 LAB — INR PPP: 2.1

## 2023-12-21 NOTE — PROGRESS NOTES
Anticoagulation Clinic Progress Note    Anticoagulation Summary  As of 2023      INR goal:  2.0-3.0   TTR:  55.7% (2.8 y)   INR used for dosin.10 (2023)   Warfarin maintenance plan:  1.25 mg every Wed; 2.5 mg all other days   Weekly warfarin total:  16.25 mg   Plan last modified:  Keshawn Castaneda, PharmD (2023)   Next INR check:  2023   Priority:  High   Target end date:      Indications    Atrial fibrillation  chronic [I48.20]                 Anticoagulation Episode Summary       INR check location:      Preferred lab:      Send INR reminders to:   [facility] [employer] HOME TEST POOL    Comments:   Home Testing as of 21 *call only when out of range*          Anticoagulation Care Providers       Provider Role Specialty Phone number    Zenia Tinajero MD Referring Cardiology [phone]            Clinic Interview:  No pertinent clinical findings have been reported.    INR History:      2023     1:15 PM 2023    12:00 AM 2023     1:18 PM 2023    12:00 AM 2023    11:19 AM 2023    12:00 AM 2023     1:09 PM   Anticoagulation Monitoring   INR 2.20  3.50  1.30  2.10   INR Date 2023   INR Goal 2.0-3.0  2.0-3.0  2.0-3.0  2.0-3.0   Trend Same  Same  Same  Same   Last Week Total 16.25 mg  16.25 mg  15 mg  17.5 mg   Next Week Total 16.25 mg  15 mg  17.5 mg  16.25 mg   Sun 2.5 mg  2.5 mg  2.5 mg  2.5 mg   Mon 2.5 mg  2.5 mg  2.5 mg  2.5 mg   Tue 2.5 mg  2.5 mg  2.5 mg  2.5 mg   Wed 1.25 mg  1.25 mg  1.25 mg  1.25 mg   Thu 2.5 mg  1.25 mg ()  3.75 mg ()  2.5 mg   Fri 2.5 mg  2.5 mg  2.5 mg  2.5 mg   Sat 2.5 mg  2.5 mg  2.5 mg  2.5 mg   Historical INR  3.50      1.30      2.10            This result is from an external source.       Plan:  1. INR is therapeutic today- see above in Anticoagulation Summary.    Shani Phillip to continue their warfarin regimen- see above in Anticoagulation Summary.  2. Follow up in 1  week  3.  They have been instructed to call if any changes in medications, doses, concerns, etc. Patient expresses understanding and has no further questions at this time.    Marleny Black formerly [facility]

## 2023-12-27 ENCOUNTER — OFFICE VISIT (OUTPATIENT)
Dept: CARDIOLOGY | Facility: CLINIC | Age: 73
End: 2023-12-27
Payer: MEDICARE

## 2023-12-27 VITALS
BODY MASS INDEX: 24.92 KG/M2 | SYSTOLIC BLOOD PRESSURE: 120 MMHG | OXYGEN SATURATION: 95 % | HEART RATE: 94 BPM | WEIGHT: 132 LBS | DIASTOLIC BLOOD PRESSURE: 68 MMHG | HEIGHT: 61 IN

## 2023-12-27 DIAGNOSIS — R60.0 BILATERAL LOWER EXTREMITY EDEMA: ICD-10-CM

## 2023-12-27 DIAGNOSIS — I48.91 ATRIAL FIBRILLATION WITH RVR: Primary | ICD-10-CM

## 2023-12-27 DIAGNOSIS — I33.0 MITRAL VALVE VEGETATION: ICD-10-CM

## 2023-12-27 DIAGNOSIS — Z99.81 OXYGEN DEPENDENT: ICD-10-CM

## 2023-12-27 PROCEDURE — 93000 ELECTROCARDIOGRAM COMPLETE: CPT | Performed by: INTERNAL MEDICINE

## 2023-12-27 PROCEDURE — 99214 OFFICE O/P EST MOD 30 MIN: CPT | Performed by: INTERNAL MEDICINE

## 2023-12-27 PROCEDURE — 1159F MED LIST DOCD IN RCRD: CPT | Performed by: INTERNAL MEDICINE

## 2023-12-27 PROCEDURE — 1160F RVW MEDS BY RX/DR IN RCRD: CPT | Performed by: INTERNAL MEDICINE

## 2023-12-27 NOTE — PROGRESS NOTES
CARDIOLOGY    Zenia Tinajero MD    ENCOUNTER DATE:  12/27/2023    Shani Phillip / 73 y.o. / female        CHIEF COMPLAINT / REASON FOR OFFICE VISIT     Follow-up      HISTORY OF PRESENT ILLNESS       HPI    Shani Phillip is a 73 y.o. female     This is a lady who was on a cruise ship when she developed a febrile illness.  She was taken to [facility] where she was diagnosed with endocarditis.  She left the hospital to return home to [place] I first saw her in January 14, 2019.  She has a history of COPD on oxygen, former smoker, restless leg syndrome and chronic pain.  She had a transthoracic echocardiogram suggestive of vegetation on the mitral valve annulus.  She had a transesophageal echo which delineated this more clearly.  Dr. Briones saw her in consultation.  The plan was to treat her with IV antibiotics which occurred.  She came in for a repeat transesophageal echo on January 14, 2020 and this was pretty much unchanged.  The mobile element attached to the posterior mitral valve annulus annulus was unchanged.     In August 2020, she had normal LV functions and ejection fraction of 66%, moderate left atrial enlargement, severe calcification of the aortic valve without regurgitation or stenosis.  There is moderate bileaflet mitral valve thickening with trace regurgitation and mild stenosis with a mean gradient of 4 mmHg.  There was mild tricuspid regurgitation with a normal right ventricular systolic pressure.  She was seen in the office in August of 2020 and was having lower extremity edema.  Heidi Ambrcoio ordered an arterial Doppler which was normal.  She had another echocardiogram in September 2020, again normal left ventricular systolic function, and again bileaflet thickening with this time, mild mitral regurgitation and mitral stenosis was noted and a vegetation on the atrial side of the posterior mitral valve leaflet was also noted.     Repeat echo in June 2021 showed normal LV  "systolic function, grade 2 diastolic dysfunction, mild left atrial enlargement, calcification of the mitral leaflets with mild mitral stenosis.     She saw Heidi Kraus in February 2022 and was found to be in asymptomatic atrial fibrillation with rapid ventricular response.  Her INRs have been therapeutic.  Diltiazem was resumed.  24-hour monitor in March 2022 showed that she was predominantly in atrial fibrillation.  Her average heart rate was 86 bpm with a range from 50 to 124 bpm.    She follows with Dr. Zamora for her lung disease.  She is on oxygen.  She has chronic left lower extremity edema.  She has not been having fevers or chills.  She gets a chest discomfort across the center of her chest.  No exacerbating factors.  Seems to be relieved with aspirin.  She had a heart catheterization in 2020 which showed no significant coronary disease.  Her most recent nuclear stress test was in June 2023 and showed no evidence of ischemia.    REVIEW OF SYSTEMS     Review of Systems   Constitutional: Negative for chills, fever, weight gain and weight loss.   Cardiovascular:  Negative for leg swelling.   Respiratory:  Negative for cough, snoring and wheezing.    Hematologic/Lymphatic: Negative for bleeding problem. Does not bruise/bleed easily.   Skin:  Negative for color change.   Musculoskeletal:  Negative for falls, joint pain and myalgias.   Gastrointestinal:  Negative for melena.   Genitourinary:  Negative for hematuria.   Neurological:  Negative for excessive daytime sleepiness.   Psychiatric/Behavioral:  Negative for depression. The patient is not nervous/anxious.          VITAL SIGNS     Visit Vitals  /68   Pulse 94   Ht 154.9 cm (60.98\")   Wt 59.9 kg (132 lb)   SpO2 95%   BMI 24.96 kg/m²         Wt Readings from Last 3 Encounters:   12/27/23 59.9 kg (132 lb)   10/05/23 63.6 kg (140 lb 3.2 oz)   08/31/23 62.6 kg (138 lb)     Body mass index is 24.96 kg/m².      PHYSICAL EXAMINATION     Constitutional:       " General: Not in acute distress.  Neck:      Vascular: No carotid bruit or JVD.   Pulmonary:      Effort: Pulmonary effort is normal.      Breath sounds: Normal breath sounds.   Cardiovascular:      Normal rate. Irregularly irregular rhythm.      Murmurs: There is no murmur.   Psychiatric:         Mood and Affect: Mood and affect normal.           REVIEWED DATA       ECG 12 Lead    Date/Time: 12/27/2023 11:41 AM  Performed by: Zenia Tinajero MD    Authorized by: Zenia Tinajero MD  Comparison: compared with previous ECG from 6/4/2023  Similar to previous ECG  Rhythm: atrial fibrillation  BPM: 94  Conduction: right bundle branch block    Clinical impression: abnormal EKG                Lab Results   Component Value Date    GLUCOSE 102 (H) 06/05/2023    BUN 13 06/05/2023    CREATININE 1.08 (H) 06/05/2023    EGFR 54.3 (L) 06/05/2023    BCR 12.0 06/05/2023    K 3.9 06/05/2023    CO2 30.6 (H) 06/05/2023    CALCIUM 9.1 06/05/2023    PROTENTOTREF 6.7 04/12/2021    ALBUMIN 4.3 06/04/2023    BILITOT 0.4 06/04/2023    AST 18 06/04/2023    ALT 14 06/04/2023       ASSESSMENT & PLAN     No diagnosis found.    1.  Permanent atrial fibrillation.  She is rate controlled.  She is on warfarin and follows with the anticoagulation clinic.  She could not afford Eliquis.  She had a cardioversion in November 2019 but was back in A-fib by February 2022.  She continues in rate controlled A-fib with anticoagulation.  2.  Mitral valve vegetation.  S/P antibiotics.  I reviewed her most recent echo and she has a lot of mitral annular calcification and aortic valve calcification though her valve function is fairly normal.  I do not see anything to indicate endocarditis at this point in time.  She does not have any infectious symptoms.  3. Lower extremity edema.  Chronic  4.  COPD on home oxygen.  Avoid beta-blockers.     Follow-up in 6 months.      Orders Placed This Encounter   Procedures    ECG 12 Lead     This order was created via  procedure documentation     Order Specific Question:   Release to patient     Answer:   Routine Release [[id number]]           MEDICATIONS         Discharge Medications            Accurate as of December 27, 2023 11:42 AM. If you have any questions, ask your nurse or doctor.                Continue These Medications        Instructions Start Date   ammonium lactate 12 % lotion  Commonly known as: AmLactin   Topical, As Needed      budesonide 0.5 MG/2ML nebulizer solution  Commonly known as: PULMICORT   0.5 mg, Nebulization, Daily - RT      buPROPion  MG 24 hr tablet  Commonly known as: WELLBUTRIN XL   300 mg, Oral, Daily      cholecalciferol 25 MCG (1000 UT) tablet  Commonly known as: VITAMIN D3   2,000 Units, Oral, Daily      cyclobenzaprine 10 MG tablet  Commonly known as: FLEXERIL   10 mg, Oral, 3 Times Daily PRN      digoxin 125 MCG tablet  Commonly known as: LANOXIN   125 mcg, Oral, Every Other Day      famotidine 20 MG tablet  Commonly known as: PEPCID       furosemide 40 MG tablet  Commonly known as: LASIX   TAKE 1 TABLET BY MOUTH EVERY DAY      HYDROcodone-acetaminophen 7.5-325 MG per tablet  Commonly known as: NORCO   1 tablet, Oral, Every 6 Hours PRN      ipratropium-albuterol 0.5-2.5 mg/3 ml nebulizer  Commonly known as: DUO-NEB   3 mL, Nebulization, Every 4 Hours PRN      O2  Commonly known as: OXYGEN   2 L/min, Inhalation, Once      ondansetron 4 MG tablet  Commonly known as: ZOFRAN   4 mg, Oral, Every 8 Hours PRN      pantoprazole 40 MG EC tablet  Commonly known as: PROTONIX   40 mg, Oral, Daily      potassium chloride ER 20 MEQ tablet controlled-release ER tablet  Commonly known as: K-TAB   TAKE 1 TABLET BY MOUTH EVERY DAY      pramipexole 0.5 MG tablet  Commonly known as: MIRAPEX   0.5 mg, Oral, 2 Times Daily      trospium 20 MG tablet  Commonly known as: SANCTURA   20 mg, Oral, 2 Times Daily      warfarin 2.5 MG tablet  Commonly known as: COUMADIN   Take one-half of a tablet by mouth on wed,  take one tablet by mouth all other days or as directed      Yupelri 175 MCG/3ML nebulizer solution  Generic drug: revefenacin   175 mcg, Nebulization, Daily - RT                 Zenia Tinajero MD  12/27/23  11:42 EST    Part of this note may be an electronic transcription/translation of spoken language to printed text using the Dragon dictation system.

## 2023-12-28 ENCOUNTER — ANTICOAGULATION VISIT (OUTPATIENT)
Dept: PHARMACY | Facility: HOSPITAL | Age: 73
End: 2023-12-28
Payer: MEDICARE

## 2023-12-28 DIAGNOSIS — I48.20 ATRIAL FIBRILLATION, CHRONIC: Primary | Chronic | ICD-10-CM

## 2023-12-28 LAB — INR PPP: 2.1

## 2023-12-28 PROCEDURE — G0249 PROVIDE INR TEST MATER/EQUIP: HCPCS

## 2023-12-28 NOTE — PROGRESS NOTES
Anticoagulation Clinic Progress Note    Anticoagulation Summary  As of 2023      INR goal:  2.0-3.0   TTR:  56.0% (2.8 y)   INR used for dosin.10 (2023)   Warfarin maintenance plan:  1.25 mg every Wed; 2.5 mg all other days   Weekly warfarin total:  16.25 mg   No change documented:  Keshawn Castaneda PharmD   Plan last modified:  Keshawn Castaneda PharmD (2023)   Next INR check:  2024   Priority:  High   Target end date:      Indications    Atrial fibrillation  chronic [I48.20]                 Anticoagulation Episode Summary       INR check location:      Preferred lab:      Send INR reminders to:  [facility] HOME TEST POOL    Comments:   Home Testing as of 21 *call only when out of range*          Anticoagulation Care Providers       Provider Role Specialty Phone number    Zenia Tinajero MD Referring Cardiology [phone]            Clinic Interview:  No pertinent clinical findings have been reported.    INR History:      2023     1:18 PM 2023    12:00 AM 2023    11:19 AM 2023    12:00 AM 2023     1:09 PM 2023    12:00 AM 2023    10:45 AM   Anticoagulation Monitoring   INR 3.50  1.30  2.10  2.10   INR Date 2023   INR Goal 2.0-3.0  2.0-3.0  2.0-3.0  2.0-3.0   Trend Same  Same  Same  Same   Last Week Total 16.25 mg  15 mg  17.5 mg  16.25 mg   Next Week Total 15 mg  17.5 mg  16.25 mg  16.25 mg   Sun 2.5 mg  2.5 mg  2.5 mg  2.5 mg   Mon 2.5 mg  2.5 mg  2.5 mg  2.5 mg   Tue 2.5 mg  2.5 mg  2.5 mg  2.5 mg   Wed 1.25 mg  1.25 mg  1.25 mg  1.25 mg   Thu 1.25 mg ()  3.75 mg ()  2.5 mg  2.5 mg   Fri 2.5 mg  2.5 mg  2.5 mg  2.5 mg   Sat 2.5 mg  2.5 mg  2.5 mg  2.5 mg   Historical INR  1.30      2.10      2.10            This result is from an external source.       Plan:  1. INR is therapeutic today- see above in Anticoagulation Summary.    Shani Phillip to continue their warfarin regimen- see above  in Anticoagulation Summary.  2. Follow up in 1 week  3. Pt has agreed to only be called if INR out of range. They have been instructed to call if any changes in medications, doses, concerns, etc. Patient expresses understanding and has no further questions at this time.    Keshawn Castaneda, PharmD

## 2023-12-31 DIAGNOSIS — K21.9 GASTROESOPHAGEAL REFLUX DISEASE WITHOUT ESOPHAGITIS: ICD-10-CM

## 2024-01-02 DIAGNOSIS — M54.42 CHRONIC MIDLINE LOW BACK PAIN WITH LEFT-SIDED SCIATICA: ICD-10-CM

## 2024-01-02 DIAGNOSIS — G89.29 CHRONIC MIDLINE LOW BACK PAIN WITH LEFT-SIDED SCIATICA: ICD-10-CM

## 2024-01-02 RX ORDER — HYDROCODONE BITARTRATE AND ACETAMINOPHEN 7.5; 325 MG/1; MG/1
1 TABLET ORAL EVERY 6 HOURS PRN
Qty: 20 TABLET | Refills: 0 | OUTPATIENT
Start: 2024-01-02

## 2024-01-02 RX ORDER — PANTOPRAZOLE SODIUM 40 MG/1
40 TABLET, DELAYED RELEASE ORAL DAILY
Qty: 90 TABLET | Refills: 1 | Status: SHIPPED | OUTPATIENT
Start: 2024-01-02

## 2024-01-02 NOTE — TELEPHONE ENCOUNTER
Caller: KenjiOpheliaie    Relationship: Self    Best call back number: [phone]     Requested Prescriptions:   Requested Prescriptions     Pending Prescriptions Disp Refills    HYDROcodone-acetaminophen (NORCO) 7.5-325 MG per tablet 20 tablet 0     Sig: Take 1 tablet by mouth Every 6 (Six) Hours As Needed for Moderate Pain.        Pharmacy where request should be sent: [facility]/PHARMACY #[id number] - [place], [place] - [address] [facility] [phone] [facility] [phone]      Last office visit with prescribing clinician: 10/5/2023   Last telemedicine visit with prescribing clinician: Visit date not found   Next office visit with prescribing clinician: 10/7/2024     Additional details provided by patient: PATIENT STATES SHE IS OUT     Does the patient have less than a 3 day supply:  [x] Yes  [] No      Tran Arce Rep   01/02/24 11:24 EST

## 2024-01-04 ENCOUNTER — ANTICOAGULATION VISIT (OUTPATIENT)
Dept: PHARMACY | Facility: HOSPITAL | Age: 74
End: 2024-01-04
Payer: MEDICARE

## 2024-01-04 DIAGNOSIS — I48.20 ATRIAL FIBRILLATION, CHRONIC: Primary | Chronic | ICD-10-CM

## 2024-01-04 LAB — INR PPP: 1.8

## 2024-01-04 NOTE — PROGRESS NOTES
Anticoagulation Clinic Progress Note    Anticoagulation Summary  As of 2024      INR goal:  2.0-3.0   TTR:  55.8% (2.8 y)   INR used for dosin.80 (2024)   Warfarin maintenance plan:  1.25 mg every Wed; 2.5 mg all other days   Weekly warfarin total:  16.25 mg   Plan last modified:  Keshawn Castaneda, PharmD (2023)   Next INR check:  2024   Priority:  High   Target end date:      Indications    Atrial fibrillation  chronic [I48.20]                 Anticoagulation Episode Summary       INR check location:      Preferred lab:      Send INR reminders to:   [facility] HOME TEST POOL    Comments:   Home Testing as of 21 *call only when out of range*          Anticoagulation Care Providers       Provider Role Specialty Phone number    Zenia Tinajero MD Referring Cardiology [phone]            Clinic Interview:  Patient Findings     Negatives:  Signs/symptoms of thrombosis, Signs/symptoms of bleeding,   Laboratory test error suspected, Change in health, Change in alcohol use,   Change in activity, Upcoming invasive procedure, Emergency department   visit, Upcoming dental procedure, Missed doses, Extra doses, Change in   medications, Change in diet/appetite, Hospital admission, Bruising, Other   complaints      Clinical Outcomes     Negatives:  Major bleeding event, Thromboembolic event,   Anticoagulation-related hospital admission, Anticoagulation-related ED   visit, Anticoagulation-related fatality        INR History:      2023    11:19 AM 2023    12:00 AM 2023     1:09 PM 2023    12:00 AM 2023    10:45 AM 2024    12:00 AM 2024    11:05 AM   Anticoagulation Monitoring   INR 1.30  2.10  2.10  1.80   INR Date 2023   INR Goal 2.0-3.0  2.0-3.0  2.0-3.0  2.0-3.0   Trend Same  Same  Same  Same   Last Week Total 15 mg  17.5 mg  16.25 mg  16.25 mg   Next Week Total 17.5 mg  16.25 mg  16.25 mg  17.5 mg   Sun 2.5 mg  2.5  mg  2.5 mg  2.5 mg   Mon 2.5 mg  2.5 mg  2.5 mg  2.5 mg   Tue 2.5 mg  2.5 mg  2.5 mg  2.5 mg   Wed 1.25 mg  1.25 mg  1.25 mg  1.25 mg   Thu 3.75 mg (12/14)  2.5 mg  2.5 mg  3.75 mg (1/4)   Fri 2.5 mg  2.5 mg  2.5 mg  2.5 mg   Sat 2.5 mg  2.5 mg  2.5 mg  2.5 mg   Historical INR  2.10      2.10      1.80            This result is from an external source.       Plan:  1. INR is Subtherapeutic today- see above in Anticoagulation Summary.   Will instruct Shani Phillip to Change their warfarin regimen (3.75 mg today, then resume 1.25 mg Wed, 2.5 mg all other days) - see above in Anticoagulation Summary.   2. Follow up in 1 week.  3. They have been instructed to call if any changes in medications, doses, concerns, etc. Patient expresses understanding and has no further questions at this time.    Keshawn Castaneda, PharmD

## 2024-01-08 DIAGNOSIS — M54.42 CHRONIC MIDLINE LOW BACK PAIN WITH LEFT-SIDED SCIATICA: ICD-10-CM

## 2024-01-08 DIAGNOSIS — I50.33 ACUTE ON CHRONIC DIASTOLIC CHF (CONGESTIVE HEART FAILURE): ICD-10-CM

## 2024-01-08 DIAGNOSIS — G89.29 CHRONIC MIDLINE LOW BACK PAIN WITH LEFT-SIDED SCIATICA: ICD-10-CM

## 2024-01-08 RX ORDER — TROSPIUM CHLORIDE 20 MG/1
20 TABLET, FILM COATED ORAL 2 TIMES DAILY
Qty: 180 TABLET | Refills: 1 | Status: SHIPPED | OUTPATIENT
Start: 2024-01-08

## 2024-01-08 RX ORDER — HYDROCODONE BITARTRATE AND ACETAMINOPHEN 7.5; 325 MG/1; MG/1
1 TABLET ORAL EVERY 6 HOURS PRN
Qty: 20 TABLET | Refills: 0 | OUTPATIENT
Start: 2024-01-08

## 2024-01-08 NOTE — TELEPHONE ENCOUNTER
I called and spoke with the patient and let her know that in order for this medication to be refilled she would need to have an appointment with her provider. I scheduled her for next Monday at January and let her know we can fill it that day. She verbalized understanding and will call back if she needs anything before then.    [employer] Safety Interventions

## 2024-01-08 NOTE — TELEPHONE ENCOUNTER
Caller: Kenji Shani    Relationship: Self    Best call back number: [phone]     Requested Prescriptions:   Requested Prescriptions     Pending Prescriptions Disp Refills    HYDROcodone-acetaminophen (NORCO) 7.5-325 MG per tablet 20 tablet 0     Sig: Take 1 tablet by mouth Every 6 (Six) Hours As Needed for Moderate Pain.        Pharmacy where request should be sent: [facility]/PHARMACY #[id number] - [place], [place] - [address] [facility] [phone] [facility] [phone] FX     Last office visit with prescribing clinician: 10/5/2023   Last telemedicine visit with prescribing clinician: Visit date not found   Next office visit with prescribing clinician: 10/7/2024     Does the patient have less than a 3 day supply:  [x] Yes  [] No    Tran Rico Rep   01/08/24 15:55 EST

## 2024-01-09 RX ORDER — FAMOTIDINE 20 MG/1
20 TABLET, FILM COATED ORAL
Qty: 180 TABLET | Refills: 3 | Status: SHIPPED | OUTPATIENT
Start: 2024-01-09

## 2024-01-09 RX ORDER — FUROSEMIDE 40 MG/1
TABLET ORAL
Qty: 90 TABLET | Refills: 3 | Status: SHIPPED | OUTPATIENT
Start: 2024-01-09

## 2024-01-09 RX ORDER — POTASSIUM CHLORIDE 1500 MG/1
TABLET, EXTENDED RELEASE ORAL
Qty: 90 TABLET | Refills: 3 | Status: SHIPPED | OUTPATIENT
Start: 2024-01-09

## 2024-01-11 ENCOUNTER — ANTICOAGULATION VISIT (OUTPATIENT)
Dept: PHARMACY | Facility: HOSPITAL | Age: 74
End: 2024-01-11
Payer: MEDICARE

## 2024-01-11 DIAGNOSIS — I48.20 ATRIAL FIBRILLATION, CHRONIC: Primary | Chronic | ICD-10-CM

## 2024-01-11 LAB — INR PPP: 2.3

## 2024-01-11 NOTE — PROGRESS NOTES
Anticoagulation Clinic Progress Note    Anticoagulation Summary  As of 2024      INR goal:  2.0-3.0   TTR:  55.9% (2.8 y)   INR used for dosin.30 (2024)   Warfarin maintenance plan:  1.25 mg every Wed; 2.5 mg all other days   Weekly warfarin total:  16.25 mg   No change documented:  Zenia Myers, Fahad   Plan last modified:  Keshawn Castaneda PharmD (2023)   Next INR check:  2024   Priority:  High   Target end date:      Indications    Atrial fibrillation  chronic [I48.20]                 Anticoagulation Episode Summary       INR check location:      Preferred lab:      Send INR reminders to:   [facility] [employer] HOME TEST POOL    Comments:   Home Testing as of 21 *call only when out of range*          Anticoagulation Care Providers       Provider Role Specialty Phone number    Zenia Tinajero MD Referring Cardiology [phone]            Clinic Interview:  No pertinent clinical findings have been reported.    INR History:      2023     1:09 PM 2023    12:00 AM 2023    10:45 AM 2024    12:00 AM 2024    11:05 AM 2024    12:00 AM 2024    12:28 PM   Anticoagulation Monitoring   INR 2.10  2.10  1.80  2.30   INR Date 2023   INR Goal 2.0-3.0  2.0-3.0  2.0-3.0  2.0-3.0   Trend Same  Same  Same  Same   Last Week Total 17.5 mg  16.25 mg  16.25 mg  17.5 mg   Next Week Total 16.25 mg  16.25 mg  17.5 mg  16.25 mg   Sun 2.5 mg  2.5 mg  2.5 mg  2.5 mg   Mon 2.5 mg  2.5 mg  2.5 mg  2.5 mg   Tue 2.5 mg  2.5 mg  2.5 mg  2.5 mg   Wed 1.25 mg  1.25 mg  1.25 mg  1.25 mg   Thu 2.5 mg  2.5 mg  3.75 mg ()  2.5 mg   Fri 2.5 mg  2.5 mg  2.5 mg  2.5 mg   Sat 2.5 mg  2.5 mg  2.5 mg  2.5 mg   Historical INR  2.10      1.80      2.30            This result is from an external source.       Plan:  1. INR is therapeutic today- see above in Anticoagulation Summary.    Shani Phillip to continue their warfarin regimen- see above in  Anticoagulation Summary.  2. Follow up in 1 week  3. Pt has agreed to only be called if INR out of range. They have been instructed to call if any changes in medications, doses, concerns, etc. Patient expresses understanding and has no further questions at this time.    Zenia Myers, PharmD

## 2024-01-15 ENCOUNTER — TELEMEDICINE (OUTPATIENT)
Dept: FAMILY MEDICINE CLINIC | Facility: CLINIC | Age: 74
End: 2024-01-15
Payer: MEDICARE

## 2024-01-15 DIAGNOSIS — G89.29 CHRONIC MIDLINE LOW BACK PAIN WITH LEFT-SIDED SCIATICA: ICD-10-CM

## 2024-01-15 DIAGNOSIS — M54.42 CHRONIC MIDLINE LOW BACK PAIN WITH LEFT-SIDED SCIATICA: ICD-10-CM

## 2024-01-15 PROCEDURE — 99213 OFFICE O/P EST LOW 20 MIN: CPT | Performed by: INTERNAL MEDICINE

## 2024-01-15 PROCEDURE — 1160F RVW MEDS BY RX/DR IN RCRD: CPT | Performed by: INTERNAL MEDICINE

## 2024-01-15 PROCEDURE — 1159F MED LIST DOCD IN RCRD: CPT | Performed by: INTERNAL MEDICINE

## 2024-01-15 RX ORDER — HYDROCODONE BITARTRATE AND ACETAMINOPHEN 7.5; 325 MG/1; MG/1
1 TABLET ORAL EVERY 6 HOURS PRN
Qty: 20 TABLET | Refills: 0 | Status: SHIPPED | OUTPATIENT
Start: 2024-01-15

## 2024-01-15 NOTE — PROGRESS NOTES
Subjective   Shani Phillip is a 73 y.o. female.     No chief complaint on file.      History of Present Illness   You have chosen to receive care through a telehealth visit.  Do you consent to use a video/audio connection for your medical care today? Yes  Video visit completed utilizing [employer] video conferencing.  Patient location in home in [facility].  Physician location in office in [facility].    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  And bileaflet thickening of mitral valve.  COPD with chronic hypoxic respiratory failure/oxygen dependent, CKD stage 3a, chronic back pain with sciatica for which she uses the hydrocodone rarely (maybe once a week.  Patient with history of lap band and should not vomit.      Having persistent back pain worse the longer she stands and is up.  Last took her last dose 2 weeks ago of the last fill of hydrocodone 7.5 on 8/21/23 of #20 tabs.  Uses as needed only.  Otherwise feels unchanged and at her baseline.    The following portions of the patient's history were reviewed and updated as appropriate: allergies, current medications, past family history, past medical history, past social history, past surgical history and problem list.    Depression Screen:      10/5/2023     9:22 AM   PHQ-2/PHQ-9 Depression Screening   Little Interest or Pleasure in Doing Things 0-->not at all   Feeling Down, Depressed or Hopeless 0-->not at all   PHQ-9: Brief Depression Severity Measure Score 0       Past Medical History:   Diagnosis Date    Acute endocarditis     Atrial fibrillation with RVR 11/14/2019    CKD (chronic kidney disease) stage 3, GFR 30-59 ml/min     COPD (chronic obstructive pulmonary disease)     Influenza 11/14/2019    Medicare annual wellness visit, subsequent 04/12/2021    Osteoporosis     Renal disorder     Restless leg syndrome     Thrush, oral 11/14/2019       Past Surgical History:   Procedure  Laterality Date    CARDIAC CATHETERIZATION N/A 2020    Procedure: Coronary angiography;  Surgeon: Svetlana Grayson MD;  Location:  [facility];  Service: Cardiovascular    CARDIAC CATHETERIZATION N/A 2020    Procedure: Left ventriculography;  Surgeon: Svetlana Grayson MD;  Location:  [facility];  Service: Cardiovascular    CARDIAC CATHETERIZATION N/A 2020    Procedure: Left Heart Cath;  Surgeon: Svetlana Grayson MD;  Location:  [facility];  Service: Cardiovascular    CHOLECYSTECTOMY      KNEE ARTHROPLASTY Right     LAPAROSCOPIC GASTRIC BANDING         Family History   Problem Relation Age of Onset    Lung cancer Mother        Social History     Socioeconomic History    Marital status:    Tobacco Use    Smoking status: Former     Packs/day: 0.50     Years: 50.00     Additional pack years: 0.00     Total pack years: 25.00     Types: Cigarettes     Quit date: 11/3/2019     Years since quittin.2    Smokeless tobacco: Never    Tobacco comments:     LAST CIG 2019   Substance and Sexual Activity    Alcohol use: No     Comment: caffeine - 1/2 cup coffee daily     Drug use: No    Sexual activity: Defer       Current Outpatient Medications   Medication Sig Dispense Refill    trospium (SANCTURA) 20 MG tablet TAKE 1 TABLET BY MOUTH TWICE A  tablet 1    ammonium lactate (AmLactin) 12 % lotion Apply  topically to the appropriate area as directed As Needed for Dry Skin. 225 g 6    budesonide (PULMICORT) 0.5 MG/2ML nebulizer solution Take 2 mL by nebulization Daily. 60 each 1    buPROPion XL (WELLBUTRIN XL) 300 MG 24 hr tablet Take 1 tablet by mouth Daily. 90 tablet 1    cholecalciferol (VITAMIN D3) 25 MCG (1000 UT) tablet Take 2 tablets by mouth Daily.      cyclobenzaprine (FLEXERIL) 10 MG tablet Take 1 tablet by mouth 3 (Three) Times a Day As Needed (back pain). 40 tablet 1    digoxin (LANOXIN) 125 MCG tablet Take 1 tablet by mouth Every Other  Day. 90 tablet 2    famotidine (PEPCID) 20 MG tablet TAKE 1 TABLET BY MOUTH 2 TIMES A DAY BEFORE MEALS. 180 tablet 3    furosemide (LASIX) 40 MG tablet TAKE 1 TABLET BY MOUTH EVERY DAY 90 tablet 3    HYDROcodone-acetaminophen (NORCO) 7.5-325 MG per tablet Take 1 tablet by mouth Every 6 (Six) Hours As Needed for Moderate Pain. 20 tablet 0    ipratropium-albuterol (DUO-NEB) 0.5-2.5 mg/3 ml nebulizer Take 3 mL by nebulization Every 4 (Four) Hours As Needed for Wheezing. 360 mL 1    O2 (OXYGEN) Inhale 2 L/min 1 (One) Time.      ondansetron (ZOFRAN) 4 MG tablet Take 1 tablet by mouth Every 8 (Eight) Hours As Needed for Nausea or Vomiting. 30 tablet 1    pantoprazole (PROTONIX) 40 MG EC tablet TAKE 1 TABLET BY MOUTH EVERY DAY 90 tablet 1    potassium chloride ER (K-TAB) 20 MEQ tablet controlled-release ER tablet TAKE 1 TABLET BY MOUTH EVERY DAY 90 tablet 3    pramipexole (MIRAPEX) 0.5 MG tablet Take 1 tablet by mouth 2 (Two) Times a Day. 180 tablet 1    revefenacin (Yupelri) 175 MCG/3ML nebulizer solution Take 3 mL by nebulization Daily.      warfarin (COUMADIN) 2.5 MG tablet Take one-half of a tablet by mouth on wed, take one tablet by mouth all other days or as directed 85 tablet 1     No current facility-administered medications for this visit.       Review of Systems   Respiratory:  Positive for shortness of breath.    Musculoskeletal:  Positive for back pain.       Objective   There were no vitals taken for this visit.    Physical Exam   Constitutional: She appears well-developed and well-nourished. No distress.   Patient is a very pleasant and calm sitting in chair throughout the entirety of the visit.   HENT:   Head: Normocephalic and atraumatic.   Eyes: Pupils are equal, round, and reactive to light.   Pulmonary/Chest: Effort normal.  No respiratory distress.  Utilizing nasal cannula oxygen   Neurological: She is alert.   Skin: She is not diaphoretic.   Psychiatric: She has a normal mood and affect.       Recent  Results (from the past 2016 hour(s))   Protime-INR    Collection Time: 10/26/23 12:00 AM    Specimen: Blood   Result Value Ref Range    INR 1.10    Protime-INR    Collection Time: 11/02/23 12:00 AM    Specimen: Blood   Result Value Ref Range    INR 2.50    Protime-INR    Collection Time: 11/09/23 12:00 AM    Specimen: Blood   Result Value Ref Range    INR 3.70    Protime-INR    Collection Time: 11/16/23 12:00 AM    Specimen: Blood   Result Value Ref Range    INR 3.70    Protime-INR    Collection Time: 11/22/23 12:00 AM    Specimen: Blood   Result Value Ref Range    INR 3.10    Protime-INR    Collection Time: 11/30/23 12:00 AM    Specimen: Blood   Result Value Ref Range    INR 2.20    Protime-INR    Collection Time: 12/07/23 12:00 AM    Specimen: Blood   Result Value Ref Range    INR 3.50    Protime-INR    Collection Time: 12/14/23 12:00 AM    Specimen: Blood   Result Value Ref Range    INR 1.30    Protime-INR    Collection Time: 12/21/23 12:00 AM    Specimen: Blood   Result Value Ref Range    INR 2.10    Protime-INR    Collection Time: 12/28/23 12:00 AM    Specimen: Blood   Result Value Ref Range    INR 2.10    Protime-INR    Collection Time: 01/04/24 12:00 AM    Specimen: Blood   Result Value Ref Range    INR 1.80    Protime-INR    Collection Time: 01/11/24 12:00 AM    Specimen: Blood   Result Value Ref Range    INR 2.30      Assessment & Plan   Diagnoses and all orders for this visit:    1. Chronic midline low back pain with left-sided sciatica        ZHANE run and reviewed.  Risks of the medication include but are not limited to fatigue, somnolence, increased risk of falls, constipation, allergic reaction, dependence, and addiction.  Discussed and reviewed the hydrocodone risk and benefits and she is to continue use only as needed.  Will need to be seen in person on next visit.  Continue all of her other medications unchanged.  Video visit completed.       I spent 20 minutes caring for Shani on this date of  service. This time includes time spent by me in the following activities:preparing for the visit, reviewing tests, obtaining and/or reviewing a separately obtained history, performing a medically appropriate examination and/or evaluation , counseling and educating the patient/family/caregiver, ordering medications, tests, or procedures, and documenting information in the medical record

## 2024-01-18 ENCOUNTER — ANTICOAGULATION VISIT (OUTPATIENT)
Dept: PHARMACY | Facility: HOSPITAL | Age: 74
End: 2024-01-18
Payer: MEDICARE

## 2024-01-18 DIAGNOSIS — I48.20 ATRIAL FIBRILLATION, CHRONIC: Primary | Chronic | ICD-10-CM

## 2024-01-18 LAB — INR PPP: 3

## 2024-01-18 NOTE — PROGRESS NOTES
Anticoagulation Clinic Progress Note    Anticoagulation Summary  As of 1/18/2024      INR goal:  2.0-3.0   TTR:  56.2% (2.9 y)   INR used for dosing:  3.00 (1/18/2024)   Warfarin maintenance plan:  1.25 mg every Wed; 2.5 mg all other days   Weekly warfarin total:  16.25 mg   No change documented:  Marleny Black RPH   Plan last modified:  Keshawn Castaneda, PharmD (11/22/2023)   Next INR check:  1/25/2024   Priority:  High   Target end date:      Indications    Atrial fibrillation  chronic [I48.20]                 Anticoagulation Episode Summary       INR check location:      Preferred lab:      Send INR reminders to:   [facility] [employer] HOME TEST POOL    Comments:   Home Testing as of 7/30/21 *call only when out of range*          Anticoagulation Care Providers       Provider Role Specialty Phone number    Zenia Tinajero MD Referring Cardiology [phone]            Clinic Interview:  No pertinent clinical findings have been reported.    INR History:      12/28/2023    10:45 AM 1/4/2024    12:00 AM 1/4/2024    11:05 AM 1/11/2024    12:00 AM 1/11/2024    12:28 PM 1/18/2024    12:00 AM 1/18/2024     1:05 PM   Anticoagulation Monitoring   INR 2.10  1.80  2.30  3.00   INR Date 12/28/2023 1/4/2024 1/11/2024 1/18/2024   INR Goal 2.0-3.0  2.0-3.0  2.0-3.0  2.0-3.0   Trend Same  Same  Same  Same   Last Week Total 16.25 mg  16.25 mg  17.5 mg  16.25 mg   Next Week Total 16.25 mg  17.5 mg  16.25 mg  16.25 mg   Sun 2.5 mg  2.5 mg  2.5 mg  2.5 mg   Mon 2.5 mg  2.5 mg  2.5 mg  2.5 mg   Tue 2.5 mg  2.5 mg  2.5 mg  2.5 mg   Wed 1.25 mg  1.25 mg  1.25 mg  1.25 mg   Thu 2.5 mg  3.75 mg (1/4)  2.5 mg  2.5 mg   Fri 2.5 mg  2.5 mg  2.5 mg  2.5 mg   Sat 2.5 mg  2.5 mg  2.5 mg  2.5 mg   Historical INR  1.80      2.30      3.00            This result is from an external source.       Plan:  1. INR is therapeutic today- see above in Anticoagulation Summary.    Shani Phillip to continue their warfarin regimen- see above in  Anticoagulation Summary.  2. Follow up in 1 week  3. Pt has agreed to only be called if INR out of range. They have been instructed to call if any changes in medications, doses, concerns, etc. Patient expresses understanding and has no further questions at this time.    Marleny Black, [facility]

## 2024-01-25 ENCOUNTER — ANTICOAGULATION VISIT (OUTPATIENT)
Dept: PHARMACY | Facility: HOSPITAL | Age: 74
End: 2024-01-25
Payer: MEDICARE

## 2024-01-25 DIAGNOSIS — I48.20 ATRIAL FIBRILLATION, CHRONIC: Primary | Chronic | ICD-10-CM

## 2024-01-25 LAB — INR PPP: 2.3

## 2024-01-25 PROCEDURE — G0249 PROVIDE INR TEST MATER/EQUIP: HCPCS

## 2024-01-25 NOTE — PROGRESS NOTES
Anticoagulation Clinic Progress Note    Anticoagulation Summary  As of 2024      INR goal:  2.0-3.0   TTR:  56.5% (2.9 y)   INR used for dosin.30 (2024)   Warfarin maintenance plan:  1.25 mg every Wed; 2.5 mg all other days   Weekly warfarin total:  16.25 mg   No change documented:  Marleny Black RPH   Plan last modified:  Keshawn Castaneda, PharmD (2023)   Next INR check:  2024   Priority:  High   Target end date:      Indications    Atrial fibrillation  chronic [I48.20]                 Anticoagulation Episode Summary       INR check location:      Preferred lab:      Send INR reminders to:   [facility] [employer] HOME TEST POOL    Comments:   Home Testing as of 21 *call only when out of range*          Anticoagulation Care Providers       Provider Role Specialty Phone number    Zenia Tinajero MD Referring Cardiology [phone]            Clinic Interview:  No pertinent clinical findings have been reported.    INR History:      2024    11:05 AM 2024    12:00 AM 2024    12:28 PM 2024    12:00 AM 2024     1:05 PM 2024    12:00 AM 2024     9:15 AM   Anticoagulation Monitoring   INR 1.80  2.30  3.00  2.30   INR Date 2024   INR Goal 2.0-3.0  2.0-3.0  2.0-3.0  2.0-3.0   Trend Same  Same  Same  Same   Last Week Total 16.25 mg  17.5 mg  16.25 mg  16.25 mg   Next Week Total 17.5 mg  16.25 mg  16.25 mg  16.25 mg   Sun 2.5 mg  2.5 mg  2.5 mg  2.5 mg   Mon 2.5 mg  2.5 mg  2.5 mg  2.5 mg   Tue 2.5 mg  2.5 mg  2.5 mg  2.5 mg   Wed 1.25 mg  1.25 mg  1.25 mg  1.25 mg   Thu 3.75 mg ()  2.5 mg  2.5 mg  2.5 mg   Fri 2.5 mg  2.5 mg  2.5 mg  2.5 mg   Sat 2.5 mg  2.5 mg  2.5 mg  2.5 mg   Historical INR  2.30      3.00      2.30            This result is from an external source.       Plan:  1. INR is therapeutic today- see above in Anticoagulation Summary.    Shani Phillip to continue their warfarin regimen- see above in Anticoagulation  Summary.  2. Follow up in 1 week  3. Pt has agreed to only be called if INR out of range. They have been instructed to call if any changes in medications, doses, concerns, etc. Patient expresses understanding and has no further questions at this time.    Marleny Black, [facility]

## 2024-01-30 DIAGNOSIS — I48.91 ATRIAL FIBRILLATION WITH RVR: Primary | ICD-10-CM

## 2024-01-30 DIAGNOSIS — Z79.01 CHRONIC ANTICOAGULATION: ICD-10-CM

## 2024-01-30 DIAGNOSIS — T46.0X4D: ICD-10-CM

## 2024-01-30 DIAGNOSIS — I50.33 ACUTE ON CHRONIC DIASTOLIC CHF (CONGESTIVE HEART FAILURE): ICD-10-CM

## 2024-01-30 RX ORDER — DIGOXIN 125 MCG
125 TABLET ORAL EVERY OTHER DAY
Qty: 30 TABLET | Refills: 0 | Status: SHIPPED | OUTPATIENT
Start: 2024-01-30 | End: 2024-02-05 | Stop reason: SDUPTHER

## 2024-01-30 NOTE — TELEPHONE ENCOUNTER
Caller: Shani Phillip    Relationship: Self    Best call back number: [phone]     Requested Prescriptions:   Requested Prescriptions     Pending Prescriptions Disp Refills    digoxin (LANOXIN) 125 MCG tablet 90 tablet 2     Sig: Take 1 tablet by mouth Every Other Day.        Pharmacy where request should be sent: [facility]/PHARMACY #[id number] [place], [place] - [address] - [phone]  - [phone] FX     Last office visit with prescribing clinician: 12/27/2023   Last telemedicine visit with prescribing clinician: Visit date not found   Next office visit with prescribing clinician: Visit date not found     Additional details provided by patient: COMPLETELY OUT    Does the patient have less than a 3 day supply:  [x] Yes  [] No    Would you like a call back once the refill request has been completed: [] Yes [] No    If the office needs to give you a call back, can they leave a voicemail: [] Yes [] No    Tran Sanchez Rep   01/30/24 10:01 EST

## 2024-01-30 NOTE — TELEPHONE ENCOUNTER
I sent in a 30-day supply but she is due for laboratory data.  Labs were placed.  Please call her and let her know she needs to come in for blood work.  Last dig level was 1.6 in June

## 2024-02-01 ENCOUNTER — ANTICOAGULATION VISIT (OUTPATIENT)
Dept: PHARMACY | Facility: HOSPITAL | Age: 74
End: 2024-02-01
Payer: MEDICARE

## 2024-02-01 DIAGNOSIS — I48.20 ATRIAL FIBRILLATION, CHRONIC: Primary | Chronic | ICD-10-CM

## 2024-02-01 LAB — INR PPP: 1.4

## 2024-02-02 NOTE — PROGRESS NOTES
Anticoagulation Clinic Progress Note    Anticoagulation Summary  As of 2024      INR goal:  2.0-3.0   TTR:  56.3% (2.9 y)   INR used for dosin.40 (2024)   Warfarin maintenance plan:  1.25 mg every Wed; 2.5 mg all other days   Weekly warfarin total:  16.25 mg   Plan last modified:  Keshawn Castaneda, PharmD (2023)   Next INR check:  2024   Priority:  High   Target end date:      Indications    Atrial fibrillation  chronic [I48.20]                 Anticoagulation Episode Summary       INR check location:      Preferred lab:      Send INR reminders to:   [facility] [employer] HOME TEST POOL    Comments:   Home Testing as of 21 *call only when out of range*          Anticoagulation Care Providers       Provider Role Specialty Phone number    Zenia Tinajero MD Referring Cardiology [phone]            Clinic Interview:  Patient Findings     Negatives:  Signs/symptoms of thrombosis, Signs/symptoms of bleeding,   Laboratory test error suspected, Change in health, Change in alcohol use,   Change in activity, Upcoming invasive procedure, Emergency department   visit, Upcoming dental procedure, Missed doses, Extra doses, Change in   medications, Change in diet/appetite, Hospital admission, Bruising, Other   complaints      Clinical Outcomes     Negatives:  Major bleeding event, Thromboembolic event,   Anticoagulation-related hospital admission, Anticoagulation-related ED   visit, Anticoagulation-related fatality        INR History:      2024    12:00 AM 2024     1:05 PM 2024    12:00 AM 2024     9:15 AM 2024    12:00 AM 2024     1:18 PM 2024     1:19 PM   Anticoagulation Monitoring   INR  3.00  2.30  1.40    INR Date  2024    INR Goal  2.0-3.0  2.0-3.0  2.0-3.0 2.0-3.0   Trend  Same  Same  Same    Last Week Total  16.25 mg  16.25 mg  16.25 mg    Next Week Total  16.25 mg  16.25 mg  17.5 mg    Sun  2.5 mg  2.5 mg  2.5 mg    Mon  2.5 mg  2.5 mg   2.5 mg    Tue  2.5 mg  2.5 mg  2.5 mg    Wed  1.25 mg  1.25 mg  1.25 mg    Thu  2.5 mg  2.5 mg  2.5 mg    Fri  2.5 mg  2.5 mg  3.75 mg (2/2)    Sat  2.5 mg  2.5 mg  2.5 mg    Historical INR 3.00      2.30      1.40             This result is from an external source.       Plan:  1. INR is Subtherapeutic today- see above in Anticoagulation Summary.   Will instruct Shani Phillip to Increase their warfarin regimen- see above in Anticoagulation Summary.  boost to 3.75 mg today then resume, rck 1 week   2. Follow up in 1 weeks  3. They have been instructed to call if any changes in medications, doses, concerns, etc. Patient expresses understanding and has no further questions at this time.    Marleny Black [facility]

## 2024-02-05 ENCOUNTER — TELEPHONE (OUTPATIENT)
Dept: CARDIOLOGY | Facility: CLINIC | Age: 74
End: 2024-02-05
Payer: MEDICARE

## 2024-02-05 ENCOUNTER — LAB (OUTPATIENT)
Dept: LAB | Facility: HOSPITAL | Age: 74
End: 2024-02-05
Payer: MEDICARE

## 2024-02-05 DIAGNOSIS — I48.91 ATRIAL FIBRILLATION WITH RVR: ICD-10-CM

## 2024-02-05 DIAGNOSIS — T46.0X4D: ICD-10-CM

## 2024-02-05 DIAGNOSIS — Z79.899 ENCOUNTER FOR MONITORING DIGOXIN THERAPY: ICD-10-CM

## 2024-02-05 DIAGNOSIS — Z51.81 ENCOUNTER FOR MONITORING DIGOXIN THERAPY: ICD-10-CM

## 2024-02-05 DIAGNOSIS — Z79.01 CHRONIC ANTICOAGULATION: ICD-10-CM

## 2024-02-05 DIAGNOSIS — I48.91 ATRIAL FIBRILLATION WITH RVR: Primary | ICD-10-CM

## 2024-02-05 DIAGNOSIS — I50.33 ACUTE ON CHRONIC DIASTOLIC CHF (CONGESTIVE HEART FAILURE): ICD-10-CM

## 2024-02-05 LAB
ALBUMIN SERPL-MCNC: 4 G/DL (ref 3.5–5.2)
ALBUMIN/GLOB SERPL: 1.3 G/DL
ALP SERPL-CCNC: 135 U/L (ref 39–117)
ALT SERPL W P-5'-P-CCNC: 14 U/L (ref 1–33)
ANION GAP SERPL CALCULATED.3IONS-SCNC: 10 MMOL/L (ref 5–15)
AST SERPL-CCNC: 50 U/L (ref 1–32)
BILIRUB SERPL-MCNC: 0.4 MG/DL (ref 0–1.2)
BUN SERPL-MCNC: 16 MG/DL (ref 8–23)
BUN/CREAT SERPL: 13.9 (ref 7–25)
CALCIUM SPEC-SCNC: 9.2 MG/DL (ref 8.6–10.5)
CHLORIDE SERPL-SCNC: 99 MMOL/L (ref 98–107)
CO2 SERPL-SCNC: 28 MMOL/L (ref 22–29)
CREAT SERPL-MCNC: 1.15 MG/DL (ref 0.57–1)
DEPRECATED RDW RBC AUTO: 44.8 FL (ref 37–54)
DIGOXIN SERPL-MCNC: 1.2 NG/ML (ref 0.6–1.2)
EGFRCR SERPLBLD CKD-EPI 2021: 50.1 ML/MIN/1.73
ERYTHROCYTE [DISTWIDTH] IN BLOOD BY AUTOMATED COUNT: 14.7 % (ref 12.3–15.4)
GLOBULIN UR ELPH-MCNC: 3.2 GM/DL
GLUCOSE SERPL-MCNC: 110 MG/DL (ref 65–99)
HCT VFR BLD AUTO: 38.8 % (ref 34–46.6)
HGB BLD-MCNC: 12.4 G/DL (ref 12–15.9)
MCH RBC QN AUTO: 26.5 PG (ref 26.6–33)
MCHC RBC AUTO-ENTMCNC: 32 G/DL (ref 31.5–35.7)
MCV RBC AUTO: 82.9 FL (ref 79–97)
PLATELET # BLD AUTO: 255 10*3/MM3 (ref 140–450)
PMV BLD AUTO: 9.5 FL (ref 6–12)
POTASSIUM SERPL-SCNC: 3.8 MMOL/L (ref 3.5–5.2)
PROT SERPL-MCNC: 7.2 G/DL (ref 6–8.5)
RBC # BLD AUTO: 4.68 10*6/MM3 (ref 3.77–5.28)
SODIUM SERPL-SCNC: 137 MMOL/L (ref 136–145)
WBC NRBC COR # BLD AUTO: 9.27 10*3/MM3 (ref 3.4–10.8)

## 2024-02-05 PROCEDURE — 85027 COMPLETE CBC AUTOMATED: CPT

## 2024-02-05 PROCEDURE — 36415 COLL VENOUS BLD VENIPUNCTURE: CPT

## 2024-02-05 PROCEDURE — 80162 ASSAY OF DIGOXIN TOTAL: CPT

## 2024-02-05 PROCEDURE — 80053 COMPREHEN METABOLIC PANEL: CPT

## 2024-02-05 RX ORDER — DIGOXIN 125 MCG
125 TABLET ORAL
Qty: 90 TABLET | Refills: 1 | Status: SHIPPED | OUTPATIENT
Start: 2024-02-05

## 2024-02-05 NOTE — TELEPHONE ENCOUNTER
I called and spoke with patient.  She has not yet taken digoxin today.  She will come in for digoxin level today and I will call her later with further instruction.

## 2024-02-05 NOTE — TELEPHONE ENCOUNTER
Results and recommendations called to pt.  Instructed to call with any further questions or concerns.  Verbalized understanding.  Pt states that she will take digoxin daily, and call if HR consistently above 110.    Leta Mann RN  Triage Nurse, [facility] [place]  02/05/24 12:28 EST

## 2024-02-05 NOTE — TELEPHONE ENCOUNTER
Please inform patient blood counts are overall stable.  Kidney function in normal range.  One of the liver enzymes is a little elevated.  Digoxin level was within normal limits so at this time she may continue digoxin every day and will keep her neck scheduled office follow-up appointment with me in July.  I need her to call if her pulse starts to sustain above 110 routinely.  Otherwise, please let her know that I will arrange for repeat blood work to check her liver enzymes and also to repeat digoxin level in July when I see her

## 2024-02-05 NOTE — TELEPHONE ENCOUNTER
Pt called in this morning.  States that she refilled her digoxin prescription on 1/30, and when she picked it up, it was only written for 0.125mcg every other day.  She states that on the days that she doesn't take it, she has CP and increased SOA.  Denies any dizziness/lightheadedness/palpitations or change in swelling.  States BP runs 130's/60's, HR on days she takes dig runs mostly 130-140's (occasionally down to 115, but states that is rare).  On days she doesn't take dig, states that HR running 140-170.  The last dig level I see in [facility] is from 6/4/23 and was 1.6.  Pt has taken it daily, and states that she was not told to decrease dose.    Recommendations?    Leta Mann, RN  Triage RN  02/05/24 08:24 EST

## 2024-02-08 ENCOUNTER — ANTICOAGULATION VISIT (OUTPATIENT)
Dept: PHARMACY | Facility: HOSPITAL | Age: 74
End: 2024-02-08
Payer: MEDICARE

## 2024-02-08 DIAGNOSIS — I48.20 ATRIAL FIBRILLATION, CHRONIC: Primary | Chronic | ICD-10-CM

## 2024-02-08 LAB — INR PPP: 2.8

## 2024-02-08 NOTE — PROGRESS NOTES
Anticoagulation Clinic Progress Note    Anticoagulation Summary  As of 2024      INR goal:  2.0-3.0   TTR:  56.3% (2.9 y)   INR used for dosin.80 (2024)   Warfarin maintenance plan:  1.25 mg every Wed; 2.5 mg all other days   Weekly warfarin total:  16.25 mg   No change documented:  Marleny Black RPH   Plan last modified:  Keshawn Castaneda, PharmD (2023)   Next INR check:  2/15/2024   Priority:  High   Target end date:      Indications    Atrial fibrillation  chronic [I48.20]                 Anticoagulation Episode Summary       INR check location:      Preferred lab:      Send INR reminders to:   [facility] [email or web address] HOME TEST POOL    Comments:   Home Testing as of 21 *call only when out of range*          Anticoagulation Care Providers       Provider Role Specialty Phone number    Zenia Tinajero MD Referring Cardiology [phone]            Clinic Interview:  No pertinent clinical findings have been reported.    INR History:      2024    12:00 AM 2024     9:15 AM 2024    12:00 AM 2024     1:18 PM 2024     1:19 PM 2024    12:00 AM 2024    10:46 AM   Anticoagulation Monitoring   INR  2.30  1.40   2.80   INR Date  2024   INR Goal  2.0-3.0  2.0-3.0 2.0-3.0  2.0-3.0   Trend  Same  Same   Same   Last Week Total  16.25 mg  16.25 mg   17.5 mg   Next Week Total  16.25 mg  17.5 mg   16.25 mg   Sun  2.5 mg  2.5 mg   2.5 mg   Mon  2.5 mg  2.5 mg   2.5 mg   Tue  2.5 mg  2.5 mg   2.5 mg   Wed  1.25 mg  1.25 mg   1.25 mg   Thu  2.5 mg  2.5 mg   2.5 mg   Fri  2.5 mg  3.75 mg (2/)   2.5 mg   Sat  2.5 mg  2.5 mg   2.5 mg   Historical INR 2.30      1.40       2.80            This result is from an external source.       Plan:  1. INR is therapeutic today- see above in Anticoagulation Summary.    Shani Phillip to continue their warfarin regimen- see above in Anticoagulation Summary.  2. Follow up in 1 week  3. They have been instructed to call if any  changes in medications, doses, concerns, etc. Patient expresses understanding and has no further questions at this time.    Marleny Black, [facility]

## 2024-02-15 ENCOUNTER — ANTICOAGULATION VISIT (OUTPATIENT)
Dept: PHARMACY | Facility: HOSPITAL | Age: 74
End: 2024-02-15
Payer: MEDICARE

## 2024-02-15 DIAGNOSIS — I48.20 ATRIAL FIBRILLATION, CHRONIC: Primary | Chronic | ICD-10-CM

## 2024-02-15 LAB — INR PPP: 3

## 2024-02-22 ENCOUNTER — ANTICOAGULATION VISIT (OUTPATIENT)
Dept: PHARMACY | Facility: HOSPITAL | Age: 74
End: 2024-02-22
Payer: MEDICARE

## 2024-02-22 DIAGNOSIS — I48.20 ATRIAL FIBRILLATION, CHRONIC: Primary | Chronic | ICD-10-CM

## 2024-02-22 LAB — INR PPP: 2.9

## 2024-02-22 PROCEDURE — G0249 PROVIDE INR TEST MATER/EQUIP: HCPCS

## 2024-02-22 NOTE — PROGRESS NOTES
Anticoagulation Clinic Progress Note    Anticoagulation Summary  As of 2024      INR goal:  2.0-3.0   TTR:  56.9% (3 y)   INR used for dosin.90 (2024)   Warfarin maintenance plan:  1.25 mg every Wed; 2.5 mg all other days   Weekly warfarin total:  16.25 mg   No change documented:  Zenia Myers PharmD   Plan last modified:  Keshawn Castaneda PharmD (2023)   Next INR check:  2024   Priority:  High   Target end date:      Indications    Atrial fibrillation  chronic [I48.20]                 Anticoagulation Episode Summary       INR check location:      Preferred lab:      Send INR reminders to:   [employer] [employer] HOME TEST POOL    Comments:   Home Testing as of 21 *call only when out of range*          Anticoagulation Care Providers       Provider Role Specialty Phone number    Zenia Tinajero MD Referring Cardiology [phone]            Clinic Interview:  Patient Findings     Negatives:  Signs/symptoms of thrombosis, Signs/symptoms of bleeding,   Laboratory test error suspected, Change in health, Change in alcohol use,   Change in activity, Upcoming invasive procedure, Emergency department   visit, Upcoming dental procedure, Missed doses, Extra doses, Change in   medications, Change in diet/appetite, Hospital admission, Bruising, Other   complaints      Clinical Outcomes     Negatives:  Major bleeding event, Thromboembolic event,   Anticoagulation-related hospital admission, Anticoagulation-related ED   visit, Anticoagulation-related fatality        INR History:      2024    12:00 AM 2024    10:46 AM 2/15/2024    12:00 AM 2/15/2024    10:24 AM 2/15/2024    10:47 AM 2024    12:00 AM 2024    11:30 AM   Anticoagulation Monitoring   INR  2.80   3.00  2.90   INR Date  2024   2/15/2024  2024   INR Goal  2.0-3.0  2.0-3.0 2.0-3.0  2.0-3.0   Trend  Same   Same  Same   Last Week Total  17.5 mg   16.25 mg  16.25 mg   Next Week Total  16.25 mg   16.25 mg  16.25 mg    Sun  2.5 mg   2.5 mg  2.5 mg   Mon  2.5 mg   2.5 mg  2.5 mg   Tue  2.5 mg   2.5 mg  2.5 mg   Wed  1.25 mg   1.25 mg  1.25 mg   Thu  2.5 mg   2.5 mg  2.5 mg   Fri  2.5 mg   2.5 mg  2.5 mg   Sat  2.5 mg   2.5 mg  2.5 mg   Historical INR 2.80      3.00       2.90  C          C Corrected result    This result is from an external source.       Plan:  1. INR is Therapeutic today- see above in Anticoagulation Summary.   Will instruct Shani Phillip to Continue their warfarin regimen- see above in Anticoagulation Summary.  2. Follow up in 1 weeks  3. They have been instructed to call if any changes in medications, doses, concerns, etc. Patient expresses understanding and has no further questions at this time.    Zenia Myers, PharmD

## 2024-02-29 ENCOUNTER — ANTICOAGULATION VISIT (OUTPATIENT)
Dept: PHARMACY | Facility: HOSPITAL | Age: 74
End: 2024-02-29
Payer: MEDICARE

## 2024-02-29 DIAGNOSIS — I48.20 ATRIAL FIBRILLATION, CHRONIC: Primary | Chronic | ICD-10-CM

## 2024-02-29 LAB — INR PPP: 3.8

## 2024-02-29 RX ORDER — WARFARIN SODIUM 2.5 MG/1
TABLET ORAL
Qty: 85 TABLET | Refills: 1 | Status: SHIPPED | OUTPATIENT
Start: 2024-02-29

## 2024-02-29 NOTE — PROGRESS NOTES
Anticoagulation Clinic Progress Note    Anticoagulation Summary  As of 2/29/2024      INR goal:  2.0-3.0   TTR:  56.6% (3 y)   INR used for dosing:  3.80 (2/29/2024)   Warfarin maintenance plan:  1.25 mg every Wed; 2.5 mg all other days   Weekly warfarin total:  16.25 mg   Plan last modified:  Nicole Frazier RPH (2/29/2024)   Next INR check:  3/7/2024   Priority:  High   Target end date:      Indications    Atrial fibrillation  chronic [I48.20]                 Anticoagulation Episode Summary       INR check location:      Preferred lab:      Send INR reminders to:   [facility] [employer] HOME TEST POOL    Comments:   Home Testing as of 7/30/21 *call only when out of range*          Anticoagulation Care Providers       Provider Role Specialty Phone number    Zenia Tinajero MD Referring Cardiology [phone]            Clinic Interview:  Patient Findings     Negatives:  Signs/symptoms of thrombosis, Signs/symptoms of bleeding,   Laboratory test error suspected, Change in health, Change in alcohol use,   Change in activity, Upcoming invasive procedure, Emergency department   visit, Upcoming dental procedure, Missed doses, Extra doses, Change in   medications, Change in diet/appetite, Hospital admission, Bruising, Other   complaints      Clinical Outcomes     Negatives:  Major bleeding event, Thromboembolic event,   Anticoagulation-related hospital admission, Anticoagulation-related ED   visit, Anticoagulation-related fatality        INR History:      2/15/2024    12:00 AM 2/15/2024    10:24 AM 2/15/2024    10:47 AM 2/22/2024    12:00 AM 2/22/2024    11:30 AM 2/29/2024    12:00 AM 2/29/2024    10:21 AM   Anticoagulation Monitoring   INR   3.00  2.90  3.80   INR Date   2/15/2024  2/22/2024  2/29/2024   INR Goal  2.0-3.0 2.0-3.0  2.0-3.0  2.0-3.0   Trend   Same  Same  Same   Last Week Total   16.25 mg  16.25 mg  16.25 mg   Next Week Total   16.25 mg  16.25 mg  15 mg   Sun   2.5 mg  2.5 mg  2.5 mg   Mon   2.5 mg  2.5 mg   2.5 mg   Tue   2.5 mg  2.5 mg  2.5 mg   Wed   1.25 mg  1.25 mg  1.25 mg   Thu   2.5 mg  2.5 mg  1.25 mg (2/29)   Fri   2.5 mg  2.5 mg  2.5 mg   Sat   2.5 mg  2.5 mg  2.5 mg   Historical INR 3.00       2.90  C     3.80           C Corrected result    This result is from an external source.       Plan:  1. INR is Supratherapeutic today- see above in Anticoagulation Summary.   Will instruct Shani Phillip to Continue their warfarin regimen, but will decrease dose tonight 2/29/24 to 1.25 mg see above in Anticoagulation Summary.  2. Follow up in 1 week  3. They have been instructed to call if any changes in medications, doses, concerns, etc. Patient expresses understanding and has no further questions at this time.    Nicole Frazier RP

## 2024-03-07 ENCOUNTER — ANTICOAGULATION VISIT (OUTPATIENT)
Dept: PHARMACY | Facility: HOSPITAL | Age: 74
End: 2024-03-07
Payer: MEDICARE

## 2024-03-07 DIAGNOSIS — I48.20 ATRIAL FIBRILLATION, CHRONIC: Primary | Chronic | ICD-10-CM

## 2024-03-07 LAB — INR PPP: 3.8

## 2024-03-07 NOTE — PROGRESS NOTES
Anticoagulation Clinic Progress Note    Anticoagulation Summary  As of 3/7/2024      INR goal:  2.0-3.0   TTR:  56.2% (3 y)   INR used for dosing:  3.80 (3/7/2024)   Warfarin maintenance plan:  1.25 mg every Wed, Sat; 2.5 mg all other days   Weekly warfarin total:  15 mg   Plan last modified:  Marleny Black RPH (3/7/2024)   Next INR check:  3/14/2024   Priority:  High   Target end date:      Indications    Atrial fibrillation  chronic [I48.20]                 Anticoagulation Episode Summary       INR check location:      Preferred lab:      Send INR reminders to:   [facility] [employer] HOME TEST POOL    Comments:   Home Testing as of 7/30/21 *call only when out of range*          Anticoagulation Care Providers       Provider Role Specialty Phone number    Zenia Tinajero MD Referring Cardiology [phone]            Clinic Interview:  Patient Findings     Negatives:  Signs/symptoms of thrombosis, Signs/symptoms of bleeding,   Laboratory test error suspected, Change in health, Change in alcohol use,   Change in activity, Upcoming invasive procedure, Emergency department   visit, Upcoming dental procedure, Missed doses, Extra doses, Change in   medications, Change in diet/appetite, Hospital admission, Bruising, Other   complaints      Clinical Outcomes     Negatives:  Major bleeding event, Thromboembolic event,   Anticoagulation-related hospital admission, Anticoagulation-related ED   visit, Anticoagulation-related fatality        INR History:      2/15/2024    10:47 AM 2/22/2024    12:00 AM 2/22/2024    11:30 AM 2/29/2024    12:00 AM 2/29/2024    10:21 AM 3/7/2024    12:00 AM 3/7/2024     2:37 PM   Anticoagulation Monitoring   INR 3.00  2.90  3.80  3.80   INR Date 2/15/2024  2/22/2024  2/29/2024  3/7/2024   INR Goal 2.0-3.0  2.0-3.0  2.0-3.0  2.0-3.0   Trend Same  Same  Same  Down   Last Week Total 16.25 mg  16.25 mg  16.25 mg  15 mg   Next Week Total 16.25 mg  16.25 mg  15 mg  12.5 mg   Sun 2.5 mg  2.5 mg  2.5 mg   2.5 mg   Mon 2.5 mg  2.5 mg  2.5 mg  2.5 mg   Tue 2.5 mg  2.5 mg  2.5 mg  2.5 mg   Wed 1.25 mg  1.25 mg  1.25 mg  1.25 mg   Thu 2.5 mg  2.5 mg  1.25 mg (2/29)  Hold (3/7)   Fri 2.5 mg  2.5 mg  2.5 mg  2.5 mg   Sat 2.5 mg  2.5 mg  2.5 mg  1.25 mg   Historical INR  2.90  C     3.80      3.80           C Corrected result    This result is from an external source.       Plan:  1. INR is Supratherapeutic today- see above in Anticoagulation Summary.   Will instruct Shani Phillip to Change their warfarin regimen- see above in Anticoagulation Summary.  hold warfarin today, then trial on 1.25 mg wed, sat and 2.5 mg URMILA, rck 1 week   2. Follow up in 1 weeks  3. They have been instructed to call if any changes in medications, doses, concerns, etc. Patient expresses understanding and has no further questions at this time.    Marleny Black [facility]

## 2024-03-10 DIAGNOSIS — F32.A DEPRESSION, UNSPECIFIED DEPRESSION TYPE: ICD-10-CM

## 2024-03-11 RX ORDER — BUPROPION HYDROCHLORIDE 300 MG/1
300 TABLET ORAL DAILY
Qty: 90 TABLET | Refills: 1 | Status: SHIPPED | OUTPATIENT
Start: 2024-03-11

## 2024-03-14 ENCOUNTER — ANTICOAGULATION VISIT (OUTPATIENT)
Dept: PHARMACY | Facility: HOSPITAL | Age: 74
End: 2024-03-14
Payer: MEDICARE

## 2024-03-14 DIAGNOSIS — I48.20 ATRIAL FIBRILLATION, CHRONIC: Primary | Chronic | ICD-10-CM

## 2024-03-14 LAB — INR PPP: 3.8

## 2024-03-14 NOTE — PROGRESS NOTES
Anticoagulation Clinic Progress Note    Anticoagulation Summary  As of 3/14/2024      INR goal:  2.0-3.0   TTR:  55.9% (3 y)   INR used for dosing:  3.80 (3/14/2024)   Warfarin maintenance plan:  1.25 mg every Mon, Wed, Sat; 2.5 mg all other days   Weekly warfarin total:  13.75 mg   Plan last modified:  Keshawn Castaneda, PharmD (3/14/2024)   Next INR check:  3/21/2024   Priority:  High   Target end date:      Indications    Atrial fibrillation  chronic [I48.20]                 Anticoagulation Episode Summary       INR check location:      Preferred lab:      Send INR reminders to:   [facility] [employer] HOME TEST POOL    Comments:   Home Testing as of 7/30/21 *call only when out of range*          Anticoagulation Care Providers       Provider Role Specialty Phone number    Zenia Tinajero MD Referring Cardiology [phone]            Clinic Interview:  Patient Findings     Negatives:  Signs/symptoms of thrombosis, Signs/symptoms of bleeding,   Laboratory test error suspected, Change in health, Change in alcohol use,   Change in activity, Upcoming invasive procedure, Emergency department   visit, Upcoming dental procedure, Missed doses, Extra doses, Change in   medications, Change in diet/appetite, Hospital admission, Bruising, Other   complaints      Clinical Outcomes     Negatives:  Major bleeding event, Thromboembolic event,   Anticoagulation-related hospital admission, Anticoagulation-related ED   visit, Anticoagulation-related fatality        INR History:      2/22/2024    11:30 AM 2/29/2024    12:00 AM 2/29/2024    10:21 AM 3/7/2024    12:00 AM 3/7/2024     2:37 PM 3/14/2024    12:00 AM 3/14/2024    10:15 AM   Anticoagulation Monitoring   INR 2.90  3.80  3.80  3.80   INR Date 2/22/2024  2/29/2024  3/7/2024  3/14/2024   INR Goal 2.0-3.0  2.0-3.0  2.0-3.0  2.0-3.0   Trend Same  Same  Down  Down   Last Week Total 16.25 mg  16.25 mg  15 mg  12.5 mg   Next Week Total 16.25 mg  15 mg  12.5 mg  11.25 mg   Sun 2.5 mg  2.5  mg  2.5 mg  2.5 mg   Mon 2.5 mg  2.5 mg  2.5 mg  1.25 mg   Tue 2.5 mg  2.5 mg  2.5 mg  2.5 mg   Wed 1.25 mg  1.25 mg  1.25 mg  1.25 mg   Thu 2.5 mg  1.25 mg (2/29)  Hold (3/7)  Hold (3/14)   Fri 2.5 mg  2.5 mg  2.5 mg  2.5 mg   Sat 2.5 mg  2.5 mg  1.25 mg  1.25 mg   Historical INR  3.80      3.80      3.80            This result is from an external source.       Plan:  1. INR is Supratherapeutic today- see above in Anticoagulation Summary.   Will instruct Shani Phillip to Change their warfarin regimen (HOLD today, then decrease to 1.25 mg Mon/Wed/Sat, 2.5 mg all other days) - see above in Anticoagulation Summary.  2. Follow up in 1 week.  3. They have been instructed to call if any changes in medications, doses, concerns, etc. Patient expresses understanding and has no further questions at this time.     Keshawn Castaneda, PharmD

## 2024-03-21 ENCOUNTER — ANTICOAGULATION VISIT (OUTPATIENT)
Dept: PHARMACY | Facility: HOSPITAL | Age: 74
End: 2024-03-21
Payer: MEDICARE

## 2024-03-21 DIAGNOSIS — I48.20 ATRIAL FIBRILLATION, CHRONIC: Primary | Chronic | ICD-10-CM

## 2024-03-21 LAB — INR PPP: 3.3

## 2024-03-21 PROCEDURE — G0249 PROVIDE INR TEST MATER/EQUIP: HCPCS

## 2024-03-21 NOTE — PROGRESS NOTES
Anticoagulation Clinic Progress Note    Anticoagulation Summary  As of 3/21/2024      INR goal:  2.0-3.0   TTR:  55.5% (3 y)   INR used for dosing:  3.30 (3/21/2024)   Warfarin maintenance plan:  2.5 mg every Sun, Tue, Fri; 1.25 mg all other days   Weekly warfarin total:  12.5 mg   Plan last modified:  Priscilla Villanueva, Fahad (3/21/2024)   Next INR check:  3/28/2024   Priority:  High   Target end date:      Indications    Atrial fibrillation  chronic [I48.20]                 Anticoagulation Episode Summary       INR check location:      Preferred lab:      Send INR reminders to:   [facility] [employer] HOME TEST POOL    Comments:   Home Testing as of 7/30/21 *call only when out of range*          Anticoagulation Care Providers       Provider Role Specialty Phone number    Zenia Tinajero MD Referring Cardiology [phone]            Clinic Interview:  Patient Findings     Negatives:  Signs/symptoms of thrombosis, Signs/symptoms of bleeding,   Laboratory test error suspected, Change in health, Change in alcohol use,   Change in activity, Upcoming invasive procedure, Emergency department   visit, Upcoming dental procedure, Missed doses, Extra doses, Change in   medications, Change in diet/appetite, Hospital admission, Bruising, Other   complaints      Clinical Outcomes     Negatives:  Major bleeding event, Thromboembolic event,   Anticoagulation-related hospital admission, Anticoagulation-related ED   visit, Anticoagulation-related fatality        INR History:      2/29/2024    10:21 AM 3/7/2024    12:00 AM 3/7/2024     2:37 PM 3/14/2024    12:00 AM 3/14/2024    10:15 AM 3/21/2024    12:00 AM 3/21/2024    10:45 AM   Anticoagulation Monitoring   INR 3.80  3.80  3.80  3.30   INR Date 2/29/2024  3/7/2024  3/14/2024  3/21/2024   INR Goal 2.0-3.0  2.0-3.0  2.0-3.0  2.0-3.0   Trend Same  Down  Down  Down   Last Week Total 16.25 mg  15 mg  12.5 mg  11.25 mg   Next Week Total 15 mg  12.5 mg  11.25 mg  12.5 mg   Sun 2.5 mg   2.5 mg  2.5 mg  2.5 mg   Mon 2.5 mg  2.5 mg  1.25 mg  1.25 mg   Tue 2.5 mg  2.5 mg  2.5 mg  2.5 mg   Wed 1.25 mg  1.25 mg  1.25 mg  1.25 mg   Thu 1.25 mg (2/29)  Hold (3/7)  Hold (3/14)  1.25 mg   Fri 2.5 mg  2.5 mg  2.5 mg  2.5 mg   Sat 2.5 mg  1.25 mg  1.25 mg  1.25 mg   Historical INR  3.80      3.80      3.30            This result is from an external source.       Plan:  1. INR is Supratherapeutic today- see above in Anticoagulation Summary.   Will instruct Shani Phillip to Change their warfarin regimen (1.25mg on M/W/Th/Sa, 2.5mg on all other days) - see above in Anticoagulation Summary.  2. Follow up in 1 week  3. They have been instructed to call if any changes in medications, doses, concerns, etc. Patient expresses understanding and has no further questions at this time.    Priscilla Villanueva, PharmD

## 2024-03-21 NOTE — PROGRESS NOTES
I have supervised and reviewed the notes, assessments, and/or procedures performed. The documented assessment and plan were developed cooperatively, and the plan was implemented in my presence. I concur with the documentation of this patient encounter.    Keshawn Castaneda, ShavonD

## 2024-03-28 ENCOUNTER — ANTICOAGULATION VISIT (OUTPATIENT)
Dept: PHARMACY | Facility: HOSPITAL | Age: 74
End: 2024-03-28
Payer: MEDICARE

## 2024-03-28 DIAGNOSIS — I48.20 ATRIAL FIBRILLATION, CHRONIC: Primary | Chronic | ICD-10-CM

## 2024-03-28 LAB — INR PPP: 1.6

## 2024-03-28 NOTE — PROGRESS NOTES
Anticoagulation Clinic Progress Note    Anticoagulation Summary  As of 3/28/2024      INR goal:  2.0-3.0   TTR:  55.5% (3.1 y)   INR used for dosin.60 (3/28/2024)   Warfarin maintenance plan:  1.25 mg every Mon, Wed, Sat; 2.5 mg all other days   Weekly warfarin total:  13.75 mg   Plan last modified:  Priscilla Villanueva, Fahad (3/28/2024)   Next INR check:  2024   Priority:  High   Target end date:      Indications    Atrial fibrillation  chronic [I48.20]                 Anticoagulation Episode Summary       INR check location:      Preferred lab:      Send INR reminders to:   [facility] [employer] HOME TEST POOL    Comments:   Home Testing as of 21 *call only when out of range*          Anticoagulation Care Providers       Provider Role Specialty Phone number    Zenia Tinajero MD Referring Cardiology [phone]            Clinic Interview:  Patient Findings     Negatives:  Signs/symptoms of thrombosis, Signs/symptoms of bleeding,   Laboratory test error suspected, Change in health, Change in alcohol use,   Change in activity, Upcoming invasive procedure, Emergency department   visit, Upcoming dental procedure, Missed doses, Extra doses, Change in   medications, Change in diet/appetite, Hospital admission, Bruising, Other   complaints      Clinical Outcomes     Negatives:  Major bleeding event, Thromboembolic event,   Anticoagulation-related hospital admission, Anticoagulation-related ED   visit, Anticoagulation-related fatality        INR History:      3/7/2024     2:37 PM 3/14/2024    12:00 AM 3/14/2024    10:15 AM 3/21/2024    12:00 AM 3/21/2024    10:45 AM 3/28/2024    12:00 AM 3/28/2024     1:51 PM   Anticoagulation Monitoring   INR 3.80  3.80  3.30  1.60   INR Date 3/7/2024  3/14/2024  3/21/2024  3/28/2024   INR Goal 2.0-3.0  2.0-3.0  2.0-3.0  2.0-3.0   Trend Down  Down  Down  Up   Last Week Total 15 mg  12.5 mg  11.25 mg  12.5 mg   Next Week Total 12.5 mg  11.25 mg  12.5 mg  13.75 mg   Sun 2.5  mg  2.5 mg  2.5 mg  2.5 mg   Mon 2.5 mg  1.25 mg  1.25 mg  1.25 mg   Tue 2.5 mg  2.5 mg  2.5 mg  2.5 mg   Wed 1.25 mg  1.25 mg  1.25 mg  1.25 mg   Thu Hold (3/7)  Hold (3/14)  1.25 mg  2.5 mg   Fri 2.5 mg  2.5 mg  2.5 mg  2.5 mg   Sat 1.25 mg  1.25 mg  1.25 mg  1.25 mg   Historical INR  3.80      3.30      1.60            This result is from an external source.       Plan:  1. INR is Subtherapeutic today- see above in Anticoagulation Summary.  Will instruct Shani Phillip to Change their warfarin regimen- see above in Anticoagulation Summary.   Increase warfarin today with 2.5mg, then take 1.25mg on M/W/Sa, and 2.5mg AOD.  2. Follow up in 1 week  3. Patient declines warfarin refills.  4. Verbal and written information provided. Patient expresses understanding and has no further questions at this time.    Priscilla Villanueva, PharmD

## 2024-03-28 NOTE — PROGRESS NOTES
Anticoagulation Clinic Progress Note    Anticoagulation Summary  As of 3/28/2024      INR goal:  2.0-3.0   TTR:  55.5% (3.1 y)   INR used for dosin.60 (3/28/2024)   Warfarin maintenance plan:  1.25 mg every Mon, Wed, Sat; 2.5 mg all other days   Weekly warfarin total:  13.75 mg   Plan last modified:  Priscilla Villanueva, Fahad (3/28/2024)   Next INR check:  2024   Priority:  High   Target end date:      Indications    Atrial fibrillation  chronic [I48.20]                 Anticoagulation Episode Summary       INR check location:      Preferred lab:      Send INR reminders to:   [facility] HOME TEST POOL    Comments:   Home Testing as of 21 *call only when out of range*          Anticoagulation Care Providers       Provider Role Specialty Phone number    Zenia Tinajero MD Referring Cardiology [phone]            Clinic Interview:  Patient Findings     Negatives:  Signs/symptoms of thrombosis, Signs/symptoms of bleeding,   Laboratory test error suspected, Change in health, Change in alcohol use,   Change in activity, Upcoming invasive procedure, Emergency department   visit, Upcoming dental procedure, Missed doses, Extra doses, Change in   medications, Change in diet/appetite, Hospital admission, Bruising, Other   complaints      Clinical Outcomes     Negatives:  Major bleeding event, Thromboembolic event,   Anticoagulation-related hospital admission, Anticoagulation-related ED   visit, Anticoagulation-related fatality        INR History:      3/7/2024     2:37 PM 3/14/2024    12:00 AM 3/14/2024    10:15 AM 3/21/2024    12:00 AM 3/21/2024    10:45 AM 3/28/2024    12:00 AM 3/28/2024     1:51 PM   Anticoagulation Monitoring   INR 3.80  3.80  3.30  1.60   INR Date 3/7/2024  3/14/2024  3/21/2024  3/28/2024   INR Goal 2.0-3.0  2.0-3.0  2.0-3.0  2.0-3.0   Trend Down  Down  Down  Up   Last Week Total 15 mg  12.5 mg  11.25 mg  12.5 mg   Next Week Total 12.5 mg  11.25 mg  12.5 mg  13.75 mg   Sun 2.5  mg  2.5 mg  2.5 mg  2.5 mg   Mon 2.5 mg  1.25 mg  1.25 mg  1.25 mg   Tue 2.5 mg  2.5 mg  2.5 mg  2.5 mg   Wed 1.25 mg  1.25 mg  1.25 mg  1.25 mg   Thu Hold (3/7)  Hold (3/14)  1.25 mg  2.5 mg   Fri 2.5 mg  2.5 mg  2.5 mg  2.5 mg   Sat 1.25 mg  1.25 mg  1.25 mg  1.25 mg   Historical INR  3.80      3.30      1.60            This result is from an external source.       Plan:  1. INR is Subtherapeutic today- see above in Anticoagulation Summary.   Will instruct Shani Phillip to Change their warfarin regimen- see above in Anticoagulation Summary.   Increase warfarin to 2.5mg today, then adjust regimen to 1.25mg M/W/Sa, and 2.5 mg AOD  2. Follow up in 1 week  3. They have been instructed to call if any changes in medications, doses, concerns, etc. Patient expresses understanding and has no further questions at this time.    Priscilla Villanueva, PharmD

## 2024-03-28 NOTE — PROGRESS NOTES
I have supervised and reviewed the notes, assessments, and/or procedures performed. The documented assessment and plan were developed cooperatively. I concur with the documentation of this patient encounter.    Keshawn Castaneda, PharmD

## 2024-04-02 ENCOUNTER — TELEPHONE (OUTPATIENT)
Dept: FAMILY MEDICINE CLINIC | Facility: CLINIC | Age: 74
End: 2024-04-02
Payer: MEDICARE

## 2024-04-02 NOTE — TELEPHONE ENCOUNTER
I contacted patient to confirm that she requested this testing through [facility]. Patient stated she never requested any such testing and any further contact from [facility] can be disregarded until further notice.

## 2024-04-02 NOTE — TELEPHONE ENCOUNTER
Caller: JODIE    Relationship: Other    Best call back number:   What is the best time to reach you: ANY    Who are you requesting to speak with (clinical staff, provider,  specific staff member): CLINICAL    Do you know the name of the person who called: JODIE FROM [facility]    What was the call regarding: FAX SENT ON 03/21 REQUESTING AUTOIMMUNE TESTING.    Is it okay if the provider responds through MyChart: NO

## 2024-04-04 ENCOUNTER — ANTICOAGULATION VISIT (OUTPATIENT)
Dept: PHARMACY | Facility: HOSPITAL | Age: 74
End: 2024-04-04
Payer: MEDICARE

## 2024-04-04 DIAGNOSIS — I48.20 ATRIAL FIBRILLATION, CHRONIC: Primary | Chronic | ICD-10-CM

## 2024-04-04 LAB — INR PPP: 2.3

## 2024-04-04 NOTE — PROGRESS NOTES
Anticoagulation Clinic Progress Note    Anticoagulation Summary  As of 2024      INR goal:  2.0-3.0   TTR:  55.5% (3.1 y)   INR used for dosin.30 (2024)   Warfarin maintenance plan:  1.25 mg every Mon, Wed, Sat; 2.5 mg all other days   Weekly warfarin total:  13.75 mg   No change documented:  Marleny Black RPH   Plan last modified:  Priscilla Villanueva, PharmD (3/28/2024)   Next INR check:  2024   Priority:  High   Target end date:      Indications    Atrial fibrillation  chronic [I48.20]                 Anticoagulation Episode Summary       INR check location:      Preferred lab:      Send INR reminders to:  [facility] HOME TEST POOL    Comments:   Home Testing as of 21 *call only when out of range*          Anticoagulation Care Providers       Provider Role Specialty Phone number    Zenia Tinajero MD Referring Cardiology [phone]            Clinic Interview:  No pertinent clinical findings have been reported.    INR History:      3/14/2024    10:15 AM 3/21/2024    12:00 AM 3/21/2024    10:45 AM 3/28/2024    12:00 AM 3/28/2024     1:51 PM 2024    12:00 AM 2024    11:45 AM   Anticoagulation Monitoring   INR 3.80  3.30  1.60  2.30   INR Date 3/14/2024  3/21/2024  3/28/2024  2024   INR Goal 2.0-3.0  2.0-3.0  2.0-3.0  2.0-3.0   Trend Down  Down  Up  Same   Last Week Total 12.5 mg  11.25 mg  12.5 mg  13.75 mg   Next Week Total 11.25 mg  12.5 mg  13.75 mg  13.75 mg   Sun 2.5 mg  2.5 mg  2.5 mg  2.5 mg   Mon 1.25 mg  1.25 mg  1.25 mg  1.25 mg   Tue 2.5 mg  2.5 mg  2.5 mg  2.5 mg   Wed 1.25 mg  1.25 mg  1.25 mg  1.25 mg   Thu Hold (3/14)  1.25 mg  2.5 mg  2.5 mg   Fri 2.5 mg  2.5 mg  2.5 mg  2.5 mg   Sat 1.25 mg  1.25 mg  1.25 mg  1.25 mg   Historical INR  3.30      1.60      2.30            This result is from an external source.       Plan:  1. INR is therapeutic today- see above in Anticoagulation Summary.    Shani Phillip to continue their warfarin regimen- see above  in Anticoagulation Summary.  2. Follow up in 1 week  3.  They have been instructed to call if any changes in medications, doses, concerns, etc. Patient expresses understanding and has no further questions at this time.    Marleny Black RP

## 2024-04-07 DIAGNOSIS — K21.9 GASTROESOPHAGEAL REFLUX DISEASE WITHOUT ESOPHAGITIS: ICD-10-CM

## 2024-04-07 RX ORDER — PANTOPRAZOLE SODIUM 40 MG/1
40 TABLET, DELAYED RELEASE ORAL DAILY
Qty: 90 TABLET | Refills: 1 | Status: SHIPPED | OUTPATIENT
Start: 2024-04-07

## 2024-04-08 ENCOUNTER — HOSPITAL ENCOUNTER (EMERGENCY)
Facility: HOSPITAL | Age: 74
Discharge: HOME | End: 2024-04-08
Attending: EMERGENCY MEDICINE | Admitting: EMERGENCY MEDICINE
Payer: MEDICARE

## 2024-04-08 ENCOUNTER — APPOINTMENT (OUTPATIENT)
Dept: MRI IMAGING | Facility: HOSPITAL | Age: 74
End: 2024-04-08
Payer: MEDICARE

## 2024-04-08 VITALS
TEMPERATURE: 97.1 F | WEIGHT: 140 LBS | HEART RATE: 90 BPM | SYSTOLIC BLOOD PRESSURE: 100 MMHG | HEIGHT: 61 IN | OXYGEN SATURATION: 100 % | DIASTOLIC BLOOD PRESSURE: 80 MMHG | RESPIRATION RATE: 18 BRPM | BODY MASS INDEX: 26.43 KG/M2

## 2024-04-08 DIAGNOSIS — R93.89 ABNORMAL MRI: ICD-10-CM

## 2024-04-08 DIAGNOSIS — N39.0 URINARY TRACT INFECTION IN FEMALE: Primary | ICD-10-CM

## 2024-04-08 LAB
ALBUMIN SERPL-MCNC: 4 G/DL (ref 3.5–5.2)
ALBUMIN/GLOB SERPL: 1.3 G/DL
ALP SERPL-CCNC: 147 U/L (ref 39–117)
ALT SERPL W P-5'-P-CCNC: 15 U/L (ref 1–33)
ANION GAP SERPL CALCULATED.3IONS-SCNC: 9 MMOL/L (ref 5–15)
APTT PPP: 34.1 SECONDS (ref 22.7–35.4)
AST SERPL-CCNC: 12 U/L (ref 1–32)
BACTERIA UR QL AUTO: ABNORMAL /HPF
BASOPHILS # BLD AUTO: 0.04 10*3/MM3 (ref 0–0.2)
BASOPHILS NFR BLD AUTO: 0.5 % (ref 0–1.5)
BILIRUB SERPL-MCNC: 0.3 MG/DL (ref 0–1.2)
BILIRUB UR QL STRIP: NEGATIVE
BUN SERPL-MCNC: 19 MG/DL (ref 8–23)
BUN/CREAT SERPL: 14.5 (ref 7–25)
CA-I BLD-MCNC: 4.7 MG/DL (ref 4.6–5.4)
CA-I SERPL ISE-MCNC: 1.17 MMOL/L (ref 1.15–1.35)
CALCIUM SPEC-SCNC: 9 MG/DL (ref 8.6–10.5)
CHLORIDE SERPL-SCNC: 97 MMOL/L (ref 98–107)
CLARITY UR: ABNORMAL
CO2 SERPL-SCNC: 31 MMOL/L (ref 22–29)
COLOR UR: YELLOW
CREAT SERPL-MCNC: 1.31 MG/DL (ref 0.57–1)
DEPRECATED RDW RBC AUTO: 42.3 FL (ref 37–54)
DIGOXIN SERPL-MCNC: 1.7 NG/ML (ref 0.6–1.2)
EGFRCR SERPLBLD CKD-EPI 2021: 42.8 ML/MIN/1.73
EOSINOPHIL # BLD AUTO: 0.21 10*3/MM3 (ref 0–0.4)
EOSINOPHIL NFR BLD AUTO: 2.4 % (ref 0.3–6.2)
ERYTHROCYTE [DISTWIDTH] IN BLOOD BY AUTOMATED COUNT: 14.5 % (ref 12.3–15.4)
FOLATE SERPL-MCNC: 5.08 NG/ML (ref 4.78–24.2)
GLOBULIN UR ELPH-MCNC: 3 GM/DL
GLUCOSE SERPL-MCNC: 113 MG/DL (ref 65–99)
GLUCOSE UR STRIP-MCNC: NEGATIVE MG/DL
HCT VFR BLD AUTO: 35 % (ref 34–46.6)
HGB BLD-MCNC: 10.9 G/DL (ref 12–15.9)
HGB UR QL STRIP.AUTO: ABNORMAL
HYALINE CASTS UR QL AUTO: ABNORMAL /LPF
IMM GRANULOCYTES # BLD AUTO: 0.04 10*3/MM3 (ref 0–0.05)
IMM GRANULOCYTES NFR BLD AUTO: 0.5 % (ref 0–0.5)
INR PPP: 1.61 (ref 0.9–1.1)
KETONES UR QL STRIP: NEGATIVE
LEUKOCYTE ESTERASE UR QL STRIP.AUTO: ABNORMAL
LYMPHOCYTES # BLD AUTO: 0.86 10*3/MM3 (ref 0.7–3.1)
LYMPHOCYTES NFR BLD AUTO: 10 % (ref 19.6–45.3)
MAGNESIUM SERPL-MCNC: 2.3 MG/DL (ref 1.6–2.4)
MCH RBC QN AUTO: 24.9 PG (ref 26.6–33)
MCHC RBC AUTO-ENTMCNC: 31.1 G/DL (ref 31.5–35.7)
MCV RBC AUTO: 80.1 FL (ref 79–97)
MONOCYTES # BLD AUTO: 0.46 10*3/MM3 (ref 0.1–0.9)
MONOCYTES NFR BLD AUTO: 5.3 % (ref 5–12)
NEUTROPHILS NFR BLD AUTO: 7.02 10*3/MM3 (ref 1.7–7)
NEUTROPHILS NFR BLD AUTO: 81.3 % (ref 42.7–76)
NITRITE UR QL STRIP: POSITIVE
NRBC BLD AUTO-RTO: 0 /100 WBC (ref 0–0.2)
PH UR STRIP.AUTO: 6 [PH] (ref 5–8)
PLATELET # BLD AUTO: 260 10*3/MM3 (ref 140–450)
PMV BLD AUTO: 9.3 FL (ref 6–12)
POTASSIUM SERPL-SCNC: 4.8 MMOL/L (ref 3.5–5.2)
PROT SERPL-MCNC: 7 G/DL (ref 6–8.5)
PROT UR QL STRIP: ABNORMAL
PROTHROMBIN TIME: 19.4 SECONDS (ref 11.7–14.2)
RBC # BLD AUTO: 4.37 10*6/MM3 (ref 3.77–5.28)
RBC # UR STRIP: ABNORMAL /HPF
REF LAB TEST METHOD: ABNORMAL
SODIUM SERPL-SCNC: 137 MMOL/L (ref 136–145)
SP GR UR STRIP: 1.02 (ref 1–1.03)
SQUAMOUS #/AREA URNS HPF: ABNORMAL /HPF
TSH SERPL DL<=0.05 MIU/L-ACNC: 5.88 UIU/ML (ref 0.27–4.2)
UROBILINOGEN UR QL STRIP: ABNORMAL
VIT B12 BLD-MCNC: 559 PG/ML (ref 211–946)
WBC # UR STRIP: ABNORMAL /HPF
WBC NRBC COR # BLD AUTO: 8.63 10*3/MM3 (ref 3.4–10.8)

## 2024-04-08 PROCEDURE — 82607 VITAMIN B-12: CPT | Performed by: STUDENT IN AN ORGANIZED HEALTH CARE EDUCATION/TRAINING PROGRAM

## 2024-04-08 PROCEDURE — 85025 COMPLETE CBC W/AUTO DIFF WBC: CPT | Performed by: EMERGENCY MEDICINE

## 2024-04-08 PROCEDURE — 82746 ASSAY OF FOLIC ACID SERUM: CPT | Performed by: STUDENT IN AN ORGANIZED HEALTH CARE EDUCATION/TRAINING PROGRAM

## 2024-04-08 PROCEDURE — 85730 THROMBOPLASTIN TIME PARTIAL: CPT | Performed by: EMERGENCY MEDICINE

## 2024-04-08 PROCEDURE — 87086 URINE CULTURE/COLONY COUNT: CPT | Performed by: EMERGENCY MEDICINE

## 2024-04-08 PROCEDURE — 94761 N-INVAS EAR/PLS OXIMETRY MLT: CPT

## 2024-04-08 PROCEDURE — 25010000002 CEFTRIAXONE PER 250 MG: Performed by: EMERGENCY MEDICINE

## 2024-04-08 PROCEDURE — 96365 THER/PROPH/DIAG IV INF INIT: CPT

## 2024-04-08 PROCEDURE — 80053 COMPREHEN METABOLIC PANEL: CPT | Performed by: EMERGENCY MEDICINE

## 2024-04-08 PROCEDURE — 94799 UNLISTED PULMONARY SVC/PX: CPT

## 2024-04-08 PROCEDURE — 70551 MRI BRAIN STEM W/O DYE: CPT

## 2024-04-08 PROCEDURE — 83735 ASSAY OF MAGNESIUM: CPT | Performed by: EMERGENCY MEDICINE

## 2024-04-08 PROCEDURE — 80162 ASSAY OF DIGOXIN TOTAL: CPT | Performed by: STUDENT IN AN ORGANIZED HEALTH CARE EDUCATION/TRAINING PROGRAM

## 2024-04-08 PROCEDURE — 85610 PROTHROMBIN TIME: CPT | Performed by: EMERGENCY MEDICINE

## 2024-04-08 PROCEDURE — 81001 URINALYSIS AUTO W/SCOPE: CPT | Performed by: EMERGENCY MEDICINE

## 2024-04-08 PROCEDURE — 99284 EMERGENCY DEPT VISIT MOD MDM: CPT

## 2024-04-08 PROCEDURE — 82330 ASSAY OF CALCIUM: CPT | Performed by: STUDENT IN AN ORGANIZED HEALTH CARE EDUCATION/TRAINING PROGRAM

## 2024-04-08 PROCEDURE — 94640 AIRWAY INHALATION TREATMENT: CPT

## 2024-04-08 PROCEDURE — 84443 ASSAY THYROID STIM HORMONE: CPT | Performed by: STUDENT IN AN ORGANIZED HEALTH CARE EDUCATION/TRAINING PROGRAM

## 2024-04-08 RX ORDER — IPRATROPIUM BROMIDE AND ALBUTEROL SULFATE 2.5; .5 MG/3ML; MG/3ML
3 SOLUTION RESPIRATORY (INHALATION) ONCE
Status: COMPLETED | OUTPATIENT
Start: 2024-04-08 | End: 2024-04-08

## 2024-04-08 RX ORDER — CEFUROXIME AXETIL 500 MG/1
500 TABLET ORAL 2 TIMES DAILY
Qty: 14 TABLET | Refills: 0 | Status: SHIPPED | OUTPATIENT
Start: 2024-04-08 | End: 2024-04-12

## 2024-04-08 RX ORDER — SODIUM CHLORIDE 0.9 % (FLUSH) 0.9 %
10 SYRINGE (ML) INJECTION AS NEEDED
Status: DISCONTINUED | OUTPATIENT
Start: 2024-04-08 | End: 2024-04-08 | Stop reason: HOSPADM

## 2024-04-08 RX ADMIN — CEFTRIAXONE SODIUM 1000 MG: 1 INJECTION, POWDER, FOR SOLUTION INTRAMUSCULAR; INTRAVENOUS at 04:29

## 2024-04-08 RX ADMIN — IPRATROPIUM BROMIDE AND ALBUTEROL SULFATE 3 ML: .5; 3 SOLUTION RESPIRATORY (INHALATION) at 06:50

## 2024-04-08 NOTE — ED PROVIDER NOTES
EMERGENCY DEPARTMENT ENCOUNTER    Room Number:  15/15  PCP: Rodríguez Walker MD  Patient Care Team:  Rodríguez Walker MD as PCP - General (Internal Medicine)  Mike Atkins MD as Surgeon (General Surgery)  Hayes Briones MD as Surgeon (Cardiothoracic Surgery)  Zenia Tinajero MD as Consulting Physician (Cardiology)  Clover Zamora MD as Consulting Physician (Pulmonary Disease)   Independent Historians: Patient, family member    HPI:  Chief Complaint: Paresthesia    A complete HPI/ROS/PMH/PSH/SH/FH are unobtainable due to: None    Chronic or social conditions impacting patient care (Social Determinants of Health): None  (Financial Resource Strain / Food Insecurity / Transportation Needs / Physical Activity / Stress / Social Connections / Intimate Partner Violence / Housing Stability)    Context: Shani Phillip is a 73 y.o. female who presents to the ED c/o acute paresthesia which she states was by the night at midnight.  Describes a tingling sensation across all of her skin.  Denies numbness or weakness.  Patient states that this lasted about an hour.  This is recurred multiple times since then each time lasting maybe 5.  Describes the sensation as radiating up from her feet times encompassing her entire body.  No shortness of breath no fever or chills.  Patient has history of COPD.  Is on 2 L oxygen at all times.  Also with history of A-fib anticoagulated on warfarin.    Review of prior external notes (non-ED) -and- Review of prior external test results outside of this encounter: Patient's cardiology note from 12/27/2023    Prescription drug monitoring program review:         PAST MEDICAL HISTORY  Active Ambulatory Problems     Diagnosis Date Noted    Influenza 11/14/2019    Thrush, oral 11/14/2019    COPD (chronic obstructive pulmonary disease) 11/14/2019    Atrial fibrillation with RVR 11/14/2019    Chronic respiratory failure with hypoxia 01/17/2020    Endocarditis of mitral valve 01/20/2020     Mitral valve vegetation 01/29/2020    Chronic diastolic CHF (congestive heart failure) 03/01/2020    Atrial fibrillation, chronic 03/01/2020    Bilateral lower extremity edema 03/01/2020    Intermittent lightheadedness 08/03/2020    Depression 10/22/2020    Chronic midline low back pain with left-sided sciatica 10/22/2020    Nephrolithiasis 10/22/2020    Oxygen dependent 10/22/2020    Medicare annual wellness visit, subsequent 04/12/2021    Osteoporosis     CKD (chronic kidney disease) stage 3, GFR 30-59 ml/min     Chronic anticoagulation 08/23/2021    Nonrheumatic mitral valve stenosis 02/28/2022    Chest pain, unspecified type 06/04/2023    Chronic heart failure with preserved ejection fraction (HFpEF) 06/05/2023    Overweight with body mass index (BMI) 25.0-29.9 08/31/2023     Resolved Ambulatory Problems     Diagnosis Date Noted    Acute endocarditis 11/13/2019    Other persistent atrial fibrillation 01/17/2020    Infective endocarditis 01/17/2020     Past Medical History:   Diagnosis Date    Renal disorder     Restless leg syndrome          PAST SURGICAL HISTORY  Past Surgical History:   Procedure Laterality Date    CARDIAC CATHETERIZATION N/A 1/23/2020    Procedure: Coronary angiography;  Surgeon: Svetlana Grayson MD;  Location:  [facility];  Service: Cardiovascular    CARDIAC CATHETERIZATION N/A 1/23/2020    Procedure: Left ventriculography;  Surgeon: Svetlana Grayson MD;  Location:  [facility];  Service: Cardiovascular    CARDIAC CATHETERIZATION N/A 1/23/2020    Procedure: Left Heart Cath;  Surgeon: Svetlana Grayson MD;  Location:  [facility];  Service: Cardiovascular    CHOLECYSTECTOMY      KNEE ARTHROPLASTY Right     LAPAROSCOPIC GASTRIC BANDING           FAMILY HISTORY  Family History   Problem Relation Age of Onset    Lung cancer Mother          SOCIAL HISTORY  Social History     Socioeconomic History    Marital status:    Tobacco Use    Smoking  status: Former     Current packs/day: 0.00     Average packs/day: 0.5 packs/day for 50.0 years (25.0 ttl pk-yrs)     Types: Cigarettes     Start date: 11/3/1969     Quit date: 11/3/2019     Years since quittin.4    Smokeless tobacco: Never    Tobacco comments:     LAST CIG 2019   Substance and Sexual Activity    Alcohol use: No     Comment: caffeine - 1/2 cup coffee daily     Drug use: No    Sexual activity: Defer         ALLERGIES  Penicillins        REVIEW OF SYSTEMS  Review of Systems  Included in HPI  All systems reviewed and negative except for those discussed in HPI.      PHYSICAL EXAM    I have reviewed the triage vital signs and nursing notes.    ED Triage Vitals [24 0104]   Temp Heart Rate Resp BP SpO2   97.1 °F (36.2 °C) 60 18 106/52 99 %      Temp src Heart Rate Source Patient Position BP Location FiO2 (%)   Tympanic Monitor Lying Right arm --       Physical Exam  GENERAL: alert, no acute distress  SKIN: Warm, dry  HENT: Normocephalic, atraumatic  EYES: no scleral icterus  CV: regular rhythm, regular rate  RESPIRATORY: normal effort, lungs clear  ABDOMEN: soft, nontender, nondistended  MUSCULOSKELETAL: no deformity  NEURO: alert, moves all extremities, follows commands                                                               LAB RESULTS  Recent Results (from the past 24 hour(s))   TSH    Collection Time: 24  8:31 AM    Specimen: Blood   Result Value Ref Range    TSH 5.880 (H) 0.270 - 4.200 uIU/mL   Vitamin B12    Collection Time: 24  8:31 AM    Specimen: Blood   Result Value Ref Range    Vitamin B-12 559 211 - 946 pg/mL   Calcium, Ionized    Collection Time: 24  8:31 AM    Specimen: Blood   Result Value Ref Range    Ionized Calcium 1.17 1.15 - 1.35 mmol/L    Ionized Calcium 4.7 4.6 - 5.4 mg/dL   Folate    Collection Time: 24  8:31 AM    Specimen: Blood   Result Value Ref Range    Folate 5.08 4.78 - 24.20 ng/mL   Digoxin Level    Collection Time: 24   8:31 AM    Specimen: Blood   Result Value Ref Range    Digoxin 1.70 (H) 0.60 - 1.20 ng/mL       I ordered the above labs and independently reviewed the results.        RADIOLOGY  MRI Brain Without Contrast    Result Date: 4/8/2024  EMERGENCY MRI OF THE BRAIN WITHOUT CONTRAST ON 04/08/2024  CLINICAL HISTORY: Patient has diffuse paresthesias.  TECHNIQUE: Axial T1, FLAIR, fat-suppressed T2, axial diffusion and gradient echo T2 and sagittal T1-weighted images were obtained of the entire head.  There are no prior MRIs of the head or head CTs from [facility] for comparison or on EPIC for comparison.  FINDINGS: There are multiple patchy nodular foci of T2 and FLAIR hyperintensity in the periventricular and subcortical white matter of the cerebral hemispheres and in addition to multiple small nodular foci in the superior frontal juxtacortical white matter are likely all manifestation mild-moderate small vessel disease. There is some unusual patchy T2 and FLAIR hyperintensity in the anterior temporal lobes bilaterally. There is a 16 x 10 mm focus in the anterior left temporal lobe juxtacortical white matter several small patchy foci in the anterior right temporal lobe juxtacortical white matter. This is an unusual location for small vessel disease and is nonspecific. The remainder of the brain parenchyma is normal in signal intensity. Specifically no diffusion weighted abnormality is identified with no acute infarct seen. On the gradient echo T2 weighted images no acute or old blood breakdown products are seen intracranially. The ventricles are normal in size. I see no mass effect or midline shift. No extra-axial fluid collections are identified. Paranasal sinuses mastoid air cells and middle ear cavities are clear. The calvarium and skull base and cervical spine demonstrate normal marrow signal intensity. The orbits are normal in appearance. Good flow voids are demonstrated in the cerebral vessels and in the  dural venous sinuses.      1. No convincing acute intracranial abnormality is identified. Specifically, no acute infarct is identified. 2. There are multiple small patchy nodular foci of T2 high signal in the periventricular and subcortical white matter of the cerebral hemispheres in the superior frontal juxtacortical white matter bilaterally likely all a manifestation of mild-moderate small vessel disease. However, there are are unusual bilateral areas of patchy T2 high signal in the anterior temporal lobes with a 16 x 10 mm confluent focus in the anterior left temporal lobe juxtacortical white matter and several small patchy foci in the anterior right temporal lobe. This is an unusual finding and typically small vessel disease does not target the temporal lobe white matter. There is no volume loss and there is no positive mass effect. At this point the temporal lobe findings are indeterminate and may merely be a manifestation of atypical small vessel disease and unlikely to be areas of posttraumatic encephalomalacia given the lack of significant volume loss. Given the lack of significant cortical involvement, I do not think it is encephalitis. There is probably bilateral chronic gliosis in the anterior temporal lobe white matter. Could consider a contrast-enhanced MRI of the brain for a more complete assessment. The results were communicated to Dr. Martinez Fitzpatrick in the ER by telephone on 04/08/2024 at 11:48 a.m.  This report was finalized on 4/8/2024 3:38 PM by Dr. Regan Huerta M.D on Workstation: [id number]       I ordered the above noted radiological studies. Reviewed by me and discussed with radiologist.  See dictation for official radiology interpretation.      PROCEDURES    Procedures      MEDICATIONS GIVEN IN ER    Medications   cefTRIAXone (ROCEPHIN) 1,000 mg in sodium chloride 0.9 % 100 mL MBP (0 mg Intravenous Stopped 4/8/24 9746)   ipratropium-albuterol (DUO-NEB) nebulizer solution 3 mL (3 mL Nebulization  Given 4/8/24 0650)         ORDERS PLACED DURING THIS VISIT:  Orders Placed This Encounter   Procedures    Urine Culture - Urine, Urine, Clean Catch    MRI Brain Without Contrast    Comprehensive Metabolic Panel    Protime-INR    aPTT    Urinalysis With Microscopic If Indicated (No Culture) - Urine, Clean Catch    Magnesium    CBC Auto Differential    Urinalysis, Microscopic Only - Urine, Clean Catch    TSH    Vitamin B12    Calcium, Ionized    Folate    Digoxin Level    Monitor Blood Pressure    Continuous Pulse Oximetry    CBC & Differential         PROGRESS, DATA ANALYSIS, CONSULTS, AND MEDICAL DECISION MAKING    All labs have been independently interpreted by me.  All radiology studies have been reviewed by me and discussed with radiologist dictating the report.   EKG's independently viewed and interpreted by me.  Discussion below represents my analysis of pertinent findings related to patient's condition, differential diagnosis, treatment plan and final disposition.    Differential diagnosis includes but is not limited to paresthesia, MS, peripheral nerve disorder.    Clinical Scores:              ED Course as of 04/09/24 0610   Mon Apr 08, 2024   0459 D/w Dr Will - general neuro - recommends MRI brain.  No admission at this time. [TJ]   0627 Patient tolerates lying flat with oxygen.  Maintain sats of 100%.  Will pretreat with DuoNeb.  Plan for MRI afterwards. [TJ]   0728 WBC, UA(!): Too Numerous to Count [TJ]   0728 Nitrite, UA(!): Positive [TJ]   0728 WBC: 8.63 [TJ]   0728 Hemoglobin(!): 10.9 [TJ]   0728 Creatinine(!): 1.31  Care to Dr. Fitzpatrick.  Pending MRI. [TJ]   1243 At reevaluation patient is currently asymptomatic, no paresthesias.  Discussed unclear abnormality on MRI, will discharge with follow-up in neurology clinic.  Can consider additional testing depending on patient's symptoms.  Unclear clinical significance of findings on MRI.  Given return precautions and discharged home. [FS]      ED Course  User Index  [FS] Martinez Fitzpatrick MD  [TJ] Jose R Liz MD             PPE: The patient wore a mask and I wore an N95 mask throughout the entire patient encounter.       AS OF 06:10 EDT VITALS:    BP - 100/80  HR - 90  TEMP - 97.1 °F (36.2 °C) (Tympanic)  O2 SATS - 100%        DIAGNOSIS  Final diagnoses:   Urinary tract infection in female   Abnormal MRI         DISPOSITION  ED Disposition       ED Disposition   Discharge    Condition   Stable    Comment   --                  Note Disclaimer: At [facility], we believe that sharing information builds trust and better relationships. You are receiving this note because you recently visited [facility]. It is possible you will see health information before a provider has talked with you about it. This kind of information can be easy to misunderstand. To help you fully understand what it means for your health, we urge you to discuss this note with your provider.         Jose R Liz MD  04/08/24 0729       Jose R Liz MD  04/09/24 0610

## 2024-04-08 NOTE — ED NOTES
"Pt arrives from home via EMS.    EMS states \"At 11:50 Pt had a full body tingling sensation. Stroke exam is negative. NIH of 0, Pt denies any tingling sensations at this time.Pt noted to be in afib, normal for her and she is on warfarin. Pt AxOx4.\"  "

## 2024-04-09 LAB — BACTERIA SPEC AEROBE CULT: NORMAL

## 2024-04-10 ENCOUNTER — TELEPHONE (OUTPATIENT)
Dept: FAMILY MEDICINE CLINIC | Facility: CLINIC | Age: 74
End: 2024-04-10

## 2024-04-11 ENCOUNTER — OFFICE VISIT (OUTPATIENT)
Dept: FAMILY MEDICINE CLINIC | Facility: CLINIC | Age: 74
End: 2024-04-11
Payer: MEDICARE

## 2024-04-11 ENCOUNTER — ANTICOAGULATION VISIT (OUTPATIENT)
Dept: PHARMACY | Facility: HOSPITAL | Age: 74
End: 2024-04-11
Payer: MEDICARE

## 2024-04-11 VITALS
SYSTOLIC BLOOD PRESSURE: 110 MMHG | TEMPERATURE: 97.8 F | HEART RATE: 109 BPM | DIASTOLIC BLOOD PRESSURE: 70 MMHG | OXYGEN SATURATION: 96 %

## 2024-04-11 DIAGNOSIS — I48.20 ATRIAL FIBRILLATION, CHRONIC: Primary | Chronic | ICD-10-CM

## 2024-04-11 DIAGNOSIS — R42 DIZZINESS: Primary | ICD-10-CM

## 2024-04-11 DIAGNOSIS — D64.9 ANEMIA, UNSPECIFIED TYPE: ICD-10-CM

## 2024-04-11 DIAGNOSIS — R20.2 PARESTHESIA OF SKIN: ICD-10-CM

## 2024-04-11 DIAGNOSIS — N30.01 ACUTE CYSTITIS WITH HEMATURIA: ICD-10-CM

## 2024-04-11 DIAGNOSIS — R79.89 ABNORMAL TSH: ICD-10-CM

## 2024-04-11 LAB — INR PPP: 1.6

## 2024-04-11 PROCEDURE — 99214 OFFICE O/P EST MOD 30 MIN: CPT | Performed by: NURSE PRACTITIONER

## 2024-04-11 PROCEDURE — 1160F RVW MEDS BY RX/DR IN RCRD: CPT | Performed by: NURSE PRACTITIONER

## 2024-04-11 PROCEDURE — 1159F MED LIST DOCD IN RCRD: CPT | Performed by: NURSE PRACTITIONER

## 2024-04-11 RX ORDER — CIPROFLOXACIN 500 MG/1
500 TABLET, FILM COATED ORAL 2 TIMES DAILY
Qty: 6 TABLET | Refills: 0 | Status: SHIPPED | OUTPATIENT
Start: 2024-04-11

## 2024-04-11 RX ORDER — FERROUS SULFATE 325(65) MG
325 TABLET ORAL EVERY OTHER DAY
Qty: 45 TABLET | Refills: 1 | Status: SHIPPED | OUTPATIENT
Start: 2024-04-11

## 2024-04-11 RX ORDER — MECLIZINE HCL 12.5 MG/1
12.5 TABLET ORAL 3 TIMES DAILY PRN
Qty: 12 TABLET | Refills: 0 | Status: SHIPPED | OUTPATIENT
Start: 2024-04-11

## 2024-04-11 NOTE — PROGRESS NOTES
"Chief Complaint  Dizziness (Seen in ER 04/08/24 - given ceftin and has been dizzy since taking medication)    Subjective        Shani Phillip presents to [facility]  History of Present Illness  Patient is here to discuss antibiotics given for uti in ER. She states they gave her ceftin and she has been dizzy ever since. She stopped taking today. She states she is still dizzy and it gets worse when she moves or stands up. She is wanting a different antibiotic. She did have a period of nausea this morning. The original symptoms which sent her to the ER has resolved.     She has an appointment in May with neurology for abnormal MRI and symptoms.   Objective   Vital Signs:  /70 (BP Location: Left arm, Patient Position: Sitting, Cuff Size: Adult)   Pulse 109   Temp 97.8 °F (36.6 °C) (Temporal)   SpO2 96%   Estimated body mass index is 26.45 kg/m² as calculated from the following:    Height as of 4/8/24: 154.9 cm (61\").    Weight as of 4/8/24: 63.5 kg (140 lb).               Physical Exam  Constitutional:       Appearance: Normal appearance.   HENT:      Head: Normocephalic.   Eyes:      Extraocular Movements: Extraocular movements intact.      Pupils: Pupils are equal, round, and reactive to light.   Cardiovascular:      Rate and Rhythm: Normal rate and regular rhythm.   Pulmonary:      Effort: Pulmonary effort is normal.      Breath sounds: Normal breath sounds.   Musculoskeletal:         General: Normal range of motion.   Skin:     General: Skin is warm and dry.   Neurological:      General: No focal deficit present.      Mental Status: She is alert and oriented to person, place, and time.   Psychiatric:         Mood and Affect: Mood normal.        Result Review :                     Assessment and Plan     Diagnoses and all orders for this visit:    1. Dizziness (Primary)  -     meclizine (ANTIVERT) 12.5 MG tablet; Take 1 tablet by mouth 3 (Three) Times a Day As Needed for " Dizziness.  Dispense: 12 tablet; Refill: 0    2. Acute cystitis with hematuria  -     ciprofloxacin (Cipro) 500 MG tablet; Take 1 tablet by mouth 2 (Two) Times a Day.  Dispense: 6 tablet; Refill: 0    3. Abnormal TSH  -     TSH Rfx On Abnormal To Free T4    4. Paresthesia of skin  -     TSH Rfx On Abnormal To Free T4    5. Anemia, unspecified type  -     ferrous sulfate 325 (65 FE) MG tablet; Take 1 tablet by mouth Every Other Day.  Dispense: 45 tablet; Refill: 1             Follow Up     Return if symptoms worsen or fail to improve.  Patient was given instructions and counseling regarding her condition or for health maintenance advice. Please see specific information pulled into the AVS if appropriate.

## 2024-04-11 NOTE — PROGRESS NOTES
Anticoagulation Clinic Progress Note    Anticoagulation Summary  As of 2024      INR goal:  2.0-3.0   TTR:  55.3% (3.1 y)   INR used for dosin.60 (2024)   Warfarin maintenance plan:  1.25 mg every Mon, Wed, Sat; 2.5 mg all other days   Weekly warfarin total:  13.75 mg   Plan last modified:  Priscilla Villanueva, PharmD (3/28/2024)   Next INR check:  2024   Priority:  High   Target end date:      Indications    Atrial fibrillation  chronic [I48.20]                 Anticoagulation Episode Summary       INR check location:      Preferred lab:      Send INR reminders to:   [facility] HOME TEST POOL    Comments:   Home Testing as of 21 *call only when out of range*          Anticoagulation Care Providers       Provider Role Specialty Phone number    Zenia Tinajero MD Referring Cardiology [phone]            Clinic Interview:  Patient Findings     Positives:  Emergency department visit    Negatives:  Signs/symptoms of thrombosis, Signs/symptoms of bleeding,   Laboratory test error suspected, Change in health, Change in alcohol use,   Change in activity, Upcoming invasive procedure, Upcoming dental   procedure, Missed doses, Extra doses, Change in medications, Change in   diet/appetite, Hospital admission, Bruising, Other complaints    Comments:  ED visit on  for paraesthesias; no changes to   medications.  States she has a UTI and is starting cipro.      Clinical Outcomes     Negatives:  Major bleeding event, Thromboembolic event,   Anticoagulation-related hospital admission, Anticoagulation-related ED   visit, Anticoagulation-related fatality    Comments:  ED visit on  for paraesthesias; no changes to   medications.  States she has a UTI and is starting cipro.        INR History:      3/28/2024    12:00 AM 3/28/2024     1:51 PM 2024    12:00 AM 2024    11:45 AM 2024     4:16 AM 2024    12:00 AM 2024     2:36 PM   Anticoagulation Monitoring    INR  1.60  2.30   1.60   INR Date  3/28/2024  4/4/2024   4/11/2024   INR Goal  2.0-3.0  2.0-3.0   2.0-3.0   Trend  Up  Same   Same   Last Week Total  12.5 mg  13.75 mg   13.75 mg   Next Week Total  13.75 mg  13.75 mg   15 mg   Sun  2.5 mg  2.5 mg   2.5 mg   Mon  1.25 mg  1.25 mg   1.25 mg   Tue  2.5 mg  2.5 mg   2.5 mg   Wed  1.25 mg  1.25 mg   1.25 mg   Thu  2.5 mg  2.5 mg   3.75 mg (4/11)   Fri  2.5 mg  2.5 mg   2.5 mg   Sat  1.25 mg  1.25 mg   1.25 mg   Historical INR 1.60      2.30      1.61  1.60        Visit Report      Report Report       This result is from an external source.       Plan:  1. INR is Subtherapeutic today- see above in Anticoagulation Summary.   Will instruct Shani Phillip to Change their warfarin regimen- see above in Anticoagulation Summary.  2. Follow up in 1 weeks  3. They have been instructed to call if any changes in medications, doses, concerns, etc. Patient expresses understanding and has no further questions at this time.    Zenia Myers, PharmD

## 2024-04-12 ENCOUNTER — PATIENT OUTREACH (OUTPATIENT)
Dept: CASE MANAGEMENT | Facility: OTHER | Age: 74
End: 2024-04-12
Payer: MEDICARE

## 2024-04-12 DIAGNOSIS — E03.9 HYPOTHYROIDISM, UNSPECIFIED TYPE: Primary | ICD-10-CM

## 2024-04-12 LAB
T4 FREE SERPL-MCNC: 1.21 NG/DL (ref 0.93–1.7)
TSH SERPL DL<=0.005 MIU/L-ACNC: 5.57 UIU/ML (ref 0.27–4.2)

## 2024-04-12 RX ORDER — LEVOTHYROXINE SODIUM 0.03 MG/1
25 TABLET ORAL
Qty: 30 TABLET | Refills: 2 | Status: SHIPPED | OUTPATIENT
Start: 2024-04-12

## 2024-04-12 NOTE — OUTREACH NOTE
"AMBULATORY CASE MANAGEMENT NOTE    Name and Relationship of Patient/Support Person: Shani Phillip - Self    CCM Interim Update    Called and spoke to patient for CCM services after ER visit. Introduced self and role. Discussed CCM services, its goal and benefits. She followed up with PCP office and needs were addressed. Patient states, \"I think I'm managing at this time\" regarding CCM. Patient also states \"I will think about it\". Patient agreeable to receive CCM information via mail for future reference.     Education Documentation  No documentation found.        Lester BUTT  Ambulatory Case Management    4/12/2024, 10:22 EDT  "

## 2024-04-18 ENCOUNTER — ANTICOAGULATION VISIT (OUTPATIENT)
Dept: PHARMACY | Facility: HOSPITAL | Age: 74
End: 2024-04-18
Payer: MEDICARE

## 2024-04-18 DIAGNOSIS — I48.20 ATRIAL FIBRILLATION, CHRONIC: Primary | Chronic | ICD-10-CM

## 2024-04-18 LAB — INR PPP: 1.5

## 2024-04-18 PROCEDURE — G0249 PROVIDE INR TEST MATER/EQUIP: HCPCS

## 2024-04-18 NOTE — PROGRESS NOTES
Anticoagulation Clinic Progress Note    Anticoagulation Summary  As of 2024      INR goal:  2.0-3.0   TTR:  54.9% (3.1 y)   INR used for dosin.50 (2024)   Warfarin maintenance plan:  1.25 mg every Mon, Wed, Sat; 2.5 mg all other days   Weekly warfarin total:  13.75 mg   Plan last modified:  Priscilla Villanueva, PharmD (3/28/2024)   Next INR check:  2024   Priority:  High   Target end date:      Indications    Atrial fibrillation  chronic [I48.20]                 Anticoagulation Episode Summary       INR check location:      Preferred lab:      Send INR reminders to:   [facility] HOME TEST POOL    Comments:   Home Testing as of 21 *call only when out of range*          Anticoagulation Care Providers       Provider Role Specialty Phone number    Zenia Tinajero MD Referring Cardiology [phone]            Clinic Interview:  Patient Findings     Positives:  Change in medications    Negatives:  Signs/symptoms of thrombosis, Signs/symptoms of bleeding,   Laboratory test error suspected, Change in health, Change in alcohol use,   Change in activity, Upcoming invasive procedure, Emergency department   visit, Upcoming dental procedure, Missed doses, Extra doses, Change in   diet/appetite, Hospital admission, Bruising, Other complaints    Comments:  Started levothyroxine about 1 week ago.        Clinical Outcomes     Negatives:  Major bleeding event, Thromboembolic event,   Anticoagulation-related hospital admission, Anticoagulation-related ED   visit, Anticoagulation-related fatality    Comments:  Started levothyroxine about 1 week ago.          INR History:      2024    12:00 AM 2024    11:45 AM 2024     4:16 AM 2024    12:00 AM 2024     2:36 PM 2024    12:00 AM 2024    11:15 AM   Anticoagulation Monitoring   INR  2.30   1.60  1.50   INR Date  2024   INR Goal  2.0-3.0   2.0-3.0  2.0-3.0   Trend  Same   Same  Same   Last Week  Total  13.75 mg   13.75 mg  15 mg   Next Week Total  13.75 mg   15 mg  16.25 mg   Sun  2.5 mg   2.5 mg  2.5 mg   Mon  1.25 mg   1.25 mg  1.25 mg   Tue  2.5 mg   2.5 mg  2.5 mg   Wed  1.25 mg   1.25 mg  1.25 mg   Thu  2.5 mg   3.75 mg (4/11)  3.75 mg (4/18)   Fri  2.5 mg   2.5 mg  2.5 mg   Sat  1.25 mg   1.25 mg  2.5 mg (4/20)   Historical INR 2.30      1.61  1.60      1.50        Visit Report    Report Report         This result is from an external source.       Plan:  1. INR is Subtherapeutic today- see above in Anticoagulation Summary.   Will instruct Shani Phillip to Change their warfarin regimen- see above in Anticoagulation Summary.  Take 3.75 mg today then increase to 1.25mg on Monday and Wedensday and 2.5 mg on all other days.  2. Follow up in 1 weeks  3. They have been instructed to call if any changes in medications, doses, concerns, etc. Patient expresses understanding and has no further questions at this time.    Zenia Myers, PharmD

## 2024-04-25 ENCOUNTER — ANTICOAGULATION VISIT (OUTPATIENT)
Dept: PHARMACY | Facility: HOSPITAL | Age: 74
End: 2024-04-25
Payer: MEDICARE

## 2024-04-25 DIAGNOSIS — I48.20 ATRIAL FIBRILLATION, CHRONIC: Primary | Chronic | ICD-10-CM

## 2024-04-25 LAB — INR PPP: 2.6

## 2024-04-25 NOTE — PROGRESS NOTES
Anticoagulation Clinic Progress Note    Anticoagulation Summary  As of 2024      INR goal:  2.0-3.0   TTR:  54.9% (3.1 y)   INR used for dosin.60 (2024)   Warfarin maintenance plan:  1.25 mg every Mon, Wed, Sat; 2.5 mg all other days   Weekly warfarin total:  13.75 mg   No change documented:  Zenia Myers PharmD   Plan last modified:  Priscilla Villanueva PharmD (3/28/2024)   Next INR check:  2024   Priority:  High   Target end date:      Indications    Atrial fibrillation  chronic [I48.20]                 Anticoagulation Episode Summary       INR check location:      Preferred lab:      Send INR reminders to:  [facility] HOME TEST POOL    Comments:   Home Testing as of 21 *call only when out of range*          Anticoagulation Care Providers       Provider Role Specialty Phone number    Zenia Tinajero MD Referring Cardiology [phone]            Clinic Interview:  No pertinent clinical findings have been reported.    INR History:      2024     4:16 AM 2024    12:00 AM 2024     2:36 PM 2024    12:00 AM 2024    11:15 AM 2024    12:00 AM 2024    11:15 AM   Anticoagulation Monitoring   INR   1.60  1.50  2.60   INR Date   2024   INR Goal   2.0-3.0  2.0-3.0  2.0-3.0   Trend   Same  Same  Same   Last Week Total   13.75 mg  15 mg  16.25 mg   Next Week Total   15 mg  16.25 mg  13.75 mg   Sun   2.5 mg  2.5 mg  2.5 mg   Mon   1.25 mg  1.25 mg  1.25 mg   Tue   2.5 mg  2.5 mg  2.5 mg   Wed   1.25 mg  1.25 mg  1.25 mg   Thu   3.75 mg ()  3.75 mg ()  2.5 mg   Fri   2.5 mg  2.5 mg  2.5 mg   Sat   1.25 mg  2.5 mg ()  1.25 mg   Historical INR 1.61  1.60      1.50      2.60        Visit Report  Report Report           This result is from an external source.       Plan:  1. INR is therapeutic today- see above in Anticoagulation Summary.    Shani Phillip to continue their warfarin regimen- see above in Anticoagulation  Summary.  2. Follow up in 1 week  3. Pt has agreed to only be called if INR out of range. They have been instructed to call if any changes in medications, doses, concerns, etc. Patient expresses understanding and has no further questions at this time.    Zenia Myers, PharmD

## 2024-04-29 DIAGNOSIS — J44.9 CHRONIC OBSTRUCTIVE PULMONARY DISEASE, UNSPECIFIED COPD TYPE: ICD-10-CM

## 2024-04-29 RX ORDER — BUDESONIDE 0.5 MG/2ML
INHALANT ORAL
Qty: 30 EACH | Refills: 11 | Status: SHIPPED | OUTPATIENT
Start: 2024-04-29

## 2024-04-29 RX ORDER — IPRATROPIUM BROMIDE AND ALBUTEROL SULFATE 2.5; .5 MG/3ML; MG/3ML
SOLUTION RESPIRATORY (INHALATION)
Qty: 360 ML | Refills: 11 | Status: SHIPPED | OUTPATIENT
Start: 2024-04-29

## 2024-05-09 ENCOUNTER — ANTICOAGULATION VISIT (OUTPATIENT)
Dept: PHARMACY | Facility: HOSPITAL | Age: 74
End: 2024-05-09
Payer: MEDICARE

## 2024-05-09 DIAGNOSIS — I48.20 ATRIAL FIBRILLATION, CHRONIC: Primary | Chronic | ICD-10-CM

## 2024-05-09 LAB — INR PPP: 2.4

## 2024-05-16 ENCOUNTER — ANTICOAGULATION VISIT (OUTPATIENT)
Dept: PHARMACY | Facility: HOSPITAL | Age: 74
End: 2024-05-16
Payer: MEDICARE

## 2024-05-16 DIAGNOSIS — I48.20 ATRIAL FIBRILLATION, CHRONIC: Primary | Chronic | ICD-10-CM

## 2024-05-16 LAB — INR PPP: 2.6

## 2024-05-16 NOTE — PROGRESS NOTES
Anticoagulation Clinic Progress Note    Anticoagulation Summary  As of 2024      INR goal:  2.0-3.0   TTR:  55.7% (3.2 y)   INR used for dosin.60 (2024)   Warfarin maintenance plan:  1.25 mg every Mon, Wed, Sat; 2.5 mg all other days   Weekly warfarin total:  13.75 mg   No change documented:  Marleny Black RPH   Plan last modified:  Priscilla Villanueva, PharmD (3/28/2024)   Next INR check:  2024   Priority:  High   Target end date:      Indications    Atrial fibrillation  chronic [I48.20]                 Anticoagulation Episode Summary       INR check location:      Preferred lab:      Send INR reminders to:   [facility] HOME TEST POOL    Comments:   Home Testing as of 21 *call only when out of range*          Anticoagulation Care Providers       Provider Role Specialty Phone number    Zenia Tinajero MD Referring Cardiology [phone]            Clinic Interview:  No pertinent clinical findings have been reported.    INR History:      2024    11:15 AM 2024    12:00 AM 2024    11:15 AM 2024    12:00 AM 2024    10:22 AM 2024    12:00 AM 2024     1:01 PM   Anticoagulation Monitoring   INR 1.50  2.60  2.40  2.60   INR Date 2024   INR Goal 2.0-3.0  2.0-3.0  2.0-3.0  2.0-3.0   Trend Same  Same  Same  Same   Last Week Total 15 mg  16.25 mg  13.75 mg  13.75 mg   Next Week Total 16.25 mg  13.75 mg  13.75 mg  13.75 mg   Sun 2.5 mg  2.5 mg  2.5 mg  2.5 mg   Mon 1.25 mg  1.25 mg  1.25 mg  1.25 mg   Tue 2.5 mg  2.5 mg  2.5 mg  2.5 mg   Wed 1.25 mg  1.25 mg  1.25 mg  1.25 mg   Thu 3.75 mg ()  2.5 mg  2.5 mg  2.5 mg   Fri 2.5 mg  2.5 mg  2.5 mg  2.5 mg   Sat 2.5 mg ()  1.25 mg  1.25 mg  1.25 mg   Historical INR  2.60      2.40      2.60            This result is from an external source.       Plan:  1. INR is therapeutic today- see above in Anticoagulation Summary.    Shani Phillip to continue their warfarin  regimen- see above in Anticoagulation Summary.  2. Follow up in 1 week  3. Pt has agreed to only be called if INR out of range. They have been instructed to call if any changes in medications, doses, concerns, etc. Patient expresses understanding and has no further questions at this time.    Mraleny Black, [facility]

## 2024-05-22 DIAGNOSIS — G25.81 RLS (RESTLESS LEGS SYNDROME): ICD-10-CM

## 2024-05-22 RX ORDER — PRAMIPEXOLE DIHYDROCHLORIDE 0.5 MG/1
0.5 TABLET ORAL 2 TIMES DAILY
Qty: 180 TABLET | Refills: 1 | Status: SHIPPED | OUTPATIENT
Start: 2024-05-22

## 2024-05-22 RX ORDER — TROSPIUM CHLORIDE 20 MG/1
20 TABLET, FILM COATED ORAL 2 TIMES DAILY
Qty: 180 TABLET | Refills: 1 | Status: SHIPPED | OUTPATIENT
Start: 2024-05-22

## 2024-05-23 DIAGNOSIS — E03.9 ACQUIRED HYPOTHYROIDISM: ICD-10-CM

## 2024-05-23 DIAGNOSIS — E03.9 ACQUIRED HYPOTHYROIDISM: Primary | ICD-10-CM

## 2024-05-24 ENCOUNTER — ANTICOAGULATION VISIT (OUTPATIENT)
Dept: PHARMACY | Facility: HOSPITAL | Age: 74
End: 2024-05-24
Payer: MEDICARE

## 2024-05-24 DIAGNOSIS — I48.20 ATRIAL FIBRILLATION, CHRONIC: Primary | Chronic | ICD-10-CM

## 2024-05-24 LAB
INR PPP: 2
T4 FREE SERPL-MCNC: 1.45 NG/DL (ref 0.93–1.7)
TSH SERPL DL<=0.005 MIU/L-ACNC: 4.86 UIU/ML (ref 0.27–4.2)

## 2024-05-24 PROCEDURE — G0249 PROVIDE INR TEST MATER/EQUIP: HCPCS

## 2024-05-24 NOTE — PROGRESS NOTES
Anticoagulation Clinic Progress Note    Anticoagulation Summary  As of 2024      INR goal:  2.0-3.0   TTR:  56.0% (3.2 y)   INR used for dosin.00 (2024)   Warfarin maintenance plan:  1.25 mg every Mon, Wed, Sat; 2.5 mg all other days   Weekly warfarin total:  13.75 mg   No change documented:  Marleny Black RPH   Plan last modified:  Priscilla Villanueva, PharmD (3/28/2024)   Next INR check:  2024   Priority:  High   Target end date:      Indications    Atrial fibrillation  chronic [I48.20]                 Anticoagulation Episode Summary       INR check location:      Preferred lab:      Send INR reminders to:   [facility] HOME TEST POOL    Comments:   Home Testing as of 21 *call only when out of range*          Anticoagulation Care Providers       Provider Role Specialty Phone number    Zenia Tinajero MD Referring Cardiology [phone]            Clinic Interview:  No pertinent clinical findings have been reported.    INR History:      2024    11:15 AM 2024    12:00 AM 2024    10:22 AM 2024    12:00 AM 2024     1:01 PM 2024    12:00 AM 2024    11:30 AM   Anticoagulation Monitoring   INR 2.60  2.40  2.60  2.00   INR Date 2024   INR Goal 2.0-3.0  2.0-3.0  2.0-3.0  2.0-3.0   Trend Same  Same  Same  Same   Last Week Total 16.25 mg  13.75 mg  13.75 mg  13.75 mg   Next Week Total 13.75 mg  13.75 mg  13.75 mg  13.75 mg   Sun 2.5 mg  2.5 mg  2.5 mg  2.5 mg   Mon 1.25 mg  1.25 mg  1.25 mg  1.25 mg   Tue 2.5 mg  2.5 mg  2.5 mg  2.5 mg   Wed 1.25 mg  1.25 mg  1.25 mg  1.25 mg   Thu 2.5 mg  2.5 mg  2.5 mg  2.5 mg   Fri 2.5 mg  2.5 mg  2.5 mg  2.5 mg   Sat 1.25 mg  1.25 mg  1.25 mg  1.25 mg   Historical INR  2.40      2.60      2.00            This result is from an external source.       Plan:  1. INR is therapeutic today- see above in Anticoagulation Summary.    Shani Phillip to continue their warfarin regimen- see above  in Anticoagulation Summary.  2. Follow up in 1 week  3. Pt has agreed to only be called if INR out of range. They have been instructed to call if any changes in medications, doses, concerns, etc. Patient expresses understanding and has no further questions at this time.    Marleny Black, [facility]

## 2024-05-28 DIAGNOSIS — E03.9 HYPOTHYROIDISM, UNSPECIFIED TYPE: ICD-10-CM

## 2024-05-28 RX ORDER — LEVOTHYROXINE SODIUM 0.05 MG/1
50 TABLET ORAL
Qty: 90 TABLET | Refills: 1 | Status: SHIPPED | OUTPATIENT
Start: 2024-05-28

## 2024-05-30 ENCOUNTER — TELEPHONE (OUTPATIENT)
Dept: FAMILY MEDICINE CLINIC | Facility: CLINIC | Age: 74
End: 2024-05-30
Payer: MEDICARE

## 2024-05-30 ENCOUNTER — ANTICOAGULATION VISIT (OUTPATIENT)
Dept: PHARMACY | Facility: HOSPITAL | Age: 74
End: 2024-05-30
Payer: MEDICARE

## 2024-05-30 DIAGNOSIS — I48.20 ATRIAL FIBRILLATION, CHRONIC: Primary | Chronic | ICD-10-CM

## 2024-05-30 LAB — INR PPP: 2.3

## 2024-05-30 NOTE — TELEPHONE ENCOUNTER
I called and spoke with the patient and she was wanting to know what the results of her last labs were on her thyroid. Per Dr Walker I let her know the TSH or thyroid-stimulating hormone has improved slightly but is still elevated.  An increase in the dose of the levothyroxine 50 mcg daily has been sent to the pharmacy and we should recheck her levels in 2 months. She verbalized understanding and I confirmed the pharmacy it was sent to. She had no further questions at this time and will call back if needed.

## 2024-05-30 NOTE — PROGRESS NOTES
Anticoagulation Clinic Progress Note    Anticoagulation Summary  As of 2024      INR goal:  2.0-3.0   TTR:  56.3% (3.2 y)   INR used for dosin.30 (2024)   Warfarin maintenance plan:  1.25 mg every Mon, Wed, Sat; 2.5 mg all other days   Weekly warfarin total:  13.75 mg   No change documented:  Zenia Myers PharmD   Plan last modified:  Priscilla Villanueva PharmD (3/28/2024)   Next INR check:  2024   Priority:  High   Target end date:      Indications    Atrial fibrillation  chronic [I48.20]                 Anticoagulation Episode Summary       INR check location:      Preferred lab:      Send INR reminders to:   [facility] [employer] HOME TEST POOL    Comments:   Home Testing as of 21 *call only when out of range*          Anticoagulation Care Providers       Provider Role Specialty Phone number    Zenia Tinajero MD Referring Cardiology [phone]            Clinic Interview:  No pertinent clinical findings have been reported.    INR History:      2024    10:22 AM 2024    12:00 AM 2024     1:01 PM 2024    12:00 AM 2024    11:30 AM 2024    12:00 AM 2024    10:47 AM   Anticoagulation Monitoring   INR 2.40  2.60  2.00  2.30   INR Date 2024   INR Goal 2.0-3.0  2.0-3.0  2.0-3.0  2.0-3.0   Trend Same  Same  Same  Same   Last Week Total 13.75 mg  13.75 mg  13.75 mg  13.75 mg   Next Week Total 13.75 mg  13.75 mg  13.75 mg  13.75 mg   Sun 2.5 mg  2.5 mg  2.5 mg  2.5 mg   Mon 1.25 mg  1.25 mg  1.25 mg  1.25 mg   Tue 2.5 mg  2.5 mg  2.5 mg  2.5 mg   Wed 1.25 mg  1.25 mg  1.25 mg  1.25 mg   Thu 2.5 mg  2.5 mg  2.5 mg  2.5 mg   Fri 2.5 mg  2.5 mg  2.5 mg  2.5 mg   Sat 1.25 mg  1.25 mg  1.25 mg  1.25 mg   Historical INR  2.60      2.00      2.30            This result is from an external source.       Plan:  1. INR is therapeutic today- see above in Anticoagulation Summary.    Shani Phillip to continue their warfarin regimen- see  above in Anticoagulation Summary.  2. Follow up in 1 week  3. Pt has agreed to only be called if INR out of range. They have been instructed to call if any changes in medications, doses, concerns, etc. Patient expresses understanding and has no further questions at this time.    Zenia Myers, PharmD

## 2024-05-30 NOTE — TELEPHONE ENCOUNTER
Caller: Shani Phillip    Relationship: Self    Best call back number: [phone]     What test was performed: LABS    When was the test performed: 5/23/24    Where was the test performed: [facility]    PLEASE ADVISE

## 2024-06-04 RX ORDER — DIGOXIN 125 MCG
125 TABLET ORAL DAILY
Qty: 90 TABLET | Refills: 2 | Status: SHIPPED | OUTPATIENT
Start: 2024-06-04

## 2024-06-06 ENCOUNTER — ANTICOAGULATION VISIT (OUTPATIENT)
Dept: PHARMACY | Facility: HOSPITAL | Age: 74
End: 2024-06-06
Payer: MEDICARE

## 2024-06-06 DIAGNOSIS — I48.20 ATRIAL FIBRILLATION, CHRONIC: Primary | Chronic | ICD-10-CM

## 2024-06-06 LAB — INR PPP: 2.4

## 2024-06-06 NOTE — PROGRESS NOTES
Anticoagulation Clinic Progress Note    Anticoagulation Summary  As of 2024      INR goal:  2.0-3.0   TTR:  56.5% (3.2 y)   INR used for dosin.40 (2024)   Warfarin maintenance plan:  1.25 mg every Mon, Wed, Sat; 2.5 mg all other days   Weekly warfarin total:  13.75 mg   No change documented:  Marleny Black RPH   Plan last modified:  Priscilla Villanueva, PharmD (3/28/2024)   Next INR check:  2024   Priority:  High   Target end date:      Indications    Atrial fibrillation  chronic [I48.20]                 Anticoagulation Episode Summary       INR check location:      Preferred lab:      Send INR reminders to:   [facility] [employer] HOME TEST POOL    Comments:   Home Testing as of 21 *call only when out of range*          Anticoagulation Care Providers       Provider Role Specialty Phone number    Zenia Tinajero MD Referring Cardiology [phone]            Clinic Interview:  No pertinent clinical findings have been reported.    INR History:      2024     1:01 PM 2024    12:00 AM 2024    11:30 AM 2024    12:00 AM 2024    10:47 AM 2024    12:00 AM 2024     3:20 PM   Anticoagulation Monitoring   INR 2.60  2.00  2.30  2.40   INR Date 2024   INR Goal 2.0-3.0  2.0-3.0  2.0-3.0  2.0-3.0   Trend Same  Same  Same  Same   Last Week Total 13.75 mg  13.75 mg  13.75 mg  13.75 mg   Next Week Total 13.75 mg  13.75 mg  13.75 mg  13.75 mg   Sun 2.5 mg  2.5 mg  2.5 mg  2.5 mg   Mon 1.25 mg  1.25 mg  1.25 mg  1.25 mg   Tue 2.5 mg  2.5 mg  2.5 mg  2.5 mg   Wed 1.25 mg  1.25 mg  1.25 mg  1.25 mg   Thu 2.5 mg  2.5 mg  2.5 mg  2.5 mg   Fri 2.5 mg  2.5 mg  2.5 mg  2.5 mg   Sat 1.25 mg  1.25 mg  1.25 mg  1.25 mg   Historical INR  2.00      2.30      2.40            This result is from an external source.       Plan:  1. INR is therapeutic today- see above in Anticoagulation Summary.    Shani Phillip to continue their warfarin regimen- see above  in Anticoagulation Summary.  2. Follow up in 1 week  3. Pt has agreed to only be called if INR out of range. They have been instructed to call if any changes in medications, doses, concerns, etc. Patient expresses understanding and has no further questions at this time.    Marleny Black, [facility]

## 2024-06-10 ENCOUNTER — OFFICE VISIT (OUTPATIENT)
Dept: NEUROLOGY | Facility: CLINIC | Age: 74
End: 2024-06-10
Payer: MEDICARE

## 2024-06-10 VITALS
SYSTOLIC BLOOD PRESSURE: 150 MMHG | OXYGEN SATURATION: 98 % | BODY MASS INDEX: 26.45 KG/M2 | HEIGHT: 61 IN | DIASTOLIC BLOOD PRESSURE: 78 MMHG | HEART RATE: 81 BPM

## 2024-06-10 DIAGNOSIS — R20.0 NUMBNESS AND TINGLING: ICD-10-CM

## 2024-06-10 DIAGNOSIS — R90.89 ABNORMAL FINDING ON MRI OF BRAIN: Primary | ICD-10-CM

## 2024-06-10 DIAGNOSIS — R20.2 NUMBNESS AND TINGLING: ICD-10-CM

## 2024-06-10 PROCEDURE — 99213 OFFICE O/P EST LOW 20 MIN: CPT | Performed by: NURSE PRACTITIONER

## 2024-06-10 NOTE — PROGRESS NOTES
[facility] NEUROLOGY         Date of Visit: 6/10/2024    Name: Shani Phillip    :  1950    PCP: Rodríguez Walker MD    Visit Type: an initial evaluation         Subjective     Patient ID: Shani is a 73 y.o. female.         History of Present Illness  I had the pleasure of seeing your patient for the first time today.  As you may know she is a 73-year-old female here today for spittle follow-up for abnormal MRI of the brain with history of previous whole-body paresthesias.    History:    Patient does have history of COPD, atrial fibrillation, chronic heart failure, chronic kidney disease, hypothyroid.    Patient presented to the emergency room on 2024 for evaluation for shortness of breath and whole body paresthesias.  She did end up undergoing MRI of the brain at that time.  MRI revealed the following:    IMPRESSION:  1. No convincing acute intracranial abnormality is identified.  Specifically, no acute infarct is identified.  2. There are multiple small patchy nodular foci of T2 high signal in the  periventricular and subcortical white matter of the cerebral hemispheres  in the superior frontal juxtacortical white matter bilaterally likely  all a manifestation of mild-moderate small vessel disease. However,  there are are unusual bilateral areas of patchy T2 high signal in the  anterior temporal lobes with a 16 x 10 mm confluent focus in the  anterior left temporal lobe juxtacortical white matter and several small  patchy foci in the anterior right temporal lobe. This is an unusual  finding and typically small vessel disease does not target the temporal  lobe white matter. There is no volume loss and there is no positive mass  effect. At this point the temporal lobe findings are indeterminate and  may merely be a manifestation of atypical small vessel disease and  unlikely to be areas of posttraumatic encephalomalacia given the lack of  significant volume loss. Given the lack of  significant cortical  involvement, I do not think it is encephalitis. There is probably  bilateral chronic gliosis in the anterior temporal lobe white matter.  Could consider a contrast-enhanced MRI of the brain for a more complete  assessment.    Neurology was consulted and recommended outpatient evaluation.  Patient was discharged within 24 hours after treatment for COPD exacerbation and urinary tract infection.    Patient is never had any additional episodes of symptoms like this in the past.  She denies any family history of any kind of autoimmune disorders, stroke, multiple sclerosis.    Current:    Patient states that she has not had any additional symptoms of numbness, tingling, weakness since the hospital.  She states that numbness prior to the hospital was whole body tingling.  She states that it did not start on 1 side or the other.  She states that she had no weakness associated with it.  Her  who presents with her today states that there was no cognitive changes during the episode.  She denies vision changes, headaches, speech issues.  She denied dizziness or lightheadedness.  She has not had any additional symptoms since the hospitalization.  No other new neurological complaints at today's visit.      The following portions of the patient's history were reviewed and updated as appropriate: allergies, current medications, past family history, past medical history, past social history, past surgical history, and problem list.                 Review of Systems   Constitutional:  Negative for activity change, appetite change, fatigue and unexpected weight change.   HENT:  Negative for tinnitus and trouble swallowing.    Eyes:  Negative for visual disturbance.   Respiratory:  Positive for chest tightness and shortness of breath.    Gastrointestinal:  Negative for constipation, diarrhea, nausea and vomiting.   Genitourinary:  Negative for difficulty urinating, frequency and urgency.   Musculoskeletal:   "Positive for gait problem. Negative for back pain and neck pain.   Neurological:  Positive for numbness. Negative for dizziness, tremors, seizures, syncope, facial asymmetry, speech difficulty, weakness, light-headedness and headaches.   Psychiatric/Behavioral:  Negative for confusion and sleep disturbance.             Current Medications:    Current Outpatient Medications   Medication Instructions    ammonium lactate (AmLactin) 12 % lotion Topical, As Needed    budesonide (PULMICORT) 0.5 MG/2ML nebulizer solution USE 1 VIAL IN NEBULIZER DAILY - rinse mouth after treatment    buPROPion XL (WELLBUTRIN XL) 300 mg, Oral, Daily    cholecalciferol (VITAMIN D3) 2,000 Units, Daily    ciprofloxacin (CIPRO) 500 mg, Oral, 2 Times Daily    cyclobenzaprine (FLEXERIL) 10 mg, Oral, 3 Times Daily PRN    digoxin (LANOXIN) 125 mcg, Oral, Daily    famotidine (PEPCID) 20 mg, Oral, 2 Times Daily Before Meals    ferrous sulfate 325 mg, Oral, Every Other Day    furosemide (LASIX) 40 MG tablet TAKE 1 TABLET BY MOUTH EVERY DAY    HYDROcodone-acetaminophen (NORCO) 7.5-325 MG per tablet 1 tablet, Oral, Every 6 Hours PRN    ipratropium-albuterol (DUO-NEB) 0.5-2.5 mg/3 ml nebulizer USE 1 VIAL IN NEBULIZER 4 TIMES DAILY - as directed    levothyroxine (SYNTHROID, LEVOTHROID) 50 mcg, Oral, Every Early Morning    meclizine (ANTIVERT) 12.5 mg, Oral, 3 Times Daily PRN    O2 (OXYGEN) 2 L/min, Inhalation, Once    ondansetron (ZOFRAN) 4 mg, Oral, Every 8 Hours PRN    pantoprazole (PROTONIX) 40 mg, Oral, Daily    potassium chloride ER (K-TAB) 20 MEQ tablet controlled-release ER tablet TAKE 1 TABLET BY MOUTH EVERY DAY    pramipexole (MIRAPEX) 0.5 mg, Oral, 2 Times Daily    trospium (SANCTURA) 20 mg, Oral, 2 Times Daily    warfarin (COUMADIN) 2.5 MG tablet Take one-half of a tablet by mouth on wednesday, take one tablet by mouth all other days or as directed    Yupelri 175 mcg, Nebulization, Daily - RT          /78   Pulse 81   Ht 154.9 cm (61\")  "  SpO2 98%   BMI 26.45 kg/m²                Objective     Neurological Exam  Mental Status  Awake, alert and oriented to person, place and time. Speech is normal. Language is fluent with no aphasia.    Cranial Nerves  CN II: Visual fields full to confrontation.  CN III, IV, VI: Extraocular movements intact bilaterally. Normal lids and orbits bilaterally. Pupils equal round and reactive to light bilaterally.  CN V: Facial sensation is normal.  CN VII: Full and symmetric facial movement.  CN IX, X: Palate elevates symmetrically  CN XI: Shoulder shrug strength is normal.  CN XII: Tongue midline without atrophy or fasciculations.    Motor  Normal muscle bulk throughout. Normal muscle tone. No abnormal involuntary movements. Strength is 5/5 throughout all four extremities.    Sensory  Sensation is intact to light touch, pinprick, vibration and proprioception in all four extremities.    Reflexes  Deep tendon reflexes are 2+ and symmetric in all four extremities.    Coordination    Finger-to-nose, rapid alternating movements and heel-to-shin normal bilaterally without dysmetria.    Gait    Patient currently in wheelchair.      Physical Exam  Eyes:      General: Lids are normal.      Extraocular Movements: Extraocular movements intact.      Pupils: Pupils are equal, round, and reactive to light.   Neurological:      Motor: Motor strength is normal.     Coordination: Coordination is intact.      Deep Tendon Reflexes: Reflexes are normal and symmetric.   Psychiatric:         Speech: Speech normal.                     Assessment & Plan     Diagnoses and all orders for this visit:    1. Abnormal finding on MRI of brain (Primary)  -     MRI Brain With & Without Contrast; Future    2. Numbness and tingling  -     MRI Brain With & Without Contrast; Future       At this time we will go ahead and order MRI brain with and without contrast for repeat imaging due to the abnormal imaging seen in April.  I do however feel like this is  most likely microvascular changes based on opinion from radiologist.    Plan for follow-up via telephone with test results with appointment for follow-up as necessary if results are abnormal.            Shonda Brown [facility]    Neurology    [facility] [place]    Phone: [phone]    6/10/2024 , 14:54 EDT

## 2024-06-10 NOTE — LETTER
Camila 10, 2024       No Recipients    Patient: Shani Phillip   YOB: 1950   Date of Visit: 6/10/2024     Dear Rodríguez Walker MD:       Thank you for referring Shani Phillip to me for evaluation. Below are the relevant portions of my assessment and plan of care.    If you have questions, please do not hesitate to call me. I look forward to following Shani along with you.         Sincerely,        GENET Horner        CC:   No Recipients

## 2024-06-13 ENCOUNTER — ANTICOAGULATION VISIT (OUTPATIENT)
Dept: PHARMACY | Facility: HOSPITAL | Age: 74
End: 2024-06-13
Payer: MEDICARE

## 2024-06-13 DIAGNOSIS — I48.20 ATRIAL FIBRILLATION, CHRONIC: Primary | Chronic | ICD-10-CM

## 2024-06-13 LAB — INR PPP: 1.7

## 2024-06-13 NOTE — PROGRESS NOTES
Anticoagulation Clinic Progress Note    Anticoagulation Summary  As of 2024      INR goal:  2.0-3.0   TTR:  56.5% (3.3 y)   INR used for dosin.70 (2024)   Warfarin maintenance plan:  1.25 mg every Mon, Wed, Sat; 2.5 mg all other days   Weekly warfarin total:  13.75 mg   Plan last modified:  Priscilla Villanueva, PharmD (3/28/2024)   Next INR check:  2024   Priority:  High   Target end date:      Indications    Atrial fibrillation  chronic [I48.20]                 Anticoagulation Episode Summary       INR check location:      Preferred lab:      Send INR reminders to:   [employer] [employer] HOME TEST POOL    Comments:   Home Testing as of 21 *call only when out of range*          Anticoagulation Care Providers       Provider Role Specialty Phone number    Zenia Tinajero MD Referring Cardiology [phone]            Clinic Interview:  Patient Findings     Negatives:  Signs/symptoms of thrombosis, Signs/symptoms of bleeding,   Laboratory test error suspected, Change in health, Change in alcohol use,   Change in activity, Upcoming invasive procedure, Emergency department   visit, Upcoming dental procedure, Missed doses, Extra doses, Change in   medications, Change in diet/appetite, Hospital admission, Bruising, Other   complaints      Clinical Outcomes     Negatives:  Major bleeding event, Thromboembolic event,   Anticoagulation-related hospital admission, Anticoagulation-related ED   visit, Anticoagulation-related fatality        INR History:      2024    11:30 AM 2024    12:00 AM 2024    10:47 AM 2024    12:00 AM 2024     3:20 PM 2024    12:00 AM 2024     1:26 PM   Anticoagulation Monitoring   INR 2.00  2.30  2.40  1.70   INR Date 2024   INR Goal 2.0-3.0  2.0-3.0  2.0-3.0  2.0-3.0   Trend Same  Same  Same  Same   Last Week Total 13.75 mg  13.75 mg  13.75 mg  13.75 mg   Next Week Total 13.75 mg  13.75 mg  13.75 mg  15 mg    Sun 2.5 mg  2.5 mg  2.5 mg  2.5 mg   Mon 1.25 mg  1.25 mg  1.25 mg  1.25 mg   Tue 2.5 mg  2.5 mg  2.5 mg  2.5 mg   Wed 1.25 mg  1.25 mg  1.25 mg  1.25 mg   Thu 2.5 mg  2.5 mg  2.5 mg  3.75 mg (6/13)   Fri 2.5 mg  2.5 mg  2.5 mg  2.5 mg   Sat 1.25 mg  1.25 mg  1.25 mg  1.25 mg   Historical INR  2.30      2.40      1.70            This result is from an external source.       Plan:  1. INR is Subtherapeutic today- see above in Anticoagulation Summary.   Will instruct Shani Phillip to Increase their warfarin regimen- see above in Anticoagulation Summary.  boost to 3.75 mg then resume, rck 1 week   2. Follow up in 1 weeks  3. They have been instructed to call if any changes in medications, doses, concerns, etc. Patient expresses understanding and has no further questions at this time.    Marleny Black [facility]

## 2024-06-17 ENCOUNTER — HOSPITAL ENCOUNTER (OUTPATIENT)
Dept: MRI IMAGING | Facility: HOSPITAL | Age: 74
Discharge: HOME | End: 2024-06-17
Admitting: NURSE PRACTITIONER
Payer: MEDICARE

## 2024-06-17 DIAGNOSIS — R90.89 ABNORMAL FINDING ON MRI OF BRAIN: ICD-10-CM

## 2024-06-17 DIAGNOSIS — R20.2 NUMBNESS AND TINGLING: ICD-10-CM

## 2024-06-17 DIAGNOSIS — R20.0 NUMBNESS AND TINGLING: ICD-10-CM

## 2024-06-17 PROCEDURE — 70553 MRI BRAIN STEM W/O & W/DYE: CPT

## 2024-06-17 PROCEDURE — 0 GADOBENATE DIMEGLUMINE 529 MG/ML SOLUTION: Performed by: NURSE PRACTITIONER

## 2024-06-17 PROCEDURE — 82565 ASSAY OF CREATININE: CPT

## 2024-06-17 PROCEDURE — A9577 INJ MULTIHANCE: HCPCS | Performed by: NURSE PRACTITIONER

## 2024-06-17 RX ADMIN — GADOBENATE DIMEGLUMINE 13 ML: 529 INJECTION, SOLUTION INTRAVENOUS at 14:50

## 2024-06-20 ENCOUNTER — ANTICOAGULATION VISIT (OUTPATIENT)
Dept: PHARMACY | Facility: HOSPITAL | Age: 74
End: 2024-06-20
Payer: MEDICARE

## 2024-06-20 DIAGNOSIS — I48.20 ATRIAL FIBRILLATION, CHRONIC: Primary | Chronic | ICD-10-CM

## 2024-06-20 LAB — INR PPP: 3.3

## 2024-06-20 NOTE — PROGRESS NOTES
Anticoagulation Clinic Progress Note    Anticoagulation Summary  As of 6/20/2024      INR goal:  2.0-3.0   TTR:  56.6% (3.3 y)   INR used for dosing:  3.30 (6/20/2024)   Warfarin maintenance plan:  1.25 mg every Mon, Wed, Sat; 2.5 mg all other days   Weekly warfarin total:  13.75 mg   Plan last modified:  Priscilla Villanueva, Fahad (3/28/2024)   Next INR check:  6/27/2024   Priority:  High   Target end date:      Indications    Atrial fibrillation  chronic [I48.20]                 Anticoagulation Episode Summary       INR check location:      Preferred lab:      Send INR reminders to:   [employer] [facility] [employer]    Comments:   Home Testing as of 7/30/21 *call only when out of range*          Anticoagulation Care Providers       Provider Role Specialty Phone number    Zenia Tinajero MD Referring Cardiology [phone]            Clinic Interview:  Patient Findings     Negatives:  Signs/symptoms of thrombosis, Signs/symptoms of bleeding,   Laboratory test error suspected, Change in health, Change in alcohol use,   Change in activity, Upcoming invasive procedure, Emergency department   visit, Upcoming dental procedure, Missed doses, Extra doses, Change in   medications, Change in diet/appetite, Hospital admission, Bruising, Other   complaints      Clinical Outcomes     Negatives:  Major bleeding event, Thromboembolic event,   Anticoagulation-related hospital admission, Anticoagulation-related ED   visit, Anticoagulation-related fatality        INR History:      5/30/2024    10:47 AM 6/6/2024    12:00 AM 6/6/2024     3:20 PM 6/13/2024    12:00 AM 6/13/2024     1:26 PM 6/20/2024    12:00 AM 6/20/2024    10:42 AM   Anticoagulation Monitoring   INR 2.30  2.40  1.70  3.30   INR Date 5/30/2024 6/6/2024 6/13/2024 6/20/2024   INR Goal 2.0-3.0  2.0-3.0  2.0-3.0  2.0-3.0   Trend Same  Same  Same  Same   Last Week Total 13.75 mg  13.75 mg  13.75 mg  15 mg   Next Week Total 13.75 mg  13.75 mg  15 mg  12.5 mg   Sun  2.5 mg  2.5 mg  2.5 mg  2.5 mg   Mon 1.25 mg  1.25 mg  1.25 mg  1.25 mg   Tue 2.5 mg  2.5 mg  2.5 mg  2.5 mg   Wed 1.25 mg  1.25 mg  1.25 mg  1.25 mg   Thu 2.5 mg  2.5 mg  3.75 mg (6/13)  1.25 mg (6/20)   Fri 2.5 mg  2.5 mg  2.5 mg  2.5 mg   Sat 1.25 mg  1.25 mg  1.25 mg  1.25 mg   Historical INR  2.40      1.70      3.30            This result is from an external source.       Plan:  1. INR is Supratherapeutic today- see above in Anticoagulation Summary.   Will instruct Shani Phillip to Change their warfarin regimen- see above in Anticoagulation Summary.  2. Follow up in 1 weeks  3. They have been instructed to call if any changes in medications, doses, concerns, etc. Patient expresses understanding and has no further questions at this time.    Zenia Myers, PharmD

## 2024-06-21 LAB — CREAT BLDA-MCNC: 1.2 MG/DL (ref 0.6–1.3)

## 2024-06-21 RX ORDER — WARFARIN SODIUM 2.5 MG/1
TABLET ORAL
Qty: 75 TABLET | Refills: 1 | Status: SHIPPED | OUTPATIENT
Start: 2024-06-21

## 2024-06-27 LAB — INR PPP: 3

## 2024-06-28 ENCOUNTER — ANTICOAGULATION VISIT (OUTPATIENT)
Dept: PHARMACY | Facility: HOSPITAL | Age: 74
End: 2024-06-28
Payer: MEDICARE

## 2024-06-28 DIAGNOSIS — I48.20 ATRIAL FIBRILLATION, CHRONIC: Primary | Chronic | ICD-10-CM

## 2024-06-28 PROCEDURE — G0249 PROVIDE INR TEST MATER/EQUIP: HCPCS

## 2024-06-28 NOTE — PROGRESS NOTES
Anticoagulation Clinic Progress Note    Anticoagulation Summary  As of 6/28/2024      INR goal:  2.0-3.0   TTR:  56.2% (3.3 y)   INR used for dosing:  3.00 (6/27/2024)   Warfarin maintenance plan:  1.25 mg every Mon, Wed, Sat; 2.5 mg all other days   Weekly warfarin total:  13.75 mg   No change documented:  Zenia Myers PharmD   Plan last modified:  Priscilla Villanueva PharmD (3/28/2024)   Next INR check:  7/4/2024   Priority:  High   Target end date:      Indications    Atrial fibrillation  chronic [I48.20]                 Anticoagulation Episode Summary       INR check location:      Preferred lab:      Send INR reminders to:  [facility] HOME TEST POOL    Comments:   Home Testing as of 7/30/21 *call only when out of range*          Anticoagulation Care Providers       Provider Role Specialty Phone number    Zenia Tinajero MD Referring Cardiology [phone]            Clinic Interview:  No pertinent clinical findings have been reported.    INR History:      6/6/2024     3:20 PM 6/13/2024    12:00 AM 6/13/2024     1:26 PM 6/20/2024    12:00 AM 6/20/2024    10:42 AM 6/27/2024    12:00 AM 6/28/2024     8:15 AM   Anticoagulation Monitoring   INR 2.40  1.70  3.30  3.00   INR Date 6/6/2024 6/13/2024 6/20/2024 6/27/2024   INR Goal 2.0-3.0  2.0-3.0  2.0-3.0  2.0-3.0   Trend Same  Same  Same  Same   Last Week Total 13.75 mg  13.75 mg  15 mg  13.75 mg   Next Week Total 13.75 mg  15 mg  12.5 mg  13.75 mg   Sun 2.5 mg  2.5 mg  2.5 mg  2.5 mg   Mon 1.25 mg  1.25 mg  1.25 mg  1.25 mg   Tue 2.5 mg  2.5 mg  2.5 mg  2.5 mg   Wed 1.25 mg  1.25 mg  1.25 mg  1.25 mg   Thu 2.5 mg  3.75 mg (6/13)  1.25 mg (6/20)  -   Fri 2.5 mg  2.5 mg  2.5 mg  2.5 mg   Sat 1.25 mg  1.25 mg  1.25 mg  1.25 mg   Historical INR  1.70      3.30      3.00            This result is from an external source.       Plan:  1. INR is therapeutic today- see above in Anticoagulation Summary.    Shani Phillip to continue their warfarin regimen-  see above in Anticoagulation Summary.  2. Follow up in 1 week  3. Pt has agreed to only be called if INR out of range. They have been instructed to call if any changes in medications, doses, concerns, etc. Patient expresses understanding and has no further questions at this time.    Zenia Myers, PharmD

## 2024-07-02 ENCOUNTER — TELEPHONE (OUTPATIENT)
Dept: CARDIOLOGY | Facility: CLINIC | Age: 74
End: 2024-07-02

## 2024-07-02 ENCOUNTER — HOSPITAL ENCOUNTER (OUTPATIENT)
Dept: CARDIOLOGY | Facility: HOSPITAL | Age: 74
Discharge: HOME | End: 2024-07-02
Admitting: NURSE PRACTITIONER
Payer: MEDICARE

## 2024-07-02 ENCOUNTER — OFFICE VISIT (OUTPATIENT)
Dept: CARDIOLOGY | Facility: CLINIC | Age: 74
End: 2024-07-02
Payer: MEDICARE

## 2024-07-02 VITALS
HEART RATE: 93 BPM | WEIGHT: 143 LBS | DIASTOLIC BLOOD PRESSURE: 80 MMHG | HEIGHT: 61 IN | BODY MASS INDEX: 27 KG/M2 | SYSTOLIC BLOOD PRESSURE: 150 MMHG

## 2024-07-02 DIAGNOSIS — I48.91 ATRIAL FIBRILLATION WITH RVR: ICD-10-CM

## 2024-07-02 DIAGNOSIS — I48.20 ATRIAL FIBRILLATION, CHRONIC: ICD-10-CM

## 2024-07-02 DIAGNOSIS — I50.33 ACUTE ON CHRONIC DIASTOLIC CHF (CONGESTIVE HEART FAILURE): ICD-10-CM

## 2024-07-02 DIAGNOSIS — R78.89 ELEVATED DIGOXIN LEVEL: Primary | ICD-10-CM

## 2024-07-02 DIAGNOSIS — R06.09 DOE (DYSPNEA ON EXERTION): ICD-10-CM

## 2024-07-02 DIAGNOSIS — Z51.81 ENCOUNTER FOR THERAPEUTIC DRUG LEVEL MONITORING: ICD-10-CM

## 2024-07-02 DIAGNOSIS — I48.91 ATRIAL FIBRILLATION WITH RVR: Primary | ICD-10-CM

## 2024-07-02 LAB
ALBUMIN SERPL-MCNC: 3.7 G/DL (ref 3.5–5.2)
ALBUMIN/GLOB SERPL: 1.1 G/DL
ALP SERPL-CCNC: 125 U/L (ref 39–117)
ALT SERPL W P-5'-P-CCNC: 12 U/L (ref 1–33)
ANION GAP SERPL CALCULATED.3IONS-SCNC: 11 MMOL/L (ref 5–15)
AST SERPL-CCNC: 16 U/L (ref 1–32)
BILIRUB SERPL-MCNC: 0.4 MG/DL (ref 0–1.2)
BUN SERPL-MCNC: 21 MG/DL (ref 8–23)
BUN/CREAT SERPL: 20.4 (ref 7–25)
CALCIUM SPEC-SCNC: 9.1 MG/DL (ref 8.6–10.5)
CHLORIDE SERPL-SCNC: 99 MMOL/L (ref 98–107)
CO2 SERPL-SCNC: 29 MMOL/L (ref 22–29)
CREAT SERPL-MCNC: 1.03 MG/DL (ref 0.57–1)
DIGOXIN SERPL-MCNC: 2 NG/ML (ref 0.6–1.2)
EGFRCR SERPLBLD CKD-EPI 2021: 57.2 ML/MIN/1.73
GLOBULIN UR ELPH-MCNC: 3.3 GM/DL
GLUCOSE SERPL-MCNC: 98 MG/DL (ref 65–99)
NT-PROBNP SERPL-MCNC: 900 PG/ML (ref 0–900)
POTASSIUM SERPL-SCNC: 4.1 MMOL/L (ref 3.5–5.2)
PROT SERPL-MCNC: 7 G/DL (ref 6–8.5)
SODIUM SERPL-SCNC: 139 MMOL/L (ref 136–145)

## 2024-07-02 PROCEDURE — 80053 COMPREHEN METABOLIC PANEL: CPT | Performed by: NURSE PRACTITIONER

## 2024-07-02 PROCEDURE — 36415 COLL VENOUS BLD VENIPUNCTURE: CPT

## 2024-07-02 PROCEDURE — 80162 ASSAY OF DIGOXIN TOTAL: CPT | Performed by: NURSE PRACTITIONER

## 2024-07-02 PROCEDURE — 83880 ASSAY OF NATRIURETIC PEPTIDE: CPT | Performed by: NURSE PRACTITIONER

## 2024-07-02 RX ORDER — FUROSEMIDE 40 MG/1
40 TABLET ORAL 2 TIMES DAILY
Qty: 180 TABLET | Refills: 2 | Status: SHIPPED | OUTPATIENT
Start: 2024-07-02

## 2024-07-02 RX ORDER — DIGOXIN 125 MCG
125 TABLET ORAL NIGHTLY
Qty: 90 TABLET | Refills: 2 | Status: SHIPPED | OUTPATIENT
Start: 2024-07-02

## 2024-07-02 RX ORDER — POTASSIUM CHLORIDE 1500 MG/1
20 TABLET, EXTENDED RELEASE ORAL 2 TIMES DAILY
Qty: 180 TABLET | Refills: 1 | Status: SHIPPED | OUTPATIENT
Start: 2024-07-02

## 2024-07-02 NOTE — PROGRESS NOTES
Date of Office Visit: 24  Encounter Provider: GENET Sanchez  Place of Service: [facility] CARDIOLOGY  Patient Name: Shani Phillip  :1950    Chief Complaint   Patient presents with    <9>Coronary artery disease involving coronary bypass graft of    Edema    Uncontrolled atrial fibrillation   :     HPI: Shani Phillip is a 73 y.o. female  with chronic atrial fibrillation, mitral valve vegetation, COPD, lower extremity edema, diastolic congestive heart failure, and hypertension.        She is followed by Dr. Tinajero.  I will visit with her in follow-up and have reviewed her medical record.  She has history of mitral valve endocarditis which was treated with antibiotics.  In 2021 she had an echocardiogram which showed normal left ventricular systolic function, grade 2 diastolic dysfunction, moderate calcification of the aortic valve, moderate calcification of the mitral valve and mild mitral valve stenoses.  She had cardioversion 2019 but then had recurrent atrial fibrillation 2022.  Her Lasix was increased to 40 mg twice daily in late 2022 due to continued swelling.  Her swelling did not significantly initially change with that it was felt that higher dose diltiazem was contributing.  I lowered diltiazem to 180 mg daily and started digoxin with a load x3 days.        She continues to have issues with swelling with diltiazem so that was stopped in November.  She sent a list of her blood pressure and pulse readings which showed pulse 80s to 90s.  She reported her swelling was better.  Perfusion stress test 2023 showed no evidence of ischemia.  In 2023 she had repeat echo which showed preserved LV systolic function, mildly reduced RV systolic function, mild aortic valve stenosis, elevated RVSP of 42 mmHg consistent with mild pulmonary hypertension and severe mitral annular calcification with trace to mild mitral valve regurgitation and no  "significant concerns for endocarditis and lack of fever or chills.  Her white blood cell count was normal just prior.    She presents today for reassessment.  She has chronic shortness of breath with exertion.  Her oxygen requirement remains at 2 L.  She has no chest pain tightness or pressure dizziness or palpitation sensation.  No blood in the urine or stool with warfarin.  She continues to follow with our anticoagulation clinic over the past 7 to 10 days she has noticed some increased lower extremity swelling.  She admits to skipping her water pill when she is out of the house for majority of the day.  Most days, she takes 2 Lasix as well as 2 potassium reportedly.  No near-syncope or syncope.        Allergies   Allergen Reactions    Ceftin [Cefuroxime] Dizziness    Penicillins Rash     RASH 30 YRS AGO NO HOSPITALIZATION           Family and social history reviewed.     ROS  All other systems were reviewed and are negative          Objective:     Vitals:    07/02/24 1115   BP: 150/80   BP Location: Left arm   Patient Position: Sitting   Pulse: 93   Weight: 64.9 kg (143 lb)   Height: 154.9 cm (61\")     Body mass index is 27.02 kg/m².    PHYSICAL EXAM:  Pulmonary:      Effort: Pulmonary effort is normal.      Breath sounds: Examination of the right-lower field reveals decreased breath sounds. Examination of the left-lower field reveals decreased breath sounds. Decreased breath sounds present.      Comments: Oxygen in place  Cardiovascular:      Normal rate. Irregularly irregular rhythm.      Comments: Left greater than right  Edema:     Ankle: trace edema of the left ankle and 1+ edema of the right ankle.  Skin:     Capillary Refill: Capillary refill takes more than 3 seconds.      Comments: Toes             ECG 12 Lead    Date/Time: 7/2/2024 11:32 AM  Performed by: Angy Smith APRN    Authorized by: Angy Smith APRN  Comparison: compared with previous ECG   Similar to previous ECG  Rhythm: atrial " fibrillation  Rate: normal  Conduction: right bundle branch block    Clinical impression: abnormal EKG            Current Outpatient Medications   Medication Sig Dispense Refill    ammonium lactate (AmLactin) 12 % lotion Apply  topically to the appropriate area as directed As Needed for Dry Skin. 225 g 6    budesonide (PULMICORT) 0.5 MG/2ML nebulizer solution USE 1 VIAL IN NEBULIZER DAILY - rinse mouth after treatment 30 each 11    buPROPion XL (WELLBUTRIN XL) 300 MG 24 hr tablet TAKE 1 TABLET BY MOUTH EVERY DAY 90 tablet 1    cholecalciferol (VITAMIN D3) 25 MCG (1000 UT) tablet Take 2 tablets by mouth Daily.      cyclobenzaprine (FLEXERIL) 10 MG tablet Take 1 tablet by mouth 3 (Three) Times a Day As Needed (back pain). 40 tablet 1    digoxin (LANOXIN) 125 MCG tablet Take 1 tablet by mouth Every Night. 90 tablet 2    famotidine (PEPCID) 20 MG tablet TAKE 1 TABLET BY MOUTH 2 TIMES A DAY BEFORE MEALS. 180 tablet 3    ferrous sulfate 325 (65 FE) MG tablet Take 1 tablet by mouth Every Other Day. 45 tablet 1    furosemide (LASIX) 40 MG tablet Take 1 tablet by mouth 2 (Two) Times a Day. 180 tablet 2    HYDROcodone-acetaminophen (NORCO) 7.5-325 MG per tablet Take 1 tablet by mouth Every 6 (Six) Hours As Needed for Moderate Pain. 20 tablet 0    ipratropium-albuterol (DUO-NEB) 0.5-2.5 mg/3 ml nebulizer USE 1 VIAL IN NEBULIZER 4 TIMES DAILY - as directed 360 mL 11    levothyroxine (SYNTHROID, LEVOTHROID) 50 MCG tablet Take 1 tablet by mouth Every Morning. 90 tablet 1    meclizine (ANTIVERT) 12.5 MG tablet Take 1 tablet by mouth 3 (Three) Times a Day As Needed for Dizziness. 12 tablet 0    O2 (OXYGEN) Inhale 2 L/min 1 (One) Time.      ondansetron (ZOFRAN) 4 MG tablet Take 1 tablet by mouth Every 8 (Eight) Hours As Needed for Nausea or Vomiting. 30 tablet 1    pantoprazole (PROTONIX) 40 MG EC tablet TAKE 1 TABLET BY MOUTH EVERY DAY 90 tablet 1    potassium chloride ER (K-TAB) 20 MEQ tablet controlled-release ER tablet Take 1  tablet by mouth 2 (Two) Times a Day. 180 tablet 1    pramipexole (MIRAPEX) 0.5 MG tablet TAKE 1 TABLET BY MOUTH TWICE A  tablet 1    revefenacin (Yupelri) 175 MCG/3ML nebulizer solution Take 3 mL by nebulization Daily.      trospium (SANCTURA) 20 MG tablet TAKE 1 TABLET BY MOUTH TWICE A  tablet 1    warfarin (COUMADIN) 2.5 MG tablet Take one-half of a tablet (1.25 MG) by mouth on Monday, Wednesday and Saturday and take one tablet (2.5 mg)  by mouth all other days or as directed 75 tablet 1     No current facility-administered medications for this visit.     Assessment:       Diagnosis Plan   1. Atrial fibrillation with RVR  Digoxin Level      2. Atrial fibrillation, chronic  ECG 12 Lead    Comprehensive Metabolic Panel    proBNP      3. Acute on chronic diastolic CHF (congestive heart failure)  potassium chloride ER (K-TAB) 20 MEQ tablet controlled-release ER tablet    Comprehensive Metabolic Panel      4. Encounter for therapeutic drug level monitoring  Comprehensive Metabolic Panel    Digoxin Level      5. JARVIS (dyspnea on exertion)  proBNP           Orders Placed This Encounter   Procedures    Comprehensive Metabolic Panel     Standing Status:   Future     Number of Occurrences:   1     Standing Expiration Date:   7/3/2025     Order Specific Question:   Release to patient     Answer:   Routine Release [[id number]]    Digoxin Level     Standing Status:   Future     Number of Occurrences:   1     Standing Expiration Date:   7/2/2025     Order Specific Question:   Release to patient     Answer:   Routine Release [[id number]]    proBNP     Order Specific Question:   Release to patient     Answer:   Routine Release [[id number]]    ECG 12 Lead     This order was created via procedure documentation     Order Specific Question:   Release to patient     Answer:   Routine Release [[id number]]         Plan:           1.  73-year-old female with chronic atrial fibrillation.  Anticoagulated with warfarin and INR  is followed in our anticoagulation clinic.  CHADS2 Vascor of 3 she had a cardioversion November 2019 but now was predominately atrial fibrillation 93 % of the time on 2 week mobile teletry 03/2022 with average HR 86.     She is off diltiazem due to history of swelling.  I have advised that she take digoxin at night.  She had digoxin around 9 this a.m. but I am going to check a digoxin level regardless since we have the opportunity  2.  History of mitral valve vegetation in 2019 treated with antibiotics.  Last echo June 2023 showed severe mitral annular calcification with mild regurgitation however patient had no clinical signs of endocarditis and we continue to follow her clinically  - she requires antibiotic for all dental work and verbalized understanding of this today.  3.  COPD on home oxygen-avoid beta-blockers.  She is on 24-hour oxygen at 2 L.  She follows with Dr. Zamora  4.  Grade 2 diastolic dysfunction with lower extremity edema and chronic diastolic heart failure.  She is on Lasix 40 mg twice daily.  She had increased swelling that started last week.  She does give her water pill if she is out of the house for prolonged period of time.  She knows to take her potassium with Lasix.  She lives about 40 minutes from our office so I have decided not to give IV Lasix in clinic today.  I will check proBNP and chemistries to see if we may be able to increase her home furosemide a little further   5.  Mild, non obstructive coronary artery disease on cardiac cath June 2020.  Negative perfusion stress test July 2022 no angina.  6.  Mild aortic valve stenoses on echo August 2023  7.  Chronic right bundle branch block  8.  Normal bilateral lower extremity arterial study August 2020-she has significant cyanosis in the feet.  This could be related to chronic hypoxia despite oxygen use.  She has no complaint of claudications at this time I will hold off on repeat RAY            It has been a pleasure to participate in  this patient's care.      Thank you,  GENET Sanchez      **I used Dragon to dictate this note:**

## 2024-07-02 NOTE — TELEPHONE ENCOUNTER
I called and spoke with patient.  Digoxin level 2.0 however I feel that this is skewed because she had a dose this morning.  She will be moving digoxin to nighttime dosing starting tomorrow evening.  I will plan plan to repeat digoxin level next week.  Renal function is stable.      We also discussed proBNP is within normal limits however approximately doubled the value from last year.  Given her increased swelling, I will increase furosemide from 1 tablet twice daily to 1.5 tablets twice daily for 1 week.  She will also increase potassium from 2 tablets daily to a total of 3 tablets daily for 1 week as well.  She verbalized understanding and states she has enough pills to make this temporary change.  Repeat orders have been placed for dig level and BMP.  I will call her personally at the end of next week after she has the labs drawn and get an update on her swelling.

## 2024-07-05 ENCOUNTER — ANTICOAGULATION VISIT (OUTPATIENT)
Dept: PHARMACY | Facility: HOSPITAL | Age: 74
End: 2024-07-05
Payer: MEDICARE

## 2024-07-05 DIAGNOSIS — I48.20 ATRIAL FIBRILLATION, CHRONIC: Primary | Chronic | ICD-10-CM

## 2024-07-05 LAB — INR PPP: 2.1

## 2024-07-05 NOTE — PROGRESS NOTES
Anticoagulation Clinic Progress Note    Anticoagulation Summary  As of 2024      INR goal:  2.0-3.0   TTR:  56.5% (3.3 y)   INR used for dosin.10 (2024)   Warfarin maintenance plan:  1.25 mg every Mon, Wed, Sat; 2.5 mg all other days   Weekly warfarin total:  13.75 mg   No change documented:  Marleny Black RPH   Plan last modified:  Priscilla Villanueva, PharmD (3/28/2024)   Next INR check:  2024   Priority:  High   Target end date:      Indications    Atrial fibrillation  chronic [I48.20]                 Anticoagulation Episode Summary       INR check location:      Preferred lab:      Send INR reminders to:   [facility] [employer] HOME TEST POOL    Comments:   Home Testing as of 21 *call only when out of range*          Anticoagulation Care Providers       Provider Role Specialty Phone number    Zenia Tinajero MD Referring Cardiology [phone]            Clinic Interview:  No pertinent clinical findings have been reported.    INR History:      2024     1:26 PM 2024    12:00 AM 2024    10:42 AM 2024    12:00 AM 2024     8:15 AM 2024    12:00 AM 2024    10:24 AM   Anticoagulation Monitoring   INR 1.70  3.30  3.00  2.10   INR Date 2024   INR Goal 2.0-3.0  2.0-3.0  2.0-3.0  2.0-3.0   Trend Same  Same  Same  Same   Last Week Total 13.75 mg  15 mg  13.75 mg  13.75 mg   Next Week Total 15 mg  12.5 mg  13.75 mg  13.75 mg   Sun 2.5 mg  2.5 mg  2.5 mg  2.5 mg   Mon 1.25 mg  1.25 mg  1.25 mg  1.25 mg   Tue 2.5 mg  2.5 mg  2.5 mg  2.5 mg   Wed 1.25 mg  1.25 mg  1.25 mg  1.25 mg   Thu 3.75 mg ()  1.25 mg ()  -  2.5 mg   Fri 2.5 mg  2.5 mg  2.5 mg  2.5 mg   Sat 1.25 mg  1.25 mg  1.25 mg  1.25 mg   Historical INR  3.30      3.00      2.10            This result is from an external source.       Plan:  1. INR is therapeutic today- see above in Anticoagulation Summary.    Shani Phillip to continue their warfarin regimen- see  above in Anticoagulation Summary.  2. Follow up in 1 week  3. Pt has agreed to only be called if INR out of range. They have been instructed to call if any changes in medications, doses, concerns, etc. Patient expresses understanding and has no further questions at this time.    Marleny Black, [facility]

## 2024-07-08 DIAGNOSIS — E03.9 HYPOTHYROIDISM, UNSPECIFIED TYPE: ICD-10-CM

## 2024-07-08 RX ORDER — LEVOTHYROXINE SODIUM 0.03 MG/1
25 TABLET ORAL EVERY MORNING
Qty: 90 TABLET | OUTPATIENT
Start: 2024-07-08

## 2024-07-11 ENCOUNTER — ANTICOAGULATION VISIT (OUTPATIENT)
Dept: PHARMACY | Facility: HOSPITAL | Age: 74
End: 2024-07-11
Payer: MEDICARE

## 2024-07-11 DIAGNOSIS — I48.20 ATRIAL FIBRILLATION, CHRONIC: Primary | Chronic | ICD-10-CM

## 2024-07-11 LAB — INR PPP: 1.5

## 2024-07-11 NOTE — PROGRESS NOTES
Anticoagulation Clinic Progress Note    Anticoagulation Summary  As of 2024      INR goal:  2.0-3.0   TTR:  56.3% (3.3 y)   INR used for dosin.50 (2024)   Warfarin maintenance plan:  1.25 mg every Mon, Wed, Sat; 2.5 mg all other days   Weekly warfarin total:  13.75 mg   Plan last modified:  Priscilla Villanueva, Fahad (3/28/2024)   Next INR check:  2024   Priority:  High   Target end date:      Indications    Atrial fibrillation  chronic [I48.20]                 Anticoagulation Episode Summary       INR check location:      Preferred lab:      Send INR reminders to:   [facility] [employer] HOME TEST POOL    Comments:   Home Testing as of 21 *call only when out of range*          Anticoagulation Care Providers       Provider Role Specialty Phone number    Zenia Tinajero MD Referring Cardiology [phone]            Clinic Interview:  Patient Findings     Negatives:  Signs/symptoms of thrombosis, Signs/symptoms of bleeding,   Laboratory test error suspected, Change in health, Change in alcohol use,   Change in activity, Upcoming invasive procedure, Emergency department   visit, Upcoming dental procedure, Missed doses, Extra doses, Change in   medications, Change in diet/appetite, Hospital admission, Bruising, Other   complaints      Clinical Outcomes     Negatives:  Major bleeding event, Thromboembolic event,   Anticoagulation-related hospital admission, Anticoagulation-related ED   visit, Anticoagulation-related fatality        INR History:      2024    10:42 AM 2024    12:00 AM 2024     8:15 AM 2024    12:00 AM 2024    10:24 AM 2024    12:00 AM 2024    11:35 AM   Anticoagulation Monitoring   INR 3.30  3.00  2.10  1.50   INR Date 2024   INR Goal 2.0-3.0  2.0-3.0  2.0-3.0  2.0-3.0   Trend Same  Same  Same  Same   Last Week Total 15 mg  13.75 mg  13.75 mg  13.75 mg   Next Week Total 12.5 mg  13.75 mg  13.75 mg  15 mg   Sun  2.5 mg  2.5 mg  2.5 mg  2.5 mg   Mon 1.25 mg  1.25 mg  1.25 mg  1.25 mg   Tue 2.5 mg  2.5 mg  2.5 mg  2.5 mg   Wed 1.25 mg  1.25 mg  1.25 mg  1.25 mg   Thu 1.25 mg (6/20)  -  2.5 mg  3.75 mg (7/11)   Fri 2.5 mg  2.5 mg  2.5 mg  2.5 mg   Sat 1.25 mg  1.25 mg  1.25 mg  1.25 mg   Historical INR  3.00      2.10      1.50            This result is from an external source.       Plan:  1. INR is Subtherapeutic today- see above in Anticoagulation Summary.   Will instruct Shani Phillip to Increase their warfarin regimen- see above in Anticoagulation Summary.  Denies any changes or missed doses, boost to 3.75 mg then resume, rck 1 week   2. Follow up in 1 weeks  3. They have been instructed to call if any changes in medications, doses, concerns, etc. Patient expresses understanding and has no further questions at this time.    Marleny Black [facility]

## 2024-07-17 ENCOUNTER — TELEPHONE (OUTPATIENT)
Dept: CARDIOLOGY | Facility: CLINIC | Age: 74
End: 2024-07-17

## 2024-07-17 NOTE — TELEPHONE ENCOUNTER
I spoke with pt who shares that she had misunderstood Angy's instructions; pt has only been taking 1.5 tablets 60 mg furosemide QD since conversation on 7/2/24.    I reiterated a couple of times instructions to take furosemide 1.5 tablets (60 mg) BID, and then to have labs drawn on 7/24 in 1 week to assess kidney function & electrolytes.  Pt v/u.    Thank you,    Meme CLEMENTS RN  Triage [facility]  07/17/24 12:40 EDT

## 2024-07-17 NOTE — TELEPHONE ENCOUNTER
Caller: Shani Phillip     Best call back number: [phone]     What is your medical concern? PT STATES SHE IS STILL ON LASIX AND HAS STILL BEEN HAVING THE SWELLING SHE HAS BEEN DISCUSSING WITH PROVIDERS - PT STATES SHE HAD SOME LABS ORDERED AND DONE TO CHECK HER KIDNEY FUNCTION AND SEE IF SHE CAN TOLERATE SOMETHING ELSE - PT STATES SHE IS NOT SEEING ANY IMPROVEMENT AND IS INQUIRING WHAT THE LABS SHOWED - PT STATES THE SWELLING IS STILL IN BOTH LEGS BUT IT IS REALLY BAD IN HER LEFT, PT DENIES BREAKING OF SKIN OR BLISTERS BUT STATES THERE IS SOMETIMES LEAKING FROM IT, BUT IT IS NOT CONSTANT - PT DENIES HEAT WHEN TOUCHING - PT SEEKING ADVISEMENT

## 2024-07-18 ENCOUNTER — ANTICOAGULATION VISIT (OUTPATIENT)
Dept: PHARMACY | Facility: HOSPITAL | Age: 74
End: 2024-07-18
Payer: MEDICARE

## 2024-07-18 DIAGNOSIS — I48.20 ATRIAL FIBRILLATION, CHRONIC: Primary | Chronic | ICD-10-CM

## 2024-07-18 LAB — INR PPP: 2.5

## 2024-07-18 NOTE — PROGRESS NOTES
Anticoagulation Clinic Progress Note    Anticoagulation Summary  As of 2024      INR goal:  2.0-3.0   TTR:  56.3% (3.4 y)   INR used for dosin.50 (2024)   Warfarin maintenance plan:  1.25 mg every Mon, Wed, Sat; 2.5 mg all other days   Weekly warfarin total:  13.75 mg   No change documented:  Keshawn Castaneda PharmD   Plan last modified:  Priscilla Villanueva PharmD (3/28/2024)   Next INR check:  2024   Priority:  High   Target end date:      Indications    Atrial fibrillation  chronic [I48.20]                 Anticoagulation Episode Summary       INR check location:      Preferred lab:      Send INR reminders to:   [facility] [employer] [facility] HOME TEST POOL    Comments:   Home Testing as of 21 *call only when out of range*          Anticoagulation Care Providers       Provider Role Specialty Phone number    Zenia Tinajero MD Referring Cardiology [phone]            Clinic Interview:  Patient Findings     Negatives:  Signs/symptoms of thrombosis, Signs/symptoms of bleeding,   Laboratory test error suspected, Change in health, Change in alcohol use,   Change in activity, Upcoming invasive procedure, Emergency department   visit, Upcoming dental procedure, Missed doses, Extra doses, Change in   medications, Change in diet/appetite, Hospital admission, Bruising, Other   complaints      Clinical Outcomes     Negatives:  Major bleeding event, Thromboembolic event,   Anticoagulation-related hospital admission, Anticoagulation-related ED   visit, Anticoagulation-related fatality        INR History:      2024     8:15 AM 2024    12:00 AM 2024    10:24 AM 2024    12:00 AM 2024    11:35 AM 2024    12:00 AM 2024    10:25 AM   Anticoagulation Monitoring   INR 3.00  2.10  1.50  2.50   INR Date 2024   INR Goal 2.0-3.0  2.0-3.0  2.0-3.0  2.0-3.0   Trend Same  Same  Same  Same   Last Week Total 13.75 mg  13.75 mg  13.75 mg  15 mg   Next Week  Total 13.75 mg  13.75 mg  15 mg  13.75 mg   Sun 2.5 mg  2.5 mg  2.5 mg  2.5 mg   Mon 1.25 mg  1.25 mg  1.25 mg  1.25 mg   Tue 2.5 mg  2.5 mg  2.5 mg  2.5 mg   Wed 1.25 mg  1.25 mg  1.25 mg  1.25 mg   Thu -  2.5 mg  3.75 mg (7/11)  2.5 mg   Fri 2.5 mg  2.5 mg  2.5 mg  2.5 mg   Sat 1.25 mg  1.25 mg  1.25 mg  1.25 mg   Historical INR  2.10      1.50      2.50            This result is from an external source.       Plan:  1. INR is Therapeutic today- see above in Anticoagulation Summary.   Will instruct Shani Phillip to Continue their warfarin regimen - see above in Anticoagulation Summary.  2. Follow up in 1 week.  3. They have been instructed to call if any changes in medications, doses, concerns, etc. Patient expresses understanding and has no further questions at this time.    Keshawn Castaneda, PharmD

## 2024-07-24 ENCOUNTER — LAB (OUTPATIENT)
Dept: LAB | Facility: HOSPITAL | Age: 74
End: 2024-07-24
Payer: MEDICARE

## 2024-07-24 DIAGNOSIS — I48.91 ATRIAL FIBRILLATION WITH RVR: ICD-10-CM

## 2024-07-24 DIAGNOSIS — R78.89 ELEVATED DIGOXIN LEVEL: ICD-10-CM

## 2024-07-24 DIAGNOSIS — Z51.81 ENCOUNTER FOR THERAPEUTIC DRUG LEVEL MONITORING: ICD-10-CM

## 2024-07-24 DIAGNOSIS — I50.33 ACUTE ON CHRONIC DIASTOLIC CHF (CONGESTIVE HEART FAILURE): ICD-10-CM

## 2024-07-24 LAB
ANION GAP SERPL CALCULATED.3IONS-SCNC: 12.8 MMOL/L (ref 5–15)
BUN SERPL-MCNC: 17 MG/DL (ref 8–23)
BUN/CREAT SERPL: 14.2 (ref 7–25)
CALCIUM SPEC-SCNC: 9.6 MG/DL (ref 8.6–10.5)
CHLORIDE SERPL-SCNC: 96 MMOL/L (ref 98–107)
CO2 SERPL-SCNC: 28.2 MMOL/L (ref 22–29)
CREAT SERPL-MCNC: 1.2 MG/DL (ref 0.57–1)
DIGOXIN SERPL-MCNC: 1.4 NG/ML (ref 0.6–1.2)
EGFRCR SERPLBLD CKD-EPI 2021: 47.6 ML/MIN/1.73
GLUCOSE SERPL-MCNC: 107 MG/DL (ref 65–99)
POTASSIUM SERPL-SCNC: 4.1 MMOL/L (ref 3.5–5.2)
SODIUM SERPL-SCNC: 137 MMOL/L (ref 136–145)

## 2024-07-24 PROCEDURE — 36415 COLL VENOUS BLD VENIPUNCTURE: CPT

## 2024-07-24 PROCEDURE — 80048 BASIC METABOLIC PNL TOTAL CA: CPT

## 2024-07-24 PROCEDURE — 80162 ASSAY OF DIGOXIN TOTAL: CPT

## 2024-07-25 ENCOUNTER — ANTICOAGULATION VISIT (OUTPATIENT)
Dept: PHARMACY | Facility: HOSPITAL | Age: 74
End: 2024-07-25
Payer: MEDICARE

## 2024-07-25 ENCOUNTER — TELEPHONE (OUTPATIENT)
Dept: CARDIOLOGY | Facility: CLINIC | Age: 74
End: 2024-07-25
Payer: MEDICARE

## 2024-07-25 DIAGNOSIS — I48.20 ATRIAL FIBRILLATION, CHRONIC: Primary | Chronic | ICD-10-CM

## 2024-07-25 LAB — INR PPP: 1.9

## 2024-07-25 NOTE — PROGRESS NOTES
Please call patient.  Digoxin level stable according to Dr. Tinajero.  Kidney function mildly elevated.  Make sure she is drinking enough water.  Blood sugar mildly elevated at 107.  Please ask her to monitor her blood pressure and heart rates for the next 2 weeks.  If her blood pressure remains elevated, she may benefit from a beta-blocker.  Please call with an update to Angy.  Thank you

## 2024-07-25 NOTE — PROGRESS NOTES
Zenia Tinajero MD  to Prudence Charlton APRN    I recommend continuing the same.  Since her blood pressure was elevated at her last office visit, I would encourage her to monitor her blood pressure at home.  She could benefit from beta-blocker if her blood pressure is consistently elevated.    JL

## 2024-07-25 NOTE — PROGRESS NOTES
Anticoagulation Clinic Progress Note    Anticoagulation Summary  As of 2024      INR goal:  2.0-3.0   TTR:  56.4% (3.4 y)   INR used for dosin.90 (2024)   Warfarin maintenance plan:  1.25 mg every Mon, Wed; 2.5 mg all other days   Weekly warfarin total:  15 mg   Plan last modified:  Keshawn Castaneda, PharmD (2024)   Next INR check:  2024   Priority:  High   Target end date:      Indications    Atrial fibrillation  chronic [I48.20]                 Anticoagulation Episode Summary       INR check location:      Preferred lab:      Send INR reminders to:   [facility] [employer] HOME TEST POOL    Comments:   Home Testing as of 21 *call only when out of range*          Anticoagulation Care Providers       Provider Role Specialty Phone number    Zenia Tinajero MD Referring Cardiology [phone]            Clinic Interview:  Patient Findings     Negatives:  Signs/symptoms of thrombosis, Signs/symptoms of bleeding,   Laboratory test error suspected, Change in health, Change in alcohol use,   Change in activity, Upcoming invasive procedure, Emergency department   visit, Upcoming dental procedure, Missed doses, Extra doses, Change in   medications, Change in diet/appetite, Hospital admission, Bruising, Other   complaints      Clinical Outcomes     Negatives:  Major bleeding event, Thromboembolic event,   Anticoagulation-related hospital admission, Anticoagulation-related ED   visit, Anticoagulation-related fatality        INR History:      2024    10:24 AM 2024    12:00 AM 2024    11:35 AM 2024    12:00 AM 2024    10:25 AM 2024    12:00 AM 2024    11:33 AM   Anticoagulation Monitoring   INR 2.10  1.50  2.50  1.90   INR Date 2024   INR Goal 2.0-3.0  2.0-3.0  2.0-3.0  2.0-3.0   Trend Same  Same  Same  Up   Last Week Total 13.75 mg  13.75 mg  15 mg  13.75 mg   Next Week Total 13.75 mg  15 mg  13.75 mg  15 mg   Sun 2.5 mg  2.5 mg  2.5  mg  2.5 mg   Mon 1.25 mg  1.25 mg  1.25 mg  1.25 mg   Tue 2.5 mg  2.5 mg  2.5 mg  2.5 mg   Wed 1.25 mg  1.25 mg  1.25 mg  1.25 mg   Thu 2.5 mg  3.75 mg (7/11)  2.5 mg  2.5 mg   Fri 2.5 mg  2.5 mg  2.5 mg  2.5 mg   Sat 1.25 mg  1.25 mg  1.25 mg  2.5 mg   Historical INR  1.50      2.50      1.90            This result is from an external source.       Plan:  1. INR is Subtherapeutic today- see above in Anticoagulation Summary.   Will instruct Shani Phillip to Change their warfarin regimen to 1.25 mg MW, 2.5 mg all other days- see above in Anticoagulation Summary.  2. Follow up in 1 week.  3. They have been instructed to call if any changes in medications, doses, concerns, etc. Patient expresses understanding and has no further questions at this time.    Keshawn Castaneda, PharmD

## 2024-07-25 NOTE — PROGRESS NOTES
I am covering Angy's in basket.    She has permanent atrial fibrillation.  Last visit in July /80 and heart rate 93.  She had dig the morning she saw Angy and the level was 2.0.  Repeat today 1.4.  Her furosemide was increased for shortness of breath and edema.  Kidney function mildly elevated, but I think we can tolerate some azotemia.    She has been intolerant to diltiazem due to edema.  I am not sure why she is not on a beta-blocker.  Do want her to continue digoxin 125 mcg daily?

## 2024-07-31 DIAGNOSIS — L85.3 XEROSIS OF SKIN: ICD-10-CM

## 2024-07-31 RX ORDER — AMMONIUM LACTATE 12 G/100G
LOTION TOPICAL AS NEEDED
Qty: 225 G | Refills: 6 | Status: SHIPPED | OUTPATIENT
Start: 2024-07-31

## 2024-08-01 ENCOUNTER — ANTICOAGULATION VISIT (OUTPATIENT)
Dept: PHARMACY | Facility: HOSPITAL | Age: 74
End: 2024-08-01
Payer: MEDICARE

## 2024-08-01 ENCOUNTER — TELEPHONE (OUTPATIENT)
Dept: CARDIOLOGY | Facility: CLINIC | Age: 74
End: 2024-08-01
Payer: MEDICARE

## 2024-08-01 DIAGNOSIS — I48.20 ATRIAL FIBRILLATION, CHRONIC: Primary | Chronic | ICD-10-CM

## 2024-08-01 LAB — INR PPP: 3.5

## 2024-08-01 PROCEDURE — G0249 PROVIDE INR TEST MATER/EQUIP: HCPCS

## 2024-08-01 RX ORDER — FUROSEMIDE 40 MG/1
40 TABLET ORAL 2 TIMES DAILY
Qty: 180 TABLET | Refills: 2 | Status: SHIPPED | OUTPATIENT
Start: 2024-08-01

## 2024-08-01 NOTE — PROGRESS NOTES
Anticoagulation Clinic Progress Note    Anticoagulation Summary  As of 8/1/2024      INR goal:  2.0-3.0   TTR:  56.5% (3.4 y)   INR used for dosing:  3.50 (8/1/2024)   Warfarin maintenance plan:  1.25 mg every Mon, Wed; 2.5 mg all other days   Weekly warfarin total:  15 mg   Plan last modified:  Keshawn Castaneda, PharmD (7/25/2024)   Next INR check:  8/8/2024   Priority:  High   Target end date:      Indications    Atrial fibrillation  chronic [I48.20]                 Anticoagulation Episode Summary       INR check location:      Preferred lab:      Send INR reminders to:   [facility] [employer] HOME TEST POOL    Comments:   Home Testing as of 7/30/21 *call only when out of range*          Anticoagulation Care Providers       Provider Role Specialty Phone number    Zenia Tinajero MD Referring Cardiology [phone]            Clinic Interview:  Patient Findings     Negatives:  Signs/symptoms of thrombosis, Signs/symptoms of bleeding,   Laboratory test error suspected, Change in health, Change in alcohol use,   Change in activity, Upcoming invasive procedure, Emergency department   visit, Upcoming dental procedure, Missed doses, Extra doses, Change in   medications, Change in diet/appetite, Hospital admission, Bruising, Other   complaints      Clinical Outcomes     Negatives:  Major bleeding event, Thromboembolic event,   Anticoagulation-related hospital admission, Anticoagulation-related ED   visit, Anticoagulation-related fatality        INR History:      7/11/2024    11:35 AM 7/18/2024    12:00 AM 7/18/2024    10:25 AM 7/25/2024    12:00 AM 7/25/2024    11:33 AM 8/1/2024    12:00 AM 8/1/2024    11:30 AM   Anticoagulation Monitoring   INR 1.50  2.50  1.90  3.50   INR Date 7/11/2024 7/18/2024 7/25/2024 8/1/2024   INR Goal 2.0-3.0  2.0-3.0  2.0-3.0  2.0-3.0   Trend Same  Same  Up  Same   Last Week Total 13.75 mg  15 mg  13.75 mg  15 mg   Next Week Total 15 mg  13.75 mg  15 mg  13.75 mg   Sun 2.5 mg  2.5 mg  2.5 mg  2.5  mg   Mon 1.25 mg  1.25 mg  1.25 mg  1.25 mg   Tue 2.5 mg  2.5 mg  2.5 mg  2.5 mg   Wed 1.25 mg  1.25 mg  1.25 mg  1.25 mg   Thu 3.75 mg (7/11)  2.5 mg  2.5 mg  1.25 mg (8/1)   Fri 2.5 mg  2.5 mg  2.5 mg  2.5 mg   Sat 1.25 mg  1.25 mg  2.5 mg  2.5 mg   Historical INR  2.50      1.90      3.50            This result is from an external source.       Plan:  1. INR is Supratherapeutic today- see above in Anticoagulation Summary.   Will instruct Shani hPillip to Change their warfarin regimen- see above in Anticoagulation Summary.  partial to 1.25 mg today then resume, rck 1 week   2. Follow up in 1 weeks  3. They have been instructed to call if any changes in medications, doses, concerns, etc. Patient expresses understanding and has no further questions at this time.    Marleny Black [facility]

## 2024-08-01 NOTE — TELEPHONE ENCOUNTER
Please inform patient I have reviewed her readings which are overall okay.  I will refill her Lasix and I do not recommend any changes at this time

## 2024-08-01 NOTE — TELEPHONE ENCOUNTER
Caller: Shani Phillip    Relationship: Self    Best call back number: [phone]    What is the best time to reach you: ANYTIME TODAY    Who are you requesting to speak with (clinical staff, provider,  specific staff member): CLINICAL    Do you know the name of the person who called:     What was the call regarding: PT SAID  WANTS PT TO TAKE HER BLOOD PRESSURE MEDICINE AND PT SAID DR SAID TO HAVE HER CALL AND KEEP RECORD OF HER READINGS FOR A WEEK    Is it okay if the provider responds through MyChart: CALL

## 2024-08-01 NOTE — TELEPHONE ENCOUNTER
"I called Ms. Phillip back to get her BP readings.  7/25  149/83, HR-95  7/27   124/80, HR-86  7/28   141/83, HR-109  7/29    148/80, HR-106  7/30    128/85, HR- 98  7/31   224/91, HR- 101  8/1   136/74, HR- 88    Pt states her edema is the \"same\", she has SOA but has COPD and is on 2 liters of oxygen.  Pt will need a refill on her Lasix because she will run out due to the increase.([facility]  She states she does not take any BP medications.  Would you like to make any changes?  Thanks/jlf  "

## 2024-08-07 DIAGNOSIS — G89.29 CHRONIC MIDLINE LOW BACK PAIN WITH LEFT-SIDED SCIATICA: ICD-10-CM

## 2024-08-07 DIAGNOSIS — M54.42 CHRONIC MIDLINE LOW BACK PAIN WITH LEFT-SIDED SCIATICA: ICD-10-CM

## 2024-08-07 RX ORDER — HYDROCODONE BITARTRATE AND ACETAMINOPHEN 7.5; 325 MG/1; MG/1
1 TABLET ORAL EVERY 6 HOURS PRN
Qty: 20 TABLET | Refills: 0 | Status: SHIPPED | OUTPATIENT
Start: 2024-08-07

## 2024-08-07 NOTE — TELEPHONE ENCOUNTER
Caller: Shani Phillip    Relationship: Self    Best call back number:    [phone] (Home)       Requested Prescriptions:   HYDROcodone-acetaminophen (NORCO) 7.5-325 MG per tablet        Pharmacy where request should be sent:  [facility]/pharmacy #[id number] - [place], [place] - [address] - [phone]  - [phone] FX     Last office visit with prescribing clinician: 10/5/2023   Last telemedicine visit with prescribing clinician: Visit date not found   Next office visit with prescribing clinician: 10/7/2024     Additional details provided by patient: PATIENT CALLED TO REQUEST A MEDICATION REFILL ON MEDICATION. PATIENT HAS A 1 DAY SUPPLY LEFT.      Does the patient have less than a 3 day supply:  [x] Yes  [] No    Would you like a call back once the refill request has been completed: [] Yes [] No    If the office needs to give you a call back, can they leave a voicemail: [] Yes [] No    Tran Vázquez   08/07/24 12:34 EDT         THANKS

## 2024-08-08 ENCOUNTER — ANTICOAGULATION VISIT (OUTPATIENT)
Dept: PHARMACY | Facility: HOSPITAL | Age: 74
End: 2024-08-08
Payer: MEDICARE

## 2024-08-08 DIAGNOSIS — I48.20 ATRIAL FIBRILLATION, CHRONIC: Primary | Chronic | ICD-10-CM

## 2024-08-08 LAB — INR PPP: 4

## 2024-08-08 NOTE — PROGRESS NOTES
Anticoagulation Clinic Progress Note    Anticoagulation Summary  As of 2024      INR goal:  2.0-3.0   TTR:  56.2% (3.4 y)   INR used for dosin.00 (2024)   Warfarin maintenance plan:  1.25 mg every Mon, Wed, Fri; 2.5 mg all other days   Weekly warfarin total:  13.75 mg   Plan last modified:  Marleny Black RPH (2024)   Next INR check:  8/15/2024   Priority:  High   Target end date:      Indications    Atrial fibrillation  chronic [I48.20]                 Anticoagulation Episode Summary       INR check location:      Preferred lab:      Send INR reminders to:   [facility] HOME TEST POOL    Comments:   Home Testing as of 21 *call only when out of range*          Anticoagulation Care Providers       Provider Role Specialty Phone number    Zenia Tinajero MD Referring Cardiology [phone]            Clinic Interview:  Patient Findings     Negatives:  Signs/symptoms of thrombosis, Signs/symptoms of bleeding,   Laboratory test error suspected, Change in health, Change in alcohol use,   Change in activity, Upcoming invasive procedure, Emergency department   visit, Upcoming dental procedure, Missed doses, Extra doses, Change in   medications, Change in diet/appetite, Hospital admission, Bruising, Other   complaints      Clinical Outcomes     Negatives:  Major bleeding event, Thromboembolic event,   Anticoagulation-related hospital admission, Anticoagulation-related ED   visit, Anticoagulation-related fatality        INR History:      2024    10:25 AM 2024    12:00 AM 2024    11:33 AM 2024    12:00 AM 2024    11:30 AM 2024    12:00 AM 2024     1:14 PM   Anticoagulation Monitoring   INR 2.50  1.90  3.50  4.00   INR Date 2024   INR Goal 2.0-3.0  2.0-3.0  2.0-3.0  2.0-3.0   Trend Same  Up  Same  Down   Last Week Total 15 mg  13.75 mg  15 mg  13.75 mg   Next Week Total 13.75 mg  15 mg  13.75 mg  11.25 mg   Sun 2.5 mg  2.5 mg  2.5  mg  2.5 mg   Mon 1.25 mg  1.25 mg  1.25 mg  1.25 mg   Tue 2.5 mg  2.5 mg  2.5 mg  2.5 mg   Wed 1.25 mg  1.25 mg  1.25 mg  1.25 mg   Thu 2.5 mg  2.5 mg  1.25 mg (8/1)  Hold (8/8)   Fri 2.5 mg  2.5 mg  2.5 mg  1.25 mg   Sat 1.25 mg  2.5 mg  2.5 mg  2.5 mg   Historical INR  1.90      3.50      4.00            This result is from an external source.       Plan:  1. INR is Supratherapeutic today- see above in Anticoagulation Summary.   Will instruct Shani Phillip to Change their warfarin regimen- see above in Anticoagulation Summary.  Hold and decrease to 1.25 mg MWF, 2.5 mg AOD, rck 1 week   2. Follow up in 1 weeks  3. They have been instructed to call if any changes in medications, doses, concerns, etc. Patient expresses understanding and has no further questions at this time.    Marleny Black [facility]

## 2024-08-15 ENCOUNTER — ANTICOAGULATION VISIT (OUTPATIENT)
Dept: PHARMACY | Facility: HOSPITAL | Age: 74
End: 2024-08-15
Payer: MEDICARE

## 2024-08-15 DIAGNOSIS — I48.20 ATRIAL FIBRILLATION, CHRONIC: Primary | Chronic | ICD-10-CM

## 2024-08-15 LAB — INR PPP: 1.3

## 2024-08-15 NOTE — PROGRESS NOTES
Anticoagulation Clinic Progress Note    Anticoagulation Summary  As of 8/15/2024      INR goal:  2.0-3.0   TTR:  56.1% (3.4 y)   INR used for dosin.30 (8/15/2024)   Warfarin maintenance plan:  1.25 mg every Mon, Wed, Fri; 2.5 mg all other days   Weekly warfarin total:  13.75 mg   Plan last modified:  Marleny Black RPH (8/15/2024)   Next INR check:     Priority:  High   Target end date:      Indications    Atrial fibrillation  chronic [I48.20]                 Anticoagulation Episode Summary       INR check location:      Preferred lab:      Send INR reminders to:   [facility] [employer] HOME TEST POOL    Comments:   Home Testing as of 21 *call only when out of range*          Anticoagulation Care Providers       Provider Role Specialty Phone number    Zenia Tinajero MD Referring Cardiology [phone]            Clinic Interview:  Patient Findings     Positives:  Signs/symptoms of bleeding    Negatives:  Signs/symptoms of thrombosis, Laboratory test error   suspected, Change in health, Change in alcohol use, Change in activity,   Upcoming invasive procedure, Emergency department visit, Upcoming dental   procedure, Missed doses, Extra doses, Change in medications, Change in   diet/appetite, Hospital admission, Bruising, Other complaints      Clinical Outcomes     Negatives:  Major bleeding event, Thromboembolic event,   Anticoagulation-related hospital admission, Anticoagulation-related ED   visit, Anticoagulation-related fatality        INR History:      2024    11:33 AM 2024    12:00 AM 2024    11:30 AM 2024    12:00 AM 2024     1:14 PM 8/15/2024    12:00 AM 8/15/2024     9:41 AM   Anticoagulation Monitoring   INR 1.90  3.50  4.00  1.30   INR Date 2024  2024  2024  8/15/2024   INR Goal 2.0-3.0  2.0-3.0  2.0-3.0  2.0-3.0   Trend Up  Same  Down  Same   Last Week Total 13.75 mg  15 mg  13.75 mg  11.25 mg   Next Week Total 15 mg  13.75 mg  11.25 mg  15 mg   Sun 2.5 mg  2.5  mg  2.5 mg  2.5 mg   Mon 1.25 mg  1.25 mg  1.25 mg  1.25 mg   Tue 2.5 mg  2.5 mg  2.5 mg  2.5 mg   Wed 1.25 mg  1.25 mg  1.25 mg  1.25 mg   Thu 2.5 mg  1.25 mg (8/1)  Hold (8/8)  3.75 mg (8/15)   Fri 2.5 mg  2.5 mg  1.25 mg  1.25 mg   Sat 2.5 mg  2.5 mg  2.5 mg  2.5 mg   Historical INR  3.50      4.00      1.30            This result is from an external source.       Plan:  1. INR is Subtherapeutic today- see above in Anticoagulation Summary.   Will instruct Shani Phillip to Increase their warfarin regimen- see above in Anticoagulation Summary.  Confirmed dose, two nosebleeds but pt is unsure when this occurred. Boost to 3.75 mg then resume, rck 1 week    2. Follow up in 1 weeks  3. They have been instructed to call if any changes in medications, doses, concerns, etc. Patient expresses understanding and has no further questions at this time.    Marleny Black [facility]

## 2024-08-20 ENCOUNTER — TELEPHONE (OUTPATIENT)
Dept: CARDIOLOGY | Facility: CLINIC | Age: 74
End: 2024-08-20

## 2024-08-20 DIAGNOSIS — G89.29 CHRONIC MIDLINE LOW BACK PAIN WITH LEFT-SIDED SCIATICA: ICD-10-CM

## 2024-08-20 DIAGNOSIS — M54.42 CHRONIC MIDLINE LOW BACK PAIN WITH LEFT-SIDED SCIATICA: ICD-10-CM

## 2024-08-20 RX ORDER — HYDROCODONE BITARTRATE AND ACETAMINOPHEN 7.5; 325 MG/1; MG/1
1 TABLET ORAL EVERY 6 HOURS PRN
Qty: 30 TABLET | Refills: 0 | Status: SHIPPED | OUTPATIENT
Start: 2024-08-20

## 2024-08-20 NOTE — TELEPHONE ENCOUNTER
Caller: Shani Phillip    Relationship: Self    Best call back number: [phone] (Home)     Requested Prescriptions:   Requested Prescriptions     Pending Prescriptions Disp Refills    HYDROcodone-acetaminophen (NORCO) 7.5-325 MG per tablet 20 tablet 0     Sig: Take 1 tablet by mouth Every 6 (Six) Hours As Needed for Moderate Pain.        Pharmacy where request should be sent: [facility]/PHARMACY #[id number] - [place], [place] - [address].  [phone] [facility] [phone]      Last office visit with prescribing clinician: 10/5/2023   Last telemedicine visit with prescribing clinician: Visit date not found   Next office visit with prescribing clinician: 10/7/2024     Additional details provided by patient: PATIENT ONLY HAS ONE.     Does the patient have less than a 3 day supply:  [x] Yes  [] No    Would you like a call back once the refill request has been completed: [] Yes [x] No    If the office needs to give you a call back, can they leave a voicemail: [] Yes [x] No    Tran Baeza Rep   08/20/24 11:13 EDT

## 2024-08-20 NOTE — TELEPHONE ENCOUNTER
LOV 4/11/24  NOV 10/07/24  LF 8/7/24-#20    Protocol  Please advise    [facility]

## 2024-08-21 ENCOUNTER — TELEPHONE (OUTPATIENT)
Dept: CARDIOLOGY | Facility: CLINIC | Age: 74
End: 2024-08-21

## 2024-08-21 ENCOUNTER — OFFICE VISIT (OUTPATIENT)
Dept: CARDIOLOGY | Facility: CLINIC | Age: 74
End: 2024-08-21
Payer: MEDICARE

## 2024-08-21 ENCOUNTER — HOSPITAL ENCOUNTER (OUTPATIENT)
Dept: CARDIOLOGY | Facility: HOSPITAL | Age: 74
Discharge: HOME | End: 2024-08-21
Payer: MEDICARE

## 2024-08-21 VITALS
HEIGHT: 61 IN | BODY MASS INDEX: 27.38 KG/M2 | DIASTOLIC BLOOD PRESSURE: 66 MMHG | HEART RATE: 86 BPM | WEIGHT: 145 LBS | OXYGEN SATURATION: 90 % | SYSTOLIC BLOOD PRESSURE: 130 MMHG

## 2024-08-21 DIAGNOSIS — Z79.899 ENCOUNTER FOR MONITORING DIGOXIN THERAPY: ICD-10-CM

## 2024-08-21 DIAGNOSIS — Z51.81 ENCOUNTER FOR THERAPEUTIC DRUG LEVEL MONITORING: ICD-10-CM

## 2024-08-21 DIAGNOSIS — R22.43 LOCALIZED SWELLING OF BOTH LOWER LEGS: Primary | ICD-10-CM

## 2024-08-21 DIAGNOSIS — R22.43 LOCALIZED SWELLING OF BOTH LOWER LEGS: ICD-10-CM

## 2024-08-21 DIAGNOSIS — I50.33 ACUTE ON CHRONIC DIASTOLIC CHF (CONGESTIVE HEART FAILURE): ICD-10-CM

## 2024-08-21 DIAGNOSIS — Z51.81 ENCOUNTER FOR MONITORING DIGOXIN THERAPY: ICD-10-CM

## 2024-08-21 DIAGNOSIS — I48.91 ATRIAL FIBRILLATION WITH RVR: ICD-10-CM

## 2024-08-21 LAB
ALBUMIN SERPL-MCNC: 4.1 G/DL (ref 3.5–5.2)
ALBUMIN/GLOB SERPL: 1.1 G/DL
ALP SERPL-CCNC: 135 U/L (ref 39–117)
ALT SERPL W P-5'-P-CCNC: 14 U/L (ref 1–33)
ANION GAP SERPL CALCULATED.3IONS-SCNC: 12.6 MMOL/L (ref 5–15)
AST SERPL-CCNC: 17 U/L (ref 1–32)
BH CV LOWER VASCULAR LEFT COMMON FEMORAL AUGMENT: NORMAL
BH CV LOWER VASCULAR LEFT COMMON FEMORAL COMPETENT: NORMAL
BH CV LOWER VASCULAR LEFT COMMON FEMORAL COMPRESS: NORMAL
BH CV LOWER VASCULAR LEFT COMMON FEMORAL PHASIC: NORMAL
BH CV LOWER VASCULAR LEFT COMMON FEMORAL SPONT: NORMAL
BH CV LOWER VASCULAR LEFT DISTAL FEMORAL COMPRESS: NORMAL
BH CV LOWER VASCULAR LEFT GASTRONEMIUS COMPRESS: NORMAL
BH CV LOWER VASCULAR LEFT GREATER SAPH AK COMPRESS: NORMAL
BH CV LOWER VASCULAR LEFT GREATER SAPH BK COMPRESS: NORMAL
BH CV LOWER VASCULAR LEFT LESSER SAPH COMPRESS: NORMAL
BH CV LOWER VASCULAR LEFT MID FEMORAL AUGMENT: NORMAL
BH CV LOWER VASCULAR LEFT MID FEMORAL COMPETENT: NORMAL
BH CV LOWER VASCULAR LEFT MID FEMORAL COMPRESS: NORMAL
BH CV LOWER VASCULAR LEFT MID FEMORAL PHASIC: NORMAL
BH CV LOWER VASCULAR LEFT MID FEMORAL SPONT: NORMAL
BH CV LOWER VASCULAR LEFT PERONEAL COMPRESS: NORMAL
BH CV LOWER VASCULAR LEFT POPLITEAL AUGMENT: NORMAL
BH CV LOWER VASCULAR LEFT POPLITEAL COMPETENT: NORMAL
BH CV LOWER VASCULAR LEFT POPLITEAL COMPRESS: NORMAL
BH CV LOWER VASCULAR LEFT POPLITEAL PHASIC: NORMAL
BH CV LOWER VASCULAR LEFT POPLITEAL SPONT: NORMAL
BH CV LOWER VASCULAR LEFT POSTERIOR TIBIAL COMPRESS: NORMAL
BH CV LOWER VASCULAR LEFT PROFUNDA FEMORAL COMPRESS: NORMAL
BH CV LOWER VASCULAR LEFT PROXIMAL FEMORAL COMPRESS: NORMAL
BH CV LOWER VASCULAR LEFT SAPHENOFEMORAL JUNCTION COMPRESS: NORMAL
BH CV LOWER VASCULAR RIGHT COMMON FEMORAL AUGMENT: NORMAL
BH CV LOWER VASCULAR RIGHT COMMON FEMORAL COMPETENT: NORMAL
BH CV LOWER VASCULAR RIGHT COMMON FEMORAL COMPRESS: NORMAL
BH CV LOWER VASCULAR RIGHT COMMON FEMORAL PHASIC: NORMAL
BH CV LOWER VASCULAR RIGHT COMMON FEMORAL SPONT: NORMAL
BH CV LOWER VASCULAR RIGHT DISTAL FEMORAL COMPRESS: NORMAL
BH CV LOWER VASCULAR RIGHT GASTRONEMIUS COMPRESS: NORMAL
BH CV LOWER VASCULAR RIGHT GREATER SAPH AK COMPRESS: NORMAL
BH CV LOWER VASCULAR RIGHT GREATER SAPH BK COMPRESS: NORMAL
BH CV LOWER VASCULAR RIGHT LESSER SAPH COMPRESS: NORMAL
BH CV LOWER VASCULAR RIGHT MID FEMORAL AUGMENT: NORMAL
BH CV LOWER VASCULAR RIGHT MID FEMORAL COMPETENT: NORMAL
BH CV LOWER VASCULAR RIGHT MID FEMORAL COMPRESS: NORMAL
BH CV LOWER VASCULAR RIGHT MID FEMORAL PHASIC: NORMAL
BH CV LOWER VASCULAR RIGHT MID FEMORAL SPONT: NORMAL
BH CV LOWER VASCULAR RIGHT PERONEAL COMPRESS: NORMAL
BH CV LOWER VASCULAR RIGHT POPLITEAL AUGMENT: NORMAL
BH CV LOWER VASCULAR RIGHT POPLITEAL COMPETENT: NORMAL
BH CV LOWER VASCULAR RIGHT POPLITEAL COMPRESS: NORMAL
BH CV LOWER VASCULAR RIGHT POPLITEAL PHASIC: NORMAL
BH CV LOWER VASCULAR RIGHT POPLITEAL SPONT: NORMAL
BH CV LOWER VASCULAR RIGHT POSTERIOR TIBIAL COMPRESS: NORMAL
BH CV LOWER VASCULAR RIGHT PROFUNDA FEMORAL COMPRESS: NORMAL
BH CV LOWER VASCULAR RIGHT PROXIMAL FEMORAL COMPRESS: NORMAL
BH CV LOWER VASCULAR RIGHT SAPHENOFEMORAL JUNCTION COMPRESS: NORMAL
BH CV VAS PRELIMINARY FINDINGS SCRIPTING: 1
BILIRUB SERPL-MCNC: 0.5 MG/DL (ref 0–1.2)
BUN SERPL-MCNC: 16 MG/DL (ref 8–23)
BUN/CREAT SERPL: 13.8 (ref 7–25)
CALCIUM SPEC-SCNC: 10 MG/DL (ref 8.6–10.5)
CHLORIDE SERPL-SCNC: 96 MMOL/L (ref 98–107)
CO2 SERPL-SCNC: 29.4 MMOL/L (ref 22–29)
CREAT SERPL-MCNC: 1.16 MG/DL (ref 0.57–1)
DIGOXIN SERPL-MCNC: 1.5 NG/ML (ref 0.6–1.2)
EGFRCR SERPLBLD CKD-EPI 2021: 49.6 ML/MIN/1.73
GLOBULIN UR ELPH-MCNC: 3.6 GM/DL
GLUCOSE SERPL-MCNC: 102 MG/DL (ref 65–99)
NT-PROBNP SERPL-MCNC: 589 PG/ML (ref 0–900)
POTASSIUM SERPL-SCNC: 4.1 MMOL/L (ref 3.5–5.2)
PROT SERPL-MCNC: 7.7 G/DL (ref 6–8.5)
SODIUM SERPL-SCNC: 138 MMOL/L (ref 136–145)

## 2024-08-21 PROCEDURE — 83880 ASSAY OF NATRIURETIC PEPTIDE: CPT | Performed by: NURSE PRACTITIONER

## 2024-08-21 PROCEDURE — 36415 COLL VENOUS BLD VENIPUNCTURE: CPT

## 2024-08-21 PROCEDURE — 25010000002 BUMETANIDE PER 0.5 MG: Performed by: NURSE PRACTITIONER

## 2024-08-21 PROCEDURE — 93970 EXTREMITY STUDY: CPT

## 2024-08-21 PROCEDURE — 96374 THER/PROPH/DIAG INJ IV PUSH: CPT

## 2024-08-21 PROCEDURE — 80162 ASSAY OF DIGOXIN TOTAL: CPT | Performed by: NURSE PRACTITIONER

## 2024-08-21 PROCEDURE — 80053 COMPREHEN METABOLIC PANEL: CPT | Performed by: NURSE PRACTITIONER

## 2024-08-21 RX ORDER — BUMETANIDE 2 MG/1
2 TABLET ORAL 2 TIMES DAILY
Qty: 60 TABLET | Refills: 1 | Status: SHIPPED | OUTPATIENT
Start: 2024-08-21

## 2024-08-21 RX ORDER — BUMETANIDE 0.25 MG/ML
4 INJECTION INTRAMUSCULAR; INTRAVENOUS ONCE
Status: COMPLETED | OUTPATIENT
Start: 2024-08-21 | End: 2024-08-21

## 2024-08-21 RX ADMIN — BUMETANIDE 4 MG: 0.25 INJECTION INTRAMUSCULAR; INTRAVENOUS at 08:59

## 2024-08-21 NOTE — PROGRESS NOTES
Date of Office Visit: 24  Encounter Provider: GENET Sanchez  Place of Service: Psychiatric CARDIOLOGY  Patient Name: Shani Phillip  :1950    Chief Complaint   Patient presents with    <9>Atrial fibrillation with RVR    Coronary artery disease involving coronary bypass graft of     Acute on chronic diastolic CHF (congestive heart failure)    Follow-up   :     HPI: Shani Phillip is a 73 y.o. female  with chronic atrial fibrillation, mitral valve vegetation, COPD, lower extremity edema, diastolic congestive heart failure, and hypertension.        She is followed by Dr. Zenia Tinajero.  I will visit with her in follow-up and have reviewed her medical record.    She has history of mitral valve endocarditis which was treated with antibiotics.  In 2021 she had an echocardiogram which showed normal left ventricular systolic function, grade 2 diastolic dysfunction, moderate calcification of the aortic valve, moderate calcification of the mitral valve and mild mitral valve stenoses.  She had cardioversion 2019 but then had recurrent atrial fibrillation 2022.  Her Lasix was increased to 40 mg twice daily in late 2022 due to continued swelling.  Her swelling did not significantly initially change with that it was felt that higher dose diltiazem was contributing.  I lowered diltiazem to 180 mg daily and started digoxin with a load x3 days.        She continues to have issues with swelling with diltiazem so that was stopped in November.  She sent a list of her blood pressure and pulse readings which showed pulse 80s to 90s.  She reported her swelling was better.  Perfusion stress test 2023 showed no evidence of ischemia.  In 2023 she had repeat echo which showed preserved LV systolic function, mildly reduced RV systolic function, mild aortic valve stenosis, elevated RVSP of 42 mmHg consistent with mild pulmonary hypertension and severe mitral  "annular calcification with trace to mild mitral valve regurgitation and no significant concerns for endocarditis and lack of fever or chills.       On 7/2/2024 she presented with increased swelling.  I increase furosemide from 40 mg daily to 40 mg twice daily.  proBNP was 900.     She presents today due to her swelling persisting.  She has had no improvement with increased furosemide.  She denies chest pain or palpitations.  She is always short of breath and remains on 2 L of oxygen which has not changed.            Allergies   Allergen Reactions    Ceftin [Cefuroxime] Dizziness    Penicillins Rash     RASH 30 YRS AGO NO HOSPITALIZATION           Family and social history reviewed.     ROS  All other systems were reviewed and are negative          Objective:     Vitals:    08/21/24 0814   BP: 130/66   BP Location: Left arm   Patient Position: Sitting   Pulse: 86   SpO2: 90%   Weight: 65.8 kg (145 lb)   Height: 154.9 cm (61\")     Body mass index is 27.4 kg/m².    PHYSICAL EXAM:  Pulmonary:      Effort: Pulmonary effort is normal.      Breath sounds: Normal breath sounds.   Cardiovascular:      Normal rate. Irregularly irregular rhythm.         Procedures      Current Outpatient Medications   Medication Sig Dispense Refill    ammonium lactate (LAC-HYDRIN) 12 % lotion APPLY TOPICALLY TO THE APPROPRIATE AREA AS DIRECTED AS NEEDED FOR DRY SKIN. 225 g 6    budesonide (PULMICORT) 0.5 MG/2ML nebulizer solution USE 1 VIAL IN NEBULIZER DAILY - rinse mouth after treatment 30 each 11    buPROPion XL (WELLBUTRIN XL) 300 MG 24 hr tablet TAKE 1 TABLET BY MOUTH EVERY DAY 90 tablet 1    cyclobenzaprine (FLEXERIL) 10 MG tablet Take 1 tablet by mouth 3 (Three) Times a Day As Needed (back pain). 40 tablet 1    digoxin (LANOXIN) 125 MCG tablet Take 1 tablet by mouth Every Night. 90 tablet 2    famotidine (PEPCID) 20 MG tablet TAKE 1 TABLET BY MOUTH 2 TIMES A DAY BEFORE MEALS. 180 tablet 3    ferrous sulfate 325 (65 FE) MG tablet Take 1 " tablet by mouth Every Other Day. 45 tablet 1    HYDROcodone-acetaminophen (NORCO) 7.5-325 MG per tablet Take 1 tablet by mouth Every 6 (Six) Hours As Needed for Moderate Pain. 30 tablet 0    ipratropium-albuterol (DUO-NEB) 0.5-2.5 mg/3 ml nebulizer USE 1 VIAL IN NEBULIZER 4 TIMES DAILY - as directed 360 mL 11    levothyroxine (SYNTHROID, LEVOTHROID) 50 MCG tablet Take 1 tablet by mouth Every Morning. 90 tablet 1    meclizine (ANTIVERT) 12.5 MG tablet Take 1 tablet by mouth 3 (Three) Times a Day As Needed for Dizziness. 12 tablet 0    O2 (OXYGEN) Inhale 2 L/min 1 (One) Time.      ondansetron (ZOFRAN) 4 MG tablet Take 1 tablet by mouth Every 8 (Eight) Hours As Needed for Nausea or Vomiting. 30 tablet 1    pantoprazole (PROTONIX) 40 MG EC tablet TAKE 1 TABLET BY MOUTH EVERY DAY 90 tablet 1    potassium chloride ER (K-TAB) 20 MEQ tablet controlled-release ER tablet Take 1 tablet by mouth 2 (Two) Times a Day. 180 tablet 1    pramipexole (MIRAPEX) 0.5 MG tablet TAKE 1 TABLET BY MOUTH TWICE A  tablet 1    revefenacin (Yupelri) 175 MCG/3ML nebulizer solution Take 3 mL by nebulization Daily.      trospium (SANCTURA) 20 MG tablet TAKE 1 TABLET BY MOUTH TWICE A  tablet 1    warfarin (COUMADIN) 2.5 MG tablet Take one-half of a tablet (1.25 MG) by mouth on Monday, Wednesday and Saturday and take one tablet (2.5 mg)  by mouth all other days or as directed 75 tablet 1    bumetanide (BUMEX) 2 MG tablet Take 1 tablet by mouth 2 (Two) Times a Day. 60 tablet 1     Current Facility-Administered Medications   Medication Dose Route Frequency Provider Last Rate Last Admin    bumetanide (BUMEX) injection 4 mg  4 mg Intravenous Once Angy Smith APRN         Assessment:       Diagnosis Plan   1. Localized swelling of both lower legs  Duplex Venous Lower Extremity - Bilateral CAR    proBNP      2. Acute on chronic diastolic CHF (congestive heart failure)  bumetanide (BUMEX) injection 4 mg    proBNP    Adult Transthoracic Echo  Complete W/ Cont if Necessary Per Protocol      3. Atrial fibrillation with RVR  Digoxin Level      4. Encounter for monitoring digoxin therapy  Digoxin Level      5. Encounter for therapeutic drug level monitoring  Comprehensive Metabolic Panel    Digoxin Level           Orders Placed This Encounter   Procedures    Comprehensive Metabolic Panel     Standing Status:   Future     Number of Occurrences:   1     Standing Expiration Date:   8/22/2025     Order Specific Question:   Release to patient     Answer:   Routine Release [[id number]]    proBNP     Order Specific Question:   Release to patient     Answer:   Routine Release [[id number]]    Digoxin Level     Standing Status:   Future     Number of Occurrences:   1     Standing Expiration Date:   8/21/2025     Order Specific Question:   Release to patient     Answer:   Routine Release [[id number]]    Adult Transthoracic Echo Complete W/ Cont if Necessary Per Protocol     Standing Status:   Future     Standing Expiration Date:   8/21/2025     Order Specific Question:   Reason for exam?     Answer:   Dyspnea     Order Specific Question:   Reason for exam?     Answer:   Heart Failure, Cardiomyopathy, or Sytemic or Pulmonary Hypertension     Order Specific Question:   Hypertension, Heart Failure, or Cardiomyopathy specification?     Answer:   Known Heart Failure     Order Specific Question:   Release to patient     Answer:   Routine Release [[id number]]         Plan:   1.  73-year-old female with chronic atrial fibrillation.  Anticoagulated with warfarin and INR is followed in our anticoagulation clinic.  CHADS2 Vascor of 3 she had a cardioversion November 2019 but now was predominately atrial fibrillation 93 % of the time on 2 week mobile teletry 03/2022 with average HR 86.     She is off diltiazem due to history of swelling.   -Heart rate is controlled.  I will recheck digoxin level today.  She confirms taking digoxin daily at night.  2.  History of mitral valve  vegetation in 2019 treated with antibiotics.  Last echo June 2023 showed severe mitral annular calcification with mild regurgitation however patient had no clinical signs of endocarditis and we continue to follow her clinically  - she requires antibiotic for all dental work and verbalized understanding of this today.  3.  COPD on home oxygen-avoid beta-blockers.  She is on 24-hour oxygen at 2 L.  She follows with Dr. Zamora  4.  Grade 2 diastolic dysfunction with lower extremity edema and chronic diastolic heart failure.  She has been on Lasix 40 mg twice daily.  She continues have swelling above baseline despite higher dose Lasix for approximately 6 weeks.  I am going to obtain labs today including CMP, proBNP and digoxin level as listed above.  I will give her a dose of IV bumetanide 4 mg and switch her Lasix at home to bumetanide 2 mg twice daily.  She was seen again next week on 8/27/2024 to follow-up.  I will recheck her renal function at that time and we will try to coordinate echocardiogram to be performed on same day.  -This is considered complex management of a chronic medical condition.   5.  Mild, non obstructive coronary artery disease on cardiac cath June 2020.  Negative perfusion stress test July 2022 no angina.  6.  Mild aortic valve stenoses on echo August 2023  7.  Chronic right bundle branch block  8.  Normal bilateral lower extremity arterial study August 2020-she has significant cyanosis in the feet.  This could be related to chronic hypoxia despite oxygen use.  She has no complaint of claudications at this time I will hold off on repeat RAY  9.  Persistent lower extremity edema.  Left greater than the right.  I will check venous duplex today stat to rule out DVT given recent low INR and leg pain    Follow-up in 1 week            It has been a pleasure to participate in this patient's care.      Thank you,  GENET Sanchez      **I used Dragon to dictate this note:**

## 2024-08-21 NOTE — TELEPHONE ENCOUNTER
I called and spoke with patient.  Discussed negative venous duplex bilateral.  Discussed stable renal function.  Digoxin level remains slightly elevated but stable.  She will have her echo as scheduled next week and see me the day after to discuss and  follow-up.

## 2024-08-22 ENCOUNTER — ANTICOAGULATION VISIT (OUTPATIENT)
Dept: PHARMACY | Facility: HOSPITAL | Age: 74
End: 2024-08-22
Payer: MEDICARE

## 2024-08-22 DIAGNOSIS — I48.20 ATRIAL FIBRILLATION, CHRONIC: Primary | Chronic | ICD-10-CM

## 2024-08-22 LAB — INR PPP: 2

## 2024-08-22 NOTE — PROGRESS NOTES
Anticoagulation Clinic Progress Note    Anticoagulation Summary  As of 2024      INR goal:  2.0-3.0   TTR:  55.7% (3.5 y)   INR used for dosin.00 (2024)   Warfarin maintenance plan:  1.25 mg every Mon, Wed, Fri; 2.5 mg all other days   Weekly warfarin total:  13.75 mg   No change documented:  Keshawn Castaneda, PharmD   Plan last modified:  Marleny Black RPH (8/15/2024)   Next INR check:  2024   Priority:  High   Target end date:      Indications    Atrial fibrillation  chronic [I48.20]                 Anticoagulation Episode Summary       INR check location:      Preferred lab:      Send INR reminders to:   [facility] HOME TEST POOL    Comments:   Home Testing as of 21 *call only when out of range*          Anticoagulation Care Providers       Provider Role Specialty Phone number    Zenia Tinajero MD Referring Cardiology [phone]            Clinic Interview:  Patient Findings     Negatives:  Signs/symptoms of thrombosis, Signs/symptoms of bleeding,   Laboratory test error suspected, Change in health, Change in alcohol use,   Change in activity, Upcoming invasive procedure, Emergency department   visit, Upcoming dental procedure, Missed doses, Extra doses, Change in   medications, Change in diet/appetite, Hospital admission, Bruising, Other   complaints      Clinical Outcomes     Negatives:  Major bleeding event, Thromboembolic event,   Anticoagulation-related hospital admission, Anticoagulation-related ED   visit, Anticoagulation-related fatality        INR History:      2024    11:30 AM 2024    12:00 AM 2024     1:14 PM 8/15/2024    12:00 AM 8/15/2024     9:41 AM 2024    12:00 AM 2024    11:16 AM   Anticoagulation Monitoring   INR 3.50  4.00  1.30  2.00   INR Date 2024  2024  8/15/2024  2024   INR Goal 2.0-3.0  2.0-3.0  2.0-3.0  2.0-3.0   Trend Same  Down  Same  Same   Last Week Total 15 mg  13.75 mg  11.25 mg  15 mg   Next Week Total 13.75 mg   11.25 mg  15 mg  13.75 mg   Sun 2.5 mg  2.5 mg  2.5 mg  2.5 mg   Mon 1.25 mg  1.25 mg  1.25 mg  1.25 mg   Tue 2.5 mg  2.5 mg  2.5 mg  2.5 mg   Wed 1.25 mg  1.25 mg  1.25 mg  1.25 mg   Thu 1.25 mg (8/1)  Hold (8/8)  3.75 mg (8/15)  2.5 mg   Fri 2.5 mg  1.25 mg  1.25 mg  1.25 mg   Sat 2.5 mg  2.5 mg  2.5 mg  2.5 mg   Historical INR  4.00      1.30      2.00            This result is from an external source.       Plan:  1. INR is Therapeutic today- see above in Anticoagulation Summary.   Will instruct Shani Phillip to Continue their warfarin regimen - see above in Anticoagulation Summary.  2. Follow up in 1 week.  3. They have been instructed to call if any changes in medications, doses, concerns, etc. Patient expresses understanding and has no further questions at this time.    Keshawn Castaneda, PharmD

## 2024-08-26 ENCOUNTER — HOSPITAL ENCOUNTER (OUTPATIENT)
Dept: CARDIOLOGY | Facility: HOSPITAL | Age: 74
Discharge: HOME | End: 2024-08-26
Admitting: NURSE PRACTITIONER
Payer: MEDICARE

## 2024-08-26 VITALS
BODY MASS INDEX: 27.38 KG/M2 | HEIGHT: 61 IN | SYSTOLIC BLOOD PRESSURE: 122 MMHG | DIASTOLIC BLOOD PRESSURE: 64 MMHG | WEIGHT: 145 LBS

## 2024-08-26 DIAGNOSIS — I50.33 ACUTE ON CHRONIC DIASTOLIC CHF (CONGESTIVE HEART FAILURE): ICD-10-CM

## 2024-08-26 LAB
AORTIC DIMENSIONLESS INDEX: 0.7 (DI)
ASCENDING AORTA: 2.7 CM
BH CV ECHO MEAS - ACS: 0.88 CM
BH CV ECHO MEAS - AO MAX PG: 12.3 MMHG
BH CV ECHO MEAS - AO MEAN PG: 6 MMHG
BH CV ECHO MEAS - AO ROOT DIAM: 2.8 CM
BH CV ECHO MEAS - AO V2 MAX: 175 CM/SEC
BH CV ECHO MEAS - AO V2 VTI: 23.5 CM
BH CV ECHO MEAS - AVA(I,D): 2.19 CM2
BH CV ECHO MEAS - EDV(CUBED): 22 ML
BH CV ECHO MEAS - EDV(MOD-SP2): 53 ML
BH CV ECHO MEAS - EDV(MOD-SP4): 56 ML
BH CV ECHO MEAS - EF(MOD-BP): 78.7 %
BH CV ECHO MEAS - EF(MOD-SP2): 79.2 %
BH CV ECHO MEAS - EF(MOD-SP4): 80.4 %
BH CV ECHO MEAS - ESV(CUBED): 5.5 ML
BH CV ECHO MEAS - ESV(MOD-SP2): 11 ML
BH CV ECHO MEAS - ESV(MOD-SP4): 11 ML
BH CV ECHO MEAS - FS: 37 %
BH CV ECHO MEAS - IVS/LVPW: 1 CM
BH CV ECHO MEAS - IVSD: 1.1 CM
BH CV ECHO MEAS - LAT PEAK E' VEL: 7 CM/SEC
BH CV ECHO MEAS - LV DIASTOLIC VOL/BSA (35-75): 34 CM2
BH CV ECHO MEAS - LV MASS(C)D: 86.3 GRAMS
BH CV ECHO MEAS - LV MAX PG: 4.1 MMHG
BH CV ECHO MEAS - LV MEAN PG: 2 MMHG
BH CV ECHO MEAS - LV SYSTOLIC VOL/BSA (12-30): 6.7 CM2
BH CV ECHO MEAS - LV V1 MAX: 101 CM/SEC
BH CV ECHO MEAS - LV V1 VTI: 17.1 CM
BH CV ECHO MEAS - LVIDD: 2.8 CM
BH CV ECHO MEAS - LVIDS: 1.76 CM
BH CV ECHO MEAS - LVOT AREA: 3 CM2
BH CV ECHO MEAS - LVOT DIAM: 1.96 CM
BH CV ECHO MEAS - LVPWD: 1.1 CM
BH CV ECHO MEAS - MED PEAK E' VEL: 6.4 CM/SEC
BH CV ECHO MEAS - MV DEC SLOPE: 1113 CM/SEC2
BH CV ECHO MEAS - MV DEC TIME: 0.31 SEC
BH CV ECHO MEAS - MV E MAX VEL: 192 CM/SEC
BH CV ECHO MEAS - MV MAX PG: 28.9 MMHG
BH CV ECHO MEAS - MV MEAN PG: 14.6 MMHG
BH CV ECHO MEAS - MV P1/2T: 72.9 MSEC
BH CV ECHO MEAS - MV V2 VTI: 44.6 CM
BH CV ECHO MEAS - MVA(P1/2T): 3 CM2
BH CV ECHO MEAS - MVA(VTI): 1.15 CM2
BH CV ECHO MEAS - PA ACC TIME: 0.08 SEC
BH CV ECHO MEAS - PA V2 MAX: 107.2 CM/SEC
BH CV ECHO MEAS - QP/QS: 0.76
BH CV ECHO MEAS - RAP SYSTOLE: 8 MMHG
BH CV ECHO MEAS - RV MAX PG: 2.7 MMHG
BH CV ECHO MEAS - RV V1 MAX: 82.6 CM/SEC
BH CV ECHO MEAS - RV V1 VTI: 13.6 CM
BH CV ECHO MEAS - RVOT DIAM: 1.91 CM
BH CV ECHO MEAS - RVSP: 53 MMHG
BH CV ECHO MEAS - SV(LVOT): 51.4 ML
BH CV ECHO MEAS - SV(MOD-SP2): 42 ML
BH CV ECHO MEAS - SV(MOD-SP4): 45 ML
BH CV ECHO MEAS - SV(RVOT): 39.1 ML
BH CV ECHO MEAS - SVI(LVOT): 31.2 ML/M2
BH CV ECHO MEAS - SVI(MOD-SP2): 25.5 ML/M2
BH CV ECHO MEAS - SVI(MOD-SP4): 27.3 ML/M2
BH CV ECHO MEAS - TAPSE (>1.6): 1.69 CM
BH CV ECHO MEAS - TR MAX PG: 45 MMHG
BH CV ECHO MEAS - TR MAX VEL: 335.3 CM/SEC
BH CV ECHO MEASUREMENTS AVERAGE E/E' RATIO: 28.66
BH CV XLRA - RV BASE: 3 CM
BH CV XLRA - RV LENGTH: 7.1 CM
BH CV XLRA - RV MID: 2.42 CM
BH CV XLRA - TDI S': 11.7 CM/SEC
LEFT ATRIUM VOLUME INDEX: 42.7 ML/M2
SINUS: 2.9 CM
STJ: 2.49 CM

## 2024-08-26 PROCEDURE — 93306 TTE W/DOPPLER COMPLETE: CPT | Performed by: INTERNAL MEDICINE

## 2024-08-26 PROCEDURE — 25510000001 PERFLUTREN 6.52 MG/ML SUSPENSION 2 ML VIAL: Performed by: NURSE PRACTITIONER

## 2024-08-26 PROCEDURE — 93306 TTE W/DOPPLER COMPLETE: CPT

## 2024-08-26 RX ADMIN — PERFLUTREN 2 ML: 6.52 INJECTION, SUSPENSION INTRAVENOUS at 11:28

## 2024-08-27 ENCOUNTER — TELEPHONE (OUTPATIENT)
Dept: CARDIOLOGY | Facility: CLINIC | Age: 74
End: 2024-08-27

## 2024-08-27 ENCOUNTER — HOSPITAL ENCOUNTER (OUTPATIENT)
Dept: CARDIOLOGY | Facility: HOSPITAL | Age: 74
Discharge: HOME | End: 2024-08-27
Admitting: NURSE PRACTITIONER
Payer: MEDICARE

## 2024-08-27 ENCOUNTER — OFFICE VISIT (OUTPATIENT)
Dept: CARDIOLOGY | Facility: CLINIC | Age: 74
End: 2024-08-27
Payer: MEDICARE

## 2024-08-27 VITALS
SYSTOLIC BLOOD PRESSURE: 130 MMHG | BODY MASS INDEX: 27.4 KG/M2 | OXYGEN SATURATION: 99 % | DIASTOLIC BLOOD PRESSURE: 60 MMHG | HEART RATE: 91 BPM | HEIGHT: 61 IN

## 2024-08-27 DIAGNOSIS — I50.33 ACUTE ON CHRONIC DIASTOLIC CHF (CONGESTIVE HEART FAILURE): ICD-10-CM

## 2024-08-27 DIAGNOSIS — Z51.81 ENCOUNTER FOR THERAPEUTIC DRUG LEVEL MONITORING: ICD-10-CM

## 2024-08-27 DIAGNOSIS — I50.33 ACUTE ON CHRONIC DIASTOLIC CHF (CONGESTIVE HEART FAILURE): Primary | ICD-10-CM

## 2024-08-27 LAB
ANION GAP SERPL CALCULATED.3IONS-SCNC: 12.8 MMOL/L (ref 5–15)
BUN SERPL-MCNC: 14 MG/DL (ref 8–23)
BUN/CREAT SERPL: 12.1 (ref 7–25)
CALCIUM SPEC-SCNC: 9.7 MG/DL (ref 8.6–10.5)
CHLORIDE SERPL-SCNC: 97 MMOL/L (ref 98–107)
CO2 SERPL-SCNC: 29.2 MMOL/L (ref 22–29)
CREAT SERPL-MCNC: 1.16 MG/DL (ref 0.57–1)
EGFRCR SERPLBLD CKD-EPI 2021: 49.6 ML/MIN/1.73
GLUCOSE SERPL-MCNC: 104 MG/DL (ref 65–99)
POTASSIUM SERPL-SCNC: 3.8 MMOL/L (ref 3.5–5.2)
SODIUM SERPL-SCNC: 139 MMOL/L (ref 136–145)

## 2024-08-27 PROCEDURE — 36415 COLL VENOUS BLD VENIPUNCTURE: CPT

## 2024-08-27 PROCEDURE — 25010000002 BUMETANIDE PER 0.5 MG: Performed by: NURSE PRACTITIONER

## 2024-08-27 PROCEDURE — 80048 BASIC METABOLIC PNL TOTAL CA: CPT | Performed by: NURSE PRACTITIONER

## 2024-08-27 PROCEDURE — 96374 THER/PROPH/DIAG INJ IV PUSH: CPT

## 2024-08-27 RX ORDER — BUMETANIDE 0.25 MG/ML
4 INJECTION INTRAMUSCULAR; INTRAVENOUS ONCE
Status: COMPLETED | OUTPATIENT
Start: 2024-08-27 | End: 2024-08-27

## 2024-08-27 RX ORDER — POTASSIUM CHLORIDE 1500 MG/1
20 TABLET, EXTENDED RELEASE ORAL DAILY
Start: 2024-08-27

## 2024-08-27 RX ORDER — SPIRONOLACTONE 25 MG/1
25 TABLET ORAL DAILY
Qty: 30 TABLET | Refills: 3 | Status: SHIPPED | OUTPATIENT
Start: 2024-08-27

## 2024-08-27 RX ADMIN — BUMETANIDE 4 MG: 0.25 INJECTION INTRAMUSCULAR; INTRAVENOUS at 08:43

## 2024-08-27 NOTE — TELEPHONE ENCOUNTER
I spoke with patient and discussed renal function has not yet resulted.  I will finalize plan for possibly adding spironolactone after I review her renal function.  She verbalized understanding

## 2024-08-27 NOTE — TELEPHONE ENCOUNTER
Notified patient of results/recommendations. Patient verbalized understanding.    Dee Dee Castillo RN  Triage [facility] [place]

## 2024-08-27 NOTE — PROGRESS NOTES
Date of Office Visit: 24  Encounter Provider: GENET Sanchez  Place of Service: [facility] CARDIOLOGY  Patient Name: Shani Phillip  :1950    Chief Complaint   Patient presents with    Coronary artery disease involving coronary bypass graft of     Acute on chronic diastolic CHF (congestive heart failure)    Atrial fibrillation, chronic    Follow-up    Edema   :     HPI: Shani Phillip is a 73 y.o. female  with chronic atrial fibrillation, mitral valve stenoses, history of mitral valve vegetation, COPD, hypertension, lower extremity edema, diastolic congestive heart failure, and hypertension.        She is followed by Dr. Zenia Tinajero.  I will visit with her in follow-up and have reviewed her medical record.     She has history of mitral valve endocarditis which was treated with antibiotics.  In 2021 she had an echocardiogram which showed normal left ventricular systolic function, grade 2 diastolic dysfunction, moderate calcification of the aortic valve, moderate calcification of the mitral valve and mild mitral valve stenoses.  She had cardioversion 2019 but then had recurrent atrial fibrillation 2022.  Her Lasix was increased to 40 mg twice daily in late 2022 due to continued swelling.  Her swelling did not significantly initially change with that it was felt that higher dose diltiazem was contributing.  I lowered diltiazem to 180 mg daily and started digoxin with a load x3 days.        She continues to have issues with swelling with diltiazem so that was stopped in November.  She sent a list of her blood pressure and pulse readings which showed pulse 80s to 90s.  She reported her swelling was better.  Perfusion stress test 2023 showed no evidence of ischemia.  In 2023 she had repeat echo which showed preserved LV systolic function, mildly reduced RV systolic function, mild aortic valve stenosis, elevated RVSP of 42 mmHg consistent  "with mild pulmonary hypertension and severe mitral annular calcification with trace to mild mitral valve regurgitation and no significant concerns for endocarditis and lack of fever or chills.         On 7/2/2024 she presented with increased swelling.  I increase furosemide from 40 mg daily to 40 mg twice daily.  proBNP was 900.     Continue to have unusual swelling and I sent her for venous duplex 8/21/2024 which was negative for DVT.  She was given IV bumetanide 4 mg in our cardiac evaluation clinic and I switched her from oral furosemide to bumetanide 2 mg twice daily at home.  She presents today in follow-up and reportedly is urinating well with bumetanide.  Her swelling is slightly better.  Her dizziness is not worse.  No chest pain or palpitations.  She has intermittent wheezing but that is not new.          Allergies   Allergen Reactions    Ceftin [Cefuroxime] Dizziness    Penicillins Rash     RASH 30 YRS AGO NO HOSPITALIZATION           Family and social history reviewed.     ROS  All other systems were reviewed and are negative          Objective:     Vitals:    08/27/24 0810   BP: 130/60   BP Location: Left arm   Patient Position: Sitting   Pulse: 91   SpO2: 99%   Height: 154.9 cm (61\")     Body mass index is 27.4 kg/m².    PHYSICAL EXAM:  Pulmonary:      Effort: Pulmonary effort is normal.      Breath sounds: Decreased breath sounds present. Wheezing present.   Cardiovascular:      Normal rate. Irregularly irregular rhythm.      Murmurs: There is a grade 2/6 systolic murmur.      Comments: Left greater than right  Edema:     Ankle: 1+ edema of the left ankle and trace edema of the right ankle.        Procedures      Current Outpatient Medications   Medication Sig Dispense Refill    ammonium lactate (LAC-HYDRIN) 12 % lotion APPLY TOPICALLY TO THE APPROPRIATE AREA AS DIRECTED AS NEEDED FOR DRY SKIN. 225 g 6    budesonide (PULMICORT) 0.5 MG/2ML nebulizer solution USE 1 VIAL IN NEBULIZER DAILY - rinse mouth " after treatment 30 each 11    bumetanide (BUMEX) 2 MG tablet Take 1 tablet by mouth 2 (Two) Times a Day. 60 tablet 1    buPROPion XL (WELLBUTRIN XL) 300 MG 24 hr tablet TAKE 1 TABLET BY MOUTH EVERY DAY 90 tablet 1    cyclobenzaprine (FLEXERIL) 10 MG tablet Take 1 tablet by mouth 3 (Three) Times a Day As Needed (back pain). 40 tablet 1    digoxin (LANOXIN) 125 MCG tablet Take 1 tablet by mouth Every Night. 90 tablet 2    famotidine (PEPCID) 20 MG tablet TAKE 1 TABLET BY MOUTH 2 TIMES A DAY BEFORE MEALS. 180 tablet 3    ferrous sulfate 325 (65 FE) MG tablet Take 1 tablet by mouth Every Other Day. 45 tablet 1    HYDROcodone-acetaminophen (NORCO) 7.5-325 MG per tablet Take 1 tablet by mouth Every 6 (Six) Hours As Needed for Moderate Pain. 30 tablet 0    ipratropium-albuterol (DUO-NEB) 0.5-2.5 mg/3 ml nebulizer USE 1 VIAL IN NEBULIZER 4 TIMES DAILY - as directed 360 mL 11    levothyroxine (SYNTHROID, LEVOTHROID) 50 MCG tablet Take 1 tablet by mouth Every Morning. 90 tablet 1    meclizine (ANTIVERT) 12.5 MG tablet Take 1 tablet by mouth 3 (Three) Times a Day As Needed for Dizziness. 12 tablet 0    O2 (OXYGEN) Inhale 2 L/min 1 (One) Time.      ondansetron (ZOFRAN) 4 MG tablet Take 1 tablet by mouth Every 8 (Eight) Hours As Needed for Nausea or Vomiting. 30 tablet 1    pantoprazole (PROTONIX) 40 MG EC tablet TAKE 1 TABLET BY MOUTH EVERY DAY 90 tablet 1    potassium chloride ER (K-TAB) 20 MEQ tablet controlled-release ER tablet Take 1 tablet by mouth 2 (Two) Times a Day. 180 tablet 1    pramipexole (MIRAPEX) 0.5 MG tablet TAKE 1 TABLET BY MOUTH TWICE A  tablet 1    revefenacin (Yupelri) 175 MCG/3ML nebulizer solution Take 3 mL by nebulization Daily.      trospium (SANCTURA) 20 MG tablet TAKE 1 TABLET BY MOUTH TWICE A  tablet 1    warfarin (COUMADIN) 2.5 MG tablet Take one-half of a tablet (1.25 MG) by mouth on Monday, Wednesday and Saturday and take one tablet (2.5 mg)  by mouth all other days or as directed 75  tablet 1     Current Facility-Administered Medications   Medication Dose Route Frequency Provider Last Rate Last Admin    bumetanide (BUMEX) injection 4 mg  4 mg Intravenous Once Angy Smith APRN         Assessment:       Diagnosis Plan   1. Acute on chronic diastolic CHF (congestive heart failure)  bumetanide (BUMEX) injection 4 mg    Basic Metabolic Panel      2. Encounter for therapeutic drug level monitoring  bumetanide (BUMEX) injection 4 mg    Basic Metabolic Panel           Orders Placed This Encounter   Procedures    Basic Metabolic Panel     Standing Status:   Future     Number of Occurrences:   1     Standing Expiration Date:   8/27/2025     Order Specific Question:   Release to patient     Answer:   Routine Release [[id number]]         Plan:       1.  73-year-old female with chronic atrial fibrillation.  Anticoagulated with warfarin and INR is followed in our anticoagulation clinic.  CHADS2 Vascor of 3 she had a cardioversion November 2019 but now was predominately atrial fibrillation 93 % of the time on 2 week mobile teletry 03/2022 with average HR 86.     She is off diltiazem due to history of swelling.   -Heart rate is adequate.  Digoxin level minimally elevated but she has no symptoms of toxicity.  We will continue the same but may need to consider another 24-48-hour Holter monitor to reassess average heart rate.  2.  History of mitral valve vegetation in 2019 treated with antibiotics.  Last echo 8/26/24 shows moderate to severe mitral valve stenoses.  Will continue to adjust medication for symptom improvement.  - she requires antibiotic for all dental work and verbalized understanding of this today.  3.  COPD on home oxygen-avoid beta-blockers.  She is on 24-hour oxygen at 2 L.  She follows with Dr. Zamora  4.  Grade 2 diastolic dysfunction with lower extremity edema and chronic diastolic heart failure.  She was switched from furosemide 40 mg twice daily to bumetanide 2 mg twice daily last  week.  -Her weights at home remained stable without significant decline in the past week.  -Her swelling has improved with bumetanide 2 mg twice daily.  She has been taking 40 mEq potassium daily..  I will give her another dose of IV bumetanide 4 mg today.  If renal function allows, I plan to add spironolactone and have close follow-up with patient at home.   5.  Mild, non obstructive coronary artery disease on cardiac cath June 2020.  Negative perfusion stress test July 2022 no angina.  6.  Mild aortic valve stenoses on echo August 2023  7.  Chronic right bundle branch block  8.  Normal bilateral lower extremity arterial study August 2020-she has significant cyanosis in the feet.  This could be related to chronic hypoxia despite oxygen use.  She has no complaint of claudications at this time I will hold off on repeat RAY  9.  Persistent lower extremity edema.  Left greater than the right.  DVT was ruled out last week.  Slightly better since switching to bumetanide.      Follow-up in 1 week with me.  See follow-up telephone note          It has been a pleasure to participate in this patient's care.      Thank you,  GENET Sanchez      **I used Dragon to dictate this note:**

## 2024-08-27 NOTE — TELEPHONE ENCOUNTER
Please inform patient renal function is stable to start spironolactone 25 mg daily ( sent to [facility]).  She may decrease potassium supplementation from 2 tablets down to 1 tablet with this change.  I do want her to have 2 tablets of potassium today and providing she is able to get spironolactone, she will take her first dose of spironolactone tomorrow with current dose bumetanide and just 1 potassium supplement tomorrow.    Med list updated to reflect this change

## 2024-08-27 NOTE — TELEPHONE ENCOUNTER
Caller: Shani Phillip    Relationship to patient: Self    Best call back number: [phone]    Patient is needing: PATIENT WENT TO PHARMACY DIRECTLY AFTER APPT. STATES THE WATER PILL WAS NOT CALLED IN YET. PLEASE CALL PATIENT TO ADVISE.

## 2024-08-30 ENCOUNTER — ANTICOAGULATION VISIT (OUTPATIENT)
Dept: PHARMACY | Facility: HOSPITAL | Age: 74
End: 2024-08-30
Payer: MEDICARE

## 2024-08-30 DIAGNOSIS — I48.20 ATRIAL FIBRILLATION, CHRONIC: Primary | Chronic | ICD-10-CM

## 2024-08-30 LAB — INR PPP: 1.8

## 2024-08-30 PROCEDURE — G0249 PROVIDE INR TEST MATER/EQUIP: HCPCS

## 2024-08-30 NOTE — PROGRESS NOTES
Anticoagulation Clinic Progress Note    Anticoagulation Summary  As of 2024      INR goal:  2.0-3.0   TTR:  55.4% (3.5 y)   INR used for dosin.80 (2024)   Warfarin maintenance plan:  1.25 mg every Mon, Wed, Fri; 2.5 mg all other days   Weekly warfarin total:  13.75 mg   Plan last modified:  Marleny Black RPH (8/15/2024)   Next INR check:  2024   Priority:  High   Target end date:      Indications    Atrial fibrillation  chronic [I48.20]                 Anticoagulation Episode Summary       INR check location:      Preferred lab:      Send INR reminders to:   [facility] HOME TEST POOL    Comments:   Home Testing as of 21 *call only when out of range*          Anticoagulation Care Providers       Provider Role Specialty Phone number    Zenia Tinajero MD Referring Cardiology [phone]            Clinic Interview:  Patient Findings     Negatives:  Signs/symptoms of thrombosis, Signs/symptoms of bleeding,   Laboratory test error suspected, Change in health, Change in alcohol use,   Change in activity, Upcoming invasive procedure, Emergency department   visit, Upcoming dental procedure, Missed doses, Extra doses, Change in   medications, Change in diet/appetite, Hospital admission, Bruising, Other   complaints      Clinical Outcomes     Negatives:  Major bleeding event, Thromboembolic event,   Anticoagulation-related hospital admission, Anticoagulation-related ED   visit, Anticoagulation-related fatality        INR History:      2024     1:14 PM 8/15/2024    12:00 AM 8/15/2024     9:41 AM 2024    12:00 AM 2024    11:16 AM 2024    12:00 AM 2024    10:15 AM   Anticoagulation Monitoring   INR 4.00  1.30  2.00  1.80   INR Date 2024  8/15/2024  2024  2024   INR Goal 2.0-3.0  2.0-3.0  2.0-3.0  2.0-3.0   Trend Down  Same  Same  Same   Last Week Total 13.75 mg  11.25 mg  15 mg  13.75 mg   Next Week Total 11.25 mg  15 mg  13.75 mg  15 mg   Sun 2.5 mg  2.5  mg  2.5 mg  2.5 mg   Mon 1.25 mg  1.25 mg  1.25 mg  1.25 mg   Tue 2.5 mg  2.5 mg  2.5 mg  2.5 mg   Wed 1.25 mg  1.25 mg  1.25 mg  1.25 mg   Thu Hold (8/8)  3.75 mg (8/15)  2.5 mg  2.5 mg   Fri 1.25 mg  1.25 mg  1.25 mg  2.5 mg (8/30)   Sat 2.5 mg  2.5 mg  2.5 mg  2.5 mg   Historical INR  1.30      2.00      1.80            This result is from an external source.       Plan:  1. INR is Supratherapeutic today- see above in Anticoagulation Summary.   Will instruct Shani Phillip to Change their warfarin regimen- see above in Anticoagulation Summary.  Boost to 2.5 mg then resume, rck 1 week   2. Follow up in 1 weeks  3. They have been instructed to call if any changes in medications, doses, concerns, etc. Patient expresses understanding and has no further questions at this time.    Marleny Black [facility]

## 2024-09-04 ENCOUNTER — OFFICE VISIT (OUTPATIENT)
Dept: CARDIOLOGY | Facility: CLINIC | Age: 74
End: 2024-09-04
Payer: MEDICARE

## 2024-09-04 VITALS
HEIGHT: 61 IN | OXYGEN SATURATION: 97 % | BODY MASS INDEX: 27.4 KG/M2 | HEART RATE: 73 BPM | SYSTOLIC BLOOD PRESSURE: 140 MMHG | DIASTOLIC BLOOD PRESSURE: 70 MMHG

## 2024-09-04 DIAGNOSIS — I50.33 ACUTE ON CHRONIC DIASTOLIC CHF (CONGESTIVE HEART FAILURE): ICD-10-CM

## 2024-09-04 DIAGNOSIS — Z51.81 ENCOUNTER FOR THERAPEUTIC DRUG LEVEL MONITORING: Primary | ICD-10-CM

## 2024-09-04 NOTE — PROGRESS NOTES
Date of Office Visit: 24  Encounter Provider: GENET Sanchez  Place of Service: [facility] CARDIOLOGY  Patient Name: Shani Phillip  :1950    Chief Complaint   Patient presents with    Acute on chronic diastolic CHF (congestive heart failure)    Localized swelling of both lower legs    Atrial fibrillation with RVR    Follow-up   :     HPI: Shani Phillip is a 73 y.o. female  with chronic atrial fibrillation, mitral valve stenoses, history of mitral valve vegetation, COPD, hypertension, lower extremity edema, diastolic congestive heart failure, and hypertension.        She is followed by Dr. Zenia Tinajero.  I will visit with her in follow-up and have reviewed her medical record.     She has history of mitral valve endocarditis which was treated with antibiotics.  In 2021 she had an echocardiogram which showed normal left ventricular systolic function, grade 2 diastolic dysfunction, moderate calcification of the aortic valve, moderate calcification of the mitral valve and mild mitral valve stenoses.  She had cardioversion 2019 but then had recurrent atrial fibrillation 2022.  Her Lasix was increased to 40 mg twice daily in late 2022 due to continued swelling.  Her swelling did not significantly initially change with that it was felt that higher dose diltiazem was contributing.  I lowered diltiazem to 180 mg daily and started digoxin with a load x3 days.        She continues to have issues with swelling with diltiazem so that was stopped in November.  She sent a list of her blood pressure and pulse readings which showed pulse 80s to 90s.  She reported her swelling was better.  Perfusion stress test 2023 showed no evidence of ischemia.  In 2023 she had repeat echo which showed preserved LV systolic function, mildly reduced RV systolic function, mild aortic valve stenosis, elevated RVSP of 42 mmHg consistent with mild pulmonary  "hypertension and severe mitral annular calcification with trace to mild mitral valve regurgitation and no significant concerns for endocarditis and lack of fever or chills.         On 7/2/2024 she presented with increased swelling.  I increase furosemide from 40 mg daily to 40 mg twice daily.  proBNP was 900.   She did not feel she was urinating much with furosemide so she was switched to bumetanide.  Spironolactone was added to her regimen.  She presents today for reassessment.  Unfortunately, there was miscommunication and she stopped bumetanide over the past week.  Her legs remain unchanged.  She denies chest pain, dizziness or palpitation.  She remains on oxygen and has chronic shortness of breath.  No blood in the urine or stool.              Allergies   Allergen Reactions    Ceftin [Cefuroxime] Dizziness    Penicillins Rash     RASH 30 YRS AGO NO HOSPITALIZATION           Family and social history reviewed.     ROS  All other systems were reviewed and are negative          Objective:     Vitals:    09/04/24 0948   BP: 140/70   BP Location: Left arm   Patient Position: Sitting   Pulse: 73   SpO2: 97%   Height: 154.9 cm (61\")     Body mass index is 27.4 kg/m².    PHYSICAL EXAM:  Cardiovascular:      Irregularly irregular rhythm.      Murmurs: There is a grade 2/6 systolic murmur.      Comments: No pitting but tight and red        Procedures      Current Outpatient Medications   Medication Sig Dispense Refill    ammonium lactate (LAC-HYDRIN) 12 % lotion APPLY TOPICALLY TO THE APPROPRIATE AREA AS DIRECTED AS NEEDED FOR DRY SKIN. 225 g 6    budesonide (PULMICORT) 0.5 MG/2ML nebulizer solution USE 1 VIAL IN NEBULIZER DAILY - rinse mouth after treatment 30 each 11    buPROPion XL (WELLBUTRIN XL) 300 MG 24 hr tablet TAKE 1 TABLET BY MOUTH EVERY DAY 90 tablet 1    cyclobenzaprine (FLEXERIL) 10 MG tablet Take 1 tablet by mouth 3 (Three) Times a Day As Needed (back pain). 40 tablet 1    digoxin (LANOXIN) 125 MCG tablet " Take 1 tablet by mouth Every Night. 90 tablet 2    famotidine (PEPCID) 20 MG tablet TAKE 1 TABLET BY MOUTH 2 TIMES A DAY BEFORE MEALS. 180 tablet 3    ferrous sulfate 325 (65 FE) MG tablet Take 1 tablet by mouth Every Other Day. 45 tablet 1    HYDROcodone-acetaminophen (NORCO) 7.5-325 MG per tablet Take 1 tablet by mouth Every 6 (Six) Hours As Needed for Moderate Pain. 30 tablet 0    ipratropium-albuterol (DUO-NEB) 0.5-2.5 mg/3 ml nebulizer USE 1 VIAL IN NEBULIZER 4 TIMES DAILY - as directed 360 mL 11    levothyroxine (SYNTHROID, LEVOTHROID) 50 MCG tablet Take 1 tablet by mouth Every Morning. 90 tablet 1    meclizine (ANTIVERT) 12.5 MG tablet Take 1 tablet by mouth 3 (Three) Times a Day As Needed for Dizziness. 12 tablet 0    O2 (OXYGEN) Inhale 2 L/min 1 (One) Time.      ondansetron (ZOFRAN) 4 MG tablet Take 1 tablet by mouth Every 8 (Eight) Hours As Needed for Nausea or Vomiting. 30 tablet 1    pantoprazole (PROTONIX) 40 MG EC tablet TAKE 1 TABLET BY MOUTH EVERY DAY 90 tablet 1    pramipexole (MIRAPEX) 0.5 MG tablet TAKE 1 TABLET BY MOUTH TWICE A  tablet 1    revefenacin (Yupelri) 175 MCG/3ML nebulizer solution Take 3 mL by nebulization Daily.      spironolactone (ALDACTONE) 25 MG tablet Take 1 tablet by mouth Daily. 30 tablet 3    trospium (SANCTURA) 20 MG tablet TAKE 1 TABLET BY MOUTH TWICE A  tablet 1    warfarin (COUMADIN) 2.5 MG tablet Take one-half of a tablet (1.25 MG) by mouth on Monday, Wednesday and Saturday and take one tablet (2.5 mg)  by mouth all other days or as directed 75 tablet 1    bumetanide (BUMEX) 2 MG tablet Take 1 tablet by mouth 2 (Two) Times a Day. 60 tablet 1    potassium chloride ER (K-TAB) 20 MEQ tablet controlled-release ER tablet Take 1 tablet by mouth Daily.       No current facility-administered medications for this visit.     Assessment:       Diagnosis Plan   1. Encounter for therapeutic drug level monitoring  Basic Metabolic Panel      2. Acute on chronic diastolic  CHF (congestive heart failure)  Basic Metabolic Panel           Orders Placed This Encounter   Procedures    Basic Metabolic Panel     Standing Status:   Future     Standing Expiration Date:   9/4/2025     Order Specific Question:   Release to patient     Answer:   Routine Release [[id number]]         Plan:       1.  73-year-old female with chronic atrial fibrillation.  Anticoagulated with warfarin and INR is followed in our anticoagulation clinic.  CHADS2 Vascor of 3 she had a cardioversion November 2019 but now was predominately atrial fibrillation 93 % of the time on 2 week mobile teletry 03/2022 with average HR 86.     She is off diltiazem due to history of swelling.   -Heart rate is adequate.  Digoxin level minimally elevated but she has no symptoms of toxicity.  We will continue the same but may need to consider another 24-48-hour Holter monitor to reassess average heart rate.  2.  History of mitral valve vegetation in 2019 treated with antibiotics.  Last echo 8/26/24 shows moderate to severe mitral valve stenoses.  Will continue to adjust medication for symptom improvement.  - she requires antibiotic for all dental work and verbalized understanding of this today.  3.  COPD on home oxygen-avoid beta-blockers.  She is on 24-hour oxygen at 2 L.  She follows with Dr. Zamora  4.  Grade 2 diastolic dysfunction with lower extremity edema and chronic diastolic heart failure.  She is now on spironolactone 25 mg daily.  I have asked her to restart bumetanide 2 mg twice daily and she will take potassium once a day.  I will arrange for repeat blood work at her PCP office next week   -This is considered complex management of a chronic medical condition.    5.  Mild, non obstructive coronary artery disease on cardiac cath June 2020.  Negative perfusion stress test July 2022 no angina.  6.  Mild aortic valve stenoses on echo August 2023  7.  Chronic right bundle branch block  8.  Normal bilateral lower extremity arterial  study August 2020-she has significant cyanosis in the feet.  This could be related to chronic hypoxia despite oxygen use.  She has no complaint of claudications at this time I will hold off on repeat RAY  9.  Persistent lower extremity edema.  She is very sedentary and sits a lot.  Have asked her to elevate while resting.  Left greater than the right.  DVT was ruled out 2 weeks ago.  Slightly better over the past month.  She will restart bumetanide 2 mg twice daily and continue spironolactone 25 mg daily.    Follow-up 9/25/2024.          It has been a pleasure to participate in this patient's care.      Thank you,  GENET Sanchez      **I used Dragon to dictate this note:**

## 2024-09-05 ENCOUNTER — ANTICOAGULATION VISIT (OUTPATIENT)
Dept: PHARMACY | Facility: HOSPITAL | Age: 74
End: 2024-09-05
Payer: MEDICARE

## 2024-09-05 DIAGNOSIS — I48.20 ATRIAL FIBRILLATION, CHRONIC: Primary | Chronic | ICD-10-CM

## 2024-09-05 LAB — INR PPP: 1.6

## 2024-09-05 NOTE — PROGRESS NOTES
Anticoagulation Clinic Progress Note    Anticoagulation Summary  As of 2024      INR goal:  2.0-3.0   TTR:  55.1% (3.5 y)   INR used for dosin.60 (2024)   Warfarin maintenance plan:  1.25 mg every Mon, Fri; 2.5 mg all other days   Weekly warfarin total:  15 mg   Plan last modified:  Zenia Myers, PharmD (2024)   Next INR check:  2024   Priority:  High   Target end date:      Indications    Atrial fibrillation  chronic [I48.20]                 Anticoagulation Episode Summary       INR check location:      Preferred lab:      Send INR reminders to:   [facility] TEST POOL    Comments:   Home Testing as of 21 *call only when out of range*          Anticoagulation Care Providers       Provider Role Specialty Phone number    Zenia Tinajero MD Referring Cardiology [phone]            Clinic Interview:  Patient Findings     Negatives:  Signs/symptoms of thrombosis, Signs/symptoms of bleeding,   Laboratory test error suspected, Change in health, Change in alcohol use,   Change in activity, Upcoming invasive procedure, Emergency department   visit, Upcoming dental procedure, Missed doses, Extra doses, Change in   medications, Change in diet/appetite, Hospital admission, Bruising, Other   complaints      Clinical Outcomes     Negatives:  Major bleeding event, Thromboembolic event,   Anticoagulation-related hospital admission, Anticoagulation-related ED   visit, Anticoagulation-related fatality        INR History:      8/15/2024     9:41 AM 2024    12:00 AM 2024    11:16 AM 2024    12:00 AM 2024    10:15 AM 2024    12:00 AM 2024    10:14 AM   Anticoagulation Monitoring   INR 1.30  2.00  1.80  1.60   INR Date 8/15/2024  2024  2024  2024   INR Goal 2.0-3.0  2.0-3.0  2.0-3.0  2.0-3.0   Trend Same  Same  Same  Up   Last Week Total 11.25 mg  15 mg  13.75 mg  15 mg   Next Week Total 15 mg  13.75 mg  15 mg  16.25 mg   Sun 2.5 mg  2.5 mg  2.5 mg   2.5 mg   Mon 1.25 mg  1.25 mg  1.25 mg  1.25 mg   Tue 2.5 mg  2.5 mg  2.5 mg  2.5 mg   Wed 1.25 mg  1.25 mg  1.25 mg  2.5 mg (9/11)   Thu 3.75 mg (8/15)  2.5 mg  2.5 mg  3.75 mg (9/5)   Fri 1.25 mg  1.25 mg  2.5 mg (8/30)  1.25 mg   Sat 2.5 mg  2.5 mg  2.5 mg  2.5 mg   Historical INR  2.00      1.80      1.60            This result is from an external source.       Plan:  1. INR is Subtherapeutic today- see above in Anticoagulation Summary.   Will instruct Shani Phillip to Change their warfarin regimen- see above in Anticoagulation Summary.  Take 3.75 mg today then increase to 1.25 mg on Monday and Friday and 2.5 mg on all  other days.   2. Follow up in 1 weeks  3. They have been instructed to call if any changes in medications, doses, concerns, etc. Patient expresses understanding and has no further questions at this time.    Zenia Myers, PharmD

## 2024-09-10 DIAGNOSIS — Z51.81 ENCOUNTER FOR THERAPEUTIC DRUG LEVEL MONITORING: ICD-10-CM

## 2024-09-10 DIAGNOSIS — I50.33 ACUTE ON CHRONIC DIASTOLIC CHF (CONGESTIVE HEART FAILURE): ICD-10-CM

## 2024-09-10 LAB
BUN SERPL-MCNC: 19 MG/DL (ref 8–23)
BUN/CREAT SERPL: 15.4 (ref 7–25)
CALCIUM SERPL-MCNC: 9.4 MG/DL (ref 8.6–10.5)
CHLORIDE SERPL-SCNC: 94 MMOL/L (ref 98–107)
CO2 SERPL-SCNC: 23.5 MMOL/L (ref 22–29)
CREAT SERPL-MCNC: 1.23 MG/DL (ref 0.57–1)
EGFRCR SERPLBLD CKD-EPI 2021: 46.2 ML/MIN/1.73
GLUCOSE SERPL-MCNC: 120 MG/DL (ref 65–99)
POTASSIUM SERPL-SCNC: 4.5 MMOL/L (ref 3.5–5.2)
SODIUM SERPL-SCNC: 136 MMOL/L (ref 136–145)

## 2024-09-11 ENCOUNTER — TELEPHONE (OUTPATIENT)
Dept: CARDIOLOGY | Facility: CLINIC | Age: 74
End: 2024-09-11
Payer: MEDICARE

## 2024-09-11 DIAGNOSIS — R22.43 LOCALIZED SWELLING OF BOTH LOWER LEGS: ICD-10-CM

## 2024-09-11 DIAGNOSIS — Z51.81 ENCOUNTER FOR THERAPEUTIC DRUG LEVEL MONITORING: Primary | ICD-10-CM

## 2024-09-11 DIAGNOSIS — I50.33 ACUTE ON CHRONIC DIASTOLIC CHF (CONGESTIVE HEART FAILURE): ICD-10-CM

## 2024-09-11 RX ORDER — BUMETANIDE 2 MG/1
TABLET ORAL
Qty: 90 TABLET | Refills: 0 | Status: SHIPPED | OUTPATIENT
Start: 2024-09-11

## 2024-09-11 NOTE — TELEPHONE ENCOUNTER
I spoke with patient.  Discussed stable renal function however her legs are not improving but are not worse.  She is frustrated they are still swollen.  She will increase bumetanide from 2 mg twice daily to 4 mg in the morning and 2 mg in the afternoon/evening.  She will continue spironolactone 25 mg daily and have another BMP at her PCP office which is closer to home in 1 week.  She may notes and verbalized understanding of plan

## 2024-09-12 ENCOUNTER — ANTICOAGULATION VISIT (OUTPATIENT)
Dept: PHARMACY | Facility: HOSPITAL | Age: 74
End: 2024-09-12
Payer: MEDICARE

## 2024-09-12 DIAGNOSIS — I48.20 ATRIAL FIBRILLATION, CHRONIC: Primary | Chronic | ICD-10-CM

## 2024-09-12 LAB — INR PPP: 1.6

## 2024-09-12 NOTE — PROGRESS NOTES
Anticoagulation Clinic Progress Note    Anticoagulation Summary  As of 2024      INR goal:  2.0-3.0   TTR:  54.8% (3.5 y)   INR used for dosin.60 (2024)   Warfarin maintenance plan:  2.5 mg every day   Weekly warfarin total:  17.5 mg   Plan last modified:  Zenia Myers, PharmD (2024)   Next INR check:  2024   Priority:  High   Target end date:      Indications    Atrial fibrillation  chronic [I48.20]                 Anticoagulation Episode Summary       INR check location:      Preferred lab:      Send INR reminders to:   [facility] [facility] HOME TEST POOL    Comments:   Home Testing as of 21 *call only when out of range*          Anticoagulation Care Providers       Provider Role Specialty Phone number    Zenia Tinajero MD Referring Cardiology [phone]            Clinic Interview:  Patient Findings     Negatives:  Signs/symptoms of thrombosis, Signs/symptoms of bleeding,   Laboratory test error suspected, Change in health, Change in alcohol use,   Change in activity, Upcoming invasive procedure, Emergency department   visit, Upcoming dental procedure, Missed doses, Extra doses, Change in   medications, Change in diet/appetite, Hospital admission, Bruising, Other   complaints      Clinical Outcomes     Negatives:  Major bleeding event, Thromboembolic event,   Anticoagulation-related hospital admission, Anticoagulation-related ED   visit, Anticoagulation-related fatality        INR History:      2024    11:16 AM 2024    12:00 AM 2024    10:15 AM 2024    12:00 AM 2024    10:14 AM 2024    12:00 AM 2024     1:17 PM   Anticoagulation Monitoring   INR 2.00  1.80  1.60  1.60   INR Date 2024   INR Goal 2.0-3.0  2.0-3.0  2.0-3.0  2.0-3.0   Trend Same  Same  Up  Up   Last Week Total 15 mg  13.75 mg  15 mg  16.25 mg   Next Week Total 13.75 mg  15 mg  16.25 mg  17.5 mg   Sun 2.5 mg  2.5 mg  2.5 mg  2.5 mg   Mon 1.25 mg   1.25 mg  1.25 mg  2.5 mg   Tue 2.5 mg  2.5 mg  2.5 mg  2.5 mg   Wed 1.25 mg  1.25 mg  2.5 mg (9/11)  2.5 mg   Thu 2.5 mg  2.5 mg  3.75 mg (9/5)  2.5 mg   Fri 1.25 mg  2.5 mg (8/30)  1.25 mg  2.5 mg   Sat 2.5 mg  2.5 mg  2.5 mg  2.5 mg   Historical INR  1.80      1.60      1.60            This result is from an external source.       Plan:  1. INR is Subtherapeutic today- see above in Anticoagulation Summary.   Will instruct Shani Phillip to Change their warfarin regimen- see above in Anticoagulation Summary.  Increase to 2.5 mg daily.   2. Follow up in 1 weeks  3. They have been instructed to call if any changes in medications, doses, concerns, etc. Patient expresses understanding and has no further questions at this time.    Zenia Myers, PharmD

## 2024-09-19 ENCOUNTER — ANTICOAGULATION VISIT (OUTPATIENT)
Dept: PHARMACY | Facility: HOSPITAL | Age: 74
End: 2024-09-19
Payer: MEDICARE

## 2024-09-19 DIAGNOSIS — I50.33 ACUTE ON CHRONIC DIASTOLIC CHF (CONGESTIVE HEART FAILURE): ICD-10-CM

## 2024-09-19 DIAGNOSIS — R22.43 LOCALIZED SWELLING OF BOTH LOWER LEGS: ICD-10-CM

## 2024-09-19 DIAGNOSIS — I48.20 ATRIAL FIBRILLATION, CHRONIC: Primary | Chronic | ICD-10-CM

## 2024-09-19 DIAGNOSIS — Z51.81 ENCOUNTER FOR THERAPEUTIC DRUG LEVEL MONITORING: ICD-10-CM

## 2024-09-19 LAB — INR PPP: 1.1

## 2024-09-20 ENCOUNTER — TELEPHONE (OUTPATIENT)
Dept: CARDIOLOGY | Facility: CLINIC | Age: 74
End: 2024-09-20
Payer: MEDICARE

## 2024-09-20 DIAGNOSIS — D64.9 ANEMIA, UNSPECIFIED TYPE: ICD-10-CM

## 2024-09-20 LAB
AMBIG ABBREV BMP8 DEFAULT: NORMAL
BUN SERPL-MCNC: 21 MG/DL (ref 8–27)
BUN/CREAT SERPL: 16 (ref 12–28)
CALCIUM SERPL-MCNC: 9.3 MG/DL (ref 8.7–10.3)
CHLORIDE SERPL-SCNC: 96 MMOL/L (ref 96–106)
CO2 SERPL-SCNC: 26 MMOL/L (ref 20–29)
CREAT SERPL-MCNC: 1.3 MG/DL (ref 0.57–1)
EGFRCR SERPLBLD CKD-EPI 2021: 43 ML/MIN/1.73
GLUCOSE SERPL-MCNC: 149 MG/DL (ref 70–99)
POTASSIUM SERPL-SCNC: 3.5 MMOL/L (ref 3.5–5.2)
SODIUM SERPL-SCNC: 140 MMOL/L (ref 134–144)

## 2024-09-23 RX ORDER — FERROUS SULFATE 325(65) MG
325 TABLET ORAL EVERY OTHER DAY
Qty: 45 TABLET | Refills: 1 | Status: SHIPPED | OUTPATIENT
Start: 2024-09-23

## 2024-09-25 ENCOUNTER — OFFICE VISIT (OUTPATIENT)
Dept: CARDIOLOGY | Facility: CLINIC | Age: 74
End: 2024-09-25
Payer: MEDICARE

## 2024-09-25 ENCOUNTER — HOSPITAL ENCOUNTER (OUTPATIENT)
Dept: CARDIOLOGY | Facility: HOSPITAL | Age: 74
Discharge: HOME | End: 2024-09-25
Admitting: NURSE PRACTITIONER
Payer: MEDICARE

## 2024-09-25 VITALS
BODY MASS INDEX: 28.32 KG/M2 | WEIGHT: 150 LBS | HEIGHT: 61 IN | HEART RATE: 110 BPM | SYSTOLIC BLOOD PRESSURE: 136 MMHG | DIASTOLIC BLOOD PRESSURE: 80 MMHG

## 2024-09-25 DIAGNOSIS — I48.91 ATRIAL FIBRILLATION WITH RVR: ICD-10-CM

## 2024-09-25 DIAGNOSIS — I50.33 ACUTE ON CHRONIC DIASTOLIC CHF (CONGESTIVE HEART FAILURE): ICD-10-CM

## 2024-09-25 DIAGNOSIS — I48.91 ATRIAL FIBRILLATION WITH RVR: Primary | ICD-10-CM

## 2024-09-25 LAB
ANION GAP SERPL CALCULATED.3IONS-SCNC: 10.8 MMOL/L (ref 5–15)
BUN SERPL-MCNC: 27 MG/DL (ref 8–23)
BUN/CREAT SERPL: 18.6 (ref 7–25)
CALCIUM SPEC-SCNC: 9.7 MG/DL (ref 8.6–10.5)
CHLORIDE SERPL-SCNC: 95 MMOL/L (ref 98–107)
CO2 SERPL-SCNC: 31.2 MMOL/L (ref 22–29)
CREAT SERPL-MCNC: 1.45 MG/DL (ref 0.57–1)
EGFRCR SERPLBLD CKD-EPI 2021: 37.9 ML/MIN/1.73
GLUCOSE SERPL-MCNC: 103 MG/DL (ref 65–99)
POTASSIUM SERPL-SCNC: 4.1 MMOL/L (ref 3.5–5.2)
SODIUM SERPL-SCNC: 137 MMOL/L (ref 136–145)

## 2024-09-25 PROCEDURE — 99214 OFFICE O/P EST MOD 30 MIN: CPT | Performed by: NURSE PRACTITIONER

## 2024-09-25 PROCEDURE — 36415 COLL VENOUS BLD VENIPUNCTURE: CPT

## 2024-09-25 PROCEDURE — 1159F MED LIST DOCD IN RCRD: CPT | Performed by: NURSE PRACTITIONER

## 2024-09-25 PROCEDURE — 1160F RVW MEDS BY RX/DR IN RCRD: CPT | Performed by: NURSE PRACTITIONER

## 2024-09-25 PROCEDURE — 80048 BASIC METABOLIC PNL TOTAL CA: CPT | Performed by: NURSE PRACTITIONER

## 2024-09-26 ENCOUNTER — TELEPHONE (OUTPATIENT)
Dept: CARDIOLOGY | Facility: CLINIC | Age: 74
End: 2024-09-26
Payer: MEDICARE

## 2024-09-26 DIAGNOSIS — N17.9 AKI (ACUTE KIDNEY INJURY): Primary | ICD-10-CM

## 2024-09-26 DIAGNOSIS — I13.10: ICD-10-CM

## 2024-09-26 LAB — INR PPP: 2.1

## 2024-09-27 ENCOUNTER — ANTICOAGULATION VISIT (OUTPATIENT)
Dept: PHARMACY | Facility: HOSPITAL | Age: 74
End: 2024-09-27
Payer: MEDICARE

## 2024-09-27 DIAGNOSIS — I48.20 ATRIAL FIBRILLATION, CHRONIC: Primary | Chronic | ICD-10-CM

## 2024-09-27 PROCEDURE — G0249 PROVIDE INR TEST MATER/EQUIP: HCPCS

## 2024-09-30 NOTE — PROGRESS NOTES
[facility] CARDIOLOGY  Clinical Cardiac Electrophysiology     Date of consultation: 10/2/2024  Referring Provider: Angy Smith, GENET  [address]  [facility] [address]  [place] [address]     Reason for consultation: A-fib RVR      History of Presenting Illness     OFFICE VISIT  Shani Phillip is a 74 y.o. female with a past medical history of chronic atrial fibrillation, mitral valve stenosis, history of mitral valve regurgitation, COPD (on home oxygen), hypertension, lower extremity edema, hypertension, history of mitral valve vegetation in 2019 treated with antibiotics, moderate to severe mitral valve stenosis (8/29/2024) who presents for new patient evaluation.     Patient was referred for consideration of a pace/ablate strategy.  Patient presents today with .    Patient notes that she does feel her atrial fibrillation as palpitations/fluttering in her chest.  She notes that overall she usually rests on the couch during the daytime.  Today she did use a wheelchair to come to the clinic visit.  She says overall, her symptoms are moderate and they bothersome, likely have worsened a little bit over the past year.     Past Medical History     Past Medical History:   Diagnosis Date    Acute endocarditis     Atrial fibrillation with RVR 11/14/2019    Cancer     CKD (chronic kidney disease) stage 3, GFR 30-59 ml/min     COPD (chronic obstructive pulmonary disease)     CTS (carpal tunnel syndrome)     Dizziness     Gall stones     Influenza 11/14/2019    Medicare annual wellness visit, subsequent 04/12/2021    Migraine     Osteoporosis     Renal disorder     Restless leg syndrome     Thrush, oral 11/14/2019     Past Surgical History:   Procedure Laterality Date    CARDIAC CATHETERIZATION N/A 1/23/2020    Procedure: Coronary angiography;  Surgeon: Svetlana Grayson MD;  Location: [facility] INVASIVE LOCATION;  Service: Cardiovascular    CARDIAC CATHETERIZATION N/A 1/23/2020    Procedure: Left  ventriculography;  Surgeon: Svetlana Grayson MD;  Location:  [facility] INVASIVE LOCATION;  Service: Cardiovascular    CARDIAC CATHETERIZATION N/A 1/23/2020    Procedure: Left Heart Cath;  Surgeon: Svetlana Grayson MD;  Location:  [facility] INVASIVE LOCATION;  Service: Cardiovascular    CHOLECYSTECTOMY      KNEE ARTHROPLASTY Right     LAPAROSCOPIC GASTRIC BANDING           Medications     Current Outpatient Medications on File Prior to Visit   Medication Sig Dispense Refill    ammonium lactate (LAC-HYDRIN) 12 % lotion APPLY TOPICALLY TO THE APPROPRIATE AREA AS DIRECTED AS NEEDED FOR DRY SKIN. 225 g 6    budesonide (PULMICORT) 0.5 MG/2ML nebulizer solution USE 1 VIAL IN NEBULIZER DAILY - rinse mouth after treatment 30 each 11    bumetanide (BUMEX) 2 MG tablet Take 2 tablets in the morning and 1 tablet in the evening 90 tablet 0    buPROPion XL (WELLBUTRIN XL) 300 MG 24 hr tablet TAKE 1 TABLET BY MOUTH EVERY DAY 90 tablet 1    cyclobenzaprine (FLEXERIL) 10 MG tablet Take 1 tablet by mouth 3 (Three) Times a Day As Needed (back pain). 40 tablet 1    digoxin (LANOXIN) 125 MCG tablet Take 1 tablet by mouth Every Night. 90 tablet 2    famotidine (PEPCID) 20 MG tablet TAKE 1 TABLET BY MOUTH 2 TIMES A DAY BEFORE MEALS. 180 tablet 3    ferrous sulfate 325 (65 FE) MG tablet TAKE 1 TABLET BY MOUTH EVERY OTHER DAY 45 tablet 1    HYDROcodone-acetaminophen (NORCO) 7.5-325 MG per tablet Take 1 tablet by mouth Every 6 (Six) Hours As Needed for Moderate Pain. 30 tablet 0    ipratropium-albuterol (DUO-NEB) 0.5-2.5 mg/3 ml nebulizer USE 1 VIAL IN NEBULIZER 4 TIMES DAILY - as directed 360 mL 11    levothyroxine (SYNTHROID, LEVOTHROID) 50 MCG tablet Take 1 tablet by mouth Every Morning. 90 tablet 1    meclizine (ANTIVERT) 12.5 MG tablet Take 1 tablet by mouth 3 (Three) Times a Day As Needed for Dizziness. 12 tablet 0    O2 (OXYGEN) Inhale 2 L/min 1 (One) Time.      ondansetron (ZOFRAN) 4 MG tablet Take 1 tablet by mouth Every 8 (Eight)  Hours As Needed for Nausea or Vomiting. 30 tablet 1    pantoprazole (PROTONIX) 40 MG EC tablet TAKE 1 TABLET BY MOUTH EVERY DAY 90 tablet 1    potassium chloride ER (K-TAB) 20 MEQ tablet controlled-release ER tablet Take 1 tablet by mouth Daily.      pramipexole (MIRAPEX) 0.5 MG tablet TAKE 1 TABLET BY MOUTH TWICE A  tablet 1    revefenacin (Yupelri) 175 MCG/3ML nebulizer solution Take 3 mL by nebulization Daily.      spironolactone (ALDACTONE) 25 MG tablet Take 1 tablet by mouth Daily. 30 tablet 3    trospium (SANCTURA) 20 MG tablet TAKE 1 TABLET BY MOUTH TWICE A  tablet 1    warfarin (COUMADIN) 2.5 MG tablet Take one-half of a tablet (1.25 MG) by mouth on Monday, Wednesday and Saturday and take one tablet (2.5 mg)  by mouth all other days or as directed 75 tablet 1     No current facility-administered medications on file prior to visit.           Allergies     Allergies   Allergen Reactions    Ceftin [Cefuroxime] Dizziness    Diltiazem Swelling    Penicillins Rash     RASH 30 YRS AGO NO HOSPITALIZATION           Family History     Family History   Problem Relation Age of Onset    Lung cancer Mother            Social History     Social History     Socioeconomic History    Marital status:    Tobacco Use    Smoking status: Former     Current packs/day: 0.00     Average packs/day: 0.5 packs/day for 50.0 years (25.0 ttl pk-yrs)     Types: Cigarettes     Start date: 11/3/1969     Quit date: 11/3/2019     Years since quittin.9     Passive exposure: Past (parents smoked in the home)    Smokeless tobacco: Never    Tobacco comments:     LAST CIG 2019   Vaping Use    Vaping status: Never Used   Substance and Sexual Activity    Alcohol use: No     Comment: caffeine - 1/2 cup coffee daily     Drug use: No    Sexual activity: Defer        Review of Systems: All systems reviewed. Pertinent positives identified in HPI. All other systems are negative.         Physical Examination     Vitals:     "10/02/24 1339   BP: 130/80   Pulse: 112   Weight: 66.7 kg (147 lb)   Height: 154.9 cm (61\")     Body mass index is 27.78 kg/m².     Gen: Well nourished; Alert & oriented  Skin: no visible rashes and no abnormalities with palpation  Eyes: no evidence of visual defects and normal sclera  Ears: no abnormalities.  Mouth: normal teeth and appropriately moist mucous membranes  Chest: clear to auscultation. There is normal respiratory effort and expansion.  CV: examination showed a irregular rhythm. S1 and S2 were normal.   Abd: no visible distension.   Neurologic:Cranial nerves appear intact; Sensation normal; no localizing signs        Laboratory Studies (Reviewed by provider)    Lab Results   Component Value Date    WBC 8.63 04/08/2024    HGB 10.9 (L) 04/08/2024    HCT 35.0 04/08/2024    MCV 80.1 04/08/2024     04/08/2024     Lab Results   Component Value Date    GLUCOSE 103 (H) 09/25/2024    BUN 27 (H) 09/25/2024    CO2 31.2 (H) 09/25/2024    CREATININE 1.45 (H) 09/25/2024    K 4.1 09/25/2024     09/25/2024    CL 95 (L) 09/25/2024    CALCIUM 9.7 09/25/2024     Lab Results   Component Value Date    MG 2.3 04/08/2024     Lab Results   Component Value Date    PHOS 2.6 08/26/2021     Lab Results   Component Value Date    ALT 14 08/21/2024    AST 17 08/21/2024    ALKPHOS 135 (H) 08/21/2024    BILITOT 0.5 08/21/2024     Lab Results   Component Value Date    TRIG 174 (H) 04/12/2021    HDL 48 04/12/2021     (H) 04/12/2021     Lab Results   Component Value Date    PTT 34.1 04/08/2024    INR 2.10 09/26/2024     Lab Results   Component Value Date    HGBA1C 5.6 04/12/2021     Lab Results   Component Value Date    TSH 4.860 (H) 05/23/2024        Investigations (Reviewed by provider)    EKG 10/02/24:   ECG 12 Lead    Date/Time: 10/2/2024 2:02 PM  Performed by: Prieto Walter MD    Authorized by: Prieto Walter MD  Comparison: compared with previous ECG from 7/2/2024  Similar to previous ECG  Comparison to previous " ECG: Rate is increased  Rhythm: atrial fibrillation  Conduction: right bundle branch block        7/2/2024:       ECHOCARDIOGRAPHY: 8/26/2024:    Left ventricular systolic function is hyperdynamic (EF > 70%). Calculated left ventricular EF = 78.7% Normal left ventricular cavity size noted. Left ventricular wall thickness is consistent with moderate concentric hypertrophy. All left ventricular wall segments contract normally. There is systolic into motion of the chordae of the edge of mitral valve leaflet with modest outflow tract obstruction noted at rest with a late peaking gradient in the left ventricular outflow tract of 34 mmHg with Valsalva. Left ventricular diastolic function was indeterminate.    The left atrial cavity is severely dilated. The right atrial cavity is mildly dilated    No aortic valve regurgitation or stenosis is present. The aortic valve is abnormal in structure. There is moderate calcification of the aortic valve.    Severe mitral annular calcification is present. There is severe, bileaflet mitral valve thickening present. No mitral valve regurgitation is present. Moderate to severe mitral valve stenosis is present. The mean mitral valve gradient is 14 mmHg at a heart rate of 117 bpm.    Mild to moderate tricuspid valve regurgitation is present. Estimated right ventricular systolic pressure from tricuspid regurgitation is moderately elevated (45-55 mmHg). Calculated right ventricular systolic pressure from tricuspid regurgitation is 53 mmHg.     NM MYOCARDIAL PERF STRESS TEST: 6/20/2023:    Left ventricular ejection fraction is normal (Calculated EF = 70%).    Myocardial perfusion imaging indicates a normal myocardial perfusion study with no evidence of ischemia.    Impressions are consistent with a low risk study.          Assessment & Plan  # Permanent atrial fibrillation.  - Recent Holter was performed.  Final read is not back yet, however my read notes that her average heart rate was 100  bpm with a range between  bpm.  - She is on digoxin for rate control  - Heart rate today 112 bpm.  - I discussed with her various treatment methods for her atrial fibrillation including a possible pace/ablate strategy.  - I did discuss with her the risks of such a strategy including the risk of pacemaker infection, which may present with a dangerous situation if she becomes pacemaker dependent.  - Of note, she does have a previous history of mitral valve vegetation treated with antibiotics, that she may be predisposed to infection.  - I advised that we should continue to monitor her symptoms over the next 3 months.  If they are stable, I may recommend to continue medical management.  - If however she becomes more symptomatic, she may be candidate for a Micra pacemaker if the pace and ablate strategy is pursued.  - She is on warfarin.  [id number] Score: 4            Follow up in 3 months   Plan: Continue rate control for the time being.  Monitor symptoms.     As always, Dr. Smith, it has been a pleasure to participate in your patient's care. We will continue to follow the patient in our clinic.     This time spent caring for Shani Phillip on this date of service includes time spent by me in the following activities:preparing for the visit, reviewing tests, obtaining and/or reviewing a separately obtained history, performing a medically appropriate examination and/or evaluation, counseling and educating the patient/family/caregiver, documenting information in the medical record, and independently interpreting results and communicating that information with the patient/family/caregiver    Prieto Walter MD  Clinical Cardiac Electrophysiology  [facility]

## 2024-10-02 ENCOUNTER — OFFICE VISIT (OUTPATIENT)
Age: 74
End: 2024-10-02
Payer: MEDICARE

## 2024-10-02 VITALS
HEIGHT: 61 IN | WEIGHT: 147 LBS | BODY MASS INDEX: 27.75 KG/M2 | SYSTOLIC BLOOD PRESSURE: 130 MMHG | DIASTOLIC BLOOD PRESSURE: 80 MMHG | HEART RATE: 112 BPM

## 2024-10-02 DIAGNOSIS — I48.21 PERMANENT ATRIAL FIBRILLATION: Primary | ICD-10-CM

## 2024-10-03 ENCOUNTER — PATIENT ROUNDING (BHMG ONLY) (OUTPATIENT)
Dept: CARDIOLOGY | Facility: CLINIC | Age: 74
End: 2024-10-03
Payer: MEDICARE

## 2024-10-03 ENCOUNTER — ANTICOAGULATION VISIT (OUTPATIENT)
Dept: PHARMACY | Facility: HOSPITAL | Age: 74
End: 2024-10-03
Payer: MEDICARE

## 2024-10-03 DIAGNOSIS — I48.20 ATRIAL FIBRILLATION, CHRONIC: Primary | Chronic | ICD-10-CM

## 2024-10-03 LAB — INR PPP: 3.9

## 2024-10-03 NOTE — PROGRESS NOTES
Anticoagulation Clinic Progress Note    Anticoagulation Summary  As of 10/3/2024      INR goal:  2.0-3.0   TTR:  54.3% (3.6 y)   INR used for dosing:  3.90 (10/3/2024)   Warfarin maintenance plan:  3.75 mg every Tue, Thu; 2.5 mg all other days   Weekly warfarin total:  20 mg   Plan last modified:  Marleny Black RPH (9/19/2024)   Next INR check:  10/10/2024   Priority:  High   Target end date:      Indications    Atrial fibrillation  chronic [I48.20]                 Anticoagulation Episode Summary       INR check location:      Preferred lab:      Send INR reminders to:   [facility] HOME TEST POOL    Comments:   Home Testing as of 7/30/21 *call only when out of range*          Anticoagulation Care Providers       Provider Role Specialty Phone number    Zenia Tinajero MD Referring Cardiology [phone]            Clinic Interview:  Patient Findings     Negatives:  Signs/symptoms of thrombosis, Signs/symptoms of bleeding,   Laboratory test error suspected, Change in health, Change in alcohol use,   Change in activity, Upcoming invasive procedure, Emergency department   visit, Upcoming dental procedure, Missed doses, Extra doses, Change in   medications, Change in diet/appetite, Hospital admission, Bruising, Other   complaints      Clinical Outcomes     Negatives:  Major bleeding event, Thromboembolic event,   Anticoagulation-related hospital admission, Anticoagulation-related ED   visit, Anticoagulation-related fatality        INR History:      9/12/2024     1:17 PM 9/19/2024    12:00 AM 9/19/2024    11:38 AM 9/26/2024    12:00 AM 9/27/2024     8:00 AM 10/3/2024    12:00 AM 10/3/2024    10:43 AM   Anticoagulation Monitoring   INR 1.60  1.10  2.10  3.90   INR Date 9/12/2024  9/19/2024  9/26/2024  10/3/2024   INR Goal 2.0-3.0  2.0-3.0  2.0-3.0  2.0-3.0   Trend Up  Up  Same  Same   Last Week Total 16.25 mg  17.5 mg  20 mg  20 mg   Next Week Total 17.5 mg  20 mg  20 mg  16.25 mg   Sun 2.5 mg  2.5 mg  2.5 mg   2.5 mg   Mon 2.5 mg  2.5 mg  2.5 mg  2.5 mg   Tue 2.5 mg  3.75 mg  3.75 mg  3.75 mg   Wed 2.5 mg  2.5 mg  2.5 mg  2.5 mg   Thu 2.5 mg  3.75 mg  -  Hold (10/3)   Fri 2.5 mg  2.5 mg  2.5 mg  2.5 mg   Sat 2.5 mg  2.5 mg  2.5 mg  2.5 mg   Historical INR  1.10      2.10      3.90            This result is from an external source.       Plan:  1. INR is Supratherapeutic today- see above in Anticoagulation Summary.   Will instruct Shani Phillip to Change their warfarin regimen- see above in Anticoagulation Summary.  Denies any changes, hold warfarin today and recheck in 1 week   2. Follow up in 1 weeks  3. They have been instructed to call if any changes in medications, doses, concerns, etc. Patient expresses understanding and has no further questions at this time.    Marleny Black [facility]

## 2024-10-03 NOTE — PROGRESS NOTES
A My Chart message has been sent to the patient for PATIENT ROUNDING with [facility]

## 2024-10-07 ENCOUNTER — OFFICE VISIT (OUTPATIENT)
Dept: FAMILY MEDICINE CLINIC | Facility: CLINIC | Age: 74
End: 2024-10-07
Payer: MEDICARE

## 2024-10-07 VITALS
BODY MASS INDEX: 27.75 KG/M2 | SYSTOLIC BLOOD PRESSURE: 126 MMHG | TEMPERATURE: 98.2 F | OXYGEN SATURATION: 85 % | HEIGHT: 61 IN | HEART RATE: 73 BPM | DIASTOLIC BLOOD PRESSURE: 66 MMHG | WEIGHT: 147 LBS

## 2024-10-07 DIAGNOSIS — Z99.81 OXYGEN DEPENDENT: ICD-10-CM

## 2024-10-07 DIAGNOSIS — J44.9 CHRONIC OBSTRUCTIVE PULMONARY DISEASE, UNSPECIFIED COPD TYPE: ICD-10-CM

## 2024-10-07 DIAGNOSIS — D64.9 ANEMIA, UNSPECIFIED TYPE: ICD-10-CM

## 2024-10-07 DIAGNOSIS — J96.11 CHRONIC RESPIRATORY FAILURE WITH HYPOXIA: ICD-10-CM

## 2024-10-07 DIAGNOSIS — E03.9 ACQUIRED HYPOTHYROIDISM: ICD-10-CM

## 2024-10-07 DIAGNOSIS — N18.32 STAGE 3B CHRONIC KIDNEY DISEASE: ICD-10-CM

## 2024-10-07 DIAGNOSIS — G25.81 RLS (RESTLESS LEGS SYNDROME): ICD-10-CM

## 2024-10-07 DIAGNOSIS — K21.9 GASTROESOPHAGEAL REFLUX DISEASE WITHOUT ESOPHAGITIS: ICD-10-CM

## 2024-10-07 DIAGNOSIS — F32.A DEPRESSION, UNSPECIFIED DEPRESSION TYPE: ICD-10-CM

## 2024-10-07 DIAGNOSIS — I50.32 CHRONIC HEART FAILURE WITH PRESERVED EJECTION FRACTION (HFPEF): ICD-10-CM

## 2024-10-07 DIAGNOSIS — Z00.00 MEDICARE ANNUAL WELLNESS VISIT, SUBSEQUENT: Primary | ICD-10-CM

## 2024-10-07 DIAGNOSIS — I50.32 CHRONIC DIASTOLIC CHF (CONGESTIVE HEART FAILURE): ICD-10-CM

## 2024-10-07 DIAGNOSIS — E66.3 OVERWEIGHT WITH BODY MASS INDEX (BMI) 25.0-29.9: ICD-10-CM

## 2024-10-07 PROCEDURE — 1159F MED LIST DOCD IN RCRD: CPT | Performed by: INTERNAL MEDICINE

## 2024-10-07 PROCEDURE — 1126F AMNT PAIN NOTED NONE PRSNT: CPT | Performed by: INTERNAL MEDICINE

## 2024-10-07 PROCEDURE — 1160F RVW MEDS BY RX/DR IN RCRD: CPT | Performed by: INTERNAL MEDICINE

## 2024-10-07 PROCEDURE — 1170F FXNL STATUS ASSESSED: CPT | Performed by: INTERNAL MEDICINE

## 2024-10-07 PROCEDURE — G0439 PPPS, SUBSEQ VISIT: HCPCS | Performed by: INTERNAL MEDICINE

## 2024-10-07 RX ORDER — PANTOPRAZOLE SODIUM 40 MG/1
40 TABLET, DELAYED RELEASE ORAL DAILY
Qty: 90 TABLET | Refills: 1 | Status: SHIPPED | OUTPATIENT
Start: 2024-10-07

## 2024-10-07 RX ORDER — BUPROPION HYDROCHLORIDE 300 MG/1
300 TABLET ORAL DAILY
Qty: 90 TABLET | Refills: 3 | Status: SHIPPED | OUTPATIENT
Start: 2024-10-07

## 2024-10-07 RX ORDER — FERROUS SULFATE 325(65) MG
325 TABLET ORAL EVERY OTHER DAY
Qty: 45 TABLET | Refills: 1 | Status: SHIPPED | OUTPATIENT
Start: 2024-10-07

## 2024-10-07 RX ORDER — PRAMIPEXOLE DIHYDROCHLORIDE 0.5 MG/1
0.5 TABLET ORAL 2 TIMES DAILY
Qty: 180 TABLET | Refills: 3 | Status: SHIPPED | OUTPATIENT
Start: 2024-10-07

## 2024-10-07 RX ORDER — TROSPIUM CHLORIDE 20 MG/1
20 TABLET, FILM COATED ORAL 2 TIMES DAILY
Qty: 180 TABLET | Refills: 3 | Status: SHIPPED | OUTPATIENT
Start: 2024-10-07

## 2024-10-07 NOTE — PROGRESS NOTES
Subjective   The ABCs of the Annual Wellness Visit  Medicare Wellness Visit      Shani Phillip is a 74 y.o. patient who presents for a Medicare Wellness Visit.    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  And bileaflet thickening of mitral valve.  COPD with chronic hypoxic respiratory failure/oxygen dependent, CKD stage 3b, chronic back pain with sciatica for which she uses the hydrocodone rarely (maybe once a week.  Patient with history of lap band and should not vomit.         The following portions of the patient's history were reviewed and updated as appropriate: allergies, current medications, past family history, past medical history, past social history, past surgical history, and problem list.  Compared to one year ago, the patient's physical health is the same.  Compared to one year ago, the patient's mental health is the same.    Recent Hospitalizations:  She was not admitted to the hospital during the last year.     Current Medical Providers:  Patient Care Team:  Rodríguez Walker MD as PCP - General (Internal Medicine)  Mike Atkins MD as Surgeon (General Surgery)  Hayes Briones MD as Surgeon (Cardiothoracic Surgery)  Zenia Tinajero MD as Consulting Physician (Cardiology)  Clover Zamora MD as Consulting Physician (Pulmonary Disease)  Angy Smith APRN as Nurse Practitioner (Cardiology)    Outpatient Medications Prior to Visit   Medication Sig Dispense Refill    ammonium lactate (LAC-HYDRIN) 12 % lotion APPLY TOPICALLY TO THE APPROPRIATE AREA AS DIRECTED AS NEEDED FOR DRY SKIN. 225 g 6    budesonide (PULMICORT) 0.5 MG/2ML nebulizer solution USE 1 VIAL IN NEBULIZER DAILY - rinse mouth after treatment 30 each 11    bumetanide (BUMEX) 2 MG tablet Take 2 tablets in the morning and 1 tablet in the evening 90 tablet 0    cyclobenzaprine (FLEXERIL) 10 MG tablet Take 1 tablet by mouth 3 (Three) Times a Day As Needed (back  pain). 40 tablet 1    digoxin (LANOXIN) 125 MCG tablet Take 1 tablet by mouth Every Night. 90 tablet 2    famotidine (PEPCID) 20 MG tablet TAKE 1 TABLET BY MOUTH 2 TIMES A DAY BEFORE MEALS. 180 tablet 3    HYDROcodone-acetaminophen (NORCO) 7.5-325 MG per tablet Take 1 tablet by mouth Every 6 (Six) Hours As Needed for Moderate Pain. 30 tablet 0    ipratropium-albuterol (DUO-NEB) 0.5-2.5 mg/3 ml nebulizer USE 1 VIAL IN NEBULIZER 4 TIMES DAILY - as directed 360 mL 11    levothyroxine (SYNTHROID, LEVOTHROID) 50 MCG tablet Take 1 tablet by mouth Every Morning. 90 tablet 1    meclizine (ANTIVERT) 12.5 MG tablet Take 1 tablet by mouth 3 (Three) Times a Day As Needed for Dizziness. 12 tablet 0    O2 (OXYGEN) Inhale 2 L/min 1 (One) Time.      potassium chloride ER (K-TAB) 20 MEQ tablet controlled-release ER tablet Take 1 tablet by mouth Daily.      revefenacin (Yupelri) 175 MCG/3ML nebulizer solution Take 3 mL by nebulization Daily.      spironolactone (ALDACTONE) 25 MG tablet Take 1 tablet by mouth Daily. 30 tablet 3    warfarin (COUMADIN) 2.5 MG tablet Take one-half of a tablet (1.25 MG) by mouth on Monday, Wednesday and Saturday and take one tablet (2.5 mg)  by mouth all other days or as directed 75 tablet 1    buPROPion XL (WELLBUTRIN XL) 300 MG 24 hr tablet TAKE 1 TABLET BY MOUTH EVERY DAY 90 tablet 1    ferrous sulfate 325 (65 FE) MG tablet TAKE 1 TABLET BY MOUTH EVERY OTHER DAY 45 tablet 1    pantoprazole (PROTONIX) 40 MG EC tablet TAKE 1 TABLET BY MOUTH EVERY DAY 90 tablet 1    pramipexole (MIRAPEX) 0.5 MG tablet TAKE 1 TABLET BY MOUTH TWICE A  tablet 1    trospium (SANCTURA) 20 MG tablet TAKE 1 TABLET BY MOUTH TWICE A  tablet 1    ondansetron (ZOFRAN) 4 MG tablet Take 1 tablet by mouth Every 8 (Eight) Hours As Needed for Nausea or Vomiting. 30 tablet 1     No facility-administered medications prior to visit.     Opioid medication/s are on active medication list.  and I have evaluated her active  "treatment plan and pain score trends (see table).  Vitals:    10/07/24 0948   PainSc: 0-No pain     I have reviewed the chart for potential of high risk medication and harmful drug interactions in the elderly.        Aspirin is not on active medication list.  Aspirin use is contraindicated for this patient due to: current use of warfarin.  .    Patient Active Problem List   Diagnosis    Influenza    Thrush, oral    COPD (chronic obstructive pulmonary disease)    Atrial fibrillation with RVR    Chronic respiratory failure with hypoxia    Endocarditis of mitral valve    Mitral valve vegetation    Chronic diastolic CHF (congestive heart failure)    Atrial fibrillation, chronic    Bilateral lower extremity edema    Intermittent lightheadedness    Depression    Chronic midline low back pain with left-sided sciatica    Nephrolithiasis    Oxygen dependent    Medicare annual wellness visit, subsequent    Osteoporosis    CKD (chronic kidney disease) stage 3, GFR 30-59 ml/min    Chronic anticoagulation    Nonrheumatic mitral valve stenosis    Chest pain, unspecified type    Chronic heart failure with preserved ejection fraction (HFpEF)    Overweight with body mass index (BMI) 25.0-29.9    Acquired hypothyroidism     Advance Care Planning Advance Directive is on file.  ACP discussion was held with the patient during this visit. Patient has an advance directive in EMR which is still valid.       Objective   Vitals:    10/07/24 0948   BP: 126/66   BP Location: Right arm   Patient Position: Sitting   Cuff Size: Adult   Pulse: 73   Temp: 98.2 °F (36.8 °C)   TempSrc: Temporal   SpO2: (!) 85%   Weight: 66.7 kg (147 lb)   Height: 154.9 cm (61\")   PainSc: 0-No pain       Estimated body mass index is 27.78 kg/m² as calculated from the following:    Height as of this encounter: 154.9 cm (61\").    Weight as of this encounter: 66.7 kg (147 lb).    BMI is >= 25 and <30. (Overweight) The following options were offered after discussion;: " weight loss educational material (shared in after visit summary), exercise counseling/recommendations, and nutrition counseling/recommendations     Does the patient have evidence of cognitive impairment? No, mini cog 5 out of 5 score                                                                                              Health  Risk Assessment    Smoking Status:  Social History     Tobacco Use   Smoking Status Former    Current packs/day: 0.00    Average packs/day: 0.5 packs/day for 50.0 years (25.0 ttl pk-yrs)    Types: Cigarettes    Start date: 11/3/1969    Quit date: 11/3/2019    Years since quittin.9    Passive exposure: Past (parents smoked in the home)   Smokeless Tobacco Never   Tobacco Comments    LAST CIG 2019     Alcohol Consumption:  Social History     Substance and Sexual Activity   Alcohol Use No    Comment: caffeine - 1/2 cup coffee daily        Fall Risk Screen  STEADI Fall Risk Assessment was completed, and patient is at LOW risk for falls.Assessment completed on:10/7/2024    Depression Screening:      10/7/2024     9:48 AM   PHQ-2/PHQ-9 Depression Screening   Little Interest or Pleasure in Doing Things 0-->not at all   Feeling Down, Depressed or Hopeless 0-->not at all   PHQ-9: Brief Depression Severity Measure Score 0     Health Habits and Functional and Cognitive Screening:      10/7/2024     9:47 AM   Functional & Cognitive Status   Do you have difficulty preparing food and eating? Yes   Do you have difficulty bathing yourself, getting dressed or grooming yourself? No   Do you have difficulty using the toilet? No   Do you have difficulty moving around from place to place? Yes   Do you have trouble with steps or getting out of a bed or a chair? Yes   Current Diet Well Balanced Diet   Dental Exam Up to date   Eye Exam Up to date   Exercise (times per week) 0 times per week   Current Exercises Include No Regular Exercise   Do you need help using the phone?  No   Are you deaf or do  you have serious difficulty hearing?  No   Do you need help to go to places out of walking distance? Yes   Do you need help shopping? Yes   Do you need help preparing meals?  Yes   Do you need help with housework?  Yes   Do you need help with laundry? Yes   Do you need help taking your medications? No   Do you need help managing money? No   Do you ever drive or ride in a car without wearing a seat belt? No   Have you felt unusual stress, anger or loneliness in the last month? No   Who do you live with? Spouse   If you need help, do you have trouble finding someone available to you? No   Have you been bothered in the last four weeks by sexual problems? No   Do you have difficulty concentrating, remembering or making decisions? No           Physical Exam  Vitals and nursing note reviewed.   Constitutional:       General: She is not in acute distress.     Appearance: She is well-developed. She is not diaphoretic.      Comments: Using oxygen concentrator   HENT:      Head: Normocephalic and atraumatic.      Right Ear: External ear normal.      Left Ear: External ear normal.      Nose: Nose normal.   Eyes:      Conjunctiva/sclera: Conjunctivae normal.      Pupils: Pupils are equal, round, and reactive to light.   Neck:      Thyroid: No thyromegaly.      Trachea: No tracheal deviation.   Cardiovascular:      Rate and Rhythm: Normal rate and regular rhythm.      Heart sounds: Normal heart sounds. No murmur heard.     No friction rub. No gallop.   Pulmonary:      Effort: Pulmonary effort is normal. No respiratory distress.      Breath sounds: Wheezing present.   Abdominal:      General: Bowel sounds are normal.      Palpations: Abdomen is soft. There is no mass.      Tenderness: There is no abdominal tenderness. There is no guarding.   Musculoskeletal:         General: Normal range of motion.      Cervical back: Normal range of motion and neck supple.      Comments: Sittting in wheelchair   Lymphadenopathy:      Cervical:  No cervical adenopathy.   Skin:     General: Skin is warm and dry.      Capillary Refill: Capillary refill takes less than 2 seconds.      Findings: No rash.   Neurological:      Mental Status: She is alert and oriented to person, place, and time.      Motor: No abnormal muscle tone.      Deep Tendon Reflexes: Reflexes normal.   Psychiatric:         Behavior: Behavior normal.         Thought Content: Thought content normal.         Judgment: Judgment normal.        Age-appropriate Screening Schedule:  Refer to the list below for future screening recommendations based on patient's age, sex and/or medical conditions. Orders for these recommended tests are listed in the plan section. The patient has been provided with a written plan.    Health Maintenance List  Health Maintenance   Topic Date Due    TDAP/TD VACCINES (1 - Tdap) Never done    INFLUENZA VACCINE  08/01/2024    COVID-19 Vaccine (5 - 2023-24 season) 09/01/2024    COLORECTAL CANCER SCREENING  03/21/2025    ANNUAL WELLNESS VISIT  10/07/2025    BMI FOLLOWUP  10/07/2025    HEPATITIS C SCREENING  Completed    RSV Vaccine - Adults  Completed    Pneumococcal Vaccine 65+  Completed    ZOSTER VACCINE  Completed    DXA SCAN  Discontinued    LUNG CANCER SCREENING  Discontinued                                                                                                                                                CMS Preventative Services Quick Reference  Risk Factors Identified During Encounter  Immunizations Discussed/Encouraged: Tdap, Influenza, and COVID19  Diagnoses and all orders for this visit:    1. Medicare annual wellness visit, subsequent (Primary)    2. Acquired hypothyroidism  -     TSH Rfx On Abnormal To Free T4    3. Chronic obstructive pulmonary disease, unspecified COPD type    4. Stage 3b chronic kidney disease  -     Comprehensive Metabolic Panel    5. Oxygen dependent    6. Chronic respiratory failure with hypoxia    7. Chronic diastolic CHF  (congestive heart failure)    8. Chronic heart failure with preserved ejection fraction (HFpEF)  -     Comprehensive Metabolic Panel  -     Lipid Panel    9. Depression, unspecified depression type  -     buPROPion XL (WELLBUTRIN XL) 300 MG 24 hr tablet; Take 1 tablet by mouth Daily.  Dispense: 90 tablet; Refill: 3    10. Anemia, unspecified type  -     ferrous sulfate 325 (65 FE) MG tablet; Take 1 tablet by mouth Every Other Day.  Dispense: 45 tablet; Refill: 1    11. Gastroesophageal reflux disease without esophagitis  -     pantoprazole (PROTONIX) 40 MG EC tablet; Take 1 tablet by mouth Daily.  Dispense: 90 tablet; Refill: 1    12. RLS (restless legs syndrome)  -     pramipexole (MIRAPEX) 0.5 MG tablet; Take 1 tablet by mouth 2 (Two) Times a Day.  Dispense: 180 tablet; Refill: 3    13. Overweight with body mass index (BMI) 25.0-29.9    Other orders  -     trospium (SANCTURA) 20 MG tablet; Take 1 tablet by mouth 2 (Two) Times a Day.  Dispense: 180 tablet; Refill: 3      Reviewed history and annual wellness visit with patient during office time.  Medications reviewed as appropriate.  Discussed advanced directives and living will.  Patient has living will: Living will: Directive already scanned in chart.  Discussed fall risk and precautions encourage removing throw rugs and using grab bars within the home and bathroom.  Will check the labs as ordered above to evaluate the blood sugars, kidney, liver, cholesterol for screening.  Discussed flu shot recommended to get the high-dose influenza vaccine annually in the fall.  The patient has started, but not completed, their COVID-19 vaccination series..  Prevnar-13 and pneumovax-23 up to date and appropriate.  Tdap, COVID booster, and influenza vaccinations discussed and recommended and patient to have at pharmacy.  Encourage follow-up with the eye doctor on annual basis for glaucoma evaluation.  Discussed weight and encouraged exercise as tolerated while following a  healthy diet.  Colon cancer screening discussed and current status: colonoscopy last completed 3/21/2022 with repeat after 3 years recommended due to polyps.  Recommend to get annual mammograms and Dexa every 2 years but patient declined bopth.  Follow-up with specialists as scheduled.      The above risks/problems have been discussed with the patient.  Pertinent information has been shared with the patient in the After Visit Summary.  An After Visit Summary and PPPS were made available to the patient.    Follow Up:   Next Medicare Wellness visit to be scheduled in 1 year.

## 2024-10-07 NOTE — PATIENT INSTRUCTIONS
Medicare Wellness  Personal Prevention Plan of Service     Date of Office Visit:    Encounter Provider:  Rodríguez Walker MD  Place of Service:  [facility]  Patient Name: Shani Phillip  :  1950    As part of the Medicare Wellness portion of your visit today, we are providing you with this personalized preventive plan of services (PPPS). This plan is based upon recommendations of the [place] Preventive Services Task Force (USPSTF) and the Advisory Committee on Immunization Practices (ACIP).    This lists the preventive care services that should be considered, and provides dates of when you are due. Items listed as completed are up-to-date and do not require any further intervention.    Health Maintenance   Topic Date Due    TDAP/TD VACCINES (1 - Tdap) Never done    INFLUENZA VACCINE  2024    COVID-19 Vaccine (5 - - season) 2024    COLORECTAL CANCER SCREENING  2025    ANNUAL WELLNESS VISIT  10/07/2025    BMI FOLLOWUP  10/07/2025    HEPATITIS C SCREENING  Completed    RSV Vaccine - Adults  Completed    Pneumococcal Vaccine 65+  Completed    ZOSTER VACCINE  Completed    DXA SCAN  Discontinued    LUNG CANCER SCREENING  Discontinued       Orders Placed This Encounter   Procedures    Comprehensive Metabolic Panel     Order Specific Question:   Release to patient     Answer:   Routine Release [[id number]]    Lipid Panel     Order Specific Question:   Release to patient     Answer:   Routine Release [[id number]]    TSH Rfx On Abnormal To Free T4     Order Specific Question:   Release to patient     Answer:   Routine Release [[id number]]       Return in about 1 year (around 10/7/2025) for Medicare Wellness.

## 2024-10-08 LAB
ALBUMIN SERPL-MCNC: 4 G/DL (ref 3.5–5.2)
ALBUMIN/GLOB SERPL: 1.2 G/DL
ALP SERPL-CCNC: 128 U/L (ref 39–117)
ALT SERPL-CCNC: 11 U/L (ref 1–33)
AST SERPL-CCNC: 14 U/L (ref 1–32)
BILIRUB SERPL-MCNC: 0.4 MG/DL (ref 0–1.2)
BUN SERPL-MCNC: 24 MG/DL (ref 8–23)
BUN/CREAT SERPL: 17.8 (ref 7–25)
CALCIUM SERPL-MCNC: 9.3 MG/DL (ref 8.6–10.5)
CHLORIDE SERPL-SCNC: 95 MMOL/L (ref 98–107)
CHOLEST SERPL-MCNC: 183 MG/DL (ref 0–200)
CO2 SERPL-SCNC: 27.1 MMOL/L (ref 22–29)
CREAT SERPL-MCNC: 1.35 MG/DL (ref 0.57–1)
EGFRCR SERPLBLD CKD-EPI 2021: 41.1 ML/MIN/1.73
GLOBULIN SER CALC-MCNC: 3.3 GM/DL
GLUCOSE SERPL-MCNC: 156 MG/DL (ref 65–99)
HDLC SERPL-MCNC: 54 MG/DL (ref 40–60)
LDLC SERPL CALC-MCNC: 107 MG/DL (ref 0–100)
POTASSIUM SERPL-SCNC: 4 MMOL/L (ref 3.5–5.2)
PROT SERPL-MCNC: 7.3 G/DL (ref 6–8.5)
SODIUM SERPL-SCNC: 137 MMOL/L (ref 136–145)
T4 FREE SERPL-MCNC: 1.71 NG/DL (ref 0.93–1.7)
TRIGL SERPL-MCNC: 126 MG/DL (ref 0–150)
TSH SERPL DL<=0.005 MIU/L-ACNC: 5.6 UIU/ML (ref 0.27–4.2)
VLDLC SERPL CALC-MCNC: 22 MG/DL (ref 5–40)

## 2024-10-08 RX ORDER — BUMETANIDE 2 MG/1
TABLET ORAL
Qty: 270 TABLET | Refills: 1 | Status: SHIPPED | OUTPATIENT
Start: 2024-10-08

## 2024-10-11 ENCOUNTER — ANTICOAGULATION VISIT (OUTPATIENT)
Dept: PHARMACY | Facility: HOSPITAL | Age: 74
End: 2024-10-11
Payer: MEDICARE

## 2024-10-11 DIAGNOSIS — I48.20 ATRIAL FIBRILLATION, CHRONIC: Primary | Chronic | ICD-10-CM

## 2024-10-11 LAB — INR PPP: 4.9

## 2024-10-11 NOTE — PROGRESS NOTES
Anticoagulation Clinic Progress Note    Anticoagulation Summary  As of 10/11/2024      INR goal:  2.0-3.0   TTR:  53.9% (3.6 y)   INR used for dosin.90 (10/11/2024)   Warfarin maintenance plan:  3.75 mg every Thu; 2.5 mg all other days   Weekly warfarin total:  18.75 mg   Plan last modified:  Marleny Black RPH (10/11/2024)   Next INR check:  10/18/2024   Priority:  High   Target end date:      Indications    Atrial fibrillation  chronic [I48.20]                 Anticoagulation Episode Summary       INR check location:      Preferred lab:      Send INR reminders to:   [facility] ANTICOAG HOME TEST POOL    Comments:   Home Testing as of 21 *call only when out of range*          Anticoagulation Care Providers       Provider Role Specialty Phone number    Zenia Tinajero MD Referring Cardiology [phone]            Clinic Interview:  Patient Findings     Negatives:  Signs/symptoms of thrombosis, Signs/symptoms of bleeding,   Laboratory test error suspected, Change in health, Change in alcohol use,   Change in activity, Upcoming invasive procedure, Emergency department   visit, Upcoming dental procedure, Missed doses, Extra doses, Change in   medications, Change in diet/appetite, Hospital admission, Bruising, Other   complaints      Clinical Outcomes     Negatives:  Major bleeding event, Thromboembolic event,   Anticoagulation-related hospital admission, Anticoagulation-related ED   visit, Anticoagulation-related fatality        INR History:      2024    11:38 AM 2024    12:00 AM 2024     8:00 AM 10/3/2024    12:00 AM 10/3/2024    10:43 AM 10/11/2024    12:00 AM 10/11/2024     2:04 PM   Anticoagulation Monitoring   INR 1.10  2.10  3.90  4.90   INR Date 2024  2024  10/3/2024  10/11/2024   INR Goal 2.0-3.0  2.0-3.0  2.0-3.0  2.0-3.0   Trend Up  Same  Same  Down   Last Week Total 17.5 mg  20 mg  20 mg  20 mg   Next Week Total 20 mg  20 mg  16.25 mg  16.25 mg   Sun 2.5 mg  2.5 mg  2.5  mg  2.5 mg   Mon 2.5 mg  2.5 mg  2.5 mg  2.5 mg   Tue 3.75 mg  3.75 mg  3.75 mg  2.5 mg   Wed 2.5 mg  2.5 mg  2.5 mg  2.5 mg   Thu 3.75 mg  -  Hold (10/3)  3.75 mg   Fri 2.5 mg  2.5 mg  2.5 mg  Hold (10/11)   Sat 2.5 mg  2.5 mg  2.5 mg  2.5 mg   Historical INR  2.10      3.90      4.90            This result is from an external source.       Plan:  1. INR is Supratherapeutic today- see above in Anticoagulation Summary.   Will instruct Shani Phillip to Change their warfarin regimen- see above in Anticoagulation Summary.  hold and trial on 3.75 mg on thurs, 2.5 mg URMILA, sheeba 1 week   2. Follow up in 1 weeks  3. They have been instructed to call if any changes in medications, doses, concerns, etc. Patient expresses understanding and has no further questions at this time.    Marleny Black [facility]

## 2024-10-18 ENCOUNTER — ANTICOAGULATION VISIT (OUTPATIENT)
Dept: PHARMACY | Facility: HOSPITAL | Age: 74
End: 2024-10-18
Payer: MEDICARE

## 2024-10-18 DIAGNOSIS — I48.20 ATRIAL FIBRILLATION, CHRONIC: Primary | Chronic | ICD-10-CM

## 2024-10-18 LAB — INR PPP: 4.8

## 2024-10-18 NOTE — PROGRESS NOTES
Anticoagulation Clinic Progress Note    Anticoagulation Summary  As of 10/18/2024      INR goal:  2.0-3.0   TTR:  53.7% (3.6 y)   INR used for dosin.80 (10/18/2024)   Warfarin maintenance plan:  2.5 mg every day   Weekly warfarin total:  17.5 mg   Plan last modified:  Marleny Black RPH (10/18/2024)   Next INR check:  10/25/2024   Priority:  High   Target end date:  --    Indications    Atrial fibrillation  chronic [I48.20]                 Anticoagulation Episode Summary       INR check location:  --    Preferred lab:  --    Send INR reminders to:   [facility] treadalong HOME TEST POOL    Comments:   Home Testing as of 21 *call only when out of range*          Anticoagulation Care Providers       Provider Role Specialty Phone number    Zenia Tinajero MD Referring Cardiology [phone]            Clinic Interview:  Patient Findings     Negatives:  Signs/symptoms of thrombosis, Signs/symptoms of bleeding,   Laboratory test error suspected, Change in health, Change in alcohol use,   Change in activity, Upcoming invasive procedure, Emergency department   visit, Upcoming dental procedure, Missed doses, Extra doses, Change in   medications, Change in diet/appetite, Hospital admission, Bruising, Other   complaints      Clinical Outcomes     Negatives:  Major bleeding event, Thromboembolic event,   Anticoagulation-related hospital admission, Anticoagulation-related ED   visit, Anticoagulation-related fatality        INR History:      2024     8:00 AM 10/3/2024    12:00 AM 10/3/2024    10:43 AM 10/11/2024    12:00 AM 10/11/2024     2:04 PM 10/18/2024    12:00 AM 10/18/2024    10:13 AM   Anticoagulation Monitoring   INR 2.10  3.90  4.90  4.80   INR Date 2024  10/3/2024  10/11/2024  10/18/2024   INR Goal 2.0-3.0  2.0-3.0  2.0-3.0  2.0-3.0   Trend Same  Same  Down  Down   Last Week Total 20 mg  20 mg  20 mg  16.25 mg   Next Week Total 20 mg  16.25 mg  16.25 mg  12.5 mg   Sun 2.5 mg  2.5 mg  2.5 mg  2.5 mg    Mon 2.5 mg  2.5 mg  2.5 mg  2.5 mg   Tue 3.75 mg  3.75 mg  2.5 mg  2.5 mg   Wed 2.5 mg  2.5 mg  2.5 mg  2.5 mg   Thu -  Hold (10/3)  3.75 mg  2.5 mg   Fri 2.5 mg  2.5 mg  Hold (10/11)  Hold (10/18)   Sat 2.5 mg  2.5 mg  2.5 mg  Hold (10/19)   Historical INR  3.90      4.90      4.80            This result is from an external source.       Plan:  1. INR is Supratherapeutic today- see above in Anticoagulation Summary.   Will instruct Shani Phillip to Change their warfarin regimen- see above in Anticoagulation Summary.  Hold x 2 days, and trial on 2.5 mg daily (confirmed dose), rck 1 week   2. Follow up in 1 weeks  3. They have been instructed to call if any changes in medications, doses, concerns, etc. Patient expresses understanding and has no further questions at this time.    Marleny Black [facility]

## 2024-10-24 ENCOUNTER — ANTICOAGULATION VISIT (OUTPATIENT)
Dept: PHARMACY | Facility: HOSPITAL | Age: 74
End: 2024-10-24
Payer: MEDICARE

## 2024-10-24 DIAGNOSIS — I48.20 ATRIAL FIBRILLATION, CHRONIC: Primary | Chronic | ICD-10-CM

## 2024-10-24 LAB — INR PPP: 1.8

## 2024-10-25 NOTE — PROGRESS NOTES
Anticoagulation Clinic Progress Note    Anticoagulation Summary  As of 10/24/2024      INR goal:  2.0-3.0   TTR:  53.6% (3.6 y)   INR used for dosin.80 (10/24/2024)   Warfarin maintenance plan:  2.5 mg every day   Weekly warfarin total:  17.5 mg   No change documented:  Marleny Black RPH   Plan last modified:  Marleny Black RPH (10/18/2024)   Next INR check:  10/31/2024   Priority:  High   Target end date:  --    Indications    Atrial fibrillation  chronic [I48.20]                 Anticoagulation Episode Summary       INR check location:  --    Preferred lab:  --    Send INR reminders to:   [facility] [employer] HOME TEST POOL    Comments:   Home Testing as of 21 *call only when out of range*          Anticoagulation Care Providers       Provider Role Specialty Phone number    Zenia Tinajero MD Referring Cardiology [phone]            Clinic Interview:  Patient Findings     Negatives:  Signs/symptoms of thrombosis, Signs/symptoms of bleeding,   Laboratory test error suspected, Change in health, Change in alcohol use,   Change in activity, Upcoming invasive procedure, Emergency department   visit, Upcoming dental procedure, Missed doses, Extra doses, Change in   medications, Change in diet/appetite, Hospital admission, Bruising, Other   complaints      Clinical Outcomes     Negatives:  Major bleeding event, Thromboembolic event,   Anticoagulation-related hospital admission, Anticoagulation-related ED   visit, Anticoagulation-related fatality        INR History:      10/3/2024    10:43 AM 10/11/2024    12:00 AM 10/11/2024     2:04 PM 10/18/2024    12:00 AM 10/18/2024    10:13 AM 10/24/2024    12:00 AM 10/24/2024     2:45 PM   Anticoagulation Monitoring   INR 3.90  4.90  4.80  1.80   INR Date 10/3/2024  10/11/2024  10/18/2024  10/24/2024   INR Goal 2.0-3.0  2.0-3.0  2.0-3.0  2.0-3.0   Trend Same  Down  Down  Same   Last Week Total 20 mg  20 mg  16.25 mg  13.75 mg   Next Week Total 16.25 mg  16.25 mg   12.5 mg  17.5 mg   Sun 2.5 mg  2.5 mg  2.5 mg  2.5 mg   Mon 2.5 mg  2.5 mg  2.5 mg  2.5 mg   Tue 3.75 mg  2.5 mg  2.5 mg  2.5 mg   Wed 2.5 mg  2.5 mg  2.5 mg  2.5 mg   Thu Hold (10/3)  3.75 mg  2.5 mg  2.5 mg   Fri 2.5 mg  Hold (10/11)  Hold (10/18)  2.5 mg   Sat 2.5 mg  2.5 mg  Hold (10/19)  2.5 mg   Historical INR  4.90      4.80      1.80            This result is from an external source.       Plan:  1. INR is Subtherapeutic today- see above in Anticoagulation Summary.   Will instruct Shani Phillip to continue their warfarin regimen- see above in Anticoagulation Summary.  continue 2.5 mg daily (17.5 mg versus 13.75 mg over the past week), rck 1 week   2. Follow up in 1 weeks  3. They have been instructed to call if any changes in medications, doses, concerns, etc. Patient expresses understanding and has no further questions at this time.    Marleny Black [facility]

## 2024-10-27 DIAGNOSIS — E03.9 HYPOTHYROIDISM, UNSPECIFIED TYPE: ICD-10-CM

## 2024-10-28 ENCOUNTER — TELEPHONE (OUTPATIENT)
Dept: CARDIOLOGY | Facility: CLINIC | Age: 74
End: 2024-10-28
Payer: MEDICARE

## 2024-10-28 RX ORDER — LEVOTHYROXINE SODIUM 50 UG/1
50 TABLET ORAL EVERY MORNING
Qty: 90 TABLET | Refills: 1 | Status: SHIPPED | OUTPATIENT
Start: 2024-10-28

## 2024-10-28 RX ORDER — SPIRONOLACTONE 25 MG/1
25 TABLET ORAL DAILY
Qty: 90 TABLET | Refills: 1 | Status: SHIPPED | OUTPATIENT
Start: 2024-10-28

## 2024-10-28 NOTE — TELEPHONE ENCOUNTER
Faxed clearance to [facility] at [phone].  Spoke with pt.  No other questions.  Verbalized understanding.  (Done)  
Patient is at acceptable cardiovascular risk to proceed with cataract surgery without additional testing or examination.      GENET Sanchez  [facility]   [address]  [place] [address]  Ph: [phone]  Fax: [phone]         
Pt called re: Does she need to hold any meds for Cataract surgery?    I called [facility] at [phone] re: Pt does NOT need to hold any meds for cataract surgery under [facility].  They do need a cardiac clearance faxed to them at [phone].      Please advise if ok to proceed.    
no chest pain, no cough, and no shortness of breath.

## 2024-10-31 ENCOUNTER — ANTICOAGULATION VISIT (OUTPATIENT)
Dept: PHARMACY | Facility: HOSPITAL | Age: 74
End: 2024-10-31
Payer: MEDICARE

## 2024-10-31 DIAGNOSIS — I48.20 ATRIAL FIBRILLATION, CHRONIC: Primary | Chronic | ICD-10-CM

## 2024-10-31 LAB — INR PPP: 3.4

## 2024-10-31 PROCEDURE — G0249 PROVIDE INR TEST MATER/EQUIP: HCPCS

## 2024-10-31 NOTE — PROGRESS NOTES
Anticoagulation Clinic Progress Note    Anticoagulation Summary  As of 10/31/2024      INR goal:  2.0-3.0   TTR:  53.6% (3.7 y)   INR used for dosing:  3.40 (10/31/2024)   Warfarin maintenance plan:  2.5 mg every day   Weekly warfarin total:  17.5 mg   Plan last modified:  Marleny Black RPH (10/18/2024)   Next INR check:  11/7/2024   Priority:  High   Target end date:  --    Indications    Atrial fibrillation  chronic [I48.20]                 Anticoagulation Episode Summary       INR check location:  --    Preferred lab:  --    Send INR reminders to:   [facility] [employer] HOME TEST POOL    Comments:   Home Testing as of 7/30/21 *call only when out of range*          Anticoagulation Care Providers       Provider Role Specialty Phone number    Zenia Tinajero MD Referring Cardiology [phone]            Clinic Interview:      INR History:      10/11/2024     2:04 PM 10/18/2024    12:00 AM 10/18/2024    10:13 AM 10/24/2024    12:00 AM 10/24/2024     2:45 PM 10/31/2024    12:00 AM 10/31/2024     3:15 PM   Anticoagulation Monitoring   INR 4.90  4.80  1.80  3.40   INR Date 10/11/2024  10/18/2024  10/24/2024  10/31/2024   INR Goal 2.0-3.0  2.0-3.0  2.0-3.0  2.0-3.0   Trend Down  Down  Same  Same   Last Week Total 20 mg  16.25 mg  13.75 mg  17.5 mg   Next Week Total 16.25 mg  12.5 mg  17.5 mg  16.25 mg   Sun 2.5 mg  2.5 mg  2.5 mg  2.5 mg   Mon 2.5 mg  2.5 mg  2.5 mg  2.5 mg   Tue 2.5 mg  2.5 mg  2.5 mg  2.5 mg   Wed 2.5 mg  2.5 mg  2.5 mg  2.5 mg   Thu 3.75 mg  2.5 mg  2.5 mg  1.25 mg (10/31)   Fri Hold (10/11)  Hold (10/18)  2.5 mg  2.5 mg   Sat 2.5 mg  Hold (10/19)  2.5 mg  2.5 mg   Historical INR  4.80      1.80      3.40            This result is from an external source.       Plan:  1. INR is Supratherapeutic today- see above in Anticoagulation Summary.   Will instruct Shani Phillip to Change their warfarin regimen- see above in Anticoagulation Summary.  2. Follow up in 1 weeks  3. They have been instructed to  call if any changes in medications, doses, concerns, etc. Patient expresses understanding and has no further questions at this time.    Zenia Myers, PharmD

## 2024-11-07 ENCOUNTER — ANTICOAGULATION VISIT (OUTPATIENT)
Dept: PHARMACY | Facility: HOSPITAL | Age: 74
End: 2024-11-07
Payer: MEDICARE

## 2024-11-07 DIAGNOSIS — I48.20 ATRIAL FIBRILLATION, CHRONIC: Primary | Chronic | ICD-10-CM

## 2024-11-07 LAB — INR PPP: 2.6

## 2024-11-07 NOTE — PROGRESS NOTES
Anticoagulation Clinic Progress Note    Anticoagulation Summary  As of 2024      INR goal:  2.0-3.0   TTR:  53.6% (3.7 y)   INR used for dosin.60 (2024)   Warfarin maintenance plan:  2.5 mg every day   Weekly warfarin total:  17.5 mg   No change documented:  Zenia Myers, PharmD   Plan last modified:  Marleny Black RPH (10/18/2024)   Next INR check:  2024   Priority:  High   Target end date:  --    Indications    Atrial fibrillation  chronic [I48.20]                 Anticoagulation Episode Summary       INR check location:  --    Preferred lab:  --    Send INR reminders to:   [facility] [employer] HOME TEST POOL    Comments:   Home Testing as of 21 *call only when out of range*          Anticoagulation Care Providers       Provider Role Specialty Phone number    Zenia Tinajero MD Referring Cardiology [phone]            Clinic Interview:  Patient Findings     Negatives:  Signs/symptoms of thrombosis, Signs/symptoms of bleeding,   Laboratory test error suspected, Change in health, Change in alcohol use,   Change in activity, Upcoming invasive procedure, Emergency department   visit, Upcoming dental procedure, Missed doses, Extra doses, Change in   medications, Change in diet/appetite, Hospital admission, Bruising, Other   complaints      Clinical Outcomes     Negatives:  Major bleeding event, Thromboembolic event,   Anticoagulation-related hospital admission, Anticoagulation-related ED   visit, Anticoagulation-related fatality        INR History:      10/18/2024    10:13 AM 10/24/2024    12:00 AM 10/24/2024     2:45 PM 10/31/2024    12:00 AM 10/31/2024     3:15 PM 2024    12:00 AM 2024     1:14 PM   Anticoagulation Monitoring   INR 4.80  1.80  3.40  2.60   INR Date 10/18/2024  10/24/2024  10/31/2024  2024   INR Goal 2.0-3.0  2.0-3.0  2.0-3.0  2.0-3.0   Trend Down  Same  Same  Same   Last Week Total 16.25 mg  13.75 mg  17.5 mg  16.25 mg   Next Week Total 12.5 mg  17.5  mg  16.25 mg  17.5 mg   Sun 2.5 mg  2.5 mg  2.5 mg  2.5 mg   Mon 2.5 mg  2.5 mg  2.5 mg  2.5 mg   Tue 2.5 mg  2.5 mg  2.5 mg  2.5 mg   Wed 2.5 mg  2.5 mg  2.5 mg  2.5 mg   Thu 2.5 mg  2.5 mg  1.25 mg (10/31)  2.5 mg   Fri Hold (10/18)  2.5 mg  2.5 mg  2.5 mg   Sat Hold (10/19)  2.5 mg  2.5 mg  2.5 mg   Historical INR  1.80      3.40      2.60            This result is from an external source.       Plan:  1. INR is Therapeutic today- see above in Anticoagulation Summary.   Will instruct Shani Phillip to Continue their warfarin regimen- see above in Anticoagulation Summary.  2. Follow up in 1 weeks  3. They have been instructed to call if any changes in medications, doses, concerns, etc. Patient expresses understanding and has no further questions at this time.    Zenia Myers, PharmD

## 2024-11-12 ENCOUNTER — TELEPHONE (OUTPATIENT)
Dept: FAMILY MEDICINE CLINIC | Facility: CLINIC | Age: 74
End: 2024-11-12
Payer: MEDICARE

## 2024-11-12 NOTE — TELEPHONE ENCOUNTER
[employer] called regarding patient information stated they need at patient's request. I spoke with the patient who stated she was contacted by the company and is not interested. Please disregard any faxes from [employer]

## 2024-11-14 ENCOUNTER — ANTICOAGULATION VISIT (OUTPATIENT)
Dept: PHARMACY | Facility: HOSPITAL | Age: 74
End: 2024-11-14
Payer: MEDICARE

## 2024-11-14 DIAGNOSIS — I48.20 ATRIAL FIBRILLATION, CHRONIC: Primary | Chronic | ICD-10-CM

## 2024-11-14 LAB — INR PPP: 1.9

## 2024-11-14 NOTE — PROGRESS NOTES
Anticoagulation Clinic Progress Note    Anticoagulation Summary  As of 2024      INR goal:  2.0-3.0   TTR:  53.8% (3.7 y)   INR used for dosin.90 (2024)   Warfarin maintenance plan:  2.5 mg every day   Weekly warfarin total:  17.5 mg   No change documented:  Zenia Myers, PharmD   Plan last modified:  Marleny Black RPH (10/18/2024)   Next INR check:  2024   Priority:  High   Target end date:  --    Indications    Atrial fibrillation  chronic [I48.20]                 Anticoagulation Episode Summary       INR check location:  --    Preferred lab:  --    Send INR reminders to:   [facility] [employer] HOME TEST POOL    Comments:   Home Testing as of 21 *call only when out of range*          Anticoagulation Care Providers       Provider Role Specialty Phone number    Zenia Tinajero MD Referring Cardiology [phone]            Clinic Interview:  Patient Findings     Negatives:  Signs/symptoms of thrombosis, Signs/symptoms of bleeding,   Laboratory test error suspected, Change in health, Change in alcohol use,   Change in activity, Upcoming invasive procedure, Emergency department   visit, Upcoming dental procedure, Missed doses, Extra doses, Change in   medications, Change in diet/appetite, Hospital admission, Bruising, Other   complaints      Clinical Outcomes     Negatives:  Major bleeding event, Thromboembolic event,   Anticoagulation-related hospital admission, Anticoagulation-related ED   visit, Anticoagulation-related fatality        INR History:      10/24/2024     2:45 PM 10/31/2024    12:00 AM 10/31/2024     3:15 PM 2024    12:00 AM 2024     1:14 PM 2024    12:00 AM 2024     1:40 PM   Anticoagulation Monitoring   INR 1.80  3.40  2.60  1.90   INR Date 10/24/2024  10/31/2024  2024  2024   INR Goal 2.0-3.0  2.0-3.0  2.0-3.0  2.0-3.0   Trend Same  Same  Same  Same   Last Week Total 13.75 mg  17.5 mg  16.25 mg  17.5 mg   Next Week Total 17.5 mg  16.25  mg  17.5 mg  17.5 mg   Sun 2.5 mg  2.5 mg  2.5 mg  2.5 mg   Mon 2.5 mg  2.5 mg  2.5 mg  2.5 mg   Tue 2.5 mg  2.5 mg  2.5 mg  2.5 mg   Wed 2.5 mg  2.5 mg  2.5 mg  2.5 mg   Thu 2.5 mg  1.25 mg (10/31)  2.5 mg  2.5 mg   Fri 2.5 mg  2.5 mg  2.5 mg  2.5 mg   Sat 2.5 mg  2.5 mg  2.5 mg  2.5 mg   Historical INR  3.40      2.60      1.90            This result is from an external source.       Plan:  1. INR is Subtherapeutic today- see above in Anticoagulation Summary.   Will instruct Shani Phillip to Continue their warfarin regimen- see above in Anticoagulation Summary.  2. Follow up in 1 weeks  3. They have been instructed to call if any changes in medications, doses, concerns, etc. Patient expresses understanding and has no further questions at this time.    Zenia Myers, PharmD

## 2024-11-18 DIAGNOSIS — G25.81 RLS (RESTLESS LEGS SYNDROME): ICD-10-CM

## 2024-11-18 RX ORDER — PRAMIPEXOLE DIHYDROCHLORIDE 0.5 MG/1
0.5 TABLET ORAL 2 TIMES DAILY
Qty: 180 TABLET | Refills: 1 | OUTPATIENT
Start: 2024-11-18

## 2024-11-18 NOTE — TELEPHONE ENCOUNTER
LOV                   10/7/2024  NOV                   around 10/7/2025   Last refill             10/7/24  Protocol              met

## 2024-11-21 ENCOUNTER — ANTICOAGULATION VISIT (OUTPATIENT)
Dept: PHARMACY | Facility: HOSPITAL | Age: 74
End: 2024-11-21
Payer: MEDICARE

## 2024-11-21 DIAGNOSIS — I48.20 ATRIAL FIBRILLATION, CHRONIC: Primary | Chronic | ICD-10-CM

## 2024-11-21 LAB — INR PPP: 2.1

## 2024-11-21 NOTE — PROGRESS NOTES
Anticoagulation Clinic Progress Note    Anticoagulation Summary  As of 2024      INR goal:  2.0-3.0   TTR:  53.7% (3.7 y)   INR used for dosin.10 (2024)   Warfarin maintenance plan:  2.5 mg every day   Weekly warfarin total:  17.5 mg   No change documented:  Zenia Myers, PharmD   Plan last modified:  Marleny Black RPH (10/18/2024)   Next INR check:  2024   Priority:  High   Target end date:  --    Indications    Atrial fibrillation  chronic [I48.20]                 Anticoagulation Episode Summary       INR check location:  --    Preferred lab:  --    Send INR reminders to:   [facility] [employer] HOME TEST POOL    Comments:   Home Testing as of 21 *call only when out of range*          Anticoagulation Care Providers       Provider Role Specialty Phone number    Zenia Tinajero MD Referring Cardiology [phone]            Clinic Interview:  No pertinent clinical findings have been reported.    INR History:      10/31/2024     3:15 PM 2024    12:00 AM 2024     1:14 PM 2024    12:00 AM 2024     1:40 PM 2024    12:00 AM 2024    11:52 AM   Anticoagulation Monitoring   INR 3.40  2.60  1.90  2.10   INR Date 10/31/2024  2024  2024  2024   INR Goal 2.0-3.0  2.0-3.0  2.0-3.0  2.0-3.0   Trend Same  Same  Same  Same   Last Week Total 17.5 mg  16.25 mg  17.5 mg  17.5 mg   Next Week Total 16.25 mg  17.5 mg  17.5 mg  17.5 mg   Sun 2.5 mg  2.5 mg  2.5 mg  2.5 mg   Mon 2.5 mg  2.5 mg  2.5 mg  2.5 mg   Tue 2.5 mg  2.5 mg  2.5 mg  2.5 mg   Wed 2.5 mg  2.5 mg  2.5 mg  2.5 mg   Thu 1.25 mg (10/31)  2.5 mg  2.5 mg  2.5 mg   Fri 2.5 mg  2.5 mg  2.5 mg  2.5 mg   Sat 2.5 mg  2.5 mg  2.5 mg  2.5 mg   Historical INR  2.60      1.90      2.10            This result is from an external source.       Plan:  1. INR is therapeutic today- see above in Anticoagulation Summary.    Shani Phillip to continue their warfarin regimen- see above in Anticoagulation  Summary.  2. Follow up in 1 week  3. Pt has agreed to only be called if INR out of range. They have been instructed to call if any changes in medications, doses, concerns, etc. Patient expresses understanding and has no further questions at this time.    Zenia Myers, PharmD

## 2024-11-29 LAB — INR PPP: 1.8

## 2024-12-02 ENCOUNTER — ANTICOAGULATION VISIT (OUTPATIENT)
Dept: PHARMACY | Facility: HOSPITAL | Age: 74
End: 2024-12-02
Payer: MEDICARE

## 2024-12-02 DIAGNOSIS — G25.81 RLS (RESTLESS LEGS SYNDROME): ICD-10-CM

## 2024-12-02 DIAGNOSIS — I48.20 ATRIAL FIBRILLATION, CHRONIC: Primary | Chronic | ICD-10-CM

## 2024-12-02 PROCEDURE — G0249 PROVIDE INR TEST MATER/EQUIP: HCPCS

## 2024-12-02 RX ORDER — PRAMIPEXOLE DIHYDROCHLORIDE 0.5 MG/1
0.5 TABLET ORAL 2 TIMES DAILY
Qty: 180 TABLET | Refills: 3 | Status: SHIPPED | OUTPATIENT
Start: 2024-12-02

## 2024-12-02 NOTE — TELEPHONE ENCOUNTER
Caller: Shani Phillip    Relationship: Self    Best call back number: [phone]     Requested Prescriptions:   Requested Prescriptions     Pending Prescriptions Disp Refills    pramipexole (MIRAPEX) 0.5 MG tablet 180 tablet 3     Sig: Take 1 tablet by mouth 2 (Two) Times a Day.        Pharmacy where request should be sent: [facility] [address] [place] [address] [address] [place] [address] - [phone]  - [phone] FX     Last office visit with prescribing clinician: 10/7/2024   Last telemedicine visit with prescribing clinician: Visit date not found   Next office visit with prescribing clinician: Visit date not found     Additional details provided by patient: PATIENT STATED SHE HAS BEEN OUT OF THIS MEDICATION FOR THREE WEEKS.    PATIENT STATED SHE HAS BEEN UNABLE TO GET THIS PRESCRIPTION REFILLED AT [facility], AS THEY KEEP TELLING HER IT WILL BE READY, THEN IT IS NOT.    PATIENT HAS CHANGED TO [facility] AND WILL NO LONGER BE USING [facility].    Does the patient have less than a 3 day supply:  [x] Yes  [] No    Would you like a call back once the refill request has been completed: [] Yes [x] No    If the office needs to give you a call back, can they leave a voicemail: [] Yes [x] No    Tran Millard Rep   12/02/24 13:42 EST

## 2024-12-02 NOTE — PROGRESS NOTES
Anticoagulation Clinic Progress Note    Anticoagulation Summary  As of 2024      INR goal:  2.0-3.0   TTR:  53.6% (3.7 y)   INR used for dosin.80 (2024)   Warfarin maintenance plan:  2.5 mg every day   Weekly warfarin total:  17.5 mg   Plan last modified:  Marleny Black [facility] (10/18/2024)   Next INR check:  2024   Priority:  High   Target end date:  --    Indications    Atrial fibrillation  chronic [I48.20]                 Anticoagulation Episode Summary       INR check location:  --    Preferred lab:  --    Send INR reminders to:   [facility] [employer] HOME TEST POOL    Comments:   Home Testing as of 21 *call only when out of range*          Anticoagulation Care Providers       Provider Role Specialty Phone number    Zenia Tinajero MD Referring Cardiology [phone]            Clinic Interview:  Patient Findings     Negatives:  Signs/symptoms of thrombosis, Signs/symptoms of bleeding,   Laboratory test error suspected, Change in health, Change in alcohol use,   Change in activity, Upcoming invasive procedure, Emergency department   visit, Upcoming dental procedure, Missed doses, Extra doses, Change in   medications, Change in diet/appetite, Hospital admission, Bruising, Other   complaints      Clinical Outcomes     Negatives:  Major bleeding event, Thromboembolic event,   Anticoagulation-related hospital admission, Anticoagulation-related ED   visit, Anticoagulation-related fatality        INR History:      2024     1:14 PM 2024    12:00 AM 2024     1:40 PM 2024    12:00 AM 2024    11:52 AM 2024    12:00 AM 2024     9:15 AM   Anticoagulation Monitoring   INR 2.60  1.90  2.10  1.80   INR Date 2024   INR Goal 2.0-3.0  2.0-3.0  2.0-3.0  2.0-3.0   Trend Same  Same  Same  Same   Last Week Total 16.25 mg  17.5 mg  17.5 mg  17.5 mg   Next Week Total 17.5 mg  17.5 mg  17.5 mg  20 mg   Sun 2.5 mg  2.5 mg  2.5 mg  -    Mon 2.5 mg  2.5 mg  2.5 mg  5 mg (12/2)   Tue 2.5 mg  2.5 mg  2.5 mg  2.5 mg   Wed 2.5 mg  2.5 mg  2.5 mg  2.5 mg   Thu 2.5 mg  2.5 mg  2.5 mg  2.5 mg   Fri 2.5 mg  2.5 mg  2.5 mg  -   Sat 2.5 mg  2.5 mg  2.5 mg  -   Historical INR  1.90      2.10      1.80            This result is from an external source.       Plan:  1. INR is Subtherapeutic today- see above in Anticoagulation Summary.   Will instruct Shani Phillip to take 5 mg tonight, 12/2/24, and continue their warfarin regimen- see above in Anticoagulation Summary.  2. Follow up in 4 days  3. They have been instructed to call if any changes in medications, doses, concerns, etc. Patient expresses understanding and has no further questions at this time.    Nicole Frazier [facility]

## 2024-12-06 ENCOUNTER — ANTICOAGULATION VISIT (OUTPATIENT)
Dept: PHARMACY | Facility: HOSPITAL | Age: 74
End: 2024-12-06
Payer: MEDICARE

## 2024-12-06 DIAGNOSIS — I48.20 ATRIAL FIBRILLATION, CHRONIC: Primary | Chronic | ICD-10-CM

## 2024-12-06 LAB — INR PPP: 3.6

## 2024-12-06 NOTE — PROGRESS NOTES
Anticoagulation Clinic Progress Note    Anticoagulation Summary  As of 12/6/2024      INR goal:  2.0-3.0   TTR:  53.6% (3.8 y)   INR used for dosing:  3.60 (12/6/2024)   Warfarin maintenance plan:  2.5 mg every day   Weekly warfarin total:  17.5 mg   Plan last modified:  Marleny Black [facility] (10/18/2024)   Next INR check:  12/13/2024   Priority:  High   Target end date:  --    Indications    Atrial fibrillation  chronic [I48.20]                 Anticoagulation Episode Summary       INR check location:  --    Preferred lab:  --    Send INR reminders to:   [facility] [employer] HOME TEST POOL    Comments:   Home Testing as of 7/30/21 *call only when out of range*          Anticoagulation Care Providers       Provider Role Specialty Phone number    Zenia Tinajero MD Referring Cardiology [phone]            Clinic Interview:  Patient Findings     Negatives:  Signs/symptoms of thrombosis, Signs/symptoms of bleeding,   Laboratory test error suspected, Change in health, Change in alcohol use,   Change in activity, Upcoming invasive procedure, Emergency department   visit, Upcoming dental procedure, Missed doses, Extra doses, Change in   medications, Change in diet/appetite, Hospital admission, Bruising, Other   complaints      Clinical Outcomes     Negatives:  Major bleeding event, Thromboembolic event,   Anticoagulation-related hospital admission, Anticoagulation-related ED   visit, Anticoagulation-related fatality        INR History:      11/14/2024     1:40 PM 11/21/2024    12:00 AM 11/21/2024    11:52 AM 11/29/2024    12:00 AM 12/2/2024     9:15 AM 12/6/2024    12:00 AM 12/6/2024    11:22 AM   Anticoagulation Monitoring   INR 1.90  2.10  1.80  3.60   INR Date 11/14/2024 11/21/2024 11/29/2024 12/6/2024   INR Goal 2.0-3.0  2.0-3.0  2.0-3.0  2.0-3.0   Trend Same  Same  Same  Same   Last Week Total 17.5 mg  17.5 mg  17.5 mg  20 mg   Next Week Total 17.5 mg  17.5 mg  20 mg  16.25 mg   Sun 2.5 mg  2.5 mg  -  2.5 mg    Mon 2.5 mg  2.5 mg  5 mg (12/2)  2.5 mg   Tue 2.5 mg  2.5 mg  2.5 mg  2.5 mg   Wed 2.5 mg  2.5 mg  2.5 mg  2.5 mg   Thu 2.5 mg  2.5 mg  2.5 mg  2.5 mg   Fri 2.5 mg  2.5 mg  -  1.25 mg (12/6)   Sat 2.5 mg  2.5 mg  -  2.5 mg   Historical INR  2.10      1.80      3.60            This result is from an external source.       Plan:  1. INR is Supratherapeutic today- see above in Anticoagulation Summary.   Will instruct Shani Phillip to Change their warfarin regimen- see above in Anticoagulation Summary.  2. Follow up in 1 weeks  3. They have been instructed to call if any changes in medications, doses, concerns, etc. Patient expresses understanding and has no further questions at this time.    Zenia Myers, PharmD

## 2024-12-12 ENCOUNTER — ANTICOAGULATION VISIT (OUTPATIENT)
Dept: PHARMACY | Facility: HOSPITAL | Age: 74
End: 2024-12-12
Payer: MEDICARE

## 2024-12-12 DIAGNOSIS — I48.20 ATRIAL FIBRILLATION, CHRONIC: Primary | Chronic | ICD-10-CM

## 2024-12-12 LAB — INR PPP: 2.5

## 2024-12-12 NOTE — PROGRESS NOTES
Anticoagulation Clinic Progress Note    Anticoagulation Summary  As of 2024      INR goal:  2.0-3.0   TTR:  53.6% (3.8 y)   INR used for dosin.50 (2024)   Warfarin maintenance plan:  2.5 mg every day   Weekly warfarin total:  17.5 mg   No change documented:  Marleny Black RPH   Plan last modified:  Marleny Black RPH (10/18/2024)   Next INR check:  2024   Priority:  High   Target end date:  --    Indications    Atrial fibrillation  chronic [I48.20]                 Anticoagulation Episode Summary       INR check location:  --    Preferred lab:  --    Send INR reminders to:   [facility] [employer] HOME TEST POOL    Comments:   Home Testing as of 21 *call only when out of range*          Anticoagulation Care Providers       Provider Role Specialty Phone number    Zenia Tinajero MD Referring Cardiology [phone]            Clinic Interview:  No pertinent clinical findings have been reported.    INR History:      2024    11:52 AM 2024    12:00 AM 2024     9:15 AM 2024    12:00 AM 2024    11:22 AM 2024    12:00 AM 2024    10:08 AM   Anticoagulation Monitoring   INR 2.10  1.80  3.60  2.50   INR Date 2024   INR Goal 2.0-3.0  2.0-3.0  2.0-3.0  2.0-3.0   Trend Same  Same  Same  Same   Last Week Total 17.5 mg  17.5 mg  20 mg  16.25 mg   Next Week Total 17.5 mg  20 mg  16.25 mg  17.5 mg   Sun 2.5 mg  -  2.5 mg  2.5 mg   Mon 2.5 mg  5 mg ()  2.5 mg  2.5 mg   Tue 2.5 mg  2.5 mg  2.5 mg  2.5 mg   Wed 2.5 mg  2.5 mg  2.5 mg  2.5 mg   Thu 2.5 mg  2.5 mg  2.5 mg  2.5 mg   Fri 2.5 mg  -  1.25 mg ()  2.5 mg   Sat 2.5 mg  -  2.5 mg  2.5 mg   Historical INR  1.80      3.60      2.50            This result is from an external source.       Plan:  1. INR is therapeutic today- see above in Anticoagulation Summary.    Shani Phillip to continue their warfarin regimen- see above in Anticoagulation Summary.  2. Follow up in  1 week  3. They have been instructed to call if any changes in medications, doses, concerns, etc. Patient expresses understanding and has no further questions at this time.    Marleny Black [facility]

## 2024-12-19 ENCOUNTER — ANTICOAGULATION VISIT (OUTPATIENT)
Dept: PHARMACY | Facility: HOSPITAL | Age: 74
End: 2024-12-19
Payer: MEDICARE

## 2024-12-19 ENCOUNTER — TELEPHONE (OUTPATIENT)
Dept: CARDIOLOGY | Facility: CLINIC | Age: 74
End: 2024-12-19
Payer: MEDICARE

## 2024-12-19 DIAGNOSIS — I48.20 ATRIAL FIBRILLATION, CHRONIC: Primary | Chronic | ICD-10-CM

## 2024-12-19 LAB — INR PPP: 2.5

## 2024-12-19 RX ORDER — WARFARIN SODIUM 2.5 MG/1
TABLET ORAL
Qty: 90 TABLET | Refills: 1 | Status: SHIPPED | OUTPATIENT
Start: 2024-12-19

## 2024-12-19 NOTE — TELEPHONE ENCOUNTER
[facility] pharmacy sent over a need for clarification request for Warfarin 2.5 mg dosage. They would like a new script sent with specific instructions please call pharmacy to advise.        [phone]

## 2024-12-19 NOTE — PROGRESS NOTES
Anticoagulation Clinic Progress Note    Anticoagulation Summary  As of 2024      INR goal:  2.0-3.0   TTR:  53.8% (3.8 y)   INR used for dosin.50 (2024)   Warfarin maintenance plan:  2.5 mg every day   Weekly warfarin total:  17.5 mg   No change documented:  Marleny Black RPH   Plan last modified:  Marleny Black RPH (10/18/2024)   Next INR check:  2024   Priority:  High   Target end date:  --    Indications    Atrial fibrillation  chronic [I48.20]                 Anticoagulation Episode Summary       INR check location:  --    Preferred lab:  --    Send INR reminders to:   [facility] [employer] HOME TEST POOL    Comments:   Home Testing as of 21 *call only when out of range*          Anticoagulation Care Providers       Provider Role Specialty Phone number    Zenia Tinajero MD Referring Cardiology [phone]            Clinic Interview:  No pertinent clinical findings have been reported.    INR History:      2024     9:15 AM 2024    12:00 AM 2024    11:22 AM 2024    12:00 AM 2024    10:08 AM 2024    12:00 AM 2024    11:14 AM   Anticoagulation Monitoring   INR 1.80  3.60  2.50  2.50   INR Date 2024   INR Goal 2.0-3.0  2.0-3.0  2.0-3.0  2.0-3.0   Trend Same  Same  Same  Same   Last Week Total 17.5 mg  20 mg  16.25 mg  17.5 mg   Next Week Total 20 mg  16.25 mg  17.5 mg  17.5 mg   Sun -  2.5 mg  2.5 mg  2.5 mg   Mon 5 mg ()  2.5 mg  2.5 mg  2.5 mg   Tue 2.5 mg  2.5 mg  2.5 mg  2.5 mg   Wed 2.5 mg  2.5 mg  2.5 mg  2.5 mg   Thu 2.5 mg  2.5 mg  2.5 mg  2.5 mg   Fri -  1.25 mg ()  2.5 mg  2.5 mg   Sat -  2.5 mg  2.5 mg  2.5 mg   Historical INR  3.60      2.50      2.50            This result is from an external source.       Plan:  1. INR is therapeutic today- see above in Anticoagulation Summary.    Shani Pihllip to continue their warfarin regimen- see above in Anticoagulation Summary.  2. Follow up in  1 week  3. Pt has agreed to only be called if INR out of range. They have been instructed to call if any changes in medications, doses, concerns, etc. Patient expresses understanding and has no further questions at this time.    Marleny Black, [facility]

## 2024-12-26 ENCOUNTER — ANTICOAGULATION VISIT (OUTPATIENT)
Dept: PHARMACY | Facility: HOSPITAL | Age: 74
End: 2024-12-26
Payer: MEDICARE

## 2024-12-26 DIAGNOSIS — I48.20 ATRIAL FIBRILLATION, CHRONIC: Primary | Chronic | ICD-10-CM

## 2024-12-26 LAB — INR PPP: 3.1

## 2024-12-26 NOTE — PROGRESS NOTES
Anticoagulation Clinic Progress Note    Anticoagulation Summary  As of 12/26/2024      INR goal:  2.0-3.0   TTR:  54.0% (3.8 y)   INR used for dosing:  3.10 (12/26/2024)   Warfarin maintenance plan:  2.5 mg every day   Weekly warfarin total:  17.5 mg   No change documented:  Marleny Black RPH   Plan last modified:  Marleny Black RPH (10/18/2024)   Next INR check:  1/2/2025   Priority:  High   Target end date:  --    Indications    Atrial fibrillation  chronic [I48.20]                 Anticoagulation Episode Summary       INR check location:  --    Preferred lab:  --    Send INR reminders to:   [facility] [employer] HOME TEST POOL    Comments:   Home Testing as of 7/30/21 *call only when out of range*          Anticoagulation Care Providers       Provider Role Specialty Phone number    Zenia Tinajero MD Referring Cardiology [phone]            Clinic Interview:  Patient Findings     Negatives:  Signs/symptoms of thrombosis, Signs/symptoms of bleeding,   Laboratory test error suspected, Change in health, Change in alcohol use,   Change in activity, Upcoming invasive procedure, Emergency department   visit, Upcoming dental procedure, Missed doses, Extra doses, Change in   medications, Change in diet/appetite, Hospital admission, Bruising, Other   complaints      Clinical Outcomes     Negatives:  Major bleeding event, Thromboembolic event,   Anticoagulation-related hospital admission, Anticoagulation-related ED   visit, Anticoagulation-related fatality        INR History:      12/6/2024    11:22 AM 12/12/2024    12:00 AM 12/12/2024    10:08 AM 12/19/2024    12:00 AM 12/19/2024    11:14 AM 12/26/2024    12:00 AM 12/26/2024    11:40 AM   Anticoagulation Monitoring   INR 3.60  2.50  2.50  3.10   INR Date 12/6/2024 12/12/2024 12/19/2024 12/26/2024   INR Goal 2.0-3.0  2.0-3.0  2.0-3.0  2.0-3.0   Trend Same  Same  Same  Same   Last Week Total 20 mg  16.25 mg  17.5 mg  17.5 mg   Next Week Total 16.25 mg  17.5 mg   17.5 mg  17.5 mg   Sun 2.5 mg  2.5 mg  2.5 mg  2.5 mg   Mon 2.5 mg  2.5 mg  2.5 mg  2.5 mg   Tue 2.5 mg  2.5 mg  2.5 mg  2.5 mg   Wed 2.5 mg  2.5 mg  2.5 mg  2.5 mg   Thu 2.5 mg  2.5 mg  2.5 mg  2.5 mg   Fri 1.25 mg (12/6)  2.5 mg  2.5 mg  2.5 mg   Sat 2.5 mg  2.5 mg  2.5 mg  2.5 mg   Historical INR  2.50      2.50      3.10            This result is from an external source.       Plan:  1. INR is Supratherapeutic today- see above in Anticoagulation Summary.   Will instruct Shani Phillip to Continue their warfarin regimen as INR is very close to goal - see above in Anticoagulation Summary.  2. Follow up in 1 weeks  3. They have been instructed to call if any changes in medications, doses, concerns, etc. Patient expresses understanding and has no further questions at this time.    Marleny Black [facility]

## 2025-01-01 ENCOUNTER — APPOINTMENT (OUTPATIENT)
Dept: GENERAL RADIOLOGY | Facility: HOSPITAL | Age: 75
DRG: 480 | End: 2025-01-01
Payer: MEDICARE

## 2025-01-01 ENCOUNTER — HOSPITAL ENCOUNTER (INPATIENT)
Facility: HOSPITAL | Age: 75
LOS: 8 days | DRG: 480 | End: 2025-07-11
Attending: EMERGENCY MEDICINE | Admitting: INTERNAL MEDICINE
Payer: MEDICARE

## 2025-01-01 ENCOUNTER — TELEPHONE (OUTPATIENT)
Dept: FAMILY MEDICINE CLINIC | Facility: CLINIC | Age: 75
End: 2025-01-01

## 2025-01-01 ENCOUNTER — APPOINTMENT (OUTPATIENT)
Dept: CT IMAGING | Facility: HOSPITAL | Age: 75
DRG: 480 | End: 2025-01-01
Payer: MEDICARE

## 2025-01-01 ENCOUNTER — APPOINTMENT (OUTPATIENT)
Dept: ULTRASOUND IMAGING | Facility: HOSPITAL | Age: 75
DRG: 480 | End: 2025-01-01
Payer: MEDICARE

## 2025-01-01 VITALS
SYSTOLIC BLOOD PRESSURE: 122 MMHG | HEIGHT: 61 IN | HEART RATE: 120 BPM | TEMPERATURE: 98.8 F | DIASTOLIC BLOOD PRESSURE: 48 MMHG | BODY MASS INDEX: 27.51 KG/M2 | OXYGEN SATURATION: 90 % | RESPIRATION RATE: 12 BRPM | WEIGHT: 145.72 LBS

## 2025-01-01 DIAGNOSIS — S72.142A CLOSED DISPLACED INTERTROCHANTERIC FRACTURE OF LEFT FEMUR, INITIAL ENCOUNTER: Primary | ICD-10-CM

## 2025-01-01 LAB
ABO GROUP BLD: NORMAL
ALBUMIN SERPL-MCNC: 2 G/DL (ref 3.5–5.2)
ALBUMIN SERPL-MCNC: 2.4 G/DL (ref 3.5–5.2)
ALBUMIN SERPL-MCNC: 2.4 G/DL (ref 3.5–5.2)
ALBUMIN SERPL-MCNC: 2.5 G/DL (ref 3.5–5.2)
ALBUMIN SERPL-MCNC: 2.6 G/DL (ref 3.5–5.2)
ALBUMIN SERPL-MCNC: 2.7 G/DL (ref 3.5–5.2)
ALBUMIN SERPL-MCNC: 2.8 G/DL (ref 3.5–5.2)
ALBUMIN SERPL-MCNC: 2.9 G/DL (ref 3.5–5.2)
ALBUMIN SERPL-MCNC: 3.3 G/DL (ref 3.5–5.2)
ALBUMIN/GLOB SERPL: 0.6 G/DL
ALBUMIN/GLOB SERPL: 0.7 G/DL
ALBUMIN/GLOB SERPL: 0.8 G/DL
ALP SERPL-CCNC: 103 U/L (ref 39–117)
ALP SERPL-CCNC: 104 U/L (ref 39–117)
ALP SERPL-CCNC: 131 U/L (ref 39–117)
ALP SERPL-CCNC: 137 U/L (ref 39–117)
ALP SERPL-CCNC: 96 U/L (ref 39–117)
ALP SERPL-CCNC: 97 U/L (ref 39–117)
ALT SERPL W P-5'-P-CCNC: 11 U/L (ref 1–33)
ALT SERPL W P-5'-P-CCNC: 12 U/L (ref 1–33)
ALT SERPL W P-5'-P-CCNC: 14 U/L (ref 1–33)
ALT SERPL W P-5'-P-CCNC: 17 U/L (ref 1–33)
ALT SERPL W P-5'-P-CCNC: 19 U/L (ref 1–33)
ALT SERPL W P-5'-P-CCNC: 21 U/L (ref 1–33)
ANION GAP SERPL CALCULATED.3IONS-SCNC: 11.4 MMOL/L (ref 5–15)
ANION GAP SERPL CALCULATED.3IONS-SCNC: 11.5 MMOL/L (ref 5–15)
ANION GAP SERPL CALCULATED.3IONS-SCNC: 12.3 MMOL/L (ref 5–15)
ANION GAP SERPL CALCULATED.3IONS-SCNC: 13 MMOL/L (ref 5–15)
ANION GAP SERPL CALCULATED.3IONS-SCNC: 13.6 MMOL/L (ref 5–15)
ANION GAP SERPL CALCULATED.3IONS-SCNC: 14.2 MMOL/L (ref 5–15)
ANION GAP SERPL CALCULATED.3IONS-SCNC: 21 MMOL/L (ref 5–15)
ANION GAP SERPL CALCULATED.3IONS-SCNC: 8.3 MMOL/L (ref 5–15)
ANION GAP SERPL CALCULATED.3IONS-SCNC: 9.4 MMOL/L (ref 5–15)
ANION GAP SERPL CALCULATED.3IONS-SCNC: 9.6 MMOL/L (ref 5–15)
ARTERIAL PATENCY WRIST A: ABNORMAL
ARTERIAL PATENCY WRIST A: ABNORMAL
ARTERIAL PATENCY WRIST A: POSITIVE
AST SERPL-CCNC: 16 U/L (ref 1–32)
AST SERPL-CCNC: 27 U/L (ref 1–32)
AST SERPL-CCNC: 28 U/L (ref 1–32)
AST SERPL-CCNC: 31 U/L (ref 1–32)
AST SERPL-CCNC: 42 U/L (ref 1–32)
AST SERPL-CCNC: 53 U/L (ref 1–32)
ATMOSPHERIC PRESS: 748.2 MMHG
ATMOSPHERIC PRESS: 748.4 MMHG
ATMOSPHERIC PRESS: 750.6 MMHG
BACTERIA SPEC AEROBE CULT: NO GROWTH
BACTERIA UR QL AUTO: ABNORMAL /HPF
BACTERIA UR QL AUTO: ABNORMAL /HPF
BASE EXCESS BLDA CALC-SCNC: -3.3 MMOL/L (ref 0–2)
BASE EXCESS BLDA CALC-SCNC: 3.6 MMOL/L (ref 0–2)
BASE EXCESS BLDA CALC-SCNC: 3.9 MMOL/L (ref 0–2)
BASOPHILS # BLD AUTO: 0 10*3/MM3 (ref 0–0.2)
BASOPHILS # BLD AUTO: 0.01 10*3/MM3 (ref 0–0.2)
BASOPHILS # BLD AUTO: 0.01 10*3/MM3 (ref 0–0.2)
BASOPHILS # BLD AUTO: 0.02 10*3/MM3 (ref 0–0.2)
BASOPHILS # BLD AUTO: 0.03 10*3/MM3 (ref 0–0.2)
BASOPHILS # BLD AUTO: 0.03 10*3/MM3 (ref 0–0.2)
BASOPHILS NFR BLD AUTO: 0 % (ref 0–1.5)
BASOPHILS NFR BLD AUTO: 0.1 % (ref 0–1.5)
BASOPHILS NFR BLD AUTO: 0.2 % (ref 0–1.5)
BASOPHILS NFR BLD AUTO: 0.3 % (ref 0–1.5)
BDY SITE: ABNORMAL
BH BB BLOOD EXPIRATION DATE: NORMAL
BH BB BLOOD TYPE BARCODE: 6200
BH BB DISPENSE STATUS: NORMAL
BH BB PRODUCT CODE: NORMAL
BH BB UNIT NUMBER: NORMAL
BILIRUB SERPL-MCNC: 0.3 MG/DL (ref 0–1.2)
BILIRUB SERPL-MCNC: 0.4 MG/DL (ref 0–1.2)
BILIRUB SERPL-MCNC: 0.4 MG/DL (ref 0–1.2)
BILIRUB SERPL-MCNC: 0.5 MG/DL (ref 0–1.2)
BILIRUB SERPL-MCNC: 0.5 MG/DL (ref 0–1.2)
BILIRUB SERPL-MCNC: 0.6 MG/DL (ref 0–1.2)
BILIRUB UR QL STRIP: NEGATIVE
BILIRUB UR QL STRIP: NEGATIVE
BLD GP AB SCN SERPL QL: NEGATIVE
BUN SERPL-MCNC: 24 MG/DL (ref 8–23)
BUN SERPL-MCNC: 25 MG/DL (ref 8–23)
BUN SERPL-MCNC: 25 MG/DL (ref 8–23)
BUN SERPL-MCNC: 28 MG/DL (ref 8–23)
BUN SERPL-MCNC: 31 MG/DL (ref 8–23)
BUN SERPL-MCNC: 31 MG/DL (ref 8–23)
BUN SERPL-MCNC: 35 MG/DL (ref 8–23)
BUN SERPL-MCNC: 41 MG/DL (ref 8–23)
BUN SERPL-MCNC: 44 MG/DL (ref 8–23)
BUN SERPL-MCNC: 49 MG/DL (ref 8–23)
BUN/CREAT SERPL: 12.3 (ref 7–25)
BUN/CREAT SERPL: 12.3 (ref 7–25)
BUN/CREAT SERPL: 13.5 (ref 7–25)
BUN/CREAT SERPL: 17.2 (ref 7–25)
BUN/CREAT SERPL: 17.9 (ref 7–25)
BUN/CREAT SERPL: 21.4 (ref 7–25)
BUN/CREAT SERPL: 22.1 (ref 7–25)
BUN/CREAT SERPL: 23.1 (ref 7–25)
BUN/CREAT SERPL: 25 (ref 7–25)
BUN/CREAT SERPL: 25.3 (ref 7–25)
CALCIUM SPEC-SCNC: 6.2 MG/DL (ref 8.6–10.5)
CALCIUM SPEC-SCNC: 7.6 MG/DL (ref 8.6–10.5)
CALCIUM SPEC-SCNC: 8.2 MG/DL (ref 8.6–10.5)
CALCIUM SPEC-SCNC: 8.3 MG/DL (ref 8.6–10.5)
CALCIUM SPEC-SCNC: 8.4 MG/DL (ref 8.6–10.5)
CALCIUM SPEC-SCNC: 8.4 MG/DL (ref 8.6–10.5)
CALCIUM SPEC-SCNC: 8.6 MG/DL (ref 8.6–10.5)
CALCIUM SPEC-SCNC: 9.2 MG/DL (ref 8.6–10.5)
CALCIUM SPEC-SCNC: 9.5 MG/DL (ref 8.6–10.5)
CALCIUM SPEC-SCNC: 9.6 MG/DL (ref 8.6–10.5)
CHLORIDE SERPL-SCNC: 100 MMOL/L (ref 98–107)
CHLORIDE SERPL-SCNC: 103 MMOL/L (ref 98–107)
CHLORIDE SERPL-SCNC: 104 MMOL/L (ref 98–107)
CHLORIDE SERPL-SCNC: 107 MMOL/L (ref 98–107)
CHLORIDE SERPL-SCNC: 111 MMOL/L (ref 98–107)
CHLORIDE SERPL-SCNC: 113 MMOL/L (ref 98–107)
CHLORIDE SERPL-SCNC: 116 MMOL/L (ref 98–107)
CHLORIDE SERPL-SCNC: 116 MMOL/L (ref 98–107)
CHLORIDE SERPL-SCNC: 93 MMOL/L (ref 98–107)
CHLORIDE SERPL-SCNC: 97 MMOL/L (ref 98–107)
CHOLEST SERPL-MCNC: 115 MG/DL (ref 0–200)
CLARITY UR: ABNORMAL
CLARITY UR: ABNORMAL
CO2 SERPL-SCNC: 20.8 MMOL/L (ref 22–29)
CO2 SERPL-SCNC: 21 MMOL/L (ref 22–29)
CO2 SERPL-SCNC: 21 MMOL/L (ref 22–29)
CO2 SERPL-SCNC: 21.5 MMOL/L (ref 22–29)
CO2 SERPL-SCNC: 22.6 MMOL/L (ref 22–29)
CO2 SERPL-SCNC: 23.4 MMOL/L (ref 22–29)
CO2 SERPL-SCNC: 23.7 MMOL/L (ref 22–29)
CO2 SERPL-SCNC: 24.7 MMOL/L (ref 22–29)
CO2 SERPL-SCNC: 25.4 MMOL/L (ref 22–29)
CO2 SERPL-SCNC: 28.6 MMOL/L (ref 22–29)
COLOR UR: ABNORMAL
COLOR UR: YELLOW
CREAT SERPL-MCNC: 1.13 MG/DL (ref 0.57–1)
CREAT SERPL-MCNC: 1.17 MG/DL (ref 0.57–1)
CREAT SERPL-MCNC: 1.24 MG/DL (ref 0.57–1)
CREAT SERPL-MCNC: 1.34 MG/DL (ref 0.57–1)
CREAT SERPL-MCNC: 1.74 MG/DL (ref 0.57–1)
CREAT SERPL-MCNC: 1.78 MG/DL (ref 0.57–1)
CREAT SERPL-MCNC: 2.03 MG/DL (ref 0.57–1)
CREAT SERPL-MCNC: 2.27 MG/DL (ref 0.57–1)
CREAT SERPL-MCNC: 2.74 MG/DL (ref 0.57–1)
CREAT SERPL-MCNC: 3.32 MG/DL (ref 0.57–1)
DEPRECATED RDW RBC AUTO: 44 FL (ref 37–54)
DEPRECATED RDW RBC AUTO: 45.7 FL (ref 37–54)
DEPRECATED RDW RBC AUTO: 47.6 FL (ref 37–54)
DEPRECATED RDW RBC AUTO: 48 FL (ref 37–54)
DEPRECATED RDW RBC AUTO: 48.4 FL (ref 37–54)
DEPRECATED RDW RBC AUTO: 48.6 FL (ref 37–54)
DEPRECATED RDW RBC AUTO: 49.9 FL (ref 37–54)
DEVICE COMMENT: ABNORMAL
DIGOXIN SERPL-MCNC: 0.7 NG/ML (ref 0.6–1.2)
DIGOXIN SERPL-MCNC: 0.9 NG/ML (ref 0.6–1.2)
DIGOXIN SERPL-MCNC: 1.1 NG/ML (ref 0.6–1.2)
DIGOXIN SERPL-MCNC: 1.2 NG/ML (ref 0.6–1.2)
DIGOXIN SERPL-MCNC: 1.3 NG/ML (ref 0.6–1.2)
DIGOXIN SERPL-MCNC: 1.3 NG/ML (ref 0.6–1.2)
DIGOXIN SERPL-MCNC: 1.4 NG/ML (ref 0.6–1.2)
EGFRCR SERPLBLD CKD-EPI 2021: 13.9 ML/MIN/1.73
EGFRCR SERPLBLD CKD-EPI 2021: 17.6 ML/MIN/1.73
EGFRCR SERPLBLD CKD-EPI 2021: 22 ML/MIN/1.73
EGFRCR SERPLBLD CKD-EPI 2021: 25.2 ML/MIN/1.73
EGFRCR SERPLBLD CKD-EPI 2021: 29.5 ML/MIN/1.73
EGFRCR SERPLBLD CKD-EPI 2021: 30.3 ML/MIN/1.73
EGFRCR SERPLBLD CKD-EPI 2021: 41.4 ML/MIN/1.73
EGFRCR SERPLBLD CKD-EPI 2021: 45.5 ML/MIN/1.73
EGFRCR SERPLBLD CKD-EPI 2021: 48.8 ML/MIN/1.73
EGFRCR SERPLBLD CKD-EPI 2021: 50.8 ML/MIN/1.73
EOSINOPHIL # BLD AUTO: 0 10*3/MM3 (ref 0–0.4)
EOSINOPHIL # BLD AUTO: 0 10*3/MM3 (ref 0–0.4)
EOSINOPHIL # BLD AUTO: 0.03 10*3/MM3 (ref 0–0.4)
EOSINOPHIL # BLD AUTO: 0.09 10*3/MM3 (ref 0–0.4)
EOSINOPHIL # BLD AUTO: 0.17 10*3/MM3 (ref 0–0.4)
EOSINOPHIL # BLD AUTO: 0.25 10*3/MM3 (ref 0–0.4)
EOSINOPHIL NFR BLD AUTO: 0 % (ref 0.3–6.2)
EOSINOPHIL NFR BLD AUTO: 0 % (ref 0.3–6.2)
EOSINOPHIL NFR BLD AUTO: 0.3 % (ref 0.3–6.2)
EOSINOPHIL NFR BLD AUTO: 0.8 % (ref 0.3–6.2)
EOSINOPHIL NFR BLD AUTO: 2.1 % (ref 0.3–6.2)
EOSINOPHIL NFR BLD AUTO: 2.1 % (ref 0.3–6.2)
ERYTHROCYTE [DISTWIDTH] IN BLOOD BY AUTOMATED COUNT: 13.3 % (ref 12.3–15.4)
ERYTHROCYTE [DISTWIDTH] IN BLOOD BY AUTOMATED COUNT: 13.6 % (ref 12.3–15.4)
ERYTHROCYTE [DISTWIDTH] IN BLOOD BY AUTOMATED COUNT: 13.6 % (ref 12.3–15.4)
ERYTHROCYTE [DISTWIDTH] IN BLOOD BY AUTOMATED COUNT: 13.8 % (ref 12.3–15.4)
ERYTHROCYTE [DISTWIDTH] IN BLOOD BY AUTOMATED COUNT: 14 % (ref 12.3–15.4)
ERYTHROCYTE [DISTWIDTH] IN BLOOD BY AUTOMATED COUNT: 14 % (ref 12.3–15.4)
ERYTHROCYTE [DISTWIDTH] IN BLOOD BY AUTOMATED COUNT: 14.1 % (ref 12.3–15.4)
GAS FLOW AIRWAY: 5 LPM
GEN 5 1HR TROPONIN T REFLEX: 69 NG/L
GLOBULIN UR ELPH-MCNC: 3.4 GM/DL
GLOBULIN UR ELPH-MCNC: 3.7 GM/DL
GLOBULIN UR ELPH-MCNC: 4 GM/DL
GLOBULIN UR ELPH-MCNC: 4.1 GM/DL
GLOBULIN UR ELPH-MCNC: 4.4 GM/DL
GLOBULIN UR ELPH-MCNC: 4.7 GM/DL
GLUCOSE BLDC GLUCOMTR-MCNC: 101 MG/DL (ref 70–130)
GLUCOSE BLDC GLUCOMTR-MCNC: 108 MG/DL (ref 70–130)
GLUCOSE BLDC GLUCOMTR-MCNC: 110 MG/DL (ref 70–130)
GLUCOSE BLDC GLUCOMTR-MCNC: 112 MG/DL (ref 70–130)
GLUCOSE BLDC GLUCOMTR-MCNC: 119 MG/DL (ref 70–130)
GLUCOSE BLDC GLUCOMTR-MCNC: 120 MG/DL (ref 70–130)
GLUCOSE BLDC GLUCOMTR-MCNC: 122 MG/DL (ref 70–130)
GLUCOSE BLDC GLUCOMTR-MCNC: 124 MG/DL (ref 70–130)
GLUCOSE BLDC GLUCOMTR-MCNC: 128 MG/DL (ref 70–130)
GLUCOSE BLDC GLUCOMTR-MCNC: 144 MG/DL (ref 70–130)
GLUCOSE BLDC GLUCOMTR-MCNC: 145 MG/DL (ref 70–130)
GLUCOSE BLDC GLUCOMTR-MCNC: 145 MG/DL (ref 70–130)
GLUCOSE BLDC GLUCOMTR-MCNC: 147 MG/DL (ref 70–130)
GLUCOSE BLDC GLUCOMTR-MCNC: 156 MG/DL (ref 70–130)
GLUCOSE BLDC GLUCOMTR-MCNC: 158 MG/DL (ref 70–130)
GLUCOSE BLDC GLUCOMTR-MCNC: 164 MG/DL (ref 70–130)
GLUCOSE BLDC GLUCOMTR-MCNC: 176 MG/DL (ref 70–130)
GLUCOSE BLDC GLUCOMTR-MCNC: 196 MG/DL (ref 70–130)
GLUCOSE BLDC GLUCOMTR-MCNC: 76 MG/DL (ref 70–130)
GLUCOSE BLDC GLUCOMTR-MCNC: 77 MG/DL (ref 70–130)
GLUCOSE BLDC GLUCOMTR-MCNC: 79 MG/DL (ref 70–130)
GLUCOSE BLDC GLUCOMTR-MCNC: 81 MG/DL (ref 70–130)
GLUCOSE BLDC GLUCOMTR-MCNC: 88 MG/DL (ref 70–130)
GLUCOSE BLDC GLUCOMTR-MCNC: 89 MG/DL (ref 70–130)
GLUCOSE BLDC GLUCOMTR-MCNC: 94 MG/DL (ref 70–130)
GLUCOSE BLDC GLUCOMTR-MCNC: 95 MG/DL (ref 70–130)
GLUCOSE BLDC GLUCOMTR-MCNC: 96 MG/DL (ref 70–130)
GLUCOSE SERPL-MCNC: 115 MG/DL (ref 65–99)
GLUCOSE SERPL-MCNC: 121 MG/DL (ref 65–99)
GLUCOSE SERPL-MCNC: 152 MG/DL (ref 65–99)
GLUCOSE SERPL-MCNC: 157 MG/DL (ref 65–99)
GLUCOSE SERPL-MCNC: 180 MG/DL (ref 65–99)
GLUCOSE SERPL-MCNC: 184 MG/DL (ref 65–99)
GLUCOSE SERPL-MCNC: 186 MG/DL (ref 65–99)
GLUCOSE SERPL-MCNC: 81 MG/DL (ref 65–99)
GLUCOSE SERPL-MCNC: 82 MG/DL (ref 65–99)
GLUCOSE SERPL-MCNC: 88 MG/DL (ref 65–99)
GLUCOSE UR STRIP-MCNC: NEGATIVE MG/DL
GLUCOSE UR STRIP-MCNC: NEGATIVE MG/DL
HCO3 BLDA-SCNC: 23.2 MMOL/L (ref 22–28)
HCO3 BLDA-SCNC: 29.3 MMOL/L (ref 22–28)
HCO3 BLDA-SCNC: 31.2 MMOL/L (ref 22–28)
HCT VFR BLD AUTO: 29.8 % (ref 34–46.6)
HCT VFR BLD AUTO: 29.9 % (ref 34–46.6)
HCT VFR BLD AUTO: 31.1 % (ref 34–46.6)
HCT VFR BLD AUTO: 34.1 % (ref 34–46.6)
HCT VFR BLD AUTO: 36.5 % (ref 34–46.6)
HCT VFR BLD AUTO: 37.7 % (ref 34–46.6)
HCT VFR BLD AUTO: 39.5 % (ref 34–46.6)
HDLC SERPL-MCNC: 35 MG/DL (ref 40–60)
HEMODILUTION: NO
HGB BLD-MCNC: 10 G/DL (ref 12–15.9)
HGB BLD-MCNC: 10 G/DL (ref 12–15.9)
HGB BLD-MCNC: 10.7 G/DL (ref 12–15.9)
HGB BLD-MCNC: 11.8 G/DL (ref 12–15.9)
HGB BLD-MCNC: 12.9 G/DL (ref 12–15.9)
HGB BLD-MCNC: 13 G/DL (ref 12–15.9)
HGB BLD-MCNC: 9.8 G/DL (ref 12–15.9)
HGB UR QL STRIP.AUTO: ABNORMAL
HGB UR QL STRIP.AUTO: ABNORMAL
HYALINE CASTS UR QL AUTO: ABNORMAL /LPF
HYALINE CASTS UR QL AUTO: ABNORMAL /LPF
IMM GRANULOCYTES # BLD AUTO: 0.05 10*3/MM3 (ref 0–0.05)
IMM GRANULOCYTES # BLD AUTO: 0.08 10*3/MM3 (ref 0–0.05)
IMM GRANULOCYTES # BLD AUTO: 0.09 10*3/MM3 (ref 0–0.05)
IMM GRANULOCYTES # BLD AUTO: 0.12 10*3/MM3 (ref 0–0.05)
IMM GRANULOCYTES # BLD AUTO: 0.16 10*3/MM3 (ref 0–0.05)
IMM GRANULOCYTES # BLD AUTO: 0.2 10*3/MM3 (ref 0–0.05)
IMM GRANULOCYTES NFR BLD AUTO: 0.6 % (ref 0–0.5)
IMM GRANULOCYTES NFR BLD AUTO: 0.7 % (ref 0–0.5)
IMM GRANULOCYTES NFR BLD AUTO: 0.8 % (ref 0–0.5)
IMM GRANULOCYTES NFR BLD AUTO: 0.9 % (ref 0–0.5)
IMM GRANULOCYTES NFR BLD AUTO: 1.1 % (ref 0–0.5)
IMM GRANULOCYTES NFR BLD AUTO: 1.7 % (ref 0–0.5)
INHALED O2 CONCENTRATION: 100 %
INHALED O2 CONCENTRATION: 62 %
INR PPP: 1.48 (ref 0.9–1.1)
INR PPP: 1.71 (ref 0.9–1.1)
INR PPP: 1.71 (ref 0.9–1.1)
INR PPP: 1.72 (ref 0.9–1.1)
INR PPP: 1.78 (ref 0.9–1.1)
INR PPP: 1.81 (ref 0.9–1.1)
INR PPP: 1.83 (ref 0.9–1.1)
INR PPP: 2.06 (ref 0.9–1.1)
INR PPP: 2.09 (ref 0.9–1.1)
INR PPP: 2.13 (ref 0.9–1.1)
INSPIRATORY TIME: 1
KETONES UR QL STRIP: NEGATIVE
KETONES UR QL STRIP: NEGATIVE
LDLC SERPL CALC-MCNC: 57 MG/DL (ref 0–100)
LDLC/HDLC SERPL: 1.54 {RATIO}
LEUKOCYTE ESTERASE UR QL STRIP.AUTO: ABNORMAL
LEUKOCYTE ESTERASE UR QL STRIP.AUTO: ABNORMAL
LYMPHOCYTES # BLD AUTO: 0.41 10*3/MM3 (ref 0.7–3.1)
LYMPHOCYTES # BLD AUTO: 0.44 10*3/MM3 (ref 0.7–3.1)
LYMPHOCYTES # BLD AUTO: 0.53 10*3/MM3 (ref 0.7–3.1)
LYMPHOCYTES # BLD AUTO: 0.55 10*3/MM3 (ref 0.7–3.1)
LYMPHOCYTES # BLD AUTO: 0.59 10*3/MM3 (ref 0.7–3.1)
LYMPHOCYTES # BLD AUTO: 0.63 10*3/MM3 (ref 0.7–3.1)
LYMPHOCYTES NFR BLD AUTO: 2.2 % (ref 19.6–45.3)
LYMPHOCYTES NFR BLD AUTO: 3.3 % (ref 19.6–45.3)
LYMPHOCYTES NFR BLD AUTO: 5.3 % (ref 19.6–45.3)
LYMPHOCYTES NFR BLD AUTO: 5.4 % (ref 19.6–45.3)
LYMPHOCYTES NFR BLD AUTO: 5.9 % (ref 19.6–45.3)
LYMPHOCYTES NFR BLD AUTO: 6.4 % (ref 19.6–45.3)
Lab: ABNORMAL
Lab: ABNORMAL
MAGNESIUM SERPL-MCNC: 2.4 MG/DL (ref 1.6–2.4)
MAGNESIUM SERPL-MCNC: 2.5 MG/DL (ref 1.6–2.4)
MCH RBC QN AUTO: 30.5 PG (ref 26.6–33)
MCH RBC QN AUTO: 30.6 PG (ref 26.6–33)
MCH RBC QN AUTO: 30.7 PG (ref 26.6–33)
MCH RBC QN AUTO: 30.7 PG (ref 26.6–33)
MCH RBC QN AUTO: 31.1 PG (ref 26.6–33)
MCH RBC QN AUTO: 31.1 PG (ref 26.6–33)
MCH RBC QN AUTO: 31.2 PG (ref 26.6–33)
MCHC RBC AUTO-ENTMCNC: 31.4 G/DL (ref 31.5–35.7)
MCHC RBC AUTO-ENTMCNC: 32.2 G/DL (ref 31.5–35.7)
MCHC RBC AUTO-ENTMCNC: 32.3 G/DL (ref 31.5–35.7)
MCHC RBC AUTO-ENTMCNC: 32.8 G/DL (ref 31.5–35.7)
MCHC RBC AUTO-ENTMCNC: 32.9 G/DL (ref 31.5–35.7)
MCHC RBC AUTO-ENTMCNC: 33.6 G/DL (ref 31.5–35.7)
MCHC RBC AUTO-ENTMCNC: 34.2 G/DL (ref 31.5–35.7)
MCV RBC AUTO: 91.1 FL (ref 79–97)
MCV RBC AUTO: 92.5 FL (ref 79–97)
MCV RBC AUTO: 92.7 FL (ref 79–97)
MCV RBC AUTO: 94.9 FL (ref 79–97)
MCV RBC AUTO: 95.1 FL (ref 79–97)
MCV RBC AUTO: 95.4 FL (ref 79–97)
MCV RBC AUTO: 97.4 FL (ref 79–97)
MODALITY: ABNORMAL
MONOCYTES # BLD AUTO: 0.45 10*3/MM3 (ref 0.1–0.9)
MONOCYTES # BLD AUTO: 0.51 10*3/MM3 (ref 0.1–0.9)
MONOCYTES # BLD AUTO: 0.65 10*3/MM3 (ref 0.1–0.9)
MONOCYTES # BLD AUTO: 0.7 10*3/MM3 (ref 0.1–0.9)
MONOCYTES # BLD AUTO: 0.77 10*3/MM3 (ref 0.1–0.9)
MONOCYTES # BLD AUTO: 0.85 10*3/MM3 (ref 0.1–0.9)
MONOCYTES NFR BLD AUTO: 2.2 % (ref 5–12)
MONOCYTES NFR BLD AUTO: 5.9 % (ref 5–12)
MONOCYTES NFR BLD AUTO: 6.2 % (ref 5–12)
MONOCYTES NFR BLD AUTO: 6.5 % (ref 5–12)
MONOCYTES NFR BLD AUTO: 6.8 % (ref 5–12)
MONOCYTES NFR BLD AUTO: 7.5 % (ref 5–12)
NEUTROPHILS NFR BLD AUTO: 10.02 10*3/MM3 (ref 1.7–7)
NEUTROPHILS NFR BLD AUTO: 11.15 10*3/MM3 (ref 1.7–7)
NEUTROPHILS NFR BLD AUTO: 19.32 10*3/MM3 (ref 1.7–7)
NEUTROPHILS NFR BLD AUTO: 6.97 10*3/MM3 (ref 1.7–7)
NEUTROPHILS NFR BLD AUTO: 7.96 10*3/MM3 (ref 1.7–7)
NEUTROPHILS NFR BLD AUTO: 84.1 % (ref 42.7–76)
NEUTROPHILS NFR BLD AUTO: 84.7 % (ref 42.7–76)
NEUTROPHILS NFR BLD AUTO: 85.3 % (ref 42.7–76)
NEUTROPHILS NFR BLD AUTO: 86.6 % (ref 42.7–76)
NEUTROPHILS NFR BLD AUTO: 89.1 % (ref 42.7–76)
NEUTROPHILS NFR BLD AUTO: 9.48 10*3/MM3 (ref 1.7–7)
NEUTROPHILS NFR BLD AUTO: 94.7 % (ref 42.7–76)
NITRITE UR QL STRIP: NEGATIVE
NITRITE UR QL STRIP: NEGATIVE
NOTIFIED WHO: ABNORMAL
NOTIFIED WHO: ABNORMAL
NRBC BLD AUTO-RTO: 0 /100 WBC (ref 0–0.2)
NT-PROBNP SERPL-MCNC: ABNORMAL PG/ML (ref 0–1800)
O2 A-A PPRESDIFF RESPIRATORY: 0.1 MMHG
O2 A-A PPRESDIFF RESPIRATORY: 0.3 MMHG
OSMOLALITY UR: 348 MOSM/KG
PCO2 BLDA: 46.1 MM HG (ref 35–45)
PCO2 BLDA: 47.3 MM HG (ref 35–45)
PCO2 BLDA: 58.2 MM HG (ref 35–45)
PEEP RESPIRATORY: 6 CM[H2O]
PH BLDA: 7.3 PH UNITS (ref 7.35–7.45)
PH BLDA: 7.34 PH UNITS (ref 7.35–7.45)
PH BLDA: 7.41 PH UNITS (ref 7.35–7.45)
PH UR STRIP.AUTO: 5.5 [PH] (ref 5–8)
PH UR STRIP.AUTO: 7 [PH] (ref 5–8)
PHOSPHATE SERPL-MCNC: 1.4 MG/DL (ref 2.5–4.5)
PHOSPHATE SERPL-MCNC: 2 MG/DL (ref 2.5–4.5)
PHOSPHATE SERPL-MCNC: 2.3 MG/DL (ref 2.5–4.5)
PHOSPHATE SERPL-MCNC: 2.9 MG/DL (ref 2.5–4.5)
PHOSPHATE SERPL-MCNC: 4.3 MG/DL (ref 2.5–4.5)
PHOSPHATE SERPL-MCNC: 4.3 MG/DL (ref 2.5–4.5)
PHOSPHATE SERPL-MCNC: 5 MG/DL (ref 2.5–4.5)
PLATELET # BLD AUTO: 192 10*3/MM3 (ref 140–450)
PLATELET # BLD AUTO: 194 10*3/MM3 (ref 140–450)
PLATELET # BLD AUTO: 196 10*3/MM3 (ref 140–450)
PLATELET # BLD AUTO: 231 10*3/MM3 (ref 140–450)
PLATELET # BLD AUTO: 319 10*3/MM3 (ref 140–450)
PLATELET # BLD AUTO: 340 10*3/MM3 (ref 140–450)
PLATELET # BLD AUTO: 352 10*3/MM3 (ref 140–450)
PMV BLD AUTO: 10.1 FL (ref 6–12)
PMV BLD AUTO: 8.8 FL (ref 6–12)
PMV BLD AUTO: 8.8 FL (ref 6–12)
PMV BLD AUTO: 8.9 FL (ref 6–12)
PMV BLD AUTO: 9.2 FL (ref 6–12)
PMV BLD AUTO: 9.7 FL (ref 6–12)
PMV BLD AUTO: 9.8 FL (ref 6–12)
PO2 BLD: 207 MM[HG] (ref 0–500)
PO2 BLD: 84 MM[HG] (ref 0–500)
PO2 BLDA: 128.5 MM HG (ref 80–100)
PO2 BLDA: 56.1 MM HG (ref 80–100)
PO2 BLDA: 84.2 MM HG (ref 80–100)
POTASSIUM SERPL-SCNC: 3.9 MMOL/L (ref 3.5–5.2)
POTASSIUM SERPL-SCNC: 4.4 MMOL/L (ref 3.5–5.2)
POTASSIUM SERPL-SCNC: 4.5 MMOL/L (ref 3.5–5.2)
POTASSIUM SERPL-SCNC: 4.9 MMOL/L (ref 3.5–5.2)
POTASSIUM SERPL-SCNC: 5.1 MMOL/L (ref 3.5–5.2)
POTASSIUM SERPL-SCNC: 5.4 MMOL/L (ref 3.5–5.2)
PROCALCITONIN SERPL-MCNC: 0.67 NG/ML (ref 0–0.25)
PROCALCITONIN SERPL-MCNC: 1.51 NG/ML (ref 0–0.25)
PROT SERPL-MCNC: 6 G/DL (ref 6–8.5)
PROT SERPL-MCNC: 6.2 G/DL (ref 6–8.5)
PROT SERPL-MCNC: 6.8 G/DL (ref 6–8.5)
PROT SERPL-MCNC: 6.9 G/DL (ref 6–8.5)
PROT SERPL-MCNC: 7.2 G/DL (ref 6–8.5)
PROT SERPL-MCNC: 8 G/DL (ref 6–8.5)
PROT UR QL STRIP: ABNORMAL
PROT UR QL STRIP: ABNORMAL
PROTHROMBIN TIME: 17.9 SECONDS (ref 11.7–14.2)
PROTHROMBIN TIME: 20.1 SECONDS (ref 11.7–14.2)
PROTHROMBIN TIME: 20.1 SECONDS (ref 11.7–14.2)
PROTHROMBIN TIME: 20.2 SECONDS (ref 11.7–14.2)
PROTHROMBIN TIME: 20.8 SECONDS (ref 11.7–14.2)
PROTHROMBIN TIME: 21 SECONDS (ref 11.7–14.2)
PROTHROMBIN TIME: 21.3 SECONDS (ref 11.7–14.2)
PROTHROMBIN TIME: 23.4 SECONDS (ref 11.7–14.2)
PROTHROMBIN TIME: 23.6 SECONDS (ref 11.7–14.2)
PROTHROMBIN TIME: 24 SECONDS (ref 11.7–14.2)
QT INTERVAL: 318 MS
QT INTERVAL: 355 MS
QTC INTERVAL: 434 MS
QTC INTERVAL: 471 MS
RBC # BLD AUTO: 3.15 10*6/MM3 (ref 3.77–5.28)
RBC # BLD AUTO: 3.22 10*6/MM3 (ref 3.77–5.28)
RBC # BLD AUTO: 3.26 10*6/MM3 (ref 3.77–5.28)
RBC # BLD AUTO: 3.5 10*6/MM3 (ref 3.77–5.28)
RBC # BLD AUTO: 3.84 10*6/MM3 (ref 3.77–5.28)
RBC # BLD AUTO: 4.14 10*6/MM3 (ref 3.77–5.28)
RBC # BLD AUTO: 4.26 10*6/MM3 (ref 3.77–5.28)
RBC # UR STRIP: ABNORMAL /HPF
RBC # UR STRIP: ABNORMAL /HPF
READ BACK: YES
READ BACK: YES
REF LAB TEST METHOD: ABNORMAL
REF LAB TEST METHOD: ABNORMAL
RH BLD: NEGATIVE
SAO2 % BLDCOA: 85.9 % (ref 92–98.5)
SAO2 % BLDCOA: 96.3 % (ref 92–98.5)
SAO2 % BLDCOA: 98.5 % (ref 92–98.5)
SET MECH RESP RATE: 20
SET MECH RESP RATE: 24
SODIUM SERPL-SCNC: 133 MMOL/L (ref 136–145)
SODIUM SERPL-SCNC: 134 MMOL/L (ref 136–145)
SODIUM SERPL-SCNC: 135 MMOL/L (ref 136–145)
SODIUM SERPL-SCNC: 137 MMOL/L (ref 136–145)
SODIUM SERPL-SCNC: 140 MMOL/L (ref 136–145)
SODIUM SERPL-SCNC: 140 MMOL/L (ref 136–145)
SODIUM SERPL-SCNC: 144 MMOL/L (ref 136–145)
SODIUM SERPL-SCNC: 145 MMOL/L (ref 136–145)
SODIUM SERPL-SCNC: 151 MMOL/L (ref 136–145)
SODIUM SERPL-SCNC: 158 MMOL/L (ref 136–145)
SODIUM UR-SCNC: 38 MMOL/L
SP GR UR STRIP: 1.01 (ref 1–1.03)
SP GR UR STRIP: 1.02 (ref 1–1.03)
SQUAMOUS #/AREA URNS HPF: ABNORMAL /HPF
SQUAMOUS #/AREA URNS HPF: ABNORMAL /HPF
T&S EXPIRATION DATE: NORMAL
TOTAL RATE: 12 BREATHS/MINUTE
TOTAL RATE: 25 BREATHS/MINUTE
TRIGL SERPL-MCNC: 131 MG/DL (ref 0–150)
TROPONIN T % DELTA: -5
TROPONIN T NUMERIC DELTA: -4 NG/L
TROPONIN T SERPL HS-MCNC: 73 NG/L
UNIT  ABO: NORMAL
UNIT  RH: NORMAL
URATE SERPL-MCNC: 12.3 MG/DL (ref 2.4–5.7)
URATE SERPL-MCNC: 13.3 MG/DL (ref 2.4–5.7)
UROBILINOGEN UR QL STRIP: ABNORMAL
UROBILINOGEN UR QL STRIP: ABNORMAL
VLDLC SERPL-MCNC: 23 MG/DL (ref 5–40)
VT ON VENT VENT: 412 ML
WBC # UR STRIP: ABNORMAL /HPF
WBC # UR STRIP: ABNORMAL /HPF
WBC NRBC COR # BLD AUTO: 10.95 10*3/MM3 (ref 3.4–10.8)
WBC NRBC COR # BLD AUTO: 11.9 10*3/MM3 (ref 3.4–10.8)
WBC NRBC COR # BLD AUTO: 12.51 10*3/MM3 (ref 3.4–10.8)
WBC NRBC COR # BLD AUTO: 20.4 10*3/MM3 (ref 3.4–10.8)
WBC NRBC COR # BLD AUTO: 23.74 10*3/MM3 (ref 3.4–10.8)
WBC NRBC COR # BLD AUTO: 8.23 10*3/MM3 (ref 3.4–10.8)
WBC NRBC COR # BLD AUTO: 9.33 10*3/MM3 (ref 3.4–10.8)

## 2025-01-01 PROCEDURE — 83880 ASSAY OF NATRIURETIC PEPTIDE: CPT | Performed by: INTERNAL MEDICINE

## 2025-01-01 PROCEDURE — 80162 ASSAY OF DIGOXIN TOTAL: CPT | Performed by: INTERNAL MEDICINE

## 2025-01-01 PROCEDURE — 99233 SBSQ HOSP IP/OBS HIGH 50: CPT | Performed by: INTERNAL MEDICINE

## 2025-01-01 PROCEDURE — 94799 UNLISTED PULMONARY SVC/PX: CPT

## 2025-01-01 PROCEDURE — 82948 REAGENT STRIP/BLOOD GLUCOSE: CPT

## 2025-01-01 PROCEDURE — 73030 X-RAY EXAM OF SHOULDER: CPT

## 2025-01-01 PROCEDURE — 83735 ASSAY OF MAGNESIUM: CPT | Performed by: INTERNAL MEDICINE

## 2025-01-01 PROCEDURE — 25810000003 SODIUM CHLORIDE 0.9 % SOLUTION

## 2025-01-01 PROCEDURE — 80053 COMPREHEN METABOLIC PANEL: CPT | Performed by: INTERNAL MEDICINE

## 2025-01-01 PROCEDURE — 25010000002 AZITHROMYCIN PER 500 MG: Performed by: STUDENT IN AN ORGANIZED HEALTH CARE EDUCATION/TRAINING PROGRAM

## 2025-01-01 PROCEDURE — 97110 THERAPEUTIC EXERCISES: CPT

## 2025-01-01 PROCEDURE — 84100 ASSAY OF PHOSPHORUS: CPT

## 2025-01-01 PROCEDURE — 25010000002 METHYLPREDNISOLONE PER 40 MG

## 2025-01-01 PROCEDURE — 25810000003 SODIUM CHLORIDE 0.9 % SOLUTION: Performed by: HOSPITALIST

## 2025-01-01 PROCEDURE — 63710000001 INSULIN REGULAR HUMAN PER 5 UNITS: Performed by: INTERNAL MEDICINE

## 2025-01-01 PROCEDURE — 85025 COMPLETE CBC W/AUTO DIFF WBC: CPT | Performed by: INTERNAL MEDICINE

## 2025-01-01 PROCEDURE — 99232 SBSQ HOSP IP/OBS MODERATE 35: CPT | Performed by: INTERNAL MEDICINE

## 2025-01-01 PROCEDURE — 80162 ASSAY OF DIGOXIN TOTAL: CPT | Performed by: STUDENT IN AN ORGANIZED HEALTH CARE EDUCATION/TRAINING PROGRAM

## 2025-01-01 PROCEDURE — 36600 WITHDRAWAL OF ARTERIAL BLOOD: CPT | Performed by: NURSE PRACTITIONER

## 2025-01-01 PROCEDURE — 71045 X-RAY EXAM CHEST 1 VIEW: CPT

## 2025-01-01 PROCEDURE — 25010000002 HYDROMORPHONE PER 4 MG: Performed by: ORTHOPAEDIC SURGERY

## 2025-01-01 PROCEDURE — 84145 PROCALCITONIN (PCT): CPT | Performed by: INTERNAL MEDICINE

## 2025-01-01 PROCEDURE — 25810000003 SODIUM CHLORIDE 0.9 % SOLUTION: Performed by: INTERNAL MEDICINE

## 2025-01-01 PROCEDURE — 84550 ASSAY OF BLOOD/URIC ACID: CPT | Performed by: INTERNAL MEDICINE

## 2025-01-01 PROCEDURE — 99223 1ST HOSP IP/OBS HIGH 75: CPT | Performed by: STUDENT IN AN ORGANIZED HEALTH CARE EDUCATION/TRAINING PROGRAM

## 2025-01-01 PROCEDURE — 85025 COMPLETE CBC W/AUTO DIFF WBC: CPT | Performed by: EMERGENCY MEDICINE

## 2025-01-01 PROCEDURE — C1713 ANCHOR/SCREW BN/BN,TIS/BN: HCPCS | Performed by: ORTHOPAEDIC SURGERY

## 2025-01-01 PROCEDURE — 94761 N-INVAS EAR/PLS OXIMETRY MLT: CPT

## 2025-01-01 PROCEDURE — 25010000002 MORPHINE PER 10 MG: Performed by: INTERNAL MEDICINE

## 2025-01-01 PROCEDURE — 99232 SBSQ HOSP IP/OBS MODERATE 35: CPT | Performed by: STUDENT IN AN ORGANIZED HEALTH CARE EDUCATION/TRAINING PROGRAM

## 2025-01-01 PROCEDURE — 94660 CPAP INITIATION&MGMT: CPT

## 2025-01-01 PROCEDURE — 76775 US EXAM ABDO BACK WALL LIM: CPT

## 2025-01-01 PROCEDURE — 97530 THERAPEUTIC ACTIVITIES: CPT

## 2025-01-01 PROCEDURE — 25010000002 HYDROMORPHONE PER 4 MG: Performed by: INTERNAL MEDICINE

## 2025-01-01 PROCEDURE — 84100 ASSAY OF PHOSPHORUS: CPT | Performed by: INTERNAL MEDICINE

## 2025-01-01 PROCEDURE — 80061 LIPID PANEL: CPT | Performed by: INTERNAL MEDICINE

## 2025-01-01 PROCEDURE — 82803 BLOOD GASES ANY COMBINATION: CPT | Performed by: INTERNAL MEDICINE

## 2025-01-01 PROCEDURE — 25010000002 MORPHINE PER 10 MG: Performed by: EMERGENCY MEDICINE

## 2025-01-01 PROCEDURE — 94762 N-INVAS EAR/PLS OXIMTRY CONT: CPT

## 2025-01-01 PROCEDURE — 25010000002 CEFTRIAXONE PER 250 MG: Performed by: ORTHOPAEDIC SURGERY

## 2025-01-01 PROCEDURE — 25010000002 METHYLPREDNISOLONE PER 125 MG

## 2025-01-01 PROCEDURE — 93005 ELECTROCARDIOGRAM TRACING: CPT | Performed by: EMERGENCY MEDICINE

## 2025-01-01 PROCEDURE — 93005 ELECTROCARDIOGRAM TRACING: CPT | Performed by: INTERNAL MEDICINE

## 2025-01-01 PROCEDURE — 36415 COLL VENOUS BLD VENIPUNCTURE: CPT | Performed by: INTERNAL MEDICINE

## 2025-01-01 PROCEDURE — 94640 AIRWAY INHALATION TREATMENT: CPT

## 2025-01-01 PROCEDURE — 81001 URINALYSIS AUTO W/SCOPE: CPT

## 2025-01-01 PROCEDURE — 73502 X-RAY EXAM HIP UNI 2-3 VIEWS: CPT

## 2025-01-01 PROCEDURE — 81001 URINALYSIS AUTO W/SCOPE: CPT | Performed by: HOSPITALIST

## 2025-01-01 PROCEDURE — 86850 RBC ANTIBODY SCREEN: CPT | Performed by: EMERGENCY MEDICINE

## 2025-01-01 PROCEDURE — 85610 PROTHROMBIN TIME: CPT | Performed by: EMERGENCY MEDICINE

## 2025-01-01 PROCEDURE — 82803 BLOOD GASES ANY COMBINATION: CPT | Performed by: NURSE PRACTITIONER

## 2025-01-01 PROCEDURE — 25010000002 METHYLPREDNISOLONE PER 40 MG: Performed by: ORTHOPAEDIC SURGERY

## 2025-01-01 PROCEDURE — 25010000002 FUROSEMIDE PER 20 MG: Performed by: INTERNAL MEDICINE

## 2025-01-01 PROCEDURE — 97162 PT EVAL MOD COMPLEX 30 MIN: CPT

## 2025-01-01 PROCEDURE — 85610 PROTHROMBIN TIME: CPT | Performed by: ORTHOPAEDIC SURGERY

## 2025-01-01 PROCEDURE — 87086 URINE CULTURE/COLONY COUNT: CPT

## 2025-01-01 PROCEDURE — 94664 DEMO&/EVAL PT USE INHALER: CPT

## 2025-01-01 PROCEDURE — C1769 GUIDE WIRE: HCPCS | Performed by: ORTHOPAEDIC SURGERY

## 2025-01-01 PROCEDURE — 94760 N-INVAS EAR/PLS OXIMETRY 1: CPT

## 2025-01-01 PROCEDURE — 80069 RENAL FUNCTION PANEL: CPT | Performed by: HOSPITALIST

## 2025-01-01 PROCEDURE — 74176 CT ABD & PELVIS W/O CONTRAST: CPT

## 2025-01-01 PROCEDURE — 93010 ELECTROCARDIOGRAM REPORT: CPT | Performed by: INTERNAL MEDICINE

## 2025-01-01 PROCEDURE — 76000 FLUOROSCOPY <1 HR PHYS/QHP: CPT

## 2025-01-01 PROCEDURE — 25010000002 CEFTRIAXONE PER 250 MG: Performed by: INTERNAL MEDICINE

## 2025-01-01 PROCEDURE — 25810000003 SODIUM CHLORIDE 0.9 % SOLUTION 250 ML FLEX CONT: Performed by: STUDENT IN AN ORGANIZED HEALTH CARE EDUCATION/TRAINING PROGRAM

## 2025-01-01 PROCEDURE — 80069 RENAL FUNCTION PANEL: CPT | Performed by: INTERNAL MEDICINE

## 2025-01-01 PROCEDURE — 63710000001 INSULIN LISPRO (HUMAN) PER 5 UNITS: Performed by: INTERNAL MEDICINE

## 2025-01-01 PROCEDURE — 25010000002 GLYCOPYRROLATE 0.2 MG/ML SOLUTION: Performed by: INTERNAL MEDICINE

## 2025-01-01 PROCEDURE — 82803 BLOOD GASES ANY COMBINATION: CPT

## 2025-01-01 PROCEDURE — 25010000002 DIGOXIN PER 500 MCG: Performed by: INTERNAL MEDICINE

## 2025-01-01 PROCEDURE — 85610 PROTHROMBIN TIME: CPT | Performed by: INTERNAL MEDICINE

## 2025-01-01 PROCEDURE — 99285 EMERGENCY DEPT VISIT HI MDM: CPT

## 2025-01-01 PROCEDURE — 03HY32Z INSERTION OF MONITORING DEVICE INTO UPPER ARTERY, PERCUTANEOUS APPROACH: ICD-10-PCS | Performed by: ORTHOPAEDIC SURGERY

## 2025-01-01 PROCEDURE — 85027 COMPLETE CBC AUTOMATED: CPT | Performed by: INTERNAL MEDICINE

## 2025-01-01 PROCEDURE — 86985 SPLIT BLOOD OR PRODUCTS: CPT

## 2025-01-01 PROCEDURE — 84550 ASSAY OF BLOOD/URIC ACID: CPT | Performed by: HOSPITALIST

## 2025-01-01 PROCEDURE — 25010000002 ONDANSETRON PER 1 MG: Performed by: EMERGENCY MEDICINE

## 2025-01-01 PROCEDURE — P9059 PLASMA, FRZ BETWEEN 8-24HOUR: HCPCS

## 2025-01-01 PROCEDURE — 74018 RADEX ABDOMEN 1 VIEW: CPT

## 2025-01-01 PROCEDURE — 25010000002 HYDROMORPHONE 1 MG/ML SOLUTION: Performed by: EMERGENCY MEDICINE

## 2025-01-01 PROCEDURE — 25010000002 ACETAMINOPHEN 10 MG/ML SOLUTION: Performed by: INTERNAL MEDICINE

## 2025-01-01 PROCEDURE — 80048 BASIC METABOLIC PNL TOTAL CA: CPT | Performed by: INTERNAL MEDICINE

## 2025-01-01 PROCEDURE — 83935 ASSAY OF URINE OSMOLALITY: CPT | Performed by: HOSPITALIST

## 2025-01-01 PROCEDURE — 84300 ASSAY OF URINE SODIUM: CPT | Performed by: HOSPITALIST

## 2025-01-01 PROCEDURE — 84484 ASSAY OF TROPONIN QUANT: CPT | Performed by: STUDENT IN AN ORGANIZED HEALTH CARE EDUCATION/TRAINING PROGRAM

## 2025-01-01 PROCEDURE — 36600 WITHDRAWAL OF ARTERIAL BLOOD: CPT

## 2025-01-01 PROCEDURE — 86900 BLOOD TYPING SEROLOGIC ABO: CPT | Performed by: EMERGENCY MEDICINE

## 2025-01-01 PROCEDURE — 25010000002 CEFTRIAXONE PER 250 MG: Performed by: STUDENT IN AN ORGANIZED HEALTH CARE EDUCATION/TRAINING PROGRAM

## 2025-01-01 PROCEDURE — 73552 X-RAY EXAM OF FEMUR 2/>: CPT

## 2025-01-01 PROCEDURE — 25010000002 HYDROMORPHONE PER 4 MG: Performed by: NURSE ANESTHETIST, CERTIFIED REGISTERED

## 2025-01-01 PROCEDURE — 0QS736Z REPOSITION LEFT UPPER FEMUR WITH INTRAMEDULLARY INTERNAL FIXATION DEVICE, PERCUTANEOUS APPROACH: ICD-10-PCS | Performed by: ORTHOPAEDIC SURGERY

## 2025-01-01 PROCEDURE — 86927 PLASMA FRESH FROZEN: CPT

## 2025-01-01 PROCEDURE — 36430 TRANSFUSION BLD/BLD COMPNT: CPT

## 2025-01-01 PROCEDURE — 83735 ASSAY OF MAGNESIUM: CPT | Performed by: HOSPITALIST

## 2025-01-01 PROCEDURE — 86901 BLOOD TYPING SEROLOGIC RH(D): CPT | Performed by: EMERGENCY MEDICINE

## 2025-01-01 PROCEDURE — 80053 COMPREHEN METABOLIC PANEL: CPT | Performed by: EMERGENCY MEDICINE

## 2025-01-01 DEVICE — INTERTAN 10MM X 24CM 125D
Type: IMPLANTABLE DEVICE | Site: FEMUR | Status: FUNCTIONAL
Brand: INTERTAN

## 2025-01-01 DEVICE — TRIGEN LOW PROFILE SCREW 5.0MM X 32.5MM
Type: IMPLANTABLE DEVICE | Site: FEMUR | Status: FUNCTIONAL
Brand: TRIGEN

## 2025-01-01 DEVICE — INTERTAN LAG/COMPRESSION SCREW KIT                                    90MM / 85MM
Type: IMPLANTABLE DEVICE | Site: FEMUR | Status: FUNCTIONAL
Brand: TRIGEN

## 2025-01-01 RX ORDER — MORPHINE SULFATE 20 MG/ML
5 SOLUTION ORAL
Refills: 0 | Status: DISCONTINUED | OUTPATIENT
Start: 2025-01-01 | End: 2025-01-01 | Stop reason: HOSPADM

## 2025-01-01 RX ORDER — METHYLPREDNISOLONE SODIUM SUCCINATE 125 MG/2ML
125 INJECTION, POWDER, LYOPHILIZED, FOR SOLUTION INTRAMUSCULAR; INTRAVENOUS ONCE
Status: COMPLETED | OUTPATIENT
Start: 2025-01-01 | End: 2025-01-01

## 2025-01-01 RX ORDER — METOPROLOL TARTRATE 25 MG/1
12.5 TABLET, FILM COATED ORAL EVERY 12 HOURS SCHEDULED
Status: DISCONTINUED | OUTPATIENT
Start: 2025-01-01 | End: 2025-01-01

## 2025-01-01 RX ORDER — DIPHENHYDRAMINE HYDROCHLORIDE 50 MG/ML
12.5 INJECTION, SOLUTION INTRAMUSCULAR; INTRAVENOUS
Status: DISCONTINUED | OUTPATIENT
Start: 2025-01-01 | End: 2025-01-01

## 2025-01-01 RX ORDER — HALOPERIDOL 2 MG/1
1 TABLET ORAL EVERY 4 HOURS PRN
Status: DISCONTINUED | OUTPATIENT
Start: 2025-01-01 | End: 2025-01-01

## 2025-01-01 RX ORDER — ONDANSETRON 2 MG/ML
4 INJECTION INTRAMUSCULAR; INTRAVENOUS ONCE
Status: COMPLETED | OUTPATIENT
Start: 2025-01-01 | End: 2025-01-01

## 2025-01-01 RX ORDER — ATROPINE SULFATE 0.4 MG/ML
0.4 INJECTION, SOLUTION INTRAMUSCULAR; INTRAVENOUS; SUBCUTANEOUS ONCE AS NEEDED
Status: DISCONTINUED | OUTPATIENT
Start: 2025-01-01 | End: 2025-01-01

## 2025-01-01 RX ORDER — NALOXONE HCL 0.4 MG/ML
0.2 VIAL (ML) INJECTION AS NEEDED
Status: DISCONTINUED | OUTPATIENT
Start: 2025-01-01 | End: 2025-01-01 | Stop reason: HOSPADM

## 2025-01-01 RX ORDER — BUPROPION HYDROCHLORIDE 150 MG/1
300 TABLET ORAL DAILY
Status: DISCONTINUED | OUTPATIENT
Start: 2025-01-01 | End: 2025-01-01

## 2025-01-01 RX ORDER — HYDROMORPHONE HYDROCHLORIDE 1 MG/ML
0.5 INJECTION, SOLUTION INTRAMUSCULAR; INTRAVENOUS; SUBCUTANEOUS
Refills: 0 | Status: DISCONTINUED | OUTPATIENT
Start: 2025-01-01 | End: 2025-01-01 | Stop reason: HOSPADM

## 2025-01-01 RX ORDER — GLYCOPYRROLATE 0.2 MG/ML
0.2 INJECTION INTRAMUSCULAR; INTRAVENOUS
Status: DISCONTINUED | OUTPATIENT
Start: 2025-01-01 | End: 2025-01-01 | Stop reason: HOSPADM

## 2025-01-01 RX ORDER — WARFARIN SODIUM 2.5 MG/1
2.5 TABLET ORAL
Status: DISCONTINUED | OUTPATIENT
Start: 2025-01-01 | End: 2025-01-01

## 2025-01-01 RX ORDER — ONDANSETRON 2 MG/ML
4 INJECTION INTRAMUSCULAR; INTRAVENOUS EVERY 6 HOURS PRN
Status: DISCONTINUED | OUTPATIENT
Start: 2025-01-01 | End: 2025-01-01

## 2025-01-01 RX ORDER — AMMONIUM LACTATE 12 G/100G
CREAM TOPICAL 2 TIMES DAILY PRN
Status: DISCONTINUED | OUTPATIENT
Start: 2025-01-01 | End: 2025-01-01 | Stop reason: HOSPADM

## 2025-01-01 RX ORDER — MORPHINE SULFATE 2 MG/ML
4 INJECTION, SOLUTION INTRAMUSCULAR; INTRAVENOUS
Status: DISCONTINUED | OUTPATIENT
Start: 2025-01-01 | End: 2025-01-01 | Stop reason: HOSPADM

## 2025-01-01 RX ORDER — PANTOPRAZOLE SODIUM 40 MG/1
40 TABLET, DELAYED RELEASE ORAL DAILY
Status: DISCONTINUED | OUTPATIENT
Start: 2025-01-01 | End: 2025-01-01

## 2025-01-01 RX ORDER — CYCLOBENZAPRINE HCL 10 MG
5 TABLET ORAL 3 TIMES DAILY PRN
Status: DISCONTINUED | OUTPATIENT
Start: 2025-01-01 | End: 2025-01-01

## 2025-01-01 RX ORDER — ACETAMINOPHEN 325 MG/1
650 TABLET ORAL EVERY 4 HOURS PRN
Status: DISCONTINUED | OUTPATIENT
Start: 2025-01-01 | End: 2025-01-01

## 2025-01-01 RX ORDER — HYDROMORPHONE HYDROCHLORIDE 1 MG/ML
0.25 INJECTION, SOLUTION INTRAMUSCULAR; INTRAVENOUS; SUBCUTANEOUS
Status: DISCONTINUED | OUTPATIENT
Start: 2025-01-01 | End: 2025-01-01

## 2025-01-01 RX ORDER — DIGOXIN 125 MCG
125 TABLET ORAL EVERY OTHER DAY
Status: DISCONTINUED | OUTPATIENT
Start: 2025-01-01 | End: 2025-01-01

## 2025-01-01 RX ORDER — POTASSIUM CHLORIDE 1500 MG/1
20 TABLET, EXTENDED RELEASE ORAL DAILY
Status: DISCONTINUED | OUTPATIENT
Start: 2025-01-01 | End: 2025-01-01

## 2025-01-01 RX ORDER — MAGNESIUM HYDROXIDE 1200 MG/15ML
LIQUID ORAL AS NEEDED
Status: DISCONTINUED | OUTPATIENT
Start: 2025-01-01 | End: 2025-01-01 | Stop reason: HOSPADM

## 2025-01-01 RX ORDER — FAMOTIDINE 20 MG/1
20 TABLET, FILM COATED ORAL
Status: DISCONTINUED | OUTPATIENT
Start: 2025-01-01 | End: 2025-01-01

## 2025-01-01 RX ORDER — MIDODRINE HYDROCHLORIDE 2.5 MG/1
2.5 TABLET ORAL
Status: DISCONTINUED | OUTPATIENT
Start: 2025-01-01 | End: 2025-01-01

## 2025-01-01 RX ORDER — PREGABALIN 50 MG/1
50 CAPSULE ORAL EVERY 12 HOURS SCHEDULED
Status: DISCONTINUED | OUTPATIENT
Start: 2025-01-01 | End: 2025-01-01

## 2025-01-01 RX ORDER — MORPHINE SULFATE 20 MG/ML
20 SOLUTION ORAL
Refills: 0 | Status: DISCONTINUED | OUTPATIENT
Start: 2025-01-01 | End: 2025-01-01 | Stop reason: HOSPADM

## 2025-01-01 RX ORDER — ACETAMINOPHEN 325 MG/1
650 TABLET ORAL EVERY 4 HOURS PRN
Status: DISCONTINUED | OUTPATIENT
Start: 2025-01-01 | End: 2025-01-01 | Stop reason: HOSPADM

## 2025-01-01 RX ORDER — METOPROLOL TARTRATE 25 MG/1
25 TABLET, FILM COATED ORAL EVERY 12 HOURS SCHEDULED
Status: DISCONTINUED | OUTPATIENT
Start: 2025-01-01 | End: 2025-01-01

## 2025-01-01 RX ORDER — EPHEDRINE SULFATE 50 MG/ML
5 INJECTION, SOLUTION INTRAVENOUS ONCE AS NEEDED
Status: DISCONTINUED | OUTPATIENT
Start: 2025-01-01 | End: 2025-01-01

## 2025-01-01 RX ORDER — TRANEXAMIC ACID 10 MG/ML
1000 INJECTION, SOLUTION INTRAVENOUS ONCE
Status: DISCONTINUED | OUTPATIENT
Start: 2025-01-01 | End: 2025-01-01 | Stop reason: HOSPADM

## 2025-01-01 RX ORDER — MIDODRINE HYDROCHLORIDE 5 MG/1
5 TABLET ORAL
Status: DISCONTINUED | OUTPATIENT
Start: 2025-01-01 | End: 2025-01-01

## 2025-01-01 RX ORDER — MORPHINE SULFATE 20 MG/ML
10 SOLUTION ORAL
Refills: 0 | Status: DISCONTINUED | OUTPATIENT
Start: 2025-01-01 | End: 2025-01-01 | Stop reason: HOSPADM

## 2025-01-01 RX ORDER — MORPHINE SULFATE 2 MG/ML
4 INJECTION, SOLUTION INTRAMUSCULAR; INTRAVENOUS ONCE
Status: COMPLETED | OUTPATIENT
Start: 2025-01-01 | End: 2025-01-01

## 2025-01-01 RX ORDER — SODIUM CHLORIDE 9 MG/ML
9 INJECTION, SOLUTION INTRAVENOUS CONTINUOUS
Status: DISCONTINUED | OUTPATIENT
Start: 2025-01-01 | End: 2025-01-01

## 2025-01-01 RX ORDER — ACETAMINOPHEN 650 MG/1
650 SUPPOSITORY RECTAL EVERY 4 HOURS PRN
Status: DISCONTINUED | OUTPATIENT
Start: 2025-01-01 | End: 2025-01-01 | Stop reason: HOSPADM

## 2025-01-01 RX ORDER — FERROUS SULFATE 325(65) MG
325 TABLET ORAL EVERY OTHER DAY
Status: DISCONTINUED | OUTPATIENT
Start: 2025-01-01 | End: 2025-01-01

## 2025-01-01 RX ORDER — ACETAMINOPHEN 10 MG/ML
1000 INJECTION, SOLUTION INTRAVENOUS ONCE
Status: COMPLETED | OUTPATIENT
Start: 2025-01-01 | End: 2025-01-01

## 2025-01-01 RX ORDER — ROSUVASTATIN CALCIUM 20 MG/1
10 TABLET, COATED ORAL NIGHTLY
Status: DISCONTINUED | OUTPATIENT
Start: 2025-01-01 | End: 2025-01-01

## 2025-01-01 RX ORDER — HALOPERIDOL 5 MG/ML
1 INJECTION INTRAMUSCULAR EVERY 4 HOURS PRN
Status: DISCONTINUED | OUTPATIENT
Start: 2025-01-01 | End: 2025-01-01

## 2025-01-01 RX ORDER — DEXMEDETOMIDINE HYDROCHLORIDE 4 UG/ML
.2-1.5 INJECTION, SOLUTION INTRAVENOUS
Status: DISCONTINUED | OUTPATIENT
Start: 2025-01-01 | End: 2025-01-01

## 2025-01-01 RX ORDER — SODIUM CHLORIDE 9 MG/ML
75 INJECTION, SOLUTION INTRAVENOUS CONTINUOUS
Status: DISCONTINUED | OUTPATIENT
Start: 2025-01-01 | End: 2025-01-01

## 2025-01-01 RX ORDER — BISACODYL 10 MG
10 SUPPOSITORY, RECTAL RECTAL DAILY PRN
Status: DISCONTINUED | OUTPATIENT
Start: 2025-01-01 | End: 2025-01-01

## 2025-01-01 RX ORDER — DEXTROSE MONOHYDRATE 25 G/50ML
25 INJECTION, SOLUTION INTRAVENOUS
Status: DISCONTINUED | OUTPATIENT
Start: 2025-01-01 | End: 2025-01-01

## 2025-01-01 RX ORDER — WARFARIN SODIUM 1 MG/1
1 TABLET ORAL
Status: COMPLETED | OUTPATIENT
Start: 2025-01-01 | End: 2025-01-01

## 2025-01-01 RX ORDER — ACETAMINOPHEN 160 MG/5ML
650 SOLUTION ORAL EVERY 4 HOURS PRN
Status: DISCONTINUED | OUTPATIENT
Start: 2025-01-01 | End: 2025-01-01 | Stop reason: HOSPADM

## 2025-01-01 RX ORDER — NICOTINE POLACRILEX 4 MG
15 LOZENGE BUCCAL
Status: DISCONTINUED | OUTPATIENT
Start: 2025-01-01 | End: 2025-01-01

## 2025-01-01 RX ORDER — BUPROPION HYDROCHLORIDE 75 MG/1
150 TABLET ORAL EVERY 12 HOURS SCHEDULED
Status: DISCONTINUED | OUTPATIENT
Start: 2025-01-01 | End: 2025-01-01

## 2025-01-01 RX ORDER — SODIUM CHLORIDE 0.9 % (FLUSH) 0.9 %
10 SYRINGE (ML) INJECTION AS NEEDED
Status: DISCONTINUED | OUTPATIENT
Start: 2025-01-01 | End: 2025-01-01

## 2025-01-01 RX ORDER — IPRATROPIUM BROMIDE AND ALBUTEROL SULFATE 2.5; .5 MG/3ML; MG/3ML
3 SOLUTION RESPIRATORY (INHALATION) EVERY 6 HOURS PRN
Status: DISCONTINUED | OUTPATIENT
Start: 2025-01-01 | End: 2025-01-01 | Stop reason: HOSPADM

## 2025-01-01 RX ORDER — METOPROLOL TARTRATE 50 MG
50 TABLET ORAL ONCE
Status: COMPLETED | OUTPATIENT
Start: 2025-01-01 | End: 2025-01-01

## 2025-01-01 RX ORDER — BUPIVACAINE HYDROCHLORIDE AND EPINEPHRINE 5; 5 MG/ML; UG/ML
INJECTION, SOLUTION EPIDURAL; INTRACAUDAL; PERINEURAL AS NEEDED
Status: DISCONTINUED | OUTPATIENT
Start: 2025-01-01 | End: 2025-01-01 | Stop reason: HOSPADM

## 2025-01-01 RX ORDER — BUDESONIDE 0.5 MG/2ML
0.5 INHALANT ORAL
Status: DISCONTINUED | OUTPATIENT
Start: 2025-01-01 | End: 2025-01-01

## 2025-01-01 RX ORDER — ACETAMINOPHEN 650 MG/1
650 SUPPOSITORY RECTAL EVERY 4 HOURS PRN
Status: DISCONTINUED | OUTPATIENT
Start: 2025-01-01 | End: 2025-01-01

## 2025-01-01 RX ORDER — HYDROCODONE BITARTRATE AND ACETAMINOPHEN 7.5; 325 MG/1; MG/1
1 TABLET ORAL EVERY 4 HOURS PRN
Status: DISCONTINUED | OUTPATIENT
Start: 2025-01-01 | End: 2025-01-01

## 2025-01-01 RX ORDER — BUPROPION HYDROCHLORIDE 150 MG/1
150 TABLET ORAL DAILY
Status: DISCONTINUED | OUTPATIENT
Start: 2025-01-01 | End: 2025-01-01

## 2025-01-01 RX ORDER — LABETALOL HYDROCHLORIDE 5 MG/ML
5 INJECTION, SOLUTION INTRAVENOUS
Status: DISCONTINUED | OUTPATIENT
Start: 2025-01-01 | End: 2025-01-01

## 2025-01-01 RX ORDER — WARFARIN SODIUM 2.5 MG/1
2.5 TABLET ORAL
Status: COMPLETED | OUTPATIENT
Start: 2025-01-01 | End: 2025-01-01

## 2025-01-01 RX ORDER — DROPERIDOL 2.5 MG/ML
0.62 INJECTION, SOLUTION INTRAMUSCULAR; INTRAVENOUS
Status: DISCONTINUED | OUTPATIENT
Start: 2025-01-01 | End: 2025-01-01

## 2025-01-01 RX ORDER — FUROSEMIDE 10 MG/ML
20 INJECTION INTRAMUSCULAR; INTRAVENOUS ONCE
Status: COMPLETED | OUTPATIENT
Start: 2025-01-01 | End: 2025-01-01

## 2025-01-01 RX ORDER — POLYETHYLENE GLYCOL 3350 17 G/17G
17 POWDER, FOR SOLUTION ORAL DAILY PRN
Status: DISCONTINUED | OUTPATIENT
Start: 2025-01-01 | End: 2025-01-01 | Stop reason: HOSPADM

## 2025-01-01 RX ORDER — ONDANSETRON 2 MG/ML
4 INJECTION INTRAMUSCULAR; INTRAVENOUS EVERY 6 HOURS PRN
Status: DISCONTINUED | OUTPATIENT
Start: 2025-01-01 | End: 2025-01-01 | Stop reason: HOSPADM

## 2025-01-01 RX ORDER — MORPHINE SULFATE 10 MG/ML
6 INJECTION, SOLUTION INTRAMUSCULAR; INTRAVENOUS
Status: DISCONTINUED | OUTPATIENT
Start: 2025-01-01 | End: 2025-01-01 | Stop reason: HOSPADM

## 2025-01-01 RX ORDER — NALOXONE HCL 0.4 MG/ML
0.4 VIAL (ML) INJECTION
Status: DISCONTINUED | OUTPATIENT
Start: 2025-01-01 | End: 2025-01-01

## 2025-01-01 RX ORDER — WARFARIN SODIUM 1 MG/1
1 TABLET ORAL
Status: DISCONTINUED | OUTPATIENT
Start: 2025-01-01 | End: 2025-01-01

## 2025-01-01 RX ORDER — DIGOXIN 0.25 MG/ML
250 INJECTION INTRAMUSCULAR; INTRAVENOUS ONCE
Status: DISCONTINUED | OUTPATIENT
Start: 2025-01-01 | End: 2025-01-01

## 2025-01-01 RX ORDER — AMOXICILLIN 250 MG
2 CAPSULE ORAL 2 TIMES DAILY PRN
Status: DISCONTINUED | OUTPATIENT
Start: 2025-01-01 | End: 2025-01-01

## 2025-01-01 RX ORDER — PROMETHAZINE HYDROCHLORIDE 25 MG/1
25 TABLET ORAL ONCE AS NEEDED
Status: DISCONTINUED | OUTPATIENT
Start: 2025-01-01 | End: 2025-01-01

## 2025-01-01 RX ORDER — LEVOTHYROXINE SODIUM 50 UG/1
50 TABLET ORAL
Status: DISCONTINUED | OUTPATIENT
Start: 2025-01-01 | End: 2025-01-01

## 2025-01-01 RX ORDER — PRAMIPEXOLE DIHYDROCHLORIDE 0.5 MG/1
0.5 TABLET ORAL 2 TIMES DAILY
Status: DISCONTINUED | OUTPATIENT
Start: 2025-01-01 | End: 2025-01-01

## 2025-01-01 RX ORDER — GLYCOPYRROLATE 0.2 MG/ML
0.4 INJECTION INTRAMUSCULAR; INTRAVENOUS
Status: DISCONTINUED | OUTPATIENT
Start: 2025-01-01 | End: 2025-01-01 | Stop reason: HOSPADM

## 2025-01-01 RX ORDER — KETOROLAC TROMETHAMINE 30 MG/ML
15 INJECTION, SOLUTION INTRAMUSCULAR; INTRAVENOUS EVERY 6 HOURS PRN
Status: DISCONTINUED | OUTPATIENT
Start: 2025-01-01 | End: 2025-01-01 | Stop reason: HOSPADM

## 2025-01-01 RX ORDER — BISACODYL 5 MG/1
5 TABLET, DELAYED RELEASE ORAL DAILY PRN
Status: DISCONTINUED | OUTPATIENT
Start: 2025-01-01 | End: 2025-01-01

## 2025-01-01 RX ORDER — ARFORMOTEROL TARTRATE 15 UG/2ML
15 SOLUTION RESPIRATORY (INHALATION)
Status: DISCONTINUED | OUTPATIENT
Start: 2025-01-01 | End: 2025-01-01

## 2025-01-01 RX ORDER — HYDROCODONE BITARTRATE AND ACETAMINOPHEN 7.5; 325 MG/1; MG/1
1 TABLET ORAL EVERY 4 HOURS PRN
Refills: 0 | Status: DISCONTINUED | OUTPATIENT
Start: 2025-01-01 | End: 2025-01-01

## 2025-01-01 RX ORDER — DIPHENOXYLATE HYDROCHLORIDE AND ATROPINE SULFATE 2.5; .025 MG/1; MG/1
1 TABLET ORAL
Status: DISCONTINUED | OUTPATIENT
Start: 2025-01-01 | End: 2025-01-01

## 2025-01-01 RX ORDER — WARFARIN SODIUM 5 MG/1
5 TABLET ORAL
Status: DISCONTINUED | OUTPATIENT
Start: 2025-01-01 | End: 2025-01-01

## 2025-01-01 RX ORDER — NOREPINEPHRINE BITARTRATE 0.03 MG/ML
.02-.3 INJECTION, SOLUTION INTRAVENOUS
Status: DISCONTINUED | OUTPATIENT
Start: 2025-01-01 | End: 2025-01-01

## 2025-01-01 RX ORDER — ONDANSETRON 2 MG/ML
4 INJECTION INTRAMUSCULAR; INTRAVENOUS ONCE AS NEEDED
Status: DISCONTINUED | OUTPATIENT
Start: 2025-01-01 | End: 2025-01-01

## 2025-01-01 RX ORDER — FLUMAZENIL 0.1 MG/ML
0.2 INJECTION INTRAVENOUS AS NEEDED
Status: DISCONTINUED | OUTPATIENT
Start: 2025-01-01 | End: 2025-01-01

## 2025-01-01 RX ORDER — OXYBUTYNIN CHLORIDE 5 MG/1
5 TABLET, EXTENDED RELEASE ORAL DAILY
Status: DISCONTINUED | OUTPATIENT
Start: 2025-01-01 | End: 2025-01-01

## 2025-01-01 RX ORDER — FENTANYL CITRATE 50 UG/ML
25 INJECTION, SOLUTION INTRAMUSCULAR; INTRAVENOUS
Status: DISCONTINUED | OUTPATIENT
Start: 2025-01-01 | End: 2025-01-01

## 2025-01-01 RX ORDER — MORPHINE SULFATE 2 MG/ML
2 INJECTION, SOLUTION INTRAMUSCULAR; INTRAVENOUS
Status: DISCONTINUED | OUTPATIENT
Start: 2025-01-01 | End: 2025-01-01 | Stop reason: HOSPADM

## 2025-01-01 RX ORDER — IPRATROPIUM BROMIDE AND ALBUTEROL SULFATE 2.5; .5 MG/3ML; MG/3ML
3 SOLUTION RESPIRATORY (INHALATION)
Status: DISCONTINUED | OUTPATIENT
Start: 2025-01-01 | End: 2025-01-01

## 2025-01-01 RX ORDER — ONDANSETRON 4 MG/1
4 TABLET, ORALLY DISINTEGRATING ORAL EVERY 6 HOURS PRN
Status: DISCONTINUED | OUTPATIENT
Start: 2025-01-01 | End: 2025-01-01 | Stop reason: HOSPADM

## 2025-01-01 RX ORDER — AMOXICILLIN 250 MG
2 CAPSULE ORAL 2 TIMES DAILY PRN
Status: DISCONTINUED | OUTPATIENT
Start: 2025-01-01 | End: 2025-01-01 | Stop reason: HOSPADM

## 2025-01-01 RX ORDER — DIPHENHYDRAMINE HCL 25 MG
25 CAPSULE ORAL EVERY 6 HOURS PRN
Status: DISCONTINUED | OUTPATIENT
Start: 2025-01-01 | End: 2025-01-01 | Stop reason: HOSPADM

## 2025-01-01 RX ORDER — HYDROCODONE BITARTRATE AND ACETAMINOPHEN 5; 325 MG/1; MG/1
1 TABLET ORAL EVERY 6 HOURS PRN
Refills: 0 | Status: DISCONTINUED | OUTPATIENT
Start: 2025-01-01 | End: 2025-01-01

## 2025-01-01 RX ORDER — TORSEMIDE 20 MG/1
40 TABLET ORAL
Status: DISCONTINUED | OUTPATIENT
Start: 2025-01-01 | End: 2025-01-01

## 2025-01-01 RX ORDER — MORPHINE SULFATE 2 MG/ML
2 INJECTION, SOLUTION INTRAMUSCULAR; INTRAVENOUS
Status: DISCONTINUED | OUTPATIENT
Start: 2025-01-01 | End: 2025-01-01

## 2025-01-01 RX ORDER — IPRATROPIUM BROMIDE AND ALBUTEROL SULFATE 2.5; .5 MG/3ML; MG/3ML
3 SOLUTION RESPIRATORY (INHALATION) ONCE AS NEEDED
Status: COMPLETED | OUTPATIENT
Start: 2025-01-01 | End: 2025-01-01

## 2025-01-01 RX ORDER — IBUPROFEN 600 MG/1
1 TABLET ORAL
Status: DISCONTINUED | OUTPATIENT
Start: 2025-01-01 | End: 2025-01-01 | Stop reason: SDUPTHER

## 2025-01-01 RX ORDER — HALOPERIDOL 2 MG/ML
1 SOLUTION ORAL EVERY 4 HOURS PRN
Status: DISCONTINUED | OUTPATIENT
Start: 2025-01-01 | End: 2025-01-01

## 2025-01-01 RX ORDER — DIGOXIN 0.25 MG/ML
125 INJECTION INTRAMUSCULAR; INTRAVENOUS ONCE
Status: COMPLETED | OUTPATIENT
Start: 2025-01-01 | End: 2025-01-01

## 2025-01-01 RX ORDER — ONDANSETRON 4 MG/1
4 TABLET, ORALLY DISINTEGRATING ORAL EVERY 6 HOURS PRN
Status: DISCONTINUED | OUTPATIENT
Start: 2025-01-01 | End: 2025-01-01

## 2025-01-01 RX ORDER — POLYETHYLENE GLYCOL 3350 17 G/17G
17 POWDER, FOR SOLUTION ORAL DAILY PRN
Status: DISCONTINUED | OUTPATIENT
Start: 2025-01-01 | End: 2025-01-01

## 2025-01-01 RX ORDER — INSULIN LISPRO 100 [IU]/ML
2-7 INJECTION, SOLUTION INTRAVENOUS; SUBCUTANEOUS
Status: DISCONTINUED | OUTPATIENT
Start: 2025-01-01 | End: 2025-01-01

## 2025-01-01 RX ORDER — NICOTINE POLACRILEX 4 MG
15 LOZENGE BUCCAL
Status: DISCONTINUED | OUTPATIENT
Start: 2025-01-01 | End: 2025-01-01 | Stop reason: SDUPTHER

## 2025-01-01 RX ORDER — PROMETHAZINE HYDROCHLORIDE 25 MG/1
25 SUPPOSITORY RECTAL ONCE AS NEEDED
Status: DISCONTINUED | OUTPATIENT
Start: 2025-01-01 | End: 2025-01-01

## 2025-01-01 RX ORDER — SPIRONOLACTONE 25 MG/1
25 TABLET ORAL DAILY
Status: DISCONTINUED | OUTPATIENT
Start: 2025-01-01 | End: 2025-01-01

## 2025-01-01 RX ORDER — HYDROMORPHONE HYDROCHLORIDE 2 MG/ML
1.5 INJECTION, SOLUTION INTRAMUSCULAR; INTRAVENOUS; SUBCUTANEOUS
Status: DISCONTINUED | OUTPATIENT
Start: 2025-01-01 | End: 2025-01-01 | Stop reason: HOSPADM

## 2025-01-01 RX ORDER — CYCLOBENZAPRINE HCL 10 MG
10 TABLET ORAL 3 TIMES DAILY PRN
Status: DISCONTINUED | OUTPATIENT
Start: 2025-01-01 | End: 2025-01-01

## 2025-01-01 RX ORDER — HYDROMORPHONE HYDROCHLORIDE 1 MG/ML
0.5 INJECTION, SOLUTION INTRAMUSCULAR; INTRAVENOUS; SUBCUTANEOUS
Refills: 0 | Status: DISCONTINUED | OUTPATIENT
Start: 2025-01-01 | End: 2025-01-01

## 2025-01-01 RX ORDER — WARFARIN SODIUM 1 MG/1
0.5 TABLET ORAL
Status: DISCONTINUED | OUTPATIENT
Start: 2025-01-01 | End: 2025-01-01

## 2025-01-01 RX ORDER — METHYLPREDNISOLONE SODIUM SUCCINATE 40 MG/ML
40 INJECTION, POWDER, LYOPHILIZED, FOR SOLUTION INTRAMUSCULAR; INTRAVENOUS EVERY 8 HOURS
Status: DISCONTINUED | OUTPATIENT
Start: 2025-01-01 | End: 2025-01-01

## 2025-01-01 RX ORDER — BISACODYL 10 MG
10 SUPPOSITORY, RECTAL RECTAL DAILY PRN
Status: DISCONTINUED | OUTPATIENT
Start: 2025-01-01 | End: 2025-01-01 | Stop reason: HOSPADM

## 2025-01-01 RX ORDER — DIPHENHYDRAMINE HYDROCHLORIDE 50 MG/ML
25 INJECTION, SOLUTION INTRAMUSCULAR; INTRAVENOUS EVERY 6 HOURS PRN
Status: DISCONTINUED | OUTPATIENT
Start: 2025-01-01 | End: 2025-01-01 | Stop reason: HOSPADM

## 2025-01-01 RX ORDER — FAMOTIDINE 20 MG/1
20 TABLET, FILM COATED ORAL DAILY
Status: DISCONTINUED | OUTPATIENT
Start: 2025-01-01 | End: 2025-01-01

## 2025-01-01 RX ORDER — ACETAMINOPHEN 160 MG/5ML
650 SOLUTION ORAL EVERY 4 HOURS PRN
Status: DISCONTINUED | OUTPATIENT
Start: 2025-01-01 | End: 2025-01-01

## 2025-01-01 RX ORDER — HYDROCODONE BITARTRATE AND ACETAMINOPHEN 5; 325 MG/1; MG/1
1 TABLET ORAL ONCE AS NEEDED
Status: COMPLETED | OUTPATIENT
Start: 2025-01-01 | End: 2025-01-01

## 2025-01-01 RX ORDER — IBUPROFEN 600 MG/1
1 TABLET ORAL
Status: DISCONTINUED | OUTPATIENT
Start: 2025-01-01 | End: 2025-01-01

## 2025-01-01 RX ADMIN — BUPROPION HYDROCHLORIDE 150 MG: 150 TABLET, EXTENDED RELEASE ORAL at 09:09

## 2025-01-01 RX ADMIN — BUDESONIDE 0.5 MG: 0.5 INHALANT RESPIRATORY (INHALATION) at 07:16

## 2025-01-01 RX ADMIN — HYDROCODONE BITARTRATE AND ACETAMINOPHEN 1 TABLET: 5; 325 TABLET ORAL at 09:11

## 2025-01-01 RX ADMIN — SODIUM CHLORIDE 75 ML/HR: 9 INJECTION, SOLUTION INTRAVENOUS at 00:13

## 2025-01-01 RX ADMIN — HYDROMORPHONE HYDROCHLORIDE 0.5 MG: 1 INJECTION, SOLUTION INTRAMUSCULAR; INTRAVENOUS; SUBCUTANEOUS at 17:06

## 2025-01-01 RX ADMIN — SODIUM PHOSPHATE, MONOBASIC, MONOHYDRATE AND SODIUM PHOSPHATE, DIBASIC ANHYDROUS 15 MMOL: 142; 276 INJECTION, SOLUTION INTRAVENOUS at 17:29

## 2025-01-01 RX ADMIN — IPRATROPIUM BROMIDE AND ALBUTEROL SULFATE 3 ML: .5; 3 SOLUTION RESPIRATORY (INHALATION) at 08:34

## 2025-01-01 RX ADMIN — IPRATROPIUM BROMIDE AND ALBUTEROL SULFATE 3 ML: .5; 3 SOLUTION RESPIRATORY (INHALATION) at 15:49

## 2025-01-01 RX ADMIN — IPRATROPIUM BROMIDE AND ALBUTEROL SULFATE 3 ML: .5; 3 SOLUTION RESPIRATORY (INHALATION) at 11:26

## 2025-01-01 RX ADMIN — BUDESONIDE 0.5 MG: 0.5 INHALANT RESPIRATORY (INHALATION) at 06:47

## 2025-01-01 RX ADMIN — DEXMEDETOMIDINE HYDROCHLORIDE 1.5 MCG/KG/HR: 4 INJECTION, SOLUTION INTRAVENOUS at 00:50

## 2025-01-01 RX ADMIN — HYDROMORPHONE HYDROCHLORIDE 0.5 MG: 1 INJECTION, SOLUTION INTRAMUSCULAR; INTRAVENOUS; SUBCUTANEOUS at 02:52

## 2025-01-01 RX ADMIN — Medication 2.5 MG: at 20:10

## 2025-01-01 RX ADMIN — HYDROCODONE BITARTRATE AND ACETAMINOPHEN 1 TABLET: 5; 325 TABLET ORAL at 17:53

## 2025-01-01 RX ADMIN — INSULIN HUMAN 2 UNITS: 100 INJECTION, SOLUTION PARENTERAL at 17:52

## 2025-01-01 RX ADMIN — IPRATROPIUM BROMIDE AND ALBUTEROL SULFATE 3 ML: .5; 3 SOLUTION RESPIRATORY (INHALATION) at 19:31

## 2025-01-01 RX ADMIN — HYDROMORPHONE HYDROCHLORIDE 0.5 MG: 1 INJECTION, SOLUTION INTRAMUSCULAR; INTRAVENOUS; SUBCUTANEOUS at 10:29

## 2025-01-01 RX ADMIN — METHYLPREDNISOLONE SODIUM SUCCINATE 40 MG: 40 INJECTION, POWDER, FOR SOLUTION INTRAMUSCULAR; INTRAVENOUS at 05:56

## 2025-01-01 RX ADMIN — ARFORMOTEROL TARTRATE 15 MCG: 15 SOLUTION RESPIRATORY (INHALATION) at 06:48

## 2025-01-01 RX ADMIN — Medication 2.5 MG: at 20:19

## 2025-01-01 RX ADMIN — BUDESONIDE 0.5 MG: 0.5 INHALANT RESPIRATORY (INHALATION) at 19:06

## 2025-01-01 RX ADMIN — DEXMEDETOMIDINE HYDROCHLORIDE 1.2 MCG/KG/HR: 4 INJECTION, SOLUTION INTRAVENOUS at 15:09

## 2025-01-01 RX ADMIN — BISACODYL 10 MG: 10 SUPPOSITORY RECTAL at 17:55

## 2025-01-01 RX ADMIN — GLYCOPYRROLATE 0.4 MG: 0.2 SOLUTION INTRAMUSCULAR; INTRAVENOUS at 21:19

## 2025-01-01 RX ADMIN — FAMOTIDINE 20 MG: 20 TABLET, FILM COATED ORAL at 08:24

## 2025-01-01 RX ADMIN — PREGABALIN 50 MG: 50 CAPSULE ORAL at 08:50

## 2025-01-01 RX ADMIN — AZITHROMYCIN MONOHYDRATE 500 MG: 500 INJECTION, POWDER, LYOPHILIZED, FOR SOLUTION INTRAVENOUS at 09:29

## 2025-01-01 RX ADMIN — CYCLOBENZAPRINE HYDROCHLORIDE 5 MG: 10 TABLET, FILM COATED ORAL at 11:18

## 2025-01-01 RX ADMIN — BUDESONIDE 0.5 MG: 0.5 INHALANT RESPIRATORY (INHALATION) at 07:45

## 2025-01-01 RX ADMIN — LEVOTHYROXINE SODIUM 50 MCG: 50 TABLET ORAL at 06:37

## 2025-01-01 RX ADMIN — FAMOTIDINE 20 MG: 20 TABLET, FILM COATED ORAL at 07:06

## 2025-01-01 RX ADMIN — ARFORMOTEROL TARTRATE 15 MCG: 15 SOLUTION RESPIRATORY (INHALATION) at 10:36

## 2025-01-01 RX ADMIN — ARFORMOTEROL TARTRATE 15 MCG: 15 SOLUTION RESPIRATORY (INHALATION) at 08:35

## 2025-01-01 RX ADMIN — METHYLPREDNISOLONE SODIUM SUCCINATE 40 MG: 40 INJECTION, POWDER, FOR SOLUTION INTRAMUSCULAR; INTRAVENOUS at 11:37

## 2025-01-01 RX ADMIN — PRAMIPEXOLE DIHYDROCHLORIDE 0.5 MG: 0.5 TABLET ORAL at 08:25

## 2025-01-01 RX ADMIN — IPRATROPIUM BROMIDE AND ALBUTEROL SULFATE 3 ML: .5; 3 SOLUTION RESPIRATORY (INHALATION) at 06:46

## 2025-01-01 RX ADMIN — PRAMIPEXOLE DIHYDROCHLORIDE 0.5 MG: 0.5 TABLET ORAL at 09:28

## 2025-01-01 RX ADMIN — GLYCOPYRROLATE 0.4 MG: 0.2 SOLUTION INTRAMUSCULAR; INTRAVENOUS at 17:42

## 2025-01-01 RX ADMIN — PRAMIPEXOLE DIHYDROCHLORIDE 0.5 MG: 0.5 TABLET ORAL at 20:27

## 2025-01-01 RX ADMIN — HYDROMORPHONE HYDROCHLORIDE 0.5 MG: 1 INJECTION, SOLUTION INTRAMUSCULAR; INTRAVENOUS; SUBCUTANEOUS at 08:09

## 2025-01-01 RX ADMIN — METOPROLOL TARTRATE 50 MG: 50 TABLET, FILM COATED ORAL at 21:09

## 2025-01-01 RX ADMIN — ACETAMINOPHEN 1000 MG: 10 INJECTION INTRAVENOUS at 13:55

## 2025-01-01 RX ADMIN — IPRATROPIUM BROMIDE AND ALBUTEROL SULFATE 3 ML: .5; 3 SOLUTION RESPIRATORY (INHALATION) at 19:06

## 2025-01-01 RX ADMIN — IPRATROPIUM BROMIDE AND ALBUTEROL SULFATE 3 ML: .5; 3 SOLUTION RESPIRATORY (INHALATION) at 15:27

## 2025-01-01 RX ADMIN — PREGABALIN 50 MG: 50 CAPSULE ORAL at 08:31

## 2025-01-01 RX ADMIN — FAMOTIDINE 20 MG: 20 TABLET, FILM COATED ORAL at 17:06

## 2025-01-01 RX ADMIN — HYDROCODONE BITARTRATE AND ACETAMINOPHEN 1 TABLET: 5; 325 TABLET ORAL at 16:40

## 2025-01-01 RX ADMIN — HYDROCODONE BITARTRATE AND ACETAMINOPHEN 1 TABLET: 5; 325 TABLET ORAL at 16:48

## 2025-01-01 RX ADMIN — IPRATROPIUM BROMIDE AND ALBUTEROL SULFATE 3 ML: .5; 3 SOLUTION RESPIRATORY (INHALATION) at 11:44

## 2025-01-01 RX ADMIN — MORPHINE SULFATE 4 MG: 2 INJECTION, SOLUTION INTRAMUSCULAR; INTRAVENOUS at 16:34

## 2025-01-01 RX ADMIN — CEFTRIAXONE SODIUM 2000 MG: 2 INJECTION, POWDER, FOR SOLUTION INTRAMUSCULAR; INTRAVENOUS at 11:17

## 2025-01-01 RX ADMIN — BUDESONIDE 0.5 MG: 0.5 INHALANT RESPIRATORY (INHALATION) at 08:37

## 2025-01-01 RX ADMIN — METOPROLOL TARTRATE 12.5 MG: 25 TABLET, FILM COATED ORAL at 08:13

## 2025-01-01 RX ADMIN — CEFTRIAXONE SODIUM 1000 MG: 1 INJECTION, POWDER, FOR SOLUTION INTRAMUSCULAR; INTRAVENOUS at 16:04

## 2025-01-01 RX ADMIN — MORPHINE SULFATE 2 MG: 2 INJECTION, SOLUTION INTRAMUSCULAR; INTRAVENOUS at 23:47

## 2025-01-01 RX ADMIN — MORPHINE SULFATE 2 MG: 2 INJECTION, SOLUTION INTRAMUSCULAR; INTRAVENOUS at 14:07

## 2025-01-01 RX ADMIN — IPRATROPIUM BROMIDE AND ALBUTEROL SULFATE 3 ML: .5; 3 SOLUTION RESPIRATORY (INHALATION) at 13:55

## 2025-01-01 RX ADMIN — HYDROMORPHONE HYDROCHLORIDE 0.25 MG: 1 INJECTION, SOLUTION INTRAMUSCULAR; INTRAVENOUS; SUBCUTANEOUS at 09:44

## 2025-01-01 RX ADMIN — SODIUM CHLORIDE 1000 ML: 9 INJECTION, SOLUTION INTRAVENOUS at 22:13

## 2025-01-01 RX ADMIN — Medication 2.5 MG: at 21:48

## 2025-01-01 RX ADMIN — LEVOTHYROXINE SODIUM 50 MCG: 50 TABLET ORAL at 06:52

## 2025-01-01 RX ADMIN — ONDANSETRON 4 MG: 2 INJECTION, SOLUTION INTRAMUSCULAR; INTRAVENOUS at 16:34

## 2025-01-01 RX ADMIN — SENNOSIDES AND DOCUSATE SODIUM 2 TABLET: 50; 8.6 TABLET ORAL at 20:27

## 2025-01-01 RX ADMIN — CYCLOBENZAPRINE HYDROCHLORIDE 5 MG: 10 TABLET, FILM COATED ORAL at 16:40

## 2025-01-01 RX ADMIN — METHYLPREDNISOLONE SODIUM SUCCINATE 40 MG: 40 INJECTION, POWDER, FOR SOLUTION INTRAMUSCULAR; INTRAVENOUS at 13:08

## 2025-01-01 RX ADMIN — PREGABALIN 50 MG: 50 CAPSULE ORAL at 21:47

## 2025-01-01 RX ADMIN — BUPROPION HYDROCHLORIDE 150 MG: 75 TABLET, FILM COATED ORAL at 08:30

## 2025-01-01 RX ADMIN — METOPROLOL TARTRATE 12.5 MG: 25 TABLET, FILM COATED ORAL at 22:32

## 2025-01-01 RX ADMIN — IPRATROPIUM BROMIDE AND ALBUTEROL SULFATE 3 ML: .5; 3 SOLUTION RESPIRATORY (INHALATION) at 04:28

## 2025-01-01 RX ADMIN — ARFORMOTEROL TARTRATE 15 MCG: 15 SOLUTION RESPIRATORY (INHALATION) at 08:05

## 2025-01-01 RX ADMIN — IPRATROPIUM BROMIDE AND ALBUTEROL SULFATE 3 ML: .5; 3 SOLUTION RESPIRATORY (INHALATION) at 10:10

## 2025-01-01 RX ADMIN — IPRATROPIUM BROMIDE AND ALBUTEROL SULFATE 3 ML: .5; 3 SOLUTION RESPIRATORY (INHALATION) at 06:48

## 2025-01-01 RX ADMIN — MIDODRINE HYDROCHLORIDE 5 MG: 5 TABLET ORAL at 17:06

## 2025-01-01 RX ADMIN — MORPHINE SULFATE 2 MG: 2 INJECTION, SOLUTION INTRAMUSCULAR; INTRAVENOUS at 15:10

## 2025-01-01 RX ADMIN — HYDROCODONE BITARTRATE AND ACETAMINOPHEN 1 TABLET: 7.5; 325 TABLET ORAL at 18:14

## 2025-01-01 RX ADMIN — DEXMEDETOMIDINE HYDROCHLORIDE 0.2 MCG/KG/HR: 4 INJECTION, SOLUTION INTRAVENOUS at 00:00

## 2025-01-01 RX ADMIN — NOREPINEPHRINE BITARTRATE 0.07 MCG/KG/MIN: 0.03 INJECTION, SOLUTION INTRAVENOUS at 21:38

## 2025-01-01 RX ADMIN — MORPHINE SULFATE 2 MG: 2 INJECTION, SOLUTION INTRAMUSCULAR; INTRAVENOUS at 21:25

## 2025-01-01 RX ADMIN — MORPHINE SULFATE 6 MG: 10 INJECTION INTRAVENOUS at 04:21

## 2025-01-01 RX ADMIN — Medication 2.5 MG: at 21:07

## 2025-01-01 RX ADMIN — BUDESONIDE 0.5 MG: 0.5 INHALANT RESPIRATORY (INHALATION) at 19:07

## 2025-01-01 RX ADMIN — CEFTRIAXONE SODIUM 2000 MG: 2 INJECTION, POWDER, FOR SOLUTION INTRAMUSCULAR; INTRAVENOUS at 14:18

## 2025-01-01 RX ADMIN — SODIUM CHLORIDE 75 ML/HR: 9 INJECTION, SOLUTION INTRAVENOUS at 07:21

## 2025-01-01 RX ADMIN — ARFORMOTEROL TARTRATE 15 MCG: 15 SOLUTION RESPIRATORY (INHALATION) at 19:32

## 2025-01-01 RX ADMIN — PRAMIPEXOLE DIHYDROCHLORIDE 0.5 MG: 0.5 TABLET ORAL at 21:24

## 2025-01-01 RX ADMIN — POTASSIUM CHLORIDE 20 MEQ: 1500 TABLET, EXTENDED RELEASE ORAL at 08:10

## 2025-01-01 RX ADMIN — CEFTRIAXONE SODIUM 1000 MG: 1 INJECTION, POWDER, FOR SOLUTION INTRAMUSCULAR; INTRAVENOUS at 08:09

## 2025-01-01 RX ADMIN — MIDODRINE HYDROCHLORIDE 2.5 MG: 2.5 TABLET ORAL at 11:37

## 2025-01-01 RX ADMIN — INSULIN HUMAN 2 UNITS: 100 INJECTION, SOLUTION PARENTERAL at 13:54

## 2025-01-01 RX ADMIN — MORPHINE SULFATE 2 MG: 2 INJECTION, SOLUTION INTRAMUSCULAR; INTRAVENOUS at 08:31

## 2025-01-01 RX ADMIN — PANTOPRAZOLE SODIUM 40 MG: 40 TABLET, DELAYED RELEASE ORAL at 08:10

## 2025-01-01 RX ADMIN — METHYLPREDNISOLONE SODIUM SUCCINATE 40 MG: 40 INJECTION, POWDER, FOR SOLUTION INTRAMUSCULAR; INTRAVENOUS at 20:16

## 2025-01-01 RX ADMIN — BUPROPION HYDROCHLORIDE 150 MG: 75 TABLET, FILM COATED ORAL at 21:24

## 2025-01-01 RX ADMIN — FAMOTIDINE 20 MG: 20 TABLET, FILM COATED ORAL at 08:50

## 2025-01-01 RX ADMIN — PRAMIPEXOLE DIHYDROCHLORIDE 0.5 MG: 0.5 TABLET ORAL at 08:50

## 2025-01-01 RX ADMIN — PREGABALIN 50 MG: 50 CAPSULE ORAL at 08:14

## 2025-01-01 RX ADMIN — SODIUM PHOSPHATE, MONOBASIC, MONOHYDRATE AND SODIUM PHOSPHATE, DIBASIC ANHYDROUS 15 MMOL: 142; 276 INJECTION, SOLUTION INTRAVENOUS at 13:52

## 2025-01-01 RX ADMIN — BUPROPION HYDROCHLORIDE 150 MG: 150 TABLET, EXTENDED RELEASE ORAL at 08:25

## 2025-01-01 RX ADMIN — HYDROCODONE BITARTRATE AND ACETAMINOPHEN 1 TABLET: 5; 325 TABLET ORAL at 03:14

## 2025-01-01 RX ADMIN — IPRATROPIUM BROMIDE AND ALBUTEROL SULFATE 3 ML: .5; 3 SOLUTION RESPIRATORY (INHALATION) at 12:41

## 2025-01-01 RX ADMIN — FERROUS SULFATE TAB 325 MG (65 MG ELEMENTAL FE) 325 MG: 325 (65 FE) TAB at 08:10

## 2025-01-01 RX ADMIN — MORPHINE SULFATE 6 MG: 10 INJECTION INTRAVENOUS at 17:41

## 2025-01-01 RX ADMIN — ROSUVASTATIN CALCIUM 10 MG: 20 TABLET, FILM COATED ORAL at 21:24

## 2025-01-01 RX ADMIN — SODIUM CHLORIDE 75 ML/HR: 9 INJECTION, SOLUTION INTRAVENOUS at 00:04

## 2025-01-01 RX ADMIN — BUDESONIDE 0.5 MG: 0.5 INHALANT RESPIRATORY (INHALATION) at 19:20

## 2025-01-01 RX ADMIN — HYDROCODONE BITARTRATE AND ACETAMINOPHEN 1 TABLET: 5; 325 TABLET ORAL at 09:29

## 2025-01-01 RX ADMIN — PRAMIPEXOLE DIHYDROCHLORIDE 0.5 MG: 0.5 TABLET ORAL at 21:47

## 2025-01-01 RX ADMIN — WARFARIN SODIUM 0.5 MG: 1 TABLET ORAL at 17:53

## 2025-01-01 RX ADMIN — BUDESONIDE 0.5 MG: 0.5 INHALANT RESPIRATORY (INHALATION) at 19:32

## 2025-01-01 RX ADMIN — FAMOTIDINE 20 MG: 20 TABLET, FILM COATED ORAL at 08:30

## 2025-01-01 RX ADMIN — FAMOTIDINE 20 MG: 20 TABLET, FILM COATED ORAL at 17:53

## 2025-01-01 RX ADMIN — ARFORMOTEROL TARTRATE 15 MCG: 15 SOLUTION RESPIRATORY (INHALATION) at 19:06

## 2025-01-01 RX ADMIN — DIGOXIN 125 MCG: 0.12 TABLET ORAL at 13:54

## 2025-01-01 RX ADMIN — ROSUVASTATIN CALCIUM 10 MG: 20 TABLET, FILM COATED ORAL at 21:47

## 2025-01-01 RX ADMIN — MORPHINE SULFATE 6 MG: 10 INJECTION INTRAVENOUS at 21:19

## 2025-01-01 RX ADMIN — BUPROPION HYDROCHLORIDE 150 MG: 75 TABLET, FILM COATED ORAL at 21:47

## 2025-01-01 RX ADMIN — SODIUM CHLORIDE 250 ML: 9 INJECTION, SOLUTION INTRAVENOUS at 17:08

## 2025-01-01 RX ADMIN — BUPROPION HYDROCHLORIDE 150 MG: 75 TABLET, FILM COATED ORAL at 08:50

## 2025-01-01 RX ADMIN — HYDROMORPHONE HYDROCHLORIDE 0.5 MG: 1 INJECTION, SOLUTION INTRAMUSCULAR; INTRAVENOUS; SUBCUTANEOUS at 04:57

## 2025-01-01 RX ADMIN — HYDROCODONE BITARTRATE AND ACETAMINOPHEN 1 TABLET: 5; 325 TABLET ORAL at 20:13

## 2025-01-01 RX ADMIN — FAMOTIDINE 20 MG: 20 TABLET, FILM COATED ORAL at 16:40

## 2025-01-01 RX ADMIN — PRAMIPEXOLE DIHYDROCHLORIDE 0.5 MG: 0.5 TABLET ORAL at 08:14

## 2025-01-01 RX ADMIN — CYCLOBENZAPRINE HYDROCHLORIDE 5 MG: 10 TABLET, FILM COATED ORAL at 21:00

## 2025-01-01 RX ADMIN — IPRATROPIUM BROMIDE AND ALBUTEROL SULFATE 3 ML: .5; 3 SOLUTION RESPIRATORY (INHALATION) at 15:37

## 2025-01-01 RX ADMIN — BUDESONIDE 0.5 MG: 0.5 INHALANT RESPIRATORY (INHALATION) at 08:06

## 2025-01-01 RX ADMIN — CEFTRIAXONE SODIUM 2000 MG: 2 INJECTION, POWDER, FOR SOLUTION INTRAMUSCULAR; INTRAVENOUS at 12:34

## 2025-01-01 RX ADMIN — MORPHINE SULFATE 6 MG: 10 INJECTION INTRAVENOUS at 00:20

## 2025-01-01 RX ADMIN — FAMOTIDINE 20 MG: 20 TABLET, FILM COATED ORAL at 08:13

## 2025-01-01 RX ADMIN — IPRATROPIUM BROMIDE AND ALBUTEROL SULFATE 3 ML: .5; 3 SOLUTION RESPIRATORY (INHALATION) at 12:27

## 2025-01-01 RX ADMIN — LEVOTHYROXINE SODIUM 50 MCG: 50 TABLET ORAL at 06:15

## 2025-01-01 RX ADMIN — INSULIN LISPRO 2 UNITS: 100 INJECTION, SOLUTION INTRAVENOUS; SUBCUTANEOUS at 12:19

## 2025-01-01 RX ADMIN — MIDODRINE HYDROCHLORIDE 2.5 MG: 2.5 TABLET ORAL at 14:09

## 2025-01-01 RX ADMIN — DEXMEDETOMIDINE HYDROCHLORIDE 1.4 MCG/KG/HR: 4 INJECTION, SOLUTION INTRAVENOUS at 19:30

## 2025-01-01 RX ADMIN — MIDODRINE HYDROCHLORIDE 2.5 MG: 2.5 TABLET ORAL at 07:06

## 2025-01-01 RX ADMIN — ROSUVASTATIN CALCIUM 10 MG: 20 TABLET, FILM COATED ORAL at 20:26

## 2025-01-01 RX ADMIN — DEXMEDETOMIDINE HYDROCHLORIDE 1.5 MCG/KG/HR: 4 INJECTION, SOLUTION INTRAVENOUS at 08:12

## 2025-01-01 RX ADMIN — METOPROLOL TARTRATE 12.5 MG: 25 TABLET, FILM COATED ORAL at 08:24

## 2025-01-01 RX ADMIN — IPRATROPIUM BROMIDE AND ALBUTEROL SULFATE 3 ML: .5; 3 SOLUTION RESPIRATORY (INHALATION) at 11:05

## 2025-01-01 RX ADMIN — IPRATROPIUM BROMIDE AND ALBUTEROL SULFATE 3 ML: .5; 3 SOLUTION RESPIRATORY (INHALATION) at 07:46

## 2025-01-01 RX ADMIN — HYDROCODONE BITARTRATE AND ACETAMINOPHEN 1 TABLET: 7.5; 325 TABLET ORAL at 10:18

## 2025-01-01 RX ADMIN — IPRATROPIUM BROMIDE AND ALBUTEROL SULFATE 3 ML: .5; 3 SOLUTION RESPIRATORY (INHALATION) at 10:36

## 2025-01-01 RX ADMIN — SODIUM PHOSPHATE, MONOBASIC, MONOHYDRATE AND SODIUM PHOSPHATE, DIBASIC, ANHYDROUS 15 MMOL: 276; 142 INJECTION, SOLUTION INTRAVENOUS at 06:56

## 2025-01-01 RX ADMIN — CEFTRIAXONE SODIUM 1000 MG: 1 INJECTION, POWDER, FOR SOLUTION INTRAMUSCULAR; INTRAVENOUS at 10:53

## 2025-01-01 RX ADMIN — INSULIN HUMAN 2 UNITS: 100 INJECTION, SOLUTION PARENTERAL at 00:10

## 2025-01-01 RX ADMIN — LEVOTHYROXINE SODIUM 50 MCG: 50 TABLET ORAL at 05:33

## 2025-01-01 RX ADMIN — Medication 2.5 MG: at 21:24

## 2025-01-01 RX ADMIN — IPRATROPIUM BROMIDE AND ALBUTEROL SULFATE 3 ML: .5; 3 SOLUTION RESPIRATORY (INHALATION) at 15:15

## 2025-01-01 RX ADMIN — IPRATROPIUM BROMIDE AND ALBUTEROL SULFATE 3 ML: .5; 3 SOLUTION RESPIRATORY (INHALATION) at 08:04

## 2025-01-01 RX ADMIN — HYDROMORPHONE HYDROCHLORIDE 0.5 MG: 1 INJECTION, SOLUTION INTRAMUSCULAR; INTRAVENOUS; SUBCUTANEOUS at 08:25

## 2025-01-01 RX ADMIN — GLYCOPYRROLATE 0.4 MG: 0.2 SOLUTION INTRAMUSCULAR; INTRAVENOUS at 00:20

## 2025-01-01 RX ADMIN — NOREPINEPHRINE BITARTRATE 0.02 MCG/KG/MIN: 0.03 INJECTION, SOLUTION INTRAVENOUS at 02:55

## 2025-01-01 RX ADMIN — HYDROCODONE BITARTRATE AND ACETAMINOPHEN 1 TABLET: 7.5; 325 TABLET ORAL at 22:32

## 2025-01-01 RX ADMIN — BUPROPION HYDROCHLORIDE 300 MG: 150 TABLET, EXTENDED RELEASE ORAL at 08:10

## 2025-01-01 RX ADMIN — MORPHINE SULFATE 2 MG: 2 INJECTION, SOLUTION INTRAMUSCULAR; INTRAVENOUS at 05:35

## 2025-01-01 RX ADMIN — FUROSEMIDE 20 MG: 10 INJECTION, SOLUTION INTRAMUSCULAR; INTRAVENOUS at 13:54

## 2025-01-01 RX ADMIN — ARFORMOTEROL TARTRATE 15 MCG: 15 SOLUTION RESPIRATORY (INHALATION) at 19:24

## 2025-01-01 RX ADMIN — METOPROLOL TARTRATE 12.5 MG: 25 TABLET, FILM COATED ORAL at 13:09

## 2025-01-01 RX ADMIN — HYDROCODONE BITARTRATE AND ACETAMINOPHEN 1 TABLET: 7.5; 325 TABLET ORAL at 14:08

## 2025-01-01 RX ADMIN — DIGOXIN 125 MCG: 0.25 INJECTION INTRAMUSCULAR; INTRAVENOUS at 16:17

## 2025-01-01 RX ADMIN — NOREPINEPHRINE BITARTRATE 0.04 MCG/KG/MIN: 0.03 INJECTION, SOLUTION INTRAVENOUS at 22:52

## 2025-01-01 RX ADMIN — WARFARIN SODIUM 1 MG: 1 TABLET ORAL at 17:42

## 2025-01-01 RX ADMIN — PRAMIPEXOLE DIHYDROCHLORIDE 0.5 MG: 0.5 TABLET ORAL at 20:19

## 2025-01-01 RX ADMIN — HYDROMORPHONE HYDROCHLORIDE 0.25 MG: 1 INJECTION, SOLUTION INTRAMUSCULAR; INTRAVENOUS; SUBCUTANEOUS at 09:32

## 2025-01-01 RX ADMIN — MORPHINE SULFATE 4 MG: 2 INJECTION, SOLUTION INTRAMUSCULAR; INTRAVENOUS at 16:38

## 2025-01-01 RX ADMIN — INSULIN HUMAN 2 UNITS: 100 INJECTION, SOLUTION PARENTERAL at 06:15

## 2025-01-01 RX ADMIN — Medication 2.5 MG: at 20:26

## 2025-01-01 RX ADMIN — PRAMIPEXOLE DIHYDROCHLORIDE 0.5 MG: 0.5 TABLET ORAL at 20:12

## 2025-01-01 RX ADMIN — DIGOXIN 125 MCG: 0.12 TABLET ORAL at 13:10

## 2025-01-01 RX ADMIN — ARFORMOTEROL TARTRATE 15 MCG: 15 SOLUTION RESPIRATORY (INHALATION) at 19:16

## 2025-01-01 RX ADMIN — LEVOTHYROXINE SODIUM 50 MCG: 50 TABLET ORAL at 05:34

## 2025-01-01 RX ADMIN — ARFORMOTEROL TARTRATE 15 MCG: 15 SOLUTION RESPIRATORY (INHALATION) at 07:17

## 2025-01-01 RX ADMIN — DEXMEDETOMIDINE HYDROCHLORIDE 1.5 MCG/KG/HR: 4 INJECTION, SOLUTION INTRAVENOUS at 04:09

## 2025-01-01 RX ADMIN — PRAMIPEXOLE DIHYDROCHLORIDE 0.5 MG: 0.5 TABLET ORAL at 21:00

## 2025-01-01 RX ADMIN — WARFARIN SODIUM 2.5 MG: 2.5 TABLET ORAL at 17:29

## 2025-01-01 RX ADMIN — IPRATROPIUM BROMIDE AND ALBUTEROL SULFATE 3 ML: .5; 3 SOLUTION RESPIRATORY (INHALATION) at 19:20

## 2025-01-01 RX ADMIN — MORPHINE SULFATE 2 MG: 2 INJECTION, SOLUTION INTRAMUSCULAR; INTRAVENOUS at 11:17

## 2025-01-01 RX ADMIN — DEXMEDETOMIDINE HYDROCHLORIDE 0.4 MCG/KG/HR: 4 INJECTION, SOLUTION INTRAVENOUS at 08:23

## 2025-01-01 RX ADMIN — DIGOXIN 125 MCG: 0.12 TABLET ORAL at 09:28

## 2025-01-01 RX ADMIN — METOPROLOL TARTRATE 25 MG: 25 TABLET, FILM COATED ORAL at 20:10

## 2025-01-01 RX ADMIN — BUPROPION HYDROCHLORIDE 150 MG: 75 TABLET, FILM COATED ORAL at 08:13

## 2025-01-01 RX ADMIN — ARFORMOTEROL TARTRATE 15 MCG: 15 SOLUTION RESPIRATORY (INHALATION) at 19:07

## 2025-01-01 RX ADMIN — MORPHINE SULFATE 6 MG: 10 INJECTION INTRAVENOUS at 02:56

## 2025-01-01 RX ADMIN — HYDROMORPHONE HYDROCHLORIDE 0.5 MG: 1 INJECTION, SOLUTION INTRAMUSCULAR; INTRAVENOUS; SUBCUTANEOUS at 23:27

## 2025-01-01 RX ADMIN — IPRATROPIUM BROMIDE AND ALBUTEROL SULFATE 3 ML: .5; 3 SOLUTION RESPIRATORY (INHALATION) at 19:16

## 2025-01-01 RX ADMIN — GLYCOPYRROLATE 0.4 MG: 0.2 INJECTION INTRAMUSCULAR; INTRAVENOUS at 16:38

## 2025-01-01 RX ADMIN — BUDESONIDE 0.5 MG: 0.5 INHALANT RESPIRATORY (INHALATION) at 19:23

## 2025-01-01 RX ADMIN — MORPHINE SULFATE 2 MG: 2 INJECTION, SOLUTION INTRAMUSCULAR; INTRAVENOUS at 04:55

## 2025-01-01 RX ADMIN — PREGABALIN 50 MG: 50 CAPSULE ORAL at 21:24

## 2025-01-01 RX ADMIN — Medication 2.5 MG: at 21:00

## 2025-01-01 RX ADMIN — METHYLPREDNISOLONE SODIUM SUCCINATE 125 MG: 125 INJECTION, POWDER, FOR SOLUTION INTRAMUSCULAR; INTRAVENOUS at 05:05

## 2025-01-01 RX ADMIN — OXYBUTYNIN CHLORIDE 5 MG: 5 TABLET, EXTENDED RELEASE ORAL at 08:13

## 2025-01-01 RX ADMIN — WARFARIN SODIUM 1 MG: 1 TABLET ORAL at 17:46

## 2025-01-01 RX ADMIN — HYDROMORPHONE HYDROCHLORIDE 0.5 MG: 1 INJECTION, SOLUTION INTRAMUSCULAR; INTRAVENOUS; SUBCUTANEOUS at 05:33

## 2025-01-01 RX ADMIN — HYDROMORPHONE HYDROCHLORIDE 1 MG: 1 INJECTION, SOLUTION INTRAMUSCULAR; INTRAVENOUS; SUBCUTANEOUS at 17:28

## 2025-01-01 RX ADMIN — HYDROMORPHONE HYDROCHLORIDE 0.5 MG: 1 INJECTION, SOLUTION INTRAMUSCULAR; INTRAVENOUS; SUBCUTANEOUS at 21:08

## 2025-01-01 RX ADMIN — BUDESONIDE 0.5 MG: 0.5 INHALANT RESPIRATORY (INHALATION) at 06:49

## 2025-01-01 RX ADMIN — ARFORMOTEROL TARTRATE 15 MCG: 15 SOLUTION RESPIRATORY (INHALATION) at 19:20

## 2025-01-01 RX ADMIN — IPRATROPIUM BROMIDE AND ALBUTEROL SULFATE 3 ML: .5; 3 SOLUTION RESPIRATORY (INHALATION) at 07:16

## 2025-01-01 RX ADMIN — IPRATROPIUM BROMIDE AND ALBUTEROL SULFATE 3 ML: .5; 3 SOLUTION RESPIRATORY (INHALATION) at 09:43

## 2025-01-01 RX ADMIN — CEFTRIAXONE SODIUM 2000 MG: 2 INJECTION, POWDER, FOR SOLUTION INTRAMUSCULAR; INTRAVENOUS at 13:52

## 2025-01-01 RX ADMIN — PRAMIPEXOLE DIHYDROCHLORIDE 0.5 MG: 0.5 TABLET ORAL at 08:31

## 2025-01-01 RX ADMIN — FAMOTIDINE 20 MG: 20 TABLET, FILM COATED ORAL at 06:52

## 2025-01-01 RX ADMIN — HYDROCODONE BITARTRATE AND ACETAMINOPHEN 1 TABLET: 7.5; 325 TABLET ORAL at 23:18

## 2025-01-01 RX ADMIN — METOPROLOL TARTRATE 12.5 MG: 25 TABLET, FILM COATED ORAL at 20:26

## 2025-01-01 RX ADMIN — PRAMIPEXOLE DIHYDROCHLORIDE 0.5 MG: 0.5 TABLET ORAL at 09:10

## 2025-01-01 RX ADMIN — IPRATROPIUM BROMIDE AND ALBUTEROL SULFATE 3 ML: .5; 3 SOLUTION RESPIRATORY (INHALATION) at 15:50

## 2025-01-01 RX ADMIN — BUDESONIDE 0.5 MG: 0.5 INHALANT RESPIRATORY (INHALATION) at 19:16

## 2025-01-02 ENCOUNTER — ANTICOAGULATION VISIT (OUTPATIENT)
Dept: PHARMACY | Facility: HOSPITAL | Age: 75
End: 2025-01-02
Payer: MEDICARE

## 2025-01-02 DIAGNOSIS — I48.20 ATRIAL FIBRILLATION, CHRONIC: Primary | Chronic | ICD-10-CM

## 2025-01-02 LAB — INR PPP: 3.3

## 2025-01-02 PROCEDURE — G0249 PROVIDE INR TEST MATER/EQUIP: HCPCS

## 2025-01-02 NOTE — PROGRESS NOTES
Anticoagulation Clinic Progress Note    Anticoagulation Summary  As of 1/2/2025      INR goal:  2.0-3.0   TTR:  53.7% (3.8 y)   INR used for dosing:  3.30 (1/2/2025)   Warfarin maintenance plan:  2.5 mg every day   Weekly warfarin total:  17.5 mg   Plan last modified:  Marleny Black RPH (10/18/2024)   Next INR check:  1/9/2025   Priority:  High   Target end date:  --    Indications    Atrial fibrillation  chronic [I48.20]                 Anticoagulation Episode Summary       INR check location:  --    Preferred lab:  --    Send INR reminders to:   [facility] [employer] HOME TEST POOL    Comments:   Home Testing as of 7/30/21 *call only when out of range*          Anticoagulation Care Providers       Provider Role Specialty Phone number    Zenia Tinajero MD Referring Cardiology [phone]            Clinic Interview:  Patient Findings     Positives:  Change in diet/appetite    Negatives:  Signs/symptoms of thrombosis, Signs/symptoms of bleeding,   Laboratory test error suspected, Change in health, Change in alcohol use,   Change in activity, Upcoming invasive procedure, Emergency department   visit, Upcoming dental procedure, Missed doses, Extra doses, Change in   medications, Hospital admission, Bruising, Other complaints      Clinical Outcomes     Negatives:  Major bleeding event, Thromboembolic event,   Anticoagulation-related hospital admission, Anticoagulation-related ED   visit, Anticoagulation-related fatality        INR History:      12/12/2024    10:08 AM 12/19/2024    12:00 AM 12/19/2024    11:14 AM 12/26/2024    12:00 AM 12/26/2024    11:40 AM 1/2/2025    12:00 AM 1/2/2025     2:15 PM   Anticoagulation Monitoring   INR 2.50  2.50  3.10  3.30   INR Date 12/12/2024 12/19/2024 12/26/2024 1/2/2025   INR Goal 2.0-3.0  2.0-3.0  2.0-3.0  2.0-3.0   Trend Same  Same  Same  Same   Last Week Total 16.25 mg  17.5 mg  17.5 mg  17.5 mg   Next Week Total 17.5 mg  17.5 mg  17.5 mg  16.25 mg   Sun 2.5 mg  2.5 mg  2.5  mg  2.5 mg   Mon 2.5 mg  2.5 mg  2.5 mg  2.5 mg   Tue 2.5 mg  2.5 mg  2.5 mg  2.5 mg   Wed 2.5 mg  2.5 mg  2.5 mg  2.5 mg   Thu 2.5 mg  2.5 mg  2.5 mg  1.25 mg (1/2)   Fri 2.5 mg  2.5 mg  2.5 mg  2.5 mg   Sat 2.5 mg  2.5 mg  2.5 mg  2.5 mg   Historical INR  2.50      3.10      3.30            This result is from an external source.       Plan:  1. INR is Supratherapeutic today- see above in Anticoagulation Summary.   Will instruct Shani Phillip to Change their warfarin regimen- see above in Anticoagulation Summary.  Cranberry juice over this week, partial to 1.25 mg then resume, rck 1 week   2. Follow up in 1 weeks  3. They have been instructed to call if any changes in medications, doses, concerns, etc. Patient expresses understanding and has no further questions at this time.    Marleny Black [facility]

## 2025-01-09 ENCOUNTER — ANTICOAGULATION VISIT (OUTPATIENT)
Dept: PHARMACY | Facility: HOSPITAL | Age: 75
End: 2025-01-09
Payer: MEDICARE

## 2025-01-09 DIAGNOSIS — I48.20 ATRIAL FIBRILLATION, CHRONIC: Primary | Chronic | ICD-10-CM

## 2025-01-09 LAB — INR PPP: 3.6

## 2025-01-09 NOTE — PROGRESS NOTES
Anticoagulation Clinic Progress Note    Anticoagulation Summary  As of 1/9/2025      INR goal:  2.0-3.0   TTR:  53.4% (3.8 y)   INR used for dosing:  3.60 (1/9/2025)   Warfarin maintenance plan:  1.25 mg every Tue, Thu; 2.5 mg all other days   Weekly warfarin total:  15 mg   Plan last modified:  Marleny Black RPH (1/9/2025)   Next INR check:  1/16/2025   Priority:  High   Target end date:  --    Indications    Atrial fibrillation  chronic [I48.20]                 Anticoagulation Episode Summary       INR check location:  --    Preferred lab:  --    Send INR reminders to:   [facility] [employer] HOME TEST POOL    Comments:   Home Testing as of 7/30/21 *call only when out of range*          Anticoagulation Care Providers       Provider Role Specialty Phone number    Zenia Tinajero MD Referring Cardiology [phone]            Clinic Interview:  Patient Findings     Negatives:  Signs/symptoms of thrombosis, Signs/symptoms of bleeding,   Laboratory test error suspected, Change in health, Change in alcohol use,   Change in activity, Upcoming invasive procedure, Emergency department   visit, Upcoming dental procedure, Missed doses, Extra doses, Change in   medications, Change in diet/appetite, Hospital admission, Bruising, Other   complaints      Clinical Outcomes     Negatives:  Major bleeding event, Thromboembolic event,   Anticoagulation-related hospital admission, Anticoagulation-related ED   visit, Anticoagulation-related fatality        INR History:      12/19/2024    11:14 AM 12/26/2024    12:00 AM 12/26/2024    11:40 AM 1/2/2025    12:00 AM 1/2/2025     2:15 PM 1/9/2025    12:00 AM 1/9/2025     1:59 PM   Anticoagulation Monitoring   INR 2.50  3.10  3.30  3.60   INR Date 12/19/2024 12/26/2024 1/2/2025 1/9/2025   INR Goal 2.0-3.0  2.0-3.0  2.0-3.0  2.0-3.0   Trend Same  Same  Same  Down   Last Week Total 17.5 mg  17.5 mg  17.5 mg  16.25 mg   Next Week Total 17.5 mg  17.5 mg  16.25 mg  15 mg   Sun 2.5 mg  2.5  mg  2.5 mg  2.5 mg   Mon 2.5 mg  2.5 mg  2.5 mg  2.5 mg   Tue 2.5 mg  2.5 mg  2.5 mg  1.25 mg   Wed 2.5 mg  2.5 mg  2.5 mg  2.5 mg   Thu 2.5 mg  2.5 mg  1.25 mg (1/2)  1.25 mg   Fri 2.5 mg  2.5 mg  2.5 mg  2.5 mg   Sat 2.5 mg  2.5 mg  2.5 mg  2.5 mg   Historical INR  3.10      3.30      3.60            This result is from an external source.       Plan:  1. INR is Supratherapeutic today- see above in Anticoagulation Summary.   Will instruct Shani Phillip to Change their warfarin regimen- see above in Anticoagulation Summary.  Decrease to 1.25 mg on tues, thurs and 2.5 mg URMILA, sheeba 1 week   2. Follow up in 1 weeks  3. They have been instructed to call if any changes in medications, doses, concerns, etc. Patient expresses understanding and has no further questions at this time.    Marleny Black [facility]

## 2025-01-16 ENCOUNTER — ANTICOAGULATION VISIT (OUTPATIENT)
Dept: PHARMACY | Facility: HOSPITAL | Age: 75
End: 2025-01-16
Payer: MEDICARE

## 2025-01-16 DIAGNOSIS — I48.20 ATRIAL FIBRILLATION, CHRONIC: Primary | Chronic | ICD-10-CM

## 2025-01-16 LAB — INR PPP: 2.6

## 2025-01-16 NOTE — PROGRESS NOTES
Anticoagulation Clinic Progress Note    Anticoagulation Summary  As of 2025      INR goal:  2.0-3.0   TTR:  53.4% (3.9 y)   INR used for dosin.60 (2025)   Warfarin maintenance plan:  1.25 mg every Tue, Thu; 2.5 mg all other days   Weekly warfarin total:  15 mg   No change documented:  Marleny Black RPH   Plan last modified:  Marleny Black RPH (2025)   Next INR check:  2025   Priority:  High   Target end date:  --    Indications    Atrial fibrillation  chronic [I48.20]                 Anticoagulation Episode Summary       INR check location:  --    Preferred lab:  --    Send INR reminders to:   [facility] [employer] HOME TEST POOL    Comments:   Home Testing as of 21 *call only when out of range*          Anticoagulation Care Providers       Provider Role Specialty Phone number    Zenia Tinajero MD Referring Cardiology [phone]            Clinic Interview:  No pertinent clinical findings have been reported.    INR History:      2024    11:40 AM 2025    12:00 AM 2025     2:15 PM 2025    12:00 AM 2025     1:59 PM 2025    12:00 AM 2025     1:28 PM   Anticoagulation Monitoring   INR 3.10  3.30  3.60  2.60   INR Date 2024   INR Goal 2.0-3.0  2.0-3.0  2.0-3.0  2.0-3.0   Trend Same  Same  Down  Same   Last Week Total 17.5 mg  17.5 mg  16.25 mg  15 mg   Next Week Total 17.5 mg  16.25 mg  15 mg  15 mg   Sun 2.5 mg  2.5 mg  2.5 mg  2.5 mg   Mon 2.5 mg  2.5 mg  2.5 mg  2.5 mg   Tue 2.5 mg  2.5 mg  1.25 mg  1.25 mg   Wed 2.5 mg  2.5 mg  2.5 mg  2.5 mg   Thu 2.5 mg  1.25 mg ()  1.25 mg  1.25 mg   Fri 2.5 mg  2.5 mg  2.5 mg  2.5 mg   Sat 2.5 mg  2.5 mg  2.5 mg  2.5 mg   Historical INR  3.30      3.60      2.60            This result is from an external source.       Plan:  1. INR is therapeutic today- see above in Anticoagulation Summary.    Shani Phillip to continue their warfarin regimen- see above in Anticoagulation  Summary.  2. Follow up in 1 week  3. They have been instructed to call if any changes in medications, doses, concerns, etc. Patient expresses understanding and has no further questions at this time.    Marleny Black RP

## 2025-01-23 ENCOUNTER — ANTICOAGULATION VISIT (OUTPATIENT)
Dept: PHARMACY | Facility: HOSPITAL | Age: 75
End: 2025-01-23
Payer: MEDICARE

## 2025-01-23 DIAGNOSIS — I48.20 ATRIAL FIBRILLATION, CHRONIC: Primary | Chronic | ICD-10-CM

## 2025-01-23 LAB — INR PPP: 4.8

## 2025-01-23 NOTE — PROGRESS NOTES
Anticoagulation Clinic Progress Note    Anticoagulation Summary  As of 2025      INR goal:  2.0-3.0   TTR:  53.2% (3.9 y)   INR used for dosin.80 (2025)   Warfarin maintenance plan:  1.25 mg every Sun, Tue, Thu; 2.5 mg all other days   Weekly warfarin total:  13.75 mg   Plan last modified:  Nara Jefferson, PharmD (2025)   Next INR check:  2025   Priority:  High   Target end date:  --    Indications    Atrial fibrillation  chronic [I48.20]                 Anticoagulation Episode Summary       INR check location:  --    Preferred lab:  --    Send INR reminders to:   [facility] [employer] HOME TEST POOL    Comments:   Home Testing as of 21 *call only when out of range*          Anticoagulation Care Providers       Provider Role Specialty Phone number    Zenia Tinajero MD Referring Cardiology [phone]            Clinic Interview:  Patient Findings     Positives:  Change in diet/appetite    Negatives:  Signs/symptoms of thrombosis, Signs/symptoms of bleeding,   Laboratory test error suspected, Change in health, Change in alcohol use,   Change in activity, Upcoming invasive procedure, Emergency department   visit, Upcoming dental procedure, Missed doses, Extra doses, Change in   medications, Hospital admission, Bruising, Other complaints    Comments:  Patient reports drinking ~8oz cranberry juice on Tuesday      Clinical Outcomes     Negatives:  Major bleeding event, Thromboembolic event,   Anticoagulation-related hospital admission, Anticoagulation-related ED   visit, Anticoagulation-related fatality    Comments:  Patient reports drinking ~8oz cranberry juice on Tuesday        INR History:      2025     2:15 PM 2025    12:00 AM 2025     1:59 PM 2025    12:00 AM 2025     1:28 PM 2025    12:00 AM 2025    10:51 AM   Anticoagulation Monitoring   INR 3.30  3.60  2.60  4.80   INR Date 2025   INR Goal 2.0-3.0  2.0-3.0  2.0-3.0   2.0-3.0   Trend Same  Down  Same  Down   Last Week Total 17.5 mg  16.25 mg  15 mg  15 mg   Next Week Total 16.25 mg  15 mg  15 mg  12.5 mg   Sun 2.5 mg  2.5 mg  2.5 mg  1.25 mg   Mon 2.5 mg  2.5 mg  2.5 mg  2.5 mg   Tue 2.5 mg  1.25 mg  1.25 mg  1.25 mg   Wed 2.5 mg  2.5 mg  2.5 mg  2.5 mg   Thu 1.25 mg (1/2)  1.25 mg  1.25 mg  Hold (1/23)   Fri 2.5 mg  2.5 mg  2.5 mg  2.5 mg   Sat 2.5 mg  2.5 mg  2.5 mg  2.5 mg   Historical INR  3.60      2.60      4.80            This result is from an external source.       Plan:  1. INR is Supratherapeutic today- see above in Anticoagulation Summary.   Will instruct Shani Phillip to hold dose tonight then change their warfarin regimen- see above in Anticoagulation Summary.  2. Follow up in 1 week  3. They have been instructed to call if any changes in medications, doses, concerns, etc. Patient expresses understanding and has no further questions at this time.    Nara Jefferson, [facility]

## 2025-01-28 ENCOUNTER — TELEPHONE (OUTPATIENT)
Age: 75
End: 2025-01-28
Payer: MEDICARE

## 2025-01-28 NOTE — TELEPHONE ENCOUNTER
Received request for cardiac clearance for Ms. Shani Phillip for bilateral cataract surgery with local and MAC anesthesia with Dr. Cori Campos.    I had a chance to speak with Dr.Mohamed Campos on 1/28/2025 at 2:45 PM.  I did note that she is relatively high risk for any type of surgery given her history of moderate to severe mitral valve stenosis mitral valve regurgitation, chronic A-fib, COPD on home oxygen.    Noted that many of these risk factors are nonmodifiable.  She does have a perfusion stress test from June 2023 that are consistent with a low risk study.    Did discuss that if the patient understand the risks associated with her conditions, and still wishes to proceed, she may proceed knowing that her baseline risk may be higher than average.

## 2025-01-30 ENCOUNTER — ANTICOAGULATION VISIT (OUTPATIENT)
Dept: PHARMACY | Facility: HOSPITAL | Age: 75
End: 2025-01-30
Payer: MEDICARE

## 2025-01-30 DIAGNOSIS — I48.20 ATRIAL FIBRILLATION, CHRONIC: Primary | Chronic | ICD-10-CM

## 2025-01-30 LAB — INR PPP: 2.4

## 2025-01-30 PROCEDURE — G0249 PROVIDE INR TEST MATER/EQUIP: HCPCS

## 2025-01-30 NOTE — PROGRESS NOTES
Anticoagulation Clinic Progress Note    Anticoagulation Summary  As of 2025      INR goal:  2.0-3.0   TTR:  53.1% (3.9 y)   INR used for dosin.40 (2025)   Warfarin maintenance plan:  2.5 mg every Mon, Wed, Fri; 1.25 mg all other days   Weekly warfarin total:  12.5 mg   Plan last modified:  Marleny Black RPH (2025)   Next INR check:  2025   Priority:  High   Target end date:  --    Indications    Atrial fibrillation  chronic [I48.20]                 Anticoagulation Episode Summary       INR check location:  --    Preferred lab:  --    Send INR reminders to:   [facility] [employer] HOME TEST POOL    Comments:   Home Testing as of 21 *call only when out of range*          Anticoagulation Care Providers       Provider Role Specialty Phone number    Zenia Tinajero MD Referring Cardiology [phone]            Clinic Interview:  Patient Findings     Negatives:  Signs/symptoms of thrombosis, Signs/symptoms of bleeding,   Laboratory test error suspected, Change in health, Change in alcohol use,   Change in activity, Upcoming invasive procedure, Emergency department   visit, Upcoming dental procedure, Missed doses, Extra doses, Change in   medications, Change in diet/appetite, Hospital admission, Bruising, Other   complaints      Clinical Outcomes     Negatives:  Major bleeding event, Thromboembolic event,   Anticoagulation-related hospital admission, Anticoagulation-related ED   visit, Anticoagulation-related fatality        INR History:      2025     1:59 PM 2025    12:00 AM 2025     1:28 PM 2025    12:00 AM 2025    10:51 AM 2025    12:00 AM 2025     1:45 PM   Anticoagulation Monitoring   INR 3.60  2.60  4.80  2.40   INR Date 2025   INR Goal 2.0-3.0  2.0-3.0  2.0-3.0  2.0-3.0   Trend Down  Same  Down  Down   Last Week Total 16.25 mg  15 mg  15 mg  11.25 mg   Next Week Total 15 mg  15 mg  12.5 mg  12.5 mg   Sun 2.5 mg  2.5  mg  1.25 mg  1.25 mg   Mon 2.5 mg  2.5 mg  2.5 mg  2.5 mg   Tue 1.25 mg  1.25 mg  1.25 mg  1.25 mg   Wed 2.5 mg  2.5 mg  2.5 mg  2.5 mg   Thu 1.25 mg  1.25 mg  Hold (1/23)  1.25 mg   Fri 2.5 mg  2.5 mg  2.5 mg  2.5 mg   Sat 2.5 mg  2.5 mg  2.5 mg  1.25 mg (2/1)   Historical INR  2.60      4.80      2.40            This result is from an external source.       Plan:  1. INR is Therapeutic today- see above in Anticoagulation Summary.   Will instruct Shani Phillip to Continue their warfarin regimen- see above in Anticoagulation Summary.   patient reports she took 2.5 mg MWF, 1.25 mg AOD (and held on Thursday). Continue 2.5 mg MWF, 1.25 mg AOD, rck 1 week   2. Follow up in 1 weeks  3. They have been instructed to call if any changes in medications, doses, concerns, etc. Patient expresses understanding and has no further questions at this time.    Marleny Black [facility]

## 2025-02-06 ENCOUNTER — ANTICOAGULATION VISIT (OUTPATIENT)
Dept: PHARMACY | Facility: HOSPITAL | Age: 75
End: 2025-02-06
Payer: MEDICARE

## 2025-02-06 DIAGNOSIS — I48.20 ATRIAL FIBRILLATION, CHRONIC: Primary | Chronic | ICD-10-CM

## 2025-02-06 LAB — INR PPP: 1.7

## 2025-02-06 NOTE — PROGRESS NOTES
Anticoagulation Clinic Progress Note    Anticoagulation Summary  As of 2025      INR goal:  2.0-3.0   TTR:  53.1% (3.9 y)   INR used for dosin.70 (2025)   Warfarin maintenance plan:  2.5 mg every Mon, Wed, Fri; 1.25 mg all other days   Weekly warfarin total:  12.5 mg   Plan last modified:  Marleny Black RPH (2025)   Next INR check:  2025   Priority:  High   Target end date:  --    Indications    Atrial fibrillation  chronic [I48.20]                 Anticoagulation Episode Summary       INR check location:  --    Preferred lab:  --    Send INR reminders to:   [facility] [employer] HOME TEST POOL    Comments:   Home Testing as of 21 *call only when out of range*          Anticoagulation Care Providers       Provider Role Specialty Phone number    Zenia Tinajero MD Referring Cardiology [phone]            Clinic Interview:  Patient Findings     Negatives:  Signs/symptoms of thrombosis, Signs/symptoms of bleeding,   Laboratory test error suspected, Change in health, Change in alcohol use,   Change in activity, Upcoming invasive procedure, Emergency department   visit, Upcoming dental procedure, Missed doses, Extra doses, Change in   medications, Change in diet/appetite, Hospital admission, Bruising, Other   complaints      Clinical Outcomes     Negatives:  Major bleeding event, Thromboembolic event,   Anticoagulation-related hospital admission, Anticoagulation-related ED   visit, Anticoagulation-related fatality        INR History:      2025     1:28 PM 2025    12:00 AM 2025    10:51 AM 2025    12:00 AM 2025     1:45 PM 2025    12:00 AM 2025    11:39 AM   Anticoagulation Monitoring   INR 2.60  4.80  2.40  1.70   INR Date 2025   INR Goal 2.0-3.0  2.0-3.0  2.0-3.0  2.0-3.0   Trend Same  Down  Down  Same   Last Week Total 15 mg  15 mg  11.25 mg  12.5 mg   Next Week Total 15 mg  12.5 mg  12.5 mg  13.75 mg   Sun 2.5 mg   1.25 mg  1.25 mg  1.25 mg   Mon 2.5 mg  2.5 mg  2.5 mg  2.5 mg   Tue 1.25 mg  1.25 mg  1.25 mg  1.25 mg   Wed 2.5 mg  2.5 mg  2.5 mg  2.5 mg   Thu 1.25 mg  Hold (1/23)  1.25 mg  2.5 mg (2/6)   Fri 2.5 mg  2.5 mg  2.5 mg  2.5 mg   Sat 2.5 mg  2.5 mg  1.25 mg (2/1)  1.25 mg   Historical INR  4.80      2.40      1.70            This result is from an external source.       Plan:  1. INR is Subtherapeutic today- see above in Anticoagulation Summary.   Will instruct Shani Phillip to Increase their warfarin regimen- see above in Anticoagulation Summary.      Boost to 2.5 mg then resume 2.5  mg MWF, 1.25 mg AOD, rck 1 week   2. Follow up in 1 weeks  3. They have been instructed to call if any changes in medications, doses, concerns, etc. Patient expresses understanding and has no further questions at this time.    Marleny Black [facility]

## 2025-02-13 ENCOUNTER — ANTICOAGULATION VISIT (OUTPATIENT)
Dept: PHARMACY | Facility: HOSPITAL | Age: 75
End: 2025-02-13
Payer: MEDICARE

## 2025-02-13 DIAGNOSIS — I48.20 ATRIAL FIBRILLATION, CHRONIC: Primary | Chronic | ICD-10-CM

## 2025-02-13 LAB — INR PPP: 2.3

## 2025-02-13 NOTE — PROGRESS NOTES
Anticoagulation Clinic Progress Note    Anticoagulation Summary  As of 2025      INR goal:  2.0-3.0   TTR:  53.1% (3.9 y)   INR used for dosin.30 (2025)   Warfarin maintenance plan:  2.5 mg every Mon, Wed, Fri; 1.25 mg all other days   Weekly warfarin total:  12.5 mg   No change documented:  Marleny Black RPH   Plan last modified:  Marleny Black RPH (2025)   Next INR check:  2025   Priority:  High   Target end date:  --    Indications    Atrial fibrillation  chronic [I48.20]                 Anticoagulation Episode Summary       INR check location:  --    Preferred lab:  --    Send INR reminders to:   [facility] TEST [facility]    Comments:   Home Testing as of 21 *call only when out of range*          Anticoagulation Care Providers       Provider Role Specialty Phone number    Zenia Tinajero MD Referring Cardiology [phone]            Clinic Interview:  No pertinent clinical findings have been reported.    INR History:      2025    10:51 AM 2025    12:00 AM 2025     1:45 PM 2025    12:00 AM 2025    11:39 AM 2025    12:00 AM 2025     1:12 PM   Anticoagulation Monitoring   INR 4.80  2.40  1.70  2.30   INR Date 2025   INR Goal 2.0-3.0  2.0-3.0  2.0-3.0  2.0-3.0   Trend Down  Down  Same  Same   Last Week Total 15 mg  11.25 mg  12.5 mg  13.75 mg   Next Week Total 12.5 mg  12.5 mg  13.75 mg  12.5 mg   Sun 1.25 mg  1.25 mg  1.25 mg  1.25 mg   Mon 2.5 mg  2.5 mg  2.5 mg  2.5 mg   Tue 1.25 mg  1.25 mg  1.25 mg  1.25 mg   Wed 2.5 mg  2.5 mg  2.5 mg  2.5 mg   Thu Hold ()  1.25 mg  2.5 mg ()  1.25 mg   Fri 2.5 mg  2.5 mg  2.5 mg  2.5 mg   Sat 2.5 mg  1.25 mg (2/)  1.25 mg  1.25 mg   Historical INR  2.40      1.70      2.30            This result is from an external source.       Plan:  1. INR is therapeutic today- see above in Anticoagulation Summary.    Shani Phillip to continue their warfarin regimen-  see above in Anticoagulation Summary.  2. Follow up in 1 week  3. Pt has agreed to only be called if INR out of range. They have been instructed to call if any changes in medications, doses, concerns, etc. Patient expresses understanding and has no further questions at this time.    Marleny Black [facility]

## 2025-02-14 ENCOUNTER — HOSPITAL ENCOUNTER (OUTPATIENT)
Dept: CARDIOLOGY | Facility: HOSPITAL | Age: 75
Discharge: HOME | End: 2025-02-14
Payer: MEDICARE

## 2025-02-14 ENCOUNTER — TELEPHONE (OUTPATIENT)
Dept: CARDIOLOGY | Facility: CLINIC | Age: 75
End: 2025-02-14

## 2025-02-14 ENCOUNTER — OFFICE VISIT (OUTPATIENT)
Dept: CARDIOLOGY | Facility: CLINIC | Age: 75
End: 2025-02-14
Payer: MEDICARE

## 2025-02-14 VITALS — HEIGHT: 61 IN | BODY MASS INDEX: 27.78 KG/M2

## 2025-02-14 DIAGNOSIS — I50.33 ACUTE ON CHRONIC DIASTOLIC CHF (CONGESTIVE HEART FAILURE): Primary | ICD-10-CM

## 2025-02-14 DIAGNOSIS — I48.20 ATRIAL FIBRILLATION, CHRONIC: Chronic | ICD-10-CM

## 2025-02-14 DIAGNOSIS — I34.2 NONRHEUMATIC MITRAL VALVE STENOSIS: ICD-10-CM

## 2025-02-14 DIAGNOSIS — I05.9 ENDOCARDITIS OF MITRAL VALVE: ICD-10-CM

## 2025-02-14 DIAGNOSIS — I50.33 ACUTE ON CHRONIC DIASTOLIC CHF (CONGESTIVE HEART FAILURE): ICD-10-CM

## 2025-02-14 LAB
ANION GAP SERPL CALCULATED.3IONS-SCNC: 10.9 MMOL/L (ref 5–15)
BUN SERPL-MCNC: 25 MG/DL (ref 8–23)
BUN/CREAT SERPL: 14.5 (ref 7–25)
CALCIUM SPEC-SCNC: 9.5 MG/DL (ref 8.6–10.5)
CHLORIDE SERPL-SCNC: 92 MMOL/L (ref 98–107)
CO2 SERPL-SCNC: 32.1 MMOL/L (ref 22–29)
CREAT SERPL-MCNC: 1.73 MG/DL (ref 0.57–1)
DEPRECATED RDW RBC AUTO: 48.9 FL (ref 37–54)
DIGOXIN SERPL-MCNC: 2.1 NG/ML (ref 0.6–1.2)
EGFRCR SERPLBLD CKD-EPI 2021: 30.5 ML/MIN/1.73
ERYTHROCYTE [DISTWIDTH] IN BLOOD BY AUTOMATED COUNT: 14.5 % (ref 12.3–15.4)
GLUCOSE SERPL-MCNC: 100 MG/DL (ref 65–99)
HCT VFR BLD AUTO: 43.2 % (ref 34–46.6)
HGB BLD-MCNC: 14.9 G/DL (ref 12–15.9)
MCH RBC QN AUTO: 31.9 PG (ref 26.6–33)
MCHC RBC AUTO-ENTMCNC: 34.5 G/DL (ref 31.5–35.7)
MCV RBC AUTO: 92.5 FL (ref 79–97)
NT-PROBNP SERPL-MCNC: 1124 PG/ML (ref 0–1800)
PLATELET # BLD AUTO: 240 10*3/MM3 (ref 140–450)
PMV BLD AUTO: 9.4 FL (ref 6–12)
POTASSIUM SERPL-SCNC: 3.9 MMOL/L (ref 3.5–5.2)
RBC # BLD AUTO: 4.67 10*6/MM3 (ref 3.77–5.28)
SODIUM SERPL-SCNC: 135 MMOL/L (ref 136–145)
WBC NRBC COR # BLD AUTO: 8.13 10*3/MM3 (ref 3.4–10.8)

## 2025-02-14 PROCEDURE — 85027 COMPLETE CBC AUTOMATED: CPT | Performed by: INTERNAL MEDICINE

## 2025-02-14 PROCEDURE — 36415 COLL VENOUS BLD VENIPUNCTURE: CPT

## 2025-02-14 PROCEDURE — 96374 THER/PROPH/DIAG INJ IV PUSH: CPT

## 2025-02-14 PROCEDURE — 80162 ASSAY OF DIGOXIN TOTAL: CPT | Performed by: INTERNAL MEDICINE

## 2025-02-14 PROCEDURE — 83880 ASSAY OF NATRIURETIC PEPTIDE: CPT | Performed by: INTERNAL MEDICINE

## 2025-02-14 PROCEDURE — 25010000002 BUMETANIDE PER 0.5 MG: Performed by: INTERNAL MEDICINE

## 2025-02-14 PROCEDURE — 80048 BASIC METABOLIC PNL TOTAL CA: CPT | Performed by: INTERNAL MEDICINE

## 2025-02-14 RX ORDER — NYSTATIN 100000 [USP'U]/G
POWDER TOPICAL 4 TIMES DAILY
Qty: 30 G | Refills: 1 | Status: ON HOLD | OUTPATIENT
Start: 2025-02-14

## 2025-02-14 RX ORDER — BUMETANIDE 0.25 MG/ML
3 INJECTION, SOLUTION INTRAMUSCULAR; INTRAVENOUS ONCE
Status: COMPLETED | OUTPATIENT
Start: 2025-02-14 | End: 2025-02-14

## 2025-02-14 RX ADMIN — BUMETANIDE 3 MG: 0.25 INJECTION INTRAMUSCULAR; INTRAVENOUS at 13:43

## 2025-02-14 NOTE — TELEPHONE ENCOUNTER
Notified patient of results/recommendations. Patient verbalized understanding.    Dee Dee Castlilo RN  Triage [facility] [place]

## 2025-02-14 NOTE — TELEPHONE ENCOUNTER
Called and left VM on both home and mobile numbers, will continue to try to reach pt.    HUB- please put patient straight through to triage    Leta Mann RN  Triage RN  02/14/25 15:31 EST

## 2025-02-14 NOTE — TELEPHONE ENCOUNTER
Please let her know that I got the blood work back.  I want her to hold her digoxin this weekend.  I want her to take her Bumex as she has been since her kidneys actually look like she might be a little bit dry and she got IV Bumex today.  Keep me posted how she is doing on Monday.  She may need to be admitted versus have more lab work on Monday.

## 2025-02-14 NOTE — PROGRESS NOTES
CARDIOLOGY    Zenia Tinajero MD    ENCOUNTER DATE:  02/14/2025    Shani Phillip / 74 y.o. / female        CHIEF COMPLAINT / REASON FOR OFFICE VISIT     Follow-up      HISTORY OF PRESENT ILLNESS       HPI    Shani Phillip is a 74 y.o. female     This is a lady who was on a cruise ship when she developed a febrile illness.  She was taken to [facility] where she was diagnosed with endocarditis.  She left the hospital to return home to [place] I first saw her in January 14, 2019.  She has a history of COPD on oxygen, former smoker, restless leg syndrome and chronic pain.  She had a transthoracic echocardiogram suggestive of vegetation on the mitral valve annulus.  She had a transesophageal echo which delineated this more clearly.  Dr. Briones saw her in consultation.  The plan was to treat her with IV antibiotics which occurred.  She came in for a repeat transesophageal echo on January 14, 2020 and this was pretty much unchanged.  The mobile element attached to the posterior mitral valve annulus annulus was unchanged.     In August 2020, she had normal LV functions and ejection fraction of 66%, moderate left atrial enlargement, severe calcification of the aortic valve without regurgitation or stenosis.  There is moderate bileaflet mitral valve thickening with trace regurgitation and mild stenosis with a mean gradient of 4 mmHg.  There was mild tricuspid regurgitation with a normal right ventricular systolic pressure.  She was seen in the office in August of 2020 and was having lower extremity edema.  Heidi Ambrocio ordered an arterial Doppler which was normal.  She had another echocardiogram in September 2020, again normal left ventricular systolic function, and again bileaflet thickening with this time, mild mitral regurgitation and mitral stenosis was noted and a vegetation on the atrial side of the posterior mitral valve leaflet was also noted.     Repeat echo in June 2021 showed normal LV  "systolic function, grade 2 diastolic dysfunction, mild left atrial enlargement, calcification of the mitral leaflets with mild mitral stenosis.     She saw Heidicarlitos Kraus in February 2022 and was found to be in asymptomatic atrial fibrillation with rapid ventricular response.  Her INRs have been therapeutic.  Diltiazem was resumed.  24-hour monitor in March 2022 showed that she was predominantly in atrial fibrillation.  Her average heart rate was 86 bpm with a range from 50 to 124 bpm.     She follows with Dr. Zamora for her lung disease.  She is on oxygen.  She has chronic left lower extremity edema.  She has not been having fevers or chills.  She gets a chest discomfort across the center of her chest.  No exacerbating factors.  Seems to be relieved with aspirin.  She had a heart catheterization in 2020 which showed no significant coronary disease.  Nuclear stress test June 2023 showed no evidence of ischemia.  Lower extremity Doppler August 2024 was normal.  Echocardiogram August 2024 showed normal LV systolic function ejection fraction 78%, severe left atrial enlargement, moderate to severe mitral stenosis with a mean gradient of 14 mmHg.  Mild to moderate tricuspid regurgitation with right ventricular systolic pressure of 53 mmHg.  48-hour Holter monitor October 2024 showed permanent atrial fibrillation with average heart rate of 102 bpm.    She comes in today and she has not been feeling well.  She has been short of breath.  She has weeping edema from both legs.    VITAL SIGNS     Visit Vitals  Ht 154.9 cm (61\")   BMI 27.78 kg/m²         Wt Readings from Last 3 Encounters:   10/07/24 66.7 kg (147 lb)   10/02/24 66.7 kg (147 lb)   09/25/24 68 kg (150 lb)     Body mass index is 27.78 kg/m².      PHYSICAL EXAMINATION     Constitutional:       General: Not in acute distress.  Neck:      Vascular: No carotid bruit or JVD.   Pulmonary:      Effort: Pulmonary effort is normal.      Breath sounds: Normal breath sounds. "   Cardiovascular:      Normal rate. Regularly irregular rhythm.      Murmurs: There is no murmur.   Edema:     Pretibial: bilateral 3+ edema of the pretibial area.  Psychiatric:         Mood and Affect: Mood and affect normal.           REVIEWED DATA     Procedures  EKG was so technically difficult as to be nondiagnostic.    Lipid Panel          10/7/2024    10:49   Lipid Panel   Total Cholesterol 183    Triglycerides 126    HDL Cholesterol 54    VLDL Cholesterol 22    LDL Cholesterol  107        Lab Results   Component Value Date    GLUCOSE 156 (H) 10/07/2024    BUN 24 (H) 10/07/2024    CREATININE 1.35 (H) 10/07/2024     10/07/2024    K 4.1 01/29/2025    CL 95 (L) 10/07/2024    CALCIUM 9.3 10/07/2024    PROTEINTOT 7.3 10/07/2024    ALBUMIN 4.0 10/07/2024    ALT 11 10/07/2024    AST 14 10/07/2024    ALKPHOS 128 (H) 10/07/2024    BILITOT 0.4 10/07/2024    GLOB 3.3 10/07/2024    AGRATIO 1.2 10/07/2024    BCR 17.8 10/07/2024    ANIONGAP 10.8 09/25/2024    EGFR 41.1 (L) 10/07/2024       ASSESSMENT & PLAN      Diagnosis Plan   1. Acute on chronic diastolic CHF (congestive heart failure)  Basic Metabolic Panel    BNP    CBC (No Diff)    bumetanide (BUMEX) injection 3 mg    Digoxin Level      2. Atrial fibrillation, chronic        3. Endocarditis of mitral valve        4. Nonrheumatic mitral valve stenosis            1.  Permanent atrial fibrillation.  She is rate controlled.  She is on warfarin and follows with the anticoagulation clinic.  She could not afford Eliquis.  She had a cardioversion in November 2019.  She has severe left atrial enlargement with mitral stenosis making her high risk for thrombosis.  2.  Mitral valve vegetation.  S/P antibiotics.  No infectious symptoms at this time.  3.  Severe mitral stenosis.  I think this is at the root of her problems of shortness of breath as well as edema.  4.  COPD on home oxygen.  Avoid beta-blockers.  5.  Yeast infection under left breast worse than right.  I have  sent in some nystatin powder and will send a message to Dr Benites    Today I am checking blood work and I am going to have her give IV Bumex.  Once I have her blood work to review, I will make recommendations as far as how much Bumex she should be taking over the weekend.  She may need to be admitted to the hospital for IV diuretics and management of her chronic kidney disease with diuresis.  I suspect this fluid overload is from her mitral valve disease.  However, I am not sure that she is a good candidate for surgery.  I think starting with a right and left heart catheterization and a consultation with Dr. Briones who has seen her in the past would be where to start.    I am also checking a digoxin level.    Orders Placed This Encounter   Procedures    Basic Metabolic Panel     Standing Status:   Future     Number of Occurrences:   1     Standing Expiration Date:   2/14/2026     Order Specific Question:   Release to patient     Answer:   Routine Release [[id number]]    BNP     Standing Status:   Future     Number of Occurrences:   1     Standing Expiration Date:   2/14/2026     Order Specific Question:   Release to patient     Answer:   Routine Release [[id number]]    CBC (No Diff)     Standing Status:   Future     Number of Occurrences:   1     Standing Expiration Date:   2/14/2026     Order Specific Question:   Release to patient     Answer:   Routine Release [[id number]]    Digoxin Level     Standing Status:   Future     Number of Occurrences:   1     Standing Expiration Date:   2/14/2026     Order Specific Question:   Release to patient     Answer:   Routine Release [[id number]]           MEDICATIONS         Discharge Medications            Accurate as of February 14, 2025  2:13 PM. If you have any questions, ask your nurse or doctor.                New Medications        Instructions Start Date   nystatin [id number] UNIT/GM powder  Commonly known as: MYCOSTATIN  Started by: Zenia Tinajero   Topical, 4 Times Daily,  Under both breasts             Continue These Medications        Instructions Start Date   ammonium lactate 12 % lotion  Commonly known as: LAC-HYDRIN   Topical, As Needed      budesonide 0.5 MG/2ML nebulizer solution  Commonly known as: PULMICORT   USE 1 VIAL IN NEBULIZER DAILY - rinse mouth after treatment      bumetanide 2 MG tablet  Commonly known as: BUMEX   TAKE 2 TABLETS IN THE MORNING AND 1 TABLET IN THE EVENING      buPROPion  MG 24 hr tablet  Commonly known as: WELLBUTRIN XL   300 mg, Oral, Daily      cyclobenzaprine 10 MG tablet  Commonly known as: FLEXERIL   10 mg, Oral, 3 Times Daily PRN      digoxin 125 MCG tablet  Commonly known as: LANOXIN   125 mcg, Oral, Nightly      famotidine 20 MG tablet  Commonly known as: PEPCID   20 mg, Oral, 2 Times Daily Before Meals      ferrous sulfate 325 (65 FE) MG tablet   325 mg, Oral, Every Other Day      HYDROcodone-acetaminophen 7.5-325 MG per tablet  Commonly known as: NORCO   1 tablet, Oral, Every 6 Hours PRN      ipratropium-albuterol 0.5-2.5 mg/3 ml nebulizer  Commonly known as: DUO-NEB   USE 1 VIAL IN NEBULIZER 4 TIMES DAILY - as directed      levothyroxine 50 MCG tablet  Commonly known as: SYNTHROID, LEVOTHROID   50 mcg, Oral, Every Morning      meclizine 12.5 MG tablet  Commonly known as: ANTIVERT   12.5 mg, Oral, 3 Times Daily PRN      O2  Commonly known as: OXYGEN   2 L/min, Inhalation, Once      ondansetron 4 MG tablet  Commonly known as: ZOFRAN   4 mg, Oral, Every 8 Hours PRN      pantoprazole 40 MG EC tablet  Commonly known as: PROTONIX   40 mg, Oral, Daily      potassium chloride ER 20 MEQ tablet controlled-release ER tablet  Commonly known as: K-TAB   20 mEq, Oral, Daily      pramipexole 0.5 MG tablet  Commonly known as: MIRAPEX   0.5 mg, Oral, 2 Times Daily      spironolactone 25 MG tablet  Commonly known as: ALDACTONE   25 mg, Oral, Daily      trospium 20 MG tablet  Commonly known as: SANCTURA   20 mg, Oral, 2 Times Daily      warfarin 2.5 MG  tablet  Commonly known as: COUMADIN   Take one tablet by mouth daily or as directed      Yupelri 175 MCG/3ML nebulizer solution  Generic drug: revefenacin   175 mcg, Nebulization, Daily - RT                 Zenia Tinajero MD  02/14/25  14:13 EST    Part of this note may be an electronic transcription/translation of spoken language to printed text using the Dragon dictation system.

## 2025-02-17 ENCOUNTER — HOSPITAL ENCOUNTER (INPATIENT)
Facility: HOSPITAL | Age: 75
LOS: 7 days | Discharge: HOME | DRG: 291 | End: 2025-02-24
Attending: INTERNAL MEDICINE | Admitting: INTERNAL MEDICINE
Payer: MEDICARE

## 2025-02-17 PROBLEM — I50.43 ACUTE ON CHRONIC COMBINED SYSTOLIC AND DIASTOLIC CHF (CONGESTIVE HEART FAILURE): Status: ACTIVE | Noted: 2025-02-17

## 2025-02-17 LAB
ALBUMIN SERPL-MCNC: 3.5 G/DL (ref 3.5–5.2)
ALBUMIN/GLOB SERPL: 0.9 G/DL
ALP SERPL-CCNC: 132 U/L (ref 39–117)
ALT SERPL W P-5'-P-CCNC: 10 U/L (ref 1–33)
ANION GAP SERPL CALCULATED.3IONS-SCNC: 7.6 MMOL/L (ref 5–15)
AST SERPL-CCNC: 21 U/L (ref 1–32)
BILIRUB SERPL-MCNC: 0.3 MG/DL (ref 0–1.2)
BUN SERPL-MCNC: 33 MG/DL (ref 8–23)
BUN/CREAT SERPL: 18.8 (ref 7–25)
CALCIUM SPEC-SCNC: 8.8 MG/DL (ref 8.6–10.5)
CHLORIDE SERPL-SCNC: 93 MMOL/L (ref 98–107)
CO2 SERPL-SCNC: 29.4 MMOL/L (ref 22–29)
CREAT SERPL-MCNC: 1.76 MG/DL (ref 0.57–1)
DEPRECATED RDW RBC AUTO: 48.1 FL (ref 37–54)
DIGOXIN SERPL-MCNC: 1.8 NG/ML (ref 0.6–1.2)
EGFRCR SERPLBLD CKD-EPI 2021: 29.9 ML/MIN/1.73
ERYTHROCYTE [DISTWIDTH] IN BLOOD BY AUTOMATED COUNT: 14.4 % (ref 12.3–15.4)
GLOBULIN UR ELPH-MCNC: 4.1 GM/DL
GLUCOSE SERPL-MCNC: 94 MG/DL (ref 65–99)
HCT VFR BLD AUTO: 39.3 % (ref 34–46.6)
HGB BLD-MCNC: 13.8 G/DL (ref 12–15.9)
INR PPP: 2.32 (ref 0.9–1.1)
MCH RBC QN AUTO: 31.9 PG (ref 26.6–33)
MCHC RBC AUTO-ENTMCNC: 35.1 G/DL (ref 31.5–35.7)
MCV RBC AUTO: 90.8 FL (ref 79–97)
NT-PROBNP SERPL-MCNC: 943 PG/ML (ref 0–1800)
PLATELET # BLD AUTO: 211 10*3/MM3 (ref 140–450)
PMV BLD AUTO: 9.4 FL (ref 6–12)
POTASSIUM SERPL-SCNC: 4.5 MMOL/L (ref 3.5–5.2)
PROT SERPL-MCNC: 7.6 G/DL (ref 6–8.5)
PROTHROMBIN TIME: 25.7 SECONDS (ref 11.7–14.2)
RBC # BLD AUTO: 4.33 10*6/MM3 (ref 3.77–5.28)
SODIUM SERPL-SCNC: 130 MMOL/L (ref 136–145)
WBC NRBC COR # BLD AUTO: 6.95 10*3/MM3 (ref 3.4–10.8)

## 2025-02-17 PROCEDURE — 85027 COMPLETE CBC AUTOMATED: CPT | Performed by: INTERNAL MEDICINE

## 2025-02-17 PROCEDURE — 83880 ASSAY OF NATRIURETIC PEPTIDE: CPT | Performed by: INTERNAL MEDICINE

## 2025-02-17 PROCEDURE — 80162 ASSAY OF DIGOXIN TOTAL: CPT | Performed by: INTERNAL MEDICINE

## 2025-02-17 PROCEDURE — 80053 COMPREHEN METABOLIC PANEL: CPT | Performed by: INTERNAL MEDICINE

## 2025-02-17 PROCEDURE — 85610 PROTHROMBIN TIME: CPT | Performed by: INTERNAL MEDICINE

## 2025-02-17 PROCEDURE — 93005 ELECTROCARDIOGRAM TRACING: CPT | Performed by: INTERNAL MEDICINE

## 2025-02-17 PROCEDURE — 93010 ELECTROCARDIOGRAM REPORT: CPT | Performed by: INTERNAL MEDICINE

## 2025-02-17 RX ORDER — BISACODYL 5 MG/1
5 TABLET, DELAYED RELEASE ORAL DAILY PRN
Status: DISCONTINUED | OUTPATIENT
Start: 2025-02-17 | End: 2025-02-24 | Stop reason: HOSPADM

## 2025-02-17 RX ORDER — PANTOPRAZOLE SODIUM 40 MG/1
40 TABLET, DELAYED RELEASE ORAL DAILY
Status: DISCONTINUED | OUTPATIENT
Start: 2025-02-18 | End: 2025-02-24 | Stop reason: HOSPADM

## 2025-02-17 RX ORDER — AMOXICILLIN 250 MG
2 CAPSULE ORAL 2 TIMES DAILY PRN
Status: DISCONTINUED | OUTPATIENT
Start: 2025-02-17 | End: 2025-02-24 | Stop reason: HOSPADM

## 2025-02-17 RX ORDER — HYDROCODONE BITARTRATE AND ACETAMINOPHEN 7.5; 325 MG/1; MG/1
1 TABLET ORAL EVERY 6 HOURS PRN
Status: DISCONTINUED | OUTPATIENT
Start: 2025-02-17 | End: 2025-02-24 | Stop reason: HOSPADM

## 2025-02-17 RX ORDER — IPRATROPIUM BROMIDE AND ALBUTEROL SULFATE 2.5; .5 MG/3ML; MG/3ML
1.5 SOLUTION RESPIRATORY (INHALATION)
Status: DISCONTINUED | OUTPATIENT
Start: 2025-02-18 | End: 2025-02-24 | Stop reason: HOSPADM

## 2025-02-17 RX ORDER — NYSTATIN 100000 [USP'U]/G
POWDER TOPICAL 4 TIMES DAILY
Status: DISCONTINUED | OUTPATIENT
Start: 2025-02-17 | End: 2025-02-24 | Stop reason: HOSPADM

## 2025-02-17 RX ORDER — NITROGLYCERIN 0.4 MG/1
0.4 TABLET SUBLINGUAL
Status: DISCONTINUED | OUTPATIENT
Start: 2025-02-17 | End: 2025-02-24 | Stop reason: HOSPADM

## 2025-02-17 RX ORDER — POLYETHYLENE GLYCOL 3350 17 G/17G
17 POWDER, FOR SOLUTION ORAL DAILY PRN
Status: DISCONTINUED | OUTPATIENT
Start: 2025-02-17 | End: 2025-02-24 | Stop reason: HOSPADM

## 2025-02-17 RX ORDER — SODIUM CHLORIDE 9 MG/ML
40 INJECTION, SOLUTION INTRAVENOUS AS NEEDED
Status: DISCONTINUED | OUTPATIENT
Start: 2025-02-17 | End: 2025-02-24 | Stop reason: HOSPADM

## 2025-02-17 RX ORDER — FERROUS SULFATE 325(65) MG
325 TABLET ORAL EVERY OTHER DAY
Status: DISCONTINUED | OUTPATIENT
Start: 2025-02-17 | End: 2025-02-24 | Stop reason: HOSPADM

## 2025-02-17 RX ORDER — BISACODYL 10 MG
10 SUPPOSITORY, RECTAL RECTAL DAILY PRN
Status: DISCONTINUED | OUTPATIENT
Start: 2025-02-17 | End: 2025-02-24 | Stop reason: HOSPADM

## 2025-02-17 RX ORDER — BUDESONIDE 0.5 MG/2ML
0.5 INHALANT ORAL
Status: DISCONTINUED | OUTPATIENT
Start: 2025-02-17 | End: 2025-02-24 | Stop reason: HOSPADM

## 2025-02-17 RX ORDER — BUPROPION HYDROCHLORIDE 150 MG/1
150 TABLET ORAL DAILY
Status: DISCONTINUED | OUTPATIENT
Start: 2025-02-18 | End: 2025-02-24 | Stop reason: HOSPADM

## 2025-02-17 RX ORDER — LEVOTHYROXINE SODIUM 50 UG/1
50 TABLET ORAL EVERY MORNING
Status: DISCONTINUED | OUTPATIENT
Start: 2025-02-18 | End: 2025-02-24 | Stop reason: HOSPADM

## 2025-02-17 RX ORDER — ONDANSETRON 4 MG/1
4 TABLET, ORALLY DISINTEGRATING ORAL EVERY 8 HOURS PRN
Status: DISCONTINUED | OUTPATIENT
Start: 2025-02-17 | End: 2025-02-24 | Stop reason: HOSPADM

## 2025-02-17 RX ORDER — SODIUM CHLORIDE 0.9 % (FLUSH) 0.9 %
10 SYRINGE (ML) INJECTION AS NEEDED
Status: DISCONTINUED | OUTPATIENT
Start: 2025-02-17 | End: 2025-02-24 | Stop reason: HOSPADM

## 2025-02-17 RX ORDER — SPIRONOLACTONE 25 MG/1
25 TABLET ORAL DAILY
Status: DISCONTINUED | OUTPATIENT
Start: 2025-02-18 | End: 2025-02-24 | Stop reason: HOSPADM

## 2025-02-17 RX ORDER — SODIUM CHLORIDE 0.9 % (FLUSH) 0.9 %
10 SYRINGE (ML) INJECTION EVERY 12 HOURS SCHEDULED
Status: DISCONTINUED | OUTPATIENT
Start: 2025-02-17 | End: 2025-02-24 | Stop reason: HOSPADM

## 2025-02-17 RX ADMIN — Medication 10 ML: at 23:09

## 2025-02-17 NOTE — TELEPHONE ENCOUNTER
Reviewed Dr. Tinajero's message with Shani Phillip.  Patient is agreeable to direct admit. Patient will await call with further instructions regarding admission.    Patient also asking if she should cancel tomorrow's appointment with Dr. Walter.  Encouraged patient to leave appointment for now as unsure of current bed availability at the hospital.  Will wait to when patient is admitted.    Please let me know if there is anything else you would like me to do for this patient.    Thank you,  Tawanna LUND RN  [place] Nurse [facility] [place]  02/17/25   13:23 EST

## 2025-02-17 NOTE — TELEPHONE ENCOUNTER
When I saw her on Friday I suggested that she might need to be admitted to the hospital if the IV diuretics and work up of her mitral stenosis.  I think that is what we need to do if she is still having problems because her kidney function was not normal which is probably contributing to the swelling. This can best be managed in the hospital. I can work on a direct admit if she agrees

## 2025-02-17 NOTE — TELEPHONE ENCOUNTER
Okay.  I think one of the hospital schedulers can get her a bed?  Not sure on that.  I can put in orders once she is admitted. I am on call sridevi so I will place orders even if its late

## 2025-02-17 NOTE — TELEPHONE ENCOUNTER
Problem: Pain  Goal: #Acceptable pain level achieved/maintained at rest using NRS/Faces  Description: This goal is used for patients who can self-report.  Acceptable means the level is at or below the identified comfort/function goal.  Outcome: Outcome Met, Continue evaluating goal progress toward completion  Goal: # Acceptable pain level achieved/maintained at rest using NRS/Faces without oversedation (opioid naive or PCA/Epidural infusion)  Description: This goal is used if Opioid-naïve or on PCA/Epidural Infusion.  Outcome: Outcome Met, Continue evaluating goal progress toward completion  Goal: # Acceptable pain level achieved/maintained with activity using NRS/Faces  Description: This goal is used for patients who can self-report and are not achieving acceptable pain control during activity.  Outcome: Outcome Met, Continue evaluating goal progress toward completion  Goal: # Verbalizes understanding of pain management  Description: Documented in Patient Education Activity  Outcome: Outcome Met, Continue evaluating goal progress toward completion     Problem: Pressure Injury, Risk for  Goal: # Skin remains intact  Outcome: Outcome Met, Continue evaluating goal progress toward completion  Goal: No new pressure injury (PI) development  Outcome: Outcome Met, Continue evaluating goal progress toward completion  Goal: # Verbalizes understanding of PI risk factors and prevention strategies  Description: Document education using the patient education activity.   Outcome: Outcome Met, Continue evaluating goal progress toward completion  Goal: Comfort optimized with pressure injury prevention strategies guided by patient/family preference. (Hospice)  Outcome: Outcome Met, Continue evaluating goal progress toward completion     Problem: Delirium, Risk for  Goal: # No symptoms of delirium  Description: Evaluate delirium symptoms under active problem when present  Outcome: Outcome Met, Continue evaluating goal progress  Dr. Tinajero.  Ms. Phillip  just left a message to say that even after the IV Bumex, her Edema hasn't gotten any better.   toward completion  Goal: # Verbalizes understanding of delirium preventive strategies  Description: Document on Patient Education Activity   Outcome: Outcome Met, Continue evaluating goal progress toward completion     Problem: Impaired Physical Mobility  Goal: # Bed mobility, ambulation, and ADLs are maintained or returned to baseline during hospitalization  Outcome: Outcome Met, Continue evaluating goal progress toward completion     Problem: At Risk for Falls  Goal: # Patient does not fall  Outcome: Outcome Met, Continue evaluating goal progress toward completion  Goal: # Takes action to control fall-related risks  Outcome: Outcome Met, Continue evaluating goal progress toward completion  Goal: # Verbalizes understanding of fall risk/precautions  Description: Document education using the patient education activity  Outcome: Outcome Met, Continue evaluating goal progress toward completion

## 2025-02-18 ENCOUNTER — APPOINTMENT (OUTPATIENT)
Dept: GENERAL RADIOLOGY | Facility: HOSPITAL | Age: 75
DRG: 291 | End: 2025-02-18
Payer: MEDICARE

## 2025-02-18 ENCOUNTER — APPOINTMENT (OUTPATIENT)
Dept: CARDIOLOGY | Facility: HOSPITAL | Age: 75
DRG: 291 | End: 2025-02-18
Payer: MEDICARE

## 2025-02-18 LAB
AORTIC DIMENSIONLESS INDEX: 0.4 (DI)
ASCENDING AORTA: 2.7 CM
AV MEAN PRESS GRAD SYS DOP V1V2: 14.2 MMHG
AV VMAX SYS DOP: 295.9 CM/SEC
BH CV ECHO MEAS - ACS: 0.96 CM
BH CV ECHO MEAS - AO MAX PG: 35 MMHG
BH CV ECHO MEAS - AO ROOT DIAM: 2.9 CM
BH CV ECHO MEAS - AO V2 VTI: 42.9 CM
BH CV ECHO MEAS - AVA(I,D): 1.4 CM2
BH CV ECHO MEAS - EDV(CUBED): 36.8 ML
BH CV ECHO MEAS - EDV(MOD-SP2): 56 ML
BH CV ECHO MEAS - EDV(MOD-SP4): 34 ML
BH CV ECHO MEAS - EF(MOD-SP2): 80.4 %
BH CV ECHO MEAS - EF(MOD-SP4): 73.5 %
BH CV ECHO MEAS - ESV(CUBED): 9.1 ML
BH CV ECHO MEAS - ESV(MOD-SP2): 11 ML
BH CV ECHO MEAS - ESV(MOD-SP4): 9 ML
BH CV ECHO MEAS - FS: 37.1 %
BH CV ECHO MEAS - IVS/LVPW: 0.91 CM
BH CV ECHO MEAS - IVSD: 0.69 CM
BH CV ECHO MEAS - LAT PEAK E' VEL: 7.1 CM/SEC
BH CV ECHO MEAS - LV DIASTOLIC VOL/BSA (35-75): 21.3 CM2
BH CV ECHO MEAS - LV MASS(C)D: 60 GRAMS
BH CV ECHO MEAS - LV MAX PG: 5 MMHG
BH CV ECHO MEAS - LV MEAN PG: 2.5 MMHG
BH CV ECHO MEAS - LV SYSTOLIC VOL/BSA (12-30): 5.6 CM2
BH CV ECHO MEAS - LV V1 MAX: 111.6 CM/SEC
BH CV ECHO MEAS - LV V1 VTI: 22.2 CM
BH CV ECHO MEAS - LVIDD: 3.3 CM
BH CV ECHO MEAS - LVIDS: 2.09 CM
BH CV ECHO MEAS - LVOT AREA: 2.7 CM2
BH CV ECHO MEAS - LVOT DIAM: 1.86 CM
BH CV ECHO MEAS - LVPWD: 0.75 CM
BH CV ECHO MEAS - MED PEAK E' VEL: 6.5 CM/SEC
BH CV ECHO MEAS - MV A DUR: 0.12 SEC
BH CV ECHO MEAS - MV A MAX VEL: 136.3 CM/SEC
BH CV ECHO MEAS - MV DEC SLOPE: 599.4 CM/SEC2
BH CV ECHO MEAS - MV DEC TIME: 0.29 SEC
BH CV ECHO MEAS - MV E MAX VEL: 186 CM/SEC
BH CV ECHO MEAS - MV E/A: 1.36
BH CV ECHO MEAS - MV MAX PG: 19 MMHG
BH CV ECHO MEAS - MV MEAN PG: 7 MMHG
BH CV ECHO MEAS - MV P1/2T: 97.1 MSEC
BH CV ECHO MEAS - MV V2 VTI: 63.3 CM
BH CV ECHO MEAS - MVA(P1/2T): 2.27 CM2
BH CV ECHO MEAS - MVA(VTI): 0.95 CM2
BH CV ECHO MEAS - PA ACC TIME: 0.1 SEC
BH CV ECHO MEAS - PA V2 MAX: 109.1 CM/SEC
BH CV ECHO MEAS - RAP SYSTOLE: 3 MMHG
BH CV ECHO MEAS - RV MAX PG: 3.6 MMHG
BH CV ECHO MEAS - RV V1 MAX: 95 CM/SEC
BH CV ECHO MEAS - RV V1 VTI: 17.9 CM
BH CV ECHO MEAS - RVSP: 53 MMHG
BH CV ECHO MEAS - SV(LVOT): 60.3 ML
BH CV ECHO MEAS - SV(MOD-SP2): 45 ML
BH CV ECHO MEAS - SV(MOD-SP4): 25 ML
BH CV ECHO MEAS - SVI(LVOT): 37.8 ML/M2
BH CV ECHO MEAS - SVI(MOD-SP2): 28.2 ML/M2
BH CV ECHO MEAS - SVI(MOD-SP4): 15.7 ML/M2
BH CV ECHO MEAS - TAPSE (>1.6): 1.93 CM
BH CV ECHO MEAS - TR MAX PG: 50.5 MMHG
BH CV ECHO MEAS - TR MAX VEL: 355.4 CM/SEC
BH CV ECHO MEASUREMENTS AVERAGE E/E' RATIO: 27.35
BH CV XLRA - RV BASE: 3.1 CM
BH CV XLRA - RV LENGTH: 5.6 CM
BH CV XLRA - RV MID: 2.44 CM
BH CV XLRA - TDI S': 11 CM/SEC
CHLORIDE UR-SCNC: 54 MMOL/L
CREAT UR-MCNC: 128.9 MG/DL
LEFT ATRIUM VOLUME INDEX: 24.3 ML/M2
LV EF BIPLANE MOD: 75.8 %
PROT ?TM UR-MCNC: 47.6 MG/DL
PROT/CREAT UR: 369.3 MG/G CREA (ref 0–200)
QT INTERVAL: 346 MS
QTC INTERVAL: 418 MS
SINUS: 2.32 CM
SODIUM UR-SCNC: 27 MMOL/L
STJ: 2.4 CM

## 2025-02-18 PROCEDURE — 25010000002 ONDANSETRON PER 1 MG: Performed by: INTERNAL MEDICINE

## 2025-02-18 PROCEDURE — 97162 PT EVAL MOD COMPLEX 30 MIN: CPT

## 2025-02-18 PROCEDURE — 25010000002 BUMETANIDE PER 0.5 MG: Performed by: INTERNAL MEDICINE

## 2025-02-18 PROCEDURE — 71046 X-RAY EXAM CHEST 2 VIEWS: CPT

## 2025-02-18 PROCEDURE — 94799 UNLISTED PULMONARY SVC/PX: CPT

## 2025-02-18 PROCEDURE — 97530 THERAPEUTIC ACTIVITIES: CPT

## 2025-02-18 PROCEDURE — 94664 DEMO&/EVAL PT USE INHALER: CPT

## 2025-02-18 PROCEDURE — 93306 TTE W/DOPPLER COMPLETE: CPT | Performed by: INTERNAL MEDICINE

## 2025-02-18 PROCEDURE — 84300 ASSAY OF URINE SODIUM: CPT | Performed by: INTERNAL MEDICINE

## 2025-02-18 PROCEDURE — 93306 TTE W/DOPPLER COMPLETE: CPT

## 2025-02-18 PROCEDURE — 82570 ASSAY OF URINE CREATININE: CPT | Performed by: INTERNAL MEDICINE

## 2025-02-18 PROCEDURE — 25510000001 PERFLUTREN 6.52 MG/ML SUSPENSION 2 ML VIAL: Performed by: INTERNAL MEDICINE

## 2025-02-18 PROCEDURE — 94760 N-INVAS EAR/PLS OXIMETRY 1: CPT

## 2025-02-18 PROCEDURE — 82436 ASSAY OF URINE CHLORIDE: CPT | Performed by: INTERNAL MEDICINE

## 2025-02-18 PROCEDURE — 84156 ASSAY OF PROTEIN URINE: CPT | Performed by: INTERNAL MEDICINE

## 2025-02-18 PROCEDURE — 94640 AIRWAY INHALATION TREATMENT: CPT

## 2025-02-18 PROCEDURE — 25010000002 MORPHINE PER 10 MG: Performed by: INTERNAL MEDICINE

## 2025-02-18 PROCEDURE — 94761 N-INVAS EAR/PLS OXIMETRY MLT: CPT

## 2025-02-18 PROCEDURE — 99223 1ST HOSP IP/OBS HIGH 75: CPT | Performed by: INTERNAL MEDICINE

## 2025-02-18 RX ORDER — ONDANSETRON 2 MG/ML
4 INJECTION INTRAMUSCULAR; INTRAVENOUS EVERY 6 HOURS PRN
Status: DISCONTINUED | OUTPATIENT
Start: 2025-02-18 | End: 2025-02-24 | Stop reason: HOSPADM

## 2025-02-18 RX ORDER — WARFARIN SODIUM 2.5 MG/1
2.5 TABLET ORAL
Status: DISCONTINUED | OUTPATIENT
Start: 2025-02-19 | End: 2025-02-24 | Stop reason: DRUGHIGH

## 2025-02-18 RX ORDER — MORPHINE SULFATE 2 MG/ML
2 INJECTION, SOLUTION INTRAMUSCULAR; INTRAVENOUS ONCE
Status: COMPLETED | OUTPATIENT
Start: 2025-02-18 | End: 2025-02-18

## 2025-02-18 RX ORDER — BUMETANIDE 0.25 MG/ML
4 INJECTION, SOLUTION INTRAMUSCULAR; INTRAVENOUS
Status: DISCONTINUED | OUTPATIENT
Start: 2025-02-18 | End: 2025-02-18

## 2025-02-18 RX ORDER — MORPHINE SULFATE 2 MG/ML
2 INJECTION, SOLUTION INTRAMUSCULAR; INTRAVENOUS
Status: DISPENSED | OUTPATIENT
Start: 2025-02-18 | End: 2025-02-23

## 2025-02-18 RX ORDER — ONDANSETRON 2 MG/ML
4 INJECTION INTRAMUSCULAR; INTRAVENOUS ONCE
Status: COMPLETED | OUTPATIENT
Start: 2025-02-18 | End: 2025-02-18

## 2025-02-18 RX ORDER — WARFARIN SODIUM 2.5 MG/1
1.25 TABLET ORAL
Status: DISCONTINUED | OUTPATIENT
Start: 2025-02-18 | End: 2025-02-24 | Stop reason: DRUGHIGH

## 2025-02-18 RX ORDER — ACETAMINOPHEN 325 MG/1
650 TABLET ORAL EVERY 6 HOURS PRN
Status: DISCONTINUED | OUTPATIENT
Start: 2025-02-18 | End: 2025-02-24 | Stop reason: HOSPADM

## 2025-02-18 RX ORDER — BUMETANIDE 0.25 MG/ML
4 INJECTION, SOLUTION INTRAMUSCULAR; INTRAVENOUS EVERY 8 HOURS
Status: COMPLETED | OUTPATIENT
Start: 2025-02-18 | End: 2025-02-19

## 2025-02-18 RX ADMIN — LEVOTHYROXINE SODIUM 50 MCG: 50 TABLET ORAL at 06:59

## 2025-02-18 RX ADMIN — BUMETANIDE 4 MG: 0.25 INJECTION INTRAMUSCULAR; INTRAVENOUS at 09:42

## 2025-02-18 RX ADMIN — PERFLUTREN 1 ML: 6.52 INJECTION, SUSPENSION INTRAVENOUS at 14:00

## 2025-02-18 RX ADMIN — NYSTATIN: 100000 POWDER TOPICAL at 21:03

## 2025-02-18 RX ADMIN — PANTOPRAZOLE SODIUM 40 MG: 40 TABLET, DELAYED RELEASE ORAL at 09:43

## 2025-02-18 RX ADMIN — MORPHINE SULFATE 2 MG: 2 INJECTION, SOLUTION INTRAMUSCULAR; INTRAVENOUS at 16:05

## 2025-02-18 RX ADMIN — WARFARIN SODIUM 1.25 MG: 2.5 TABLET ORAL at 19:08

## 2025-02-18 RX ADMIN — NYSTATIN: 100000 POWDER TOPICAL at 00:45

## 2025-02-18 RX ADMIN — BUDESONIDE 0.5 MG: 0.5 INHALANT RESPIRATORY (INHALATION) at 20:37

## 2025-02-18 RX ADMIN — ACETAMINOPHEN 325MG 650 MG: 325 TABLET ORAL at 19:07

## 2025-02-18 RX ADMIN — NYSTATIN: 100000 POWDER TOPICAL at 10:32

## 2025-02-18 RX ADMIN — IPRATROPIUM BROMIDE AND ALBUTEROL SULFATE 1.5 ML: 2.5; .5 SOLUTION RESPIRATORY (INHALATION) at 07:49

## 2025-02-18 RX ADMIN — HYDROCODONE BITARTRATE AND ACETAMINOPHEN 1 TABLET: 7.5; 325 TABLET ORAL at 06:59

## 2025-02-18 RX ADMIN — IPRATROPIUM BROMIDE AND ALBUTEROL SULFATE 1.5 ML: 2.5; .5 SOLUTION RESPIRATORY (INHALATION) at 14:27

## 2025-02-18 RX ADMIN — ONDANSETRON 4 MG: 2 INJECTION INTRAMUSCULAR; INTRAVENOUS at 04:27

## 2025-02-18 RX ADMIN — BUMETANIDE 4 MG: 0.25 INJECTION INTRAMUSCULAR; INTRAVENOUS at 16:05

## 2025-02-18 RX ADMIN — BUDESONIDE 0.5 MG: 0.5 INHALANT RESPIRATORY (INHALATION) at 00:17

## 2025-02-18 RX ADMIN — IPRATROPIUM BROMIDE AND ALBUTEROL SULFATE 1.5 ML: 2.5; .5 SOLUTION RESPIRATORY (INHALATION) at 20:37

## 2025-02-18 RX ADMIN — SPIRONOLACTONE 25 MG: 25 TABLET ORAL at 09:43

## 2025-02-18 RX ADMIN — Medication 10 ML: at 09:00

## 2025-02-18 RX ADMIN — FERROUS SULFATE TAB 325 MG (65 MG ELEMENTAL FE) 325 MG: 325 (65 FE) TAB at 00:52

## 2025-02-18 RX ADMIN — HYDROCODONE BITARTRATE AND ACETAMINOPHEN 1 TABLET: 7.5; 325 TABLET ORAL at 12:45

## 2025-02-18 RX ADMIN — MORPHINE SULFATE 2 MG: 2 INJECTION, SOLUTION INTRAMUSCULAR; INTRAVENOUS at 04:27

## 2025-02-18 RX ADMIN — BUDESONIDE 0.5 MG: 0.5 INHALANT RESPIRATORY (INHALATION) at 07:51

## 2025-02-18 RX ADMIN — HYDROCODONE BITARTRATE AND ACETAMINOPHEN 1 TABLET: 7.5; 325 TABLET ORAL at 00:45

## 2025-02-18 RX ADMIN — Medication 10 ML: at 21:03

## 2025-02-18 RX ADMIN — BUPROPION HYDROCHLORIDE 150 MG: 150 TABLET, EXTENDED RELEASE ORAL at 09:43

## 2025-02-18 NOTE — CONSULTS
[facility] Consult Note      Patient Name: Shani Phillip  : 1950  MRN: [id number]  Primary Care Physician:  Rodríguez Walker MD  Referring Physician: Zenia Tinajero MD  Date of admission: 2025    Subjective     Reason for Consult: Acute on chronic kidney disease    HPI:   Shani Phillip is a 74 y.o. female patient was admitted on 2025 with increasing shortness of breath and increasing lower extremity edema being progressive for the past few weeks.  She has a history of COPD, history of hypertension, atrial fibrillation, she has chronic kidney disease baseline creatinine around 1.4-1.5.  She has history of osteoporosis, she has a chronic lower extremity edema.  She has mitral valve stenosis and regurgitation she had diastolic dysfunction.  She had prior history of kidney stone requiring extraction few years ago, and she is not diabetic.    Having orthopnea and PND, no chest pain, chronic lower extremity edema and chronic skin changes of lower extremities.  No dysuria or gross hematuria, no nausea or vomiting, no lightheadedness.    Review of Systems:   14 point review of systems is otherwise negative except for mentioned above on HPI    Personal History     Past Medical History:   Diagnosis Date    Acute endocarditis     Atrial fibrillation with RVR 2019    Cancer     CKD (chronic kidney disease) stage 3, GFR 30-59 ml/min     COPD (chronic obstructive pulmonary disease)     CTS (carpal tunnel syndrome)     Dizziness     Gall stones     Influenza 2019    Medicare annual wellness visit, subsequent 2021    Migraine     Osteoporosis     Renal disorder     Restless leg syndrome     Thrush, oral 2019       Past Surgical History:   Procedure Laterality Date    CARDIAC CATHETERIZATION N/A 2020    Procedure: Coronary angiography;  Surgeon: Svetlana Grayson MD;  Location: [facility] CATH INVASIVE LOCATION;  Service: Cardiovascular    CARDIAC  CATHETERIZATION N/A 1/23/2020    Procedure: Left ventriculography;  Surgeon: Svetlana Grayson MD;  Location:  [facility] CATH INVASIVE LOCATION;  Service: Cardiovascular    CARDIAC CATHETERIZATION N/A 1/23/2020    Procedure: Left Heart Cath;  Surgeon: Svetlana Grayson MD;  Location:  [facility] CATH INVASIVE LOCATION;  Service: Cardiovascular    CHOLECYSTECTOMY      KNEE ARTHROPLASTY Right     LAPAROSCOPIC GASTRIC BANDING         Family History: family history includes Lung cancer in her mother.    Social History:  reports that she quit smoking about 5 years ago. Her smoking use included cigarettes. She started smoking about 55 years ago. She has a 25 pack-year smoking history. She has been exposed to tobacco smoke. She has never used smokeless tobacco. She reports that she does not drink alcohol and does not use drugs.    Home Medications:  Prior to Admission medications    Medication Sig Start Date End Date Taking? Authorizing Provider   ammonium lactate (LAC-HYDRIN) 12 % lotion APPLY TOPICALLY TO THE APPROPRIATE AREA AS DIRECTED AS NEEDED FOR DRY SKIN. 7/31/24  Yes Rodríguez Walker MD   budesonide (PULMICORT) 0.5 MG/2ML nebulizer solution USE 1 VIAL IN NEBULIZER DAILY - rinse mouth after treatment 4/29/24   Rodríguez Walker MD   bumetanide (BUMEX) 2 MG tablet TAKE 2 TABLETS IN THE MORNING AND 1 TABLET IN THE EVENING 10/8/24   Angy Smith APRN   buPROPion XL (WELLBUTRIN XL) 300 MG 24 hr tablet Take 1 tablet by mouth Daily. 10/7/24   Rodríguez Walker MD   cyclobenzaprine (FLEXERIL) 10 MG tablet Take 1 tablet by mouth 3 (Three) Times a Day As Needed (back pain). 8/28/23   Rodríguez Walker MD   digoxin (LANOXIN) 125 MCG tablet Take 1 tablet by mouth Every Night. 7/2/24   Angy Smith APRN   famotidine (PEPCID) 20 MG tablet TAKE 1 TABLET BY MOUTH 2 TIMES A DAY BEFORE MEALS. 1/9/24   Zenia Tinajero MD   ferrous sulfate 325 (65 FE) MG tablet Take 1 tablet by mouth Every Other Day. 10/7/24   Rodríguez Walker MD    HYDROcodone-acetaminophen (NORCO) 7.5-325 MG per tablet Take 1 tablet by mouth Every 6 (Six) Hours As Needed for Moderate Pain. 8/20/24   DeniseRodríguez MD   ipratropium-albuterol (DUO-NEB) 0.5-2.5 mg/3 ml nebulizer USE 1 VIAL IN NEBULIZER 4 TIMES DAILY - as directed 4/29/24   DeniseRodríguez carson MD   levothyroxine (SYNTHROID, LEVOTHROID) 50 MCG tablet TAKE 1 TABLET BY MOUTH EVERY DAY IN THE MORNING 10/28/24   Denise, Rodríguez HOUSE MD   meclizine (ANTIVERT) 12.5 MG tablet Take 1 tablet by mouth 3 (Three) Times a Day As Needed for Dizziness. 4/11/24   Ema Elizabeth APRN   nystatin (MYCOSTATIN) 343034 UNIT/GM powder Apply  topically to the appropriate area as directed 4 (Four) Times a Day. Under both breasts 2/14/25   Zenia Tinajero MD   O2 (OXYGEN) Inhale 2 L/min 1 (One) Time.    ProviderMarnie MD   ondansetron (ZOFRAN) 4 MG tablet Take 1 tablet by mouth Every 8 (Eight) Hours As Needed for Nausea or Vomiting. 8/31/23   Denise, Rodríguez HOUSE MD   pantoprazole (PROTONIX) 40 MG EC tablet Take 1 tablet by mouth Daily. 10/7/24   Denise, Rodríguez HOUSE MD   potassium chloride ER (K-TAB) 20 MEQ tablet controlled-release ER tablet Take 1 tablet by mouth Daily. 8/27/24   Angy Smith APRN   pramipexole (MIRAPEX) 0.5 MG tablet Take 1 tablet by mouth 2 (Two) Times a Day. 12/2/24   Denise, Rodríguez HOUSE MD   revefenacin (Yupelri) 175 MCG/3ML nebulizer solution Take 3 mL by nebulization Daily.    ProviderMarnie MD   spironolactone (ALDACTONE) 25 MG tablet TAKE 1 TABLET BY MOUTH EVERY DAY 10/28/24   Angy Smith APRN   trospium (SANCTURA) 20 MG tablet Take 1 tablet by mouth 2 (Two) Times a Day. 10/7/24   Denise, Rodríguez HOUSE MD   warfarin (COUMADIN) 2.5 MG tablet Take one tablet by mouth daily or as directed 12/19/24   Zenia Tinajero MD       Allergies:  Allergies   Allergen Reactions    Penicillins Rash     RASH 30 YRS AGO NO HOSPITALIZATION       Objective     Vitals:   Temp:  [98 °F (36.7 °C)-98.2 °F (36.8  °C)] 98 °F (36.7 °C)  Heart Rate:  [83-96] 96  Resp:  [16-24] 22  BP: ()/() 113/53  Flow (L/min) (Oxygen Therapy):  [3-5] 3    Intake/Output Summary (Last 24 hours) at 2/18/2025 1115  Last data filed at 2/18/2025 0930  Gross per 24 hour   Intake 240 ml   Output 300 ml   Net -60 ml       Physical Exam:   Constitutional: Awake, alert, no acute distress.  Chronically ill  HEENT: Sclera anicteric, no conjunctival injection  Neck: Mild JVD  Respiratory: Bilateral rhonchi and crackles, breathing effort not labored  Cardiovascular: Irregularly irregular, , no carotid bruit  Gastrointestinal: Positive bowel sounds, abdomen is soft, nontender and nondistended  : No palpable bladder  Musculoskeletal: Brawny edema bilaterally, no cyanosis or  Psychiatric: Appropriate affect, cooperative  Neurologic: Oriented x3, moving all extremities, normal speech and mental status  Skin: Warm and dry, she has stasis dermatitis of both lower extremities       Scheduled Meds:     budesonide, 0.5 mg, Nebulization, BID - RT  bumetanide, 4 mg, Intravenous, BID Diuretics  buPROPion XL, 150 mg, Oral, Daily  ferrous sulfate, 325 mg, Oral, Every Other Day  ipratropium-albuterol, 1.5 mL, Nebulization, Q6H While Awake - RT  levothyroxine, 50 mcg, Oral, QAM  nystatin, , Topical, 4x Daily  pantoprazole, 40 mg, Oral, Daily  sodium chloride, 10 mL, Intravenous, Q12H  spironolactone, 25 mg, Oral, Daily      IV Meds:        Results Reviewed:   I have personally reviewed the results from the time of this admission to 2/18/2025 11:15 EST     Lab Results   Component Value Date    GLUCOSE 94 02/17/2025    CALCIUM 8.8 02/17/2025     (L) 02/17/2025    K 4.5 02/17/2025    CO2 29.4 (H) 02/17/2025    CL 93 (L) 02/17/2025    BUN 33 (H) 02/17/2025    CREATININE 1.76 (H) 02/17/2025    EGFRIFAFRI 46 (L) 04/12/2021    EGFRIFNONA 48 (L) 08/26/2021    BCR 18.8 02/17/2025    ANIONGAP 7.6 02/17/2025      Lab Results   Component Value Date    MG 2.3  04/08/2024    PHOS 2.6 08/26/2021    ALBUMIN 3.5 02/17/2025           Assessment / Plan       Acute on chronic combined systolic and diastolic CHF (congestive heart failure)      ASSESSMENT:  Mild JAIME on chronic kidney disease stage IIIb associated with cardiorenal syndrome  Chronic kidney disease stage IIIb secondary to nephrosclerosis.  Acute on chronic diastolic congestive heart failure  Chronic A-fib, chronically anticoagulated  COPD  Chronic depression.    PLAN:  Check random urine for sodium, chloride and protein to creatinine ratio  Restrict sodium intake to 2 g daily  Diurese with IV bumetanide I will give 4 mg every 8 hours x 3 doses and reevaluate in the next 24 hours.  Monitor for diuretic toxicity  Surveillance labs, will use uric acid as a marker for effective intravascular volume.    I reviewed the chart and other providers notes, reviewed labs.  I discussed the case with the patient and her  at the bedside.    Thank you for involving us in the care of Shani Phillip.  Please feel free to call with any questions.    Richard Morrell MD  02/18/25  11:15 [facility]    [facility]  [phone]      Please note that portions of this note were completed with a voice recognition program.

## 2025-02-18 NOTE — THERAPY EVALUATION
Patient Name: Shani Phillip  : 1950    MRN: [id number]                              Today's Date: 2025       Admit Date: 2025    Visit Dx: No diagnosis found.  Patient Active Problem List   Diagnosis    Influenza    Thrush, oral    COPD (chronic obstructive pulmonary disease)    Atrial fibrillation with RVR    Chronic respiratory failure with hypoxia    Endocarditis of mitral valve    Mitral valve vegetation    Chronic diastolic CHF (congestive heart failure)    Atrial fibrillation, chronic    Bilateral lower extremity edema    Intermittent lightheadedness    Depression    Chronic midline low back pain with left-sided sciatica    Nephrolithiasis    Oxygen dependent    Medicare annual wellness visit, subsequent    Osteoporosis    CKD (chronic kidney disease) stage 3, GFR 30-59 ml/min    Chronic anticoagulation    Nonrheumatic mitral valve stenosis    Chest pain, unspecified type    Chronic heart failure with preserved ejection fraction (HFpEF)    Overweight with body mass index (BMI) 25.0-29.9    Acquired hypothyroidism    Acute on chronic combined systolic and diastolic CHF (congestive heart failure)     Past Medical History:   Diagnosis Date    Acute endocarditis     Atrial fibrillation with RVR 2019    Cancer     CKD (chronic kidney disease) stage 3, GFR 30-59 ml/min     COPD (chronic obstructive pulmonary disease)     CTS (carpal tunnel syndrome)     Dizziness     Gall stones     Influenza 2019    Medicare annual wellness visit, subsequent 2021    Migraine     Osteoporosis     Renal disorder     Restless leg syndrome     Thrush, oral 2019     Past Surgical History:   Procedure Laterality Date    CARDIAC CATHETERIZATION N/A 2020    Procedure: Coronary angiography;  Surgeon: Svetlana Grayson MD;  Location: [facility] INVASIVE LOCATION;  Service: Cardiovascular    CARDIAC CATHETERIZATION N/A 2020    Procedure: Left ventriculography;  Surgeon: Svetlana Grayson MD;   Location:  [facility] CATH INVASIVE LOCATION;  Service: Cardiovascular    CARDIAC CATHETERIZATION N/A 1/23/2020    Procedure: Left Heart Cath;  Surgeon: Svetlana Grayson MD;  Location: [facility] CATH INVASIVE LOCATION;  Service: Cardiovascular    CHOLECYSTECTOMY      KNEE ARTHROPLASTY Right     LAPAROSCOPIC GASTRIC BANDING        General Information       Row Name 02/18/25 1128          Physical Therapy Time and Intention    Document Type evaluation  -     Mode of Treatment individual therapy;physical therapy  -       Row Name 02/18/25 1128          General Information    Patient Profile Reviewed yes  -     Prior Level of Function independent:;min assist:  spouse assists as needed  -     Existing Precautions/Restrictions fall;oxygen therapy device and L/min  2L at baseline  -       Row Name 02/18/25 1128          Living Environment    People in Home spouse  -       Row Name 02/18/25 1128          Home Main Entrance    Number of Stairs, Main Entrance none  -       Row Name 02/18/25 1128          Cognition    Orientation Status (Cognition) oriented x 4  -       Row Name 02/18/25 1128          Safety Issues/Impairments Affecting Functional Mobility    Impairments Affecting Function (Mobility) balance;endurance/activity tolerance;shortness of breath;strength;pain  -               User Key  (r) = Recorded By, (t) = Taken By, (c) = Cosigned By      Initials Name Provider Type     Mallorie Agustin, PT Physical Therapist                   Mobility       Row Name 02/18/25 1129          Bed Mobility    Bed Mobility supine-sit  -     Supine-Sit Channing (Bed Mobility) contact guard  -     Assistive Device (Bed Mobility) head of bed elevated;bed rails  -     Comment, (Bed Mobility) Increased time. sitting at EOB at end of session  -       Row Name 02/18/25 1129          Sit-Stand Transfer    Sit-Stand Channing (Transfers) minimum assist (75% patient effort)  -     Assistive Device (Sit-Stand  Transfers) walker, front-wheeled  -     Comment, (Sit-Stand Transfer) elevated EOB  -       Row Name 02/18/25 1129          Gait/Stairs (Locomotion)    Ethel Level (Gait) contact guard  -     Assistive Device (Gait) walker, front-wheeled  -     Distance in Feet (Gait) 1  side steps to HOB  -     Deviations/Abnormal Patterns (Gait) leobardo decreased;festinating/shuffling;gait speed decreased  -     Bilateral Gait Deviations forward flexed posture  -               User Key  (r) = Recorded By, (t) = Taken By, (c) = Cosigned By      Initials Name Provider Type     Mallorie Agustin PT Physical Therapist                   Obj/Interventions       Row Name 02/18/25 1130          Range of Motion Comprehensive    General Range of Motion bilateral lower extremity ROM WFL  -       Row Name 02/18/25 1130          Strength Comprehensive (MMT)    General Manual Muscle Testing (MMT) Assessment lower extremity strength deficits identified  -     Comment, General Manual Muscle Testing (MMT) Assessment Generalized B LE weakness. B LEs grossly 3+/5  -SM       Row Name 02/18/25 1130          Balance    Balance Assessment sitting static balance;sitting dynamic balance;standing static balance;standing dynamic balance  -SM     Static Sitting Balance supervision  -SM     Dynamic Sitting Balance supervision  -SM     Position, Sitting Balance sitting edge of bed  -SM     Static Standing Balance contact guard  -SM     Dynamic Standing Balance contact guard  -SM     Position/Device Used, Standing Balance supported;walker, front-wheeled  -     Balance Interventions sitting;standing;sit to stand;supported;static;dynamic  -               User Key  (r) = Recorded By, (t) = Taken By, (c) = Cosigned By      Initials Name Provider Type     Mallorie Agustin PT Physical Therapist                   Goals/Plan       Row Name 02/18/25 1135          Bed Mobility Goal 1 (PT)    Activity/Assistive Device (Bed Mobility Goal  1, PT) bed mobility activities, all  -SM     [facility] Level/Cues Needed (Bed Mobility Goal 1, PT) supervision required  -SM     Time Frame (Bed Mobility Goal 1, PT) 2 weeks  -SM       Row Name 02/18/25 1135          Transfer Goal 1 (PT)    Activity/Assistive Device (Transfer Goal 1, PT) sit-to-stand/stand-to-sit;bed-to-chair/chair-to-bed;walker, rolling  -SM     [facility] Level/Cues Needed (Transfer Goal 1, PT) standby assist  -SM     Time Frame (Transfer Goal 1, PT) 2 weeks  -SM       Row Name 02/18/25 1135          Gait Training Goal 1 (PT)    Activity/Assistive Device (Gait Training Goal 1, PT) gait (walking locomotion);walker, rolling  -SM     [facility] Level (Gait Training Goal 1, PT) standby assist  -SM     Distance (Gait Training Goal 1, PT) 30  -SM     Time Frame (Gait Training Goal 1, PT) 2 weeks  -SM               User Key  (r) = Recorded By, (t) = Taken By, (c) = Cosigned By      Initials Name Provider Type     Mallorie Agustin, PT Physical Therapist                   Clinical Impression       Row Name 02/18/25 1131          Pain    Pain Location extremity  -SM     Pain Side/Orientation bilateral;lower  -SM     Pain Management Interventions exercise or physical activity utilized  -SM     Response to Pain Interventions activity participation with tolerable pain  -SM     Pre/Posttreatment Pain Comment Patient did not rate pain  -SM       Row Name 02/18/25 1136          Plan of Care Review    Plan of Care Reviewed With patient  -SM     Outcome Evaluation Patient is a 74 y.o female who presented to [facility] as a direct admit due to fluid overload. Patient reports at baseline she is mostly independent but her spouse assists as needed with ADLs. Patient reports she ambulates using a rwx in the home and a w/c for longer distances. Patient on 2L O2 at baseline. Patient endorses B LE pain this date, L>R. Patient sat up to EOB with CGA and increased time. Patient stood from elevated EOB with Glenny. Patient  able to take a few side steps to HOB with support of rwx and CGA. Further mobility deferred due to fatigue. Patient would continue to benefit from skilled PT intervention to address deficits in functional mobility. Patient reports plan is to return home with assist at d/c. PT will continue to monitor.  -       Row Name 02/18/25 1131          Therapy Assessment/Plan (PT)    Rehab Potential (PT) good  -SM     Criteria for Skilled Interventions Met (PT) yes  -SM     Therapy Frequency (PT) 5 times/wk  -       Row Name 02/18/25 1131          Vital Signs    Pre Patient Position Supine  -SM     Intra Patient Position Standing  -SM     Post Patient Position Sitting  -SM       Row Name 02/18/25 1131          Positioning and Restraints    Pre-Treatment Position in bed  -SM     Post Treatment Position bed  -SM     In Bed notified nsg;call light within reach;encouraged to call for assist;exit alarm on;sitting EOB  -SM               User Key  (r) = Recorded By, (t) = Taken By, (c) = Cosigned By      Initials Name Provider Type     Mallorie Agustin, PT Physical Therapist                   Outcome Measures       Row Name 02/18/25 1135 02/18/25 0930       How much help from another person do you currently need...    Turning from your back to your side while in flat bed without using bedrails? 3  -SM 3  -KM    Moving from lying on back to sitting on the side of a flat bed without bedrails? 3  -SM 3  -KM    Moving to and from a bed to a chair (including a wheelchair)? 3  -SM 3  -KM    Standing up from a chair using your arms (e.g., wheelchair, bedside chair)? 3  -SM 3  -KM    Climbing 3-5 steps with a railing? 2  -SM 2  -KM    To walk in hospital room? 2  -SM 3  -KM    AM-PAC 6 Clicks Score (PT) 16  -SM 17  -KM    Highest Level of Mobility Goal 5 --> Static standing  -SM 5 --> Static standing  -KM      Row Name 02/18/25 1135          Functional Assessment    Outcome Measure Options AM-PAC 6 Clicks Basic Mobility (PT)  -                User Key  (r) = Recorded By, (t) = Taken By, (c) = Cosigned By      Initials Name Provider Type     Mallorie Agustin, DERICK Physical Therapist    Marie Carter RN Registered Nurse                                 Physical Therapy Education       Title: PT OT SLP Therapies (Done)       Topic: Physical Therapy (Done)       Point: Mobility training (Done)       Learning Progress Summary            Patient Acceptance, E, VU by  at 2/18/2025 1135                      Point: Home exercise program (Done)       Learning Progress Summary            Patient Acceptance, E, VU by  at 2/18/2025 1135                      Point: Body mechanics (Done)       Learning Progress Summary            Patient Acceptance, E, VU by  at 2/18/2025 1135                      Point: Precautions (Done)       Learning Progress Summary            Patient Acceptance, E, VU by  at 2/18/2025 1135                                      User Key       Initials Effective Dates Name Provider Type [facility] 05/02/22 -  Mallorie Agustin PT Physical Therapist PT                  PT Recommendation and Plan     Outcome Evaluation: Patient is a 74 y.o female who presented to [facility] as a direct admit due to fluid overload. Patient reports at baseline she is mostly independent but her spouse assists as needed with ADLs. Patient reports she ambulates using a rwx in the home and a w/c for longer distances. Patient on 2L O2 at baseline. Patient endorses B LE pain this date, L>R. Patient sat up to EOB with CGA and increased time. Patient stood from elevated EOB with Glenny. Patient able to take a few side steps to HOB with support of rwx and CGA. Further mobility deferred due to fatigue. Patient would continue to benefit from skilled PT intervention to address deficits in functional mobility. Patient reports plan is to return home with assist at d/c. PT will continue to monitor.     Time Calculation:         PT Charges       Row Name  02/18/25 1136             Time Calculation    Start Time 1013  -SM      Stop Time 1025  -SM      Time Calculation (min) 12 min  -SM      PT Received On 02/18/25  -      PT - Next Appointment 02/19/25  -      PT Goal Re-Cert Due Date 03/04/25  -         Time Calculation- PT    Total Timed Code Minutes- PT 8 minute(s)  -SM         Timed Charges    38824 - PT Therapeutic Activity Minutes 8  -SM         Total Minutes    Timed Charges Total Minutes 8  -SM       Total Minutes 8  -SM                User Key  (r) = Recorded By, (t) = Taken By, (c) = Cosigned By      Initials Name Provider Type     Mallorie Agustin, PT Physical Therapist                  Therapy Charges for Today       Code Description Service Date Service Provider Modifiers Qty    97984329064 HC PT THERAPEUTIC ACT EA 15 MIN 2/18/2025 Mallorie Agustin, PT GP 1    35627805745 HC PT EVAL MOD COMPLEXITY 3 2/18/2025 Mallorie Agustin, PT GP 1            PT G-Codes  Outcome Measure Options: AM-PAC 6 Clicks Basic Mobility (PT)  AM-PAC 6 Clicks Score (PT): 16  PT Discharge Summary  Anticipated Discharge Disposition (PT): home with assist, home with home health, home with 24/7 care    Mallorie Agustin PT  2/18/2025

## 2025-02-18 NOTE — CASE MANAGEMENT/SOCIAL WORK
Discharge Planning Assessment  [facility]     Patient Name: Shani Phillip  MRN: [id number]  Today's Date: 2/18/2025    Admit Date: 2/17/2025    Plan: Home w/ spouse; Denies need for HH or rehab.   Discharge Needs Assessment       Row Name 02/18/25 1535       Living Environment    People in Home spouse    Name(s) of People in Home Willis Phillip/spouse    Current Living Arrangements home    Potentially Unsafe Housing Conditions none    In the past 12 months has the electric, gas, oil, or water company threatened to shut off services in your home? No    Primary Care Provided by self    Provides Primary Care For no one    Family Caregiver if Needed spouse    Family Caregiver Names Willis/spouse    Quality of Family Relationships helpful;involved;supportive    Able to Return to Prior Arrangements yes       Resource/Environmental Concerns    Resource/Environmental Concerns none    Transportation Concerns none       Transportation Needs    In the past 12 months, has lack of transportation kept you from medical appointments or from getting medications? no    In the past 12 months, has lack of transportation kept you from meetings, work, or from getting things needed for daily living? No       Transition Planning    Patient/Family Anticipates Transition to home with family    Patient/Family Anticipated Services at Transition none    Transportation Anticipated family or friend will provide       Discharge Needs Assessment    Readmission Within the Last 30 Days no previous admission in last 30 days    Equipment Currently Used at Home wheelchair;walker, rolling;oxygen;pulse ox;nebulizer;grab bar;shower chair;bp cuff;scales    Concerns to be Addressed denies needs/concerns at this time;no discharge needs identified    Anticipated Changes Related to Illness none    Equipment Needed After Discharge none    Provided Post Acute Provider List? Refused    Provided Post Acute Provider Quality & Resource List? Refused                    Discharge Plan       Row Name 02/18/25 1537       Plan    Plan Home w/ spouse; Denies need for HH or rehab.    Plan Comments CCP spoke with patient and her spouse/Willis Phillip at bedside. Introduced self and role.  Discharge planning discussed.  Information on face sheet verified.  Patient stated she is IADL's, retired and drives occasionally.  Pt uses wheelchair and rolling walker when up and about.  Patient lives in a single-story home with five entrance stair steps.  Patients PCP confirmed as, Rodríguez Walker and home pharmacy is [facility].  Patient has the following DME- wheelchair, rolling walker, oxygen ([employer]), pulse oximeter, nebulizer, grab bars, shower chair, BP cuff and scale.  Patient reports she has used past home health but cannot recall the agency name.  Pt denies past sub-acute rehab.  Patient plans to return home at discharge with spouse and she denies need for HH or rehab list.  Patient denies current discharge needs.  CCP will continue to follow….…Christina POTTER /NING.                  Continued Care and Services - Admitted Since 2/17/2025    No active coordination exists for this encounter.       Selected Continued Care - Episodes Includes continued care and service providers with selected services from the active episodes listed below      Chronic Care Management Episode start date: 2/18/2025 (Paused)   There are no active outsourced providers for this episode.                 Expected Discharge Date and Time       Expected Discharge Date Expected Discharge Time    Feb 20, 2025            Demographic Summary       Row Name 02/18/25 1533       General Information    Admission Type inpatient    Arrived From home    Required Notices Provided Important Message from Medicare    Referral Source admission list    Reason for Consult discharge planning    Preferred Language English       Contact Information    Permission Granted to Share Info With                    Functional  Status       Row Name 02/18/25 1534       Functional Status    Usual Activity Tolerance moderate    Current Activity Tolerance moderate       Physical Activity    On average, how many days per week do you engage in moderate to strenuous exercise (like a brisk walk)? 0 days    On average, how many minutes do you engage in exercise at this level? 0 min    Number of minutes of exercise per week 0       Assessment of Health Literacy    How often do you have someone help you read hospital materials? Never    How often do you have problems learning about your medical condition because of difficulty understanding written information? Occasionally    Health Literacy Good       Functional Status, IADL    Medications independent    Meal Preparation assistive equipment    Housekeeping assistive equipment    Laundry assistive equipment    Shopping assistive equipment and person    If for any reason you need help with day-to-day activities such as bathing, preparing meals, shopping, managing finances, etc., do you get the help you need? I don't need any help    IADL Comments Uses wheelchair and rolling walker       Mental Status    General Appearance WDL WDL       Mental Status Summary    Recent Changes in Mental Status/Cognitive Functioning no changes       Employment/    Employment Status retired                   Psychosocial    No documentation.                  Abuse/Neglect    No documentation.                  Legal    No documentation.                  Substance Abuse    No documentation.                  Patient Forms    No documentation.                     Christina Yates RN

## 2025-02-18 NOTE — PROGRESS NOTES
[facility] Clinical Pharmacy Services: Warfarin Dosing/Monitoring Consult    Shani Phillip is a 74 y.o. female, estimated creatinine clearance is 23.1 mL/min (A) (by C-G formula based on SCr of 1.76 mg/dL (H)). weighing 60.8 kg (134 lb).    Results from last 7 days   Lab Units 02/17/25  2256 02/14/25  1323 02/13/25  0000   INR  2.32*  --  2.30   HEMOGLOBIN g/dL 13.8 14.9  --    HEMATOCRIT % 39.3 43.2  --    PLATELETS 10*3/mm3 211 240  --      Prior to admission anticoagulation: 2.5 mg MWF and 1.25 mg all other days    Hospital Anticoagulation:  Consulting provider: Magdaleno Ray MD   Start date: pta med   Indication: A Fib - requiring full anticoagulation  Target INR: 2 - 3  Expected duration: life   Bridge Therapy:     Potential food or drug interactions:   none      Education complete?/Date: N/A; home medication    Assessment/Plan:  INR was therapuetic on last INR check <24 hrs ago. Resuming home dosage as above  Monitor for any signs or symptoms of bleeding  Follow up daily INRs and dose adjustments    Pharmacy will continue to follow until discharge or discontinuation of warfarin.     Son Gay [facility]  Clinical Pharmacist

## 2025-02-18 NOTE — PLAN OF CARE
Goal Outcome Evaluation:  Plan of Care Reviewed With: patient        Progress: no change  Outcome Evaluation: Direct admit, [facility] admitting, does not know medications or dosages, will need to call her pharmacy, ECHO and chest Xray ordered, will need to change Xray to portable, patient extremely short of air on exertion, 3L NC to keep 02 sats between 87 and 90%, left leg swollen, red, and warm to touch with sloughing, hydrocodon given without relief, order received for zofran and morphine, ace wrap placed, VSS, continue plan of care.

## 2025-02-18 NOTE — H&P
Date of Admission: 25    Encounter Provider: Magdaleno Ray MD    Group of Service: [facility]     Patient Name: Shani Phillip    :1950    Chief complaint: Shortness of breath, edema.    History of Present Illness:      This is a 74-year-old female who is normally followed by Dr. Tinajero in our practice.  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    She was seen in the office by Dr. Tinajero on 2025.  She was volume overloaded, and appeared to be in congestive heart failure.  She was given a dose of IV Bumex.  However, she continued to have worsening edema, and ultimately was admitted for further care.  She is still short of breath, and has lower extremity edema.  Her renal function also appears to fluctuate significantly.  She denied any chest pain.      ECHO 24      Left ventricular systolic function is hyperdynamic (EF > 70%). Calculated left ventricular EF = 78.7% Normal left ventricular cavity size noted. Left ventricular wall thickness is consistent with moderate concentric hypertrophy. All left ventricular wall segments contract normally. There is systolic into motion of the chordae of the edge of mitral valve leaflet with modest outflow tract obstruction noted at rest with a late peaking gradient in the left ventricular outflow tract of 34 mmHg with Valsalva. Left ventricular diastolic function was indeterminate.    The left atrial cavity is severely dilated. The right atrial cavity is mildly dilated    No aortic valve regurgitation or stenosis is present. The aortic valve is abnormal in structure. There is moderate calcification of the aortic valve.    Severe mitral annular calcification is present. There is severe, bileaflet mitral valve thickening present. No mitral valve regurgitation is present. Moderate to severe mitral valve stenosis is present.  The mean mitral valve gradient is 14 mmHg at a heart rate of 117 bpm.    Mild to moderate tricuspid valve regurgitation is present. Estimated right ventricular systolic pressure from tricuspid regurgitation is moderately elevated (45-55 mmHg). Calculated right ventricular systolic pressure from tricuspid regurgitation is 53 mmHg.          Stress test 6/5/23       Left ventricular ejection fraction is normal (Calculated EF = 70%).    Myocardial perfusion imaging indicates a normal myocardial perfusion study with no evidence of ischemia.    Impressions are consistent with a low risk study.          CATH 1/23/20      SUMMARY: No evidence of obstructive coronary artery disease.  Normal LV filling pressures and function.     RECOMMENDATIONS: Continue evaluation for mitral valve replacement/repair.      Past Medical History:   Diagnosis Date    Acute endocarditis     Atrial fibrillation with RVR 11/14/2019    Cancer     CKD (chronic kidney disease) stage 3, GFR 30-59 ml/min     COPD (chronic obstructive pulmonary disease)     CTS (carpal tunnel syndrome)     Dizziness     Gall stones     Influenza 11/14/2019    Medicare annual wellness visit, subsequent 04/12/2021    Migraine     Osteoporosis     Renal disorder     Restless leg syndrome     Thrush, oral 11/14/2019         Past Surgical History:   Procedure Laterality Date    CARDIAC CATHETERIZATION N/A 1/23/2020    Procedure: Coronary angiography;  Surgeon: Svetlana Grayson MD;  Location:  [facility];  Service: Cardiovascular    CARDIAC CATHETERIZATION N/A 1/23/2020    Procedure: Left ventriculography;  Surgeon: Svetlana Grayson MD;  Location:  [facility];  Service: Cardiovascular    CARDIAC CATHETERIZATION N/A 1/23/2020    Procedure: Left Heart Cath;  Surgeon: Svetlana Grayson MD;  Location:  [facility];  Service: Cardiovascular    CHOLECYSTECTOMY      KNEE ARTHROPLASTY Right     LAPAROSCOPIC GASTRIC BANDING            Allergies   Allergen Reactions    Penicillins Rash     RASH 30 YRS AGO NO HOSPITALIZATION         No current facility-administered medications on file prior to encounter.     Current Outpatient Medications on File Prior to Encounter   Medication Sig Dispense Refill    ammonium lactate (LAC-HYDRIN) 12 % lotion APPLY TOPICALLY TO THE APPROPRIATE AREA AS DIRECTED AS NEEDED FOR DRY SKIN. 225 g 6    budesonide (PULMICORT) 0.5 MG/2ML nebulizer solution USE 1 VIAL IN NEBULIZER DAILY - rinse mouth after treatment 30 each 11    bumetanide (BUMEX) 2 MG tablet TAKE 2 TABLETS IN THE MORNING AND 1 TABLET IN THE EVENING 270 tablet 1    buPROPion XL (WELLBUTRIN XL) 300 MG 24 hr tablet Take 1 tablet by mouth Daily. 90 tablet 3    cyclobenzaprine (FLEXERIL) 10 MG tablet Take 1 tablet by mouth 3 (Three) Times a Day As Needed (back pain). 40 tablet 1    digoxin (LANOXIN) 125 MCG tablet Take 1 tablet by mouth Every Night. 90 tablet 2    famotidine (PEPCID) 20 MG tablet TAKE 1 TABLET BY MOUTH 2 TIMES A DAY BEFORE MEALS. 180 tablet 3    ferrous sulfate 325 (65 FE) MG tablet Take 1 tablet by mouth Every Other Day. 45 tablet 1    HYDROcodone-acetaminophen (NORCO) 7.5-325 MG per tablet Take 1 tablet by mouth Every 6 (Six) Hours As Needed for Moderate Pain. 30 tablet 0    ipratropium-albuterol (DUO-NEB) 0.5-2.5 mg/3 ml nebulizer USE 1 VIAL IN NEBULIZER 4 TIMES DAILY - as directed 360 mL 11    levothyroxine (SYNTHROID, LEVOTHROID) 50 MCG tablet TAKE 1 TABLET BY MOUTH EVERY DAY IN THE MORNING 90 tablet 1    meclizine (ANTIVERT) 12.5 MG tablet Take 1 tablet by mouth 3 (Three) Times a Day As Needed for Dizziness. 12 tablet 0    nystatin (MYCOSTATIN) 660113 UNIT/GM powder Apply  topically to the appropriate area as directed 4 (Four) Times a Day. Under both breasts 30 g 1    O2 (OXYGEN) Inhale 2 L/min 1 (One) Time.      ondansetron (ZOFRAN) 4 MG tablet Take 1 tablet by mouth Every 8 (Eight) Hours As Needed for Nausea or Vomiting. 30 tablet  "1    pantoprazole (PROTONIX) 40 MG EC tablet Take 1 tablet by mouth Daily. 90 tablet 1    potassium chloride ER (K-TAB) 20 MEQ tablet controlled-release ER tablet Take 1 tablet by mouth Daily.      pramipexole (MIRAPEX) 0.5 MG tablet Take 1 tablet by mouth 2 (Two) Times a Day. 180 tablet 3    revefenacin (Yupelri) 175 MCG/3ML nebulizer solution Take 3 mL by nebulization Daily.      spironolactone (ALDACTONE) 25 MG tablet TAKE 1 TABLET BY MOUTH EVERY DAY 90 tablet 1    trospium (SANCTURA) 20 MG tablet Take 1 tablet by mouth 2 (Two) Times a Day. 180 tablet 3    warfarin (COUMADIN) 2.5 MG tablet Take one tablet by mouth daily or as directed 90 tablet 1         Social History     Socioeconomic History    Marital status:    Tobacco Use    Smoking status: Former     Current packs/day: 0.00     Average packs/day: 0.5 packs/day for 50.0 years (25.0 ttl pk-yrs)     Types: Cigarettes     Start date: 11/3/1969     Quit date: 11/3/2019     Years since quittin.2     Passive exposure: Past (parents smoked in the home)    Smokeless tobacco: Never    Tobacco comments:     LAST CIG 2019   Vaping Use    Vaping status: Never Used   Substance and Sexual Activity    Alcohol use: No     Comment: caffeine - 1/2 cup coffee daily     Drug use: No    Sexual activity: Defer         Family History   Problem Relation Age of Onset    Lung cancer Mother        REVIEW OF SYSTEMS:   Pertinent positives are noted in HPI above.  Otherwise, all other systems were reviewed, and are negative.     Objective:     Vitals:    25 0945 25 1400 25 1427 25 1431   BP: 113/53 113/53     BP Location:       Patient Position:       Pulse: 96  83 85   Resp:   20 20   Temp:       TempSrc:       SpO2:   91%    Weight:  60.8 kg (134 lb)     Height:  154.9 cm (61\")       Body mass index is 25.32 kg/m².  Flowsheet Rows      Flowsheet Row First Filed Value   Admission Height 154.9 cm (61\") Documented at 2025   Admission " Weight 65.7 kg (144 lb 12.8 oz) Documented at 02/17/2025 2052             General:    No acute distress, alert and oriented x4, pleasant                   Head:    Normocephalic, atraumatic.   Eyes:          Conjunctivae and sclerae normal, no icterus.   Throat:   No oral lesions, no thrush, oral mucosa moist.    Neck:   Supple, trachea midline.   Lungs:     Bilateral rales at the bases.    Heart:    Irregularly irregular rhythm and tachycardic rate. III/VI SM RUSB and LUSB, II DM LLSB.   Abdomen:     Soft, non-tender, non-distended, positive bowel sounds.    Extremities:   2+ edema of the lower extremities.   Pulses:   Pulses palpable and equal bilaterally.    Skin:   Very dry skin on the lower extremities.   Neuro:   Non-focal.  Moves all extremities well.    Psychiatric:   Normal mood and affect.       Lab Review:                Results from last 7 days   Lab Units 02/17/25  2256   SODIUM mmol/L 130*   POTASSIUM mmol/L 4.5   CHLORIDE mmol/L 93*   CO2 mmol/L 29.4*   BUN mg/dL 33*   CREATININE mg/dL 1.76*   GLUCOSE mg/dL 94   CALCIUM mg/dL 8.8         Results from last 7 days   Lab Units 02/17/25  2256   WBC 10*3/mm3 6.95   HEMOGLOBIN g/dL 13.8   HEMATOCRIT % 39.3   PLATELETS 10*3/mm3 211     Results from last 7 days   Lab Units 02/17/25  2256 02/13/25  0000   INR  2.32* 2.30                                         EKG (reviewed by me personally):      Assessment:   1.  Acute valvular and diastolic CHF secondary to #2  2.  Calcific mitral stenosis, moderate to severe by echo  3.  Acute kidney injury with stage IIIb chronic kidney disease (cardiorenal)  4.  Persistent atrial fibrillation  5.  COPD without acute exacerbation  6.  Mild aortic stenosis by echo  7.  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  Right bundle branch block  9.  Generalized weakness  10.  Hypervolemic hyponatremia    Plan:       Appreciate nephrology consult.  Dr. Morrell saw the patient and increased the IV Bumex to 4 mg IV every  8 hours for 3 doses.  Continue to watch renal function and volume status closely.    I strongly suspect that the calcific mitral stenosis is driving this.  Although the mean gradient was only 7 on the echo from today, it has been higher in the past, and it appears to be severely stenotic visually.  I am not sure if she is going to be a good operative candidate with her comorbidities.  I will ask Dr. Briones to see her while she is here.    Her atrial fibrillation is controlled.  I will check a TSH in the morning.  Continue to hold digoxin given renal dysfunction.  Her digoxin level was 1.8 this morning.  She is also on warfarin, which I will continue for now.  However, if she is going to need on the other procedures, this may need to be held.  Her INR on admission was 2.32.    The patient and her  that we will need to reevaluate this on a day-to-day basis.    Messi Ray MD

## 2025-02-18 NOTE — CONSULTS
Patient Care Team:  Rodríguez Walker MD as PCP - General (Internal Medicine)  Mike Atkins MD as Surgeon (General Surgery)  Hayes Briones MD as Surgeon (Cardiothoracic Surgery)  Zenia Tinajero MD as Consulting Physician (Cardiology)  Clover Zamora MD as Consulting Physician (Pulmonary Disease)  Angy Smith APRN as Nurse Practitioner (Cardiology)  Lester Garcia, RN as Ambulatory  ([facility])    Chief complaint: Moderate-severe calcific mitral stenosis with chronic mobile echodensity     Subjective     History of Present Illness    Patient is a 74 year old female with PMH of mitral stenosis, CKD IIIb, atrial fibrillation (warfarin at home), COPD, CHF, chronic pain, and frailty who was admitted yesterday due to shortness of breath and edema. Patient is followed by Dr. Tinajero and has known calcific mitral stenosis with chronic mobile echodensity. Patient was recently seen in office and was noted to be volume overloaded. Patient was admitted for further evaluation of shortness of breath and JAIME on CKDIIIb. Nephrology has been consulted. Patient reports she was a long time smoker but quit in 2019. Patient states she got COVID 5 years ago and has been on continuous oxygen ever since this. Patient reports she is not very mobile and uses a walker/cane to get around. Patient does not live an active lifestyle and her  helps her perform ADL's. Patient gets short of breath with minimal exertion and has lower extremity edema, she denies chest pain.    Review of Systems   Constitutional:  Positive for fatigue.   Respiratory:  Positive for shortness of breath.    Cardiovascular:  Positive for leg swelling.   All other systems reviewed and are negative.       Past Medical History:   Diagnosis Date    Acute endocarditis     Atrial fibrillation with RVR 11/14/2019    Cancer     CKD (chronic kidney disease) stage 3, GFR 30-59 ml/min     COPD (chronic obstructive pulmonary disease)      CTS (carpal tunnel syndrome)     Dizziness     Gall stones     Influenza 2019    Medicare annual wellness visit, subsequent 2021    Migraine     Osteoporosis     Renal disorder     Restless leg syndrome     Thrush, oral 2019     Past Surgical History:   Procedure Laterality Date    CARDIAC CATHETERIZATION N/A 2020    Procedure: Coronary angiography;  Surgeon: Svetlana Grayson MD;  Location:  [facility];  Service: Cardiovascular    CARDIAC CATHETERIZATION N/A 2020    Procedure: Left ventriculography;  Surgeon: Svetlana Grayson MD;  Location:  [facility];  Service: Cardiovascular    CARDIAC CATHETERIZATION N/A 2020    Procedure: Left Heart Cath;  Surgeon: Svetlana Grayson MD;  Location:  [facility];  Service: Cardiovascular    CHOLECYSTECTOMY      KNEE ARTHROPLASTY Right     LAPAROSCOPIC GASTRIC BANDING       Family History   Problem Relation Age of Onset    Lung cancer Mother      Social History     Tobacco Use    Smoking status: Former     Current packs/day: 0.00     Average packs/day: 0.5 packs/day for 50.0 years (25.0 ttl pk-yrs)     Types: Cigarettes     Start date: 11/3/1969     Quit date: 11/3/2019     Years since quittin.2     Passive exposure: Past (parents smoked in the home)    Smokeless tobacco: Never    Tobacco comments:     LAST CIG 2019   Vaping Use    Vaping status: Never Used   Substance Use Topics    Alcohol use: No     Comment: caffeine - 1/2 cup coffee daily     Drug use: No     Medications Prior to Admission   Medication Sig Dispense Refill Last Dose/Taking    ammonium lactate (LAC-HYDRIN) 12 % lotion APPLY TOPICALLY TO THE APPROPRIATE AREA AS DIRECTED AS NEEDED FOR DRY SKIN. 225 g 6 Past Week    budesonide (PULMICORT) 0.5 MG/2ML nebulizer solution USE 1 VIAL IN NEBULIZER DAILY - rinse mouth after treatment 30 each 11 Unknown    bumetanide (BUMEX) 2 MG tablet TAKE 2 TABLETS IN THE MORNING AND 1 TABLET IN  THE EVENING 270 tablet 1 Unknown    buPROPion XL (WELLBUTRIN XL) 300 MG 24 hr tablet Take 1 tablet by mouth Daily. 90 tablet 3 Unknown    cyclobenzaprine (FLEXERIL) 10 MG tablet Take 1 tablet by mouth 3 (Three) Times a Day As Needed (back pain). 40 tablet 1 Unknown    digoxin (LANOXIN) 125 MCG tablet Take 1 tablet by mouth Every Night. 90 tablet 2 Unknown    famotidine (PEPCID) 20 MG tablet TAKE 1 TABLET BY MOUTH 2 TIMES A DAY BEFORE MEALS. 180 tablet 3 Unknown    ferrous sulfate 325 (65 FE) MG tablet Take 1 tablet by mouth Every Other Day. 45 tablet 1 Unknown    HYDROcodone-acetaminophen (NORCO) 7.5-325 MG per tablet Take 1 tablet by mouth Every 6 (Six) Hours As Needed for Moderate Pain. 30 tablet 0 Unknown    ipratropium-albuterol (DUO-NEB) 0.5-2.5 mg/3 ml nebulizer USE 1 VIAL IN NEBULIZER 4 TIMES DAILY - as directed 360 mL 11 Unknown    levothyroxine (SYNTHROID, LEVOTHROID) 50 MCG tablet TAKE 1 TABLET BY MOUTH EVERY DAY IN THE MORNING 90 tablet 1 Unknown    meclizine (ANTIVERT) 12.5 MG tablet Take 1 tablet by mouth 3 (Three) Times a Day As Needed for Dizziness. 12 tablet 0 Unknown    nystatin (MYCOSTATIN) 936377 UNIT/GM powder Apply  topically to the appropriate area as directed 4 (Four) Times a Day. Under both breasts 30 g 1 Unknown    O2 (OXYGEN) Inhale 2 L/min 1 (One) Time.   Unknown    ondansetron (ZOFRAN) 4 MG tablet Take 1 tablet by mouth Every 8 (Eight) Hours As Needed for Nausea or Vomiting. 30 tablet 1 Unknown    pantoprazole (PROTONIX) 40 MG EC tablet Take 1 tablet by mouth Daily. 90 tablet 1 Unknown    potassium chloride ER (K-TAB) 20 MEQ tablet controlled-release ER tablet Take 1 tablet by mouth Daily.   Unknown    pramipexole (MIRAPEX) 0.5 MG tablet Take 1 tablet by mouth 2 (Two) Times a Day. 180 tablet 3 Unknown    revefenacin (Yupelri) 175 MCG/3ML nebulizer solution Take 3 mL by nebulization Daily.   Unknown    spironolactone (ALDACTONE) 25 MG tablet TAKE 1 TABLET BY MOUTH EVERY DAY 90 tablet 1  "Unknown    trospium (SANCTURA) 20 MG tablet Take 1 tablet by mouth 2 (Two) Times a Day. 180 tablet 3 Unknown    warfarin (COUMADIN) 2.5 MG tablet Take one tablet by mouth daily or as directed 90 tablet 1 Unknown     budesonide, 0.5 mg, Nebulization, BID - RT  bumetanide, 4 mg, Intravenous, Q8H  buPROPion XL, 150 mg, Oral, Daily  ferrous sulfate, 325 mg, Oral, Every Other Day  ipratropium-albuterol, 1.5 mL, Nebulization, Q6H While Awake - RT  levothyroxine, 50 mcg, Oral, QAM  nystatin, , Topical, 4x Daily  pantoprazole, 40 mg, Oral, Daily  sodium chloride, 10 mL, Intravenous, Q12H  spironolactone, 25 mg, Oral, Daily  warfarin, 1.25 mg, Oral, Once per day on Sunday Tuesday Thursday Saturday  [START ON 2/19/2025] warfarin, 2.5 mg, Oral, Once per day on Monday Wednesday Friday      Allergies:  Penicillins    Objective      Vital Signs  Temp:  [98 °F (36.7 °C)-98.2 °F (36.8 °C)] 98 °F (36.7 °C)  Heart Rate:  [83-96] 85  Resp:  [16-24] 20  BP: ()/() 113/53    Flowsheet Rows      Flowsheet Row First Filed Value   Admission Height 154.9 cm (61\") Documented at 02/17/2025 2052   Admission Weight 65.7 kg (144 lb 12.8 oz) Documented at 02/17/2025 2052          154.9 cm (61\")    Physical Exam  Vitals reviewed.   Constitutional:       General: She is not in acute distress.     Appearance: She is ill-appearing. She is not toxic-appearing.   HENT:      Head: Normocephalic and atraumatic.      Nose: Nose normal. No congestion or rhinorrhea.      Mouth/Throat:      Mouth: Mucous membranes are moist.      Pharynx: Oropharynx is clear.   Eyes:      Extraocular Movements: Extraocular movements intact.      Conjunctiva/sclera: Conjunctivae normal.      Pupils: Pupils are equal, round, and reactive to light.   Cardiovascular:      Rate and Rhythm: Normal rate and regular rhythm.      Heart sounds: Murmur heard.   Pulmonary:      Effort: Pulmonary effort is normal.      Breath sounds: Wheezing present.   Abdominal:      " General: Abdomen is flat.      Palpations: Abdomen is soft.   Musculoskeletal:         General: Normal range of motion.      Cervical back: Normal range of motion and neck supple.      Right lower leg: Edema present.      Left lower leg: Edema present.   Skin:     General: Skin is warm and dry.      Findings: Erythema and rash present.   Neurological:      General: No focal deficit present.      Mental Status: She is alert and oriented to person, place, and time.   Psychiatric:         Mood and Affect: Mood normal.         Thought Content: Thought content normal.         Results Review:   Lab Results (last 24 hours)       Procedure Component Value Units Date/Time    Sodium, Urine, Random - [[id number]] Collected: 02/18/25 1222    Specimen: Urine Updated: 02/18/25 1255     Sodium, Urine 27 mmol/L     Narrative:      Reference intervals for random urine have not been established.  Clinical usage is dependent upon physician's interpretation in combination with other laboratory tests.       Chloride, Urine, Random - [[id number]] Collected: 02/18/25 1222    Specimen: Urine Updated: 02/18/25 1255     Chloride, Urine 54 mmol/L     Narrative:      Reference intervals for random urine have not been established.  Clinical usage is dependent upon physician's interpretation in combination with other laboratory tests.       Protein / Creatinine Ratio, Urine - [[id number]]  (Abnormal) Collected: 02/18/25 1222    Specimen: Urine Updated: 02/18/25 1254     Protein/Creatinine Ratio, Urine 369.3 mg/G Crea      Creatinine, Urine 128.9 mg/dL      Total Protein, Urine 47.6 mg/dL     Comprehensive Metabolic Panel [[id number]]  (Abnormal) Collected: 02/17/25 2256    Specimen: Blood Updated: 02/17/25 2357     Glucose 94 mg/dL      BUN 33 mg/dL      Creatinine 1.76 mg/dL      Sodium 130 mmol/L      Potassium 4.5 mmol/L      Chloride 93 mmol/L      CO2 29.4 mmol/L      Calcium 8.8 mg/dL      Total Protein 7.6 g/dL      Albumin 3.5 g/dL      ALT  (SGPT) 10 U/L      AST (SGOT) 21 U/L      Alkaline Phosphatase 132 U/L      Total Bilirubin 0.3 mg/dL      Globulin 4.1 gm/dL      A/G Ratio 0.9 g/dL      BUN/Creatinine Ratio 18.8     Anion Gap 7.6 mmol/L      eGFR 29.9 mL/min/1.73     Narrative:      GFR Categories in Chronic Kidney Disease (CKD)      GFR Category          GFR (mL/min/1.73)    Interpretation  G1                     90 or greater         Normal or high (1)  G2                      60-89                Mild decrease (1)  G3a                   45-59                Mild to moderate decrease  G3b                   30-44                Moderate to severe decrease  G4                    15-29                Severe decrease  G5                    14 or less           Kidney failure          (1)In the absence of evidence of kidney disease, neither GFR category G1 or G2 fulfill the criteria for CKD.    eGFR calculation 2021 CKD-EPI creatinine equation, which does not include race as a factor    Digoxin Level [[id number]]  (Abnormal) Collected: 02/17/25 2256    Specimen: Blood Updated: 02/17/25 2354     Digoxin 1.80 ng/mL     BNP [[id number]]  (Normal) Collected: 02/17/25 2256    Specimen: Blood Updated: 02/17/25 2354     proBNP 943.0 pg/mL     Narrative:      This assay is used as an aid in the diagnosis of individuals suspected of having heart failure. It can be used as an aid in the diagnosis of acute decompensated heart failure (ADHF) in patients presenting with signs and symptoms of ADHF to the emergency department (ED). In addition, NT-proBNP of <300 pg/mL indicates ADHF is not likely.    Age Range Result Interpretation  NT-proBNP Concentration (pg/mL:      <50             Positive            >450                   Gray                 300-450                    Negative             <300    50-75           Positive            >900                  Gray                300-900                  Negative            <300      >75             Positive             >1800                  Gray                300-1800                  Negative            <300    Protime-INR [[id number]]  (Abnormal) Collected: 02/17/25 2256    Specimen: Blood Updated: 02/17/25 2330     Protime 25.7 Seconds      INR 2.32    CBC (No Diff) [[id number]]  (Normal) Collected: 02/17/25 2256    Specimen: Blood Updated: 02/17/25 2323     WBC 6.95 10*3/mm3      RBC 4.33 10*6/mm3      Hemoglobin 13.8 g/dL      Hematocrit 39.3 %      MCV 90.8 fL      MCH 31.9 pg      MCHC 35.1 g/dL      RDW 14.4 %      RDW-SD 48.1 fl      MPV 9.4 fL      Platelets 211 10*3/mm3                 Assessment & Plan       Acute on chronic combined systolic and diastolic CHF (congestive heart failure)      Assessment & Plan    - Severe calcific mitral stenosis  - Chronic oxygen dependence (has been on supplemental O2 for 5 years)  - COPD  - CKD IIIb  - Atrial fibrillation (warfarin at home)  - CHF  - Chronic pain  - Frailty     Dr. Briones has reviewed echocardiogram  Plan is to get complete PFT's with DLCO and room air ABG, further recommendations to follow    Henry Vega PA-C  02/18/25  15:29 EST

## 2025-02-18 NOTE — PLAN OF CARE
Goal Outcome Evaluation:  Plan of Care Reviewed With: patient           Outcome Evaluation: Patient is a 74 y.o female who presented to [facility] as a direct admit due to fluid overload. Patient reports at baseline she is mostly independent but her spouse assists as needed with ADLs. Patient reports she ambulates using a rwx in the home and a w/c for longer distances. Patient on 2L O2 at baseline. Patient endorses B LE pain this date, L>R. Patient sat up to EOB with CGA and increased time. Patient stood from elevated EOB with Glenny. Patient able to take a few side steps to HOB with support of rwx and CGA. Further mobility deferred due to fatigue. Patient would continue to benefit from skilled PT intervention to address deficits in functional mobility. Patient reports plan is to return home with assist at d/c. PT will continue to monitor.    Anticipated Discharge Disposition (PT): home with assist, home with home health, home with 24/7 care

## 2025-02-18 NOTE — PROGRESS NOTES
Directly admitted for fluid overload. She had labs and a dose of IV bumex at the office Friday. Dig level was up so she was told to hold digoxin over the weekend. Cr was also increased. Recheck labs tonight. May need to have nephrology see. Reordered an echo given history of mitral stenosis.     Electronically signed by Zenia Tinajero MD, 02/17/25, 10:12 PM EST.

## 2025-02-19 ENCOUNTER — APPOINTMENT (OUTPATIENT)
Dept: RESPIRATORY THERAPY | Facility: HOSPITAL | Age: 75
DRG: 291 | End: 2025-02-19
Payer: MEDICARE

## 2025-02-19 LAB
ALBUMIN SERPL-MCNC: 3.2 G/DL (ref 3.5–5.2)
ANION GAP SERPL CALCULATED.3IONS-SCNC: 14 MMOL/L (ref 5–15)
ARTERIAL PATENCY WRIST A: POSITIVE
ATMOSPHERIC PRESS: 758 MMHG
BASE EXCESS BLDA CALC-SCNC: 6 MMOL/L (ref 0–2)
BASOPHILS # BLD AUTO: 0.01 10*3/MM3 (ref 0–0.2)
BASOPHILS NFR BLD AUTO: 0.2 % (ref 0–1.5)
BDY SITE: ABNORMAL
BUN SERPL-MCNC: 33 MG/DL (ref 8–23)
BUN/CREAT SERPL: 17.7 (ref 7–25)
CALCIUM SPEC-SCNC: 8.6 MG/DL (ref 8.6–10.5)
CHLORIDE SERPL-SCNC: 96 MMOL/L (ref 98–107)
CO2 BLDA-SCNC: >32.45 MMOL/L (ref 23–27)
CO2 SERPL-SCNC: 26 MMOL/L (ref 22–29)
CREAT SERPL-MCNC: 1.86 MG/DL (ref 0.57–1)
DEPRECATED RDW RBC AUTO: 51.2 FL (ref 37–54)
DEVICE COMMENT: ABNORMAL
EGFRCR SERPLBLD CKD-EPI 2021: 28 ML/MIN/1.73
EOSINOPHIL # BLD AUTO: 0.03 10*3/MM3 (ref 0–0.4)
EOSINOPHIL NFR BLD AUTO: 0.5 % (ref 0.3–6.2)
ERYTHROCYTE [DISTWIDTH] IN BLOOD BY AUTOMATED COUNT: 14.5 % (ref 12.3–15.4)
GLUCOSE SERPL-MCNC: 93 MG/DL (ref 65–99)
HCO3 BLDA-SCNC: 33.1 MMOL/L (ref 22–28)
HCT VFR BLD AUTO: 41.4 % (ref 34–46.6)
HEMODILUTION: NO
HGB BLD-MCNC: 13.3 G/DL (ref 12–15.9)
IMM GRANULOCYTES # BLD AUTO: 0.04 10*3/MM3 (ref 0–0.05)
IMM GRANULOCYTES NFR BLD AUTO: 0.7 % (ref 0–0.5)
INR PPP: 2.46 (ref 0.9–1.1)
LYMPHOCYTES # BLD AUTO: 0.64 10*3/MM3 (ref 0.7–3.1)
LYMPHOCYTES NFR BLD AUTO: 10.6 % (ref 19.6–45.3)
Lab: ABNORMAL
MAGNESIUM SERPL-MCNC: 2.3 MG/DL (ref 1.6–2.4)
MCH RBC QN AUTO: 30.5 PG (ref 26.6–33)
MCHC RBC AUTO-ENTMCNC: 32.1 G/DL (ref 31.5–35.7)
MCV RBC AUTO: 95 FL (ref 79–97)
MODALITY: ABNORMAL
MONOCYTES # BLD AUTO: 0.53 10*3/MM3 (ref 0.1–0.9)
MONOCYTES NFR BLD AUTO: 8.8 % (ref 5–12)
NEUTROPHILS NFR BLD AUTO: 4.79 10*3/MM3 (ref 1.7–7)
NEUTROPHILS NFR BLD AUTO: 79.2 % (ref 42.7–76)
NOTIFIED WHO: ABNORMAL
NRBC BLD AUTO-RTO: 0 /100 WBC (ref 0–0.2)
PCO2 BLDA: 54.9 MM HG (ref 35–45)
PH BLDA: 7.39 PH UNITS (ref 7.35–7.45)
PHOSPHATE SERPL-MCNC: 2.7 MG/DL (ref 2.5–4.5)
PLATELET # BLD AUTO: 208 10*3/MM3 (ref 140–450)
PMV BLD AUTO: 9.7 FL (ref 6–12)
PO2 BLDA: 34.7 MM HG (ref 80–100)
POTASSIUM SERPL-SCNC: 4.4 MMOL/L (ref 3.5–5.2)
PROTHROMBIN TIME: 26.9 SECONDS (ref 11.7–14.2)
RBC # BLD AUTO: 4.36 10*6/MM3 (ref 3.77–5.28)
READ BACK: YES
SAO2 % BLDCOA: 64.4 % (ref 92–98.5)
SODIUM SERPL-SCNC: 136 MMOL/L (ref 136–145)
TOTAL RATE: 20 BREATHS/MINUTE
TSH SERPL DL<=0.05 MIU/L-ACNC: 5.79 UIU/ML (ref 0.27–4.2)
URATE SERPL-MCNC: 13.1 MG/DL (ref 2.4–5.7)
WBC NRBC COR # BLD AUTO: 6.04 10*3/MM3 (ref 3.4–10.8)

## 2025-02-19 PROCEDURE — 85610 PROTHROMBIN TIME: CPT | Performed by: INTERNAL MEDICINE

## 2025-02-19 PROCEDURE — 80069 RENAL FUNCTION PANEL: CPT | Performed by: INTERNAL MEDICINE

## 2025-02-19 PROCEDURE — 82803 BLOOD GASES ANY COMBINATION: CPT

## 2025-02-19 PROCEDURE — 84550 ASSAY OF BLOOD/URIC ACID: CPT | Performed by: INTERNAL MEDICINE

## 2025-02-19 PROCEDURE — 94761 N-INVAS EAR/PLS OXIMETRY MLT: CPT

## 2025-02-19 PROCEDURE — 25010000002 MORPHINE PER 10 MG: Performed by: INTERNAL MEDICINE

## 2025-02-19 PROCEDURE — 83735 ASSAY OF MAGNESIUM: CPT | Performed by: INTERNAL MEDICINE

## 2025-02-19 PROCEDURE — 94760 N-INVAS EAR/PLS OXIMETRY 1: CPT

## 2025-02-19 PROCEDURE — 99232 SBSQ HOSP IP/OBS MODERATE 35: CPT | Performed by: INTERNAL MEDICINE

## 2025-02-19 PROCEDURE — 97530 THERAPEUTIC ACTIVITIES: CPT

## 2025-02-19 PROCEDURE — 94799 UNLISTED PULMONARY SVC/PX: CPT

## 2025-02-19 PROCEDURE — 99231 SBSQ HOSP IP/OBS SF/LOW 25: CPT | Performed by: PHYSICIAN ASSISTANT

## 2025-02-19 PROCEDURE — 36600 WITHDRAWAL OF ARTERIAL BLOOD: CPT

## 2025-02-19 PROCEDURE — 94664 DEMO&/EVAL PT USE INHALER: CPT

## 2025-02-19 PROCEDURE — 25010000002 BUMETANIDE PER 0.5 MG: Performed by: INTERNAL MEDICINE

## 2025-02-19 PROCEDURE — 85025 COMPLETE CBC W/AUTO DIFF WBC: CPT | Performed by: INTERNAL MEDICINE

## 2025-02-19 PROCEDURE — 84443 ASSAY THYROID STIM HORMONE: CPT | Performed by: INTERNAL MEDICINE

## 2025-02-19 RX ORDER — BUMETANIDE 0.25 MG/ML
4 INJECTION, SOLUTION INTRAMUSCULAR; INTRAVENOUS EVERY 8 HOURS
Status: COMPLETED | OUTPATIENT
Start: 2025-02-19 | End: 2025-02-20

## 2025-02-19 RX ORDER — ALBUTEROL SULFATE 0.83 MG/ML
2.5 SOLUTION RESPIRATORY (INHALATION) ONCE AS NEEDED
Status: DISCONTINUED | OUTPATIENT
Start: 2025-02-19 | End: 2025-02-24 | Stop reason: HOSPADM

## 2025-02-19 RX ADMIN — Medication 10 ML: at 10:00

## 2025-02-19 RX ADMIN — LEVOTHYROXINE SODIUM 50 MCG: 50 TABLET ORAL at 06:08

## 2025-02-19 RX ADMIN — HYDROCODONE BITARTRATE AND ACETAMINOPHEN 1 TABLET: 7.5; 325 TABLET ORAL at 16:41

## 2025-02-19 RX ADMIN — BUPROPION HYDROCHLORIDE 150 MG: 150 TABLET, EXTENDED RELEASE ORAL at 08:28

## 2025-02-19 RX ADMIN — MORPHINE SULFATE 2 MG: 2 INJECTION, SOLUTION INTRAMUSCULAR; INTRAVENOUS at 08:28

## 2025-02-19 RX ADMIN — BUMETANIDE 4 MG: 0.25 INJECTION INTRAMUSCULAR; INTRAVENOUS at 16:43

## 2025-02-19 RX ADMIN — BUDESONIDE 0.5 MG: 0.5 INHALANT RESPIRATORY (INHALATION) at 20:23

## 2025-02-19 RX ADMIN — IPRATROPIUM BROMIDE AND ALBUTEROL SULFATE 1.5 ML: 2.5; .5 SOLUTION RESPIRATORY (INHALATION) at 20:26

## 2025-02-19 RX ADMIN — WARFARIN SODIUM 2.5 MG: 2.5 TABLET ORAL at 17:40

## 2025-02-19 RX ADMIN — MORPHINE SULFATE 2 MG: 2 INJECTION, SOLUTION INTRAMUSCULAR; INTRAVENOUS at 13:15

## 2025-02-19 RX ADMIN — BUDESONIDE 0.5 MG: 0.5 INHALANT RESPIRATORY (INHALATION) at 07:46

## 2025-02-19 RX ADMIN — BUMETANIDE 4 MG: 0.25 INJECTION INTRAMUSCULAR; INTRAVENOUS at 08:28

## 2025-02-19 RX ADMIN — MORPHINE SULFATE 2 MG: 2 INJECTION, SOLUTION INTRAMUSCULAR; INTRAVENOUS at 02:19

## 2025-02-19 RX ADMIN — BUMETANIDE 4 MG: 0.25 INJECTION INTRAMUSCULAR; INTRAVENOUS at 00:14

## 2025-02-19 RX ADMIN — NYSTATIN: 100000 POWDER TOPICAL at 18:00

## 2025-02-19 RX ADMIN — Medication 10 ML: at 20:38

## 2025-02-19 RX ADMIN — ACETAMINOPHEN 325MG 650 MG: 325 TABLET ORAL at 10:00

## 2025-02-19 RX ADMIN — SPIRONOLACTONE 25 MG: 25 TABLET ORAL at 08:28

## 2025-02-19 RX ADMIN — NYSTATIN: 100000 POWDER TOPICAL at 09:00

## 2025-02-19 RX ADMIN — HYDROCODONE BITARTRATE AND ACETAMINOPHEN 1 TABLET: 7.5; 325 TABLET ORAL at 06:08

## 2025-02-19 RX ADMIN — BUMETANIDE 4 MG: 0.25 INJECTION INTRAMUSCULAR; INTRAVENOUS at 23:53

## 2025-02-19 RX ADMIN — IPRATROPIUM BROMIDE AND ALBUTEROL SULFATE 1.5 ML: 2.5; .5 SOLUTION RESPIRATORY (INHALATION) at 07:43

## 2025-02-19 RX ADMIN — PANTOPRAZOLE SODIUM 40 MG: 40 TABLET, DELAYED RELEASE ORAL at 08:28

## 2025-02-19 RX ADMIN — NYSTATIN: 100000 POWDER TOPICAL at 20:38

## 2025-02-19 NOTE — PROGRESS NOTES
[facility] Progress Note      Patient Name: Shani Phillip  : 1950  MRN: [id number]  Primary Care Physician:  Rodríguez Walker MD  Date of admission: 2025    Subjective     Interval History:   Follow-up acute on chronic kidney disease    The patient is feeling somewhat better, less shortness of breath and orthopnea, no chest pain, no nausea or vomiting, continue to have chronic lower extremity edema, no dysuria or gross hematuria.  She received diuretics yesterday but no I's and O's are reported.    Review of Systems:   As noted above    Objective     Vitals:   Temp:  [98.2 °F (36.8 °C)-98.4 °F (36.9 °C)] 98.2 °F (36.8 °C)  Heart Rate:  [75-97] 97  Resp:  [20-22] 20  BP: ()/(41-70) 126/70  Flow (L/min) (Oxygen Therapy):  [3-4] 4    Intake/Output Summary (Last 24 hours) at 2025 1010  Last data filed at 2025 0923  Gross per 24 hour   Intake 240 ml   Output --   Net 240 ml       Physical Exam:    General Appearance: alert, chronically ill, no acute distress   Skin: warm and dry  HEENT: oral mucosa normal, nonicteric sclera  Neck: Mild JVD  Lungs: Bilateral rhonchi and crackles, breathing effort not clear  Heart: Regularly irregular, normal  Abdomen: soft, nontender, nondistended  : no palpable bladder  Extremities: Chronic lower extremity edema with the brawny edema and stasis dermatitis.  Neuro: normal speech and mental status     Scheduled Meds:     budesonide, 0.5 mg, Nebulization, BID - RT  buPROPion XL, 150 mg, Oral, Daily  ferrous sulfate, 325 mg, Oral, Every Other Day  ipratropium-albuterol, 1.5 mL, Nebulization, Q6H While Awake - RT  levothyroxine, 50 mcg, Oral, QAM  nystatin, , Topical, 4x Daily  pantoprazole, 40 mg, Oral, Daily  sodium chloride, 10 mL, Intravenous, Q12H  spironolactone, 25 mg, Oral, Daily  warfarin, 1.25 mg, Oral, Once per day on   warfarin, 2.5 mg, Oral, Once per day on   Friday      IV Meds:   Pharmacy to dose warfarin,         Results Reviewed:   I have personally reviewed the results from the time of this admission to 2/19/2025 10:10 EST     Results from last 7 days   Lab Units 02/19/25 0316 02/17/25 2256 02/14/25  1323   SODIUM mmol/L 136 130* 135*   POTASSIUM mmol/L 4.4 4.5 3.9   CHLORIDE mmol/L 96* 93* 92*   CO2 mmol/L 26.0 29.4* 32.1*   BUN mg/dL 33* 33* 25*   CREATININE mg/dL 1.86* 1.76* 1.73*   CALCIUM mg/dL 8.6 8.8 9.5   BILIRUBIN mg/dL  --  0.3  --    ALK PHOS U/L  --  132*  --    ALT (SGPT) U/L  --  10  --    AST (SGOT) U/L  --  21  --    GLUCOSE mg/dL 93 94 100*       Estimated Creatinine Clearance: 22.6 mL/min (A) (by C-G formula based on SCr of 1.86 mg/dL (H)).    Results from last 7 days   Lab Units 02/19/25  0316   MAGNESIUM mg/dL 2.3   PHOSPHORUS mg/dL 2.7       Results from last 7 days   Lab Units 02/19/25  0316   URIC ACID mg/dL 13.1*       Results from last 7 days   Lab Units 02/19/25 0316 02/17/25 2256 02/14/25  1323   WBC 10*3/mm3 6.04 6.95 8.13   HEMOGLOBIN g/dL 13.3 13.8 14.9   PLATELETS 10*3/mm3 208 211 240       Results from last 7 days   Lab Units 02/19/25 0316 02/17/25 2256 02/13/25  0000   INR  2.46* 2.32* 2.30       Assessment / Plan     ASSESSMENT:  Mild JAIME on chronic kidney disease stage IIIb associated with cardiorenal syndrome creatinine remains stable, uric acid 13.1, electrolyte within acceptable range continue to have hypervolemic  Chronic kidney disease stage IIIb secondary to nephrosclerosis.  Acute on chronic diastolic congestive heart failure  Chronic A-fib, chronically anticoagulated  COPD  Chronic depression  Severe hyperuricemia associate with decreased effective intravascular    PLAN:  Diurese again with IV bumetanide  Monitor for diuretic toxicity  Surveillance labs  Need strict I's and O's    I reviewed the chart and other providers notes, reviewed labs.  I discussed the case with the patient and she voiced good  understanding.Copied text in this note has been reviewed and is accurate as of 02/19/25.         Thank you for involving us in the care of Shani Phillip.  Please feel free to call with any questions.    Richard Morrell MD  02/19/25  10:10 [facility]    [facility]  [phone]    Please note that portions of this note were completed with a voice recognition program.

## 2025-02-19 NOTE — THERAPY TREATMENT NOTE
Patient Name: Shani Phillip  : 1950    MRN: [id number]                              Today's Date: 2025       Admit Date: 2025    Visit Dx: No diagnosis found.  Patient Active Problem List   Diagnosis    Influenza    Thrush, oral    COPD (chronic obstructive pulmonary disease)    Atrial fibrillation with RVR    Chronic respiratory failure with hypoxia    Endocarditis of mitral valve    Mitral valve vegetation    Chronic diastolic CHF (congestive heart failure)    Atrial fibrillation, chronic    Bilateral lower extremity edema    Intermittent lightheadedness    Depression    Chronic midline low back pain with left-sided sciatica    Nephrolithiasis    Oxygen dependent    Medicare annual wellness visit, subsequent    Osteoporosis    CKD (chronic kidney disease) stage 3, GFR 30-59 ml/min    Chronic anticoagulation    Nonrheumatic mitral valve stenosis    Chest pain, unspecified type    Chronic heart failure with preserved ejection fraction (HFpEF)    Overweight with body mass index (BMI) 25.0-29.9    Acquired hypothyroidism    Acute on chronic combined systolic and diastolic CHF (congestive heart failure)     Past Medical History:   Diagnosis Date    Acute endocarditis     Atrial fibrillation with RVR 2019    Cancer     CKD (chronic kidney disease) stage 3, GFR 30-59 ml/min     COPD (chronic obstructive pulmonary disease)     CTS (carpal tunnel syndrome)     Dizziness     Gall stones     Influenza 2019    Medicare annual wellness visit, subsequent 2021    Migraine     Osteoporosis     Renal disorder     Restless leg syndrome     Thrush, oral 2019     Past Surgical History:   Procedure Laterality Date    CARDIAC CATHETERIZATION N/A 2020    Procedure: Coronary angiography;  Surgeon: Svetlana Grayson MD;  Location: [facility] INVASIVE LOCATION;  Service: Cardiovascular    CARDIAC CATHETERIZATION N/A 2020    Procedure: Left ventriculography;  Surgeon: Svetlana Grayson MD;   Location:  [facility] INVASIVE LOCATION;  Service: Cardiovascular    CARDIAC CATHETERIZATION N/A 1/23/2020    Procedure: Left Heart Cath;  Surgeon: Svetlana Grayson MD;  Location:  [facility] INVASIVE LOCATION;  Service: Cardiovascular    CHOLECYSTECTOMY      KNEE ARTHROPLASTY Right     LAPAROSCOPIC GASTRIC BANDING        General Information       Row Name 02/19/25 0942          Physical Therapy Time and Intention    Document Type therapy note (daily note)  -PH     Mode of Treatment physical therapy  -PH       Row Name 02/19/25 0942          General Information    Existing Precautions/Restrictions fall;oxygen therapy device and L/min  2L at BL  -PH       Row Name 02/19/25 0942          Cognition    Orientation Status (Cognition) oriented x 4  -PH       Row Name 02/19/25 0942          Safety Issues/Impairments Affecting Functional Mobility    Impairments Affecting Function (Mobility) balance;endurance/activity tolerance;shortness of breath;pain  -PH     Comment, Safety Issues/Impairments (Mobility) gt belt and non skid socks donned; B foot pain when wt bearing; incr O2 from 4L to 6L for amb  -PH               User Key  (r) = Recorded By, (t) = Taken By, (c) = Cosigned By      Initials Name Provider Type    PH Isabelle Woo PTA Physical Therapist Assistant                   Mobility       Row Name 02/19/25 0943          Bed Mobility    Bed Mobility supine-sit;sit-supine  -PH     Supine-Sit [place] (Bed Mobility) supervision;contact guard  -PH     Sit-Supine [place] (Bed Mobility) minimum assist (75% patient effort);verbal cues;nonverbal cues (demo/gesture)  -PH     Assistive Device (Bed Mobility) bed rails;head of bed elevated  -PH     Comment, (Bed Mobility) incr time; pt sat w/ kyphotic posture at EOB; Assist for B LE to bed  -PH       Row Name 02/19/25 0943          Sit-Stand Transfer    Sit-Stand [place] (Transfers) contact guard;verbal cues;nonverbal cues (demo/gesture)  -PH     Assistive  Device (Sit-Stand Transfers) walker, front-wheeled  -PH       Row Name 02/19/25 0943          Gait/Stairs (Locomotion)    [place] Level (Gait) contact guard  -PH     Assistive Device (Gait) walker, front-wheeled  -PH     Distance in Feet (Gait) 30  -PH     Deviations/Abnormal Patterns (Gait) leobardo decreased;festinating/shuffling;stride length decreased;gait speed decreased  -PH     Bilateral Gait Deviations forward flexed posture;heel strike decreased  -PH     [place] Level (Stairs) unable to assess  -PH     Comment, (Gait/Stairs) slow w/ no overt LOB; activity intolerance limiting; pt reported dizziness when returning to EOB; sats at 88% at lowest although desat to 79% after seated EOB  -PH               User Key  (r) = Recorded By, (t) = Taken By, (c) = Cosigned By      Initials Name Provider Type     Isabelle Woo PTA Physical Therapist Assistant                   Obj/Interventions       Row Name 02/19/25 0945          Balance    Balance Assessment sitting static balance;standing static balance  -PH     Static Sitting Balance standby assist  -PH     Static Standing Balance contact guard  -PH     Position/Device Used, Standing Balance walker, front-wheeled  -PH               User Key  (r) = Recorded By, (t) = Taken By, (c) = Cosigned By      Initials Name Provider Type    Isabelle Andrews PTA Physical Therapist Assistant                   Goals/Plan    No documentation.                  Clinical Impression       Row Name 02/19/25 0945          Pain    Pretreatment Pain Rating 0/10 - no pain  -PH     Posttreatment Pain Rating 0/10 - no pain  -PH     Pain Management Interventions exercise or physical activity utilized;activity modification encouraged  -PH     Response to Pain Interventions activity participation with tolerable pain;activity level improved;mobility function improved  -PH     Pre/Posttreatment Pain Comment pain in B feet when wt bearing - unrated  -PH       Row Name  02/19/25 0945          Plan of Care Review    Plan of Care Reviewed With patient;spouse  -PH     Progress improving  -PH     Outcome Evaluation Pt was seen for PT tx this AM. Pt was in bed w/ spouse in room. Pt sat up to EOB w/ CGA/SV and significant incr time. Posture kyphotic when seated EOB w/ SBA. Sats in low 90's. Pt stood w/ CGA/min A. Pt amb 30' w/ CGA and use of fww. Pt sats at 88% w/ pt reporting dizziness when returning to EOB. Pt desatted to 79% after sitting. Aide in room and took BP which was WNL. Pt returned to bed w/ min A to assist B LE.  -PH       Row Name 02/19/25 0945          Vital Signs    Pre SpO2 (%) 91  -PH     O2 Delivery Pre Treatment nasal cannula  -PH     Intra SpO2 (%) 79  -PH     O2 Delivery Intra Treatment nasal cannula  -PH     O2 Delivery Post Treatment nasal cannula  -PH       Row Name 02/19/25 0945          Positioning and Restraints    Pre-Treatment Position in bed  -PH     Post Treatment Position bed  -PH     In Bed notified nsg;fowlers;call light within reach;encouraged to call for assist;exit alarm on;with family/caregiver  -PH               User Key  (r) = Recorded By, (t) = Taken By, (c) = Cosigned By      Initials Name Provider Type    PH Isabelle Woo PTA Physical Therapist Assistant                   Outcome Measures       Row Name 02/19/25 0692          How much help from another person do you currently need...    Turning from your back to your side while in flat bed without using bedrails? 3  -PH     Moving from lying on back to sitting on the side of a flat bed without bedrails? 3  -PH     Moving to and from a bed to a chair (including a wheelchair)? 3  -PH     Standing up from a chair using your arms (e.g., wheelchair, bedside chair)? 3  -PH     Climbing 3-5 steps with a railing? 2  -PH     To walk in hospital room? 3  -PH     AM-PAC 6 Clicks Score (PT) 17  -PH     Highest Level of Mobility Goal 5 --> Static standing  -PH       Row Name 02/19/25 0929           Functional Assessment    Outcome Measure Options AM-PAC 6 Clicks Basic Mobility (PT)  -               User Key  (r) = Recorded By, (t) = Taken By, (c) = Cosigned By      Initials Name Provider Type     Isabelle Woo PTA Physical Therapist Assistant                                 Physical Therapy Education       Title: PT OT SLP Therapies (Done)       Topic: Physical Therapy (Done)       Point: Mobility training (Done)       Learning Progress Summary            Patient Acceptance, E,TB, VU by  at 2/19/2025 0949    Acceptance, E, VU by  at 2/18/2025 1135                      Point: Home exercise program (Done)       Learning Progress Summary            Patient Acceptance, E, VU by  at 2/18/2025 1135                      Point: Body mechanics (Done)       Learning Progress Summary            Patient Acceptance, E,TB, VU by  at 2/19/2025 0949    Acceptance, E, VU by  at 2/18/2025 1135                      Point: Precautions (Done)       Learning Progress Summary            Patient Acceptance, E,TB, VU by  at 2/19/2025 0949    Acceptance, E, VU by  at 2/18/2025 1135                                      User Key       Initials Effective Dates Name Provider Type Discipline     06/16/21 -  Isabelle Woo PTA Physical Therapist Assistant PT     05/02/22 -  Mallorie Agustin PT Physical Therapist PT                  PT Recommendation and Plan     Progress: improving  Outcome Evaluation: Pt was seen for PT tx this AM. Pt was in bed w/ spouse in room. Pt sat up to EOB w/ CGA/SV and significant incr time. Posture kyphotic when seated EOB w/ SBA. Sats in low 90's. Pt stood w/ CGA/min A. Pt amb 30' w/ CGA and use of fww. Pt sats at 88% w/ pt reporting dizziness when returning to EOB. Pt desatted to 79% after sitting. Aide in room and took BP which was WNL. Pt returned to bed w/ min A to assist B LE.     Time Calculation:         PT Charges       Row Name 02/19/25 0949             Time  Calculation    Start Time 0916  -PH      Stop Time 0934  -PH      Time Calculation (min) 18 min  -PH      PT Received On 02/19/25  -PH      PT - Next Appointment 02/20/25  -PH         Timed Charges    53390 - PT Therapeutic Activity Minutes 18  -PH         Total Minutes    Timed Charges Total Minutes 18  -PH       Total Minutes 18  -PH                User Key  (r) = Recorded By, (t) = Taken By, (c) = Cosigned By      Initials Name Provider Type    PH Isabelle Woo PTA Physical Therapist Assistant                  Therapy Charges for Today       Code Description Service Date Service Provider Modifiers Qty    43124092827  PT THERAPEUTIC ACT EA 15 MIN 2/19/2025 Isabelle Woo PTA GP 1            PT G-Codes  Outcome Measure Options: AM-PAC 6 Clicks Basic Mobility (PT)  AM-PAC 6 Clicks Score (PT): 17  PT Discharge Summary  Anticipated Discharge Disposition (PT): home with home health, home with 24/7 care    Isabelle Woo PTA  2/19/2025

## 2025-02-19 NOTE — PLAN OF CARE
Goal Outcome Evaluation:  Plan of Care Reviewed With: patient, spouse        Progress: improving  Outcome Evaluation: Pt was seen for PT tx this AM. Pt was in bed w/ spouse in room. Pt sat up to EOB w/ CGA/SV and significant incr time. Posture kyphotic when seated EOB w/ SBA. Sats in low 90's. Pt stood w/ CGA/min A. Pt amb 30' w/ CGA and use of fww. Pt sats at 88% w/ pt reporting dizziness when returning to EOB. Pt desatted to 79% after sitting. Aide in room and took BP which was WNL. Pt returned to bed w/ min A to assist ESTIVEN LOUIS.    Anticipated Discharge Disposition (PT): home with home health, home with 24/7 care

## 2025-02-19 NOTE — THERAPY TREATMENT NOTE
Patient Name: Shani Phillip  : 1950    MRN: [id number]                              Today's Date: 2025       Admit Date: 2025    Visit Dx: No diagnosis found.  Patient Active Problem List   Diagnosis    Influenza    Thrush, oral    COPD (chronic obstructive pulmonary disease)    Atrial fibrillation with RVR    Chronic respiratory failure with hypoxia    Endocarditis of mitral valve    Mitral valve vegetation    Chronic diastolic CHF (congestive heart failure)    Atrial fibrillation, chronic    Bilateral lower extremity edema    Intermittent lightheadedness    Depression    Chronic midline low back pain with left-sided sciatica    Nephrolithiasis    Oxygen dependent    Medicare annual wellness visit, subsequent    Osteoporosis    CKD (chronic kidney disease) stage 3, GFR 30-59 ml/min    Chronic anticoagulation    Nonrheumatic mitral valve stenosis    Chest pain, unspecified type    Chronic heart failure with preserved ejection fraction (HFpEF)    Overweight with body mass index (BMI) 25.0-29.9    Acquired hypothyroidism    Acute on chronic combined systolic and diastolic CHF (congestive heart failure)     Past Medical History:   Diagnosis Date    Acute endocarditis     Atrial fibrillation with RVR 2019    Cancer     CKD (chronic kidney disease) stage 3, GFR 30-59 ml/min     COPD (chronic obstructive pulmonary disease)     CTS (carpal tunnel syndrome)     Dizziness     Gall stones     Influenza 2019    Medicare annual wellness visit, subsequent 2021    Migraine     Osteoporosis     Renal disorder     Restless leg syndrome     Thrush, oral 2019     Past Surgical History:   Procedure Laterality Date    CARDIAC CATHETERIZATION N/A 2020    Procedure: Coronary angiography;  Surgeon: Svetlana Grayson MD;  Location: [facility] INVASIVE LOCATION;  Service: Cardiovascular    CARDIAC CATHETERIZATION N/A 2020    Procedure: Left ventriculography;  Surgeon: Svetlana Grayson MD;   Location:  [facility] INVASIVE LOCATION;  Service: Cardiovascular    CARDIAC CATHETERIZATION N/A 1/23/2020    Procedure: Left Heart Cath;  Surgeon: Svetlana Grayson MD;  Location:  [facility] INVASIVE LOCATION;  Service: Cardiovascular    CHOLECYSTECTOMY      KNEE ARTHROPLASTY Right     LAPAROSCOPIC GASTRIC BANDING        General Information       Row Name 02/19/25 0942          Physical Therapy Time and Intention    Document Type therapy note (daily note)  -PH     Mode of Treatment physical therapy  -PH       Row Name 02/19/25 0942          General Information    Existing Precautions/Restrictions fall;oxygen therapy device and L/min  2L at BL  -PH       Row Name 02/19/25 0942          Cognition    Orientation Status (Cognition) oriented x 4  -PH       Row Name 02/19/25 0942          Safety Issues/Impairments Affecting Functional Mobility    Impairments Affecting Function (Mobility) balance;endurance/activity tolerance;shortness of breath;pain  -PH     Comment, Safety Issues/Impairments (Mobility) gt belt and non skid socks donned; B foot pain when wt bearing; incr O2 from 4L to 6L for amb  -PH               User Key  (r) = Recorded By, (t) = Taken By, (c) = Cosigned By      Initials Name Provider Type    PH Isabelle Woo PTA Physical Therapist Assistant                   Mobility       Row Name 02/19/25 0943          Bed Mobility    Bed Mobility supine-sit;sit-supine  -PH     Supine-Sit [place] (Bed Mobility) supervision;contact guard  -PH     Sit-Supine [place] (Bed Mobility) minimum assist (75% patient effort);verbal cues;nonverbal cues (demo/gesture)  -PH     Assistive Device (Bed Mobility) bed rails;head of bed elevated  -PH     Comment, (Bed Mobility) incr time; pt sat w/ kyphotic posture at EOB; Assist for B LE to bed  -PH       Row Name 02/19/25 0943          Sit-Stand Transfer    Sit-Stand [place] (Transfers) contact guard;verbal cues;nonverbal cues (demo/gesture)  -PH     Assistive  Device (Sit-Stand Transfers) walker, front-wheeled  -PH       Row Name 02/19/25 0943          Gait/Stairs (Locomotion)    [place] Level (Gait) contact guard  -PH     Assistive Device (Gait) walker, front-wheeled  -PH     Distance in Feet (Gait) 30  -PH     Deviations/Abnormal Patterns (Gait) leobardo decreased;festinating/shuffling;stride length decreased;gait speed decreased  -PH     Bilateral Gait Deviations forward flexed posture;heel strike decreased  -PH     [place] Level (Stairs) unable to assess  -PH     Comment, (Gait/Stairs) slow w/ no overt LOB; activity intolerance limiting; pt reported dizziness when returning to EOB; sats at 88% at lowest although desat to 79% after seated EOB  -PH               User Key  (r) = Recorded By, (t) = Taken By, (c) = Cosigned By      Initials Name Provider Type     Isabelle Woo PTA Physical Therapist Assistant                   Obj/Interventions       Row Name 02/19/25 0945          Balance    Balance Assessment sitting static balance;standing static balance  -PH     Static Sitting Balance standby assist  -PH     Static Standing Balance contact guard  -PH     Position/Device Used, Standing Balance walker, front-wheeled  -PH               User Key  (r) = Recorded By, (t) = Taken By, (c) = Cosigned By      Initials Name Provider Type    Isabelle Andrews PTA Physical Therapist Assistant                   Goals/Plan    No documentation.                  Clinical Impression       Row Name 02/19/25 0945          Pain    Pretreatment Pain Rating 0/10 - no pain  -PH     Posttreatment Pain Rating 0/10 - no pain  -PH     Pain Management Interventions exercise or physical activity utilized;activity modification encouraged  -PH     Response to Pain Interventions activity participation with tolerable pain;activity level improved;mobility function improved  -PH     Pre/Posttreatment Pain Comment pain in B feet when wt bearing - unrated  -PH       Row Name  02/19/25 0945          Plan of Care Review    Plan of Care Reviewed With patient;spouse  -PH     Progress improving  -PH     Outcome Evaluation Pt was seen for PT tx this AM. Pt was in bed w/ spouse in room. Pt sat up to EOB w/ CGA/SV and significant incr time. Posture kyphotic when seated EOB w/ SBA. Sats in low 90's. Pt stood w/ CGA/min A. Pt amb 30' w/ CGA and use of fww. Pt sats at 88% w/ pt reporting dizziness when returning to EOB. Pt desatted to 79% after sitting. Aide in room and took BP which was WNL. Pt returned to bed w/ min A to assist B LE.  -PH       Row Name 02/19/25 0945          Vital Signs    Pre SpO2 (%) 91  -PH     O2 Delivery Pre Treatment nasal cannula  -PH     Intra SpO2 (%) 79  -PH     O2 Delivery Intra Treatment nasal cannula  -PH     O2 Delivery Post Treatment nasal cannula  -PH       Row Name 02/19/25 0945          Positioning and Restraints    Pre-Treatment Position in bed  -PH     Post Treatment Position bed  -PH     In Bed notified nsg;fowlers;call light within reach;encouraged to call for assist;exit alarm on;with family/caregiver  -PH               User Key  (r) = Recorded By, (t) = Taken By, (c) = Cosigned By      Initials Name Provider Type    PH Isabelle Woo PTA Physical Therapist Assistant                   Outcome Measures       Row Name 02/19/25 8787          How much help from another person do you currently need...    Turning from your back to your side while in flat bed without using bedrails? 3  -PH     Moving from lying on back to sitting on the side of a flat bed without bedrails? 3  -PH     Moving to and from a bed to a chair (including a wheelchair)? 3  -PH     Standing up from a chair using your arms (e.g., wheelchair, bedside chair)? 3  -PH     Climbing 3-5 steps with a railing? 2  -PH     To walk in hospital room? 3  -PH     AM-PAC 6 Clicks Score (PT) 17  -PH     Highest Level of Mobility Goal 5 --> Static standing  -PH       Row Name 02/19/25 0954           Functional Assessment    Outcome Measure Options AM-PAC 6 Clicks Basic Mobility (PT)  -               User Key  (r) = Recorded By, (t) = Taken By, (c) = Cosigned By      Initials Name Provider Type     Isaeblle Woo PTA Physical Therapist Assistant                                 Physical Therapy Education       Title: PT OT SLP Therapies (Done)       Topic: Physical Therapy (Done)       Point: Mobility training (Done)       Learning Progress Summary            Patient Acceptance, E,TB, VU by  at 2/19/2025 0949    Acceptance, E, VU by  at 2/18/2025 1135                      Point: Home exercise program (Done)       Learning Progress Summary            Patient Acceptance, E, VU by  at 2/18/2025 1135                      Point: Body mechanics (Done)       Learning Progress Summary            Patient Acceptance, E,TB, VU by  at 2/19/2025 0949    Acceptance, E, VU by  at 2/18/2025 1135                      Point: Precautions (Done)       Learning Progress Summary            Patient Acceptance, E,TB, VU by  at 2/19/2025 0949    Acceptance, E, VU by  at 2/18/2025 1135                                      User Key       Initials Effective Dates Name Provider Type Discipline     06/16/21 -  Isabelle Woo PTA Physical Therapist Assistant PT     05/02/22 -  Mallorie Agustin PT Physical Therapist PT                  PT Recommendation and Plan     Progress: improving  Outcome Evaluation: Pt was seen for PT tx this AM. Pt was in bed w/ spouse in room. Pt sat up to EOB w/ CGA/SV and significant incr time. Posture kyphotic when seated EOB w/ SBA. Sats in low 90's. Pt stood w/ CGA/min A. Pt amb 30' w/ CGA and use of fww. Pt sats at 88% w/ pt reporting dizziness when returning to EOB. Pt desatted to 79% after sitting. Aide in room and took BP which was WNL. Pt returned to bed w/ min A to assist B LE.     Time Calculation:         PT Charges       Row Name 02/19/25 0949             Time  Calculation    Start Time 0903  -PH      Stop Time 0915  -PH      Time Calculation (min) 12 min  -PH      PT Received On 02/19/25  -PH      PT - Next Appointment 02/20/25  -PH         Timed Charges    52159 - PT Therapeutic Activity Minutes 12  -PH         Total Minutes    Timed Charges Total Minutes 12  -PH       Total Minutes 12  -PH                User Key  (r) = Recorded By, (t) = Taken By, (c) = Cosigned By      Initials Name Provider Type    PH Isabelle Woo PTA Physical Therapist Assistant                  Therapy Charges for Today       Code Description Service Date Service Provider Modifiers Qty    36382914796  PT THERAPEUTIC ACT EA 15 MIN 2/19/2025 Isabelle Woo PTA GP 1            PT G-Codes  Outcome Measure Options: AM-PAC 6 Clicks Basic Mobility (PT)  AM-PAC 6 Clicks Score (PT): 17  PT Discharge Summary  Anticipated Discharge Disposition (PT): home with home health, home with 24/7 care    Isabelle Woo PTA  2/19/2025

## 2025-02-19 NOTE — PROGRESS NOTES
[facility] Clinical Pharmacy Services: Warfarin Dosing/Monitoring Consult    Shani Phillip is a 74 y.o. female, estimated creatinine clearance is 22.6 mL/min (A) (by C-G formula based on SCr of 1.86 mg/dL (H)). weighing 65.5 kg (144 lb 6.4 oz).    Results from last 7 days   Lab Units 02/19/25  0316 02/17/25  2256 02/14/25  1323 02/13/25  0000   INR  2.46* 2.32*  --  2.30   HEMOGLOBIN g/dL 13.3 13.8 14.9  --    HEMATOCRIT % 41.4 39.3 43.2  --    PLATELETS 10*3/mm3 208 211 240  --      Prior to admission anticoagulation: 2.5 mg MWF and 1.25 mg all other days    Hospital Anticoagulation:  Consulting provider: Magdaleno Ray MD   Start date: [facility] med   Indication: A Fib - requiring full anticoagulation  Target INR: 2 - 3  Expected duration: life   Bridge Therapy: none    Potential food or drug interactions:   none      Education complete?/Date: N/A; home medication    Assessment/Plan:  INR remains therapeutic. Continue home dose as above.   Monitor for any signs or symptoms of bleeding  Follow up daily INRs and dose adjustments    Pharmacy will continue to follow until discharge or discontinuation of warfarin.     Madina Andersen [facility]  Clinical Pharmacist

## 2025-02-19 NOTE — PROGRESS NOTES
LOS: 2 days   Patient Care Team:  Rodríguez Walker MD as PCP - General (Internal Medicine)  Mike Atkins MD as Surgeon (General Surgery)  Hayes Briones MD as Surgeon (Cardiothoracic Surgery)  Zenia Tinajero MD as Consulting Physician (Cardiology)  Clover Zamora MD as Consulting Physician (Pulmonary Disease)  Angy Smith APRN as Nurse Practitioner (Cardiology)  Lester Garcia, RN as Ambulatory  ([facility])    Chief Complaint: Follow-up acute valvular and diastolic CHF, persistent atrial fibrillation.    Interval History: Still short of breath, although improved.  No chest pain.  I's and O's are not accurately tallied, although she did urinate fairly well.    Vital Signs:  Temp:  [98.2 °F (36.8 °C)-98.4 °F (36.9 °C)] 98.2 °F (36.8 °C)  Heart Rate:  [75-97] 97  Resp:  [20-22] 20  BP: ()/(41-70) 126/70    Intake/Output Summary (Last 24 hours) at 2/19/2025 1404  Last data filed at 2/19/2025 1100  Gross per 24 hour   Intake 240 ml   Output 800 ml   Net -560 ml       Physical Exam:   General Appearance:    No acute distress, alert and oriented x4   Lungs:     Bilateral rales at the bases.    Heart:    Irregularly irregular rhythm and normal rate. III/VI SM RUSB and LUSB, II DM LLSB.    Abdomen:     Soft, nontender, nondistended.    Extremities:   1-2+ edema of the lower extremities with chronic venous stasis changes.     Results Review:    Results from last 7 days   Lab Units 02/19/25  0316   SODIUM mmol/L 136   POTASSIUM mmol/L 4.4   CHLORIDE mmol/L 96*   CO2 mmol/L 26.0   BUN mg/dL 33*   CREATININE mg/dL 1.86*   GLUCOSE mg/dL 93   CALCIUM mg/dL 8.6         Results from last 7 days   Lab Units 02/19/25  0316   WBC 10*3/mm3 6.04   HEMOGLOBIN g/dL 13.3   HEMATOCRIT % 41.4   PLATELETS 10*3/mm3 208     Results from last 7 days   Lab Units 02/19/25  0316 02/17/25  2256 02/13/25  0000   INR  2.46* 2.32* 2.30         Results from last 7 days   Lab Units 02/19/25  0316   MAGNESIUM  mg/dL 2.3           I reviewed the patient's new clinical results.        Assessment:  1.  Acute valvular and diastolic CHF secondary to #2  2.  Calcific mitral stenosis, moderate to severe by echo  3.  Acute kidney injury with stage IIIb chronic kidney disease (cardiorenal)  4.  Persistent atrial fibrillation  5.  COPD without acute exacerbation  6.  Mild aortic stenosis by echo  7.  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  Right bundle branch block  9.  Generalized weakness  10.  Hypervolemic hyponatremia, improved    Plan:  -Appreciate Dr. Briones seeing the patient.  I spoke with him yesterday.  It is likely she is going to be too high of a risk to be an operative candidate.  However, PFTs are still pending today.  Suspect this is going to be medical management only.    -Appreciate Dr. Morrell.  Continue IV Bumex today.    -Continue to hold digoxin for now.    -Warfarin per pharmacy with goal INR 2-3.    -Continue spironolactone.    Magdaleno Ray MD  02/19/25  14:04 EST

## 2025-02-19 NOTE — PLAN OF CARE
Goal Outcome Evaluation:  Plan of Care Reviewed With: patient, spouse        Progress: no change  Outcome Evaluation: A&O, medicated for pain X1 with relief, afib on the monitor, edema noted to BLE, optifoam placed for weeping to BLE, 3LNC, plan is for diuresing, VSS, continue plan of care.

## 2025-02-19 NOTE — PROGRESS NOTES
Patient pO2 34.7 on room air x 10 minutes.  Call placed to Lucila Fuentes and PFT cancelled.  Thank you

## 2025-02-19 NOTE — PROGRESS NOTES
" LOS: 2 days   Patient Care Team:  Rodríguez Walker MD as PCP - General (Internal Medicine)  Mike Atkins MD as Surgeon (General Surgery)  Hayes Briones MD as Surgeon (Cardiothoracic Surgery)  Zenia Tinajero MD as Consulting Physician (Cardiology)  Clover Zamora MD as Consulting Physician (Pulmonary Disease)  Angy Smith APRN as Nurse Practitioner (Cardiology)  Lester Garcia, RN as Ambulatory  ([facility])    Chief Complaint: Shortness of breath      Subjective  States shortness of breath is slightly better today.     Objective     Vital Signs  Temp:  [98.2 °F (36.8 °C)-98.4 °F (36.9 °C)] 98.2 °F (36.8 °C)  Heart Rate:  [75-96] 80  Resp:  [20-22] 20  BP: ()/(41-53) 96/41  Body mass index is 27.28 kg/m².    Intake/Output Summary (Last 24 hours) at 2/19/2025 0855  Last data filed at 2/18/2025 0930  Gross per 24 hour   Intake 240 ml   Output --   Net 240 ml     No intake/output data recorded.      Wt Readings from Last 3 Encounters:   02/19/25 65.5 kg (144 lb 6.4 oz)   10/07/24 66.7 kg (147 lb)   10/02/24 66.7 kg (147 lb)       Flowsheet Rows      Flowsheet Row First Filed Value   Admission Height 154.9 cm (61\") Documented at 02/17/2025 2052   Admission Weight 65.7 kg (144 lb 12.8 oz) Documented at 02/17/2025 2052            Objective:  General Appearance:  Comfortable, ill-appearing, in no acute distress and not in pain (Sitting up in bed with family at bedside).    Vital signs: (most recent): Blood pressure 126/70, pulse 97, temperature 98.2 °F (36.8 °C), temperature source Oral, resp. rate 20, height 154.9 cm (61\"), weight 65.5 kg (144 lb 6.4 oz), SpO2 94%.  Vital signs are normal.  No fever.  (94% 4L O2).    Output: Producing urine.    Lungs:  Normal effort and normal respiratory rate.  There are wheezes.    Heart: Normal rate.  Regular rhythm.  Positive for murmur.    Abdomen: Abdomen is soft.    Extremities: There is dependent edema.    Neurological: Patient is " alert and oriented to person, place and time.    Skin:  Warm and dry.  There is ecchymosis.              Results Review:        Results from last 7 days   Lab Units 02/19/25 0316 02/17/25 2256 02/14/25  1323   WBC 10*3/mm3 6.04 6.95 8.13   HEMOGLOBIN g/dL 13.3 13.8 14.9   HEMATOCRIT % 41.4 39.3 43.2   PLATELETS 10*3/mm3 208 211 240         PT/INR:    Protime   Date Value Ref Range Status   02/19/2025 26.9 (H) 11.7 - 14.2 Seconds Final   02/17/2025 25.7 (H) 11.7 - 14.2 Seconds Final   /  INR   Date Value Ref Range Status   02/19/2025 2.46 (H) 0.90 - 1.10 Final   02/17/2025 2.32 (H) 0.90 - 1.10 Final       Results from last 7 days   Lab Units 02/19/25 0316 02/17/25 2256 02/14/25  1323   SODIUM mmol/L 136 130* 135*   POTASSIUM mmol/L 4.4 4.5 3.9   CHLORIDE mmol/L 96* 93* 92*   CO2 mmol/L 26.0 29.4* 32.1*   BUN mg/dL 33* 33* 25*   CREATININE mg/dL 1.86* 1.76* 1.73*   GLUCOSE mg/dL 93 94 100*   CALCIUM mg/dL 8.6 8.8 9.5         Scheduled Meds:  budesonide, 0.5 mg, Nebulization, BID - RT  buPROPion XL, 150 mg, Oral, Daily  ferrous sulfate, 325 mg, Oral, Every Other Day  ipratropium-albuterol, 1.5 mL, Nebulization, Q6H While Awake - RT  levothyroxine, 50 mcg, Oral, QAM  nystatin, , Topical, 4x Daily  pantoprazole, 40 mg, Oral, Daily  sodium chloride, 10 mL, Intravenous, Q12H  spironolactone, 25 mg, Oral, Daily  warfarin, 1.25 mg, Oral, Once per day on Sunday Tuesday Thursday Saturday  warfarin, 2.5 mg, Oral, Once per day on Monday Wednesday Friday        Infusions:  Pharmacy to dose warfarin,       Assessment & Plan    - Severe calcific mitral stenosis  - Acute on chronic systolic & diastolic CHF  - Chronic oxygen dependence (has been on supplemental O2 for 5 years)  - COPD  - CKD IIIb  - Atrial fibrillation (warfarin at home)  - CHF  - Chronic pain  - Frailty       Awaiting PFTs and room air ABG to assess pulmonary status before making a recommendation regarding cardiac surgery.       Kitty Jones,  PA  02/19/25  08:55 EST

## 2025-02-20 LAB
ALBUMIN SERPL-MCNC: 3.2 G/DL (ref 3.5–5.2)
ANION GAP SERPL CALCULATED.3IONS-SCNC: 12.9 MMOL/L (ref 5–15)
BASOPHILS # BLD AUTO: 0.01 10*3/MM3 (ref 0–0.2)
BASOPHILS NFR BLD AUTO: 0.1 % (ref 0–1.5)
BUN SERPL-MCNC: 29 MG/DL (ref 8–23)
BUN/CREAT SERPL: 21 (ref 7–25)
CALCIUM SPEC-SCNC: 8.4 MG/DL (ref 8.6–10.5)
CHLORIDE SERPL-SCNC: 97 MMOL/L (ref 98–107)
CO2 SERPL-SCNC: 29.1 MMOL/L (ref 22–29)
CREAT SERPL-MCNC: 1.38 MG/DL (ref 0.57–1)
DEPRECATED RDW RBC AUTO: 48.6 FL (ref 37–54)
DIGOXIN SERPL-MCNC: 1.4 NG/ML (ref 0.6–1.2)
EGFRCR SERPLBLD CKD-EPI 2021: 40 ML/MIN/1.73
EOSINOPHIL # BLD AUTO: 0.07 10*3/MM3 (ref 0–0.4)
EOSINOPHIL NFR BLD AUTO: 0.9 % (ref 0.3–6.2)
ERYTHROCYTE [DISTWIDTH] IN BLOOD BY AUTOMATED COUNT: 14.4 % (ref 12.3–15.4)
GLUCOSE SERPL-MCNC: 72 MG/DL (ref 65–99)
HCT VFR BLD AUTO: 40.6 % (ref 34–46.6)
HGB BLD-MCNC: 13.8 G/DL (ref 12–15.9)
IMM GRANULOCYTES # BLD AUTO: 0.04 10*3/MM3 (ref 0–0.05)
IMM GRANULOCYTES NFR BLD AUTO: 0.5 % (ref 0–0.5)
INR PPP: 2.31 (ref 0.9–1.1)
LYMPHOCYTES # BLD AUTO: 0.54 10*3/MM3 (ref 0.7–3.1)
LYMPHOCYTES NFR BLD AUTO: 7.2 % (ref 19.6–45.3)
MAGNESIUM SERPL-MCNC: 1.8 MG/DL (ref 1.6–2.4)
MCH RBC QN AUTO: 31.6 PG (ref 26.6–33)
MCHC RBC AUTO-ENTMCNC: 34 G/DL (ref 31.5–35.7)
MCV RBC AUTO: 92.9 FL (ref 79–97)
MONOCYTES # BLD AUTO: 0.56 10*3/MM3 (ref 0.1–0.9)
MONOCYTES NFR BLD AUTO: 7.4 % (ref 5–12)
NEUTROPHILS NFR BLD AUTO: 6.33 10*3/MM3 (ref 1.7–7)
NEUTROPHILS NFR BLD AUTO: 83.9 % (ref 42.7–76)
NRBC BLD AUTO-RTO: 0 /100 WBC (ref 0–0.2)
PHOSPHATE SERPL-MCNC: 2.8 MG/DL (ref 2.5–4.5)
PLATELET # BLD AUTO: 188 10*3/MM3 (ref 140–450)
PMV BLD AUTO: 9.4 FL (ref 6–12)
POTASSIUM SERPL-SCNC: 4 MMOL/L (ref 3.5–5.2)
PROTHROMBIN TIME: 25.6 SECONDS (ref 11.7–14.2)
RBC # BLD AUTO: 4.37 10*6/MM3 (ref 3.77–5.28)
SODIUM SERPL-SCNC: 139 MMOL/L (ref 136–145)
URATE SERPL-MCNC: 13.5 MG/DL (ref 2.4–5.7)
WBC NRBC COR # BLD AUTO: 7.55 10*3/MM3 (ref 3.4–10.8)

## 2025-02-20 PROCEDURE — 25010000002 BUMETANIDE PER 0.5 MG: Performed by: INTERNAL MEDICINE

## 2025-02-20 PROCEDURE — 25010000002 MORPHINE PER 10 MG: Performed by: INTERNAL MEDICINE

## 2025-02-20 PROCEDURE — 85610 PROTHROMBIN TIME: CPT | Performed by: INTERNAL MEDICINE

## 2025-02-20 PROCEDURE — 84550 ASSAY OF BLOOD/URIC ACID: CPT | Performed by: INTERNAL MEDICINE

## 2025-02-20 PROCEDURE — 85025 COMPLETE CBC W/AUTO DIFF WBC: CPT | Performed by: INTERNAL MEDICINE

## 2025-02-20 PROCEDURE — 83735 ASSAY OF MAGNESIUM: CPT | Performed by: INTERNAL MEDICINE

## 2025-02-20 PROCEDURE — 94799 UNLISTED PULMONARY SVC/PX: CPT

## 2025-02-20 PROCEDURE — 99232 SBSQ HOSP IP/OBS MODERATE 35: CPT | Performed by: INTERNAL MEDICINE

## 2025-02-20 PROCEDURE — 80162 ASSAY OF DIGOXIN TOTAL: CPT | Performed by: INTERNAL MEDICINE

## 2025-02-20 PROCEDURE — 80069 RENAL FUNCTION PANEL: CPT | Performed by: INTERNAL MEDICINE

## 2025-02-20 PROCEDURE — 25010000002 ONDANSETRON PER 1 MG: Performed by: INTERNAL MEDICINE

## 2025-02-20 RX ORDER — CARVEDILOL 3.12 MG/1
3.12 TABLET ORAL 2 TIMES DAILY WITH MEALS
Status: DISCONTINUED | OUTPATIENT
Start: 2025-02-20 | End: 2025-02-24 | Stop reason: HOSPADM

## 2025-02-20 RX ORDER — BUMETANIDE 0.25 MG/ML
4 INJECTION, SOLUTION INTRAMUSCULAR; INTRAVENOUS EVERY 8 HOURS
Status: COMPLETED | OUTPATIENT
Start: 2025-02-20 | End: 2025-02-21

## 2025-02-20 RX ADMIN — CARVEDILOL 3.12 MG: 3.12 TABLET, FILM COATED ORAL at 18:29

## 2025-02-20 RX ADMIN — BUDESONIDE 0.5 MG: 0.5 INHALANT RESPIRATORY (INHALATION) at 06:58

## 2025-02-20 RX ADMIN — Medication 10 ML: at 20:19

## 2025-02-20 RX ADMIN — BUMETANIDE 4 MG: 0.25 INJECTION INTRAMUSCULAR; INTRAVENOUS at 08:16

## 2025-02-20 RX ADMIN — PANTOPRAZOLE SODIUM 40 MG: 40 TABLET, DELAYED RELEASE ORAL at 08:16

## 2025-02-20 RX ADMIN — WARFARIN SODIUM 1.25 MG: 2.5 TABLET ORAL at 18:28

## 2025-02-20 RX ADMIN — LEVOTHYROXINE SODIUM 50 MCG: 50 TABLET ORAL at 08:16

## 2025-02-20 RX ADMIN — MORPHINE SULFATE 2 MG: 2 INJECTION, SOLUTION INTRAMUSCULAR; INTRAVENOUS at 16:22

## 2025-02-20 RX ADMIN — NYSTATIN: 100000 POWDER TOPICAL at 11:49

## 2025-02-20 RX ADMIN — IPRATROPIUM BROMIDE AND ALBUTEROL SULFATE 1.5 ML: 2.5; .5 SOLUTION RESPIRATORY (INHALATION) at 14:43

## 2025-02-20 RX ADMIN — NYSTATIN: 100000 POWDER TOPICAL at 20:19

## 2025-02-20 RX ADMIN — ONDANSETRON 4 MG: 2 INJECTION INTRAMUSCULAR; INTRAVENOUS at 16:31

## 2025-02-20 RX ADMIN — BUPROPION HYDROCHLORIDE 150 MG: 150 TABLET, EXTENDED RELEASE ORAL at 08:16

## 2025-02-20 RX ADMIN — HYDROCODONE BITARTRATE AND ACETAMINOPHEN 1 TABLET: 7.5; 325 TABLET ORAL at 20:19

## 2025-02-20 RX ADMIN — NYSTATIN: 100000 POWDER TOPICAL at 08:17

## 2025-02-20 RX ADMIN — HYDROCODONE BITARTRATE AND ACETAMINOPHEN 1 TABLET: 7.5; 325 TABLET ORAL at 11:49

## 2025-02-20 RX ADMIN — IPRATROPIUM BROMIDE AND ALBUTEROL SULFATE 1.5 ML: 2.5; .5 SOLUTION RESPIRATORY (INHALATION) at 06:59

## 2025-02-20 RX ADMIN — NYSTATIN: 100000 POWDER TOPICAL at 18:56

## 2025-02-20 RX ADMIN — Medication 10 ML: at 08:18

## 2025-02-20 RX ADMIN — BUDESONIDE 0.5 MG: 0.5 INHALANT RESPIRATORY (INHALATION) at 20:05

## 2025-02-20 RX ADMIN — IPRATROPIUM BROMIDE AND ALBUTEROL SULFATE 1.5 ML: 2.5; .5 SOLUTION RESPIRATORY (INHALATION) at 20:07

## 2025-02-20 RX ADMIN — MORPHINE SULFATE 2 MG: 2 INJECTION, SOLUTION INTRAMUSCULAR; INTRAVENOUS at 01:38

## 2025-02-20 RX ADMIN — BUMETANIDE 4 MG: 0.25 INJECTION INTRAMUSCULAR; INTRAVENOUS at 16:22

## 2025-02-20 RX ADMIN — SPIRONOLACTONE 25 MG: 25 TABLET ORAL at 08:16

## 2025-02-20 RX ADMIN — HYDROCODONE BITARTRATE AND ACETAMINOPHEN 1 TABLET: 7.5; 325 TABLET ORAL at 03:15

## 2025-02-20 RX ADMIN — BUMETANIDE 4 MG: 0.25 INJECTION INTRAMUSCULAR; INTRAVENOUS at 23:55

## 2025-02-20 NOTE — PROGRESS NOTES
[facility] Clinical Pharmacy Services: Clinical Pharmacy Consult - Warfarin Dosing/Monitoring    Results from last 7 days   Lab Units 02/20/25  0342 02/19/25  0316 02/17/25  2256   INR  2.31* 2.46* 2.32*     Dose Ordered: continue home dose  Comments: INR very stable at home dose, adjusting INR checks to q3days    Pharmacy will continue to follow until discharge or discontinuation of warfarin.    Son Gay [facility]  Clinical Pharmacist

## 2025-02-20 NOTE — PLAN OF CARE
Goal Outcome Evaluation:           Progress: no change  Outcome Evaluation: Pt A+Ox4, VSS, pt complaining of severe sammy leg pain throughout night, unable to move and screaming when RN or Tech moves legs/repositions patient. Pt recvd morphine x1 and norco x1, pt educated extensively on pain medication plan. Pt on 4LNC but does remove from nose frequently and seems to sat fine until she falls asleep. Bed alarm on pt, pt's spouse at bedside. Pt sammy legs weeping pt decl to be wrapped, agreeable to lotion, but pt states very painful. Pt afib on monitor. Is/Os documented as appropriate.

## 2025-02-20 NOTE — PROGRESS NOTES
[facility] Progress Note      Patient Name: Shani Phillip  : 1950  MRN: [id number]  Primary Care Physician:  Rodríguez Walker MD  Date of admission: 2025    Subjective     Interval History:   Follow-up acute on chronic kidney disease    The patient is feeling better, less short of breath, edema improving, she has good urine output, no nausea or vomiting, no dysuria or gross hematuria.    Review of Systems:   As noted above    Objective     Vitals:   Temp:  [97.6 °F (36.4 °C)-98.3 °F (36.8 °C)] 98.2 °F (36.8 °C)  Heart Rate:  [] 95  Resp:  [18-22] 18  BP: (107-131)/(58-70) 130/58  Flow (L/min) (Oxygen Therapy):  [4] 4    Intake/Output Summary (Last 24 hours) at 2025 0913  Last data filed at 2025 0800  Gross per 24 hour   Intake 720 ml   Output 1225 ml   Net -505 ml       Physical Exam:    General Appearance: alert, chronically ill, no acute distress   Skin: warm and dry  HEENT: oral mucosa normal, nonicteric sclera  Neck: Mild JVD  Lungs: Bilateral rhonchi and crackles, breathing effort not clear  Heart: Regularly irregular, normal  Abdomen: soft, nontender, nondistended  : no palpable bladder  Extremities: Chronic lower extremity edema with the brawny edema and stasis dermatitis.  She has heparin thigh edema  Neuro: normal speech and mental status     Scheduled Meds:     budesonide, 0.5 mg, Nebulization, BID - RT  buPROPion XL, 150 mg, Oral, Daily  ferrous sulfate, 325 mg, Oral, Every Other Day  ipratropium-albuterol, 1.5 mL, Nebulization, Q6H While Awake - RT  levothyroxine, 50 mcg, Oral, QAM  nystatin, , Topical, 4x Daily  pantoprazole, 40 mg, Oral, Daily  sodium chloride, 10 mL, Intravenous, Q12H  spironolactone, 25 mg, Oral, Daily  warfarin, 1.25 mg, Oral, Once per day on   warfarin, 2.5 mg, Oral, Once per day on       IV Meds:   Pharmacy to dose warfarin,         Results Reviewed:   I have  personally reviewed the results from the time of this admission to 2/20/2025 09:13 EST     Results from last 7 days   Lab Units 02/20/25 0342 02/19/25 0316 02/17/25 2256   SODIUM mmol/L 139 136 130*   POTASSIUM mmol/L 4.0 4.4 4.5   CHLORIDE mmol/L 97* 96* 93*   CO2 mmol/L 29.1* 26.0 29.4*   BUN mg/dL 29* 33* 33*   CREATININE mg/dL 1.38* 1.86* 1.76*   CALCIUM mg/dL 8.4* 8.6 8.8   BILIRUBIN mg/dL  --   --  0.3   ALK PHOS U/L  --   --  132*   ALT (SGPT) U/L  --   --  10   AST (SGOT) U/L  --   --  21   GLUCOSE mg/dL 72 93 94       Estimated Creatinine Clearance: 32.3 mL/min (A) (by C-G formula based on SCr of 1.38 mg/dL (H)).    Results from last 7 days   Lab Units 02/20/25 0342 02/19/25  0316   MAGNESIUM mg/dL 1.8 2.3   PHOSPHORUS mg/dL 2.8 2.7       Results from last 7 days   Lab Units 02/20/25 0342 02/19/25  0316   URIC ACID mg/dL 13.5* 13.1*       Results from last 7 days   Lab Units 02/20/25 0342 02/19/25  0316 02/17/25  2256 02/14/25  1323   WBC 10*3/mm3 7.55 6.04 6.95 8.13   HEMOGLOBIN g/dL 13.8 13.3 13.8 14.9   PLATELETS 10*3/mm3 188 208 211 240       Results from last 7 days   Lab Units 02/20/25 0342 02/19/25 0316 02/17/25  2256   INR  2.31* 2.46* 2.32*       Assessment / Plan     ASSESSMENT:  Mild JAIME on chronic kidney disease stage IIIb associated with cardiorenal syndrome, the creatinine is down to 1.38 improved with diuresis, uric acid 13.5, electrolyte within acceptable range continue to have hypervolemic  Chronic kidney disease stage IIIb secondary to nephrosclerosis.  Acute on chronic diastolic congestive heart failure  Chronic A-fib, chronically anticoagulated  COPD  Chronic depression  Severe hyperuricemia associate with decreased effective intravascular    PLAN:  Diurese again with IV bumetanide  Monitor for diuretic toxicity  Surveillance labs      I reviewed the chart and other providers notes, reviewed labs.  I discussed the case with the patient and her  at the bedside, also I  discussed the case with Dr. Ray   .Copied text in this note has been reviewed and is accurate as of 02/20/25.         Thank you for involving us in the care of Shani Phillip.  Please feel free to call with any questions.    Richard Morrell MD  02/20/25  09:13 [facility]    [facility]  [phone]    Please note that portions of this note were completed with a voice recognition program.   0 0

## 2025-02-20 NOTE — PROGRESS NOTES
" LOS: 3 days   Patient Care Team:  Rodríguez Walker MD as PCP - General (Internal Medicine)  Mike Atkins MD as Surgeon (General Surgery)  Hayes Briones MD as Surgeon (Cardiothoracic Surgery)  Zenia Tinajero MD as Consulting Physician (Cardiology)  Clover Zamora MD as Consulting Physician (Pulmonary Disease)  Angy Smith APRN as Nurse Practitioner (Cardiology)  Lester Garcia RN as Ambulatory  ([facility])    Chief Complaint: Shortness of breath      Subjective: Resting comfortably in bed. C/o pain in her legs. Denies SOA at rest    Vital Signs  Temp:  [97.6 °F (36.4 °C)-98.3 °F (36.8 °C)] 98.2 °F (36.8 °C)  Heart Rate:  [] 95  Resp:  [18-22] 18  BP: (107-131)/(58-69) 130/58      02/19/25  0341 02/19/25  1434 02/20/25  0536   Weight: 65.5 kg (144 lb 6.4 oz) 69.5 kg (153 lb 3.5 oz) 73.3 kg (161 lb 9.6 oz)     Body mass index is 30.53 kg/m².    Intake/Output Summary (Last 24 hours) at 2/20/2025 0948  Last data filed at 2/20/2025 0800  Gross per 24 hour   Intake 480 ml   Output 1225 ml   Net -745 ml     I/O this shift:  In: 240 [P.O.:240]  Out: -         Objective:  Vital signs: (most recent): Blood pressure 112/45, pulse 84, temperature 98 °F (36.7 °C), temperature source Oral, resp. rate 19, height 154.9 cm (61\"), weight 73.8 kg (162 lb 9.6 oz), SpO2 98%.              Physical Examination:   General appearance - alert, in no distress and chronically ill appearing  Mental status - normal mood, behavior, speech, dress, motor activity, and thought processes  Chest - no wheezes or rales, symmetric air entry, rhonchi noted bilaterally  Heart - AF rate 90's-100's, murmur  Abdomen - soft, nontender, nondistended, no masses or organomegaly  bowel sounds normal  Neurological - alert, oriented, normal speech, no focal findings or movement disorder noted, motor and sensory grossly normal bilaterally  Extremities - pedal edema 3 +, intact peripheral pulses, BLE red and scaly, warm " to the touch, painful  Skin - normal coloration and turgor, no rashes, no suspicious skin lesions noted  LESIONS NOTED: skin scaly/dry/flaky diffusely.     Results Review:      WBC WBC   Date Value Ref Range Status   02/20/2025 7.55 3.40 - 10.80 10*3/mm3 Final   02/19/2025 6.04 3.40 - 10.80 10*3/mm3 Final   02/17/2025 6.95 3.40 - 10.80 10*3/mm3 Final      HGB Hemoglobin   Date Value Ref Range Status   02/20/2025 13.8 12.0 - 15.9 g/dL Final   02/19/2025 13.3 12.0 - 15.9 g/dL Final   02/17/2025 13.8 12.0 - 15.9 g/dL Final      HCT Hematocrit   Date Value Ref Range Status   02/20/2025 40.6 34.0 - 46.6 % Final   02/19/2025 41.4 34.0 - 46.6 % Final   02/17/2025 39.3 34.0 - 46.6 % Final      Platelets Platelets   Date Value Ref Range Status   02/20/2025 188 140 - 450 10*3/mm3 Final   02/19/2025 208 140 - 450 10*3/mm3 Final   02/17/2025 211 140 - 450 10*3/mm3 Final        PT/INR:    Protime   Date Value Ref Range Status   02/20/2025 25.6 (H) 11.7 - 14.2 Seconds Final   02/19/2025 26.9 (H) 11.7 - 14.2 Seconds Final   02/17/2025 25.7 (H) 11.7 - 14.2 Seconds Final   /  INR   Date Value Ref Range Status   02/20/2025 2.31 (H) 0.90 - 1.10 Final   02/19/2025 2.46 (H) 0.90 - 1.10 Final   02/17/2025 2.32 (H) 0.90 - 1.10 Final       Sodium Sodium   Date Value Ref Range Status   02/20/2025 139 136 - 145 mmol/L Final   02/19/2025 136 136 - 145 mmol/L Final   02/17/2025 130 (L) 136 - 145 mmol/L Final      Potassium Potassium   Date Value Ref Range Status   02/20/2025 4.0 3.5 - 5.2 mmol/L Final   02/19/2025 4.4 3.5 - 5.2 mmol/L Final     Comment:     Slight hemolysis detected by analyzer. Result may be falsely elevated.   02/17/2025 4.5 3.5 - 5.2 mmol/L Final      Chloride Chloride   Date Value Ref Range Status   02/20/2025 97 (L) 98 - 107 mmol/L Final   02/19/2025 96 (L) 98 - 107 mmol/L Final   02/17/2025 93 (L) 98 - 107 mmol/L Final      Bicarbonate CO2   Date Value Ref Range Status   02/20/2025 29.1 (H) 22.0 - 29.0 mmol/L Final    02/19/2025 26.0 22.0 - 29.0 mmol/L Final   02/17/2025 29.4 (H) 22.0 - 29.0 mmol/L Final      BUN BUN   Date Value Ref Range Status   02/20/2025 29 (H) 8 - 23 mg/dL Final   02/19/2025 33 (H) 8 - 23 mg/dL Final   02/17/2025 33 (H) 8 - 23 mg/dL Final      Creatinine Creatinine   Date Value Ref Range Status   02/20/2025 1.38 (H) 0.57 - 1.00 mg/dL Final   02/19/2025 1.86 (H) 0.57 - 1.00 mg/dL Final   02/17/2025 1.76 (H) 0.57 - 1.00 mg/dL Final      Calcium Calcium   Date Value Ref Range Status   02/20/2025 8.4 (L) 8.6 - 10.5 mg/dL Final   02/19/2025 8.6 8.6 - 10.5 mg/dL Final   02/17/2025 8.8 8.6 - 10.5 mg/dL Final      Magnesium Magnesium   Date Value Ref Range Status   02/20/2025 1.8 1.6 - 2.4 mg/dL Final   02/19/2025 2.3 1.6 - 2.4 mg/dL Final          budesonide, 0.5 mg, Nebulization, BID - RT  bumetanide, 4 mg, Intravenous, Q8H  buPROPion XL, 150 mg, Oral, Daily  ferrous sulfate, 325 mg, Oral, Every Other Day  ipratropium-albuterol, 1.5 mL, Nebulization, Q6H While Awake - RT  levothyroxine, 50 mcg, Oral, QAM  nystatin, , Topical, 4x Daily  pantoprazole, 40 mg, Oral, Daily  sodium chloride, 10 mL, Intravenous, Q12H  spironolactone, 25 mg, Oral, Daily  warfarin, 1.25 mg, Oral, Once per day on Sunday Tuesday Thursday Saturday  warfarin, 2.5 mg, Oral, Once per day on Monday Wednesday Friday      Pharmacy to dose warfarin,           Acute on chronic combined systolic and diastolic CHF (congestive heart failure)      Assessment & Plan    Severe calcific mitral stenosis  Acute on chronic combined systolic & diastolic CHF  Mild JAIME on CKD IIIb secondary to cardio renal syndrome  Chronic oxygen dependence (has been on supplemental O2 for 5 years)  COPD  Atrial fibrillation (warfarin at home)  CHF  Chronic pain  Frailty   Bilateral lower extremity wounds/weeping      - Dr. Pires reviewing case. There is significant concern given patients pulmonary disease and other co-morbidities. Final recommendations to come.        GENET Sullivan  02/20/25  09:48 EST      Addendum: Patient seen and examined by Dr. Pires.  At this time, the patient is not a good candidate for surgery due to medical frailty, poor respiratory status and multiple other comorbidities. We will sign off at this time.  Please feel free to reach out with any additional questions.    Electronically signed by GENET Sullivan, 02/21/25, 1:06 PM EST.

## 2025-02-20 NOTE — SIGNIFICANT NOTE
02/20/25 1145   OTHER   Discipline physical therapist   Rehab Time/Intention   Session Not Performed other (see comments)  (pt requesting pain medicine prior to therapy. Rn notified. PT will follow up as time allows)

## 2025-02-20 NOTE — PROGRESS NOTES
LOS: 3 days   Patient Care Team:  Rodríguez Walker MD as PCP - General (Internal Medicine)  Mike Atkins MD as Surgeon (General Surgery)  Hayes Briones MD as Surgeon (Cardiothoracic Surgery)  Zenia Tinajero MD as Consulting Physician (Cardiology)  Clover Zamora MD as Consulting Physician (Pulmonary Disease)  Angy Smith APRN as Nurse Practitioner (Cardiology)  Lester Garcia, RN as Ambulatory  ([facility])    Chief Complaint: Follow-up acute valvular and diastolic CHF, persistent atrial fibrillation.    Interval History: She is improved.  She still has baseline shortness of breath, although better than on admission.  No chest pain.  She diuresed fairly well.    Vital Signs:  Temp:  [97.6 °F (36.4 °C)-98.3 °F (36.8 °C)] 98.2 °F (36.8 °C)  Heart Rate:  [] 95  Resp:  [18-22] 18  BP: (107-131)/(58-69) 130/58    Intake/Output Summary (Last 24 hours) at 2/20/2025 1325  Last data filed at 2/20/2025 0800  Gross per 24 hour   Intake 480 ml   Output 925 ml   Net -445 ml       Physical Exam:   General Appearance:    No acute distress, alert and oriented x4   Lungs:     Bilateral rales at the bases.    Heart:    Irregularly irregular rhythm and normal rate. III/VI SM RUSB and LUSB, II DM LLSB.    Abdomen:     Soft, nontender, nondistended.    Extremities:   1+ brawny edema of the lower extremities with chronic venous stasis changes.     Results Review:    Results from last 7 days   Lab Units 02/20/25  0342   SODIUM mmol/L 139   POTASSIUM mmol/L 4.0   CHLORIDE mmol/L 97*   CO2 mmol/L 29.1*   BUN mg/dL 29*   CREATININE mg/dL 1.38*   GLUCOSE mg/dL 72   CALCIUM mg/dL 8.4*         Results from last 7 days   Lab Units 02/20/25  0342   WBC 10*3/mm3 7.55   HEMOGLOBIN g/dL 13.8   HEMATOCRIT % 40.6   PLATELETS 10*3/mm3 188     Results from last 7 days   Lab Units 02/20/25  0342 02/19/25  0316 02/17/25  2256   INR  2.31* 2.46* 2.32*         Results from last 7 days   Lab Units  02/20/25  0342   MAGNESIUM mg/dL 1.8           I reviewed the patient's new clinical results.        Assessment:  1.  Acute valvular and diastolic CHF secondary to #2  2.  Calcific mitral stenosis, moderate to severe by echo  3.  Acute kidney injury with stage IIIb chronic kidney disease (cardiorenal)  4.  Persistent atrial fibrillation  5.  COPD without acute exacerbation  6.  Mild aortic stenosis by echo  7.  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  Right bundle branch block  9.  Generalized weakness  10.  Hypervolemic hyponatremia, improved    Plan:  -Appreciate Dr. Morrell.  She is clinically improving, and renal function is better.  She is getting another 3 doses of IV Bumex 4 mg.    -Continue to hold digoxin for now.  Digoxin level is 1.4.  I am going to see if she can tolerate a low-dose of carvedilol.    -Cardiovascular surgery is evaluating, although I suspect that she is going to be medical management because of high operative risk.    -Continue spironolactone.    -Warfarin per pharmacy with goal INR 2-3.      Magdaleno Ray MD  02/20/25  13:25 EST

## 2025-02-21 LAB
ALBUMIN SERPL-MCNC: 3.1 G/DL (ref 3.5–5.2)
ANION GAP SERPL CALCULATED.3IONS-SCNC: 10 MMOL/L (ref 5–15)
BASOPHILS # BLD AUTO: 0.01 10*3/MM3 (ref 0–0.2)
BASOPHILS NFR BLD AUTO: 0.2 % (ref 0–1.5)
BUN SERPL-MCNC: 25 MG/DL (ref 8–23)
BUN/CREAT SERPL: 19.7 (ref 7–25)
CALCIUM SPEC-SCNC: 8.5 MG/DL (ref 8.6–10.5)
CHLORIDE SERPL-SCNC: 96 MMOL/L (ref 98–107)
CO2 SERPL-SCNC: 33 MMOL/L (ref 22–29)
CREAT SERPL-MCNC: 1.27 MG/DL (ref 0.57–1)
DEPRECATED RDW RBC AUTO: 50.1 FL (ref 37–54)
DIGOXIN SERPL-MCNC: 1.2 NG/ML (ref 0.6–1.2)
EGFRCR SERPLBLD CKD-EPI 2021: 44.2 ML/MIN/1.73
EOSINOPHIL # BLD AUTO: 0.17 10*3/MM3 (ref 0–0.4)
EOSINOPHIL NFR BLD AUTO: 2.8 % (ref 0.3–6.2)
ERYTHROCYTE [DISTWIDTH] IN BLOOD BY AUTOMATED COUNT: 14.7 % (ref 12.3–15.4)
GLUCOSE SERPL-MCNC: 81 MG/DL (ref 65–99)
HCT VFR BLD AUTO: 39.7 % (ref 34–46.6)
HGB BLD-MCNC: 13.3 G/DL (ref 12–15.9)
IMM GRANULOCYTES # BLD AUTO: 0.03 10*3/MM3 (ref 0–0.05)
IMM GRANULOCYTES NFR BLD AUTO: 0.5 % (ref 0–0.5)
LYMPHOCYTES # BLD AUTO: 0.66 10*3/MM3 (ref 0.7–3.1)
LYMPHOCYTES NFR BLD AUTO: 10.9 % (ref 19.6–45.3)
MAGNESIUM SERPL-MCNC: 1.8 MG/DL (ref 1.6–2.4)
MCH RBC QN AUTO: 31.6 PG (ref 26.6–33)
MCHC RBC AUTO-ENTMCNC: 33.5 G/DL (ref 31.5–35.7)
MCV RBC AUTO: 94.3 FL (ref 79–97)
MONOCYTES # BLD AUTO: 0.46 10*3/MM3 (ref 0.1–0.9)
MONOCYTES NFR BLD AUTO: 7.6 % (ref 5–12)
NEUTROPHILS NFR BLD AUTO: 4.74 10*3/MM3 (ref 1.7–7)
NEUTROPHILS NFR BLD AUTO: 78 % (ref 42.7–76)
NRBC BLD AUTO-RTO: 0 /100 WBC (ref 0–0.2)
PHOSPHATE SERPL-MCNC: 2.3 MG/DL (ref 2.5–4.5)
PLATELET # BLD AUTO: 180 10*3/MM3 (ref 140–450)
PMV BLD AUTO: 9.3 FL (ref 6–12)
POTASSIUM SERPL-SCNC: 3.9 MMOL/L (ref 3.5–5.2)
RBC # BLD AUTO: 4.21 10*6/MM3 (ref 3.77–5.28)
SODIUM SERPL-SCNC: 139 MMOL/L (ref 136–145)
URATE SERPL-MCNC: 12.9 MG/DL (ref 2.4–5.7)
WBC NRBC COR # BLD AUTO: 6.07 10*3/MM3 (ref 3.4–10.8)

## 2025-02-21 PROCEDURE — 84550 ASSAY OF BLOOD/URIC ACID: CPT | Performed by: INTERNAL MEDICINE

## 2025-02-21 PROCEDURE — 63710000001 ONDANSETRON ODT 4 MG TABLET DISPERSIBLE: Performed by: INTERNAL MEDICINE

## 2025-02-21 PROCEDURE — 97530 THERAPEUTIC ACTIVITIES: CPT

## 2025-02-21 PROCEDURE — 25010000002 MORPHINE PER 10 MG: Performed by: INTERNAL MEDICINE

## 2025-02-21 PROCEDURE — 25010000002 BUMETANIDE PER 0.5 MG: Performed by: INTERNAL MEDICINE

## 2025-02-21 PROCEDURE — 83735 ASSAY OF MAGNESIUM: CPT | Performed by: INTERNAL MEDICINE

## 2025-02-21 PROCEDURE — 99232 SBSQ HOSP IP/OBS MODERATE 35: CPT | Performed by: INTERNAL MEDICINE

## 2025-02-21 PROCEDURE — 85025 COMPLETE CBC W/AUTO DIFF WBC: CPT | Performed by: INTERNAL MEDICINE

## 2025-02-21 PROCEDURE — 80069 RENAL FUNCTION PANEL: CPT | Performed by: INTERNAL MEDICINE

## 2025-02-21 PROCEDURE — 94799 UNLISTED PULMONARY SVC/PX: CPT

## 2025-02-21 PROCEDURE — 80162 ASSAY OF DIGOXIN TOTAL: CPT | Performed by: INTERNAL MEDICINE

## 2025-02-21 RX ORDER — TORSEMIDE 100 MG/1
100 TABLET ORAL
Status: DISCONTINUED | OUTPATIENT
Start: 2025-02-21 | End: 2025-02-22

## 2025-02-21 RX ADMIN — Medication 10 ML: at 08:56

## 2025-02-21 RX ADMIN — Medication 10 ML: at 20:37

## 2025-02-21 RX ADMIN — PANTOPRAZOLE SODIUM 40 MG: 40 TABLET, DELAYED RELEASE ORAL at 08:52

## 2025-02-21 RX ADMIN — IPRATROPIUM BROMIDE AND ALBUTEROL SULFATE 1.5 ML: 2.5; .5 SOLUTION RESPIRATORY (INHALATION) at 07:37

## 2025-02-21 RX ADMIN — FERROUS SULFATE TAB 325 MG (65 MG ELEMENTAL FE) 325 MG: 325 (65 FE) TAB at 08:51

## 2025-02-21 RX ADMIN — BUPROPION HYDROCHLORIDE 150 MG: 150 TABLET, EXTENDED RELEASE ORAL at 09:13

## 2025-02-21 RX ADMIN — TORSEMIDE 100 MG: 100 TABLET ORAL at 14:51

## 2025-02-21 RX ADMIN — NYSTATIN: 100000 POWDER TOPICAL at 18:37

## 2025-02-21 RX ADMIN — PETROLATUM 1 APPLICATION: 420 OINTMENT TOPICAL at 16:10

## 2025-02-21 RX ADMIN — NYSTATIN: 100000 POWDER TOPICAL at 20:35

## 2025-02-21 RX ADMIN — NYSTATIN: 100000 POWDER TOPICAL at 08:56

## 2025-02-21 RX ADMIN — NYSTATIN: 100000 POWDER TOPICAL at 16:10

## 2025-02-21 RX ADMIN — BUMETANIDE 4 MG: 0.25 INJECTION INTRAMUSCULAR; INTRAVENOUS at 09:12

## 2025-02-21 RX ADMIN — CARVEDILOL 3.12 MG: 3.12 TABLET, FILM COATED ORAL at 18:33

## 2025-02-21 RX ADMIN — MORPHINE SULFATE 2 MG: 2 INJECTION, SOLUTION INTRAMUSCULAR; INTRAVENOUS at 14:51

## 2025-02-21 RX ADMIN — BUDESONIDE 0.5 MG: 0.5 INHALANT RESPIRATORY (INHALATION) at 07:37

## 2025-02-21 RX ADMIN — PETROLATUM 1 APPLICATION: 420 OINTMENT TOPICAL at 20:37

## 2025-02-21 RX ADMIN — IPRATROPIUM BROMIDE AND ALBUTEROL SULFATE 1.5 ML: 2.5; .5 SOLUTION RESPIRATORY (INHALATION) at 14:24

## 2025-02-21 RX ADMIN — TORSEMIDE 100 MG: 100 TABLET ORAL at 18:35

## 2025-02-21 RX ADMIN — SPIRONOLACTONE 25 MG: 25 TABLET ORAL at 08:52

## 2025-02-21 RX ADMIN — CARVEDILOL 3.12 MG: 3.12 TABLET, FILM COATED ORAL at 09:13

## 2025-02-21 RX ADMIN — WARFARIN SODIUM 2.5 MG: 2.5 TABLET ORAL at 18:34

## 2025-02-21 RX ADMIN — ONDANSETRON 4 MG: 4 TABLET, ORALLY DISINTEGRATING ORAL at 10:02

## 2025-02-21 RX ADMIN — MORPHINE SULFATE 2 MG: 2 INJECTION, SOLUTION INTRAMUSCULAR; INTRAVENOUS at 08:50

## 2025-02-21 RX ADMIN — LEVOTHYROXINE SODIUM 50 MCG: 50 TABLET ORAL at 08:51

## 2025-02-21 NOTE — PLAN OF CARE
Goal Outcome Evaluation:         Pt. VSS, BP 98/74, HR 80-90's, on 5L NC. Pt. has c/o pain on leg treated per MAR. BLE dressing applied per orders. Diureses per neph. Discharge tbd. Will continue with current POC.

## 2025-02-21 NOTE — THERAPY TREATMENT NOTE
Patient Name: Shani Phillip  : 1950    MRN: [id number]                              Today's Date: 2025       Admit Date: 2025    Visit Dx: No diagnosis found.  Patient Active Problem List   Diagnosis    Influenza    Thrush, oral    COPD (chronic obstructive pulmonary disease)    Atrial fibrillation with RVR    Chronic respiratory failure with hypoxia    Endocarditis of mitral valve    Mitral valve vegetation    Chronic diastolic CHF (congestive heart failure)    Atrial fibrillation, chronic    Bilateral lower extremity edema    Intermittent lightheadedness    Depression    Chronic midline low back pain with left-sided sciatica    Nephrolithiasis    Oxygen dependent    Medicare annual wellness visit, subsequent    Osteoporosis    CKD (chronic kidney disease) stage 3, GFR 30-59 ml/min    Chronic anticoagulation    Nonrheumatic mitral valve stenosis    Chest pain, unspecified type    Chronic heart failure with preserved ejection fraction (HFpEF)    Overweight with body mass index (BMI) 25.0-29.9    Acquired hypothyroidism    Acute on chronic combined systolic and diastolic CHF (congestive heart failure)     Past Medical History:   Diagnosis Date    Acute endocarditis     Atrial fibrillation with RVR 2019    Cancer     CKD (chronic kidney disease) stage 3, GFR 30-59 ml/min     COPD (chronic obstructive pulmonary disease)     CTS (carpal tunnel syndrome)     Dizziness     Gall stones     Influenza 2019    Medicare annual wellness visit, subsequent 2021    Migraine     Osteoporosis     Renal disorder     Restless leg syndrome     Thrush, oral 2019     Past Surgical History:   Procedure Laterality Date    CARDIAC CATHETERIZATION N/A 2020    Procedure: Coronary angiography;  Surgeon: Svetlana Grayson MD;  Location: [facility] INVASIVE LOCATION;  Service: Cardiovascular    CARDIAC CATHETERIZATION N/A 2020    Procedure: Left ventriculography;  Surgeon: Svetlana Grayson MD;   Location:  [facility] INVASIVE LOCATION;  Service: Cardiovascular    CARDIAC CATHETERIZATION N/A 1/23/2020    Procedure: Left Heart Cath;  Surgeon: Svetlana Grayson MD;  Location:  [facility]ATH INVASIVE LOCATION;  Service: Cardiovascular    CHOLECYSTECTOMY      KNEE ARTHROPLASTY Right     LAPAROSCOPIC GASTRIC BANDING        General Information       Row Name 02/21/25 1026          Physical Therapy Time and Intention    Document Type therapy note (daily note)  -PH     Mode of Treatment physical therapy  -PH       Row Name 02/21/25 1026          General Information    Existing Precautions/Restrictions fall;oxygen therapy device and L/min  -PH       Row Name 02/21/25 1026          Cognition    Orientation Status (Cognition) oriented x 4  -PH       Row Name 02/21/25 1026          Safety Issues/Impairments Affecting Functional Mobility    Impairments Affecting Function (Mobility) balance;endurance/activity tolerance;shortness of breath;strength  -PH     Comment, Safety Issues/Impairments (Mobility) gt belt and non skid slippers donned; pt at BL of 2L of O2  -PH               User Key  (r) = Recorded By, (t) = Taken By, (c) = Cosigned By      Initials Name Provider Type    PH Isabelle Woo PTA Physical Therapist Assistant                   Mobility       Row Name 02/21/25 1027          Bed Mobility    Bed Mobility supine-sit  -PH     Supine-Sit Yell (Bed Mobility) contact guard;supervision  -PH     Sit-Supine Yell (Bed Mobility) minimum assist (75% patient effort);verbal cues;nonverbal cues (demo/gesture)  -PH     Assistive Device (Bed Mobility) head of bed elevated  -PH     Comment, (Bed Mobility) incr time w/ pt resting incr time at EOB after reporting dizziness  -PH       Row Name 02/21/25 1027          Sit-Stand Transfer    Sit-Stand Yell (Transfers) minimum assist (75% patient effort);verbal cues;nonverbal cues (demo/gesture)  -PH     Assistive Device (Sit-Stand Transfers) walker,  front-wheeled  -PH     Comment, (Sit-Stand Transfer) from w/ significant incr in B foot pain when standing w/ pt crying out; cues to keep B hands on .  -PH       Row Name 02/21/25 1027          Gait/Stairs (Locomotion)    Soldier Level (Gait) contact guard;verbal cues;minimum assist (75% patient effort)  -PH     Assistive Device (Gait) walker, front-wheeled  -PH     Distance in Feet (Gait) 10  -PH     Deviations/Abnormal Patterns (Gait) leobardo decreased;festinating/shuffling;gait speed decreased;stride length decreased  -PH     Bilateral Gait Deviations forward flexed posture;heel strike decreased  -PH     Soldier Level (Stairs) unable to assess  -PH     Comment, (Gait/Stairs) slow and unsteady w/ pt reporting incr in nausea. Pt returned to EOB and began vomiting w/ RN notified; pt limited by pain and nausea/vomiting  -PH               User Key  (r) = Recorded By, (t) = Taken By, (c) = Cosigned By      Initials Name Provider Type     Isabelle Woo PTA Physical Therapist Assistant                   Obj/Interventions       Row Name 02/21/25 1030          Balance    Balance Assessment sitting static balance  -PH     Static Sitting Balance independent  -PH     Static Standing Balance contact guard;verbal cues  -PH     Position/Device Used, Standing Balance walker, front-wheeled  -PH               User Key  (r) = Recorded By, (t) = Taken By, (c) = Cosigned By      Initials Name Provider Type     Isabelle Woo PTA Physical Therapist Assistant                   Goals/Plan    No documentation.                  Clinical Impression       Row Name 02/21/25 1030          Pain    Pre/Posttreatment Pain Comment B foot pain when wt bearing - unrated  -PH       Row Name 02/21/25 1030          Plan of Care Review    Plan of Care Reviewed With patient;spouse  -PH     Progress declining  -PH     Outcome Evaluation Pt was seen for PT tx this AM pt was in bed and sat up to EOB w/ CGA/SV and incr  time. Pt req a few min rest at EOB after reporting dizziness. Pt stood w/ min/mod A w/ significant incr in B foot pain and req cues to keep B hands on fww. Pt amb 10' w/ min A and use of fww. Pt reported incr nausea and returned to EOB. Pt supplied emesis bag and began vomiting. Spouse in room and voiced upset over PTA not being able to do more therapy w/ pt. Attempted to explain to spouse that pt was in significant pain and nauseous / vomiting so unable to perform more PT.RN notified of pt nausea and spouse dissatisfaction.  -PH       Row Name 02/21/25 1030          Vital Signs    O2 Delivery Pre Treatment supplemental O2  -PH     O2 Delivery Intra Treatment supplemental O2  -PH     O2 Delivery Post Treatment supplemental O2  -PH       Row Name 02/21/25 1030          Positioning and Restraints    Pre-Treatment Position in bed  -PH     Post Treatment Position bed  -PH     In Bed sitting EOB;exit alarm on;call light within reach;encouraged to call for assist;with family/caregiver;notified nsg  -PH               User Key  (r) = Recorded By, (t) = Taken By, (c) = Cosigned By      Initials Name Provider Type    PH Isabelle Woo, PTA Physical Therapist Assistant                   Outcome Measures       Row Name 02/21/25 1035          How much help from another person do you currently need...    Turning from your back to your side while in flat bed without using bedrails? 3  -PH     Moving from lying on back to sitting on the side of a flat bed without bedrails? 3  -PH     Moving to and from a bed to a chair (including a wheelchair)? 3  -PH     Standing up from a chair using your arms (e.g., wheelchair, bedside chair)? 2  -PH     Climbing 3-5 steps with a railing? 2  -PH     To walk in hospital room? 3  -PH     AM-PAC 6 Clicks Score (PT) 16  -PH     Highest Level of Mobility Goal 5 --> Static standing  -PH       Row Name 02/21/25 1035          Functional Assessment    Outcome Measure Options AM-PAC 6 Clicks Basic  Mobility (PT)  -               User Key  (r) = Recorded By, (t) = Taken By, (c) = Cosigned By      Initials Name Provider Type     Isabelle Woo PTA Physical Therapist Assistant                                 Physical Therapy Education       Title: PT OT SLP Therapies (Done)       Topic: Physical Therapy (Done)       Point: Mobility training (Done)       Learning Progress Summary            Patient Acceptance, E,TB, NR,DU by  at 2/21/2025 1035    Acceptance, E,TB, VU by  at 2/19/2025 0949    Acceptance, E, VU by  at 2/18/2025 1135                      Point: Home exercise program (Done)       Learning Progress Summary            Patient Acceptance, E,TB, NR,DU by  at 2/21/2025 1035    Acceptance, E, VU by  at 2/18/2025 1135                      Point: Body mechanics (Done)       Learning Progress Summary            Patient Acceptance, E,TB, NR,DU by  at 2/21/2025 1035    Acceptance, E,TB, VU by  at 2/19/2025 0949    Acceptance, E, VU by  at 2/18/2025 1135                      Point: Precautions (Done)       Learning Progress Summary            Patient Acceptance, E,TB, NR,DU by  at 2/21/2025 1035    Acceptance, E,TB, VU by  at 2/19/2025 0949    Acceptance, E, VU by  at 2/18/2025 1135                                      User Key       Initials Effective Dates Name Provider Type [facility] 06/16/21 -  Isabelle Woo PTA Physical Therapist Assistant PT     05/02/22 -  Mallorie Agustin PT Physical Therapist PT                  PT Recommendation and Plan     Progress: declining  Outcome Evaluation: Pt was seen for PT tx this AM pt was in bed and sat up to EOB w/ CGA/SV and incr time. Pt req a few min rest at EOB after reporting dizziness. Pt stood w/ min/mod A w/ significant incr in B foot pain and req cues to keep B hands on fww. Pt amb 10' w/ min A and use of fww. Pt reported incr nausea and returned to EOB. Pt supplied emesis bag and began vomiting. Spouse in  room and voiced upset over PTA not being able to do more therapy w/ pt. Attempted to explain to spouse that pt was in significant pain and nauseous / vomiting so unable to perform more PT.RN notified of pt nausea and spouse dissatisfaction.     Time Calculation:         PT Charges       Row Name 02/21/25 1035             Time Calculation    Start Time 1008  -PH      Stop Time 1023  -PH      Time Calculation (min) 15 min  -PH      PT Received On 02/21/25  -PH      PT - Next Appointment 02/24/25  -PH         Timed Charges    27409 - PT Therapeutic Activity Minutes 15  -PH         Total Minutes    Timed Charges Total Minutes 15  -PH       Total Minutes 15  -PH                User Key  (r) = Recorded By, (t) = Taken By, (c) = Cosigned By      Initials Name Provider Type    PH Isabelle Woo PTA Physical Therapist Assistant                  Therapy Charges for Today       Code Description Service Date Service Provider Modifiers Qty    22087707756 HC PT THERAPEUTIC ACT EA 15 MIN 2/21/2025 Isabelle Woo PTA GP 1            PT G-Codes  Outcome Measure Options: AM-PAC 6 Clicks Basic Mobility (PT)  AM-PAC 6 Clicks Score (PT): 16  PT Discharge Summary  Anticipated Discharge Disposition (PT): home with 24/7 care, home with home health, skilled nursing facility    Isabelle Woo PTA  2/21/2025

## 2025-02-21 NOTE — NURSING NOTE
Reason for Visit: CWCN: Consult received to see patient with skin issue on bilateral lower leg/feet. Patient alert, on oxygen, with hx of Acute valvular and diastolic CHF, Calcific mitral stenosis, Persistent atrial fibrillation and other comorbidities.  Upon assessment we could see red, swollen appearance, dry flaky skin, tender to touch towards bilateral lower leg/feet, and some weeping areas possibly related to fluid retention due to cardiovascular disorders that she presents.  In addition the heels have intact skin, red as well but blanchable.  Treatment Plan/Recommendations: Petrolatum ointment ordered to bilateral dry skin and Vaseline gauze on weeping sites.  We recommend elevating her legs for as long as possible.  Protective padding to bilateral heels is ordered to facilitated cushioning, the heels should be elevated off the bed.  Wound Team Follow up Plan: WCN will follow her according to her needs.

## 2025-02-21 NOTE — PROGRESS NOTES
[facility] Progress Note      Patient Name: Shani Phillip  : 1950  MRN: [id number]  Primary Care Physician:  Rodríguez Walker MD  Date of admission: 2025    Subjective     Interval History:   Follow-up acute on chronic kidney disease    The patient is feeling better, less short of breath, edema improving, she has good urine output, no nausea or vomiting, no dysuria or gross hematuria.  Urine output in the past 24 hours was 1150 cc with negative fluid balance since admission 1.55 L.  Review of Systems:   As noted above    Objective     Vitals:   Temp:  [97.8 °F (36.6 °C)-98 °F (36.7 °C)] 98 °F (36.7 °C)  Heart Rate:  [83-96] 84  Resp:  [18-20] 19  BP: (108-137)/(45-59) 112/45  Flow (L/min) (Oxygen Therapy):  [3-4] 4    Intake/Output Summary (Last 24 hours) at 2025 1105  Last data filed at 2025 0932  Gross per 24 hour   Intake 778 ml   Output 1150 ml   Net -372 ml       Physical Exam:    General Appearance: alert, chronically ill, no acute distress   Skin: warm and dry  HEENT: oral mucosa normal, nonicteric sclera  Neck: Mild JVD  Lungs: Bilateral rhonchi and crackles, breathing effort not clear  Heart: Regularly irregular, normal  Abdomen: soft, nontender, nondistended  : no palpable bladder  Extremities: Chronic lower extremity edema with the brawny edema and stasis dermatitis.  Her thigh edema has improved  Neuro: normal speech and mental status     Scheduled Meds:     budesonide, 0.5 mg, Nebulization, BID - RT  buPROPion XL, 150 mg, Oral, Daily  carvedilol, 3.125 mg, Oral, BID With Meals  ferrous sulfate, 325 mg, Oral, Every Other Day  ipratropium-albuterol, 1.5 mL, Nebulization, Q6H While Awake - RT  levothyroxine, 50 mcg, Oral, QAM  nystatin, , Topical, 4x Daily  pantoprazole, 40 mg, Oral, Daily  petrolatum 42 % ex oint wrapper, 1 Application, Topical, Q12H  sodium chloride, 10 mL, Intravenous, Q12H  spironolactone, 25 mg, Oral, Daily  warfarin, 1.25 mg,  Oral, Once per day on Sunday Tuesday Thursday Saturday  warfarin, 2.5 mg, Oral, Once per day on Monday Wednesday Friday      IV Meds:   Pharmacy to dose warfarin,         Results Reviewed:   I have personally reviewed the results from the time of this admission to 2/21/2025 11:05 EST     Results from last 7 days   Lab Units 02/21/25 0312 02/20/25 0342 02/19/25 0316 02/17/25 2256   SODIUM mmol/L 139 139 136 130*   POTASSIUM mmol/L 3.9 4.0 4.4 4.5   CHLORIDE mmol/L 96* 97* 96* 93*   CO2 mmol/L 33.0* 29.1* 26.0 29.4*   BUN mg/dL 25* 29* 33* 33*   CREATININE mg/dL 1.27* 1.38* 1.86* 1.76*   CALCIUM mg/dL 8.5* 8.4* 8.6 8.8   BILIRUBIN mg/dL  --   --   --  0.3   ALK PHOS U/L  --   --   --  132*   ALT (SGPT) U/L  --   --   --  10   AST (SGOT) U/L  --   --   --  21   GLUCOSE mg/dL 81 72 93 94       Estimated Creatinine Clearance: 35.2 mL/min (A) (by C-G formula based on SCr of 1.27 mg/dL (H)).    Results from last 7 days   Lab Units 02/21/25 0312 02/20/25 0342 02/19/25  0316   MAGNESIUM mg/dL 1.8 1.8 2.3   PHOSPHORUS mg/dL 2.3* 2.8 2.7       Results from last 7 days   Lab Units 02/21/25 0312 02/20/25 0342 02/19/25  0316   URIC ACID mg/dL 12.9* 13.5* 13.1*       Results from last 7 days   Lab Units 02/21/25 0312 02/20/25 0342 02/19/25 0316 02/17/25 2256 02/14/25  1323   WBC 10*3/mm3 6.07 7.55 6.04 6.95 8.13   HEMOGLOBIN g/dL 13.3 13.8 13.3 13.8 14.9   PLATELETS 10*3/mm3 180 188 208 211 240       Results from last 7 days   Lab Units 02/20/25  0342 02/19/25  0316 02/17/25  2256   INR  2.31* 2.46* 2.32*       Assessment / Plan     ASSESSMENT:  Mild JAIME on chronic kidney disease stage IIIb associated with cardiorenal syndrome, the creatinine is down to 1.27 improved with diuresis, uric acid is down to 12.9, electrolyte within acceptable range continue to have hypervolemic  Chronic kidney disease stage IIIb secondary to nephrosclerosis.  Acute on chronic diastolic congestive heart failure  Chronic A-fib, chronically  anticoagulated  COPD  Chronic depression  Severe hyperuricemia associate with decreased effective intravascular    PLAN:  Transition to oral diuretics will give torsemide 100 mg twice daily  Monitor for diuretic toxicity  Surveillance labs      I reviewed the chart and other providers notes, reviewed labs.  I discussed the case with the patient and her  at the bedside, also I discussed the case with Dr. Ray   .Copied text in this note has been reviewed and is accurate as of 02/21/25.         Thank you for involving us in the care of Shani Phillip.  Please feel free to call with any questions.    Richard Morrell MD  02/21/25  11:05 [facility]    [facility]  [phone]    Please note that portions of this note were completed with a voice recognition program.

## 2025-02-21 NOTE — PROGRESS NOTES
LOS: 4 days   Patient Care Team:  Rodríguez Walker MD as PCP - General (Internal Medicine)  Mike Atkins MD as Surgeon (General Surgery)  Hayes Briones MD as Surgeon (Cardiothoracic Surgery)  Zenia Tinajero MD as Consulting Physician (Cardiology)  Clover Zamora MD as Consulting Physician (Pulmonary Disease)  Angy Smith APRN as Nurse Practitioner (Cardiology)  Lester Garcia, RN as Ambulatory  ([facility])    Chief Complaint: Follow-up acute valvular and diastolic CHF, persistent atrial fibrillation.    Interval History: She continues to feel better.  She has diuresed well over the last several days.  Less short of breath, although still has shortness of breath at baseline.  No chest pain.    Vital Signs:  Temp:  [97.8 °F (36.6 °C)-98.2 °F (36.8 °C)] 98.2 °F (36.8 °C)  Heart Rate:  [81-95] 85  Resp:  [18-20] 18  BP: ()/(45-74) 98/74    Intake/Output Summary (Last 24 hours) at 2/21/2025 1512  Last data filed at 2/21/2025 1337  Gross per 24 hour   Intake 588 ml   Output 1150 ml   Net -562 ml       Physical Exam:   General Appearance:    No acute distress, alert and oriented x4   Lungs:     Bilateral rhonchi.    Heart:    Irregularly irregular rhythm and normal rate. III/VI SM RUSB and LUSB, II DM LLSB.    Abdomen:     Soft, nontender, nondistended.    Extremities:   1+ brawny edema of the lower extremities with chronic venous stasis changes.     Results Review:    Results from last 7 days   Lab Units 02/21/25  0312   SODIUM mmol/L 139   POTASSIUM mmol/L 3.9   CHLORIDE mmol/L 96*   CO2 mmol/L 33.0*   BUN mg/dL 25*   CREATININE mg/dL 1.27*   GLUCOSE mg/dL 81   CALCIUM mg/dL 8.5*         Results from last 7 days   Lab Units 02/21/25  0312   WBC 10*3/mm3 6.07   HEMOGLOBIN g/dL 13.3   HEMATOCRIT % 39.7   PLATELETS 10*3/mm3 180     Results from last 7 days   Lab Units 02/20/25  0342 02/19/25  0316 02/17/25  2256   INR  2.31* 2.46* 2.32*         Results from last 7 days   Lab  Units 02/21/25  0312   MAGNESIUM mg/dL 1.8           I reviewed the patient's new clinical results.        Assessment:  1.  Acute valvular and diastolic CHF secondary to #2  2.  Calcific mitral stenosis, moderate to severe by echo  3.  Acute kidney injury with stage IIIb chronic kidney disease (cardiorenal)  4.  Persistent atrial fibrillation  5.  COPD without acute exacerbation  6.  Mild aortic stenosis by echo  7.  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  Right bundle branch block  9.  Generalized weakness  10.  Hypervolemic hyponatremia, resolved  11.  Frailty    Plan:  -Appreciate Dr. Morrell.  She is clinically improving, and renal function is better.  Diuresed with IV Bumex last several days.  Starting torsemide 100 mg twice daily today.    -Continue to hold digoxin for now.  Digoxin level is 1.2.  Now on a low-dose of carvedilol.  Could potentially try to add back the digoxin at a lower dose on an every other day or every third day basis discharge.    -Cardiovascular surgery evaluated her.  She is not a good operative candidate because of frailty and multiple medical comorbidities.  Medical management will need to be pursued.    -Continue spironolactone.    -Warfarin per pharmacy with goal INR 2-3.    Magdaleno Ray MD  02/21/25  15:12 EST

## 2025-02-21 NOTE — PLAN OF CARE
Goal Outcome Evaluation:           Progress: improving  Outcome Evaluation: Pt A+Ox4, VSS, recvd norco x1 overnight for sammy leg pain, pt on 4LNC, legs not addressed yesterday L leg showing cracking/openings, concerns for infec/cellulitis entered in WOCN consult. Spouse at bedside. Pt and spouse state she is WC bound baseline can stand/pivot but unsteady and painful. Pt decl to have legs wrapped. Pt afib on monitor. Not appropriate for a standing weight, refusing PT, documented Is/Os.

## 2025-02-21 NOTE — PROGRESS NOTES
[facility] Clinical Pharmacy Services: Warfarin Dosing/Monitoring Consult    Shani Phillip is a 74 y.o. female, estimated creatinine clearance is 35.2 mL/min (A) (by C-G formula based on SCr of 1.27 mg/dL (H)). weighing 73.8 kg (162 lb 9.6 oz).    Results from last 7 days   Lab Units 02/21/25  0312 02/20/25  0342 02/19/25  0316 02/17/25  2256 02/14/25  1323   INR   --  2.31* 2.46* 2.32*  --    HEMOGLOBIN g/dL 13.3 13.8 13.3 13.8 14.9   HEMATOCRIT % 39.7 40.6 41.4 39.3 43.2   PLATELETS 10*3/mm3 180 188 208 211 240     Prior to admission anticoagulation: Per last [facility] anticoagulation visit on 2/13, patient's warfarin dose is 2.5 mg Mon/Wed/Fri and 1.25 mg all other days of the week (12.5 mg/week).    Hospital Anticoagulation:  Consulting provider: Dr. Ray  Start date: 2/18/25  Indication: Atrial fibrillation  Target INR: 2 - 3  Expected duration: lifelong   Bridge Therapy: No      INR Assessment:  Date INR Dose   2/18 2.32 1.25 mg   2/19 2.46 2.5 mg   2/20 2.31 1.25 mg   2/21 ----      Assessment/Plan:  Patient is stable on home regimen. Continue same.  Monitor for any signs or symptoms of bleeding.  Follow up daily INRs and dose adjustments.    Pharmacy will continue to follow until discharge or discontinuation of warfarin.     Elisabeth Bassett Pharm.D., [facility]   Clinical Pharmacist   Phone Extension #[phone]

## 2025-02-22 LAB
ALBUMIN SERPL-MCNC: 3.1 G/DL (ref 3.5–5.2)
ANION GAP SERPL CALCULATED.3IONS-SCNC: 8 MMOL/L (ref 5–15)
BASOPHILS # BLD AUTO: 0.01 10*3/MM3 (ref 0–0.2)
BASOPHILS NFR BLD AUTO: 0.2 % (ref 0–1.5)
BUN SERPL-MCNC: 23 MG/DL (ref 8–23)
BUN/CREAT SERPL: 18.4 (ref 7–25)
CALCIUM SPEC-SCNC: 8.3 MG/DL (ref 8.6–10.5)
CHLORIDE SERPL-SCNC: 95 MMOL/L (ref 98–107)
CO2 SERPL-SCNC: 36 MMOL/L (ref 22–29)
CREAT SERPL-MCNC: 1.25 MG/DL (ref 0.57–1)
DEPRECATED RDW RBC AUTO: 49.4 FL (ref 37–54)
DIGOXIN SERPL-MCNC: 1.1 NG/ML (ref 0.6–1.2)
EGFRCR SERPLBLD CKD-EPI 2021: 45 ML/MIN/1.73
EOSINOPHIL # BLD AUTO: 0.29 10*3/MM3 (ref 0–0.4)
EOSINOPHIL NFR BLD AUTO: 5.1 % (ref 0.3–6.2)
ERYTHROCYTE [DISTWIDTH] IN BLOOD BY AUTOMATED COUNT: 14.4 % (ref 12.3–15.4)
GLUCOSE SERPL-MCNC: 77 MG/DL (ref 65–99)
HCT VFR BLD AUTO: 38.9 % (ref 34–46.6)
HGB BLD-MCNC: 13.1 G/DL (ref 12–15.9)
IMM GRANULOCYTES # BLD AUTO: 0.02 10*3/MM3 (ref 0–0.05)
IMM GRANULOCYTES NFR BLD AUTO: 0.4 % (ref 0–0.5)
INR PPP: 2.9 (ref 0.9–1.1)
LYMPHOCYTES # BLD AUTO: 0.91 10*3/MM3 (ref 0.7–3.1)
LYMPHOCYTES NFR BLD AUTO: 16 % (ref 19.6–45.3)
MAGNESIUM SERPL-MCNC: 1.6 MG/DL (ref 1.6–2.4)
MCH RBC QN AUTO: 31.7 PG (ref 26.6–33)
MCHC RBC AUTO-ENTMCNC: 33.7 G/DL (ref 31.5–35.7)
MCV RBC AUTO: 94.2 FL (ref 79–97)
MONOCYTES # BLD AUTO: 0.52 10*3/MM3 (ref 0.1–0.9)
MONOCYTES NFR BLD AUTO: 9.2 % (ref 5–12)
NEUTROPHILS NFR BLD AUTO: 3.92 10*3/MM3 (ref 1.7–7)
NEUTROPHILS NFR BLD AUTO: 69.1 % (ref 42.7–76)
NRBC BLD AUTO-RTO: 0 /100 WBC (ref 0–0.2)
PHOSPHATE SERPL-MCNC: 2 MG/DL (ref 2.5–4.5)
PLATELET # BLD AUTO: 180 10*3/MM3 (ref 140–450)
PMV BLD AUTO: 9.6 FL (ref 6–12)
POTASSIUM SERPL-SCNC: 3.4 MMOL/L (ref 3.5–5.2)
PROTHROMBIN TIME: 30.7 SECONDS (ref 11.7–14.2)
RBC # BLD AUTO: 4.13 10*6/MM3 (ref 3.77–5.28)
SODIUM SERPL-SCNC: 139 MMOL/L (ref 136–145)
URATE SERPL-MCNC: 13 MG/DL (ref 2.4–5.7)
WBC NRBC COR # BLD AUTO: 5.67 10*3/MM3 (ref 3.4–10.8)

## 2025-02-22 PROCEDURE — 84550 ASSAY OF BLOOD/URIC ACID: CPT | Performed by: INTERNAL MEDICINE

## 2025-02-22 PROCEDURE — 83735 ASSAY OF MAGNESIUM: CPT | Performed by: INTERNAL MEDICINE

## 2025-02-22 PROCEDURE — 80069 RENAL FUNCTION PANEL: CPT | Performed by: INTERNAL MEDICINE

## 2025-02-22 PROCEDURE — 85610 PROTHROMBIN TIME: CPT | Performed by: INTERNAL MEDICINE

## 2025-02-22 PROCEDURE — 85025 COMPLETE CBC W/AUTO DIFF WBC: CPT | Performed by: INTERNAL MEDICINE

## 2025-02-22 PROCEDURE — 25010000002 MORPHINE PER 10 MG: Performed by: INTERNAL MEDICINE

## 2025-02-22 PROCEDURE — 94799 UNLISTED PULMONARY SVC/PX: CPT

## 2025-02-22 PROCEDURE — 80162 ASSAY OF DIGOXIN TOTAL: CPT | Performed by: INTERNAL MEDICINE

## 2025-02-22 PROCEDURE — 99232 SBSQ HOSP IP/OBS MODERATE 35: CPT

## 2025-02-22 PROCEDURE — 25010000002 ONDANSETRON PER 1 MG: Performed by: INTERNAL MEDICINE

## 2025-02-22 RX ORDER — POTASSIUM CHLORIDE 1500 MG/1
40 TABLET, EXTENDED RELEASE ORAL ONCE
Status: COMPLETED | OUTPATIENT
Start: 2025-02-22 | End: 2025-02-22

## 2025-02-22 RX ADMIN — HYDROCODONE BITARTRATE AND ACETAMINOPHEN 1 TABLET: 7.5; 325 TABLET ORAL at 02:55

## 2025-02-22 RX ADMIN — PETROLATUM 1 APPLICATION: 420 OINTMENT TOPICAL at 09:00

## 2025-02-22 RX ADMIN — LEVOTHYROXINE SODIUM 50 MCG: 50 TABLET ORAL at 06:15

## 2025-02-22 RX ADMIN — ONDANSETRON 4 MG: 2 INJECTION INTRAMUSCULAR; INTRAVENOUS at 13:28

## 2025-02-22 RX ADMIN — ONDANSETRON 4 MG: 2 INJECTION INTRAMUSCULAR; INTRAVENOUS at 02:55

## 2025-02-22 RX ADMIN — NYSTATIN: 100000 POWDER TOPICAL at 18:08

## 2025-02-22 RX ADMIN — PANTOPRAZOLE SODIUM 40 MG: 40 TABLET, DELAYED RELEASE ORAL at 09:47

## 2025-02-22 RX ADMIN — BUPROPION HYDROCHLORIDE 150 MG: 150 TABLET, EXTENDED RELEASE ORAL at 09:47

## 2025-02-22 RX ADMIN — Medication 10 ML: at 09:00

## 2025-02-22 RX ADMIN — POTASSIUM CHLORIDE 40 MEQ: 1500 TABLET, EXTENDED RELEASE ORAL at 13:00

## 2025-02-22 RX ADMIN — WARFARIN SODIUM 1.25 MG: 2.5 TABLET ORAL at 17:24

## 2025-02-22 RX ADMIN — NYSTATIN: 100000 POWDER TOPICAL at 20:09

## 2025-02-22 RX ADMIN — CARVEDILOL 3.12 MG: 3.12 TABLET, FILM COATED ORAL at 09:47

## 2025-02-22 RX ADMIN — BUDESONIDE 0.5 MG: 0.5 INHALANT RESPIRATORY (INHALATION) at 07:30

## 2025-02-22 RX ADMIN — TORSEMIDE 100 MG: 100 TABLET ORAL at 09:47

## 2025-02-22 RX ADMIN — IPRATROPIUM BROMIDE AND ALBUTEROL SULFATE 1.5 ML: 2.5; .5 SOLUTION RESPIRATORY (INHALATION) at 20:40

## 2025-02-22 RX ADMIN — IPRATROPIUM BROMIDE AND ALBUTEROL SULFATE 1.5 ML: 2.5; .5 SOLUTION RESPIRATORY (INHALATION) at 07:30

## 2025-02-22 RX ADMIN — BUDESONIDE 0.5 MG: 0.5 INHALANT RESPIRATORY (INHALATION) at 20:43

## 2025-02-22 RX ADMIN — NYSTATIN: 100000 POWDER TOPICAL at 12:00

## 2025-02-22 RX ADMIN — SPIRONOLACTONE 25 MG: 25 TABLET ORAL at 09:47

## 2025-02-22 RX ADMIN — HYDROCODONE BITARTRATE AND ACETAMINOPHEN 1 TABLET: 7.5; 325 TABLET ORAL at 20:09

## 2025-02-22 RX ADMIN — CARVEDILOL 3.12 MG: 3.12 TABLET, FILM COATED ORAL at 17:24

## 2025-02-22 RX ADMIN — NYSTATIN: 100000 POWDER TOPICAL at 09:00

## 2025-02-22 RX ADMIN — IPRATROPIUM BROMIDE AND ALBUTEROL SULFATE 1.5 ML: 2.5; .5 SOLUTION RESPIRATORY (INHALATION) at 15:39

## 2025-02-22 RX ADMIN — Medication 10 ML: at 20:10

## 2025-02-22 RX ADMIN — MORPHINE SULFATE 2 MG: 2 INJECTION, SOLUTION INTRAMUSCULAR; INTRAVENOUS at 09:58

## 2025-02-22 RX ADMIN — PETROLATUM 1 APPLICATION: 420 OINTMENT TOPICAL at 20:09

## 2025-02-22 NOTE — PROGRESS NOTES
[facility] Clinical Pharmacy Services: Warfarin Dosing/Monitoring Consult    Shani Phillip is a 74 y.o. female, estimated creatinine clearance is 35.7 mL/min (A) (by C-G formula based on SCr of 1.25 mg/dL (H)). weighing 73.8 kg (162 lb 9.6 oz).    Results from last 7 days   Lab Units 02/22/25  0943 02/22/25  0221 02/21/25  0312 02/20/25  0342 02/19/25  0316 02/17/25  2256   INR  2.90*  --   --  2.31* 2.46* 2.32*   HEMOGLOBIN g/dL  --  13.1 13.3 13.8 13.3 13.8   HEMATOCRIT %  --  38.9 39.7 40.6 41.4 39.3   PLATELETS 10*3/mm3  --  180 180 188 208 211     Prior to admission anticoagulation: Per last [facility] anticoagulation visit on 2/13, patient's warfarin dose is 2.5 mg Mon/Wed/Fri and 1.25 mg all other days of the week (12.5 mg/week).    Hospital Anticoagulation:  Consulting provider: Dr. Ray  Start date: 2/18/25  Indication: Atrial fibrillation  Target INR: 2 - 3  Expected duration: lifelong   Bridge Therapy: No      INR Assessment:  Date INR Dose   2/19 2.46 2.5 mg   2/20 2.31 1.25 mg   2/21 ---- 2.5 mg   2/22 2.90      Assessment/Plan:  INR is currently therapeutic at 2.90, but has trended up. This could be from the normal fluctuations of her regimen. Will continue home dose, but order INR to be drawn tomorrow.  Monitor for any signs or symptoms of bleeding.  Follow up daily INRs and dose adjustments.    Pharmacy will continue to follow until discharge or discontinuation of warfarin.     Elisabeth Bassett, Pharm.D., [facility]   Clinical Pharmacist   Phone Extension #[phone]

## 2025-02-22 NOTE — PROGRESS NOTES
[facility] Progress Note      Patient Name: Shani Phillip  : 1950  MRN: [id number]  Primary Care Physician:  Rodríguez Walker MD  Date of admission: 2025    Subjective     Interval History:   Follow-up acute on chronic kidney disease    The patient is feeling better, less short of breath, edema improving, she has good urine output, no nausea or vomiting, no dysuria or gross hematuria.  Urine output in the past 24 hours was 700 cc with negative fluid balance since admission 2.087 L.  Review of Systems:   As noted above    Objective     Vitals:   Temp:  [97.5 °F (36.4 °C)-98.3 °F (36.8 °C)] 98.3 °F (36.8 °C)  Heart Rate:  [78-93] 78  Resp:  [18-19] 18  BP: ()/(49-74) 109/51  Flow (L/min) (Oxygen Therapy):  [4] 4    Intake/Output Summary (Last 24 hours) at 2025 1030  Last data filed at 2025 1900  Gross per 24 hour   Intake 50 ml   Output 700 ml   Net -650 ml       Physical Exam:    General Appearance: alert, chronically ill, no acute distress   Skin: warm and dry  HEENT: oral mucosa normal, nonicteric sclera  Neck: Mild JVD  Lungs: Bilateral rhonchi and crackles, breathing effort not clear  Heart: Regularly irregular, normal  Abdomen: soft, nontender, nondistended  : no palpable bladder  Extremities: Chronic lower extremity edema with the brawny edema and stasis dermatitis.  Improved and hip edema resolved  Neuro: normal speech and mental status     Scheduled Meds:     budesonide, 0.5 mg, Nebulization, BID - RT  buPROPion XL, 150 mg, Oral, Daily  carvedilol, 3.125 mg, Oral, BID With Meals  ferrous sulfate, 325 mg, Oral, Every Other Day  ipratropium-albuterol, 1.5 mL, Nebulization, Q6H While Awake - RT  levothyroxine, 50 mcg, Oral, QAM  nystatin, , Topical, 4x Daily  pantoprazole, 40 mg, Oral, Daily  petrolatum 42 % ex oint wrapper, 1 Application, Topical, Q12H  sodium chloride, 10 mL, Intravenous, Q12H  spironolactone, 25 mg, Oral, Daily  torsemide, 100 mg,  Oral, BID Diuretics  warfarin, 1.25 mg, Oral, Once per day on Sunday Tuesday Thursday Saturday  warfarin, 2.5 mg, Oral, Once per day on Monday Wednesday Friday      IV Meds:   Pharmacy to dose warfarin,         Results Reviewed:   I have personally reviewed the results from the time of this admission to 2/22/2025 10:30 EST     Results from last 7 days   Lab Units 02/22/25 0221 02/21/25 0312 02/20/25 0342 02/19/25 0316 02/17/25  2256   SODIUM mmol/L 139 139 139   < > 130*   POTASSIUM mmol/L 3.4* 3.9 4.0   < > 4.5   CHLORIDE mmol/L 95* 96* 97*   < > 93*   CO2 mmol/L 36.0* 33.0* 29.1*   < > 29.4*   BUN mg/dL 23 25* 29*   < > 33*   CREATININE mg/dL 1.25* 1.27* 1.38*   < > 1.76*   CALCIUM mg/dL 8.3* 8.5* 8.4*   < > 8.8   BILIRUBIN mg/dL  --   --   --   --  0.3   ALK PHOS U/L  --   --   --   --  132*   ALT (SGPT) U/L  --   --   --   --  10   AST (SGOT) U/L  --   --   --   --  21   GLUCOSE mg/dL 77 81 72   < > 94    < > = values in this interval not displayed.       Estimated Creatinine Clearance: 35.7 mL/min (A) (by C-G formula based on SCr of 1.25 mg/dL (H)).    Results from last 7 days   Lab Units 02/22/25 0221 02/21/25 0312 02/20/25 0342   MAGNESIUM mg/dL 1.6 1.8 1.8   PHOSPHORUS mg/dL 2.0* 2.3* 2.8       Results from last 7 days   Lab Units 02/22/25 0221 02/21/25 0312 02/20/25 0342 02/19/25 0316   URIC ACID mg/dL 13.0* 12.9* 13.5* 13.1*       Results from last 7 days   Lab Units 02/22/25 0221 02/21/25 0312 02/20/25 0342 02/19/25  0316 02/17/25  2256   WBC 10*3/mm3 5.67 6.07 7.55 6.04 6.95   HEMOGLOBIN g/dL 13.1 13.3 13.8 13.3 13.8   PLATELETS 10*3/mm3 180 180 188 208 211       Results from last 7 days   Lab Units 02/22/25  0943 02/20/25  0342 02/19/25 0316 02/17/25  2256   INR  2.90* 2.31* 2.46* 2.32*       Assessment / Plan     ASSESSMENT:  Mild JAIME on chronic kidney disease stage IIIb associated with cardiorenal syndrome, the creatinine is 1.25, very stable, uric acid is down to 13, potassium 3.4 and  total CO2 36, phosphorus is 2.    Chronic kidney disease stage IIIb secondary to nephrosclerosis.  Acute on chronic diastolic congestive heart failure  Chronic A-fib, chronically anticoagulated  COPD  Chronic depression  Severe hyperuricemia associate with decreased effective intravascular  Metabolic alkalosis associated with overdiuresis.    PLAN:  Hold off diuretics today  Replete potassium  Surveillance labs      I reviewed the chart and other providers notes, reviewed labs.  I discussed the case with the patient and her  at the bedside, also I discussed the case with Dr. Ray   .Copied text in this note has been reviewed and is accurate as of 02/22/25.         Thank you for involving us in the care of Shani Phillip.  Please feel free to call with any questions.    Richard Morrell MD  02/22/25  10:30 [facility]    [facility]  [phone]    Please note that portions of this note were completed with a voice recognition program.

## 2025-02-22 NOTE — PLAN OF CARE
Goal Outcome Evaluation:      Patient complain of nausea and pain , med given and effective, pt on oxygen 4 liter nasal cannula,  both leg dressing dry done,  at bedside, educate to call for assist, call light in reach, cont to monitor.

## 2025-02-22 NOTE — PROGRESS NOTES
LOS: 5 days   Patient Care Team:  Rodríguez Walker MD as PCP - General (Internal Medicine)  Mike Atkins MD as Surgeon (General Surgery)  Hayes Briones MD as Surgeon (Cardiothoracic Surgery)  Zenia Tinajero MD as Consulting Physician (Cardiology)  Clover Zamora MD as Consulting Physician (Pulmonary Disease)  Angy Smith APRN as Nurse Practitioner (Cardiology)  Lester Garcia, RN as Ambulatory  ([facility])    Chief Complaint: follow up acute valvular and diastolic CHF, persistent atrial fibrillation     Interval History: She was resting in bed on my exam. She denies chest pain or discomfort, palpitations, or shortness of breath at rest.      Vital Signs:  Temp:  [97.5 °F (36.4 °C)-98.3 °F (36.8 °C)] 98.3 °F (36.8 °C)  Heart Rate:  [78-93] 78  Resp:  [18-19] 18  BP: ()/(49-74) 109/51    Intake/Output Summary (Last 24 hours) at 2/22/2025 1113  Last data filed at 2/21/2025 1900  Gross per 24 hour   Intake 50 ml   Output 700 ml   Net -650 ml        Physical Exam  Vitals reviewed.   Constitutional:       General: She is not in acute distress.  HENT:      Head: Normocephalic.   Eyes:      Extraocular Movements: Extraocular movements intact.      Pupils: Pupils are equal, round, and reactive to light.   Cardiovascular:      Rate and Rhythm: Rhythm irregularly irregular.      Pulses: Normal pulses.      Heart sounds: S1 normal and S2 normal. Heart sounds not distant. Murmur heard.      No friction rub. No gallop. No S3 or S4 sounds.   Pulmonary:      Effort: Pulmonary effort is normal.      Breath sounds: Normal breath sounds.   Abdominal:      General: Abdomen is flat. Bowel sounds are normal.      Palpations: Abdomen is soft.      Tenderness: There is no abdominal tenderness.   Musculoskeletal:      Right lower leg: Edema present.      Left lower leg: Edema present.   Skin:     General: Skin is warm and dry.   Neurological:      General: No focal deficit present.       Mental Status: She is alert and oriented to person, place, and time.   Psychiatric:         Mood and Affect: Mood normal.         Behavior: Behavior normal.         Results Review:    Results from last 7 days   Lab Units 02/22/25  0221   SODIUM mmol/L 139   POTASSIUM mmol/L 3.4*   CHLORIDE mmol/L 95*   CO2 mmol/L 36.0*   BUN mg/dL 23   CREATININE mg/dL 1.25*   GLUCOSE mg/dL 77   CALCIUM mg/dL 8.3*         Results from last 7 days   Lab Units 02/22/25  0221   WBC 10*3/mm3 5.67   HEMOGLOBIN g/dL 13.1   HEMATOCRIT % 38.9   PLATELETS 10*3/mm3 180     Results from last 7 days   Lab Units 02/22/25  0943 02/20/25  0342 02/19/25  0316   INR  2.90* 2.31* 2.46*         Results from last 7 days   Lab Units 02/22/25  0221   MAGNESIUM mg/dL 1.6           I reviewed the patient's new clinical results.        Assessment & Plan:  Acute valvular and diastolic CHF  Secondary to #2  Nephrology is managing her diuresis, appreciate their expertise. She was transitioned to oral torsemide 100 mg twice daily yesterday. Continue spironolactone. Diuretics held per nephrology due to JAIME.   Calcific mitral stenosis  Moderate to severe by echocardiogram   She was evaluated by cardiovascular surgery who feel she is not a good operative candidate due to fraility and her multiple medical comorbidities. Continue medical management.   Acute kidney injury with stage IIIb chronic kidney disease  Cardiorenal   Persistent atrial fibrillation  Heart rate reasonable. Continue low dose carvedilol. Continue to hold digoxin, could consider every other day or every third day dosing at discharge.   Warfarin per pharmacy, goal INR 2-3.   COPD without acute exacerbation  Mild aortic stenosis by echocardiogram   Chronic mobile echodensity on mitral valve  Benign  Right bundle branch block  Generalized weakness  Hypervolemic hyponatremia  Resolved  Frailty     Sofie Richardson, APRN  02/22/25  11:13 EST

## 2025-02-22 NOTE — PLAN OF CARE
Goal Outcome Evaluation:              Outcome Evaluation: Pt A&Ox4, vss, Afib 70's-80's, 4LNC. c/o bilateral LE pain per MAR. Dressing change done per order. up to BSC x2. IV zofran given once for nausea. 1700 UOP. Will continue with current POC and update as needed.

## 2025-02-23 LAB
ALBUMIN SERPL-MCNC: 3.2 G/DL (ref 3.5–5.2)
ANION GAP SERPL CALCULATED.3IONS-SCNC: 8 MMOL/L (ref 5–15)
BUN SERPL-MCNC: 21 MG/DL (ref 8–23)
BUN/CREAT SERPL: 17.8 (ref 7–25)
CALCIUM SPEC-SCNC: 8.6 MG/DL (ref 8.6–10.5)
CHLORIDE SERPL-SCNC: 97 MMOL/L (ref 98–107)
CO2 SERPL-SCNC: 36 MMOL/L (ref 22–29)
CREAT SERPL-MCNC: 1.18 MG/DL (ref 0.57–1)
EGFRCR SERPLBLD CKD-EPI 2021: 48.3 ML/MIN/1.73
GLUCOSE SERPL-MCNC: 88 MG/DL (ref 65–99)
INR PPP: 2.92 (ref 0.9–1.1)
MAGNESIUM SERPL-MCNC: 1.6 MG/DL (ref 1.6–2.4)
PHOSPHATE SERPL-MCNC: 2 MG/DL (ref 2.5–4.5)
POTASSIUM SERPL-SCNC: 3.5 MMOL/L (ref 3.5–5.2)
PROTHROMBIN TIME: 30.8 SECONDS (ref 11.7–14.2)
SODIUM SERPL-SCNC: 141 MMOL/L (ref 136–145)
URATE SERPL-MCNC: 12.7 MG/DL (ref 2.4–5.7)

## 2025-02-23 PROCEDURE — 84550 ASSAY OF BLOOD/URIC ACID: CPT | Performed by: INTERNAL MEDICINE

## 2025-02-23 PROCEDURE — 80069 RENAL FUNCTION PANEL: CPT | Performed by: INTERNAL MEDICINE

## 2025-02-23 PROCEDURE — 94761 N-INVAS EAR/PLS OXIMETRY MLT: CPT

## 2025-02-23 PROCEDURE — 25010000002 MORPHINE PER 10 MG: Performed by: INTERNAL MEDICINE

## 2025-02-23 PROCEDURE — 83735 ASSAY OF MAGNESIUM: CPT | Performed by: INTERNAL MEDICINE

## 2025-02-23 PROCEDURE — 85610 PROTHROMBIN TIME: CPT | Performed by: INTERNAL MEDICINE

## 2025-02-23 PROCEDURE — 25010000002 ONDANSETRON PER 1 MG: Performed by: INTERNAL MEDICINE

## 2025-02-23 PROCEDURE — 94664 DEMO&/EVAL PT USE INHALER: CPT

## 2025-02-23 PROCEDURE — 94799 UNLISTED PULMONARY SVC/PX: CPT

## 2025-02-23 PROCEDURE — 99232 SBSQ HOSP IP/OBS MODERATE 35: CPT

## 2025-02-23 RX ORDER — TORSEMIDE 20 MG/1
40 TABLET ORAL
Status: DISCONTINUED | OUTPATIENT
Start: 2025-02-23 | End: 2025-02-24 | Stop reason: HOSPADM

## 2025-02-23 RX ORDER — POTASSIUM CHLORIDE 1500 MG/1
40 TABLET, EXTENDED RELEASE ORAL ONCE
Status: COMPLETED | OUTPATIENT
Start: 2025-02-23 | End: 2025-02-23

## 2025-02-23 RX ADMIN — HYDROCODONE BITARTRATE AND ACETAMINOPHEN 1 TABLET: 7.5; 325 TABLET ORAL at 21:28

## 2025-02-23 RX ADMIN — PETROLATUM 1 APPLICATION: 420 OINTMENT TOPICAL at 10:42

## 2025-02-23 RX ADMIN — LEVOTHYROXINE SODIUM 50 MCG: 50 TABLET ORAL at 06:14

## 2025-02-23 RX ADMIN — NYSTATIN: 100000 POWDER TOPICAL at 21:30

## 2025-02-23 RX ADMIN — IPRATROPIUM BROMIDE AND ALBUTEROL SULFATE 1.5 ML: 2.5; .5 SOLUTION RESPIRATORY (INHALATION) at 11:13

## 2025-02-23 RX ADMIN — CARVEDILOL 3.12 MG: 3.12 TABLET, FILM COATED ORAL at 18:15

## 2025-02-23 RX ADMIN — BUPROPION HYDROCHLORIDE 150 MG: 150 TABLET, EXTENDED RELEASE ORAL at 09:17

## 2025-02-23 RX ADMIN — Medication 10 ML: at 21:31

## 2025-02-23 RX ADMIN — CARVEDILOL 3.12 MG: 3.12 TABLET, FILM COATED ORAL at 09:17

## 2025-02-23 RX ADMIN — WARFARIN SODIUM 1.25 MG: 2.5 TABLET ORAL at 18:14

## 2025-02-23 RX ADMIN — BUDESONIDE 0.5 MG: 0.5 INHALANT RESPIRATORY (INHALATION) at 20:18

## 2025-02-23 RX ADMIN — NYSTATIN: 100000 POWDER TOPICAL at 09:00

## 2025-02-23 RX ADMIN — NYSTATIN: 100000 POWDER TOPICAL at 12:12

## 2025-02-23 RX ADMIN — Medication 10 ML: at 09:00

## 2025-02-23 RX ADMIN — TORSEMIDE 40 MG: 20 TABLET ORAL at 15:36

## 2025-02-23 RX ADMIN — BUDESONIDE 0.5 MG: 0.5 INHALANT RESPIRATORY (INHALATION) at 06:53

## 2025-02-23 RX ADMIN — IPRATROPIUM BROMIDE AND ALBUTEROL SULFATE 1.5 ML: 2.5; .5 SOLUTION RESPIRATORY (INHALATION) at 06:52

## 2025-02-23 RX ADMIN — PETROLATUM 1 APPLICATION: 420 OINTMENT TOPICAL at 21:30

## 2025-02-23 RX ADMIN — FERROUS SULFATE TAB 325 MG (65 MG ELEMENTAL FE) 325 MG: 325 (65 FE) TAB at 09:17

## 2025-02-23 RX ADMIN — IPRATROPIUM BROMIDE AND ALBUTEROL SULFATE 1.5 ML: 2.5; .5 SOLUTION RESPIRATORY (INHALATION) at 20:20

## 2025-02-23 RX ADMIN — PANTOPRAZOLE SODIUM 40 MG: 40 TABLET, DELAYED RELEASE ORAL at 09:17

## 2025-02-23 RX ADMIN — POTASSIUM CHLORIDE 40 MEQ: 1500 TABLET, EXTENDED RELEASE ORAL at 15:36

## 2025-02-23 RX ADMIN — MORPHINE SULFATE 2 MG: 2 INJECTION, SOLUTION INTRAMUSCULAR; INTRAVENOUS at 10:42

## 2025-02-23 RX ADMIN — ONDANSETRON 4 MG: 2 INJECTION INTRAMUSCULAR; INTRAVENOUS at 10:46

## 2025-02-23 RX ADMIN — NYSTATIN: 100000 POWDER TOPICAL at 18:10

## 2025-02-23 NOTE — PROGRESS NOTES
[facility] Clinical Pharmacy Services: Warfarin Dosing/Monitoring Consult    Shani Phillip is a 74 y.o. female, estimated creatinine clearance is 36.4 mL/min (A) (by C-G formula based on SCr of 1.18 mg/dL (H)). weighing 68.4 kg (150 lb 12.7 oz).    Results from last 7 days   Lab Units 02/23/25  0418 02/22/25  0943 02/22/25  0221 02/21/25  0312 02/20/25  0342 02/19/25  0316 02/17/25  2256   INR  2.92* 2.90*  --   --  2.31* 2.46* 2.32*   HEMOGLOBIN g/dL  --   --  13.1 13.3 13.8 13.3 13.8   HEMATOCRIT %  --   --  38.9 39.7 40.6 41.4 39.3   PLATELETS 10*3/mm3  --   --  180 180 188 208 211     Prior to admission anticoagulation: Per last [facility] anticoagulation visit on 2/13, patient's warfarin dose is 2.5 mg Mon/Wed/Fri and 1.25 mg all other days of the week (12.5 mg/week).    Hospital Anticoagulation:  Consulting provider: Dr. Ray  Start date: 2/18/25  Indication: Atrial fibrillation  Target INR: 2 - 3  Expected duration: lifelong   Bridge Therapy: No      INR Assessment:  Date INR Dose   2/19 2.46 2.5 mg   2/20 2.31 1.25 mg   2/21 ---- 2.5 mg   2/22 2.90 1.25 mg   2/23 2.92      Assessment/Plan:  INR is currently therapeutic, continue current regimen.  Monitor for any signs or symptoms of bleeding.  Follow up daily INRs and dose adjustments.    Pharmacy will continue to follow until discharge or discontinuation of warfarin.     Niall Trotter, Pharm.D.  Clinical Pharmacist   Phone Extension #[phone]

## 2025-02-23 NOTE — PROGRESS NOTES
LOS: 6 days   Patient Care Team:  Rodríguez Walker MD as PCP - General (Internal Medicine)  Mike Atkins MD as Surgeon (General Surgery)  Hayes Briones MD as Surgeon (Cardiothoracic Surgery)  Zenia Tinajero MD as Consulting Physician (Cardiology)  Clover Zamora MD as Consulting Physician (Pulmonary Disease)  Angy Smith APRN as Nurse Practitioner (Cardiology)  Lester Garcia, RN as Ambulatory  ([facility] [place]    Chief Complaint: follow up acute valvular and diastolic CHF, persistent atrial fibrillation     Interval History: She was resting in bed on my exam. She denies chest pain or discomfort, palpitations, or shortness of breath at rest. No acute events overnight.     Vital Signs:  Temp:  [97.5 °F (36.4 °C)-98 °F (36.7 °C)] 97.5 °F (36.4 °C)  Heart Rate:  [76-94] 79  Resp:  [18-20] 18  BP: (114-117)/() 116/100    Intake/Output Summary (Last 24 hours) at 2/23/2025 1140  Last data filed at 2/23/2025 0900  Gross per 24 hour   Intake 960 ml   Output 2300 ml   Net -1340 ml        Physical Exam  Vitals reviewed.   Constitutional:       General: She is not in acute distress.  HENT:      Head: Normocephalic.   Eyes:      Extraocular Movements: Extraocular movements intact.      Pupils: Pupils are equal, round, and reactive to light.   Cardiovascular:      Rate and Rhythm: Rhythm irregularly irregular.      Pulses: Normal pulses.      Heart sounds: S1 normal and S2 normal. Heart sounds not distant. Murmur heard.      No friction rub. No gallop. No S3 or S4 sounds.   Pulmonary:      Effort: Pulmonary effort is normal.      Breath sounds: Normal breath sounds.   Abdominal:      General: Abdomen is flat. Bowel sounds are normal.      Palpations: Abdomen is soft.      Tenderness: There is no abdominal tenderness.   Musculoskeletal:      Right lower leg: Edema present.      Left lower leg: Edema present.   Skin:     General: Skin is warm and dry.   Neurological:      General: No  focal deficit present.      Mental Status: She is alert and oriented to person, place, and time.   Psychiatric:         Mood and Affect: Mood normal.         Behavior: Behavior normal.         Results Review:    Results from last 7 days   Lab Units 02/23/25  0418   SODIUM mmol/L 141   POTASSIUM mmol/L 3.5   CHLORIDE mmol/L 97*   CO2 mmol/L 36.0*   BUN mg/dL 21   CREATININE mg/dL 1.18*   GLUCOSE mg/dL 88   CALCIUM mg/dL 8.6         Results from last 7 days   Lab Units 02/22/25  0221   WBC 10*3/mm3 5.67   HEMOGLOBIN g/dL 13.1   HEMATOCRIT % 38.9   PLATELETS 10*3/mm3 180     Results from last 7 days   Lab Units 02/23/25  0418 02/22/25  0943 02/20/25  0342   INR  2.92* 2.90* 2.31*         Results from last 7 days   Lab Units 02/23/25  0418   MAGNESIUM mg/dL 1.6           I reviewed the patient's new clinical results.        Assessment & Plan:  Acute valvular and diastolic CHF  Secondary to #2  Nephrology is managing her diuresis, appreciate their expertise. Diuretics held yesterday due to JAIME, creatinine improved today.   Calcific mitral stenosis  Moderate to severe by echocardiogram   She was evaluated by cardiovascular surgery who feel she is not a good operative candidate due to fraility and her multiple medical comorbidities. Continue medical management.   Acute kidney injury with stage IIIb chronic kidney disease  Cardiorenal   Persistent atrial fibrillation  Heart rate reasonable. Continue low dose carvedilol. Continue to hold digoxin, could consider every other day or every third day dosing at discharge.   Warfarin per pharmacy, goal INR 2-3.   COPD without acute exacerbation  Mild aortic stenosis by echocardiogram   Chronic mobile echodensity on mitral valve  Benign  Right bundle branch block  Generalized weakness  Hypervolemic hyponatremia  Resolved  Frailty     Sofie Richardson, APRN  02/23/25  11:40 EST

## 2025-02-23 NOTE — PLAN OF CARE
Goal Outcome Evaluation:         Pt resting at this time, pain med effective, no nausea, bilateral dressing done, afib on the monitor, still on oxygen 4 liter nasal cannula , educate to call for assist call light in reach cont to monitor.

## 2025-02-23 NOTE — PROGRESS NOTES
[facility] Progress Note      Patient Name: Shani Phillip  : 1950  MRN: [id number]  Primary Care Physician:  Rodríguez Walker MD  Date of admission: 2025    Subjective     Interval History:   Follow-up acute on chronic kidney disease    The patient is feeling better, she diuresed quite well yesterday 2300 cc negative fluid balance of 3.4 L since admission.  Denies any chest pain, she had improved orthopnea and PND and lower body edema..  Review of Systems:   As noted above    Objective     Vitals:   Temp:  [97.5 °F (36.4 °C)-98 °F (36.7 °C)] 97.5 °F (36.4 °C)  Heart Rate:  [76-94] 79  Resp:  [18-20] 18  BP: (114-116)/() 116/100  Flow (L/min) (Oxygen Therapy):  [4-4.5] 4    Intake/Output Summary (Last 24 hours) at 2025 1407  Last data filed at 2025 1042  Gross per 24 hour   Intake 720 ml   Output 1400 ml   Net -680 ml       Physical Exam:    General Appearance: alert, chronically ill, no acute distress   Skin: warm and dry  HEENT: oral mucosa normal, nonicteric sclera  Neck: Mild JVD  Lungs: Bilateral rhonchi and crackles, breathing effort not clear  Heart: Regularly irregular, normal  Abdomen: soft, nontender, nondistended  : no palpable bladder  Extremities: Chronic lower extremity edema with the brawny edema and stasis dermatitis.  Improved and hip edema resolved  Neuro: normal speech and mental status     Scheduled Meds:     budesonide, 0.5 mg, Nebulization, BID - RT  buPROPion XL, 150 mg, Oral, Daily  carvedilol, 3.125 mg, Oral, BID With Meals  ferrous sulfate, 325 mg, Oral, Every Other Day  ipratropium-albuterol, 1.5 mL, Nebulization, Q6H While Awake - RT  levothyroxine, 50 mcg, Oral, QAM  nystatin, , Topical, 4x Daily  pantoprazole, 40 mg, Oral, Daily  petrolatum 42 % ex oint wrapper, 1 Application, Topical, Q12H  sodium chloride, 10 mL, Intravenous, Q12H  spironolactone, 25 mg, Oral, Daily  warfarin, 1.25 mg, Oral, Once per day on   Thursday Saturday  warfarin, 2.5 mg, Oral, Once per day on Monday Wednesday Friday      IV Meds:   Pharmacy to dose warfarin,         Results Reviewed:   I have personally reviewed the results from the time of this admission to 2/23/2025 14:07 EST     Results from last 7 days   Lab Units 02/23/25 0418 02/22/25 0221 02/21/25 0312 02/19/25 0316 02/17/25 2256   SODIUM mmol/L 141 139 139   < > 130*   POTASSIUM mmol/L 3.5 3.4* 3.9   < > 4.5   CHLORIDE mmol/L 97* 95* 96*   < > 93*   CO2 mmol/L 36.0* 36.0* 33.0*   < > 29.4*   BUN mg/dL 21 23 25*   < > 33*   CREATININE mg/dL 1.18* 1.25* 1.27*   < > 1.76*   CALCIUM mg/dL 8.6 8.3* 8.5*   < > 8.8   BILIRUBIN mg/dL  --   --   --   --  0.3   ALK PHOS U/L  --   --   --   --  132*   ALT (SGPT) U/L  --   --   --   --  10   AST (SGOT) U/L  --   --   --   --  21   GLUCOSE mg/dL 88 77 81   < > 94    < > = values in this interval not displayed.       Estimated Creatinine Clearance: 36.4 mL/min (A) (by C-G formula based on SCr of 1.18 mg/dL (H)).    Results from last 7 days   Lab Units 02/23/25 0418 02/22/25 0221 02/21/25 0312   MAGNESIUM mg/dL 1.6 1.6 1.8   PHOSPHORUS mg/dL 2.0* 2.0* 2.3*       Results from last 7 days   Lab Units 02/23/25 0418 02/22/25 0221 02/21/25 0312 02/20/25 0342 02/19/25 0316   URIC ACID mg/dL 12.7* 13.0* 12.9* 13.5* 13.1*       Results from last 7 days   Lab Units 02/22/25 0221 02/21/25 0312 02/20/25 0342 02/19/25 0316 02/17/25  2256   WBC 10*3/mm3 5.67 6.07 7.55 6.04 6.95   HEMOGLOBIN g/dL 13.1 13.3 13.8 13.3 13.8   PLATELETS 10*3/mm3 180 180 188 208 211       Results from last 7 days   Lab Units 02/23/25  0418 02/22/25  0943 02/20/25  0342 02/19/25  0316 02/17/25  2256   INR  2.92* 2.90* 2.31* 2.46* 2.32*       Assessment / Plan     ASSESSMENT:  Mild JAIME on chronic kidney disease stage IIIb associated with cardiorenal syndrome, the creatinine is 1.18, very stable, uric acid is down to 12.7, potassium 3.5 and total CO2 36, phosphorus is 2.     Chronic kidney disease stage IIIb secondary to nephrosclerosis.  Acute on chronic diastolic congestive heart failure  Chronic A-fib, chronically anticoagulated  COPD  Chronic depression  Severe hyperuricemia associate with decreased effective intravascular  Metabolic alkalosis associated diuretic and COPD    PLAN:  Start the oral diuretics torsemide 40 mg twice daily and continue spironolactone  Replete potassium  Monitor for diuretic toxicity  Now will get VBG  Surveillance labs      I reviewed the chart and other providers notes, reviewed labs.  I discussed the case with the patient and her  at the bedside  Copied text in this note has been reviewed and is accurate as of 02/23/25.         Thank you for involving us in the care of Shani Phillip.  Please feel free to call with any questions.    Richard Morrell MD  02/23/25  14:07 [facility]    [facility]  [phone]    Please note that portions of this note were completed with a voice recognition program.

## 2025-02-24 ENCOUNTER — READMISSION MANAGEMENT (OUTPATIENT)
Dept: CALL CENTER | Facility: HOSPITAL | Age: 75
End: 2025-02-24
Payer: MEDICARE

## 2025-02-24 VITALS
OXYGEN SATURATION: 96 % | HEART RATE: 88 BPM | RESPIRATION RATE: 19 BRPM | DIASTOLIC BLOOD PRESSURE: 54 MMHG | BODY MASS INDEX: 28.55 KG/M2 | WEIGHT: 151.24 LBS | TEMPERATURE: 97.6 F | SYSTOLIC BLOOD PRESSURE: 117 MMHG | HEIGHT: 61 IN

## 2025-02-24 LAB
ALBUMIN SERPL-MCNC: 3.1 G/DL (ref 3.5–5.2)
ANION GAP SERPL CALCULATED.3IONS-SCNC: 12 MMOL/L (ref 5–15)
BASOPHILS # BLD AUTO: 0.02 10*3/MM3 (ref 0–0.2)
BASOPHILS NFR BLD AUTO: 0.3 % (ref 0–1.5)
BUN SERPL-MCNC: 21 MG/DL (ref 8–23)
BUN/CREAT SERPL: 17.1 (ref 7–25)
CALCIUM SPEC-SCNC: 8.7 MG/DL (ref 8.6–10.5)
CHLORIDE SERPL-SCNC: 99 MMOL/L (ref 98–107)
CO2 SERPL-SCNC: 30 MMOL/L (ref 22–29)
CREAT SERPL-MCNC: 1.23 MG/DL (ref 0.57–1)
DEPRECATED RDW RBC AUTO: 51.1 FL (ref 37–54)
EGFRCR SERPLBLD CKD-EPI 2021: 45.9 ML/MIN/1.73
EOSINOPHIL # BLD AUTO: 0.21 10*3/MM3 (ref 0–0.4)
EOSINOPHIL NFR BLD AUTO: 3.4 % (ref 0.3–6.2)
ERYTHROCYTE [DISTWIDTH] IN BLOOD BY AUTOMATED COUNT: 14.4 % (ref 12.3–15.4)
GLUCOSE SERPL-MCNC: 104 MG/DL (ref 65–99)
HCT VFR BLD AUTO: 45.4 % (ref 34–46.6)
HGB BLD-MCNC: 14.4 G/DL (ref 12–15.9)
IMM GRANULOCYTES # BLD AUTO: 0.02 10*3/MM3 (ref 0–0.05)
IMM GRANULOCYTES NFR BLD AUTO: 0.3 % (ref 0–0.5)
INR PPP: 3.1 (ref 0.9–1.1)
LYMPHOCYTES # BLD AUTO: 0.9 10*3/MM3 (ref 0.7–3.1)
LYMPHOCYTES NFR BLD AUTO: 14.5 % (ref 19.6–45.3)
MAGNESIUM SERPL-MCNC: 1.8 MG/DL (ref 1.6–2.4)
MCH RBC QN AUTO: 30.3 PG (ref 26.6–33)
MCHC RBC AUTO-ENTMCNC: 31.7 G/DL (ref 31.5–35.7)
MCV RBC AUTO: 95.6 FL (ref 79–97)
MONOCYTES # BLD AUTO: 0.49 10*3/MM3 (ref 0.1–0.9)
MONOCYTES NFR BLD AUTO: 7.9 % (ref 5–12)
NEUTROPHILS NFR BLD AUTO: 4.55 10*3/MM3 (ref 1.7–7)
NEUTROPHILS NFR BLD AUTO: 73.6 % (ref 42.7–76)
NRBC BLD AUTO-RTO: 0 /100 WBC (ref 0–0.2)
PHOSPHATE SERPL-MCNC: 2 MG/DL (ref 2.5–4.5)
PLATELET # BLD AUTO: 194 10*3/MM3 (ref 140–450)
PMV BLD AUTO: 9.9 FL (ref 6–12)
POTASSIUM SERPL-SCNC: 3.8 MMOL/L (ref 3.5–5.2)
PROTHROMBIN TIME: 32.4 SECONDS (ref 11.7–14.2)
RBC # BLD AUTO: 4.75 10*6/MM3 (ref 3.77–5.28)
SODIUM SERPL-SCNC: 141 MMOL/L (ref 136–145)
URATE SERPL-MCNC: 12.4 MG/DL (ref 2.4–5.7)
WBC NRBC COR # BLD AUTO: 6.19 10*3/MM3 (ref 3.4–10.8)

## 2025-02-24 PROCEDURE — 94799 UNLISTED PULMONARY SVC/PX: CPT

## 2025-02-24 PROCEDURE — 94760 N-INVAS EAR/PLS OXIMETRY 1: CPT

## 2025-02-24 PROCEDURE — 99239 HOSP IP/OBS DSCHRG MGMT >30: CPT | Performed by: INTERNAL MEDICINE

## 2025-02-24 PROCEDURE — 85610 PROTHROMBIN TIME: CPT | Performed by: INTERNAL MEDICINE

## 2025-02-24 PROCEDURE — 94761 N-INVAS EAR/PLS OXIMETRY MLT: CPT

## 2025-02-24 PROCEDURE — 84550 ASSAY OF BLOOD/URIC ACID: CPT | Performed by: INTERNAL MEDICINE

## 2025-02-24 PROCEDURE — 97530 THERAPEUTIC ACTIVITIES: CPT

## 2025-02-24 PROCEDURE — 83735 ASSAY OF MAGNESIUM: CPT | Performed by: INTERNAL MEDICINE

## 2025-02-24 PROCEDURE — 85025 COMPLETE CBC W/AUTO DIFF WBC: CPT | Performed by: INTERNAL MEDICINE

## 2025-02-24 PROCEDURE — 80069 RENAL FUNCTION PANEL: CPT | Performed by: INTERNAL MEDICINE

## 2025-02-24 PROCEDURE — 94664 DEMO&/EVAL PT USE INHALER: CPT

## 2025-02-24 RX ORDER — DIGOXIN 125 MCG
125 TABLET ORAL EVERY OTHER DAY
Start: 2025-02-24 | End: 2025-03-03 | Stop reason: SDUPTHER

## 2025-02-24 RX ORDER — CARVEDILOL 3.12 MG/1
3.12 TABLET ORAL 2 TIMES DAILY WITH MEALS
Qty: 60 TABLET | Refills: 1 | Status: SHIPPED | OUTPATIENT
Start: 2025-02-24 | End: 2025-03-03 | Stop reason: SDUPTHER

## 2025-02-24 RX ADMIN — TORSEMIDE 40 MG: 20 TABLET ORAL at 09:11

## 2025-02-24 RX ADMIN — BUDESONIDE 0.5 MG: 0.5 INHALANT RESPIRATORY (INHALATION) at 07:17

## 2025-02-24 RX ADMIN — CARVEDILOL 3.12 MG: 3.12 TABLET, FILM COATED ORAL at 09:11

## 2025-02-24 RX ADMIN — IPRATROPIUM BROMIDE AND ALBUTEROL SULFATE 1.5 ML: 2.5; .5 SOLUTION RESPIRATORY (INHALATION) at 07:17

## 2025-02-24 RX ADMIN — PANTOPRAZOLE SODIUM 40 MG: 40 TABLET, DELAYED RELEASE ORAL at 10:53

## 2025-02-24 RX ADMIN — SPIRONOLACTONE 25 MG: 25 TABLET ORAL at 09:12

## 2025-02-24 RX ADMIN — BUPROPION HYDROCHLORIDE 150 MG: 150 TABLET, EXTENDED RELEASE ORAL at 09:11

## 2025-02-24 RX ADMIN — LEVOTHYROXINE SODIUM 50 MCG: 50 TABLET ORAL at 06:41

## 2025-02-24 RX ADMIN — NYSTATIN: 100000 POWDER TOPICAL at 09:16

## 2025-02-24 RX ADMIN — PETROLATUM 1 APPLICATION: 420 OINTMENT TOPICAL at 09:17

## 2025-02-24 RX ADMIN — Medication 10 ML: at 09:18

## 2025-02-24 NOTE — THERAPY TREATMENT NOTE
Patient Name: Shani Phillip  : 1950    MRN: [id number]                              Today's Date: 2025       Admit Date: 2025    Visit Dx: No diagnosis found.  Patient Active Problem List   Diagnosis    Influenza    Thrush, oral    COPD (chronic obstructive pulmonary disease)    Atrial fibrillation with RVR    Chronic respiratory failure with hypoxia    Endocarditis of mitral valve    Mitral valve vegetation    Chronic diastolic CHF (congestive heart failure)    Atrial fibrillation, chronic    Bilateral lower extremity edema    Intermittent lightheadedness    Depression    Chronic midline low back pain with left-sided sciatica    Nephrolithiasis    Oxygen dependent    Medicare annual wellness visit, subsequent    Osteoporosis    CKD (chronic kidney disease) stage 3, GFR 30-59 ml/min    Chronic anticoagulation    Nonrheumatic mitral valve stenosis    Chest pain, unspecified type    Chronic heart failure with preserved ejection fraction (HFpEF)    Overweight with body mass index (BMI) 25.0-29.9    Acquired hypothyroidism    Acute on chronic combined systolic and diastolic CHF (congestive heart failure)     Past Medical History:   Diagnosis Date    Acute endocarditis     Atrial fibrillation with RVR 2019    Cancer     CKD (chronic kidney disease) stage 3, GFR 30-59 ml/min     COPD (chronic obstructive pulmonary disease)     CTS (carpal tunnel syndrome)     Dizziness     Gall stones     Influenza 2019    Medicare annual wellness visit, subsequent 2021    Migraine     Osteoporosis     Renal disorder     Restless leg syndrome     Thrush, oral 2019     Past Surgical History:   Procedure Laterality Date    CARDIAC CATHETERIZATION N/A 2020    Procedure: Coronary angiography;  Surgeon: Svetlana Grayson MD;  Location: [facility] INVASIVE LOCATION;  Service: Cardiovascular    CARDIAC CATHETERIZATION N/A 2020    Procedure: Left ventriculography;  Surgeon: Svetlana Grayson MD;   Location:  [facility] CATH INVASIVE LOCATION;  Service: Cardiovascular    CARDIAC CATHETERIZATION N/A 1/23/2020    Procedure: Left Heart Cath;  Surgeon: Svetlana Grayson MD;  Location: [facility] CATH INVASIVE LOCATION;  Service: Cardiovascular    CHOLECYSTECTOMY      KNEE ARTHROPLASTY Right     LAPAROSCOPIC GASTRIC BANDING        General Information       Row Name 02/24/25 1138          Physical Therapy Time and Intention    Document Type therapy note (daily note)  -EJ     Mode of Treatment physical therapy  -EJ       Row Name 02/24/25 1138          General Information    Existing Precautions/Restrictions fall;oxygen therapy device and L/min  -EJ     Barriers to Rehab previous functional deficit  -EJ               User Key  (r) = Recorded By, (t) = Taken By, (c) = Cosigned By      Initials Name Provider Type    EJ Lyubov Oscar, PT Physical Therapist                   Mobility       Row Name 02/24/25 1141          Bed Mobility    Supine-Sit [place] (Bed Mobility) contact guard  -EJ     Comment, (Bed Mobility) pt partially sitting EOB upon entry to room. some assistance to scoot toward EOB.  -EJ       Row Name 02/24/25 1141          Sit-Stand Transfer    Sit-Stand [place] (Transfers) verbal cues;nonverbal cues (demo/gesture);minimum assist (75% patient effort)  -EJ     Assistive Device (Sit-Stand Transfers) walker, front-wheeled  -EJ     Comment, (Sit-Stand Transfer) cues for hand placement and safe transition for sit <> stand, cues for upright posture  -EJ       Row Name 02/24/25 1141          Gait/Stairs (Locomotion)    [place] Level (Gait) verbal cues;contact guard  -EJ     Assistive Device (Gait) walker, front-wheeled  -EJ     Distance in Feet (Gait) 20  -EJ     Deviations/Abnormal Patterns (Gait) [place] decreased;stride length decreased;gait speed decreased  -EJ     Bilateral Gait Deviations forward flexed posture;heel strike decreased  -EJ     Comment, (Gait/Stairs) slow pace w cues for upright  posture, some assist w walker management at times, no overt LOB. limited by fatigue  -EJ               User Key  (r) = Recorded By, (t) = Taken By, (c) = Cosigned By      Initials Name Provider Type    Lyubov Michelle, PT Physical Therapist                   Obj/Interventions    No documentation.                  Goals/Plan    No documentation.                  Clinical Impression       Row Name 02/24/25 1142          Pain    Pretreatment Pain Rating 0/10 - no pain  -EJ     Posttreatment Pain Rating 0/10 - no pain  -       Row Name 02/24/25 114          Plan of Care Review    Plan of Care Reviewed With patient;spouse  -ANGELA     Outcome Evaluation Pt seen for PT this AM. Pt partially sitting up on EOB upon entry to room. SPouse present. Pt agreeable to work w therapy. She required some assistance to scoot forward toward EOB. Pt able to stand w min A and Rwx. She requires cues for hand placement and upright posture. Pt able to increase ambulation distance today, ambulating approx 20 ft w CGA and rwx. She continues to require cues for posture and occasional assist w walker management at times. Pt agreeable to sit up in chair at end of session. SHe is limited by fatigue with cues for PLB once in recliner. Pt left w all needs in reach and nsg notified. After session, spoke w pt's spouse at length. He was voicing frustration regarding pt's therapy session stating she was almost dropped before sitting in chair. Reassured spouse that pt was safe and therapist was ensuring that chair was fully locked prior to pt sitting down. Spouse also seemed frustrated that patient ambulated and was even sitting in the chair at all.  I attempted to educate spouse that our goal is to always safely progress activity to build strength and endurance so that patient can safely DC. Also informed spouse that it is always good to get patients out of bed if possible to prevent further deconditioning.  -       Row Name 02/24/25 1140           Positioning and Restraints    Pre-Treatment Position in bed  -EJ     Post Treatment Position chair  -EJ     In Chair notified nsg;reclined;call light within reach;encouraged to call for assist;exit alarm on;with family/caregiver  -EJ               User Key  (r) = Recorded By, (t) = Taken By, (c) = Cosigned By      Initials Name Provider Type    Lyubov Michelle, PT Physical Therapist                   Outcome Measures       Row Name 02/24/25 1203          How much help from another person do you currently need...    Turning from your back to your side while in flat bed without using bedrails? 3  -EJ     Moving from lying on back to sitting on the side of a flat bed without bedrails? 3  -EJ     Moving to and from a bed to a chair (including a wheelchair)? 3  -EJ     Standing up from a chair using your arms (e.g., wheelchair, bedside chair)? 3  -EJ     Climbing 3-5 steps with a railing? 2  -EJ     To walk in hospital room? 3  -EJ     AM-PAC 6 Clicks Score (PT) 17  -EJ     Highest Level of Mobility Goal 5 --> Static standing  -EJ               User Key  (r) = Recorded By, (t) = Taken By, (c) = Cosigned By      Initials Name Provider Type    Lyubov Michelle, PT Physical Therapist                                 Physical Therapy Education       Title: PT OT SLP Therapies (Done)       Topic: Physical Therapy (Done)       Point: Mobility training (Done)       Learning Progress Summary            Patient Acceptance, E,TB, NR,DU by  at 2/21/2025 1035    Acceptance, E,TB, VU by  at 2/19/2025 0949    Acceptance, E, VU by  at 2/18/2025 1135                      Point: Home exercise program (Done)       Learning Progress Summary            Patient Acceptance, E,TB, NR,DU by  at 2/21/2025 1035    Acceptance, E, VU by  at 2/18/2025 1135                      Point: Body mechanics (Done)       Learning Progress Summary            Patient Acceptance, E,TB, NR,DU by  at 2/21/2025 1035    Acceptance, E,TB, VU  by  at 2/19/2025 0949    Acceptance, E, VU by  at 2/18/2025 1135                      Point: Precautions (Done)       Learning Progress Summary            Patient Acceptance, E,TB, NR,DU by  at 2/21/2025 1035    Acceptance, E,TB, VU by  at 2/19/2025 0949    Acceptance, E, VU by  at 2/18/2025 1135                                      User Key       Initials Effective Dates Name Provider Type Discipline     06/16/21 -  Isabelle Woo PTA Physical Therapist Assistant PT     05/02/22 -  Mallorie Agustin PT Physical Therapist PT                  PT Recommendation and Plan     Outcome Evaluation: Pt seen for PT this AM. Pt partially sitting up on EOB upon entry to room. SPouse present. Pt agreeable to work w therapy. She required some assistance to scoot forward toward EOB. Pt able to stand w min A and Rwx. She requires cues for hand placement and upright posture. Pt able to increase ambulation distance today, ambulating approx 20 ft w CGA and rwx. She continues to require cues for posture and occasional assist w walker management at times. Pt agreeable to sit up in chair at end of session. SHe is limited by fatigue with cues for PLB once in recliner. Pt left w all needs in reach and nsg notified. After session, spoke w pt's spouse at length. He was voicing frustration regarding pt's therapy session stating she was almost dropped before sitting in chair. Reassured spouse that pt was safe and therapist was ensuring that chair was fully locked prior to pt sitting down. Spouse also seemed frustrated that patient ambulated and was even sitting in the chair at all.  I attempted to educate spouse that our goal is to always safely progress activity to build strength and endurance so that patient can safely DC. Also informed spouse that it is always good to get patients out of bed if possible to prevent further deconditioning.     Time Calculation:         PT Charges       Row Name 02/24/25 9635              Time Calculation    Start Time 1030  -EJ      Stop Time 1058  -EJ      Time Calculation (min) 28 min  -EJ      PT Received On 02/24/25  -EJ      PT - Next Appointment 02/25/25  -EJ         Timed Charges    03415 - PT Therapeutic Activity Minutes 28  -EJ         Total Minutes    Timed Charges Total Minutes 28  -EJ       Total Minutes 28  -EJ                User Key  (r) = Recorded By, (t) = Taken By, (c) = Cosigned By      Initials Name Provider Type     Lyubov Oscar, PT Physical Therapist                  Therapy Charges for Today       Code Description Service Date Service Provider Modifiers Qty    [id number]  PT THERAPEUTIC ACT EA 15 MIN 2/24/2025 Lyubov Oscar, PT GP 2            PT G-Codes  Outcome Measure Options: AM-PAC 6 Clicks Basic Mobility (PT)  AM-PAC 6 Clicks Score (PT): 17       Lyubov Oscar, PT  2/24/2025

## 2025-02-24 NOTE — OUTREACH NOTE
Prep Survey      Flowsheet Row Responses   [facility] patient discharged from? [facility]   Is LACE score < 7 ? No   Eligibility [facility]   Date of Admission 02/17/25   Date of Discharge 02/24/25   Discharge diagnosis Acute on chronic combined systolic and diastolic CHF (congestive heart failure)   Does the patient have one of the following disease processes/diagnoses(primary or secondary)? CHF   Prep survey completed? Yes            Arlen BLANKENSHIP - Registered Nurse

## 2025-02-24 NOTE — PLAN OF CARE
Goal Outcome Evaluation:  Plan of Care Reviewed With: patient, spouse           Outcome Evaluation: Pt seen for PT this AM. Pt partially sitting up on EOB upon entry to room. SPouse present. Pt agreeable to work w therapy. She required some assistance to scoot forward toward EOB. Pt able to stand w min A and Rwx. She requires cues for hand placement and upright posture. Pt able to increase ambulation distance today, ambulating approx 20 ft w CGA and rwx. She continues to require cues for posture and occasional assist w walker management at times. Pt agreeable to sit up in chair at end of session. SHe is limited by fatigue with cues for PLB once in recliner. Pt left w all needs in reach and nsg notified. After session, spoke w pt's spouse at length. He was voicing frustration regarding pt's therapy session stating she was almost dropped before sitting in chair. Reassured spouse that pt was safe and therapist was ensuring that chair was fully locked prior to pt sitting down. Spouse also seemed frustrated that patient ambulated and was even sitting in the chair at all.  I attempted to educate spouse that our goal is to always safely progress activity to build strength and endurance so that patient can safely DC. Also informed spouse that it is always good to get patients out of bed if possible to prevent further deconditioning.

## 2025-02-24 NOTE — PLAN OF CARE
Goal Outcome Evaluation:              Outcome Evaluation: Pt A&oOx4, vss, Afib 80's, 4LNC no c/o SOB. Dressing change done per order, updated pics in flowsheets. Pt ambulated to BR w one assist& walker. Will continue with current POC and update as needed.

## 2025-02-24 NOTE — DISCHARGE SUMMARY
Date of Admission: 2/17/2025    Date of Discharge:  2/24/2025    Discharge Diagnoses:   1.  Acute on chronic valvular and diastolic CHF secondary to #2  2.  Calcific mitral stenosis, moderate to severe by echo  3.  Acute kidney injury with stage IIIb chronic kidney disease (cardiorenal)  4.  Persistent atrial fibrillation  5.  COPD without acute exacerbation  6.  Mild aortic stenosis by echo  7.  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  Right bundle branch block  9.  Generalized weakness  10.  Hypervolemic hyponatremia, resolved  11.  Frailty    Procedures Performed:  1.  Echocardiogram on 2/18/2025.       Hospital Course:     This is a very pleasant 74 year-old female who is normally followed by Dr. Tinajero in our practice.  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    The patient was seen in the office by Dr. Tinajero on 2/14/2025.  She was volume overloaded, and appeared to be in congestive heart failure.  She continued to have worsening edema after IV Bumex was given in the office, and ultimately was admitted for further care on 2/17/2024.    Dr. Morrell saw the patient from nephrology, and diuresed her aggressively with IV Bumex.  She ultimately was transitioned to torsemide 40 mg p.o. twice daily.  Her renal function actually improved with diuresis, consistent with venous congestion.  She still has some lower extremity edema, which is going to be chronic, although it is significantly improved.    She has moderate to severe calcific mitral stenosis, which has been known for quite some time.  This was felt to be contributing to the CHF.  Dr. Briones evaluated the patient from cardiovascular surgery.  She is too high of a risk for surgery given her frailty, multiple comorbidities, and baseline respiratory status.  Medical management was recommended.    She also had an elevated digoxin  level in the setting of her acute kidney injury.  This did trend downwards while she was hospitalized.  However, carvedilol was added, and she actually tolerated this from a cardiac standpoint.  I switched her digoxin to every other day at discharge in addition to the carvedilol.  I elected not to use an SGLT2 inhibitor at this time as she still has intermittent hypotension with systolic blood pressures as low as the 90s.  This can be added as an outpatient if she tolerates her medical therapy currently.  She is on spironolactone 25 mg/day as well.  She will continue warfarin.  Her INR remained therapeutic in the hospital.      Discharge Medications:     Discharge Medications        New Medications        Instructions Start Date   carvedilol 3.125 MG tablet  Commonly known as: COREG   3.125 mg, Oral, 2 Times Daily With Meals      Torsemide 40 MG tablet   40 mg, Oral, 2 Times Daily             Changes to Medications        Instructions Start Date   digoxin 125 MCG tablet  Commonly known as: LANOXIN  What changed: when to take this   125 mcg, Oral, Every Other Day             Continue These Medications        Instructions Start Date   ammonium lactate 12 % lotion  Commonly known as: LAC-HYDRIN   Topical, As Needed      budesonide 0.5 MG/2ML nebulizer solution  Commonly known as: PULMICORT   USE 1 VIAL IN NEBULIZER DAILY - rinse mouth after treatment      buPROPion  MG 24 hr tablet  Commonly known as: WELLBUTRIN XL   300 mg, Oral, Daily      cyclobenzaprine 10 MG tablet  Commonly known as: FLEXERIL   10 mg, Oral, 3 Times Daily PRN      famotidine 20 MG tablet  Commonly known as: PEPCID   20 mg, Oral, 2 Times Daily Before Meals      ferrous sulfate 325 (65 FE) MG tablet   325 mg, Oral, Every Other Day      HYDROcodone-acetaminophen 7.5-325 MG per tablet  Commonly known as: NORCO   1 tablet, Oral, Every 6 Hours PRN      ipratropium-albuterol 0.5-2.5 mg/3 ml nebulizer  Commonly known as: DUO-NEB   USE 1 VIAL IN  NEBULIZER 4 TIMES DAILY - as directed      levothyroxine 50 MCG tablet  Commonly known as: SYNTHROID, LEVOTHROID   50 mcg, Oral, Every Morning      meclizine 12.5 MG tablet  Commonly known as: ANTIVERT   12.5 mg, Oral, 3 Times Daily PRN      nystatin 281468 UNIT/GM powder  Commonly known as: MYCOSTATIN   Topical, 4 Times Daily, Under both breasts      O2  Commonly known as: OXYGEN   2 L/min, Inhalation, Once      ondansetron 4 MG tablet  Commonly known as: ZOFRAN   4 mg, Oral, Every 8 Hours PRN      pantoprazole 40 MG EC tablet  Commonly known as: PROTONIX   40 mg, Oral, Daily      potassium chloride ER 20 MEQ tablet controlled-release ER tablet  Commonly known as: K-TAB   20 mEq, Oral, Daily      pramipexole 0.5 MG tablet  Commonly known as: MIRAPEX   0.5 mg, Oral, 2 Times Daily      spironolactone 25 MG tablet  Commonly known as: ALDACTONE   25 mg, Oral, Daily      trospium 20 MG tablet  Commonly known as: SANCTURA   20 mg, Oral, 2 Times Daily      warfarin 2.5 MG tablet  Commonly known as: COUMADIN   Take one tablet by mouth daily or as directed      Yupelri 175 MCG/3ML nebulizer solution  Generic drug: revefenacin   175 mcg, Nebulization, Daily - RT             Stop These Medications      bumetanide 2 MG tablet  Commonly known as: BUMEX            Physical Exam:           General Appearance:    No acute distress, alert and oriented x4   Lungs:     Bilateral rhonchi.    Heart:    Irregularly irregular rhythm and normal rate. III/VI SM RUSB and LUSB, II DM LLSB.    Abdomen:     Soft, nontender, nondistended.    Extremities:   Trace to 1+ brawny edema of the lower extremities with chronic venous stasis changes.       Follow-up:  1.  Follow-up with GENET Sanchez, in 1 week.  2.  Follow-up with Dr. Tinajero in 4 weeks      Time Spent on Discharge: 35 minutes.      Magdaleno Ray MD  02/24/25  10:43 EST

## 2025-02-24 NOTE — PLAN OF CARE
Goal Outcome Evaluation:      Patient dressing bilateral dressing done, cont on oxygen 4 liter nasal cannula,  pt on torsemide po,  at bedside, Afib on the monitor cont to monitor.

## 2025-02-24 NOTE — PROGRESS NOTES
[facility] Clinical Pharmacy Services: Warfarin Dosing/Monitoring Consult    Shani Phillip is a 74 y.o. female, estimated creatinine clearance is 35 mL/min (A) (by C-G formula based on SCr of 1.23 mg/dL (H)). weighing 68.6 kg (151 lb 3.8 oz).    Results from last 7 days   Lab Units 02/24/25  0249 02/23/25  0418 02/22/25  0943 02/22/25  0221 02/21/25  0312 02/20/25  0342 02/19/25  0316   INR  3.10* 2.92* 2.90*  --   --  2.31* 2.46*   HEMOGLOBIN g/dL 14.4  --   --  13.1 13.3 13.8 13.3   HEMATOCRIT % 45.4  --   --  38.9 39.7 40.6 41.4   PLATELETS 10*3/mm3 194  --   --  180 180 188 208     Prior to admission anticoagulation: Per last [facility] anticoagulation visit on 2/13, patient's warfarin dose is 2.5 mg Mon/Wed/Fri and 1.25 mg all other days of the week (12.5 mg/week).       Hospital Anticoagulation:  Consulting provider: Dr. Ray  Start date: 2/18/25  Indication: Atrial fibrillation  Target INR: 2 - 3  Expected duration: lifelong   Bridge Therapy: No      Potential food or drug interactions: NEW- torsemide can increase INR    Education complete?/Date: N/A; home medication    Assessment/Plan: INR now above goal likely due to new DDI with torsemide  Dose: hold today and recheck INR in AM  Monitor for any signs or symptoms of bleeding  Follow up daily INRs and dose adjustments    Pharmacy will continue to follow until discharge or discontinuation of warfarin.     Son Gay [facility]  Clinical Pharmacist

## 2025-02-24 NOTE — PROGRESS NOTES
[facility] Progress Note      Patient Name: Shani Phillip  : 1950  MRN: [id number]  Primary Care Physician:  Rodríguez Walker MD  Date of admission: 2025    Subjective     Interval History:   Follow-up acute on chronic kidney disease    The patient is feeling better, continue to diurese she was transition to oral diuretics and tolerating her treatment reasonably well no chest pain no shortness of breath, no nausea or vomiting.  Review of Systems:   As noted above    Objective     Vitals:   Temp:  [97.2 °F (36.2 °C)-98.1 °F (36.7 °C)] 97.6 °F (36.4 °C)  Heart Rate:  [79-95] 88  Resp:  [18-20] 19  BP: ()/(54-82) 117/54  Flow (L/min) (Oxygen Therapy):  [4] 4    Intake/Output Summary (Last 24 hours) at 2025 0922  Last data filed at 2025 0437  Gross per 24 hour   Intake 700 ml   Output 1300 ml   Net -600 ml       Physical Exam:    General Appearance: alert, chronically ill, no acute distress   Skin: warm and dry  HEENT: oral mucosa normal, nonicteric sclera  Neck: No JVD  Lungs: Bilateral rhonchi and crackles, breathing effort not clear  Heart: Regularly irregular, normal  Abdomen: soft, nontender, nondistended  : no palpable bladder  Extremities: Chronic lower extremity edema with the brawny edema and stasis dermatitis.  Improved and hip edema resolved  Neuro: normal speech and mental status     Scheduled Meds:     budesonide, 0.5 mg, Nebulization, BID - RT  buPROPion XL, 150 mg, Oral, Daily  carvedilol, 3.125 mg, Oral, BID With Meals  ferrous sulfate, 325 mg, Oral, Every Other Day  ipratropium-albuterol, 1.5 mL, Nebulization, Q6H While Awake - RT  levothyroxine, 50 mcg, Oral, QAM  nystatin, , Topical, 4x Daily  pantoprazole, 40 mg, Oral, Daily  petrolatum 42 % ex oint wrapper, 1 Application, Topical, Q12H  sodium chloride, 10 mL, Intravenous, Q12H  spironolactone, 25 mg, Oral, Daily  torsemide, 40 mg, Oral, BID Diuretics  warfarin, 1.25 mg, Oral, Once per  day on Sunday Tuesday Thursday Saturday  warfarin, 2.5 mg, Oral, Once per day on Monday Wednesday Friday      IV Meds:   Pharmacy to dose warfarin,         Results Reviewed:   I have personally reviewed the results from the time of this admission to 2/24/2025 09:22 EST     Results from last 7 days   Lab Units 02/24/25 0249 02/23/25 0418 02/22/25 0221 02/19/25 0316 02/17/25 2256   SODIUM mmol/L 141 141 139   < > 130*   POTASSIUM mmol/L 3.8 3.5 3.4*   < > 4.5   CHLORIDE mmol/L 99 97* 95*   < > 93*   CO2 mmol/L 30.0* 36.0* 36.0*   < > 29.4*   BUN mg/dL 21 21 23   < > 33*   CREATININE mg/dL 1.23* 1.18* 1.25*   < > 1.76*   CALCIUM mg/dL 8.7 8.6 8.3*   < > 8.8   BILIRUBIN mg/dL  --   --   --   --  0.3   ALK PHOS U/L  --   --   --   --  132*   ALT (SGPT) U/L  --   --   --   --  10   AST (SGOT) U/L  --   --   --   --  21   GLUCOSE mg/dL 104* 88 77   < > 94    < > = values in this interval not displayed.       Estimated Creatinine Clearance: 35 mL/min (A) (by C-G formula based on SCr of 1.23 mg/dL (H)).    Results from last 7 days   Lab Units 02/24/25 0249 02/23/25 0418 02/22/25 0221   MAGNESIUM mg/dL 1.8 1.6 1.6   PHOSPHORUS mg/dL 2.0* 2.0* 2.0*       Results from last 7 days   Lab Units 02/24/25 0249 02/23/25 0418 02/22/25 0221 02/21/25 0312 02/20/25 0342 02/19/25 0316   URIC ACID mg/dL 12.4* 12.7* 13.0* 12.9* 13.5* 13.1*       Results from last 7 days   Lab Units 02/24/25 0249 02/22/25 0221 02/21/25 0312 02/20/25  0342 02/19/25  0316   WBC 10*3/mm3 6.19 5.67 6.07 7.55 6.04   HEMOGLOBIN g/dL 14.4 13.1 13.3 13.8 13.3   PLATELETS 10*3/mm3 194 180 180 188 208       Results from last 7 days   Lab Units 02/24/25  0249 02/23/25  0418 02/22/25  0943 02/20/25  0342 02/19/25  0316   INR  3.10* 2.92* 2.90* 2.31* 2.46*       Assessment / Plan     ASSESSMENT:  Mild JAIME on chronic kidney disease stage IIIb associated with cardiorenal syndrome, the creatinine is 1.23, very stable, uric acid is down to 12.4, potassium  3.8 and total CO2 30, phosphorus is 2.    Chronic kidney disease stage IIIb secondary to nephrosclerosis.  Acute on chronic diastolic congestive heart failure  Chronic A-fib, chronically anticoagulated  COPD  Chronic depression  Severe hyperuricemia associate with decreased effective intravascular  Metabolic alkalosis associated diuretic and COPD    PLAN:  Start the oral diuretics torsemide 40 mg twice daily and continue spironolactone  The patient could be discharged from the renal standpoint and will plan follow-up in the office after discharge.      I reviewed the chart and other providers notes, reviewed labs.  I discussed the case with the patient and her  at the bedside  Copied text in this note has been reviewed and is accurate as of 02/24/25.         Thank you for involving us in the care of Shani Phillip.  Please feel free to call with any questions.    Richard Morrell MD  02/24/25  09:22 [facility]    [facility]  [phone]    Please note that portions of this note were completed with a voice recognition program.

## 2025-02-25 ENCOUNTER — TRANSITIONAL CARE MANAGEMENT TELEPHONE ENCOUNTER (OUTPATIENT)
Dept: CALL CENTER | Facility: HOSPITAL | Age: 75
End: 2025-02-25
Payer: MEDICARE

## 2025-02-25 NOTE — OUTREACH NOTE
Call Center TCM Note      Flowsheet Row Responses   [facility] patient discharged from? [place]   Does the patient have one of the following disease processes/diagnoses(primary or secondary)? CHF   TCM attempt successful? Yes   Call start time 1251   Call end time 1307   Discharge diagnosis Acute on chronic combined systolic and diastolic CHF (congestive heart failure)   Is patient permission given to speak with other caregiver? Yes   Person spoke with today (if not patient) and relationship Lottie dtr   Meds reviewed with patient/caregiver? Yes   Is the patient having any side effects they believe may be caused by any medication additions or changes? No   Does the patient have all medications ordered at discharge? Yes   Prescription comments New rx's Carvedilol, Torsemide in place. Updated Digoxin, and Bumetanide discontinued   Is the patient taking all medications as directed (includes completed medication regime)? Yes   Does the patient have an appointment with their PCP within 7-14 days of discharge? No   Nursing Interventions Patient desires to follow up with specialty only, Routed TCM call to PCP office, Patient declined scheduling/rescheduling appointment at this time   Has home health visited the patient within 72 hours of discharge? N/A   Pulse Ox monitoring None   Psychosocial issues? No   Did the patient receive a copy of their discharge instructions? Yes   Nursing interventions Reviewed instructions with patient   What is the patient's perception of their health status since discharge? Improving   Is the patient able to teach back signs and symptoms of worsening condition? (i.e. weight gain, shortness of air, etc.) Yes   If the patient is a current smoker, are they able to teach back resources for cessation? Not a smoker   Is the patient/caregiver able to teach back the hierarchy of who to call/visit for symptoms/problems? PCP, Specialist, Home health nurse, Urgent Care, ED, 911 Yes   TCM  call completed? Yes   Wrap up additional comments D/C DX: acute volume overload,  admit direct from Cardio MD office,  a/c Systolic and Diastolic CHF - Pt states she is feeling better, edema much improved. She and spouse somewhat confused re: medications changes, I went over all in detail and I also reached out to dtr Lottie, she will go over today just to be sure all medications in place. Pt has [facility] Follow up 03/03/2025, declines TCM APPT for now.   Call end time [id number]             Risa Frank MA    2/25/2025, 13:12 EST

## 2025-02-27 ENCOUNTER — ANTICOAGULATION VISIT (OUTPATIENT)
Dept: PHARMACY | Facility: HOSPITAL | Age: 75
End: 2025-02-27
Payer: MEDICARE

## 2025-02-27 DIAGNOSIS — I48.20 ATRIAL FIBRILLATION, CHRONIC: Primary | Chronic | ICD-10-CM

## 2025-02-27 DIAGNOSIS — J44.9 CHRONIC OBSTRUCTIVE PULMONARY DISEASE, UNSPECIFIED COPD TYPE: ICD-10-CM

## 2025-02-27 LAB — INR PPP: 4.7

## 2025-02-27 RX ORDER — IPRATROPIUM BROMIDE AND ALBUTEROL SULFATE 2.5; .5 MG/3ML; MG/3ML
SOLUTION RESPIRATORY (INHALATION)
Qty: 120 ML | Refills: 11 | Status: SHIPPED | OUTPATIENT
Start: 2025-02-27

## 2025-02-27 RX ORDER — BUDESONIDE 0.5 MG/2ML
INHALANT ORAL
Qty: 30 EACH | Refills: 11 | Status: SHIPPED | OUTPATIENT
Start: 2025-02-27

## 2025-02-27 NOTE — PROGRESS NOTES
Anticoagulation Clinic Progress Note    Anticoagulation Summary  As of 2025      INR goal:  2.0-3.0   TTR:  53.1% (3.9 y)   INR used for dosin.70 (2025)   Warfarin maintenance plan:  2.5 mg every Mon, Wed, Fri; 1.25 mg all other days   Weekly warfarin total:  12.5 mg   Plan last modified:  Marleny Black RPH (2025)   Next INR check:  3/6/2025   Priority:  High   Target end date:  --    Indications    Atrial fibrillation  chronic [I48.20]                 Anticoagulation Episode Summary       INR check location:  --    Preferred lab:  --    Send INR reminders to:   [facility] ANTICOAG HOME TEST POOL    Comments:   Home Testing as of 21 *call only when out of range*          Anticoagulation Care Providers       Provider Role Specialty Phone number    Zenia Tinajero MD Referring Cardiology [phone]            Clinic Interview:  Patient Findings     Positives:  Change in medications, Hospital admission    Negatives:  Signs/symptoms of thrombosis, Signs/symptoms of bleeding,   Laboratory test error suspected, Change in health, Change in alcohol use,   Change in activity, Upcoming invasive procedure, Emergency department   visit, Upcoming dental procedure, Missed doses, Extra doses, Change in   diet/appetite, Bruising, Other complaints      Clinical Outcomes     Negatives:  Major bleeding event, Thromboembolic event,   Anticoagulation-related hospital admission, Anticoagulation-related ED   visit, Anticoagulation-related fatality        INR History:      2025     3:16 AM 2025     3:42 AM 2025     9:43 AM 2025     4:18 AM 2025     2:49 AM 2025    12:00 AM 2025     3:46 PM   Anticoagulation Monitoring   INR       4.70   INR Date       2025   INR Goal       2.0-3.0   Trend       Same   Last Week Total       12.5 mg   Next Week Total       10 mg   Sun       1.25 mg   Mon       2.5 mg   Tue       1.25 mg   Wed       2.5 mg   Thu       Hold ()   Fri        1.25 mg (2/28)   Sat       1.25 mg   Historical INR 2.46  2.31  2.90  2.92  3.10  4.70            This result is from an external source.       Plan:  1. INR is Supratherapeutic today- see above in Anticoagulation Summary.   Will instruct Shani Phillip to Change their warfarin regimen- see above in Anticoagulation Summary.      Admitted for CHF exacerbation/fluid overload from 2/17-2/24. On discharge, Bumex was switched to torsemide. She does not feel like her swelling has started increasing over the past few days. Hold today and partial to 1.25 mg tomorrow. Rck 1 week   2. Follow up in 1 weeks  3. They have been instructed to call if any changes in medications, doses, concerns, etc. Patient expresses understanding and has no further questions at this time.    Marleny Black [facility]

## 2025-02-28 ENCOUNTER — PATIENT OUTREACH (OUTPATIENT)
Dept: CASE MANAGEMENT | Facility: OTHER | Age: 75
End: 2025-02-28
Payer: MEDICARE

## 2025-02-28 DIAGNOSIS — J44.9 CHRONIC OBSTRUCTIVE PULMONARY DISEASE, UNSPECIFIED COPD TYPE: ICD-10-CM

## 2025-02-28 DIAGNOSIS — I50.32 CHRONIC DIASTOLIC CHF (CONGESTIVE HEART FAILURE): Primary | ICD-10-CM

## 2025-02-28 NOTE — PLAN OF CARE
Problem: Heart Failure  Goal: Track and Manage Fluids and Swelling  Outcome: Progressing  Intervention: My Fluids and Swelling To Do List  Description:   Why is this important?  It is important to check your weight at home every day.  Also, check for swelling in your feet and legs every day.  Keeping your legs up while you are sitting and doing ankle pump exercises will help with the swelling.    Flowsheets (Taken 2/28/2025 1522)  My Fluids and Swelling To Do List:   call office if I gain more than 2 pounds in 1 day or 5 pounds in 1 week   track weight in diary   weigh myself every day   watch for swelling in feet, ankles and legs every day  Goal: Track and Manage Symptoms  Outcome: Progressing  Intervention: My Heart Failure Symptoms To Do List  Description:   Why is this important?  You will be able to handle your symptoms better if you keep track of them.  Making some simple changes to your lifestyle will help.  Knowing how to self-manage shortness of breath and when to call the doctor is very important.  Eating healthy is one thing you can do to take care of yourself.    Flowsheets (Taken 2/28/2025 1524)  My Heart Failure Symptoms To Do List: know when to call the doctor  Goal: Track and Manage Activity and Exertion  Outcome: Progressing  Goal: Optimal Care Coordination of a Patient Experiencing Heart Failure  Outcome: Progressing  Intervention: Identify and Minimize Risk of Heart Failure Exacerbation  Flowsheets (Taken 2/28/2025 1526)  Identify and Minimize Risk of Heart Failure Exacerbation:   barriers to lifestyle changes reviewed and addressed   barriers to treatment reviewed and addressed  Intervention: Alleviate Barriers to Optimal Nutrition and Fluid Status  Flowsheets (Taken 2/28/2025 1526)  Alleviate Barriers to Optimal Nutrition and Fluid Status:   barriers to sufficient nutrient intake addressed   home monitoring of weight gain or loss encouraged  Intervention: Maintain Strength and Functional  Ability  Flowsheets (Taken 2/28/2025 1526)  Maintain Strength and Functional Ability:   self-care encouraged   barriers to activity identified and managed  Intervention: Support Psychosocial Response to Heart Failure  Flowsheets (Taken 2/28/2025 1526)  Support Psychosocial Response to Heart Failure: verbalization of feelings encouraged     Problem: COPD  Goal: Track and Manage My Symptoms  Outcome: Progressing  Intervention: My COPD Symptoms To Do List  Description:   Why is this important?  Tracking your symptoms and other information about your health helps your doctor plan your care.  Write down the symptoms, the time of day, what you were doing and what medicine you are taking.  You will soon learn how to manage your symptoms.    Flowsheets (Taken 2/28/2025 1526)  My COPD Symptoms To Do List: keep follow-up appointments  Goal: Track and Manage My Triggers  Outcome: Progressing  Goal: Manage My Fatigue (Tiredness)  Outcome: Progressing  Goal: Learn and Do Breathing Exercises  Outcome: Progressing  Goal: Optimal Care Coordination of a Patient Experiencing COPD  Outcome: Progressing  Intervention: Support Psychosocial Response to COPD  Flowsheets (Taken 2/28/2025 1526)  Support Psychosocial Response to COPD: verbalization of feelings encouraged  Intervention: Alleviate Barriers to COPD Management  Flowsheets (Taken 2/28/2025 1526)  Alleviate Barriers to COPD Management: barriers to treatment managed  Intervention: Identify and Minimize Risk of COPD Exacerbation  Flowsheets (Taken 2/28/2025 1526)  Identify and Minimize Risk of COPD Exacerbation:   barriers to treatment reviewed and addressed   barriers to lifestyle changes reviewed and addressed

## 2025-02-28 NOTE — OUTREACH NOTE
AMBULATORY CASE MANAGEMENT NOTE    Names and Relationships of Patient/Support Persons: Contact: Shani Phillip; Relationship: Self -     Adult Patient Profile  Questions/Answers      Flowsheet Row Most Recent Value   Symptoms/Conditions Managed at Home cardiovascular, respiratory   Barriers to Managing Health understanding health advice, stress of chronic illness   Cardiovascular Symptoms/Conditions heart failure   Cardiovascular Management Strategies medication therapy, diet modification, activity, weight management, routine screening   Cardiovascular Self-Management Outcome unsure   Cardiovascular Comment Discussed with patient CHF - medications, monitoring weight, salt intake and f/u with Cardiology   Respiratory Symptoms/Conditions COPD   Respiratory Management Strategies oxygen therapy   Oxygen Therapy Device nasal cannula   Oxygen Therapy Times continuous   Oxygen Flow (L/min) 2   Respiratory Self-Management Outcome unsure   Respiratory Comment Following Pulmonary care            CCM Interim Update    Called and spoke to patient for CCM services. Introduced self, role and reason for the call. Discussed CCM program, goals and benefits. Patient consent obtained.    CHF - monitoring daily weight but does not keep daily log. Patient states she will plan to start keeping log. Patient aware to report any weight gain of more than 2lbs in a 24hr period or 5lbs in a week. Denies SOB and edema at the time of call. Reviewed salt intake. Patient to f/u with Cardio 3/3.  COPD - uses O2 at 2L continuously. Have O2 pulse oximeter to monitor her O2. Follow Pulmonary.    Patient lives with . Have all DME needs. She denies questions or concerns with her medications. Patient agreeable to future outreaches. She denies urgent needs at this time.     Education Documentation  Unresolved/Worsening Symptoms, taught by Lester Garcia, RN at 2/28/2025  3:28 PM.  Learner: Patient  Readiness: Acceptance  Method:  Explanation  Response: Verbalizes Understanding    Weight Monitoring, taught by Lester Garcia, RN at 2/28/2025  3:28 PM.  Learner: Patient  Readiness: Acceptance  Method: Explanation  Response: Verbalizes Understanding          Lseter BUTT  Ambulatory Case Management    2/28/2025, 15:28 EST

## 2025-02-28 NOTE — OUTREACH NOTE
[facility] End of Month Documentation    This Chronic Medical Management Care Plan for Shani Phillip, 74 y.o. female, has been a new plan of care implemented; established and a new plan of care implemented for the month of February.  A cumulative time of 21  minutes was spent on this patient record this month, including chart review; phone call with patient, F/U [facility] [place]. CHF and COPD management. Care coordination. Reviewed meds and f.u with providers..    Regarding the patient's problems: has Influenza; Thrush, oral; COPD (chronic obstructive pulmonary disease); Atrial fibrillation with RVR; Chronic respiratory failure with hypoxia; Endocarditis of mitral valve; Mitral valve vegetation; Chronic diastolic CHF (congestive heart failure); Atrial fibrillation, chronic; Bilateral lower extremity edema; Intermittent lightheadedness; Depression; Chronic midline low back pain with left-sided sciatica; Nephrolithiasis; Oxygen dependent; Medicare annual wellness visit, subsequent; Osteoporosis; CKD (chronic kidney disease) stage 3, GFR 30-59 ml/min; Chronic anticoagulation; Nonrheumatic mitral valve stenosis; Chest pain, unspecified type; Chronic heart failure with preserved ejection fraction (HFpEF); Overweight with body mass index (BMI) 25.0-29.9; Acquired hypothyroidism; and Acute on chronic combined systolic and diastolic CHF (congestive heart failure) on their problem list., the following items were addressed: medical records; medications and any changes can be found within the plan section of the note.  A detailed listing of time spent for chronic care management is tracked within each outreach encounter.  Current medications include:  has a current medication list which includes the following prescription(s): ammonium lactate, budesonide, bupropion xl, carvedilol, cyclobenzaprine, digoxin, famotidine, ferrous sulfate, hydrocodone-acetaminophen, ipratropium-albuterol, levothyroxine, meclizine, nystatin, o2, ondansetron,  pantoprazole, potassium chloride er, pramipexole, yupelri, spironolactone, torsemide, trospium, and warfarin. and the patient is reported to be patient is compliant with medication protocol,  Medications are reported to be effective.  Regarding these diagnoses, referrals were made to the following provider(s):  n/a.  All notes on chart for PCP to review.    The patient was monitored remotely for weight; activity level; medications.    The patient's physical needs include:  help taking medications as prescribed; physical healthcare; DME supplies.     The patient's mental support needs include:  increased support    The patient's cognitive support needs include:  increased support    The patient's psychosocial support needs include:  need for increased support    The patient's functional needs include: DME; physical healthcare    The patient's environmental needs include:  not applicable, n/a    Care Plan overall comments:  No data recorded    Refer to previous outreach notes for more information on the areas listed above.    Monthly Billing Diagnoses  (I50.32) Chronic diastolic CHF (congestive heart failure)    (J44.9) Chronic obstructive pulmonary disease, unspecified COPD type    Medications   Medications have been reconciled    Care Plan progress this month:      Recently Modified Care Plans Updates made since 1/28/2025 12:00 AM      No recently modified care plans.             COPD       Track and Manage My Symptoms (Progressing)       Start:  02/28/25    Expected End:  08/28/25         My COPD Symptoms To Do List       Start:  02/28/25         Why is this important?  Tracking your symptoms and other information about your health helps your doctor plan your care.  Write down the symptoms, the time of day, what you were doing and what medicine you are taking.  You will soon learn how to manage your symptoms.         Initial    My COPD Symptoms To Do List: keep follow-up appointments taken at 02/28/25             Track and Manage My Triggers (Progressing)       Start:  02/28/25    Expected End:  08/28/25         My COPD Triggers To Do List       Start:  02/28/25         Why is this important?  Triggers are activities or things, like tobacco smoke or cold weather, that make your COPD (chronic obstructive pulmonary disease) flare up.  Knowing these triggers helps you plan how to stay away from them.  When you cannot remove them, you can learn how to manage them.              Manage My Fatigue (Tiredness) (Progressing)       Start:  02/28/25    Expected End:  08/28/25         My Fatigue Management To Do List       Start:  02/28/25         Why is this important?  Feeling tired or worn out is a common symptom of COPD (chronic obstructive pulmonary disease).  Learning when you feel your best and when you need rest is important.  Managing the tiredness (fatigue) will help you be active and enjoy life.              Learn and Do Breathing Exercises (Progressing)       Start:  02/28/25    Expected End:  08/28/25         My Breathing Exercises To Do List       Start:  02/28/25         Why is this important?  Breathing exercises can help lessen the cough that comes with chronic obstructive pulmonary disease.  Doing the exercises will give you more energy.  They will also help you to control your symptoms.              Optimal Care Coordination of a Patient Experiencing COPD (Progressing)       Start:  02/28/25    Expected End:  08/28/25         Support Psychosocial Response to COPD       Start:  02/28/25          Initial    Support Psychosocial Response to COPD: verbalization of feelings encouraged taken at 02/28/25         Alleviate Barriers to COPD Management       Start:  02/28/25          Initial    Alleviate Barriers to COPD Management: barriers to treatment managed taken at 02/28/25         Identify and Minimize Risk of COPD Exacerbation       Start:  02/28/25          Initial    Identify and Minimize Risk of COPD Exacerbation:  barriers to treatment reviewed and addressed;barriers to lifestyle changes reviewed and addressed taken at 02/28/25           Heart Failure       Track and Manage Fluids and Swelling (Progressing)       Start:  02/28/25    Expected End:  08/28/25         My Fluids and Swelling To Do List       Start:  02/28/25         Why is this important?  It is important to check your weight at home every day.  Also, check for swelling in your feet and legs every day.  Keeping your legs up while you are sitting and doing ankle pump exercises will help with the swelling.         Initial    My Fluids and Swelling To Do List: call office if I gain more than 2 pounds in 1 day or 5 pounds in 1 week;track weight in diary;weigh myself every day;watch for swelling in feet, ankles and legs every day taken at 02/28/25            Track and Manage Symptoms (Progressing)       Start:  02/28/25    Expected End:  08/28/25         My Heart Failure Symptoms To Do List       Start:  02/28/25         Why is this important?  You will be able to handle your symptoms better if you keep track of them.  Making some simple changes to your lifestyle will help.  Knowing how to self-manage shortness of breath and when to call the doctor is very important.  Eating healthy is one thing you can do to take care of yourself.         Initial    My Heart Failure Symptoms To Do List: know when to call the doctor taken at 02/28/25            Track and Manage Activity and Exertion (Progressing)       Start:  02/28/25    Expected End:  08/28/25         My Activity and Exertion To Do List       Start:  02/28/25         Why is this important?  Exercising is very important when managing your heart failure.  It will help your heart get stronger.  Wearing a pedometer or using a fitness tracker can help you stay motivated.              Optimal Care Coordination of a Patient Experiencing Heart Failure (Progressing)       Start:  02/28/25    Expected End:  08/28/25          Identify and Minimize Risk of Heart Failure Exacerbation       Start:  02/28/25          Initial    Identify and Minimize Risk of Heart Failure Exacerbation: barriers to lifestyle changes reviewed and addressed;barriers to treatment reviewed and addressed taken at 02/28/25         Identify and Manage Comorbidities and Complications       Start:  02/28/25            Alleviate Barriers to Optimal Nutrition and Fluid Status       Start:  02/28/25          Initial    Alleviate Barriers to Optimal Nutrition and Fluid Status: barriers to sufficient nutrient intake addressed;home monitoring of weight gain or loss encouraged taken at 02/28/25         Maintain Strength and Functional Ability       Start:  02/28/25          Initial    Maintain Strength and Functional Ability: self-care encouraged;barriers to activity identified and managed taken at 02/28/25         Monitor and Manage Sleep Disturbance       Start:  02/28/25            Support Psychosocial Response to Heart Failure       Start:  02/28/25          Initial    Support Psychosocial Response to Heart Failure: verbalization of feelings encouraged taken at 02/28/25         Develop and Maintain Palliative Care Plan       Start:  02/28/25                Current Specialty Plan of Care Status in process    Instructions   Patient was provided an electronic copy of care plan  CCM services were explained and offered and patient has accepted these services.  Patient has given their written consent to receive CCM services and understands that this includes the authorization of electronic communication of medical information with the other treating providers.  Patient understands that they may stop CCM services at any time and these changes will be effective at the end of the calendar month and will effectively revocate the agreement of CCM services.  Patient understands that only one practitioner can furnish and be paid for CCM services during one calendar month.  Patient  also understands that there may be co-payment or deductible fees in association with CCM services.  Patient will continue with at least monthly follow-up calls with the Ambulatory .    Lester BUTT  Ambulatory Case Management    2/28/2025, 15:33 EST

## 2025-03-03 ENCOUNTER — TELEPHONE (OUTPATIENT)
Dept: CARDIOLOGY | Facility: CLINIC | Age: 75
End: 2025-03-03
Payer: MEDICARE

## 2025-03-03 ENCOUNTER — OFFICE VISIT (OUTPATIENT)
Dept: CARDIOLOGY | Facility: CLINIC | Age: 75
End: 2025-03-03
Payer: MEDICARE

## 2025-03-03 VITALS
DIASTOLIC BLOOD PRESSURE: 80 MMHG | SYSTOLIC BLOOD PRESSURE: 122 MMHG | HEART RATE: 70 BPM | WEIGHT: 134 LBS | BODY MASS INDEX: 25.3 KG/M2 | OXYGEN SATURATION: 95 % | HEIGHT: 61 IN

## 2025-03-03 DIAGNOSIS — I13.10: ICD-10-CM

## 2025-03-03 DIAGNOSIS — I34.2 NONRHEUMATIC MITRAL VALVE STENOSIS: ICD-10-CM

## 2025-03-03 DIAGNOSIS — I48.20 ATRIAL FIBRILLATION, CHRONIC: Primary | ICD-10-CM

## 2025-03-03 DIAGNOSIS — I50.31 ACUTE HEART FAILURE WITH PRESERVED EJECTION FRACTION (HFPEF): ICD-10-CM

## 2025-03-03 PROCEDURE — 99214 OFFICE O/P EST MOD 30 MIN: CPT | Performed by: NURSE PRACTITIONER

## 2025-03-03 RX ORDER — CARVEDILOL 3.12 MG/1
3.12 TABLET ORAL 2 TIMES DAILY WITH MEALS
Qty: 180 TABLET | Refills: 1 | Status: SHIPPED | OUTPATIENT
Start: 2025-03-03

## 2025-03-03 RX ORDER — TORSEMIDE 20 MG/1
40 TABLET ORAL 2 TIMES DAILY
Qty: 360 TABLET | Refills: 1 | Status: SHIPPED | OUTPATIENT
Start: 2025-03-03

## 2025-03-03 RX ORDER — DIGOXIN 125 MCG
125 TABLET ORAL EVERY OTHER DAY
Qty: 90 TABLET | Refills: 1 | Status: SHIPPED | OUTPATIENT
Start: 2025-03-03

## 2025-03-03 NOTE — PROGRESS NOTES
Date of Office Visit: 25  Encounter Provider: GENET Sanchez  Place of Service: [facility] CARDIOLOGY  Patient Name: Shani Phillip  :1950    Chief Complaint   Patient presents with   • Shortness of Breath   • Dizziness   • Follow-up   :     HPI: Shnai Phillip is a 74 y.o. female  with chronic atrial fibrillation, mitral valve stenoses, history of mitral valve vegetation, COPD, CKD, hypertension, lower extremity edema, diastolic congestive heart failure, and hypertension.        She is followed by Dr. Zenia Tinajero.  I will visit with her in follow-up and have reviewed her medical record.     She has history of mitral valve endocarditis which was treated with antibiotics.  In 2021 she had an echocardiogram which showed normal left ventricular systolic function, grade 2 diastolic dysfunction, moderate calcification of the aortic valve, moderate calcification of the mitral valve and mild mitral valve stenoses.  She had cardioversion 2019 but then had recurrent atrial fibrillation 2022.  Her Lasix was increased to 40 mg twice daily in late 2022 due to continued swelling.  Her swelling did not significantly initially change with that it was felt that higher dose diltiazem was contributing.  I lowered diltiazem to 180 mg daily and started digoxin with a load x3 days.        She continues to have issues with swelling with diltiazem so that was stopped in November.  She sent a list of her blood pressure and pulse readings which showed pulse 80s to 90s.  She reported her swelling was better.     In  she had some swelling which resolved with torsemide 40 mg daily.     Perfusion stress test 2023 showed no evidence of ischemia.  In 2023 she had repeat echo which showed preserved LV systolic function, mildly reduced RV systolic function, mild aortic valve stenosis, elevated RVSP of 42 mmHg consistent with mild pulmonary hypertension and  severe mitral annular calcification with trace to mild mitral valve regurgitation and no significant concerns for endocarditis and lack of fever or chills.         On 7/2/2024 she presented with increased swelling.  I increase furosemide from 40 mg daily to 40 mg twice daily.  proBNP was 900.   I sent her for venous duplex 8/21/2024 which was negative for DVT. She was given IV bumetanide 4 mg in our cardiac evaluation clinic and I switched her from oral furosemide to bumetanide 2 mg twice daily at home.  I added spironolactone 25 mg daily.  She had repeat echocardiogram 8/26/2024 showing ejection fraction 70% with moderate concentric hypertrophy, moderate outflow tract obstruction with a late peak gradient of 34 mmHg, severe mitral annular calcification with moderate to severe mitral valve stenosis and no regurgitation.  There was mild to moderate TR with RVSP 53 mmHg.  I gave her an additional dose of IV bumetanide 4 mg on 8/27/2024.  Renal function was stable and I increased bumetanide to 4 mg in the morning and 2 mg in the evening.           She had worsening dyspnea on exertion and was admitted and treated for acute on chronic valvular and diastolic congestive heart failure.  She had acute kidney injury which was felt to be related to cardiorenal syndrome.  Dr. Morrell with nephrology help to aggressively diurese patient with IV bumetanide.  repeat echo 2/18/2025 showed hyperdynamic left ventricular systolic function with an EF greater than 70%, moderate valve stenosis, moderate to severe mitral valve stenosis, mild TR and RVSP 53 mmHg with trivial pericardial effusion.  She ultimately was transition to torsemide 40 mg twice daily.  Dr. Briones evaluated patient due to significant mitral stenosis which was felt to be contributing to CHF however she was felt to be too high risk for surgery given frailty, multiple comorbidities and baseline respiratory status and medical management was recommended.  Carvedilol was  "added to her regimen and her due to elevated digoxin level her digoxin was switched to every other day.  SGLT2 inhibitor was not used due to hypotension and systolic blood pressure in the 90s.    She presents today for reassessment.  She is feeling much better and her legs are much less swollen.  She has chronic shortness of breath but feels that she is back at her baseline.  No chest pain or edema or palpitation.  She has occasional lightheadedness.  She is in a wheelchair today.  She has oxygen with her.  She has a follow-up with nephrology 3/7/2025.  Allergies   Allergen Reactions   • Penicillins Rash     RASH 30 YRS AGO NO HOSPITALIZATION           Family and social history reviewed.     ROS  All other systems were reviewed and are negative          Objective:     Vitals:    03/03/25 1130   BP: 122/80   BP Location: Left arm   Patient Position: Sitting   Cuff Size: Adult   Pulse: 70   SpO2: 95%   Weight: 60.8 kg (134 lb)   Height: 154.9 cm (61\")     Body mass index is 25.32 kg/m².    PHYSICAL EXAM:  Pulmonary:      Effort: Pulmonary effort is normal.      Breath sounds: Decreased air movement present. Decreased breath sounds present. Wheezing present.      Comments: Oxygen, expiratory wheeze throughout  Cardiovascular:      Normal rate. Irregularly irregular rhythm.      Murmurs: There is a grade 2/6 systolic murmur at the LLSB.      Comments: Left > right  Edema:     Ankle: 1+ edema of the left ankle and trace edema of the right ankle.      Procedures      Current Outpatient Medications   Medication Sig Dispense Refill   • ammonium lactate (LAC-HYDRIN) 12 % lotion APPLY TOPICALLY TO THE APPROPRIATE AREA AS DIRECTED AS NEEDED FOR DRY SKIN. 225 g 6   • budesonide (PULMICORT) 0.5 MG/2ML nebulizer solution USE 1 VIAL IN NEBULIZER DAILY - rinse mouth after treatment 30 each 11   • buPROPion XL (WELLBUTRIN XL) 300 MG 24 hr tablet Take 1 tablet by mouth Daily. 90 tablet 3   • carvedilol (COREG) 3.125 MG tablet Take 1 " tablet by mouth 2 (Two) Times a Day With Meals. 180 tablet 1   • cyclobenzaprine (FLEXERIL) 10 MG tablet Take 1 tablet by mouth 3 (Three) Times a Day As Needed (back pain). 40 tablet 1   • digoxin (LANOXIN) 125 MCG tablet Take 1 tablet by mouth Every Other Day. 90 tablet 1   • famotidine (PEPCID) 20 MG tablet TAKE 1 TABLET BY MOUTH 2 TIMES A DAY BEFORE MEALS. 180 tablet 3   • ferrous sulfate 325 (65 FE) MG tablet Take 1 tablet by mouth Every Other Day. 45 tablet 1   • HYDROcodone-acetaminophen (NORCO) 7.5-325 MG per tablet Take 1 tablet by mouth Every 6 (Six) Hours As Needed for Moderate Pain. 30 tablet 0   • ipratropium-albuterol (DUO-NEB) 0.5-2.5 mg/3 ml nebulizer USE 1 VIAL IN NEBULIZER 4 TIMES DAILY - as directed 120 mL 11   • levothyroxine (SYNTHROID, LEVOTHROID) 50 MCG tablet TAKE 1 TABLET BY MOUTH EVERY DAY IN THE MORNING 90 tablet 1   • meclizine (ANTIVERT) 12.5 MG tablet Take 1 tablet by mouth 3 (Three) Times a Day As Needed for Dizziness. 12 tablet 0   • nystatin (MYCOSTATIN) 657149 UNIT/GM powder Apply  topically to the appropriate area as directed 4 (Four) Times a Day. Under both breasts 30 g 1   • O2 (OXYGEN) Inhale 2 L/min 1 (One) Time.     • ondansetron (ZOFRAN) 4 MG tablet Take 1 tablet by mouth Every 8 (Eight) Hours As Needed for Nausea or Vomiting. 30 tablet 1   • pantoprazole (PROTONIX) 40 MG EC tablet Take 1 tablet by mouth Daily. 90 tablet 1   • potassium chloride ER (K-TAB) 20 MEQ tablet controlled-release ER tablet Take 1 tablet by mouth Daily.     • pramipexole (MIRAPEX) 0.5 MG tablet Take 1 tablet by mouth 2 (Two) Times a Day. 180 tablet 3   • revefenacin (Yupelri) 175 MCG/3ML nebulizer solution Take 3 mL by nebulization Daily.     • spironolactone (ALDACTONE) 25 MG tablet TAKE 1 TABLET BY MOUTH EVERY DAY 90 tablet 1   • Torsemide 40 MG tablet Take 40 mg by mouth 2 (Two) Times a Day. 180 tablet 1   • trospium (SANCTURA) 20 MG tablet Take 1 tablet by mouth 2 (Two) Times a Day. 180 tablet 3    • warfarin (COUMADIN) 2.5 MG tablet Take one tablet by mouth daily or as directed 90 tablet 1     No current facility-administered medications for this visit.     Assessment:       Diagnosis Plan   1. Atrial fibrillation, chronic        2. Nonrheumatic mitral valve stenosis        3. Cardiorenal syndrome without renal failure        4. Acute heart failure with preserved ejection fraction (HFpEF)             No orders of the defined types were placed in this encounter.        Plan:       1.  74-year-old female with chronic atrial fibrillation.  Anticoagulated with warfarin and INR is followed in our anticoagulation clinic.  CHADS2 Vascor of 3 she had a cardioversion November 2019.      She is off diltiazem due to history of swelling.   -She has an appointment with Dr. Prieto Walter to discuss pace/ablate treatment versus continued medical management.  -She will continue current dose carvedilol and every other day digoxin along with warfarin.  2.  History of mitral valve vegetation in 2019 treated with antibiotics.  Last echo 02/2025 shows moderate to severe mitral valve stenoses.  Medical therapy has been recommended as she was felt to be too high risk for open heart surgery fibber 2025 by Dr. Briones.- she requires antibiotic for all dental work   3.  COPD on home oxygen-avoid beta-blockers.  She is on 24-hour oxygen at 2 L.  She follows with Dr. Zamora  4.  Heart failure preserved ejection fraction related to diastolic dysfunction and valvular heart disease.  Her EF was greater than 70 02/2025.  She is on torsemide 40 mg twice daily and spironolactone 25 mg daily.  She is not on SGLT2 inhibitor at this time because her blood pressure was in the 90s often while inpatient recently.    5.  Mild, non obstructive coronary artery disease on cardiac cath June 2020.  Negative perfusion stress test July 2022 no angina.  6.  Mild aortic valve stenoses on echo 02/2025  7.  Chronic right bundle branch block  8.  Normal bilateral  lower extremity arterial study August 2020-she has significant cyanosis in the feet.  Felt to be related to chronic hypoxia despite oxygen use.   9.  Persistent lower extremity edema.  Improved on that she is on torsemide 40 mg twice daily.  10.  Iron deficient anemia.  Her hemoglobin was 14.4 on 2/24/2025  11.  CKD-she has an appointment with nephrology on 3/7/2025 which I encouraged that she keep.  Her last creatinine was 1.23 on 2/20/2025.    Follow-up in 3 months.  Call sooner with any questions or concerns        It has been a pleasure to participate in this patient's care.      Thank you,  GENET Sanchez      **I used Dragon to dictate this note:**

## 2025-03-03 NOTE — LETTER
March 3, 2025     GENET Trivedi  [address]  [address]  [place] [address]    Patient: Shani Phillip   YOB: 1950   Date of Visit: 3/3/2025     Dear GENET Trivedi:       Thank you for referring Shani Phillip to me for evaluation. Below are the relevant portions of my assessment and plan of care.    If you have questions, please do not hesitate to call me. I look forward to following Shani along with you.         Sincerely,        GENET Sanchez        CC: No Recipients    Angy Smith APRN  25 [id number]  Sign when Signing Visit  Date of Office Visit: 25  Encounter Provider: GENET Sanchez  Place of Service: [facility]  Patient Name: Shani Phillip  :1950    Chief Complaint   Patient presents with   • Shortness of Breath   • Dizziness   • Follow-up   :     HPI: Shani Phillip is a 74 y.o. female  with chronic atrial fibrillation, mitral valve stenoses, history of mitral valve vegetation, COPD, hypertension, lower extremity edema, diastolic congestive heart failure, and hypertension.        She is followed by Dr. Zenia Tinajero.  I will visit with her in follow-up and have reviewed her medical record.     She has history of mitral valve endocarditis which was treated with antibiotics.  In 2021 she had an echocardiogram which showed normal left ventricular systolic function, grade 2 diastolic dysfunction, moderate calcification of the aortic valve, moderate calcification of the mitral valve and mild mitral valve stenoses.  She had cardioversion 2019 but then had recurrent atrial fibrillation 2022.  Her Lasix was increased to 40 mg twice daily in late 2022 due to continued swelling.  Her swelling did not significantly initially change with that it was felt that higher dose diltiazem was contributing.  I lowered diltiazem to 180 mg daily and started digoxin with a load x3 days.        She continues  to have issues with swelling with diltiazem so that was stopped in November.  She sent a list of her blood pressure and pulse readings which showed pulse 80s to 90s.  She reported her swelling was better.     In 2020 she had some swelling which resolved with torsemide 40 mg daily.     Perfusion stress test June 2023 showed no evidence of ischemia.  In August 2023 she had repeat echo which showed preserved LV systolic function, mildly reduced RV systolic function, mild aortic valve stenosis, elevated RVSP of 42 mmHg consistent with mild pulmonary hypertension and severe mitral annular calcification with trace to mild mitral valve regurgitation and no significant concerns for endocarditis and lack of fever or chills.         On 7/2/2024 she presented with increased swelling.  I increase furosemide from 40 mg daily to 40 mg twice daily.  proBNP was 900.   I sent her for venous duplex 8/21/2024 which was negative for DVT. She was given IV bumetanide 4 mg in our cardiac evaluation clinic and I switched her from oral furosemide to bumetanide 2 mg twice daily at home.  I added spironolactone 25 mg daily.  She had repeat echocardiogram 8/26/2024 showing ejection fraction 70% with moderate concentric hypertrophy, moderate outflow tract obstruction with a late peak gradient of 34 mmHg, severe mitral annular calcification with moderate to severe mitral valve stenosis and no regurgitation.  There was mild to moderate TR with RVSP 53 mmHg.  I gave her an additional dose of IV bumetanide 4 mg on 8/27/2024.  Renal function was stable and I increased bumetanide to 4 mg in the morning and 2 mg in the evening.           She had worsening dyspnea on exertion and was admitted and treated for acute on chronic valvular and diastolic congestive heart failure.  She had acute kidney injury which was felt to be related to cardiorenal syndrome.  Dr. Morrell with nephrology help to aggressively diurese patient with IV bumetanide.  repeat echo  "2/18/2025 showed hyperdynamic left ventricular systolic function with an EF greater than 70%, moderate valve stenosis, moderate to severe mitral valve stenosis, mild TR and RVSP 53 mmHg with trivial pericardial effusion.  She ultimately was transition to torsemide 40 mg twice daily.  Dr. Briones evaluated patient due to significant mitral stenosis which was felt to be contributing to CHF however she was felt to be too high risk for surgery given frailty, multiple comorbidities and baseline respiratory status and medical management was recommended.  Carvedilol was added to her regimen and her due to elevated digoxin level her digoxin was switched to every other day.  SGLT2 inhibitor was not used due to hypotension and systolic blood pressure in the 90s.    She presents today for reassessment.  She is feeling much better and her legs are much less swollen.  She has chronic shortness of breath but feels that she is back at her baseline.  No chest pain or edema or palpitation.  She has occasional lightheadedness.  She is in a wheelchair today.  She has oxygen with her.  She has a follow-up with nephrology 3/7/2025.  Allergies   Allergen Reactions   • Penicillins Rash     RASH 30 YRS AGO NO HOSPITALIZATION           Family and social history reviewed.     ROS  All other systems were reviewed and are negative          Objective:     Vitals:    03/03/25 1130   BP: 122/80   BP Location: Left arm   Patient Position: Sitting   Cuff Size: Adult   Pulse: 70   SpO2: 95%   Weight: 60.8 kg (134 lb)   Height: 154.9 cm (61\")     Body mass index is 25.32 kg/m².    PHYSICAL EXAM:  Pulmonary:      Effort: Pulmonary effort is normal.      Breath sounds: Decreased air movement present. Decreased breath sounds present.      Comments: oxygen  Cardiovascular:      Normal rate. Irregularly irregular rhythm.      Murmurs: There is a grade 2/6 systolic murmur at the LLSB.      Comments: Left > right  Edema:     Ankle: 1+ edema of the left ankle " and trace edema of the right ankle.        Procedures      Current Outpatient Medications   Medication Sig Dispense Refill   • ammonium lactate (LAC-HYDRIN) 12 % lotion APPLY TOPICALLY TO THE APPROPRIATE AREA AS DIRECTED AS NEEDED FOR DRY SKIN. 225 g 6   • budesonide (PULMICORT) 0.5 MG/2ML nebulizer solution USE 1 VIAL IN NEBULIZER DAILY - rinse mouth after treatment 30 each 11   • buPROPion XL (WELLBUTRIN XL) 300 MG 24 hr tablet Take 1 tablet by mouth Daily. 90 tablet 3   • carvedilol (COREG) 3.125 MG tablet Take 1 tablet by mouth 2 (Two) Times a Day With Meals. 180 tablet 1   • cyclobenzaprine (FLEXERIL) 10 MG tablet Take 1 tablet by mouth 3 (Three) Times a Day As Needed (back pain). 40 tablet 1   • digoxin (LANOXIN) 125 MCG tablet Take 1 tablet by mouth Every Other Day. 90 tablet 1   • famotidine (PEPCID) 20 MG tablet TAKE 1 TABLET BY MOUTH 2 TIMES A DAY BEFORE MEALS. 180 tablet 3   • ferrous sulfate 325 (65 FE) MG tablet Take 1 tablet by mouth Every Other Day. 45 tablet 1   • HYDROcodone-acetaminophen (NORCO) 7.5-325 MG per tablet Take 1 tablet by mouth Every 6 (Six) Hours As Needed for Moderate Pain. 30 tablet 0   • ipratropium-albuterol (DUO-NEB) 0.5-2.5 mg/3 ml nebulizer USE 1 VIAL IN NEBULIZER 4 TIMES DAILY - as directed 120 mL 11   • levothyroxine (SYNTHROID, LEVOTHROID) 50 MCG tablet TAKE 1 TABLET BY MOUTH EVERY DAY IN THE MORNING 90 tablet 1   • meclizine (ANTIVERT) 12.5 MG tablet Take 1 tablet by mouth 3 (Three) Times a Day As Needed for Dizziness. 12 tablet 0   • nystatin (MYCOSTATIN) 824948 UNIT/GM powder Apply  topically to the appropriate area as directed 4 (Four) Times a Day. Under both breasts 30 g 1   • O2 (OXYGEN) Inhale 2 L/min 1 (One) Time.     • ondansetron (ZOFRAN) 4 MG tablet Take 1 tablet by mouth Every 8 (Eight) Hours As Needed for Nausea or Vomiting. 30 tablet 1   • pantoprazole (PROTONIX) 40 MG EC tablet Take 1 tablet by mouth Daily. 90 tablet 1   • potassium chloride ER (K-TAB) 20 MEQ  tablet controlled-release ER tablet Take 1 tablet by mouth Daily.     • pramipexole (MIRAPEX) 0.5 MG tablet Take 1 tablet by mouth 2 (Two) Times a Day. 180 tablet 3   • revefenacin (Yupelri) 175 MCG/3ML nebulizer solution Take 3 mL by nebulization Daily.     • spironolactone (ALDACTONE) 25 MG tablet TAKE 1 TABLET BY MOUTH EVERY DAY 90 tablet 1   • Torsemide 40 MG tablet Take 40 mg by mouth 2 (Two) Times a Day. 180 tablet 1   • trospium (SANCTURA) 20 MG tablet Take 1 tablet by mouth 2 (Two) Times a Day. 180 tablet 3   • warfarin (COUMADIN) 2.5 MG tablet Take one tablet by mouth daily or as directed 90 tablet 1     No current facility-administered medications for this visit.     Assessment:       Diagnosis Plan   1. Atrial fibrillation, chronic        2. Nonrheumatic mitral valve stenosis        3. Cardiorenal syndrome without renal failure        4. Acute heart failure with preserved ejection fraction (HFpEF)             No orders of the defined types were placed in this encounter.        Plan:       1.  74-year-old female with chronic atrial fibrillation.  Anticoagulated with warfarin and INR is followed in our anticoagulation clinic.  CHADS2 Vascor of 3 she had a cardioversion November 2019.      She is off diltiazem due to history of swelling.   -She has an appointment with Dr. Prieto Walter to discuss pace/ablate treatment versus continued medical management.  -She will continue current dose carvedilol and every other day digoxin along with warfarin.  2.  History of mitral valve vegetation in 2019 treated with antibiotics.  Last echo 02/2025 shows moderate to severe mitral valve stenoses.  Medical therapy has been recommended as she was felt to be too high risk for open heart surgery fibber 2025 by Dr. Briones.- she requires antibiotic for all dental work   3.  COPD on home oxygen-avoid beta-blockers.  She is on 24-hour oxygen at 2 L.  She follows with Dr. Zamora  4.  Heart failure preserved ejection fraction related to  diastolic dysfunction and valvular heart disease.  Her EF was greater than 70 02/2025.  She is on torsemide 40 mg twice daily and spironolactone 25 mg daily.  She is not on SGLT2 inhibitor at this time because her blood pressure was in the 90s often while inpatient recently.    5.  Mild, non obstructive coronary artery disease on cardiac cath June 2020.  Negative perfusion stress test July 2022 no angina.  6.  Mild aortic valve stenoses on echo 02/2025  7.  Chronic right bundle branch block  8.  Normal bilateral lower extremity arterial study August 2020-she has significant cyanosis in the feet.  Felt to be related to chronic hypoxia despite oxygen use.   9.  Persistent lower extremity edema.  Improved on that she is on torsemide 40 mg twice daily.  10.  Iron deficient anemia.  Her hemoglobin was 14.4 on 2/24/2025      Follow-up in 3 months.  Call sooner with any questions or concerns        It has been a pleasure to participate in this patient's care.      Thank you,  GENET Sanchez      **I used Dragon to dictate this note:**

## 2025-03-03 NOTE — TELEPHONE ENCOUNTER
Pharmacist at the [facility] in [facility] called stating they don't have a Torsemide 40mg, but they have a 20mg, wants to know if it is okay to verbal re-write for 2x daily for this script.

## 2025-03-04 NOTE — TELEPHONE ENCOUNTER
Confirmed with Pharmacy they have received the refill for 20 mg 2 tablets twice daily.  I also called patient and informed her of the dose change.  Patient verbalized understanding./ MONO

## 2025-03-06 ENCOUNTER — ANTICOAGULATION VISIT (OUTPATIENT)
Dept: PHARMACY | Facility: HOSPITAL | Age: 75
End: 2025-03-06
Payer: MEDICARE

## 2025-03-06 DIAGNOSIS — I48.20 ATRIAL FIBRILLATION, CHRONIC: Primary | Chronic | ICD-10-CM

## 2025-03-06 LAB — INR PPP: 2.2

## 2025-03-06 PROCEDURE — G0249 PROVIDE INR TEST MATER/EQUIP: HCPCS

## 2025-03-06 NOTE — PROGRESS NOTES
Anticoagulation Clinic Progress Note    Anticoagulation Summary  As of 3/6/2025      INR goal:  2.0-3.0   TTR:  53.0% (4 y)   INR used for dosin.20 (3/6/2025)   Warfarin maintenance plan:  2.5 mg every Mon, Fri; 1.25 mg all other days   Weekly warfarin total:  11.25 mg   Plan last modified:  Marleny Black RPH (3/6/2025)   Next INR check:  3/13/2025   Priority:  High   Target end date:  --    Indications    Atrial fibrillation  chronic [I48.20]                 Anticoagulation Episode Summary       INR check location:  --    Preferred lab:  --    Send INR reminders to:   [facility] [employer] HOME TEST POOL    Comments:   Home Testing as of 21 *call only when out of range*          Anticoagulation Care Providers       Provider Role Specialty Phone number    Zenia Tinajero MD Referring Cardiology [phone]            Clinic Interview:  Patient Findings     Negatives:  Signs/symptoms of thrombosis, Signs/symptoms of bleeding,   Laboratory test error suspected, Change in health, Change in alcohol use,   Change in activity, Upcoming invasive procedure, Emergency department   visit, Upcoming dental procedure, Missed doses, Extra doses, Change in   medications, Change in diet/appetite, Hospital admission, Bruising, Other   complaints      Clinical Outcomes     Negatives:  Major bleeding event, Thromboembolic event,   Anticoagulation-related hospital admission, Anticoagulation-related ED   visit, Anticoagulation-related fatality        INR History:      2025     9:43 AM 2025     4:18 AM 2025     2:49 AM 2025    12:00 AM 2025     3:46 PM 3/6/2025    12:00 AM 3/6/2025     1:45 PM   Anticoagulation Monitoring   INR     4.70  2.20   INR Date     2025  3/6/2025   INR Goal     2.0-3.0  2.0-3.0   Trend     Same  Down   Last Week Total     12.5 mg  10 mg   Next Week Total     10 mg  11.25 mg   Sun     1.25 mg  1.25 mg   Mon     2.5 mg  2.5 mg   Tue     1.25 mg  1.25 mg   Wed     2.5 mg   1.25 mg   Thu     Hold (2/27)  1.25 mg   Fri     1.25 mg (2/28)  2.5 mg   Sat     1.25 mg  1.25 mg   Historical INR 2.90  2.92  3.10  4.70      2.20            This result is from an external source.       Plan:  1. INR is Therapeutic today- see above in Anticoagulation Summary.   Will instruct Shani Phillip to Change their warfarin regimen- see above in Anticoagulation Summary.   trial on 2.5  mg MF, 1.25 mg URMILA, sheeba 1 week   2. Follow up in 1 weeks  3. They have been instructed to call if any changes in medications, doses, concerns, etc. Patient expresses understanding and has no further questions at this time.    Marleny Black RP

## 2025-03-10 ENCOUNTER — READMISSION MANAGEMENT (OUTPATIENT)
Dept: CALL CENTER | Facility: HOSPITAL | Age: 75
End: 2025-03-10
Payer: MEDICARE

## 2025-03-10 NOTE — OUTREACH NOTE
CHF Week 2 Survey      Flowsheet Row Responses   [facility] facility patient discharged from? [place]   Does the patient have one of the following disease processes/diagnoses(primary or secondary)? CHF   Week 2 attempt successful? No   Unsuccessful attempts Attempt 1   Revoke Other  [ACM program]            Lori MATHUR - Registered Nurse

## 2025-03-12 ENCOUNTER — TELEPHONE (OUTPATIENT)
Dept: FAMILY MEDICINE CLINIC | Facility: CLINIC | Age: 75
End: 2025-03-12

## 2025-03-12 ENCOUNTER — TELEPHONE (OUTPATIENT)
Dept: CARDIOLOGY | Age: 75
End: 2025-03-12

## 2025-03-12 RX ORDER — CEPHALEXIN 500 MG/1
500 CAPSULE ORAL 3 TIMES DAILY
Qty: 30 CAPSULE | Refills: 0 | Status: SHIPPED | OUTPATIENT
Start: 2025-03-12

## 2025-03-12 NOTE — TELEPHONE ENCOUNTER
Caller: Shani Phillip    Relationship: Self    Best call back number: [phone]      What medication are you requesting: PRESSURE, FREQUENT URINATION, DARK AND CLOUDY URINE     What are your current symptoms:  PRESSURE, FREQUENT URINATION, DARK AND CLOUDY URINE       How long have you been experiencing symptoms: TODAY    Have you had these symptoms before:    [x] Yes  [] No    Have you been treated for these symptoms before:   [x] Yes  [] No    If a prescription is needed, what is your preferred pharmacy and phone number: [facility], [facility] [address] [facility] [address] [facility] [phone] [facility] [phone] FX     Additional notes:    PLEASE CALL TO CONFIRM OR DENY.

## 2025-03-12 NOTE — TELEPHONE ENCOUNTER
Called and spoke with Patient and this started today.  Tried to make her an appointment but her son in law passed and they are in the middle of making arrangements and her daughter who is her ride can not bring her in for an appointment.  Can you please advise

## 2025-03-12 NOTE — TELEPHONE ENCOUNTER
Caller: Lottie rAroyo    Relationship to patient: Emergency Contact    Best call back number:[phone]     Patient is needing: PT HAS BLADDER OR KIDNEY INFECTION AND LOTTIE IS REQUESTING IF DR CHASE COULD PRESCRIBE HER SOMETHING REGARDING THIS? PLS CALL & ADVISE.

## 2025-03-20 ENCOUNTER — ANTICOAGULATION VISIT (OUTPATIENT)
Dept: PHARMACY | Facility: HOSPITAL | Age: 75
End: 2025-03-20
Payer: MEDICARE

## 2025-03-20 DIAGNOSIS — I48.20 ATRIAL FIBRILLATION, CHRONIC: Primary | Chronic | ICD-10-CM

## 2025-03-20 LAB — INR PPP: 1.4

## 2025-03-20 NOTE — PROGRESS NOTES
Anticoagulation Clinic Progress Note    Anticoagulation Summary  As of 3/20/2025      INR goal:  2.0-3.0   TTR:  52.7% (4 y)   INR used for dosin.40 (3/20/2025)   Warfarin maintenance plan:  2.5 mg every Mon, Wed, Fri; 1.25 mg all other days   Weekly warfarin total:  12.5 mg   Plan last modified:  Keshawn Castaneda, PharmD (3/20/2025)   Next INR check:  3/27/2025   Priority:  High   Target end date:  --    Indications    Atrial fibrillation  chronic [I48.20]                 Anticoagulation Episode Summary       INR check location:  --    Preferred lab:  --    Send INR reminders to:   [facility] HOME TEST POOL    Comments:   Home Testing as of 21 *call only when out of range*          Anticoagulation Care Providers       Provider Role Specialty Phone number    Zenia Tinajero MD Referring Cardiology [phone]            Clinic Interview:  Patient Findings     Positives:  Change in health, Change in medications    Negatives:  Signs/symptoms of thrombosis, Signs/symptoms of bleeding,   Laboratory test error suspected, Change in alcohol use, Change in   activity, Upcoming invasive procedure, Emergency department visit,   Upcoming dental procedure, Missed doses, Extra doses, Change in   diet/appetite, Hospital admission, Bruising, Other complaints    Comments:  She was recently prescribed a 10-day course of cephalexin for   UTI; a few days remain.       Clinical Outcomes     Negatives:  Major bleeding event, Thromboembolic event,   Anticoagulation-related hospital admission, Anticoagulation-related ED   visit, Anticoagulation-related fatality    Comments:  She was recently prescribed a 10-day course of cephalexin for   UTI; a few days remain.         INR History:      2025     2:49 AM 2025    12:00 AM 2025     3:46 PM 3/6/2025    12:00 AM 3/6/2025     1:45 PM 3/20/2025    12:00 AM 3/20/2025     2:07 PM   Anticoagulation Monitoring   INR   4.70  2.20  1.40   INR Date   2025  3/6/2025   3/20/2025   INR Goal   2.0-3.0  2.0-3.0  2.0-3.0   Trend   Same  Down  Up   Last Week Total   12.5 mg  10 mg  11.25 mg   Next Week Total   10 mg  11.25 mg  13.75 mg   Sun   1.25 mg  1.25 mg  1.25 mg   Mon   2.5 mg  2.5 mg  2.5 mg   Tue   1.25 mg  1.25 mg  1.25 mg   Wed   2.5 mg  1.25 mg  2.5 mg   Thu   Hold (2/27)  1.25 mg  2.5 mg (3/20)   Fri   1.25 mg (2/28)  2.5 mg  2.5 mg   Sat   1.25 mg  1.25 mg  1.25 mg   Historical INR 3.10  4.70      2.20      1.40            This result is from an external source.       Plan:  1. INR is Subtherapeutic today- see above in Anticoagulation Summary.   Will instruct Shani Phillip to Increase their warfarin regimen (2.5 mg today, then increase to 2.5 mg MWF, 1.25 mg all other days) - see above in Anticoagulation Summary.  2. Follow up in 1 week.  3. They have been instructed to call if any changes in medications, doses, concerns, etc. Patient expresses understanding and has no further questions at this time.    Keshawn Castaneda, PharmD

## 2025-03-24 ENCOUNTER — PATIENT OUTREACH (OUTPATIENT)
Dept: CASE MANAGEMENT | Facility: OTHER | Age: 75
End: 2025-03-24
Payer: MEDICARE

## 2025-03-24 DIAGNOSIS — I50.32 CHRONIC DIASTOLIC CHF (CONGESTIVE HEART FAILURE): Primary | ICD-10-CM

## 2025-03-24 DIAGNOSIS — J44.9 CHRONIC OBSTRUCTIVE PULMONARY DISEASE, UNSPECIFIED COPD TYPE: ICD-10-CM

## 2025-03-24 NOTE — PLAN OF CARE
Problem: Heart Failure  Goal: Track and Manage Fluids and Swelling  Outcome: Progressing  Intervention: My Fluids and Swelling To Do List  Description:   Why is this important?  It is important to check your weight at home every day.  Also, check for swelling in your feet and legs every day.  Keeping your legs up while you are sitting and doing ankle pump exercises will help with the swelling.    Flowsheets (Taken 3/24/2025 1326)  My Fluids and Swelling To Do List:   read food labels for sodium (salt) content   weigh myself every day   use salt in moderation   watch for swelling in feet, ankles and legs every day  Goal: Track and Manage Symptoms  Outcome: Progressing  Goal: Track and Manage Activity and Exertion  Outcome: Progressing  Goal: Optimal Care Coordination of a Patient Experiencing Heart Failure  Outcome: Progressing  Intervention: Identify and Minimize Risk of Heart Failure Exacerbation  Flowsheets (Taken 3/24/2025 1326)  Identify and Minimize Risk of Heart Failure Exacerbation:   barriers to lifestyle changes reviewed and addressed   barriers to treatment reviewed and addressed   medication-adherence assessment completed  Intervention: Identify and Manage Comorbidities and Complications  Flowsheets (Taken 3/24/2025 1326)  Identify and Manage Comorbidities and Complications: response to pharmacologic therapy monitored  Intervention: Alleviate Barriers to Optimal Nutrition and Fluid Status  Flowsheets (Taken 3/24/2025 1326)  Alleviate Barriers to Optimal Nutrition and Fluid Status:   home monitoring of weight gain or loss encouraged   barriers to sufficient nutrient intake addressed  Intervention: Maintain Strength and Functional Ability  Flowsheets (Taken 3/24/2025 1326)  Maintain Strength and Functional Ability:   barriers to activity identified and managed   activity or exercise based on tolerance encouraged   daily activity/exercise promoted  Intervention: Monitor and Manage Sleep  Disturbance  Flowsheets (Taken 3/24/2025 1326)  Monitor and Manage Sleep Disturbance: (using O2 at 2L) oxygen therapy promoted  Intervention: Support Psychosocial Response to Heart Failure  Flowsheets (Taken 3/24/2025 1326)  Support Psychosocial Response to Heart Failure: verbalization of feelings encouraged  Intervention: Develop and Maintain Palliative Care Plan  Flowsheets (Taken 3/24/2025 1326)  Develop and Maintain Palliative Care Plan: active listening utilized     Problem: COPD  Goal: Track and Manage My Symptoms  Outcome: Progressing  Intervention: My COPD Symptoms To Do List  Description:   Why is this important?  Tracking your symptoms and other information about your health helps your doctor plan your care.  Write down the symptoms, the time of day, what you were doing and what medicine you are taking.  You will soon learn how to manage your symptoms.    Flowsheets (Taken 3/24/2025 1326)  My COPD Symptoms To Do List:   keep follow-up appointments   use an extra pillow to sleep  Goal: Track and Manage My Triggers  Outcome: Progressing  Goal: Manage My Fatigue (Tiredness)  Outcome: Progressing  Intervention: My Fatigue Management To Do List  Description:   Why is this important?  Feeling tired or worn out is a common symptom of COPD (chronic obstructive pulmonary disease).  Learning when you feel your best and when you need rest is important.  Managing the tiredness (fatigue) will help you be active and enjoy life.    Flowsheets (Taken 3/24/2025 1326)  My Fatigue Management To Do List: keep bedroom cool and dark  Goal: Learn and Do Breathing Exercises  Outcome: Progressing  Goal: Optimal Care Coordination of a Patient Experiencing COPD  Outcome: Progressing  Intervention: Support Psychosocial Response to COPD  Flowsheets (Taken 3/24/2025 1326)  Support Psychosocial Response to COPD: verbalization of feelings encouraged  Intervention: Alleviate Barriers to COPD Management  Flowsheets (Taken 3/24/2025  1326)  Alleviate Barriers to COPD Management:   barriers to treatment managed   activity or exercise based on tolerance encouraged  Intervention: Identify and Minimize Risk of COPD Exacerbation  Flowsheets (Taken 3/24/2025 1326)  Identify and Minimize Risk of COPD Exacerbation:   barriers to lifestyle changes reviewed and addressed   barriers to treatment reviewed and addressed

## 2025-03-24 NOTE — OUTREACH NOTE
"AMBULATORY CASE MANAGEMENT NOTE    Names and Relationships of Patient/Support Persons: Contact: Shani Phillip; Relationship: Self -     CCM Interim Update    Called and spoke with patient for CCM update. Patient states she is \"doing well\". Reviewed recent office visit with Nephrology and Cardiology. Patient denies any questions or concerns. Reported compliance with medications.    CHF/COPD - monitors daily weight. Patient verbalizes how many pounds to watch for and when to report. She denies increasing SOB - uses O2 at 2L via NC continuously. She keeps eye on her edema, she states this is improved. Reminded to keep eye on her salt intake, elevate when sitting or laying. She denies any chest pain or palpitations. Patient states she takes her diuretic as prescribed, at times she voids a lot and at times she does not think she voids a lot. She verbalizes that she does not think she drinks enough water on a daily basis. Encouraged to keep water with her to remind her to stay hydrated throughout the day. She verbalizes understanding.  COPD - States she has future appt with Pulmonologist but just don't remember exact date. She is using her nebulizer treatment 4x/day. Sleep is fair, encouraged to keep environment cool/dark to help with sleep. She sleeps with her head elevated.   She denies urgent needs at this time. Encouraged patient to reach out should she have any concerns or symptom change.     Education Documentation  No documentation found.        Lester BUTT  Ambulatory Case Management    3/24/2025, 13:30 EDT  "

## 2025-03-27 ENCOUNTER — ANTICOAGULATION VISIT (OUTPATIENT)
Dept: PHARMACY | Facility: HOSPITAL | Age: 75
End: 2025-03-27
Payer: MEDICARE

## 2025-03-27 DIAGNOSIS — I48.20 ATRIAL FIBRILLATION, CHRONIC: Primary | Chronic | ICD-10-CM

## 2025-03-27 LAB — INR PPP: 2.5

## 2025-03-27 RX ORDER — WARFARIN SODIUM 2.5 MG/1
TABLET ORAL
Qty: 75 TABLET | Refills: 0 | Status: SHIPPED | OUTPATIENT
Start: 2025-03-27

## 2025-03-27 NOTE — PROGRESS NOTES
Anticoagulation Clinic Progress Note    Anticoagulation Summary  As of 3/27/2025      INR goal:  2.0-3.0   TTR:  52.7% (4 y)   INR used for dosin.50 (3/27/2025)   Warfarin maintenance plan:  2.5 mg every Mon, Wed, Fri; 1.25 mg all other days   Weekly warfarin total:  12.5 mg   No change documented:  Marleny Black RPH   Plan last modified:  Keshawn Castaneda, PharmD (3/20/2025)   Next INR check:  4/3/2025   Priority:  High   Target end date:  --    Indications    Atrial fibrillation  chronic [I48.20]                 Anticoagulation Episode Summary       INR check location:  --    Preferred lab:  --    Send INR reminders to:   [facility] [employer] HOME TEST POOL    Comments:   Home Testing as of 21 *call only when out of range*          Anticoagulation Care Providers       Provider Role Specialty Phone number    Zenia Tinajero MD Referring Cardiology [phone]            Clinic Interview:  No pertinent clinical findings have been reported.    INR History:      2025     3:46 PM 3/6/2025    12:00 AM 3/6/2025     1:45 PM 3/20/2025    12:00 AM 3/20/2025     2:07 PM 3/27/2025    12:00 AM 3/27/2025     2:08 PM   Anticoagulation Monitoring   INR 4.70  2.20  1.40  2.50   INR Date 2025  3/6/2025  3/20/2025  3/27/2025   INR Goal 2.0-3.0  2.0-3.0  2.0-3.0  2.0-3.0   Trend Same  Down  Up  Same   Last Week Total 12.5 mg  10 mg  11.25 mg  13.75 mg   Next Week Total 10 mg  11.25 mg  13.75 mg  12.5 mg   Sun 1.25 mg  1.25 mg  1.25 mg  1.25 mg   Mon 2.5 mg  2.5 mg  2.5 mg  2.5 mg   Tue 1.25 mg  1.25 mg  1.25 mg  1.25 mg   Wed 2.5 mg  1.25 mg  2.5 mg  2.5 mg   Thu Hold ()  1.25 mg  2.5 mg (3/20)  1.25 mg   Fri 1.25 mg ()  2.5 mg  2.5 mg  2.5 mg   Sat 1.25 mg  1.25 mg  1.25 mg  1.25 mg   Historical INR  2.20      1.40      2.50            This result is from an external source.       Plan:  1. INR is therapeutic today- see above in Anticoagulation Summary.    Shani Phillip to continue their warfarin regimen-  see above in Anticoagulation Summary.  2. Follow up in 1 week  3. They have been instructed to call if any changes in medications, doses, concerns, etc. Patient expresses understanding and has no further questions at this time.    Marleny Black RP

## 2025-03-28 ENCOUNTER — OFFICE VISIT (OUTPATIENT)
Age: 75
End: 2025-03-28
Payer: MEDICARE

## 2025-03-28 VITALS
HEART RATE: 105 BPM | DIASTOLIC BLOOD PRESSURE: 64 MMHG | WEIGHT: 134 LBS | BODY MASS INDEX: 25.3 KG/M2 | SYSTOLIC BLOOD PRESSURE: 130 MMHG | HEIGHT: 61 IN

## 2025-03-28 DIAGNOSIS — I48.11 LONGSTANDING PERSISTENT ATRIAL FIBRILLATION: Primary | ICD-10-CM

## 2025-03-28 PROCEDURE — 99214 OFFICE O/P EST MOD 30 MIN: CPT | Performed by: STUDENT IN AN ORGANIZED HEALTH CARE EDUCATION/TRAINING PROGRAM

## 2025-03-28 PROCEDURE — 93000 ELECTROCARDIOGRAM COMPLETE: CPT | Performed by: STUDENT IN AN ORGANIZED HEALTH CARE EDUCATION/TRAINING PROGRAM

## 2025-03-28 NOTE — PROGRESS NOTES
[facility] CARDIOLOGY  Clinical Cardiac Electrophysiology     Date of consultation: 3/28/2025  Referring Provider: No referring provider defined for this encounter.     Reason for consultation: A-fib RVR      History of Presenting Illness     OFFICE VISIT  Shani Phillip is a 74 y.o. female with a past medical history of chronic atrial fibrillation, mitral valve stenosis, history of mitral valve regurgitation, COPD (on home oxygen), hypertension, lower extremity edema, hypertension, history of mitral valve vegetation in 2019 treated with antibiotics, moderate to severe mitral valve stenosis (8/29/2024) who presents for new patient evaluation.     Patient was referred for consideration of a pace/ablate strategy.    Patient was initially seen on 10/2/2024 at that time the following is noted:    Patient notes that she does feel her atrial fibrillation as palpitations/fluttering in her chest.  She notes that overall she usually rests on the couch during the daytime.  Today she did use a wheelchair to come to the clinic visit.  She says overall, her symptoms are moderate and they bothersome, likely have worsened a little bit over the past year.    Seen again today 3/28/2025.    Notably, patient had a recent hospitalization from 2/17/2025 - 2/24/2025 due to acute on chronic valvular diastolic heart failure due to moderate to severe mitral stenosis.    Interestingly, when she first presented, an EKG that was recorded on 2/17/2025 at 10:21 PM actually showed sinus rhythm with atrial premature complexes.  Indeed, recorded telemetry from that they actually did also show sinus rhythm with PACs.         Past Medical History     Past Medical History:   Diagnosis Date    Acute endocarditis     Atrial fibrillation with RVR 11/14/2019    Cancer     CKD (chronic kidney disease) stage 3, GFR 30-59 ml/min     COPD (chronic obstructive pulmonary disease)     CTS (carpal tunnel syndrome)     Dizziness      Gall stones     Influenza 11/14/2019    Medicare annual wellness visit, subsequent 04/12/2021    Migraine     Osteoporosis     Renal disorder     Restless leg syndrome     Thrush, oral 11/14/2019     Past Surgical History:   Procedure Laterality Date    CARDIAC CATHETERIZATION N/A 1/23/2020    Procedure: Coronary angiography;  Surgeon: Svetlana Grayson MD;  Location:  [facility];  Service: Cardiovascular    CARDIAC CATHETERIZATION N/A 1/23/2020    Procedure: Left ventriculography;  Surgeon: Svetlana Grayson MD;  Location:  [facility];  Service: Cardiovascular    CARDIAC CATHETERIZATION N/A 1/23/2020    Procedure: Left Heart Cath;  Surgeon: Svetlana Grayson MD;  Location:  [facility];  Service: Cardiovascular    CHOLECYSTECTOMY      KNEE ARTHROPLASTY Right     LAPAROSCOPIC GASTRIC BANDING           Medications     Current Outpatient Medications on File Prior to Visit   Medication Sig Dispense Refill    ammonium lactate (LAC-HYDRIN) 12 % lotion APPLY TOPICALLY TO THE APPROPRIATE AREA AS DIRECTED AS NEEDED FOR DRY SKIN. 225 g 6    budesonide (PULMICORT) 0.5 MG/2ML nebulizer solution USE 1 VIAL IN NEBULIZER DAILY - rinse mouth after treatment 30 each 11    buPROPion XL (WELLBUTRIN XL) 300 MG 24 hr tablet Take 1 tablet by mouth Daily. 90 tablet 3    carvedilol (COREG) 3.125 MG tablet Take 1 tablet by mouth 2 (Two) Times a Day With Meals. 180 tablet 1    cephalexin (KEFLEX) 500 MG capsule Take 1 capsule by mouth 3 (Three) Times a Day. 30 capsule 0    cyclobenzaprine (FLEXERIL) 10 MG tablet Take 1 tablet by mouth 3 (Three) Times a Day As Needed (back pain). 40 tablet 1    digoxin (LANOXIN) 125 MCG tablet Take 1 tablet by mouth Every Other Day. 90 tablet 1    famotidine (PEPCID) 20 MG tablet TAKE 1 TABLET BY MOUTH 2 TIMES A DAY BEFORE MEALS. 180 tablet 3    ferrous sulfate 325 (65 FE) MG tablet Take 1 tablet by mouth Every Other Day. 45 tablet 1    HYDROcodone-acetaminophen  (NORCO) 7.5-325 MG per tablet Take 1 tablet by mouth Every 6 (Six) Hours As Needed for Moderate Pain. 30 tablet 0    ipratropium-albuterol (DUO-NEB) 0.5-2.5 mg/3 ml nebulizer USE 1 VIAL IN NEBULIZER 4 TIMES DAILY - as directed 120 mL 11    levothyroxine (SYNTHROID, LEVOTHROID) 50 MCG tablet TAKE 1 TABLET BY MOUTH EVERY DAY IN THE MORNING 90 tablet 1    meclizine (ANTIVERT) 12.5 MG tablet Take 1 tablet by mouth 3 (Three) Times a Day As Needed for Dizziness. 12 tablet 0    nystatin (MYCOSTATIN) 966761 UNIT/GM powder Apply  topically to the appropriate area as directed 4 (Four) Times a Day. Under both breasts 30 g 1    O2 (OXYGEN) Inhale 2 L/min 1 (One) Time.      ondansetron (ZOFRAN) 4 MG tablet Take 1 tablet by mouth Every 8 (Eight) Hours As Needed for Nausea or Vomiting. 30 tablet 1    pantoprazole (PROTONIX) 40 MG EC tablet Take 1 tablet by mouth Daily. 90 tablet 1    potassium chloride ER (K-TAB) 20 MEQ tablet controlled-release ER tablet Take 1 tablet by mouth Daily.      pramipexole (MIRAPEX) 0.5 MG tablet Take 1 tablet by mouth 2 (Two) Times a Day. 180 tablet 3    revefenacin (Yupelri) 175 MCG/3ML nebulizer solution Take 3 mL by nebulization Daily.      spironolactone (ALDACTONE) 25 MG tablet TAKE 1 TABLET BY MOUTH EVERY DAY 90 tablet 1    torsemide (DEMADEX) 20 MG tablet Take 2 tablets by mouth 2 (Two) Times a Day. 360 tablet 1    trospium (SANCTURA) 20 MG tablet Take 1 tablet by mouth 2 (Two) Times a Day. 180 tablet 3    warfarin (COUMADIN) 2.5 MG tablet TAKE 1/2 TABLET ON MONDAY, WEDNESDAY AND SATURDAY, THEN 1 TABLET ALL OTHER DAYS AS DIRECTED. 75 tablet 0     No current facility-administered medications on file prior to visit.           Allergies     Allergies   Allergen Reactions    Penicillins Rash     RASH 30 YRS AGO NO HOSPITALIZATION           Family History     Family History   Problem Relation Age of Onset    Lung cancer Mother            Social History     Social History     Socioeconomic History     "Marital status:    Tobacco Use    Smoking status: Former     Current packs/day: 0.00     Average packs/day: 0.5 packs/day for 50.0 years (25.0 ttl pk-yrs)     Types: Cigarettes     Start date: 11/3/1969     Quit date: 11/3/2019     Years since quittin.4     Passive exposure: Past (parents smoked in the home)    Smokeless tobacco: Never    Tobacco comments:     LAST CIG 2019   Vaping Use    Vaping status: Never Used   Substance and Sexual Activity    Alcohol use: No     Comment: caffeine - 1/2 cup coffee daily     Drug use: No    Sexual activity: Defer        Review of Systems: All systems reviewed. Pertinent positives identified in HPI. All other systems are negative.         Physical Examination     Vitals:    25 1330   BP: 130/64   Pulse: 105   Weight: 60.8 kg (134 lb)   Height: 154.9 cm (61\")       Body mass index is 25.32 kg/m².     Gen: Well nourished; Alert & oriented  Skin: no visible rashes and no abnormalities with palpation  Eyes: no evidence of visual defects and normal sclera  Ears: no abnormalities.  Mouth: normal teeth and appropriately moist mucous membranes  Chest: clear to auscultation. There is normal respiratory effort and expansion.  CV: examination showed a irregular rhythm. S1 and S2 were normal.   Abd: no visible distension.   Neurologic:Cranial nerves appear intact; Sensation normal; no localizing signs  Extremities: Bilateral lower extremities with chronic skin changes.        Laboratory Studies (Reviewed by provider)    Lab Results   Component Value Date    WBC 6.19 2025    HGB 14.4 2025    HCT 45.4 2025    MCV 95.6 2025     2025     Lab Results   Component Value Date    GLUCOSE 104 (H) 2025    BUN 21 2025    CO2 30.0 (H) 2025    CREATININE 1.23 (H) 2025    K 3.8 2025     2025    CL 99 2025    CALCIUM 8.7 2025     Lab Results   Component Value Date    MG 1.8 2025     Lab " Results   Component Value Date    PHOS 2.0 (L) 02/24/2025     Lab Results   Component Value Date    ALT 10 02/17/2025    AST 21 02/17/2025    ALKPHOS 132 (H) 02/17/2025    BILITOT 0.3 02/17/2025     Lab Results   Component Value Date    TRIG 126 10/07/2024    HDL 54 10/07/2024     (H) 10/07/2024     Lab Results   Component Value Date    PTT 34.1 04/08/2024    INR 2.50 03/27/2025     Lab Results   Component Value Date    HGBA1C 5.6 04/12/2021     Lab Results   Component Value Date    TSH 5.790 (H) 02/19/2025        Investigations (Reviewed by provider)    EKG 03/28/25:   ECG 12 Lead    Date/Time: 3/28/2025 1:32 PM  Performed by: Prieto Walter MD    Authorized by: Prieto Walter MD  Comparison: compared with previous ECG from 2/17/2025  Similar to previous ECG  Rhythm: atrial fibrillation  Conduction: right bundle branch block    Clinical impression: abnormal EKG        7/2/2024:       ECHOCARDIOGRAPHY: 2/18/2025       Left ventricular systolic function is hyperdynamic (EF > 70%).    Normal right ventricular cavity size noted. Hyperdynamic right ventricular systolic function noted.    The left atrial cavity is moderately dilated.    Mild aortic valve stenosis is present. Aortic valve maximum pressure gradient is 35.0 mmHg. Aortic valve mean pressure gradient is 14.2 mmHg. The aortic valve leaflets are moderately calcified    Moderate to severe mitral valve stenosis is present. There is severe mitral annular calcification.    The mitral valve peak gradient is 19.00 mmHg. The mitral valve mean gradient is 7.00 mmHg.    There is a mobile echodensity on the atrial surface of the anterior mitral valve leaflet which may either represent a problem chordae or fibroblastoma (appearance would be atypical for vegetation)    Mild tricuspid valve regurgitation is present.    Calculated right ventricular systolic pressure from tricuspid regurgitation is 53 mmHg.    There is a trivial pericardial effusion.         8/26/2024:     Left ventricular systolic function is hyperdynamic (EF > 70%). Calculated left ventricular EF = 78.7% Normal left ventricular cavity size noted. Left ventricular wall thickness is consistent with moderate concentric hypertrophy. All left ventricular wall segments contract normally. There is systolic into motion of the chordae of the edge of mitral valve leaflet with modest outflow tract obstruction noted at rest with a late peaking gradient in the left ventricular outflow tract of 34 mmHg with Valsalva. Left ventricular diastolic function was indeterminate.    The left atrial cavity is severely dilated. The right atrial cavity is mildly dilated    No aortic valve regurgitation or stenosis is present. The aortic valve is abnormal in structure. There is moderate calcification of the aortic valve.    Severe mitral annular calcification is present. There is severe, bileaflet mitral valve thickening present. No mitral valve regurgitation is present. Moderate to severe mitral valve stenosis is present. The mean mitral valve gradient is 14 mmHg at a heart rate of 117 bpm.    Mild to moderate tricuspid valve regurgitation is present. Estimated right ventricular systolic pressure from tricuspid regurgitation is moderately elevated (45-55 mmHg). Calculated right ventricular systolic pressure from tricuspid regurgitation is 53 mmHg.     NM MYOCARDIAL PERF STRESS TEST: 6/20/2023:    Left ventricular ejection fraction is normal (Calculated EF = 70%).    Myocardial perfusion imaging indicates a normal myocardial perfusion study with no evidence of ischemia.    Impressions are consistent with a low risk study.    Holter 9/25/2024:    An abnormal monitor study.    48-hour Holter monitor continuous atrial fibrillation heart rate ranging 70 to 144 bpm (average 102 bpm).  Rapid ventricular response was noted 58% of the monitored time. Rare ventricular ectopy with no NSVT. No significant pauses or bradycardia noted. No patient  triggered or diary events reported.     Assessment & Plan  # Persistent atrial fibrillation.  - [id number] Score: 4  -On anticoagulation with warfarin  - Recent Holter was performed. 100% afib w/ average heart rate was 100 bpm with a range between  bpm.  - Notably, she did have a hospitalization on 2/17/2025 that shows an EKG with apparent normal sinus rhythm.  - Notably, I did talk with the patient extensively about pace/ablate strategy.  Notably, she has very high risk.  I also discussed that I would not be able to make her feel any better than how she felt when she was in sinus rhythm.  - Patient notes significant shortness of breath with any activity.  I noted with pace and ablate, I would be able to reduce her elevated heart rates under exertion, but likely would not be able to improve her shortness of breath.  - Notably, she has history of endocarditis that was treated with antibiotics, thus the only type of pacemaker that be appropriate would be a Micra.  - Notably, she will be very high risk for Micra pacemaker due to her frailty, comorbidities, and and limited ability to lay flat postprocedure.  - Indeed, she notes that she would have significant trouble laying flat for the 2-3 hours required after pacemaker implantation and/or after ablation.  - After extensive discussion consideration risk benefits, we have elected to continue medical management at this time.    # Moderate-severe mitral valve stenosis  - Notably, she was previously evaluated by Dr. Briones but was felt to be too high risk for surgery      Follow up in 12 months   Plan: Continue rate control for the time being.  Monitor symptoms.     As always, Dr. Da Silva ref. provider found, it has been a pleasure to participate in your patient's care. We will continue to follow the patient in our clinic.     This time spent caring for Shani Phillip on this date of service includes time spent by me in the following activities:preparing for the visit,  reviewing tests, obtaining and/or reviewing a separately obtained history, performing a medically appropriate examination and/or evaluation, counseling and educating the patient/family/caregiver, documenting information in the medical record, and independently interpreting results and communicating that information with the patient/family/caregiver    Prieto Walter MD  Clinical Cardiac Electrophysiology  [facility]

## 2025-03-31 ENCOUNTER — PATIENT OUTREACH (OUTPATIENT)
Dept: CASE MANAGEMENT | Facility: OTHER | Age: 75
End: 2025-03-31
Payer: MEDICARE

## 2025-03-31 DIAGNOSIS — J44.9 CHRONIC OBSTRUCTIVE PULMONARY DISEASE, UNSPECIFIED COPD TYPE: ICD-10-CM

## 2025-03-31 DIAGNOSIS — I50.32 CHRONIC DIASTOLIC CHF (CONGESTIVE HEART FAILURE): Primary | ICD-10-CM

## 2025-03-31 NOTE — OUTREACH NOTE
[facility] End of Month Documentation    This Chronic Medical Management Care Plan for Shani Phillip, 74 y.o. female, has been monitored and managed; reviewed and a new plan of care implemented for the month of March.  A cumulative time of 21  minutes was spent on this patient record this month, including chart review; phone call with patient, F/U [facility]. CHF and COPD management. Care coordination. Reviewed meds and f.u with providers..    Regarding the patient's problems: has Influenza; Thrush, oral; COPD (chronic obstructive pulmonary disease); Atrial fibrillation with RVR; Chronic respiratory failure with hypoxia; Endocarditis of mitral valve; Mitral valve vegetation; Chronic diastolic CHF (congestive heart failure); Atrial fibrillation, chronic; Bilateral lower extremity edema; Intermittent lightheadedness; Depression; Chronic midline low back pain with left-sided sciatica; Nephrolithiasis; Oxygen dependent; Medicare annual wellness visit, subsequent; Osteoporosis; CKD (chronic kidney disease) stage 3, GFR 30-59 ml/min; Chronic anticoagulation; Nonrheumatic mitral valve stenosis; Chest pain, unspecified type; Chronic heart failure with preserved ejection fraction (HFpEF); Overweight with body mass index (BMI) 25.0-29.9; Acquired hypothyroidism; and Acute on chronic combined systolic and diastolic CHF (congestive heart failure) on their problem list., the following items were addressed: medical records; medications and any changes can be found within the plan section of the note.  A detailed listing of time spent for chronic care management is tracked within each outreach encounter.  Current medications include:  has a current medication list which includes the following prescription(s): ammonium lactate, budesonide, bupropion xl, carvedilol, cephalexin, cyclobenzaprine, digoxin, famotidine, ferrous sulfate, hydrocodone-acetaminophen, ipratropium-albuterol, levothyroxine, meclizine, nystatin, o2, ondansetron,  pantoprazole, potassium chloride er, pramipexole, yupelri, spironolactone, torsemide, trospium, and warfarin. and the patient is reported to be patient is compliant with medication protocol,  Medications are reported to be effective.  Regarding these diagnoses, referrals were made to the following provider(s):  n/a.  All notes on chart for PCP to review.    The patient was monitored remotely for weight; activity level; medications.    The patient's physical needs include:  help taking medications as prescribed; physical healthcare; DME supplies.     The patient's mental support needs include:  continued support    The patient's cognitive support needs include:  continued support    The patient's psychosocial support needs include:  medication management or adherence; continued support    The patient's functional needs include: DME; physical healthcare    The patient's environmental needs include:  not applicable, n/a    Care Plan overall comments:  No data recorded    Refer to previous outreach notes for more information on the areas listed above.    Monthly Billing Diagnoses  (I50.32) Chronic diastolic CHF (congestive heart failure)    (J44.9) Chronic obstructive pulmonary disease, unspecified COPD type    Medications   Medications have been reconciled    Care Plan progress this month:      Recently Modified Care Plans Updates made since 2/28/2025 12:00 AM      No recently modified care plans.             COPD       Track and Manage My Symptoms (Progressing)       Start:  02/28/25    Expected End:  08/28/25         My COPD Symptoms To Do List       Start:  02/28/25         Why is this important?  Tracking your symptoms and other information about your health helps your doctor plan your care.  Write down the symptoms, the time of day, what you were doing and what medicine you are taking.  You will soon learn how to manage your symptoms.         Initial Last    My COPD Symptoms To Do List: keep follow-up appointments  taken at 02/28/25 keep follow-up appointments;use an extra pillow to sleep taken at 03/24/25            Track and Manage My Triggers (Progressing)       Start:  02/28/25    Expected End:  08/28/25         My COPD Triggers To Do List       Start:  02/28/25         Why is this important?  Triggers are activities or things, like tobacco smoke or cold weather, that make your COPD (chronic obstructive pulmonary disease) flare up.  Knowing these triggers helps you plan how to stay away from them.  When you cannot remove them, you can learn how to manage them.              Manage My Fatigue (Tiredness) (Progressing)       Start:  02/28/25    Expected End:  08/28/25         My Fatigue Management To Do List       Start:  02/28/25         Why is this important?  Feeling tired or worn out is a common symptom of COPD (chronic obstructive pulmonary disease).  Learning when you feel your best and when you need rest is important.  Managing the tiredness (fatigue) will help you be active and enjoy life.         Initial    My Fatigue Management To Do List: keep bedroom cool and dark taken at 03/24/25            Learn and Do Breathing Exercises (Progressing)       Start:  02/28/25    Expected End:  08/28/25         My Breathing Exercises To Do List       Start:  02/28/25         Why is this important?  Breathing exercises can help lessen the cough that comes with chronic obstructive pulmonary disease.  Doing the exercises will give you more energy.  They will also help you to control your symptoms.              Optimal Care Coordination of a Patient Experiencing COPD (Progressing)       Start:  02/28/25    Expected End:  08/28/25         Support Psychosocial Response to COPD       Start:  02/28/25          Initial Last    Support Psychosocial Response to COPD: verbalization of feelings encouraged taken at 02/28/25 verbalization of feelings encouraged taken at 03/24/25         Alleviate Barriers to COPD Management       Start:   02/28/25          Initial Last    Alleviate Barriers to COPD Management: barriers to treatment managed taken at 02/28/25 barriers to treatment managed;activity or exercise based on tolerance encouraged taken at 03/24/25         Identify and Minimize Risk of COPD Exacerbation       Start:  02/28/25          Initial Last    Identify and Minimize Risk of COPD Exacerbation: barriers to treatment reviewed and addressed;barriers to lifestyle changes reviewed and addressed taken at 02/28/25 barriers to lifestyle changes reviewed and addressed;barriers to treatment reviewed and addressed taken at 03/24/25           Heart Failure       Track and Manage Fluids and Swelling (Progressing)       Start:  02/28/25    Expected End:  08/28/25         My Fluids and Swelling To Do List       Start:  02/28/25         Why is this important?  It is important to check your weight at home every day.  Also, check for swelling in your feet and legs every day.  Keeping your legs up while you are sitting and doing ankle pump exercises will help with the swelling.         Initial Last    My Fluids and Swelling To Do List: call office if I gain more than 2 pounds in 1 day or 5 pounds in 1 week;track weight in diary;weigh myself every day;watch for swelling in feet, ankles and legs every day taken at 02/28/25 read food labels for sodium (salt) content;weigh myself every day;use salt in moderation;watch for swelling in feet, ankles and legs every day taken at 03/24/25            Track and Manage Symptoms (Progressing)       Start:  02/28/25    Expected End:  08/28/25         My Heart Failure Symptoms To Do List       Start:  02/28/25         Why is this important?  You will be able to handle your symptoms better if you keep track of them.  Making some simple changes to your lifestyle will help.  Knowing how to self-manage shortness of breath and when to call the doctor is very important.  Eating healthy is one thing you can do to take care of  yourself.         Initial    My Heart Failure Symptoms To Do List: know when to call the doctor taken at 02/28/25            Track and Manage Activity and Exertion (Progressing)       Start:  02/28/25    Expected End:  08/28/25         My Activity and Exertion To Do List       Start:  02/28/25         Why is this important?  Exercising is very important when managing your heart failure.  It will help your heart get stronger.  Wearing a pedometer or using a fitness tracker can help you stay motivated.              Optimal Care Coordination of a Patient Experiencing Heart Failure (Progressing)       Start:  02/28/25    Expected End:  08/28/25         Identify and Minimize Risk of Heart Failure Exacerbation       Start:  02/28/25          Initial Last    Identify and Minimize Risk of Heart Failure Exacerbation: barriers to lifestyle changes reviewed and addressed;barriers to treatment reviewed and addressed taken at 02/28/25 barriers to lifestyle changes reviewed and addressed;barriers to treatment reviewed and addressed;medication-adherence assessment completed taken at 03/24/25         Identify and Manage Comorbidities and Complications       Start:  02/28/25          Initial    Identify and Manage Comorbidities and Complications: response to pharmacologic therapy monitored taken at 03/24/25         Alleviate Barriers to Optimal Nutrition and Fluid Status       Start:  02/28/25          Initial Last    Alleviate Barriers to Optimal Nutrition and Fluid Status: barriers to sufficient nutrient intake addressed;home monitoring of weight gain or loss encouraged taken at 02/28/25 home monitoring of weight gain or loss encouraged;barriers to sufficient nutrient intake addressed taken at 03/24/25         Maintain Strength and Functional Ability       Start:  02/28/25          Initial Last    Maintain Strength and Functional Ability: self-care encouraged;barriers to activity identified and managed taken at 02/28/25 barriers  to activity identified and managed;activity or exercise based on tolerance encouraged;daily activity/exercise promoted taken at 03/24/25         Monitor and Manage Sleep Disturbance       Start:  02/28/25          Initial    Monitor and Manage Sleep Disturbance: oxygen therapy promoted using O2 at 2L taken at 03/24/25         Support Psychosocial Response to Heart Failure       Start:  02/28/25          Initial Last    Support Psychosocial Response to Heart Failure: verbalization of feelings encouraged taken at 02/28/25 verbalization of feelings encouraged taken at 03/24/25         Develop and Maintain Palliative Care Plan       Start:  02/28/25          Initial    Develop and Maintain Palliative Care Plan: active listening utilized taken at 03/24/25               Instructions   Patient was provided an electronic copy of care plan  CCM services were explained and offered and patient has accepted these services.  Patient has given their written consent to receive CCM services and understands that this includes the authorization of electronic communication of medical information with the other treating providers.  Patient understands that they may stop CCM services at any time and these changes will be effective at the end of the calendar month and will effectively revocate the agreement of CCM services.  Patient understands that only one practitioner can furnish and be paid for CCM services during one calendar month.  Patient also understands that there may be co-payment or deductible fees in association with CCM services.  Patient will continue with at least monthly follow-up calls with the Ambulatory .    Lester BUTT  Ambulatory Case Management    3/31/2025, 09:58 EDT

## 2025-04-03 ENCOUNTER — ANTICOAGULATION VISIT (OUTPATIENT)
Dept: PHARMACY | Facility: HOSPITAL | Age: 75
End: 2025-04-03
Payer: MEDICARE

## 2025-04-03 DIAGNOSIS — I48.20 ATRIAL FIBRILLATION, CHRONIC: Primary | Chronic | ICD-10-CM

## 2025-04-03 LAB — INR PPP: 1.1

## 2025-04-03 NOTE — PROGRESS NOTES
Anticoagulation Clinic Progress Note    Anticoagulation Summary  As of 4/3/2025      INR goal:  2.0-3.0   TTR:  52.6% (4 y)   INR used for dosin.10 (4/3/2025)   Warfarin maintenance plan:  2.5 mg every Mon, Wed, Fri; 1.25 mg all other days   Weekly warfarin total:  12.5 mg   Plan last modified:  Keshawn Castaneda, PharmD (3/20/2025)   Next INR check:  4/10/2025   Priority:  High   Target end date:  --    Indications    Atrial fibrillation  chronic [I48.20]                 Anticoagulation Episode Summary       INR check location:  --    Preferred lab:  --    Send INR reminders to:   [facility] HOME TEST POOL    Comments:   Home Testing as of 21 *call only when out of range*          Anticoagulation Care Providers       Provider Role Specialty Phone number    Zenia Tinajero MD Referring Cardiology [phone]            Clinic Interview:  Patient Findings     Positives:  Missed doses    Negatives:  Signs/symptoms of thrombosis, Signs/symptoms of bleeding,   Laboratory test error suspected, Change in health, Change in alcohol use,   Change in activity, Upcoming invasive procedure, Emergency department   visit, Upcoming dental procedure, Extra doses, Change in medications,   Change in diet/appetite, Hospital admission, Bruising, Other complaints      Clinical Outcomes     Negatives:  Major bleeding event, Thromboembolic event,   Anticoagulation-related hospital admission, Anticoagulation-related ED   visit, Anticoagulation-related fatality        INR History:      3/6/2025     1:45 PM 3/20/2025    12:00 AM 3/20/2025     2:07 PM 3/27/2025    12:00 AM 3/27/2025     2:08 PM 4/3/2025    12:00 AM 4/3/2025     1:36 PM   Anticoagulation Monitoring   INR 2.20  1.40  2.50  1.10   INR Date 3/6/2025  3/20/2025  3/27/2025  4/3/2025   INR Goal 2.0-3.0  2.0-3.0  2.0-3.0  2.0-3.0   Trend Down  Up  Same  Same   Last Week Total 10 mg  11.25 mg  13.75 mg  11.25 mg   Next Week Total 11.25 mg  13.75 mg  12.5 mg  13.75 mg  Do You Have A Family History Of Psoriasis?: no   Sun 1.25 mg  1.25 mg  1.25 mg  1.25 mg   Mon 2.5 mg  2.5 mg  2.5 mg  2.5 mg   Tue 1.25 mg  1.25 mg  1.25 mg  1.25 mg   Wed 1.25 mg  2.5 mg  2.5 mg  2.5 mg   Thu 1.25 mg  2.5 mg (3/20)  1.25 mg  2.5 mg (4/3)   Fri 2.5 mg  2.5 mg  2.5 mg  2.5 mg   Sat 1.25 mg  1.25 mg  1.25 mg  1.25 mg   Historical INR  1.40      2.50      1.10            This result is from an external source.       Plan:  1. INR is Subtherapeutic today- see above in Anticoagulation Summary.   Will instruct Shani Phillip to Increase their warfarin regimen- see above in Anticoagulation Summary.  Missed dose this week, boost to 2.5 mg today and then resume 2.5 mg MWF, 1.25 mg [id number], rck 1 week   2. Follow up in 1 weeks  3. They have been instructed to call if any changes in medications, doses, concerns, etc. Patient expresses understanding and has no further questions at this time.    Marleny Black [facility]   How Severe Is Your Psoriasis?: moderate Is This A New Presentation, Or A Follow-Up?: Psoriasis Additional History: Patients states that she was prescribed Betamethasone ointment on the affected areas as well as CeraVe psoriasis ointment. However, she states that it has not improved and comes intermittently.

## 2025-04-04 ENCOUNTER — TELEPHONE (OUTPATIENT)
Age: 75
End: 2025-04-04
Payer: MEDICARE

## 2025-04-04 NOTE — TELEPHONE ENCOUNTER
[facility] stating pt is scheduled 4/21/25 to have cataract surgery on both eyes under MAC. They are asking if pt can move forward and if he can hold his warfarin prior to.     Pt has NO history of stroke or valve replacement...Linda

## 2025-04-07 ENCOUNTER — TELEPHONE (OUTPATIENT)
Dept: FAMILY MEDICINE CLINIC | Facility: CLINIC | Age: 75
End: 2025-04-07
Payer: MEDICARE

## 2025-04-07 DIAGNOSIS — J44.9 CHRONIC OBSTRUCTIVE PULMONARY DISEASE, UNSPECIFIED COPD TYPE: ICD-10-CM

## 2025-04-07 NOTE — TELEPHONE ENCOUNTER
Name: Shani Phillip    Relationship: Self    Best Callback Number: [phone]     HUB PROVIDED THE RELAY MESSAGE FROM THE OFFICE   PATIENT HAS FURTHER QUESTIONS AND WOULD LIKE A CALL BACK    ADDITIONAL INFORMATION:  PATIENT STATED THAT IT DOES COME FROM [place] AND IT IS DELIVERED TO HER, BUT SHE DOESN'T NEED A COMPLETE NEW ONE SHE JUST NEEDS REFILLS.    PLEASE CALL WITH ANY QUESTIONS.

## 2025-04-07 NOTE — TELEPHONE ENCOUNTER
OKAY FOR HUB TO RELAY     LVMTCB- Tried calling pt to ask a few questions.  We got a fax from a pharmacy [facility] in [place] for a refill for her a Nebulizer set.  Need to know is she requesting this in [place] because I have a pharmacy in her chart as [facility] [place].  Does she need a complete new nebulizer?

## 2025-04-08 RX ORDER — IPRATROPIUM BROMIDE AND ALBUTEROL SULFATE 2.5; .5 MG/3ML; MG/3ML
SOLUTION RESPIRATORY (INHALATION)
Qty: 120 ML | Refills: 11 | Status: CANCELLED | OUTPATIENT
Start: 2025-04-08

## 2025-04-08 NOTE — TELEPHONE ENCOUNTER
She can hold warfain 5 days prior and resume post, normally they do not need to hold AC for a cataract surgery though

## 2025-04-08 NOTE — TELEPHONE ENCOUNTER
Patient is not needing the nebulizer she is needing just the medication refilled. If there are any questions please give her a call

## 2025-04-08 NOTE — TELEPHONE ENCOUNTER
Patient returned your call & she is not needing the nebulizer she is needing just the medication refilled. If there are any questions please give her a call

## 2025-04-09 ENCOUNTER — TELEPHONE (OUTPATIENT)
Dept: FAMILY MEDICINE CLINIC | Facility: CLINIC | Age: 75
End: 2025-04-09

## 2025-04-09 ENCOUNTER — TELEPHONE (OUTPATIENT)
Age: 75
End: 2025-04-09
Payer: MEDICARE

## 2025-04-09 NOTE — TELEPHONE ENCOUNTER
Caller: Shani Phillip    Relationship: Self    Best call back number: [phone]     What is the best time to reach you: ANYTIME    Who are you requesting to speak with (clinical staff, provider,  specific staff member): CLINICAL    Do you know the name of the person who called: WILLOW    What was the call regarding: PATIENT RETURNING CALL SHE IS UNSURE OF WHAT THE CALL WAS FOR     Is it okay if the provider responds through MyChart: NO

## 2025-04-09 NOTE — TELEPHONE ENCOUNTER
Spoke to pt and informed her I was returning her call the other day to answer her questions but she does not remember her questions.  I told her her nebulizer refill was faxed to her pharmacy per her request and she voiced understanding

## 2025-04-09 NOTE — TELEPHONE ENCOUNTER
Requesting Cardiac Clearance for Cataracts Surgery on 4/21/25 w/ Dr. Cori Deshpande.     Anesthesia: MAC    LOV w/ you on 3/28/25  FU w/ you on 3/31/26    Stroke Hx: No    Valve Hx: No    Anticoags: Warfarin    Please advise on Clearance and recommendations.    JAVIER Camargo

## 2025-04-10 LAB — INR PPP: 1.1

## 2025-04-11 ENCOUNTER — ANTICOAGULATION VISIT (OUTPATIENT)
Dept: PHARMACY | Facility: HOSPITAL | Age: 75
End: 2025-04-11
Payer: MEDICARE

## 2025-04-11 DIAGNOSIS — I48.20 ATRIAL FIBRILLATION, CHRONIC: Primary | Chronic | ICD-10-CM

## 2025-04-11 PROCEDURE — G0249 PROVIDE INR TEST MATER/EQUIP: HCPCS

## 2025-04-11 NOTE — PROGRESS NOTES
Anticoagulation Clinic Progress Note    Anticoagulation Summary  As of 2025      INR goal:  2.0-3.0   TTR:  52.3% (4.1 y)   INR used for dosin.10 (4/10/2025)   Warfarin maintenance plan:  2.5 mg every Mon, Wed, Fri; 1.25 mg all other days   Weekly warfarin total:  12.5 mg   Plan last modified:  Keshawn Castaneda, PharmD (3/20/2025)   Next INR check:  2025   Priority:  High   Target end date:  --    Indications    Atrial fibrillation  chronic [I48.20]                 Anticoagulation Episode Summary       INR check location:  --    Preferred lab:  --    Send INR reminders to:   [facility] HOME TEST POOL    Comments:   Home Testing as of 21 *call only when out of range*          Anticoagulation Care Providers       Provider Role Specialty Phone number    Zenia Tinajero MD Referring Cardiology [phone]            Clinic Interview:  Patient Findings     Positives:  Upcoming invasive procedure    Negatives:  Signs/symptoms of thrombosis, Signs/symptoms of bleeding,   Laboratory test error suspected, Change in health, Change in alcohol use,   Change in activity, Emergency department visit, Upcoming dental procedure,   Missed doses, Extra doses, Change in medications, Change in diet/appetite,   Hospital admission, Bruising, Other complaints      Clinical Outcomes     Negatives:  Major bleeding event, Thromboembolic event,   Anticoagulation-related hospital admission, Anticoagulation-related ED   visit, Anticoagulation-related fatality        INR History:      3/20/2025     2:07 PM 3/27/2025    12:00 AM 3/27/2025     2:08 PM 4/3/2025    12:00 AM 4/3/2025     1:36 PM 4/10/2025    12:00 AM 2025     9:15 AM   Anticoagulation Monitoring   INR 1.40  2.50  1.10  1.10   INR Date 3/20/2025  3/27/2025  4/3/2025  4/10/2025   INR Goal 2.0-3.0  2.0-3.0  2.0-3.0  2.0-3.0   Trend Up  Same  Same  Same   Last Week Total 11.25 mg  13.75 mg  11.25 mg  12.5 mg   Next Week Total 13.75 mg  12.5 mg  13.75 mg   11.25 mg   Sun 1.25 mg  1.25 mg  1.25 mg  1.25 mg   Mon 2.5 mg  2.5 mg  2.5 mg  -   Tue 1.25 mg  1.25 mg  1.25 mg  -   Wed 2.5 mg  2.5 mg  2.5 mg  -   Thu 2.5 mg (3/20)  1.25 mg  2.5 mg (4/3)  -   Fri 2.5 mg  2.5 mg  2.5 mg  5 mg (4/11)   Sat 1.25 mg  1.25 mg  1.25 mg  1.25 mg   Historical INR  2.50      1.10      1.10            This result is from an external source.       Plan:  1. INR is Subtherapeutic today- see above in Anticoagulation Summary.   Will instruct Shani Phillip to Increase their warfarin regimen- see above in Anticoagulation Summary.      Cataract surgery on 4/21- appears she was cleared to hold 5 days, however it is difficult to tell if that was required by the surgeon because it is also documented we would not typically hold warfarin for a cataract procedure (see telephone note on 4/4) . She is going to confirm with surgeon if she needs to hold warfairn. She denies any missed doses. She utilizes a pill box. Boost to 5 mg then resume, rck on Monday   2. Follow up on Monday   3. They have been instructed to call if any changes in medications, doses, concerns, etc. Patient expresses understanding and has no further questions at this time.    Marleny Black [facility]

## 2025-04-14 ENCOUNTER — TELEPHONE (OUTPATIENT)
Dept: PHARMACY | Facility: HOSPITAL | Age: 75
End: 2025-04-14
Payer: MEDICARE

## 2025-04-14 DIAGNOSIS — I50.33 ACUTE ON CHRONIC DIASTOLIC CHF (CONGESTIVE HEART FAILURE): ICD-10-CM

## 2025-04-14 RX ORDER — POTASSIUM CHLORIDE 1500 MG/1
20 TABLET, EXTENDED RELEASE ORAL 2 TIMES DAILY
Qty: 180 TABLET | Refills: 1 | Status: SHIPPED | OUTPATIENT
Start: 2025-04-14

## 2025-04-14 NOTE — TELEPHONE ENCOUNTER
----- Message from Chele SEALS sent at 4/14/2025 10:03 AM EDT -----  Having surgery soon was wondering if she had to hold warfarin    Spoke with patient regarding upcoming cataract surgery. Discussed she is cleared to hold warfarin up to 5 days prior to her surgery, however, typically anticoagulation does not need to be discontinued for a cataract procedure. Recommended confirming with the surgeon that she needs to hold warfarin. If so, we will proceed with a 5 day hold that is approved.

## 2025-04-17 ENCOUNTER — ANTICOAGULATION VISIT (OUTPATIENT)
Dept: PHARMACY | Facility: HOSPITAL | Age: 75
End: 2025-04-17
Payer: MEDICARE

## 2025-04-17 DIAGNOSIS — I48.20 ATRIAL FIBRILLATION, CHRONIC: Primary | Chronic | ICD-10-CM

## 2025-04-17 LAB — INR PPP: 1

## 2025-04-17 NOTE — PROGRESS NOTES
Anticoagulation Clinic Progress Note    Anticoagulation Summary  As of 2025      INR goal:  2.0-3.0   TTR:  52.1% (4.1 y)   INR used for dosin.00 (2025)   Warfarin maintenance plan:  2.5 mg every Mon, Wed, Fri; 1.25 mg all other days   Weekly warfarin total:  12.5 mg   Plan last modified:  Keshawn Castaneda, PharmD (3/20/2025)   Next INR check:  --   Priority:  High   Target end date:  --    Indications    Atrial fibrillation  chronic [I48.20]                 Anticoagulation Episode Summary       INR check location:  --    Preferred lab:  --    Send INR reminders to:   [facility] HOME TEST POOL    Comments:   Home Testing as of 21 *call only when out of range*          Anticoagulation Care Providers       Provider Role Specialty Phone number    Zenia Tinajero MD Referring Cardiology [phone]            Clinic Interview:  Patient Findings     Negatives:  Signs/symptoms of thrombosis, Signs/symptoms of bleeding,   Laboratory test error suspected, Change in health, Change in alcohol use,   Change in activity, Upcoming invasive procedure, Emergency department   visit, Upcoming dental procedure, Missed doses, Extra doses, Change in   medications, Change in diet/appetite, Hospital admission, Bruising, Other   complaints      Clinical Outcomes     Negatives:  Major bleeding event, Thromboembolic event,   Anticoagulation-related hospital admission, Anticoagulation-related ED   visit, Anticoagulation-related fatality        INR History:      3/27/2025     2:08 PM 4/3/2025    12:00 AM 4/3/2025     1:36 PM 4/10/2025    12:00 AM 2025     9:15 AM 2025    12:00 AM 2025     1:51 PM   Anticoagulation Monitoring   INR 2.50  1.10  1.10  1.00   INR Date 3/27/2025  4/3/2025  4/10/2025  2025   INR Goal 2.0-3.0  2.0-3.0  2.0-3.0  2.0-3.0   Trend Same  Same  Same  Same   Last Week Total 13.75 mg  11.25 mg  12.5 mg  15 mg   Next Week Total 12.5 mg  13.75 mg  11.25 mg  15 mg   Sun 1.25 mg   1.25 mg  1.25 mg  1.25 mg   Mon 2.5 mg  2.5 mg  -  2.5 mg   Tue 1.25 mg  1.25 mg  -  1.25 mg   Wed 2.5 mg  2.5 mg  -  2.5 mg   Thu 1.25 mg  2.5 mg (4/3)  -  3.75 mg (4/17)   Fri 2.5 mg  2.5 mg  5 mg (4/11)  2.5 mg   Sat 1.25 mg  1.25 mg  1.25 mg  1.25 mg   Historical INR  1.10      1.10      1.00            This result is from an external source.       Plan:  1. INR is Subtherapeutic today- see above in Anticoagulation Summary.   Will instruct Shani Phillip to Increase their warfarin regimen- see above in Anticoagulation Summary.  Patient does not need to hold warfarin for cataract surgery. She reports no missed doses. INR has been 1.1 or 1.0 for the past three weeks even with extra warfarin. Patient is going to come to the clinic tomorrow to verify the accuracy of her home INR device. Boost to 3.75 mg today then resume, rck tomorrow.   2. Follow up in 1 day   3. They have been instructed to call if any changes in medications, doses, concerns, etc. Patient expresses understanding and has no further questions at this time.    Marleny Black [facility]

## 2025-04-18 ENCOUNTER — ANTICOAGULATION VISIT (OUTPATIENT)
Dept: PHARMACY | Facility: HOSPITAL | Age: 75
End: 2025-04-18
Payer: MEDICARE

## 2025-04-18 DIAGNOSIS — I48.20 ATRIAL FIBRILLATION, CHRONIC: Primary | Chronic | ICD-10-CM

## 2025-04-18 LAB
INR PPP: 1.2
INR PPP: 1.3

## 2025-04-18 PROCEDURE — G0463 HOSPITAL OUTPT CLINIC VISIT: HCPCS

## 2025-04-18 NOTE — PROGRESS NOTES
Anticoagulation Clinic Progress Note    Anticoagulation Summary  As of 2025      INR goal:  2.0-3.0   TTR:  52.0% (4.1 y)   INR used for dosin.20 (2025)   Warfarin maintenance plan:  1.25 mg every Sun, Thu; 2.5 mg all other days   Weekly warfarin total:  15 mg   Plan last modified:  Keshawn Castaneda, PharmD (2025)   Next INR check:  2025   Priority:  High   Target end date:  --    Indications    Atrial fibrillation  chronic [I48.20]                 Anticoagulation Episode Summary       INR check location:  --    Preferred lab:  --    Send INR reminders to:   [facility] HOME TEST POOL    Comments:   Home Testing as of 21 *call only when out of range*          Anticoagulation Care Providers       Provider Role Specialty Phone number    Zenia Tinajero MD Referring Cardiology [phone]            Clinic Interview:  Patient Findings     Positives:  Other complaints    Negatives:  Signs/symptoms of thrombosis, Signs/symptoms of bleeding,   Laboratory test error suspected, Change in health, Change in alcohol use,   Change in activity, Upcoming invasive procedure, Emergency department   visit, Upcoming dental procedure, Missed doses, Extra doses, Change in   medications, Change in diet/appetite, Hospital admission, Bruising    Comments:  Ms. Phillip and her spouse, Willis, visited clinic today to   assess the accuracy of her [employer] home INR testing device. I performed   a test using our clinic's [employer] device, and the INR was 1.3. Mr. Phillip performed a test on Mrs. Phillip on their device, and the INR was   1.2.       Clinical Outcomes     Negatives:  Major bleeding event, Thromboembolic event,   Anticoagulation-related hospital admission, Anticoagulation-related ED   visit, Anticoagulation-related fatality    Comments:  Ms. Phillip and her spouse, Willis, visited clinic today to   assess the accuracy of her [employer] home INR testing device. I performed   a test using our  [facility]'s [employer] device, and the INR was 1.3. Mr. Phillip performed a test on Mrs. Phillip on their device, and the INR was   1.2.         INR History:      4/3/2025     1:36 PM 4/10/2025    12:00 AM 4/11/2025     9:15 AM 4/17/2025    12:00 AM 4/17/2025     1:51 PM 4/18/2025    12:00 AM 4/18/2025    11:30 AM   Anticoagulation Monitoring   INR 1.10  1.10  1.00  1.20   INR Date 4/3/2025  4/10/2025  4/17/2025  4/18/2025   INR Goal 2.0-3.0  2.0-3.0  2.0-3.0  2.0-3.0   Trend Same  Same  Same  Up   Last Week Total 11.25 mg  12.5 mg  15 mg  17.5 mg   Next Week Total 13.75 mg  11.25 mg  15 mg  15 mg   Sun 1.25 mg  1.25 mg  -  1.25 mg   Mon 2.5 mg  -  -  2.5 mg   Tue 1.25 mg  -  -  2.5 mg   Wed 2.5 mg  -  -  2.5 mg   Thu 2.5 mg (4/3)  -  3.75 mg (4/17)  -   Fri 2.5 mg  5 mg (4/11)  -  2.5 mg   Sat 1.25 mg  1.25 mg  -  2.5 mg   Historical INR  1.10      1.00      1.30        1.20            This result is from an external source.       Plan:  1. INR is Subtherapeutic today- see above in Anticoagulation Summary.   Will instruct Shani Phillip to Increase their warfarin regimen to 1.25 mg Sun/Thurs, 2.5 mg all other days - see above in Anticoagulation Summary.  2. Home INT test technique deficiencies were noted and corrected (changing lot/barcode drawing blood prior to inserting the test strip, technique for squeezing the lanced finger to obtain adequate sample quickly).  3. Follow up in 6 days with home INR test.   4. Patient declines warfarin refills.  5. Verbal and written information provided. Patient expresses understanding and has no further questions at this time.    Keshawn Castaneda, PharmD

## 2025-04-22 ENCOUNTER — TELEPHONE (OUTPATIENT)
Dept: CASE MANAGEMENT | Facility: OTHER | Age: 75
End: 2025-04-22
Payer: MEDICARE

## 2025-04-24 NOTE — TELEPHONE ENCOUNTER
Please notify [facility] health of BMP needed on Monday. She believes this is their last visit. If they are not coming out to the house then patient will need labs at [facility].    62 y/o active M with h/o HTN, HLD, HOCM with CHB, s/p dual chamber PPM ([employer]) in 1/2025 at [facility], pAFib noted on device in 3/16/25 (started Eliquis, last dose 4/23 PM), and recent dental work few weeks ago (root canal, crown) p/w fever, fatigue, body aches for 10 days, found to have MSSA bacteremia. Was started on IV cefazolin. So far, DONNY at OSH with negative for vegetation on valve or leads.  Urine culture positive for MSSA (likely 2/2 hematogenous spread) and persistent bacteremia.   - Given that he is bacteremia, we recommend explanting his transvenous dual chamber PPM. This can be done in our lab tomorrow as the device was implanted 3 months ago.  He has CHB without ventricular escape above 30 bpm, and will need some form of pacing.  We recommend a leadless PPM (ie AV Micra).  Explain to him that he will potentially get this leadless ppm switched back to a dual chamber PPM in the near future once he is cleared from bacteremia.  Will get ID team to weigh in on timing for the dual chamber PPM implant.   - Please keep active Type and Screen   - NPO after midnight.  62 y/o active M with h/o HTN, HLD, HOCM with CHB, s/p dual chamber PPM ([employer]iotronik) in 1/2025 at [facility], pAFib noted on device in 3/16/25 (started Eliquis, last dose 4/23 PM), and recent dental work few weeks ago (root canal, crown) p/w fever, fatigue, body aches for 10 days, found to have MSSA bacteremia. Was started on IV cefazolin. So far, DONNY at OSH with negative for vegetation on valve or leads.  Urine culture positive for MSSA (likely 2/2 hematogenous spread) and persistent bacteremia.   - Given that he is bacteremia, we recommend explanting his transvenous dual chamber PPM. This can be done in our lab tomorrow as the device was implanted 3 months ago.  He has CHB without ventricular escape above 30 bpm, and will need some form of pacing.  We recommend a leadless PPM (ie AV Micra).  Explain to him that he will potentially get this leadless ppm switched back to a dual chamber PPM in the near future once he is cleared from bacteremia.  Will get ID team to weigh in on timing for the dual chamber PPM implant.   - Please keep active Type and Screen   - NPO after midnight.

## 2025-04-25 ENCOUNTER — ANTICOAGULATION VISIT (OUTPATIENT)
Dept: PHARMACY | Facility: HOSPITAL | Age: 75
End: 2025-04-25
Payer: MEDICARE

## 2025-04-25 DIAGNOSIS — I48.20 ATRIAL FIBRILLATION, CHRONIC: Primary | Chronic | ICD-10-CM

## 2025-04-25 LAB — INR PPP: 1.6

## 2025-04-25 NOTE — PROGRESS NOTES
Anticoagulation Clinic Progress Note    Anticoagulation Summary  As of 2025      INR goal:  2.0-3.0   TTR:  51.8% (4.1 y)   INR used for dosin.60 (2025)   Warfarin maintenance plan:  1.25 mg every Thu; 2.5 mg all other days   Weekly warfarin total:  16.25 mg   Plan last modified:  Sarah Vasquez RPH (2025)   Next INR check:  2025   Priority:  High   Target end date:  --    Indications    Atrial fibrillation  chronic [I48.20]                 Anticoagulation Episode Summary       INR check location:  --    Preferred lab:  --    Send INR reminders to:  [facility] ANTICOAG HOME TEST POOL    Comments:   Home Testing as of 21 *call only when out of range*          Anticoagulation Care Providers       Provider Role Specialty Phone number    Zenia Tinajero MD Referring Cardiology [phone]            Clinic Interview:  Patient Findings     Negatives:  Signs/symptoms of thrombosis, Signs/symptoms of bleeding,   Laboratory test error suspected, Change in health, Change in alcohol use,   Change in activity, Upcoming invasive procedure, Emergency department   visit, Upcoming dental procedure, Missed doses, Extra doses, Change in   medications, Change in diet/appetite, Hospital admission, Bruising, Other   complaints      Clinical Outcomes     Negatives:  Major bleeding event, Thromboembolic event,   Anticoagulation-related hospital admission, Anticoagulation-related ED   visit, Anticoagulation-related fatality        INR History:      2025     9:15 AM 2025    12:00 AM 2025     1:51 PM 2025    12:00 AM 2025    11:30 AM 2025    12:00 AM 2025     1:27 PM   Anticoagulation Monitoring   INR 1.10  1.00  1.20  1.60   INR Date 4/10/2025  2025  2025  2025   INR Goal 2.0-3.0  2.0-3.0  2.0-3.0  2.0-3.0   Trend Same  Same  Up  Up   Last Week Total 12.5 mg  15 mg  17.5 mg  15 mg   Next Week Total 11.25 mg  15 mg  15 mg  17.5 mg   Sun 1.25 mg  -  1.25 mg  2.5  mg   Mon -  -  2.5 mg  2.5 mg   Tue -  -  2.5 mg  2.5 mg   Wed -  -  2.5 mg  2.5 mg   Thu -  3.75 mg (4/17)  -  1.25 mg   Fri 5 mg (4/11)  -  2.5 mg  3.75 mg (4/25)   Sat 1.25 mg  -  2.5 mg  2.5 mg   Historical INR  1.00      1.30        1.20      1.60            This result is from an external source.       Plan:  1. INR is Subtherapeutic today- see above in Anticoagulation Summary.  Will instruct Shani Phillip to Change their warfarin regimen- see above in Anticoagulation Summary.  2. Follow up in 1 week  3. They have been instructed to call if any changes in medications, doses, concerns, etc. Patient expresses understanding and has no further questions at this time.    Sarah Vasquez [facility]

## 2025-04-28 ENCOUNTER — PATIENT OUTREACH (OUTPATIENT)
Dept: CASE MANAGEMENT | Facility: OTHER | Age: 75
End: 2025-04-28
Payer: MEDICARE

## 2025-04-28 DIAGNOSIS — E03.9 HYPOTHYROIDISM, UNSPECIFIED TYPE: ICD-10-CM

## 2025-04-28 DIAGNOSIS — J44.9 CHRONIC OBSTRUCTIVE PULMONARY DISEASE, UNSPECIFIED COPD TYPE: ICD-10-CM

## 2025-04-28 DIAGNOSIS — I50.32 CHRONIC DIASTOLIC CHF (CONGESTIVE HEART FAILURE): Primary | ICD-10-CM

## 2025-04-28 PROCEDURE — 99490 CHRNC CARE MGMT STAFF 1ST 20: CPT | Performed by: INTERNAL MEDICINE

## 2025-04-28 NOTE — PLAN OF CARE
Problem: Heart Failure  Goal: Track and Manage Fluids and Swelling  Outcome: Progressing  Goal: Track and Manage Symptoms  Outcome: Progressing  Goal: Track and Manage Activity and Exertion  Outcome: Progressing  Goal: Optimal Care Coordination of a Patient Experiencing Heart Failure  Outcome: Progressing     Problem: COPD  Goal: Track and Manage My Symptoms  Outcome: Progressing  Goal: Track and Manage My Triggers  Outcome: Progressing  Goal: Manage My Fatigue (Tiredness)  Outcome: Progressing  Goal: Learn and Do Breathing Exercises  Outcome: Progressing  Goal: Optimal Care Coordination of a Patient Experiencing COPD  Outcome: Progressing

## 2025-04-28 NOTE — TELEPHONE ENCOUNTER
Patient requesting refill for Levothyroxine 50 mcg tab daily. She would like this sent to [facility] pharmacy at [place]. Thank you.     Roxane DUPONT

## 2025-04-28 NOTE — OUTREACH NOTE
AMBULATORY CASE MANAGEMENT NOTE    Names and Relationships of Patient/Support Persons: Contact: Shani Phillip; Relationship: Self -     CCM Interim Update    Called and spoke to patient for CCM update. Patient states she is doing well. She recently had cataract surgery completed to both her eyes. She states that it went well.     CHF - Patient monitoring weight but not daily, she states that her swelling is increasing again to her legs. She states that it is more than 2-3 lbs weight gain. She states her breathing is about the same and denies worsening SOB. Advised patient to call Cardiology today to notify them about her weight gain and edema. She verbalizes understanding and states that she will call them. Discussed low salt diet, elevate legs above heart when possible.   COPD - continues with O2 use at 2L continuously. Using her breathing treatments. Discussed practicing breathing exercises at home.   Sleep - she states she is not sleeping well and this is not new for her.     Medication - reports compliance with medications. She states she has an issue with her levothyroxine 50 mcg. She states she is unsure what happened to the bottle and have asked her  to look around their home but could not find it. She has been using her levothyroxine 25mcg taking 2 tabs to completed the 50 mcg but now is almost out. She is unsure if insurance will cover this. Discussed with patient that the last refill showing is back in October 2024, I told her we can try and send refill and see if insurance will cover. She is agreeable to this plan. Will request refill from PCP.    Care Coordination    Sent refill request.     Education Documentation  Unresolved/Worsening Symptoms, taught by Lester Garcia RN at 4/28/2025  1:56 PM.  Learner: Patient  Readiness: Acceptance  Method: Explanation  Response: Verbalizes Understanding    Weight Monitoring, taught by Lester Garcia RN at 4/28/2025  1:56 PM.  Learner:  Patient  Readiness: Acceptance  Method: Explanation  Response: Verbalizes Understanding          Lester BUTT  Ambulatory Case Management    4/28/2025, 13:57 EDT

## 2025-04-29 ENCOUNTER — PATIENT OUTREACH (OUTPATIENT)
Dept: CASE MANAGEMENT | Facility: OTHER | Age: 75
End: 2025-04-29
Payer: MEDICARE

## 2025-04-29 DIAGNOSIS — I50.32 CHRONIC DIASTOLIC CHF (CONGESTIVE HEART FAILURE): Primary | ICD-10-CM

## 2025-04-29 DIAGNOSIS — J44.9 CHRONIC OBSTRUCTIVE PULMONARY DISEASE, UNSPECIFIED COPD TYPE: ICD-10-CM

## 2025-04-29 RX ORDER — LEVOTHYROXINE SODIUM 50 UG/1
50 TABLET ORAL EVERY MORNING
Qty: 90 TABLET | Refills: 1 | Status: SHIPPED | OUTPATIENT
Start: 2025-04-29

## 2025-04-29 NOTE — OUTREACH NOTE
[facility] End of Month Documentation    This Chronic Medical Management Care Plan for Shani Phillip, 74 y.o. female, has been monitored and managed; reviewed and a new plan of care implemented for the month of April.  A cumulative time of 31  minutes was spent on this patient record this month, including chart review; phone call with patient, CHF and COPD management. Care coordination. Reviewed meds and f.u with providers..    Regarding the patient's problems: has Influenza; Thrush, oral; COPD (chronic obstructive pulmonary disease); Atrial fibrillation with RVR; Chronic respiratory failure with hypoxia; Endocarditis of mitral valve; Mitral valve vegetation; Chronic diastolic CHF (congestive heart failure); Atrial fibrillation, chronic; Bilateral lower extremity edema; Intermittent lightheadedness; Depression; Chronic midline low back pain with left-sided sciatica; Nephrolithiasis; Oxygen dependent; Medicare annual wellness visit, subsequent; Osteoporosis; CKD (chronic kidney disease) stage 3, GFR 30-59 ml/min; Chronic anticoagulation; Nonrheumatic mitral valve stenosis; Chest pain, unspecified type; Chronic heart failure with preserved ejection fraction (HFpEF); Overweight with body mass index (BMI) 25.0-29.9; Acquired hypothyroidism; and Acute on chronic combined systolic and diastolic CHF (congestive heart failure) on their problem list., the following items were addressed: medical records; medications and any changes can be found within the plan section of the note.  A detailed listing of time spent for chronic care management is tracked within each outreach encounter.  Current medications include:  has a current medication list which includes the following prescription(s): ammonium lactate, budesonide, bupropion xl, carvedilol, cephalexin, cyclobenzaprine, digoxin, famotidine, ferrous sulfate, hydrocodone-acetaminophen, ipratropium-albuterol, levothyroxine, meclizine, nystatin, o2, ondansetron, pantoprazole,  potassium chloride er, pramipexole, yupelri, spironolactone, torsemide, trospium, and warfarin. and the patient is reported to be patient is compliant with medication protocol,  Medications are reported to be effective.  Regarding these diagnoses, referrals were made to the following provider(s):  n/a.  All notes on chart for PCP to review.    The patient was monitored remotely for weight; activity level; medications.    The patient's physical needs include:  help taking medications as prescribed; physical healthcare; DME supplies.     The patient's mental support needs include:  continued support    The patient's cognitive support needs include:  continued support    The patient's psychosocial support needs include:  medication management or adherence; continued support    The patient's functional needs include: DME; physical healthcare    The patient's environmental needs include:  not applicable, n/a    Care Plan overall comments:  No data recorded    Refer to previous outreach notes for more information on the areas listed above.    Monthly Billing Diagnoses  (I50.32) Chronic diastolic CHF (congestive heart failure)    (J44.9) Chronic obstructive pulmonary disease, unspecified COPD type    Medications   Medications have been reconciled    Care Plan progress this month:      Recently Modified Care Plans Updates made since 3/29/2025 12:00 AM      No recently modified care plans.             COPD       Track and Manage My Symptoms (Progressing)       Start:  02/28/25    Expected End:  08/28/25         My COPD Symptoms To Do List       Start:  02/28/25         Why is this important?  Tracking your symptoms and other information about your health helps your doctor plan your care.  Write down the symptoms, the time of day, what you were doing and what medicine you are taking.  You will soon learn how to manage your symptoms.         Initial Last    My COPD Symptoms To Do List: keep follow-up appointments taken at  02/28/25 keep follow-up appointments;use an extra pillow to sleep taken at 03/24/25            Track and Manage My Triggers (Progressing)       Start:  02/28/25    Expected End:  08/28/25         My COPD Triggers To Do List       Start:  02/28/25         Why is this important?  Triggers are activities or things, like tobacco smoke or cold weather, that make your COPD (chronic obstructive pulmonary disease) flare up.  Knowing these triggers helps you plan how to stay away from them.  When you cannot remove them, you can learn how to manage them.              Manage My Fatigue (Tiredness) (Progressing)       Start:  02/28/25    Expected End:  08/28/25         My Fatigue Management To Do List       Start:  02/28/25         Why is this important?  Feeling tired or worn out is a common symptom of COPD (chronic obstructive pulmonary disease).  Learning when you feel your best and when you need rest is important.  Managing the tiredness (fatigue) will help you be active and enjoy life.         Initial    My Fatigue Management To Do List: keep bedroom cool and dark taken at 03/24/25            Learn and Do Breathing Exercises (Progressing)       Start:  02/28/25    Expected End:  08/28/25         My Breathing Exercises To Do List       Start:  02/28/25         Why is this important?  Breathing exercises can help lessen the cough that comes with chronic obstructive pulmonary disease.  Doing the exercises will give you more energy.  They will also help you to control your symptoms.              Optimal Care Coordination of a Patient Experiencing COPD (Progressing)       Start:  02/28/25    Expected End:  08/28/25         Support Psychosocial Response to COPD       Start:  02/28/25          Initial Last    Support Psychosocial Response to COPD: verbalization of feelings encouraged taken at 02/28/25 verbalization of feelings encouraged taken at 03/24/25         Alleviate Barriers to COPD Management       Start:  02/28/25           Initial Last    Alleviate Barriers to COPD Management: barriers to treatment managed taken at 02/28/25 barriers to treatment managed;activity or exercise based on tolerance encouraged taken at 03/24/25         Identify and Minimize Risk of COPD Exacerbation       Start:  02/28/25          Initial Last    Identify and Minimize Risk of COPD Exacerbation: barriers to treatment reviewed and addressed;barriers to lifestyle changes reviewed and addressed taken at 02/28/25 barriers to lifestyle changes reviewed and addressed;barriers to treatment reviewed and addressed taken at 03/24/25           Heart Failure       Track and Manage Fluids and Swelling (Progressing)       Start:  02/28/25    Expected End:  08/28/25         My Fluids and Swelling To Do List       Start:  02/28/25         Why is this important?  It is important to check your weight at home every day.  Also, check for swelling in your feet and legs every day.  Keeping your legs up while you are sitting and doing ankle pump exercises will help with the swelling.         Initial Last    My Fluids and Swelling To Do List: call office if I gain more than 2 pounds in 1 day or 5 pounds in 1 week;track weight in diary;weigh myself every day;watch for swelling in feet, ankles and legs every day taken at 02/28/25 read food labels for sodium (salt) content;weigh myself every day;use salt in moderation;watch for swelling in feet, ankles and legs every day taken at 03/24/25            Track and Manage Symptoms (Progressing)       Start:  02/28/25    Expected End:  08/28/25         My Heart Failure Symptoms To Do List       Start:  02/28/25         Why is this important?  You will be able to handle your symptoms better if you keep track of them.  Making some simple changes to your lifestyle will help.  Knowing how to self-manage shortness of breath and when to call the doctor is very important.  Eating healthy is one thing you can do to take care of yourself.          Initial    My Heart Failure Symptoms To Do List: know when to call the doctor taken at 02/28/25            Track and Manage Activity and Exertion (Progressing)       Start:  02/28/25    Expected End:  08/28/25         My Activity and Exertion To Do List       Start:  02/28/25         Why is this important?  Exercising is very important when managing your heart failure.  It will help your heart get stronger.  Wearing a pedometer or using a fitness tracker can help you stay motivated.              Optimal Care Coordination of a Patient Experiencing Heart Failure (Progressing)       Start:  02/28/25    Expected End:  08/28/25         Identify and Minimize Risk of Heart Failure Exacerbation       Start:  02/28/25          Initial Last    Identify and Minimize Risk of Heart Failure Exacerbation: barriers to lifestyle changes reviewed and addressed;barriers to treatment reviewed and addressed taken at 02/28/25 barriers to lifestyle changes reviewed and addressed;barriers to treatment reviewed and addressed;medication-adherence assessment completed taken at 03/24/25         Identify and Manage Comorbidities and Complications       Start:  02/28/25          Initial    Identify and Manage Comorbidities and Complications: response to pharmacologic therapy monitored taken at 03/24/25         Alleviate Barriers to Optimal Nutrition and Fluid Status       Start:  02/28/25          Initial Last    Alleviate Barriers to Optimal Nutrition and Fluid Status: barriers to sufficient nutrient intake addressed;home monitoring of weight gain or loss encouraged taken at 02/28/25 home monitoring of weight gain or loss encouraged;barriers to sufficient nutrient intake addressed taken at 03/24/25         Maintain Strength and Functional Ability       Start:  02/28/25          Initial Last    Maintain Strength and Functional Ability: self-care encouraged;barriers to activity identified and managed taken at 02/28/25 barriers to activity  identified and managed;activity or exercise based on tolerance encouraged;daily activity/exercise promoted taken at 03/24/25         Monitor and Manage Sleep Disturbance       Start:  02/28/25          Initial    Monitor and Manage Sleep Disturbance: oxygen therapy promoted using O2 at 2L taken at 03/24/25         Support Psychosocial Response to Heart Failure       Start:  02/28/25          Initial Last    Support Psychosocial Response to Heart Failure: verbalization of feelings encouraged taken at 02/28/25 verbalization of feelings encouraged taken at 03/24/25         Develop and Maintain Palliative Care Plan       Start:  02/28/25          Initial    Develop and Maintain Palliative Care Plan: active listening utilized taken at 03/24/25             Instructions   Patient was provided an electronic copy of care plan  CCM services were explained and offered and patient has accepted these services.  Patient has given their written consent to receive CCM services and understands that this includes the authorization of electronic communication of medical information with the other treating providers.  Patient understands that they may stop CCM services at any time and these changes will be effective at the end of the calendar month and will effectively revocate the agreement of CCM services.  Patient understands that only one practitioner can furnish and be paid for CCM services during one calendar month.  Patient also understands that there may be co-payment or deductible fees in association with CCM services.  Patient will continue with at least monthly follow-up calls with the Ambulatory .    Lester BUTT  Ambulatory Case Management    4/29/2025, 13:58 EDT

## 2025-04-30 ENCOUNTER — PATIENT OUTREACH (OUTPATIENT)
Dept: CASE MANAGEMENT | Facility: OTHER | Age: 75
End: 2025-04-30
Payer: MEDICARE

## 2025-04-30 DIAGNOSIS — J44.9 CHRONIC OBSTRUCTIVE PULMONARY DISEASE, UNSPECIFIED COPD TYPE: ICD-10-CM

## 2025-04-30 DIAGNOSIS — I50.32 CHRONIC DIASTOLIC CHF (CONGESTIVE HEART FAILURE): Primary | ICD-10-CM

## 2025-04-30 NOTE — OUTREACH NOTE
AMBULATORY CASE MANAGEMENT NOTE    Names and Relationships of Patient/Support Persons: Contact: Kenji Shani; Relationship: Self -     CCM Interim Update    Called and spoke to patient to patient to follow up and ensure she received her medication synthroid. Patient state she did receive. Also followed up if she reached out to her Cardiologist regarding her weight gain and edema. She states she did not call her Cardiologist since she checked her weight and she lost 2lbs and would like to wait. Denies any worsening with her breathing. Advised to check daily weight, every morning same time and keep log. Informed that I will be following up with her in a week to follow up on her weight records. Reminded to practice low salt intake. She reported medication compliance. She is agreeable to plan. Denies urgent needs at this time.     Education Documentation  No documentation found.        Lester BUTT  Ambulatory Case Management    4/30/2025, 14:01 EDT

## 2025-05-01 ENCOUNTER — ANTICOAGULATION VISIT (OUTPATIENT)
Dept: PHARMACY | Facility: HOSPITAL | Age: 75
End: 2025-05-01
Payer: MEDICARE

## 2025-05-01 DIAGNOSIS — I48.20 ATRIAL FIBRILLATION, CHRONIC: Primary | Chronic | ICD-10-CM

## 2025-05-01 LAB — INR PPP: 1.7

## 2025-05-01 NOTE — PROGRESS NOTES
Anticoagulation Clinic Progress Note    Anticoagulation Summary  As of 2025      INR goal:  2.0-3.0   TTR:  51.6% (4.1 y)   INR used for dosin.70 (2025)   Warfarin maintenance plan:  2.5 mg every day   Weekly warfarin total:  17.5 mg   Plan last modified:  Marleny Black RPH (2025)   Next INR check:  2025   Priority:  High   Target end date:  --    Indications    Atrial fibrillation  chronic [I48.20]                 Anticoagulation Episode Summary       INR check location:  --    Preferred lab:  --    Send INR reminders to:   [facility] HOME TEST POOL    Comments:   Home Testing as of 21 *call only when out of range*          Anticoagulation Care Providers       Provider Role Specialty Phone number    Zenia Tinajero MD Referring Cardiology [phone]            Clinic Interview:  Patient Findings     Negatives:  Signs/symptoms of thrombosis, Signs/symptoms of bleeding,   Laboratory test error suspected, Change in health, Change in alcohol use,   Change in activity, Upcoming invasive procedure, Emergency department   visit, Upcoming dental procedure, Missed doses, Extra doses, Change in   medications, Change in diet/appetite, Hospital admission, Bruising, Other   complaints      Clinical Outcomes     Negatives:  Major bleeding event, Thromboembolic event,   Anticoagulation-related hospital admission, Anticoagulation-related ED   visit, Anticoagulation-related fatality        INR History:      2025     1:51 PM 2025    12:00 AM 2025    11:30 AM 2025    12:00 AM 2025     1:27 PM 2025    12:00 AM 2025     1:40 PM   Anticoagulation Monitoring   INR 1.00  1.20  1.60  1.70   INR Date 2025   INR Goal 2.0-3.0  2.0-3.0  2.0-3.0  2.0-3.0   Trend Same  Up  Up  Up   Last Week Total 15 mg  17.5 mg  15 mg  17.5 mg   Next Week Total 15 mg  15 mg  17.5 mg  18.75 mg   Sun -  1.25 mg  2.5 mg  2.5 mg   Mon -  2.5 mg  2.5 mg  2.5  mg   Tue -  2.5 mg  2.5 mg  2.5 mg   Wed -  2.5 mg  2.5 mg  2.5 mg   Thu 3.75 mg (4/17)  -  1.25 mg  3.75 mg (5/1)   Fri -  2.5 mg  3.75 mg (4/25)  2.5 mg   Sat -  2.5 mg  2.5 mg  2.5 mg   Historical INR  1.30        1.20      1.60      1.70            This result is from an external source.       Plan:  1. INR is Subtherapeutic today- see above in Anticoagulation Summary.   Will instruct Shani Phillip to Increase their warfarin regimen- see above in Anticoagulation Summary.      boost to 3.75 mg today then trial 2.5 mg daily, rck 1 week   2. Follow up in 1 weeks  3. They have been instructed to call if any changes in medications, doses, concerns, etc. Patient expresses understanding and has no further questions at this time.    Marleny Black [facility]

## 2025-05-08 ENCOUNTER — PATIENT OUTREACH (OUTPATIENT)
Dept: CASE MANAGEMENT | Facility: OTHER | Age: 75
End: 2025-05-08
Payer: MEDICARE

## 2025-05-08 ENCOUNTER — ANTICOAGULATION VISIT (OUTPATIENT)
Dept: PHARMACY | Facility: HOSPITAL | Age: 75
End: 2025-05-08
Payer: MEDICARE

## 2025-05-08 DIAGNOSIS — I48.20 ATRIAL FIBRILLATION, CHRONIC: Primary | Chronic | ICD-10-CM

## 2025-05-08 DIAGNOSIS — J44.9 CHRONIC OBSTRUCTIVE PULMONARY DISEASE, UNSPECIFIED COPD TYPE: ICD-10-CM

## 2025-05-08 DIAGNOSIS — I50.32 CHRONIC DIASTOLIC CHF (CONGESTIVE HEART FAILURE): Primary | ICD-10-CM

## 2025-05-08 LAB — INR PPP: 3

## 2025-05-08 NOTE — OUTREACH NOTE
AMBULATORY CASE MANAGEMENT NOTE    Names and Relationships of Patient/Support Persons: Contact: Shani Phillip; Relationship: Self -     CCM Interim Update    Called and spoke to patient for CCM update. Called for weight update. Patient states she is checking her weight daily and weight is the same, stable. She does not have her records at the time of call. She will continue to monitor weight daily, she denies any SOB at the time of call. Reviewed to report if eight gain is more than 2-3 lbs within 24 hrs or more than 5 lbs/week. She verbalizes understanding. Next outreach scheduled.     Education Documentation  Unresolved/Worsening Symptoms, taught by Lester Garcia, RN at 5/8/2025  1:30 PM.  Learner: Patient  Readiness: Acceptance  Method: Explanation  Response: Verbalizes Understanding    Weight Monitoring, taught by Lester Garcia, RN at 5/8/2025  1:30 PM.  Learner: Patient  Readiness: Acceptance  Method: Explanation  Response: Verbalizes Understanding    Self-Care, taught by Lester Garcia, RN at 5/8/2025  1:30 PM.  Learner: Patient  Readiness: Acceptance  Method: Explanation  Response: Verbalizes Understanding          Lester BUTT  Ambulatory Case Management    5/8/2025, 13:30 EDT

## 2025-05-08 NOTE — PROGRESS NOTES
Anticoagulation Clinic Progress Note    Anticoagulation Summary  As of 5/8/2025      INR goal:  2.0-3.0   TTR:  51.7% (4.1 y)   INR used for dosing:  3.00 (5/8/2025)   Warfarin maintenance plan:  2.5 mg every day   Weekly warfarin total:  17.5 mg   No change documented:  Keshawn Castaneda, PharmD   Plan last modified:  Marleny Black RPH (5/1/2025)   Next INR check:  5/15/2025   Priority:  High   Target end date:  --    Indications    Atrial fibrillation  chronic [I48.20]                 Anticoagulation Episode Summary       INR check location:  --    Preferred lab:  --    Send INR reminders to:   [facility] [employer] HOME TEST POOL    Comments:   Home Testing as of 7/30/21 *call only when out of range*          Anticoagulation Care Providers       Provider Role Specialty Phone number    Zenia Tinajero MD Referring Cardiology [phone]            Clinic Interview:  Patient Findings     Negatives:  Signs/symptoms of thrombosis, Signs/symptoms of bleeding,   Laboratory test error suspected, Change in health, Change in alcohol use,   Change in activity, Upcoming invasive procedure, Emergency department   visit, Upcoming dental procedure, Missed doses, Extra doses, Change in   medications, Change in diet/appetite, Hospital admission, Bruising, Other   complaints      Clinical Outcomes     Negatives:  Major bleeding event, Thromboembolic event,   Anticoagulation-related hospital admission, Anticoagulation-related ED   visit, Anticoagulation-related fatality        INR History:      4/18/2025    11:30 AM 4/25/2025    12:00 AM 4/25/2025     1:27 PM 5/1/2025    12:00 AM 5/1/2025     1:40 PM 5/8/2025    12:00 AM 5/8/2025    11:31 AM   Anticoagulation Monitoring   INR 1.20  1.60  1.70  3.00   INR Date 4/18/2025 4/25/2025 5/1/2025 5/8/2025   INR Goal 2.0-3.0  2.0-3.0  2.0-3.0  2.0-3.0   Trend Up  Up  Up  Same   Last Week Total 17.5 mg  15 mg  17.5 mg  18.75 mg   Next Week Total 15 mg  17.5 mg  18.75 mg  17.5 mg   Sun 1.25 mg   2.5 mg  2.5 mg  2.5 mg   Mon 2.5 mg  2.5 mg  2.5 mg  2.5 mg   Tue 2.5 mg  2.5 mg  2.5 mg  2.5 mg   Wed 2.5 mg  2.5 mg  2.5 mg  2.5 mg   Thu -  1.25 mg  3.75 mg (5/1)  2.5 mg   Fri 2.5 mg  3.75 mg (4/25)  2.5 mg  2.5 mg   Sat 2.5 mg  2.5 mg  2.5 mg  2.5 mg   Historical INR  1.60      1.70      3.00            This result is from an external source.       Plan:  1. INR is Therapeutic today- see above in Anticoagulation Summary.   Will instruct Shani Phillip to Continue their warfarin regimen at dose of 2.5 mg daily - see above in Anticoagulation Summary.  2. Follow up in 1 week.  3. They have been instructed to call if any changes in medications, doses, concerns, etc. Patient expresses understanding and has no further questions at this time.    Keshawn Castaneda, PharmD

## 2025-05-15 ENCOUNTER — ANTICOAGULATION VISIT (OUTPATIENT)
Dept: PHARMACY | Facility: HOSPITAL | Age: 75
End: 2025-05-15
Payer: MEDICARE

## 2025-05-15 DIAGNOSIS — I48.20 ATRIAL FIBRILLATION, CHRONIC: Primary | Chronic | ICD-10-CM

## 2025-05-15 LAB — INR PPP: 3.9

## 2025-05-15 PROCEDURE — G0249 PROVIDE INR TEST MATER/EQUIP: HCPCS

## 2025-05-15 NOTE — PROGRESS NOTES
Anticoagulation Clinic Progress Note    Anticoagulation Summary  As of 5/15/2025      INR goal:  2.0-3.0   TTR:  51.5% (4.2 y)   INR used for dosing:  3.90 (5/15/2025)   Warfarin maintenance plan:  1.25 mg every Mon; 2.5 mg all other days   Weekly warfarin total:  16.25 mg   Plan last modified:  Keshawn Castaneda, PharmD (5/15/2025)   Next INR check:  5/22/2025   Priority:  High   Target end date:  --    Indications    Atrial fibrillation  chronic [I48.20]                 Anticoagulation Episode Summary       INR check location:  --    Preferred lab:  --    Send INR reminders to:   [facility] HOME TEST POOL    Comments:   Home Testing as of 7/30/21 *call only when out of range*          Anticoagulation Care Providers       Provider Role Specialty Phone number    Zenia Tinajero MD Referring Cardiology [phone]            Clinic Interview:  Patient Findings     Negatives:  Signs/symptoms of thrombosis, Signs/symptoms of bleeding,   Laboratory test error suspected, Change in health, Change in alcohol use,   Change in activity, Upcoming invasive procedure, Emergency department   visit, Upcoming dental procedure, Missed doses, Extra doses, Change in   medications, Change in diet/appetite, Hospital admission, Bruising, Other   complaints      Clinical Outcomes     Negatives:  Major bleeding event, Thromboembolic event,   Anticoagulation-related hospital admission, Anticoagulation-related ED   visit, Anticoagulation-related fatality        INR History:      4/25/2025     1:27 PM 5/1/2025    12:00 AM 5/1/2025     1:40 PM 5/8/2025    12:00 AM 5/8/2025    11:31 AM 5/15/2025    12:00 AM 5/15/2025     1:30 PM   Anticoagulation Monitoring   INR 1.60  1.70  3.00  3.90   INR Date 4/25/2025  5/1/2025  5/8/2025  5/15/2025   INR Goal 2.0-3.0  2.0-3.0  2.0-3.0  2.0-3.0   Trend Up  Up  Same  Down   Last Week Total 15 mg  17.5 mg  18.75 mg  17.5 mg   Next Week Total 17.5 mg  18.75 mg  17.5 mg  13.75 mg   Sun 2.5 mg  2.5 mg  2.5  mg  2.5 mg   Mon 2.5 mg  2.5 mg  2.5 mg  1.25 mg   Tue 2.5 mg  2.5 mg  2.5 mg  2.5 mg   Wed 2.5 mg  2.5 mg  2.5 mg  2.5 mg   Thu 1.25 mg  3.75 mg (5/1)  2.5 mg  Hold (5/15)   Fri 3.75 mg (4/25)  2.5 mg  2.5 mg  2.5 mg   Sat 2.5 mg  2.5 mg  2.5 mg  2.5 mg   Historical INR  1.70      3.00      3.90            This result is from an external source.       Plan:  1. INR is Supratherapeutic today- see above in Anticoagulation Summary.   Will instruct Shani Phillip to Change their warfarin regimen (HOLD today, then decrease to 1.25 mg Mon, 2.5 mg all other days) - see above in Anticoagulation Summary.  2. Follow up in 1 week.  3. They have been instructed to call if any changes in medications, doses, concerns, etc. Patient expresses understanding and has no further questions at this time.    Keshawn Castaneda, PharmD

## 2025-05-23 ENCOUNTER — ANTICOAGULATION VISIT (OUTPATIENT)
Dept: PHARMACY | Facility: HOSPITAL | Age: 75
End: 2025-05-23
Payer: MEDICARE

## 2025-05-23 DIAGNOSIS — I48.20 ATRIAL FIBRILLATION, CHRONIC: Primary | Chronic | ICD-10-CM

## 2025-05-23 LAB — INR PPP: 2.2

## 2025-05-23 NOTE — PROGRESS NOTES
Anticoagulation Clinic Progress Note    Anticoagulation Summary  As of 2025      INR goal:  2.0-3.0   TTR:  51.5% (4.2 y)   INR used for dosin.20 (2025)   Warfarin maintenance plan:  1.25 mg every Mon; 2.5 mg all other days   Weekly warfarin total:  16.25 mg   No change documented:  Marleny Black RPH   Plan last modified:  Keshawn Castaneda, PharmD (5/15/2025)   Next INR check:  2025   Priority:  High   Target end date:  --    Indications    Atrial fibrillation  chronic [I48.20]                 Anticoagulation Episode Summary       INR check location:  --    Preferred lab:  --    Send INR reminders to:   [facility] HOME TEST POOL    Comments:   Home Testing as of 21 *call only when out of range*          Anticoagulation Care Providers       Provider Role Specialty Phone number    Zenia Tinajero MD Referring Cardiology [phone]            Clinic Interview:  No pertinent clinical findings have been reported.    INR History:      2025     1:40 PM 2025    12:00 AM 2025    11:31 AM 5/15/2025    12:00 AM 5/15/2025     1:30 PM 2025    12:00 AM 2025    11:24 AM   Anticoagulation Monitoring   INR 1.70  3.00  3.90  2.20   INR Date 2025  2025  5/15/2025  2025   INR Goal 2.0-3.0  2.0-3.0  2.0-3.0  2.0-3.0   Trend Up  Same  Down  Same   Last Week Total 17.5 mg  18.75 mg  17.5 mg  16.25 mg   Next Week Total 18.75 mg  17.5 mg  13.75 mg  16.25 mg   Sun 2.5 mg  2.5 mg  2.5 mg  2.5 mg   Mon 2.5 mg  2.5 mg  1.25 mg  1.25 mg   Tue 2.5 mg  2.5 mg  2.5 mg  2.5 mg   Wed 2.5 mg  2.5 mg  2.5 mg  2.5 mg   Thu 3.75 mg ()  2.5 mg  Hold (5/15)  2.5 mg   Fri 2.5 mg  2.5 mg  2.5 mg  2.5 mg   Sat 2.5 mg  2.5 mg  2.5 mg  2.5 mg   Historical INR  3.00      3.90      2.20            This result is from an external source.       Plan:  1. INR is therapeutic today- see above in Anticoagulation Summary.    Shani Phillip to continue their warfarin regimen- see above in  Anticoagulation Summary.  2. Follow up in 1 week  3. They have been instructed to call if any changes in medications, doses, concerns, etc. Patient expresses understanding and has no further questions at this time.    Marleny Black RPH

## 2025-05-27 ENCOUNTER — PATIENT OUTREACH (OUTPATIENT)
Dept: CASE MANAGEMENT | Facility: OTHER | Age: 75
End: 2025-05-27
Payer: MEDICARE

## 2025-05-27 DIAGNOSIS — J44.9 CHRONIC OBSTRUCTIVE PULMONARY DISEASE, UNSPECIFIED COPD TYPE: ICD-10-CM

## 2025-05-27 DIAGNOSIS — I50.32 CHRONIC DIASTOLIC CHF (CONGESTIVE HEART FAILURE): Primary | ICD-10-CM

## 2025-05-27 NOTE — OUTREACH NOTE
[facility] End of Month Documentation    This Chronic Medical Management Care Plan for Shani Phillip, 74 y.o. female, has been monitored and managed; reviewed and a new plan of care implemented for the month of May.  A cumulative time of 21  minutes was spent on this patient record this month, including chart review; phone call with patient, CHF and COPD management. Care coordination. Reviewed meds and f.u with providers..    Regarding the patient's problems: has Influenza; Thrush, oral; COPD (chronic obstructive pulmonary disease); Atrial fibrillation with RVR; Chronic respiratory failure with hypoxia; Endocarditis of mitral valve; Mitral valve vegetation; Chronic diastolic CHF (congestive heart failure); Atrial fibrillation, chronic; Bilateral lower extremity edema; Intermittent lightheadedness; Depression; Chronic midline low back pain with left-sided sciatica; Nephrolithiasis; Oxygen dependent; Medicare annual wellness visit, subsequent; Osteoporosis; CKD (chronic kidney disease) stage 3, GFR 30-59 ml/min; Chronic anticoagulation; Nonrheumatic mitral valve stenosis; Chest pain, unspecified type; Chronic heart failure with preserved ejection fraction (HFpEF); Overweight with body mass index (BMI) 25.0-29.9; Acquired hypothyroidism; and Acute on chronic combined systolic and diastolic CHF (congestive heart failure) on their problem list., the following items were addressed: medical records; medications and any changes can be found within the plan section of the note.  A detailed listing of time spent for chronic care management is tracked within each outreach encounter.  Current medications include:  has a current medication list which includes the following prescription(s): ammonium lactate, budesonide, bupropion xl, carvedilol, cephalexin, cyclobenzaprine, digoxin, famotidine, ferrous sulfate, hydrocodone-acetaminophen, ipratropium-albuterol, levothyroxine, meclizine, nystatin, o2, ondansetron, pantoprazole, potassium  chloride er, pramipexole, yupelri, spironolactone, torsemide, trospium, and warfarin. and the patient is reported to be patient is compliant with medication protocol,  Medications are reported to be effective.  Regarding these diagnoses, referrals were made to the following provider(s):  n/a.  All notes on chart for PCP to review.    The patient was monitored remotely for weight; activity level; medications; blood pressure.    The patient's physical needs include:  help taking medications as prescribed; physical healthcare; DME supplies.     The patient's mental support needs include:  continued support    The patient's cognitive support needs include:  continued support    The patient's psychosocial support needs include:  medication management or adherence; continued support    The patient's functional needs include: DME; physical healthcare    The patient's environmental needs include:  not applicable, n/a    Care Plan overall comments:  No data recorded    Refer to previous outreach notes for more information on the areas listed above.    Monthly Billing Diagnoses  (I50.32) Chronic diastolic CHF (congestive heart failure)    (J44.9) Chronic obstructive pulmonary disease, unspecified COPD type    Medications   Medications have been reconciled    Care Plan progress this month:      Recently Modified Care Plans Updates made since 4/26/2025 12:00 AM      No recently modified care plans.             COPD       Track and Manage My Symptoms (Progressing)       Start:  02/28/25    Expected End:  08/28/25         My COPD Symptoms To Do List       Start:  02/28/25         Why is this important?  Tracking your symptoms and other information about your health helps your doctor plan your care.  Write down the symptoms, the time of day, what you were doing and what medicine you are taking.  You will soon learn how to manage your symptoms.         Initial Last    My COPD Symptoms To Do List: keep follow-up appointments taken  at 02/28/25 keep follow-up appointments;use an extra pillow to sleep taken at 03/24/25            Track and Manage My Triggers (Progressing)       Start:  02/28/25    Expected End:  08/28/25         My COPD Triggers To Do List       Start:  02/28/25         Why is this important?  Triggers are activities or things, like tobacco smoke or cold weather, that make your COPD (chronic obstructive pulmonary disease) flare up.  Knowing these triggers helps you plan how to stay away from them.  When you cannot remove them, you can learn how to manage them.              Manage My Fatigue (Tiredness) (Progressing)       Start:  02/28/25    Expected End:  08/28/25         My Fatigue Management To Do List       Start:  02/28/25         Why is this important?  Feeling tired or worn out is a common symptom of COPD (chronic obstructive pulmonary disease).  Learning when you feel your best and when you need rest is important.  Managing the tiredness (fatigue) will help you be active and enjoy life.         Initial    My Fatigue Management To Do List: keep bedroom cool and dark taken at 03/24/25            Learn and Do Breathing Exercises (Progressing)       Start:  02/28/25    Expected End:  08/28/25         My Breathing Exercises To Do List       Start:  02/28/25         Why is this important?  Breathing exercises can help lessen the cough that comes with chronic obstructive pulmonary disease.  Doing the exercises will give you more energy.  They will also help you to control your symptoms.              Optimal Care Coordination of a Patient Experiencing COPD (Progressing)       Start:  02/28/25    Expected End:  08/28/25         Support Psychosocial Response to COPD       Start:  02/28/25          Initial Last    Support Psychosocial Response to COPD: verbalization of feelings encouraged taken at 02/28/25 verbalization of feelings encouraged taken at 03/24/25         Alleviate Barriers to COPD Management       Start:  02/28/25           Initial Last    Alleviate Barriers to COPD Management: barriers to treatment managed taken at 02/28/25 barriers to treatment managed;activity or exercise based on tolerance encouraged taken at 03/24/25         Identify and Minimize Risk of COPD Exacerbation       Start:  02/28/25          Initial Last    Identify and Minimize Risk of COPD Exacerbation: barriers to treatment reviewed and addressed;barriers to lifestyle changes reviewed and addressed taken at 02/28/25 barriers to lifestyle changes reviewed and addressed;barriers to treatment reviewed and addressed taken at 03/24/25           Heart Failure       Track and Manage Fluids and Swelling (Progressing)       Start:  02/28/25    Expected End:  08/28/25         My Fluids and Swelling To Do List       Start:  02/28/25         Why is this important?  It is important to check your weight at home every day.  Also, check for swelling in your feet and legs every day.  Keeping your legs up while you are sitting and doing ankle pump exercises will help with the swelling.         Initial Last    My Fluids and Swelling To Do List: call office if I gain more than 2 pounds in 1 day or 5 pounds in 1 week;track weight in diary;weigh myself every day;watch for swelling in feet, ankles and legs every day taken at 02/28/25 read food labels for sodium (salt) content;weigh myself every day;use salt in moderation;watch for swelling in feet, ankles and legs every day taken at 03/24/25            Track and Manage Symptoms (Progressing)       Start:  02/28/25    Expected End:  08/28/25         My Heart Failure Symptoms To Do List       Start:  02/28/25         Why is this important?  You will be able to handle your symptoms better if you keep track of them.  Making some simple changes to your lifestyle will help.  Knowing how to self-manage shortness of breath and when to call the doctor is very important.  Eating healthy is one thing you can do to take care of yourself.          Initial    My Heart Failure Symptoms To Do List: know when to call the doctor taken at 02/28/25            Track and Manage Activity and Exertion (Progressing)       Start:  02/28/25    Expected End:  08/28/25         My Activity and Exertion To Do List       Start:  02/28/25         Why is this important?  Exercising is very important when managing your heart failure.  It will help your heart get stronger.  Wearing a pedometer or using a fitness tracker can help you stay motivated.              Optimal Care Coordination of a Patient Experiencing Heart Failure (Progressing)       Start:  02/28/25    Expected End:  08/28/25         Identify and Minimize Risk of Heart Failure Exacerbation       Start:  02/28/25          Initial Last    Identify and Minimize Risk of Heart Failure Exacerbation: barriers to lifestyle changes reviewed and addressed;barriers to treatment reviewed and addressed taken at 02/28/25 barriers to lifestyle changes reviewed and addressed;barriers to treatment reviewed and addressed;medication-adherence assessment completed taken at 03/24/25         Identify and Manage Comorbidities and Complications       Start:  02/28/25          Initial    Identify and Manage Comorbidities and Complications: response to pharmacologic therapy monitored taken at 03/24/25         Alleviate Barriers to Optimal Nutrition and Fluid Status       Start:  02/28/25          Initial Last    Alleviate Barriers to Optimal Nutrition and Fluid Status: barriers to sufficient nutrient intake addressed;home monitoring of weight gain or loss encouraged taken at 02/28/25 home monitoring of weight gain or loss encouraged;barriers to sufficient nutrient intake addressed taken at 03/24/25         Maintain Strength and Functional Ability       Start:  02/28/25          Initial Last    Maintain Strength and Functional Ability: self-care encouraged;barriers to activity identified and managed taken at 02/28/25 barriers to activity  identified and managed;activity or exercise based on tolerance encouraged;daily activity/exercise promoted taken at 03/24/25         Monitor and Manage Sleep Disturbance       Start:  02/28/25          Initial    Monitor and Manage Sleep Disturbance: oxygen therapy promoted using O2 at 2L taken at 03/24/25         Support Psychosocial Response to Heart Failure       Start:  02/28/25          Initial Last    Support Psychosocial Response to Heart Failure: verbalization of feelings encouraged taken at 02/28/25 verbalization of feelings encouraged taken at 03/24/25         Develop and Maintain Palliative Care Plan       Start:  02/28/25          Initial    Develop and Maintain Palliative Care Plan: active listening utilized taken at 03/24/25             Instructions   Patient was provided an electronic copy of care plan  CCM services were explained and offered and patient has accepted these services.  Patient has given their written consent to receive CCM services and understands that this includes the authorization of electronic communication of medical information with the other treating providers.  Patient understands that they may stop CCM services at any time and these changes will be effective at the end of the calendar month and will effectively revocate the agreement of CCM services.  Patient understands that only one practitioner can furnish and be paid for CCM services during one calendar month.  Patient also understands that there may be co-payment or deductible fees in association with CCM services.  Patient will continue with at least monthly follow-up calls with the Ambulatory .    Lester BUTT  Ambulatory Case Management    5/27/2025, 12:05 EDT

## 2025-05-27 NOTE — OUTREACH NOTE
"AMBULATORY CASE MANAGEMENT NOTE    Names and Relationships of Patient/Support Persons: Contact: Shani Phillip; Relationship: Self -     CCM Interim Update    Called and spoke with patient for CCM update. Patient no show on her appt with PCP 5/22. She states she was not aware she had a schedule and why she was coming in. Unable to view reason why patient is coming in. She states she was rescheduled for 6/11 at 3 PM in [facility] office. Informed patient that I confirmed with scheduling that our system does not show an appointment for her on 6/11. New appt date made, she requested if I can schedule her  as well as they always come together when they see PCP. Appointment scheduled.    F/U on CHF - monitoring daily weight. She states her weight is stable at 141lbs. She verbalizes understanding when to report weight gain and worsening in symptoms. Adherent with medications. Reminded to watch salt intake. Discussed with patient that I do hear wheezing over the phone, she states she's always been like that. She denies worsening with her breathing and verbalizes that \"its about the same\". Continues to use O2 at home. She denies urgent needs at this time. Continue with CHF and COPD management. Next outreach scheduled. CCM will continue to follow and assist.     Education Documentation  No documentation found.        Lester BUTT  Ambulatory Case Management    5/27/2025, 11:55 EDT  "

## 2025-05-29 ENCOUNTER — ANTICOAGULATION VISIT (OUTPATIENT)
Dept: PHARMACY | Facility: HOSPITAL | Age: 75
End: 2025-05-29
Payer: MEDICARE

## 2025-05-29 DIAGNOSIS — I48.20 ATRIAL FIBRILLATION, CHRONIC: Primary | Chronic | ICD-10-CM

## 2025-05-29 LAB — INR PPP: 1.4

## 2025-05-29 NOTE — PROGRESS NOTES
Anticoagulation Clinic Progress Note    Anticoagulation Summary  As of 2025      INR goal:  2.0-3.0   TTR:  51.4% (4.2 y)   INR used for dosin.40 (2025)   Warfarin maintenance plan:  2.5 mg every day   Weekly warfarin total:  17.5 mg   Plan last modified:  Marleny Black RPH (2025)   Next INR check:  2025   Priority:  High   Target end date:  --    Indications    Atrial fibrillation  chronic [I48.20]                 Anticoagulation Episode Summary       INR check location:  --    Preferred lab:  --    Send INR reminders to:   [facility] HOME TEST POOL    Comments:   Home Testing as of 21 *call only when out of range*          Anticoagulation Care Providers       Provider Role Specialty Phone number    Zenia Tinajero MD Referring Cardiology [phone]            Clinic Interview:  Patient Findings     Negatives:  Signs/symptoms of thrombosis, Signs/symptoms of bleeding,   Laboratory test error suspected, Change in health, Change in alcohol use,   Change in activity, Upcoming invasive procedure, Emergency department   visit, Upcoming dental procedure, Missed doses, Extra doses, Change in   medications, Change in diet/appetite, Hospital admission, Bruising, Other   complaints      Clinical Outcomes     Negatives:  Major bleeding event, Thromboembolic event,   Anticoagulation-related hospital admission, Anticoagulation-related ED   visit, Anticoagulation-related fatality        INR History:      2025    11:31 AM 5/15/2025    12:00 AM 5/15/2025     1:30 PM 2025    12:00 AM 2025    11:24 AM 2025    12:00 AM 2025    10:10 AM   Anticoagulation Monitoring   INR 3.00  3.90  2.20  1.40   INR Date 2025  5/15/2025  2025  2025   INR Goal 2.0-3.0  2.0-3.0  2.0-3.0  2.0-3.0   Trend Same  Down  Same  Up   Last Week Total 18.75 mg  17.5 mg  16.25 mg  16.25 mg   Next Week Total 17.5 mg  13.75 mg  16.25 mg  18.75 mg   Sun 2.5 mg  2.5 mg  2.5 mg  2.5 mg    Mon 2.5 mg  1.25 mg  1.25 mg  2.5 mg   Tue 2.5 mg  2.5 mg  2.5 mg  2.5 mg   Wed 2.5 mg  2.5 mg  2.5 mg  2.5 mg   Thu 2.5 mg  Hold (5/15)  2.5 mg  3.75 mg (5/29)   Fri 2.5 mg  2.5 mg  2.5 mg  2.5 mg   Sat 2.5 mg  2.5 mg  2.5 mg  2.5 mg   Historical INR  3.90      2.20      1.40            This result is from an external source.       Plan:  1. INR is Supratherapeutic today- see above in Anticoagulation Summary.   Will instruct Shani Phillip to Change their warfarin regimen- see above in Anticoagulation Summary.  boost to 3.75 mg then trial back on 2.5 mg daily, rck 1 week   2. Follow up in 1 weeks  3. They have been instructed to call if any changes in medications, doses, concerns, etc. Patient expresses understanding and has no further questions at this time.    Marleny Black formerly [facility]

## 2025-06-02 ENCOUNTER — OFFICE VISIT (OUTPATIENT)
Dept: FAMILY MEDICINE CLINIC | Facility: CLINIC | Age: 75
End: 2025-06-02
Payer: MEDICARE

## 2025-06-02 VITALS
HEART RATE: 60 BPM | BODY MASS INDEX: 26.62 KG/M2 | TEMPERATURE: 98 F | HEIGHT: 61 IN | WEIGHT: 141 LBS | OXYGEN SATURATION: 84 % | SYSTOLIC BLOOD PRESSURE: 120 MMHG | DIASTOLIC BLOOD PRESSURE: 70 MMHG

## 2025-06-02 DIAGNOSIS — Z51.81 ENCOUNTER FOR MEDICATION MONITORING: ICD-10-CM

## 2025-06-02 DIAGNOSIS — G89.29 CHRONIC MIDLINE LOW BACK PAIN WITH LEFT-SIDED SCIATICA: ICD-10-CM

## 2025-06-02 DIAGNOSIS — R11.0 CHRONIC NAUSEA: Primary | ICD-10-CM

## 2025-06-02 DIAGNOSIS — M54.42 CHRONIC MIDLINE LOW BACK PAIN WITH LEFT-SIDED SCIATICA: ICD-10-CM

## 2025-06-02 DIAGNOSIS — K21.9 GASTROESOPHAGEAL REFLUX DISEASE WITHOUT ESOPHAGITIS: ICD-10-CM

## 2025-06-02 PROBLEM — H35.30 AGE-RELATED MACULAR DEGENERATION: Status: ACTIVE | Noted: 2025-04-22

## 2025-06-02 PROBLEM — G25.81 RESTLESS LEGS SYNDROME: Status: ACTIVE | Noted: 2025-06-02

## 2025-06-02 RX ORDER — ONDANSETRON 4 MG/1
4 TABLET, FILM COATED ORAL EVERY 8 HOURS PRN
Qty: 60 TABLET | Refills: 3 | Status: SHIPPED | OUTPATIENT
Start: 2025-06-02

## 2025-06-02 RX ORDER — PANTOPRAZOLE SODIUM 40 MG/1
40 TABLET, DELAYED RELEASE ORAL DAILY
Qty: 180 TABLET | Refills: 0 | Status: SHIPPED | OUTPATIENT
Start: 2025-06-02

## 2025-06-02 RX ORDER — CYCLOBENZAPRINE HCL 10 MG
10 TABLET ORAL 3 TIMES DAILY PRN
Qty: 40 TABLET | Refills: 1 | Status: SHIPPED | OUTPATIENT
Start: 2025-06-02

## 2025-06-02 RX ORDER — SPIRONOLACTONE 25 MG/1
25 TABLET ORAL DAILY
Qty: 90 TABLET | Refills: 0 | Status: SHIPPED | OUTPATIENT
Start: 2025-06-02

## 2025-06-02 RX ORDER — HYDROCODONE BITARTRATE AND ACETAMINOPHEN 7.5; 325 MG/1; MG/1
1 TABLET ORAL EVERY 6 HOURS PRN
Qty: 30 TABLET | Refills: 0 | Status: SHIPPED | OUTPATIENT
Start: 2025-06-02

## 2025-06-02 NOTE — PROGRESS NOTES
Subjective   Shani Phillip is a 74 y.o. female.     Chief Complaint   Patient presents with    Med Refill     Follow up        History of Present Illness    was present during the history-taking and subsequent discussion (and for part of the physical exam) with this patient.  Patient agrees to the presence of the individual during this visit.     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  And bileaflet thickening of mitral valve.  COPD with chronic hypoxic respiratory failure/oxygen dependent, CKD stage 3b, chronic back pain with sciatica for which she uses the hydrocodone rarely and last filled on 8/20/2024 of 30 tablets (maybe once a week).  Patient with history of lap band and should not vomit.      Patient does have a history of tobacco use total of 50-pack-year history discontinue 11/3/2019.    The following portions of the patient's history were reviewed and updated as appropriate: allergies, current medications, past family history, past medical history, past social history, past surgical history and problem list.    Depression Screen:      6/2/2025     4:01 PM   PHQ-2/PHQ-9 Depression Screening   Little interest or pleasure in doing things Not at all   Feeling down, depressed, or hopeless Not at all       Past Medical History:   Diagnosis Date    Acute endocarditis     Atrial fibrillation with RVR 11/14/2019    Cancer     CKD (chronic kidney disease) stage 3, GFR 30-59 ml/min     COPD (chronic obstructive pulmonary disease)     CTS (carpal tunnel syndrome)     Dizziness     Gall stones     Influenza 11/14/2019    Medicare annual wellness visit, subsequent 04/12/2021    Migraine     Osteoporosis     Renal disorder     Restless leg syndrome     Thrush, oral 11/14/2019       Past Surgical History:   Procedure Laterality Date    CARDIAC CATHETERIZATION N/A 1/23/2020    Procedure: Coronary angiography;  Surgeon: Svetlana Grayson MD;   Location:  [facility];  Service: Cardiovascular    CARDIAC CATHETERIZATION N/A 2020    Procedure: Left ventriculography;  Surgeon: Svetlana Grayson MD;  Location:  [facility];  Service: Cardiovascular    CARDIAC CATHETERIZATION N/A 2020    Procedure: Left Heart Cath;  Surgeon: Svetlana Grayson MD;  Location:  [facility];  Service: Cardiovascular    CHOLECYSTECTOMY      KNEE ARTHROPLASTY Right     LAPAROSCOPIC GASTRIC BANDING         Family History   Problem Relation Age of Onset    Lung cancer Mother        Social History     Socioeconomic History    Marital status:    Tobacco Use    Smoking status: Former     Current packs/day: 0.00     Average packs/day: 0.5 packs/day for 50.0 years (25.0 ttl pk-yrs)     Types: Cigarettes     Start date: 11/3/1969     Quit date: 11/3/2019     Years since quittin.5     Passive exposure: Past (parents smoked in the home)    Smokeless tobacco: Never    Tobacco comments:     LAST CIG 2019   Vaping Use    Vaping status: Never Used   Substance and Sexual Activity    Alcohol use: No     Comment: caffeine - 1/2 cup coffee daily     Drug use: No    Sexual activity: Defer       Current Outpatient Medications   Medication Sig Dispense Refill    ammonium lactate (LAC-HYDRIN) 12 % lotion APPLY TOPICALLY TO THE APPROPRIATE AREA AS DIRECTED AS NEEDED FOR DRY SKIN. 225 g 6    budesonide (PULMICORT) 0.5 MG/2ML nebulizer solution USE 1 VIAL IN NEBULIZER DAILY - rinse mouth after treatment 30 each 11    buPROPion XL (WELLBUTRIN XL) 300 MG 24 hr tablet Take 1 tablet by mouth Daily. 90 tablet 3    carvedilol (COREG) 3.125 MG tablet Take 1 tablet by mouth 2 (Two) Times a Day With Meals. 180 tablet 1    cyclobenzaprine (FLEXERIL) 10 MG tablet Take 1 tablet by mouth 3 (Three) Times a Day As Needed (back pain). 40 tablet 1    digoxin (LANOXIN) 125 MCG tablet Take 1 tablet by mouth Every Other Day. 90 tablet 1    famotidine  (PEPCID) 20 MG tablet TAKE 1 TABLET BY MOUTH 2 TIMES A DAY BEFORE MEALS. 180 tablet 3    ferrous sulfate 325 (65 FE) MG tablet Take 1 tablet by mouth Every Other Day. 45 tablet 1    HYDROcodone-acetaminophen (NORCO) 7.5-325 MG per tablet Take 1 tablet by mouth Every 6 (Six) Hours As Needed for Moderate Pain. 30 tablet 0    ipratropium-albuterol (DUO-NEB) 0.5-2.5 mg/3 ml nebulizer USE 1 VIAL IN NEBULIZER 4 TIMES DAILY - as directed 120 mL 11    levothyroxine (SYNTHROID, LEVOTHROID) 50 MCG tablet Take 1 tablet by mouth Every Morning. 90 tablet 1    meclizine (ANTIVERT) 12.5 MG tablet Take 1 tablet by mouth 3 (Three) Times a Day As Needed for Dizziness. 12 tablet 0    nystatin (MYCOSTATIN) 556796 UNIT/GM powder Apply  topically to the appropriate area as directed 4 (Four) Times a Day. Under both breasts 30 g 1    O2 (OXYGEN) Inhale 2 L/min 1 (One) Time.      ondansetron (ZOFRAN) 4 MG tablet Take 1 tablet by mouth Every 8 (Eight) Hours As Needed for Nausea or Vomiting. 60 tablet 3    pantoprazole (PROTONIX) 40 MG EC tablet Take 1 tablet by mouth Daily. 90 tablet 1    potassium chloride ER (K-TAB) 20 MEQ tablet controlled-release ER tablet Take 1 tablet by mouth twice daily 180 tablet 1    pramipexole (MIRAPEX) 0.5 MG tablet Take 1 tablet by mouth 2 (Two) Times a Day. 180 tablet 3    revefenacin (Yupelri) 175 MCG/3ML nebulizer solution Take 3 mL by nebulization Daily.      torsemide (DEMADEX) 20 MG tablet Take 2 tablets by mouth 2 (Two) Times a Day. 360 tablet 1    trospium (SANCTURA) 20 MG tablet Take 1 tablet by mouth 2 (Two) Times a Day. 180 tablet 3    warfarin (COUMADIN) 2.5 MG tablet TAKE 1/2 TABLET ON MONDAY, WEDNESDAY AND SATURDAY, THEN 1 TABLET ALL OTHER DAYS AS DIRECTED. 75 tablet 0    cephalexin (KEFLEX) 500 MG capsule Take 1 capsule by mouth 3 (Three) Times a Day. (Patient not taking: Reported on 6/2/2025) 30 capsule 0    spironolactone (ALDACTONE) 25 MG tablet Take 1 tablet by mouth once daily 90 tablet 0  "    No current facility-administered medications for this visit.       Review of Systems   Constitutional:  Negative for activity change, appetite change, fatigue, fever, unexpected weight gain and unexpected weight loss.   HENT:  Negative for nosebleeds, rhinorrhea, trouble swallowing and voice change.    Eyes:  Negative for visual disturbance.   Respiratory:  Positive for shortness of breath. Negative for cough, chest tightness and wheezing.    Cardiovascular:  Negative for chest pain, palpitations and leg swelling.   Gastrointestinal:  Positive for nausea. Negative for abdominal pain, blood in stool, constipation, diarrhea, vomiting, GERD and indigestion.   Genitourinary:  Negative for dysuria, frequency and hematuria.   Musculoskeletal:  Positive for back pain. Negative for arthralgias and myalgias.   Skin:  Negative for rash and wound.   Neurological:  Positive for weakness. Negative for dizziness, tremors, light-headedness, numbness, headache and memory problem.   Hematological:  Negative for adenopathy. Does not bruise/bleed easily.   Psychiatric/Behavioral:  Negative for sleep disturbance and depressed mood. The patient is not nervous/anxious.        Objective   /70 (BP Location: Left arm, Patient Position: Sitting, Cuff Size: Large Adult)   Pulse 60   Temp 98 °F (36.7 °C) (Temporal)   Ht 154.9 cm (60.98\")   Wt 64 kg (141 lb) Comment: Patient Reported  SpO2 (!) 84% Comment: on Oxygen  BMI 26.66 kg/m²     Physical Exam  Vitals and nursing note reviewed.   Constitutional:       General: She is not in acute distress.     Appearance: She is well-developed. She is not diaphoretic.   HENT:      Head: Normocephalic and atraumatic.      Right Ear: External ear normal.      Left Ear: External ear normal.      Nose: Nose normal.   Eyes:      Conjunctiva/sclera: Conjunctivae normal.      Pupils: Pupils are equal, round, and reactive to light.   Neck:      Thyroid: No thyromegaly.      Trachea: No tracheal " deviation.   Cardiovascular:      Rate and Rhythm: Normal rate and regular rhythm.      Heart sounds: Normal heart sounds. No murmur heard.     No friction rub. No gallop.      Comments: Blue purple toes and distal feet bilaterally  Pulmonary:      Effort: Pulmonary effort is normal. No respiratory distress.      Breath sounds: Normal breath sounds.      Comments: Using oxygen 2L/NC  Abdominal:      General: Bowel sounds are normal.      Palpations: Abdomen is soft. There is no mass.      Tenderness: There is no abdominal tenderness. There is no guarding.   Musculoskeletal:         General: Normal range of motion.      Cervical back: Normal range of motion and neck supple.      Comments: Using wheelchair.   Lymphadenopathy:      Cervical: No cervical adenopathy.   Skin:     General: Skin is warm and dry.      Capillary Refill: Capillary refill takes less than 2 seconds.      Findings: No rash.   Neurological:      Mental Status: She is alert and oriented to person, place, and time.      Motor: No abnormal muscle tone.      Deep Tendon Reflexes: Reflexes normal.   Psychiatric:         Behavior: Behavior normal.         Thought Content: Thought content normal.         Judgment: Judgment normal.         Recent Results (from the past 12 weeks)   Protime-INR    Collection Time: 03/20/25 12:00 AM    Specimen: Blood   Result Value Ref Range    INR 1.40    Protime-INR    Collection Time: 03/27/25 12:00 AM    Specimen: Blood   Result Value Ref Range    INR 2.50    Protime-INR    Collection Time: 04/03/25 12:00 AM    Specimen: Blood   Result Value Ref Range    INR 1.10    Protime-INR    Collection Time: 04/10/25 12:00 AM    Specimen: Blood   Result Value Ref Range    INR 1.10    Protime-INR    Collection Time: 04/17/25 12:00 AM    Specimen: Blood   Result Value Ref Range    INR 1.00    Protime-INR    Collection Time: 04/18/25 12:00 AM    Specimen: Blood   Result Value Ref Range    INR 1.30    Protime-INR    Collection Time:  04/18/25 12:00 AM    Specimen: Blood   Result Value Ref Range    INR 1.20    Protime-INR    Collection Time: 04/25/25 12:00 AM    Specimen: Blood   Result Value Ref Range    INR 1.60    Protime-INR    Collection Time: 05/01/25 12:00 AM    Specimen: Blood   Result Value Ref Range    INR 1.70    Protime-INR    Collection Time: 05/08/25 12:00 AM    Specimen: Blood   Result Value Ref Range    INR 3.00    Protime-INR    Collection Time: 05/15/25 12:00 AM    Specimen: Blood   Result Value Ref Range    INR 3.90    Protime-INR    Collection Time: 05/23/25 12:00 AM    Specimen: Blood   Result Value Ref Range    INR 2.20    Protime-INR    Collection Time: 05/29/25 12:00 AM    Specimen: Blood   Result Value Ref Range    INR 1.40      Assessment & Plan   Diagnoses and all orders for this visit:    1. Chronic nausea (Primary)  -     ondansetron (ZOFRAN) 4 MG tablet; Take 1 tablet by mouth Every 8 (Eight) Hours As Needed for Nausea or Vomiting.  Dispense: 60 tablet; Refill: 3    2. Chronic midline low back pain with left-sided sciatica  -     HYDROcodone-acetaminophen (NORCO) 7.5-325 MG per tablet; Take 1 tablet by mouth Every 6 (Six) Hours As Needed for Moderate Pain.  Dispense: 30 tablet; Refill: 0  -     cyclobenzaprine (FLEXERIL) 10 MG tablet; Take 1 tablet by mouth 3 (Three) Times a Day As Needed (back pain).  Dispense: 40 tablet; Refill: 1    3. Encounter for medication monitoring  -     Chalo Select 13 (MW) - Urine, Clean Catch    Continue current medications as noted above.  We discussed hydrocodone and Flexeril to use as needed only.  She is obviously doing this.  Controlled substance agreement reviewed and signed in office.  ZHANE run and reviewed.  Risks of the medication include but are not limited to fatigue, somnolence, increased risk of falls, constipation, allergic reaction, dependence, and addiction.       Dictated utilizing Dragon Dictation

## 2025-06-06 ENCOUNTER — ANTICOAGULATION VISIT (OUTPATIENT)
Dept: PHARMACY | Facility: HOSPITAL | Age: 75
End: 2025-06-06
Payer: MEDICARE

## 2025-06-06 DIAGNOSIS — I48.20 ATRIAL FIBRILLATION, CHRONIC: Primary | Chronic | ICD-10-CM

## 2025-06-06 LAB — INR PPP: 2.8

## 2025-06-06 NOTE — TELEPHONE ENCOUNTER
Caller: [email or web address]    Relationship to patient: Other    Best call back number: [phone]     Patient is needing:   ANH FROM [employer] IS WANTING TO KNOW IF THE OFFICE HAS RECEIVED HER FAX REGARDING THE PATIENT.    PLEASE CALL TO CONFIRM OR DENY.

## 2025-06-06 NOTE — PROGRESS NOTES
Anticoagulation Clinic Progress Note    Anticoagulation Summary  As of 2025      INR goal:  2.0-3.0   TTR:  51.4% (4.2 y)   INR used for dosin.80 (2025)   Warfarin maintenance plan:  2.5 mg every day   Weekly warfarin total:  17.5 mg   No change documented:  Marleny Black RPH   Plan last modified:  Marleny Black RPH (2025)   Next INR check:  2025   Priority:  High   Target end date:  --    Indications    Atrial fibrillation  chronic [I48.20]                 Anticoagulation Episode Summary       INR check location:  --    Preferred lab:  --    Send INR reminders to:   [facility] HOME TEST [facility]    Comments:   Home Testing as of 21 *call only when out of range*          Anticoagulation Care Providers       Provider Role Specialty Phone number    Zenia Tinajero MD Referring Cardiology [phone]            Clinic Interview:  No pertinent clinical findings have been reported.    INR History:      5/15/2025     1:30 PM 2025    12:00 AM 2025    11:24 AM 2025    12:00 AM 2025    10:10 AM 2025    12:00 AM 2025    10:17 AM   Anticoagulation Monitoring   INR 3.90  2.20  1.40  2.80   INR Date 5/15/2025  2025  2025  2025   INR Goal 2.0-3.0  2.0-3.0  2.0-3.0  2.0-3.0   Trend Down  Same  Up  Same   Last Week Total 17.5 mg  16.25 mg  16.25 mg  17.5 mg   Next Week Total 13.75 mg  16.25 mg  18.75 mg  17.5 mg   Sun 2.5 mg  2.5 mg  2.5 mg  2.5 mg   Mon 1.25 mg  1.25 mg  2.5 mg  2.5 mg   Tue 2.5 mg  2.5 mg  2.5 mg  2.5 mg   Wed 2.5 mg  2.5 mg  2.5 mg  2.5 mg   Thu Hold (5/15)  2.5 mg  3.75 mg ()  2.5 mg   Fri 2.5 mg  2.5 mg  2.5 mg  2.5 mg   Sat 2.5 mg  2.5 mg  2.5 mg  2.5 mg   Historical INR  2.20      1.40      2.80            This result is from an external source.       Plan:  1. INR is therapeutic today- see above in Anticoagulation Summary.    Shani Phillip to continue their warfarin regimen- see above in Anticoagulation Summary.  2. Follow  up in 1 week  3.  They have been instructed to call if any changes in medications, doses, concerns, etc. Patient expresses understanding and has no further questions at this time.    Marleny Black [facility]

## 2025-06-08 LAB — DRUGS UR: NORMAL

## 2025-06-10 ENCOUNTER — APPOINTMENT (OUTPATIENT)
Dept: GENERAL RADIOLOGY | Facility: HOSPITAL | Age: 75
DRG: 659 | End: 2025-06-10
Payer: MEDICARE

## 2025-06-10 ENCOUNTER — ANESTHESIA EVENT (OUTPATIENT)
Dept: PERIOP | Facility: HOSPITAL | Age: 75
End: 2025-06-10
Payer: MEDICARE

## 2025-06-10 ENCOUNTER — APPOINTMENT (OUTPATIENT)
Dept: CT IMAGING | Facility: HOSPITAL | Age: 75
DRG: 659 | End: 2025-06-10
Payer: MEDICARE

## 2025-06-10 ENCOUNTER — HOSPITAL ENCOUNTER (INPATIENT)
Facility: HOSPITAL | Age: 75
LOS: 4 days | Discharge: HOME | DRG: 659 | End: 2025-06-14
Attending: EMERGENCY MEDICINE | Admitting: INTERNAL MEDICINE
Payer: MEDICARE

## 2025-06-10 ENCOUNTER — ANESTHESIA (OUTPATIENT)
Dept: PERIOP | Facility: HOSPITAL | Age: 75
End: 2025-06-10
Payer: MEDICARE

## 2025-06-10 DIAGNOSIS — N13.30 HYDRONEPHROSIS, LEFT: Primary | ICD-10-CM

## 2025-06-10 DIAGNOSIS — Z79.01 CHRONIC ANTICOAGULATION: ICD-10-CM

## 2025-06-10 DIAGNOSIS — I48.91 ATRIAL FIBRILLATION WITH RAPID VENTRICULAR RESPONSE: ICD-10-CM

## 2025-06-10 DIAGNOSIS — N20.0 HORSESHOE KIDNEY WITH RENAL CALCULUS: ICD-10-CM

## 2025-06-10 DIAGNOSIS — Q63.1 HORSESHOE KIDNEY WITH RENAL CALCULUS: ICD-10-CM

## 2025-06-10 DIAGNOSIS — I50.33 ACUTE ON CHRONIC DIASTOLIC CHF (CONGESTIVE HEART FAILURE): ICD-10-CM

## 2025-06-10 DIAGNOSIS — J44.9 CHRONIC OBSTRUCTIVE PULMONARY DISEASE, UNSPECIFIED COPD TYPE: ICD-10-CM

## 2025-06-10 LAB
ALBUMIN SERPL-MCNC: 4.1 G/DL (ref 3.5–5.2)
ALBUMIN/GLOB SERPL: 0.9 G/DL
ALP SERPL-CCNC: 177 U/L (ref 39–117)
ALT SERPL W P-5'-P-CCNC: 13 U/L (ref 1–33)
ANION GAP SERPL CALCULATED.3IONS-SCNC: 10 MMOL/L (ref 5–15)
ANION GAP SERPL CALCULATED.3IONS-SCNC: 12 MMOL/L (ref 5–15)
AST SERPL-CCNC: 21 U/L (ref 1–32)
BACTERIA UR QL AUTO: ABNORMAL /HPF
BASOPHILS # BLD AUTO: 0.05 10*3/MM3 (ref 0–0.2)
BASOPHILS NFR BLD AUTO: 0.3 % (ref 0–1.5)
BILIRUB SERPL-MCNC: 0.7 MG/DL (ref 0–1.2)
BILIRUB UR QL STRIP: NEGATIVE
BUN SERPL-MCNC: 21 MG/DL (ref 8–23)
BUN SERPL-MCNC: 23 MG/DL (ref 8–23)
BUN/CREAT SERPL: 14.3 (ref 7–25)
BUN/CREAT SERPL: 14.7 (ref 7–25)
CALCIUM SPEC-SCNC: 9.2 MG/DL (ref 8.6–10.5)
CALCIUM SPEC-SCNC: 9.6 MG/DL (ref 8.6–10.5)
CHLORIDE SERPL-SCNC: 93 MMOL/L (ref 98–107)
CHLORIDE SERPL-SCNC: 93 MMOL/L (ref 98–107)
CLARITY UR: CLEAR
CO2 SERPL-SCNC: 30 MMOL/L (ref 22–29)
CO2 SERPL-SCNC: 32 MMOL/L (ref 22–29)
COLOR UR: YELLOW
CREAT SERPL-MCNC: 1.43 MG/DL (ref 0.57–1)
CREAT SERPL-MCNC: 1.61 MG/DL (ref 0.57–1)
CRP SERPL-MCNC: 1.54 MG/DL (ref 0–0.5)
DEPRECATED RDW RBC AUTO: 46.8 FL (ref 37–54)
DEPRECATED RDW RBC AUTO: 48.4 FL (ref 37–54)
DIGOXIN SERPL-MCNC: 0.7 NG/ML (ref 0.6–1.2)
EGFRCR SERPLBLD CKD-EPI 2021: 33.2 ML/MIN/1.73
EGFRCR SERPLBLD CKD-EPI 2021: 38.3 ML/MIN/1.73
EOSINOPHIL # BLD AUTO: 0.2 10*3/MM3 (ref 0–0.4)
EOSINOPHIL NFR BLD AUTO: 1.3 % (ref 0.3–6.2)
ERYTHROCYTE [DISTWIDTH] IN BLOOD BY AUTOMATED COUNT: 13.9 % (ref 12.3–15.4)
ERYTHROCYTE [DISTWIDTH] IN BLOOD BY AUTOMATED COUNT: 14.2 % (ref 12.3–15.4)
ERYTHROCYTE [SEDIMENTATION RATE] IN BLOOD: 63 MM/HR (ref 0–30)
GLOBULIN UR ELPH-MCNC: 4.5 GM/DL
GLUCOSE SERPL-MCNC: 142 MG/DL (ref 65–99)
GLUCOSE SERPL-MCNC: 161 MG/DL (ref 65–99)
GLUCOSE UR STRIP-MCNC: NEGATIVE MG/DL
HCT VFR BLD AUTO: 43.1 % (ref 34–46.6)
HCT VFR BLD AUTO: 46.9 % (ref 34–46.6)
HGB BLD-MCNC: 14.6 G/DL (ref 12–15.9)
HGB BLD-MCNC: 15.9 G/DL (ref 12–15.9)
HGB UR QL STRIP.AUTO: NEGATIVE
HYALINE CASTS UR QL AUTO: ABNORMAL /LPF
IMM GRANULOCYTES # BLD AUTO: 0.08 10*3/MM3 (ref 0–0.05)
IMM GRANULOCYTES NFR BLD AUTO: 0.5 % (ref 0–0.5)
INR PPP: 2.3 (ref 0.9–1.1)
KETONES UR QL STRIP: NEGATIVE
LEUKOCYTE ESTERASE UR QL STRIP.AUTO: ABNORMAL
LYMPHOCYTES # BLD AUTO: 0.58 10*3/MM3 (ref 0.7–3.1)
LYMPHOCYTES NFR BLD AUTO: 3.8 % (ref 19.6–45.3)
MCH RBC QN AUTO: 31.4 PG (ref 26.6–33)
MCH RBC QN AUTO: 31.6 PG (ref 26.6–33)
MCHC RBC AUTO-ENTMCNC: 33.9 G/DL (ref 31.5–35.7)
MCHC RBC AUTO-ENTMCNC: 33.9 G/DL (ref 31.5–35.7)
MCV RBC AUTO: 92.7 FL (ref 79–97)
MCV RBC AUTO: 93.2 FL (ref 79–97)
MONOCYTES # BLD AUTO: 0.64 10*3/MM3 (ref 0.1–0.9)
MONOCYTES NFR BLD AUTO: 4.2 % (ref 5–12)
NEUTROPHILS NFR BLD AUTO: 13.67 10*3/MM3 (ref 1.7–7)
NEUTROPHILS NFR BLD AUTO: 89.9 % (ref 42.7–76)
NITRITE UR QL STRIP: NEGATIVE
NRBC BLD AUTO-RTO: 0 /100 WBC (ref 0–0.2)
PH UR STRIP.AUTO: 6 [PH] (ref 5–8)
PLATELET # BLD AUTO: 211 10*3/MM3 (ref 140–450)
PLATELET # BLD AUTO: 231 10*3/MM3 (ref 140–450)
PMV BLD AUTO: 9.3 FL (ref 6–12)
PMV BLD AUTO: 9.5 FL (ref 6–12)
POTASSIUM SERPL-SCNC: 4.2 MMOL/L (ref 3.5–5.2)
POTASSIUM SERPL-SCNC: 4.5 MMOL/L (ref 3.5–5.2)
PROT SERPL-MCNC: 8.6 G/DL (ref 6–8.5)
PROT UR QL STRIP: NEGATIVE
PROTHROMBIN TIME: 25.5 SECONDS (ref 11.7–14.2)
RBC # BLD AUTO: 4.65 10*6/MM3 (ref 3.77–5.28)
RBC # BLD AUTO: 5.03 10*6/MM3 (ref 3.77–5.28)
RBC # UR STRIP: ABNORMAL /HPF
REF LAB TEST METHOD: ABNORMAL
SODIUM SERPL-SCNC: 135 MMOL/L (ref 136–145)
SODIUM SERPL-SCNC: 135 MMOL/L (ref 136–145)
SP GR UR STRIP: 1.01 (ref 1–1.03)
SQUAMOUS #/AREA URNS HPF: ABNORMAL /HPF
UROBILINOGEN UR QL STRIP: ABNORMAL
WBC # UR STRIP: ABNORMAL /HPF
WBC NRBC COR # BLD AUTO: 15.22 10*3/MM3 (ref 3.4–10.8)
WBC NRBC COR # BLD AUTO: 19.8 10*3/MM3 (ref 3.4–10.8)

## 2025-06-10 PROCEDURE — 99222 1ST HOSP IP/OBS MODERATE 55: CPT | Performed by: NURSE PRACTITIONER

## 2025-06-10 PROCEDURE — 25010000002 HYDROMORPHONE PER 4 MG: Performed by: NURSE PRACTITIONER

## 2025-06-10 PROCEDURE — 94799 UNLISTED PULMONARY SVC/PX: CPT

## 2025-06-10 PROCEDURE — 76000 FLUOROSCOPY <1 HR PHYS/QHP: CPT

## 2025-06-10 PROCEDURE — 25010000002 CEFTRIAXONE PER 250 MG: Performed by: NURSE PRACTITIONER

## 2025-06-10 PROCEDURE — 94664 DEMO&/EVAL PT USE INHALER: CPT

## 2025-06-10 PROCEDURE — 74176 CT ABD & PELVIS W/O CONTRAST: CPT

## 2025-06-10 PROCEDURE — 0T778DZ DILATION OF LEFT URETER WITH INTRALUMINAL DEVICE, VIA NATURAL OR ARTIFICIAL OPENING ENDOSCOPIC: ICD-10-PCS | Performed by: UROLOGY

## 2025-06-10 PROCEDURE — 93010 ELECTROCARDIOGRAM REPORT: CPT | Performed by: STUDENT IN AN ORGANIZED HEALTH CARE EDUCATION/TRAINING PROGRAM

## 2025-06-10 PROCEDURE — 25010000002 ONDANSETRON PER 1 MG: Performed by: EMERGENCY MEDICINE

## 2025-06-10 PROCEDURE — 94760 N-INVAS EAR/PLS OXIMETRY 1: CPT

## 2025-06-10 PROCEDURE — 80053 COMPREHEN METABOLIC PANEL: CPT | Performed by: EMERGENCY MEDICINE

## 2025-06-10 PROCEDURE — 86140 C-REACTIVE PROTEIN: CPT | Performed by: EMERGENCY MEDICINE

## 2025-06-10 PROCEDURE — 85652 RBC SED RATE AUTOMATED: CPT | Performed by: EMERGENCY MEDICINE

## 2025-06-10 PROCEDURE — 25010000002 HYDROMORPHONE PER 4 MG: Performed by: UROLOGY

## 2025-06-10 PROCEDURE — 85027 COMPLETE CBC AUTOMATED: CPT | Performed by: NURSE PRACTITIONER

## 2025-06-10 PROCEDURE — C2617 STENT, NON-COR, TEM W/O DEL: HCPCS | Performed by: UROLOGY

## 2025-06-10 PROCEDURE — 94761 N-INVAS EAR/PLS OXIMETRY MLT: CPT

## 2025-06-10 PROCEDURE — 25010000002 HYDROMORPHONE PER 4 MG: Performed by: EMERGENCY MEDICINE

## 2025-06-10 PROCEDURE — 81001 URINALYSIS AUTO W/SCOPE: CPT | Performed by: EMERGENCY MEDICINE

## 2025-06-10 PROCEDURE — 63710000001 REVEFENACIN 175 MCG/3ML SOLUTION: Performed by: INTERNAL MEDICINE

## 2025-06-10 PROCEDURE — 94640 AIRWAY INHALATION TREATMENT: CPT

## 2025-06-10 PROCEDURE — C1769 GUIDE WIRE: HCPCS | Performed by: UROLOGY

## 2025-06-10 PROCEDURE — 93005 ELECTROCARDIOGRAM TRACING: CPT | Performed by: STUDENT IN AN ORGANIZED HEALTH CARE EDUCATION/TRAINING PROGRAM

## 2025-06-10 PROCEDURE — 36415 COLL VENOUS BLD VENIPUNCTURE: CPT

## 2025-06-10 PROCEDURE — 80162 ASSAY OF DIGOXIN TOTAL: CPT | Performed by: EMERGENCY MEDICINE

## 2025-06-10 PROCEDURE — 25010000002 MORPHINE PER 10 MG: Performed by: EMERGENCY MEDICINE

## 2025-06-10 PROCEDURE — 25810000003 SODIUM CHLORIDE 0.9 % SOLUTION: Performed by: NURSE PRACTITIONER

## 2025-06-10 PROCEDURE — 99285 EMERGENCY DEPT VISIT HI MDM: CPT

## 2025-06-10 PROCEDURE — 25010000002 PROPOFOL 1000 MG/100ML EMULSION: Performed by: ANESTHESIOLOGY

## 2025-06-10 PROCEDURE — 85025 COMPLETE CBC W/AUTO DIFF WBC: CPT | Performed by: EMERGENCY MEDICINE

## 2025-06-10 PROCEDURE — 71046 X-RAY EXAM CHEST 2 VIEWS: CPT

## 2025-06-10 PROCEDURE — 85610 PROTHROMBIN TIME: CPT | Performed by: EMERGENCY MEDICINE

## 2025-06-10 DEVICE — URETERAL STENT
Type: IMPLANTABLE DEVICE | Site: URETER | Status: FUNCTIONAL
Brand: CONTOUR™

## 2025-06-10 RX ORDER — CARVEDILOL 6.25 MG/1
3.12 TABLET ORAL 2 TIMES DAILY WITH MEALS
Status: DISCONTINUED | OUTPATIENT
Start: 2025-06-10 | End: 2025-06-10

## 2025-06-10 RX ORDER — BISACODYL 5 MG/1
5 TABLET, DELAYED RELEASE ORAL DAILY PRN
Status: DISCONTINUED | OUTPATIENT
Start: 2025-06-10 | End: 2025-06-14 | Stop reason: HOSPADM

## 2025-06-10 RX ORDER — SODIUM CHLORIDE 0.9 % (FLUSH) 0.9 %
10 SYRINGE (ML) INJECTION EVERY 12 HOURS SCHEDULED
Status: DISCONTINUED | OUTPATIENT
Start: 2025-06-10 | End: 2025-06-14 | Stop reason: HOSPADM

## 2025-06-10 RX ORDER — ACETAMINOPHEN 160 MG/5ML
650 SOLUTION ORAL EVERY 4 HOURS PRN
Status: DISCONTINUED | OUTPATIENT
Start: 2025-06-10 | End: 2025-06-14 | Stop reason: HOSPADM

## 2025-06-10 RX ORDER — PROPOFOL 10 MG/ML
INJECTION, EMULSION INTRAVENOUS CONTINUOUS PRN
Status: DISCONTINUED | OUTPATIENT
Start: 2025-06-10 | End: 2025-06-10

## 2025-06-10 RX ORDER — METOPROLOL TARTRATE 25 MG/1
25 TABLET, FILM COATED ORAL EVERY 12 HOURS SCHEDULED
Status: DISCONTINUED | OUTPATIENT
Start: 2025-06-10 | End: 2025-06-10

## 2025-06-10 RX ORDER — ALBUTEROL SULFATE 5 MG/ML
2.5 SOLUTION RESPIRATORY (INHALATION) EVERY 6 HOURS PRN
Status: DISCONTINUED | OUTPATIENT
Start: 2025-06-10 | End: 2025-06-10 | Stop reason: HOSPADM

## 2025-06-10 RX ORDER — IPRATROPIUM BROMIDE AND ALBUTEROL SULFATE 2.5; .5 MG/3ML; MG/3ML
3 SOLUTION RESPIRATORY (INHALATION) ONCE
Status: COMPLETED | OUTPATIENT
Start: 2025-06-10 | End: 2025-06-10

## 2025-06-10 RX ORDER — METOPROLOL TARTRATE 25 MG/1
25 TABLET, FILM COATED ORAL EVERY 12 HOURS SCHEDULED
Status: DISCONTINUED | OUTPATIENT
Start: 2025-06-10 | End: 2025-06-14 | Stop reason: HOSPADM

## 2025-06-10 RX ORDER — HYDROMORPHONE HYDROCHLORIDE 1 MG/ML
0.5 INJECTION, SOLUTION INTRAMUSCULAR; INTRAVENOUS; SUBCUTANEOUS ONCE
Refills: 0 | Status: COMPLETED | OUTPATIENT
Start: 2025-06-10 | End: 2025-06-10

## 2025-06-10 RX ORDER — PANTOPRAZOLE SODIUM 40 MG/1
40 TABLET, DELAYED RELEASE ORAL
Status: DISCONTINUED | OUTPATIENT
Start: 2025-06-10 | End: 2025-06-14 | Stop reason: HOSPADM

## 2025-06-10 RX ORDER — ONDANSETRON 4 MG/1
4 TABLET, ORALLY DISINTEGRATING ORAL EVERY 6 HOURS PRN
Status: DISCONTINUED | OUTPATIENT
Start: 2025-06-10 | End: 2025-06-14 | Stop reason: HOSPADM

## 2025-06-10 RX ORDER — LIDOCAINE HYDROCHLORIDE 20 MG/ML
JELLY TOPICAL AS NEEDED
Status: DISCONTINUED | OUTPATIENT
Start: 2025-06-10 | End: 2025-06-10 | Stop reason: HOSPADM

## 2025-06-10 RX ORDER — ALBUTEROL SULFATE 0.83 MG/ML
2.5 SOLUTION RESPIRATORY (INHALATION)
Status: DISCONTINUED | OUTPATIENT
Start: 2025-06-10 | End: 2025-06-14 | Stop reason: HOSPADM

## 2025-06-10 RX ORDER — BUPROPION HYDROCHLORIDE 300 MG/1
300 TABLET ORAL DAILY
Status: DISCONTINUED | OUTPATIENT
Start: 2025-06-10 | End: 2025-06-14 | Stop reason: HOSPADM

## 2025-06-10 RX ORDER — PROPOFOL 10 MG/ML
INJECTION, EMULSION INTRAVENOUS AS NEEDED
Status: DISCONTINUED | OUTPATIENT
Start: 2025-06-10 | End: 2025-06-10

## 2025-06-10 RX ORDER — POLYETHYLENE GLYCOL 3350 17 G/17G
17 POWDER, FOR SOLUTION ORAL DAILY PRN
Status: DISCONTINUED | OUTPATIENT
Start: 2025-06-10 | End: 2025-06-14 | Stop reason: HOSPADM

## 2025-06-10 RX ORDER — CALCIUM CARBONATE 500 MG/1
2 TABLET, CHEWABLE ORAL 2 TIMES DAILY PRN
Status: DISCONTINUED | OUTPATIENT
Start: 2025-06-10 | End: 2025-06-14 | Stop reason: HOSPADM

## 2025-06-10 RX ORDER — SODIUM CHLORIDE 0.9 % (FLUSH) 0.9 %
10 SYRINGE (ML) INJECTION AS NEEDED
Status: DISCONTINUED | OUTPATIENT
Start: 2025-06-10 | End: 2025-06-14 | Stop reason: HOSPADM

## 2025-06-10 RX ORDER — BISACODYL 10 MG
10 SUPPOSITORY, RECTAL RECTAL DAILY PRN
Status: DISCONTINUED | OUTPATIENT
Start: 2025-06-10 | End: 2025-06-14 | Stop reason: HOSPADM

## 2025-06-10 RX ORDER — ACETAMINOPHEN 325 MG/1
650 TABLET ORAL EVERY 4 HOURS PRN
Status: DISCONTINUED | OUTPATIENT
Start: 2025-06-10 | End: 2025-06-14 | Stop reason: HOSPADM

## 2025-06-10 RX ORDER — ONDANSETRON 2 MG/ML
4 INJECTION INTRAMUSCULAR; INTRAVENOUS EVERY 6 HOURS PRN
Status: DISCONTINUED | OUTPATIENT
Start: 2025-06-10 | End: 2025-06-14 | Stop reason: HOSPADM

## 2025-06-10 RX ORDER — BUDESONIDE 0.5 MG/2ML
0.5 INHALANT ORAL
Status: DISCONTINUED | OUTPATIENT
Start: 2025-06-10 | End: 2025-06-14

## 2025-06-10 RX ORDER — ARFORMOTEROL TARTRATE 15 UG/2ML
15 SOLUTION RESPIRATORY (INHALATION)
Status: DISCONTINUED | OUTPATIENT
Start: 2025-06-10 | End: 2025-06-14 | Stop reason: HOSPADM

## 2025-06-10 RX ORDER — CYCLOBENZAPRINE HCL 10 MG
5 TABLET ORAL 2 TIMES DAILY PRN
Status: DISCONTINUED | OUTPATIENT
Start: 2025-06-10 | End: 2025-06-14 | Stop reason: HOSPADM

## 2025-06-10 RX ORDER — IPRATROPIUM BROMIDE AND ALBUTEROL SULFATE 2.5; .5 MG/3ML; MG/3ML
3 SOLUTION RESPIRATORY (INHALATION) ONCE
Status: DISCONTINUED | OUTPATIENT
Start: 2025-06-10 | End: 2025-06-10 | Stop reason: HOSPADM

## 2025-06-10 RX ORDER — ONDANSETRON 2 MG/ML
4 INJECTION INTRAMUSCULAR; INTRAVENOUS ONCE
Status: COMPLETED | OUTPATIENT
Start: 2025-06-10 | End: 2025-06-10

## 2025-06-10 RX ORDER — IPRATROPIUM BROMIDE AND ALBUTEROL SULFATE 2.5; .5 MG/3ML; MG/3ML
SOLUTION RESPIRATORY (INHALATION)
Status: COMPLETED
Start: 2025-06-10 | End: 2025-06-10

## 2025-06-10 RX ORDER — SODIUM CHLORIDE 0.9 % (FLUSH) 0.9 %
3 SYRINGE (ML) INJECTION EVERY 12 HOURS SCHEDULED
Status: DISCONTINUED | OUTPATIENT
Start: 2025-06-10 | End: 2025-06-10 | Stop reason: HOSPADM

## 2025-06-10 RX ORDER — SODIUM CHLORIDE 9 MG/ML
40 INJECTION, SOLUTION INTRAVENOUS AS NEEDED
Status: DISCONTINUED | OUTPATIENT
Start: 2025-06-10 | End: 2025-06-14 | Stop reason: HOSPADM

## 2025-06-10 RX ORDER — MORPHINE SULFATE 2 MG/ML
4 INJECTION, SOLUTION INTRAMUSCULAR; INTRAVENOUS ONCE
Status: COMPLETED | OUTPATIENT
Start: 2025-06-10 | End: 2025-06-10

## 2025-06-10 RX ORDER — IPRATROPIUM BROMIDE AND ALBUTEROL SULFATE 2.5; .5 MG/3ML; MG/3ML
3 SOLUTION RESPIRATORY (INHALATION)
Status: DISCONTINUED | OUTPATIENT
Start: 2025-06-10 | End: 2025-06-10

## 2025-06-10 RX ORDER — AMMONIUM LACTATE 12 G/100G
CREAM TOPICAL 2 TIMES DAILY
Status: DISCONTINUED | OUTPATIENT
Start: 2025-06-10 | End: 2025-06-14 | Stop reason: HOSPADM

## 2025-06-10 RX ORDER — AMOXICILLIN 250 MG
2 CAPSULE ORAL 2 TIMES DAILY PRN
Status: DISCONTINUED | OUTPATIENT
Start: 2025-06-10 | End: 2025-06-14 | Stop reason: HOSPADM

## 2025-06-10 RX ORDER — LIDOCAINE HYDROCHLORIDE 10 MG/ML
0.5 INJECTION, SOLUTION INFILTRATION; PERINEURAL ONCE AS NEEDED
Status: DISCONTINUED | OUTPATIENT
Start: 2025-06-10 | End: 2025-06-10 | Stop reason: HOSPADM

## 2025-06-10 RX ORDER — SODIUM CHLORIDE 9 MG/ML
50 INJECTION, SOLUTION INTRAVENOUS CONTINUOUS
Status: DISCONTINUED | OUTPATIENT
Start: 2025-06-10 | End: 2025-06-11

## 2025-06-10 RX ORDER — LEVOTHYROXINE SODIUM 50 UG/1
50 TABLET ORAL EVERY MORNING
Status: DISCONTINUED | OUTPATIENT
Start: 2025-06-10 | End: 2025-06-14 | Stop reason: HOSPADM

## 2025-06-10 RX ORDER — SODIUM CHLORIDE, SODIUM LACTATE, POTASSIUM CHLORIDE, AND CALCIUM CHLORIDE .6; .31; .03; .02 G/100ML; G/100ML; G/100ML; G/100ML
9 INJECTION, SOLUTION INTRAVENOUS ONCE
Status: COMPLETED | OUTPATIENT
Start: 2025-06-10 | End: 2025-06-10

## 2025-06-10 RX ORDER — SODIUM CHLORIDE, SODIUM LACTATE, POTASSIUM CHLORIDE, CALCIUM CHLORIDE 600; 310; 30; 20 MG/100ML; MG/100ML; MG/100ML; MG/100ML
9 INJECTION, SOLUTION INTRAVENOUS CONTINUOUS
Status: DISCONTINUED | OUTPATIENT
Start: 2025-06-10 | End: 2025-06-11

## 2025-06-10 RX ORDER — DIGOXIN 125 MCG
125 TABLET ORAL EVERY OTHER DAY
Status: DISCONTINUED | OUTPATIENT
Start: 2025-06-10 | End: 2025-06-14 | Stop reason: HOSPADM

## 2025-06-10 RX ORDER — ACETAMINOPHEN 650 MG/1
650 SUPPOSITORY RECTAL EVERY 4 HOURS PRN
Status: DISCONTINUED | OUTPATIENT
Start: 2025-06-10 | End: 2025-06-14 | Stop reason: HOSPADM

## 2025-06-10 RX ORDER — HYDROMORPHONE HYDROCHLORIDE 1 MG/ML
0.5 INJECTION, SOLUTION INTRAMUSCULAR; INTRAVENOUS; SUBCUTANEOUS
Refills: 0 | Status: DISCONTINUED | OUTPATIENT
Start: 2025-06-10 | End: 2025-06-14

## 2025-06-10 RX ORDER — PRAMIPEXOLE DIHYDROCHLORIDE 0.5 MG/1
0.5 TABLET ORAL 2 TIMES DAILY
Status: DISCONTINUED | OUTPATIENT
Start: 2025-06-10 | End: 2025-06-14 | Stop reason: HOSPADM

## 2025-06-10 RX ORDER — PROPOFOL 10 MG/ML
INJECTION, EMULSION INTRAVENOUS CONTINUOUS PRN
Status: DISCONTINUED | OUTPATIENT
Start: 2025-06-10 | End: 2025-06-10 | Stop reason: SURG

## 2025-06-10 RX ORDER — IPRATROPIUM BROMIDE AND ALBUTEROL SULFATE 2.5; .5 MG/3ML; MG/3ML
3 SOLUTION RESPIRATORY (INHALATION) EVERY 4 HOURS PRN
Status: DISCONTINUED | OUTPATIENT
Start: 2025-06-10 | End: 2025-06-14 | Stop reason: HOSPADM

## 2025-06-10 RX ORDER — SODIUM CHLORIDE 0.9 % (FLUSH) 0.9 %
3-10 SYRINGE (ML) INJECTION AS NEEDED
Status: DISCONTINUED | OUTPATIENT
Start: 2025-06-10 | End: 2025-06-10 | Stop reason: HOSPADM

## 2025-06-10 RX ORDER — FAMOTIDINE 10 MG/ML
20 INJECTION, SOLUTION INTRAVENOUS ONCE
Status: DISCONTINUED | OUTPATIENT
Start: 2025-06-10 | End: 2025-06-10 | Stop reason: HOSPADM

## 2025-06-10 RX ADMIN — HYDROMORPHONE HYDROCHLORIDE 0.5 MG: 1 INJECTION, SOLUTION INTRAMUSCULAR; INTRAVENOUS; SUBCUTANEOUS at 23:08

## 2025-06-10 RX ADMIN — ALBUTEROL SULFATE 2.5 MG: 2.5 SOLUTION RESPIRATORY (INHALATION) at 21:55

## 2025-06-10 RX ADMIN — MORPHINE SULFATE 4 MG: 2 INJECTION, SOLUTION INTRAMUSCULAR; INTRAVENOUS at 02:55

## 2025-06-10 RX ADMIN — METOPROLOL TARTRATE 25 MG: 25 TABLET, FILM COATED ORAL at 21:00

## 2025-06-10 RX ADMIN — IPRATROPIUM BROMIDE AND ALBUTEROL SULFATE 3 ML: .5; 3 SOLUTION RESPIRATORY (INHALATION) at 03:27

## 2025-06-10 RX ADMIN — PROPOFOL INJECTABLE EMULSION 50 MCG/KG/MIN: 10 INJECTION, EMULSION INTRAVENOUS at 18:43

## 2025-06-10 RX ADMIN — ARFORMOTEROL TARTRATE 15 MCG: 15 SOLUTION RESPIRATORY (INHALATION) at 21:55

## 2025-06-10 RX ADMIN — ALBUTEROL SULFATE 2.5 MG: 2.5 SOLUTION RESPIRATORY (INHALATION) at 14:47

## 2025-06-10 RX ADMIN — SODIUM CHLORIDE 50 ML/HR: 9 INJECTION, SOLUTION INTRAVENOUS at 06:52

## 2025-06-10 RX ADMIN — ONDANSETRON 4 MG: 2 INJECTION, SOLUTION INTRAMUSCULAR; INTRAVENOUS at 02:55

## 2025-06-10 RX ADMIN — CEFTRIAXONE SODIUM 1000 MG: 1 INJECTION, POWDER, FOR SOLUTION INTRAMUSCULAR; INTRAVENOUS at 08:35

## 2025-06-10 RX ADMIN — IPRATROPIUM BROMIDE AND ALBUTEROL SULFATE 3 ML: .5; 3 SOLUTION RESPIRATORY (INHALATION) at 03:36

## 2025-06-10 RX ADMIN — HYDROMORPHONE HYDROCHLORIDE 0.5 MG: 1 INJECTION, SOLUTION INTRAMUSCULAR; INTRAVENOUS; SUBCUTANEOUS at 04:14

## 2025-06-10 RX ADMIN — HYDROMORPHONE HYDROCHLORIDE 0.5 MG: 1 INJECTION, SOLUTION INTRAMUSCULAR; INTRAVENOUS; SUBCUTANEOUS at 20:59

## 2025-06-10 RX ADMIN — HYDROMORPHONE HYDROCHLORIDE 0.5 MG: 1 INJECTION, SOLUTION INTRAMUSCULAR; INTRAVENOUS; SUBCUTANEOUS at 13:48

## 2025-06-10 RX ADMIN — METOPROLOL TARTRATE 25 MG: 25 TABLET, FILM COATED ORAL at 10:19

## 2025-06-10 RX ADMIN — METOROPROLOL TARTRATE 5 MG: 5 INJECTION, SOLUTION INTRAVENOUS at 04:56

## 2025-06-10 RX ADMIN — IPRATROPIUM BROMIDE AND ALBUTEROL SULFATE 3 ML: .5; 3 SOLUTION RESPIRATORY (INHALATION) at 18:20

## 2025-06-10 RX ADMIN — SODIUM CHLORIDE 50 ML/HR: 9 INJECTION, SOLUTION INTRAVENOUS at 16:18

## 2025-06-10 RX ADMIN — HYDROMORPHONE HYDROCHLORIDE 0.5 MG: 1 INJECTION, SOLUTION INTRAMUSCULAR; INTRAVENOUS; SUBCUTANEOUS at 07:46

## 2025-06-10 RX ADMIN — PRAMIPEXOLE DIHYDROCHLORIDE 0.5 MG: 0.5 TABLET ORAL at 21:00

## 2025-06-10 RX ADMIN — Medication 10 ML: at 21:00

## 2025-06-10 RX ADMIN — BUDESONIDE 0.5 MG: 0.5 INHALANT RESPIRATORY (INHALATION) at 21:55

## 2025-06-10 RX ADMIN — Medication 10 ML: at 08:35

## 2025-06-10 RX ADMIN — CYCLOBENZAPRINE HYDROCHLORIDE 5 MG: 10 TABLET, FILM COATED ORAL at 21:41

## 2025-06-10 RX ADMIN — IPRATROPIUM BROMIDE AND ALBUTEROL SULFATE 3 ML: 2.5; .5 SOLUTION RESPIRATORY (INHALATION) at 03:36

## 2025-06-10 RX ADMIN — SODIUM CHLORIDE, POTASSIUM CHLORIDE, SODIUM LACTATE AND CALCIUM CHLORIDE 9 ML/HR: 600; 310; 30; 20 INJECTION, SOLUTION INTRAVENOUS at 18:25

## 2025-06-10 RX ADMIN — Medication 1 APPLICATION: at 21:00

## 2025-06-10 RX ADMIN — REVEFENACIN 175 MCG: 175 SOLUTION RESPIRATORY (INHALATION) at 14:54

## 2025-06-10 NOTE — PLAN OF CARE
Goal Outcome Evaluation:  Plan of Care Reviewed With: patient, spouse           Outcome Evaluation: Admitted to room 663 with kidney stone and back pain from POD F,. Hx COPD and becomes SOA with any activity. 02 3.5L NC.  Telemetry a-fib. Went to surgery at 1730.

## 2025-06-10 NOTE — CONSULTS
CONSULT NOTE    Patient Identification:  Shani Phillip  74 y.o.  female  1950  [id number]            Requesting physician:     Reason for Consultation:      CC:     History of Present Illness:  Patient has a previous medical history of chronic hypoxemic respiratory failure severe COPD who sees Dr. Zamora in our office with an FEV1 of 38% diffusion capacity of 31% the history of smoking 75 pack years emphysema using 2 L of oxygen performance budesonide Yupelri and albuterol 3-4 times a day at baseline with comorbidities of PAD and atrial fibrillation on warfarin.     She presented to the emergency room with back pain.  Pain is in her lower back.  Worse on her left.  Ongoing for 2 to 3 days prior to arrival getting worse not better.  She was found to have concern for nephrolithiasis.  She denies any worsening shortness of breath to me.  Denies any hemoptysis wheeze worsening sputum production    Chronic shortness of breath lower extremity swelling chronic changes to lower extremities.    In the ER she is found to have mild left-sided hydronephrosis with a 9 mm obstructing stone increased white count and start antibiotics admitted.  We were asked to help consult for COPD.    Review of Systems:  As per HPI otherwise a12 system review of systems performed and all else negative    Past Medical History:   Diagnosis Date    Acute endocarditis     Atrial fibrillation with RVR 11/14/2019    Cancer     CKD (chronic kidney disease) stage 3, GFR 30-59 ml/min     COPD (chronic obstructive pulmonary disease)     CTS (carpal tunnel syndrome)     Dizziness     Gall stones     Influenza 11/14/2019    Medicare annual wellness visit, subsequent 04/12/2021    Migraine     Osteoporosis     Renal disorder     Restless leg syndrome     Thrush, oral 11/14/2019       Past Surgical History:   Procedure Laterality Date    CARDIAC CATHETERIZATION N/A 1/23/2020    Procedure: Coronary angiography;  Surgeon: Svetlana Grayson MD;   Location:  [facility];  Service: Cardiovascular    CARDIAC CATHETERIZATION N/A 1/23/2020    Procedure: Left ventriculography;  Surgeon: Svetlana Grayson MD;  Location:  [facility];  Service: Cardiovascular    CARDIAC CATHETERIZATION N/A 1/23/2020    Procedure: Left Heart Cath;  Surgeon: Svetlana Grayson MD;  Location:  [facility];  Service: Cardiovascular    CHOLECYSTECTOMY      KNEE ARTHROPLASTY Right     LAPAROSCOPIC GASTRIC BANDING          Medications Prior to Admission   Medication Sig Dispense Refill Last Dose/Taking    ammonium lactate (LAC-HYDRIN) 12 % lotion APPLY TOPICALLY TO THE APPROPRIATE AREA AS DIRECTED AS NEEDED FOR DRY SKIN. 225 g 6 6/9/2025    budesonide (PULMICORT) 0.5 MG/2ML nebulizer solution USE 1 VIAL IN NEBULIZER DAILY - rinse mouth after treatment 30 each 11 6/9/2025    buPROPion XL (WELLBUTRIN XL) 300 MG 24 hr tablet Take 1 tablet by mouth Daily. 90 tablet 3     carvedilol (COREG) 3.125 MG tablet Take 1 tablet by mouth 2 (Two) Times a Day With Meals. 180 tablet 1     cephalexin (KEFLEX) 500 MG capsule Take 1 capsule by mouth 3 (Three) Times a Day. (Patient not taking: Reported on 6/2/2025) 30 capsule 0     cyclobenzaprine (FLEXERIL) 10 MG tablet Take 1 tablet by mouth 3 (Three) Times a Day As Needed (back pain). 40 tablet 1     digoxin (LANOXIN) 125 MCG tablet Take 1 tablet by mouth Every Other Day. 90 tablet 1     famotidine (PEPCID) 20 MG tablet TAKE 1 TABLET BY MOUTH 2 TIMES A DAY BEFORE MEALS. 180 tablet 3     ferrous sulfate 325 (65 FE) MG tablet Take 1 tablet by mouth Every Other Day. 45 tablet 1     HYDROcodone-acetaminophen (NORCO) 7.5-325 MG per tablet Take 1 tablet by mouth Every 6 (Six) Hours As Needed for Moderate Pain. 30 tablet 0     ipratropium-albuterol (DUO-NEB) 0.5-2.5 mg/3 ml nebulizer USE 1 VIAL IN NEBULIZER 4 TIMES DAILY - as directed 120 mL 11     levothyroxine (SYNTHROID, LEVOTHROID) 50 MCG tablet Take 1 tablet by  mouth Every Morning. 90 tablet 1     meclizine (ANTIVERT) 12.5 MG tablet Take 1 tablet by mouth 3 (Three) Times a Day As Needed for Dizziness. 12 tablet 0     nystatin (MYCOSTATIN) 740383 UNIT/GM powder Apply  topically to the appropriate area as directed 4 (Four) Times a Day. Under both breasts 30 g 1     O2 (OXYGEN) Inhale 2 L/min 1 (One) Time.       ondansetron (ZOFRAN) 4 MG tablet Take 1 tablet by mouth Every 8 (Eight) Hours As Needed for Nausea or Vomiting. 60 tablet 3     pantoprazole (PROTONIX) 40 MG EC tablet Take 1 tablet by mouth once daily 180 tablet 0     potassium chloride ER (K-TAB) 20 MEQ tablet controlled-release ER tablet Take 1 tablet by mouth twice daily 180 tablet 1     pramipexole (MIRAPEX) 0.5 MG tablet Take 1 tablet by mouth 2 (Two) Times a Day. 180 tablet 3     revefenacin (Yupelri) 175 MCG/3ML nebulizer solution Take 3 mL by nebulization Daily.       spironolactone (ALDACTONE) 25 MG tablet Take 1 tablet by mouth once daily 90 tablet 0     torsemide (DEMADEX) 20 MG tablet Take 2 tablets by mouth 2 (Two) Times a Day. 360 tablet 1     trospium (SANCTURA) 20 MG tablet Take 1 tablet by mouth 2 (Two) Times a Day. 180 tablet 3     warfarin (COUMADIN) 2.5 MG tablet TAKE 1/2 TABLET ON MONDAY, WEDNESDAY AND SATURDAY, THEN 1 TABLET ALL OTHER DAYS AS DIRECTED. 75 tablet 0        Allergies   Allergen Reactions    Penicillins Rash     RASH 30 YRS AGO NO HOSPITALIZATION       Social History     Socioeconomic History    Marital status:    Tobacco Use    Smoking status: Former     Current packs/day: 0.00     Average packs/day: 0.5 packs/day for 50.0 years (25.0 ttl pk-yrs)     Types: Cigarettes     Start date: 11/3/1969     Quit date: 11/3/2019     Years since quittin.6     Passive exposure: Past (parents smoked in the home)    Smokeless tobacco: Never    Tobacco comments:     LAST CIG 2019   Vaping Use    Vaping status: Never Used   Substance and Sexual Activity    Alcohol use: No      "Comment: caffeine - 1/2 cup coffee daily     Drug use: No    Sexual activity: Defer       Family History   Problem Relation Age of Onset    Lung cancer Mother        Physical Exam:  /76 (BP Location: Right arm, Patient Position: Lying)   Pulse 102   Temp 98 °F (36.7 °C) (Oral)   Resp 22   Ht 154.9 cm (60.98\")   Wt 64 kg (141 lb)   SpO2 (!) 88%   BMI 26.66 kg/m²   Body mass index is 26.66 kg/m².   General appearance: Nontoxic, conversant   Eyes: Anicteric sclerae, moist conjunctivae; no lid lag;   HENT: Atraumatic; oropharynx clear with moist mucous membranes and no mucosal ulcerations; normal hard and soft palate  Neck: Trachea midline; no JVD  Lungs: Mild rhonchi no significant wheeze, with normal respiratory effort and no intercostal retractions  CV: RRR, no rub  Abdomen: Nonrigid bowel sounds positive  Extremities: Chronic dusky appearance to lower extremities patient and  say this is about her normal minimal swelling no drainage  Skin: Normal temperature, turgor and texture; no rash, ulcers or subcutaneous nodules  Psych: Appropriate affect, alert   Neuro speech intact moves extremities    LABS:  Results from last 7 days   Lab Units 06/10/25  0654 06/10/25  0246   WBC 10*3/mm3 19.80* 15.22*   HEMOGLOBIN g/dL 14.6 15.9   PLATELETS 10*3/mm3 231 211     Results from last 7 days   Lab Units 06/10/25  0654 06/10/25  0246   SODIUM mmol/L 135* 135*   POTASSIUM mmol/L 4.5 4.2   CHLORIDE mmol/L 93* 93*   CO2 mmol/L 30.0* 32.0*   BUN mg/dL 23.0 21.0   CREATININE mg/dL 1.61* 1.43*   GLUCOSE mg/dL 161* 142*   CALCIUM mg/dL 9.2 9.6   Estimated Creatinine Clearance: 25.9 mL/min (A) (by C-G formula based on SCr of 1.61 mg/dL (H)).    Imaging: I personally visualized the images of scans/x-rays performed within last 3 days.  Imaging Results (Most Recent)       Procedure Component Value Units Date/Time    XR Chest PA & Lateral [[id number]] Collected: 06/10/25 1044     Updated: 06/10/25 1051    Narrative:      " XR CHEST PA AND LATERAL-     DATE OF EXAM: 6/10/2025 10:02 AM     INDICATION: labored breathing & cough.     COMPARISON: Chest radiographs 2/18/2025 and 6/4/2023. CT abdomen and  pelvis 6/10/2025. CT chest 2/19/2019.     TECHNIQUE: PA and lateral views of the chest were obtained.     FINDINGS:  Low lung volumes and mild elevation of the left hemidiaphragm with  ill-defined bilateral perihilar and bibasilar opacities that could  represent atelectasis and/or scarring. Mild chronic interstitial  prominence in both lungs. No new focal lung opacity. No pneumothorax.  Unchanged cardiac and mediastinal contours. Calcified atherosclerotic  disease in the thoracic aorta. Diffuse osteopenia. Similar-appearing  upper thoracic levoscoliosis and mildly exaggerated thoracic kyphosis.  Incompletely evaluated chronic changes in the right shoulder. Multiple  chronic appearing bilateral rib fracture deformities. No acute osseous  abnormality is identified. Similar-appearing orientation of the gastric  lap band device. Cholecystectomy clips.       Impression:      Low lung volumes and mild elevation of the left hemidiaphragm with  ill-defined bilateral perihilar and bibasilar opacities that could  represent atelectasis and/or scarring.     This report was finalized on 6/10/2025 10:48 AM by Clay Ybarra MD on  Workstation: [id number]       CT Abdomen Pelvis Without Contrast [[id number]] Collected: 06/10/25 0347     Updated: 06/10/25 0407    Narrative:      CT OF THE ABDOMEN AND PELVIS WITHOUT CONTRAST     HISTORY: Low back pain     COMPARISON: 7/19/2019     TECHNIQUE: Axial CT imaging was obtained through the abdomen and pelvis.  No IV contrast was administered.     FINDINGS:  Images through the lung bases demonstrate some scarring. There are  dystrophic calcifications of the mitral valve annulus. No suspicious  hepatic lesions are seen. There is a laparoscopic gastric band. The band  is stable in orientation when compared to  December 2019. Calcified  granulomata are seen within the spleen. Adrenal glands are within normal  limits. The pancreas is atrophic. There is a duodenal diverticulum.  Patient is again noted to have a horseshoe kidney. Multiple large stones  are identified within the left renal collecting system. Stone burden has  increased when compared to prior study, and there is now hydronephrosis  involving the left renal moiety. There is also air identified within the  left renal collecting system, as well as perinephric stranding on the  left.. Stranding is also noted tracking along the left paracolic gutter.  No acute osseous abnormalities are seen. There is a punctate stone  measuring 2 mm which is noted within the right renal moiety. The patient  does have a 9 mm stone located at the left ureteropelvic junction.  Distal to this, the left ureter is decompressed, although the patient  does have an additional stone which is located within the distal left  ureter. This measures approximately 4 mm. This is new when compared to  prior study. There are aortoiliac calcifications. The urinary bladder is  relatively collapsed. There is air identified within the urinary  bladder. There is colonic diverticulosis. There is no bowel obstruction.  The appendix is normal. Uterus appears unremarkable. No adnexal masses  are seen.       Impression:         1. The patient has mild left-sided hydronephrosis, new when compared to  prior study. Stone burden within the left renal moiety has significantly  increased when compared to prior exam. There is a 9 mm obstructing stone  which is noted at the left UPJ. An additional nonobstructing 4 mm stone  is noted within the urinary bladder. There is perinephric stranding  noted on the left. There is air noted within the relatively decompressed  urinary bladder, with additional air identified within the left renal  collecting system. Some of the appearance may be related to  instrumentation. However,  emphysematous pyelitis certainly not excluded.     Radiation dose reduction techniques were utilized, including automated  exposure control and exposure modulation based on body size.        This report was finalized on 6/10/2025 4:04 AM by Dr. 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

## 2025-06-10 NOTE — CONSULTS
Electrophysiology Hospital Consult            Patient Name: Shani Phillip  Age/Sex: 74 y.o. female  : 1950  MRN: [id number]    Date of Admission: 6/10/2025  Date of Encounter Visit: 06/10/25  Encounter Provider: GENET Gr  Referring Provider: Khang Brock MD  Place of Service: [facility] CARDIOLOGY  Patient Care Team:  Rodríguez Walker MD as PCP - General (Internal Medicine)  Mike Atkins MD as Surgeon (General Surgery)  Hayes Briones MD as Surgeon (Cardiothoracic Surgery)  Zenia Tinajero MD as Consulting Physician (Cardiology)  Clover Zamora MD as Consulting Physician (Pulmonary Disease)  Angy Smith APRN as Nurse Practitioner (Cardiology)  Lester Garcia RN as Ambulatory  ([facility])      Subjective:   EP Consultation for: Persistent atrial fibrillation    Chief Complaint: Lower back pain    History of Present Illness:  Shani Phillip is a 74 y.o. female with a past medical history of persistent atrial fibrillation, severe mitral valve stenosis, history of mitral valve regurgitation, COPD on home oxygen, hypertension, lower extremity vascular disease.     She is seen by Dr. Walter for persistent atrial fibrillation.    She presented to the ER with acute back pain.  On a CT it was found that she has horseshoe kidney with multiple renal calculi and left-sided hydronephrosis.  Urology has planned to place a stent later this evening.     She was found to be in atrial fibrillation on telemetry in the ER.  She is on carvedilol and digoxin for rate control.  She has had good rate control. She is on warfarin for AC.     Past Medical History:  Past Medical History:   Diagnosis Date    Acute endocarditis     Atrial fibrillation with RVR 2019    Cancer     CKD (chronic kidney disease) stage 3, GFR 30-59 ml/min     COPD (chronic obstructive pulmonary disease)     CTS (carpal tunnel syndrome)     Dizziness     Gall stones      Influenza 11/14/2019    Medicare annual wellness visit, subsequent 04/12/2021    Migraine     Osteoporosis     Renal disorder     Restless leg syndrome     Thrush, oral 11/14/2019       Past Surgical History:   Procedure Laterality Date    CARDIAC CATHETERIZATION N/A 1/23/2020    Procedure: Coronary angiography;  Surgeon: Svetlana Grayson MD;  Location:  [facility];  Service: Cardiovascular    CARDIAC CATHETERIZATION N/A 1/23/2020    Procedure: Left ventriculography;  Surgeon: Svetlana Grayson MD;  Location:  [facility];  Service: Cardiovascular    CARDIAC CATHETERIZATION N/A 1/23/2020    Procedure: Left Heart Cath;  Surgeon: Svetlana Grayson MD;  Location:  [facility];  Service: Cardiovascular    CHOLECYSTECTOMY      KNEE ARTHROPLASTY Right     LAPAROSCOPIC GASTRIC BANDING         Home Medications:   Medications Prior to Admission   Medication Sig Dispense Refill Last Dose/Taking    ammonium lactate (LAC-HYDRIN) 12 % lotion APPLY TOPICALLY TO THE APPROPRIATE AREA AS DIRECTED AS NEEDED FOR DRY SKIN. 225 g 6 6/9/2025    budesonide (PULMICORT) 0.5 MG/2ML nebulizer solution USE 1 VIAL IN NEBULIZER DAILY - rinse mouth after treatment 30 each 11 6/9/2025    buPROPion XL (WELLBUTRIN XL) 300 MG 24 hr tablet Take 1 tablet by mouth Daily. 90 tablet 3 6/9/2025    carvedilol (COREG) 3.125 MG tablet Take 1 tablet by mouth 2 (Two) Times a Day With Meals. 180 tablet 1 6/9/2025    cyclobenzaprine (FLEXERIL) 10 MG tablet Take 1 tablet by mouth 3 (Three) Times a Day As Needed (back pain). 40 tablet 1 6/9/2025    digoxin (LANOXIN) 125 MCG tablet Take 1 tablet by mouth Every Other Day. 90 tablet 1 6/9/2025    famotidine (PEPCID) 20 MG tablet TAKE 1 TABLET BY MOUTH 2 TIMES A DAY BEFORE MEALS. 180 tablet 3 6/9/2025    ferrous sulfate 325 (65 FE) MG tablet Take 1 tablet by mouth Every Other Day. 45 tablet 1 6/9/2025    HYDROcodone-acetaminophen (NORCO) 7.5-325 MG per tablet Take 1 tablet by  mouth Every 6 (Six) Hours As Needed for Moderate Pain. 30 tablet 0 6/9/2025    ipratropium-albuterol (DUO-NEB) 0.5-2.5 mg/3 ml nebulizer USE 1 VIAL IN NEBULIZER 4 TIMES DAILY - as directed 120 mL 11 6/9/2025    levothyroxine (SYNTHROID, LEVOTHROID) 50 MCG tablet Take 1 tablet by mouth Every Morning. 90 tablet 1 6/9/2025    meclizine (ANTIVERT) 12.5 MG tablet Take 1 tablet by mouth 3 (Three) Times a Day As Needed for Dizziness. 12 tablet 0 Past Month    nystatin (MYCOSTATIN) 626303 UNIT/GM powder Apply  topically to the appropriate area as directed 4 (Four) Times a Day. Under both breasts 30 g 1 Past Month    O2 (OXYGEN) Inhale 2 L/min 1 (One) Time.   6/10/2025    ondansetron (ZOFRAN) 4 MG tablet Take 1 tablet by mouth Every 8 (Eight) Hours As Needed for Nausea or Vomiting. 60 tablet 3 Past Week    pantoprazole (PROTONIX) 40 MG EC tablet Take 1 tablet by mouth once daily 180 tablet 0 6/9/2025    potassium chloride ER (K-TAB) 20 MEQ tablet controlled-release ER tablet Take 1 tablet by mouth twice daily 180 tablet 1 6/9/2025    pramipexole (MIRAPEX) 0.5 MG tablet Take 1 tablet by mouth 2 (Two) Times a Day. 180 tablet 3 6/9/2025    revefenacin (Yupelri) 175 MCG/3ML nebulizer solution Take 3 mL by nebulization Daily.   6/9/2025    spironolactone (ALDACTONE) 25 MG tablet Take 1 tablet by mouth once daily 90 tablet 0 6/9/2025    torsemide (DEMADEX) 20 MG tablet Take 2 tablets by mouth 2 (Two) Times a Day. 360 tablet 1 6/9/2025    trospium (SANCTURA) 20 MG tablet Take 1 tablet by mouth 2 (Two) Times a Day. 180 tablet 3 6/9/2025    warfarin (COUMADIN) 2.5 MG tablet TAKE 1/2 TABLET ON MONDAY, WEDNESDAY AND SATURDAY, THEN 1 TABLET ALL OTHER DAYS AS DIRECTED. 75 tablet 0 6/9/2025       Allergies:  Allergies   Allergen Reactions    Penicillins Rash     RASH 30 YRS AGO NO HOSPITALIZATION       Past Social History:  Social History     Socioeconomic History    Marital status:    Tobacco Use    Smoking status: Former      Current packs/day: 0.00     Average packs/day: 0.5 packs/day for 50.0 years (25.0 ttl pk-yrs)     Types: Cigarettes     Start date: 11/3/1969     Quit date: 11/3/2019     Years since quittin.6     Passive exposure: Past (parents smoked in the home)    Smokeless tobacco: Never    Tobacco comments:     LAST CIG 2019   Vaping Use    Vaping status: Never Used   Substance and Sexual Activity    Alcohol use: No     Comment: caffeine - 1/2 cup coffee daily     Drug use: No    Sexual activity: Defer       Past Family History:  Family History   Problem Relation Age of Onset    Lung cancer Mother        Review of Systems: All systems reviewed. Pertinent positives identified in HPI. All other systems are negative.     14 point ROS was performed and is negative except as outlined in HPI.     Objective:     Objective:  Vital Signs (last 24 hours)          0700  06/10 0659 06/10 0700  06/10 1414   Most Recent      Temp (°F)   98.4    98 -  98.8     98 (36.7) 06/10 1208    Heart Rate 62 -  124    98 -  114     102 06/10 1208    Resp 18 -  28    20 -  22     22 06/10 1208    /94 -  142/83    95/70 -  122/76     122/76 06/10 1208    SpO2 (%) 81 -  100    88 -  95     88 06/10 1208    Flow (L/min) (Oxygen Therapy) 2 -  3    3 -  4     3 06/10 1208          Temp:  [98 °F (36.7 °C)-98.8 °F (37.1 °C)] 98 °F (36.7 °C)  Heart Rate:  [] 102  Resp:  [18-] 22  BP: ()/() 122/76  Body mass index is 27.43 kg/m².        Physical Exam:     General Appearance: Frail, chronically ill, no acute distress.   Respiratory: Tachypneic.    Cardiovascular:  Heart Rate and Rhythm: Irregularly irregular  Psychiatric: Patient alert and oriented to person, place, and time. Speech and behavior appropriate. Normal mood and affect.    Labs:   Lab Review:     Results from last 7 days   Lab Units 06/10/25  0654 06/10/25  0246   SODIUM mmol/L 135* 135*   POTASSIUM mmol/L 4.5 4.2   CHLORIDE mmol/L 93* 93*   CO2 mmol/L 30.0*  32.0*   BUN mg/dL 23.0 21.0   CREATININE mg/dL 1.61* 1.43*   GLUCOSE mg/dL 161* 142*   CALCIUM mg/dL 9.2 9.6   AST (SGOT) U/L  --  21   ALT (SGPT) U/L  --  13         Results from last 7 days   Lab Units 06/10/25  0654   WBC 10*3/mm3 19.80*   HEMOGLOBIN g/dL 14.6   HEMATOCRIT % 43.1   PLATELETS 10*3/mm3 231     Results from last 7 days   Lab Units 06/10/25  0246 06/06/25  0000   INR  2.30* 2.80                     Results from last 7 days   Lab Units 06/10/25  0246   DIGOXIN LVL ng/mL 0.70             EKG:         I personally viewed and interpreted the patient's EKG/Telemetry tracings.    Assessment:       Horseshoe kidney with renal calculus    COPD (chronic obstructive pulmonary disease)    Chronic respiratory failure with hypoxia    Atrial fibrillation, chronic    Bilateral lower extremity edema    Renal stone    Oxygen dependent    CKD (chronic kidney disease) stage 3, GFR 30-59 ml/min    Overweight with body mass index (BMI) 25.0-29.9    Acquired hypothyroidism    Acute on chronic combined systolic and diastolic CHF (congestive heart failure)        Plan:   Dr. Walter and I saw this patient.      #Persistent atrial fibrillation  -- Rate is well-controlled at this time, continue current regimen as tolerated  -- Okay to hold AC, can resume when deemed appropriate by urology  -- Discussed with the patient and family member at bedside that she is high risk for all procedures at this point due to her comorbidities and underlying lung disease, patient and family verbalized understanding  -- We will continue to follow    Thank you for allowing me to participate in the care of Shani Phillip. Feel free to contact me directly with any further questions or concerns.    GENET Gr  [facility]  06/10/25  14:37 EDT

## 2025-06-10 NOTE — ED NOTES
Nursing report ED to floor  Shani Phillip  74 y.o.  female    HPI :  HPI  Stated Reason for Visit: Pt presents to the ED via PV with c/o lower back pain. No falls or known injury preceding the pain.  History Obtained From: patient, family    Chief Complaint  Chief Complaint   Patient presents with    Back Pain       Admitting doctor:   Rodríguez Sandoval MD    Admitting diagnosis:   The primary encounter diagnosis was Hydronephrosis, left. Diagnoses of Horseshoe kidney with renal calculus, Chronic anticoagulation, Chronic obstructive pulmonary disease, unspecified COPD type, and Atrial fibrillation with rapid ventricular response were also pertinent to this visit.    Code status:   Current Code Status       Date Active Code Status Order ID Comments User Context       6/10/2025 0541 CPR (Attempt to Resuscitate) [id number]  Zenia Frazier APRN ED        Question Answer    Code Status (Patient has no pulse and is not breathing) CPR (Attempt to Resuscitate)    Medical Interventions (Patient has pulse or is breathing) Full Support                    Allergies:   Penicillins    Isolation:   No active isolations    Intake and Output    Intake/Output Summary (Last 24 hours) at 6/10/2025 1034  Last data filed at 6/10/2025 0910  Gross per 24 hour   Intake 100 ml   Output --   Net 100 ml       Weight:       06/10/25  0734   Weight: 64 kg (141 lb)       Most recent vitals:   Vitals:    06/10/25 0801 06/10/25 0901 06/10/25 0926 06/10/25 1015   BP: (!) 114/102 97/64 97/64    BP Location:       Pulse: 110 101 105 114   Resp: 20 20  20   Temp: 98.8 °F (37.1 °C)      SpO2: 92% 93% 92% 92%   Weight:       Height:           Active LDAs/IV Access:   Lines, Drains & Airways       Active LDAs       Name Placement date Placement time Site Days    Peripheral IV 06/10/25 0221 20 G Left Antecubital 06/10/25  0221  Antecubital  less than 1    External Urinary Catheter 02/17/25  2648  --  112                    Labs (abnormal labs have a  star):   Labs Reviewed   COMPREHENSIVE METABOLIC PANEL - Abnormal; Notable for the following components:       Result Value    Glucose 142 (*)     Creatinine 1.43 (*)     Sodium 135 (*)     Chloride 93 (*)     CO2 32.0 (*)     Total Protein 8.6 (*)     Alkaline Phosphatase 177 (*)     eGFR 38.3 (*)     All other components within normal limits    Narrative:     GFR Categories in Chronic Kidney Disease (CKD)              GFR Category          GFR (mL/min/1.73)    Interpretation  G1                    90 or greater        Normal or high (1)  G2                    60-89                Mild decrease (1)  G3a                   45-59                Mild to moderate decrease  G3b                   30-44                Moderate to severe decrease  G4                    15-29                Severe decrease  G5                    14 or less           Kidney failure    (1)In the absence of evidence of kidney disease, neither GFR category G1 or G2 fulfill the criteria for CKD.    eGFR calculation 2021 CKD-EPI creatinine equation, which does not include race as a factor   PROTIME-INR - Abnormal; Notable for the following components:    Protime 25.5 (*)     INR 2.30 (*)     All other components within normal limits   SEDIMENTATION RATE - Abnormal; Notable for the following components:    Sed Rate 63 (*)     All other components within normal limits   C-REACTIVE PROTEIN - Abnormal; Notable for the following components:    C-Reactive Protein 1.54 (*)     All other components within normal limits   CBC WITH AUTO DIFFERENTIAL - Abnormal; Notable for the following components:    WBC 15.22 (*)     Hematocrit 46.9 (*)     Neutrophil % 89.9 (*)     Lymphocyte % 3.8 (*)     Monocyte % 4.2 (*)     Neutrophils, Absolute 13.67 (*)     Lymphocytes, Absolute 0.58 (*)     Immature Grans, Absolute 0.08 (*)     All other components within normal limits   URINALYSIS W/ MICROSCOPIC IF INDICATED (NO CULTURE) - Abnormal; Notable for the following  components:    Leuk Esterase, UA Trace (*)     All other components within normal limits   URINALYSIS, MICROSCOPIC ONLY - Abnormal; Notable for the following components:    WBC, UA 3-5 (*)     Bacteria, UA Trace (*)     All other components within normal limits   BASIC METABOLIC PANEL - Abnormal; Notable for the following components:    Glucose 161 (*)     Creatinine 1.61 (*)     Sodium 135 (*)     Chloride 93 (*)     CO2 30.0 (*)     eGFR 33.2 (*)     All other components within normal limits    Narrative:     GFR Categories in Chronic Kidney Disease (CKD)              GFR Category          GFR (mL/min/1.73)    Interpretation  G1                    90 or greater        Normal or high (1)  G2                    60-89                Mild decrease (1)  G3a                   45-59                Mild to moderate decrease  G3b                   30-44                Moderate to severe decrease  G4                    15-29                Severe decrease  G5                    14 or less           Kidney failure    (1)In the absence of evidence of kidney disease, neither GFR category G1 or G2 fulfill the criteria for CKD.    eGFR calculation 2021 CKD-EPI creatinine equation, which does not include race as a factor   CBC (NO DIFF) - Abnormal; Notable for the following components:    WBC 19.80 (*)     All other components within normal limits   DIGOXIN LEVEL - Normal   CBC AND DIFFERENTIAL    Narrative:     The following orders were created for panel order CBC & Differential.  Procedure                               Abnormality         Status                     ---------                               -----------         ------                     CBC Auto Differential[[id number]]        Abnormal            Final result                 Please view results for these tests on the individual orders.       EKG:   No orders to display       Meds given in ED:   Medications   sodium chloride 0.9 % flush 10 mL (has no administration  in time range)   sodium chloride 0.9 % flush 10 mL (10 mL Intravenous Given 6/10/25 0835)   sodium chloride 0.9 % flush 10 mL (has no administration in time range)   sodium chloride 0.9 % infusion 40 mL (has no administration in time range)   sodium chloride 0.9 % infusion (50 mL/hr Intravenous Rate/Dose Verify 6/10/25 0910)   acetaminophen (TYLENOL) tablet 650 mg (has no administration in time range)     Or   acetaminophen (TYLENOL) 160 MG/5ML oral solution 650 mg (has no administration in time range)     Or   acetaminophen (TYLENOL) suppository 650 mg (has no administration in time range)   sennosides-docusate (PERICOLACE) 8.6-50 MG per tablet 2 tablet (has no administration in time range)     And   polyethylene glycol (MIRALAX) packet 17 g (has no administration in time range)     And   bisacodyl (DULCOLAX) EC tablet 5 mg (has no administration in time range)     And   bisacodyl (DULCOLAX) suppository 10 mg (has no administration in time range)   ondansetron ODT (ZOFRAN-ODT) disintegrating tablet 4 mg (has no administration in time range)     Or   ondansetron (ZOFRAN) injection 4 mg (has no administration in time range)   calcium carbonate (TUMS) chewable tablet 500 mg (200 mg elemental) (has no administration in time range)   HYDROmorphone (DILAUDID) injection 0.5 mg (0.5 mg Intravenous Given 6/10/25 0746)   ipratropium-albuterol (DUO-NEB) nebulizer solution 3 mL (has no administration in time range)   ipratropium-albuterol (DUO-NEB) nebulizer solution 3 mL (has no administration in time range)   cefTRIAXone (ROCEPHIN) 1,000 mg in sodium chloride 0.9 % 100 mL MBP (0 mg Intravenous Stopped 6/10/25 0910)   metoprolol tartrate (LOPRESSOR) tablet 25 mg (25 mg Oral Given 6/10/25 1019)   morphine injection 4 mg (4 mg Intravenous Given 6/10/25 0255)   ondansetron (ZOFRAN) injection 4 mg (4 mg Intravenous Given 6/10/25 0255)   ipratropium-albuterol (DUO-NEB) nebulizer solution 3 mL (3 mL Nebulization Given 6/10/25 8533)    ipratropium-albuterol (DUO-NEB) nebulizer solution 3 mL (3 mL Nebulization Given 6/10/25 0336)   HYDROmorphone (DILAUDID) injection 0.5 mg (0.5 mg Intravenous Given 6/10/25 1060)   metoprolol tartrate (LOPRESSOR) injection 5 mg (5 mg Intravenous Given 6/10/25 0548)       Imaging results:  CT Abdomen Pelvis Without Contrast  Result Date: 6/10/2025   1. The patient has mild left-sided hydronephrosis, new when compared to prior study. Stone burden within the left renal moiety has significantly increased when compared to prior exam. There is a 9 mm obstructing stone which is noted at the left UPJ. An additional nonobstructing 4 mm stone is noted within the urinary bladder. There is perinephric stranding noted on the left. There is air noted within the relatively decompressed urinary bladder, with additional air identified within the left renal collecting system. Some of the appearance may be related to instrumentation. However, emphysematous pyelitis certainly not excluded.  Radiation dose reduction techniques were utilized, including automated exposure control and exposure modulation based on body size.   This report was finalized on 6/10/2025 4:04 AM by Dr. Mena Littlejohn M.D on Workstation: [id number]        Ambulatory status:   - assist    Social issues:   Social History     Socioeconomic History    Marital status:    Tobacco Use    Smoking status: Former     Current packs/day: 0.00     Average packs/day: 0.5 packs/day for 50.0 years (25.0 ttl pk-yrs)     Types: Cigarettes     Start date: 11/3/1969     Quit date: 11/3/2019     Years since quittin.6     Passive exposure: Past (parents smoked in the home)    Smokeless tobacco: Never    Tobacco comments:     LAST CIG 2019   Vaping Use    Vaping status: Never Used   Substance and Sexual Activity    Alcohol use: No     Comment: caffeine - 1/2 cup coffee daily     Drug use: No    Sexual activity: Defer       Peripheral Neurovascular  Peripheral  Neurovascular (Adult)  Peripheral Neurovascular WDL: .WDL except, neurovascular assessment lower, pulse assessment  Pulse Assessment: posterior tibial  LLE Neurovascular Assessment  Temperature LLE: warm  Color LLE: red  Sensation LLE: no numbness, no tenderness, no tingling  RLE Neurovascular Assessment  Temperature RLE: warm  Color RLE: red  Sensation RLE: no numbness, no tenderness, no tingling    Neuro Cognitive  Neuro Cognitive (Adult)  Cognitive/Neuro/Behavioral WDL: WDL    Learning  Learning Assessment  Learning Readiness and Ability: no barriers identified  Education Provided  Person Taught: patient  Teaching Method: verbal instruction  Teaching Focus: symptom/problem overview, diagnostic test, medication administration  Education Outcome Evaluation: verbalizes understanding    Respiratory  Respiratory WDL  Respiratory WDL: .WDL except, rhythm/pattern  Rhythm/Pattern, Respiratory: tachypneic  Breath Sounds  All Lung Fields Breath Sounds: All Fields  All Lung Fields Breath Sounds: aeration increased, coarse, wheezes, expiratory  Breath Sounds Post-Respiratory Treatment  Breath Sounds Posttreatment All Fields: All Fields  Breath Sounds Posttreatment All Fields: aeration increased, coarse, wheezes, expiratory    Abdominal Pain       Pain Assessments  Pain (Adult)  (0-10) Pain Rating: Rest: 6  (0-10) Pain Rating: Activity: 8  Patient requested Medication Prescribed for Lower Pain Scale: Yes  Pain Location: back  Pain Side/Orientation: lower, bilateral, other (see comments)  Pain Description: constant  Response to Pain Interventions: interventions effective per patient    NIH Stroke Scale       Desiree Ríos RN  06/10/25 10:34 EDT

## 2025-06-10 NOTE — CONSULTS
FIRST UROLOGY CONSULT    Patient Identification:  NAME:  Shani Phillip  Age:  74 y.o.   Sex:  female   :  1950   MRN:  [id number]     Chief complaint: back pain    History of present illness:  Shani Phillip is a 74 y.o. female  with multiple comorbidities including COPD, Afib, CHF, CKD presenting with acute back pain and nausea. She has hx of horseshoe kidney and prior stone disease. Most recently underwent left PCNL with Dr. MARISEL Cannon. 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      Lab Results   Component Value Date    CREATININE 1.61 (H) 06/10/2025    WBC 19.80 (H) 06/10/2025    HGB 14.6 06/10/2025        Results for orders placed or performed during the hospital encounter of 19   Blood Culture - Blood, Blood, Venous Line    Specimen: Blood, Venous Line   Result Value Ref Range    Blood Culture No growth at 5 days      Results from last 7 days   Lab Units 06/10/25  0306   COLOR UA  Yellow   CLARITY UA  Clear   BILIRUBIN UA  Negative   KETONES UA  Negative   PH, URINE  6.0   UROBILINOGEN UA  1.0 E.U./dL   LEUKOCYTES UA  Trace*   NITRITE UA  Negative   BACTERIA UA /HPF Trace*       CT AP 6/10/25 (viewed and interpreted by me) - left hdro; significant left stone burden (staghorn); 9mm obstructing left UPJ stone. Air in left collecting system.    Past medical history:  Past Medical History:   Diagnosis Date    Acute endocarditis     Atrial fibrillation with RVR 2019    Cancer     CKD (chronic kidney disease) stage 3, GFR 30-59 ml/min     COPD (chronic obstructive pulmonary disease)     CTS (carpal tunnel syndrome)     Dizziness     Gall stones     Influenza 2019    Medicare annual wellness visit, subsequent 2021    Migraine     Osteoporosis     Renal disorder     Restless leg syndrome     Thrush, oral 2019       Past surgical history:  Past Surgical History:   Procedure Laterality Date    CARDIAC CATHETERIZATION N/A 2020    Procedure: Coronary  angiography;  Surgeon: Svetlana Grayson MD;  Location:  [facility];  Service: Cardiovascular    CARDIAC CATHETERIZATION N/A 2020    Procedure: Left ventriculography;  Surgeon: Svetlana Grayson MD;  Location:  [facility];  Service: Cardiovascular    CARDIAC CATHETERIZATION N/A 2020    Procedure: Left Heart Cath;  Surgeon: Svetlana Grayson MD;  Location:  [facility];  Service: Cardiovascular    CHOLECYSTECTOMY      KNEE ARTHROPLASTY Right     LAPAROSCOPIC GASTRIC BANDING         Allergies:  Penicillins    Home medications:  (Not in a hospital admission)       Hospital medications:  cefTRIAXone, 1,000 mg, Intravenous, Q24H  ipratropium-albuterol, 3 mL, Nebulization, Q6H While Awake - RT  sodium chloride, 10 mL, Intravenous, Q12H      sodium chloride, 50 mL/hr, Last Rate: 50 mL/hr (06/10/25 0652)        acetaminophen **OR** acetaminophen **OR** acetaminophen    senna-docusate sodium **AND** polyethylene glycol **AND** bisacodyl **AND** bisacodyl    calcium carbonate    HYDROmorphone    ipratropium-albuterol    ondansetron ODT **OR** ondansetron    [COMPLETED] Insert Peripheral IV **AND** sodium chloride    sodium chloride    sodium chloride    Family history:  Family History   Problem Relation Age of Onset    Lung cancer Mother        Social history:  Social History     Tobacco Use    Smoking status: Former     Current packs/day: 0.00     Average packs/day: 0.5 packs/day for 50.0 years (25.0 ttl pk-yrs)     Types: Cigarettes     Start date: 11/3/1969     Quit date: 11/3/2019     Years since quittin.6     Passive exposure: Past (parents smoked in the home)    Smokeless tobacco: Never    Tobacco comments:     LAST CIG 2019   Vaping Use    Vaping status: Never Used   Substance Use Topics    Alcohol use: No     Comment: caffeine - 1/2 cup coffee daily     Drug use: No       REVIEW OF SYSTEMS:  14 pt ROS performed and negative except per  HPI    Objective:  TMax 24 hours:   Temp (24hrs), Av.4 °F (36.9 °C), Min:98.4 °F (36.9 °C), Max:98.4 °F (36.9 °C)      Vitals Ranges:   Temp:  [98.4 °F (36.9 °C)] 98.4 °F (36.9 °C)  Heart Rate:  [] 100  Resp:  [18-28] 20  BP: (104-142)/(68-95) 104/82    Intake/Output Last 3 shifts:  No intake/output data recorded.     Physical Exam:    Gen: sitting up in bed appearing uncomfortable.    Results review:   I reviewed the patient's new clinical results.    Data review:  Lab Results (last 24 hours)       Procedure Component Value Units Date/Time    Basic Metabolic Panel [[id number]]  (Abnormal) Collected: 06/10/25 0654    Specimen: Blood Updated: 06/10/25 07     Glucose 161 mg/dL      BUN 23.0 mg/dL      Creatinine 1.61 mg/dL      Sodium 135 mmol/L      Potassium 4.5 mmol/L      Chloride 93 mmol/L      CO2 30.0 mmol/L      Calcium 9.2 mg/dL      BUN/Creatinine Ratio 14.3     Anion Gap 12.0 mmol/L      eGFR 33.2 mL/min/1.73     Narrative:      GFR Categories in Chronic Kidney Disease (CKD)              GFR Category          GFR (mL/min/1.73)    Interpretation  G1                    90 or greater        Normal or high (1)  G2                    60-89                Mild decrease (1)  G3a                   45-59                Mild to moderate decrease  G3b                   30-44                Moderate to severe decrease  G4                    15-29                Severe decrease  G5                    14 or less           Kidney failure    (1)In the absence of evidence of kidney disease, neither GFR category G1 or G2 fulfill the criteria for CKD.    eGFR calculation  CKD-EPI creatinine equation, which does not include race as a factor    CBC (No Diff) [[id number]]  (Abnormal) Collected: 06/10/25 0654    Specimen: Blood Updated: 06/10/25 0715     WBC 19.80 10*3/mm3      RBC 4.65 10*6/mm3      Hemoglobin 14.6 g/dL      Hematocrit 43.1 %      MCV 92.7 fL      MCH 31.4 pg      MCHC 33.9 g/dL      RDW 13.9 %       RDW-SD 46.8 fl      MPV 9.3 fL      Platelets 231 10*3/mm3     Digoxin Level [[id number]]  (Normal) Collected: 06/10/25 0246    Specimen: Blood Updated: 06/10/25 0433     Digoxin 0.70 ng/mL     Urinalysis With Microscopic If Indicated (No Culture) - Urine, Clean Catch [[id number]]  (Abnormal) Collected: 06/10/25 0306    Specimen: Urine, Clean Catch Updated: 06/10/25 0323     Color, UA Yellow     Appearance, UA Clear     pH, UA 6.0     Specific Gravity, UA 1.008     Glucose, UA Negative     Ketones, UA Negative     Bilirubin, UA Negative     Blood, UA Negative     Protein, UA Negative     Leuk Esterase, UA Trace     Nitrite, UA Negative     Urobilinogen, UA 1.0 E.U./dL    Urinalysis, Microscopic Only - Urine, Clean Catch [[id number]]  (Abnormal) Collected: 06/10/25 0306    Specimen: Urine, Clean Catch Updated: 06/10/25 0323     RBC, UA 0-2 /HPF      WBC, UA 3-5 /HPF      Bacteria, UA Trace /HPF      Squamous Epithelial Cells, UA 0-2 /HPF      Hyaline Casts, UA None Seen /LPF      Methodology Automated Microscopy    Comprehensive Metabolic Panel [[id number]]  (Abnormal) Collected: 06/10/25 0246    Specimen: Blood Updated: 06/10/25 0315     Glucose 142 mg/dL      BUN 21.0 mg/dL      Creatinine 1.43 mg/dL      Sodium 135 mmol/L      Potassium 4.2 mmol/L      Chloride 93 mmol/L      CO2 32.0 mmol/L      Calcium 9.6 mg/dL      Total Protein 8.6 g/dL      Albumin 4.1 g/dL      ALT (SGPT) 13 U/L      AST (SGOT) 21 U/L      Alkaline Phosphatase 177 U/L      Total Bilirubin 0.7 mg/dL      Globulin 4.5 gm/dL      A/G Ratio 0.9 g/dL      BUN/Creatinine Ratio 14.7     Anion Gap 10.0 mmol/L      eGFR 38.3 mL/min/1.73     Narrative:      GFR Categories in Chronic Kidney Disease (CKD)              GFR Category          GFR (mL/min/1.73)    Interpretation  G1                    90 or greater        Normal or high (1)  G2                    60-89                Mild decrease (1)  G3a                   45-59                Mild to  moderate decrease  G3b                   30-44                Moderate to severe decrease  G4                    15-29                Severe decrease  G5                    14 or less           Kidney failure    (1)In the absence of evidence of kidney disease, neither GFR category G1 or G2 fulfill the criteria for CKD.    eGFR calculation 2021 CKD-EPI creatinine equation, which does not include race as a factor    C-reactive Protein [[id number]]  (Abnormal) Collected: 06/10/25 0246    Specimen: Blood Updated: 06/10/25 0315     C-Reactive Protein 1.54 mg/dL     Sedimentation Rate [[id number]]  (Abnormal) Collected: 06/10/25 0246    Specimen: Blood Updated: 06/10/25 0309     Sed Rate 63 mm/hr     Protime-INR [[id number]]  (Abnormal) Collected: 06/10/25 0246    Specimen: Blood Updated: 06/10/25 0305     Protime 25.5 Seconds      INR 2.30    CBC & Differential [[id number]]  (Abnormal) Collected: 06/10/25 0246    Specimen: Blood Updated: 06/10/25 0254    Narrative:      The following orders were created for panel order CBC & Differential.  Procedure                               Abnormality         Status                     ---------                               -----------         ------                     CBC Auto Differential[[id number]]        Abnormal            Final result                 Please view results for these tests on the individual orders.    CBC Auto Differential [[id number]]  (Abnormal) Collected: 06/10/25 0246    Specimen: Blood Updated: 06/10/25 0254     WBC 15.22 10*3/mm3      RBC 5.03 10*6/mm3      Hemoglobin 15.9 g/dL      Hematocrit 46.9 %      MCV 93.2 fL      MCH 31.6 pg      MCHC 33.9 g/dL      RDW 14.2 %      RDW-SD 48.4 fl      MPV 9.5 fL      Platelets 211 10*3/mm3      Neutrophil % 89.9 %      Lymphocyte % 3.8 %      Monocyte % 4.2 %      Eosinophil % 1.3 %      Basophil % 0.3 %      Immature Grans % 0.5 %      Neutrophils, Absolute 13.67 10*3/mm3      Lymphocytes, Absolute 0.58 10*3/mm3       Monocytes, Absolute 0.64 10*3/mm3      Eosinophils, Absolute 0.20 10*3/mm3      Basophils, Absolute 0.05 10*3/mm3      Immature Grans, Absolute 0.08 10*3/mm3      nRBC 0.0 /100 WBC              Imaging:  Imaging Results (Last 24 Hours)       Procedure Component Value Units Date/Time    CT Abdomen Pelvis Without Contrast [[id number]] Collected: 06/10/25 0347     Updated: 06/10/25 0407    Narrative:      CT OF THE ABDOMEN AND PELVIS WITHOUT CONTRAST     HISTORY: Low back pain     COMPARISON: 7/19/2019     TECHNIQUE: Axial CT imaging was obtained through the abdomen and pelvis.  No IV contrast was administered.     FINDINGS:  Images through the lung bases demonstrate some scarring. There are  dystrophic calcifications of the mitral valve annulus. No suspicious  hepatic lesions are seen. There is a laparoscopic gastric band. The band  is stable in orientation when compared to December 2019. Calcified  granulomata are seen within the spleen. Adrenal glands are within normal  limits. The pancreas is atrophic. There is a duodenal diverticulum.  Patient is again noted to have a horseshoe kidney. Multiple large stones  are identified within the left renal collecting system. Stone burden has  increased when compared to prior study, and there is now hydronephrosis  involving the left renal moiety. There is also air identified within the  left renal collecting system, as well as perinephric stranding on the  left.. Stranding is also noted tracking along the left paracolic gutter.  No acute osseous abnormalities are seen. There is a punctate stone  measuring 2 mm which is noted within the right renal moiety. The patient  does have a 9 mm stone located at the left ureteropelvic junction.  Distal to this, the left ureter is decompressed, although the patient  does have an additional stone which is located within the distal left  ureter. This measures approximately 4 mm. This is new when compared to  prior study. There are  aortoiliac calcifications. The urinary bladder is  relatively collapsed. There is air identified within the urinary  bladder. There is colonic diverticulosis. There is no bowel obstruction.  The appendix is normal. Uterus appears unremarkable. No adnexal masses  are seen.       Impression:         1. The patient has mild left-sided hydronephrosis, new when compared to  prior study. Stone burden within the left renal moiety has significantly  increased when compared to prior exam. There is a 9 mm obstructing stone  which is noted at the left UPJ. An additional nonobstructing 4 mm stone  is noted within the urinary bladder. There is perinephric stranding  noted on the left. There is air noted within the relatively decompressed  urinary bladder, with additional air identified within the left renal  collecting system. Some of the appearance may be related to  instrumentation. However, emphysematous pyelitis certainly not excluded.     Radiation dose reduction techniques were utilized, including automated  exposure control and exposure modulation based on body size.        This report was finalized on 6/10/2025 4:04 AM by Dr. Mena Littlejohn M.D on Workstation: [id number]                  Assessment:       Horseshoe kidney with renal calculus    Left ureteral stone  Leukocytosis  Tachycardia  Sepsis  UTI      Plan:     -NPO  -Hold AC if able  -Will attempt cystoscopy and left ureteral stent placement  -Given patient's anatomy-- this may not be feasible and patient may require IR guided PCNT  -Please contact urology if patient clinically declines    Carline Miranda MD  06/10/25  08:08 EDT

## 2025-06-10 NOTE — ED PROVIDER NOTES
EMERGENCY DEPARTMENT ENCOUNTER  Room Number:  15/15  PCP: Rodríguez Walker MD  Independent Historians: Patient      HPI:  Chief Complaint: had concerns including Back Pain.     A complete HPI/ROS/PMH/PSH/SH/FH are unobtainable due to: Nothing      Context: Shani Phillip is a 74 y.o. female with a medical history of COPD, A-fib, chronic diastolic heart failure, endocarditis of mitral valve, CKD, chronic low back pain who presents to the ED c/o acute low back pain.  She states that the pain is more severe than her typical back pain.  She took her hydrocodone at home without relief.  This pain is more on the left than right.  She denies radiation down her left leg.  She denies fever or chills.  She denies dysuria.  She states that she has had a kidney infection and also a kidney stone previously and she is concerned this may be related to a kidney problem.  She denies any known injury or trauma.  She wears 2 L nasal cannula oxygen at all times.  She does report that she feels like she needs a breathing treatment currently.  She denies chest pain.      Review of prior external notes (non-ED) -and- Review of prior external test results outside of this encounter: See ED course below        PAST MEDICAL HISTORY  Active Ambulatory Problems     Diagnosis Date Noted    Influenza 11/14/2019    Thrush, oral 11/14/2019    COPD (chronic obstructive pulmonary disease) 11/14/2019    Atrial fibrillation with RVR 11/14/2019    Chronic respiratory failure with hypoxia 01/17/2020    Endocarditis of mitral valve 01/20/2020    Mitral valve vegetation 01/29/2020    Chronic diastolic CHF (congestive heart failure) 03/01/2020    Atrial fibrillation, chronic 03/01/2020    Bilateral lower extremity edema 03/01/2020    Intermittent lightheadedness 08/03/2020    Depression 10/22/2020    Chronic midline low back pain with left-sided sciatica 10/22/2020    Nephrolithiasis 10/22/2020    Oxygen dependent 10/22/2020    Medicare annual wellness  visit, subsequent 04/12/2021    Osteoporosis     CKD (chronic kidney disease) stage 3, GFR 30-59 ml/min     Chronic anticoagulation 08/23/2021    Nonrheumatic mitral valve stenosis 02/28/2022    Chest pain, unspecified type 06/04/2023    Chronic heart failure with preserved ejection fraction (HFpEF) 06/05/2023    Overweight with body mass index (BMI) 25.0-29.9 08/31/2023    Acquired hypothyroidism 05/23/2024    Acute on chronic combined systolic and diastolic CHF (congestive heart failure) 02/17/2025    Age-related macular degeneration 04/22/2025    Restless legs syndrome 06/02/2025     Resolved Ambulatory Problems     Diagnosis Date Noted    Acute endocarditis 11/13/2019    Other persistent atrial fibrillation 01/17/2020    Infective endocarditis 01/17/2020     Past Medical History:   Diagnosis Date    Cancer     CTS (carpal tunnel syndrome)     Dizziness     Gall stones     Migraine     Renal disorder     Restless leg syndrome          PAST SURGICAL HISTORY  Past Surgical History:   Procedure Laterality Date    CARDIAC CATHETERIZATION N/A 1/23/2020    Procedure: Coronary angiography;  Surgeon: Svetlana Grayson MD;  Location:  [facility];  Service: Cardiovascular    CARDIAC CATHETERIZATION N/A 1/23/2020    Procedure: Left ventriculography;  Surgeon: Svetlana Grayson MD;  Location:  [facility];  Service: Cardiovascular    CARDIAC CATHETERIZATION N/A 1/23/2020    Procedure: Left Heart Cath;  Surgeon: Svetlana Grayson MD;  Location:  [facility];  Service: Cardiovascular    CHOLECYSTECTOMY      KNEE ARTHROPLASTY Right     LAPAROSCOPIC GASTRIC BANDING           FAMILY HISTORY  Family History   Problem Relation Age of Onset    Lung cancer Mother          SOCIAL HISTORY  Social History     Socioeconomic History    Marital status:    Tobacco Use    Smoking status: Former     Current packs/day: 0.00     Average packs/day: 0.5 packs/day for 50.0 years (25.0 ttl pk-yrs)      Types: Cigarettes     Start date: 11/3/1969     Quit date: 11/3/2019     Years since quittin.6     Passive exposure: Past (parents smoked in the home)    Smokeless tobacco: Never    Tobacco comments:     LAST CIG 2019   Vaping Use    Vaping status: Never Used   Substance and Sexual Activity    Alcohol use: No     Comment: caffeine - 1/2 cup coffee daily     Drug use: No    Sexual activity: Defer         ALLERGIES  Penicillins      REVIEW OF SYSTEMS  Review of all 14 systems is negative other than stated in the HPI above.      PHYSICAL EXAM    I have reviewed the triage vital signs and nursing notes.    ED Triage Vitals   Temp Heart Rate Resp BP SpO2   06/10/25 0220 06/10/25 0218 06/10/25 0218 06/10/25 0220 06/10/25 0218   98.4 °F (36.9 °C) 62 18 135/71 (!) 81 %      Temp src Heart Rate Source Patient Position BP Location FiO2 (%)   -- 06/10/25 022 -- 06/10/25 0220 --    Monitor  Right arm          GENERAL: awake and alert, patient is sitting up in the stretcher, appears uncomfortable, she is audibly wheezing and seems to be in mild respiratory distress  HENT: Normocephalic, atraumatic  EYES: no scleral icterus  CV: regular rhythm, regular rate  RESPIRATORY: normal effort, diffuse expiratory wheezing bilaterally, nasal cannula oxygen in place at 2 L  ABDOMEN: soft, nondistended, nontender throughout  MUSCULOSKELETAL: no deformity, mild lumbar paraspinous tenderness bilaterally  NEURO: alert, moves all extremities, follows commands, normal strength and sensation throughout bilateral lower extremities  PSYCH: calm, cooperative  SKIN: Warm, dry, no rash.  There is erythema of the feet bilaterally without warmth.          LAB RESULTS  Recent Results (from the past 24 hours)   Comprehensive Metabolic Panel    Collection Time: 06/10/25  2:46 AM    Specimen: Blood   Result Value Ref Range    Glucose 142 (H) 65 - 99 mg/dL    BUN 21.0 8.0 - 23.0 mg/dL    Creatinine 1.43 (H) 0.57 - 1.00 mg/dL    Sodium 135 (L)  136 - 145 mmol/L    Potassium 4.2 3.5 - 5.2 mmol/L    Chloride 93 (L) 98 - 107 mmol/L    CO2 32.0 (H) 22.0 - 29.0 mmol/L    Calcium 9.6 8.6 - 10.5 mg/dL    Total Protein 8.6 (H) 6.0 - 8.5 g/dL    Albumin 4.1 3.5 - 5.2 g/dL    ALT (SGPT) 13 1 - 33 U/L    AST (SGOT) 21 1 - 32 U/L    Alkaline Phosphatase 177 (H) 39 - 117 U/L    Total Bilirubin 0.7 0.0 - 1.2 mg/dL    Globulin 4.5 gm/dL    A/G Ratio 0.9 g/dL    BUN/Creatinine Ratio 14.7 7.0 - 25.0    Anion Gap 10.0 5.0 - 15.0 mmol/L    eGFR 38.3 (L) >60.0 mL/min/1.73   Protime-INR    Collection Time: 06/10/25  2:46 AM    Specimen: Blood   Result Value Ref Range    Protime 25.5 (H) 11.7 - 14.2 Seconds    INR 2.30 (H) 0.90 - 1.10   Sedimentation Rate    Collection Time: 06/10/25  2:46 AM    Specimen: Blood   Result Value Ref Range    Sed Rate 63 (H) 0 - 30 mm/hr   C-reactive Protein    Collection Time: 06/10/25  2:46 AM    Specimen: Blood   Result Value Ref Range    C-Reactive Protein 1.54 (H) 0.00 - 0.50 mg/dL   CBC Auto Differential    Collection Time: 06/10/25  2:46 AM    Specimen: Blood   Result Value Ref Range    WBC 15.22 (H) 3.40 - 10.80 10*3/mm3    RBC 5.03 3.77 - 5.28 10*6/mm3    Hemoglobin 15.9 12.0 - 15.9 g/dL    Hematocrit 46.9 (H) 34.0 - 46.6 %    MCV 93.2 79.0 - 97.0 fL    MCH 31.6 26.6 - 33.0 pg    MCHC 33.9 31.5 - 35.7 g/dL    RDW 14.2 12.3 - 15.4 %    RDW-SD 48.4 37.0 - 54.0 fl    MPV 9.5 6.0 - 12.0 fL    Platelets 211 140 - 450 10*3/mm3    Neutrophil % 89.9 (H) 42.7 - 76.0 %    Lymphocyte % 3.8 (L) 19.6 - 45.3 %    Monocyte % 4.2 (L) 5.0 - 12.0 %    Eosinophil % 1.3 0.3 - 6.2 %    Basophil % 0.3 0.0 - 1.5 %    Immature Grans % 0.5 0.0 - 0.5 %    Neutrophils, Absolute 13.67 (H) 1.70 - 7.00 10*3/mm3    Lymphocytes, Absolute 0.58 (L) 0.70 - 3.10 10*3/mm3    Monocytes, Absolute 0.64 0.10 - 0.90 10*3/mm3    Eosinophils, Absolute 0.20 0.00 - 0.40 10*3/mm3    Basophils, Absolute 0.05 0.00 - 0.20 10*3/mm3    Immature Grans, Absolute 0.08 (H) 0.00 - 0.05 10*3/mm3     nRBC 0.0 0.0 - 0.2 /100 WBC   Digoxin Level    Collection Time: 06/10/25  2:46 AM    Specimen: Blood   Result Value Ref Range    Digoxin 0.70 0.60 - 1.20 ng/mL   Urinalysis With Microscopic If Indicated (No Culture) - Urine, Clean Catch    Collection Time: 06/10/25  3:06 AM    Specimen: Urine, Clean Catch   Result Value Ref Range    Color, UA Yellow Yellow, Straw    Appearance, UA Clear Clear    pH, UA 6.0 5.0 - 8.0    Specific Gravity, UA 1.008 1.005 - 1.030    Glucose, UA Negative Negative    Ketones, UA Negative Negative    Bilirubin, UA Negative Negative    Blood, UA Negative Negative    Protein, UA Negative Negative    Leuk Esterase, UA Trace (A) Negative    Nitrite, UA Negative Negative    Urobilinogen, UA 1.0 E.U./dL 0.2 - 1.0 E.U./dL   Urinalysis, Microscopic Only - Urine, Clean Catch    Collection Time: 06/10/25  3:06 AM    Specimen: Urine, Clean Catch   Result Value Ref Range    RBC, UA 0-2 None Seen, 0-2 /HPF    WBC, UA 3-5 (A) None Seen, 0-2 /HPF    Bacteria, UA Trace (A) None Seen /HPF    Squamous Epithelial Cells, UA 0-2 None Seen, 0-2 /HPF    Hyaline Casts, UA None Seen None Seen /LPF    Methodology Automated Microscopy        The above labs were ordered by me and independently reviewed by me.     RADIOLOGY  CT Abdomen Pelvis Without Contrast  Result Date: 6/10/2025  CT OF THE ABDOMEN AND PELVIS WITHOUT CONTRAST  HISTORY: Low back pain  COMPARISON: 7/19/2019  TECHNIQUE: Axial CT imaging was obtained through the abdomen and pelvis. No IV contrast was administered.  FINDINGS: Images through the lung bases demonstrate some scarring. There are dystrophic calcifications of the mitral valve annulus. No suspicious hepatic lesions are seen. There is a laparoscopic gastric band. The band is stable in orientation when compared to December 2019. Calcified granulomata are seen within the spleen. Adrenal glands are within normal limits. The pancreas is atrophic. There is a duodenal diverticulum. Patient is  again noted to have a horseshoe kidney. Multiple large stones are identified within the left renal collecting system. Stone burden has increased when compared to prior study, and there is now hydronephrosis involving the left renal moiety. There is also air identified within the left renal collecting system, as well as perinephric stranding on the left.. Stranding is also noted tracking along the left paracolic gutter. No acute osseous abnormalities are seen. There is a punctate stone measuring 2 mm which is noted within the right renal moiety. The patient does have a 9 mm stone located at the left ureteropelvic junction. Distal to this, the left ureter is decompressed, although the patient does have an additional stone which is located within the distal left ureter. This measures approximately 4 mm. This is new when compared to prior study. There are aortoiliac calcifications. The urinary bladder is relatively collapsed. There is air identified within the urinary bladder. There is colonic diverticulosis. There is no bowel obstruction. The appendix is normal. Uterus appears unremarkable. No adnexal masses are seen.       1. The patient has mild left-sided hydronephrosis, new when compared to prior study. Stone burden within the left renal moiety has significantly increased when compared to prior exam. There is a 9 mm obstructing stone which is noted at the left UPJ. An additional nonobstructing 4 mm stone is noted within the urinary bladder. There is perinephric stranding noted on the left. There is air noted within the relatively decompressed urinary bladder, with additional air identified within the left renal collecting system. Some of the appearance may be related to instrumentation. However, emphysematous pyelitis certainly not excluded.  Radiation dose reduction techniques were utilized, including automated exposure control and exposure modulation based on body size.   This report was finalized on 6/10/2025  4:04 AM by Dr. Mena Littlejohn M.D on Workstation: [id number]        The above radiology studies were ordered by me.  See ED course for independent interpretations.     MEDICATIONS GIVEN IN ER  Medications   sodium chloride 0.9 % flush 10 mL (has no administration in time range)   sodium chloride 0.9 % flush 10 mL (has no administration in time range)   sodium chloride 0.9 % flush 10 mL (has no administration in time range)   sodium chloride 0.9 % infusion 40 mL (has no administration in time range)   sodium chloride 0.9 % infusion (50 mL/hr Intravenous New Bag 6/10/25 0575)   acetaminophen (TYLENOL) tablet 650 mg (has no administration in time range)     Or   acetaminophen (TYLENOL) 160 MG/5ML oral solution 650 mg (has no administration in time range)     Or   acetaminophen (TYLENOL) suppository 650 mg (has no administration in time range)   sennosides-docusate (PERICOLACE) 8.6-50 MG per tablet 2 tablet (has no administration in time range)     And   polyethylene glycol (MIRALAX) packet 17 g (has no administration in time range)     And   bisacodyl (DULCOLAX) EC tablet 5 mg (has no administration in time range)     And   bisacodyl (DULCOLAX) suppository 10 mg (has no administration in time range)   ondansetron ODT (ZOFRAN-ODT) disintegrating tablet 4 mg (has no administration in time range)     Or   ondansetron (ZOFRAN) injection 4 mg (has no administration in time range)   calcium carbonate (TUMS) chewable tablet 500 mg (200 mg elemental) (has no administration in time range)   HYDROmorphone (DILAUDID) injection 0.5 mg (has no administration in time range)   ipratropium-albuterol (DUO-NEB) nebulizer solution 3 mL (has no administration in time range)   ipratropium-albuterol (DUO-NEB) nebulizer solution 3 mL (has no administration in time range)   morphine injection 4 mg (4 mg Intravenous Given 6/10/25 0255)   ondansetron (ZOFRAN) injection 4 mg (4 mg Intravenous Given 6/10/25 0255)   ipratropium-albuterol  (DUO-NEB) nebulizer solution 3 mL (3 mL Nebulization Given 6/10/25 9227)   ipratropium-albuterol (DUO-NEB) nebulizer solution 3 mL (3 mL Nebulization Given 6/10/25 5396)   HYDROmorphone (DILAUDID) injection 0.5 mg (0.5 mg Intravenous Given 6/10/25 8293)   metoprolol tartrate (LOPRESSOR) injection 5 mg (5 mg Intravenous Given 6/10/25 3164)         ORDERS PLACED DURING THIS VISIT:  Orders Placed This Encounter   Procedures    CT Abdomen Pelvis Without Contrast    Comprehensive Metabolic Panel    Protime-INR    Sedimentation Rate    C-reactive Protein    CBC Auto Differential    Urinalysis With Microscopic If Indicated (No Culture) - Urine, Clean Catch    Urinalysis, Microscopic Only - Urine, Clean Catch    Digoxin Level    Basic Metabolic Panel    CBC (No Diff)    NPO Diet NPO Type: Strict NPO    Vital Signs    Intake & Output    Weigh Patient    Oral Care    Saline Lock & Maintain IV Access    Place Sequential Compression Device    Maintain Sequential Compression Device    Code Status and Medical Interventions: CPR (Attempt to Resuscitate); Full Support    [facility] (on-call MD unless specified) Details    Urology (on-call MD unless specified)    Inpatient Urology Consult    Incentive Spirometry    Insert Peripheral IV    Insert Peripheral IV    Inpatient Admission    CBC & Differential         OUTPATIENT MEDICATION MANAGEMENT:  Current Facility-Administered Medications Ordered in Epic   Medication Dose Route Frequency Provider Last Rate Last Admin    acetaminophen (TYLENOL) tablet 650 mg  650 mg Oral Q4H PRN Zenia Frazier APRN        Or    acetaminophen (TYLENOL) 160 MG/5ML oral solution 650 mg  650 mg Oral Q4H PRN Zenia Frazier APRN        Or    acetaminophen (TYLENOL) suppository 650 mg  650 mg Rectal Q4H PRN Zenia Frazier APRN        sennosides-docusate (PERICOLACE) 8.6-50 MG per tablet 2 tablet  2 tablet Oral BID PRN Zenia Frazier APRN        And    polyethylene glycol (MIRALAX) packet  17 g  17 g Oral Daily PRN Zenia Frazier APRN        And    bisacodyl (DULCOLAX) EC tablet 5 mg  5 mg Oral Daily PRN Zenia Frazier APRN        And    bisacodyl (DULCOLAX) suppository 10 mg  10 mg Rectal Daily PRN Zenia Frazier APRN        calcium carbonate (TUMS) chewable tablet 500 mg (200 mg elemental)  2 tablet Oral BID PRN Zenia Frazier APRN        HYDROmorphone (DILAUDID) injection 0.5 mg  0.5 mg Intravenous Q2H PRN Zenia Frazier APRN        ipratropium-albuterol (DUO-NEB) nebulizer solution 3 mL  3 mL Nebulization Q4H PRN Zeina Frazier APRN        ipratropium-albuterol (DUO-NEB) nebulizer solution 3 mL  3 mL Nebulization Q6H While Awake - RT Zenia Frazier APRN        ondansetron ODT (ZOFRAN-ODT) disintegrating tablet 4 mg  4 mg Oral Q6H PRN Zenia Frazier APRN        Or    ondansetron (ZOFRAN) injection 4 mg  4 mg Intravenous Q6H PRN Zenia Frazier APRN        sodium chloride 0.9 % flush 10 mL  10 mL Intravenous PRN Kike Collazo MD        sodium chloride 0.9 % flush 10 mL  10 mL Intravenous Q12H Zenia Frazier APRN        sodium chloride 0.9 % flush 10 mL  10 mL Intravenous PRN Zenia Frazier APRN        sodium chloride 0.9 % infusion 40 mL  40 mL Intravenous PRN Zenia Frazier APRN        sodium chloride 0.9 % infusion  50 mL/hr Intravenous Continuous Zenia Frazier APRN 50 mL/hr at 06/10/25 0652 50 mL/hr at 06/10/25 0652     Current Outpatient Medications Ordered in Epic   Medication Sig Dispense Refill    ammonium lactate (LAC-HYDRIN) 12 % lotion APPLY TOPICALLY TO THE APPROPRIATE AREA AS DIRECTED AS NEEDED FOR DRY SKIN. 225 g 6    budesonide (PULMICORT) 0.5 MG/2ML nebulizer solution USE 1 VIAL IN NEBULIZER DAILY - rinse mouth after treatment 30 each 11    buPROPion XL (WELLBUTRIN XL) 300 MG 24 hr tablet Take 1 tablet by mouth Daily. 90 tablet 3    carvedilol (COREG) 3.125 MG tablet Take 1 tablet by  mouth 2 (Two) Times a Day With Meals. 180 tablet 1    cephalexin (KEFLEX) 500 MG capsule Take 1 capsule by mouth 3 (Three) Times a Day. (Patient not taking: Reported on 6/2/2025) 30 capsule 0    cyclobenzaprine (FLEXERIL) 10 MG tablet Take 1 tablet by mouth 3 (Three) Times a Day As Needed (back pain). 40 tablet 1    digoxin (LANOXIN) 125 MCG tablet Take 1 tablet by mouth Every Other Day. 90 tablet 1    famotidine (PEPCID) 20 MG tablet TAKE 1 TABLET BY MOUTH 2 TIMES A DAY BEFORE MEALS. 180 tablet 3    ferrous sulfate 325 (65 FE) MG tablet Take 1 tablet by mouth Every Other Day. 45 tablet 1    HYDROcodone-acetaminophen (NORCO) 7.5-325 MG per tablet Take 1 tablet by mouth Every 6 (Six) Hours As Needed for Moderate Pain. 30 tablet 0    ipratropium-albuterol (DUO-NEB) 0.5-2.5 mg/3 ml nebulizer USE 1 VIAL IN NEBULIZER 4 TIMES DAILY - as directed 120 mL 11    levothyroxine (SYNTHROID, LEVOTHROID) 50 MCG tablet Take 1 tablet by mouth Every Morning. 90 tablet 1    meclizine (ANTIVERT) 12.5 MG tablet Take 1 tablet by mouth 3 (Three) Times a Day As Needed for Dizziness. 12 tablet 0    nystatin (MYCOSTATIN) 509662 UNIT/GM powder Apply  topically to the appropriate area as directed 4 (Four) Times a Day. Under both breasts 30 g 1    O2 (OXYGEN) Inhale 2 L/min 1 (One) Time.      ondansetron (ZOFRAN) 4 MG tablet Take 1 tablet by mouth Every 8 (Eight) Hours As Needed for Nausea or Vomiting. 60 tablet 3    pantoprazole (PROTONIX) 40 MG EC tablet Take 1 tablet by mouth once daily 180 tablet 0    potassium chloride ER (K-TAB) 20 MEQ tablet controlled-release ER tablet Take 1 tablet by mouth twice daily 180 tablet 1    pramipexole (MIRAPEX) 0.5 MG tablet Take 1 tablet by mouth 2 (Two) Times a Day. 180 tablet 3    revefenacin (Yupelri) 175 MCG/3ML nebulizer solution Take 3 mL by nebulization Daily.      spironolactone (ALDACTONE) 25 MG tablet Take 1 tablet by mouth once daily 90 tablet 0    torsemide (DEMADEX) 20 MG tablet Take 2 tablets  by mouth 2 (Two) Times a Day. 360 tablet 1    trospium (SANCTURA) 20 MG tablet Take 1 tablet by mouth 2 (Two) Times a Day. 180 tablet 3    warfarin (COUMADIN) 2.5 MG tablet TAKE 1/2 TABLET ON MONDAY, WEDNESDAY AND SATURDAY, THEN 1 TABLET ALL OTHER DAYS AS DIRECTED. 75 tablet 0         PROCEDURES  Procedures            PROGRESS, DATA ANALYSIS, CONSULTS, AND MEDICAL DECISION MAKING  All labs have been independently interpreted by me.  All radiology studies have been reviewed by me. All EKG's have been independently viewed and interpreted by me.  Discussion below represents my analysis of pertinent findings related to patient's condition, differential diagnosis, treatment plan and final disposition.    Differential diagnosis includes but is not limited to:  Ureteral calculus  Pyelonephritis  Lumbar disc herniation  Spinal epidural abscess or hematoma    Clinical Scores:                  ED Course as of 06/10/25 0659   Tue Fer 10, 2025   5025 I reviewed recent PCP visit from 6/2/2025.  Patient was seen for chronic nausea, chronic midline low back pain with left-sided sciatica.  She was prescribed hydrocodone 7.5 every 6 hours as needed and also Flexeril. [JR]   0336 Sed Rate(!): 63 [JR]   0336 C-Reactive Protein(!): 1.54 [JR]   0336 Nitrite, UA: Negative [JR]   0336 WBC(!): 15.22 [JR]   0410 WBC, UA(!): 3-5 [JR]   0411 Nitrite, UA: Negative [JR]   0411 On CT, patient has evidence of horseshoe kidney with multiple renal calculi however she does have some new left sided hydronephrosis today.  Concern for obstructing ureteral calculus contributing to her symptoms.  Her pain was not alleviated at all by morphine.  I ordered Dilaudid.  She will be admitted for further pain control and evaluation by urology.  Her urinalysis does not appear consistent with acute UTI therefore I do not suspect emphysematous pyelitis as the CT report  mentioned. [JR]   0442 Discussed with Alexi urology, who agrees to consult. [JR]   1832  Discussed with GENET Bergman for A, who agrees to admit. [JR]      ED Course User Index  [JR] Kike Collazo MD             AS OF 06:59 EDT VITALS:    BP - 123/94  HR - 102  TEMP - 98.4 °F (36.9 °C)  O2 SATS - 96%    COMPLEXITY OF CARE  The patient requires admission.      Chronic or social conditions impacting patient care (Social Determinants of Health):     DIAGNOSIS  Final diagnoses:   Hydronephrosis, left   Horseshoe kidney with renal calculus   Chronic anticoagulation   Chronic obstructive pulmonary disease, unspecified COPD type   Atrial fibrillation with rapid ventricular response           DISPOSITION  ADMIT      Prescription drug monitoring program review:           Please note that portions of this document were completed with a voice recognition program.    Note Disclaimer: At [facility], we believe that sharing information builds trust and better relationships. You are receiving this note because you recently visited [facility]. It is possible you will see health information before a provider has talked with you about it. This kind of information can be easy to misunderstand. To help you fully understand what it means for your health, we urge you to discuss this note with your provider.         Kike Collazo MD  06/10/25 0700

## 2025-06-10 NOTE — ANESTHESIA POSTPROCEDURE EVALUATION
Patient: Shani Phillip    Procedure Summary       Date: 06/10/25 Room / Location: [facility] OR 01 / [facility] MAIN OR    Anesthesia Start: 1835 Anesthesia Stop: 1909    Procedure: CYSTOSCOPY URETERAL LEFT STENT PLACEMENT (Left) Diagnosis:     Surgeons: Stefano Mahoney MD Provider: Dania Cannon MD    Anesthesia Type: general ASA Status: 3 - Emergent            Anesthesia Type: general    Vitals  Vitals Value Taken Time   BP 94/54 06/10/25 19:46   Temp 37.5 °C (99.5 °F) 06/10/25 19:05   Pulse 100 06/10/25 19:51   Resp 24 06/10/25 19:30   SpO2 92 % 06/10/25 19:51   Vitals shown include unfiled device data.        Post Anesthesia Care and Evaluation    Patient location during evaluation: bedside  Patient participation: complete - patient participated  Level of consciousness: awake and alert  Pain management: adequate    Airway patency: patent  Anesthetic complications: No anesthetic complications  PONV Status: controlled  Cardiovascular status: acceptable  Respiratory status: acceptable  Hydration status: acceptable

## 2025-06-10 NOTE — H&P
Patient Name:  Shani Phillip  YOB: 1950  MRN:  [id number]  Admit Date:  6/10/2025  Patient Care Team:  Rodríguez Walker MD as PCP - General (Internal Medicine)  Mike Atkins MD as Surgeon (General Surgery)  Hayes Briones MD as Surgeon (Cardiothoracic Surgery)  Zenia Tinajero MD as Consulting Physician (Cardiology)  Clover Zamora MD as Consulting Physician (Pulmonary Disease)  Angy Smith APRN as Nurse Practitioner (Cardiology)  Lester Garcia RN as Ambulatory  ([facility])      Subjective   History Present Illness     Chief Complaint   Patient presents with    Back Pain       Ms. Phillip is a 74 y.o. female with a history of COPD, chronic respiratory failure chronically on 2 L supplemental O2, endocarditis, mitral valve stenosis, CKD, chronic combined systolic and diastolic CHF, A-fib, hypothyroidism, chronic back pain that presents to [facility] complaining of acute lower back pain for a couple of days.  Pain is worse on the left side of her lower back.  She does have chronic back pain and this pain is different than her chronic problems.  Denies fever, chills, sweats.  No dysuria or hematuria.  She has chronic cough and dyspnea on supplemental O2, over the last couple of days chronic respiratory problems are little bit worse per patient and .  She is breathing a little harder and sounds more congested than usual.  She recently had a renal stone seen by urology.  Patient just had some pain medication and she is a little drowsy and a limited historian but able to make her needs known.   at bedside assisting to provide information.  Both lower legs, ankles and feet are red and joel, at baseline per  and sometimes her legs swell up 3-4 times the current size. She denies leg or foot pain.  states she had a respiratory illness in 2019 in [place] and was hospitalized for several weeks, it is now thought she probably  had COVID 19. She has been chronically ill and weak since that time. She is followed by Dr. Tinajero with cardiology and has a pulmonologist, unsure of the name.     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  On arrival she was found to have creatinine of 1.43 GFR 38.3 CO2 32, CRP 1.54.  White count of 15 with a left shift.  UA positive for WBCs, leukocytes, trace bacteria.  Also with some tachycardia heart rate between 101 and 124.  She has been started on IV fluids and admitted to the hospital for further evaluation and treatment.      Review of Systems   Constitutional:  Negative for activity change, appetite change, chills, diaphoresis and fever.   Respiratory:  Positive for cough and shortness of breath.    Gastrointestinal:  Negative for abdominal pain, constipation, diarrhea, nausea and vomiting.   Genitourinary:  Positive for flank pain. Negative for difficulty urinating, dysuria and hematuria.   Skin:  Negative for wound.   Neurological:  Negative for dizziness, light-headedness and headaches.        Personal History     Past Medical History:   Diagnosis Date    Acute endocarditis     Atrial fibrillation with RVR 11/14/2019    Cancer     CKD (chronic kidney disease) stage 3, GFR 30-59 ml/min     COPD (chronic obstructive pulmonary disease)     CTS (carpal tunnel syndrome)     Dizziness     Gall stones     Influenza 11/14/2019    Medicare annual wellness visit, subsequent 04/12/2021    Migraine     Osteoporosis     Renal disorder     Restless leg syndrome     Thrush, oral 11/14/2019     Past Surgical History:   Procedure Laterality Date    CARDIAC CATHETERIZATION N/A 1/23/2020    Procedure: Coronary angiography;  Surgeon: Svetlana Grayson MD;  Location: [facility] CATH INVASIVE LOCATION;  Service: Cardiovascular    CARDIAC CATHETERIZATION N/A  2020    Procedure: Left ventriculography;  Surgeon: Svetlana Grayson MD;  Location:  [facility] CATH INVASIVE LOCATION;  Service: Cardiovascular    CARDIAC CATHETERIZATION N/A 2020    Procedure: Left Heart Cath;  Surgeon: Svetlana Grayson MD;  Location:  [facility] CATH INVASIVE LOCATION;  Service: Cardiovascular    CHOLECYSTECTOMY      KNEE ARTHROPLASTY Right     LAPAROSCOPIC GASTRIC BANDING       Family History   Problem Relation Age of Onset    Lung cancer Mother      Social History     Tobacco Use    Smoking status: Former     Current packs/day: 0.00     Average packs/day: 0.5 packs/day for 50.0 years (25.0 ttl pk-yrs)     Types: Cigarettes     Start date: 11/3/1969     Quit date: 11/3/2019     Years since quittin.6     Passive exposure: Past (parents smoked in the home)    Smokeless tobacco: Never    Tobacco comments:     LAST CIG 2019   Vaping Use    Vaping status: Never Used   Substance Use Topics    Alcohol use: No     Comment: caffeine - 1/2 cup coffee daily     Drug use: No     No current facility-administered medications on file prior to encounter.     Current Outpatient Medications on File Prior to Encounter   Medication Sig Dispense Refill    ammonium lactate (LAC-HYDRIN) 12 % lotion APPLY TOPICALLY TO THE APPROPRIATE AREA AS DIRECTED AS NEEDED FOR DRY SKIN. 225 g 6    budesonide (PULMICORT) 0.5 MG/2ML nebulizer solution USE 1 VIAL IN NEBULIZER DAILY - rinse mouth after treatment 30 each 11    buPROPion XL (WELLBUTRIN XL) 300 MG 24 hr tablet Take 1 tablet by mouth Daily. 90 tablet 3    carvedilol (COREG) 3.125 MG tablet Take 1 tablet by mouth 2 (Two) Times a Day With Meals. 180 tablet 1    cephalexin (KEFLEX) 500 MG capsule Take 1 capsule by mouth 3 (Three) Times a Day. (Patient not taking: Reported on 2025) 30 capsule 0    cyclobenzaprine (FLEXERIL) 10 MG tablet Take 1 tablet by mouth 3 (Three) Times a Day As Needed (back pain). 40 tablet 1    digoxin (LANOXIN) 125 MCG tablet Take 1 tablet  by mouth Every Other Day. 90 tablet 1    famotidine (PEPCID) 20 MG tablet TAKE 1 TABLET BY MOUTH 2 TIMES A DAY BEFORE MEALS. 180 tablet 3    ferrous sulfate 325 (65 FE) MG tablet Take 1 tablet by mouth Every Other Day. 45 tablet 1    HYDROcodone-acetaminophen (NORCO) 7.5-325 MG per tablet Take 1 tablet by mouth Every 6 (Six) Hours As Needed for Moderate Pain. 30 tablet 0    ipratropium-albuterol (DUO-NEB) 0.5-2.5 mg/3 ml nebulizer USE 1 VIAL IN NEBULIZER 4 TIMES DAILY - as directed 120 mL 11    levothyroxine (SYNTHROID, LEVOTHROID) 50 MCG tablet Take 1 tablet by mouth Every Morning. 90 tablet 1    meclizine (ANTIVERT) 12.5 MG tablet Take 1 tablet by mouth 3 (Three) Times a Day As Needed for Dizziness. 12 tablet 0    nystatin (MYCOSTATIN) 720207 UNIT/GM powder Apply  topically to the appropriate area as directed 4 (Four) Times a Day. Under both breasts 30 g 1    O2 (OXYGEN) Inhale 2 L/min 1 (One) Time.      ondansetron (ZOFRAN) 4 MG tablet Take 1 tablet by mouth Every 8 (Eight) Hours As Needed for Nausea or Vomiting. 60 tablet 3    pantoprazole (PROTONIX) 40 MG EC tablet Take 1 tablet by mouth once daily 180 tablet 0    potassium chloride ER (K-TAB) 20 MEQ tablet controlled-release ER tablet Take 1 tablet by mouth twice daily 180 tablet 1    pramipexole (MIRAPEX) 0.5 MG tablet Take 1 tablet by mouth 2 (Two) Times a Day. 180 tablet 3    revefenacin (Yupelri) 175 MCG/3ML nebulizer solution Take 3 mL by nebulization Daily.      spironolactone (ALDACTONE) 25 MG tablet Take 1 tablet by mouth once daily 90 tablet 0    torsemide (DEMADEX) 20 MG tablet Take 2 tablets by mouth 2 (Two) Times a Day. 360 tablet 1    trospium (SANCTURA) 20 MG tablet Take 1 tablet by mouth 2 (Two) Times a Day. 180 tablet 3    warfarin (COUMADIN) 2.5 MG tablet TAKE 1/2 TABLET ON MONDAY, WEDNESDAY AND SATURDAY, THEN 1 TABLET ALL OTHER DAYS AS DIRECTED. 75 tablet 0     Allergies   Allergen Reactions    Penicillins Rash     RASH 30 YRS AGO NO  HOSPITALIZATION       Objective    Objective     Vital Signs  Temp:  [98.4 °F (36.9 °C)-98.8 °F (37.1 °C)] 98.8 °F (37.1 °C)  Heart Rate:  [] 100  Resp:  [18-28] 20  BP: ()/() 97/64  SpO2:  [81 %-100 %] 94 %  on  Flow (L/min) (Oxygen Therapy):  [2-4] 4;   Device (Oxygen Therapy): nasal cannula  Body mass index is 26.66 kg/m².    Physical Exam  Constitutional:       Appearance: She is ill-appearing.      Comments: Drowsy.  Limited historian.  Appears chronically and acutely ill.  Some audible wheezing slightly tachypneic and labored.  Wearing 2 L of oxygen via nasal cannula.  Spontaneous cough is congested and nonproductive.  Lungs diminished throughout.    Abdomen soft, round, nontender.  No flank pain on exam.    Both feet, ankles, lower shins are joel red and chronic appearing.  DP and PT pulses +1.  Mild edema   HENT:      Head: Normocephalic and atraumatic.      Mouth/Throat:      Mouth: Mucous membranes are moist.   Eyes:      Extraocular Movements: Extraocular movements intact.      Pupils: Pupils are equal, round, and reactive to light.   Cardiovascular:      Rate and Rhythm: Tachycardia present. Rhythm irregular.         Results Review:  I reviewed the patient's new clinical results.      Lab Results (last 24 hours)       Procedure Component Value Units Date/Time    CBC & Differential [[id number]]  (Abnormal) Collected: 06/10/25 0246    Specimen: Blood Updated: 06/10/25 0254    Narrative:      The following orders were created for panel order CBC & Differential.  Procedure                               Abnormality         Status                     ---------                               -----------         ------                     CBC Auto Differential[[id number]]        Abnormal            Final result                 Please view results for these tests on the individual orders.    Comprehensive Metabolic Panel [[id number]]  (Abnormal) Collected: 06/10/25 0246    Specimen: Blood Updated:  06/10/25 0315     Glucose 142 mg/dL      BUN 21.0 mg/dL      Creatinine 1.43 mg/dL      Sodium 135 mmol/L      Potassium 4.2 mmol/L      Chloride 93 mmol/L      CO2 32.0 mmol/L      Calcium 9.6 mg/dL      Total Protein 8.6 g/dL      Albumin 4.1 g/dL      ALT (SGPT) 13 U/L      AST (SGOT) 21 U/L      Alkaline Phosphatase 177 U/L      Total Bilirubin 0.7 mg/dL      Globulin 4.5 gm/dL      A/G Ratio 0.9 g/dL      BUN/Creatinine Ratio 14.7     Anion Gap 10.0 mmol/L      eGFR 38.3 mL/min/1.73     Narrative:      GFR Categories in Chronic Kidney Disease (CKD)              GFR Category          GFR (mL/min/1.73)    Interpretation  G1                    90 or greater        Normal or high (1)  G2                    60-89                Mild decrease (1)  G3a                   45-59                Mild to moderate decrease  G3b                   30-44                Moderate to severe decrease  G4                    15-29                Severe decrease  G5                    14 or less           Kidney failure    (1)In the absence of evidence of kidney disease, neither GFR category G1 or G2 fulfill the criteria for CKD.    eGFR calculation 2021 CKD-EPI creatinine equation, which does not include race as a factor    Protime-INR [[id number]]  (Abnormal) Collected: 06/10/25 0246    Specimen: Blood Updated: 06/10/25 0305     Protime 25.5 Seconds      INR 2.30    Sedimentation Rate [[id number]]  (Abnormal) Collected: 06/10/25 0246    Specimen: Blood Updated: 06/10/25 0309     Sed Rate 63 mm/hr     C-reactive Protein [[id number]]  (Abnormal) Collected: 06/10/25 0246    Specimen: Blood Updated: 06/10/25 0315     C-Reactive Protein 1.54 mg/dL     CBC Auto Differential [[id number]]  (Abnormal) Collected: 06/10/25 0246    Specimen: Blood Updated: 06/10/25 0254     WBC 15.22 10*3/mm3      RBC 5.03 10*6/mm3      Hemoglobin 15.9 g/dL      Hematocrit 46.9 %      MCV 93.2 fL      MCH 31.6 pg      MCHC 33.9 g/dL      RDW 14.2 %      RDW-SD 48.4  fl      MPV 9.5 fL      Platelets 211 10*3/mm3      Neutrophil % 89.9 %      Lymphocyte % 3.8 %      Monocyte % 4.2 %      Eosinophil % 1.3 %      Basophil % 0.3 %      Immature Grans % 0.5 %      Neutrophils, Absolute 13.67 10*3/mm3      Lymphocytes, Absolute 0.58 10*3/mm3      Monocytes, Absolute 0.64 10*3/mm3      Eosinophils, Absolute 0.20 10*3/mm3      Basophils, Absolute 0.05 10*3/mm3      Immature Grans, Absolute 0.08 10*3/mm3      nRBC 0.0 /100 WBC     Digoxin Level [[id number]]  (Normal) Collected: 06/10/25 0246    Specimen: Blood Updated: 06/10/25 0433     Digoxin 0.70 ng/mL     Urinalysis With Microscopic If Indicated (No Culture) - Urine, Clean Catch [[id number]]  (Abnormal) Collected: 06/10/25 0306    Specimen: Urine, Clean Catch Updated: 06/10/25 0323     Color, UA Yellow     Appearance, UA Clear     pH, UA 6.0     Specific Gravity, UA 1.008     Glucose, UA Negative     Ketones, UA Negative     Bilirubin, UA Negative     Blood, UA Negative     Protein, UA Negative     Leuk Esterase, UA Trace     Nitrite, UA Negative     Urobilinogen, UA 1.0 E.U./dL    Urinalysis, Microscopic Only - Urine, Clean Catch [[id number]]  (Abnormal) Collected: 06/10/25 0306    Specimen: Urine, Clean Catch Updated: 06/10/25 0323     RBC, UA 0-2 /HPF      WBC, UA 3-5 /HPF      Bacteria, UA Trace /HPF      Squamous Epithelial Cells, UA 0-2 /HPF      Hyaline Casts, UA None Seen /LPF      Methodology Automated Microscopy    Basic Metabolic Panel [[id number]]  (Abnormal) Collected: 06/10/25 0654    Specimen: Blood Updated: 06/10/25 0749     Glucose 161 mg/dL      BUN 23.0 mg/dL      Creatinine 1.61 mg/dL      Sodium 135 mmol/L      Potassium 4.5 mmol/L      Chloride 93 mmol/L      CO2 30.0 mmol/L      Calcium 9.2 mg/dL      BUN/Creatinine Ratio 14.3     Anion Gap 12.0 mmol/L      eGFR 33.2 mL/min/1.73     Narrative:      GFR Categories in Chronic Kidney Disease (CKD)              GFR Category          GFR (mL/min/1.73)     Interpretation  G1                    90 or greater        Normal or high (1)  G2                    60-89                Mild decrease (1)  G3a                   45-59                Mild to moderate decrease  G3b                   30-44                Moderate to severe decrease  G4                    15-29                Severe decrease  G5                    14 or less           Kidney failure    (1)In the absence of evidence of kidney disease, neither GFR category G1 or G2 fulfill the criteria for CKD.    eGFR calculation 2021 CKD-EPI creatinine equation, which does not include race as a factor    CBC (No Diff) [[id number]]  (Abnormal) Collected: 06/10/25 0654    Specimen: Blood Updated: 06/10/25 0715     WBC 19.80 10*3/mm3      RBC 4.65 10*6/mm3      Hemoglobin 14.6 g/dL      Hematocrit 43.1 %      MCV 92.7 fL      MCH 31.4 pg      MCHC 33.9 g/dL      RDW 13.9 %      RDW-SD 46.8 fl      MPV 9.3 fL      Platelets 231 10*3/mm3             Imaging Results (Last 24 Hours)       Procedure Component Value Units Date/Time    XR Chest PA & Lateral [[id number]] Collected: 06/10/25 1044     Updated: 06/10/25 1051    Narrative:      XR CHEST PA AND LATERAL-     DATE OF EXAM: 6/10/2025 10:02 AM     INDICATION: labored breathing & cough.     COMPARISON: Chest radiographs 2/18/2025 and 6/4/2023. CT abdomen and  pelvis 6/10/2025. CT chest 2/19/2019.     TECHNIQUE: PA and lateral views of the chest were obtained.     FINDINGS:  Low lung volumes and mild elevation of the left hemidiaphragm with  ill-defined bilateral perihilar and bibasilar opacities that could  represent atelectasis and/or scarring. Mild chronic interstitial  prominence in both lungs. No new focal lung opacity. No pneumothorax.  Unchanged cardiac and mediastinal contours. Calcified atherosclerotic  disease in the thoracic aorta. Diffuse osteopenia. Similar-appearing  upper thoracic levoscoliosis and mildly exaggerated thoracic kyphosis.  Incompletely evaluated  chronic changes in the right shoulder. Multiple  chronic appearing bilateral rib fracture deformities. No acute osseous  abnormality is identified. Similar-appearing orientation of the gastric  lap band device. Cholecystectomy clips.       Impression:      Low lung volumes and mild elevation of the left hemidiaphragm with  ill-defined bilateral perihilar and bibasilar opacities that could  represent atelectasis and/or scarring.     This report was finalized on 6/10/2025 10:48 AM by Clay Ybarra MD on  Workstation: [id number]       CT Abdomen Pelvis Without Contrast [[id number]] Collected: 06/10/25 0347     Updated: 06/10/25 0407    Narrative:      CT OF THE ABDOMEN AND PELVIS WITHOUT CONTRAST     HISTORY: Low back pain     COMPARISON: 7/19/2019     TECHNIQUE: Axial CT imaging was obtained through the abdomen and pelvis.  No IV contrast was administered.     FINDINGS:  Images through the lung bases demonstrate some scarring. There are  dystrophic calcifications of the mitral valve annulus. No suspicious  hepatic lesions are seen. There is a laparoscopic gastric band. The band  is stable in orientation when compared to December 2019. Calcified  granulomata are seen within the spleen. Adrenal glands are within normal  limits. The pancreas is atrophic. There is a duodenal diverticulum.  Patient is again noted to have a horseshoe kidney. Multiple large stones  are identified within the left renal collecting system. Stone burden has  increased when compared to prior study, and there is now hydronephrosis  involving the left renal moiety. There is also air identified within the  left renal collecting system, as well as perinephric stranding on the  left.. Stranding is also noted tracking along the left paracolic gutter.  No acute osseous abnormalities are seen. There is a punctate stone  measuring 2 mm which is noted within the right renal moiety. The patient  does have a 9 mm stone located at the left ureteropelvic  junction.  Distal to this, the left ureter is decompressed, although the patient  does have an additional stone which is located within the distal left  ureter. This measures approximately 4 mm. This is new when compared to  prior study. There are aortoiliac calcifications. The urinary bladder is  relatively collapsed. There is air identified within the urinary  bladder. There is colonic diverticulosis. There is no bowel obstruction.  The appendix is normal. Uterus appears unremarkable. No adnexal masses  are seen.       Impression:         1. The patient has mild left-sided hydronephrosis, new when compared to  prior study. Stone burden within the left renal moiety has significantly  increased when compared to prior exam. There is a 9 mm obstructing stone  which is noted at the left UPJ. An additional nonobstructing 4 mm stone  is noted within the urinary bladder. There is perinephric stranding  noted on the left. There is air noted within the relatively decompressed  urinary bladder, with additional air identified within the left renal  collecting system. Some of the appearance may be related to  instrumentation. However, emphysematous pyelitis certainly not excluded.     Radiation dose reduction techniques were utilized, including automated  exposure control and exposure modulation based on body size.        This report was finalized on 6/10/2025 4:04 AM by Dr. Mena Littlejohn M.D on Workstation: [id number]               Results for orders placed during the hospital encounter of 02/17/25    Adult Transthoracic Echo Complete W/ Cont if Necessary Per Protocol    Interpretation Summary    Left ventricular systolic function is hyperdynamic (EF > 70%).    Normal right ventricular cavity size noted. Hyperdynamic right ventricular systolic function noted.    The left atrial cavity is moderately dilated.    Mild aortic valve stenosis is present. Aortic valve maximum pressure gradient is 35.0 mmHg. Aortic valve mean  pressure gradient is 14.2 mmHg. The aortic valve leaflets are moderately calcified    Moderate to severe mitral valve stenosis is present. There is severe mitral annular calcification.    The mitral valve peak gradient is 19.00 mmHg. The mitral valve mean gradient is 7.00 mmHg.    There is a mobile echodensity on the atrial surface of the anterior mitral valve leaflet which may either represent a problem chordae or fibroblastoma (appearance would be atypical for vegetation)    Mild tricuspid valve regurgitation is present.    Calculated right ventricular systolic pressure from tricuspid regurgitation is 53 mmHg.    There is a trivial pericardial effusion.      No orders to display        Assessment/Plan     Active Hospital Problems    Diagnosis  POA    **Horseshoe kidney with renal calculus [Q63.1, N20.0]  Not Applicable    Acute on chronic combined systolic and diastolic CHF (congestive heart failure) [I50.43]  Yes    Acquired hypothyroidism [E03.9]  Yes    Overweight with body mass index (BMI) 25.0-29.9 [E66.3]  Yes    CKD (chronic kidney disease) stage 3, GFR 30-59 ml/min [N18.30]  Yes    Oxygen dependent [Z99.81]  Not Applicable    Renal stone [N20.0]  Yes    Atrial fibrillation, chronic [I48.20]  Yes    Bilateral lower extremity edema [R60.0]  Yes    Chronic respiratory failure with hypoxia [J96.11]  Yes    COPD (chronic obstructive pulmonary disease) [J44.9]  Yes      Resolved Hospital Problems   No resolved problems to display.     This is a frail and chronically ill-appearing 74-year-old female comes in with lower back pain and found with an obstructing renal stone.  Leukocytosis, WBC 15 on arrival, increased to 19 this morning.  Will go ahead and start her on Rocephin.  Urology has been consulted and planning cystoscopy and possible stent placement.  I will also check chest x-ray and consult pulmonology given her chronic respiratory issues and looking a little worse over the last few days per patient and  family.   also states she has had to undergo procedures before without being put under due to her chronic lung problems.    Afib. Hold warfarin for now.  Continue home digoxin and Coreg for A-fib.    Chronic respiratory failure/COPD.  Continue home DuoNebs, scheduled every 6 hours.  Continue home oxygen.  Pulmonology consult as above.    Hypothyroidism. Continue levothyroxine.    JAIME on CKD and chronic combined CHF-creatinine elevated from baseline on arrival at 1.43, up from 1.25.  This morning creatinine is up to 1.61.  Hold diuretics for now and continue IV fluids.  Check BNP.  Monitor closely for fluid overload.    I discussed the patient's findings and my recommendations with patient and spouse.    VTE Prophylaxis - SCDs.  Code Status - Full code.       GENET Martin  [facility]  06/10/25  10:59 EDT

## 2025-06-10 NOTE — OP NOTE
PREOPERATIVE DIAGNOSIS: Left UPJ stone, hydronephrosis, UTI.    POSTOPERATIVE DIAGNOSIS: Same    PROCEDURE: Cystoscopy left ureteral stent placement    SURGEON:  Stefano Mahoney MD    ASSISTANT: None    ANESTHESIA: General    EBL: None    DRAINS: 6 Maltese by 24 cm double-J ureteral stent    COMPLICATIONS: None    FINDINGS: Pyelonephrosis    INDICATIONS FOR PROCEDURE: History of obstructing 9 mm UPJ stone/staghorn calculus.  Patient has positive UTI urosepsis.  Zentz today for stent placement.    DESCRIPTION OF PROCEDURE: After receiving IV antibiotics was taken to the cystoscopy suite underwent a MAC anesthesia.  After adequate esthesia was obtained she placed in dorsolithotomy position.  Her groins prepped and drape sterile fashion.  A 21 Maltese cystoscope was introduced into the urethra and into the bladder.  There was purulence throughout the bladder.  No masses noted.  A guidewire was placed into the left orifice and into the left renal pelvis which was confirmed by fluoroscopy.  A 6 Maltese by 24 cm double-J ureteral stent was then placed over the guidewire Seldinger technique.  There was good coil in the left renal pelvis which was confirmed by fluoroscopy.  Good coil in the bladder confirmed by cystoscopy.  Purulence coming from the stent.  Guidewire was removed.  Cystoscope was removed.  She was then awakened taken recovery in satisfactory condition.  She tolerated the procedure well.

## 2025-06-10 NOTE — ANESTHESIA PREPROCEDURE EVALUATION
Anesthesia Evaluation     Patient summary reviewed                Airway   Dental    (+) edentulous    Pulmonary - normal exam   (+) COPD,  Cardiovascular     ECG reviewed  Rhythm: irregular    (+) dysrhythmias Atrial Fib, CHF       Neuro/Psych  (+) headaches, psychiatric history Depression  GI/Hepatic/Renal/Endo    (+) renal disease- CRI, thyroid problem hypothyroidism    Musculoskeletal     Abdominal    Substance History      OB/GYN          Other      history of cancer                Anesthesia Plan    ASA 3 - emergent     general       Anesthetic plan, risks, benefits, and alternatives have been provided, discussed and informed consent has been obtained with: patient.    CODE STATUS:    Code Status (Patient has no pulse and is not breathing): CPR (Attempt to Resuscitate)  Medical Interventions (Patient has pulse or is breathing): Full Support

## 2025-06-11 ENCOUNTER — APPOINTMENT (OUTPATIENT)
Dept: ULTRASOUND IMAGING | Facility: HOSPITAL | Age: 75
DRG: 659 | End: 2025-06-11
Payer: MEDICARE

## 2025-06-11 PROBLEM — I50.42 CHRONIC COMBINED SYSTOLIC AND DIASTOLIC CHF (CONGESTIVE HEART FAILURE): Status: ACTIVE | Noted: 2025-06-11

## 2025-06-11 PROBLEM — N18.32 STAGE 3B CHRONIC KIDNEY DISEASE: Status: ACTIVE | Noted: 2025-06-11

## 2025-06-11 PROBLEM — N17.9 AKI (ACUTE KIDNEY INJURY): Status: ACTIVE | Noted: 2025-06-11

## 2025-06-11 LAB
ANION GAP SERPL CALCULATED.3IONS-SCNC: 15 MMOL/L (ref 5–15)
BACTERIA UR QL AUTO: ABNORMAL /HPF
BASOPHILS # BLD AUTO: 0.1 10*3/MM3 (ref 0–0.2)
BASOPHILS NFR BLD AUTO: 0.5 % (ref 0–1.5)
BILIRUB UR QL STRIP: NEGATIVE
BUN SERPL-MCNC: 41 MG/DL (ref 8–23)
BUN/CREAT SERPL: 13.1 (ref 7–25)
CALCIUM SPEC-SCNC: 8.8 MG/DL (ref 8.6–10.5)
CHLORIDE SERPL-SCNC: 95 MMOL/L (ref 98–107)
CLARITY UR: ABNORMAL
CO2 SERPL-SCNC: 23 MMOL/L (ref 22–29)
COLOR UR: ABNORMAL
CREAT SERPL-MCNC: 3.12 MG/DL (ref 0.57–1)
DEPRECATED RDW RBC AUTO: 48.1 FL (ref 37–54)
EGFRCR SERPLBLD CKD-EPI 2021: 15 ML/MIN/1.73
EOSINOPHIL # BLD AUTO: 0.03 10*3/MM3 (ref 0–0.4)
EOSINOPHIL NFR BLD AUTO: 0.2 % (ref 0.3–6.2)
ERYTHROCYTE [DISTWIDTH] IN BLOOD BY AUTOMATED COUNT: 14.5 % (ref 12.3–15.4)
GLUCOSE SERPL-MCNC: 102 MG/DL (ref 65–99)
GLUCOSE UR STRIP-MCNC: NEGATIVE MG/DL
HCT VFR BLD AUTO: 37.7 % (ref 34–46.6)
HGB BLD-MCNC: 13.1 G/DL (ref 12–15.9)
HGB UR QL STRIP.AUTO: ABNORMAL
HYALINE CASTS UR QL AUTO: ABNORMAL /LPF
IMM GRANULOCYTES # BLD AUTO: 0.16 10*3/MM3 (ref 0–0.05)
IMM GRANULOCYTES NFR BLD AUTO: 0.8 % (ref 0–0.5)
INR PPP: 2.73 (ref 0.9–1.1)
KETONES UR QL STRIP: ABNORMAL
LEUKOCYTE ESTERASE UR QL STRIP.AUTO: ABNORMAL
LYMPHOCYTES # BLD AUTO: 0.55 10*3/MM3 (ref 0.7–3.1)
LYMPHOCYTES NFR BLD AUTO: 2.8 % (ref 19.6–45.3)
MCH RBC QN AUTO: 31.6 PG (ref 26.6–33)
MCHC RBC AUTO-ENTMCNC: 34.7 G/DL (ref 31.5–35.7)
MCV RBC AUTO: 91.1 FL (ref 79–97)
MONOCYTES # BLD AUTO: 1 10*3/MM3 (ref 0.1–0.9)
MONOCYTES NFR BLD AUTO: 5 % (ref 5–12)
NEUTROPHILS NFR BLD AUTO: 18.02 10*3/MM3 (ref 1.7–7)
NEUTROPHILS NFR BLD AUTO: 90.7 % (ref 42.7–76)
NITRITE UR QL STRIP: NEGATIVE
NRBC BLD AUTO-RTO: 0 /100 WBC (ref 0–0.2)
PH UR STRIP.AUTO: <=5 [PH] (ref 5–8)
PLATELET # BLD AUTO: 157 10*3/MM3 (ref 140–450)
PMV BLD AUTO: 10.6 FL (ref 6–12)
POTASSIUM SERPL-SCNC: 4.9 MMOL/L (ref 3.5–5.2)
PROT UR QL STRIP: ABNORMAL
PROTHROMBIN TIME: 29.3 SECONDS (ref 11.7–14.2)
QT INTERVAL: 354 MS
QTC INTERVAL: 465 MS
RBC # BLD AUTO: 4.14 10*6/MM3 (ref 3.77–5.28)
RBC # UR STRIP: ABNORMAL /HPF
REF LAB TEST METHOD: ABNORMAL
SODIUM SERPL-SCNC: 133 MMOL/L (ref 136–145)
SP GR UR STRIP: 1.02 (ref 1–1.03)
SQUAMOUS #/AREA URNS HPF: ABNORMAL /HPF
UROBILINOGEN UR QL STRIP: ABNORMAL
WBC # UR STRIP: ABNORMAL /HPF
WBC NRBC COR # BLD AUTO: 19.86 10*3/MM3 (ref 3.4–10.8)

## 2025-06-11 PROCEDURE — 81001 URINALYSIS AUTO W/SCOPE: CPT | Performed by: STUDENT IN AN ORGANIZED HEALTH CARE EDUCATION/TRAINING PROGRAM

## 2025-06-11 PROCEDURE — 80048 BASIC METABOLIC PNL TOTAL CA: CPT | Performed by: UROLOGY

## 2025-06-11 PROCEDURE — 94760 N-INVAS EAR/PLS OXIMETRY 1: CPT

## 2025-06-11 PROCEDURE — 94799 UNLISTED PULMONARY SVC/PX: CPT

## 2025-06-11 PROCEDURE — 63710000001 REVEFENACIN 175 MCG/3ML SOLUTION: Performed by: UROLOGY

## 2025-06-11 PROCEDURE — 87086 URINE CULTURE/COLONY COUNT: CPT | Performed by: STUDENT IN AN ORGANIZED HEALTH CARE EDUCATION/TRAINING PROGRAM

## 2025-06-11 PROCEDURE — 85610 PROTHROMBIN TIME: CPT | Performed by: STUDENT IN AN ORGANIZED HEALTH CARE EDUCATION/TRAINING PROGRAM

## 2025-06-11 PROCEDURE — 94761 N-INVAS EAR/PLS OXIMETRY MLT: CPT

## 2025-06-11 PROCEDURE — 25010000002 HYDROMORPHONE PER 4 MG: Performed by: UROLOGY

## 2025-06-11 PROCEDURE — 94664 DEMO&/EVAL PT USE INHALER: CPT

## 2025-06-11 PROCEDURE — 76775 US EXAM ABDO BACK WALL LIM: CPT

## 2025-06-11 PROCEDURE — 85025 COMPLETE CBC W/AUTO DIFF WBC: CPT | Performed by: UROLOGY

## 2025-06-11 PROCEDURE — 25010000002 CEFTRIAXONE PER 250 MG: Performed by: UROLOGY

## 2025-06-11 PROCEDURE — 99231 SBSQ HOSP IP/OBS SF/LOW 25: CPT | Performed by: NURSE PRACTITIONER

## 2025-06-11 RX ORDER — SODIUM CHLORIDE 9 MG/ML
75 INJECTION, SOLUTION INTRAVENOUS CONTINUOUS
Status: DISCONTINUED | OUTPATIENT
Start: 2025-06-11 | End: 2025-06-11

## 2025-06-11 RX ORDER — SODIUM CHLORIDE 9 MG/ML
75 INJECTION, SOLUTION INTRAVENOUS CONTINUOUS
Status: DISCONTINUED | OUTPATIENT
Start: 2025-06-11 | End: 2025-06-12

## 2025-06-11 RX ORDER — SODIUM CHLORIDE 9 MG/ML
100 INJECTION, SOLUTION INTRAVENOUS CONTINUOUS
Status: DISCONTINUED | OUTPATIENT
Start: 2025-06-11 | End: 2025-06-11

## 2025-06-11 RX ORDER — MIDODRINE HYDROCHLORIDE 5 MG/1
2.5 TABLET ORAL
Status: DISCONTINUED | OUTPATIENT
Start: 2025-06-11 | End: 2025-06-14 | Stop reason: HOSPADM

## 2025-06-11 RX ADMIN — MIDODRINE HYDROCHLORIDE 2.5 MG: 5 TABLET ORAL at 21:19

## 2025-06-11 RX ADMIN — Medication 10 ML: at 08:55

## 2025-06-11 RX ADMIN — HYDROMORPHONE HYDROCHLORIDE 0.5 MG: 1 INJECTION, SOLUTION INTRAMUSCULAR; INTRAVENOUS; SUBCUTANEOUS at 13:45

## 2025-06-11 RX ADMIN — ARFORMOTEROL TARTRATE 15 MCG: 15 SOLUTION RESPIRATORY (INHALATION) at 07:30

## 2025-06-11 RX ADMIN — Medication 10 ML: at 21:20

## 2025-06-11 RX ADMIN — BUDESONIDE 0.5 MG: 0.5 INHALANT RESPIRATORY (INHALATION) at 07:31

## 2025-06-11 RX ADMIN — PRAMIPEXOLE DIHYDROCHLORIDE 0.5 MG: 0.5 TABLET ORAL at 21:20

## 2025-06-11 RX ADMIN — ALBUTEROL SULFATE 2.5 MG: 2.5 SOLUTION RESPIRATORY (INHALATION) at 20:56

## 2025-06-11 RX ADMIN — ALBUTEROL SULFATE 2.5 MG: 2.5 SOLUTION RESPIRATORY (INHALATION) at 07:28

## 2025-06-11 RX ADMIN — BUPROPION HYDROCHLORIDE 300 MG: 300 TABLET, EXTENDED RELEASE ORAL at 08:54

## 2025-06-11 RX ADMIN — ALBUTEROL SULFATE 2.5 MG: 2.5 SOLUTION RESPIRATORY (INHALATION) at 23:13

## 2025-06-11 RX ADMIN — LEVOTHYROXINE SODIUM 50 MCG: 50 TABLET ORAL at 08:54

## 2025-06-11 RX ADMIN — ARFORMOTEROL TARTRATE 15 MCG: 15 SOLUTION RESPIRATORY (INHALATION) at 20:56

## 2025-06-11 RX ADMIN — PRAMIPEXOLE DIHYDROCHLORIDE 0.5 MG: 0.5 TABLET ORAL at 09:04

## 2025-06-11 RX ADMIN — Medication 1 APPLICATION: at 22:32

## 2025-06-11 RX ADMIN — SODIUM CHLORIDE 75 ML/HR: 9 INJECTION, SOLUTION INTRAVENOUS at 17:16

## 2025-06-11 RX ADMIN — CYCLOBENZAPRINE HYDROCHLORIDE 5 MG: 10 TABLET, FILM COATED ORAL at 15:30

## 2025-06-11 RX ADMIN — BUDESONIDE 0.5 MG: 0.5 INHALANT RESPIRATORY (INHALATION) at 20:56

## 2025-06-11 RX ADMIN — PANTOPRAZOLE SODIUM 40 MG: 40 TABLET, DELAYED RELEASE ORAL at 08:54

## 2025-06-11 RX ADMIN — IPRATROPIUM BROMIDE AND ALBUTEROL SULFATE 3 ML: .5; 3 SOLUTION RESPIRATORY (INHALATION) at 04:25

## 2025-06-11 RX ADMIN — CEFTRIAXONE SODIUM 1000 MG: 1 INJECTION, POWDER, FOR SOLUTION INTRAMUSCULAR; INTRAVENOUS at 08:54

## 2025-06-11 RX ADMIN — METOPROLOL TARTRATE 25 MG: 25 TABLET, FILM COATED ORAL at 08:54

## 2025-06-11 RX ADMIN — Medication: at 08:54

## 2025-06-11 RX ADMIN — REVEFENACIN 175 MCG: 175 SOLUTION RESPIRATORY (INHALATION) at 07:34

## 2025-06-11 NOTE — PLAN OF CARE
Goal Outcome Evaluation:  Plan of Care Reviewed With: patient        Progress: no change  Outcome Evaluation: return from Cysto, c/o pain, med x 2 with some relief stated, VSS, afib on the monitor, SOB with any exhursion, MN x 1 obtained this am, with good results, has not voided post cysto, bladderscan with no urine noted, denies need, dozing at short intervals, resting quietly in her room,  remains at bedside

## 2025-06-11 NOTE — CASE MANAGEMENT/SOCIAL WORK
Discharge Planning Assessment  [facility]     Patient Name: Shani Phillip  MRN: [id number]  Today's Date: 6/11/2025    Admit Date: 6/10/2025    Plan: Home with spouse   Discharge Needs Assessment       Row Name 06/11/25 0819       Living Environment    People in Home spouse    Current Living Arrangements home    Potentially Unsafe Housing Conditions none    Primary Care Provided by self    Provides Primary Care For no one    Family Caregiver if Needed spouse    Quality of Family Relationships involved;helpful;supportive    Able to Return to Prior Arrangements yes       Resource/Environmental Concerns    Resource/Environmental Concerns none    Transportation Concerns none       Transition Planning    Patient/Family Anticipates Transition to home with family    Patient/Family Anticipated Services at Transition none    Transportation Anticipated family or friend will provide       Discharge Needs Assessment    Readmission Within the Last 30 Days no previous admission in last 30 days    Equipment Currently Used at Home walker, rolling;grab bar;shower chair;oxygen;nebulizer    Concerns to be Addressed no discharge needs identified;denies needs/concerns at this time    Anticipated Changes Related to Illness none    Equipment Needed After Discharge none                   Discharge Plan       Row Name 06/11/25 0820       Plan    Plan Home with spouse    Plan Comments CCP met with patient at bedside. CCP role explained and discharge planning discussed. Face sheet verified and IMM noted. Patient's PCP is Dr. Walker. Patient lives with her spouse. Patient has 6 steps to the entrance of the front of her house and 13-14 steps to the back of her house (handrails present). Patient's bedroom and bathroom are on the main level. Patient has grab bars and chair in the shower. Patient uses a walker for mobility. Patient is on continuous home 02 through [facility]. Patient has used home health but could not recall which agency. Patient  denies any SNF. Patient plans to return home at d/c and does not anticipate needing HH/SNF at this time. Family to transport. Fadumo HINDS                  Selected Continued Care - Episodes Includes continued care and service providers with selected services from the active episodes listed below             Demographic Summary       Row Name 06/11/25 0819       General Information    Admission Type inpatient    Arrived From emergency department    Required Notices Provided Important Message from Medicare    Referral Source admission list    Reason for Consult discharge planning    Preferred Language English                   Functional Status       Row Name 06/11/25 0819       Functional Status    Usual Activity Tolerance good    Current Activity Tolerance good       Functional Status, IADL    Medications assistive equipment    Meal Preparation assistive equipment    Housekeeping assistive equipment    Laundry assistive equipment    Shopping assistive equipment       Mental Status    General Appearance WDL WDL       Mental Status Summary    Recent Changes in Mental Status/Cognitive Functioning no changes                   Psychosocial    No documentation.                  Abuse/Neglect    No documentation.                  Legal    No documentation.                  Substance Abuse    No documentation.                  Patient Forms    No documentation.                     LIZET Brown

## 2025-06-11 NOTE — PROGRESS NOTES
Name: Shani Phillip ADMIT: 6/10/2025   : 1950  PCP: Rodríguez Walker MD    MRN: [id number] LOS: 1 days   AGE/SEX: 74 y.o. female  ROOM: [facility]     Subjective   Subjective   Feeling better today. No back pain. Not voiding much at all. Bladder scans zero. No N/V/D/abd pain. Tolerating diet. No F/C/NS. No SOA or CP or palp.        Objective   Objective   Vital Signs  Temp:  [97.8 °F (36.6 °C)-99.5 °F (37.5 °C)] 98.4 °F (36.9 °C)  Heart Rate:  [] 90  Resp:  [16-24] 17  BP: ()/(54-68) 110/60  SpO2:  [91 %-98 %] 98 %  on  Flow (L/min) (Oxygen Therapy):  [3-4.5] 4;   Device (Oxygen Therapy): nasal cannula  Body mass index is 27.43 kg/m².  Physical Exam  Vitals and nursing note reviewed. Exam conducted with a chaperone present ().   Constitutional:       General: She is not in acute distress.     Appearance: She is ill-appearing (chronically). She is not toxic-appearing or diaphoretic.   HENT:      Head: Normocephalic.      Nose: Nose normal.      Mouth/Throat:      Mouth: Mucous membranes are moist.      Pharynx: Oropharynx is clear.   Eyes:      General: No scleral icterus.        Right eye: No discharge.         Left eye: No discharge.      Conjunctiva/sclera: Conjunctivae normal.   Cardiovascular:      Rate and Rhythm: Normal rate. Rhythm irregular.   Pulmonary:      Effort: Pulmonary effort is normal. No respiratory distress.      Breath sounds: Normal breath sounds. No wheezing or rales.   Abdominal:      General: Bowel sounds are normal. There is no distension.      Palpations: Abdomen is soft.      Tenderness: There is no abdominal tenderness.   Musculoskeletal:         General: Swelling (1+ in BLEs, BLEs dusky from mid shin down--pt says this is chronic and that sometimes they look worse) present.      Cervical back: Neck supple.      Comments: Kyphosis   Skin:     General: Skin is warm and dry.      Capillary Refill: Capillary refill takes more than 3 seconds.      Coloration: Skin  "is not jaundiced.      Comments: Poor skin turgor   Neurological:      General: No focal deficit present.      Mental Status: She is alert. Mental status is at baseline.   Psychiatric:         Mood and Affect: Mood normal.         Behavior: Behavior normal.         Thought Content: Thought content normal.       Results Review     I reviewed the patient's new clinical results.  Results from last 7 days   Lab Units 06/11/25  0536 06/10/25  0654 06/10/25  0246   WBC 10*3/mm3 19.86* 19.80* 15.22*   HEMOGLOBIN g/dL 13.1 14.6 15.9   PLATELETS 10*3/mm3 157 231 211     Results from last 7 days   Lab Units 06/11/25  0536 06/10/25  0654 06/10/25  0246   SODIUM mmol/L 133* 135* 135*   POTASSIUM mmol/L 4.9 4.5 4.2   CHLORIDE mmol/L 95* 93* 93*   CO2 mmol/L 23.0 30.0* 32.0*   BUN mg/dL 41.0* 23.0 21.0   CREATININE mg/dL 3.12* 1.61* 1.43*   GLUCOSE mg/dL 102* 161* 142*   EGFR mL/min/1.73 15.0* 33.2* 38.3*     Results from last 7 days   Lab Units 06/10/25  0246   ALBUMIN g/dL 4.1   BILIRUBIN mg/dL 0.7   ALK PHOS U/L 177*   AST (SGOT) U/L 21   ALT (SGPT) U/L 13     Results from last 7 days   Lab Units 06/11/25 0536 06/10/25  0654 06/10/25  0246   CALCIUM mg/dL 8.8 9.2 9.6   ALBUMIN g/dL  --   --  4.1       No results found for: \"HGBA1C\", \"POCGLU\"    XR Chest PA & Lateral  Result Date: 6/10/2025  Low lung volumes and mild elevation of the left hemidiaphragm with ill-defined bilateral perihilar and bibasilar opacities that could represent atelectasis and/or scarring.  This report was finalized on 6/10/2025 10:48 AM by Clay Ybarra MD on Workstation: [id number]      CT Abdomen Pelvis Without Contrast  Result Date: 6/10/2025   1. The patient has mild left-sided hydronephrosis, new when compared to prior study. Stone burden within the left renal moiety has significantly increased when compared to prior exam. There is a 9 mm obstructing stone which is noted at the left UPJ. An additional nonobstructing 4 mm stone is noted within the " urinary bladder. There is perinephric stranding noted on the left. There is air noted within the relatively decompressed urinary bladder, with additional air identified within the left renal collecting system. Some of the appearance may be related to instrumentation. However, emphysematous pyelitis certainly not excluded.  Radiation dose reduction techniques were utilized, including automated exposure control and exposure modulation based on body size.   This report was finalized on 6/10/2025 4:04 AM by Dr. Mena Littlejohn M.D on Workstation: [id number]        I have personally reviewed all medications:  Scheduled Medications  albuterol, 2.5 mg, Nebulization, Q6H - RT  ammonium lactate, , Topical, BID  arformoterol, 15 mcg, Nebulization, BID - RT  budesonide, 0.5 mg, Nebulization, BID - RT  buPROPion XL, 300 mg, Oral, Daily  cefTRIAXone, 1,000 mg, Intravenous, Q24H  digoxin, 125 mcg, Oral, Every Other Day  levothyroxine, 50 mcg, Oral, QAM  metoprolol tartrate, 25 mg, Oral, Q12H  pantoprazole, 40 mg, Oral, Q AM  pramipexole, 0.5 mg, Oral, BID  revefenacin, 175 mcg, Nebulization, Daily - RT  sodium chloride, 10 mL, Intravenous, Q12H    Infusions  lactated ringers, 9 mL/hr, Last Rate: Stopped (06/10/25 2100)  sodium chloride, 50 mL/hr, Last Rate: 50 mL/hr (06/10/25 1618)    Diet  Diet: Regular/House; Fluid Consistency: Thin (IDDSI 0)  NPO Diet NPO Type: Strict NPO    I have personally reviewed:  [x]  Laboratory   [x]  Microbiology   [x]  Radiology   [x]  EKG/Telemetry  []  Cardiology/Vascular   []  Pathology    [x]  Records       Assessment/Plan     Active Hospital Problems    Diagnosis  POA    **Horseshoe kidney with renal calculus [Q63.1, N20.0]  Not Applicable    JAIME (acute kidney injury) [N17.9]  No    Stage 3b chronic kidney disease [N18.32]  Yes    Chronic combined systolic and diastolic CHF (congestive heart failure) [I50.42]  Yes    Acquired hypothyroidism [E03.9]  Yes    Overweight with body mass index  (BMI) 25.0-29.9 [E66.3]  Yes    Oxygen dependent [Z99.81]  Not Applicable    Renal stone [N20.0]  Yes    Atrial fibrillation, chronic [I48.20]  Yes    Bilateral lower extremity edema [R60.0]  Yes    Chronic respiratory failure with hypoxia [J96.11]  Yes    COPD (chronic obstructive pulmonary disease) [J44.9]  Yes      Resolved Hospital Problems   No resolved problems to display.       75yo woman with horseshoe kidney, chronic combined CHF, hypoT4, CKD3b, PAF (Coumadin), COPD, CHRF (2L/min), and RLS, who presented to ER with back pain and was found to have left UPJ stone with hydronephrosis.    Left UPJ stone with obstruction  Acute UTI  Horseshoe kidney  Appreciate  attention to pt  S/p stent 6/10 by Dr. Mahoney  Continue IV Rocephin  Urine culture pending  No fever but WBC still 19  Concern that she is not draining renal system adequately    JAIME (oliguric)  CKD3b  Looks dry on exam  PVRs zero  Concern for obstruction higher in system--check renal US  Will start IV NS  Renal consult  Avoid nephrotoxic meds    COPD  Chronic hypoxic resp failure (2L/min)  Requiring 4L/min now but lung exam and CXR unremarkable  Pulm following  Watch for pulm edema with IVFs as above  Continue Yupelri, Brovana, and Pulmicort with PRN DuoNebs    PAF  Chronic AC (Coumadin)  HRs acceptable on metoprolol and digoxin  AC on hold for procedures  INR still therapeutic  Appreciate Card attention to pt    Chronic combined CHF  Appears dry at present  Torsemide and Aldactone on hold  Continue metoprolol with holding parameters    HypoT4  Continue L-T4  Check TSH    Hyponatremia  IV NS  Renal consult    Hyperglycemia  Mild  Check A1c    GERD  Continue PPI    RLS  Continue Mirapex      SCDs and therapeutic INR should suffice for DVT prophylaxis.  Full code.  Discussed with patient, , and RN.  Anticipate discharge TBD   Expected Discharge Date: 6/13/2025; Expected Discharge Time: 12:00 PM      Santino Mayes MD  [facility]  06/11/25  16:21 EDT

## 2025-06-11 NOTE — PLAN OF CARE
Goal Outcome Evaluation:  Plan of Care Reviewed With: patient        Progress: no change  Outcome Evaluation: low u/o 140cc this shift, bladder scans show 0, strict I/Os, NS infusing at 75ml/hr, nephro consulted, renal US ordered, sob w/exertion. x1 assist to bedside commode,  at bedside

## 2025-06-11 NOTE — PROGRESS NOTES
Electrophysiology Follow-Up Note      Patient Name: Shani Phillip  Age/Sex: 74 y.o. female  : 1950  MRN: [id number]      Day of Service: 25       Chief Complaint/Follow-up: persistent atrial fibrillation, cardiac clearance for surgery      S: Patient sitting on edge of bed eating lunch at time of exam. Feeling much better today, pain has improved. Received ureteral stent with urology last night.        Temp:  [97.2 °F (36.2 °C)-99.5 °F (37.5 °C)] 97.8 °F (36.6 °C)  Heart Rate:  [] 98  Resp:  [16-24] 18  BP: ()/(51-68) 100/59     PHYSICAL EXAM:  Constitutional:       Appearance: Not in distress. Frail.   Pulmonary:      Effort: Pulmonary effort is normal.   Cardiovascular:      Normal rate. Irregularly irregular rhythm.   Neurological:      General: No focal deficit present.      Mental Status: Alert and oriented to person, place and time.         Results from last 7 days   Lab Units 25  0536 06/10/25  0654 06/10/25  0246   SODIUM mmol/L 133* 135* 135*   POTASSIUM mmol/L 4.9 4.5 4.2   CHLORIDE mmol/L 95* 93* 93*   CO2 mmol/L 23.0 30.0* 32.0*   BUN mg/dL 41.0* 23.0 21.0   CREATININE mg/dL 3.12* 1.61* 1.43*   GLUCOSE mg/dL 102* 161* 142*   CALCIUM mg/dL 8.8 9.2 9.6     Results from last 7 days   Lab Units 25  0536 06/10/25  0654 06/10/25  0246   WBC 10*3/mm3 19.86* 19.80* 15.22*   HEMOGLOBIN g/dL 13.1 14.6 15.9   HEMATOCRIT % 37.7 43.1 46.9*   PLATELETS 10*3/mm3 157 231 211     Results from last 7 days   Lab Units 25  0940 06/10/25  0246 25  0000   INR  2.73* 2.30* 2.80               Current Medications:   Scheduled Meds:albuterol, 2.5 mg, Nebulization, Q6H - RT  ammonium lactate, , Topical, BID  arformoterol, 15 mcg, Nebulization, BID - RT  budesonide, 0.5 mg, Nebulization, BID - RT  buPROPion XL, 300 mg, Oral, Daily  cefTRIAXone, 1,000 mg, Intravenous, Q24H  digoxin, 125 mcg, Oral, Every Other Day  levothyroxine, 50 mcg, Oral, QAM  metoprolol tartrate, 25  mg, Oral, Q12H  pantoprazole, 40 mg, Oral, Q AM  pramipexole, 0.5 mg, Oral, BID  revefenacin, 175 mcg, Nebulization, Daily - RT  sodium chloride, 10 mL, Intravenous, Q12H            Horseshoe kidney with renal calculus    COPD (chronic obstructive pulmonary disease)    Chronic respiratory failure with hypoxia    Atrial fibrillation, chronic    Bilateral lower extremity edema    Renal stone    Oxygen dependent    CKD (chronic kidney disease) stage 3, GFR 30-59 ml/min    Overweight with body mass index (BMI) 25.0-29.9    Acquired hypothyroidism    Acute on chronic combined systolic and diastolic CHF (congestive heart failure)       Plan:     #Persistent Atrial Fibrillation  -- rate is well controlled at this time, continue current regimen  -- resume AC when deemed appropriate by urology  -- will continue to follow      GENET Gr  06/11/25  12:48 EDT

## 2025-06-11 NOTE — CONSULTS
[facility] Consult Note      Patient Name: Shani Phillip  : 1950  MRN: [id number]  Primary Care Physician:  Rodríguez Walker MD  Referring Physician: Khang Brock MD  Date of admission: 6/10/2025    Subjective     Reason for Consult:   JAIME     HPI:   Shani Phillip is a 74 y.o. female with past medical history of chronic atrial fibrillation, chronic kidney disease stage III, chronic obstructive pulmonary disease on chronic supplemental oxygen  with history of for 20 years of tobacco abuse, quit 6 years ago, coronary artery disease status postcardiac cath, mitral valve stenosis, combined systolic and diastolic congestive failure, hypothyroidism, chronic back pain.    Patient presents for worsening back pain not associated with a urinary symptoms.  In the emergency department patient underwent further evaluation with imaging studies showing nephrolithiasis and horseshoe kidney patient was seen by urology and underwent cystoscopy with left ureteral stent placement followed by acute kidney injury.    Nephrology consultation was requested for the management    Review of Systems:   14 point review of systems is otherwise negative except for mentioned above on HPI    Personal History     Past Medical History:   Diagnosis Date    Acute endocarditis     Atrial fibrillation with RVR 2019    Cancer     CKD (chronic kidney disease) stage 3, GFR 30-59 ml/min     COPD (chronic obstructive pulmonary disease)     CTS (carpal tunnel syndrome)     Dizziness     Gall stones     Influenza 2019    Medicare annual wellness visit, subsequent 2021    Migraine     Osteoporosis     Renal disorder     Restless leg syndrome     Thrush, oral 2019       Past Surgical History:   Procedure Laterality Date    CARDIAC CATHETERIZATION N/A 2020    Procedure: Coronary angiography;  Surgeon: Svetlana Grayson MD;  Location: [facility] INVASIVE LOCATION;  Service: Cardiovascular     CARDIAC CATHETERIZATION N/A 1/23/2020    Procedure: Left ventriculography;  Surgeon: Svetlana Grayson MD;  Location:  [facility] INVASIVE LOCATION;  Service: Cardiovascular    CARDIAC CATHETERIZATION N/A 1/23/2020    Procedure: Left Heart Cath;  Surgeon: Svetlana Grayson MD;  Location:  [facility] INVASIVE LOCATION;  Service: Cardiovascular    CHOLECYSTECTOMY      CYSTOSCOPY W/ URETERAL STENT PLACEMENT Left 6/10/2025    Procedure: CYSTOSCOPY URETERAL LEFT STENT PLACEMENT;  Surgeon: Stefano Mahoney MD;  Location: [facility] MAIN OR;  Service: Urology;  Laterality: Left;    KNEE ARTHROPLASTY Right     LAPAROSCOPIC GASTRIC BANDING         Family History: family history includes Lung cancer in her mother.    Social History:  reports that she quit smoking about 5 years ago. Her smoking use included cigarettes. She started smoking about 55 years ago. She has a 25 pack-year smoking history. She has been exposed to tobacco smoke. She has never used smokeless tobacco. She reports that she does not drink alcohol and does not use drugs.    Home Medications:  Prior to Admission medications    Medication Sig Start Date End Date Taking? Authorizing Provider   ammonium lactate (LAC-HYDRIN) 12 % lotion APPLY TOPICALLY TO THE APPROPRIATE AREA AS DIRECTED AS NEEDED FOR DRY SKIN. 7/31/24  Yes Rodríguez Walker MD   budesonide (PULMICORT) 0.5 MG/2ML nebulizer solution USE 1 VIAL IN NEBULIZER DAILY - rinse mouth after treatment 2/27/25  Yes Rodríguez Walker MD   buPROPion XL (WELLBUTRIN XL) 300 MG 24 hr tablet Take 1 tablet by mouth Daily. 10/7/24  Yes Rodríguez Walker MD   carvedilol (COREG) 3.125 MG tablet Take 1 tablet by mouth 2 (Two) Times a Day With Meals. 3/3/25  Yes Angy Smith APRN   cyclobenzaprine (FLEXERIL) 10 MG tablet Take 1 tablet by mouth 3 (Three) Times a Day As Needed (back pain). 6/2/25  Yes Rodríguez Walker MD   digoxin (LANOXIN) 125 MCG tablet Take 1 tablet by mouth Every Other Day. 3/3/25  Yes Sarah  GENET Travis   famotidine (PEPCID) 20 MG tablet TAKE 1 TABLET BY MOUTH 2 TIMES A DAY BEFORE MEALS. 1/9/24  Yes Zenia Tinajero MD   ferrous sulfate 325 (65 FE) MG tablet Take 1 tablet by mouth Every Other Day. 10/7/24  Yes Rodríguez Walker MD   HYDROcodone-acetaminophen (NORCO) 7.5-325 MG per tablet Take 1 tablet by mouth Every 6 (Six) Hours As Needed for Moderate Pain. 6/2/25  Yes Rodríguez Walker MD   ipratropium-albuterol (DUO-NEB) 0.5-2.5 mg/3 ml nebulizer USE 1 VIAL IN NEBULIZER 4 TIMES DAILY - as directed 2/27/25  Yes Rodríguez Walker MD   levothyroxine (SYNTHROID, LEVOTHROID) 50 MCG tablet Take 1 tablet by mouth Every Morning. 4/29/25  Yes Rodríguez Walker MD   meclizine (ANTIVERT) 12.5 MG tablet Take 1 tablet by mouth 3 (Three) Times a Day As Needed for Dizziness. 4/11/24  Yes Ema Elizabeth APRN   nystatin (MYCOSTATIN) 424463 UNIT/GM powder Apply  topically to the appropriate area as directed 4 (Four) Times a Day. Under both breasts 2/14/25  Yes Zenia Tinajero MD   O2 (OXYGEN) Inhale 2 L/min 1 (One) Time.   Yes Marnie Monroy MD   ondansetron (ZOFRAN) 4 MG tablet Take 1 tablet by mouth Every 8 (Eight) Hours As Needed for Nausea or Vomiting. 6/2/25  Yes Rodríguez Walker MD   pantoprazole (PROTONIX) 40 MG EC tablet Take 1 tablet by mouth once daily 6/2/25  Yes Rodríguez Walker MD   potassium chloride ER (K-TAB) 20 MEQ tablet controlled-release ER tablet Take 1 tablet by mouth twice daily 4/14/25  Yes Angy Smith APRN   pramipexole (MIRAPEX) 0.5 MG tablet Take 1 tablet by mouth 2 (Two) Times a Day. 12/2/24  Yes Rodríguez Walker MD   revefenacin (Yupelri) 175 MCG/3ML nebulizer solution Take 3 mL by nebulization Daily.   Yes Provider, MD Marnie   spironolactone (ALDACTONE) 25 MG tablet Take 1 tablet by mouth once daily 6/2/25  Yes Angy Smith APRN   torsemide (DEMADEX) 20 MG tablet Take 2 tablets by mouth 2 (Two) Times a Day. 3/3/25  Yes Angy Smith APRN   trospium  (SANCTURA) 20 MG tablet Take 1 tablet by mouth 2 (Two) Times a Day. 10/7/24  Yes Rodríguez Walker MD   warfarin (COUMADIN) 2.5 MG tablet TAKE 1/2 TABLET ON MONDAY, WEDNESDAY AND SATURDAY, THEN 1 TABLET ALL OTHER DAYS AS DIRECTED. 3/27/25  Yes Zenia Tinajero MD       Allergies:  Allergies   Allergen Reactions    Penicillins Rash     RASH 30 YRS AGO NO HOSPITALIZATION       Objective     Vitals:   Temp:  [97.8 °F (36.6 °C)-99.5 °F (37.5 °C)] 98.4 °F (36.9 °C)  Heart Rate:  [] 90  Resp:  [16-24] 17  BP: ()/(54-68) 110/60  Flow (L/min) (Oxygen Therapy):  [3-4.5] 4    Intake/Output Summary (Last 24 hours) at 6/11/2025 1814  Last data filed at 6/11/2025 1600  Gross per 24 hour   Intake 1650 ml   Output 140 ml   Net 1510 ml       Physical Exam:   Constitutional chronically ill and deconditioned with bilateral temporal wasting  HEENT: Sclera anicteric, no conjunctival injection  Neck: Supple, no thyromegaly, no lymphadenopathy, trachea at midline, no JVD  Respiratory: Fine crackles bibasal  Cardiovascular: RRR, no murmurs, no rubs or gallops, no carotid bruit  Gastrointestinal: Positive bowel sounds, abdomen is soft, nontender and nondistended  : No palpable bladder  Musculoskeletal: No edema, no clubbing or cyanosis.  Lower extremities distal cyanosis, evidence of cyanosis in fingers and upper extremities bilateral  Neurologic: Oriented x3, moving all extremities, normal speech and mental status        Scheduled Meds:     albuterol, 2.5 mg, Nebulization, Q6H - RT  ammonium lactate, , Topical, BID  arformoterol, 15 mcg, Nebulization, BID - RT  budesonide, 0.5 mg, Nebulization, BID - RT  buPROPion XL, 300 mg, Oral, Daily  cefTRIAXone, 1,000 mg, Intravenous, Q24H  digoxin, 125 mcg, Oral, Every Other Day  levothyroxine, 50 mcg, Oral, QAM  metoprolol tartrate, 25 mg, Oral, Q12H  pantoprazole, 40 mg, Oral, Q AM  pramipexole, 0.5 mg, Oral, BID  revefenacin, 175 mcg, Nebulization, Daily - RT  sodium chloride, 10  mL, Intravenous, Q12H      IV Meds:   sodium chloride, 75 mL/hr, Last Rate: 75 mL/hr (06/11/25 1716)        Results Reviewed:   I have personally reviewed the results from the time of this admission to 6/11/2025 18:14 EDT     Lab Results   Component Value Date    GLUCOSE 102 (H) 06/11/2025    CALCIUM 8.8 06/11/2025     (L) 06/11/2025    K 4.9 06/11/2025    CO2 23.0 06/11/2025    CL 95 (L) 06/11/2025    BUN 41.0 (H) 06/11/2025    CREATININE 3.12 (H) 06/11/2025    EGFRIFAFRI 46 (L) 04/12/2021    EGFRIFNONA 48 (L) 08/26/2021    BCR 13.1 06/11/2025    ANIONGAP 15.0 06/11/2025      Lab Results   Component Value Date    MG 1.8 02/24/2025    PHOS 2.0 (L) 02/24/2025    ALBUMIN 4.1 06/10/2025       Images    CT  ABDOMEN AND PELVIS WITHOUT CONTRAST     HISTORY: Low back pain     COMPARISON: 7/19/2019     TECHNIQUE: Axial CT imaging was obtained through the abdomen and pelvis.  No IV contrast was administered.     FINDINGS:  Images through the lung bases demonstrate some scarring. There are  dystrophic calcifications of the mitral valve annulus. No suspicious  hepatic lesions are seen. There is a laparoscopic gastric band. The band  is stable in orientation when compared to December 2019. Calcified  granulomata are seen within the spleen. Adrenal glands are within normal  limits. The pancreas is atrophic. There is a duodenal diverticulum.  Patient is again noted to have a horseshoe kidney. Multiple large stones  are identified within the left renal collecting system. Stone burden has  increased when compared to prior study, and there is now hydronephrosis  involving the left renal moiety. There is also air identified within the  left renal collecting system, as well as perinephric stranding on the  left.. Stranding is also noted tracking along the left paracolic gutter.  No acute osseous abnormalities are seen. There is a punctate stone  measuring 2 mm which is noted within the right renal moiety. The patient  does have  a 9 mm stone located at the left ureteropelvic junction.  Distal to this, the left ureter is decompressed, although the patient  does have an additional stone which is located within the distal left  ureter. This measures approximately 4 mm. This is new when compared to  prior study. There are aortoiliac calcifications. The urinary bladder is  relatively collapsed. There is air identified within the urinary  bladder. There is colonic diverticulosis. There is no bowel obstruction.  The appendix is normal. Uterus appears unremarkable. No adnexal masses  are seen.     IMPRESSION:     1. The patient has mild left-sided hydronephrosis, new when compared to  prior study. Stone burden within the left renal moiety has significantly  increased when compared to prior exam. There is a 9 mm obstructing stone  which is noted at the left UPJ. An additional nonobstructing 4 mm stone  is noted within the urinary bladder. There is perinephric stranding  noted on the left. There is air noted within the relatively decompressed  urinary bladder, with additional air identified within the left renal  collecting system. Some of the appearance may be related to  instrumentation. However, emphysematous pyelitis certainly not excluded        Assessment / Plan       Horseshoe kidney with renal calculus    COPD (chronic obstructive pulmonary disease)    Chronic respiratory failure with hypoxia    Atrial fibrillation, chronic    Bilateral lower extremity edema    Renal stone    Oxygen dependent    Overweight with body mass index (BMI) 25.0-29.9    Acquired hypothyroidism    JAIME (acute kidney injury)    Stage 3b chronic kidney disease    Chronic combined systolic and diastolic CHF (congestive heart failure)      ASSESSMENT:    Acute kidney injury likely secondary to nephrolithiasis accompanied with prerenal state from  Horseshoe kidney with chronic kidney disease stage IIIb   Nephrolithiasis status post cystoscopy with left ureteral stent  placement by Dr. Mahoney , urology following recommendation advised  Chronic obstructive pulmonary disease with emphysema and history of tobacco abuse with chronic supplemental oxygen,  Coronary artery disease as per primary team ,  Chronic back pain  Atrial fibrillation on chronic anticoagulation     PLAN:  - Continue to monitor urine output  - Will add gentle IV hydration low rate for only 12 hours due to her underlying severe COPD   - Will continue to monitor renal function closely     Thank you for involving us in the care of Shani Phillip.  Please feel free to call with any questions.    Kvng Davis MD  06/11/25  18:14 EDT    [facility]  [phone]      Please note that portions of this note were completed with a voice recognition program.

## 2025-06-11 NOTE — PROGRESS NOTES
"  Daily Progress Note.   [address]  6/11/2025    Patient:  Name:  Shani Phillip  MRN:  [id number]  1950  74 y.o.  female         CC: Flank pain  History of Present Illness:  Patient has a previous medical history of chronic hypoxemic respiratory failure severe COPD who sees Dr. Zamora in our office with an FEV1 of 38% diffusion capacity of 31% the history of smoking 75 pack years emphysema using 2 L of oxygen performance budesonide Yupelri and albuterol 3-4 times a day at baseline with comorbidities of PAD and atrial fibrillation on warfarin.      She presented to the emergency room with back pain.  Pain is in her lower back.  Worse on her left.  Ongoing for 2 to 3 days prior to arrival getting worse not better.  She was found to have concern for nephrolithiasis.  She denies any worsening shortness of breath to me.  Denies any hemoptysis wheeze worsening sputum production     Chronic shortness of breath lower extremity swelling chronic changes to lower extremities.     In the ER she is found to have mild left-sided hydronephrosis with a 9 mm obstructing stone increased white count and start antibiotics admitted.  We were asked to help consult for COPD.  Interval History:  S/p urologic procedure  Purulence noted to be coming from stent, addressed by urology onabx  Afebrile on 4nc  She looks much better much less pain today she is awake alert some mild cough no significant worsening shortness of breath overall she feels like she is doing better        Physical Exam:  /59 (BP Location: Right arm, Patient Position: Lying)   Pulse 98   Temp 97.8 °F (36.6 °C) (Oral)   Resp 18   Ht 154.9 cm (60.98\")   Wt 65.8 kg (145 lb 1 oz)   SpO2 98%   BMI 27.43 kg/m²   Body mass index is 27.43 kg/m².    Intake/Output Summary (Last 24 hours) at 6/11/2025 0856  Last data filed at 6/11/2025 0600  Gross per 24 hour   Intake 860 ml   Output --   Net 860 ml     General appearance: Nontoxic, conversant "   Eyes: Anicteric sclerae, moist conjunctivae; no lid lag;   HENT: Atraumatic; oropharynx clear with moist mucous membranes and no mucosal ulcerations; normal hard and soft palate  Neck: Trachea midline; no JVD  Lungs: Minimal rhonchi no significant wheeze, with normal respiratory effort and no intercostal retractions  CV: RRR, no rub  Abdomen: Nonrigid bowel sounds positive  Extremities: Chronic dusky appearance to lower extremities baseline per patient   Skin: Dry skin diffusely and above changes  Psych: Appropriate affect, alert   Neuro speech intact moves extremities    Data Review:  Notable Labs:  Results from last 7 days   Lab Units 06/11/25  0536 06/10/25  0654 06/10/25  0246   WBC 10*3/mm3 19.86* 19.80* 15.22*   HEMOGLOBIN g/dL 13.1 14.6 15.9   PLATELETS 10*3/mm3 157 231 211     Results from last 7 days   Lab Units 06/11/25  0536 06/10/25  0654 06/10/25  0246   SODIUM mmol/L 133* 135* 135*   POTASSIUM mmol/L 4.9 4.5 4.2   CHLORIDE mmol/L 95* 93* 93*   CO2 mmol/L 23.0 30.0* 32.0*   BUN mg/dL 41.0* 23.0 21.0   CREATININE mg/dL 3.12* 1.61* 1.43*   GLUCOSE mg/dL 102* 161* 142*   CALCIUM mg/dL 8.8 9.2 9.6   Estimated Creatinine Clearance: 13.5 mL/min (A) (by C-G formula based on SCr of 3.12 mg/dL (H)).    Results from last 7 days   Lab Units 06/11/25  0536 06/10/25  0654 06/10/25  0246   AST (SGOT) U/L  --   --  21   ALT (SGPT) U/L  --   --  13   CRP mg/dL  --   --  1.54*   PLATELETS 10*3/mm3 157 231 211             Imaging:  Reviewed chest images personally from past 3 days       Assessment / Recommendations:  Severe COPD without acute exacerbation  Left-sided hydronephrosis nephrolithiasis  Hypothyroidism  Chronic hypoxemic respiratory failure  Acute on chronic kidney disease  Leukocytosis suspected UTI source on antibiotics per hospitalist team  Atrial fibrillation  Chronic combined diastolic and systolic heart failure     From a pulmonary perspective  Continue the patient's home Damaris Nagel and  budesonide  Albuterol 4 times a day  Will follow with you  Reviewed outpatient pulmonary notes spirometry diffusion  No need for steroids for pulmonary reasons at the present time    Outpatient of Dr. Zamora    Electronically signed by Jonny Jansen MD, 06/11/25, 8:56 AM EDT.  [facility]

## 2025-06-11 NOTE — PROGRESS NOTES
"UROLOGY PROGRESS NOTE    Naeo. Patient reports feeling a little better. Sitting up in bed this morning having breakfast.     Lab Results   Component Value Date    CREATININE 3.12 (H) 06/11/2025    WBC 19.86 (H) 06/11/2025    HGB 13.1 06/11/2025         Results for orders placed or performed during the hospital encounter of 12/19/19   Blood Culture - Blood, Blood, Venous Line    Specimen: Blood, Venous Line   Result Value Ref Range    Blood Culture No growth at 5 days      Results from last 7 days   Lab Units 06/10/25  0306   COLOR UA  Yellow   CLARITY UA  Clear   BILIRUBIN UA  Negative   KETONES UA  Negative   PH, URINE  6.0   UROBILINOGEN UA  1.0 E.U./dL   LEUKOCYTES UA  Trace*   NITRITE UA  Negative   BACTERIA UA /HPF Trace*       Intake/Output:    Intake/Output Summary (Last 24 hours) at 6/11/2025 0842  Last data filed at 6/11/2025 0600  Gross per 24 hour   Intake 860 ml   Output --   Net 860 ml       Exam:  /59 (BP Location: Right arm, Patient Position: Lying)   Pulse 98   Temp 97.8 °F (36.6 °C) (Oral)   Resp 18   Ht 154.9 cm (60.98\")   Wt 65.8 kg (145 lb 1 oz)   SpO2 98%   BMI 27.43 kg/m²     Gen: sitting up in bed eating breakfast    Assessment:    Horseshoe kidney with renal calculus    COPD (chronic obstructive pulmonary disease)    Chronic respiratory failure with hypoxia    Atrial fibrillation, chronic    Bilateral lower extremity edema    Renal stone    Oxygen dependent    CKD (chronic kidney disease) stage 3, GFR 30-59 ml/min    Overweight with body mass index (BMI) 25.0-29.9    Acquired hypothyroidism    Acute on chronic combined systolic and diastolic CHF (congestive heart failure)      Left ureteral stone  JAIME     S/p cystoscopy and left ureteral stent placement 6/10/25     Plan:    -WBC stable this AM, patient on rocephin per primary  - Cr rising this AM -- recommend nephrology consult  -Holding AC (warfarin) -- cards consulted and are okay with this for now. Can consider transitioning " to lovenox if needs to be off for extended period of time.  - No UOP has been recorded last 24h - start strict I/O  -Obtain bladder scan PVR to ensure patient is emptying blader - if PVR elevated >250cc recommend merlos placement  -Urine culture  -If no improvement by tomorrow -- recommend repeat CT AP to ensure appropriate drainage of left kidney  - Patient may still require IR guided left PCNT placement  - Monitor INR -- will need to be wnl prior possible PCNT  -NPO MN in case any additional procedures needed   - urology will follow     Carline Miranda MD

## 2025-06-12 ENCOUNTER — APPOINTMENT (OUTPATIENT)
Dept: GENERAL RADIOLOGY | Facility: HOSPITAL | Age: 75
DRG: 659 | End: 2025-06-12
Payer: MEDICARE

## 2025-06-12 PROBLEM — I50.43 ACUTE ON CHRONIC COMBINED SYSTOLIC AND DIASTOLIC CHF (CONGESTIVE HEART FAILURE): Status: ACTIVE | Noted: 2025-06-12

## 2025-06-12 PROBLEM — N10 ACUTE PYELONEPHRITIS: Status: ACTIVE | Noted: 2025-06-12

## 2025-06-12 LAB
ALBUMIN SERPL-MCNC: 3.2 G/DL (ref 3.5–5.2)
ALBUMIN/GLOB SERPL: 0.8 G/DL
ALP SERPL-CCNC: 135 U/L (ref 39–117)
ALT SERPL W P-5'-P-CCNC: 14 U/L (ref 1–33)
ANION GAP SERPL CALCULATED.3IONS-SCNC: 10.7 MMOL/L (ref 5–15)
ARTERIAL PATENCY WRIST A: POSITIVE
AST SERPL-CCNC: 19 U/L (ref 1–32)
ATMOSPHERIC PRESS: 750.4 MMHG
BACTERIA SPEC AEROBE CULT: NORMAL
BASE EXCESS BLDA CALC-SCNC: 4.8 MMOL/L (ref 0–2)
BASOPHILS # BLD AUTO: 0.02 10*3/MM3 (ref 0–0.2)
BASOPHILS NFR BLD AUTO: 0.1 % (ref 0–1.5)
BDY SITE: ABNORMAL
BILIRUB SERPL-MCNC: 0.7 MG/DL (ref 0–1.2)
BUN SERPL-MCNC: 49 MG/DL (ref 8–23)
BUN/CREAT SERPL: 19.2 (ref 7–25)
CALCIUM SPEC-SCNC: 8.7 MG/DL (ref 8.6–10.5)
CHLORIDE SERPL-SCNC: 92 MMOL/L (ref 98–107)
CO2 SERPL-SCNC: 28.3 MMOL/L (ref 22–29)
CREAT SERPL-MCNC: 2.55 MG/DL (ref 0.57–1)
DEPRECATED RDW RBC AUTO: 51.1 FL (ref 37–54)
EGFRCR SERPLBLD CKD-EPI 2021: 19.1 ML/MIN/1.73
EOSINOPHIL # BLD AUTO: 0.03 10*3/MM3 (ref 0–0.4)
EOSINOPHIL NFR BLD AUTO: 0.2 % (ref 0.3–6.2)
ERYTHROCYTE [DISTWIDTH] IN BLOOD BY AUTOMATED COUNT: 14.5 % (ref 12.3–15.4)
GAS FLOW AIRWAY: 4 LPM
GLOBULIN UR ELPH-MCNC: 3.9 GM/DL
GLUCOSE SERPL-MCNC: 149 MG/DL (ref 65–99)
HBA1C MFR BLD: 5.8 % (ref 4.8–5.6)
HCO3 BLDA-SCNC: 29 MMOL/L (ref 22–28)
HCT VFR BLD AUTO: 35.9 % (ref 34–46.6)
HEMODILUTION: NO
HGB BLD-MCNC: 11.6 G/DL (ref 12–15.9)
IMM GRANULOCYTES # BLD AUTO: 0.11 10*3/MM3 (ref 0–0.05)
IMM GRANULOCYTES NFR BLD AUTO: 0.7 % (ref 0–0.5)
INR PPP: 2.14 (ref 0.9–1.1)
LYMPHOCYTES # BLD AUTO: 0.4 10*3/MM3 (ref 0.7–3.1)
LYMPHOCYTES NFR BLD AUTO: 2.7 % (ref 19.6–45.3)
MAGNESIUM SERPL-MCNC: 2.2 MG/DL (ref 1.6–2.4)
MCH RBC QN AUTO: 31.2 PG (ref 26.6–33)
MCHC RBC AUTO-ENTMCNC: 32.3 G/DL (ref 31.5–35.7)
MCV RBC AUTO: 96.5 FL (ref 79–97)
MODALITY: ABNORMAL
MONOCYTES # BLD AUTO: 0.82 10*3/MM3 (ref 0.1–0.9)
MONOCYTES NFR BLD AUTO: 5.6 % (ref 5–12)
NEUTROPHILS NFR BLD AUTO: 13.35 10*3/MM3 (ref 1.7–7)
NEUTROPHILS NFR BLD AUTO: 90.7 % (ref 42.7–76)
NRBC BLD AUTO-RTO: 0 /100 WBC (ref 0–0.2)
PCO2 BLDA: 40.9 MM HG (ref 35–45)
PH BLDA: 7.46 PH UNITS (ref 7.35–7.45)
PHOSPHATE SERPL-MCNC: 3.4 MG/DL (ref 2.5–4.5)
PLATELET # BLD AUTO: 158 10*3/MM3 (ref 140–450)
PMV BLD AUTO: 10 FL (ref 6–12)
PO2 BLDA: 84.7 MM HG (ref 80–100)
POTASSIUM SERPL-SCNC: 4 MMOL/L (ref 3.5–5.2)
PROT SERPL-MCNC: 7.1 G/DL (ref 6–8.5)
PROTHROMBIN TIME: 24.1 SECONDS (ref 11.7–14.2)
RBC # BLD AUTO: 3.72 10*6/MM3 (ref 3.77–5.28)
SAO2 % BLDCOA: 96.9 % (ref 92–98.5)
SODIUM SERPL-SCNC: 131 MMOL/L (ref 136–145)
TOTAL RATE: 24 BREATHS/MINUTE
TSH SERPL DL<=0.05 MIU/L-ACNC: 2.79 UIU/ML (ref 0.27–4.2)
URATE SERPL-MCNC: 10.9 MG/DL (ref 2.4–5.7)
WBC NRBC COR # BLD AUTO: 14.73 10*3/MM3 (ref 3.4–10.8)

## 2025-06-12 PROCEDURE — 25010000002 HYDROMORPHONE PER 4 MG: Performed by: UROLOGY

## 2025-06-12 PROCEDURE — 82803 BLOOD GASES ANY COMBINATION: CPT

## 2025-06-12 PROCEDURE — 84443 ASSAY THYROID STIM HORMONE: CPT | Performed by: HOSPITALIST

## 2025-06-12 PROCEDURE — 25010000002 METHYLPREDNISOLONE PER 40 MG: Performed by: INTERNAL MEDICINE

## 2025-06-12 PROCEDURE — 84550 ASSAY OF BLOOD/URIC ACID: CPT | Performed by: HOSPITALIST

## 2025-06-12 PROCEDURE — 97162 PT EVAL MOD COMPLEX 30 MIN: CPT

## 2025-06-12 PROCEDURE — 85610 PROTHROMBIN TIME: CPT | Performed by: STUDENT IN AN ORGANIZED HEALTH CARE EDUCATION/TRAINING PROGRAM

## 2025-06-12 PROCEDURE — 97530 THERAPEUTIC ACTIVITIES: CPT

## 2025-06-12 PROCEDURE — 94799 UNLISTED PULMONARY SVC/PX: CPT

## 2025-06-12 PROCEDURE — 80053 COMPREHEN METABOLIC PANEL: CPT | Performed by: HOSPITALIST

## 2025-06-12 PROCEDURE — 25010000002 ONDANSETRON PER 1 MG: Performed by: UROLOGY

## 2025-06-12 PROCEDURE — 94664 DEMO&/EVAL PT USE INHALER: CPT

## 2025-06-12 PROCEDURE — 94761 N-INVAS EAR/PLS OXIMETRY MLT: CPT

## 2025-06-12 PROCEDURE — 36600 WITHDRAWAL OF ARTERIAL BLOOD: CPT

## 2025-06-12 PROCEDURE — 25010000002 BUMETANIDE PER 0.5 MG: Performed by: INTERNAL MEDICINE

## 2025-06-12 PROCEDURE — 63710000001 REVEFENACIN 175 MCG/3ML SOLUTION: Performed by: UROLOGY

## 2025-06-12 PROCEDURE — 83735 ASSAY OF MAGNESIUM: CPT | Performed by: HOSPITALIST

## 2025-06-12 PROCEDURE — 85025 COMPLETE CBC W/AUTO DIFF WBC: CPT | Performed by: UROLOGY

## 2025-06-12 PROCEDURE — 71045 X-RAY EXAM CHEST 1 VIEW: CPT

## 2025-06-12 PROCEDURE — 87040 BLOOD CULTURE FOR BACTERIA: CPT | Performed by: INTERNAL MEDICINE

## 2025-06-12 PROCEDURE — 83036 HEMOGLOBIN GLYCOSYLATED A1C: CPT | Performed by: HOSPITALIST

## 2025-06-12 PROCEDURE — 25010000002 CEFTRIAXONE PER 250 MG: Performed by: UROLOGY

## 2025-06-12 PROCEDURE — 84100 ASSAY OF PHOSPHORUS: CPT | Performed by: HOSPITALIST

## 2025-06-12 RX ORDER — METHYLPREDNISOLONE SODIUM SUCCINATE 40 MG/ML
40 INJECTION, POWDER, LYOPHILIZED, FOR SOLUTION INTRAMUSCULAR; INTRAVENOUS EVERY 12 HOURS
Status: DISCONTINUED | OUTPATIENT
Start: 2025-06-12 | End: 2025-06-13

## 2025-06-12 RX ORDER — BUMETANIDE 0.25 MG/ML
2 INJECTION, SOLUTION INTRAMUSCULAR; INTRAVENOUS ONCE
Status: COMPLETED | OUTPATIENT
Start: 2025-06-12 | End: 2025-06-12

## 2025-06-12 RX ADMIN — ALBUTEROL SULFATE 2.5 MG: 2.5 SOLUTION RESPIRATORY (INHALATION) at 11:48

## 2025-06-12 RX ADMIN — CYCLOBENZAPRINE HYDROCHLORIDE 5 MG: 10 TABLET, FILM COATED ORAL at 21:18

## 2025-06-12 RX ADMIN — Medication: at 21:22

## 2025-06-12 RX ADMIN — IPRATROPIUM BROMIDE AND ALBUTEROL SULFATE 3 ML: .5; 3 SOLUTION RESPIRATORY (INHALATION) at 05:00

## 2025-06-12 RX ADMIN — DIGOXIN 125 MCG: 0.12 TABLET ORAL at 08:52

## 2025-06-12 RX ADMIN — Medication: at 08:54

## 2025-06-12 RX ADMIN — ARFORMOTEROL TARTRATE 15 MCG: 15 SOLUTION RESPIRATORY (INHALATION) at 20:39

## 2025-06-12 RX ADMIN — METHYLPREDNISOLONE SODIUM SUCCINATE 40 MG: 40 INJECTION, POWDER, FOR SOLUTION INTRAMUSCULAR; INTRAVENOUS at 14:32

## 2025-06-12 RX ADMIN — LEVOTHYROXINE SODIUM 50 MCG: 50 TABLET ORAL at 08:52

## 2025-06-12 RX ADMIN — BUDESONIDE 0.5 MG: 0.5 INHALANT RESPIRATORY (INHALATION) at 20:39

## 2025-06-12 RX ADMIN — Medication 10 ML: at 21:17

## 2025-06-12 RX ADMIN — PRAMIPEXOLE DIHYDROCHLORIDE 0.5 MG: 0.5 TABLET ORAL at 21:18

## 2025-06-12 RX ADMIN — CEFTRIAXONE SODIUM 1000 MG: 1 INJECTION, POWDER, FOR SOLUTION INTRAMUSCULAR; INTRAVENOUS at 08:52

## 2025-06-12 RX ADMIN — Medication 10 ML: at 08:52

## 2025-06-12 RX ADMIN — ONDANSETRON 4 MG: 2 INJECTION, SOLUTION INTRAMUSCULAR; INTRAVENOUS at 10:37

## 2025-06-12 RX ADMIN — PRAMIPEXOLE DIHYDROCHLORIDE 0.5 MG: 0.5 TABLET ORAL at 09:01

## 2025-06-12 RX ADMIN — MIDODRINE HYDROCHLORIDE 2.5 MG: 5 TABLET ORAL at 10:44

## 2025-06-12 RX ADMIN — METOPROLOL TARTRATE 25 MG: 25 TABLET, FILM COATED ORAL at 10:44

## 2025-06-12 RX ADMIN — ONDANSETRON 4 MG: 2 INJECTION, SOLUTION INTRAMUSCULAR; INTRAVENOUS at 01:11

## 2025-06-12 RX ADMIN — ALBUTEROL SULFATE 2.5 MG: 2.5 SOLUTION RESPIRATORY (INHALATION) at 20:39

## 2025-06-12 RX ADMIN — MIDODRINE HYDROCHLORIDE 2.5 MG: 5 TABLET ORAL at 08:51

## 2025-06-12 RX ADMIN — BUDESONIDE 0.5 MG: 0.5 INHALANT RESPIRATORY (INHALATION) at 07:26

## 2025-06-12 RX ADMIN — REVEFENACIN 175 MCG: 175 SOLUTION RESPIRATORY (INHALATION) at 07:28

## 2025-06-12 RX ADMIN — MIDODRINE HYDROCHLORIDE 2.5 MG: 5 TABLET ORAL at 17:45

## 2025-06-12 RX ADMIN — BUMETANIDE 2 MG: 0.25 INJECTION INTRAMUSCULAR; INTRAVENOUS at 14:32

## 2025-06-12 RX ADMIN — PANTOPRAZOLE SODIUM 40 MG: 40 TABLET, DELAYED RELEASE ORAL at 08:51

## 2025-06-12 RX ADMIN — ARFORMOTEROL TARTRATE 15 MCG: 15 SOLUTION RESPIRATORY (INHALATION) at 07:24

## 2025-06-12 RX ADMIN — HYDROMORPHONE HYDROCHLORIDE 0.5 MG: 1 INJECTION, SOLUTION INTRAMUSCULAR; INTRAVENOUS; SUBCUTANEOUS at 01:20

## 2025-06-12 RX ADMIN — BUPROPION HYDROCHLORIDE 300 MG: 300 TABLET, EXTENDED RELEASE ORAL at 08:52

## 2025-06-12 NOTE — PROGRESS NOTES
"  Daily Progress Note.   [address]  6/12/2025    Patient:  Name:  Shani Phillip  MRN:  [id number]  1950  74 y.o.  female         CC: Flank pain  History of Present Illness:  Patient has a previous medical history of chronic hypoxemic respiratory failure severe COPD who sees Dr. Zamora in our office with an FEV1 of 38% diffusion capacity of 31% the history of smoking 75 pack years emphysema using 2 L of oxygen performance budesonide Yupelri and albuterol 3-4 times a day at baseline with comorbidities of PAD and atrial fibrillation on warfarin.      She presented to the emergency room with back pain.  Pain is in her lower back.  Worse on her left.  Ongoing for 2 to 3 days prior to arrival getting worse not better.  She was found to have concern for nephrolithiasis.  She denies any worsening shortness of breath to me.  Denies any hemoptysis wheeze worsening sputum production     Chronic shortness of breath lower extremity swelling chronic changes to lower extremities.     In the ER she is found to have mild left-sided hydronephrosis with a 9 mm obstructing stone increased white count and start antibiotics admitted.  We were asked to help consult for COPD.    Interval History:  Afebrile overnight  4lnc  Pt and  deny any lethargy confusion or excessive drowsiness.  Pt slept well   Denies any worsening cough sputum or soa.  Wears 2l at baseline.    Physical Exam:  /48 (BP Location: Left arm, Patient Position: Sitting)   Pulse 97   Temp 97.7 °F (36.5 °C) (Oral)   Resp 20   Ht 154.9 cm (60.98\")   Wt 68.9 kg (151 lb 12.8 oz)   SpO2 (!) 89%   BMI 28.70 kg/m²   Body mass index is 28.7 kg/m².    Intake/Output Summary (Last 24 hours) at 6/12/2025 0938  Last data filed at 6/12/2025 0900  Gross per 24 hour   Intake 1821 ml   Output 530 ml   Net 1291 ml     General appearance: Nontoxic, conversant   Eyes: Anicteric sclerae, moist conjunctivae; no lid lag;   HENT: Atraumatic; oropharynx " clear with moist mucous membranes and no mucosal ulcerations; normal hard and soft palate  Neck: Trachea midline; no JVD  Lungs: no rhonchi no   wheeze, with normal respiratory effort and no intercostal retractions  CV: RRR, no rub  Abdomen: Nonrigid bowel sounds positive  Extremities: Chronic dusky appearance to lower extremities baseline per patient   Skin: Dry skin diffusely and above changes  Psych: Appropriate affect, alert   Neuro speech intact moves extremities    Data Review:  Notable Labs:  Results from last 7 days   Lab Units 06/12/25  0635 06/11/25  0536 06/10/25  0654 06/10/25  0246   WBC 10*3/mm3 14.73* 19.86* 19.80* 15.22*   HEMOGLOBIN g/dL 11.6* 13.1 14.6 15.9   PLATELETS 10*3/mm3 158 157 231 211     Results from last 7 days   Lab Units 06/12/25  0635 06/11/25  0536 06/10/25  0654 06/10/25  0246   SODIUM mmol/L 131* 133* 135* 135*   POTASSIUM mmol/L 4.0 4.9 4.5 4.2   CHLORIDE mmol/L 92* 95* 93* 93*   CO2 mmol/L 28.3 23.0 30.0* 32.0*   BUN mg/dL 49.0* 41.0* 23.0 21.0   CREATININE mg/dL 2.55* 3.12* 1.61* 1.43*   GLUCOSE mg/dL 149* 102* 161* 142*   CALCIUM mg/dL 8.7 8.8 9.2 9.6   MAGNESIUM mg/dL 2.2  --   --   --    PHOSPHORUS mg/dL 3.4  --   --   --    Estimated Creatinine Clearance: 16.9 mL/min (A) (by C-G formula based on SCr of 2.55 mg/dL (H)).    Results from last 7 days   Lab Units 06/12/25  0635 06/11/25  0536 06/10/25  0654 06/10/25  0246   AST (SGOT) U/L 19  --   --  21   ALT (SGPT) U/L 14  --   --  13   CRP mg/dL  --   --   --  1.54*   PLATELETS 10*3/mm3 158 157 231 211             Imaging:  Reviewed chest images personally from past 3 days       Assessment / Recommendations:  Severe COPD without acute exacerbation  Left-sided hydronephrosis nephrolithiasis  Hypothyroidism  Chronic hypoxemic respiratory failure  Acute on chronic kidney disease  Leukocytosis suspected UTI source on antibiotics per hospitalist team  Atrial fibrillation  Anemia  Chronic combined diastolic and systolic heart  failure     From a pulmonary perspective  Continue the patient's home Yupelri Brovona and budesonide  Albuterol 4 times a day  Ceftriaxone will cover pna if present, suspect more related to pulm edema, atelectasis  Baseline hypoxemic resp failure and severe copd, will be very sensitive to pulmonary edema.  Dont see bcs, will order    Daughter requests abg, pt is awake alert no lethargy, last abg showing mild chronic hypercapnic resp failure well compensated 7.38/55 in feb 2025.  [place] pt and , they are okay with getting abg if needed but did discuss risk of arterial/vascular damage with them.      I attempted to call daughter from my phone as well as patients phone.  No answer.    Will follow with you  Reviewed outpatient pulmonary notes spirometry diffusion  No need for steroids for pulmonary reasons at the present time    Outpatient of Dr. Noah Monteiro RN.    Electronically signed by Jonny Jansen MD, 06/12/25, 10:13 AM EDT.

## 2025-06-12 NOTE — PROGRESS NOTES
First urology progress note:    Oliguria overnight.  Afebrile.  Vital signs are stable.  Slightly hypotensive but stable.  No complaints    Assessment and plan:    74-year-old female with    1.  Left nephrolithiasis  2.  Left pyelonephrosis    UA: RBC, WBC, 4+ bacteria; 21-30 squamous epithelial cells    Urine culture pending    Urine output: Documented 300 cc overnight    -Labs pending this morning  - If creatinine or WBC not improved we will get a repeat CT abdomen pelvis and determine if she needs a percutaneous nephrostomy tube  - Will follow

## 2025-06-12 NOTE — PROGRESS NOTES
I was able to get a hold of daughter.  Dw her the clinical situation with going through acute illness on top of very poor resp status at baseline.  She expresses concern for increased drowsiness.  Discussed risk of abg with pts daughter.  She wishes to proceed.    Electronically signed by Jonny Jansen MD, 06/12/25, 10:25 AM EDT.    Results from last 7 days   Lab Units 06/12/25  1147   PH, ARTERIAL pH units 7.460*   PCO2, ARTERIAL mm Hg 40.9   PO2 ART mm Hg 84.7   HCO3 ART mmol/L 29.0*     No hypercapnia.  Electronically signed by Jonny Jansen MD, 06/12/25, 12:48 PM EDT.

## 2025-06-12 NOTE — PROGRESS NOTES
[facility] Progress Note      Patient Name: Shani Phillip  : 1950  MRN: [id number]  Primary Care Physician:  Rodríguez Walker MD  Date of admission: 6/10/2025    Subjective     Interval History:     Patient sitting on the side of the bed  Accompanied by family member  Eating breakfast  Persistent dyspnea at rest with intermittent cough  No abdominal pain  Urinating spontaneously    Review of Systems:   As noted above    Objective     Vitals:   Temp:  [97.3 °F (36.3 °C)-98.4 °F (36.9 °C)] 97.7 °F (36.5 °C)  Heart Rate:  [] 97  Resp:  [16-24] 20  BP: ()/(48-61) 108/48  Flow (L/min) (Oxygen Therapy):  [4] 4    Intake/Output Summary (Last 24 hours) at 2025 0928  Last data filed at 2025 0900  Gross per 24 hour   Intake 1821 ml   Output 530 ml   Net 1291 ml       Physical Exam:    General Appearance: Alert chronically ill and deconditioned on supplemental oxygen  Skin: warm and dry  HEENT: oral mucosa normal, nonicteric sclera  Neck: supple, no JVD  Lungs: Occasional rhonchus bibasal  Heart: RRR, normal S1 and S2  Abdomen: soft, nontender, nondistended  : no palpable bladder  Extremities: Lower extremity cyanosis.  With excess dermatitis  Neuro: normal speech and mental status     Scheduled Meds:     albuterol, 2.5 mg, Nebulization, Q6H - RT  ammonium lactate, , Topical, BID  arformoterol, 15 mcg, Nebulization, BID - RT  budesonide, 0.5 mg, Nebulization, BID - RT  buPROPion XL, 300 mg, Oral, Daily  cefTRIAXone, 1,000 mg, Intravenous, Q24H  digoxin, 125 mcg, Oral, Every Other Day  levothyroxine, 50 mcg, Oral, QAM  metoprolol tartrate, 25 mg, Oral, Q12H  midodrine, 2.5 mg, Oral, TID AC  pantoprazole, 40 mg, Oral, Q AM  pramipexole, 0.5 mg, Oral, BID  revefenacin, 175 mcg, Nebulization, Daily - RT  sodium chloride, 10 mL, Intravenous, Q12H      IV Meds:        Results Reviewed:   I have personally reviewed the results from the time of this admission to 2025  09:28 EDT     Results from last 7 days   Lab Units 06/12/25  0635 06/11/25  0536 06/10/25  0654 06/10/25  0246   SODIUM mmol/L 131* 133* 135* 135*   POTASSIUM mmol/L 4.0 4.9 4.5 4.2   CHLORIDE mmol/L 92* 95* 93* 93*   CO2 mmol/L 28.3 23.0 30.0* 32.0*   BUN mg/dL 49.0* 41.0* 23.0 21.0   CREATININE mg/dL 2.55* 3.12* 1.61* 1.43*   CALCIUM mg/dL 8.7 8.8 9.2 9.6   BILIRUBIN mg/dL 0.7  --   --  0.7   ALK PHOS U/L 135*  --   --  177*   ALT (SGPT) U/L 14  --   --  13   AST (SGOT) U/L 19  --   --  21   GLUCOSE mg/dL 149* 102* 161* 142*       Estimated Creatinine Clearance: 16.9 mL/min (A) (by C-G formula based on SCr of 2.55 mg/dL (H)).    Results from last 7 days   Lab Units 06/12/25  0635   MAGNESIUM mg/dL 2.2   PHOSPHORUS mg/dL 3.4       Results from last 7 days   Lab Units 06/12/25  0635   URIC ACID mg/dL 10.9*       Results from last 7 days   Lab Units 06/12/25  0635 06/11/25  0536 06/10/25  0654 06/10/25  0246   WBC 10*3/mm3 14.73* 19.86* 19.80* 15.22*   HEMOGLOBIN g/dL 11.6* 13.1 14.6 15.9   PLATELETS 10*3/mm3 158 157 231 211       Results from last 7 days   Lab Units 06/12/25  0636 06/11/25  0940 06/10/25  0246 06/06/25  0000   INR  2.14* 2.73* 2.30* 2.80       Assessment / Plan     ASSESSMENT:    Acute kidney injury likely secondary to nephrolithiasis accompanied with prerenal state .   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 Mahoney , urology following recommendation advised  Chronic obstructive pulmonary disease with emphysema and history of tobacco abuse with chronic supplemental oxygen,  Coronary artery disease as per primary team   Chronic back pain  Atrial fibrillationon anticoagulation,    PLAN:  Renal function responded to IV fluid challenge 12 hours   I will not order further fluids as patient has severe COPD with high risk of volume overload  Will continue to follow closely    Thank you for involving us in the  care of Shani Phillip.  Please feel free to call with any questions.    Kvng Davis MD  06/12/25  09:28 EDT    [facility]  [phone]    Please note that portions of this note were completed with a voice recognition program.

## 2025-06-12 NOTE — THERAPY EVALUATION
Patient Name: Shani Phillip  : 1950    MRN: [id number]                              Today's Date: 2025       Admit Date: 6/10/2025    Visit Dx:     ICD-10-CM ICD-9-CM   1. Hydronephrosis, left  N13.30 591   2. Horseshoe kidney with renal calculus  Q63.1 753.3    N20.0 592.0   3. Chronic anticoagulation  Z79.01 V58.61   4. Chronic obstructive pulmonary disease, unspecified COPD type  J44.9 496   5. Atrial fibrillation with rapid ventricular response  I48.91 427.31     Patient Active Problem List   Diagnosis    Influenza    Thrush, oral    COPD (chronic obstructive pulmonary disease)    Atrial fibrillation with RVR    Chronic respiratory failure with hypoxia    Endocarditis of mitral valve    Mitral valve vegetation    Chronic diastolic CHF (congestive heart failure)    Atrial fibrillation, chronic    Bilateral lower extremity edema    Intermittent lightheadedness    Depression    Chronic midline low back pain with left-sided sciatica    Renal stone    Oxygen dependent    Medicare annual wellness visit, subsequent    Osteoporosis    Chronic anticoagulation    Nonrheumatic mitral valve stenosis    Chest pain, unspecified type    Chronic heart failure with preserved ejection fraction (HFpEF)    Overweight with body mass index (BMI) 25.0-29.9    Acquired hypothyroidism    Acute on chronic combined systolic and diastolic CHF (congestive heart failure)    Age-related macular degeneration    Restless legs syndrome    Horseshoe kidney with renal calculus    JAIME (acute kidney injury)    Stage 3b chronic kidney disease    Chronic combined systolic and diastolic CHF (congestive heart failure)    Acute on chronic combined systolic and diastolic CHF (congestive heart failure)    Acute pyelonephritis     Past Medical History:   Diagnosis Date    Acute endocarditis     Atrial fibrillation with RVR 2019    Cancer     CKD (chronic kidney disease) stage 3, GFR 30-59 ml/min     COPD (chronic obstructive pulmonary  disease)     CTS (carpal tunnel syndrome)     Dizziness     Gall stones     Influenza 11/14/2019    Medicare annual wellness visit, subsequent 04/12/2021    Migraine     Osteoporosis     Renal disorder     Restless leg syndrome     Thrush, oral 11/14/2019     Past Surgical History:   Procedure Laterality Date    CARDIAC CATHETERIZATION N/A 1/23/2020    Procedure: Coronary angiography;  Surgeon: Svetlana Grayson MD;  Location:  [facility];  Service: Cardiovascular    CARDIAC CATHETERIZATION N/A 1/23/2020    Procedure: Left ventriculography;  Surgeon: Svetlana Grayson MD;  Location:  [facility];  Service: Cardiovascular    CARDIAC CATHETERIZATION N/A 1/23/2020    Procedure: Left Heart Cath;  Surgeon: Svetlana Grayson MD;  Location:  [facility];  Service: Cardiovascular    CHOLECYSTECTOMY      CYSTOSCOPY W/ URETERAL STENT PLACEMENT Left 6/10/2025    Procedure: CYSTOSCOPY URETERAL LEFT STENT PLACEMENT;  Surgeon: Stefano Mahoney MD;  [facility];  Service: Urology;  Laterality: Left;    KNEE ARTHROPLASTY Right     LAPAROSCOPIC GASTRIC BANDING        General Information       Row Name 06/12/25 1251          Physical Therapy Time and Intention    Document Type evaluation  -MG (r) SB (t) MG (c)     Mode of Treatment physical therapy  -MG (r) SB (t) MG (c)       Row Name 06/12/25 1251          General Information    Patient Profile Reviewed yes  -MG (r) SB (t) MG (c)     Prior Level of Function independent:  Pt is (I) at home and uses a rollator for short walking distances w/ 2L O2 at baseline. Spouse is retired and can assist as needed at home.  -MG (r) SB (t) MG (c)     Existing Precautions/Restrictions fall;oxygen therapy device and L/min  -MG (r) SB (t) MG (c)     Barriers to Rehab medically complex  -MG (r) SB (t) MG (c)       Row Name 06/12/25 8906          Living Environment    Current Living Arrangements home  -MG (r) SB (t) MG (c)     People in  Home spouse  -MG (r) SB (t) MG (c)       Row Name 06/12/25 1253          Home Main Entrance    Number of Stairs, Main Entrance four  -MG (r) SB (t) MG (c)     Stair Railings, Main Entrance railing on left side (ascending)  -MG (r) SB (t) MG (c)       Row Name 06/12/25 1253          Stairs Within Home, Primary    Number of Stairs, Within Home, Primary none  -MG (r) SB (t) MG (c)       Row Name 06/12/25 1253          Cognition    Orientation Status (Cognition) oriented x 4  -MG (r) SB (t) MG (c)       Row Name 06/12/25 1253          Safety Issues/Impairments Affecting Functional Mobility    Safety Issues Affecting Function (Mobility) insight into deficits/self-awareness;problem-solving;safety precaution awareness;sequencing abilities;friction/shear risk  -MG (r) SB (t) MG (c)     Impairments Affecting Function (Mobility) balance;coordination;endurance/activity tolerance;shortness of breath;strength  -MG (r) SB (t) MG (c)     Comment, Safety Issues/Impairments (Mobility) Gait belt and non-skid socks donned.  -MG (r) SB (t) MG (c)               User Key  (r) = Recorded By, (t) = Taken By, (c) = Cosigned By      Initials Name Provider Type    Madina Isaacs, PT Physical Therapist    Jaxon Cantu, PT Student PT Student                   Mobility       Row Name 06/12/25 1258          Bed Mobility    Comment, (Bed Mobility) Did not perform, pt was seated EOB.  -MG (r) SB (t) MG (c)       Row Name 06/12/25 1258          Sit-Stand Transfer    Sit-Stand Floyd (Transfers) contact guard  -MG (r) SB (t) MG (c)     Assistive Device (Sit-Stand Transfers) walker, front-wheeled  -MG (r) SB (t) MG (c)       Row Name 06/12/25 1258          Gait/Stairs (Locomotion)    Floyd Level (Gait) contact guard;verbal cues  -MG (r) SB (t) MG (c)     Assistive Device (Gait) walker, front-wheeled  -MG (r) SB (t) MG (c)     Patient was able to Ambulate yes  -MG (r) SB (t) MG (c)     Distance in Feet (Gait) 4  L<->R side steps.  -MG  (r) SB (t) MG (c)     Deviations/Abnormal Patterns (Gait) weight shifting decreased;base of support, narrow;gait speed decreased  -MG (r) SB (t) MG (c)     Bilateral Gait Deviations forward flexed posture  -MG (r) SB (t) MG (c)     Comment, (Gait/Stairs) Pt performed 4' of L<-> R side steps, limited d/t O2 desatting from 92% to 85% w/ SOB while on 4L O2. No overt LOB or buckling.  -MG (r) SB (t) MG (c)               User Key  (r) = Recorded By, (t) = Taken By, (c) = Cosigned By      Initials Name Provider Type    MG Madina Torres, PT Physical Therapist    SB Jaxon Darling, PT Student PT Student                   Obj/Interventions       Row Name 06/12/25 1303          Range of Motion Comprehensive    General Range of Motion bilateral lower extremity ROM WFL  -MG (r) SB (t) MG (c)       Row Name 06/12/25 1303          Strength Comprehensive (MMT)    General Manual Muscle Testing (MMT) Assessment lower extremity strength deficits identified  -MG (r) SB (t) MG (c)     Comment, General Manual Muscle Testing (MMT) Assessment Grossly 4/5  -MG (r) SB (t) MG (c)       Row Name 06/12/25 1303          Motor Skills    Motor Skills functional endurance  -MG (r) SB (t) MG (c)     Functional Endurance poor  -MG (r) SB (t) MG (c)       Row Name 06/12/25 1303          Balance    Balance Assessment sitting static balance;standing static balance;standing dynamic balance  -MG (r) SB (t) MG (c)     Static Sitting Balance independent  -MG (r) SB (t) MG (c)     Position, Sitting Balance unsupported;sitting edge of bed  -MG (r) SB (t) MG (c)     Static Standing Balance contact guard  -MG (r) SB (t) MG (c)     Dynamic Standing Balance contact guard  -MG (r) SB (t) MG (c)     Position/Device Used, Standing Balance supported;walker, front-wheeled  -MG (r) SB (t) MG (c)     Comment, Balance No overt LOB.  -MG (r) SB (t) MG (c)       Row Name 06/12/25 1303          Sensory Assessment (Somatosensory)    Sensory Assessment (Somatosensory)  sensation intact  -MG (r) SB (t) MG (c)               User Key  (r) = Recorded By, (t) = Taken By, (c) = Cosigned By      Initials Name Provider Type    Madina Isaacs, PT Physical Therapist    Jaxon Cantu, PT Student PT Student                   Goals/Plan       Row Name 06/12/25 1340          Bed Mobility Goal 1 (PT)    Activity/Assistive Device (Bed Mobility Goal 1, PT) bed mobility activities, all  -MG (r) SB (t) MG (c)     Payne Level/Cues Needed (Bed Mobility Goal 1, PT) modified independence  -MG (r) SB (t) MG (c)     Time Frame (Bed Mobility Goal 1, PT) 1 week  -MG (r) SB (t) MG (c)       Row Name 06/12/25 1345          Transfer Goal 1 (PT)    Activity/Assistive Device (Transfer Goal 1, PT) transfers, all  -MG (r) SB (t) MG (c)     Payne Level/Cues Needed (Transfer Goal 1, PT) supervision required  -MG (r) SB (t) MG (c)     Time Frame (Transfer Goal 1, PT) 1 week  -MG (r) SB (t) MG (c)       Row Name 06/12/25 1346          Gait Training Goal 1 (PT)    Activity/Assistive Device (Gait Training Goal 1, PT) gait (walking locomotion)  -MG (r) SB (t) MG (c)     Payne Level (Gait Training Goal 1, PT) contact guard required  -MG (r) SB (t) MG (c)     Distance (Gait Training Goal 1, PT) 20'  -MG (r) SB (t) MG (c)     Time Frame (Gait Training Goal 1, PT) 1 week  -MG (r) SB (t) MG (c)       Row Name 06/12/25 134          Therapy Assessment/Plan (PT)    Planned Therapy Interventions (PT) balance training;bed mobility training;gait training;home exercise program;patient/family education;postural re-education;stair training;stretching;strengthening;transfer training  -MG (r) SB (t) MG (c)               User Key  (r) = Recorded By, (t) = Taken By, (c) = Cosigned By      Initials Name Provider Type    Madina Isaacs, PT Physical Therapist    Jaxon Cantu, PT Student PT Student                   Clinical Impression       Row Name 06/12/25 9877          Pain    Pretreatment Pain Rating 0/10 -  no pain  -MG (r) SB (t) MG (c)     Posttreatment Pain Rating 0/10 - no pain  -MG (r) SB (t) MG (c)       Row Name 06/12/25 1309          Plan of Care Review    Plan of Care Reviewed With patient;spouse  -MG (r) SB (t) MG (c)     Outcome Evaluation Pt is a 73 y/o F who has h/o COPD, chronic respiratory failure, CKD, A-fib, and chronic LBP, who presented to [facility] w/ c/o worsening Left LBP than normal. Pt is now POD1 cystoscopy w/ L ureteral stent placement. Pt was A&O x4. Pt is (I) at baseline and uses RW for gait during short distances in her home w/ 4 HOMAR and w/ 2L O2 at baseline. Pt's spouse is home and able to assist as needed. Today, pt was CG for STS w/ RW and performed 4' L<->R side stepping at EOB w/ RW w/ CG & while on 4L O2. Pt was SOB d/t activity and desatted from 92% to 85%. Pt returned to 90% after 2 min w/ cues for PLB. HR highest at 120 BPM. No overt LOB, dizziness, or buckling. Pt will benefit from skilled PT services to address their impaired strength, balance, mobility and endurance. Discussed w/ RN. PT will cont to follow and rec home w/ home assist and HH at [place].  -MG (r) SB (t) MG (c)       Row Name 06/12/25 1307          Therapy Assessment/Plan (PT)    Rehab Potential (PT) good  -MG (r) SB (t) MG (c)     Criteria for Skilled Interventions Met (PT) yes;meets criteria  -MG (r) SB (t) MG (c)     Therapy Frequency (PT) 5 times/wk  -MG (r) SB (t) MG (c)       Row Name 06/12/25 1300          Vital Signs    Pre Systolic BP Rehab 105  -MG (r) SB (t) MG (c)     Pre Treatment Diastolic BP 61  -MG (r) SB (t) MG (c)     Intratreatment Heart Rate (beats/min) 120  -MG (r) SB (t) MG (c)     Pre SpO2 (%) 92  -MG (r) SB (t) MG (c)     O2 Delivery Pre Treatment supplemental O2  4l  -MG (r) SB (t) MG (c)     Intra SpO2 (%) 85  -MG (r) SB (t) MG (c)     O2 Delivery Intra Treatment supplemental O2  4L  -MG (r) SB (t) MG (c)     Post SpO2 (%) 90  -MG (r) SB (t) MG (c)     O2 Delivery Post Treatment supplemental O2  4L   -MG (r) SB (t) MG (c)     Pre Patient Position Sitting  -MG (r) SB (t) MG (c)     Intra Patient Position Standing  -MG (r) SB (t) MG (c)     Post Patient Position Sitting  -MG (r) SB (t) MG (c)       Row Name 06/12/25 1305          Positioning and Restraints    Pre-Treatment Position in bed  Seated EOB.  -MG (r) SB (t) MG (c)     Post Treatment Position bed  -MG (r) SB (t) MG (c)     In Bed notified nsg;sitting;sitting EOB;call light within reach;encouraged to call for assist;exit alarm on;with family/caregiver;side rails up x2  -MG (r) SB (t) MG (c)               User Key  (r) = Recorded By, (t) = Taken By, (c) = Cosigned By      Initials Name Provider Type    Madina Isaacs, PT Physical Therapist    Jaxon Cantu, PT Student PT Student                   Outcome Measures       Row Name 06/12/25 1350 06/12/25 0845       How much help from another person do you currently need...    Turning from your back to your side while in flat bed without using bedrails? 3  -MG (r) SB (t) MG (c) 3  -JK    Moving from lying on back to sitting on the side of a flat bed without bedrails? 3  -MG (r) SB (t) MG (c) 3  -JK    Moving to and from a bed to a chair (including a wheelchair)? 3  -MG (r) SB (t) MG (c) 3  -JK    Standing up from a chair using your arms (e.g., wheelchair, bedside chair)? 3  -MG (r) SB (t) MG (c) 2  -JK    Climbing 3-5 steps with a railing? 1  -MG (r) SB (t) MG (c) 1  -JK    To walk in hospital room? 2  -MG (r) SB (t) MG (c) 2  -JK    AM-PAC 6 Clicks Score (PT) 15  -MG (r) SB (t) 14  -JK              User Key  (r) = Recorded By, (t) = Taken By, (c) = Cosigned By      Initials Name Provider Type    Madina Isaacs, PT Physical Therapist    Molly Frias, RN Registered Nurse    Jaxon Cantu, PT Student PT Student                                 Physical Therapy Education       Title: PT OT SLP Therapies (In Progress)       Topic: Physical Therapy (In Progress)       Point: Mobility training (Done)        Learning Progress Summary            Patient Acceptance, E, VU by SB at 6/12/2025 1351   Significant Other Acceptance, E, VU by SB at 6/12/2025 1351                      Point: Home exercise program (Not Started)       Learner Progress:  Not documented in this visit.              Point: Body mechanics (Done)       Learning Progress Summary            Patient Acceptance, E, VU by SB at 6/12/2025 1351   Significant Other Acceptance, E, VU by SB at 6/12/2025 1351                      Point: Precautions (Done)       Learning Progress Summary            Patient Acceptance, E, VU by SB at 6/12/2025 1351   Significant Other Acceptance, E, VU by SB at 6/12/2025 1351                                      User Key       Initials Effective Dates Name Provider Type Discipline    SB 05/19/25 -  Jaxon Darling, PT Student PT Student PT                  PT Recommendation and Plan  Planned Therapy Interventions (PT): balance training, bed mobility training, gait training, home exercise program, patient/family education, postural re-education, stair training, stretching, strengthening, transfer training  Outcome Evaluation: Pt is a 73 y/o F who has h/o COPD, chronic respiratory failure, CKD, A-fib, and chronic LBP, who presented to [facility] w/ c/o worsening Left LBP than normal. Pt is now POD1 cystoscopy w/ L ureteral stent placement. Pt was A&O x4. Pt is (I) at baseline and uses RW for gait during short distances in her home w/ 4 HOMAR and w/ 2L O2 at baseline. Pt's spouse is home and able to assist as needed. Today, pt was CG for STS w/ RW and performed 4' L<->R side stepping at EOB w/ RW w/ CG & while on 4L O2. Pt was SOB d/t activity and desatted from 92% to 85%. Pt returned to 90% after 2 min w/ cues for PLB. HR highest at 120 BPM. No overt LOB, dizziness, or buckling. Pt will benefit from skilled PT services to address their impaired strength, balance, mobility and endurance. Discussed w/ RN. PT will cont to follow and rec home w/  home assist and HH at [place].     Time Calculation:         PT Charges       Row Name 06/12/25 1352             Time Calculation    Start Time 1042  -MG (r) SB (t) MG (c)      Stop Time 1104  -MG (r) SB (t) MG (c)      Time Calculation (min) 22 min  -MG (r) SB (t)      PT Received On 06/12/25  -MG (r) SB (t) MG (c)      PT - Next Appointment 06/13/25  -MG (r) SB (t) MG (c)      PT Goal Re-Cert Due Date 06/26/25  -MG (r) SB (t) MG (c)         Time Calculation- PT    Total Timed Code Minutes- PT 12 minute(s)  -MG (r) SB (t) MG (c)                User Key  (r) = Recorded By, (t) = Taken By, (c) = Cosigned By      Initials Name Provider Type    Madina Isaacs, PT Physical Therapist    Jaxon Cantu, PT Student PT Student                      PT G-Codes  AM-PAC 6 Clicks Score (PT): 15  PT Discharge Summary  Anticipated Discharge Disposition (PT): home with assist, home, home with home health    Jaxon Darling, PT Student  6/12/2025

## 2025-06-12 NOTE — PROGRESS NOTES
Heart rate remains controlled, continue current regimen as tolerated.    Resume AC when deemed appropriate by urology following stent placement.    We will see as needed. Call with questions.

## 2025-06-12 NOTE — PROGRESS NOTES
Called by nurse due to concern of increased work of breathing and oxygenation post patient working with physical therapy.  Chest x-ray reviewed showing increased vascular congestion  Patient without wheeze  She feels she is improving.  Oxygen requirements going up slightly  ABG without any hypercapnia  Will go ahead and start some Solu-Medrol to optimize COPD as best as possible to help her tolerate any pulmonary edema.  Discussed with nephrology.  Okay with a trial of Bumex 2 mg will go ahead and order this.  Closely monitor blood pressure volume status and respiratory status.  Coordinated care with Dr. Mayes as well  Electronically signed by Jonny Jansen MD, 06/12/25, 1:56 PM EDT.

## 2025-06-12 NOTE — PLAN OF CARE
Goal Outcome Evaluation:  Plan of Care Reviewed With: patient        Progress: no change  Outcome Evaluation: voiding little amounts of urine, desats with any activity, legs color dark dusky when up to BSC,afib on the monitor, b/p low, held metoprolol, on midodrine, episode of dry heaves, zofran given with good results, med x 1 for pain with some relief stated, eyes clsoed at short intervals, resting quietly in her room,  remains at bedside

## 2025-06-12 NOTE — PLAN OF CARE
Goal Outcome Evaluation:  Plan of Care Reviewed With: patient, spouse           Outcome Evaluation: Pt is a 73 y/o F who has h/o COPD, chronic respiratory failure, CKD, A-fib, and chronic LBP, who presented to [facility] w/ c/o worsening Left LBP than normal. Pt is now POD1 cystoscopy w/ L ureteral stent placement. Pt was A&O x4. Pt is (I) at baseline and uses RW for gait during short distances in her home w/ 4 HOMAR and w/ 2L O2 at baseline. Pt's spouse is home and able to assist as needed. Today, pt was CG for STS w/ RW and performed 4' L<->R side stepping at EOB w/ RW w/ CG & while on 4L O2. Pt was SOB d/t activity and desatted from 92% to 85%. Pt returned to 90% after 2 min w/ cues for PLB. HR highest at 120 BPM. No overt LOB, dizziness, or buckling. Pt will benefit from skilled PT services to address their impaired strength, balance, mobility and endurance. Discussed w/ RN. PT will cont to follow and rec home w/ home assist and HH at [place].    Anticipated Discharge Disposition (PT): home with assist, home, home with home health

## 2025-06-13 LAB
ANION GAP SERPL CALCULATED.3IONS-SCNC: 13 MMOL/L (ref 5–15)
BASOPHILS # BLD AUTO: 0 10*3/MM3 (ref 0–0.2)
BASOPHILS NFR BLD AUTO: 0 % (ref 0–1.5)
BUN SERPL-MCNC: 37 MG/DL (ref 8–23)
BUN/CREAT SERPL: 24.3 (ref 7–25)
CALCIUM SPEC-SCNC: 8.1 MG/DL (ref 8.6–10.5)
CHLORIDE SERPL-SCNC: 95 MMOL/L (ref 98–107)
CO2 SERPL-SCNC: 28 MMOL/L (ref 22–29)
CREAT SERPL-MCNC: 1.52 MG/DL (ref 0.57–1)
DEPRECATED RDW RBC AUTO: 49.6 FL (ref 37–54)
EGFRCR SERPLBLD CKD-EPI 2021: 35.6 ML/MIN/1.73
EOSINOPHIL # BLD AUTO: 0 10*3/MM3 (ref 0–0.4)
EOSINOPHIL NFR BLD AUTO: 0 % (ref 0.3–6.2)
ERYTHROCYTE [DISTWIDTH] IN BLOOD BY AUTOMATED COUNT: 14.3 % (ref 12.3–15.4)
GLUCOSE SERPL-MCNC: 179 MG/DL (ref 65–99)
HCT VFR BLD AUTO: 36.6 % (ref 34–46.6)
HGB BLD-MCNC: 11.9 G/DL (ref 12–15.9)
IMM GRANULOCYTES # BLD AUTO: 0.06 10*3/MM3 (ref 0–0.05)
IMM GRANULOCYTES NFR BLD AUTO: 0.7 % (ref 0–0.5)
INR PPP: 1.86 (ref 0.9–1.1)
LYMPHOCYTES # BLD AUTO: 0.27 10*3/MM3 (ref 0.7–3.1)
LYMPHOCYTES NFR BLD AUTO: 3.3 % (ref 19.6–45.3)
MAGNESIUM SERPL-MCNC: 1.9 MG/DL (ref 1.6–2.4)
MCH RBC QN AUTO: 30.9 PG (ref 26.6–33)
MCHC RBC AUTO-ENTMCNC: 32.5 G/DL (ref 31.5–35.7)
MCV RBC AUTO: 95.1 FL (ref 79–97)
MONOCYTES # BLD AUTO: 0.25 10*3/MM3 (ref 0.1–0.9)
MONOCYTES NFR BLD AUTO: 3 % (ref 5–12)
NEUTROPHILS NFR BLD AUTO: 7.69 10*3/MM3 (ref 1.7–7)
NEUTROPHILS NFR BLD AUTO: 93 % (ref 42.7–76)
NRBC BLD AUTO-RTO: 0 /100 WBC (ref 0–0.2)
PHOSPHATE SERPL-MCNC: 2.7 MG/DL (ref 2.5–4.5)
PLATELET # BLD AUTO: 145 10*3/MM3 (ref 140–450)
PMV BLD AUTO: 9.6 FL (ref 6–12)
POTASSIUM SERPL-SCNC: 3.7 MMOL/L (ref 3.5–5.2)
PROTHROMBIN TIME: 21.5 SECONDS (ref 11.7–14.2)
RBC # BLD AUTO: 3.85 10*6/MM3 (ref 3.77–5.28)
SODIUM SERPL-SCNC: 136 MMOL/L (ref 136–145)
WBC NRBC COR # BLD AUTO: 8.27 10*3/MM3 (ref 3.4–10.8)

## 2025-06-13 PROCEDURE — 25010000002 ONDANSETRON PER 1 MG: Performed by: UROLOGY

## 2025-06-13 PROCEDURE — 63710000001 REVEFENACIN 175 MCG/3ML SOLUTION: Performed by: UROLOGY

## 2025-06-13 PROCEDURE — 94761 N-INVAS EAR/PLS OXIMETRY MLT: CPT

## 2025-06-13 PROCEDURE — 25010000002 CEFTRIAXONE PER 250 MG: Performed by: UROLOGY

## 2025-06-13 PROCEDURE — 63710000001 PREDNISONE PER 1 MG: Performed by: INTERNAL MEDICINE

## 2025-06-13 PROCEDURE — 94799 UNLISTED PULMONARY SVC/PX: CPT

## 2025-06-13 PROCEDURE — 83735 ASSAY OF MAGNESIUM: CPT | Performed by: HOSPITALIST

## 2025-06-13 PROCEDURE — 85025 COMPLETE CBC W/AUTO DIFF WBC: CPT | Performed by: UROLOGY

## 2025-06-13 PROCEDURE — 94664 DEMO&/EVAL PT USE INHALER: CPT

## 2025-06-13 PROCEDURE — 84100 ASSAY OF PHOSPHORUS: CPT | Performed by: HOSPITALIST

## 2025-06-13 PROCEDURE — 80048 BASIC METABOLIC PNL TOTAL CA: CPT | Performed by: UROLOGY

## 2025-06-13 PROCEDURE — 94760 N-INVAS EAR/PLS OXIMETRY 1: CPT

## 2025-06-13 PROCEDURE — 85610 PROTHROMBIN TIME: CPT | Performed by: HOSPITALIST

## 2025-06-13 PROCEDURE — 25010000002 METHYLPREDNISOLONE PER 40 MG: Performed by: INTERNAL MEDICINE

## 2025-06-13 RX ORDER — WARFARIN SODIUM 5 MG/1
2.5 TABLET ORAL
Status: COMPLETED | OUTPATIENT
Start: 2025-06-13 | End: 2025-06-13

## 2025-06-13 RX ORDER — PREDNISONE 20 MG/1
20 TABLET ORAL
Status: DISCONTINUED | OUTPATIENT
Start: 2025-06-13 | End: 2025-06-14 | Stop reason: HOSPADM

## 2025-06-13 RX ORDER — SPIRONOLACTONE 25 MG/1
25 TABLET ORAL DAILY
Status: DISCONTINUED | OUTPATIENT
Start: 2025-06-13 | End: 2025-06-14 | Stop reason: HOSPADM

## 2025-06-13 RX ORDER — TORSEMIDE 20 MG/1
40 TABLET ORAL
Status: DISCONTINUED | OUTPATIENT
Start: 2025-06-14 | End: 2025-06-14 | Stop reason: HOSPADM

## 2025-06-13 RX ORDER — TORSEMIDE 20 MG/1
20 TABLET ORAL DAILY
Status: DISCONTINUED | OUTPATIENT
Start: 2025-06-13 | End: 2025-06-13

## 2025-06-13 RX ADMIN — Medication 10 ML: at 21:30

## 2025-06-13 RX ADMIN — ALBUTEROL SULFATE 2.5 MG: 2.5 SOLUTION RESPIRATORY (INHALATION) at 23:07

## 2025-06-13 RX ADMIN — METHYLPREDNISOLONE SODIUM SUCCINATE 40 MG: 40 INJECTION, POWDER, FOR SOLUTION INTRAMUSCULAR; INTRAVENOUS at 02:04

## 2025-06-13 RX ADMIN — CYCLOBENZAPRINE HYDROCHLORIDE 5 MG: 10 TABLET, FILM COATED ORAL at 14:30

## 2025-06-13 RX ADMIN — METOPROLOL TARTRATE 25 MG: 25 TABLET, FILM COATED ORAL at 21:29

## 2025-06-13 RX ADMIN — MIDODRINE HYDROCHLORIDE 2.5 MG: 5 TABLET ORAL at 12:10

## 2025-06-13 RX ADMIN — ONDANSETRON 4 MG: 2 INJECTION, SOLUTION INTRAMUSCULAR; INTRAVENOUS at 14:30

## 2025-06-13 RX ADMIN — SPIRONOLACTONE 25 MG: 25 TABLET ORAL at 14:30

## 2025-06-13 RX ADMIN — METOPROLOL TARTRATE 25 MG: 25 TABLET, FILM COATED ORAL at 08:33

## 2025-06-13 RX ADMIN — Medication: at 08:33

## 2025-06-13 RX ADMIN — PRAMIPEXOLE DIHYDROCHLORIDE 0.5 MG: 0.5 TABLET ORAL at 08:33

## 2025-06-13 RX ADMIN — Medication 10 ML: at 08:33

## 2025-06-13 RX ADMIN — ALBUTEROL SULFATE 2.5 MG: 2.5 SOLUTION RESPIRATORY (INHALATION) at 15:27

## 2025-06-13 RX ADMIN — BUPROPION HYDROCHLORIDE 300 MG: 300 TABLET, EXTENDED RELEASE ORAL at 08:33

## 2025-06-13 RX ADMIN — ARFORMOTEROL TARTRATE 15 MCG: 15 SOLUTION RESPIRATORY (INHALATION) at 06:57

## 2025-06-13 RX ADMIN — PRAMIPEXOLE DIHYDROCHLORIDE 0.5 MG: 0.5 TABLET ORAL at 21:29

## 2025-06-13 RX ADMIN — ARFORMOTEROL TARTRATE 15 MCG: 15 SOLUTION RESPIRATORY (INHALATION) at 19:13

## 2025-06-13 RX ADMIN — WARFARIN SODIUM 2.5 MG: 5 TABLET ORAL at 17:13

## 2025-06-13 RX ADMIN — REVEFENACIN 175 MCG: 175 SOLUTION RESPIRATORY (INHALATION) at 07:03

## 2025-06-13 RX ADMIN — PREDNISONE 20 MG: 20 TABLET ORAL at 14:30

## 2025-06-13 RX ADMIN — ACETAMINOPHEN 650 MG: 325 TABLET ORAL at 21:36

## 2025-06-13 RX ADMIN — ALBUTEROL SULFATE 2.5 MG: 2.5 SOLUTION RESPIRATORY (INHALATION) at 06:54

## 2025-06-13 RX ADMIN — LEVOTHYROXINE SODIUM 50 MCG: 50 TABLET ORAL at 06:48

## 2025-06-13 RX ADMIN — Medication: at 21:31

## 2025-06-13 RX ADMIN — TORSEMIDE 20 MG: 20 TABLET ORAL at 14:30

## 2025-06-13 RX ADMIN — CEFTRIAXONE SODIUM 1000 MG: 1 INJECTION, POWDER, FOR SOLUTION INTRAMUSCULAR; INTRAVENOUS at 12:15

## 2025-06-13 RX ADMIN — BUDESONIDE 0.5 MG: 0.5 INHALANT RESPIRATORY (INHALATION) at 19:13

## 2025-06-13 RX ADMIN — PANTOPRAZOLE SODIUM 40 MG: 40 TABLET, DELAYED RELEASE ORAL at 06:48

## 2025-06-13 RX ADMIN — ALBUTEROL SULFATE 2.5 MG: 2.5 SOLUTION RESPIRATORY (INHALATION) at 01:51

## 2025-06-13 RX ADMIN — MIDODRINE HYDROCHLORIDE 2.5 MG: 5 TABLET ORAL at 17:13

## 2025-06-13 RX ADMIN — MIDODRINE HYDROCHLORIDE 2.5 MG: 5 TABLET ORAL at 06:48

## 2025-06-13 RX ADMIN — BUDESONIDE 0.5 MG: 0.5 INHALANT RESPIRATORY (INHALATION) at 07:00

## 2025-06-13 NOTE — PROGRESS NOTES
Name: Shani Phillip ADMIT: 6/10/2025   : 1950  PCP: Rodríguez Walker MD    MRN: [id number] LOS: 3 days   AGE/SEX: 74 y.o. female  ROOM: [facility]     Subjective   Subjective   Feeling better this afternoon. UOP improved. No N/V/D/abd pain. Tolerating diet. No F/C/NS. No SOA or CP or palp.        Objective   Objective   Vital Signs  Temp:  [97.3 °F (36.3 °C)-98 °F (36.7 °C)] 97.9 °F (36.6 °C)  Heart Rate:  [] 82  Resp:  [18-20] 18  BP: ()/(53-69) 109/69  SpO2:  [91 %-100 %] 91 %  on  Flow (L/min) (Oxygen Therapy):  [4] 4;   Device (Oxygen Therapy): nasal cannula;humidified  Body mass index is 27.91 kg/m².  Physical Exam  Vitals and nursing note reviewed. Exam conducted with a chaperone present ().   Constitutional:       General: She is not in acute distress.     Appearance: She is ill-appearing (chronically). She is not toxic-appearing or diaphoretic.   HENT:      Head: Normocephalic.      Nose: Nose normal.      Mouth/Throat:      Mouth: Mucous membranes are moist.      Pharynx: Oropharynx is clear.   Eyes:      General: No scleral icterus.        Right eye: No discharge.         Left eye: No discharge.      Conjunctiva/sclera: Conjunctivae normal.   Cardiovascular:      Rate and Rhythm: Normal rate. Rhythm irregular.   Pulmonary:      Effort: Pulmonary effort is normal. No respiratory distress.      Breath sounds: No wheezing or rales.   Abdominal:      General: Bowel sounds are normal. There is no distension.      Palpations: Abdomen is soft.      Tenderness: There is no abdominal tenderness.   Musculoskeletal:         General: Swelling (1+ in BLEs, BLEs dusky from mid shin down--pt says this is chronic and that sometimes they look worse) present.      Cervical back: Neck supple.      Comments: Kyphosis   Skin:     General: Skin is warm and dry.      Capillary Refill: Capillary refill takes less than 2 seconds.      Coloration: Skin is not jaundiced.   Neurological:      General: No  focal deficit present.      Mental Status: She is alert. Mental status is at baseline.   Psychiatric:         Mood and Affect: Mood normal.         Behavior: Behavior normal.         Thought Content: Thought content normal.       Results Review     I reviewed the patient's new clinical results.  Results from last 7 days   Lab Units 06/13/25  0649 06/12/25  0635 06/11/25  0536 06/10/25  0654   WBC 10*3/mm3 8.27 14.73* 19.86* 19.80*   HEMOGLOBIN g/dL 11.9* 11.6* 13.1 14.6   PLATELETS 10*3/mm3 145 158 157 231     Results from last 7 days   Lab Units 06/13/25  0649 06/12/25  0635 06/11/25  0536 06/10/25  0654   SODIUM mmol/L 136 131* 133* 135*   POTASSIUM mmol/L 3.7 4.0 4.9 4.5   CHLORIDE mmol/L 95* 92* 95* 93*   CO2 mmol/L 28.0 28.3 23.0 30.0*   BUN mg/dL 37.0* 49.0* 41.0* 23.0   CREATININE mg/dL 1.52* 2.55* 3.12* 1.61*   GLUCOSE mg/dL 179* 149* 102* 161*   EGFR mL/min/1.73 35.6* 19.1* 15.0* 33.2*     Results from last 7 days   Lab Units 06/12/25  0635 06/10/25  0246   ALBUMIN g/dL 3.2* 4.1   BILIRUBIN mg/dL 0.7 0.7   ALK PHOS U/L 135* 177*   AST (SGOT) U/L 19 21   ALT (SGPT) U/L 14 13     Results from last 7 days   Lab Units 06/13/25  0649 06/12/25  0635 06/11/25  0536 06/10/25  0654 06/10/25  0246   CALCIUM mg/dL 8.1* 8.7 8.8 9.2 9.6   ALBUMIN g/dL  --  3.2*  --   --  4.1   MAGNESIUM mg/dL 1.9 2.2  --   --   --    PHOSPHORUS mg/dL 2.7 3.4  --   --   --        Hemoglobin A1C   Date/Time Value Ref Range Status   06/12/2025 0635 5.80 (H) 4.80 - 5.60 % Final       XR Chest 1 View  Result Date: 6/12/2025  Increased interstitial lung opacities bilaterally which may reflect interstitial edema.   This report was finalized on 6/12/2025 1:26 PM by Dr. Osmar Cannon M.D on Workstation: [id number]      US Renal Bilateral  Result Date: 6/12/2025  Very technically limited examination. The patient has significant bowel gas, as well as a portion of the kidney. I don't see mayank hydronephrosis involving the left renal moiety,  although there is some fullness of the left renal pelvis. Given the patient's complex anatomy, CT would allow for better assessment of the kidneys and bladder.  This report was finalized on 6/12/2025 5:55 AM by Dr. Mena Littlejohn M.D on Workstation: [id number]        I have personally reviewed all medications:  Scheduled Medications  albuterol, 2.5 mg, Nebulization, Q6H - RT  ammonium lactate, , Topical, BID  arformoterol, 15 mcg, Nebulization, BID - RT  budesonide, 0.5 mg, Nebulization, BID - RT  buPROPion XL, 300 mg, Oral, Daily  cefTRIAXone, 1,000 mg, Intravenous, Q24H  digoxin, 125 mcg, Oral, Every Other Day  levothyroxine, 50 mcg, Oral, QAM  metoprolol tartrate, 25 mg, Oral, Q12H  midodrine, 2.5 mg, Oral, TID AC  pantoprazole, 40 mg, Oral, Q AM  pramipexole, 0.5 mg, Oral, BID  predniSONE, 20 mg, Oral, Daily With Breakfast  revefenacin, 175 mcg, Nebulization, Daily - RT  sodium chloride, 10 mL, Intravenous, Q12H  spironolactone, 25 mg, Oral, Daily  torsemide, 20 mg, Oral, Daily    Infusions     Diet  Diet: Regular/House; Fluid Consistency: Thin (IDDSI 0)    I have personally reviewed:  [x]  Laboratory   [x]  Microbiology   []  Radiology   [x]  EKG/Telemetry  []  Cardiology/Vascular   []  Pathology    []  Records       Assessment/Plan     Active Hospital Problems    Diagnosis  POA    **Horseshoe kidney with renal calculus [Q63.1, N20.0]  Not Applicable    Acute on chronic combined systolic and diastolic CHF (congestive heart failure) [I50.43]  No    Acute pyelonephritis [N10]  Yes    JAIME (acute kidney injury) [N17.9]  No    Stage 3b chronic kidney disease [N18.32]  Yes    Acquired hypothyroidism [E03.9]  Yes    Overweight with body mass index (BMI) 25.0-29.9 [E66.3]  Yes    Oxygen dependent [Z99.81]  Not Applicable    Renal stone [N20.0]  Yes    Atrial fibrillation, chronic [I48.20]  Yes    Bilateral lower extremity edema [R60.0]  Yes    Chronic respiratory failure with hypoxia [J96.11]  Yes    COPD (chronic  obstructive pulmonary disease) [J44.9]  Yes      Resolved Hospital Problems   No resolved problems to display.       75yo woman with horseshoe kidney, chronic combined CHF, hypoT4, CKD3b, PAF (Coumadin), COPD, CHRF (2L/min), and RLS, who presented to ER with back pain and was found to have left UPJ stone with hydronephrosis.    Left UPJ stone with obstruction  Left pyelonephritis  Horseshoe kidney  Appreciate  attention to pt  S/p stent 6/10 by Dr. Mahoney  Continue IV Rocephin for now  Urine culture grew only 25k mixed oumar  No fever, WBC normalized now   plans no intervention--recommends 10d abx empirically (plan Ceftin)    JAIME (oliguric)  CKD3b  Appreciate Renal attention to pt  Cr improving--back to baseline  Good UOP  Renal restarting home Torsemide and Aldactone today  Can f/u with Renal as outpt    COPD  Chronic hypoxic resp failure (2L/min)  Still requiring 4L/min  Pulm following  She did end up with pulm edema 6/12 on CXR but O2 requirement was unchanged--ABG obtained at dtr's insistence, it was unremarkable--no hypoxemia or hypercapnia  Clinically improved after a dose of IV Bumex  Continue Yupelri, Brovana, and Pulmicort with PRN DuoNebs  IV SoluMedrol changed to PO Prednisone today    PAF  Chronic AC (Coumadin)  HRs acceptable on metoprolol and digoxin  AC on hold for procedures--restart today (okay with )  INR 1.86 today  Appreciate Card attention to pt    Acute on chronic combined CHF  Torsemide and Aldactone on hold initially  Developed some pulm edema after IVFs  Much improved after getting a dose of IV Bumex  Renal has restarted her Torsemide and Aldactone today  Continue metoprolol with holding parameters    HypoT4  Continue L-T4  TSH wnl    Hyponatremia  Resolved    Hyperglycemia  Mild  A1c only 5.8    GERD  Continue PPI    RLS  Continue Mirapex      SCDs and therapeutic INR should suffice for DVT prophylaxis.  Full code.  Discussed with patient and . D/w Dr. Millan ().  Anticipate  discharge home with family tomorrow--would like to see oxygenation improved.  Expected Discharge Date: 6/14/2025; Expected Discharge Time: 12:00 PM      Santino Mayes MD  [facility]  06/13/25  14:19 EDT

## 2025-06-13 NOTE — CASE MANAGEMENT/SOCIAL WORK
Continued Stay Note  [facility]     Patient Name: Shani Phillip  MRN: [id number]  Today's Date: 6/13/2025    Admit Date: 6/10/2025    Plan: Plan home with spouse.   JAMISON Castaneda RN   Discharge Plan       Row Name 06/13/25 1025       Plan    Plan Plan home with spouse.   JAMISON Castaneda RN    Patient/Family in Agreement with Plan yes    Plan Comments Spoke with pt and pt's spouse  (Willis)  at bedside.  Pt denies any discharge needs. Pt has home O2 provided by [facility].  Pt states her spouse will assist her at home and will transport her home.  Plan home with spouse.  JAMISON Castaneda RN                   Discharge Codes    No documentation.                 Expected Discharge Date and Time       Expected Discharge Date Expected Discharge Time    Jun 14, 2025               Ally Castaneda RN

## 2025-06-13 NOTE — PLAN OF CARE
Goal Outcome Evaluation:  Plan of Care Reviewed With: patient        Progress: no change  Outcome Evaluation: vitals stable.  pt expressed pain.   meds given per orders.  external catheter in place.  will continue to monitor.

## 2025-06-13 NOTE — PROGRESS NOTES
[facility] Clinical Pharmacy Services: Warfarin Dosing/Monitoring Consult    Sahni Phillip is a 74 y.o. female, estimated creatinine clearance is 28 mL/min (A) (by C-G formula based on SCr of 1.52 mg/dL (H)). weighing 67 kg (147 lb 9.6 oz).    Results from last 7 days   Lab Units 06/13/25  0649 06/12/25  0636 06/12/25  0635 06/11/25  0940 06/11/25  0536 06/10/25  0654 06/10/25  0246   INR  1.86* 2.14*  --  2.73*  --   --  2.30*   HEMOGLOBIN g/dL 11.9*  --  11.6*  --  13.1 14.6 15.9   HEMATOCRIT % 36.6  --  35.9  --  37.7 43.1 46.9*   PLATELETS 10*3/mm3 145  --  158  --  157 231 211     Prior to admission anticoagulation: Per [facility] (6/6), 2.5 mg qd    Hospital Anticoagulation:  Consulting provider: Dr. Santino Mayes  Start date: 6/10  Indication: A Fib - requiring full anticoagulation  Target INR: 2 - 3  Expected duration: Indefinite   Bridge Therapy: No     Potential food or drug interactions:   - Ceftriaxone- may enhance anticoagulant effects of warfarin (6/10-6/15)  - Prednisone- may increase anticoagulant effect of warfarin (6/13- )  - Levothyroxine- may increase anticoagulant effect of warfarin (home med)  - Torsemide- may increase concentration of warfarin (was initially held during admission, but restarted 6/13- home med)    Education complete?/Date: N/A; home medication    Assessment/Plan:  Anticoagulant being restarted after being held for procedures; Has not had warfarin since admission (past 3 days). INR is at 1.86 today. IV steroids were switched to PO prednisone today. Torsemide was also restarted today. Also still on ceftriaxone. Given potential new interactions and that INR did initially increase despite warfarin being held the day before admission, won't do a higher dose today, but will order a one time dose of 2.5 mg x1 and can re-evaluate trend tomorrow.   Monitor for any signs or symptoms of bleeding  Follow up daily INRs and dose adjustments    Pharmacy will continue to follow until  discharge or discontinuation of warfarin.     Brennan Santos, [facility]  Clinical Pharmacist

## 2025-06-13 NOTE — PROGRESS NOTES
"   Daily Progress Note.   [address]  6/13/2025    Patient:  Name:  Shani Phillip  MRN:  [id number]  1950  74 y.o.  female         CC: Flank pain  History of Present Illness:  Patient has a previous medical history of chronic hypoxemic respiratory failure severe COPD who sees Dr. Zamora in our office with an FEV1 of 38% diffusion capacity of 31% the history of smoking 75 pack years emphysema using 2 L of oxygen performance budesonide Yupelri and albuterol 3-4 times a day at baseline with comorbidities of PAD and atrial fibrillation on warfarin.      She presented to the emergency room with back pain.  Pain is in her lower back.  Worse on her left.  Ongoing for 2 to 3 days prior to arrival getting worse not better.  She was found to have concern for nephrolithiasis.  She denies any worsening shortness of breath to me.  Denies any hemoptysis wheeze worsening sputum production     Chronic shortness of breath lower extremity swelling chronic changes to lower extremities.     In the ER she is found to have mild left-sided hydronephrosis with a 9 mm obstructing stone increased white count and start antibiotics admitted.  We were asked to help consult for COPD.    Interval History:   Remains afebrile oand on 4lnc, baseline is 2lnc  Abg without hypercapnia yesterday  Bumex 2mg yeterday due to increased wob, oxygen need with cxr showing volume overload with good uop improvement in breathing and cont decrease in Cr.  She was like her breathing is improved no worsening shortness of breath cough or sputum she is awake and alert she asked when she can go home    Physical Exam:  /62 (BP Location: Right arm, Patient Position: Lying)   Pulse 96   Temp 97.9 °F (36.6 °C) (Oral)   Resp 18   Ht 154.9 cm (60.98\")   Wt 67 kg (147 lb 9.6 oz)   SpO2 92%   BMI 27.91 kg/m²   Body mass index is 27.91 kg/m².    Intake/Output Summary (Last 24 hours) at 6/13/2025 1108  Last data filed at 6/13/2025 " 0800  Gross per 24 hour   Intake 1540 ml   Output 1450 ml   Net 90 ml     General appearance: Nontoxic, conversant   Eyes: Anicteric sclerae, moist conjunctivae; no lid lag;   HENT: Atraumatic; oropharynx clear with moist mucous membranes and no mucosal ulcerations; normal hard and soft palate  Neck: Trachea midline; no JVD  Lungs: no rhonchi no  wheeze, diminished with normal respiratory effort and no intercostal retractions  CV: RRR, no rub  Abdomen: Nonrigid bowel sounds positive  Extremities: Chronic dusky appearance to lower extremities baseline per patient   Skin: Dry skin diffusely and above changes  Psych: Appropriate affect, alert   Neuro speech intact moves extremities    Data Review:  Notable Labs:  Results from last 7 days   Lab Units 06/13/25  0649 06/12/25  0635 06/11/25  0536 06/10/25  0654 06/10/25  0246   WBC 10*3/mm3 8.27 14.73* 19.86* 19.80* 15.22*   HEMOGLOBIN g/dL 11.9* 11.6* 13.1 14.6 15.9   PLATELETS 10*3/mm3 145 158 157 231 211     Results from last 7 days   Lab Units 06/13/25  0649 06/12/25  0635 06/11/25  0536 06/10/25  0654 06/10/25  0246   SODIUM mmol/L 136 131* 133* 135* 135*   POTASSIUM mmol/L 3.7 4.0 4.9 4.5 4.2   CHLORIDE mmol/L 95* 92* 95* 93* 93*   CO2 mmol/L 28.0 28.3 23.0 30.0* 32.0*   BUN mg/dL 37.0* 49.0* 41.0* 23.0 21.0   CREATININE mg/dL 1.52* 2.55* 3.12* 1.61* 1.43*   GLUCOSE mg/dL 179* 149* 102* 161* 142*   CALCIUM mg/dL 8.1* 8.7 8.8 9.2 9.6   MAGNESIUM mg/dL 1.9 2.2  --   --   --    PHOSPHORUS mg/dL 2.7 3.4  --   --   --    Estimated Creatinine Clearance: 28 mL/min (A) (by C-G formula based on SCr of 1.52 mg/dL (H)).    Results from last 7 days   Lab Units 06/13/25  0649 06/12/25  0635 06/11/25  0536 06/10/25  0654 06/10/25  0246   AST (SGOT) U/L  --  19  --   --  21   ALT (SGPT) U/L  --  14  --   --  13   CRP mg/dL  --   --   --   --  1.54*   PLATELETS 10*3/mm3 145 158 157   < > 211    < > = values in this interval not displayed.       Results from last 7 days   Lab Units  06/12/25  1147   PH, ARTERIAL pH units 7.460*   PCO2, ARTERIAL mm Hg 40.9   PO2 ART mm Hg 84.7   HCO3 ART mmol/L 29.0*       Imaging:  Reviewed chest images personally from past 3 days       Assessment / Recommendations:  Severe COPD without acute exacerbation  Left-sided hydronephrosis nephrolithiasis  Hypothyroidism  Chronic hypoxemic respiratory failure  Acute on chronic kidney disease  Leukocytosis suspected UTI source on antibiotics per hospitalist team  Atrial fibrillation  Anemia  Chronic combined diastolic and systolic heart failure     From a pulmonary perspective  Continue the patient's home Yupelri Brovona and budesonide  Albuterol 4 times a day  Ceftriaxone will cover pna if present, suspect more related to pulm edema, atelectasis  Baseline hypoxemic resp failure and severe copd, will be very sensitive to pulmonary edema.  Treated with IV diuretics yesterday with improvement in symptoms.  Further diuretic management per nephrology given acute kidney injury    White blood cell count has normalized  Creatinine is normalizing  Respiratory status tolerating things well.  Started on some IV steroids to help with her severe COPD tolerate fluid balances.  Transition to p.o. pred at present time.  Stop IV Solu-Medrol    Outpatient of Dr. Zamora    Electronically signed by Jonny Jansen MD, 06/13/25, 12:16 PM EDT.

## 2025-06-13 NOTE — PROGRESS NOTES
"  [facility] DAILY PROGRESS NOTE      Name: Shani Phillip  Age: 74 y.o.  Sex: female  :  1950  MRN: [id number]    Date: 2025             Subjective:  Interval History:   Sitting up edge of bed eating peanut butter     Objective:    Scheduled Meds:albuterol, 2.5 mg, Nebulization, Q6H - RT  ammonium lactate, , Topical, BID  arformoterol, 15 mcg, Nebulization, BID - RT  budesonide, 0.5 mg, Nebulization, BID - RT  buPROPion XL, 300 mg, Oral, Daily  cefTRIAXone, 1,000 mg, Intravenous, Q24H  digoxin, 125 mcg, Oral, Every Other Day  levothyroxine, 50 mcg, Oral, QAM  methylPREDNISolone sodium succinate, 40 mg, Intravenous, Q12H  metoprolol tartrate, 25 mg, Oral, Q12H  midodrine, 2.5 mg, Oral, TID AC  pantoprazole, 40 mg, Oral, Q AM  pramipexole, 0.5 mg, Oral, BID  revefenacin, 175 mcg, Nebulization, Daily - RT  sodium chloride, 10 mL, Intravenous, Q12H      Continuous Infusions:   PRN Meds:  acetaminophen **OR** acetaminophen **OR** acetaminophen    senna-docusate sodium **AND** polyethylene glycol **AND** bisacodyl **AND** bisacodyl    calcium carbonate    cyclobenzaprine    HYDROmorphone    ipratropium-albuterol    ondansetron ODT **OR** ondansetron    [COMPLETED] Insert Peripheral IV **AND** sodium chloride    sodium chloride    sodium chloride    Vital signs in last 24 hours:  Temp:  [97.3 °F (36.3 °C)-98 °F (36.7 °C)] 97.9 °F (36.6 °C)  Heart Rate:  [] 96  Resp:  [18-24] 18  BP: ()/(48-68) 130/62    Intake/Output:    Intake/Output Summary (Last 24 hours) at 2025 0718  Last data filed at 2025 0650  Gross per 24 hour   Intake 1600 ml   Output 1650 ml   Net -50 ml       Exam:  /62 (BP Location: Right arm, Patient Position: Lying)   Pulse 96   Temp 97.9 °F (36.6 °C) (Oral)   Resp 18   Ht 154.9 cm (60.98\")   Wt 67 kg (147 lb 9.6 oz)   SpO2 92%   BMI 27.91 kg/m²     Unwell appearing  Unlabored breathing  RRR    Data Review:  All labs (24hrs):   Recent Results (from the past " 24 hours)   Blood Gas, Arterial -    Collection Time: 06/12/25 11:47 AM    Specimen: Arterial Blood   Result Value Ref Range    Site Left Radial     Vishal's Test Positive     pH, Arterial 7.460 (H) 7.350 - 7.450 pH units    pCO2, Arterial 40.9 35.0 - 45.0 mm Hg    pO2, Arterial 84.7 80.0 - 100.0 mm Hg    HCO3, Arterial 29.0 (H) 22.0 - 28.0 mmol/L    Base Excess, Arterial 4.8 (H) 0.0 - 2.0 mmol/L    O2 Saturation, Arterial 96.9 92.0 - 98.5 %    Barometric Pressure for Blood Gas 750.4000 mmHg    Modality Cannula     Flow Rate 4.0000 lpm    Rate 24 Breaths/minute    Hemodilution No    Basic Metabolic Panel    Collection Time: 06/13/25  6:49 AM    Specimen: Hand, Right; Blood   Result Value Ref Range    Glucose 179 (H) 65 - 99 mg/dL    BUN 37.0 (H) 8.0 - 23.0 mg/dL    Creatinine 1.52 (H) 0.57 - 1.00 mg/dL    Sodium 136 136 - 145 mmol/L    Potassium 3.7 3.5 - 5.2 mmol/L    Chloride 95 (L) 98 - 107 mmol/L    CO2 28.0 22.0 - 29.0 mmol/L    Calcium 8.1 (L) 8.6 - 10.5 mg/dL    BUN/Creatinine Ratio 24.3 7.0 - 25.0    Anion Gap 13.0 5.0 - 15.0 mmol/L    eGFR 35.6 (L) >60.0 mL/min/1.73   Protime-INR    Collection Time: 06/13/25  6:49 AM    Specimen: Arm, Right; Blood   Result Value Ref Range    Protime 21.5 (H) 11.7 - 14.2 Seconds    INR 1.86 (H) 0.90 - 1.10   Phosphorus    Collection Time: 06/13/25  6:49 AM    Specimen: Hand, Right; Blood   Result Value Ref Range    Phosphorus 2.7 2.5 - 4.5 mg/dL   Magnesium    Collection Time: 06/13/25  6:49 AM    Specimen: Hand, Right; Blood   Result Value Ref Range    Magnesium 1.9 1.6 - 2.4 mg/dL   CBC Auto Differential    Collection Time: 06/13/25  6:49 AM    Specimen: Hand, Right; Blood   Result Value Ref Range    WBC 8.27 3.40 - 10.80 10*3/mm3    RBC 3.85 3.77 - 5.28 10*6/mm3    Hemoglobin 11.9 (L) 12.0 - 15.9 g/dL    Hematocrit 36.6 34.0 - 46.6 %    MCV 95.1 79.0 - 97.0 fL    MCH 30.9 26.6 - 33.0 pg    MCHC 32.5 31.5 - 35.7 g/dL    RDW 14.3 12.3 - 15.4 %    RDW-SD 49.6 37.0 - 54.0 fl     MPV 9.6 6.0 - 12.0 fL    Platelets 145 140 - 450 10*3/mm3    Neutrophil % 93.0 (H) 42.7 - 76.0 %    Lymphocyte % 3.3 (L) 19.6 - 45.3 %    Monocyte % 3.0 (L) 5.0 - 12.0 %    Eosinophil % 0.0 (L) 0.3 - 6.2 %    Basophil % 0.0 0.0 - 1.5 %    Immature Grans % 0.7 (H) 0.0 - 0.5 %    Neutrophils, Absolute 7.69 (H) 1.70 - 7.00 10*3/mm3    Lymphocytes, Absolute 0.27 (L) 0.70 - 3.10 10*3/mm3    Monocytes, Absolute 0.25 0.10 - 0.90 10*3/mm3    Eosinophils, Absolute 0.00 0.00 - 0.40 10*3/mm3    Basophils, Absolute 0.00 0.00 - 0.20 10*3/mm3    Immature Grans, Absolute 0.06 (H) 0.00 - 0.05 10*3/mm3    nRBC 0.0 0.0 - 0.2 /100 WBC          Assessment:    Horseshoe kidney with renal calculus    COPD (chronic obstructive pulmonary disease)    Chronic respiratory failure with hypoxia    Atrial fibrillation, chronic    Bilateral lower extremity edema    Renal stone    Oxygen dependent    Overweight with body mass index (BMI) 25.0-29.9    Acquired hypothyroidism    JAIME (acute kidney injury)    Stage 3b chronic kidney disease    Acute on chronic combined systolic and diastolic CHF (congestive heart failure)    Acute pyelonephritis         1.  Left nephrolithiasis (severe)  2. Left UPJ calculus, obstructing  3.  Left pyelonephrosis    - S/p Left ureteral stent placement 6/10/2025     Urine output: much improved   Urine Cx: 25k mixed oumar    Cr 1.52 (down from 3.12)  Wbc 8.27 (down from high 19.8)      Plan:  - No acute Urologic surgical intervention   - Maintain Left ureteral stent  - Labs reviewed  - Recommend at leats 10 day course of empiric oral Antibiotics  - If clinically deteriorates, will need repeat CT and consideration of Left nephrostomy tube placement  - Follow-up with Dr. Eleazar Cannon ([facility]) 1-2 weeks after Discharge - Schedulers messaged.   - Urology will sign off; please call with any questions/concerns or clinical change       Houston Millan MD  6/13/2025  07:45 EDT

## 2025-06-13 NOTE — PROGRESS NOTES
[facility] Progress Note      Patient Name: Shani Phillip  : 1950  MRN: [id number]  Primary Care Physician:  Rodríguez Walker MD  Date of admission: 6/10/2025    Subjective     Interval History:     Improved dyspnea  after bumetanide   Increased urine output   Patient wanting to go home   No abdominal pain  Urinating spontaneously    Review of Systems:   As noted above    Objective     Vitals:   Temp:  [97.3 °F (36.3 °C)-98 °F (36.7 °C)] 97.9 °F (36.6 °C)  Heart Rate:  [] 82  Resp:  [18-20] 18  BP: ()/(53-69) 109/69  Flow (L/min) (Oxygen Therapy):  [4] 4    Intake/Output Summary (Last 24 hours) at 2025 1244  Last data filed at 2025 1153  Gross per 24 hour   Intake 1540 ml   Output 1950 ml   Net -410 ml       Physical Exam:    General Appearance: Alert chronically ill and deconditioned on supplemental oxygen  Skin: warm and dry  HEENT: oral mucosa normal, nonicteric sclera  Neck: supple, no JVD  Lungs: Occasional rhonchus bibasal  Heart: RRR, normal S1 and S2  Abdomen: soft, nontender, nondistended  : no palpable bladder  Extremities: Lower > Upper  extremity distal  cyanosis.  With stasis  dermatitis in LE   Neuro: normal speech and mental status     Scheduled Meds:     albuterol, 2.5 mg, Nebulization, Q6H - RT  ammonium lactate, , Topical, BID  arformoterol, 15 mcg, Nebulization, BID - RT  budesonide, 0.5 mg, Nebulization, BID - RT  buPROPion XL, 300 mg, Oral, Daily  cefTRIAXone, 1,000 mg, Intravenous, Q24H  digoxin, 125 mcg, Oral, Every Other Day  levothyroxine, 50 mcg, Oral, QAM  metoprolol tartrate, 25 mg, Oral, Q12H  midodrine, 2.5 mg, Oral, TID AC  pantoprazole, 40 mg, Oral, Q AM  pramipexole, 0.5 mg, Oral, BID  predniSONE, 20 mg, Oral, Daily With Breakfast  revefenacin, 175 mcg, Nebulization, Daily - RT  sodium chloride, 10 mL, Intravenous, Q12H      IV Meds:        Results Reviewed:   I have personally reviewed the results from the time of this  admission to 6/13/2025 12:44 EDT     Results from last 7 days   Lab Units 06/13/25  0649 06/12/25  0635 06/11/25  0536 06/10/25  0654 06/10/25  0246   SODIUM mmol/L 136 131* 133*   < > 135*   POTASSIUM mmol/L 3.7 4.0 4.9   < > 4.2   CHLORIDE mmol/L 95* 92* 95*   < > 93*   CO2 mmol/L 28.0 28.3 23.0   < > 32.0*   BUN mg/dL 37.0* 49.0* 41.0*   < > 21.0   CREATININE mg/dL 1.52* 2.55* 3.12*   < > 1.43*   CALCIUM mg/dL 8.1* 8.7 8.8   < > 9.6   BILIRUBIN mg/dL  --  0.7  --   --  0.7   ALK PHOS U/L  --  135*  --   --  177*   ALT (SGPT) U/L  --  14  --   --  13   AST (SGOT) U/L  --  19  --   --  21   GLUCOSE mg/dL 179* 149* 102*   < > 142*    < > = values in this interval not displayed.       Estimated Creatinine Clearance: 28 mL/min (A) (by C-G formula based on SCr of 1.52 mg/dL (H)).    Results from last 7 days   Lab Units 06/13/25  0649 06/12/25  0635   MAGNESIUM mg/dL 1.9 2.2   PHOSPHORUS mg/dL 2.7 3.4       Results from last 7 days   Lab Units 06/12/25  0635   URIC ACID mg/dL 10.9*       Results from last 7 days   Lab Units 06/13/25  0649 06/12/25  0635 06/11/25  0536 06/10/25  0654 06/10/25  0246   WBC 10*3/mm3 8.27 14.73* 19.86* 19.80* 15.22*   HEMOGLOBIN g/dL 11.9* 11.6* 13.1 14.6 15.9   PLATELETS 10*3/mm3 145 158 157 231 211       Results from last 7 days   Lab Units 06/13/25  0649 06/12/25  0636 06/11/25  0940 06/10/25  0246   INR  1.86* 2.14* 2.73* 2.30*       Assessment / Plan     ASSESSMENT:    Acute kidney injury likely secondary to nephrolithiasis accompanied with prerenal state .   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 Mahoney , urology following recommendation advised  Chronic obstructive pulmonary disease with emphysema and history of tobacco abuse with chronic supplemental oxygen,  Coronary artery disease as per primary team   Chronic back pain  Atrial fibrillationon anticoagulation,    PLAN:  Will resume  home dose of torsemide 40 mg  BID and spironolactone 25 mg daily   Patient renal function normalizing   Patient can be followed closely as an outpatient upon discharge     Thank you for involving us in the care of Shani Phillip.  Please feel free to call with any questions.    Kvng Davis MD  06/13/25  12:44 EDT    [facility]  [phone]    Please note that portions of this note were completed with a voice recognition program.

## 2025-06-14 ENCOUNTER — READMISSION MANAGEMENT (OUTPATIENT)
Dept: CALL CENTER | Facility: HOSPITAL | Age: 75
End: 2025-06-14
Payer: MEDICARE

## 2025-06-14 VITALS
DIASTOLIC BLOOD PRESSURE: 68 MMHG | HEART RATE: 103 BPM | BODY MASS INDEX: 28.9 KG/M2 | HEIGHT: 61 IN | RESPIRATION RATE: 18 BRPM | TEMPERATURE: 98 F | OXYGEN SATURATION: 98 % | SYSTOLIC BLOOD PRESSURE: 124 MMHG | WEIGHT: 153.1 LBS

## 2025-06-14 LAB
ANION GAP SERPL CALCULATED.3IONS-SCNC: 9.9 MMOL/L (ref 5–15)
BASOPHILS # BLD AUTO: 0.01 10*3/MM3 (ref 0–0.2)
BASOPHILS NFR BLD AUTO: 0.1 % (ref 0–1.5)
BUN SERPL-MCNC: 40 MG/DL (ref 8–23)
BUN/CREAT SERPL: 27.8 (ref 7–25)
CALCIUM SPEC-SCNC: 8.8 MG/DL (ref 8.6–10.5)
CHLORIDE SERPL-SCNC: 98 MMOL/L (ref 98–107)
CO2 SERPL-SCNC: 30.1 MMOL/L (ref 22–29)
CREAT SERPL-MCNC: 1.44 MG/DL (ref 0.57–1)
DEPRECATED RDW RBC AUTO: 50.1 FL (ref 37–54)
EGFRCR SERPLBLD CKD-EPI 2021: 38 ML/MIN/1.73
EOSINOPHIL # BLD AUTO: 0 10*3/MM3 (ref 0–0.4)
EOSINOPHIL NFR BLD AUTO: 0 % (ref 0.3–6.2)
ERYTHROCYTE [DISTWIDTH] IN BLOOD BY AUTOMATED COUNT: 14.1 % (ref 12.3–15.4)
GLUCOSE SERPL-MCNC: 115 MG/DL (ref 65–99)
HCT VFR BLD AUTO: 38.2 % (ref 34–46.6)
HGB BLD-MCNC: 12.3 G/DL (ref 12–15.9)
IMM GRANULOCYTES # BLD AUTO: 0.13 10*3/MM3 (ref 0–0.05)
IMM GRANULOCYTES NFR BLD AUTO: 1 % (ref 0–0.5)
INR PPP: 1.66 (ref 0.9–1.1)
LYMPHOCYTES # BLD AUTO: 0.53 10*3/MM3 (ref 0.7–3.1)
LYMPHOCYTES NFR BLD AUTO: 4.1 % (ref 19.6–45.3)
MAGNESIUM SERPL-MCNC: 2 MG/DL (ref 1.6–2.4)
MCH RBC QN AUTO: 31 PG (ref 26.6–33)
MCHC RBC AUTO-ENTMCNC: 32.2 G/DL (ref 31.5–35.7)
MCV RBC AUTO: 96.2 FL (ref 79–97)
MONOCYTES # BLD AUTO: 1.03 10*3/MM3 (ref 0.1–0.9)
MONOCYTES NFR BLD AUTO: 7.9 % (ref 5–12)
NEUTROPHILS NFR BLD AUTO: 11.3 10*3/MM3 (ref 1.7–7)
NEUTROPHILS NFR BLD AUTO: 86.9 % (ref 42.7–76)
NRBC BLD AUTO-RTO: 0 /100 WBC (ref 0–0.2)
PHOSPHATE SERPL-MCNC: 2.2 MG/DL (ref 2.5–4.5)
PLATELET # BLD AUTO: 201 10*3/MM3 (ref 140–450)
PMV BLD AUTO: 9.6 FL (ref 6–12)
POTASSIUM SERPL-SCNC: 3.7 MMOL/L (ref 3.5–5.2)
PROTHROMBIN TIME: 19.6 SECONDS (ref 11.7–14.2)
RBC # BLD AUTO: 3.97 10*6/MM3 (ref 3.77–5.28)
SODIUM SERPL-SCNC: 138 MMOL/L (ref 136–145)
WBC NRBC COR # BLD AUTO: 13 10*3/MM3 (ref 3.4–10.8)

## 2025-06-14 PROCEDURE — 94664 DEMO&/EVAL PT USE INHALER: CPT

## 2025-06-14 PROCEDURE — 80048 BASIC METABOLIC PNL TOTAL CA: CPT | Performed by: UROLOGY

## 2025-06-14 PROCEDURE — 25010000002 HYDROMORPHONE PER 4 MG: Performed by: UROLOGY

## 2025-06-14 PROCEDURE — 94761 N-INVAS EAR/PLS OXIMETRY MLT: CPT

## 2025-06-14 PROCEDURE — 85610 PROTHROMBIN TIME: CPT | Performed by: HOSPITALIST

## 2025-06-14 PROCEDURE — 84100 ASSAY OF PHOSPHORUS: CPT | Performed by: HOSPITALIST

## 2025-06-14 PROCEDURE — 85025 COMPLETE CBC W/AUTO DIFF WBC: CPT | Performed by: UROLOGY

## 2025-06-14 PROCEDURE — 83735 ASSAY OF MAGNESIUM: CPT | Performed by: HOSPITALIST

## 2025-06-14 PROCEDURE — 63710000001 REVEFENACIN 175 MCG/3ML SOLUTION: Performed by: UROLOGY

## 2025-06-14 PROCEDURE — 94799 UNLISTED PULMONARY SVC/PX: CPT

## 2025-06-14 PROCEDURE — 25010000002 CEFTRIAXONE PER 250 MG: Performed by: UROLOGY

## 2025-06-14 PROCEDURE — 63710000001 PREDNISONE PER 1 MG: Performed by: INTERNAL MEDICINE

## 2025-06-14 PROCEDURE — 94760 N-INVAS EAR/PLS OXIMETRY 1: CPT

## 2025-06-14 RX ORDER — HYDROCODONE BITARTRATE AND ACETAMINOPHEN 7.5; 325 MG/1; MG/1
1 TABLET ORAL EVERY 6 HOURS PRN
Refills: 0 | Status: DISCONTINUED | OUTPATIENT
Start: 2025-06-14 | End: 2025-06-14 | Stop reason: HOSPADM

## 2025-06-14 RX ORDER — METOPROLOL TARTRATE 25 MG/1
25 TABLET, FILM COATED ORAL EVERY 12 HOURS SCHEDULED
Qty: 60 TABLET | Refills: 0 | Status: SHIPPED | OUTPATIENT
Start: 2025-06-14

## 2025-06-14 RX ORDER — CEFUROXIME AXETIL 500 MG/1
1000 TABLET ORAL 3 TIMES DAILY
Status: DISCONTINUED | OUTPATIENT
Start: 2025-06-15 | End: 2025-06-14 | Stop reason: HOSPADM

## 2025-06-14 RX ORDER — WARFARIN SODIUM 5 MG/1
5 TABLET ORAL
Status: DISCONTINUED | OUTPATIENT
Start: 2025-06-14 | End: 2025-06-14 | Stop reason: HOSPADM

## 2025-06-14 RX ORDER — PREDNISONE 20 MG/1
20 TABLET ORAL
Qty: 2 TABLET | Refills: 0 | Status: SHIPPED | OUTPATIENT
Start: 2025-06-15 | End: 2025-06-17

## 2025-06-14 RX ORDER — MIDODRINE HYDROCHLORIDE 2.5 MG/1
2.5 TABLET ORAL
Qty: 90 TABLET | Refills: 0 | Status: SHIPPED | OUTPATIENT
Start: 2025-06-14

## 2025-06-14 RX ORDER — CEFUROXIME AXETIL 500 MG/1
1000 TABLET ORAL 3 TIMES DAILY
Qty: 30 TABLET | Refills: 0 | Status: SHIPPED | OUTPATIENT
Start: 2025-06-15 | End: 2025-06-20

## 2025-06-14 RX ORDER — POTASSIUM CHLORIDE 1500 MG/1
20 TABLET, EXTENDED RELEASE ORAL DAILY
Start: 2025-06-14

## 2025-06-14 RX ADMIN — MIDODRINE HYDROCHLORIDE 2.5 MG: 5 TABLET ORAL at 06:31

## 2025-06-14 RX ADMIN — CEFTRIAXONE SODIUM 1000 MG: 1 INJECTION, POWDER, FOR SOLUTION INTRAMUSCULAR; INTRAVENOUS at 08:56

## 2025-06-14 RX ADMIN — METOPROLOL TARTRATE 25 MG: 25 TABLET, FILM COATED ORAL at 08:58

## 2025-06-14 RX ADMIN — DIGOXIN 125 MCG: 0.12 TABLET ORAL at 08:57

## 2025-06-14 RX ADMIN — LEVOTHYROXINE SODIUM 50 MCG: 50 TABLET ORAL at 06:07

## 2025-06-14 RX ADMIN — Medication: at 10:02

## 2025-06-14 RX ADMIN — HYDROMORPHONE HYDROCHLORIDE 0.5 MG: 1 INJECTION, SOLUTION INTRAMUSCULAR; INTRAVENOUS; SUBCUTANEOUS at 01:14

## 2025-06-14 RX ADMIN — PREDNISONE 20 MG: 20 TABLET ORAL at 08:58

## 2025-06-14 RX ADMIN — CYCLOBENZAPRINE HYDROCHLORIDE 5 MG: 10 TABLET, FILM COATED ORAL at 06:07

## 2025-06-14 RX ADMIN — ACETAMINOPHEN 650 MG: 325 TABLET ORAL at 01:42

## 2025-06-14 RX ADMIN — HYDROCODONE BITARTRATE AND ACETAMINOPHEN 1 TABLET: 7.5; 325 TABLET ORAL at 08:58

## 2025-06-14 RX ADMIN — REVEFENACIN 175 MCG: 175 SOLUTION RESPIRATORY (INHALATION) at 07:52

## 2025-06-14 RX ADMIN — ARFORMOTEROL TARTRATE 15 MCG: 15 SOLUTION RESPIRATORY (INHALATION) at 07:49

## 2025-06-14 RX ADMIN — ALBUTEROL SULFATE 2.5 MG: 2.5 SOLUTION RESPIRATORY (INHALATION) at 07:47

## 2025-06-14 RX ADMIN — SPIRONOLACTONE 25 MG: 25 TABLET ORAL at 08:58

## 2025-06-14 RX ADMIN — BUPROPION HYDROCHLORIDE 300 MG: 300 TABLET, EXTENDED RELEASE ORAL at 08:57

## 2025-06-14 RX ADMIN — TORSEMIDE 40 MG: 20 TABLET ORAL at 08:57

## 2025-06-14 RX ADMIN — MIDODRINE HYDROCHLORIDE 2.5 MG: 5 TABLET ORAL at 12:09

## 2025-06-14 RX ADMIN — PRAMIPEXOLE DIHYDROCHLORIDE 0.5 MG: 0.5 TABLET ORAL at 08:57

## 2025-06-14 RX ADMIN — PANTOPRAZOLE SODIUM 40 MG: 40 TABLET, DELAYED RELEASE ORAL at 06:08

## 2025-06-14 RX ADMIN — BUDESONIDE 0.5 MG: 0.5 INHALANT RESPIRATORY (INHALATION) at 07:51

## 2025-06-14 NOTE — PROGRESS NOTES
"   LOS: 4 days    Patient Care Team:  Rodríguez Walker MD as PCP - General (Internal Medicine)  Mike Atkins MD as Surgeon (General Surgery)  Hayes Briones MD as Surgeon (Cardiothoracic Surgery)  Zenia Tinajero MD as Consulting Physician (Cardiology)  Clover Zamora MD as Consulting Physician (Pulmonary Disease)  Angy Smith APRN as Nurse Practitioner (Cardiology)  Lester Garcia, RN as Ambulatory  ([facility])    Chief Complaint:    Chief Complaint   Patient presents with    Back Pain     Follow UP Renal insufficiency  Subjective     Interval History:   Doing well.    Review of Systems:   As noted above    Objective     Vital Signs  Temp:  [97.3 °F (36.3 °C)-97.9 °F (36.6 °C)] 97.5 °F (36.4 °C)  Heart Rate:  [] 85  Resp:  [18-20] 18  BP: (102-133)/(59-91) 102/59    Flowsheet Rows      Flowsheet Row First Filed Value   Admission Height 154.9 cm (60.98\") Documented at 06/10/2025 0734   Admission Weight 64 kg (141 lb) Documented at 06/10/2025 0734            I/O this shift:  In: 480 [P.O.:480]  Out: 750 [Urine:750]  I/O last 3 completed shifts:  In: 2820 [P.O.:2820]  Out: 2150 [Urine:2150]    Intake/Output Summary (Last 24 hours) at 6/14/2025 0513  Last data filed at 6/14/2025 0445  Gross per 24 hour   Intake 1940 ml   Output 1250 ml   Net 690 ml       Physical Exam:  General Appearance: alert, oriented x 3, no acute distress,   Skin: warm and dry  HEENT: pupils round and reactive to light, oral mucosa normal, nonicteric sclera  Neck: supple, no JVD, trachea midline  Lungs: Decreased  Heart: RRR, normal S1 and S2, no S3, no rub  Abdomen: soft, nontender, normoactive bowels  : no palpable bladder,  Extremities: no edema, cyanosis or clubbing  Neuro: normal speech and mental status       Results Review:    Results from last 7 days   Lab Units 06/13/25  0649 06/12/25  0635 06/11/25  0536 06/10/25  0654 06/10/25  0246   SODIUM mmol/L 136 131* 133*   < > 135*   POTASSIUM " mmol/L 3.7 4.0 4.9   < > 4.2   CHLORIDE mmol/L 95* 92* 95*   < > 93*   CO2 mmol/L 28.0 28.3 23.0   < > 32.0*   BUN mg/dL 37.0* 49.0* 41.0*   < > 21.0   CREATININE mg/dL 1.52* 2.55* 3.12*   < > 1.43*   CALCIUM mg/dL 8.1* 8.7 8.8   < > 9.6   BILIRUBIN mg/dL  --  0.7  --   --  0.7   ALK PHOS U/L  --  135*  --   --  177*   ALT (SGPT) U/L  --  14  --   --  13   AST (SGOT) U/L  --  19  --   --  21   GLUCOSE mg/dL 179* 149* 102*   < > 142*    < > = values in this interval not displayed.       Estimated Creatinine Clearance: 28.5 mL/min (A) (by C-G formula based on SCr of 1.52 mg/dL (H)).    Results from last 7 days   Lab Units 06/13/25  0649 06/12/25  0635   MAGNESIUM mg/dL 1.9 2.2   PHOSPHORUS mg/dL 2.7 3.4       Results from last 7 days   Lab Units 06/12/25  0635   URIC ACID mg/dL 10.9*       Results from last 7 days   Lab Units 06/13/25  0649 06/12/25  0635 06/11/25  0536 06/10/25  0654 06/10/25  0246   WBC 10*3/mm3 8.27 14.73* 19.86* 19.80* 15.22*   HEMOGLOBIN g/dL 11.9* 11.6* 13.1 14.6 15.9   PLATELETS 10*3/mm3 145 158 157 231 211       Results from last 7 days   Lab Units 06/13/25  0649 06/12/25  0636 06/11/25  0940 06/10/25  0246   INR  1.86* 2.14* 2.73* 2.30*         Imaging Results (Last 24 Hours)       ** No results found for the last 24 hours. **          albuterol, 2.5 mg, Nebulization, Q6H - RT  ammonium lactate, , Topical, BID  arformoterol, 15 mcg, Nebulization, BID - RT  budesonide, 0.5 mg, Nebulization, BID - RT  buPROPion XL, 300 mg, Oral, Daily  cefTRIAXone, 1,000 mg, Intravenous, Q24H  digoxin, 125 mcg, Oral, Every Other Day  levothyroxine, 50 mcg, Oral, QAM  metoprolol tartrate, 25 mg, Oral, Q12H  midodrine, 2.5 mg, Oral, TID AC  pantoprazole, 40 mg, Oral, Q AM  pramipexole, 0.5 mg, Oral, BID  predniSONE, 20 mg, Oral, Daily With Breakfast  revefenacin, 175 mcg, Nebulization, Daily - RT  sodium chloride, 10 mL, Intravenous, Q12H  spironolactone, 25 mg, Oral, Daily  torsemide, 40 mg, Oral, BID  Diuretics      Pharmacy to dose warfarin,         Medication Review:   Current Facility-Administered Medications   Medication Dose Route Frequency Provider Last Rate Last Admin    acetaminophen (TYLENOL) tablet 650 mg  650 mg Oral Q4H PRN Stefano Mahoney MD   650 mg at 06/14/25 0142    Or    acetaminophen (TYLENOL) 160 MG/5ML oral solution 650 mg  650 mg Oral Q4H PRN Stefano Mahoney MD        Or    acetaminophen (TYLENOL) suppository 650 mg  650 mg Rectal Q4H PRN Stefano Mahoney MD        albuterol (PROVENTIL) nebulizer solution 0.083% 2.5 mg/3mL  2.5 mg Nebulization Q6H - RT Stefano Mahoney MD   2.5 mg at 06/13/25 2307    ammonium lactate (AMLACTIN) 12 % cream   Topical BID Stefano Mahoney MD   Given at 06/13/25 2131    arformoterol (BROVANA) nebulizer solution 15 mcg  15 mcg Nebulization BID - RT Stefano Mahoney MD   15 mcg at 06/13/25 1913    sennosides-docusate (PERICOLACE) 8.6-50 MG per tablet 2 tablet  2 tablet Oral BID PRN Stefano Mahoney MD        And    polyethylene glycol (MIRALAX) packet 17 g  17 g Oral Daily PRN Stefano Mahoney MD        And    bisacodyl (DULCOLAX) EC tablet 5 mg  5 mg Oral Daily PRN Stefano Mahoney MD        And    bisacodyl (DULCOLAX) suppository 10 mg  10 mg Rectal Daily PRN Stefano Mahoney MD        budesonide (PULMICORT) nebulizer solution 0.5 mg  0.5 mg Nebulization BID - RT Stefano Mahoney MD   0.5 mg at 06/13/25 1913    buPROPion XL (WELLBUTRIN XL) 24 hr tablet 300 mg  300 mg Oral Daily Stefano Mahoney MD   300 mg at 06/13/25 0833    calcium carbonate (TUMS) chewable tablet 500 mg (200 mg elemental)  2 tablet Oral BID PRN Stefano Mahoney MD        cefTRIAXone (ROCEPHIN) 1,000 mg in sodium chloride 0.9 % 100 mL MBP  1,000 mg Intravenous Q24H Stefano Mahoney  mL/hr at 06/13/25 1215 1,000 mg at 06/13/25 1215    cyclobenzaprine (FLEXERIL) tablet 5 mg  5 mg Oral BID  PRN Stefano Mahoney MD   5 mg at 06/13/25 1430    digoxin (LANOXIN) tablet 125 mcg  125 mcg Oral Every Other Day Stefano Mahoney MD   125 mcg at 06/12/25 0852    HYDROmorphone (DILAUDID) injection 0.5 mg  0.5 mg Intravenous Q2H PRN Stefano Mahoney MD   0.5 mg at 06/14/25 0114    ipratropium-albuterol (DUO-NEB) nebulizer solution 3 mL  3 mL Nebulization Q4H PRN Stefano Mahoney MD   3 mL at 06/12/25 0500    levothyroxine (SYNTHROID, LEVOTHROID) tablet 50 mcg  50 mcg Oral QAM Stefano Mahoney MD   50 mcg at 06/13/25 0648    metoprolol tartrate (LOPRESSOR) tablet 25 mg  25 mg Oral Q12H Stefano Mahoney MD   25 mg at 06/13/25 2129    midodrine (PROAMATINE) tablet 2.5 mg  2.5 mg Oral TID AC Kvng Davis MD   2.5 mg at 06/13/25 1713    ondansetron ODT (ZOFRAN-ODT) disintegrating tablet 4 mg  4 mg Oral Q6H PRN Stefano Mahoney MD        Or    ondansetron (ZOFRAN) injection 4 mg  4 mg Intravenous Q6H PRN Stefano Mahoney MD   4 mg at 06/13/25 1430    pantoprazole (PROTONIX) EC tablet 40 mg  40 mg Oral Q AM Stefano Mahoney MD   40 mg at 06/13/25 0648    Pharmacy to dose warfarin   Not Applicable Continuous PRN Santino Mayes MD        pramipexole (MIRAPEX) tablet 0.5 mg  0.5 mg Oral BID Stefano Mahoney MD   0.5 mg at 06/13/25 2129    predniSONE (DELTASONE) tablet 20 mg  20 mg Oral Daily With Breakfast Jonny Jansen MD   20 mg at 06/13/25 1430    revefenacin (YUPELRI) nebulizer solution 175 mcg  175 mcg Nebulization Daily - RT Stefano Mahoney MD   175 mcg at 06/13/25 0703    sodium chloride 0.9 % flush 10 mL  10 mL Intravenous PRN Stefano Mahoney MD        sodium chloride 0.9 % flush 10 mL  10 mL Intravenous Q12H Stefano Mahoney MD   10 mL at 06/13/25 2130    sodium chloride 0.9 % flush 10 mL  10 mL Intravenous PRN Stefano Mahoney MD        sodium chloride 0.9 % infusion 40 mL  40 mL Intravenous PRN  Stefano Mahoney MD        spironolactone (ALDACTONE) tablet 25 mg  25 mg Oral Daily Kvng Davis MD   25 mg at 06/13/25 1430    torsemide (DEMADEX) tablet 40 mg  40 mg Oral BID Diuretics Kvng Davis MD           Assessment & Plan         Horseshoe kidney with renal calculus    COPD (chronic obstructive pulmonary disease)    Chronic respiratory failure with hypoxia    Atrial fibrillation, chronic    Bilateral lower extremity edema    Renal stone    Oxygen dependent    Overweight with body mass index (BMI) 25.0-29.9    Acquired hypothyroidism    JAIME (acute kidney injury)    Stage 3b chronic kidney disease    Acute on chronic combined systolic and diastolic CHF (congestive heart failure)    Acute pyelonephritis    Acute kidney injury likely secondary to nephrolithiasis accompanied with prerenal state .   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 Mahoney , urology following recommendation advised  Chronic obstructive pulmonary disease with emphysema and history of tobacco abuse with chronic supplemental oxygen,  Coronary artery disease as per primary team   Chronic back pain  Atrial fibrillationon anticoagulation,     PLAN:  Will resume home dose of torsemide 40 mg  BID and spironolactone 25 mg daily   Patient renal function normalizing   Patient can be followed closely as an outpatient upon discharge       Zenia Pete MD  06/14/25  05:13 EDT

## 2025-06-14 NOTE — OUTREACH NOTE
Prep Survey      Flowsheet Row Responses   [facility] patient discharged from? [place]   Is LACE score < 7 ? Yes   Eligibility [facility]   Date of Admission 06/10/25   Date of Discharge 06/14/25   Discharge diagnosis Horseshoe kidney with renal calculus   Does the patient have one of the following disease processes/diagnoses(primary or secondary)? Other   Prep survey completed? Yes            Arlen BLANKENSHIP - Registered Nurse

## 2025-06-14 NOTE — DISCHARGE SUMMARY
Patient Name: Shani Phillip  : 1950  MRN: [id number]    Date of Admission: 6/10/2025  Date of Discharge:  2025  Primary Care Physician: Rodríguez Walker MD      Chief Complaint:   Back Pain      Discharge Diagnoses     Active Hospital Problems    Diagnosis  POA    **Horseshoe kidney with renal calculus [Q63.1, N20.0]  Not Applicable    Acute on chronic combined systolic and diastolic CHF (congestive heart failure) [I50.43]  No    Acute pyelonephritis [N10]  Yes    JAIME (acute kidney injury) [N17.9]  No    Stage 3b chronic kidney disease [N18.32]  Yes    Acquired hypothyroidism [E03.9]  Yes    Overweight with body mass index (BMI) 25.0-29.9 [E66.3]  Yes    Oxygen dependent [Z99.81]  Not Applicable    Renal stone [N20.0]  Yes    Atrial fibrillation, chronic [I48.20]  Yes    Bilateral lower extremity edema [R60.0]  Yes    Chronic respiratory failure with hypoxia [J96.11]  Yes    COPD (chronic obstructive pulmonary disease) [J44.9]  Yes      Resolved Hospital Problems   No resolved problems to display.        Hospital Course     Very pleasant 75yo woman with horseshoe kidney, chronic combined CHF, hypoT4, CKD3b, PAF (Coumadin), COPD, CHRF (2L/min), and RLS, who presented to ER with back pain and was found to have left UPJ stone with hydronephrosis. Please see below for details of admission by problem:      Left UPJ stone with obstruction  Left pyelonephritis  Horseshoe kidney  Appreciate  attention to pt  S/p stent 6/10 by Dr. Mahoney  Has completed 5d of Rocephin here  Urine culture grew only 25k mixed oumar  No fever, WBC normalized now   plans no intervention--recommends 10d abx empirically (plan 5 more days of oral Ceftin)  F/u with Dr. Lukas Cannon in 1-2 weeks     JAIME (oliguric)  CKD3b  Appreciate Renal attention to pt  Cr back to baseline  Good UOP  Renal restarted home Torsemide and Aldactone yesterday and Cr continues to improve  Can f/u with Renal as outpt in 1-2 weeks per Dr. Davis      COPD  Chronic hypoxic resp failure (2L/min)  Still requiring 3L/min  Pulm followed  She did end up with pulm edema 6/12 on CXR but O2 requirement was unchanged--ABG obtained at dtr's insistence, it was unremarkable--no hypoxemia or hypercapnia  Clinically improved after a dose of IV Bumex  Continue Yupelri, Brovana, and Pulmicort with PRN DuoNebs  IV SoluMedrol changed to PO Prednisone by Pulm--will compete 5d burst of steroids  F/u with Dr. Zamora at next available appt  Home on 3L/min O2 for now     PAF  Chronic AC (Coumadin)  HRs acceptable on metoprolol and digoxin  AC on hold for procedures--restarted yesterday (okay with )  INR 1.66 today  Recommend 5mg total dose tonight and check INR tomorrow--call to anticoag clinic for mgmt instructions  F/u with Card at next available appt--missed an appt with NP last week     Acute on chronic combined CHF  Torsemide and Aldactone on hold initially  Developed some pulm edema after IVFs  Much improved after getting a dose of IV Bumex  Renal has restarted her Torsemide and Aldactone and her volume status is stable, renal function stable  Continue metoprolol, changed from Coreg, tolerating  F/u with Card at next available appt     HypoT4  Continued L-T4  TSH wnl     Hyponatremia  Resolved     Hyperglycemia  Mild  A1c only 5.8     GERD  Continued PPI     RLS  Continued Mirapex        Full code confirmed.  Discussed with patient, RN, and .  Discharge home with family this afternoon.      Day of Discharge     Subjective:  Feeling better again this afternoon. UOP improved. No N/V/D/abd pain. Tolerating diet. No F/C/NS. No SOA or CP or palp. Eager to go home.     Physical Exam:  Temp:  [97.3 °F (36.3 °C)-97.7 °F (36.5 °C)] 97.7 °F (36.5 °C)  Heart Rate:  [] 87  Resp:  [18-20] 18  BP: (102-133)/(59-91) 118/80  Body mass index is 28.95 kg/m².  Physical Exam  Vitals and nursing note reviewed. Exam conducted with a chaperone present ().   Constitutional:        General: She is not in acute distress.     Appearance: She is ill-appearing (chronically). She is not toxic-appearing or diaphoretic.   Cardiovascular:      Rate and Rhythm: Normal rate. Rhythm irregular.   Pulmonary:      Effort: Pulmonary effort is normal. No respiratory distress.      Breath sounds: No wheezing or rales.   Abdominal:      General: Bowel sounds are normal. There is no distension.      Palpations: Abdomen is soft.      Tenderness: There is no abdominal tenderness.   Musculoskeletal:         General: Swelling (1+ in BLEs, BLEs dusky from mid shin down--pt says this is chronic and that sometimes they look worse) present.      Cervical back: Neck supple.      Comments: Kyphosis   Skin:     General: Skin is warm and dry.      Capillary Refill: Capillary refill takes less than 2 seconds.      Coloration: Skin is not jaundiced.   Neurological:      General: No focal deficit present.      Mental Status: She is alert. Mental status is at baseline.   Psychiatric:         Mood and Affect: Mood normal.         Behavior: Behavior normal.         Thought Content: Thought content normal.         Consultants     Consult Orders (all) (From admission, onward)       Start     Ordered    06/11/25 1615  Inpatient Nephrology Consult  Once        Specialty:  Nephrology  Provider:  Richard Morrell MD    06/11/25 1615    06/10/25 1309  Inpatient Cardiology Consult  Once        Specialty:  Cardiology  Provider:  Zenia Tinajero MD    06/10/25 1309    06/10/25 0959  Inpatient Pulmonology Consult  Once        Specialty:  Pulmonary Disease  Provider:  (Not yet assigned)    06/10/25 0959    06/10/25 0541  Inpatient Urology Consult  Once        Specialty:  Urology  Provider:  Santino Truong Jr., MD    06/10/25 0541    06/10/25 0412  [facility] (on-call MD unless specified) Details  Once        Specialty:  Hospitalist  Provider:  (Not yet assigned)    06/10/25 0411    06/10/25 0412  Urology (on-call MD unless specified)   Once        Specialty:  Urology  Provider:  (Not yet assigned)    06/10/25 0411                  Procedures     CYSTOSCOPY URETERAL LEFT STENT PLACEMENT    Imaging Results (All)       Procedure Component Value Units Date/Time    XR Chest 1 View [[id number]] Collected: 06/12/25 1324     Updated: 06/12/25 1329    Narrative:      AP CHEST     HISTORY: Respiratory distress     COMPARISON: 6/10/2025     FINDINGS: Increased interstitial lung opacities bilaterally. Stable  atelectasis in the left lung base. Heart size stable. No pneumothorax.       Impression:      Increased interstitial lung opacities bilaterally which may reflect  interstitial edema.         This report was finalized on 6/12/2025 1:26 PM by Dr. Osmar Cannon M.D on Workstation: [id number]        Renal Bilateral [[id number]] Collected: 06/12/25 0551     Updated: 06/12/25 0558    Narrative:      RENAL ULTRASOUND     HISTORY: Left ureteropelvic junction stone. HISTORY of horseshoe kidney.     COMPARISON: Camila 10, 2025     TECHNIQUE: Grayscale and color Doppler sonographic images were obtained  through the kidneys and bladder.     FINDINGS:  The patient has a horseshoe kidney. I do not see any evidence of amyank  hydronephrosis on either side, although there is some dilatation of the  left renal pelvis. Multiple large stones are noted within the left renal  moiety. Bladder could not be visualized, as it is not distended.       Impression:      Very technically limited examination. The patient has significant bowel  gas, as well as a portion of the kidney. I don't see mayank  hydronephrosis involving the left renal moiety, although there is some  fullness of the left renal pelvis. Given the patient's complex anatomy,  CT would allow for better assessment of the kidneys and bladder.     This report was finalized on 6/12/2025 5:55 AM by Dr. Mena Littlejohn M.D on Workstation: [id number]       FL C Arm During Surgery [[id number]] Resulted: 06/10/25 1900      Updated: 06/10/25 1900    Narrative:      This procedure was auto-finalized with no dictation required.    XR Chest PA & Lateral [[id number]] Collected: 06/10/25 1044     Updated: 06/10/25 1051    Narrative:      XR CHEST PA AND LATERAL-     DATE OF EXAM: 6/10/2025 10:02 AM     INDICATION: labored breathing & cough.     COMPARISON: Chest radiographs 2/18/2025 and 6/4/2023. CT abdomen and  pelvis 6/10/2025. CT chest 2/19/2019.     TECHNIQUE: PA and lateral views of the chest were obtained.     FINDINGS:  Low lung volumes and mild elevation of the left hemidiaphragm with  ill-defined bilateral perihilar and bibasilar opacities that could  represent atelectasis and/or scarring. Mild chronic interstitial  prominence in both lungs. No new focal lung opacity. No pneumothorax.  Unchanged cardiac and mediastinal contours. Calcified atherosclerotic  disease in the thoracic aorta. Diffuse osteopenia. Similar-appearing  upper thoracic levoscoliosis and mildly exaggerated thoracic kyphosis.  Incompletely evaluated chronic changes in the right shoulder. Multiple  chronic appearing bilateral rib fracture deformities. No acute osseous  abnormality is identified. Similar-appearing orientation of the gastric  lap band device. Cholecystectomy clips.       Impression:      Low lung volumes and mild elevation of the left hemidiaphragm with  ill-defined bilateral perihilar and bibasilar opacities that could  represent atelectasis and/or scarring.     This report was finalized on 6/10/2025 10:48 AM by Clay Ybarra MD on  Workstation: [id number]       CT Abdomen Pelvis Without Contrast [[id number]] Collected: 06/10/25 0347     Updated: 06/10/25 0407    Narrative:      CT OF THE ABDOMEN AND PELVIS WITHOUT CONTRAST     HISTORY: Low back pain     COMPARISON: 7/19/2019     TECHNIQUE: Axial CT imaging was obtained through the abdomen and pelvis.  No IV contrast was administered.     FINDINGS:  Images through the lung bases demonstrate some  scarring. There are  dystrophic calcifications of the mitral valve annulus. No suspicious  hepatic lesions are seen. There is a laparoscopic gastric band. The band  is stable in orientation when compared to December 2019. Calcified  granulomata are seen within the spleen. Adrenal glands are within normal  limits. The pancreas is atrophic. There is a duodenal diverticulum.  Patient is again noted to have a horseshoe kidney. Multiple large stones  are identified within the left renal collecting system. Stone burden has  increased when compared to prior study, and there is now hydronephrosis  involving the left renal moiety. There is also air identified within the  left renal collecting system, as well as perinephric stranding on the  left.. Stranding is also noted tracking along the left paracolic gutter.  No acute osseous abnormalities are seen. There is a punctate stone  measuring 2 mm which is noted within the right renal moiety. The patient  does have a 9 mm stone located at the left ureteropelvic junction.  Distal to this, the left ureter is decompressed, although the patient  does have an additional stone which is located within the distal left  ureter. This measures approximately 4 mm. This is new when compared to  prior study. There are aortoiliac calcifications. The urinary bladder is  relatively collapsed. There is air identified within the urinary  bladder. There is colonic diverticulosis. There is no bowel obstruction.  The appendix is normal. Uterus appears unremarkable. No adnexal masses  are seen.       Impression:         1. The patient has mild left-sided hydronephrosis, new when compared to  prior study. Stone burden within the left renal moiety has significantly  increased when compared to prior exam. There is a 9 mm obstructing stone  which is noted at the left UPJ. An additional nonobstructing 4 mm stone  is noted within the urinary bladder. There is perinephric stranding  noted on the left. There  is air noted within the relatively decompressed  urinary bladder, with additional air identified within the left renal  collecting system. Some of the appearance may be related to  instrumentation. However, emphysematous pyelitis certainly not excluded.     Radiation dose reduction techniques were utilized, including automated  exposure control and exposure modulation based on body size.        This report was finalized on 6/10/2025 4:04 AM by Dr. Mena Littlejohn M.D on Workstation: [id number]             Results for orders placed during the hospital encounter of 08/21/24    Duplex Venous Lower Extremity - Bilateral CAR    Interpretation Summary    Normal bilateral lower extremity venous duplex scan.    Results for orders placed during the hospital encounter of 02/17/25    Adult Transthoracic Echo Complete W/ Cont if Necessary Per Protocol    Interpretation Summary    Left ventricular systolic function is hyperdynamic (EF > 70%).    Normal right ventricular cavity size noted. Hyperdynamic right ventricular systolic function noted.    The left atrial cavity is moderately dilated.    Mild aortic valve stenosis is present. Aortic valve maximum pressure gradient is 35.0 mmHg. Aortic valve mean pressure gradient is 14.2 mmHg. The aortic valve leaflets are moderately calcified    Moderate to severe mitral valve stenosis is present. There is severe mitral annular calcification.    The mitral valve peak gradient is 19.00 mmHg. The mitral valve mean gradient is 7.00 mmHg.    There is a mobile echodensity on the atrial surface of the anterior mitral valve leaflet which may either represent a problem chordae or fibroblastoma (appearance would be atypical for vegetation)    Mild tricuspid valve regurgitation is present.    Calculated right ventricular systolic pressure from tricuspid regurgitation is 53 mmHg.    There is a trivial pericardial effusion.    Pertinent Labs     Results from last 7 days   Lab Units  "06/14/25  0625 06/13/25  0649 06/12/25  0635 06/11/25  0536   WBC 10*3/mm3 13.00* 8.27 14.73* 19.86*   HEMOGLOBIN g/dL 12.3 11.9* 11.6* 13.1   PLATELETS 10*3/mm3 201 145 158 157     Results from last 7 days   Lab Units 06/14/25  0625 06/13/25  0649 06/12/25  0635 06/11/25  0536   SODIUM mmol/L 138 136 131* 133*   POTASSIUM mmol/L 3.7 3.7 4.0 4.9   CHLORIDE mmol/L 98 95* 92* 95*   CO2 mmol/L 30.1* 28.0 28.3 23.0   BUN mg/dL 40.0* 37.0* 49.0* 41.0*   CREATININE mg/dL 1.44* 1.52* 2.55* 3.12*   GLUCOSE mg/dL 115* 179* 149* 102*   EGFR mL/min/1.73 38.0* 35.6* 19.1* 15.0*     Results from last 7 days   Lab Units 06/12/25  0635 06/10/25  0246   ALBUMIN g/dL 3.2* 4.1   BILIRUBIN mg/dL 0.7 0.7   ALK PHOS U/L 135* 177*   AST (SGOT) U/L 19 21   ALT (SGPT) U/L 14 13     Results from last 7 days   Lab Units 06/14/25  0625 06/13/25  0649 06/12/25  0635 06/11/25  0536 06/10/25  0654 06/10/25  0246   CALCIUM mg/dL 8.8 8.1* 8.7 8.8   < > 9.6   ALBUMIN g/dL  --   --  3.2*  --   --  4.1   MAGNESIUM mg/dL 2.0 1.9 2.2  --   --   --    PHOSPHORUS mg/dL 2.2* 2.7 3.4  --   --   --     < > = values in this interval not displayed.       Results from last 7 days   Lab Units 06/10/25  0246   DIGOXIN LVL ng/mL 0.70     Results from last 7 days   Lab Units 06/12/25  0635   URIC ACID mg/dL 10.9*         Invalid input(s): \"LDLCALC\"  Results from last 7 days   Lab Units 06/12/25  1124 06/12/25  1123 06/11/25  1542   BLOODCX  No growth at 2 days No growth at 2 days  --    URINECX   --   --  25,000 CFU/mL Mixed Yen Isolated         Test Results Pending at Discharge     Pending Results       None              Discharge Details        Discharge Medications        New Medications        Instructions Start Date   cefuroxime 500 MG tablet  Commonly known as: CEFTIN   1,000 mg, Oral, 3 times daily   Start Date: Camila 15, 2025     metoprolol tartrate 25 MG tablet  Commonly known as: LOPRESSOR   25 mg, Oral, Every 12 Hours Scheduled      midodrine 2.5 MG " tablet  Commonly known as: PROAMATINE   2.5 mg, Oral, 3 Times Daily Before Meals      predniSONE 20 MG tablet  Commonly known as: DELTASONE   20 mg, Oral, Daily With Breakfast   Start Date: Camila 15, 2025            Changes to Medications        Instructions Start Date   potassium chloride ER 20 MEQ tablet controlled-release ER tablet  Commonly known as: K-TAB  What changed: when to take this   20 mEq, Oral, Daily             Continue These Medications        Instructions Start Date   ammonium lactate 12 % lotion  Commonly known as: LAC-HYDRIN   Topical, As Needed      budesonide 0.5 MG/2ML nebulizer solution  Commonly known as: PULMICORT   USE 1 VIAL IN NEBULIZER DAILY - rinse mouth after treatment      buPROPion  MG 24 hr tablet  Commonly known as: WELLBUTRIN XL   300 mg, Oral, Daily      cyclobenzaprine 10 MG tablet  Commonly known as: FLEXERIL   10 mg, Oral, 3 Times Daily PRN      digoxin 125 MCG tablet  Commonly known as: LANOXIN   125 mcg, Oral, Every Other Day      famotidine 20 MG tablet  Commonly known as: PEPCID   20 mg, Oral, 2 Times Daily Before Meals      ferrous sulfate 325 (65 FE) MG tablet   325 mg, Oral, Every Other Day      HYDROcodone-acetaminophen 7.5-325 MG per tablet  Commonly known as: NORCO   1 tablet, Oral, Every 6 Hours PRN      ipratropium-albuterol 0.5-2.5 mg/3 ml nebulizer  Commonly known as: DUO-NEB   USE 1 VIAL IN NEBULIZER 4 TIMES DAILY - as directed      levothyroxine 50 MCG tablet  Commonly known as: SYNTHROID, LEVOTHROID   50 mcg, Oral, Every Morning      O2  Commonly known as: OXYGEN   2 L/min, Once      ondansetron 4 MG tablet  Commonly known as: ZOFRAN   4 mg, Oral, Every 8 Hours PRN      pantoprazole 40 MG EC tablet  Commonly known as: PROTONIX   40 mg, Oral, Daily      pramipexole 0.5 MG tablet  Commonly known as: MIRAPEX   0.5 mg, Oral, 2 Times Daily      spironolactone 25 MG tablet  Commonly known as: ALDACTONE   25 mg, Oral, Daily      torsemide 20 MG tablet  Commonly  known as: DEMADEX   40 mg, Oral, 2 Times Daily      trospium 20 MG tablet  Commonly known as: SANCTURA   20 mg, Oral, 2 Times Daily      warfarin 2.5 MG tablet  Commonly known as: COUMADIN   TAKE 1/2 TABLET ON MONDAY, WEDNESDAY AND SATURDAY, THEN 1 TABLET ALL OTHER DAYS AS DIRECTED.      Yupelri 175 MCG/3ML nebulizer solution  Generic drug: revefenacin   175 mcg, Daily - RT             Stop These Medications      carvedilol 3.125 MG tablet  Commonly known as: COREG     meclizine 12.5 MG tablet  Commonly known as: ANTIVERT     nystatin 411480 UNIT/GM powder  Commonly known as: MYCOSTATIN              Allergies   Allergen Reactions    Penicillins Rash     RASH 30 YRS AGO NO HOSPITALIZATION       Discharge Disposition:  Home or Self Care      Discharge Diet:  Diet Order   Procedures    Diet: Regular/House; Fluid Consistency: Thin (IDDSI 0)       Discharge Activity:   As tolerated    CODE STATUS:    Code Status and Medical Interventions: CPR (Attempt to Resuscitate); Full Support   Ordered at: 06/10/25 0541     Code Status (Patient has no pulse and is not breathing):    CPR (Attempt to Resuscitate)     Medical Interventions (Patient has pulse or is breathing):    Full Support       Future Appointments   Date Time Provider Department Center   3/31/2026  1:15 PM Prieto Walter MD [id number] None     Additional Instructions for the Follow-ups that You Need to Schedule       Discharge Follow-up with PCP   As directed       Currently Documented PCP:    Rodríguez Walker MD    PCP Phone Number:    [phone]     Follow Up Details: Dr. Walker (PCP) at next available appt        Discharge Follow-up with Specialty: Sarah NP (Card) at next available appt   As directed      Specialty: Sarah NP (Card) at next available appt        Discharge Follow-up with Specified Provider: Dr. Morrell (Renal); 1 Week   As directed      To: Dr. Morrell (Renal)   Follow Up: 1 Week        Discharge Follow-up with Specified Provider: Dr. Zamora (Pulm)  at next available appt   As directed      To: Dr. Zamora ([facility]) at next available appt        Discharge Follow-up with Specified Provider: Dr. Cannon (); 1 Week   As directed      To: Dr. Cannon ()   Follow Up: 1 Week               Follow-up Information       Rodríguez Walker MD .    Specialty: Internal Medicine  Why: Dr. Walker (PCP) at next available appt  Contact information:  [address]  [address]  [place] [address]  [phone]                             Additional Instructions for the Follow-ups that You Need to Schedule       Discharge Follow-up with PCP   As directed       Currently Documented PCP:    Rodríguez Walker MD    PCP Phone Number:    [phone]     Follow Up Details: Dr. Walker (PCP) at next available appt        Discharge Follow-up with Specialty: Sarah NP (Card) at next available appt   As directed      Specialty: Sarah NP (Card) at next available appt        Discharge Follow-up with Specified Provider: Dr. Morrell (Renal); 1 Week   As directed      To: Dr. Morrell (Renal)   Follow Up: 1 Week        Discharge Follow-up with Specified Provider: Dr. Zamora ([facility]) at next available appt   As directed      To: Dr. Zamora ([facility]) at next available appt        Discharge Follow-up with Specified Provider: Dr. Cannon (); 1 Week   As directed      To: Dr. Cannon ()   Follow Up: 1 Week            Time Spent on Discharge:  Greater than 30 minutes      Santino Mayes MD  [facility]  06/14/25  13:34 EDT

## 2025-06-14 NOTE — PROGRESS NOTES
[facility] Clinical Pharmacy Services: Warfarin Dosing/Monitoring Consult    Shani Phillip is a 74 y.o. female, estimated creatinine clearance is 30.1 mL/min (A) (by C-G formula based on SCr of 1.44 mg/dL (H)). weighing 67 kg (147 lb 9.6 oz).    Results from last 7 days   Lab Units 06/14/25  0625 06/13/25  0649 06/12/25  0636 06/12/25  0635 06/11/25  0940 06/11/25  0536 06/10/25  0654 06/10/25  0246   INR  1.66* 1.86* 2.14*  --  2.73*  --   --  2.30*   HEMOGLOBIN g/dL 12.3 11.9*  --  11.6*  --  13.1 14.6 15.9   HEMATOCRIT % 38.2 36.6  --  35.9  --  37.7 43.1 46.9*   PLATELETS 10*3/mm3 201 145  --  158  --  157 231 211     Prior to admission anticoagulation: Per [facility] (6/6), 2.5 mg qd    Hospital Anticoagulation:  Consulting provider: Dr. Santino Mayes  Start date: 6/10  Indication: A Fib - requiring full anticoagulation  Target INR: 2 - 3  Expected duration: Indefinite   Bridge Therapy: No     Potential food or drug interactions:   - Ceftriaxone- may enhance anticoagulant effects of warfarin (6/10-6/15)  - Prednisone- may increase anticoagulant effect of warfarin (6/13- )  - Levothyroxine- may increase anticoagulant effect of warfarin (home med)  - Torsemide- may increase concentration of warfarin (was initially held during admission, but restarted 6/13- home med)    Education complete?/Date: N/A; home medication    Assessment/Plan:  Inr remains sub therapeutic at 1.66 ( received 2.5mg dose yesterday)     will give one time dose of warfarin 5mg tonight.  Monitor for any signs or symptoms of bleeding  Follow up daily INRs and dose adjustments    Pharmacy will continue to follow until discharge or discontinuation of warfarin.     Risa Aguirre, [facility]    Clinical Pharmacist

## 2025-06-16 ENCOUNTER — NURSE TRIAGE (OUTPATIENT)
Dept: CALL CENTER | Facility: HOSPITAL | Age: 75
End: 2025-06-16
Payer: MEDICARE

## 2025-06-16 ENCOUNTER — TRANSITIONAL CARE MANAGEMENT TELEPHONE ENCOUNTER (OUTPATIENT)
Dept: CALL CENTER | Facility: HOSPITAL | Age: 75
End: 2025-06-16
Payer: MEDICARE

## 2025-06-16 RX ORDER — FAMOTIDINE 20 MG/1
20 TABLET, FILM COATED ORAL
Qty: 180 TABLET | Refills: 1 | Status: SHIPPED | OUTPATIENT
Start: 2025-06-16

## 2025-06-16 RX ORDER — FAMOTIDINE 20 MG/1
20 TABLET, FILM COATED ORAL
Qty: 180 TABLET | Refills: 0 | OUTPATIENT
Start: 2025-06-16

## 2025-06-16 NOTE — CASE MANAGEMENT/SOCIAL WORK
Case Management Discharge Note      Final Note: Pt discharged home with spouse on 6/14.   JAMISON Castaneda RN         Selected Continued Care - Discharged on 6/14/2025 Admission date: 6/10/2025 - Discharge disposition: Home or Self Care      Destination    No services have been selected for the patient.                Durable Medical Equipment    No services have been selected for the patient.                Dialysis/Infusion    No services have been selected for the patient.                Home Medical Care    No services have been selected for the patient.                Therapy    No services have been selected for the patient.                Community Resources    No services have been selected for the patient.                Community & DME    No services have been selected for the patient.                    Selected Continued Care - Episodes Includes continued care and service providers with selected services from the active episodes listed below          Transportation Services  Private: Car    Final Discharge Disposition Code: 01 - home or self-care

## 2025-06-16 NOTE — TELEPHONE ENCOUNTER
"DCd from [facility] this past weekend. Needs info for  follow up. Name and number provided and transferred for appt assistance. No further questions at this time.     Reason for Disposition   [1] Follow-up call to recent contact AND [2] information only call, no triage required    Additional Information   Negative: [1] Caller is not with the adult (patient) AND [2] reporting urgent symptoms   Negative: Lab result questions   Negative: Medication questions   Negative: Caller can't be reached by phone   Negative: Caller has already spoken to PCP or another triager   Negative: RN needs further essential information from caller in order to complete triage   Negative: Requesting regular office appointment   Negative: [1] Caller requesting NON-URGENT health information AND [2] PCP's office is the best resource   Negative: Health Information question, no triage required and triager able to answer question   Negative: General information question, no triage required and triager able to answer question   Negative: Question about upcoming scheduled test, no triage required and triager able to answer question   Negative: [1] Caller is not with the adult (patient) AND [2] probable NON-URGENT symptoms    Answer Assessment - Initial Assessment Questions  1. REASON FOR CALL or QUESTION: \"What is your reason for calling today?\" or \"How can I best help you?\" or \"What question do you have that I can help answer?\"      DCd from [facility] this past weekend. Needs info for  follow up. Name and number provided and transferred for appt assistance. No further questions at this time.    Protocols used: Information Only Call - No Triage-ADULT-    "

## 2025-06-16 NOTE — OUTREACH NOTE
Call Center TCM Note      Flowsheet Row Responses   [facility] patient discharged from? [facility]   Does the patient have one of the following disease processes/diagnoses(primary or secondary)? Other   TCM attempt successful? Yes   Call start time 1406   Call end time 1412   Discharge diagnosis Horseshoe kidney with renal calculus,  cystoscopy with stent   Meds reviewed with patient/caregiver? Yes   Is the patient having any side effects they believe may be caused by any medication additions or changes? No   Does the patient have all medications ordered at discharge? Yes   Prescription comments New rx's Cefuroxime, Metoprolol tartrate, Midodrine, Prednisone in place. Pt states understanding of all the changes   Is the patient taking all medications as directed (includes completed medication regime)? Yes   Does the patient have an appointment with their PCP within 7-14 days of discharge? No appointments available   Nursing Interventions Patient desires to follow up with specialty only, Routed TCM call to PCP office, Patient declined scheduling/rescheduling appointment at this time   Has home health visited the patient within 72 hours of discharge? N/A   Psychosocial issues? No   Did the patient receive a copy of their discharge instructions? Yes   Nursing interventions Reviewed instructions with patient   What is the patient's perception of their health status since discharge? Improving   Is the patient/caregiver able to teach back signs and symptoms related to disease process for when to call PCP? Yes   Is the patient/caregiver able to teach back signs and symptoms related to disease process for when to call 911? Yes   Is the patient/caregiver able to teach back the hierarchy of who to call/visit for symptoms/problems? PCP, Specialist, Home health nurse, Urgent Care, ED, 911 Yes   If the patient is a current smoker, are they able to teach back resources for cessation? Not a smoker   TCM call completed? Yes    Wrap up additional comments D/C DX: Back pain,  Horseshoe kidney with renal calculus,  cystoscopy with stent - Pt states she is feeling better. She is scheduling Hosp follow ups with Urology and Nephrology. Declines TCM APPT [facility] PCP Dr Walker for now.   Call end time 1412            ELIJAH MAYBERRY - Medical Assistant    6/16/2025, 14:16 EDT

## 2025-06-17 ENCOUNTER — TELEPHONE (OUTPATIENT)
Dept: FAMILY MEDICINE CLINIC | Facility: CLINIC | Age: 75
End: 2025-06-17
Payer: MEDICARE

## 2025-06-17 LAB
BACTERIA SPEC AEROBE CULT: NORMAL
BACTERIA SPEC AEROBE CULT: NORMAL

## 2025-06-17 NOTE — TELEPHONE ENCOUNTER
"Received a fax from [facility] for Comprehensive [facility]rology Gene testing.  Pt refused. Wrote \"patient refused\" and faxed back.    [facility]  "

## 2025-06-19 ENCOUNTER — ANTICOAGULATION VISIT (OUTPATIENT)
Dept: PHARMACY | Facility: HOSPITAL | Age: 75
End: 2025-06-19
Payer: MEDICARE

## 2025-06-19 DIAGNOSIS — I48.20 ATRIAL FIBRILLATION, CHRONIC: Primary | Chronic | ICD-10-CM

## 2025-06-19 LAB — INR PPP: 4.4

## 2025-06-19 PROCEDURE — G0249 PROVIDE INR TEST MATER/EQUIP: HCPCS

## 2025-06-20 ENCOUNTER — PATIENT OUTREACH (OUTPATIENT)
Dept: CASE MANAGEMENT | Facility: OTHER | Age: 75
End: 2025-06-20
Payer: MEDICARE

## 2025-06-20 DIAGNOSIS — I50.32 CHRONIC DIASTOLIC CHF (CONGESTIVE HEART FAILURE): Primary | ICD-10-CM

## 2025-06-20 DIAGNOSIS — J44.9 CHRONIC OBSTRUCTIVE PULMONARY DISEASE, UNSPECIFIED COPD TYPE: ICD-10-CM

## 2025-06-20 NOTE — OUTREACH NOTE
"AMBULATORY CASE MANAGEMENT NOTE    Names and Relationships of Patient/Support Persons: Contact: Shani Phillip; Relationship: Self -     Adult Patient Profile  Questions/Answers      Flowsheet Row Most Recent Value   Symptoms/Conditions Managed at Home cardiovascular   Barriers to Managing Health understanding health advice   Cardiovascular Symptoms/Conditions heart failure   Cardiovascular Management Strategies diet modification, routine screening, weight management, medication therapy, adequate rest, coping strategies   Cardiovascular Self-Management Outcome unsure          CCM Interim Update    Called and spoke to patient for CCM update. Recently hospitalized for renal calculus. Patient scheduled to see Nephrology for 6/24. Reminded to schedule f/u with Urology - office number provided. She states she continues to have pain when voiding, encouraged to stay well hydrated. F/U with CHF and COPD management. She reported breathing is \"about the same\". She currently wears O2 at 3L and weaning herself down to 2L if tolerated. She reported daily weight monitoring and weight gain and loss 2 lbs. Reviewed with patient when to notify Cardiology of weight gain 2-3 lbs within 24 hrs and >5 lbs in a week. Reminded to strict low salt diet. She reported compliance with her medications and have no questions at this time. Refused to schedule f/u appt with pcp, patient would like to follow up with specialties at this time. CCM will continue to follow.     Education Documentation  Unresolved/Worsening Symptoms, taught by Lester Garcia RN at 6/20/2025  9:55 AM.  Learner: Patient  Readiness: Acceptance  Method: Explanation  Response: Verbalizes Understanding    Weight Monitoring, taught by Lester Garcia RN at 6/20/2025  9:55 AM.  Learner: Patient  Readiness: Acceptance  Method: Explanation  Response: Verbalizes Understanding    Self-Care, taught by Lester Garcia RN at 6/20/2025  9:55 AM.  Learner: Patient  Readiness: " Acceptance  Method: Explanation  Response: Verbalizes Understanding    Medication Management, taught by Lester Garcia, RN at 6/20/2025  9:55 AM.  Learner: Patient  Readiness: Acceptance  Method: Explanation  Response: Verbalizes Understanding    Diet Modification, taught by Lester Garcia, RN at 6/20/2025  9:55 AM.  Learner: Patient  Readiness: Acceptance  Method: Explanation  Response: Verbalizes Understanding          Lester BUTT  Ambulatory Case Management    6/20/2025, 09:55 EDT

## 2025-06-20 NOTE — PLAN OF CARE
Problem: Heart Failure  Goal: Track and Manage Fluids and Swelling  Outcome: Progressing  Intervention: My Fluids and Swelling To Do List  Description:   Why is this important?  It is important to check your weight at home every day.  Also, check for swelling in your feet and legs every day.  Keeping your legs up while you are sitting and doing ankle pump exercises will help with the swelling.    Flowsheets (Taken 6/20/2025 0944)  My Fluids and Swelling To Do List:   call office if I gain more than 2 pounds in 1 day or 5 pounds in 1 week   use salt in moderation   watch for swelling in feet, ankles and legs every day   weigh myself every day  Goal: Track and Manage Symptoms  Outcome: Progressing  Intervention: My Heart Failure Symptoms To Do List  Description:   Why is this important?  You will be able to handle your symptoms better if you keep track of them.  Making some simple changes to your lifestyle will help.  Knowing how to self-manage shortness of breath and when to call the doctor is very important.  Eating healthy is one thing you can do to take care of yourself.    Flowsheets (Taken 6/20/2025 0944)  My Heart Failure Symptoms To Do List:   follow rescue plan if symptoms flare up   know when to call the doctor  Goal: Track and Manage Activity and Exertion  Outcome: Progressing  Goal: Optimal Care Coordination of a Patient Experiencing Heart Failure  Outcome: Progressing  Intervention: Identify and Minimize Risk of Heart Failure Exacerbation  Flowsheets (Taken 6/20/2025 0944)  Identify and Minimize Risk of Heart Failure Exacerbation:   barriers to lifestyle changes reviewed and addressed   barriers to treatment reviewed and addressed   healthy lifestyle encouraged   medication-adherence assessment completed  Intervention: Identify and Manage Comorbidities and Complications  Flowsheets (Taken 6/20/2025 0944)  Identify and Manage Comorbidities and Complications: healthy lifestyle encouraged  Intervention:  Alleviate Barriers to Optimal Nutrition and Fluid Status  Flowsheets (Taken 6/20/2025 0944)  Alleviate Barriers to Optimal Nutrition and Fluid Status:   barriers to sufficient nutrient intake addressed   home monitoring of weight gain or loss encouraged  Intervention: Maintain Strength and Functional Ability  Flowsheets (Taken 6/20/2025 0944)  Maintain Strength and Functional Ability:   barriers to activity identified and managed   activity or exercise based on tolerance encouraged   breathing techniques encouraged  Intervention: Support Psychosocial Response to Heart Failure  Flowsheets (Taken 6/20/2025 0944)  Support Psychosocial Response to Heart Failure: verbalization of feelings encouraged  Intervention: Develop and Maintain Palliative Care Plan  Flowsheets (Taken 6/20/2025 0944)  Develop and Maintain Palliative Care Plan:   active listening utilized   positive reinforcement provided     Problem: COPD  Goal: Track and Manage My Symptoms  Outcome: Progressing  Goal: Track and Manage My Triggers  Outcome: Progressing  Goal: Manage My Fatigue (Tiredness)  Outcome: Progressing  Intervention: My Fatigue Management To Do List  Description:   Why is this important?  Feeling tired or worn out is a common symptom of COPD (chronic obstructive pulmonary disease).  Learning when you feel your best and when you need rest is important.  Managing the tiredness (fatigue) will help you be active and enjoy life.    Flowsheets (Taken 6/20/2025 0944)  My Fatigue Management To Do List: eat healthy  Goal: Learn and Do Breathing Exercises  Outcome: Progressing  Intervention: My Breathing Exercises To Do List  Description:   Why is this important?  Breathing exercises can help lessen the cough that comes with chronic obstructive pulmonary disease.  Doing the exercises will give you more energy.  They will also help you to control your symptoms.    Flowsheets (Taken 6/20/2025 0944)  My Breathing Exercises To Do List: do breathing  exercises every day  Goal: Optimal Care Coordination of a Patient Experiencing COPD  Outcome: Progressing

## 2025-06-20 NOTE — PROGRESS NOTES
Anticoagulation Clinic Progress Note    Anticoagulation Summary  As of 2025      INR goal:  2.0-3.0   TTR:  51.5% (4.2 y)   INR used for dosin.40 (2025)   Warfarin maintenance plan:  2.5 mg every day   Weekly warfarin total:  17.5 mg   Plan last modified:  Marleny Black RPH (2025)   Next INR check:  2025   Priority:  High   Target end date:  --    Indications    Atrial fibrillation  chronic [I48.20]                 Anticoagulation Episode Summary       INR check location:  --    Preferred lab:  --    Send INR reminders to:   [facility] HOME TEST POOL    Comments:   Home Testing as of 21 *call only when out of range*          Anticoagulation Care Providers       Provider Role Specialty Phone number    Zenia Tinajero MD Referring Cardiology [phone]            Clinic Interview:  Patient Findings     Negatives:  Signs/symptoms of thrombosis, Signs/symptoms of bleeding,   Laboratory test error suspected, Change in health, Change in alcohol use,   Change in activity, Upcoming invasive procedure, Emergency department   visit, Upcoming dental procedure, Missed doses, Extra doses, Change in   medications, Change in diet/appetite, Hospital admission, Bruising, Other   complaints      Clinical Outcomes     Negatives:  Major bleeding event, Thromboembolic event,   Anticoagulation-related hospital admission, Anticoagulation-related ED   visit, Anticoagulation-related fatality        INR History:      2025     9:40 AM 2025     6:36 AM 2025     6:49 AM 2025     6:25 AM 2025    12:00 AM 2025     2:40 PM 2025     2:45 PM   Anticoagulation Monitoring   INR      -- 4.40   INR Date       2025   INR Goal      2.0-3.0 2.0-3.0   Trend       Same   Last Week Total       15 mg   Next Week Total       15 mg   Sun       2.5 mg   Mon       2.5 mg          2.5 mg    ()   Sat       2.5 mg   Historical INR 2.73   2.14  1.86  1.66  4.40             This result is from an external source.       Plan:  1. INR is Supratherapeutic today- see above in Anticoagulation Summary.   Will instruct Shani Phillip to Change their warfarin regimen- see above in Anticoagulation Summary.  pt was in hosp 6/10 to 6/14 for kidney stones,stent placement. Pt was sent home on prednisone for 2 days it appears and cefuroxime that should be ending today. However, when confirming with the patient she thinks she has more days left but is unsure which medication because she already threw out her pill bottles upon filling up her pill box. Hold and recheck on Tuesday   2. Follow up in 1 weeks  3. They have been instructed to call if any changes in medications, doses, concerns, etc. Patient expresses understanding and has no further questions at this time.    Marleny Black [facility]

## 2025-06-24 ENCOUNTER — NURSE TRIAGE (OUTPATIENT)
Dept: CALL CENTER | Facility: HOSPITAL | Age: 75
End: 2025-06-24
Payer: MEDICARE

## 2025-06-24 NOTE — TELEPHONE ENCOUNTER
"I was d/c and supposed to make appt. and I called Dania Yusuf office says I do not, Office was called by Nurse and they say yes she does have appt. today 1:30 be there at 1:15. Dania Yusuf APRN  Nephrology  NPI: [id number]  [id number]  [facility] [id number], Pt. Was notified by Triage Nurse spoke to office and confirmed her appt. Pt. Verbalized understanding gave her the address and phone number of the office. [phone]  Reason for Disposition  • [1] Follow-up call to recent contact AND [2] information only call, no triage required    Additional Information  • Negative: [1] Caller is not with the adult (patient) AND [2] reporting urgent symptoms  • Negative: Lab result questions  • Negative: Medication questions  • Negative: Caller can't be reached by phone  • Negative: Caller has already spoken to PCP or another triager  • Negative: RN needs further essential information from caller in order to complete triage  • Negative: Requesting regular office appointment  • Negative: [1] Caller requesting NON-URGENT health information AND [2] PCP's office is the best resource  • Negative: Health Information question, no triage required and triager able to answer question  • Negative: General information question, no triage required and triager able to answer question  • Negative: Question about upcoming scheduled test, no triage required and triager able to answer question  • Negative: [1] Caller is not with the adult (patient) AND [2] probable NON-URGENT symptoms    Answer Assessment - Initial Assessment Questions  1. REASON FOR CALL or QUESTION: \"What is your reason for calling today?\" or \"How can I best help you?\" or \"What question do you have that I can help answer?\"      Appt. Question for today    Protocols used: Information Only Call - No Triage-ADULT-AH    "

## 2025-06-24 NOTE — TELEPHONE ENCOUNTER
Pt. Also told will have appt. With Urology Dr. Eleazar Cannon, office will call her from Urololgy  for the  appt. She verbalized understanding.

## 2025-06-26 ENCOUNTER — ANTICOAGULATION VISIT (OUTPATIENT)
Dept: PHARMACY | Facility: HOSPITAL | Age: 75
End: 2025-06-26
Payer: MEDICARE

## 2025-06-26 DIAGNOSIS — I48.20 ATRIAL FIBRILLATION, CHRONIC: Primary | Chronic | ICD-10-CM

## 2025-06-26 LAB — INR PPP: 6.4

## 2025-06-26 NOTE — PROGRESS NOTES
Anticoagulation Clinic Progress Note    Anticoagulation Summary  As of 2025      INR goal:  2.0-3.0   TTR:  51.3% (4.2 y)   INR used for dosin.40 (2025)   Warfarin maintenance plan:  2.5 mg every day   Weekly warfarin total:  17.5 mg   Plan last modified:  Marleny Black [facility] (2025)   Next INR check:  2025   Priority:  High   Target end date:  --    Indications    Atrial fibrillation  chronic [I48.20]                 Anticoagulation Episode Summary       INR check location:  --    Preferred lab:  --    Send INR reminders to:   [facility] HOME TEST POOL    Comments:   Home Testing as of 21 *call only when out of range*          Anticoagulation Care Providers       Provider Role Specialty Phone number    Zenia Tinajero MD Referring Cardiology [phone]            Clinic Interview:  Patient Findings     Negatives:  Signs/symptoms of thrombosis, Signs/symptoms of bleeding,   Laboratory test error suspected, Change in health, Change in alcohol use,   Change in activity, Upcoming invasive procedure, Emergency department   visit, Upcoming dental procedure, Missed doses, Extra doses, Change in   medications, Change in diet/appetite, Hospital admission, Bruising, Other   complaints      Clinical Outcomes     Negatives:  Major bleeding event, Thromboembolic event,   Anticoagulation-related hospital admission, Anticoagulation-related ED   visit, Anticoagulation-related fatality        INR History:      2025     6:49 AM 2025     6:25 AM 2025    12:00 AM 2025     2:40 PM 2025     2:45 PM 2025    12:00 AM 2025    10:15 AM   Anticoagulation Monitoring   INR    -- 4.40  6.40   INR Date     2025   INR Goal    2.0-3.0 2.0-3.0  2.0-3.0   Trend     Same  Same   Last Week Total     15 mg  15 mg   Next Week Total     15 mg  12.5 mg   Sun     2.5 mg  2.5 mg   Mon     2.5 mg  -   Tue     -  -   Wed     -  -   Thu     2.5 mg  Hold ()   Fri      Hold (6/20)  Hold (6/27)   Sat     2.5 mg  2.5 mg   Historical INR 1.86  1.66  4.40       6.40            This result is from an external source.       Plan:  1. INR is Supratherapeutic today- see above in Anticoagulation Summary.   Will instruct Shani Phillip to Change their warfarin regimen- see above in Anticoagulation Summary.  Pt really has no idea if she is taking abx or prednisone. She should be finished with all of those as of last Thursday if taken correctly. She was due to recheck on Tuesday but waited until today. Hold x 2 days and recheck on Monday. Currently no signs of bleeding but discussed in detail that she really needs to check on Monday as the INR is continuing to rise.   2. Follow up on Monday   3. They have been instructed to call if any changes in medications, doses, concerns, etc. Patient expresses understanding and has no further questions at this time.    Marleny Black, [facility]

## 2025-06-30 ENCOUNTER — ANTICOAGULATION VISIT (OUTPATIENT)
Dept: PHARMACY | Facility: HOSPITAL | Age: 75
End: 2025-06-30
Payer: MEDICARE

## 2025-06-30 ENCOUNTER — PATIENT OUTREACH (OUTPATIENT)
Dept: CASE MANAGEMENT | Facility: OTHER | Age: 75
End: 2025-06-30
Payer: MEDICARE

## 2025-06-30 DIAGNOSIS — I48.20 ATRIAL FIBRILLATION, CHRONIC: Primary | Chronic | ICD-10-CM

## 2025-06-30 DIAGNOSIS — J44.9 CHRONIC OBSTRUCTIVE PULMONARY DISEASE, UNSPECIFIED COPD TYPE: ICD-10-CM

## 2025-06-30 DIAGNOSIS — I50.32 CHRONIC DIASTOLIC CHF (CONGESTIVE HEART FAILURE): Primary | ICD-10-CM

## 2025-06-30 LAB — INR PPP: 4.2

## 2025-06-30 NOTE — OUTREACH NOTE
[facility] End of Month Documentation    This Chronic Medical Management Care Plan for Shani Phillip, 74 y.o. female, has been monitored and managed; reviewed and a new plan of care implemented for the month of June.  A cumulative time of 20  minutes was spent on this patient record this month, including chart review; phone call with patient, CHF and COPD management. Care coordination. Reviewed medication compliance.    Regarding the patient's problems: has Influenza; Thrush, oral; COPD (chronic obstructive pulmonary disease); Atrial fibrillation with RVR; Chronic respiratory failure with hypoxia; Endocarditis of mitral valve; Mitral valve vegetation; Chronic diastolic CHF (congestive heart failure); Atrial fibrillation, chronic; Bilateral lower extremity edema; Intermittent lightheadedness; Depression; Chronic midline low back pain with left-sided sciatica; Renal stone; Oxygen dependent; Medicare annual wellness visit, subsequent; Osteoporosis; Chronic anticoagulation; Nonrheumatic mitral valve stenosis; Chest pain, unspecified type; Chronic heart failure with preserved ejection fraction (HFpEF); Overweight with body mass index (BMI) 25.0-29.9; Acquired hypothyroidism; Acute on chronic combined systolic and diastolic CHF (congestive heart failure); Age-related macular degeneration; Restless legs syndrome; Horseshoe kidney with renal calculus; JAIME (acute kidney injury); Stage 3b chronic kidney disease; Chronic combined systolic and diastolic CHF (congestive heart failure); Acute on chronic combined systolic and diastolic CHF (congestive heart failure); and Acute pyelonephritis on their problem list., the following items were addressed: medical records; medications and any changes can be found within the plan section of the note.  A detailed listing of time spent for chronic care management is tracked within each outreach encounter.  Current medications include:  has a current medication list which includes the following  prescription(s): ammonium lactate, budesonide, bupropion xl, cyclobenzaprine, digoxin, famotidine, ferrous sulfate, hydrocodone-acetaminophen, ipratropium-albuterol, levothyroxine, metoprolol tartrate, midodrine, o2, ondansetron, pantoprazole, potassium chloride er, pramipexole, yupelri, spironolactone, torsemide, trospium, and warfarin. and the patient is reported to be patient is compliant with medication protocol,  Medications are reported to be effective.  Regarding these diagnoses, referrals were made to the following provider(s):  n/a.  All notes on chart for PCP to review.    The patient was monitored remotely for weight; activity level; medications; blood pressure.    The patient's physical needs include:  help taking medications as prescribed; physical healthcare; DME supplies.     The patient's mental support needs include:  continued support    The patient's cognitive support needs include:  continued support; medication    The patient's psychosocial support needs include:  medication management or adherence; continued support    The patient's functional needs include: DME; physical healthcare    The patient's environmental needs include:  not applicable, n/a    Care Plan overall comments:  No data recorded    Refer to previous outreach notes for more information on the areas listed above.    Monthly Billing Diagnoses  (I50.32) Chronic diastolic CHF (congestive heart failure)    (J44.9) Chronic obstructive pulmonary disease, unspecified COPD type    Medications   Medications have been reconciled    Care Plan progress this month:      Recently Modified Care Plans Updates made since 5/30/2025 12:00 AM      No recently modified care plans.             COPD       Track and Manage My Symptoms (Progressing)       Start:  02/28/25    Expected End:  08/28/25         My COPD Symptoms To Do List       Start:  02/28/25         Why is this important?  Tracking your symptoms and other information about your health  helps your doctor plan your care.  Write down the symptoms, the time of day, what you were doing and what medicine you are taking.  You will soon learn how to manage your symptoms.         Initial Last    My COPD Symptoms To Do List: keep follow-up appointments taken at 02/28/25 keep follow-up appointments;use an extra pillow to sleep taken at 03/24/25            Track and Manage My Triggers (Progressing)       Start:  02/28/25    Expected End:  08/28/25         My COPD Triggers To Do List       Start:  02/28/25         Why is this important?  Triggers are activities or things, like tobacco smoke or cold weather, that make your COPD (chronic obstructive pulmonary disease) flare up.  Knowing these triggers helps you plan how to stay away from them.  When you cannot remove them, you can learn how to manage them.              Manage My Fatigue (Tiredness) (Progressing)       Start:  02/28/25    Expected End:  08/28/25         My Fatigue Management To Do List       Start:  02/28/25         Why is this important?  Feeling tired or worn out is a common symptom of COPD (chronic obstructive pulmonary disease).  Learning when you feel your best and when you need rest is important.  Managing the tiredness (fatigue) will help you be active and enjoy life.         Initial Last    My Fatigue Management To Do List: keep bedroom cool and dark taken at 03/24/25 eat healthy taken at 06/20/25            Learn and Do Breathing Exercises (Progressing)       Start:  02/28/25    Expected End:  08/28/25         My Breathing Exercises To Do List       Start:  02/28/25         Why is this important?  Breathing exercises can help lessen the cough that comes with chronic obstructive pulmonary disease.  Doing the exercises will give you more energy.  They will also help you to control your symptoms.         Initial    My Breathing Exercises To Do List: do breathing exercises every day taken at 06/20/25            Optimal Care Coordination of a  Patient Experiencing COPD (Progressing)       Start:  02/28/25    Expected End:  08/28/25         Support Psychosocial Response to COPD       Start:  02/28/25          Initial Last    Support Psychosocial Response to COPD: verbalization of feelings encouraged taken at 02/28/25 verbalization of feelings encouraged taken at 03/24/25         Alleviate Barriers to COPD Management       Start:  02/28/25          Initial Last    Alleviate Barriers to COPD Management: barriers to treatment managed taken at 02/28/25 barriers to treatment managed;activity or exercise based on tolerance encouraged taken at 03/24/25         Identify and Minimize Risk of COPD Exacerbation       Start:  02/28/25          Initial Last    Identify and Minimize Risk of COPD Exacerbation: barriers to treatment reviewed and addressed;barriers to lifestyle changes reviewed and addressed taken at 02/28/25 barriers to lifestyle changes reviewed and addressed;barriers to treatment reviewed and addressed taken at 03/24/25           Heart Failure       Track and Manage Fluids and Swelling (Progressing)       Start:  02/28/25    Expected End:  08/28/25         My Fluids and Swelling To Do List       Start:  02/28/25         Why is this important?  It is important to check your weight at home every day.  Also, check for swelling in your feet and legs every day.  Keeping your legs up while you are sitting and doing ankle pump exercises will help with the swelling.         Initial Last    My Fluids and Swelling To Do List: call office if I gain more than 2 pounds in 1 day or 5 pounds in 1 week;track weight in diary;weigh myself every day;watch for swelling in feet, ankles and legs every day taken at 02/28/25 call office if I gain more than 2 pounds in 1 day or 5 pounds in 1 week;use salt in moderation;watch for swelling in feet, ankles and legs every day;weigh myself every day taken at 06/20/25            Track and Manage Symptoms (Progressing)       Start:   02/28/25    Expected End:  08/28/25         My Heart Failure Symptoms To Do List       Start:  02/28/25         Why is this important?  You will be able to handle your symptoms better if you keep track of them.  Making some simple changes to your lifestyle will help.  Knowing how to self-manage shortness of breath and when to call the doctor is very important.  Eating healthy is one thing you can do to take care of yourself.         Initial Last    My Heart Failure Symptoms To Do List: know when to call the doctor taken at 02/28/25 follow rescue plan if symptoms flare up;know when to call the doctor taken at 06/20/25            Track and Manage Activity and Exertion (Progressing)       Start:  02/28/25    Expected End:  08/28/25         My Activity and Exertion To Do List       Start:  02/28/25         Why is this important?  Exercising is very important when managing your heart failure.  It will help your heart get stronger.  Wearing a pedometer or using a fitness tracker can help you stay motivated.              Optimal Care Coordination of a Patient Experiencing Heart Failure (Progressing)       Start:  02/28/25    Expected End:  08/28/25         Identify and Minimize Risk of Heart Failure Exacerbation       Start:  02/28/25          Initial Last    Identify and Minimize Risk of Heart Failure Exacerbation: barriers to lifestyle changes reviewed and addressed;barriers to treatment reviewed and addressed taken at 02/28/25 barriers to lifestyle changes reviewed and addressed;barriers to treatment reviewed and addressed;healthy lifestyle encouraged;medication-adherence assessment completed taken at 06/20/25         Identify and Manage Comorbidities and Complications       Start:  02/28/25          Initial Last    Identify and Manage Comorbidities and Complications: response to pharmacologic therapy monitored taken at 03/24/25 healthy lifestyle encouraged taken at 06/20/25         Alleviate Barriers to Optimal  Nutrition and Fluid Status       Start:  02/28/25          Initial Last    Alleviate Barriers to Optimal Nutrition and Fluid Status: barriers to sufficient nutrient intake addressed;home monitoring of weight gain or loss encouraged taken at 02/28/25 barriers to sufficient nutrient intake addressed;home monitoring of weight gain or loss encouraged taken at 06/20/25         Maintain Strength and Functional Ability       Start:  02/28/25          Initial Last    Maintain Strength and Functional Ability: self-care encouraged;barriers to activity identified and managed taken at 02/28/25 barriers to activity identified and managed;activity or exercise based on tolerance encouraged;breathing techniques encouraged taken at 06/20/25         Monitor and Manage Sleep Disturbance       Start:  02/28/25          Initial    Monitor and Manage Sleep Disturbance: oxygen therapy promoted using O2 at 2L taken at 03/24/25         Support Psychosocial Response to Heart Failure       Start:  02/28/25          Initial Last    Support Psychosocial Response to Heart Failure: verbalization of feelings encouraged taken at 02/28/25 verbalization of feelings encouraged taken at 06/20/25         Develop and Maintain Palliative Care Plan       Start:  02/28/25          Initial Last    Develop and Maintain Palliative Care Plan: active listening utilized taken at 03/24/25 active listening utilized;positive reinforcement provided taken at 06/20/25             Instructions   Patient was provided an electronic copy of care plan  CCM services were explained and offered and patient has accepted these services.  Patient has given their written consent to receive CCM services and understands that this includes the authorization of electronic communication of medical information with the other treating providers.  Patient understands that they may stop CCM services at any time and these changes will be effective at the end of the calendar month and will  effectively revocate the agreement of CCM services.  Patient understands that only one practitioner can furnish and be paid for CCM services during one calendar month.  Patient also understands that there may be co-payment or deductible fees in association with CCM services.  Patient will continue with at least monthly follow-up calls with the Ambulatory .    Lester BUTT  Ambulatory Case Management    6/30/2025, 09:20 EDT

## 2025-06-30 NOTE — PROGRESS NOTES
Anticoagulation Clinic Progress Note    Anticoagulation Summary  As of 2025      INR goal:  2.0-3.0   TTR:  51.1% (4.3 y)   INR used for dosin.20 (2025)   Warfarin maintenance plan:  2.5 mg every day   Weekly warfarin total:  17.5 mg   Plan last modified:  Marleny Black KAREEN (2025)   Next INR check:  7/3/2025   Priority:  High   Target end date:  --    Indications    Atrial fibrillation  chronic [I48.20]                 Anticoagulation Episode Summary       INR check location:  --    Preferred lab:  --    Send INR reminders to:   [facility] [employer] HOME TEST POOL    Comments:   Home Testing as of 21 *call only when out of range*          Anticoagulation Care Providers       Provider Role Specialty Phone number    Zenia Tinajero MD Referring Cardiology [phone]            Clinic Interview:  Patient Findings     Negatives:  Signs/symptoms of thrombosis, Signs/symptoms of bleeding,   Laboratory test error suspected, Change in health, Change in alcohol use,   Change in activity, Upcoming invasive procedure, Emergency department   visit, Upcoming dental procedure, Missed doses, Extra doses, Change in   medications, Change in diet/appetite, Hospital admission, Bruising, Other   complaints    Comments:  Confirmed tablet strength (looks blue to them, but 2.5 is   imprinted on one side). Spouse confirmed that she actually finished her   course of prednisone on 25. It does not appear she is on an   antibiotic any longer either.       Clinical Outcomes     Negatives:  Major bleeding event, Thromboembolic event,   Anticoagulation-related hospital admission, Anticoagulation-related ED   visit, Anticoagulation-related fatality    Comments:  Confirmed tablet strength (looks blue to them, but 2.5 is   imprinted on one side). Spouse confirmed that she actually finished her   course of prednisone on 25. It does not appear she is on an   antibiotic any longer either.         INR History:       6/19/2025    12:00 AM 6/19/2025     2:40 PM 6/19/2025     2:45 PM 6/26/2025    12:00 AM 6/26/2025    10:15 AM 6/30/2025    12:00 AM 6/30/2025     9:20 AM   Anticoagulation Monitoring   INR  -- 4.40  6.40  4.20   INR Date   6/19/2025 6/26/2025 6/30/2025   INR Goal  2.0-3.0 2.0-3.0  2.0-3.0  2.0-3.0   Trend   Same  Same  Same   Last Week Total   15 mg  15 mg  12.5 mg   Next Week Total   15 mg  12.5 mg  12.5 mg   Sun   2.5 mg  2.5 mg  -   Mon   2.5 mg  -  Hold (6/30)   Tue   -  -  1.25 mg (7/1)   Wed   -  -  1.25 mg (7/2)   Thu   2.5 mg  Hold (6/26)  -   Fri   Hold (6/20)  Hold (6/27)  -   Sat   2.5 mg  2.5 mg  -   Historical INR 4.40       6.40      4.20            This result is from an external source.       Plan:  1. INR is Supratherapeutic today- see above in Anticoagulation Summary.   Will instruct Shani Phillip to Change their warfarin regimen (HOLD today, then take 1.25 mg daily until INR check in 3 days) - see above in Anticoagulation Summary.  2. Follow up in 3 days.   3. They have been instructed to call if any changes in medications, doses, concerns, etc. Patient expresses understanding and has no further questions at this time.    Keshawn Castaneda, PharmD

## 2025-07-02 ENCOUNTER — TELEPHONE (OUTPATIENT)
Age: 75
End: 2025-07-02
Payer: MEDICARE

## 2025-07-02 NOTE — TELEPHONE ENCOUNTER
Faxed clearance request received from Dr. Eleazar Cannon with [facility].    Patient is needing clearance to move forward with shockwave lithotripsy with possible stent removal ASAP.    She is on warfarin and they are requesting that she hold for 5 days prior.    Last seen 3/28/25, NO hx of stroke or valve.

## 2025-07-03 ENCOUNTER — ANTICOAGULATION VISIT (OUTPATIENT)
Dept: PHARMACY | Facility: HOSPITAL | Age: 75
End: 2025-07-03
Payer: MEDICARE

## 2025-07-03 DIAGNOSIS — I48.20 ATRIAL FIBRILLATION, CHRONIC: Primary | Chronic | ICD-10-CM

## 2025-07-03 PROBLEM — S72.002A CLOSED LEFT HIP FRACTURE: Status: ACTIVE | Noted: 2025-07-03

## 2025-07-03 LAB — INR PPP: 2

## 2025-07-03 NOTE — PROGRESS NOTES
Anticoagulation Clinic Progress Note    Anticoagulation Summary  As of 7/3/2025      INR goal:  2.0-3.0   TTR:  51.1% (4.3 y)   INR used for dosin.00 (7/3/2025)   Warfarin maintenance plan:  2.5 mg every day   Weekly warfarin total:  17.5 mg   Plan last modified:  Marleny Black [facility] (2025)   Next INR check:  2025   Priority:  High   Target end date:  --    Indications    Atrial fibrillation  chronic [I48.20]                 Anticoagulation Episode Summary       INR check location:  --    Preferred lab:  --    Send INR reminders to:   [facility] [employer] HOME TEST POOL    Comments:   Home Testing as of 21 *call only when out of range*          Anticoagulation Care Providers       Provider Role Specialty Phone number    Zenia Tinajero MD Referring Cardiology [phone]            Clinic Interview:  Patient Findings     Positives:  Upcoming invasive procedure    Negatives:  Signs/symptoms of thrombosis, Signs/symptoms of bleeding,   Laboratory test error suspected, Change in health, Change in alcohol use,   Change in activity, Emergency department visit, Upcoming dental procedure,   Missed doses, Extra doses, Change in medications, Change in diet/appetite,   Hospital admission, Bruising, Other complaints      Clinical Outcomes     Negatives:  Major bleeding event, Thromboembolic event,   Anticoagulation-related hospital admission, Anticoagulation-related ED   visit, Anticoagulation-related fatality        INR History:      2025     2:45 PM 2025    12:00 AM 2025    10:15 AM 2025    12:00 AM 2025     9:20 AM 7/3/2025    12:00 AM 7/3/2025    11:06 AM   Anticoagulation Monitoring   INR 4.40  6.40  4.20  2.00   INR Date 2025  2025  2025  7/3/2025   INR Goal 2.0-3.0  2.0-3.0  2.0-3.0  2.0-3.0   Trend Same  Same  Same  Same   Last Week Total 15 mg  15 mg  12.5 mg  7.5 mg   Next Week Total 15 mg  12.5 mg  12.5 mg  3.75 mg   Sun 2.5 mg  2.5 mg  -  Hold ()   Mon  2.5 mg  -  Hold (6/30)  -   Tue -  -  1.25 mg (7/1)  -   Wed -  -  1.25 mg (7/2)  -   Thu 2.5 mg  Hold (6/26)  -  1.25 mg (7/3)   Fri Hold (6/20)  Hold (6/27)  -  1.25 mg (7/4)   Sat 2.5 mg  2.5 mg  -  1.25 mg (7/5)   Historical INR  6.40      4.20      2.00            This result is from an external source.       Plan:  1. INR is Therapeutic today- see above in Anticoagulation Summary.   Will instruct Shani Phillip to Change their warfarin regimen- see above in Anticoagulation Summary.  continue 1.25 mg daily, start holding warfarin on 7/6 for urology procedure on 7/11- cleared by cardiology to hold without bridge. I am going to have the patient recheck on Monday just due to INR being very elevated recently so that we know how much to boost after the procedure.   2. Follow up on Monday   3. They have been instructed to call if any changes in medications, doses, concerns, etc. Patient expresses understanding and has no further questions at this time.    Marleny Black [facility]

## 2025-07-03 NOTE — ED PROVIDER NOTES
EMERGENCY DEPARTMENT MD ATTESTATION NOTE    Room Number:  P880/1  PCP: Rodríguez Walker MD  Independent Historians: Patient, Family, and EMS    HPI:  A complete HPI/ROS/PMH/PSH/SH/FH are unobtainable due to: None    Context: Shani Phillip is a 74 y.o. female with a medical history of COPD, CKD, atrial fibrillation and restless leg syndrome who presents to the ED c/o acute severe pain in the left sided hip and shoulder areas after an accidental fall.  She did not strike her head or have any loss of consciousness.  She is anticoagulated on Coumadin for history of atrial fibrillation.  She denies chest pain or difficulties breathing.    Review of prior external notes (non-ED) -and- Review of prior external test results outside of this encounter: I independently reviewed the chest x-ray from June 12, 2025.  Impression indicated interstitial lung opacities bilaterally which may reflect interstitial edema.    Prescription drug monitoring program review: ZHANE reviewed by Raquel Senior MD, Michele Fair MD         PHYSICAL EXAM    I have reviewed the triage vital signs and nursing notes.    ED Triage Vitals [07/03/25 1611]   Temp Heart Rate Resp BP SpO2   97.8 °F (36.6 °C) 117 22 143/70 96 %      Temp src Heart Rate Source Patient Position BP Location FiO2 (%)   -- -- -- -- --       Physical Exam  GENERAL: alert, appears to be in pain.  No sign of distress.  SKIN: Warm, dry  HENT: Normocephalic, atraumatic  EYES: no scleral icterus, normal conjunctiva  CV: Irregular rhythm, regular rate  RESPIRATORY: normal effort, lungs clear  ABDOMEN: soft, nontender, nondistended  MUSCULOSKELETAL: Left lower extremity is foreshortened and externally rotated.  Very tender to palpation in the left hip.  There is mild to moderate tenderness palpation in the left shoulder as well.  Distal neurovascular exam is normal throughout all 4 extremities.  NEURO: alert, moves all extremities, follows commands          MEDICATIONS  GIVEN IN ER  Medications   sodium chloride 0.9 % flush 10 mL (has no administration in time range)   sodium chloride 0.9 % infusion (75 mL/hr Intravenous New Bag 7/4/25 0013)   HYDROcodone-acetaminophen (NORCO) 5-325 MG per tablet 1 tablet (has no administration in time range)   ondansetron ODT (ZOFRAN-ODT) disintegrating tablet 4 mg (has no administration in time range)     Or   ondansetron (ZOFRAN) injection 4 mg (has no administration in time range)   melatonin tablet 2.5 mg (2.5 mg Oral Given 7/3/25 2107)   acetaminophen (TYLENOL) tablet 650 mg (has no administration in time range)     Or   acetaminophen (TYLENOL) 160 MG/5ML oral solution 650 mg (has no administration in time range)     Or   acetaminophen (TYLENOL) suppository 650 mg (has no administration in time range)   sennosides-docusate (PERICOLACE) 8.6-50 MG per tablet 2 tablet (has no administration in time range)     And   polyethylene glycol (MIRALAX) packet 17 g (has no administration in time range)     And   bisacodyl (DULCOLAX) EC tablet 5 mg (has no administration in time range)     And   bisacodyl (DULCOLAX) suppository 10 mg (has no administration in time range)   HYDROmorphone (DILAUDID) injection 0.5 mg (0.5 mg Intravenous Given 7/3/25 2108)     And   naloxone (NARCAN) injection 0.4 mg (has no administration in time range)   ammonium lactate (AMLACTIN) 12 % cream (has no administration in time range)   budesonide (PULMICORT) nebulizer solution 0.5 mg (has no administration in time range)   buPROPion XL (WELLBUTRIN XL) 24 hr tablet 300 mg (has no administration in time range)   cyclobenzaprine (FLEXERIL) tablet 10 mg (10 mg Oral Given 7/4/25 0008)   digoxin (LANOXIN) tablet 125 mcg (has no administration in time range)   famotidine (PEPCID) tablet 20 mg (has no administration in time range)   ferrous sulfate tablet 325 mg (has no administration in time range)   ipratropium-albuterol (DUO-NEB) nebulizer solution 3 mL (has no administration in  time range)   levothyroxine (SYNTHROID, LEVOTHROID) tablet 50 mcg (has no administration in time range)   metoprolol tartrate (LOPRESSOR) tablet 25 mg (25 mg Oral Given 7/4/25 0008)   midodrine (PROAMATINE) tablet 2.5 mg (has no administration in time range)   pantoprazole (PROTONIX) EC tablet 40 mg (has no administration in time range)   potassium chloride (KLOR-CON M20) CR tablet 20 mEq (has no administration in time range)   pramipexole (MIRAPEX) tablet 0.5 mg (has no administration in time range)   revefenacin (YUPELRI) nebulizer solution 175 mcg (has no administration in time range)   spironolactone (ALDACTONE) tablet 25 mg (has no administration in time range)   torsemide (DEMADEX) tablet 40 mg (has no administration in time range)   oxybutynin XL (DITROPAN-XL) 24 hr tablet 5 mg (has no administration in time range)   morphine injection 4 mg (4 mg Intravenous Given 7/3/25 1634)   ondansetron (ZOFRAN) injection 4 mg (4 mg Intravenous Given 7/3/25 1634)   HYDROmorphone (DILAUDID) injection 1 mg (1 mg Intravenous Given 7/3/25 1728)         ORDERS PLACED DURING THIS VISIT:  Orders Placed This Encounter   Procedures    XR Chest 1 View    XR Hip With or Without Pelvis 2 - 3 View Left    XR Shoulder 2+ View Left    XR Femur 2 View Left    Comprehensive Metabolic Panel    Protime-INR    CBC Auto Differential    Basic Metabolic Panel    CBC (No Diff)    Protime-INR    Protime-INR    NPO Diet NPO Type: Strict NPO    Vital Signs    Activity - Strict Bed Rest    Intake & Output    Oral Care    Place Sequential Compression Device    Maintain Sequential Compression Device    Hold Coumadin  Nursing Communication    Code Status and Medical Interventions: CPR (Attempt to Resuscitate); Full Support    [facility] (on-call MD unless specified) Details    Ortho (on-call MD unless specified)    Inpatient Case Management  Consult    Inpatient Orthopedic Surgery Consult    Inpatient Pulmonology Consult    Incentive Spirometry     ECG 12 Lead Pre-Op / Pre-Procedure    Telemetry Scan    Type & Screen    Insert Peripheral IV    Inpatient Admission    CBC & Differential         PROCEDURES  Procedures        PROGRESS, DATA ANALYSIS, CONSULTS, AND MEDICAL DECISION MAKING  All labs have been independently interpreted by me.  All radiology studies have been reviewed by me. All EKG's have been independently viewed and interpreted by me.  Discussion below represents my analysis of pertinent findings related to patient's condition, differential diagnosis, treatment plan and final disposition.    Differential diagnosis includes but is not limited to hip fracture, hip dislocation, hip contusion, shoulder fracture, shoulder dislocation, rotator cuff injury.    Clinical Scores:                                       ED Course as of 07/04/25 0016   u Jul 03, 2025   1627 Presents with injury sustained in a mechanical fall prior to arrival.  Patient reports that she tripped and injured her left hip and left shoulder.  She denies any head trauma.  She is on Coumadin for A-fib.  Patient plan for pain control, x-rays. [EE]   1642 WBC(!): 10.95 [EE]   1642 Hemoglobin: 12.9 [EE]   1720 Left hip images independently interpreted myself show a comminuted intertrochanteric hip fracture. [EE]   1751 I discussed the case with Dr. Senior, [facility].  She agrees to admit. [EE]   1806 EKG independently considered but it myself.  Time 1639.  Atrial fibrillation, rate 105.  QRS shows a right bundle branch block.  No significant ST normalities.  Similar to previous EKG dated 6/10/2025. [EE]   1811 I discussed the case with Dr. Schofield, orthopedics.  He requests a femur film.  He states the patient may eat until midnight. [EE]      ED Course User Index  [EE] Trell Manzano PA       MDM:   EKG         EKG time/Interp time: 1639/1650  Rhythm/Rate: Atrial fibrillation, 105 bpm  P waves and AL: None  QRS, axis: 124 ms, narrow complex, normal axis, right bundle branch block  ST and T  waves: No acute ischemic changes.  Nonspecific T wave inversions in anterior leads  Independently interpreted by me contemporaneously with treatment    I independently interpreted the x-ray of the left hip and pelvis and my findings are: Intertrochanteric fracture.  No dislocation.    I independently interpreted the x-ray of the left shoulder and my findings are: No fracture or dislocation.    I reviewed the test results including the x-ray findings of the hip fracture with the patient and her  at the bedside.  Explained that we will need to admit her to the hospitalist service with orthopedics consult so that we can arrange for surgical repair of her injury.  She expressed understanding of this recommendation and agreed with the plan as outlined.  Will continue with analgesics as needed and monitor her clinical progress carefully.      COMPLEXITY OF CARE  The patient requires admission.    Please note that portions of this document were completed with a voice recognition program.    Note Disclaimer: At [facility], we believe that sharing information builds trust and better relationships. You are receiving this note because you recently visited [facility]. It is possible you will see health information before a provider has talked with you about it. This kind of information can be easy to misunderstand. To help you fully understand what it means for your health, we urge you to discuss this note with your provider.         Michele Fair MD  07/04/25 0019

## 2025-07-03 NOTE — ED TRIAGE NOTES
Pt from home via ems, called for trip and fall and landed on left side, c/o left hip/shoulder pain, zofran 4mg/fentanyl 50mcg per ems, denies loc, not on blood thinners

## 2025-07-03 NOTE — ED NOTES
"Nursing report ED to floor  Shani Phillip  74 y.o.  female    HPI :  HPI  Stated Reason for Visit: fall  History Obtained From: EMS    Chief Complaint  Chief Complaint   Patient presents with    Fall    Hip Injury       Admitting doctor:   Raquel Senior MD    Admitting diagnosis:   The encounter diagnosis was Closed displaced intertrochanteric fracture of left femur, initial encounter.    Code status:   Current Code Status       Date Active Code Status Order ID Comments User Context       7/3/2025 1758 CPR (Attempt to Resuscitate) [id number]  Raquel Senior MD ED        Question Answer    Code Status (Patient has no pulse and is not breathing) CPR (Attempt to Resuscitate)    Medical Interventions (Patient has pulse or is breathing) Full Support                    Allergies:   Penicillins    Isolation:   No active isolations    Intake and Output  No intake or output data in the 24 hours ending 07/03/25 1837    Weight:       07/03/25  1627   Weight: 64 kg (141 lb)       Most recent vitals:   Vitals:    07/03/25 1621 07/03/25 1622 07/03/25 1627 07/03/25 1657   BP: 149/84   (!) 145/117   Pulse: 100 102  99   Resp:    18   Temp:       SpO2:  92%  91%   Weight:   64 kg (141 lb)    Height:   154.9 cm (61\")        Active LDAs/IV Access:   Lines, Drains & Airways       Active LDAs       Name Placement date Placement time Site Days    Peripheral IV 07/03/25 1612 20 G Anterior;Left Forearm 07/03/25  1612  Forearm  less than 1    Ureteral Drain/Stent Left ureter 6 Fr. 06/10/25  1852  Left ureter  22                    Labs (abnormal labs have a star):   Labs Reviewed   COMPREHENSIVE METABOLIC PANEL - Abnormal; Notable for the following components:       Result Value    Glucose 121 (*)     BUN 24.0 (*)     Creatinine 1.78 (*)     Sodium 133 (*)     Chloride 93 (*)     Albumin 3.3 (*)     Alkaline Phosphatase 137 (*)     eGFR 29.5 (*)     All other components within normal limits    Narrative:     GFR Categories in " Chronic Kidney Disease (CKD)              GFR Category          GFR (mL/min/1.73)    Interpretation  G1                    90 or greater        Normal or high (1)  G2                    60-89                Mild decrease (1)  G3a                   45-59                Mild to moderate decrease  G3b                   30-44                Moderate to severe decrease  G4                    15-29                Severe decrease  G5                    14 or less           Kidney failure    (1)In the absence of evidence of kidney disease, neither GFR category G1 or G2 fulfill the criteria for CKD.    eGFR calculation 2021 CKD-EPI creatinine equation, which does not include race as a factor   PROTIME-INR - Abnormal; Notable for the following components:    Protime 20.1 (*)     INR 1.71 (*)     All other components within normal limits   CBC WITH AUTO DIFFERENTIAL - Abnormal; Notable for the following components:    WBC 10.95 (*)     Neutrophil % 86.6 (*)     Lymphocyte % 5.4 (*)     Immature Grans % 1.1 (*)     Neutrophils, Absolute 9.48 (*)     Lymphocytes, Absolute 0.59 (*)     Immature Grans, Absolute 0.12 (*)     All other components within normal limits   TYPE AND SCREEN   CBC AND DIFFERENTIAL    Narrative:     The following orders were created for panel order CBC & Differential.  Procedure                               Abnormality         Status                     ---------                               -----------         ------                     CBC Auto Differential[[id number]]        Abnormal            Final result                 Please view results for these tests on the individual orders.       EKG:   ECG 12 Lead Pre-Op / Pre-Procedure   Preliminary Result   HEART VGYI=989  bpm   RR Mlidyqex=373  ms   AL Interval=  ms   P Horizontal Axis=  deg   P Front Axis=  deg   QRSD Sgrvneel=482  ms   QT Zimievzs=190  ms   XYmA=338  ms   QRS Axis=8  deg   T Wave Axis=56  deg   - ABNORMAL ECG -   Atrial fibrillation    Right bundle branch block   When compared with ECG of 10-Fer-2025 14:25:15,   Nonspecific significant change   Date and Time of Study:2025-07-03 16:39:00          Meds given in ED:   Medications   sodium chloride 0.9 % flush 10 mL (has no administration in time range)   sodium chloride 0.9 % infusion (has no administration in time range)   HYDROcodone-acetaminophen (NORCO) 5-325 MG per tablet 1 tablet (has no administration in time range)   ondansetron ODT (ZOFRAN-ODT) disintegrating tablet 4 mg (has no administration in time range)     Or   ondansetron (ZOFRAN) injection 4 mg (has no administration in time range)   melatonin tablet 2.5 mg (has no administration in time range)   acetaminophen (TYLENOL) tablet 650 mg (has no administration in time range)     Or   acetaminophen (TYLENOL) 160 MG/5ML oral solution 650 mg (has no administration in time range)     Or   acetaminophen (TYLENOL) suppository 650 mg (has no administration in time range)   sennosides-docusate (PERICOLACE) 8.6-50 MG per tablet 2 tablet (has no administration in time range)     And   polyethylene glycol (MIRALAX) packet 17 g (has no administration in time range)     And   bisacodyl (DULCOLAX) EC tablet 5 mg (has no administration in time range)     And   bisacodyl (DULCOLAX) suppository 10 mg (has no administration in time range)   HYDROmorphone (DILAUDID) injection 0.5 mg (has no administration in time range)     And   naloxone (NARCAN) injection 0.4 mg (has no administration in time range)   morphine injection 4 mg (4 mg Intravenous Given 7/3/25 1634)   ondansetron (ZOFRAN) injection 4 mg (4 mg Intravenous Given 7/3/25 1634)   HYDROmorphone (DILAUDID) injection 1 mg (1 mg Intravenous Given 7/3/25 1728)       Imaging results:  XR Shoulder 2+ View Left  Result Date: 7/3/2025   No evidence of acute bony abnormality.  This report was finalized on 7/3/2025 5:41 PM by Dr. Jose R Villanueva M.D on Workstation: [id number]      XR Hip With or Without  "Pelvis 2 - 3 View Left  Result Date: 7/3/2025   Intertrochanteric left femoral fracture without dislocation.   This report was finalized on 7/3/2025 5:39 PM by Dr. Jose R Villanueva M.D on Workstation: [id number]      XR Chest 1 View  Result Date: 7/3/2025   Allowing for submaximal inspiratory result, heart size appears within normal limits.  Mild symmetric chronic interstitial prominence, unchanged, as is what appears to be right apical pleural-parenchymal retraction, though this appears less evident on older prior studies.  No consolidation, effusion or pneumothorax.  Consider follow-up unenhanced chest CT for further evaluation of the right lung apex.  This report was finalized on 7/3/2025 5:39 PM by Dr. Jose R Villanueva M.D on Workstation: [id number]        Ambulatory status:   - bedrest    Social issues:   Social History     Socioeconomic History    Marital status:    Tobacco Use    Smoking status: Former     Current packs/day: 0.00     Average packs/day: 0.5 packs/day for 50.0 years (25.0 ttl pk-yrs)     Types: Cigarettes     Start date: 11/3/1969     Quit date: 11/3/2019     Years since quittin.6     Passive exposure: Past (parents smoked in the home)    Smokeless tobacco: Never    Tobacco comments:     LAST CIG 2019   Vaping Use    Vaping status: Never Used   Substance and Sexual Activity    Alcohol use: No     Comment: caffeine - 1/2 cup coffee daily     Drug use: No    Sexual activity: Defer       Peripheral Neurovascular  Peripheral Neurovascular (Adult)  Peripheral Neurovascular WDL: .WDL except, capillary refill, neurovascular assessment lower, pulse assessment, all, neurovascular assessment upper  LLE Neurovascular Assessment  Temperature LLE: warm  Color LLE: joel, red (family stated presentation of LE is baseline \"normal\")  Sensation LLE: numbness present  RLE Neurovascular Assessment  Temperature RLE: warm  Color RLE: red, joel (family stated presentation of LE is baseline " "\"normal\")  Sensation RLE: numbness present    Neuro Cognitive  Neuro Cognitive (Adult)  Cognitive/Neuro/Behavioral WDL: .WDL except, arousability, mood/behavior, motor response, level of consciousness, speech, orientation, all  Level of Consciousness: Alert  Arousal Level: opens eyes spontaneously  Orientation: person, place, time, situation, oriented x 4  Speech: clear, spontaneous, logical  Mood/Behavior: anxious, cooperative, behavior appropriate to situation  Motor Response  Motor Response: general motor response  General Motor Response: purposeful motor response    Learning  Learning Assessment  Learning Readiness and Ability: no barriers identified  Education Provided  Person Taught: patient  Teaching Method: verbal instruction  Teaching Focus: symptom/problem overview, medical device/equipment use, medication actions and side effects, diagnostic test  Education Outcome Evaluation: eager to learn, verbalizes understanding    Respiratory  Respiratory  Airway WDL: WDL  Respiratory WDL  Respiratory WDL: WDL, all  Rhythm/Pattern, Respiratory: no shortness of breath reported, depth regular, pattern regular, unlabored  Expansion/Accessory Muscles/Retractions: no use of accessory muscles, no retractions, expansion symmetric    Abdominal Pain  Safety Interventions  Safety Precautions/Falls Reduction: family at bedside  All Alarms: alarm(s) activated and audible    Pain Assessments  Pain (Adult)  (0-10) Pain Rating: Rest: 10  (0-10) Pain Rating: Activity: 10  Pain Location: hip  Pain Side/Orientation: left    NIH Stroke Scale       Julianna Foster RN  07/03/25 18:37 EDT   "

## 2025-07-04 PROBLEM — J96.01 ACUTE RESPIRATORY FAILURE WITH HYPOXIA: Status: ACTIVE | Noted: 2025-07-04

## 2025-07-04 NOTE — PLAN OF CARE
Goal Outcome Evaluation:  Plan of Care Reviewed With: patient, spouse        Progress: no change  Outcome Evaluation: Pt AOx3, on hi-fannie NC, pain controlled w/ dilaudid. Pt consuming <25% of meals. Reported low appetitie/dislikes hospital food. No BM or U/O during shift. Nephro aware. Bladder scan completed x3 w/ 0mL. Pt not reporting urinary retention. Received a fluid bolus for soft BP.            Problem: Adult Inpatient Plan of Care  Goal: Patient-Specific Goal (Individualized)  Outcome: Progressing  Flowsheets (Taken 7/4/2025 1728)  Individualized Care Needs: Pain management  Anxieties, Fears or Concerns: Unsuccessful hip surgery     Problem: Fall Injury Risk  Goal: Absence of Fall and Fall-Related Injury  Outcome: Progressing  Intervention: Identify and Manage Contributors  Recent Flowsheet Documentation  Taken 7/4/2025 1600 by Nakia Robles RN  Medication Review/Management: medications reviewed  Taken 7/4/2025 1000 by Nakia Robles RN  Medication Review/Management: medications reviewed  Intervention: Promote Injury-Free Environment  Recent Flowsheet Documentation  Taken 7/4/2025 1600 by Nakia Robles RN  Safety Promotion/Fall Prevention:   fall prevention program maintained   room organization consistent   safety round/check completed  Taken 7/4/2025 1400 by Nakia Robles RN  Safety Promotion/Fall Prevention:   room organization consistent   safety round/check completed   clutter free environment maintained   fall prevention program maintained  Taken 7/4/2025 1200 by Nakia Robles RN  Safety Promotion/Fall Prevention:   activity supervised   lighting adjusted   safety round/check completed   room organization consistent

## 2025-07-04 NOTE — CONSULTS
[facility] Consult Note      Patient Name: Shani Phillip  : 1950  MRN: [id number]  Primary Care Physician:  Rodríguez Walker MD  Referring Physician: Raquel Senior MD  Date of admission: 7/3/2025    Subjective     Reason for Consult: Acute kidney injury    HPI:   Shani Phillip is a 74 y.o. female known to have history of chronic kidney disease stage IIIb with baseline creatinine between 1.4 and 1.5 secondary to hypertensive nephrosclerosis and cardiorenal syndrome, history of diastolic heart failure, atrial fibrillation mitral valve stenosis, nephrolithiasis and COPD who presents to the hospital on 7/3/2025 after an accidental fall after tripping on flip-flops and landed on her left hip and left shoulder sustaining a left intertrochanteric femur fracture.  Patient was scheduled for surgery but during hospital stay her respiratory condition deteriorated and rapid response team had to be called for acute hypoxemic respiratory failure.  Noted to have worsening creatinine with creatinine up to 2.27 so nephrology was consulted for management.  Noted poor oral intake for the past couple of days.  Denies any nausea or vomiting.  No fever no chills.  On high flow oxygen.  Urine output has been declining      Review of Systems:   14 point review of systems is otherwise negative except for mentioned above on HPI    Personal History     Past Medical History:   Diagnosis Date    Acute endocarditis     Atrial fibrillation with RVR 2019    Cancer     CKD (chronic kidney disease) stage 3, GFR 30-59 ml/min     COPD (chronic obstructive pulmonary disease)     CTS (carpal tunnel syndrome)     Dizziness     Gall stones     Influenza 2019    Medicare annual wellness visit, subsequent 2021    Migraine     Osteoporosis     Renal disorder     Restless leg syndrome     Thrush, oral 2019       Past Surgical History:   Procedure Laterality Date    CARDIAC CATHETERIZATION  N/A 1/23/2020    Procedure: Coronary angiography;  Surgeon: Svetlana Grayson MD;  Location:  [facility];  Service: Cardiovascular    CARDIAC CATHETERIZATION N/A 1/23/2020    Procedure: Left ventriculography;  Surgeon: Svetlana Grayson MD;  Location:  [facility];  Service: Cardiovascular    CARDIAC CATHETERIZATION N/A 1/23/2020    Procedure: Left Heart Cath;  Surgeon: Svetlana Grayson MD;  Location:  [facility];  Service: Cardiovascular    CHOLECYSTECTOMY      CYSTOSCOPY W/ URETERAL STENT PLACEMENT Left 6/10/2025    Procedure: CYSTOSCOPY URETERAL LEFT STENT PLACEMENT;  Surgeon: Stefano Mahoney MD;  [facility];  Service: Urology;  Laterality: Left;    KNEE ARTHROPLASTY Right     LAPAROSCOPIC GASTRIC BANDING         Family History: family history includes Lung cancer in her mother.    Social History:  reports that she quit smoking about 5 years ago. Her smoking use included cigarettes. She started smoking about 55 years ago. She has a 25 pack-year smoking history. She has been exposed to tobacco smoke. She has never used smokeless tobacco. She reports that she does not drink alcohol and does not use drugs.    Home Medications:  Prior to Admission medications    Medication Sig Start Date End Date Taking? Authorizing Provider   O2 (OXYGEN) Inhale 2 L/min 1 (One) Time.   Yes Provider, MD Marnie   ammonium lactate (LAC-HYDRIN) 12 % lotion APPLY TOPICALLY TO THE APPROPRIATE AREA AS DIRECTED AS NEEDED FOR DRY SKIN. 7/31/24   Rodríguez Walker MD   budesonide (PULMICORT) 0.5 MG/2ML nebulizer solution USE 1 VIAL IN NEBULIZER DAILY - rinse mouth after treatment 2/27/25   Rodríguez Walker MD   buPROPion XL (WELLBUTRIN XL) 300 MG 24 hr tablet Take 1 tablet by mouth Daily. 10/7/24   Rodríguez Walker MD   cyclobenzaprine (FLEXERIL) 10 MG tablet Take 1 tablet by mouth 3 (Three) Times a Day As Needed (back pain). 6/2/25   Rodríguez Walker MD   digoxin  (LANOXIN) 125 MCG tablet Take 1 tablet by mouth Every Other Day. 3/3/25   Angy Smith APRN   famotidine (PEPCID) 20 MG tablet Take 1 tablet by mouth 2 (Two) Times a Day Before Meals. 6/16/25   Angy Smith, APRN   ferrous sulfate 325 (65 FE) MG tablet Take 1 tablet by mouth Every Other Day. 10/7/24   Rodríguez Walker MD   HYDROcodone-acetaminophen (NORCO) 7.5-325 MG per tablet Take 1 tablet by mouth Every 6 (Six) Hours As Needed for Moderate Pain. 6/2/25   Rodríguez Walker MD   ipratropium-albuterol (DUO-NEB) 0.5-2.5 mg/3 ml nebulizer USE 1 VIAL IN NEBULIZER 4 TIMES DAILY - as directed 2/27/25   Rodríguez Walker MD   levothyroxine (SYNTHROID, LEVOTHROID) 50 MCG tablet Take 1 tablet by mouth Every Morning. 4/29/25   Rodríguez Walker MD   metoprolol tartrate (LOPRESSOR) 25 MG tablet Take 1 tablet by mouth Every 12 (Twelve) Hours. 6/14/25   Santino Mayes MD   midodrine (PROAMATINE) 2.5 MG tablet Take 1 tablet by mouth 3 (Three) Times a Day Before Meals. 6/14/25   Santino Mayes MD   ondansetron (ZOFRAN) 4 MG tablet Take 1 tablet by mouth Every 8 (Eight) Hours As Needed for Nausea or Vomiting. 6/2/25   Rodríguez Walker MD   pantoprazole (PROTONIX) 40 MG EC tablet Take 1 tablet by mouth once daily 6/2/25   Rodríguez Walker MD   potassium chloride ER (K-TAB) 20 MEQ tablet controlled-release ER tablet Take 1 tablet by mouth Daily. 6/14/25   Santino Mayes MD   pramipexole (MIRAPEX) 0.5 MG tablet Take 1 tablet by mouth 2 (Two) Times a Day. 12/2/24   Rodríguez Walker MD   revefenacin (Yupelri) 175 MCG/3ML nebulizer solution Take 3 mL by nebulization Daily.    Marnie Monroy MD   spironolactone (ALDACTONE) 25 MG tablet Take 1 tablet by mouth once daily 6/2/25   Angy Smith APRN   torsemide (DEMADEX) 20 MG tablet Take 2 tablets by mouth 2 (Two) Times a Day. 3/3/25   Angy Smith APRN   trospium (SANCTURA) 20 MG tablet Take 1 tablet by mouth 2 (Two) Times a Day. 10/7/24   Rodríguez Walker MD    warfarin (COUMADIN) 2.5 MG tablet TAKE 1/2 TABLET ON MONDAY, WEDNESDAY AND SATURDAY, THEN 1 TABLET ALL OTHER DAYS AS DIRECTED. 3/27/25   Zenia Tinajero MD       Allergies:  Allergies   Allergen Reactions    Penicillins Rash     RASH 30 YRS AGO NO HOSPITALIZATION       Objective     Vitals:   Temp:  [97.6 °F (36.4 °C)-98.8 °F (37.1 °C)] 98.8 °F (37.1 °C)  Heart Rate:  [] 102  Resp:  [16-24] 18  BP: ()/() 106/56  Flow (L/min) (Oxygen Therapy):  [2-60] 60  No intake or output data in the 24 hours ending 07/04/25 1347    Physical Exam:   Constitutional: Ill looking, frail, on oxygen  HEENT: Sclera anicteric, no conjunctival injection  Neck: Supple, no thyromegaly, no lymphadenopathy, trachea at midline, no JVD  Respiratory: Clear to auscultation bilaterally with no wheezes and no crackles  Cardiovascular: RRR, no murmurs, no rubs or gallops, no carotid bruit  Gastrointestinal: Positive bowel sounds, abdomen is soft, nontender and nondistended  : No palpable bladder  Musculoskeletal: No edema in bilateral lower extremity.  Presence of bilateral erythema involving both feet up to the mid of her legs.  Psychiatric: Appropriate affect, cooperative  Neurologic: Oriented x3, moving all extremities, normal speech and mental status  Skin: Warm and dry       Scheduled Meds:     arformoterol, 15 mcg, Nebulization, BID - RT  azithromycin, 500 mg, Intravenous, Q24H  budesonide, 0.5 mg, Nebulization, BID - RT  buPROPion XL, 300 mg, Oral, Daily  cefTRIAXone, 1,000 mg, Intravenous, Q24H  digoxin, 125 mcg, Oral, Every Other Day  famotidine, 20 mg, Oral, BID AC  ferrous sulfate, 325 mg, Oral, Every Other Day  insulin lispro, 2-7 Units, Subcutaneous, 4x Daily AC & at Bedtime  ipratropium-albuterol, 3 mL, Nebulization, 4x Daily - RT  levothyroxine, 50 mcg, Oral, Q AM  melatonin, 2.5 mg, Oral, Nightly  methylPREDNISolone sodium succinate, 40 mg, Intravenous, Q8H  metoprolol tartrate, 12.5 mg, Oral, Q12H  midodrine,  2.5 mg, Oral, TID AC  oxybutynin XL, 5 mg, Oral, Daily  pantoprazole, 40 mg, Oral, Daily  potassium chloride, 20 mEq, Oral, Daily  pramipexole, 0.5 mg, Oral, BID  [Held by provider] spironolactone, 25 mg, Oral, Daily  [Held by provider] torsemide, 40 mg, Oral, BID Diuretics      IV Meds:   sodium chloride, 75 mL/hr, Last Rate: 75 mL/hr (07/04/25 0721)        Results Reviewed:   I have personally reviewed the results from the time of this admission to 7/4/2025 13:47 EDT     Lab Results   Component Value Date    GLUCOSE 115 (H) 07/04/2025    CALCIUM 9.6 07/04/2025     (L) 07/04/2025    K 5.1 07/04/2025    CO2 23.4 07/04/2025    CL 97 (L) 07/04/2025    BUN 28.0 (H) 07/04/2025    CREATININE 2.27 (H) 07/04/2025    EGFRIFAFRI 46 (L) 04/12/2021    EGFRIFNONA 48 (L) 08/26/2021    BCR 12.3 07/04/2025    ANIONGAP 13.6 07/04/2025      Lab Results   Component Value Date    MG 2.0 06/14/2025    PHOS 2.2 (L) 06/14/2025    ALBUMIN 3.3 (L) 07/03/2025           Assessment / Plan     ASSESSMENT:  Acute kidney injury on top of chronic kidney disease stage IIIb with baseline creatinine 1.4 and 1.5.  Creatinine currently is at 2.2.  Etiology of worsening kidney function is likely secondary to prerenal state/ ATN due to hypotension  in the setting of decreased oral intake and diuretic therapy.Cannot rule out sepsis.   Patient had an acute kidney injury in June secondary to UPJ stone and hydronephrosis requiring cystoscopy and stent placement.  Creatinine trended down to 1.44(her baseline) on 6/14/2025.  Hypertension with CKD blood pressure was soft  Acute hypoxemic hypercapnic respiratory failure secondary to possible COPD exacerbation.  Leukocytosis noted raising question of a possible superimposed pneumonia.  COPD exacerbation   Chronic respiratory failure  Possible sepsis  Hypovolemic hyponatremia with sodium of 134 improving with normal saline        PLAN:  The patient is hypovolemic on examination today.  I agree with  maintenance IV fluid.  Continue to hold diuretic therapy  Will give a bolus of normal saline given soft blood pressure  Will check urine sodium and urinalysis  Surveillance lab      Thank you for involving us in the care of Shani Phillip.  Please feel free to call with any questions.    Jessica Wood MD  07/04/25  13:47 EDT    [facility]  [phone]    Parts of this note may be an electronic transcription/translation of spoken language to printed text using the Dragon dictation system.

## 2025-07-04 NOTE — H&P
HISTORY AND PHYSICAL   [facility]        Date of Admission: 7/3/2025  Patient Identification:  Name: Shani Phillip  Age: 74 y.o.  Sex: female  :  1950  MRN: [id number]                     Primary Care Physician: Rodríguez Walker MD    Chief Complaint:  74 year old female presents to the ED with pain of her left hip and shoulder; she was wearing flip flops and lost her balance; she has a history of copd and is on home oxygen; she is also on coumadin due to a history of a fib; no syncope; no chest pain or shortness of air    History of Present Illness:   As above    Past Medical History:  Past Medical History:   Diagnosis Date    Acute endocarditis     Atrial fibrillation with RVR 2019    Cancer     CKD (chronic kidney disease) stage 3, GFR 30-59 ml/min     COPD (chronic obstructive pulmonary disease)     CTS (carpal tunnel syndrome)     Dizziness     Gall stones     Influenza 2019    Medicare annual wellness visit, subsequent 2021    Migraine     Osteoporosis     Renal disorder     Restless leg syndrome     Thrush, oral 2019     Past Surgical History:  Past Surgical History:   Procedure Laterality Date    CARDIAC CATHETERIZATION N/A 2020    Procedure: Coronary angiography;  Surgeon: Svetlana Grayson MD;  Location:  [facility];  Service: Cardiovascular    CARDIAC CATHETERIZATION N/A 2020    Procedure: Left ventriculography;  Surgeon: Svetlana Grayson MD;  [facility];  Service: Cardiovascular    CARDIAC CATHETERIZATION N/A 2020    Procedure: Left Heart Cath;  Surgeon: Svetlana Grayson MD;  [facility];  Service: Cardiovascular    CHOLECYSTECTOMY      CYSTOSCOPY W/ URETERAL STENT PLACEMENT Left 6/10/2025    Procedure: CYSTOSCOPY URETERAL LEFT STENT PLACEMENT;  Surgeon: Stefano Mahoney MD;  [facility];  Service: Urology;  Laterality: Left;    KNEE ARTHROPLASTY Right      LAPAROSCOPIC GASTRIC BANDING        Home Meds:  Medications Prior to Admission   Medication Sig Dispense Refill Last Dose/Taking    O2 (OXYGEN) Inhale 2 L/min 1 (One) Time.   7/3/2025    ammonium lactate (LAC-HYDRIN) 12 % lotion APPLY TOPICALLY TO THE APPROPRIATE AREA AS DIRECTED AS NEEDED FOR DRY SKIN. 225 g 6     budesonide (PULMICORT) 0.5 MG/2ML nebulizer solution USE 1 VIAL IN NEBULIZER DAILY - rinse mouth after treatment 30 each 11     buPROPion XL (WELLBUTRIN XL) 300 MG 24 hr tablet Take 1 tablet by mouth Daily. 90 tablet 3     cyclobenzaprine (FLEXERIL) 10 MG tablet Take 1 tablet by mouth 3 (Three) Times a Day As Needed (back pain). 40 tablet 1     digoxin (LANOXIN) 125 MCG tablet Take 1 tablet by mouth Every Other Day. 90 tablet 1     famotidine (PEPCID) 20 MG tablet Take 1 tablet by mouth 2 (Two) Times a Day Before Meals. 180 tablet 1     ferrous sulfate 325 (65 FE) MG tablet Take 1 tablet by mouth Every Other Day. 45 tablet 1     HYDROcodone-acetaminophen (NORCO) 7.5-325 MG per tablet Take 1 tablet by mouth Every 6 (Six) Hours As Needed for Moderate Pain. 30 tablet 0     ipratropium-albuterol (DUO-NEB) 0.5-2.5 mg/3 ml nebulizer USE 1 VIAL IN NEBULIZER 4 TIMES DAILY - as directed 120 mL 11     levothyroxine (SYNTHROID, LEVOTHROID) 50 MCG tablet Take 1 tablet by mouth Every Morning. 90 tablet 1     metoprolol tartrate (LOPRESSOR) 25 MG tablet Take 1 tablet by mouth Every 12 (Twelve) Hours. 60 tablet 0     midodrine (PROAMATINE) 2.5 MG tablet Take 1 tablet by mouth 3 (Three) Times a Day Before Meals. 90 tablet 0     ondansetron (ZOFRAN) 4 MG tablet Take 1 tablet by mouth Every 8 (Eight) Hours As Needed for Nausea or Vomiting. 60 tablet 3     pantoprazole (PROTONIX) 40 MG EC tablet Take 1 tablet by mouth once daily 180 tablet 0     potassium chloride ER (K-TAB) 20 MEQ tablet controlled-release ER tablet Take 1 tablet by mouth Daily.       pramipexole (MIRAPEX) 0.5 MG tablet Take 1 tablet by mouth 2 (Two)  Times a Day. 180 tablet 3     revefenacin (Yupelri) 175 MCG/3ML nebulizer solution Take 3 mL by nebulization Daily.       spironolactone (ALDACTONE) 25 MG tablet Take 1 tablet by mouth once daily 90 tablet 0     torsemide (DEMADEX) 20 MG tablet Take 2 tablets by mouth 2 (Two) Times a Day. 360 tablet 1     trospium (SANCTURA) 20 MG tablet Take 1 tablet by mouth 2 (Two) Times a Day. 180 tablet 3     warfarin (COUMADIN) 2.5 MG tablet TAKE 1/2 TABLET ON MONDAY, WEDNESDAY AND SATURDAY, THEN 1 TABLET ALL OTHER DAYS AS DIRECTED. 75 tablet 0        Allergies:  Allergies   Allergen Reactions    Penicillins Rash     RASH 30 YRS AGO NO HOSPITALIZATION     Immunizations:  Immunization History   Administered Date(s) Administered    Arexvy (RSV, Adults 60+ yrs) 2023    COVID-19 (AMAIRANI) 03/10/2021    COVID-19 (MODERNA) BIVALENT 12+YRS 10/14/2022    COVID-19 (MODERNA) Monovalent Original Booster 2021    COVID-19 (PFIZER) 12YRS+ (COMIRNATY) 2023    COVID-19 (UNSPECIFIED) 2024    Flu Vaccine Quad PF >36MO 2023    Fluad Quad 65+ 10/22/2020, 10/14/2022, 2023    Fluzone High-Dose 65+YRS 2021    Influenza TIV (IM) 10/01/2019    Influenza, Unspecified 10/14/2022, 2024    Pneumococcal Conjugate 13-Valent (PCV13) 2016    Pneumococcal Conjugate 20-Valent (PCV20) 01/10/2024    Pneumococcal Polysaccharide (PPSV23) 2021    Shingrix 2023, 01/10/2024     Social History:   Social History     Social History Narrative    Not on file     Social History     Socioeconomic History    Marital status:    Tobacco Use    Smoking status: Former     Current packs/day: 0.00     Average packs/day: 0.5 packs/day for 50.0 years (25.0 ttl pk-yrs)     Types: Cigarettes     Start date: 11/3/1969     Quit date: 11/3/2019     Years since quittin.6     Passive exposure: Past (parents smoked in the home)    Smokeless tobacco: Never    Tobacco comments:     LAST CIG 2019   Vaping Use  "   Vaping status: Never Used   Substance and Sexual Activity    Alcohol use: No     Comment: caffeine - 1/2 cup coffee daily     Drug use: No    Sexual activity: Defer       Family History:  Family History   Problem Relation Age of Onset    Lung cancer Mother         Review of Systems  See history of present illness and past medical history.  Patient denies headache, dizziness, syncope, falls, trauma, change in vision, change in hearing, change in taste, changes in weight, changes in appetite, focal weakness, numbness, or paresthesia.  Patient denies chest pain, palpitations, dyspnea, orthopnea, PND, cough, sinus pressure, rhinorrhea, epistaxis, hemoptysis, nausea, vomiting,hematemesis, diarrhea, constipation or hematochezia.  Denies cold or heat intolerance, polydipsia, polyuria, polyphagia. Denies hematuria, pyuria, dysuria, hesitancy, frequency or urgency. Denies consumption of raw and under cooked meats foods or change in water source.  Denies fever, chills, sweats, night sweats.  Denies missing any routine medications. Remainder of ROS is negative.    Objective:  T Max 24 hrs: Temp (24hrs), Av.8 °F (36.6 °C), Min:97.8 °F (36.6 °C), Max:97.8 °F (36.6 °C)    Vitals Ranges:   Temp:  [97.8 °F (36.6 °C)] 97.8 °F (36.6 °C)  Heart Rate:  [] 113  Resp:  [18-22] 20  BP: (142-149)/() 149/63      Exam:  /63 (BP Location: Right arm, Patient Position: Lying)   Pulse 113   Temp 97.8 °F (36.6 °C) (Oral)   Resp 20   Ht 154.9 cm (61\")   Wt 64.6 kg (142 lb 6.7 oz)   SpO2 96%   BMI 26.91 kg/m²     General Appearance:    Alert, cooperative, no distress, appears stated age   Head:    Normocephalic, without obvious abnormality, atraumatic   Eyes:    PERRL, conjunctivae/corneas clear, EOM's intact, both eyes   Ears:    Normal external ear canals, both ears   Nose:   Nares normal, septum midline, mucosa normal, no drainage    or sinus tenderness   Throat:   Lips, mucosa, and tongue normal   Neck:   Supple, " symmetrical, trachea midline, no adenopathy;     thyroid:  no enlargement/tenderness/nodules; no carotid    bruit or JVD   Back:     Symmetric, no curvature, ROM normal, no CVA tenderness   Lungs:     Decreased breath sounds bilaterally, respirations unlabored   Chest Wall:    No tenderness or deformity    Heart:    Regular rate and rhythm, S1 and S2 normal, no murmur, rub   or gallop   Abdomen:     Soft, nontender, bowel sounds active all four quadrants,     no masses, no hepatomegaly, no splenomegaly   Extremities:   Extremities normal, atraumatic, no cyanosis or edema        Data Review:  Labs in chart were reviewed.  WBC   Date Value Ref Range Status   07/03/2025 10.95 (H) 3.40 - 10.80 10*3/mm3 Final     Hemoglobin   Date Value Ref Range Status   07/03/2025 12.9 12.0 - 15.9 g/dL Final     Hematocrit   Date Value Ref Range Status   07/03/2025 37.7 34.0 - 46.6 % Final     Platelets   Date Value Ref Range Status   07/03/2025 319 140 - 450 10*3/mm3 Final     Sodium   Date Value Ref Range Status   07/03/2025 133 (L) 136 - 145 mmol/L Final     Potassium   Date Value Ref Range Status   07/03/2025 4.5 3.5 - 5.2 mmol/L Final     Chloride   Date Value Ref Range Status   07/03/2025 93 (L) 98 - 107 mmol/L Final     CO2   Date Value Ref Range Status   07/03/2025 28.6 22.0 - 29.0 mmol/L Final     BUN   Date Value Ref Range Status   07/03/2025 24.0 (H) 8.0 - 23.0 mg/dL Final     Creatinine   Date Value Ref Range Status   07/03/2025 1.78 (H) 0.57 - 1.00 mg/dL Final     Glucose   Date Value Ref Range Status   07/03/2025 121 (H) 65 - 99 mg/dL Final     Calcium   Date Value Ref Range Status   07/03/2025 9.5 8.6 - 10.5 mg/dL Final     AST (SGOT)   Date Value Ref Range Status   07/03/2025 16 1 - 32 U/L Final     ALT (SGPT)   Date Value Ref Range Status   07/03/2025 12 1 - 33 U/L Final     Alkaline Phosphatase   Date Value Ref Range Status   07/03/2025 137 (H) 39 - 117 U/L Final                Imaging Results (All)       Procedure  Component Value Units Date/Time    XR Femur 2 View Left [[id number]] Collected: 07/03/25 1859     Updated: 07/03/25 1903    Narrative:      XR FEMUR 2 VW LEFT-       HISTORY:   fall pain; S72.142A-Displaced intertrochanteric fracture of  left femur, initial encounter for closed fracture      TECHNIQUE: AP and lateral views of the left femur.     FINDINGS:     Known intertrochanteric fracture.     The mid and distal femoral shaft are normal.       Impression:         Previously demonstrated intertrochanteric fracture, no evidence of any  other acute bony abnormality.     This report was finalized on 7/3/2025 7:00 PM by Dr. Jose R Villanueva M.D  on Workstation: [id number]       XR Shoulder 2+ View Left [[id number]] Collected: 07/03/25 1740     Updated: 07/03/25 1744    Narrative:      XR SHOULDER 2+ VW LEFT-       HISTORY:   fall pain      TECHNIQUE:  Two views of the left shoulder.     FINDINGS:     Osteopenia and degenerative change, no evidence of acute fracture,  dislocation, or radiopaque foreign body.       Impression:         No evidence of acute bony abnormality.     This report was finalized on 7/3/2025 5:41 PM by Dr. Jose R Villanueva M.D  on Workstation: [id number]       XR Hip With or Without Pelvis 2 - 3 View Left [[id number]] Collected: 07/03/25 1739     Updated: 07/03/25 1743    Narrative:      XR HIP W OR WO PELVIS 2-3 VIEW LEFT-       HISTORY:   fall pain      TECHNIQUE:  Single AP view of the pelvis, with 2 views of the left hip.     FINDINGS:     Pelvis:  No evidence of acute fracture or dislocation, osteopenia  without evidence of lytic or blastic bone lesion.     Hip: Comminuted varus angulated impacted intertrochanteric fracture  without dislocation.       Impression:         Intertrochanteric left femoral fracture without dislocation.        This report was finalized on 7/3/2025 5:39 PM by Dr. Jose R Villanueva M.D  on Workstation: [id number]       XR Chest 1 View [[id number]] Collected:  07/03/25 1736     Updated: 07/03/25 1742    Narrative:      CXR ONE VIEW      HISTORY: preop hip fracture repair     COMPARISON: 6/12/2025     TECHNIQUE: single portable AP       Impression:         Allowing for submaximal inspiratory result, heart size appears within  normal limits.     Mild symmetric chronic interstitial prominence, unchanged, as is what  appears to be right apical pleural-parenchymal retraction, though this  appears less evident on older prior studies.     No consolidation, effusion or pneumothorax.     Consider follow-up unenhanced chest CT for further evaluation of the  right lung apex.     This report was finalized on 7/3/2025 5:39 PM by Dr. Jose R Villanueva M.D  on Workstation: [id number]                 Assessment:  Active Hospital Problems    Diagnosis  POA    **Closed left hip fracture [S72.002A]  Yes      Resolved Hospital Problems   No resolved problems to display.   Fall  Copd  Chronic hypoxic respiratory failure  Chronic combined systolic and diastolic chf  Ckd3  A fib  Hypertension  hyperglycemia      Plan:  Ask pulmonary to see her  May need chest ct  Hold coumadin   Check inr in am  Trend labs  Dw patient and ed provider  Patient is full code and spouse is yeni García Gregory Senior MD  7/3/2025  23:34 EDT

## 2025-07-04 NOTE — CONSULTS
Orthopaedic Surgery  Consult Note  Peter Schofield MD    [phone]    HPI:  Patient is a 74 y.o.female who presents with left hip pain after a fall while tripping over her flip flops. She was unable to bear weight after the fall. She was evaluated in the ED where she was found to have left intertrochanteric femur fracture. Her pain is improved with pain medication and worsened with movement. She was originially scheduled for operative intervention on 7/4/25 with left CMN however worsening oxygenation overnight prompted a rapid response and her level of care was upgraded to the ICU. She has been on O2 and appears more comfortable this AM. She has medical history of COPD and is currently in acute exacerbation, CKD, atrial fibrillation on warfarin INR 1.8 now, as well as hypertension. Operative intervention cancelled this AM to allow for further optimization.     MEDICAL HISTORY  Past Medical History:   Diagnosis Date    Acute endocarditis     Atrial fibrillation with RVR 11/14/2019    Cancer     CKD (chronic kidney disease) stage 3, GFR 30-59 ml/min     COPD (chronic obstructive pulmonary disease)     CTS (carpal tunnel syndrome)     Dizziness     Gall stones     Influenza 11/14/2019    Medicare annual wellness visit, subsequent 04/12/2021    Migraine     Osteoporosis     Renal disorder     Restless leg syndrome     Thrush, oral 11/14/2019     Past Surgical History:   Procedure Laterality Date    CARDIAC CATHETERIZATION N/A 1/23/2020    Procedure: Coronary angiography;  Surgeon: Svetlana Grayson MD;  [facility];  Service: Cardiovascular    CARDIAC CATHETERIZATION N/A 1/23/2020    Procedure: Left ventriculography;  Surgeon: Svetlana Grayson MD;  Location:  [facility] CATH INVASIVE LOCATION;  Service: Cardiovascular    CARDIAC CATHETERIZATION N/A 1/23/2020    Procedure: Left Heart Cath;  Surgeon: Svetlana Grayson MD;  Location:  [facility] CATH INVASIVE LOCATION;  Service: Cardiovascular     "CHOLECYSTECTOMY      CYSTOSCOPY W/ URETERAL STENT PLACEMENT Left 6/10/2025    Procedure: CYSTOSCOPY URETERAL LEFT STENT PLACEMENT;  Surgeon: Stefano Mahoney MD;  Location: [facility];  Service: Urology;  Laterality: Left;    KNEE ARTHROPLASTY Right     LAPAROSCOPIC GASTRIC BANDING       Most Recent Immunizations   Administered Date(s) Administered    Arexvy (RSV, Adults 60+ yrs) 2023    COVID-19 (AMAIRANI) 03/10/2021    COVID-19 (MODERNA) BIVALENT 12+YRS 10/14/2022    COVID-19 (MODERNA) Monovalent Original Booster 2021    COVID-19 (PFIZER) 12YRS+ (COMIRNATY) 2023    COVID-19 (UNSPECIFIED) 2024    Flu Vaccine Quad PF >36MO 2023    Fluad Quad 65+ 2023    Fluzone High-Dose 65+YRS 2021    Influenza TIV (IM) 10/01/2019    Influenza, Unspecified 2024    Pneumococcal Conjugate 13-Valent (PCV13) 2016    Pneumococcal Conjugate 20-Valent (PCV20) 01/10/2024    Pneumococcal Polysaccharide (PPSV23) 2021    Shingrix 01/10/2024     Social History     Tobacco Use    Smoking status: Former     Current packs/day: 0.00     Average packs/day: 0.5 packs/day for 50.0 years (25.0 ttl pk-yrs)     Types: Cigarettes     Start date: 11/3/1969     Quit date: 11/3/2019     Years since quittin.6     Passive exposure: Past (parents smoked in the home)    Smokeless tobacco: Never    Tobacco comments:     LAST CIG 2019   Substance Use Topics    Alcohol use: No     Comment: caffeine - 1/2 cup coffee daily       Social History     Substance and Sexual Activity   Drug Use No       VITALS: /56   Pulse 120   Temp 98.4 °F (36.9 °C) (Oral)   Resp 16   Ht 154.9 cm (61\")   Wt 62.3 kg (137 lb 5.6 oz)   SpO2 98%   BMI 25.95 kg/m²  Body mass index is 25.95 kg/m².    PHYSICAL EXAM:   CONSTITUTIONAL: No acute distress  LUNGS: Equal chest rise, on 60L O2 no obvious shortness of air   CARDIOVASCULAR: palpable peripheral pulses  SKIN: no skin lesions in the area " examined  LYMPH: no lymphadenopathy in the area examined  Musculoskeletal:   LLE   Skin intact  Leg resting in a position of comfort  Able to DF PF at the ankle weakly   Sensation is intact distally   Foot is wwp       RADIOLOGY REVIEW:   Left IT femur fracture on femur and hip radiographs -left   XR Chest 1 View  Result Date: 7/4/2025  Electronically signed by Rodríguez Sullivan MD on 07-04-25 at 0458    XR Femur 2 View Left  Result Date: 7/3/2025   Previously demonstrated intertrochanteric fracture, no evidence of any other acute bony abnormality.  This report was finalized on 7/3/2025 7:00 PM by Dr. Jose R Villanueva M.D on Workstation: [id number]      XR Shoulder 2+ View Left  Result Date: 7/3/2025   No evidence of acute bony abnormality.  This report was finalized on 7/3/2025 5:41 PM by Dr. Jose R Villanueva M.D on Workstation: [id number]      XR Hip With or Without Pelvis 2 - 3 View Left  Result Date: 7/3/2025   Intertrochanteric left femoral fracture without dislocation.   This report was finalized on 7/3/2025 5:39 PM by Dr. Jose R Villanueva M.D on Workstation: [id number]      XR Chest 1 View  Result Date: 7/3/2025   Allowing for submaximal inspiratory result, heart size appears within normal limits.  Mild symmetric chronic interstitial prominence, unchanged, as is what appears to be right apical pleural-parenchymal retraction, though this appears less evident on older prior studies.  No consolidation, effusion or pneumothorax.  Consider follow-up unenhanced chest CT for further evaluation of the right lung apex.  This report was finalized on 7/3/2025 5:39 PM by Dr. Jose R Villanueva M.D on Workstation: [id number]        LABS:   Results for the past 24 hours:   Recent Results (from the past 24 hours)   Comprehensive Metabolic Panel    Collection Time: 07/03/25  4:28 PM    Specimen: Blood   Result Value Ref Range    Glucose 121 (H) 65 - 99 mg/dL    BUN 24.0 (H) 8.0 - 23.0 mg/dL    Creatinine 1.78 (H) 0.57 -  1.00 mg/dL    Sodium 133 (L) 136 - 145 mmol/L    Potassium 4.5 3.5 - 5.2 mmol/L    Chloride 93 (L) 98 - 107 mmol/L    CO2 28.6 22.0 - 29.0 mmol/L    Calcium 9.5 8.6 - 10.5 mg/dL    Total Protein 8.0 6.0 - 8.5 g/dL    Albumin 3.3 (L) 3.5 - 5.2 g/dL    ALT (SGPT) 12 1 - 33 U/L    AST (SGOT) 16 1 - 32 U/L    Alkaline Phosphatase 137 (H) 39 - 117 U/L    Total Bilirubin 0.6 0.0 - 1.2 mg/dL    Globulin 4.7 gm/dL    A/G Ratio 0.7 g/dL    BUN/Creatinine Ratio 13.5 7.0 - 25.0    Anion Gap 11.4 5.0 - 15.0 mmol/L    eGFR 29.5 (L) >60.0 mL/min/1.73   Type & Screen    Collection Time: 07/03/25  4:28 PM    Specimen: Blood   Result Value Ref Range    ABO Type A     RH type Negative     Antibody Screen Negative     T&S Expiration Date 7/6/2025 11:59:59 PM    Protime-INR    Collection Time: 07/03/25  4:28 PM    Specimen: Blood   Result Value Ref Range    Protime 20.1 (H) 11.7 - 14.2 Seconds    INR 1.71 (H) 0.90 - 1.10   CBC Auto Differential    Collection Time: 07/03/25  4:28 PM    Specimen: Blood   Result Value Ref Range    WBC 10.95 (H) 3.40 - 10.80 10*3/mm3    RBC 4.14 3.77 - 5.28 10*6/mm3    Hemoglobin 12.9 12.0 - 15.9 g/dL    Hematocrit 37.7 34.0 - 46.6 %    MCV 91.1 79.0 - 97.0 fL    MCH 31.2 26.6 - 33.0 pg    MCHC 34.2 31.5 - 35.7 g/dL    RDW 13.3 12.3 - 15.4 %    RDW-SD 44.0 37.0 - 54.0 fl    MPV 8.8 6.0 - 12.0 fL    Platelets 319 140 - 450 10*3/mm3    Neutrophil % 86.6 (H) 42.7 - 76.0 %    Lymphocyte % 5.4 (L) 19.6 - 45.3 %    Monocyte % 5.9 5.0 - 12.0 %    Eosinophil % 0.8 0.3 - 6.2 %    Basophil % 0.2 0.0 - 1.5 %    Immature Grans % 1.1 (H) 0.0 - 0.5 %    Neutrophils, Absolute 9.48 (H) 1.70 - 7.00 10*3/mm3    Lymphocytes, Absolute 0.59 (L) 0.70 - 3.10 10*3/mm3    Monocytes, Absolute 0.65 0.10 - 0.90 10*3/mm3    Eosinophils, Absolute 0.09 0.00 - 0.40 10*3/mm3    Basophils, Absolute 0.02 0.00 - 0.20 10*3/mm3    Immature Grans, Absolute 0.12 (H) 0.00 - 0.05 10*3/mm3    nRBC 0.0 0.0 - 0.2 /100 WBC   ECG 12 Lead Pre-Op /  Pre-Procedure    Collection Time: 07/03/25  4:39 PM   Result Value Ref Range    QT Interval 355 ms    QTC Interval 471 ms   Protime-INR    Collection Time: 07/03/25 10:47 PM    Specimen: Blood   Result Value Ref Range    Protime 20.1 (H) 11.7 - 14.2 Seconds    INR 1.71 (H) 0.90 - 1.10   ECG 12 Lead Chest Pain    Collection Time: 07/04/25  3:31 AM   Result Value Ref Range    QT Interval 318 ms    QTC Interval 434 ms   Basic Metabolic Panel    Collection Time: 07/04/25  3:36 AM    Specimen: Hand, Right; Blood   Result Value Ref Range    Glucose 115 (H) 65 - 99 mg/dL    BUN 28.0 (H) 8.0 - 23.0 mg/dL    Creatinine 2.27 (H) 0.57 - 1.00 mg/dL    Sodium 134 (L) 136 - 145 mmol/L    Potassium 5.1 3.5 - 5.2 mmol/L    Chloride 97 (L) 98 - 107 mmol/L    CO2 23.4 22.0 - 29.0 mmol/L    Calcium 9.6 8.6 - 10.5 mg/dL    BUN/Creatinine Ratio 12.3 7.0 - 25.0    Anion Gap 13.6 5.0 - 15.0 mmol/L    eGFR 22.0 (L) >60.0 mL/min/1.73   CBC (No Diff)    Collection Time: 07/04/25  3:36 AM    Specimen: Hand, Right; Blood   Result Value Ref Range    WBC 23.74 (H) 3.40 - 10.80 10*3/mm3    RBC 4.26 3.77 - 5.28 10*6/mm3    Hemoglobin 13.0 12.0 - 15.9 g/dL    Hematocrit 39.5 34.0 - 46.6 %    MCV 92.7 79.0 - 97.0 fL    MCH 30.5 26.6 - 33.0 pg    MCHC 32.9 31.5 - 35.7 g/dL    RDW 14.1 12.3 - 15.4 %    RDW-SD 47.6 37.0 - 54.0 fl    MPV 8.8 6.0 - 12.0 fL    Platelets 352 140 - 450 10*3/mm3   Protime-INR    Collection Time: 07/04/25  3:36 AM    Specimen: Hand, Right; Blood   Result Value Ref Range    Protime 21.3 (H) 11.7 - 14.2 Seconds    INR 1.83 (H) 0.90 - 1.10   Blood Gas, Arterial -    Collection Time: 07/04/25  4:00 AM    Specimen: Arterial Blood   Result Value Ref Range    Site Left Brachial     Vishal's Test N/A     pH, Arterial 7.337 (L) 7.350 - 7.450 pH units    pCO2, Arterial 58.2 (C) 35.0 - 45.0 mm Hg    pO2, Arterial 56.1 (L) 80.0 - 100.0 mm Hg    HCO3, Arterial 31.2 (H) 22.0 - 28.0 mmol/L    Base Excess, Arterial 3.6 (H) 0.0 - 2.0 mmol/L     O2 Saturation, Arterial 85.9 (L) 92.0 - 98.5 %    Barometric Pressure for Blood Gas 748.2000 mmHg    Modality Cannula     Flow Rate 5.0000 lpm    Set [facility] Resp Rate 24     Notified Who Urmila Vega, CRESENCIO     Read Back Yes     Notified Time      Hemodilution No     Device Comment SpO2 88%    POC Glucose Once    Collection Time: 07/04/25  4:55 AM    Specimen: Blood   Result Value Ref Range    Glucose 108 70 - 130 mg/dL       IMPRESSION:  Patient is a 74 y.o. female with intertrochanteric femur fracture and worsening hypoxia therefore case cancelled this AM and rescheduled for 7/5/25 0730.     PLAN:   Admited to: Raquel Senior MD  [facility]   NPO @ [facility] for surgery 7/5/25   Peripop abx ordered   TXA ordered   INR slightly elevated to 1.8 needs to be around 1.7 for surgical intervention.   Pain control   Medical optimization   Cardiology consult due to worsening chest tightness and chest pain with hx of atrial fibrillation   On abx per pulm   The risks, benefits, and alternatives of hip fracture surgery were discussed extensively. The risks include but are not limited to infection, DVT, PE, bleeding and blood loss, damage to nerves or blood vessels, malunion or nonunion, pa-implant fracture, continued pain, hip stiffness/loss of motion, hardware irritation,  infection and possibility of multiple surgeries, leg length discrepancy, the possibility of future arthritis of the hip, and screw cut out.The risks of anesthesia including heart attack, stroke, and even death were discussed. The typical post-operative course and rehab plan were discussed as well. We discussed the high morbidity mortality of hip fractures even after fixation, and that sometimes we do this for pain control reasons alone. All the patient/family's questions were answered. No guarantees given. A consent form will be reviewed and signed in pre op and placed on the chart. The patient will receive perioperative antibiotics. The patient will be placed  on her baseline AC for DVT prophylaxis after surgery   Discussed with her  and the patient that she is high risk for complications given her baseline pulmonary status with O2 use, atrial fibrillation, CKD, and frail nature. They wish to proceed with surgery which is a left hip nail when she is medically optimized as pulmonology recommends against surgical intervention at this time.   please call/chat  with any questions or concerns.    Peter Schofield MD   [facility]   [phone] - [place] Office  [phone] - Health system

## 2025-07-04 NOTE — CONSULTS
Date of Consultation: 25    Referral Provider: Raquel Senior, *     Reason for Consultation:  Atrial Fibrillation  and Surgical Risk Assessment    Encounter Provider: Peter Maynard MD    Group of Service: [facility]     Patient Name: Shani Phillip    :1950    Chief complaint:  Fall    History of Present Illness:      Shani Phillip is a 73 yo female who follows with Dr. Tinajero, with known atrial fibrillation (warfarin), mitral valve stenosis, congestive heart failure, CKD and COPD with chronic hypoxic respiratory failure on home oxygen at 2 liters.  She has a history of mitral valve endocarditis which was treated with antibiotics.      She was admitted to the hospital in 2025 with heart failure and an acute kidney injury that was felt to be related to cardiorenal syndrome.  Echocardiogram done at that time revealed an EF >70%, moderately dilated left atrial cavity, mild aortic valve stenosis, moderate to severe mitral stenosis with severe mitral annular calcification, peak mitral valve gradient 19 with a mean of 7.  She was evaluated by Dr. Briones who thought she was too high risk for mitral valve surgery.  She was discharged on carvedilol 3.125 mg, Torsemide 40 mg, digoxin 125 mcg, spironolactone 25 mg.    She was again admitted on 6-10-25 with a kidney stone and had problems with hypotension.  Coreg was ultimately stopped and she was discharged on midodrine 2.5 mg three times daily.      She presented to the ED yesterday after a mechanical fall resulting in a left femur fracture.  She was due to have surgery this morning, however it was cancelled due to worsening respiratory failure and elevated heart rates with some chest pressure.      Currently she is on 5 liters of oxygen with a pH of 7.337, pCO2 58 and pO2 56, sodium 134, potassium 5.1, creatinine 2.27, white count is 24 wit a hemoglobin of 13.  Heart rates have been running in the 120's-140's.  She has  received digoxin 125 mcg, and metoprolol 12.5.  Blood pressures have been a bit labile, but mainly acceptable.  She was given midodrine 2.5 mg prior to lunch.      She is scheduled for surgery tomorrow morning. Cardiology has been asked to manage atrial fibrillation and to offer opinion of surgical risk.      She tells me that she does not remember having chest pain last night.  She has not had chest pain as an outpatient.  She has chronic shortness of breath is worse currently    ECHO 2-18-25    Left ventricular systolic function is hyperdynamic (EF > 70%).    Normal right ventricular cavity size noted. Hyperdynamic right ventricular systolic function noted.    The left atrial cavity is moderately dilated.    Mild aortic valve stenosis is present. Aortic valve maximum pressure gradient is 35.0 mmHg. Aortic valve mean pressure gradient is 14.2 mmHg. The aortic valve leaflets are moderately calcified    Moderate to severe mitral valve stenosis is present. There is severe mitral annular calcification.    The mitral valve peak gradient is 19.00 mmHg. The mitral valve mean gradient is 7.00 mmHg.    There is a mobile echodensity on the atrial surface of the anterior mitral valve leaflet which may either represent a problem chordae or fibroblastoma (appearance would be atypical for vegetation)    Mild tricuspid valve regurgitation is present.    Calculated right ventricular systolic pressure from tricuspid regurgitation is 53 mmHg.    There is a trivial pericardial effusion.     48 HOUR HOLTER 9-25-24    An abnormal monitor study.    48-hour Holter monitor continuous atrial fibrillation heart rate ranging 70 to 144 bpm (average 102 bpm).  Rapid ventricular response was noted 58% of the monitored time. Rare ventricular ectopy with no NSVT. No significant pauses or bradycardia noted. No patient triggered or diary events reported.    STRESS TEST 6-5-23    Left ventricular ejection fraction is normal (Calculated EF = 70%).     Myocardial perfusion imaging indicates a normal myocardial perfusion study with no evidence of ischemia.    Impressions are consistent with a low risk study.      Past Medical History:   Diagnosis Date    Acute endocarditis     Atrial fibrillation with RVR 11/14/2019    Cancer     CKD (chronic kidney disease) stage 3, GFR 30-59 ml/min     COPD (chronic obstructive pulmonary disease)     CTS (carpal tunnel syndrome)     Dizziness     Gall stones     Influenza 11/14/2019    Medicare annual wellness visit, subsequent 04/12/2021    Migraine     Osteoporosis     Renal disorder     Restless leg syndrome     Thrush, oral 11/14/2019         Past Surgical History:   Procedure Laterality Date    CARDIAC CATHETERIZATION N/A 1/23/2020    Procedure: Coronary angiography;  Surgeon: Svetlana Grayson MD;  Location:  [facility];  Service: Cardiovascular    CARDIAC CATHETERIZATION N/A 1/23/2020    Procedure: Left ventriculography;  Surgeon: Svetlana Grayson MD;  Location:  [facility];  Service: Cardiovascular    CARDIAC CATHETERIZATION N/A 1/23/2020    Procedure: Left Heart Cath;  Surgeon: Svetlana Grayson MD;  Location: [facility] CATH [facility]VASIVE LOC[facility];  Service: Cardiovascular    CHOLECYSTECTOMY      CYSTOSCOPY W/ URETERAL STENT PLACEMENT Left 6/10/2025    Procedure: CYSTOSCOPY URETERAL LEFT STENT PLACEMENT;  Surgeon: Stefano Mahoney MD;  Location: [facility] MAIN OR;  Service: Urology;  Laterality: Left;    KNEE ARTHROPLASTY Right     LAPAROSCOPIC GASTRIC BANDING           Allergies   Allergen Reactions    Penicillins Rash     RASH 30 YRS AGO NO HOSPITALIZATION         No current facility-administered medications on file prior to encounter.     Current Outpatient Medications on File Prior to Encounter   Medication Sig Dispense Refill    O2 (OXYGEN) Inhale 2 L/min 1 (One) Time.      ammonium lactate (LAC-HYDRIN) 12 % lotion APPLY TOPICALLY TO THE APPROPRIATE AREA AS DIRECTED AS NEEDED FOR DRY SKIN.  225 g 6    budesonide (PULMICORT) 0.5 MG/2ML nebulizer solution USE 1 VIAL IN NEBULIZER DAILY - rinse mouth after treatment 30 each 11    buPROPion XL (WELLBUTRIN XL) 300 MG 24 hr tablet Take 1 tablet by mouth Daily. 90 tablet 3    cyclobenzaprine (FLEXERIL) 10 MG tablet Take 1 tablet by mouth 3 (Three) Times a Day As Needed (back pain). 40 tablet 1    digoxin (LANOXIN) 125 MCG tablet Take 1 tablet by mouth Every Other Day. 90 tablet 1    famotidine (PEPCID) 20 MG tablet Take 1 tablet by mouth 2 (Two) Times a Day Before Meals. 180 tablet 1    ferrous sulfate 325 (65 FE) MG tablet Take 1 tablet by mouth Every Other Day. 45 tablet 1    HYDROcodone-acetaminophen (NORCO) 7.5-325 MG per tablet Take 1 tablet by mouth Every 6 (Six) Hours As Needed for Moderate Pain. 30 tablet 0    ipratropium-albuterol (DUO-NEB) 0.5-2.5 mg/3 ml nebulizer USE 1 VIAL IN NEBULIZER 4 TIMES DAILY - as directed 120 mL 11    levothyroxine (SYNTHROID, LEVOTHROID) 50 MCG tablet Take 1 tablet by mouth Every Morning. 90 tablet 1    metoprolol tartrate (LOPRESSOR) 25 MG tablet Take 1 tablet by mouth Every 12 (Twelve) Hours. 60 tablet 0    midodrine (PROAMATINE) 2.5 MG tablet Take 1 tablet by mouth 3 (Three) Times a Day Before Meals. 90 tablet 0    ondansetron (ZOFRAN) 4 MG tablet Take 1 tablet by mouth Every 8 (Eight) Hours As Needed for Nausea or Vomiting. 60 tablet 3    pantoprazole (PROTONIX) 40 MG EC tablet Take 1 tablet by mouth once daily 180 tablet 0    potassium chloride ER (K-TAB) 20 MEQ tablet controlled-release ER tablet Take 1 tablet by mouth Daily.      pramipexole (MIRAPEX) 0.5 MG tablet Take 1 tablet by mouth 2 (Two) Times a Day. 180 tablet 3    revefenacin (Yupelri) 175 MCG/3ML nebulizer solution Take 3 mL by nebulization Daily.      spironolactone (ALDACTONE) 25 MG tablet Take 1 tablet by mouth once daily 90 tablet 0    torsemide (DEMADEX) 20 MG tablet Take 2 tablets by mouth 2 (Two) Times a Day. 360 tablet 1    trospium (SANCTURA) 20  "MG tablet Take 1 tablet by mouth 2 (Two) Times a Day. 180 tablet 3    warfarin (COUMADIN) 2.5 MG tablet TAKE 1/2 TABLET ON MONDAY, WEDNESDAY AND SATURDAY, THEN 1 TABLET ALL OTHER DAYS AS DIRECTED. 75 tablet 0         Social History     Socioeconomic History    Marital status:    Tobacco Use    Smoking status: Former     Current packs/day: 0.00     Average packs/day: 0.5 packs/day for 50.0 years (25.0 ttl pk-yrs)     Types: Cigarettes     Start date: 11/3/1969     Quit date: 11/3/2019     Years since quittin.6     Passive exposure: Past (parents smoked in the home)    Smokeless tobacco: Never    Tobacco comments:     LAST CIG 2019   Vaping Use    Vaping status: Never Used   Substance and Sexual Activity    Alcohol use: No     Comment: caffeine - 1/2 cup coffee daily     Drug use: No    Sexual activity: Defer         Family History   Problem Relation Age of Onset    Lung cancer Mother          Objective:     Vitals:    25 1036 25 1059 25 1147 25 1521   BP:       BP Location:       Patient Position:       Pulse: 113 102     Resp: 18      Temp:   98.8 °F (37.1 °C) 97.5 °F (36.4 °C)   TempSrc:   Oral Oral   SpO2: 96% (!) 87%     Weight:       Height:         Body mass index is 25.95 kg/m².  Flowsheet Rows      Flowsheet Row First Filed Value   Admission Height 154.9 cm (61\") Documented at 2025 1627   Admission Weight 64 kg (141 lb) Documented at 2025 1627             General:    No acute distress, alert and oriented x4, pleasant                   Head:    Normocephalic, atraumatic.   Eyes:          Conjunctivae and sclerae normal, no icterus, PERRLA   Throat:   No oral lesions, no thrush, oral mucosa moist.    Neck:   Supple, trachea midline.   Lungs:     Clear to auscultation bilaterally     Heart:    Regular rhythm and normal rate.  No murmurs, gallops, or rubs noted.   Abdomen:     Soft, non-tender, non-distended, positive bowel sounds.    Extremities:   No " clubbing, cyanosis, or edema.     Pulses:   Pulses palpable and equal bilaterally.    Skin:   No bleeding or rash.   Neuro:   Non-focal.  Moves all extremities well.    Psychiatric:   Normal mood and affect.                 Lab Review:                Results from last 7 days   Lab Units 07/04/25  0336   SODIUM mmol/L 134*   POTASSIUM mmol/L 5.1   CHLORIDE mmol/L 97*   CO2 mmol/L 23.4   BUN mg/dL 28.0*   CREATININE mg/dL 2.27*   GLUCOSE mg/dL 115*   CALCIUM mg/dL 9.6     Results from last 7 days   Lab Units 07/04/25  1435   HSTROP T ng/L 73*     Results from last 7 days   Lab Units 07/04/25  0336   WBC 10*3/mm3 23.74*   HEMOGLOBIN g/dL 13.0   HEMATOCRIT % 39.5   PLATELETS 10*3/mm3 352     Results from last 7 days   Lab Units 07/04/25  0336 07/03/25  2247 07/03/25  1628   INR  1.83* 1.71* 1.71*                   7-4-25      6-10-25        Assessment:   Left femur fracture  Atrial fibrillation with RVR  Chronic diastolic heart failure  Mild aortic valve stenosis  Moderate to severe mitral valve stenosis  JAIME on CKD  COPD      Plan:      - Patient presents with a femur fracture.  Cardiology consulted for preop restratification.  Preoperatively she was found to have worsening oxygenation with wheezing and shortness of breath.  There is some complaint of chest pain /chest tightness but the patient does not remember it.  She tells me she has not been having chest discomfort the last few months.    - I did order a troponin given the unclear history of chest tightness.  Her EKG showed a right bundle branch block and A-fib with borderline rates.  No acute ischemic changes.  Initial troponin was 73 which is expected in the setting of hypoxia and poor renal clearance due to her JAIME.  I expect the second troponin to be flat as this does not likely reactive to ACS.     - -She is chronically ill with multiple comorbidities.  She has moderate to severe mitral valve stenosis, chronic diastolic heart failure, A-fib, COPD.  She also  currently has an JAIME on CKD.  Being treated for COPD exacerbation.  She is at high cardiovascular risk that really is nonmodifiable.  Assuming her repeat troponin is flat, she may proceed as she understands that she is at high risk and would like to maintain best mobility as she can undergo the surgery. We do need her heart rate lower.     - As far as optimization prior to surgery: Looks euvolemic, her heart rate is around 100.  Will give her one-time dose of IV dig and check a level in the morning.  Continue beta-blocker up to 25mg, increase midodrine if need be. HR control is important given her MS.     -Pulmonary optimization per pulm    We will continue to follow.

## 2025-07-04 NOTE — PROGRESS NOTES
Name: Shani Phillip ADMIT: 7/3/2025   : 1950  PCP: Rodríguez Walker MD    MRN: [id number] LOS: 1 days   AGE/SEX: 74 y.o. female  [place] [facility] [place]     Subjective   Subjective   Patient seen at bedside today.  She is on high flow oxygen.       Objective   Objective   Vital Signs  Temp:  [97.6 °F (36.4 °C)-98.8 °F (37.1 °C)] 98.8 °F (37.1 °C)  Heart Rate:  [] 102  Resp:  [16-24] 18  BP: ()/() 106/56  SpO2:  [87 %-98 %] 87 %  on  Flow (L/min) (Oxygen Therapy):  [2-60] 60;   Device (Oxygen Therapy): heated;humidified;high-flow nasal cannula  Body mass index is 25.95 kg/m².  Physical Exam  General, awake and alert.  Head and ENT, normocephalic and atraumatic.  Lungs, symmetric expansion, equal air entry bilaterally.  Heart, regular rate and rhythm.  Abdomen, soft and nontender.  Extremities, no clubbing or cyanosis.  Neuro, no focal deficits.  Skin: Warm and no rash.  Psych, normal mood and affect.  Musculoskeletal, joint examination is grossly normal.      Results Review     I reviewed the patient's new clinical results.  Results from last 7 days   Lab Units 25  0336 25  1628   WBC 10*3/mm3 23.74* 10.95*   HEMOGLOBIN g/dL 13.0 12.9   PLATELETS 10*3/mm3 352 319     Results from last 7 days   Lab Units 25  0336 25  1628   SODIUM mmol/L 134* 133*   POTASSIUM mmol/L 5.1 4.5   CHLORIDE mmol/L 97* 93*   CO2 mmol/L 23.4 28.6   BUN mg/dL 28.0* 24.0*   CREATININE mg/dL 2.27* 1.78*   GLUCOSE mg/dL 115* 121*   EGFR mL/min/1.73 22.0* 29.5*     Results from last 7 days   Lab Units 25  1628   ALBUMIN g/dL 3.3*   BILIRUBIN mg/dL 0.6   ALK PHOS U/L 137*   AST (SGOT) U/L 16   ALT (SGPT) U/L 12     Results from last 7 days   Lab Units 25  0336 25  1628   CALCIUM mg/dL 9.6 9.5   ALBUMIN g/dL  --  3.3*       Glucose   Date/Time Value Ref Range Status   2025 1118 164 (H) 70 - 130 mg/dL Final   2025 0455 108 70 - 130 mg/dL Final       XR Chest 1 View  Result  Date: 7/4/2025  Electronically signed by Rodríguez Sullivan MD on 07-04-25 at 0458    XR Femur 2 View Left  Result Date: 7/3/2025   Previously demonstrated intertrochanteric fracture, no evidence of any other acute bony abnormality.  This report was finalized on 7/3/2025 7:00 PM by Dr. Jose R Villanueva M.D on Workstation: [id number]      XR Shoulder 2+ View Left  Result Date: 7/3/2025   No evidence of acute bony abnormality.  This report was finalized on 7/3/2025 5:41 PM by Dr. Jose R Villanueva M.D on Workstation: [id number]      XR Hip With or Without Pelvis 2 - 3 View Left  Result Date: 7/3/2025   Intertrochanteric left femoral fracture without dislocation.   This report was finalized on 7/3/2025 5:39 PM by Dr. Jose R Villanueva M.D on Workstation: [id number]      XR Chest 1 View  Result Date: 7/3/2025   Allowing for submaximal inspiratory result, heart size appears within normal limits.  Mild symmetric chronic interstitial prominence, unchanged, as is what appears to be right apical pleural-parenchymal retraction, though this appears less evident on older prior studies.  No consolidation, effusion or pneumothorax.  Consider follow-up unenhanced chest CT for further evaluation of the right lung apex.  This report was finalized on 7/3/2025 5:39 PM by Dr. Jose R Villanueva M.D on Workstation: [id number]        I have personally reviewed all medications:  Scheduled Medications  arformoterol, 15 mcg, Nebulization, BID - RT  azithromycin, 500 mg, Intravenous, Q24H  budesonide, 0.5 mg, Nebulization, BID - RT  buPROPion XL, 300 mg, Oral, Daily  cefTRIAXone, 1,000 mg, Intravenous, Q24H  digoxin, 125 mcg, Oral, Every Other Day  famotidine, 20 mg, Oral, BID AC  ferrous sulfate, 325 mg, Oral, Every Other Day  insulin lispro, 2-7 Units, Subcutaneous, 4x Daily AC & at Bedtime  ipratropium-albuterol, 3 mL, Nebulization, 4x Daily - RT  levothyroxine, 50 mcg, Oral, Q AM  melatonin, 2.5 mg, Oral, Nightly  methylPREDNISolone  sodium succinate, 40 mg, Intravenous, Q8H  metoprolol tartrate, 12.5 mg, Oral, Q12H  midodrine, 2.5 mg, Oral, TID AC  oxybutynin XL, 5 mg, Oral, Daily  pantoprazole, 40 mg, Oral, Daily  potassium chloride, 20 mEq, Oral, Daily  pramipexole, 0.5 mg, Oral, BID  [Held by provider] spironolactone, 25 mg, Oral, Daily  [Held by provider] torsemide, 40 mg, Oral, BID Diuretics    Infusions  sodium chloride, 75 mL/hr, Last Rate: 75 mL/hr (07/04/25 0721)    Diet  Diet: Regular/House; Fluid Consistency: Thin (IDDSI 0)  NPO Diet NPO Type: Strict NPO    I have personally reviewed:  [x]  Laboratory   [x]  Microbiology   [x]  Radiology   [x]  EKG/Telemetry  [x]  Cardiology/Vascular   []  Pathology    []  Records       Assessment/Plan     Active Hospital Problems    Diagnosis  POA    **Closed left hip fracture [S72.002A]  Yes    Acute respiratory failure with hypoxia [J96.01]  Yes    Stage 3b chronic kidney disease [N18.32]  Yes    Chronic combined systolic and diastolic CHF (congestive heart failure) [I50.42]  Yes    Acquired hypothyroidism [E03.9]  Yes    Depression [F32.A]  Yes      Resolved Hospital Problems   No resolved problems to display.       74 y.o. female admitted with Closed left hip fracture.    Assessment and plan  1.  Acute on chronic respiratory failure with hypoxia, patient is currently on high flow oxygen, pulmonary on board, manage in ICU at this point of time.  She is at high risk of clinical decline.    2.  History of chronic combined systolic and diastolic congestive heart failure along with acute on chronic kidney injury, patient will benefit from cardiology and nephrology evaluation.  We will follow management recommendations by consultants.    3.  Hypothyroidism, Synthroid will be continued.    4.  Depression, chronic condition.    5.  CODE STATUS is full code.  Further plans will be based on the hospital course.      Quan Hummel MD  [facility]  07/04/25  12:02 EDT

## 2025-07-04 NOTE — PROGRESS NOTES
"  Daily Progress Note.   [facility] INTENSIVE CARE  7/4/2025    Patient:  Name:  Shani Phillip  MRN:  [id number]  1950  74 y.o.  female         CC:  Summary:  \"74-year-old female with medical history including: Atrial fibrillation, congestive heart failure, chronic kidney disease, COPD. Follows with Dr. Zamora in our clinic.presented to the emergency department on 7/3/2025 after mechanical fall. Patient reportedly tripped over her flip-flops and landed on her left hip and left shoulder. Was being evaluated by orthopedic surgery and we were initially consulted for pulmonary clearance. However on the evening morning of 7/4/2025 patient had rapid response called due to increasing oxygen requirement, wheezing, shortness of breath. On evaluation patient did have diffuse wheezing bilaterally and was complaining of some chest pain/tightness.\"    Interval History:  On heated high flow however saturation good she says she feels a little bit better.  She is able to carry on conversation.  Nontoxic non-lethargic        Physical Exam:  /59   Pulse (!) 134   Temp 97.6 °F (36.4 °C) (Oral)   Resp 22   Ht 154.9 cm (61\")   Wt 62.3 kg (137 lb 5.6 oz)   SpO2 97%   BMI 25.95 kg/m²   Body mass index is 25.95 kg/m².  No intake or output data in the 24 hours ending 07/04/25 0727  General appearance: Nontoxic, conversant   Eyes: anicteric sclerae, moist conjunctivae; no lidlag;    HENT: Atraumatic; oropharynx clear with moist mucous membranes    Neck: Trachea midline;  supple   Lungs: Distant however no wheeze, with calm respiratory effort and no intercostal retractions  CV: RRR, no rub   Abdomen: Soft, thin; BS+  Skin: WWP   Psych: Appropriate affect, alert   Neuro: Normal cognition CN II-XII. Speech intact.    Data Review:  Notable Labs:  Results from last 7 days   Lab Units 07/04/25  0336 07/03/25  1628   WBC 10*3/mm3 23.74* 10.95*   HEMOGLOBIN g/dL 13.0 12.9   PLATELETS 10*3/mm3 352 319     Results from " last 7 days   Lab Units 07/04/25  0336 07/03/25  1628   SODIUM mmol/L 134* 133*   POTASSIUM mmol/L 5.1 4.5   CHLORIDE mmol/L 97* 93*   CO2 mmol/L 23.4 28.6   BUN mg/dL 28.0* 24.0*   CREATININE mg/dL 2.27* 1.78*   GLUCOSE mg/dL 115* 121*   CALCIUM mg/dL 9.6 9.5   Estimated Creatinine Clearance: 18.1 mL/min (A) (by C-G formula based on SCr of 2.27 mg/dL (H)).    Results from last 7 days   Lab Units 07/04/25  0336 07/03/25  1628   AST (SGOT) U/L  --  16   ALT (SGPT) U/L  --  12   PLATELETS 10*3/mm3 352 319       Results from last 7 days   Lab Units 07/04/25  0400   PH, ARTERIAL pH units 7.337*   PCO2, ARTERIAL mm Hg 58.2*   PO2 ART mm Hg 56.1*   HCO3 ART mmol/L 31.2*       Imaging:  Reviewed chest images personally from past 3 days    A/P  COPD, acute exacerbation  Acute on chronic hypercapnic respiratory failure  Acute on chronic hypoxemic respiratory failure wears 2 L nasal cannula at baseline  Acute kidney injury  Mechanical fall  Closed left hip fracture  Atrial fibrillation  Hypertension  Hyperlipidemia  Pneumonia            Ceftriaxone  Azithro    Iv solumedrol  Pulmicort  Duonebs  Add Brovana    Continue IV fluids  Hold torsemide    Wean oxygen as able    Bp improved wean midodrine    All issues new to me today.  Prior hospital course, labs and imaging reviewed.  Dw pt and pt  at bedside.      Electronically signed by Jonny Jansen MD, 07/04/25, 7:56 AM EDT.

## 2025-07-04 NOTE — CONSULTS
CONSULT NOTE    Patient Identification:  Shani Phillip  74 y.o.  female  1950  [id number]            Reason for Consultation:  chronic hypoxic respiratory failure, RRT with hypoxia and increased work of breathing    CC: Mechanical fall    History of Present Illness:  Shani Phillip is a 74-year-old female with medical history including: Atrial fibrillation, congestive heart failure, chronic kidney disease, COPD.    She follows with Dr. Zamora for her COPD and hypoxemia.  Most recent pulmonary function testing from December 2020 shows an FEV1 over FVC 58%, FEV1 38%.  She is on 2 L nasal cannula at all times, and uses performance plus budesonide plus Yupelri for her daily control.  She has a history of tobacco abuse, quit in 2019 with a history of 1 to 2 packs/day x 50 years (50 to 100 pack years).    Patient presented to the emergency department on 7/3/2025 after mechanical fall.  Patient reportedly tripped over her flip-flops and landed on her left hip and left shoulder.  She denies any head trauma or loss of consciousness and he is on Coumadin for atrial fibrillation.  ED evaluation included x-ray of hip that showed intertrochanteric left femoral fracture without dislocation.  X-ray of the shoulder showed no acute bony abnormality.    Pulmonary was initially consulted for surgical evaluation of chronic hypoxic respiratory failure, however overnight patient developed significant increased work of breathing, worsening hypoxia, and wheezing.  Rapid response was called, patient's oxygen requirement went from 3 L to 5 L nasal cannula.  Chest x-ray was performed which showed no evidence of pulmonary vascular congestion or fluid overload.    Review of Systems:  CONSTITUTIONAL:  Denies fevers or chills  EYE:  No new vision changes  EAR:  No change in hearing  CARDIAC:  No chest pain  PULMONARY:  +shortness of breath and wheezing, no cough  GI:  No diarrhea, hematemesis or hematochezia  RENAL:  No dysuria or  urinary frequency  MUSCULOSKELETAL:  +left hip pain  ENDOCRINE:  No heat or cold intolerance  INTEGUMENTARY: No skin rashes  NEUROLOGICAL:  No dizziness or confusion.  No seizure activity  PSYCHIATRIC:  No new anxiety or depression  12 system review of systems performed and all else negative     Past Medical History:  Past Medical History:   Diagnosis Date    Acute endocarditis     Atrial fibrillation with RVR 11/14/2019    Cancer     CKD (chronic kidney disease) stage 3, GFR 30-59 ml/min     COPD (chronic obstructive pulmonary disease)     CTS (carpal tunnel syndrome)     Dizziness     Gall stones     Influenza 11/14/2019    Medicare annual wellness visit, subsequent 04/12/2021    Migraine     Osteoporosis     Renal disorder     Restless leg syndrome     Thrush, oral 11/14/2019       Past Surgical History:  Past Surgical History:   Procedure Laterality Date    CARDIAC CATHETERIZATION N/A 1/23/2020    Procedure: Coronary angiography;  Surgeon: Svetlana Grayson MD;  Location:  [facility];  Service: Cardiovascular    CARDIAC CATHETERIZATION N/A 1/23/2020    Procedure: Left ventriculography;  Surgeon: Svetlana Grayson MD;  Location:  [facility];  Service: Cardiovascular    CARDIAC CATHETERIZATION N/A 1/23/2020    Procedure: Left Heart Cath;  Surgeon: Svetlana Grayson MD;  Location:  [facility];  Service: Cardiovascular    CHOLECYSTECTOMY      CYSTOSCOPY W/ URETERAL STENT PLACEMENT Left 6/10/2025    Procedure: CYSTOSCOPY URETERAL LEFT STENT PLACEMENT;  Surgeon: Stefano Mahoney MD;  [facility];  Service: Urology;  Laterality: Left;    KNEE ARTHROPLASTY Right     LAPAROSCOPIC GASTRIC BANDING          Home Meds:  Medications Prior to Admission   Medication Sig Dispense Refill Last Dose/Taking    O2 (OXYGEN) Inhale 2 L/min 1 (One) Time.   7/3/2025    ammonium lactate (LAC-HYDRIN) 12 % lotion APPLY TOPICALLY TO THE APPROPRIATE AREA AS DIRECTED AS NEEDED  FOR DRY SKIN. 225 g 6     budesonide (PULMICORT) 0.5 MG/2ML nebulizer solution USE 1 VIAL IN NEBULIZER DAILY - rinse mouth after treatment 30 each 11     buPROPion XL (WELLBUTRIN XL) 300 MG 24 hr tablet Take 1 tablet by mouth Daily. 90 tablet 3     cyclobenzaprine (FLEXERIL) 10 MG tablet Take 1 tablet by mouth 3 (Three) Times a Day As Needed (back pain). 40 tablet 1     digoxin (LANOXIN) 125 MCG tablet Take 1 tablet by mouth Every Other Day. 90 tablet 1     famotidine (PEPCID) 20 MG tablet Take 1 tablet by mouth 2 (Two) Times a Day Before Meals. 180 tablet 1     ferrous sulfate 325 (65 FE) MG tablet Take 1 tablet by mouth Every Other Day. 45 tablet 1     HYDROcodone-acetaminophen (NORCO) 7.5-325 MG per tablet Take 1 tablet by mouth Every 6 (Six) Hours As Needed for Moderate Pain. 30 tablet 0     ipratropium-albuterol (DUO-NEB) 0.5-2.5 mg/3 ml nebulizer USE 1 VIAL IN NEBULIZER 4 TIMES DAILY - as directed 120 mL 11     levothyroxine (SYNTHROID, LEVOTHROID) 50 MCG tablet Take 1 tablet by mouth Every Morning. 90 tablet 1     metoprolol tartrate (LOPRESSOR) 25 MG tablet Take 1 tablet by mouth Every 12 (Twelve) Hours. 60 tablet 0     midodrine (PROAMATINE) 2.5 MG tablet Take 1 tablet by mouth 3 (Three) Times a Day Before Meals. 90 tablet 0     ondansetron (ZOFRAN) 4 MG tablet Take 1 tablet by mouth Every 8 (Eight) Hours As Needed for Nausea or Vomiting. 60 tablet 3     pantoprazole (PROTONIX) 40 MG EC tablet Take 1 tablet by mouth once daily 180 tablet 0     potassium chloride ER (K-TAB) 20 MEQ tablet controlled-release ER tablet Take 1 tablet by mouth Daily.       pramipexole (MIRAPEX) 0.5 MG tablet Take 1 tablet by mouth 2 (Two) Times a Day. 180 tablet 3     revefenacin (Yupelri) 175 MCG/3ML nebulizer solution Take 3 mL by nebulization Daily.       spironolactone (ALDACTONE) 25 MG tablet Take 1 tablet by mouth once daily 90 tablet 0     torsemide (DEMADEX) 20 MG tablet Take 2 tablets by mouth 2 (Two) Times a Day. 360  "tablet 1     trospium (SANCTURA) 20 MG tablet Take 1 tablet by mouth 2 (Two) Times a Day. 180 tablet 3     warfarin (COUMADIN) 2.5 MG tablet TAKE 1/2 TABLET ON MONDAY, WEDNESDAY AND SATURDAY, THEN 1 TABLET ALL OTHER DAYS AS DIRECTED. 75 tablet 0        Allergies:  Allergies   Allergen Reactions    Penicillins Rash     RASH 30 YRS AGO NO HOSPITALIZATION       Social History:   Social History     Socioeconomic History    Marital status:    Tobacco Use    Smoking status: Former     Current packs/day: 0.00     Average packs/day: 0.5 packs/day for 50.0 years (25.0 ttl pk-yrs)     Types: Cigarettes     Start date: 11/3/1969     Quit date: 11/3/2019     Years since quittin.6     Passive exposure: Past (parents smoked in the home)    Smokeless tobacco: Never    Tobacco comments:     LAST CIG 2019   Vaping Use    Vaping status: Never Used   Substance and Sexual Activity    Alcohol use: No     Comment: caffeine - 1/2 cup coffee daily     Drug use: No    Sexual activity: Defer       Family History:  Family History   Problem Relation Age of Onset    Lung cancer Mother        Physical Exam:  /63 (BP Location: Right arm, Patient Position: Lying)   Pulse 113   Temp 97.8 °F (36.6 °C)   Resp 18   Ht 154.9 cm (61\")   Wt 64 kg (141 lb)   SpO2 96%   BMI 26.64 kg/m²  Body mass index is 26.64 kg/m². 96% 64 kg (141 lb)  Constitutional: Elderly, chronically ill-appearing female pt in bed, tachypneic, breathing shallow, increased work of breathing, expiratory wheezing  Head: - NCAT  Eyes: No pallor, Anicteric conjunctiva, EOMI.  ENMT:  Mallampati 3, no oral thrush. Dry MM.   NECK: Trachea midline, No thyromegaly, no palpable cervical LNpathy no JVD  Heart: Tachycardic rate, irregular rhythm, no murmur. No pedal edema (skin turgor with tenting)  Lungs: RENETTA +,  lungs sound tight, expiratory wheezing  Abdomen: Soft. No tenderness, guarding or rigidity. No palpable masses  Extremities: Extremities warm and well " perfused. No cyanosis/ clubbing  Neuro: Conscious, answers appropriately, no gross focal neuro deficits  Psych: Mood and affect appropriate    PPE recommended per [facility] infectious disease Isolation protocol for the current clinical scenario(as mentioned below) was followed.      LABS:  COVID19   Date Value Ref Range Status   06/04/2023 Not Detected Not Detected - Ref. Range Final       Lab Results   Component Value Date    CALCIUM 9.5 07/03/2025    PHOS 2.2 (L) 06/14/2025     Results from last 7 days   Lab Units 07/03/25  1628   SODIUM mmol/L 133*   POTASSIUM mmol/L 4.5   CHLORIDE mmol/L 93*   CO2 mmol/L 28.6   BUN mg/dL 24.0*   CREATININE mg/dL 1.78*   GLUCOSE mg/dL 121*   CALCIUM mg/dL 9.5   WBC 10*3/mm3 10.95*   HEMOGLOBIN g/dL 12.9   PLATELETS 10*3/mm3 319   ALT (SGPT) U/L 12   AST (SGOT) U/L 16     Lab Results   Component Value Date    CKTOTAL 175 11/03/2019    CKMBINDEX 3.2 11/03/2019    TROPONINT 26 (H) 06/04/2023                         Results from last 7 days   Lab Units 07/03/25  1628 07/03/25  0000 06/30/25  0000   INR  1.71* 2.00 4.20         Lab Results   Component Value Date    TSH 2.790 06/12/2025     Estimated Creatinine Clearance: 23.4 mL/min (A) (by C-G formula based on SCr of 1.78 mg/dL (H)).      Microbiology Results (last 10 days)       ** No results found for the last 240 hours. **               Imaging: I personally visualized the images of scans/x-rays performed within last 3 days.      Assessment:  COPD, acute exacerbation  Acute on chronic hypercapnic respiratory failure  Acute on chronic hypoxemic respiratory failure  Acute kidney injury  Mechanical fall  Closed left hip fracture  Atrial fibrillation  Hypertension  Hyperlipidemia    Recommendations:  Pulmonary initially consulted for perioperative risk assessment with chronic hypoxic respiratory failure and COPD.  Later in the evening, patient had a rapid response called for acute on chronic hypoxic respiratory failure,  increased work of breathing, hypercapnic respiratory failure and.  Chest x-ray showed no evidence of fluid overload-patient appears clinically dry on exam with poor skin turgor and dry mucous membranes, no evidence of JVD.  Expiratory wheezing noted on exam-patient has an FEV1 38%, suspect COPD exacerbation.  DuoNeb ordered stat as well as 125 mg of IV Solu-Medrol with 40 mg scheduled to follow.  ABG reviewed showing compensated respiratory acidosis: 7.337/58.2/56.1/31.2.  Doubt patient would tolerate BiPAP at this time, Airvo initiated to help with acute hypoxemia and to hopefully build off some CO2.  Repeat ABG ordered, if remains hypercapnic, may require BiPAP    At this point, patient does not appear stable for orthopedic surgery-discussed with orthopedic surgeon.  Plan to hold off on surgery until tomorrow, pending cardiology evaluation and hopeful optimization of respiratory status.  Orthopedic surgery also asked ICU team to help with INR, goal for surgery is less than 1.7.    Patient was placed in face mask upon entering room and kept mask on throughout our encounter. I wore full protective equipment throughout this patient encounter including a face mask, gown and gloves. Hand hygiene was performed before donning protective equipment and after removal when leaving the room.    Yulia Bone, APRN  7/3/2025  20:52 EDT      Much of this encounter note is an electronic transcription/translation of spoken language to printed text using Dragon Software.

## 2025-07-04 NOTE — PROGRESS NOTES
[facility] Clinical Pharmacy Services: Ceftriaxone Consult    Pt Name: Shani Phillip   : 1950  Weight: 62.3 kg (137 lb 5.6 oz)  Antibiotic: Ceftriaxone  Indication: Pneumonia    Relevant clinical data and objective history reviewed:    Past Medical History:   Diagnosis Date    Acute endocarditis     Atrial fibrillation with RVR 2019    Cancer     CKD (chronic kidney disease) stage 3, GFR 30-59 ml/min     COPD (chronic obstructive pulmonary disease)     CTS (carpal tunnel syndrome)     Dizziness     Gall stones     Influenza 2019    Medicare annual wellness visit, subsequent 2021    Migraine     Osteoporosis     Renal disorder     Restless leg syndrome     Thrush, oral 2019     Creatinine   Date Value Ref Range Status   2025 2.27 (H) 0.57 - 1.00 mg/dL Final   2025 1.78 (H) 0.57 - 1.00 mg/dL Final     BUN   Date Value Ref Range Status   2025 28.0 (H) 8.0 - 23.0 mg/dL Final     Estimated Creatinine Clearance: 18.1 mL/min (A) (by C-G formula based on SCr of 2.27 mg/dL (H)).    Lab Results   Component Value Date    WBC 23.74 (H) 2025     Temp Readings from Last 3 Encounters:   25 97.6 °F (36.4 °C) (Oral)   25 98 °F (36.7 °C) (Oral)   25 98 °F (36.7 °C) (Temporal)      Assessment/Plan    Ordered ceftriaxone 1g IV q24h for a total of 5 days. Will monitor and adjust if culture data or pertinent lab values indicate this is best for the patient.     Thank you for this consult and please contact pharmacy with any questions or concerns.     Gregory Francois, [facility]  Clinical Pharmacist

## 2025-07-05 ENCOUNTER — ANESTHESIA (OUTPATIENT)
Dept: PERIOP | Facility: HOSPITAL | Age: 75
End: 2025-07-05
Payer: MEDICARE

## 2025-07-05 ENCOUNTER — ANESTHESIA EVENT (OUTPATIENT)
Dept: PERIOP | Facility: HOSPITAL | Age: 75
End: 2025-07-05
Payer: MEDICARE

## 2025-07-05 PROCEDURE — 25010000002 SUGAMMADEX 200 MG/2ML SOLUTION: Performed by: NURSE ANESTHETIST, CERTIFIED REGISTERED

## 2025-07-05 PROCEDURE — 25010000002 LIDOCAINE 2% SOLUTION: Performed by: NURSE ANESTHETIST, CERTIFIED REGISTERED

## 2025-07-05 PROCEDURE — 25010000002 PROPOFOL 200 MG/20ML EMULSION: Performed by: NURSE ANESTHETIST, CERTIFIED REGISTERED

## 2025-07-05 PROCEDURE — 25010000002 FENTANYL CITRATE (PF) 50 MCG/ML SOLUTION: Performed by: NURSE ANESTHETIST, CERTIFIED REGISTERED

## 2025-07-05 PROCEDURE — 25010000002 PROPOFOL 10 MG/ML EMULSION: Performed by: NURSE ANESTHETIST, CERTIFIED REGISTERED

## 2025-07-05 PROCEDURE — 25010000002 CEFAZOLIN PER 500 MG: Performed by: ORTHOPAEDIC SURGERY

## 2025-07-05 PROCEDURE — 25010000002 ONDANSETRON PER 1 MG: Performed by: NURSE ANESTHETIST, CERTIFIED REGISTERED

## 2025-07-05 RX ORDER — SODIUM CHLORIDE 9 MG/ML
INJECTION, SOLUTION INTRAVENOUS CONTINUOUS PRN
Status: DISCONTINUED | OUTPATIENT
Start: 2025-07-05 | End: 2025-07-05 | Stop reason: SURG

## 2025-07-05 RX ORDER — PROPOFOL 10 MG/ML
INJECTION, EMULSION INTRAVENOUS CONTINUOUS PRN
Status: DISCONTINUED | OUTPATIENT
Start: 2025-07-05 | End: 2025-07-05 | Stop reason: SURG

## 2025-07-05 RX ORDER — PROPOFOL 10 MG/ML
VIAL (ML) INTRAVENOUS AS NEEDED
Status: DISCONTINUED | OUTPATIENT
Start: 2025-07-05 | End: 2025-07-05 | Stop reason: SURG

## 2025-07-05 RX ORDER — ROCURONIUM BROMIDE 10 MG/ML
INJECTION, SOLUTION INTRAVENOUS AS NEEDED
Status: DISCONTINUED | OUTPATIENT
Start: 2025-07-05 | End: 2025-07-05 | Stop reason: SURG

## 2025-07-05 RX ORDER — ONDANSETRON 2 MG/ML
INJECTION INTRAMUSCULAR; INTRAVENOUS AS NEEDED
Status: DISCONTINUED | OUTPATIENT
Start: 2025-07-05 | End: 2025-07-05 | Stop reason: SURG

## 2025-07-05 RX ORDER — LIDOCAINE HYDROCHLORIDE 20 MG/ML
INJECTION, SOLUTION INFILTRATION; PERINEURAL AS NEEDED
Status: DISCONTINUED | OUTPATIENT
Start: 2025-07-05 | End: 2025-07-05 | Stop reason: SURG

## 2025-07-05 RX ORDER — TRANEXAMIC ACID 100 MG/ML
INJECTION, SOLUTION INTRAVENOUS AS NEEDED
Status: DISCONTINUED | OUTPATIENT
Start: 2025-07-05 | End: 2025-07-05 | Stop reason: SURG

## 2025-07-05 RX ORDER — FENTANYL CITRATE 50 UG/ML
INJECTION, SOLUTION INTRAMUSCULAR; INTRAVENOUS AS NEEDED
Status: DISCONTINUED | OUTPATIENT
Start: 2025-07-05 | End: 2025-07-05 | Stop reason: SURG

## 2025-07-05 RX ADMIN — CEFAZOLIN 2000 MG: 1 INJECTION, POWDER, FOR SOLUTION INTRAMUSCULAR; INTRAVENOUS at 07:59

## 2025-07-05 RX ADMIN — PROPOFOL 60 MG: 10 INJECTION, EMULSION INTRAVENOUS at 07:50

## 2025-07-05 RX ADMIN — TRANEXAMIC ACID 1000 MG: 100 INJECTION, SOLUTION INTRAVENOUS at 08:31

## 2025-07-05 RX ADMIN — PROPOFOL 75 MCG/KG/MIN: 10 INJECTION, EMULSION INTRAVENOUS at 08:01

## 2025-07-05 RX ADMIN — PROPOFOL 50 MG: 10 INJECTION, EMULSION INTRAVENOUS at 08:17

## 2025-07-05 RX ADMIN — ONDANSETRON 4 MG: 2 INJECTION, SOLUTION INTRAMUSCULAR; INTRAVENOUS at 08:45

## 2025-07-05 RX ADMIN — LIDOCAINE HYDROCHLORIDE 60 MG: 20 INJECTION, SOLUTION INFILTRATION; PERINEURAL at 07:50

## 2025-07-05 RX ADMIN — FENTANYL CITRATE 25 MCG: 50 INJECTION, SOLUTION INTRAMUSCULAR; INTRAVENOUS at 08:49

## 2025-07-05 RX ADMIN — ROCURONIUM BROMIDE 30 MG: 10 INJECTION, SOLUTION INTRAVENOUS at 07:50

## 2025-07-05 RX ADMIN — SODIUM CHLORIDE: 9 INJECTION, SOLUTION INTRAVENOUS at 07:43

## 2025-07-05 RX ADMIN — TRANEXAMIC ACID 1000 MG: 100 INJECTION, SOLUTION INTRAVENOUS at 08:02

## 2025-07-05 RX ADMIN — SUGAMMADEX 200 MG: 100 INJECTION, SOLUTION INTRAVENOUS at 08:45

## 2025-07-05 RX ADMIN — FENTANYL CITRATE 25 MCG: 50 INJECTION, SOLUTION INTRAMUSCULAR; INTRAVENOUS at 08:16

## 2025-07-05 NOTE — PROGRESS NOTES
Name: Shani Phillip ADMIT: 7/3/2025   : 1950  PCP: Rodríguez Walker MD    MRN: [id number] LOS: 2 days   AGE/SEX: 74 y.o. female  ROOM: [facility]     Subjective   Subjective   Patient seen at bedside, she remains critically ill in ICU.       Objective   Objective   Vital Signs  Temp:  [96.8 °F (36 °C)-98.5 °F (36.9 °C)] 97.9 °F (36.6 °C)  Heart Rate:  [] 94  Resp:  [12-22] 18  BP: ()/() 125/69  Arterial Line BP: ()/(37-71) 154/66  SpO2:  [81 %-100 %] 100 %  on  Flow (L/min) (Oxygen Therapy):  [10-60] 60;   Device (Oxygen Therapy): heated;high-flow nasal cannula  Body mass index is 28.08 kg/m².  Physical Exam  General, awake and alert.  Head and ENT, normocephalic and atraumatic.  Lungs, symmetric expansion, equal air entry bilaterally.  Heart, regular rate and rhythm.  Abdomen, soft and nontender.  Extremities, no clubbing or cyanosis.  Neuro, no focal deficits.  Skin: Warm and no rash.  Psych, normal mood and affect.  Musculoskeletal, joint examination is grossly normal.    Copied text material from yesterday's note has been reviewed for appropriate changes and remains accurate as of 25.      Results Review     I reviewed the patient's new clinical results.  Results from last 7 days   Lab Units 25  0324 25  0336 25  1628   WBC 10*3/mm3 20.40* 23.74* 10.95*   HEMOGLOBIN g/dL 11.8* 13.0 12.9   PLATELETS 10*3/mm3 340 352 319     Results from last 7 days   Lab Units 25  0324 25  0336 25  1628   SODIUM mmol/L 135* 134* 133*   POTASSIUM mmol/L 5.4* 5.1 4.5   CHLORIDE mmol/L 100 97* 93*   CO2 mmol/L 20.8* 23.4 28.6   BUN mg/dL 41.0* 28.0* 24.0*   CREATININE mg/dL 3.32* 2.27* 1.78*   GLUCOSE mg/dL 157* 115* 121*   EGFR mL/min/1.73 13.9* 22.0* 29.5*     Results from last 7 days   Lab Units 25  0324 25  1628   ALBUMIN g/dL 2.8* 3.3*   BILIRUBIN mg/dL 0.4 0.6   ALK PHOS U/L 131* 137*   AST (SGOT) U/L 28 16   ALT (SGPT) U/L 19 12     Results  from last 7 days   Lab Units 07/05/25  0324 07/04/25  0336 07/03/25  1628   CALCIUM mg/dL 8.2* 9.6 9.5   ALBUMIN g/dL 2.8*  --  3.3*     Results from last 7 days   Lab Units 07/05/25  1612   PROCALCITONIN ng/mL 1.51*     Glucose   Date/Time Value Ref Range Status   07/05/2025 1722 95 70 - 130 mg/dL Final   07/05/2025 1123 94 70 - 130 mg/dL Final   07/05/2025 0700 128 70 - 130 mg/dL Final   07/04/2025 2351 112 70 - 130 mg/dL Final   07/04/2025 2019 145 (H) 70 - 130 mg/dL Final   07/04/2025 1722 120 70 - 130 mg/dL Final   07/04/2025 1118 164 (H) 70 - 130 mg/dL Final       XR Chest 1 View  Result Date: 7/4/2025  Electronically signed by Rodríguez Sullivan MD on 07-04-25 at 0458    XR Femur 2 View Left  Result Date: 7/3/2025   Previously demonstrated intertrochanteric fracture, no evidence of any other acute bony abnormality.  This report was finalized on 7/3/2025 7:00 PM by Dr. Jose R Villanueva M.D on Workstation: [id number]        I have personally reviewed all medications:  Scheduled Medications  arformoterol, 15 mcg, Nebulization, BID - RT  budesonide, 0.5 mg, Nebulization, BID - RT  [START ON 7/6/2025] buPROPion XL, 150 mg, Oral, Daily  cefTRIAXone, 1,000 mg, Intravenous, Q24H  digoxin, 125 mcg, Oral, Every Other Day  famotidine, 20 mg, Oral, BID AC  insulin lispro, 2-7 Units, Subcutaneous, 4x Daily AC & at Bedtime  ipratropium-albuterol, 3 mL, Nebulization, 4x Daily - RT  levothyroxine, 50 mcg, Oral, Q AM  melatonin, 2.5 mg, Oral, Nightly  [Held by provider] midodrine, 5 mg, Oral, TID AC  oxybutynin XL, 5 mg, Oral, Daily  potassium chloride, 20 mEq, Oral, Daily  pramipexole, 0.5 mg, Oral, BID    Infusions  norepinephrine, 0.02-0.3 mcg/kg/min, Last Rate: 0.08 mcg/kg/min (07/05/25 1622)    Diet  Diet: Liquid; Clear Liquid; Fluid Consistency: Thin (IDDSI 0)    I have personally reviewed:  [x]  Laboratory   [x]  Microbiology   [x]  Radiology   [x]  EKG/Telemetry  [x]  Cardiology/Vascular   []  Pathology    []   Records       Assessment/Plan     Active Hospital Problems    Diagnosis  POA    **Closed left hip fracture [S72.002A]  Yes    Acute respiratory failure with hypoxia [J96.01]  Yes    Stage 3b chronic kidney disease [N18.32]  Yes    Chronic combined systolic and diastolic CHF (congestive heart failure) [I50.42]  Yes    Acquired hypothyroidism [E03.9]  Yes    Depression [F32.A]  Yes      Resolved Hospital Problems   No resolved problems to display.       74 y.o. female admitted with Closed left hip fracture.    Assessment and plan  1.  Acute on chronic respiratory failure with hypoxia, patient is currently on high flow oxygen, pulmonary on board, manage in ICU at this point of time.  She is at high risk of clinical decline. On IV Levophed.      2.  History of chronic combined systolic and diastolic congestive heart failure along with acute on chronic kidney injury, cardiology and nephrology on board, follow management recommendations.  She is not making adequate urine today.     3.  Hypothyroidism, Synthroid will be continued.     4.  Depression, chronic condition.     5.  CODE STATUS is DNR  Further plans will be based on the hospital course.         Quan Hummel MD  [facility]  07/05/25  18:17 EDT

## 2025-07-05 NOTE — ANESTHESIA PROCEDURE NOTES
Airway  Reason: elective    Date/Time: 7/5/2025 7:55 AM  Airway not difficult    General Information and Staff    Patient location during procedure: OR    Indications and Patient Condition  Indications for airway management: airway protection    Preoxygenated: yes    Mask difficulty assessment: 2 - vent by mask + OA or adjuvant +/- NMBA    Final Airway Details    Final airway type: endotracheal airway      Successful airway: ETT  Cuffed: yes   Successful intubation technique: direct laryngoscopy  Adjuncts used in placement: intubating stylet  Endotracheal tube insertion site: oral  Blade: Aileen  Blade size: 3  ETT size (mm): 7.0  Cormack-Lehane Classification: grade I - full view of glottis  Placement verified by: chest auscultation and capnometry   Measured from: lips  ETT/EBT  to lips (cm): 21  Number of attempts at approach: 1  Assessment: lips, teeth, and gum same as pre-op and atraumatic intubation

## 2025-07-05 NOTE — ANESTHESIA PREPROCEDURE EVALUATION
Anesthesia Evaluation     Patient summary reviewed and Nursing notes reviewed                Airway   Mallampati: II  TM distance: >3 FB  Neck ROM: full  Comment: Location: Mouth; Size: 7.0; Cuff: Cuffed; Secured@:20 cm; Blade: Mac 3; Grade View: 1; Other devices used: Stylet; Technique: Atraumatic, LTA lidocaine spray; Post Intubation check: Bilateral breath sounds, End tidal CO2; Dentition: Same as pre-op; Insertion attempts: 1  Dental - normal exam     Pulmonary    (+) COPD,  Cardiovascular     ECG reviewed    (+) valvular problems/murmurs MS, dysrhythmias Atrial Fib, CHF     ROS comment: ECHO 2-18-25  ·  Left ventricular systolic function is hyperdynamic (EF > 70%).  ·  Normal right ventricular cavity size noted. Hyperdynamic right ventricular systolic function noted.  ·  The left atrial cavity is moderately dilated.  ·  Mild aortic valve stenosis is present. Aortic valve maximum pressure gradient is 35.0 mmHg. Aortic valve mean pressure gradient is 14.2 mmHg. The aortic valve leaflets are moderately calcified  ·  Moderate to severe mitral valve stenosis is present. There is severe mitral annular calcification.  ·  The mitral valve peak gradient is 19.00 mmHg. The mitral valve mean gradient is 7.00 mmHg.  ·  There is a mobile echodensity on the atrial surface of the anterior mitral valve leaflet which may either represent a problem chordae or fibroblastoma (appearance would be atypical for vegetation)  ·  Mild tricuspid valve regurgitation is present.  ·  Calculated right ventricular systolic pressure from tricuspid regurgitation is 53 mmHg.  ·  There is a trivial pericardial effusion.      48 HOUR HOLTER 9-25-24  ·  An abnormal monitor study.  ·  48-hour Holter monitor continuous atrial fibrillation heart rate ranging 70 to 144 bpm (average 102 bpm).  Rapid ventricular response was noted 58% of the monitored time. Rare ventricular ectopy with no NSVT. No significant pauses or bradycardia noted. No patient  triggered or diary events reported.     STRESS TEST 6-5-23  ·  Left ventricular ejection fraction is normal (Calculated EF = 70%).  ·  Myocardial perfusion imaging indicates a normal myocardial perfusion study with no evidence of ischemia.  ·  Impressions are consistent with a low risk study.      Neuro/Psych  (+) headaches, psychiatric history Depression  GI/Hepatic/Renal/Endo    (+) obesity, renal disease- CRI and ARF, thyroid problem hypothyroidism    Musculoskeletal     Abdominal    Substance History      OB/GYN          Other                          Anesthesia Plan    ASA 4     general and [place]   total IV anesthesia  (I have reviewed the patient's history with the patient and the chart, including all pertinent laboratory results and imaging. I have explained the risks of anesthesia including but not limited to dental damage, corneal abrasion, nerve injury, MI, stroke, and death. Questions asked and answered. Anesthetic plan discussed with patient and team as indicated. Patient expressed understanding of the above.    Extremely high risk patient; pHTN, MS, AS  TIVA, BIS)  intravenous induction     Anesthetic plan, risks, benefits, and alternatives have been provided, discussed and informed consent has been obtained with: patient.        CODE STATUS:    Code Status (Patient has no pulse and is not breathing): CPR (Attempt to Resuscitate)  Medical Interventions (Patient has pulse or is breathing): Full Support

## 2025-07-05 NOTE — PROGRESS NOTES
In the OR.  Will reevaluate tomorrow.  Please call with any cardiovascular concerns postoperatively.

## 2025-07-05 NOTE — PROGRESS NOTES
Dr. IVETH Blackwell    [facility] INTENSIVE CARE        Patient ID:  Name:  Shani Phillip  MRN:  [id number]  1950  74 y.o.  female            CC/Reason for visit: post femoral orthopedic nailing, hypoxic resp failure,     Interval hx: The patient is postop, had left femoral pinning by orthopedic surgery, now having acute respiratory failure requiring under percent FiO2 on Airvo system, some shortness of breath at rest  Also hypotensive, now on IV pressors.  Becoming anuric.  Discussed with nephrology, Dr. Morrell as well as family at bedside    ROS: Complaining of some left femur pain but otherwise denies sputum.    I reviewed old medical records.  Past medical history, social history and family history: Unchanged from admission H&P.      Vitals:  Vitals:    07/05/25 0949 07/05/25 1015 07/05/25 1030 07/05/25 1125   BP:  (!) 89/54 106/59    BP Location:  Left arm Left arm    Patient Position:       Pulse: 91 89 93    Resp:       Temp:    97.6 °F (36.4 °C)   TempSrc:       SpO2: 92% 93% 99%    Weight:       Height:               Body mass index is 28.08 kg/m².    Intake/Output Summary (Last 24 hours) at 7/5/2025 1255  Last data filed at 7/5/2025 1044  Gross per 24 hour   Intake 3945 ml   Output 325 ml   Net 3620 ml       Exam:  GEN:  No distress  Alert, oriented x 2, slightly confused  LUNGS: Some tachypnea, few scattered rhonchi on the left, diminished on the right, no use of accessory muscles  CV:  Normal S1S2, without murmur, no edema  ABD:  Non tender, no enlarged liver or masses      Scheduled meds:  arformoterol, 15 mcg, Nebulization, BID - RT  budesonide, 0.5 mg, Nebulization, BID - RT  buPROPion XL, 300 mg, Oral, Daily  cefTRIAXone, 1,000 mg, Intravenous, Q24H  digoxin, 125 mcg, Oral, Every Other Day  famotidine, 20 mg, Oral, BID AC  ferrous sulfate, 325 mg, Oral, Every Other Day  insulin lispro, 2-7 Units, Subcutaneous, 4x Daily AC & at Bedtime  ipratropium-albuterol, 3 mL, Nebulization, 4x Daily  May 14, 2018     [facility] [id number]    Patient: Kobi Thurman  YOB: 1948   Date of Visit: 5/14/2018       Dear Dr Diana Leyva:    Thank you for referring David Gutierrez to me for evaluation  Below are my notes for this consultation  If you have questions, please do not hesitate to call me  I look forward to following your patient along with you  Sincerely,        Stevan Hoover MD        CC: No Recipients  Stevan Hoover MD  5/14/2018 10:15 AM  Signed  Assessment/Plan:    BPH with obstruction/lower urinary tract symptoms  AUA symptom score is 7  He is happy with his voiding pattern  Urinalysis reveals trace positive blood and will be sent for microscopic evaluation  The patient will continue tamsulosin  Elevated PSA  PSA was 4 2 on April 30, 2018  This is  stable  The risk of prostate cancer is estimated at 23%  This was discussed with the patient  We discussed transrectal ultrasound of the prostate with biopsy  At this point we have elected to continue to follow the PSA and recheck it in 1 year  Diagnoses and all orders for this visit:    BPH with obstruction/lower urinary tract symptoms  -     POCT urine dip auto non-scope    Elevated PSA  -     PSA, total and free; Future    Other microscopic hematuria  -     Urinalysis with microscopic    Other orders  -     B Complex Vitamins (B COMPLEX 100 PO); Take by mouth  -     multivitamin (THERAGRAN) TABS; Take 1 tablet by mouth  -     vitamin E, tocopherol, 400 units capsule; Take 400 Units by mouth          Subjective:      Patient ID: Kobi Thurman  is a 69 y o  male  79-year-old male followed for lower tract symptoms and for an elevated PSA  He is voiding well with the use of tamsulosin  His stream is good and he empties his bladder adequately  He gets up once a night to urinate  There is no gross hematuria, dysuria or symptoms of infection    He has no new back or bone - RT  levothyroxine, 50 mcg, Oral, Q AM  melatonin, 2.5 mg, Oral, Nightly  methylPREDNISolone sodium succinate, 40 mg, Intravenous, Q8H  metoprolol tartrate, 25 mg, Oral, Q12H  midodrine, 5 mg, Oral, TID AC  oxybutynin XL, 5 mg, Oral, Daily  pantoprazole, 40 mg, Oral, Daily  potassium chloride, 20 mEq, Oral, Daily  pramipexole, 0.5 mg, Oral, BID      IV meds:                      norepinephrine, 0.02-0.3 mcg/kg/min, Last Rate: 0.03 mcg/kg/min (07/05/25 1043)        Data Review:   I reviewed the patient's medications and new clinical results.        Results from last 7 days   Lab Units 07/05/25  0324 07/04/25  1819 07/04/25  0336 07/03/25  2247 07/03/25  1628   SODIUM mmol/L 135*  --  134*  --  133*   POTASSIUM mmol/L 5.4*  --  5.1  --  4.5   CHLORIDE mmol/L 100  --  97*  --  93*   CO2 mmol/L 20.8*  --  23.4  --  28.6   BUN mg/dL 41.0*  --  28.0*  --  24.0*   CREATININE mg/dL 3.32*  --  2.27*  --  1.78*   CALCIUM mg/dL 8.2*  --  9.6  --  9.5   BILIRUBIN mg/dL 0.4  --   --   --  0.6   ALK PHOS U/L 131*  --   --   --  137*   ALT (SGPT) U/L 19  --   --   --  12   AST (SGOT) U/L 28  --   --   --  16   GLUCOSE mg/dL 157*  --  115*  --  121*   WBC 10*3/mm3 20.40*  --  23.74*  --  10.95*   HEMOGLOBIN g/dL 11.8*  --  13.0  --  12.9   PLATELETS 10*3/mm3 340  --  352  --  319   INR  1.81* 1.72* 1.83*   < > 1.71*    < > = values in this interval not displayed.         Results from last 7 days   Lab Units 07/04/25  1648 07/04/25  1435   HSTROP T ng/L 69* 73*     Results from last 7 days   Lab Units 07/04/25  0400   PH, ARTERIAL pH units 7.337*   PCO2, ARTERIAL mm Hg 58.2*   PO2 ART mm Hg 56.1*   O2 SATURATION ART % 85.9*   FLOW RATE lpm 5.0000   MODALITY  Cannula            ASSESSMENT:   Hypovolemic Shock   Acute exacerbation of COPD  Pneumonia  Acute on chronic hypercapnic and hypoxic respiratory failure (patient of Dr. Zamora in our office  Fall causing closed left hip fracture    Depression    Acquired hypothyroidism    Stage  pain   He has no urgency or incontinence  He is satisfied with his voiding pattern  The following portions of the patient's history were reviewed and updated as appropriate: allergies, current medications, past family history, past medical history, past social history, past surgical history and problem list     Review of Systems   Constitutional: Negative for chills, diaphoresis, fatigue and fever  HENT: Negative  Eyes: Negative  Respiratory: Negative  Cardiovascular: Negative  Endocrine: Negative  Musculoskeletal: Negative  Skin: Negative  Allergic/Immunologic: Negative  Neurological: Negative  Hematological: Negative  Psychiatric/Behavioral: Negative  Objective:      /78 (BP Location: Left arm, Patient Position: Sitting)   Ht 5' 9" (1 753 m)   Wt 83 9 kg (185 lb)   BMI 27 32 kg/m²           Physical Exam   Constitutional: He is oriented to person, place, and time  He appears well-developed and well-nourished  HENT:   Head: Normocephalic and atraumatic  Eyes: Conjunctivae are normal    Neck: Neck supple  Cardiovascular: Normal rate  Pulmonary/Chest: Effort normal    Abdominal: Soft  Bowel sounds are normal  He exhibits no distension and no mass  There is no tenderness  There is no rebound, no guarding and no CVA tenderness  Hernia confirmed negative in the right inguinal area and confirmed negative in the left inguinal area  Genitourinary: Rectum normal, testes normal and penis normal  Prostate is enlarged  Prostate is not tender  Right testis shows no mass  Left testis shows no mass  No phimosis or hypospadias  Genitourinary Comments: Prostate 2 times enlarged in softly nodular  No induration  Overall impression is palpably benign prostate gland  Musculoskeletal: He exhibits no edema  Neurological: He is alert and oriented to person, place, and time  Skin: Skin is warm and dry  Psychiatric: He has a normal mood and affect   His 3b chronic kidney disease    Chronic combined systolic and diastolic CHF (congestive heart failure)    Acute respiratory failure with hypoxia  Oliguria  Acute kidney injury      PLAN:  Patient and all problems new to me during this hospital stay.  Critically ill, now hypotensive and oliguric.  Discussed with nephrology.  Obtain CT scan without contrast of abdomen and pelvis to rule out any urinary obstruction.  Continue with IV antibiotics for presumed aspiration pneumonia.  Continue nebulized bronchodilators for COPD exacerbation with IV steroids.  Hold diuretics for now.  Hold fluids for now.  Nephrology following closely.  On IV Levophed.    Total critical care time 35 minutes      I reviewed the chart and other providers notes and reviewed labs.  Copied text in this note has been reviewed and is accurate as of today      Tristan Blackwell MD  7/5/2025   behavior is normal  Judgment and thought content normal    Nursing note and vitals reviewed

## 2025-07-05 NOTE — PROGRESS NOTES
Orthopaedic Progress Note     Frail. Ill appearing. On high flow O2 down to 50 from 60. Urine with bacteria. On ceftriaxone and azithromycin. On a touch of levophed has been titrated down as able. Creatinine elevated. Troponins flat. Risk assessment by cardiology with non modifiable risk factors. Anesthesia to see preoperatively.     Hgb: 11.8  Vitals: p 91, 125/67, 98% on high flow 50L     NAD  Alert  Respirations are nonlabored on O2   LLE  Skin is intact   Able to DF and PF at the ankle weakly   Sensation is intact distally   Foot is wwp   Compartments are soft and compressible       Patient is a 74 y.o. female with intertrochanteric femur fracture and worsening hypoxia with case cancelled yesterday. Rescheduled for this AM.      Admited to: Raquel Senior MD  NWB LLE   NPO   Peripop abx ordered   TXA ordered   INR slightly elevated to 1.8 given FFP recheck pending.   Pain control   Seen and evaluated by cardiology, nephrology, and anesthesia, no obvious indication from consultants that her case be delayed again   On abx regarding UTI and suspected pneumonia   The risks, benefits, and alternatives of hip fracture surgery were discussed extensively. The risks include but are not limited to infection, DVT, PE, bleeding and blood loss, damage to nerves or blood vessels, malunion or nonunion, pa-implant fracture, continued pain, hip stiffness/loss of motion, hardware irritation,  infection and possibility of multiple surgeries, leg length discrepancy, the possibility of future arthritis of the hip, and screw cut out.The risks of anesthesia including heart attack, stroke, and even death were discussed. The typical post-operative course and rehab plan were discussed as well. We discussed the high morbidity mortality of hip fractures even after fixation, and that sometimes we do this for pain control reasons alone. All the patient/family's questions were answered. No guarantees given. A consent form will be  reviewed and signed in pre op and placed on the chart. The patient will receive perioperative antibiotics. The patient will be placed on her baseline AC for DVT prophylaxis after surgery   Discussed with her  and the patient that she is high risk for complications given her baseline pulmonary status with O2 use, atrial fibrillation, heart failure, mitral valve stenosis COPD, CKD, and frail nature. Patient and her  wish to proceed with fixation of the left intertrochanteric femur fracture with CMN.   Discussed with the  and patient she is again at high risk for complications and he wishes to have her code status changed to limited interventions with no chest compressions, no defibrillation, only medicine.   please call/chat  with any questions or concerns.       Peter Schofield MD   [facility]   [phone] - [place] [facility]  [phone] - [facility]

## 2025-07-05 NOTE — PROGRESS NOTES
[facility] Progress Note      Patient Name: Shani Phillip  : 1950  MRN: [id number]  Primary Care Physician:  Rodríguez Walker MD  Date of admission: 7/3/2025    Subjective     Interval History:   The patient was seen and examined today for follow-up on acute kidney injury   mL in the last 24 hours  On heated high flow nasal cannula 50 L / 93% FiO2  S/p left intramedullary nailing of intertrochanteric hip fracture  Denies hip pain and chest pain  Endorses shortness of breath    Review of Systems:   As noted above    Objective     Vitals:   Temp:  [96.8 °F (36 °C)-98.5 °F (36.9 °C)] 97.6 °F (36.4 °C)  Heart Rate:  [] 85  Resp:  [12-22] 14  BP: ()/() 112/54  Arterial Line BP: ()/(37-59) 127/48  Flow (L/min) (Oxygen Therapy):  [10-50] 50    Intake/Output Summary (Last 24 hours) at 2025 1313  Last data filed at 2025 1044  Gross per 24 hour   Intake 3945 ml   Output 325 ml   Net 3620 ml       Physical Exam:    General Appearance: Lethargic and confused, no acute distress   Skin: warm and dry  HEENT: oral mucosa normal, nonicteric sclera  Neck: Mild JVD JVD  Lungs: CTA  Heart: RRR, normal S1 and S2  Abdomen: soft, nontender, nondistended  : no palpable bladder  Extremities: no edema, cyanosis or clubbing  Neuro: Moving all extremities    Scheduled Meds:     arformoterol, 15 mcg, Nebulization, BID - RT  budesonide, 0.5 mg, Nebulization, BID - RT  [START ON 2025] buPROPion XL, 150 mg, Oral, Daily  cefTRIAXone, 1,000 mg, Intravenous, Q24H  digoxin, 125 mcg, Oral, Every Other Day  famotidine, 20 mg, Oral, BID AC  insulin lispro, 2-7 Units, Subcutaneous, 4x Daily AC & at Bedtime  ipratropium-albuterol, 3 mL, Nebulization, 4x Daily - RT  levothyroxine, 50 mcg, Oral, Q AM  melatonin, 2.5 mg, Oral, Nightly  [Held by provider] midodrine, 5 mg, Oral, TID AC  oxybutynin XL, 5 mg, Oral, Daily  potassium chloride, 20 mEq, Oral, Daily  pramipexole, 0.5  mg, Oral, BID      IV Meds:   norepinephrine, 0.02-0.3 mcg/kg/min, Last Rate: 0.03 mcg/kg/min (07/05/25 1043)        Results Reviewed:   I have personally reviewed the results from the time of this admission to 7/5/2025 13:13 EDT     Results from last 7 days   Lab Units 07/05/25  0324 07/04/25  0336 07/03/25  1628   SODIUM mmol/L 135* 134* 133*   POTASSIUM mmol/L 5.4* 5.1 4.5   CHLORIDE mmol/L 100 97* 93*   CO2 mmol/L 20.8* 23.4 28.6   BUN mg/dL 41.0* 28.0* 24.0*   CREATININE mg/dL 3.32* 2.27* 1.78*   CALCIUM mg/dL 8.2* 9.6 9.5   BILIRUBIN mg/dL 0.4  --  0.6   ALK PHOS U/L 131*  --  137*   ALT (SGPT) U/L 19  --  12   AST (SGOT) U/L 28  --  16   GLUCOSE mg/dL 157* 115* 121*       Estimated Creatinine Clearance: 12.9 mL/min (A) (by C-G formula based on SCr of 3.32 mg/dL (H)).          Results from last 7 days   Lab Units 07/04/25  1435   URIC ACID mg/dL 12.3*       Results from last 7 days   Lab Units 07/05/25  0324 07/04/25  0336 07/03/25  1628   WBC 10*3/mm3 20.40* 23.74* 10.95*   HEMOGLOBIN g/dL 11.8* 13.0 12.9   PLATELETS 10*3/mm3 340 352 319       Results from last 7 days   Lab Units 07/05/25  0324 07/04/25  1819 07/04/25  0336 07/03/25  2247 07/03/25  1628   INR  1.81* 1.72* 1.83* 1.71* 1.71*       Assessment / Plan     ASSESSMENT:  Acute kidney injury on top of chronic kidney disease stage IIIb with baseline creatinine 1.4 and 1.5.  Creatinine currently is at 2.2.  Etiology of worsening kidney function is likely secondary to prerenal state/ ATN due to hypotension  in the setting of decreased oral intake and diuretic therapy.Cannot rule out sepsis.  Urinalysis with 4+ bacteria, 6-10 WBCs, and urine sodium 38 favoring ATN. Patient had an acute kidney injury in June secondary to UPJ stone and hydronephrosis requiring cystoscopy and stent placement.  Creatinine trended down to 1.44(her baseline) on 6/14/2025.  Hypertension with CKD blood pressure was soft  Acute hypoxemic hypercapnic respiratory failure on chronic  respiratory failure, secondary to possible COPD exacerbation.  And possible pneumonia  COPD exacerbation   Chronic respiratory failure  Sepsis on vasopressors currently on antibiotics per primary team  Hyponatremia improved, sodium was 135 this morning  Hyperkalemia potassium 5 4 earlier today.      PLAN:  Continue to hold diuretic therapy  Check CT of the abdomen without contrast  Continue hemodynamic support with vasopressors per ICU team   I will check chemistry and reevaluate if the patient needs dialysis urgently because of abnormal electrolytes  Surveillance lab      I reviewed the chart and other providers notes, reviewed labs.  I discussed the case with the patient and her  at the bedside also with Dr. Blackwell  Copied text in this note has been reviewed and is accurate as of 07/05/25.         Thank you for involving us in the care of Shani Phillip.  Please feel free to call with any questions.    Richard Morrell MD  07/05/25  13:13 EDT    [facility]  [phone]    Parts of this note may be an electronic transcription/translation of spoken language to printed text using the Dragon dictation system.

## 2025-07-05 NOTE — ANESTHESIA POSTPROCEDURE EVALUATION
"Patient: Shani Phillip    Procedure Summary       Date: 07/05/25 Room / Location: [facility] OR [address] [place] [address] MAIN OR    Anesthesia Start: 0743 Anesthesia Stop: 0906    Procedure: LEFT FEMUR INTRAMEDULLARY NAILING/RODDING WITH HANA (Left: Thigh) Diagnosis:     Surgeons: Peter Schofield MD Provider: Eleazar Jacobs MD    Anesthesia Type: generalBarby ASA Status: 4            Anesthesia Type: general, Barby    Vitals  Vitals Value Taken Time   /55 07/05/25 09:30   Temp 36.9 °C (98.5 °F) 07/05/25 09:09   Pulse 88 07/05/25 09:56   Resp 14 07/05/25 09:30   SpO2 93 % 07/05/25 09:56   Vitals shown include unfiled device data.        Post Anesthesia Care and Evaluation    Patient location during evaluation: PACU  Patient participation: complete - patient participated  Level of consciousness: awake and alert  Pain management: adequate    Airway patency: patent  Anesthetic complications: No anesthetic complications  PONV Status: controlled  Cardiovascular status: acceptable and hemodynamically stable  Respiratory status: acceptable  Hydration status: acceptable    Comments: /55 (BP Location: Right arm, Patient Position: Lying)   Pulse 91   Temp 36.9 °C (98.5 °F) (Oral)   Resp 14   Ht 154.9 cm (61\")   Wt 67.4 kg (148 lb 9.4 oz)   SpO2 92%   BMI 28.08 kg/m²     "

## 2025-07-05 NOTE — ANESTHESIA PROCEDURE NOTES
Arterial Line      Patient reassessed immediately prior to procedure    Patient location during procedure: OR  Start time: 7/5/2025 7:50 AM  Stop Time:7/5/2025 7:51 AM       Line placed for hemodynamic monitoring and ABGs/Labs/ISTAT.  Performed By   Anesthesiologist: Eleazar Jacobs MD   Preanesthetic Checklist  Completed: patient identified, IV checked, site marked, risks and benefits discussed, surgical consent, monitors and equipment checked, pre-op evaluation and timeout performed  Arterial Line Prep    Sterile Tech: gloves, mask, cap and sterile barriers  Prep: ChloraPrep  Patient monitoring: blood pressure monitoring, continuous pulse oximetry and EKG  Arterial Line Procedure   Laterality:right  Location:  radial artery  Catheter size: 20 G   Guidance: ultrasound guided  PROCEDURE NOTE/ULTRASOUND INTERPRETATION.  Using ultrasound guidance the potential vascular sites for insertion of the catheter were visualized to determine the patency of the vessel to be used for vascular access.  After selecting the appropriate site for insertion, the needle was visualized under ultrasound being inserted into the radial artery, followed by ultrasound confirmation of wire and catheter placement. There were no abnormalities seen on ultrasound; an image was taken; and the patient tolerated the procedure with no complications.   Number of attempts: 1  Successful placement: yes   Post Assessment   Dressing Type: occlusive dressing applied, secured with tape and wrist guard applied.   Complications no  Circ/Move/Sens Assessment: unchanged.   Patient Tolerance: patient tolerated the procedure well with no apparent complications  Additional Notes  Ultrasound was used to identify the artery. It was assessed and patent. Ultrasound was used to visualize the vascular needle entry into the artery. The selected vessel appeared anatomically normal without apparent abnormal findings. A permanent ultrasound image was saved in the  patient's record.

## 2025-07-05 NOTE — NURSING NOTE
Levophed started for hypotension.   Kept NPO for surgery today. Consent signed. CHG bath complete. Consent signed and in chart.   Notified nephrology of no urine output and AM labs. F/C placed. 195 urine out. Maintenance fluids changed to 9  mL/ hr per nephro.    1 unit FFP transfused prior to surgery today.

## 2025-07-05 NOTE — PLAN OF CARE
Goal Outcome Evaluation:              Outcome Evaluation: Patient went for surgery for left femur fracture repair, left hip dressing dry and intact, ice pack in place, attempted to complete ct of abdomen, pt went into respiratory distress, scan aborted, pt is pleasantly confused,  bp and oxygen very sensitive to movement, adjusted levo frequently, was able to give prn dose of norco for pain control, few ice chips prn, will ctm per shift.

## 2025-07-05 NOTE — NURSING NOTE
Attempted to complete CT scan, once patient was transferred to the table, oxygen sat dropped into the upper 70's-80's, transferred patient back to her bed, elevated her head of bed maxed oxygenated on the highflow and NRB, RT here at bedside, pt stats recovered to 98%, transported back to room, made Dr. Blackwell pt unable to do complete CT scan due to oxygenation issue and pt is currently on NRB and optiflow

## 2025-07-05 NOTE — OP NOTE
Intertrochanteric/Subtrochanteric Fracture Operative Note  Peter Schofield MD    [phone]    PATIENT NAME: Shani Phillip  MRN: [id number]  : 1950 AGE: 74 y.o. GENDER: female  DATE OF OPERATION: 2025  PREOPERATIVE DIAGNOSIS: left intertrochanteric femur fracture   POSTOPERATIVE DIAGNOSIS: left intertrochanteric femur fracture   OPERATION PERFORMED: Left Intramedullary Nailing of the Intertrochanteric Hip Fracture.  SURGEON: Peter Schofield MD  Circulator: Nakia Guillen RN  Radiology Technologist: Ana Collins  Scrub Person: Reggie Manriquez  Vendor Representative: Josh Lynch  ANESTHESIA: General  ASSISTANT: None   ESTIMATED BLOOD LOSS: 200cc  SPONGE AND NEEDLE COUNT: Correct  INDICATIONS: This patient was found to have an Intertrochanteric Hip Fracture after evaluation in the ER. An orthopaedic consult was placed for management. A discussion of operative versus nonoperative treatment was had with the patient and/or family. They elected to undergo intramedullary nailing of the hip fracture. Her case was originally scheduled for 25 however she was requiring increasing O2 and further evaluation by cardiology, nephrology and pulmonology. Her INR has shifted from 1/7 to 1.8 and she has been given 1 unit of FFP preoperatively. Her O2 requirement decreased slightly from 60 L on hiflow down to 50L. She is on a small level of pressors that has decreased over night. She has been deemed high risk for surgery by cardiology given her atrial fibrillation, diastolic heart failure and mitral stenosis. Her risk factors are non modifiable.  The risks of surgery were discussed and included the risk of anesthesia, nonunion, malunion, infection, damage to neurovascular structures, implant loosening/failure, screw cut out, arthritis of the hip, leg length discrepancy, fracture, hardware prominence, the need for further procedures, medical complications, and others. No guarantees were made. The patient  wished to proceed with surgery and a surgical consent was signed. Patient wishes to be limited interventions in the OR with no chest compressions and no defibrillation she is okay for medication.   COMPONENTS:   [employer] InterTAN nail 10 x 240 mm   95 mm and 90 mm lag and compression screw   Distal interlock screw     PERTINENT FINDINGS: Hip Fracture    DETAILS OF PROCEDURE:   The patient was met in the preoperative area. The site was marked. The consent and H&P were reviewed. The patient was then wheeled back to the operative suite and underwent anesthesia. The [place] table boots were secured to the patient's feet. The patient was moved onto the [place] table and secured in the supine position. The perineal post was inserted and the boots were secured into the leg holders. Surgical alcohol was used to thoroughly clean the operative area.    REDUCTION  Fluoroscopy was used to visualize the fracture on both an AP and lateral.  Traction, rotation, flexion/extension, abduction/abduction was used as needed to adequately reduce the hip fracture.    The hip and leg were then prepped in the normal sterile fashion, which included ChloraPrep and multiple layers of sterile drapes. A surgical timeout was performed in which administration of preoperative antibiotics and the surgical site were confirmed.    A small incision was made just proximal to the greater trochanter and a starting pin was drilled into the tip of the greater trochanter using fluoroscopy to confirm proper location. The opening reamer was then used to open the canal down to the lesser trochanter, visualizing proper position again using fluoroscopy.     OPEN REDUCTION  As fluoroscopic images on both an AP and lateral demonstrated adequate reduction of the fracture, no open techniques needed to be utilized for this case.     NAIL INSERTION  SHORT NAIL: A short nail was then inserted into the femur through the hole made by the opening reamer. The lag  screw and compression screw were placed into the femoral head after being drilled and measured, again using fluoroscopy to place the screws in optimal position within the femoral head on both the AP and Lateral views, close to center/center position. Traction was released at this point and the fracture was compressed through the nail. Finally the short nail was secured with one distal interlocking screw which was placed using the jig assembly for the short nail. Final X-Rays showed the fracture reduction to have been maintained and the implant in optimal position.     The wounds were irrigated and closed in layers.  The wound was injected with an analgesic cocktail.  A sterile dressing was then placed over the incisions. The patient was moved from the [place] table to the [facility] where the boots were removed. The patient was taken to the recovery room in stable condition. There were no complications and the patient tolerated the procedure well.    Peter Schofield MD    Orthopaedic Surgery  [facility]  [phone]

## 2025-07-06 NOTE — PROGRESS NOTES
[facility] Clinical Pharmacy Services: Warfarin Dosing/Monitoring Consult    Shani Phillip is a 74 y.o. female, estimated creatinine clearance is 12.9 mL/min (A) (by C-G formula based on SCr of 3.32 mg/dL (H)). weighing 67.4 kg (148 lb 9.4 oz).    Results from last 7 days   Lab Units 07/06/25  0400 07/05/25  0324 07/04/25  1819 07/04/25  0336 07/03/25  2247 07/03/25  1628   INR  2.09* 1.81* 1.72* 1.83* 1.71* 1.71*   HEMOGLOBIN g/dL 10.0* 11.8*  --  13.0  --  12.9   HEMATOCRIT % 31.1* 36.5  --  39.5  --  37.7   PLATELETS 10*3/mm3 192 340  --  352  --  319     Prior to admission anticoagulation: Current patient at [facility]. Appears to have been taking warfarin 2.5 mg PO daily until admission 6/10-6/14. Patient has needed frequent dose adjustments and doses held since then per [facility] notes at least in part due to DDIs with steroids and antibiotics prescribed at discharge.     Hospital Anticoagulation:  Consulting provider: Tristan Blackwell MD   Start date: prior to admission on 7/3; restarting 7/6   Indication: A Fib - requiring full anticoagulation  Target INR: 2 - 3  Expected duration: indefinite    Bridge Therapy: No      Potential food or drug interactions:   May enhance the anticoagulant effect of warfarin:   Azithromycin   Ceftriaxone   Methylprednisolone     Diet Order   Procedures    Diet: Liquid; Clear Liquid; Fluid Consistency: Thin (IDDSI 0)     Education complete?/Date: N/A; home medication    Assessment/Plan:  INR spontaneously trended up since admission, now greater than 2. Patient has not received any anticoagulants this admission. This may be in part due to acute illness + reduced dietary intake + above mentioned DDIs. Patient also has very low body weight and appears sensitive to warfarin dose changes in general.     Restart warfarin at low dose of 0.5 mg PO daily for now.   RN/provider to monitor for any signs or symptoms of bleeding.   Follow up daily INRs and dose adjustments. INR  ordered daily with AM labs while inpatient.    Pharmacy will continue to follow until discharge or discontinuation of warfarin.     Shavon GottiD  Clinical Pharmacist

## 2025-07-06 NOTE — PLAN OF CARE
Goal Outcome Evaluation:  Plan of Care Reviewed With: patient, family        Progress: no change    Pt A+O x4, with intermittent confusion. Pt remains on Airvow (titrating down to 40L 45% FiO2). A-line remains in place for accurate BP monitoring- low dose metoprolol initiated per Cardiology. Pt with dusky fingers/toes making it challenging for O2 sensor to ; most obtainable on right nare (O2 requirements changed to maintain above 88%). PRN norco given for left surgical pain. Coumadin restarted. Spouse at bedside. Ongoing care provided.

## 2025-07-06 NOTE — PROGRESS NOTES
[facility] Progress Note      Patient Name: Shani Phillip  : 1950  MRN: [id number]  Primary Care Physician:  Rodríguez Walker MD  Date of admission: 7/3/2025    Subjective     Interval History:   Acute kidney injury     Patient is feeling better today, she had increased urine output she had no evidence of obstruction on the ultrasound.  No chest pain, no nausea or vomiting.    Review of Systems:   As noted above    Objective     Vitals:   Temp:  [97.6 °F (36.4 °C)-98.1 °F (36.7 °C)] 98.1 °F (36.7 °C)  Heart Rate:  [] 96  Resp:  [16-24] 20  BP: ()/(40-78) 130/52  Arterial Line BP: ()/(37-75) 127/50  Flow (L/min) (Oxygen Therapy):  [40-60] 40    Intake/Output Summary (Last 24 hours) at 2025 1006  Last data filed at 2025 0924  Gross per 24 hour   Intake 601.44 ml   Output 885 ml   Net -283.56 ml       Physical Exam:    General Appearance: More awake and alert, chronically, no acute distress   Skin: warm and dry  HEENT: oral mucosa normal, nonicteric sclera  Neck: No JVD  Lungs: Bilateral rhonchi, breathing effort not labored  Heart: RRR, normal S1 and S2  Abdomen: soft, nontender, nondistended  : no palpable bladder  Extremities: 1+ lower extremity edema  Neuro: Moving all extremities    Scheduled Meds:     arformoterol, 15 mcg, Nebulization, BID - RT  budesonide, 0.5 mg, Nebulization, BID - RT  buPROPion XL, 150 mg, Oral, Daily  cefTRIAXone, 1,000 mg, Intravenous, Q24H  digoxin, 125 mcg, Oral, Every Other Day  famotidine, 20 mg, Oral, BID AC  insulin lispro, 2-7 Units, Subcutaneous, 4x Daily AC & at Bedtime  ipratropium-albuterol, 3 mL, Nebulization, 4x Daily - RT  levothyroxine, 50 mcg, Oral, Q AM  melatonin, 2.5 mg, Oral, Nightly  metoprolol tartrate, 12.5 mg, Oral, Q12H  potassium chloride, 20 mEq, Oral, Daily  pramipexole, 0.5 mg, Oral, BID  warfarin, 0.5 mg, Oral, Daily      IV Meds:   Pharmacy to dose warfarin,         Results Reviewed:   I have  personally reviewed the results from the time of this admission to 7/6/2025 10:06 EDT     Results from last 7 days   Lab Units 07/06/25  0817 07/05/25  0324 07/04/25  0336 07/03/25  1628   SODIUM mmol/L 137 135* 134* 133*   POTASSIUM mmol/L 4.9 5.4* 5.1 4.5   CHLORIDE mmol/L 103 100 97* 93*   CO2 mmol/L 21.0* 20.8* 23.4 28.6   BUN mg/dL 49.0* 41.0* 28.0* 24.0*   CREATININE mg/dL 2.74* 3.32* 2.27* 1.78*   CALCIUM mg/dL 8.4* 8.2* 9.6 9.5   BILIRUBIN mg/dL 0.3 0.4  --  0.6   ALK PHOS U/L 96 131*  --  137*   ALT (SGPT) U/L 11 19  --  12   AST (SGOT) U/L 31 28  --  16   GLUCOSE mg/dL 81 157* 115* 121*       Estimated Creatinine Clearance: 15.6 mL/min (A) (by C-G formula based on SCr of 2.74 mg/dL (H)).    Results from last 7 days   Lab Units 07/06/25  0817 07/06/25  0400   MAGNESIUM mg/dL 2.5*  --    PHOSPHORUS mg/dL  --  5.0*       Results from last 7 days   Lab Units 07/04/25  1435   URIC ACID mg/dL 12.3*       Results from last 7 days   Lab Units 07/06/25  0400 07/05/25  0324 07/04/25  0336 07/03/25  1628   WBC 10*3/mm3 12.51* 20.40* 23.74* 10.95*   HEMOGLOBIN g/dL 10.0* 11.8* 13.0 12.9   PLATELETS 10*3/mm3 192 340 352 319       Results from last 7 days   Lab Units 07/06/25  0400 07/05/25  0324 07/04/25  1819 07/04/25  0336 07/03/25  2247   INR  2.09* 1.81* 1.72* 1.83* 1.71*       Assessment / Plan     ASSESSMENT:  Acute kidney injury on top of chronic kidney disease stage IIIb with baseline creatinine 1.4 and 1.5.  Creatinine is down to 2.74 with increased urine output, electrolyte within acceptable range, albumin is 2.7   hypertension with CKD, reasonably controlled  Acute hypoxemic hypercapnic respiratory failure on chronic respiratory failure, secondary to possible COPD exacerbation.  And possible pneumonia patient is better.  COPD exacerbation   Sepsis treated, vasopressors were discontinued  Hyponatremia resolved  Hyperkalemia potassium resolved  Hyperuricemia last uric acid was 12.3.  Will  recheck.      PLAN:  No diuretics today  Discontinue scheduled potassium  Continue the same treatment  Surveillance lab      I reviewed the chart and other providers notes, reviewed labs.  I discussed the case with the patient and her  at the bedside also with Dr. Blackwell and with the patient's nurse.  Copied text in this note has been reviewed and is accurate as of 07/06/25.         Thank you for involving us in the care of Shani Phillip.  Please feel free to call with any questions.    Richard Morrell MD  07/06/25  10:06 EDT    [facility]  [phone]    Parts of this note may be an electronic transcription/translation of spoken language to printed text using the Dragon dictation system.

## 2025-07-06 NOTE — PLAN OF CARE
Goal Outcome Evaluation:  Plan of Care Reviewed With: patient, spouse           Outcome Evaluation: Pt is 73 yo female admitted to [facility] s/p L hip fx, s/p L femoral IM nailing on 7/5/25. Post op course includes respiratory distress with transfer to ICU. Pt currently on Airvo, awake and alert. Pt lives with spouse, independent with mobility at baseline with use of rwx. Patient c/o 10/10 pain today, demonstrates significant guarding in all extremities with attempted mobility. Patient required maxAx2 for bed mobility, maxAx2 for sit to stand, did not attempt ambulation. pt has impairments consisting of generalized post op weakness and pain, decreased activity tolerance, decreased balance and would benefit from skilled PT. dc recommendation is SNU.    Anticipated Discharge Disposition (PT): skilled nursing facility

## 2025-07-06 NOTE — PROGRESS NOTES
Orthopaedic Progress Note     Pain well controlled. Dressing is clean and dry. Off levo this AM. Less confused.  at bedside. Remains on high flow 60 L. Making more urine. Labs pending.     Hgb: 10.0   Vitals: p 89, 95/40, 98% on 60 L     NAD  Alert and asking appropriate questions regarding her care   Respirations appear nonlabored on O2   LLE  Dressing clean and dry  Able to DF and PF at the ankle weakly   Sensation is intact distally   Foot is wwp   Compartments are soft and compressible     74 year old frail female with CKD, heart failure, atrial fibrillation, baseline O2 use, UTI, suspected pneumonia with left intertrochanteric femur fracture   POD 1 left CMN   -WBAT LLE   -to chair if able  -pain control wean pain medication as able   -ice and elevation   -PT   -discussed with  that her situation remains tenuous although clinically this is the best she has looked since being admitted.  -okay to resume anticoagulation as hgb appears stable   -please call/chat  with any questions or concerns.    Peter Schofield MD   [facility]   [phone] - [place] Office  [phone] - [place] Office

## 2025-07-06 NOTE — THERAPY EVALUATION
Patient Name: Shani Phillip  : 1950    MRN: [id number]                              Today's Date: 2025       Admit Date: 7/3/2025    Visit Dx:     ICD-10-CM ICD-9-CM   1. Closed displaced intertrochanteric fracture of left femur, initial encounter  S72.142A 820.21     Patient Active Problem List   Diagnosis    Influenza    Thrush, oral    COPD (chronic obstructive pulmonary disease)    Atrial fibrillation with RVR    Chronic respiratory failure with hypoxia    Endocarditis of mitral valve    Mitral valve vegetation    Chronic diastolic CHF (congestive heart failure)    Atrial fibrillation, chronic    Bilateral lower extremity edema    Intermittent lightheadedness    Depression    Chronic midline low back pain with left-sided sciatica    Renal stone    Oxygen dependent    Medicare annual wellness visit, subsequent    Osteoporosis    Chronic anticoagulation    Nonrheumatic mitral valve stenosis    Chest pain, unspecified type    Chronic heart failure with preserved ejection fraction (HFpEF)    Overweight with body mass index (BMI) 25.0-29.9    Acquired hypothyroidism    Acute on chronic combined systolic and diastolic CHF (congestive heart failure)    Age-related macular degeneration    Restless legs syndrome    Horseshoe kidney with renal calculus    JAIME (acute kidney injury)    Stage 3b chronic kidney disease    Chronic combined systolic and diastolic CHF (congestive heart failure)    Acute on chronic combined systolic and diastolic CHF (congestive heart failure)    Acute pyelonephritis    Closed left hip fracture    Acute respiratory failure with hypoxia     Past Medical History:   Diagnosis Date    Acute endocarditis     Atrial fibrillation with RVR 2019    Cancer     CKD (chronic kidney disease) stage 3, GFR 30-59 ml/min     COPD (chronic obstructive pulmonary disease)     CTS (carpal tunnel syndrome)     Dizziness     Gall stones     Influenza 2019    Medicare annual wellness visit,  subsequent 04/12/2021    Migraine     Osteoporosis     Renal disorder     Restless leg syndrome     Thrush, oral 11/14/2019     Past Surgical History:   Procedure Laterality Date    CARDIAC CATHETERIZATION N/A 1/23/2020    Procedure: Coronary angiography;  Surgeon: Svetlana Grayson MD;  Location:  [facility];  Service: Cardiovascular    CARDIAC CATHETERIZATION N/A 1/23/2020    Procedure: Left ventriculography;  Surgeon: Svetlana Grayson MD;  Location:  [facility];  Service: Cardiovascular    CARDIAC CATHETERIZATION N/A 1/23/2020    Procedure: Left Heart Cath;  Surgeon: Svetlana Grayson MD;  Location:  [facility];  Service: Cardiovascular    CHOLECYSTECTOMY      CYSTOSCOPY W/ URETERAL STENT PLACEMENT Left 6/10/2025    Procedure: CYSTOSCOPY URETERAL LEFT STENT PLACEMENT;  Surgeon: Stefano Mahoney MD;  Location: [facility] MAIN OR;  Service: Urology;  Laterality: Left;    KNEE ARTHROPLASTY Right     LAPAROSCOPIC GASTRIC BANDING        General Information       Row Name 07/06/25 1342          Physical Therapy Time and Intention    Document Type evaluation  -EM     Mode of Treatment individual therapy;physical therapy  -EM       Row Name 07/06/25 1342          General Information    Patient Profile Reviewed yes  -EM     Prior Level of Function independent:  -EM     Existing Precautions/Restrictions fall;oxygen therapy device and L/min  pt currently on airvo  -EM       Row Name 07/06/25 1342          Living Environment    Current Living Arrangements home  -EM     People in Home spouse  -EM       Row Name 07/06/25 1342          Home Main Entrance    Number of Stairs, Main Entrance six  -EM       Row Name 07/06/25 1342          Stairs Within Home, Primary    Stairs, Within Home, Primary laundry in basement  -EM       Row Name 07/06/25 1342          Cognition    Orientation Status (Cognition) oriented x 3  -EM       Row Name 07/06/25 1342          Safety Issues/Impairments  "Affecting Functional Mobility    Impairments Affecting Function (Mobility) balance;endurance/activity tolerance;strength;pain  -EM               User Key  (r) = Recorded By, (t) = Taken By, (c) = Cosigned By      Initials Name Provider Type    Kitty Boone PT Physical Therapist                   Mobility       Row Name 07/06/25 1343          Bed Mobility    Bed Mobility supine-sit;sit-supine  -EM     Supine-Sit Ramsey (Bed Mobility) maximum assist (25% patient effort);2 person assist;verbal cues  -EM     Sit-Supine Ramsey (Bed Mobility) maximum assist (25% patient effort);2 person assist;verbal cues  -EM     Assistive Device (Bed Mobility) head of bed elevated;repositioning sheet  -EM     Comment, (Bed Mobility) pt fearful, guarded, pushing against therapist and staff, stating \"wait a minute\" continuously, maximal verbal cues to initiate movement towards EOB  -EM       Row Name 07/06/25 1343          Sit-Stand Transfer    Sit-Stand Ramsey (Transfers) maximum assist (25% patient effort);verbal cues;2 person assist  -EM     Assistive Device (Sit-Stand Transfers) walker, front-wheeled  -EM     Comment, (Sit-Stand Transfer) maximal cues to use UEs on rwx (pt lifting rwx up into air), pt able to stand fully once assisted up to standing position, cues for upright posture  -EM       Row Name 07/06/25 1351          Gait/Stairs (Locomotion)    Patient was able to Ambulate no, other medical factors prevent ambulation  -EM     Reason Patient was unable to Ambulate Excessive Weakness  significant resp demands, pain  -EM               User Key  (r) = Recorded By, (t) = Taken By, (c) = Cosigned By      Initials Name Provider Type    Kitty Boone PT Physical Therapist                   Obj/Interventions       Row Name 07/06/25 1345          Range of Motion Comprehensive    General Range of Motion other (see comments)  -EM     Comment, General Range of Motion sammy UEs WNL, difficult to " assess LEs, pt incredibly guarded and resistive to movement in sammy LEs, appears to be grossly WNL  -EM       Row Name 07/06/25 1345          Strength Comprehensive (MMT)    General Manual Muscle Testing (MMT) Assessment other (see comments)  -EM     Comment, General Manual Muscle Testing (MMT) Assessment expected post op weakness  -EM       Row Name 07/06/25 1345          Motor Skills    Therapeutic Exercise other (see comments)  AP, QS, LAQ x 5 reps with maximal verbal and tactile cueing. unable to complete heel slides or hip abd due to muscle guarding and pt resistance  -EM       Row Name 07/06/25 1345          Balance    Balance Assessment sitting static balance;sitting dynamic balance;standing static balance  -EM     Static Sitting Balance contact guard  -EM     Dynamic Sitting Balance contact guard  -EM     Position, Sitting Balance sitting edge of bed  -EM     Static Standing Balance 2-person assist;moderate assist  -EM     Position/Device Used, Standing Balance walker, rolling  -EM       Row Name 07/06/25 1345          Sensory Assessment (Somatosensory)    Sensory Assessment (Somatosensory) sensation intact  -EM               User Key  (r) = Recorded By, (t) = Taken By, (c) = Cosigned By      Initials Name Provider Type    EM Kitty Melgoza, PT Physical Therapist                   Goals/Plan       Row Name 07/06/25 1352          Bed Mobility Goal 1 (PT)    Activity/Assistive Device (Bed Mobility Goal 1, PT) bed mobility activities, all  -EM     [place] Level/Cues Needed (Bed Mobility Goal 1, PT) minimum assist (75% or more patient effort)  -EM     Time Frame (Bed Mobility Goal 1, PT) 1 week  -EM       Row Name 07/06/25 1352          Transfer Goal 1 (PT)    Activity/Assistive Device (Transfer Goal 1, PT) transfers, all;walker, rolling  -EM     [place] Level/Cues Needed (Transfer Goal 1, PT) minimum assist (75% or more patient effort)  -EM     Time Frame (Transfer Goal 1, PT) 1 week  -EM        Row Name 07/06/25 1353          Gait Training Goal 1 (PT)    Activity/Assistive Device (Gait Training Goal 1, PT) gait (walking locomotion);walker, rolling  -EM     [place] Level (Gait Training Goal 1, PT) minimum assist (75% or more patient effort)  -EM     Distance (Gait Training Goal 1, PT) 25  -EM     Time Frame (Gait Training Goal 1, PT) 1 week  -EM       Row Name 07/06/25 1354          Therapy Assessment/Plan (PT)    Planned Therapy Interventions (PT) bed mobility training;gait training;home exercise program;patient/family education;transfer training;strengthening  -EM               User Key  (r) = Recorded By, (t) = Taken By, (c) = Cosigned By      Initials Name Provider Type    EM Kitty Melgoza, PT Physical Therapist                   Clinical Impression       Row Name 07/06/25 1107          Pain    Pretreatment Pain Rating 10/10  -EM     Posttreatment Pain Rating 10/10  -EM     Pain Location hip  -EM     Pain Side/Orientation left  -EM     Response to Pain Interventions activity participation with increased pain  -EM       Row Name 07/06/25 3953          Plan of Care Review    Plan of Care Reviewed With patient;spouse  -EM     Outcome Evaluation Pt is 75 yo female admitted to [facility] s/p L hip fx, s/p L femoral IM nailing on 7/5/25. Post op course includes respiratory distress with transfer to ICU. Pt currently on Airvo, awake and alert. Pt lives with spouse, independent with mobility at baseline with use of rwx. Patient c/o 10/10 pain today, demonstrates significant guarding in all extremities with attempted mobility. Patient required maxAx2 for bed mobility, maxAx2 for sit to stand, did not attempt ambulation. pt has impairments consisting of generalized post op weakness and pain, decreased activity tolerance, decreased balance and would benefit from skilled PT. dc recommendation is SNU.  -EM       Row Name 07/06/25 0269          Therapy Assessment/Plan (PT)    Rehab Potential (PT) good  -EM      Criteria for Skilled Interventions Met (PT) yes;skilled treatment is necessary  -EM     Therapy Frequency (PT) 6 times/wk  -EM       Row Name 07/06/25 1347          Vital Signs    Intra SpO2 (%) --  not getting good reading during activity  -EM     Post SpO2 (%) 83  -EM     O2 Delivery Post Treatment hi-flow  Airvo at 53% FiO2  -EM       Row Name 07/06/25 1347          Positioning and Restraints    Pre-Treatment Position in bed  -EM     Post Treatment Position bed  -EM     In Bed supine;call light within reach;exit alarm on;with family/caregiver;notified nsg  -EM               User Key  (r) = Recorded By, (t) = Taken By, (c) = Cosigned By      Initials Name Provider Type    Kitty Boone PT Physical Therapist                   Outcome Measures       Row Name 07/06/25 1353          How much help from another person do you currently need...    Turning from your back to your side while in flat bed without using bedrails? 2  -EM     Moving from lying on back to sitting on the side of a flat bed without bedrails? 2  -EM     Moving to and from a bed to a chair (including a wheelchair)? 1  -EM     Standing up from a chair using your arms (e.g., wheelchair, bedside chair)? 2  -EM     Climbing 3-5 steps with a railing? 1  -EM     To walk in hospital room? 1  -EM     AM-PAC 6 Clicks Score (PT) 9  -EM               User Key  (r) = Recorded By, (t) = Taken By, (c) = Cosigned By      Initials Name Provider Type    Kitty Boone PT Physical Therapist                                 Physical Therapy Education       Title: PT OT SLP Therapies (In Progress)       Topic: Physical Therapy (In Progress)       Point: Mobility training (In Progress)       Learning Progress Summary            Patient Acceptance, E, NR by EM at 7/6/2025 1354                      Point: Home exercise program (In Progress)       Learning Progress Summary            Patient Acceptance, E, NR by EM at 7/6/2025 1354                       Point: Body mechanics (Not Started)       Learner Progress:  Not documented in this visit.              Point: Precautions (Not Started)       Learner Progress:  Not documented in this visit.                              User Key       Initials Effective Dates Name Provider Type Discipline    EM 06/16/21 -  Kitty Melgoza PT Physical Therapist PT                  PT Recommendation and Plan  Planned Therapy Interventions (PT): bed mobility training, gait training, home exercise program, patient/family education, transfer training, strengthening  Outcome Evaluation: Pt is 73 yo female admitted to [facility] s/p L hip fx, s/p L femoral IM nailing on 7/5/25. Post op course includes respiratory distress with transfer to ICU. Pt currently on Airvo, awake and alert. Pt lives with spouse, independent with mobility at baseline with use of rwx. Patient c/o 10/10 pain today, demonstrates significant guarding in all extremities with attempted mobility. Patient required maxAx2 for bed mobility, maxAx2 for sit to stand, did not attempt ambulation. pt has impairments consisting of generalized post op weakness and pain, decreased activity tolerance, decreased balance and would benefit from skilled PT. dc recommendation is SNU.     Time Calculation:         PT Charges       Row Name 07/06/25 1354             Time Calculation    Start Time 1014  -EM      Stop Time 1047  -EM      Time Calculation (min) 33 min  -EM      PT Received On 07/06/25  -EM      PT - Next Appointment 07/07/25  -EM      PT Goal Re-Cert Due Date 07/13/25  -EM         Time Calculation- PT    Total Timed Code Minutes- PT 25 minute(s)  -EM         Timed Charges    12619 - PT Therapeutic Exercise Minutes 10  -EM      47358 - PT Therapeutic Activity Minutes 15  -EM         Total Minutes    Timed Charges Total Minutes 25  -EM       Total Minutes 25  -EM                User Key  (r) = Recorded By, (t) = Taken By, (c) = Cosigned By      Initials Name Provider Type    EM  Kitty Melgoza, PT Physical Therapist                  Therapy Charges for Today       Code Description Service Date Service Provider Modifiers Qty    97022052328 HC PT THER PROC EA 15 MIN 7/6/2025 Kitty Melgoza, PT GP 1    72047034159 HC PT THERAPEUTIC ACT EA 15 MIN 7/6/2025 Kitty Melgoza, PT GP 1    05820656669 HC PT EVAL MOD COMPLEXITY 3 7/6/2025 Kitty Melgoza, PT GP 1            PT G-Codes  AM-PAC 6 Clicks Score (PT): 9  PT Discharge Summary  Anticipated Discharge Disposition (PT): skilled nursing facility    Kitty Melgoza PT  7/6/2025

## 2025-07-06 NOTE — PROGRESS NOTES
Dr. IVETH Blackwell    [facility] INTENSIVE CARE        Patient ID:  Name:  Shani Phillip  MRN:  [id number]  1950  74 y.o.  female            CC/Reason for visit: post femoral orthopedic nailing, hypoxic resp failure     Interval hx: The patient has no new complaints.  She was asking for coffee.  She has made some urine, 800 cc overnight measured with Diaz catheter.  She was on pressors overnight due to hypotension but now arterial line blood pressures are adequate, however cuff pressure has 40 mmHg difference, therefore noninvasive cuff pressure is probably unreliable    ROS: Some expected postoperative left hip pain.  Otherwise no chest pain or vomiting    Vitals:  Vitals:    07/06/25 0400 07/06/25 0500 07/06/25 0600 07/06/25 0727   BP: 106/58  95/40    BP Location: Left arm  Left arm    Patient Position: Lying  Lying    Pulse: 81 85 89    Resp: 16 20 18    Temp:    98.1 °F (36.7 °C)   TempSrc:    Oral   SpO2: 100% 100% 98%    Weight:       Height:               Body mass index is 28.08 kg/m².    Intake/Output Summary (Last 24 hours) at 7/6/2025 0745  Last data filed at 7/6/2025 0700  Gross per 24 hour   Intake 1001.44 ml   Output 825 ml   Net 176.44 ml       Exam:  GEN:  No distress  Alert, oriented x 3.  Moves all 4 extremities on command, fluent speech  LUNGS: Barrel chest, diminished and distant  breath sounds bilat, no use of accessory muscles  CV:  Normal S1S2, without murmur, no edema  ABD:  Non tender, no enlarged liver or masses      Scheduled meds:  arformoterol, 15 mcg, Nebulization, BID - RT  budesonide, 0.5 mg, Nebulization, BID - RT  buPROPion XL, 150 mg, Oral, Daily  cefTRIAXone, 1,000 mg, Intravenous, Q24H  digoxin, 125 mcg, Oral, Every Other Day  famotidine, 20 mg, Oral, BID AC  insulin lispro, 2-7 Units, Subcutaneous, 4x Daily AC & at Bedtime  ipratropium-albuterol, 3 mL, Nebulization, 4x Daily - RT  levothyroxine, 50 mcg, Oral, Q AM  melatonin, 2.5 mg, Oral, Nightly  [Held by  provider] midodrine, 5 mg, Oral, TID AC  oxybutynin XL, 5 mg, Oral, Daily  potassium chloride, 20 mEq, Oral, Daily  pramipexole, 0.5 mg, Oral, BID      IV meds:                      norepinephrine, 0.02-0.3 mcg/kg/min, Last Rate: 0.08 mcg/kg/min (07/05/25 1622)        Data Review:   I reviewed the patient's medications and new clinical results.            Results from last 7 days   Lab Units 07/06/25  0400 07/05/25  1612 07/05/25  0324 07/04/25  1819 07/04/25  0336 07/03/25  2247 07/03/25  1628   SODIUM mmol/L  --   --  135*  --  134*  --  133*   POTASSIUM mmol/L  --   --  5.4*  --  5.1  --  4.5   CHLORIDE mmol/L  --   --  100  --  97*  --  93*   CO2 mmol/L  --   --  20.8*  --  23.4  --  28.6   BUN mg/dL  --   --  41.0*  --  28.0*  --  24.0*   CREATININE mg/dL  --   --  3.32*  --  2.27*  --  1.78*   CALCIUM mg/dL  --   --  8.2*  --  9.6  --  9.5   BILIRUBIN mg/dL  --   --  0.4  --   --   --  0.6   ALK PHOS U/L  --   --  131*  --   --   --  137*   ALT (SGPT) U/L  --   --  19  --   --   --  12   AST (SGOT) U/L  --   --  28  --   --   --  16   GLUCOSE mg/dL  --   --  157*  --  115*  --  121*   WBC 10*3/mm3 12.51*  --  20.40*  --  23.74*  --  10.95*   HEMOGLOBIN g/dL 10.0*  --  11.8*  --  13.0  --  12.9   PLATELETS 10*3/mm3 192  --  340  --  352  --  319   INR  2.09*  --  1.81* 1.72* 1.83*   < > 1.71*   PROCALCITONIN ng/mL  --  1.51*  --   --   --   --   --     < > = values in this interval not displayed.           Results from last 7 days   Lab Units 07/04/25  0400   PH, ARTERIAL pH units 7.337*   PCO2, ARTERIAL mm Hg 58.2*   PO2 ART mm Hg 56.1*   O2 SATURATION ART % 85.9*   FLOW RATE lpm 5.0000   MODALITY  Cannula            ASSESSMENT:   Hypovolemic Shock   Acute exacerbation of COPD  Pneumonia  Acute on chronic hypercapnic and hypoxic respiratory failure (patient of Dr. Zamora in our office  Fall causing closed left hip fracture  Shock requiring IV pressors  Acquired hypothyroidism  Stage 3b chronic kidney disease    Chronic combined systolic and diastolic CHF (congestive heart failure)   Acute respiratory failure with hypoxia  Oliguria  Acute kidney injury  Atrial fibrillation      PLAN:  There is significant discrepancy between noninvasive cuff and invasive arterial line for her systolic blood pressure and MAP.  She responded to IV fluids, made about 800 cc of urine.  May have been hypovolemic rather than septic shock.  Has elevated procalcitonin but being treated for pneumonia with Rocephin.  Continue same but stop IV pressors.  Keep invasive arterial line for monitoring next 48 hours.  Avoid relying on noninvasive blood pressure cuff readings due to large discrepancy for now.  Has stage III kidney disease, continue monitoring urine output hourly with invasive Diaz bladder catheter for now.  Appreciate input from nephrology.  Patient's hypoxic respiratory failure yesterday did not allow for transfer for CT of the abdomen and pelvis.  She desaturated quickly.  Currently still on 60 L and 89% FiO2 high flow oxygen with saturating 98%.  Will try to wean oxygen as tolerated.  Repeat chest x-ray this morning.  Trend temperature curve, white count and procalcitonin levels.  Usually on full dose anticoagulation with warfarin at home, currently INR is 2.0.  Monitor daily INR and pharmacy to dose warfarin.  Apparently on this for atrial fibrillation at home.  Orthopedics says okay to resume full dose anticoagulation.    Total critical care time 35 minutes        Tristan Blackwell MD  7/6/2025

## 2025-07-06 NOTE — PROGRESS NOTES
Name: Shani Phillip ADMIT: 7/3/2025   : 1950  PCP: Rodríguez Walker MD    MRN: [id number] LOS: 3 days   AGE/SEX: 74 y.o. female  ROOM: [facility]     Subjective   Subjective   Patient is seen at bedside, patient is still in ICU.       Objective   Objective   Vital Signs  Temp:  [97.5 °F (36.4 °C)-98.1 °F (36.7 °C)] 97.5 °F (36.4 °C)  Heart Rate:  [] 93  Resp:  [16-24] 20  BP: ()/(40-67) 146/55  Arterial Line BP: ()/(43-75) 132/45  SpO2:  [67 %-100 %] 93 %  on  Flow (L/min) (Oxygen Therapy):  [40-60] 40;   Device (Oxygen Therapy): heated;high-flow nasal cannula  Body mass index is 28.08 kg/m².  Physical Exam  General, awake and alert.  Head and ENT, normocephalic and atraumatic.  Lungs, symmetric expansion, equal air entry bilaterally.  Heart, regular rate and rhythm.  Abdomen, soft and nontender.  Extremities, no clubbing or cyanosis.  Neuro, no focal deficits.  Skin: Warm and no rash.  Psych, normal mood and affect.  Musculoskeletal, joint examination is grossly normal.     Copied text material from yesterday's note has been reviewed for appropriate changes and remains accurate as of 25.         Results Review     I reviewed the patient's new clinical results.  Results from last 7 days   Lab Units 25  0400 25  0324 25  0336 25  1628   WBC 10*3/mm3 12.51* 20.40* 23.74* 10.95*   HEMOGLOBIN g/dL 10.0* 11.8* 13.0 12.9   PLATELETS 10*3/mm3 192 340 352 319     Results from last 7 days   Lab Units 25  0817 25  0324 25  0336 25  1628   SODIUM mmol/L 137 135* 134* 133*   POTASSIUM mmol/L 4.9 5.4* 5.1 4.5   CHLORIDE mmol/L 103 100 97* 93*   CO2 mmol/L 21.0* 20.8* 23.4 28.6   BUN mg/dL 49.0* 41.0* 28.0* 24.0*   CREATININE mg/dL 2.74* 3.32* 2.27* 1.78*   GLUCOSE mg/dL 81 157* 115* 121*   EGFR mL/min/1.73 17.6* 13.9* 22.0* 29.5*     Results from last 7 days   Lab Units 25  0817 25  0324 25  1628   ALBUMIN g/dL 2.7* 2.8* 3.3*    BILIRUBIN mg/dL 0.3 0.4 0.6   ALK PHOS U/L 96 131* 137*   AST (SGOT) U/L 31 28 16   ALT (SGPT) U/L 11 19 12     Results from last 7 days   Lab Units 07/06/25  0817 07/06/25  0400 07/05/25  0324 07/04/25  0336 07/03/25  1628   CALCIUM mg/dL 8.4*  --  8.2* 9.6 9.5   ALBUMIN g/dL 2.7*  --  2.8*  --  3.3*   MAGNESIUM mg/dL 2.5*  --   --   --   --    PHOSPHORUS mg/dL  --  5.0*  --   --   --      Results from last 7 days   Lab Units 07/05/25  1612   PROCALCITONIN ng/mL 1.51*     Glucose   Date/Time Value Ref Range Status   07/06/2025 1619 124 70 - 130 mg/dL Final   07/06/2025 1152 110 70 - 130 mg/dL Final   07/05/2025 2112 96 70 - 130 mg/dL Final   07/05/2025 1722 95 70 - 130 mg/dL Final   07/05/2025 1123 94 70 - 130 mg/dL Final   07/05/2025 0700 128 70 - 130 mg/dL Final   07/04/2025 2351 112 70 - 130 mg/dL Final       XR Chest 1 View  Result Date: 7/6/2025   1. Stable chest. 2. Airways disease  This report was finalized on 7/6/2025 4:52 PM by Dr. Prieto Delgado M.D on Workstation: [id number]        I have personally reviewed all medications:  Scheduled Medications  arformoterol, 15 mcg, Nebulization, BID - RT  budesonide, 0.5 mg, Nebulization, BID - RT  buPROPion XL, 150 mg, Oral, Daily  cefTRIAXone, 1,000 mg, Intravenous, Q24H  digoxin, 125 mcg, Oral, Every Other Day  famotidine, 20 mg, Oral, BID AC  insulin lispro, 2-7 Units, Subcutaneous, 4x Daily AC & at Bedtime  ipratropium-albuterol, 3 mL, Nebulization, 4x Daily - RT  levothyroxine, 50 mcg, Oral, Q AM  melatonin, 2.5 mg, Oral, Nightly  metoprolol tartrate, 12.5 mg, Oral, Q12H  pramipexole, 0.5 mg, Oral, BID  warfarin, 0.5 mg, Oral, Daily    Infusions  Pharmacy to dose warfarin,     Diet  Diet: Liquid; Clear Liquid; Fluid Consistency: Thin (IDDSI 0)    I have personally reviewed:  [x]  Laboratory   [x]  Microbiology   [x]  Radiology   [x]  EKG/Telemetry  [x]  Cardiology/Vascular   []  Pathology    []  Records       Assessment/Plan     Active Hospital Problems     Diagnosis  POA    **Closed left hip fracture [S72.002A]  Yes    Acute respiratory failure with hypoxia [J96.01]  Yes    Stage 3b chronic kidney disease [N18.32]  Yes    Chronic combined systolic and diastolic CHF (congestive heart failure) [I50.42]  Yes    Acquired hypothyroidism [E03.9]  Yes    Depression [F32.A]  Yes      Resolved Hospital Problems   No resolved problems to display.       74 y.o. female admitted with Closed left hip fracture.    Assessment and plan  1.  Acute on chronic respiratory failure with hypoxia, patient is currently on high flow oxygen, pulmonary on board, manage in ICU at this point of time.  She is at high risk of clinical decline. Weaned off IV Levophed.      2.  History of chronic combined systolic and diastolic congestive heart failure along with acute on chronic kidney injury, cardiology and nephrology on board, follow management recommendations.    Clinically improving slightly.     3.  Hypothyroidism, Synthroid will be continued.     4.  Depression, chronic condition.    5.  Atrial fibrillation, she is currently rate controlled, okay to resume anticoagulation.     6.  CODE STATUS is DNR  Further plans will be based on the hospital course.    Quan Hummel MD  [facility]  07/06/25  17:02 EDT

## 2025-07-06 NOTE — PROGRESS NOTES
Hospital Follow Up    LOS:  LOS: 3 days   Patient Name: Shani Phillip  Age/Sex: 74 y.o. female  : 1950  MRN: [id number]    Day of Service: 25   Length of Stay: 3  Encounter Provider: Peter Maynard MD  Place of Service: [facility] CARDIOLOGY  Patient Care Team:  Rodríguez Walker MD as PCP - General (Internal Medicine)  Mike Atkins MD as Surgeon (General Surgery)  Hayes Briones MD as Surgeon (Cardiothoracic Surgery)  Zenia Tinajero MD as Consulting Physician (Cardiology)  Clover Zamora MD as Consulting Physician (Pulmonary Disease)  Angy Smith APRN as Nurse Practitioner (Cardiology)  Lester Garcia RN as Ambulatory  ([facility])    Subjective:     Chief Complaint: Hip fracture    Interval History: Tolerated surgery.  Recovering okay.  Was on high flow overnight and had some RVR.  Needed pressors.  All is improving this morning.  Arterial line with normotension.  Transitioning to nasal cannula.  Heart rate in the 80s to 90s on my bedside evaluation this morning.      Objective:     Objective:  Temp:  [97.6 °F (36.4 °C)-98.5 °F (36.9 °C)] 98.1 °F (36.7 °C)  Heart Rate:  [] 88  Resp:  [14-24] 20  BP: ()/(40-78) 106/50  Arterial Line BP: ()/(37-75) 122/50     Intake/Output Summary (Last 24 hours) at 2025 0907  Last data filed at 2025 0700  Gross per 24 hour   Intake 601.44 ml   Output 725 ml   Net -123.56 ml     Body mass index is 28.08 kg/m².      25  0600 25  0509   Weight: 64.6 kg (142 lb 6.7 oz) 62.3 kg (137 lb 5.6 oz) 67.4 kg (148 lb 9.4 oz)     Weight change:       Physical Exam:   General : Alert, cooperative, in no acute distress.  Neuro: Alert,cooperative and oriented.  Lungs:, Normal work of breathing, on high flow  CV: Irregular rhythm, normal rate, normal S1 and S2, no murmurs, gallops or rubs.  ABD: Soft, nontender, nondistended. Positive bowel sounds.  Extr: No  "edema or cyanosis, moves all extremities.    Lab Review:   Results from last 7 days   Lab Units 07/06/25  0817 07/05/25  0324   SODIUM mmol/L 137 135*   POTASSIUM mmol/L 4.9 5.4*   CHLORIDE mmol/L 103 100   CO2 mmol/L 21.0* 20.8*   BUN mg/dL 49.0* 41.0*   CREATININE mg/dL 2.74* 3.32*   GLUCOSE mg/dL 81 157*   CALCIUM mg/dL 8.4* 8.2*   AST (SGOT) U/L 31 28   ALT (SGPT) U/L 11 19     Results from last 7 days   Lab Units 07/04/25  1648 07/04/25  1435   HSTROP T ng/L 69* 73*     Results from last 7 days   Lab Units 07/06/25  0400 07/05/25  0324   WBC 10*3/mm3 12.51* 20.40*   HEMOGLOBIN g/dL 10.0* 11.8*   HEMATOCRIT % 31.1* 36.5   PLATELETS 10*3/mm3 192 340     Results from last 7 days   Lab Units 07/06/25  0400 07/05/25  0324   INR  2.09* 1.81*     Results from last 7 days   Lab Units 07/06/25  0817   MAGNESIUM mg/dL 2.5*           Invalid input(s): \"LDLCALC\"  Results from last 7 days   Lab Units 07/06/25  0817   PROBNP pg/mL 16,731.0*         I reviewed the patient's new clinical results.  I personally viewed and interpreted the patient's EKG  Current Medications:   Scheduled Meds:arformoterol, 15 mcg, Nebulization, BID - RT  budesonide, 0.5 mg, Nebulization, BID - RT  buPROPion XL, 150 mg, Oral, Daily  cefTRIAXone, 1,000 mg, Intravenous, Q24H  digoxin, 125 mcg, Oral, Every Other Day  famotidine, 20 mg, Oral, BID AC  insulin lispro, 2-7 Units, Subcutaneous, 4x Daily AC & at Bedtime  ipratropium-albuterol, 3 mL, Nebulization, 4x Daily - RT  levothyroxine, 50 mcg, Oral, Q AM  melatonin, 2.5 mg, Oral, Nightly  potassium chloride, 20 mEq, Oral, Daily  pramipexole, 0.5 mg, Oral, BID      Continuous Infusions:Pharmacy to dose warfarin,         Allergies:  Allergies   Allergen Reactions    Penicillins Rash     RASH 30 YRS AGO NO HOSPITALIZATION       Assessment:   Left femur fracture status post operative fixation  Atrial fibrillation with RVR  Chronic diastolic heart failure  Mild aortic valve stenosis  Moderate to severe " mitral valve stenosis  JAIME on CKD  COPD    Plan:      -Patient tolerated orthopedic surgery.  Recovering about expected.  Weaning oxygen support, heart rate better controlled today.    -Continue dig every other day.  Dig level has been normal.    -Will trial low-dose metoprolol to help with rate control.  She is on warfarin.    We will continue to follow.    Peter Maynard MD  07/06/25  09:07 EDT

## 2025-07-07 NOTE — CASE MANAGEMENT/SOCIAL WORK
Discharge Planning Assessment  [facility]     Patient Name: Shani Phillip  MRN: [id number]  Today's Date: 7/7/2025    Admit Date: 7/3/2025    Plan: Referral to [facility] pending acceptance. No precert needed.   Discharge Needs Assessment       Row Name 07/07/25 1451       Living Environment    People in Home spouse    Current Living Arrangements home    Potentially Unsafe Housing Conditions none    In the past 12 months has the electric, gas, oil, or water company threatened to shut off services in your home? No    Primary Care Provided by self    Provides Primary Care For no one    Family Caregiver if Needed spouse    Family Caregiver Names Willis    Quality of Family Relationships helpful    Able to Return to Prior Arrangements yes       Resource/Environmental Concerns    Resource/Environmental Concerns none    Transportation Concerns none       Transportation Needs    In the past 12 months, has lack of transportation kept you from medical appointments or from getting medications? no    In the past 12 months, has lack of transportation kept you from meetings, work, or from getting things needed for daily living? No       Food Insecurity    Within the past 12 months, you worried that your food would run out before you got the money to buy more. Never true    Within the past 12 months, the food you bought just didn't last and you didn't have money to get more. Never true       Transition Planning    Patient/Family Anticipates Transition to inpatient rehabilitation facility    Patient/Family Anticipated Services at Transition rehabilitation services    Transportation Anticipated health plan transportation       Discharge Needs Assessment    Readmission Within the Last 30 Days unable to assess    Current Outpatient/Agency/Support Group inpatient rehabilitation facility    Equipment Currently Used at Home oxygen;walker, rolling;wheelchair    Concerns to be Addressed discharge planning    Anticipated Changes  Related to Illness inability to care for self    Equipment Needed After Discharge other (see comments)  TBD    Discharge Facility/Level of Care Needs other (see comments)  TBD    Provided Post Acute Provider List? Yes    Post Acute Provider List Nursing Home;DME Supplier;Home Health;Inpatient Rehab    Provided Post Acute Provider Quality & Resource List? Yes    Post Acute Provider Quality and Resource List Home Health;Inpatient Rehab;Nursing Home    Delivered To Support Person    Method of Delivery In person    Offered/Gave Vendor List yes    Current Discharge Risk chronically ill                   Discharge Plan       Row Name 07/07/25 8389       Plan    Plan Referral to [facility] pending acceptance. No precert needed.    Roadmap to Recovery Yes    Patient/Family in Agreement with Plan yes    Plan Comments Spoke with spouse at bedside. Facesheet, PCP and pharmacy verified. patient lives with spouse in their home. She is IADL with a rolling ealker inthe home and community. She has home o2 continuous at 2L n/c from [facility]. patient spouse asks for referral to be made to [facility] and if need NIPPV , Spouse wants it to go to [facility], not [facility]. Referral made to [facility] at [facility] and dcp sent                  Continued Care and Services - Admitted Since 7/3/2025    No active coordination exists.       Selected Continued Care - Episodes Includes continued care and service providers with selected services from the active episodes listed below      Chronic Care Management Episode start date: 2/18/2025   There are no active outsourced providers for this episode.                 Expected Discharge Date and Time       Expected Discharge Date Expected Discharge Time    Jul 10, 2025            Demographic Summary       Row Name 07/07/25 1656       General Information    Admission Type inpatient    Arrived From emergency department    Required Notices Provided Important Message from Medicare     Referral Source admission list    Reason for Consult discharge planning    Preferred Language English                   Functional Status       Row Name 07/07/25 9607       Functional Status    Usual Activity Tolerance moderate    Current Activity Tolerance poor       Functional Status, IADL    Medications independent    Meal Preparation independent    Housekeeping independent    Laundry independent    Shopping assistive person    IADL Comments spouse provides all transportation       Mental Status    General Appearance WDL X    General Appearance unkempt       Mental Status Summary    Recent Changes in Mental Status/Cognitive Functioning unable to assess                               Dorcas Bingham RN

## 2025-07-07 NOTE — PROGRESS NOTES
Name: Shani Phillip ADMIT: 7/3/2025   : 1950  PCP: Rodríguez Wakler MD    MRN: [id number] LOS: 4 days   AGE/SEX: 74 y.o. female  ROOM: [facility]     Subjective   Subjective   Patient seen at bedside, she still remains on Airvo Optiflow.       Objective   Objective   Vital Signs  Temp:  [97 °F (36.1 °C)-98.4 °F (36.9 °C)] 98 °F (36.7 °C)  Heart Rate:  [] 87  Resp:  [12-22] 20  BP: (109-145)/() 109/56  Arterial Line BP: (100-155)/(41-74) 107/60  SpO2:  [84 %-100 %] 96 %  on  Flow (L/min) (Oxygen Therapy):  [] 40;   Device (Oxygen Therapy): heated;high-flow nasal cannula  Body mass index is 28.08 kg/m².  Physical Exam  General, awake and alert.  Head and ENT, normocephalic and atraumatic.  Lungs, symmetric expansion, equal air entry bilaterally.  Heart, regular rate and rhythm.  Abdomen, soft and nontender.  Extremities, no clubbing or cyanosis.  Neuro, no focal deficits.  Skin: Warm and no rash.  Psych, normal mood and affect.  Musculoskeletal, joint examination is grossly normal.     Copied text material from yesterday's note has been reviewed for appropriate changes and remains accurate as of 25.      Results Review     I reviewed the patient's new clinical results.  Results from last 7 days   Lab Units 25  0756 25  0400 25  0324 25  0336   WBC 10*3/mm3 9.33 12.51* 20.40* 23.74*   HEMOGLOBIN g/dL 10.0* 10.0* 11.8* 13.0   PLATELETS 10*3/mm3 196 192 340 352     Results from last 7 days   Lab Units 25  0756 25  0817 25  0324 25  0336   SODIUM mmol/L 140 137 135* 134*   POTASSIUM mmol/L 4.5 4.9 5.4* 5.1   CHLORIDE mmol/L 104 103 100 97*   CO2 mmol/L 23.7 21.0* 20.8* 23.4   BUN mg/dL 35.0* 49.0* 41.0* 28.0*   CREATININE mg/dL 2.03* 2.74* 3.32* 2.27*   GLUCOSE mg/dL 82 81 157* 115*   EGFR mL/min/1.73 25.2* 17.6* 13.9* 22.0*     Results from last 7 days   Lab Units 25  0756 25  0817 25  0324 25  1628   ALBUMIN g/dL  2.9* 2.7* 2.8* 3.3*   BILIRUBIN mg/dL 0.4 0.3 0.4 0.6   ALK PHOS U/L 103 96 131* 137*   AST (SGOT) U/L 53* 31 28 16   ALT (SGPT) U/L 21 11 19 12     Results from last 7 days   Lab Units 07/07/25  0756 07/06/25  0817 07/06/25  0400 07/05/25  0324 07/04/25  0336 07/03/25  1628   CALCIUM mg/dL 9.2 8.4*  --  8.2* 9.6 9.5   ALBUMIN g/dL 2.9* 2.7*  --  2.8*  --  3.3*   MAGNESIUM mg/dL 2.4 2.5*  --   --   --   --    PHOSPHORUS mg/dL 2.9  --  5.0*  --   --   --      Results from last 7 days   Lab Units 07/07/25  0756 07/05/25  1612   PROCALCITONIN ng/mL 0.67* 1.51*     Glucose   Date/Time Value Ref Range Status   07/07/2025 1723 79 70 - 130 mg/dL Final   07/07/2025 1247 88 70 - 130 mg/dL Final   07/07/2025 0350 81 70 - 130 mg/dL Final   07/07/2025 0021 89 70 - 130 mg/dL Final   07/06/2025 2009 122 70 - 130 mg/dL Final   07/06/2025 1619 124 70 - 130 mg/dL Final   07/06/2025 1152 110 70 - 130 mg/dL Final       XR Chest 1 View  Result Date: 7/7/2025  No significant change.  This report was finalized on 7/7/2025 1:11 PM by Dr. Atilio Cruz M.D on Workstation: [id number]      CT Abdomen Pelvis Without Contrast  Result Date: 7/7/2025   1. The patient has undergone placement of a double-J ureteral stent on the left. There has also been a significant decrease in the stone burden within the left renal moiety when compared to the prior exam. The patient is suspected to have some persistent dilatation of the left renal collecting system, and there is also dilatation of the left ureter until its most distal portion, despite presence of the stent. The patient is noted to have some stone fragments within the distal left ureter. 2. There does appear to be a collection adjacent to the superior pole of the left kidney. Resolving hematoma or phlegmon/abscess would be considerations. Full assessment is limited, in the absence of contrast material. 3. Interval intramedullary delroy fixation of the proximal left femur. There is a partially  visualized hematoma adjacent to the operative site.  Radiation dose reduction techniques were utilized, including automated exposure control and exposure modulation based on body size.   This report was finalized on 7/7/2025 5:02 AM by Dr. Mena Littlejohn M.D on Workstation: [id number]      XR Chest 1 View  Result Date: 7/6/2025   1. Stable chest. 2. Airways disease  This report was finalized on 7/6/2025 4:52 PM by Dr. Prieto Delgado M.D on Workstation: [id number]        I have personally reviewed all medications:  Scheduled Medications  arformoterol, 15 mcg, Nebulization, BID - RT  budesonide, 0.5 mg, Nebulization, BID - RT  buPROPion XL, 150 mg, Oral, Daily  [Held by provider] digoxin, 125 mcg, Oral, Every Other Day  famotidine, 20 mg, Oral, BID AC  insulin lispro, 2-7 Units, Subcutaneous, 4x Daily AC & at Bedtime  ipratropium-albuterol, 3 mL, Nebulization, 4x Daily - RT  levothyroxine, 50 mcg, Oral, Q AM  melatonin, 2.5 mg, Oral, Nightly  metoprolol tartrate, 12.5 mg, Oral, Q12H  pramipexole, 0.5 mg, Oral, BID  rosuvastatin, 10 mg, Oral, Nightly    Infusions  Pharmacy to dose warfarin,     Diet  NPO Diet NPO Type: Tube Feeding    I have personally reviewed:  [x]  Laboratory   [x]  Microbiology   [x]  Radiology   [x]  EKG/Telemetry  [x]  Cardiology/Vascular   []  Pathology    []  Records       Assessment/Plan     Active Hospital Problems    Diagnosis  POA    **Closed left hip fracture [S72.002A]  Yes    Acute respiratory failure with hypoxia [J96.01]  Yes    Stage 3b chronic kidney disease [N18.32]  Yes    Chronic combined systolic and diastolic CHF (congestive heart failure) [I50.42]  Yes    Acquired hypothyroidism [E03.9]  Yes    Depression [F32.A]  Yes      Resolved Hospital Problems   No resolved problems to display.       74 y.o. female admitted with Closed left hip fracture.    Assessment and plan  1.  Acute on chronic respiratory failure with hypoxia, patient is currently on high flow oxygen,  pulmonary on board, manage in ICU at this point of time.  She is at high risk of clinical decline. Weaned off IV Levophed.   She still remains on Airvo Optiflow.  Patient is intermittently confused.     2.  History of chronic combined systolic and diastolic congestive heart failure along with acute on chronic kidney injury, cardiology and nephrology on board, follow management recommendations.    Clinically improving slightly.     3.  Hypothyroidism, Synthroid will be continued.     4.  Depression, chronic condition.     5.  Atrial fibrillation, she is currently rate controlled, okay to resume anticoagulation.     6.  CODE STATUS is DNR  Further plans will be based on the hospital course.  She will need LTAC versus Acute rehab placement upon discharge.      Quan Hummel MD  [facility]  07/07/25  19:06 EDT

## 2025-07-07 NOTE — DISCHARGE PLACEMENT REQUEST
"Mehran Phillip (74 y.o. Female)       Date of Birth   1950    Social Security Number       Address   [address] [place] [address]    Home Phone   [phone]    MRN   [id number]       Restorationism   Yazdanism    Marital Status                               Admission Date   7/3/2025    Admission Type   Emergency    Admitting Provider   Raquel Senior MD    Attending Provider   Quan Hummel MD    Department, Room/Bed   [facility] INTENSIVE CARE, I373/1       Discharge Date       Discharge Disposition       Discharge Destination                                 Attending Provider: Quan Hummel MD    Allergies: Penicillins    Isolation: None   Infection: None   Code Status: No CPR    Ht: 154.9 cm (61\")   Wt: 67.4 kg (148 lb 9.4 oz)    Admission Cmt: None   Principal Problem: Closed left hip fracture [S72.002A]                   Active Insurance as of 7/3/2025       Primary Coverage       Payor Plan Insurance Group Employer/Plan Group    MEDICARE MEDICARE A & B        Payor Plan Address Payor Plan Phone Number Payor Plan Fax Number Effective Dates    [address] [phone]  9/1/2014 - None Entered    [facility] [id number]         Subscriber Name Subscriber Birth Date Member ID       MEHRAN PHILLIP 1950 [id number]               Secondary Coverage       Payor Plan Insurance Group Employer/Plan Group    AARP [employer] SUP AAR HEALTH CARE OPTIONS PLAN F       Payor Plan Address Payor Plan Phone Number Payor Plan Fax Number Effective Dates    [facility] [phone]  1/1/2016 - None Entered    [address]       [facility] [address]         Subscriber Name Subscriber Birth Date Member ID       MEHRAN PHILLIP 1950 [id number]                     Emergency Contacts        (Rel.) Home Phone Work Phone Mobile Phone    Willis Phillip (Spouse) [phone] -- --    CruzLottie (Daughter) -- -- [phone]                "

## 2025-07-07 NOTE — PROGRESS NOTES
Dr. IVETH Blackwell    [facility] INTENSIVE CARE        Patient ID:  Name:  Shani Phillip  MRN:  [id number]  1950  74 y.o.  female            CC/Reason for visit: Left femoral fracture status post orthopedic nailing, hypoxic respiratory failure, acute encephalopathy    Interval hx: Patient's condition is worse.  Now developed acute encephalopathy.  Confused and not understanding my commands.  Garbled speech  Had approximately 500 cc of urine last night  On Airvo high flow oxygen, FiO2 60%, flow 40 L.  Oxygenation is gradually improving  Cannot give me any coherent history, confused    ROS: Confused    I reviewed old medical records.  Past medical history, social history and family history: Unchanged from admission H&P.      Vitals:  Vitals:    07/07/25 0648 07/07/25 0649 07/07/25 0653 07/07/25 0655   BP:       Pulse: 101 100 105 91   Resp: 20      Temp:       TempSrc:       SpO2: 97% 98% 96% 98%   Weight:       Height:               Body mass index is 28.08 kg/m².    Intake/Output Summary (Last 24 hours) at 7/7/2025 0821  Last data filed at 7/7/2025 0600  Gross per 24 hour   Intake 700 ml   Output 835 ml   Net -135 ml       Exam:  GEN:  No distress  Alert, oriented only to self, confused falls asleep if not verbally or tactile stimulated  LUNGS: Distant and diminished  breath sounds bilat, no use of accessory muscles  CV:  Normal S1S2, without murmur, some ankle edema  ABD:  Non tender, no enlarged liver or masses      Scheduled meds:  arformoterol, 15 mcg, Nebulization, BID - RT  budesonide, 0.5 mg, Nebulization, BID - RT  buPROPion XL, 150 mg, Oral, Daily  cefTRIAXone, 1,000 mg, Intravenous, Q24H  digoxin, 125 mcg, Oral, Every Other Day  famotidine, 20 mg, Oral, BID AC  insulin lispro, 2-7 Units, Subcutaneous, 4x Daily AC & at Bedtime  ipratropium-albuterol, 3 mL, Nebulization, 4x Daily - RT  levothyroxine, 50 mcg, Oral, Q AM  melatonin, 2.5 mg, Oral, Nightly  metoprolol tartrate, 12.5 mg, Oral,  Q12H  pramipexole, 0.5 mg, Oral, BID  rosuvastatin, 10 mg, Oral, Nightly  warfarin, 0.5 mg, Oral, Daily      IV meds:                      Pharmacy to dose warfarin,         Data Review:   I reviewed the patient's medications and new clinical results.          Lab Results   Component Value Date    CALCIUM 8.4 (L) 07/06/2025    PHOS 5.0 (H) 07/06/2025    MG 2.5 (H) 07/06/2025    MG 2.0 06/14/2025    MG 1.9 06/13/2025     Results from last 7 days   Lab Units 07/07/25  0756 07/06/25  0817 07/06/25  0400 07/05/25  1612 07/05/25  0324 07/04/25  1819 07/04/25  0336 07/03/25  2247 07/03/25  1628   SODIUM mmol/L  --  137  --   --  135*  --  134*  --  133*   POTASSIUM mmol/L  --  4.9  --   --  5.4*  --  5.1  --  4.5   CHLORIDE mmol/L  --  103  --   --  100  --  97*  --  93*   CO2 mmol/L  --  21.0*  --   --  20.8*  --  23.4  --  28.6   BUN mg/dL  --  49.0*  --   --  41.0*  --  28.0*  --  24.0*   CREATININE mg/dL  --  2.74*  --   --  3.32*  --  2.27*  --  1.78*   CALCIUM mg/dL  --  8.4*  --   --  8.2*  --  9.6  --  9.5   BILIRUBIN mg/dL  --  0.3  --   --  0.4  --   --   --  0.6   ALK PHOS U/L  --  96  --   --  131*  --   --   --  137*   ALT (SGPT) U/L  --  11  --   --  19  --   --   --  12   AST (SGOT) U/L  --  31  --   --  28  --   --   --  16   GLUCOSE mg/dL  --  81  --   --  157*  --  115*  --  121*   WBC 10*3/mm3 9.33  --  12.51*  --  20.40*  --  23.74*  --  10.95*   HEMOGLOBIN g/dL 10.0*  --  10.0*  --  11.8*  --  13.0  --  12.9   PLATELETS 10*3/mm3 196  --  192  --  340  --  352  --  319   INR   --   --  2.09*  --  1.81* 1.72* 1.83*   < > 1.71*   PROBNP pg/mL  --  16,731.0*  --   --   --   --   --   --   --    PROCALCITONIN ng/mL  --   --   --  1.51*  --   --   --   --   --     < > = values in this interval not displayed.         Results from last 7 days   Lab Units 07/04/25  1648 07/04/25  1435   HSTROP T ng/L 69* 73*     Results from last 7 days   Lab Units 07/04/25  0400   PH, ARTERIAL pH units 7.337*   PCO2, ARTERIAL  mm Hg 58.2*   PO2 ART mm Hg 56.1*   O2 SATURATION ART % 85.9*   FLOW RATE lpm 5.0000   MODALITY  Cannula            ASSESSMENT:   Hypovolemic Shock   Acute exacerbation of COPD  Pneumonia  Acute on chronic hypercapnic and hypoxic respiratory failure (patient of Dr. Zamora in our office  Fall causing closed left hip fracture  Shock requiring IV pressors  Acquired hypothyroidism  Stage 3b chronic kidney disease   Chronic combined systolic and diastolic CHF (congestive heart failure)   Acute respiratory failure with hypoxia  Oliguria  Acute kidney injury  Atrial fibrillation      PLAN:  Unfortunately patient's condition is a little worse.  Now encephalopathic, confused.  Explained to  that this could be ICU delirium combined with recent surgery, anesthesia and severity of illness.  Still requiring high flow oxygen.  Blood pressure has improved, off of pressors.  Likely had hypovolemic shock.  She also had significant discrepancy between invasive arterial line blood pressure and noninvasive cuff blood pressure, sometimes 40 point millimeter mercury difference.  Likely was just hypovolemic shock.  Volume status seems adequate.  Anemia has been corrected.  Urine output is low but still above 350 cc over the last 12 hours which is somewhat favorable.  Hopefully regains full renal function back to her previous underlying chronic disease status.  Nephrology following.  Completing Rocephin for 5 days for probable pneumonia.  Elevated procalcitonin.  Pharmacy dosing warfarin for chronic atrial fibrillation.  INR is adequate.  Try to work with physical therapy later if she is less confused.    This patient has several chronic medical conditions, and now several acute medical illnesses.  Extensive amount of data was reviewed.  Images were directly visualized by me.  This patient warrants high complexity medical decision-making.    Not appropriate for transfer to telemetry at this time    I reviewed the chart and other  providers notes and reviewed labs.  Copied text in this note has been reviewed and is accurate as of today      Tristan Blackwell MD  7/7/2025

## 2025-07-07 NOTE — PLAN OF CARE
Goal Outcome Evaluation:  Plan of Care Reviewed With: patient, spouse           Outcome Evaluation: Poor toleration of treatment today, pt very fearful and guarded, pt assisted to sitting edge of bed with max a x 2,  RN also in room assisting and working on calming pt down. Pt fearful, crying out, resisting any movement. Attempted standing but pt not able to stand. PT will cont to follow, RN working with pt on pain control. Per spouse pt was a limited household ambulator with a walker prior to admission, used a wheelchair when out of the house    Anticipated Discharge Disposition (PT): skilled nursing facility

## 2025-07-07 NOTE — PROGRESS NOTES
[facility] Clinical Pharmacy Services: Warfarin Dosing/Monitoring Consult    Shani Phillip is a 74 y.o. female, estimated creatinine clearance is 21 mL/min (A) (by C-G formula based on SCr of 2.03 mg/dL (H)). weighing 67.4 kg (148 lb 9.4 oz).    Results from last 7 days   Lab Units 07/07/25  0756 07/06/25  0400 07/05/25  0324 07/04/25  1819 07/04/25  0336 07/03/25  2247 07/03/25  1628   INR  2.06* 2.09* 1.81* 1.72* 1.83*   < > 1.71*   HEMOGLOBIN g/dL 10.0* 10.0* 11.8*  --  13.0  --  12.9   HEMATOCRIT % 29.8* 31.1* 36.5  --  39.5  --  37.7   PLATELETS 10*3/mm3 196 192 340  --  352  --  319    < > = values in this interval not displayed.     Prior to admission anticoagulation: Current patient at [facility]. Appears to have been taking warfarin 2.5 mg PO daily until admission 6/10-6/14. Patient has needed frequent dose adjustments and doses held since then per [facility] notes at least in part due to DDIs with steroids and antibiotics prescribed at discharge.     Hospital Anticoagulation:  Consulting provider: Tristan Blackwell MD   Start date: prior to admission on 7/3; restarting 7/6   Indication: A Fib - requiring full anticoagulation  Target INR: 2 - 3  Expected duration: indefinite    Bridge Therapy: No      Potential food or drug interactions:   May enhance the anticoagulant effect of warfarin:   Azithromycin (stopped)  Ceftriaxone   Methylprednisolone (stopped 7/5)    Diet Order   Procedures    Diet: Liquid; Clear Liquid; Fluid Consistency: Thin (IDDSI 0)     Education complete?/Date: N/A; home medication    Assessment/Plan:  INR therapeutic after 0.5mg dose, requiring minimal warfarin 2/2 acute illness + reduced dietary intake + above mentioned DDIs, historically sensitive to warfarin.   Will give 1mg once today 2/2 2 DDIs no longer active.   RN/provider to monitor for any signs or symptoms of bleeding.   Follow up daily INRs and dose adjustments. INR ordered daily with AM labs while  inpatient.    Pharmacy will continue to follow until discharge or discontinuation of warfarin.     Esperanza Richards, PharmD  Clinical Pharmacist

## 2025-07-07 NOTE — CONSULTS
Patient Name: Shani Phillip  YOB: 1950  MRN: [id number]  Admission date: 7/3/2025    Reason for Encounter: Cortrak Consult  and Tube Feed Consult    TF's to begin with IsoSource 1.5 at 10ml/hr and advance to goal of 55ml/hr  Water flushes 10ml/hr  RD to follow    [facility] Clinical Nutrition Assessment     Subjective    Subjective Information     This is a 73 yo female with hx of CKD, heart failure, afib, mitral valve stenosis, COPD who sustained a fall and is now left hip intertrochanteric femur fx. Per  pt has a hx of Lap Band.      Assessment    H&P and Current Problems      H&P  Past Medical History:   Diagnosis Date    Acute endocarditis     Atrial fibrillation with RVR 11/14/2019    Cancer     CKD (chronic kidney disease) stage 3, GFR 30-59 ml/min     COPD (chronic obstructive pulmonary disease)     CTS (carpal tunnel syndrome)     Dizziness     Gall stones     Influenza 11/14/2019    Medicare annual wellness visit, subsequent 04/12/2021    Migraine     Osteoporosis     Renal disorder     Restless leg syndrome     Thrush, oral 11/14/2019      Past Surgical History:   Procedure Laterality Date    CARDIAC CATHETERIZATION N/A 1/23/2020    Procedure: Coronary angiography;  Surgeon: Svetlana Grayson MD;  Location:  [facility] CATH INVASIVE LOCATION;  Service: Cardiovascular    CARDIAC CATHETERIZATION N/A 1/23/2020    Procedure: Left ventriculography;  Surgeon: Svetlana Grayson MD;  Location[facility];  Service: Cardiovascular    CARDIAC CATHETERIZATION N/A 1/23/2020    Procedure: Left Heart Cath;  Surgeon: Svetlana Grayson MD;  Location: [facility];  Service: Cardiovascular    CHOLECYSTECTOMY      CYSTOSCOPY W/ URETERAL STENT PLACEMENT Left 6/10/2025    Procedure: CYSTOSCOPY URETERAL LEFT STENT PLACEMENT;  Surgeon: Stefano Mahoney MD;  Location[facility];  Service: Urology;  Laterality: Left;    FEMUR IM NAILING/RODDING Left 7/5/2025    Procedure:  "LEFT FEMUR INTRAMEDULLARY NAILING/RODDING WITH HANA;  Surgeon: Peter Schofield MD;  Location: [facility] OR;  Service: Orthopedics;  Laterality: Left;    KNEE ARTHROPLASTY Right     LAPAROSCOPIC GASTRIC BANDING        Current Problems   Admission Diagnosis:  Closed left hip fracture [S72.002A]  Closed displaced intertrochanteric fracture of left femur, initial encounter [S72.142A]    Problem List:    Closed left hip fracture    Depression    Acquired hypothyroidism    Stage 3b chronic kidney disease    Chronic combined systolic and diastolic CHF (congestive heart failure)    Acute respiratory failure with hypoxia      Other Applicable Nutrition Information:   Pt with hx of lap band      Anthropometrics      BMI, Height, Weight Estimated body mass index is 28.08 kg/m² as calculated from the following:    Height as of this encounter: 154.9 cm (61\").    Weight as of this encounter: 67.4 kg (148 lb 9.4 oz).    Weight Method: Bed scale       Trending Weight Changes stableNo significant changes       Weight History  Wt Readings from Last 10 Encounters:   07/05/25 67.4 kg (148 lb 9.4 oz)   06/14/25 69.4 kg (153 lb 1.6 oz)   06/02/25 64 kg (141 lb)   03/28/25 60.8 kg (134 lb)   03/03/25 60.8 kg (134 lb)   02/24/25 68.6 kg (151 lb 3.8 oz)   10/07/24 66.7 kg (147 lb)   10/02/24 66.7 kg (147 lb)   09/25/24 68 kg (150 lb)   08/26/24 65.8 kg (145 lb)            Labs        Results from last 7 days   Lab Units 07/07/25  0756 07/06/25  0817 07/06/25  0400 07/05/25  0324   SODIUM mmol/L 140 137  --  135*   POTASSIUM mmol/L 4.5 4.9  --  5.4*   GLUCOSE mg/dL 82 81  --  157*   BUN mg/dL 35.0* 49.0*  --  41.0*   CREATININE mg/dL 2.03* 2.74*  --  3.32*   CALCIUM mg/dL 9.2 8.4*  --  8.2*   PHOSPHORUS mg/dL 2.9  --  5.0*  --    MAGNESIUM mg/dL 2.4 2.5*  --   --    ALBUMIN g/dL 2.9* 2.7*  --  2.8*   BILIRUBIN mg/dL 0.4 0.3  --  0.4   ALK PHOS U/L 103 96  --  131*   AST (SGOT) U/L 53* 31  --  28   ALT (SGPT) U/L 21 11  --  19 "   TRIGLYCERIDES mg/dL  --  131  --   --    PROBNP pg/mL  --  16,731.0*  --   --      Results from last 7 days   Lab Units 07/07/25  0756 07/06/25  0400 07/05/25  0324   PLATELETS 10*3/mm3 196 192 340   HEMOGLOBIN g/dL 10.0* 10.0* 11.8*   HEMATOCRIT % 29.8* 31.1* 36.5     Lab Results   Component Value Date    HGBA1C 5.80 (H) 06/12/2025      Results from last 7 days   Lab Units 07/07/25  0756   URIC ACID mg/dL 13.3*     Medications       Scheduled Medications arformoterol, 15 mcg, Nebulization, BID - RT  budesonide, 0.5 mg, Nebulization, BID - RT  buPROPion XL, 150 mg, Oral, Daily  [Held by provider] digoxin, 125 mcg, Oral, Every Other Day  famotidine, 20 mg, Oral, BID AC  insulin lispro, 2-7 Units, Subcutaneous, 4x Daily AC & at Bedtime  ipratropium-albuterol, 3 mL, Nebulization, 4x Daily - RT  levothyroxine, 50 mcg, Oral, Q AM  melatonin, 2.5 mg, Oral, Nightly  metoprolol tartrate, 12.5 mg, Oral, Q12H  pramipexole, 0.5 mg, Oral, BID  rosuvastatin, 10 mg, Oral, Nightly  warfarin, 1 mg, Oral, Once        Infusions Pharmacy to dose warfarin,         PRN Medications   acetaminophen **OR** acetaminophen **OR** acetaminophen    ammonium lactate    senna-docusate sodium **AND** polyethylene glycol **AND** bisacodyl **AND** bisacodyl    cyclobenzaprine    dextrose    dextrose    glucagon (human recombinant)    HYDROcodone-acetaminophen    HYDROcodone-acetaminophen    ipratropium-albuterol    Morphine    naloxone    ondansetron ODT **OR** ondansetron    Pharmacy to dose warfarin    [COMPLETED] Insert Peripheral IV **AND** sodium chloride     Physical Findings      Chewing/Swallowing    No issues identified at this time   Dentition Mouth/Teeth WDL: .WDL except, teeth, tongue   Teeth Symptoms: no dental appliances present   Edema   3+ (moderate)    Gastrointestinal hypoactive bowel sounds   Skin bruising, surgical incision: left hip, thigh    Lines/Drains Cortrak, NG tube   I/O reviewed      Nutrition Focused Physical Exam      NFPE Not indicated at this time        Malnutrition Severity Assessment              Estimated Needs      Estimated Requirements         Weight used  67.4    Calories  5805-0301 (25-30 kcal/kg)    Protein  80 (1.2 gm/kg)    Fluid   (1 mL/kcal)     Current Nutrition Orders & Evaluation of Intake      Oral Nutrition     Food Allergies  and Intolerances NKFA   Current PO Diet NPO Diet NPO Type: Tube Feeding   Oral Nutrition Supplement None     Trending % PO Intake NPO     Enteral Nutrition     Current EN Order Pending    EN Route  NG    EN Observation/Intake  pending         Assessment & Plan   Nutrition Diagnosis and Goals       Nutrition Diagnosis Problem: Inadequate Oral Intake  Etiology: Medical Diagnosis - hip fx   Signs/Symptoms: Report/Observation        Goal(s) Initiate EN , Tolerates EN at Goal , and No Significant Weight Loss      Nutrition Intervention and Prescription       Intervention  Start EN      Diet Prescription     Supplement Prescription     Education Provided       Enteral Prescription Initial Goal:  *initial goal conservative d/t risk of RFS    Isosource 1.5 at 10mL/hr + 10mL/hr water flush      End Goal:    Isosource 1.5 at 55 mL/hr + 10mL/hr water flush      Calories  1815 kcals (100%)    Protein  82 g (%)    Free water  920 mL   Flushes  220     The above end goal rate is for 22 hrs/day to assume interruptions for ADLs. Water flushes adjusted based on clinical picture + Rx flushes/IV fluids        TPN Prescription --     Monitoring/Evaluation       Monitor/Evaluation Per Protocol, I&O, Pertinent Labs, EN Delivery/Tolerance, Weight, Skin Status, and Symptoms      Electronically signed by:  Radha Jaramillo RD  07/07/25 15:34 EDT

## 2025-07-07 NOTE — PLAN OF CARE
Goal Outcome Evaluation:   Ct scan complete. Pt did have a new arterial line placed. While turning in bed pt did have an increase WOB, had audible wheezing, and desaturated to 84%. FiO2 increased to 75% temporarily     Pt's  vocalized concern with her mental state.

## 2025-07-07 NOTE — CASE MANAGEMENT/SOCIAL WORK
Case Management Readmission Assessment Note    Case Management Readmission Assessment (all recorded)       Readmission Interview       Row Name 07/07/25 1444             Readmission Indications    Is the patient and/or family able to complete the readmission assessment questions? Yes      Is this hospitalization related to the prior hospital diagnosis? No        Row Name 07/07/25 1444             Recommendation for rehospitalization    Did you speak with your physician prior to coming to the hospital No        [facility] Name 07/07/25 1444             Follow-up Appointments    Do you have a PCP? Yes      Did you have an appointment with PCP after your hospitalization? Yes      Did you go to appointment? Yes      Are you current with the Pulmonary Clinic? No      Are you current with the CHF Clinic? No        [facility] Name 07/07/25 1444             Medications    Did you have newly prescribed medications at discharge? Yes      Did you understand the reasons for your medications at discharge and how to take them? Yes      Did you understand the side effects of your medications? Yes      Are you taking all of you prescribed medications? Yes      What pharmacy was used to fill prescription(s)? [facility]      Were medications picked up? Yes        [facility] Name 07/07/25 1444             Discharge Instructions    Did you understand your discharge instructions? Yes      Did your family/caregiver hear your instructions? Yes      Were you given a number of someone to call if you had questions or concerns? Yes        [facility] Name 07/07/25 1444             Index discharge location/services    Where did you go upon discharge? Home      Do you have supportive family or friends in the home? Yes        [facility] Name 07/07/25 1444             Discharge Readiness    On a scale of 1-5 (5 being well prepared), how ready were you for discharge 4      Recommendation based on interview Education on diagnosis/self management;Goals of care discussion/advanced care  planning        Row Name 07/07/25 1444             Palliative Care/Hospice    Are you current with Palliative Care? No        Row Name 07/03/25 9721             Advance Directives (For Healthcare)    Pre-existing AND/MOST/POLST Order Other (Comment)  Living Will copy in chart      Advance Directive Status Patient has advance directive, copy in chart      Type of Advance Directive Health care directive/Living will      Have you reviewed your Advance Directive and is it valid for this stay? Not applicable      Literature Provided on Advance Directives No      Patient Requests Assistance on Advance Directives Patient Declined

## 2025-07-07 NOTE — PROCEDURES
Ultrasound Guided Arterial Catheter Insertion Procedure Note      Indications:  Hemodynamic Monitoring    Procedure Details:    Informed consent was obtained for the procedure, including sedation.  Risks and benefits were discussed including possible vascular compromise resulting in limb ischemia.    Maximum sterile technique was used including usual patient drapes, antiseptics and physician sterile garments.    Under sterile conditions the left Radial site was prepped with chlorhexidine and covered with a sterile drape. A ultrasound probe with sterile sheath was used to localize the target artery. It was clearly visualized. Local anesthesia was applied to the skin and subcutaneous tissues with lidocaine 1%. An  needle was then inserted into the artery under ultrasound guidance. A guide wire was then threaded into the Radial artery over the guide wire. The catheter was sutured into place and dressed following sterile protocol with Biopatch placed. Confirmation of position by waveform analysis on the monitor.    Complications:  None.    Electronically signed by GENET Sparks, 07/06/25, 10:25 PM EDT.

## 2025-07-07 NOTE — NURSING NOTE
75 y/o female admitted to the unit after tripping over her flip flop at home sustaining a Left hip fracture. She was admitted to ortho became SOA and C/O of CP, transferred to ICU. OR was postponed to allow for optimization. She was able to undergo an IM Nailing 7/5. She is being followed by [facility], Pulm, Cardio, Nephr, and Ortho. PT has been by to see her. She was able to do some bed exercises, and side of bed but unable to ambulate at this time. Pt will likely need SNU upon D/C. Awaiting CCP to see. Will follow along with patients careplan.

## 2025-07-07 NOTE — PLAN OF CARE
"Goal Outcome Evaluation:  Plan of Care Reviewed With: patient, spouse        Progress: declining    Pt remains on Airvow (trying to wean down, but unchanged from nightshift). Pt more tense, hollering out in pain, and intermittently following commands. Encephalopathy worse compared to yesterday, but able to move all extremities. Pt able to voice being in pain and pointing to LLE (IV medication given). PT worked with pt and pt noted to be hesitant to participate/tense up. Pt noted not safe to take PO with noon check and able to arouse to voice, recognize , but answering \"I don't know\" to questions. With repositioning pt noted to grab at staff saying \"wait a minute,\" but intermittently following commands. Concern for hypercapnia and Dr. Blackwell notified and STAT ABG and chest x-ray ordered. Dr. Blackwell kept pt on Airvow settings and IV dilaudid discontinued due to worsening renal function. IV morphine and IV Tylenol ordered. Cortrak inserted per RD for medication administration (Coumadin). Pt has lap band. Bilateral soft wrist restraints in place to prevent pulling out lines. Pt rest of the shift noted to yell out and family at bedside trying to soothe. Suppository given due to family report of LBM 7/3/2025. Pt currently resting and  educated on trying to minimize stimulation. Ongoing care provided.                                  "

## 2025-07-07 NOTE — THERAPY TREATMENT NOTE
Patient Name: Shani Phillip  : 1950    MRN: [id number]                              Today's Date: 2025       Admit Date: 7/3/2025    Visit Dx:     ICD-10-CM ICD-9-CM   1. Closed displaced intertrochanteric fracture of left femur, initial encounter  S72.142A 820.21     Patient Active Problem List   Diagnosis    Influenza    Thrush, oral    COPD (chronic obstructive pulmonary disease)    Atrial fibrillation with RVR    Chronic respiratory failure with hypoxia    Endocarditis of mitral valve    Mitral valve vegetation    Chronic diastolic CHF (congestive heart failure)    Atrial fibrillation, chronic    Bilateral lower extremity edema    Intermittent lightheadedness    Depression    Chronic midline low back pain with left-sided sciatica    Renal stone    Oxygen dependent    Medicare annual wellness visit, subsequent    Osteoporosis    Chronic anticoagulation    Nonrheumatic mitral valve stenosis    Chest pain, unspecified type    Chronic heart failure with preserved ejection fraction (HFpEF)    Overweight with body mass index (BMI) 25.0-29.9    Acquired hypothyroidism    Acute on chronic combined systolic and diastolic CHF (congestive heart failure)    Age-related macular degeneration    Restless legs syndrome    Horseshoe kidney with renal calculus    JAIME (acute kidney injury)    Stage 3b chronic kidney disease    Chronic combined systolic and diastolic CHF (congestive heart failure)    Acute on chronic combined systolic and diastolic CHF (congestive heart failure)    Acute pyelonephritis    Closed left hip fracture    Acute respiratory failure with hypoxia     Past Medical History:   Diagnosis Date    Acute endocarditis     Atrial fibrillation with RVR 2019    Cancer     CKD (chronic kidney disease) stage 3, GFR 30-59 ml/min     COPD (chronic obstructive pulmonary disease)     CTS (carpal tunnel syndrome)     Dizziness     Gall stones     Influenza 2019    Medicare annual wellness visit,  subsequent 04/12/2021    Migraine     Osteoporosis     Renal disorder     Restless leg syndrome     Thrush, oral 11/14/2019     Past Surgical History:   Procedure Laterality Date    CARDIAC CATHETERIZATION N/A 1/23/2020    Procedure: Coronary angiography;  Surgeon: Svetlana Grayson MD;  Location[facility] CATH [facility]VASIVE LOCATION;  Service: Cardiovascular    CARDIAC CATHETERIZATION N/A 1/23/2020    Procedure: Left ventriculography;  Surgeon: Svetlana Grayson MD;  Location: [facility] CATH [facility]VASIVE LOCATION;  Service: Cardiovascular    CARDIAC CATHETERIZATION N/A 1/23/2020    Procedure: Left Heart Cath;  Surgeon: Svetlana Grayson MD;  Location: [facility] CATH INVASIVE LOCATION;  Service: Cardiovascular    CHOLECYSTECTOMY      CYSTOSCOPY W/ URETERAL STENT PLACEMENT Left 6/10/2025    Procedure: CYSTOSCOPY URETERAL LEFT STENT PLACEMENT;  Surgeon: Stefano Mahoney MD;  Location: [facility] MAIN OR;  Service: Urology;  Laterality: Left;    FEMUR IM NAILING/RODDING Left 7/5/2025    Procedure: LEFT FEMUR INTRAMEDULLARY NAILING/RODDING WITH HANA;  Surgeon: Peter Schofield MD;  Location: [facility] OR;  Service: Orthopedics;  Laterality: Left;    KNEE ARTHROPLASTY Right     LAPAROSCOPIC GASTRIC BANDING        General Information       Row Name 07/07/25 1042          Physical Therapy Time and Intention    Document Type therapy note (daily note)  -PC     Mode of Treatment physical therapy  -PC       Row Name 07/07/25 1042          General Information    Existing Precautions/Restrictions fall;oxygen therapy device and L/min  -PC       Row Name 07/07/25 1042          Cognition    Orientation Status (Cognition) oriented to;person;unable/difficult to assess  pt confused and moaning, crying out with any movement  -PC               User Key  (r) = Recorded By, (t) = Taken By, (c) = Cosigned By      Initials Name Provider Type    PC Rukhsana Weaver PT Physical Therapist                   Mobility       Row Name 07/07/25 1043          Bed  Mobility    Supine-Sit [place] (Bed Mobility) maximum assist (25% patient effort);2 person assist  -PC     Sit-Supine [place] (Bed Mobility) dependent (less than 25% patient effort);2 person assist  -PC     Comment, (Bed Mobility) pt fearful, guarded, crying out with movement, holding legs rigidly in a flexed position  -PC       Row Name 07/07/25 1043          Sit-Stand Transfer    Sit-Stand [place] (Transfers) maximum assist (25% patient effort);2 person assist  -PC     Assistive Device (Sit-Stand Transfers) --  HHA  -PC     Comment, (Sit-Stand Transfer) attempted sit to stand with HHA x 2, pt unable to come to full stand, knees and hips flexed  -PC               User Key  (r) = Recorded By, (t) = Taken By, (c) = Cosigned By      Initials Name Provider Type    PC Rukhsana Weaver, PT Physical Therapist                   Obj/Interventions       Row Name 07/07/25 1044          Motor Skills    Therapeutic Exercise --  attempted AP, gentle ROM B LEs, holding both legs in hip and knee flexion, difficult to break  -PC       Row Name 07/07/25 1044          Balance    Static Sitting Balance minimal assist  -PC     Dynamic Sitting Balance minimal assist  -PC     Position, Sitting Balance sitting edge of bed  -PC               User Key  (r) = Recorded By, (t) = Taken By, (c) = Cosigned By      Initials Name Provider Type    PC Rukhsana Weaver, PT Physical Therapist                   Goals/Plan    No documentation.                  Clinical Impression       Row Name 07/07/25 1045          Pain    Pain Location extremity  -PC     Pain Side/Orientation left  -PC     Response to Pain Interventions activity participation with increased pain  -PC     Pre/Posttreatment Pain Comment pt crying out with any movement, holding legs in a guarded position, unable to rate pain numerically, confused  -PC       Row Name 07/07/25 1045          Plan of Care Review    Plan of Care Reviewed With patient;spouse  -PC     Outcome  Evaluation Poor toleration of treatment today, pt very fearful and guarded, pt assisted to sitting edge of bed with max a x 2,  RN also in room assisting and working on calming pt down. Pt fearful, crying out, resisting any movement. Attempted standing but pt not able to stand. PT will cont to follow, RN working with pt on pain control. Per spouse pt was a limited household ambulator with a walker prior to admission, used a wheelchair when out of the house  -       Row Name 07/07/25 1045          Positioning and Restraints    Pre-Treatment Position in bed  -PC     Post Treatment Position bed  -PC     In Bed supine;call light within reach;encouraged to call for assist;exit alarm on;with nsg  -PC               User Key  (r) = Recorded By, (t) = Taken By, (c) = Cosigned By      Initials Name Provider Type    PC Rukhsana Weaver, PT Physical Therapist                   Outcome Measures       Row Name 07/07/25 1049 07/07/25 0824       How much help from another person do you currently need...    Turning from your back to your side while in flat bed without using bedrails? 1  -PC 2  -DS    Moving from lying on back to sitting on the side of a flat bed without bedrails? 1  -PC 2  -DS    Moving to and from a bed to a chair (including a wheelchair)? 1  -PC 1  -DS    Standing up from a chair using your arms (e.g., wheelchair, bedside chair)? 1  -PC 1  -DS    Climbing 3-5 steps with a railing? 1  -PC 1  -DS    To walk in hospital room? 1  -PC 1  -DS    AM-PAC 6 Clicks Score (PT) 6  -PC 8  -DS              User Key  (r) = Recorded By, (t) = Taken By, (c) = Cosigned By      Initials Name Provider Type    Rukhsana Summers, PT Physical Therapist    Destiny Ervin RN Registered Nurse                                 Physical Therapy Education       Title: PT OT SLP Therapies (In Progress)       Topic: Physical Therapy (In Progress)       Point: Mobility training (In Progress)       Learning Progress Summary            Patient  Acceptance, E,D, NL by PC at 7/7/2025 1050    Acceptance, E,TB, VU by VW at 7/6/2025 2221    Acceptance, E, NR by EM at 7/6/2025 1354                      Point: Home exercise program (In Progress)       Learning Progress Summary            Patient Acceptance, E,D, NL by PC at 7/7/2025 1050    Acceptance, E,TB, VU by VW at 7/6/2025 2221    Acceptance, E, NR by EM at 7/6/2025 1354                      Point: Body mechanics (In Progress)       Learning Progress Summary            Patient Acceptance, E,D, NL by PC at 7/7/2025 1050    Acceptance, E,TB, VU by VW at 7/6/2025 2221                      Point: Precautions (In Progress)       Learning Progress Summary            Patient Acceptance, E,D, NL by PC at 7/7/2025 1050    Acceptance, E,TB, VU by VW at 7/6/2025 2221                                      User Key       Initials Effective Dates Name Provider Type Discipline    PC 06/16/21 -  Rukhsana Weaver, PT Physical Therapist PT    EM 06/16/21 -  Kitty Melgoza, PT Physical Therapist PT    VW 02/21/25 -  Felicia Sarabia RN Registered Nurse Nurse                  PT Recommendation and Plan     Outcome Evaluation: Poor toleration of treatment today, pt very fearful and guarded, pt assisted to sitting edge of bed with max a x 2,  RN also in room assisting and working on calming pt down. Pt fearful, crying out, resisting any movement. Attempted standing but pt not able to stand. PT will cont to follow, RN working with pt on pain control. Per spouse pt was a limited household ambulator with a walker prior to admission, used a wheelchair when out of the house     Time Calculation:         PT Charges       Row Name 07/07/25 1050             Time Calculation    Start Time 0936  -PC      Stop Time 1000  -PC      Time Calculation (min) 24 min  -PC      PT Received On 07/07/25  -PC      PT - Next Appointment 07/08/25  -PC                User Key  (r) = Recorded By, (t) = Taken By, (c) = Cosigned By      Initials Name  Provider Type    PC Rukhsana Weaver, PT Physical Therapist                  Therapy Charges for Today       Code Description Service Date Service Provider Modifiers Qty    [id number] HC PT THER PROC EA 15 MIN 7/7/2025 Rukhsana Weaver, PT GP 2            PT G-Codes  AM-PAC 6 Clicks Score (PT): 6  PT Discharge Summary  Anticipated Discharge Disposition (PT): skilled nursing facility    Rukhsana Weaver, PT  7/7/2025

## 2025-07-08 NOTE — PLAN OF CARE
Goal Outcome Evaluation:  Plan of Care Reviewed With: patient, spouse        Progress: declining  Outcome Evaluation: Poor toleration of mobility today, pt moaning and crying out with any movement, worse with B LEs, attempted gentle AAROM/PROM with encouragement and reassurance, pt resistive and crying out, unable to attempt edge of bed activity today    Anticipated Discharge Disposition (PT): skilled nursing facility

## 2025-07-08 NOTE — PLAN OF CARE
"Goal Outcome Evaluation:  Plan of Care Reviewed With: patient, spouse, child        Progress: no change     Pt remains in ICU with Airvow (higher settings). Low dose IV Precedex infusing to aid with delirium. Lyrica also initiated today, along with increased dose of Wellbutrin to aid with delirium. PRN Norco given twice for PT and wound care intervention. Pt will moan out and not follow commands, but can tell staff \"wait a minute\" during repositioning. Bilateral soft wrist restraints in place to prevent pulling out lines. Metoprolol held this AM due to needing to start Levophed to maintain MAP above 65 (A-line in place for BP monitoring)-Cardiology aware. BM x1 this shift. Voiding in Diaz Catheter and in place for I&O's. Anti-biotics extend due to pneumonia concern/possible UTI concern (pending UA results). Family at bedside. Ongoing care provided.                              "

## 2025-07-08 NOTE — PROGRESS NOTES
[facility] Clinical Pharmacy Services: Warfarin Dosing/Monitoring Consult    Shani Phillip is a 74 y.o. female, estimated creatinine clearance is 24.5 mL/min (A) (by C-G formula based on SCr of 1.74 mg/dL (H)). weighing 67.4 kg (148 lb 9.4 oz).    Results from last 7 days   Lab Units 07/08/25  0344 07/07/25  0756 07/06/25  0400 07/05/25  0324 07/04/25  1819 07/04/25  0336   INR  2.13* 2.06* 2.09* 1.81* 1.72* 1.83*   HEMOGLOBIN g/dL 9.8* 10.0* 10.0* 11.8*  --  13.0   HEMATOCRIT % 29.9* 29.8* 31.1* 36.5  --  39.5   PLATELETS 10*3/mm3 194 196 192 340  --  352     Prior to admission anticoagulation: Current patient at [facility]. Appears to have been taking warfarin 2.5 mg PO daily until admission 6/10-6/14. Patient has needed frequent dose adjustments and doses held since then per [facility] notes at least in part due to DDIs with steroids and antibiotics prescribed at discharge.     Hospital Anticoagulation:  Consulting provider: Tristan Blackwell MD   Start date: prior to admission on 7/3; restarting 7/6   Indication: A Fib - requiring full anticoagulation  Target INR: 2 - 3  Expected duration: indefinite    Bridge Therapy: No      Potential food or drug interactions:   May enhance the anticoagulant effect of warfarin:   Azithromycin (stopped)  Ceftriaxone   Methylprednisolone (stopped 7/5)    Diet Order   Procedures    NPO Diet NPO Type: Tube Feeding     Education complete?/Date: N/A; home medication    Assessment/Plan:   Will continue 1mg daily.   RN/provider to monitor for any signs or symptoms of bleeding.   Follow up daily INRs and dose adjustments. INR ordered daily with AM labs while inpatient.    Pharmacy will continue to follow until discharge or discontinuation of warfarin.     Esperanza Richards, PharmD  Clinical Pharmacist

## 2025-07-08 NOTE — PROGRESS NOTES
Hospital Follow Up    LOS:  LOS: 5 days   Patient Name: Shani Phillip  Age/Sex: 74 y.o. female  : 1950  MRN: [id number]    Day of Service: 25   Length of Stay: 5  Encounter Provider: Trev Walter MD  Place of Service: [facility] CARDIOLOGY  Patient Care Team:  Rodríguez Walker MD as PCP - General (Internal Medicine)  Mike Atkins MD as Surgeon (General Surgery)  Hayes Briones MD as Surgeon (Cardiothoracic Surgery)  Zenia Tinajero MD as Consulting Physician (Cardiology)  Clover Zamora MD as Consulting Physician (Pulmonary Disease)  Angy Smith APRN as Nurse Practitioner (Cardiology)  Lester Garcia RN as Ambulatory  (Population Health)    Subjective:     Chief Complaint: Hip fracture    Interval History: Drowsy on exam, needed to be restarted on levo drip due to low BP.  Remains in A-fib with rate 80-90s on telemetry, beta-blocker being held for hypotension.      Objective:     Objective:  Temp:  [97.9 °F (36.6 °C)-98 °F (36.7 °C)] 97.9 °F (36.6 °C)  Heart Rate:  [] 82  Resp:  [17-24] 24  BP: ()/(38-66) 104/66  Arterial Line BP: ()/(36-67) 109/48     Intake/Output Summary (Last 24 hours) at 2025 1006  Last data filed at 2025 0900  Gross per 24 hour   Intake 910.03 ml   Output 675 ml   Net 235.03 ml     Body mass index is 28.08 kg/m².      25  0600 25  0509 25  0530   Weight: 62.3 kg (137 lb 5.6 oz) 67.4 kg (148 lb 9.4 oz) 67.4 kg (148 lb 9.4 oz)     Weight change:       Physical Exam:   General : Drowsy.  Frail and chronic ill-appearing.  Neuro: Alert,cooperative and oriented.  Lungs:, Normal work of breathing, on high flow  CV: Irregular rhythm, normal rate, normal S1 and S2, no murmurs, gallops or rubs.  ABD: Soft, nontender, nondistended. Positive bowel sounds.  Extr: No edema or cyanosis, moves all extremities.  Some desquamation and cyanosis on lower EXTR    Lab Review:   Results from  last 7 days   Lab Units 07/08/25  0344 07/07/25  0756   SODIUM mmol/L 140 140   POTASSIUM mmol/L 4.4 4.5   CHLORIDE mmol/L 107 104   CO2 mmol/L 21.5* 23.7   BUN mg/dL 44.0* 35.0*   CREATININE mg/dL 1.74* 2.03*   GLUCOSE mg/dL 88 82   CALCIUM mg/dL 8.6 9.2   AST (SGOT) U/L 42* 53*   ALT (SGPT) U/L 17 21     Results from last 7 days   Lab Units 07/04/25  1648 07/04/25  1435   HSTROP T ng/L 69* 73*     Results from last 7 days   Lab Units 07/08/25  0344 07/07/25  0756   WBC 10*3/mm3 8.23 9.33   HEMOGLOBIN g/dL 9.8* 10.0*   HEMATOCRIT % 29.9* 29.8*   PLATELETS 10*3/mm3 194 196     Results from last 7 days   Lab Units 07/08/25  0344 07/07/25  0756   INR  2.13* 2.06*     Results from last 7 days   Lab Units 07/07/25  0756 07/06/25  0817   MAGNESIUM mg/dL 2.4 2.5*     Results from last 7 days   Lab Units 07/06/25  0817   CHOLESTEROL mg/dL 115   TRIGLYCERIDES mg/dL 131   HDL CHOL mg/dL 35*     Results from last 7 days   Lab Units 07/06/25  0817   PROBNP pg/mL 16,731.0*         I reviewed the patient's new clinical results.  I personally viewed and interpreted the patient's EKG  Current Medications:   Scheduled Meds:arformoterol, 15 mcg, Nebulization, BID - RT  budesonide, 0.5 mg, Nebulization, BID - RT  buPROPion, 150 mg, Nasogastric, Q12H  [Held by provider] digoxin, 125 mcg, Oral, Every Other Day  [START ON 7/9/2025] famotidine, 20 mg, Nasogastric, Daily  insulin regular, 2-7 Units, Subcutaneous, Q6H  ipratropium-albuterol, 3 mL, Nebulization, 4x Daily - RT  [START ON 7/9/2025] levothyroxine, 50 mcg, Nasogastric, Q AM  melatonin, 2.5 mg, Nasogastric, Nightly  metoprolol tartrate, 12.5 mg, Nasogastric, Q12H  pramipexole, 0.5 mg, Nasogastric, BID  pregabalin, 50 mg, Nasogastric, Q12H  rosuvastatin, 10 mg, Nasogastric, Nightly  warfarin, 1 mg, Nasogastric, Once      Continuous Infusions:dexmedetomidine, 0.2-1.5 mcg/kg/hr, Last Rate: Stopped (07/08/25 0215)  norepinephrine, 0.02-0.3 mcg/kg/min, Last Rate: 0.02 mcg/kg/min  (07/08/25 0857)  Pharmacy to dose warfarin,         Allergies:  Allergies   Allergen Reactions    Penicillins Rash     RASH 30 YRS AGO NO HOSPITALIZATION       Assessment:   Left femur fracture status post operative fixation  Atrial fibrillation with RVR  Chronic diastolic heart failure  Mild aortic valve stenosis  Moderate to severe mitral valve stenosis  JAIME on CKD  COPD    Plan:      - POD #3 of hip surgery (left intramedullary nailing of intertrochanteric hip fracture by Dr. Schofield)    -Still requires significant amount of oxygen support with high flow oxygen.    - Known history of atrial fibrillation, rate reasonably controlled in the 90-100s.patient was on digoxin 125 mcg every other day, which was held by pharmacy due to worsening renal function. Cr 1.7 this morning, slightly improved.    - Started on low-dose metoprolol 12.5 bid to help with rate control, being held due to hypotension.  [id number] score 5, she is on warfarin.    - Echo earlier this year 2/2025 showed LVEF greater than 70%, severe mitral annular calcification (MAC) with atypical appearing anterior leaflet echodensity as well as moderate to severe functional MS, also mild AS, moderate pulmonary hypertension.  She appears grossly compensated on exam, diuretic management per nephrology.    - Significant coronary calcification on CTA chest from 12/2019 upon independent image review.  Lipids within normal limits but given clinical profile and imaging evidence of extensive atherosclerosis, patient was started on Crestor 10 daily this admission.    Trev Walter MD  07/08/25  10:06 EDT

## 2025-07-08 NOTE — PROGRESS NOTES
Orthopaedic Progress Note     Confusion worsened today. Not following commands. Off pressors, on warfarin. Did poor during PT secondary to mentation.     Hgb: 10.0   Vitals: p 89, 116/60, 94% on 40 L     NAD  Able to arouse but confused   Respirations appear nonlabored on O2   LLE  Dressing clean and dry  Not following motor or sensation commands   Foot is wwp   Compartments are soft and compressible     74 year old frail female with CKD, heart failure, atrial fibrillation, baseline O2 use, UTI, suspected pneumonia with left intertrochanteric femur fracture   POD 2 left CMN   -WBAT LLE   -to chair if able  -pain control wean pain medication as able   -ice and elevation   -PT   -discussed with  that her situation remains tenuous and she is frail and critically ill. Discussed that her mentation has worsened with delirium   -On baseline AC   -please call/chat  with any questions or concerns.    Peter Schofield MD   [facility]   [phone] - [place] Office  [phone] - [place] Office

## 2025-07-08 NOTE — PROGRESS NOTES
Name: Shani Phillip ADMIT: 7/3/2025   : 1950  PCP: Rodríguez Walker MD    MRN: [id number] LOS: 5 days   AGE/SEX: 74 y.o. female  ROOM: [facility]     Subjective   Subjective   Patient seen at bedside, she appears more lethargic today.       Objective   Objective   Vital Signs  Temp:  [97.9 °F (36.6 °C)-98.1 °F (36.7 °C)] 98.1 °F (36.7 °C)  Heart Rate:  [] 116  Resp:  [17-24] 24  BP: ()/(38-66) 104/66  Arterial Line BP: ()/(36-67) 132/57  SpO2:  [89 %-99 %] 99 %  on  Flow (L/min) (Oxygen Therapy):  [40-60] 40;   Device (Oxygen Therapy): room air  Body mass index is 28.08 kg/m².  Physical Exam  General, slightly lethargic with  Head and ENT, normocephalic and atraumatic.  Lungs, symmetric expansion  Heart, regular rate and rhythm.  Abdomen, soft and nontender.  Extremities, no clubbing or cyanosis.  Neuro, unable to fully evaluate  Skin: Warm and no rash.  Psych, unable to fully evaluate  Musculoskeletal, joint examination is grossly normal.     Copied text material from yesterday's note has been reviewed for appropriate changes and remains accurate as of 25.         Results Review     I reviewed the patient's new clinical results.  Results from last 7 days   Lab Units 25  0344 25  0756 25  0400 25  0324   WBC 10*3/mm3 8.23 9.33 12.51* 20.40*   HEMOGLOBIN g/dL 9.8* 10.0* 10.0* 11.8*   PLATELETS 10*3/mm3 194 196 192 340     Results from last 7 days   Lab Units 25  0344 25  0756 25  0817 25  0324   SODIUM mmol/L 140 140 137 135*   POTASSIUM mmol/L 4.4 4.5 4.9 5.4*   CHLORIDE mmol/L 107 104 103 100   CO2 mmol/L 21.5* 23.7 21.0* 20.8*   BUN mg/dL 44.0* 35.0* 49.0* 41.0*   CREATININE mg/dL 1.74* 2.03* 2.74* 3.32*   GLUCOSE mg/dL 88 82 81 157*   EGFR mL/min/1.73 30.3* 25.2* 17.6* 13.9*     Results from last 7 days   Lab Units 25  0344 25  0756 25  0817 25  0324   ALBUMIN g/dL 2.5* 2.9* 2.7* 2.8*   BILIRUBIN mg/dL 0.5 0.4  0.3 0.4   ALK PHOS U/L 97 103 96 131*   AST (SGOT) U/L 42* 53* 31 28   ALT (SGPT) U/L 17 21 11 19     Results from last 7 days   Lab Units 07/08/25  0344 07/07/25  0756 07/06/25  0817 07/06/25  0400 07/05/25  0324   CALCIUM mg/dL 8.6 9.2 8.4*  --  8.2*   ALBUMIN g/dL 2.5* 2.9* 2.7*  --  2.8*   MAGNESIUM mg/dL  --  2.4 2.5*  --   --    PHOSPHORUS mg/dL  --  2.9  --  5.0*  --      Results from last 7 days   Lab Units 07/07/25  0756 07/05/25  1612   PROCALCITONIN ng/mL 0.67* 1.51*     Glucose   Date/Time Value Ref Range Status   07/08/2025 1740 156 (H) 70 - 130 mg/dL Final   07/08/2025 1245 119 70 - 130 mg/dL Final   07/08/2025 0624 101 70 - 130 mg/dL Final   07/07/2025 2341 77 70 - 130 mg/dL Final   07/07/2025 2026 76 70 - 130 mg/dL Final   07/07/2025 1723 79 70 - 130 mg/dL Final   07/07/2025 1247 88 70 - 130 mg/dL Final       XR Abdomen KUB  Result Date: 7/8/2025   1. Feeding tube has retracted, is now positioned within the proximal stomach. 2. Nonobstructive bowel gas pattern.  This report was finalized on 7/8/2025 12:44 AM by Dr. Mena Littlejohn M.D on Workstation: [id number]      XR Chest 1 View  Result Date: 7/7/2025  No significant change.  This report was finalized on 7/7/2025 1:11 PM by Dr. Atilio Cruz M.D on Workstation: [id number]      CT Abdomen Pelvis Without Contrast  Result Date: 7/7/2025   1. The patient has undergone placement of a double-J ureteral stent on the left. There has also been a significant decrease in the stone burden within the left renal moiety when compared to the prior exam. The patient is suspected to have some persistent dilatation of the left renal collecting system, and there is also dilatation of the left ureter until its most distal portion, despite presence of the stent. The patient is noted to have some stone fragments within the distal left ureter. 2. There does appear to be a collection adjacent to the superior pole of the left kidney. Resolving hematoma or  phlegmon/abscess would be considerations. Full assessment is limited, in the absence of contrast material. 3. Interval intramedullary delroy fixation of the proximal left femur. There is a partially visualized hematoma adjacent to the operative site.  Radiation dose reduction techniques were utilized, including automated exposure control and exposure modulation based on body size.   This report was finalized on 7/7/2025 5:02 AM by Dr. Mena Littlejohn M.D on Workstation: [id number]        I have personally reviewed all medications:  Scheduled Medications  arformoterol, 15 mcg, Nebulization, BID - RT  budesonide, 0.5 mg, Nebulization, BID - RT  buPROPion, 150 mg, Nasogastric, Q12H  cefTRIAXone, 2,000 mg, Intravenous, Q24H  [Held by provider] digoxin, 125 mcg, Oral, Every Other Day  [START ON 7/9/2025] famotidine, 20 mg, Nasogastric, Daily  insulin regular, 2-7 Units, Subcutaneous, Q6H  ipratropium-albuterol, 3 mL, Nebulization, 4x Daily - RT  [START ON 7/9/2025] levothyroxine, 50 mcg, Nasogastric, Q AM  melatonin, 2.5 mg, Nasogastric, Nightly  [Held by provider] metoprolol tartrate, 12.5 mg, Nasogastric, Q12H  pramipexole, 0.5 mg, Nasogastric, BID  pregabalin, 50 mg, Nasogastric, Q12H  rosuvastatin, 10 mg, Nasogastric, Nightly    Infusions  dexmedetomidine, 0.2-1.5 mcg/kg/hr, Last Rate: 0.2 mcg/kg/hr (07/08/25 1013)  norepinephrine, 0.02-0.3 mcg/kg/min, Last Rate: 0.04 mcg/kg/min (07/08/25 1215)  Pharmacy to dose warfarin,     Diet  NPO Diet NPO Type: Tube Feeding    I have personally reviewed:  [x]  Laboratory   [x]  Microbiology   [x]  Radiology   [x]  EKG/Telemetry  [x]  Cardiology/Vascular   []  Pathology    []  Records       Assessment/Plan     Active Hospital Problems    Diagnosis  POA    **Closed left hip fracture [S72.002A]  Yes    Acute respiratory failure with hypoxia [J96.01]  Yes    Stage 3b chronic kidney disease [N18.32]  Yes    Chronic combined systolic and diastolic CHF (congestive heart failure)  [I50.42]  Yes    Acquired hypothyroidism [E03.9]  Yes    Depression [F32.A]  Yes      Resolved Hospital Problems   No resolved problems to display.       74 y.o. female admitted with Closed left hip fracture.    Assessment and plan  1.  Acute on chronic respiratory failure with hypoxia, patient is currently on high flow oxygen, pulmonary on board, manage in ICU at this point of time.  She is at high risk of clinical decline.  Patient is on IV pressor support.  She still remains on Airvo Optiflow.  Patient is intermittently confused.     2.  History of chronic combined systolic and diastolic congestive heart failure along with acute on chronic kidney injury, cardiology and nephrology on board, follow management recommendations.    Clinically improving slightly.     3.  Hypothyroidism, Synthroid will be continued.     4.  Depression, chronic condition.     5.  Atrial fibrillation, she is currently rate controlled, okay to resume anticoagulation.     6.  CODE STATUS is DNR  Further plans will be based on the hospital course.  She will need LTAC versus Acute rehab placement upon discharge.  Expected Discharge Date: 7/10/2025; Expected Discharge Time:       Quan Hummel MD  [facility]  07/08/25  18:18 EDT

## 2025-07-08 NOTE — PROGRESS NOTES
[facility] Progress Note      Patient Name: Shani Phillip  : 1950  MRN: [id number]  Primary Care Physician:  Rodríguez Walker MD  Date of admission: 7/3/2025    Subjective     Interval History:   Acute kidney injury   Sleepy but arousable.  On high flow oxygen.   at bedside.  No events overnight.    Review of Systems:   As noted above    Objective     Vitals:   Temp:  [97.9 °F (36.6 °C)-98 °F (36.7 °C)] 97.9 °F (36.6 °C)  Heart Rate:  [] 82  Resp:  [17-24] 24  BP: ()/(38-66) 104/66  Arterial Line BP: ()/(36-74) 109/48  Flow (L/min) (Oxygen Therapy):  [40-60] 40    Intake/Output Summary (Last 24 hours) at 2025 0921  Last data filed at 2025 0900  Gross per 24 hour   Intake 910.03 ml   Output 675 ml   Net 235.03 ml       Physical Exam:    General Appearance: More awake and alert, chronically, no acute distress on high flow oxygen  Skin: warm and dry  HEENT: oral mucosa normal, nonicteric sclera.  NG tube noted  Neck: No JVD  Lungs: Bilateral rhonchi, breathing effort not labored  Heart: RRR, normal S1 and S2  Abdomen: soft, nontender, nondistended  : no palpable bladder  Extremities: 1+ lower extremity edema  Neuro: Moving all extremities    Scheduled Meds:     arformoterol, 15 mcg, Nebulization, BID - RT  budesonide, 0.5 mg, Nebulization, BID - RT  buPROPion, 150 mg, Nasogastric, Q12H  [Held by provider] digoxin, 125 mcg, Oral, Every Other Day  [START ON 2025] famotidine, 20 mg, Nasogastric, Daily  insulin regular, 2-7 Units, Subcutaneous, Q6H  ipratropium-albuterol, 3 mL, Nebulization, 4x Daily - RT  [START ON 2025] levothyroxine, 50 mcg, Nasogastric, Q AM  melatonin, 2.5 mg, Nasogastric, Nightly  metoprolol tartrate, 12.5 mg, Nasogastric, Q12H  pramipexole, 0.5 mg, Nasogastric, BID  pregabalin, 50 mg, Nasogastric, Q12H  rosuvastatin, 10 mg, Nasogastric, Nightly  warfarin, 1 mg, Nasogastric, Once      IV Meds:   dexmedetomidine,  0.2-1.5 mcg/kg/hr, Last Rate: Stopped (07/08/25 0215)  norepinephrine, 0.02-0.3 mcg/kg/min, Last Rate: 0.02 mcg/kg/min (07/08/25 0857)  Pharmacy to dose warfarin,         Results Reviewed:   I have personally reviewed the results from the time of this admission to 7/8/2025 09:21 EDT     Results from last 7 days   Lab Units 07/08/25  0344 07/07/25  0756 07/06/25  0817   SODIUM mmol/L 140 140 137   POTASSIUM mmol/L 4.4 4.5 4.9   CHLORIDE mmol/L 107 104 103   CO2 mmol/L 21.5* 23.7 21.0*   BUN mg/dL 44.0* 35.0* 49.0*   CREATININE mg/dL 1.74* 2.03* 2.74*   CALCIUM mg/dL 8.6 9.2 8.4*   BILIRUBIN mg/dL 0.5 0.4 0.3   ALK PHOS U/L 97 103 96   ALT (SGPT) U/L 17 21 11   AST (SGOT) U/L 42* 53* 31   GLUCOSE mg/dL 88 82 81       Estimated Creatinine Clearance: 24.5 mL/min (A) (by C-G formula based on SCr of 1.74 mg/dL (H)).    Results from last 7 days   Lab Units 07/07/25  0756 07/06/25  0817 07/06/25  0400   MAGNESIUM mg/dL 2.4 2.5*  --    PHOSPHORUS mg/dL 2.9  --  5.0*       Results from last 7 days   Lab Units 07/07/25  0756 07/04/25  1435   URIC ACID mg/dL 13.3* 12.3*       Results from last 7 days   Lab Units 07/08/25  0344 07/07/25  0756 07/06/25  0400 07/05/25  0324 07/04/25  0336   WBC 10*3/mm3 8.23 9.33 12.51* 20.40* 23.74*   HEMOGLOBIN g/dL 9.8* 10.0* 10.0* 11.8* 13.0   PLATELETS 10*3/mm3 194 196 192 340 352       Results from last 7 days   Lab Units 07/08/25  0344 07/07/25  0756 07/06/25  0400 07/05/25  0324 07/04/25  1819   INR  2.13* 2.06* 2.09* 1.81* 1.72*       Assessment / Plan     ASSESSMENT:  Acute kidney injury on top of chronic kidney disease stage IIIb with baseline creatinine 1.4 and 1.5.  Creatinine is down to 2.74 with increased urine output, electrolyte within acceptable range, albumin is 2.7   hypertension with CKD, reasonably controlled  Acute hypoxemic hypercapnic respiratory failure on chronic respiratory failure, secondary to possible COPD exacerbation.  And possible pneumonia patient is  better.  COPD exacerbation   Sepsis treated, vasopressors were discontinued  Hyponatremia resolved  Hyperkalemia potassium resolved  Hyperuricemia last uric acid was 12.3.  Will recheck.      PLAN:  Kidney function is currently improving.  And oxygenation seems to be stable  Chest x-ray reviewed  No indication for diuretic therapy at this time.  Surveillance labs        Thank you for involving us in the care of Shani Phillip.  Please feel free to call with any questions.    Jessica Wood MD  07/08/25  09:21 EDT    [facility]  [phone]    Parts of this note may be an electronic transcription/translation of spoken language to printed text using the Dragon dictation system.

## 2025-07-08 NOTE — PROGRESS NOTES
[facility] Progress Note      Patient Name: Shani Phillip  : 1950  MRN: [id number]  Primary Care Physician:  Rodríguez Walker MD  Date of admission: 7/3/2025    Subjective     Interval History:   Acute kidney injury     No new issues noted today.  Afebrile    Review of Systems:   As noted above    Objective     Vitals:   Temp:  [97.9 °F (36.6 °C)-98 °F (36.7 °C)] 97.9 °F (36.6 °C)  Heart Rate:  [] 82  Resp:  [17-24] 24  BP: ()/(38-66) 104/66  Arterial Line BP: ()/(36-74) 109/48  Flow (L/min) (Oxygen Therapy):  [40-60] 40    Intake/Output Summary (Last 24 hours) at 2025 0920  Last data filed at 2025 0900  Gross per 24 hour   Intake 910.03 ml   Output 675 ml   Net 235.03 ml       Physical Exam:    General Appearance: More awake and alert, chronically, no acute distress on high flow oxygen  Skin: warm and dry  HEENT: oral mucosa normal, nonicteric sclera  Neck: No JVD  Lungs: Bilateral rhonchi, breathing effort not labored  Heart: RRR, normal S1 and S2  Abdomen: soft, nontender, nondistended  : no palpable bladder  Extremities: 1+ lower extremity edema  Neuro: Moving all extremities    Scheduled Meds:     arformoterol, 15 mcg, Nebulization, BID - RT  budesonide, 0.5 mg, Nebulization, BID - RT  buPROPion, 150 mg, Nasogastric, Q12H  [Held by provider] digoxin, 125 mcg, Oral, Every Other Day  [START ON 2025] famotidine, 20 mg, Nasogastric, Daily  insulin regular, 2-7 Units, Subcutaneous, Q6H  ipratropium-albuterol, 3 mL, Nebulization, 4x Daily - RT  [START ON 2025] levothyroxine, 50 mcg, Nasogastric, Q AM  melatonin, 2.5 mg, Nasogastric, Nightly  metoprolol tartrate, 12.5 mg, Nasogastric, Q12H  pramipexole, 0.5 mg, Nasogastric, BID  pregabalin, 50 mg, Nasogastric, Q12H  rosuvastatin, 10 mg, Nasogastric, Nightly  warfarin, 1 mg, Nasogastric, Once      IV Meds:   dexmedetomidine, 0.2-1.5 mcg/kg/hr, Last Rate: Stopped (25 8780)  norepinephrine,  0.02-0.3 mcg/kg/min, Last Rate: 0.02 mcg/kg/min (07/08/25 0857)  Pharmacy to dose warfarin,         Results Reviewed:   I have personally reviewed the results from the time of this admission to 7/8/2025 09:20 EDT     Results from last 7 days   Lab Units 07/08/25  0344 07/07/25  0756 07/06/25  0817   SODIUM mmol/L 140 140 137   POTASSIUM mmol/L 4.4 4.5 4.9   CHLORIDE mmol/L 107 104 103   CO2 mmol/L 21.5* 23.7 21.0*   BUN mg/dL 44.0* 35.0* 49.0*   CREATININE mg/dL 1.74* 2.03* 2.74*   CALCIUM mg/dL 8.6 9.2 8.4*   BILIRUBIN mg/dL 0.5 0.4 0.3   ALK PHOS U/L 97 103 96   ALT (SGPT) U/L 17 21 11   AST (SGOT) U/L 42* 53* 31   GLUCOSE mg/dL 88 82 81       Estimated Creatinine Clearance: 24.5 mL/min (A) (by C-G formula based on SCr of 1.74 mg/dL (H)).    Results from last 7 days   Lab Units 07/07/25  0756 07/06/25  0817 07/06/25  0400   MAGNESIUM mg/dL 2.4 2.5*  --    PHOSPHORUS mg/dL 2.9  --  5.0*       Results from last 7 days   Lab Units 07/07/25  0756 07/04/25  1435   URIC ACID mg/dL 13.3* 12.3*       Results from last 7 days   Lab Units 07/08/25  0344 07/07/25  0756 07/06/25  0400 07/05/25  0324 07/04/25  0336   WBC 10*3/mm3 8.23 9.33 12.51* 20.40* 23.74*   HEMOGLOBIN g/dL 9.8* 10.0* 10.0* 11.8* 13.0   PLATELETS 10*3/mm3 194 196 192 340 352       Results from last 7 days   Lab Units 07/08/25  0344 07/07/25  0756 07/06/25  0400 07/05/25  0324 07/04/25  1819   INR  2.13* 2.06* 2.09* 1.81* 1.72*       Assessment / Plan     ASSESSMENT:  Acute kidney injury on top of chronic kidney disease stage IIIb with baseline creatinine 1.4 and 1.5.  Creatinine is down to 2.74 with increased urine output, electrolyte within acceptable range, albumin is 2.7   hypertension with CKD, reasonably controlled  Acute hypoxemic hypercapnic respiratory failure on chronic respiratory failure, secondary to possible COPD exacerbation.  And possible pneumonia patient is better.  COPD exacerbation   Sepsis treated, vasopressors were  discontinued  Hyponatremia resolved  Hyperkalemia potassium resolved  Hyperuricemia last uric acid was 12.3.  Will recheck.      PLAN:  Kidney function seems to be improving.  Will continue current therapy for now.  Surveillance labs        Thank you for involving us in the care of Shani Phillip.  Please feel free to call with any questions.    Jessica Wood MD  07/08/25  09:20 EDT    [facility]  [phone]    Parts of this note may be an electronic transcription/translation of spoken language to printed text using the Dragon dictation system.

## 2025-07-08 NOTE — THERAPY TREATMENT NOTE
Patient Name: Shani Phillip  : 1950    MRN: [id number]                              Today's Date: 2025       Admit Date: 7/3/2025    Visit Dx:     ICD-10-CM ICD-9-CM   1. Closed displaced intertrochanteric fracture of left femur, initial encounter  S72.142A 820.21     Patient Active Problem List   Diagnosis    Influenza    Thrush, oral    COPD (chronic obstructive pulmonary disease)    Atrial fibrillation with RVR    Chronic respiratory failure with hypoxia    Endocarditis of mitral valve    Mitral valve vegetation    Chronic diastolic CHF (congestive heart failure)    Atrial fibrillation, chronic    Bilateral lower extremity edema    Intermittent lightheadedness    Depression    Chronic midline low back pain with left-sided sciatica    Renal stone    Oxygen dependent    Medicare annual wellness visit, subsequent    Osteoporosis    Chronic anticoagulation    Nonrheumatic mitral valve stenosis    Chest pain, unspecified type    Chronic heart failure with preserved ejection fraction (HFpEF)    Overweight with body mass index (BMI) 25.0-29.9    Acquired hypothyroidism    Acute on chronic combined systolic and diastolic CHF (congestive heart failure)    Age-related macular degeneration    Restless legs syndrome    Horseshoe kidney with renal calculus    JAIME (acute kidney injury)    Stage 3b chronic kidney disease    Chronic combined systolic and diastolic CHF (congestive heart failure)    Acute on chronic combined systolic and diastolic CHF (congestive heart failure)    Acute pyelonephritis    Closed left hip fracture    Acute respiratory failure with hypoxia     Past Medical History:   Diagnosis Date    Acute endocarditis     Atrial fibrillation with RVR 2019    Cancer     CKD (chronic kidney disease) stage 3, GFR 30-59 ml/min     COPD (chronic obstructive pulmonary disease)     CTS (carpal tunnel syndrome)     Dizziness     Gall stones     Influenza 2019    Medicare annual wellness visit,  subsequent 04/12/2021    Migraine     Osteoporosis     Renal disorder     Restless leg syndrome     Thrush, oral 11/14/2019     Past Surgical History:   Procedure Laterality Date    CARDIAC CATHETERIZATION N/A 1/23/2020    Procedure: Coronary angiography;  Surgeon: Svetlana Grayson MD;  Location: [facility] CATH INVASIVE LOCATION;  Service: Cardiovascular    CARDIAC CATHETERIZATION N/A 1/23/2020    Procedure: Left ventriculography;  Surgeon: Svetlnaa Grayson MD;  Location: [facility] CATH INVASIVE LOCATION;  Service: Cardiovascular    CARDIAC CATHETERIZATION N/A 1/23/2020    Procedure: Left Heart Cath;  Surgeon: Svetlana Grayson MD;  Location: [facility] CATH INVASIVE LOCATION;  Service: Cardiovascular    CHOLECYSTECTOMY      CYSTOSCOPY W/ URETERAL STENT PLACEMENT Left 6/10/2025    Procedure: CYSTOSCOPY URETERAL LEFT STENT PLACEMENT;  Surgeon: Stefano Mahoney MD;  Location: [facility] MAIN OR;  Service: Urology;  Laterality: Left;    FEMUR IM NAILING/RODDING Left 7/5/2025    Procedure: LEFT FEMUR INTRAMEDULLARY NAILING/RODDING WITH HANA;  Surgeon: Peter Schofield MD;  Location: [facility] OR;  Service: Orthopedics;  Laterality: Left;    KNEE ARTHROPLASTY Right     LAPAROSCOPIC GASTRIC BANDING        General Information       Row Name 07/08/25 1104          Physical Therapy Time and Intention    Document Type therapy note (daily note)  -PC     Mode of Treatment physical therapy  -PC       Row Name 07/08/25 1104          General Information    Existing Precautions/Restrictions fall;oxygen therapy device and L/min  -PC       Row Name 07/08/25 1104          Cognition    Orientation Status (Cognition) unable/difficult to assess  pt opens eyes but only moaning  -PC               User Key  (r) = Recorded By, (t) = Taken By, (c) = Cosigned By      Initials Name Provider Type    PC Rukhsana Weaver PT Physical Therapist                   Mobility       Row Name 07/08/25 1104          Bed Mobility    Comment, (Bed Mobility) not able to  attempt, pt not following commands, resisting movement, moaning, crying out with any touch  -PC               User Key  (r) = Recorded By, (t) = Taken By, (c) = Cosigned By      Initials Name Provider Type    PC Rukhsana Weaver, PT Physical Therapist                   Obj/Interventions       Row Name 07/08/25 1105          Motor Skills    Therapeutic Exercise --  AAROM/PROM B UEs, RLE, PROM LLE all attempted, pt resistive and not following commands, screaming out with any touch of B LEs, RN present  -PC               User Key  (r) = Recorded By, (t) = Taken By, (c) = Cosigned By      Initials Name Provider Type    PC Rukhsana Weaver, PT Physical Therapist                   Goals/Plan    No documentation.                  Clinical Impression       Row Name 07/08/25 1107          Pain    Pre/Posttreatment Pain Comment difficult to assess due to cognitive limitations  -PC       Row Name 07/08/25 1107          Plan of Care Review    Plan of Care Reviewed With patient;spouse  -PC     Progress declining  -PC     Outcome Evaluation Poor toleration of mobility today, pt moaning and crying out with any movement, worse with B LEs, attempted gentle AAROM/PROM with encouragement and reassurance, pt resistive and crying out, unable to attempt edge of bed activity today  -PC       Row Name 07/08/25 1107          Positioning and Restraints    Pre-Treatment Position in bed  -PC     Post Treatment Position bed  -PC               User Key  (r) = Recorded By, (t) = Taken By, (c) = Cosigned By      Initials Name Provider Type    PC Rukhsana Weaver, PT Physical Therapist                   Outcome Measures       Row Name 07/08/25 1109          How much help from another person do you currently need...    Turning from your back to your side while in flat bed without using bedrails? 1  -PC     Moving from lying on back to sitting on the side of a flat bed without bedrails? 1  -PC     Moving to and from a bed to a chair (including a  wheelchair)? 1  -PC     Standing up from a chair using your arms (e.g., wheelchair, bedside chair)? 1  -PC     Climbing 3-5 steps with a railing? 1  -PC     To walk in hospital room? 1  -PC     AM-PAC 6 Clicks Score (PT) 6  -PC               User Key  (r) = Recorded By, (t) = Taken By, (c) = Cosigned By      Initials Name Provider Type    PC Rukhsana Weaver, PT Physical Therapist                                 Physical Therapy Education       Title: PT OT SLP Therapies (In Progress)       Topic: Physical Therapy (In Progress)       Point: Mobility training (In Progress)       Learning Progress Summary            Patient Acceptance, E,D, NL by PC at 7/7/2025 1050    Acceptance, E,TB, VU by VW at 7/6/2025 2221    Acceptance, E, NR by EM at 7/6/2025 1354                      Point: Home exercise program (In Progress)       Learning Progress Summary            Patient Acceptance, E,D, NL by PC at 7/7/2025 1050    Acceptance, E,TB, VU by VW at 7/6/2025 2221    Acceptance, E, NR by EM at 7/6/2025 1354                      Point: Body mechanics (In Progress)       Learning Progress Summary            Patient Acceptance, E,D, NL by PC at 7/7/2025 1050    Acceptance, E,TB, VU by VW at 7/6/2025 2221                      Point: Precautions (In Progress)       Learning Progress Summary            Patient Acceptance, E,D, NL by PC at 7/7/2025 1050    Acceptance, E,TB, VU by VW at 7/6/2025 2221                                      User Key       Initials Effective Dates Name Provider Type Discipline    PC 06/16/21 -  Rukhsana Weaver, PT Physical Therapist PT    EM 06/16/21 -  Kitty Melgoza, PT Physical Therapist PT    VW 02/21/25 -  Felicia Sarabia, RN Registered Nurse Nurse                  PT Recommendation and Plan     Progress: declining  Outcome Evaluation: Poor toleration of mobility today, pt moaning and crying out with any movement, worse with B LEs, attempted gentle AAROM/PROM with encouragement and reassurance,  pt resistive and crying out, unable to attempt edge of bed activity today     Time Calculation:         PT Charges       Row Name 07/08/25 1109             Time Calculation    Start Time 1016  -PC      Stop Time 1028  -PC      Time Calculation (min) 12 min  -PC      PT Received On 07/08/25  -PC      PT - Next Appointment 07/09/25  -PC                User Key  (r) = Recorded By, (t) = Taken By, (c) = Cosigned By      Initials Name Provider Type    PC Rukhsana Weaver, PT Physical Therapist                  Therapy Charges for Today       Code Description Service Date Service Provider Modifiers Qty    11096616508 HC PT THER PROC EA 15 MIN 7/7/2025 Rukhsana Weaver, PT GP 2    52403486697 HC PT THER PROC EA 15 MIN 7/8/2025 Rukhsana Weaver, PT GP 1            PT G-Codes  AM-PAC 6 Clicks Score (PT): 6  PT Discharge Summary  Anticipated Discharge Disposition (PT): skilled nursing facility    Rukhsana Weaver, DERICK  7/8/2025

## 2025-07-08 NOTE — PROGRESS NOTES
Dr. IVETH Blackwell    [facility]        Patient ID:  Name:  Shani Phillip  MRN:  [id number]  1950  74 y.o.  female            Patient remains critically ill.  Very confused.  Very delirious.  She received intravenous acetaminophen yesterday but it did not help.  The patient continues to moan and groan without people touching her or moving her.  This indicates mostly delirium rather than uncontrolled pain.  Due to her COPD I explained to her  at bedside that we cannot increase her narcotics any further because she will lose her airway and developed hypercapnia and require intubation.  He expressed understanding.  She is already on oral opioids as well as IV morphine as needed.  We may be able to try some intravenous acetaminophen again later today but we will see how she evolves.  Still requiring 40 L and 65% FiO2 on her high flow oxygen.  Has a feeding tube in place.    Review of systems: Unobtainable    Vitals:  Vitals:    07/08/25 0715 07/08/25 0730 07/08/25 0755 07/08/25 0800   BP:       Pulse: 93 99 93 95   Resp:       Temp:       TempSrc:       SpO2: 98% 92% 98% 96%   Weight:       Height:               Body mass index is 28.08 kg/m².    Intake/Output Summary (Last 24 hours) at 7/8/2025 0807  Last data filed at 7/8/2025 0730  Gross per 24 hour   Intake 774.91 ml   Output 750 ml   Net 24.91 ml       Exam:  GEN:  Moaning and groaning, patient not being touched by anyone, not being repositioned  Restless  Delirious, does not follow any commands  LUNGS: Distant and diminished breath sounds bilat, no use of accessory muscles  CV:  Normal S1S2, without murmur, no edema  ABD:  Non tender, no enlarged liver or masses      Scheduled meds:  arformoterol, 15 mcg, Nebulization, BID - RT  budesonide, 0.5 mg, Nebulization, BID - RT  buPROPion XL, 300 mg, Oral, Daily  [Held by provider] digoxin, 125 mcg, Oral, Every Other Day  famotidine, 20 mg, Oral, BID AC  insulin lispro, 2-7 Units,  Subcutaneous, 4x Daily AC & at Bedtime  ipratropium-albuterol, 3 mL, Nebulization, 4x Daily - RT  levothyroxine, 50 mcg, Oral, Q AM  melatonin, 2.5 mg, Oral, Nightly  metoprolol tartrate, 12.5 mg, Oral, Q12H  pramipexole, 0.5 mg, Oral, BID  pregabalin, 50 mg, Oral, Q12H  rosuvastatin, 10 mg, Oral, Nightly      IV meds:                      dexmedetomidine, 0.2-1.5 mcg/kg/hr, Last Rate: Stopped (07/08/25 0215)  norepinephrine, 0.02-0.3 mcg/kg/min, Last Rate: 0.02 mcg/kg/min (07/08/25 0755)  Pharmacy to dose warfarin,         Data Review:   I reviewed the patient's medications and new clinical results.          Lab Results   Component Value Date    CALCIUM 8.6 07/08/2025    PHOS 2.9 07/07/2025    MG 2.4 07/07/2025    MG 2.5 (H) 07/06/2025    MG 2.0 06/14/2025     Results from last 7 days   Lab Units 07/08/25  0344 07/07/25  0756 07/06/25  0817 07/06/25  0400 07/05/25  1612   SODIUM mmol/L 140 140 137  --   --    POTASSIUM mmol/L 4.4 4.5 4.9  --   --    CHLORIDE mmol/L 107 104 103  --   --    CO2 mmol/L 21.5* 23.7 21.0*  --   --    BUN mg/dL 44.0* 35.0* 49.0*  --   --    CREATININE mg/dL 1.74* 2.03* 2.74*  --   --    CALCIUM mg/dL 8.6 9.2 8.4*  --   --    BILIRUBIN mg/dL 0.5 0.4 0.3  --   --    ALK PHOS U/L 97 103 96  --   --    ALT (SGPT) U/L 17 21 11  --   --    AST (SGOT) U/L 42* 53* 31  --   --    GLUCOSE mg/dL 88 82 81  --   --    WBC 10*3/mm3 8.23 9.33  --  12.51*  --    HEMOGLOBIN g/dL 9.8* 10.0*  --  10.0*  --    PLATELETS 10*3/mm3 194 196  --  192  --    INR  2.13* 2.06*  --  2.09*  --    PROBNP pg/mL  --   --  16,731.0*  --   --    PROCALCITONIN ng/mL  --  0.67*  --   --  1.51*         Results from last 7 days   Lab Units 07/04/25  1648 07/04/25  1435   HSTROP T ng/L 69* 73*     Results from last 7 days   Lab Units 07/07/25  1250 07/04/25  0400   PH, ARTERIAL pH units 7.299* 7.337*   PCO2, ARTERIAL mm Hg 47.3* 58.2*   PO2 ART mm Hg 128.5* 56.1*   O2 SATURATION ART % 98.5 85.9*   FLOW RATE lpm  --  5.0000    MODALITY  Optiflow Cannula            ASSESSMENT:   Hypovolemic Shock   Acute exacerbation of COPD  Pneumonia  Acute on chronic hypercapnic and hypoxic respiratory failure (patient of Dr. Zamora in our office  Fall causing closed left hip fracture  Shock requiring IV pressors  Acquired hypothyroidism  Stage 3b chronic kidney disease   Chronic combined systolic and diastolic CHF (congestive heart failure)   Acute respiratory failure with hypoxia  Oliguria  Acute kidney injury  Atrial fibrillation      PLAN:  Unfortunately the patient seems quite delirious.  I do not think this is lack of adequate pain control because she is sometimes moaning and groaning without anyone touching her or mobilizing or changing her position.  We tried intravenous acetaminophen as well as morphine.  I stop Dilaudid due to its long action of 4 hours.  She does have COPD therefore I want to prevent any CO2 retention with long-acting narcotics.  I will increase her home Wellbutrin back to full dose and add some Lyrica to see if this is delirium that may be could respond to that.  Otherwise continue supportive care with bronchodilators for COPD.  She has chronic kidney disease, followed by nephrology.  Has decent amount of urine output at this time.  Continue daily renal surveillance labs  Pharmacy is dosing warfarin for atrial fibrillation.  Receiving antibiotics for pneumonia, procalcitonin has decreased steadily as expected on antibiotics.    Remains critically ill, total critical care time 35 minutes          Tristan Blackwell MD  7/8/2025

## 2025-07-09 NOTE — PROGRESS NOTES
[facility] Clinical Pharmacy Services: Warfarin Dosing/Monitoring Consult    Shani Phillip is a 74 y.o. female, estimated creatinine clearance is 34.1 mL/min (A) (by C-G formula based on SCr of 1.24 mg/dL (H)). weighing 66.1 kg (145 lb 11.6 oz).    Results from last 7 days   Lab Units 07/09/25  0345 07/08/25  0344 07/07/25  0756 07/06/25  0400 07/05/25  0324   INR  1.78* 2.13* 2.06* 2.09* 1.81*   HEMOGLOBIN g/dL 10.7* 9.8* 10.0* 10.0* 11.8*   HEMATOCRIT % 34.1 29.9* 29.8* 31.1* 36.5   PLATELETS 10*3/mm3 231 194 196 192 340     Prior to admission anticoagulation: Current patient at [facility]. Appears to have been taking warfarin 2.5 mg PO daily until admission 6/10-6/14. Patient has needed frequent dose adjustments and doses held since then per [facility] notes at least in part due to DDIs with steroids and antibiotics prescribed at discharge.     Hospital Anticoagulation:  Consulting provider: Tristan Blackwell MD   Start date: prior to admission on 7/3; restarting 7/6   Indication: A Fib - requiring full anticoagulation  Target INR: 2 - 3  Expected duration: indefinite    Bridge Therapy: No      Potential food or drug interactions:   May enhance the anticoagulant effect of warfarin:   Azithromycin (stopped)  Ceftriaxone   Methylprednisolone (stopped 7/5)    Diet Order   Procedures    NPO Diet NPO Type: Tube Feeding     Education complete?/Date: N/A; home medication    Assessment/Plan:   INR subtherapeutic at 1.78 today   Plan to increase warfarin to 2.5mg today x 1 dose, was restarted 7/6 (got 0.5mg 7/6, 1mg 7/7, 1mg 7/8).   RN/provider to monitor for any signs or symptoms of bleeding.   Follow up daily INRs and dose adjustments. INR ordered daily with AM labs while inpatient.    Pharmacy will continue to follow until discharge or discontinuation of warfarin.     Mylene Harmon, PharmD  Clinical Pharmacist

## 2025-07-09 NOTE — PROGRESS NOTES
Patient Name: Shani Phillip  YOB: 1950  MRN: [id number]  Admission date: 7/3/2025    Reason for Encounter: Follow-up/Progress Note    TF recommendations as medical conditions allow: IsoSource 1.5 goal at 55ml/hr and water flushes 10ml/hr.     RD to follow    [facility] Clinical Nutrition Progress Note       Nutrition Intervention Updates: Follow for restart of TF's as medical conditions allow      Subjective: TF's were held due to pt having to go on BIPAP this am       PO Diet: NPO Diet NPO Type: Tube Feeding   PO Supplements: None    PO Intake:  NPO       Current nutrition support: TF's held   Nutrition support review: --       Labs: Glu 158, BUN 31, cr 1.24, phos 1.4--replaced    Drain NG cortrak   GI Function:  BM 7/8        Brief Weight Review:    Wt Readings from Last 1 Encounters:   07/09/25 0600 66.1 kg (145 lb 11.6 oz)   07/08/25 0530 67.4 kg (148 lb 9.4 oz)   07/05/25 0509 67.4 kg (148 lb 9.4 oz)   07/04/25 0600 62.3 kg (137 lb 5.6 oz)   07/03/25 2040 64.6 kg (142 lb 6.7 oz)   07/03/25 1627 64 kg (141 lb)        Results from last 7 days   Lab Units 07/09/25  0812 07/08/25  0344 07/07/25  0756   SODIUM mmol/L 145  144 140 140   POTASSIUM mmol/L 4.5  4.4 4.4 4.5   CHLORIDE mmol/L 113*  111* 107 104   CO2 mmol/L 22.6  24.7 21.5* 23.7   BUN mg/dL 31.0*  31.0* 44.0* 35.0*   CREATININE mg/dL 1.34*  1.24* 1.74* 2.03*   CALCIUM mg/dL 8.4*  8.3* 8.6 9.2   BILIRUBIN mg/dL 0.5 0.5 0.4   ALK PHOS U/L 104 97 103   ALT (SGPT) U/L 14 17 21   AST (SGOT) U/L 27 42* 53*   GLUCOSE mg/dL 186*  184* 88 82     Results from last 7 days   Lab Units 07/09/25  0812 07/09/25  0345 07/08/25  0344 07/07/25  0756 07/06/25  0817   MAGNESIUM mg/dL  --   --   --  2.4 2.5*   PHOSPHORUS mg/dL 1.4*  --   --  2.9  --    PLATELETS 10*3/mm3  --  231   < > 196  --    HEMOGLOBIN g/dL  --  10.7*   < > 10.0*  --    HEMATOCRIT %  --  34.1   < > 29.8*  --    TRIGLYCERIDES mg/dL  --   --   --   --  131    < > = values in this  interval not displayed.     Lab Results   Component Value Date    HGBA1C 5.80 (H) 06/12/2025       Electronically signed by:  Radha Jaramillo RD  07/09/25 14:06 EDT

## 2025-07-09 NOTE — PROGRESS NOTES
Dr. IVETH Blackwell    [facility] INTENSIVE CARE        Patient ID:  Name:  Shani Phillip  MRN:  [id number]  1950  74 y.o.  female            CC/Reason for visit: Acute delirium, status post left femoral fracture with nailing, hypercapnic respiratory failure    Interval hx: Patient remains somnolent but at times screaming and moaning with acute agitated delirium.  Still on Airvo high flow oxygen, 40 L and 56% FiO2 saturating 92%.  Does not follow commands, does not answer questions    ROS: unobtainable    Vitals:  Vitals:    07/09/25 0400 07/09/25 0500 07/09/25 0600 07/09/25 0804   BP:       Pulse: 94 97 (!) 122 104   Resp:    21   Temp:       TempSrc:       SpO2: 90% 92% (!) 88% 93%   Weight:   66.1 kg (145 lb 11.6 oz)    Height:               Body mass index is 27.53 kg/m².    Intake/Output Summary (Last 24 hours) at 7/9/2025 0810  Last data filed at 7/9/2025 0600  Gross per 24 hour   Intake 1832.58 ml   Output 1080 ml   Net 752.58 ml       Exam:  GEN:  No distress  Somnolent, starts moaning and groaning the minute you touch her.  Delirious, does not follow commands, does not speak  LUNGS: Shallow breathing, distant and diminished breath sounds bilat, no use of accessory muscles  CV:  Normal S1S2, without murmur, some ankle edema  ABD:  Non tender, no enlarged liver or masses      Scheduled meds:  arformoterol, 15 mcg, Nebulization, BID - RT  budesonide, 0.5 mg, Nebulization, BID - RT  buPROPion, 150 mg, Nasogastric, Q12H  cefTRIAXone, 2,000 mg, Intravenous, Q24H  [Held by provider] digoxin, 125 mcg, Oral, Every Other Day  famotidine, 20 mg, Nasogastric, Daily  insulin regular, 2-7 Units, Subcutaneous, Q6H  ipratropium-albuterol, 3 mL, Nebulization, 4x Daily - RT  levothyroxine, 50 mcg, Nasogastric, Q AM  melatonin, 2.5 mg, Nasogastric, Nightly  [Held by provider] metoprolol tartrate, 12.5 mg, Nasogastric, Q12H  pramipexole, 0.5 mg, Nasogastric, BID  pregabalin, 50 mg, Nasogastric, Q12H  rosuvastatin,  10 mg, Nasogastric, Nightly      IV meds:                      dexmedetomidine, 0.2-1.5 mcg/kg/hr, Last Rate: 0.4 mcg/kg/hr (07/09/25 0803)  norepinephrine, 0.02-0.3 mcg/kg/min, Last Rate: 0.08 mcg/kg/min (07/09/25 0057)  Pharmacy to dose warfarin,         Data Review:   I reviewed the patient's medications and new clinical results.        Results from last 7 days   Lab Units 07/09/25  0345 07/08/25  0344 07/07/25  0756 07/06/25  0817 07/06/25  0400 07/05/25  1612   SODIUM mmol/L  --  140 140 137  --   --    POTASSIUM mmol/L  --  4.4 4.5 4.9  --   --    CHLORIDE mmol/L  --  107 104 103  --   --    CO2 mmol/L  --  21.5* 23.7 21.0*  --   --    BUN mg/dL  --  44.0* 35.0* 49.0*  --   --    CREATININE mg/dL  --  1.74* 2.03* 2.74*  --   --    CALCIUM mg/dL  --  8.6 9.2 8.4*  --   --    BILIRUBIN mg/dL  --  0.5 0.4 0.3  --   --    ALK PHOS U/L  --  97 103 96  --   --    ALT (SGPT) U/L  --  17 21 11  --   --    AST (SGOT) U/L  --  42* 53* 31  --   --    GLUCOSE mg/dL  --  88 82 81  --   --    WBC 10*3/mm3 11.90* 8.23 9.33  --    < >  --    HEMOGLOBIN g/dL 10.7* 9.8* 10.0*  --    < >  --    PLATELETS 10*3/mm3 231 194 196  --    < >  --    INR  1.78* 2.13* 2.06*  --    < >  --    PROBNP pg/mL  --   --   --  16,731.0*  --   --    PROCALCITONIN ng/mL  --   --  0.67*  --   --  1.51*    < > = values in this interval not displayed.         Results from last 7 days   Lab Units 07/04/25  1648 07/04/25  1435   HSTROP T ng/L 69* 73*     Results from last 7 days   Lab Units 07/07/25  1250 07/04/25  0400   PH, ARTERIAL pH units 7.299* 7.337*   PCO2, ARTERIAL mm Hg 47.3* 58.2*   PO2 ART mm Hg 128.5* 56.1*   O2 SATURATION ART % 98.5 85.9*   FLOW RATE lpm  --  5.0000   MODALITY  Optiflow Cannula            ASSESSMENT:   Agitated delirium  Closed left hip fracture, requiring left femoral nailing by orthopedic   Hypovolemic Shock   Acute exacerbation of COPD  Pneumonia  Acute on chronic hypercapnic and hypoxic respiratory failure (patient of  Dr. Zamora in our office  Fall causing closed left hip fracture  Shock requiring IV pressors   Chronic combined systolic and diastolic CHF (congestive heart failure)   Acute respiratory failure with hypoxia  Oliguria  Acute kidney injury  Atrial fibrillation    Acquired hypothyroidism    Stage 3b chronic kidney disease    Chronic combined systolic and diastolic CHF (congestive heart failure)    Acute respiratory failure with hypoxia        PLAN:  Patient remains critically ill.  At this point the main concern is her agitation and delirium.  We are trying to avoid any medicines that could exacerbate her delirium.  She is at her home Wellbutrin dose.  We added some low-dose Lyrica yesterday but it is not helping.  I do not suspect this delirium is due to uncontrolled pain.  She is receiving high-dose Tylenol as well as some IV morphine but still she starts screaming and moaning without any staff member touching her.  We will continue with some IV Precedex drip to try to help her through this delirium.  Check digoxin level.  She remains in respiratory failure.  She requires high flow Optiflow Airvo system.  ABG yesterday showed some mild hypercapnia.  Patient's  tells me she is not a heavy smoker and her total tobacco history was apparently only 20 or 25 years.  She may be very sensitive to mild hypercapnia.  Will try some noninvasive ventilation with BiPAP.  Being treated with antibiotics for probable aspiration pneumonia  Back in shock, back on IV Levophed drip.  Nephrology following along for oliguria, this is improving.  Renal function is improving.  He has atrial fibrillation at home and takes warfarin.  Pharmacy dosing per INR.  Check digoxin level.    Total critical care time 30 minutes        Tristan Blackwell MD  7/9/2025

## 2025-07-09 NOTE — PROGRESS NOTES
[facility] Progress Note      Patient Name: Shani Phillip  : 1950  MRN: [id number]  Primary Care Physician:  Rodríguez Walker MD  Date of admission: 7/3/2025    Subjective     Interval History:   Acute kidney injury   Sedated today   No events overnight  Review of Systems:   As noted above    Objective     Vitals:   Temp:  [97.7 °F (36.5 °C)-98.1 °F (36.7 °C)] 97.7 °F (36.5 °C)  Heart Rate:  [] 101  Resp:  [18-24] 23  Arterial Line BP: ()/(39-57) 99/50  Flow (L/min) (Oxygen Therapy):  [40] 40    Intake/Output Summary (Last 24 hours) at 2025 1026  Last data filed at 2025 0600  Gross per 24 hour   Intake 1577.46 ml   Output 1005 ml   Net 572.46 ml       Physical Exam:    General Appearance: More awake and alert, chronically, no acute distress on high flow oxygen  Skin: warm and dry  HEENT: oral mucosa normal, nonicteric sclera.  NG tube noted  Neck: No JVD  Lungs: Bilateral rhonchi, breathing effort not labored  Heart: RRR, normal S1 and S2  Abdomen: soft, nontender, nondistended  : no palpable bladder  Extremities: 1+ lower extremity edema  Neuro: Moving all extremities    Scheduled Meds:     arformoterol, 15 mcg, Nebulization, BID - RT  budesonide, 0.5 mg, Nebulization, BID - RT  buPROPion, 150 mg, Nasogastric, Q12H  cefTRIAXone, 2,000 mg, Intravenous, Q24H  [Held by provider] digoxin, 125 mcg, Oral, Every Other Day  famotidine, 20 mg, Nasogastric, Daily  insulin regular, 2-7 Units, Subcutaneous, Q6H  ipratropium-albuterol, 3 mL, Nebulization, 4x Daily - RT  levothyroxine, 50 mcg, Nasogastric, Q AM  melatonin, 2.5 mg, Nasogastric, Nightly  [Held by provider] metoprolol tartrate, 12.5 mg, Nasogastric, Q12H  pramipexole, 0.5 mg, Nasogastric, BID  pregabalin, 50 mg, Nasogastric, Q12H  rosuvastatin, 10 mg, Nasogastric, Nightly  warfarin, 2.5 mg, Nasogastric, Once      IV Meds:   dexmedetomidine, 0.2-1.5 mcg/kg/hr, Last Rate: 1 mcg/kg/hr (25  1014)  norepinephrine, 0.02-0.3 mcg/kg/min, Last Rate: 0.04 mcg/kg/min (07/09/25 1013)  Pharmacy to dose warfarin,         Results Reviewed:   I have personally reviewed the results from the time of this admission to 7/9/2025 10:26 EDT     Results from last 7 days   Lab Units 07/09/25  0812 07/08/25  0344 07/07/25  0756   SODIUM mmol/L 144 140 140   POTASSIUM mmol/L 4.4 4.4 4.5   CHLORIDE mmol/L 111* 107 104   CO2 mmol/L 24.7 21.5* 23.7   BUN mg/dL 31.0* 44.0* 35.0*   CREATININE mg/dL 1.24* 1.74* 2.03*   CALCIUM mg/dL 8.3* 8.6 9.2   BILIRUBIN mg/dL 0.5 0.5 0.4   ALK PHOS U/L 104 97 103   ALT (SGPT) U/L 14 17 21   AST (SGOT) U/L 27 42* 53*   GLUCOSE mg/dL 184* 88 82       Estimated Creatinine Clearance: 34.1 mL/min (A) (by C-G formula based on SCr of 1.24 mg/dL (H)).    Results from last 7 days   Lab Units 07/07/25  0756 07/06/25  0817 07/06/25  0400   MAGNESIUM mg/dL 2.4 2.5*  --    PHOSPHORUS mg/dL 2.9  --  5.0*       Results from last 7 days   Lab Units 07/07/25  0756 07/04/25  1435   URIC ACID mg/dL 13.3* 12.3*       Results from last 7 days   Lab Units 07/09/25  0345 07/08/25  0344 07/07/25  0756 07/06/25  0400 07/05/25  0324   WBC 10*3/mm3 11.90* 8.23 9.33 12.51* 20.40*   HEMOGLOBIN g/dL 10.7* 9.8* 10.0* 10.0* 11.8*   PLATELETS 10*3/mm3 231 194 196 192 340       Results from last 7 days   Lab Units 07/09/25  0345 07/08/25  0344 07/07/25  0756 07/06/25  0400 07/05/25  0324   INR  1.78* 2.13* 2.06* 2.09* 1.81*       Assessment / Plan     ASSESSMENT:  Acute kidney injury on top of chronic kidney disease stage IIIb with baseline creatinine 1.4 and 1.5.  Creatinine is down to 2.74 with increased urine output, electrolyte within acceptable range, albumin is 2.7   hypertension with CKD, reasonably controlled  Acute hypoxemic hypercapnic respiratory failure on chronic respiratory failure, secondary to possible COPD exacerbation.  And possible pneumonia patient is better.  COPD exacerbation   Sepsis treated,  vasopressors were discontinued  Hyponatremia resolved  Hyperkalemia potassium resolved  Hyperuricemia last uric acid was 12.3.  Will recheck.      PLAN:  Kidney function is currently improving.    Volume status is acceptable   Continue to hold diuretics  Urine output of  Surveillance labs        Thank you for involving us in the care of Shani Phillip.  Please feel free to call with any questions.    Jessica Wood MD  07/09/25  10:26 EDT    [facility]  [phone]    Parts of this note may be an electronic transcription/translation of spoken language to printed text using the Dragon dictation system.

## 2025-07-09 NOTE — PROGRESS NOTES
Hospital Follow Up    LOS:  LOS: 6 days   Patient Name: Shani Phillip  Age/Sex: 74 y.o. female  : 1950  MRN: [id number]    Day of Service: 25   Length of Stay: 6  Encounter Provider: Trev Walter MD  Place of Service: [facility] CARDIOLOGY  Patient Care Team:  Rodríguez Walker MD as PCP - General (Internal Medicine)  Mike Atkins MD as Surgeon (General Surgery)  Hayes Briones MD as Surgeon (Cardiothoracic Surgery)  Zenia Tinajero MD as Consulting Physician (Cardiology)  Clover Zamora MD as Consulting Physician (Pulmonary Disease)  Angy Smith APRN as Nurse Practitioner (Cardiology)  Lester Garcia RN as Ambulatory  (Population Health)    Subjective:     Chief Complaint: Hip fracture    Interval History: Patient is back on BiPAP and drowsy on exam.  Remains in A-fib with rate 90-100s on telemetry and requiring nor epi drip, beta-blocker being held for hypotension.      Objective:     Objective:  Temp:  [97.7 °F (36.5 °C)-98.1 °F (36.7 °C)] 97.7 °F (36.5 °C)  Heart Rate:  [] 104  Resp:  [18-24] 21  Arterial Line BP: ()/(39-57) 99/50     Intake/Output Summary (Last 24 hours) at 2025 0914  Last data filed at 2025 0600  Gross per 24 hour   Intake 1637.46 ml   Output 1005 ml   Net 632.46 ml     Body mass index is 27.53 kg/m².      25  0509 25  0530 25  0600   Weight: 67.4 kg (148 lb 9.4 oz) 67.4 kg (148 lb 9.4 oz) 66.1 kg (145 lb 11.6 oz)     Weight change: -1.3 kg (-2 lb 13.9 oz)      Physical Exam:   General : Drowsy.  Frail and chronic ill-appearing.  Neuro: Drowsy  Lungs:, Normal work of breathing, on BiPAP mask  CV: Irregular rhythm, normal rate, normal S1 and S2, no murmurs, gallops or rubs.  ABD: Soft, nontender, nondistended. Positive bowel sounds.  Extr: No edema or cyanosis, moves all extremities.  Some desquamation and cyanosis on lower EXTR    Lab Review:   Results from last 7 days   Lab  Units 07/09/25  0812 07/08/25  0344   SODIUM mmol/L 144 140   POTASSIUM mmol/L 4.4 4.4   CHLORIDE mmol/L 111* 107   CO2 mmol/L 24.7 21.5*   BUN mg/dL 31.0* 44.0*   CREATININE mg/dL 1.24* 1.74*   GLUCOSE mg/dL 184* 88   CALCIUM mg/dL 8.3* 8.6   AST (SGOT) U/L 27 42*   ALT (SGPT) U/L 14 17     Results from last 7 days   Lab Units 07/04/25  1648 07/04/25  1435   HSTROP T ng/L 69* 73*     Results from last 7 days   Lab Units 07/09/25  0345 07/08/25  0344   WBC 10*3/mm3 11.90* 8.23   HEMOGLOBIN g/dL 10.7* 9.8*   HEMATOCRIT % 34.1 29.9*   PLATELETS 10*3/mm3 231 194     Results from last 7 days   Lab Units 07/09/25  0345 07/08/25  0344   INR  1.78* 2.13*     Results from last 7 days   Lab Units 07/07/25  0756 07/06/25  0817   MAGNESIUM mg/dL 2.4 2.5*     Results from last 7 days   Lab Units 07/06/25  0817   CHOLESTEROL mg/dL 115   TRIGLYCERIDES mg/dL 131   HDL CHOL mg/dL 35*     Results from last 7 days   Lab Units 07/06/25  0817   PROBNP pg/mL 16,731.0*         I reviewed the patient's new clinical results.  I personally viewed and interpreted the patient's EKG  Current Medications:   Scheduled Meds:arformoterol, 15 mcg, Nebulization, BID - RT  budesonide, 0.5 mg, Nebulization, BID - RT  buPROPion, 150 mg, Nasogastric, Q12H  cefTRIAXone, 2,000 mg, Intravenous, Q24H  [Held by provider] digoxin, 125 mcg, Oral, Every Other Day  famotidine, 20 mg, Nasogastric, Daily  insulin regular, 2-7 Units, Subcutaneous, Q6H  ipratropium-albuterol, 3 mL, Nebulization, 4x Daily - RT  levothyroxine, 50 mcg, Nasogastric, Q AM  melatonin, 2.5 mg, Nasogastric, Nightly  [Held by provider] metoprolol tartrate, 12.5 mg, Nasogastric, Q12H  pramipexole, 0.5 mg, Nasogastric, BID  pregabalin, 50 mg, Nasogastric, Q12H  rosuvastatin, 10 mg, Nasogastric, Nightly      Continuous Infusions:dexmedetomidine, 0.2-1.5 mcg/kg/hr, Last Rate: 0.4 mcg/kg/hr (07/09/25 0823)  norepinephrine, 0.02-0.3 mcg/kg/min, Last Rate: 0.112 mcg/kg/min (07/09/25 0821)  Pharmacy  to dose warfarin,         Allergies:  Allergies   Allergen Reactions    Penicillins Rash     RASH 30 YRS AGO NO HOSPITALIZATION       Assessment:   Left femur fracture status post operative fixation  Atrial fibrillation with RVR  Chronic diastolic heart failure  Mild aortic valve stenosis  Moderate to severe mitral valve stenosis  JAIME on CKD  COPD    Plan:      - POD #4 of hip surgery (left intramedullary nailing of intertrochanteric hip fracture by Dr. Schofield)    -Still requires significant amount of oxygen support with high flow oxygen and actually on BiPAP this morning.    - Known history of atrial fibrillation, rate reasonably controlled in the 90-100s.patient was on digoxin 125 mcg every other day, which was held by pharmacy due to worsening renal function. Cr improved to 1.2 this morning.    - Started on low-dose metoprolol 12.5 bid to help with rate control, being held due to hypotension.  [id number] score 5, she is on warfarin.    - Echo earlier this year 2/2025 showed LVEF greater than 70%, severe mitral annular calcification (MAC) with atypical appearing anterior leaflet echodensity as well as moderate to severe functional MS, also mild AS, moderate pulmonary hypertension.  She appears grossly compensated on exam, diuretic management per nephrology.    - Significant coronary calcification on CTA chest from 12/2019 upon independent image review.  Lipids within normal limits but given clinical profile and imaging evidence of extensive atherosclerosis, patient was started on Crestor 10 daily this admission.    Trev Walter MD  07/09/25  09:14 EDT

## 2025-07-09 NOTE — SIGNIFICANT NOTE
07/09/25 1514   OTHER   Discipline physical therapist   Rehab Time/Intention   Session Not Performed other (see comments)  (pt not medically appropriate for PT today, discussed with CRESENCIO Michael. will check status tomorrow)   Recommendation   PT - Next Appointment 07/10/25

## 2025-07-10 NOTE — PROGRESS NOTES
[facility]: Warfarin Dosing/Monitoring Consult    Shani Phillip is a 74 y.o. female, estimated creatinine clearance is 36.1 mL/min (A) (by C-G formula based on SCr of 1.17 mg/dL (H)). weighing 66.1 kg (145 lb 11.6 oz).    Results from last 7 days   Lab Units 07/10/25  0357 07/09/25  0345 07/08/25  0344 07/07/25  0756 07/06/25  0400 07/05/25  0324   INR  1.48* 1.78* 2.13* 2.06* 2.09* 1.81*   HEMOGLOBIN g/dL  --  10.7* 9.8* 10.0* 10.0* 11.8*   HEMATOCRIT %  --  34.1 29.9* 29.8* 31.1* 36.5   PLATELETS 10*3/mm3  --  231 194 196 192 340     Prior to admission anticoagulation: Current patient at [facility]. Appears to have been taking warfarin 2.5 mg PO daily until admission 6/10-6/14. Patient has needed frequent dose adjustments and doses held since then per [facility] notes at least in part due to DDIs with steroids and antibiotics prescribed at discharge.     Hospital Anticoagulation:  Consulting provider: Tristan Blackwell MD   Start date: prior to admission on 7/3; restarting 7/6   Indication: A Fib - requiring full anticoagulation  Target INR: 2 - 3  Expected duration: indefinite    Bridge Therapy: No      Potential food or drug interactions:   May enhance the anticoagulant effect of warfarin:   Azithromycin (stopped)  Ceftriaxone   Methylprednisolone (stopped 7/5)    Diet Order   Procedures    NPO Diet NPO Type: Tube Feeding     Education complete?/Date: N/A; home medication    Assessment/Plan:   Dose 5 mg today   RN/provider to monitor for any signs or symptoms of bleeding.   Follow up daily INRs and dose adjustments. INR ordered daily with AM labs while inpatient.    Pharmacy will continue to follow until discharge or discontinuation of warfarin.     Geoffrey Andrea III, PharmD  Clinical Pharmacist

## 2025-07-10 NOTE — PROGRESS NOTES
Name: Shani Phillip ADMIT: 7/3/2025   : 1950  PCP: Rodríguez Walker MD    MRN: [id number] LOS: 7 days   AGE/SEX: 74 y.o. female  ROOM: [facility]     Subjective   Subjective   Patient transferred to the palliative care unit for comfort measures. Multiple family members at bedside.    Objective   Objective   Vital Signs  Temp:  [97.4 °F (36.3 °C)-100.5 °F (38.1 °C)] 98.9 °F (37.2 °C)  Heart Rate:  [] 122  Resp:  [18-24] 24  Arterial Line BP: ()/(40-71) 119/50  SpO2:  [89 %-100 %] 92 %  on  Flow (L/min) (Oxygen Therapy):  [3-50] 4;   Device (Oxygen Therapy): nasal cannula  Body mass index is 27.53 kg/m².  Physical Exam  Vitals and nursing note reviewed.   Constitutional:       Appearance: She is ill-appearing.   HENT:      Head: Normocephalic and atraumatic.      Nose: Nose normal.      Mouth/Throat:      Mouth: Mucous membranes are moist.      Pharynx: Oropharynx is clear.   Pulmonary:      Comments: Coarse breath sounds  Neurological:      Mental Status: She is lethargic.       Results Review     I reviewed the patient's new clinical results.  Results from last 7 days   Lab Units 25  0345 25  0344 25  0756 25  0400   WBC 10*3/mm3 11.90* 8.23 9.33 12.51*   HEMOGLOBIN g/dL 10.7* 9.8* 10.0* 10.0*   PLATELETS 10*3/mm3 231 194 196 192     Results from last 7 days   Lab Units 07/10/25  1454 07/10/25  0357 25  0812 25  0344   SODIUM mmol/L 158* 151* 145  144 140   POTASSIUM mmol/L 3.9 4.4 4.5  4.4 4.4   CHLORIDE mmol/L 116* 116* 113*  111* 107   CO2 mmol/L 21.0* 25.4 22.6  24.7 21.5*   BUN mg/dL 25.0* 25.0* 31.0*  31.0* 44.0*   CREATININE mg/dL 1.13* 1.17* 1.34*  1.24* 1.74*   GLUCOSE mg/dL 152* 180* 186*  184* 88   EGFR mL/min/1.73 50.8* 48.8* 41.4*  45.5* 30.3*     Results from last 7 days   Lab Units 07/10/25  1454 07/10/25  0357 25  0812 25  0344 25  0756 25  0817   ALBUMIN g/dL 2.0* 2.4* 2.4*  2.6* 2.5* 2.9* 2.7*   BILIRUBIN  mg/dL  --   --  0.5 0.5 0.4 0.3   ALK PHOS U/L  --   --  104 97 103 96   AST (SGOT) U/L  --   --  27 42* 53* 31   ALT (SGPT) U/L  --   --  14 17 21 11     Results from last 7 days   Lab Units 07/10/25  1454 07/10/25  0357 07/10/25  0037 07/09/25  0812 07/08/25  0344 07/07/25  0756 07/06/25  0817   CALCIUM mg/dL 6.2* 7.6*  --  8.4*  8.3* 8.6 9.2 8.4*   ALBUMIN g/dL 2.0* 2.4*  --  2.4*  2.6* 2.5* 2.9* 2.7*   MAGNESIUM mg/dL  --   --   --   --   --  2.4 2.5*   PHOSPHORUS mg/dL 4.3  4.3 2.0* 2.3* 1.4*  --  2.9  --      Results from last 7 days   Lab Units 07/07/25  0756 07/05/25  1612   PROCALCITONIN ng/mL 0.67* 1.51*     Glucose   Date/Time Value Ref Range Status   07/10/2025 1223 145 (H) 70 - 130 mg/dL Final   07/10/2025 0605 176 (H) 70 - 130 mg/dL Final   07/09/2025 2351 196 (H) 70 - 130 mg/dL Final   07/09/2025 1744 147 (H) 70 - 130 mg/dL Final   07/09/2025 1158 158 (H) 70 - 130 mg/dL Final   07/08/2025 2319 144 (H) 70 - 130 mg/dL Final   07/08/2025 1740 156 (H) 70 - 130 mg/dL Final       XR Chest 1 View  Result Date: 7/9/2025  As above    This report was finalized on 7/9/2025 11:30 AM by Dr. Osmar Cannon M.D on Workstation: [id number]        I have personally reviewed all medications:  Scheduled Medications   Infusions  Pharmacy Consult - Pharmacy to dose,     Diet  NPO Diet NPO Type: Tube Feeding    I have personally reviewed:  [x]  Laboratory   [x]  Microbiology   [x]  Radiology   [x]  EKG/Telemetry  []  Cardiology/Vascular   []  Pathology    [x]  Records       Assessment/Plan     Active Hospital Problems    Diagnosis  POA    **Closed left hip fracture [S72.002A]  Yes    Acute respiratory failure with hypoxia [J96.01]  Yes    Stage 3b chronic kidney disease [N18.32]  Yes    Chronic combined systolic and diastolic CHF (congestive heart failure) [I50.42]  Yes    Acquired hypothyroidism [E03.9]  Yes    Depression [F32.A]  Yes      Resolved Hospital Problems   No resolved problems to display.   Patient admitted  with left hip fracture and underwent IM nail on 7/5/25. She had a complicated hospital course due to aspiration pneumonia with acute hypoxic respiratory failure, COPD exacerbation, shock requiring pressors, and severe hypoactive delirium. Her condition has continued to deteriorate in spite of aggressive measures. Her family as decided to pursue comfort care. We will continue comfort medications as needed. [facility] consulted.    Rodríguez Sandoval MD  [facility]  07/10/25  19:03 EDT

## 2025-07-10 NOTE — PROGRESS NOTES
[facility] Progress Note      Patient Name: Shani Phillip  : 1950  MRN: [id number]  Primary Care Physician:  Rodríguez Walker MD  Date of admission: 7/3/2025    Subjective     Interval History:   Patient seen today for follow-up on acute kidney injury.  Sleepy this morning.   No events noted overnight by nursing staff  Ox requirements still high  Review of Systems:   As noted above    Objective     Vitals:   Temp:  [97.4 °F (36.3 °C)-100.5 °F (38.1 °C)] 100.5 °F (38.1 °C)  Heart Rate:  [] 103  Resp:  [18-24] 24  BP: ()/() 122/48  Arterial Line BP: ()/() 112/47  Flow (L/min) (Oxygen Therapy):  [3-50] 50    Intake/Output Summary (Last 24 hours) at 7/10/2025 0819  Last data filed at 7/10/2025 0500  Gross per 24 hour   Intake 2290.7 ml   Output 1125 ml   Net 1165.7 ml       Physical Exam:    General Appearance: More awake and alert, chronically, no acute distress on high flow oxygen  Skin: warm and dry  HEENT: oral mucosa normal, nonicteric sclera.  NG tube noted  Neck: No JVD  Lungs: Bilateral rhonchi, breathing effort not labored  Heart: RRR, normal S1 and S2  Abdomen: soft, nontender, nondistended  : no palpable bladder  Extremities: Trace lower extremity edema  Neuro: Moving all extremities    Scheduled Meds:     arformoterol, 15 mcg, Nebulization, BID - RT  budesonide, 0.5 mg, Nebulization, BID - RT  buPROPion, 150 mg, Nasogastric, Q12H  cefTRIAXone, 2,000 mg, Intravenous, Q24H  digoxin, 125 mcg, Oral, Every Other Day  famotidine, 20 mg, Nasogastric, Daily  insulin regular, 2-7 Units, Subcutaneous, Q6H  ipratropium-albuterol, 3 mL, Nebulization, 4x Daily - RT  levothyroxine, 50 mcg, Nasogastric, Q AM  melatonin, 2.5 mg, Nasogastric, Nightly  [Held by provider] metoprolol tartrate, 12.5 mg, Nasogastric, Q12H  pramipexole, 0.5 mg, Nasogastric, BID  pregabalin, 50 mg, Nasogastric, Q12H  rosuvastatin, 10 mg, Nasogastric, Nightly  sodium phosphate,  15 mmol, Intravenous, Once      IV Meds:   dexmedetomidine, 0.2-1.5 mcg/kg/hr, Last Rate: 1.5 mcg/kg/hr (07/10/25 0812)  norepinephrine, 0.02-0.3 mcg/kg/min, Last Rate: 0.04 mcg/kg/min (07/10/25 0100)  Pharmacy Consult - Pharmacy to dose,   Pharmacy to dose warfarin,         Results Reviewed:   I have personally reviewed the results from the time of this admission to 7/10/2025 08:19 EDT     Results from last 7 days   Lab Units 07/10/25  0357 07/09/25  0812 07/08/25  0344 07/07/25  0756   SODIUM mmol/L 151* 145  144 140 140   POTASSIUM mmol/L 4.4 4.5  4.4 4.4 4.5   CHLORIDE mmol/L 116* 113*  111* 107 104   CO2 mmol/L 25.4 22.6  24.7 21.5* 23.7   BUN mg/dL 25.0* 31.0*  31.0* 44.0* 35.0*   CREATININE mg/dL 1.17* 1.34*  1.24* 1.74* 2.03*   CALCIUM mg/dL 7.6* 8.4*  8.3* 8.6 9.2   BILIRUBIN mg/dL  --  0.5 0.5 0.4   ALK PHOS U/L  --  104 97 103   ALT (SGPT) U/L  --  14 17 21   AST (SGOT) U/L  --  27 42* 53*   GLUCOSE mg/dL 180* 186*  184* 88 82       Estimated Creatinine Clearance: 36.1 mL/min (A) (by C-G formula based on SCr of 1.17 mg/dL (H)).    Results from last 7 days   Lab Units 07/10/25  0357 07/10/25  0037 07/09/25  0812 07/07/25  0756 07/06/25  0817   MAGNESIUM mg/dL  --   --   --  2.4 2.5*   PHOSPHORUS mg/dL 2.0* 2.3* 1.4* 2.9  --        Results from last 7 days   Lab Units 07/07/25  0756 07/04/25  1435   URIC ACID mg/dL 13.3* 12.3*       Results from last 7 days   Lab Units 07/09/25  0345 07/08/25  0344 07/07/25  0756 07/06/25  0400 07/05/25  0324   WBC 10*3/mm3 11.90* 8.23 9.33 12.51* 20.40*   HEMOGLOBIN g/dL 10.7* 9.8* 10.0* 10.0* 11.8*   PLATELETS 10*3/mm3 231 194 196 192 340       Results from last 7 days   Lab Units 07/10/25  0357 07/09/25  0345 07/08/25  0344 07/07/25  0756 07/06/25  0400   INR  1.48* 1.78* 2.13* 2.06* 2.09*       Assessment / Plan     ASSESSMENT:  Acute kidney injury on top of chronic kidney disease stage IIIb with baseline creatinine 1.4 and 1.5.  Creatinine is down to 2.74 with  increased urine output, electrolyte within acceptable range, albumin is 2.7   hypertension with CKD, reasonably controlled  Acute hypoxemic hypercapnic respiratory failure on chronic respiratory failure, secondary to possible COPD exacerbation.  And possible pneumonia patient is better.  COPD exacerbation   Sepsis treated, vasopressors were discontinued  Hyponatremia resolved  Hyperkalemia potassium resolved  Hyperuricemia      PLAN:  Given hypernatremia we will continue to hold diuretic therapy.  Free water flushes via NG  Repeat sodium at 2 PM  Surveillance labs      Thank you for involving us in the care of Shani Phillip.  Please feel free to call with any questions.    Jessica Wood MD  07/10/25  08:19 EDT    [facility]  [phone]    Parts of this note may be an electronic transcription/translation of spoken language to printed text using the Dragon dictation system.

## 2025-07-10 NOTE — PROGRESS NOTES
Dr. IVETH Blackwell    [facility] INTENSIVE CARE        Patient ID:  Name:  Shani Phillip  MRN:  [id number]  1950  74 y.o.  female            CC/Reason for visit: Acute delirium, status post left femoral fracture with nailing, hypercapnic respiratory failure     Interval hx: Patient remains cephalopathic, obtunded.  Not answering any questions.  Occasionally she will moan and groan.  Eyes remain closed.  Not following commands.   and daughter at bedside.  Daughter is very reasonable and has very realistic expectations.  Patient still requiring high flow oxygen, Airvo high flow oxygen with 50 L satting and 62% FiO2.  End-tidal CO2 is 32    ROS: unobtainable    Vitals:  Vitals:    07/10/25 0815 07/10/25 0830 07/10/25 0845 07/10/25 0855   BP:       Pulse: 94 112 108 108   Resp:       Temp:       TempSrc:       SpO2: 100% 99% 97% 99%   Weight:       Height:               Body mass index is 27.53 kg/m².    Intake/Output Summary (Last 24 hours) at 7/10/2025 1019  Last data filed at 7/10/2025 0812  Gross per 24 hour   Intake 2290.7 ml   Output 1725 ml   Net 565.7 ml       Exam:  GEN:  Appears acutely ill, some audible pharyngeal secretions which the patient is not able to clear  Hypoactive delirium, not opening eyes, not following commands, not speaking  LUNGS: Distant breath sounds, some scattered rhonchi bilat, no use of accessory muscles  CV:  Normal S1S2, without murmur, some leg edema  ABD:  Non tender, no enlarged liver or masses      Scheduled meds:  arformoterol, 15 mcg, Nebulization, BID - RT  budesonide, 0.5 mg, Nebulization, BID - RT  buPROPion, 150 mg, Nasogastric, Q12H  cefTRIAXone, 2,000 mg, Intravenous, Q24H  digoxin, 125 mcg, Oral, Every Other Day  famotidine, 20 mg, Nasogastric, Daily  insulin regular, 2-7 Units, Subcutaneous, Q6H  ipratropium-albuterol, 3 mL, Nebulization, 4x Daily - RT  levothyroxine, 50 mcg, Nasogastric, Q AM  melatonin, 2.5 mg, Nasogastric, Nightly  [Held by  provider] metoprolol tartrate, 12.5 mg, Nasogastric, Q12H  pramipexole, 0.5 mg, Nasogastric, BID  pregabalin, 50 mg, Nasogastric, Q12H  rosuvastatin, 10 mg, Nasogastric, Nightly      IV meds:                      dexmedetomidine, 0.2-1.5 mcg/kg/hr, Last Rate: 1.5 mcg/kg/hr (07/10/25 0812)  norepinephrine, 0.02-0.3 mcg/kg/min, Last Rate: 0.04 mcg/kg/min (07/10/25 0100)  Pharmacy Consult - Pharmacy to dose,   Pharmacy to dose warfarin,         Data Review:   I reviewed the patient's medications and new clinical results.            Results from last 7 days   Lab Units 07/10/25  0357 07/09/25  0812 07/09/25  0345 07/08/25  0344 07/07/25  0756 07/06/25  0817 07/06/25  0400 07/05/25  1612   SODIUM mmol/L 151* 145  144  --  140 140 137  --   --    POTASSIUM mmol/L 4.4 4.5  4.4  --  4.4 4.5 4.9  --   --    CHLORIDE mmol/L 116* 113*  111*  --  107 104 103  --   --    CO2 mmol/L 25.4 22.6  24.7  --  21.5* 23.7 21.0*  --   --    BUN mg/dL 25.0* 31.0*  31.0*  --  44.0* 35.0* 49.0*  --   --    CREATININE mg/dL 1.17* 1.34*  1.24*  --  1.74* 2.03* 2.74*  --   --    CALCIUM mg/dL 7.6* 8.4*  8.3*  --  8.6 9.2 8.4*  --   --    BILIRUBIN mg/dL  --  0.5  --  0.5 0.4 0.3  --   --    ALK PHOS U/L  --  104  --  97 103 96  --   --    ALT (SGPT) U/L  --  14  --  17 21 11  --   --    AST (SGOT) U/L  --  27  --  42* 53* 31  --   --    GLUCOSE mg/dL 180* 186*  184*  --  88 82 81  --   --    WBC 10*3/mm3  --   --  11.90* 8.23 9.33  --    < >  --    HEMOGLOBIN g/dL  --   --  10.7* 9.8* 10.0*  --    < >  --    PLATELETS 10*3/mm3  --   --  231 194 196  --    < >  --    INR  1.48*  --  1.78* 2.13* 2.06*  --    < >  --    PROBNP pg/mL  --   --   --   --   --  16,731.0*  --   --    PROCALCITONIN ng/mL  --   --   --   --  0.67*  --   --  1.51*    < > = values in this interval not displayed.     Results from last 7 days   Lab Units 07/07/25  2217   URINECX  No growth     Results from last 7 days   Lab Units 07/04/25  1648 07/04/25  7770    HSTROP T ng/L 69* 73*     Results from last 7 days   Lab Units 07/09/25  0928 07/07/25  1250 07/04/25  0400   PH, ARTERIAL pH units 7.411 7.299* 7.337*   PCO2, ARTERIAL mm Hg 46.1* 47.3* 58.2*   PO2 ART mm Hg 84.2 128.5* 56.1*   O2 SATURATION ART % 96.3 98.5 85.9*   FLOW RATE lpm  --   --  5.0000   MODALITY  BiPap Optiflow Cannula     Microbiology Results (last 10 days)       Procedure Component Value - Date/Time    Urine Culture - Urine, Indwelling Urethral Catheter [[id number]]  (Normal) Collected: 07/07/25 2217    Lab Status: Final result Specimen: Urine from Indwelling Urethral Catheter Updated: 07/09/25 1421     Urine Culture No growth               ASSESSMENT:   Agitated delirium, alternating with hypoactive delirium  Closed left hip fracture, requiring left femoral nailing by orthopedic   Hypovolemic Shock   Acute exacerbation of COPD  Pneumonia  Acute on chronic hypercapnic and hypoxic respiratory failure (patient of Dr. Zamora in our office  Fall causing closed left hip fracture  Shock requiring IV pressors   Chronic combined systolic and diastolic CHF (congestive heart failure)   Acute respiratory failure with hypoxia  Oliguria  Acute kidney injury  Atrial fibrillation    Acquired hypothyroidism    Stage 3b chronic kidney disease    Chronic combined systolic and diastolic CHF (congestive heart failure)    Acute respiratory failure with hypoxia  Hypernatremia      PLAN:  Patient remains critically ill.  Unfortunately she has had agitated delirium alternating with hypoactive delirium for the past 72 hours.  Precedex drip overnight did not help for the past 48 hours.  We are stopping Precedex.  I do not suspect uncontrolled pain.  She has been receiving low-dose morphine for her recent left femoral fracture pending.  We have continued her home Wellbutrin dose and added low-dose Lyrica.  Despite all of this the patient remains delirious.  She is not protecting her airway well.  I have expressed all of this to  the family today.  Her daughter has some experience in the healthcare world.  She is very reasonable and she understands that her mother is not doing well.  We discussed goals of care and she does not want her mother to be placed on any artificial life-sustaining machines.  She does not want dialysis, CPR or intubation.  The patient's  was also present for this entire conversation.  She remains in shock, on norepinephrine drip.  She has had adequate urine output.  Her renal function is improving.  She has developed hypernatremia.  We will start increasing free water flushes through feeding tube.  Completing her fifth day of ceftriaxone tonight for aspiration pneumonia.  Procalcitonin responding.  Afebrile.  INR being monitored by pharmacy with Coumadin dosing.    No intubation, no CPR.    Total critical care time 35 minutes    Tristan Blackwell MD  7/10/2025

## 2025-07-10 NOTE — PLAN OF CARE
Goal Outcome Evaluation:      Pt's family decided to make pt DNR/DNI, and she was transported to [place] [address]. Report given to Enma Dior dc'madelyn, all drips stopped other than the levo. Tube feeds stopped and cortrak removed. FC left in for comfort. CL

## 2025-07-10 NOTE — SIGNIFICANT NOTE
07/10/25 1020   OTHER   Discipline physical therapist   Rehab Time/Intention   Session Not Performed other (see comments)  (pt remains medically inappropriate for PT today, discussed with CRESENCIO Michael)   Recommendation   PT - Next Appointment 07/11/25

## 2025-07-10 NOTE — PLAN OF CARE
Goal Outcome Evaluation:  Plan of Care Reviewed With: patient           Outcome Evaluation: pt moved to palliative around 1745, keep pt comfortable. Airvow remains in place, d/c'd yvonnek and allyssa. will cont to monitor                             Problem: Adult Inpatient Plan of Care  Goal: Plan of Care Review  Outcome: Not Progressing  Flowsheets (Taken 7/10/2025 1741)  Outcome Evaluation: pt moved to palliative around 1745, keep pt comfortable. Airvow remains in place, d/c'd jonnierak and allyssa. will cont to monitor  Plan of Care Reviewed With: patient  Goal: Patient-Specific Goal (Individualized)  Outcome: Not Progressing  Goal: Absence of Hospital-Acquired Illness or Injury  Outcome: Not Progressing  Goal: Optimal Comfort and Wellbeing  Outcome: Not Progressing  Goal: Readiness for Transition of Care  Outcome: Not Progressing     Problem: Fall Injury Risk  Goal: Absence of Fall and Fall-Related Injury  Outcome: Not Progressing     Problem: Skin Injury Risk Increased  Goal: Skin Health and Integrity  Outcome: Not Progressing     Problem: Sepsis/Septic Shock  Goal: Optimal Coping  Outcome: Not Progressing  Goal: Absence of Bleeding  Outcome: Not Progressing  Goal: Blood Glucose Level Within Target Range  Outcome: Not Progressing  Goal: Absence of Infection Signs and Symptoms  Outcome: Not Progressing  Goal: Optimal Nutrition Delivery  Outcome: Not Progressing     Problem: Restraint, Nonviolent  Goal: Absence of Harm or Injury  Outcome: Not Progressing     Problem: Enteral Nutrition  Goal: Absence of Aspiration Signs and Symptoms  Outcome: Not Progressing  Goal: Safe, Effective Therapy Delivery  Outcome: Not Progressing  Goal: Feeding Tolerance  Outcome: Not Progressing

## 2025-07-10 NOTE — PROGRESS NOTES
Hospital Follow Up    LOS:  LOS: 7 days   Patient Name: Shani Phillip  Age/Sex: 74 y.o. female  : 1950  MRN: [id number]    Day of Service: 07/10/25   Length of Stay: 7  Encounter Provider: Trev Walter MD  Place of Service: [facility] CARDIOLOGY  Patient Care Team:  Rodríguez Walker MD as PCP - General (Internal Medicine)  Mike Atkins MD as Surgeon (General Surgery)  Hayes Briones MD as Surgeon (Cardiothoracic Surgery)  Zenia Tinajero MD as Consulting Physician (Cardiology)  Clover Zamora MD as Consulting Physician (Pulmonary Disease)  Angy Smith APRN as Nurse Practitioner (Cardiology)  Lester Garcia RN as Ambulatory  (Population Health)    Subjective:     Chief Complaint: Hip fracture    Interval History: Decompensated further, she is on BiPAP and drowsy on exam; intubation was offered but declined by daughter.  Remains in A-fib with rate 90-100s on telemetry and requiring nor epi drip.      Objective:     Objective:  Temp:  [97.4 °F (36.3 °C)-100.5 °F (38.1 °C)] 100.5 °F (38.1 °C)  Heart Rate:  [] 108  Resp:  [18-24] 24  BP: ()/() 122/48  Arterial Line BP: ()/(40-94) 124/52     Intake/Output Summary (Last 24 hours) at 7/10/2025 0855  Last data filed at 7/10/2025 0500  Gross per 24 hour   Intake 2290.7 ml   Output 1125 ml   Net 1165.7 ml     Body mass index is 27.53 kg/m².      25  0509 25  0530 25  0600   Weight: 67.4 kg (148 lb 9.4 oz) 67.4 kg (148 lb 9.4 oz) 66.1 kg (145 lb 11.6 oz)     Weight change:       Physical Exam:   General : Drowsy.  Frail and chronic ill-appearing.  Neuro: Drowsy  Lungs:, Tachypneic, on BiPAP mask  CV: Irregular rhythm, normal rate, normal S1 and S2, no murmurs, gallops or rubs.  ABD: Soft, nontender, nondistended. Positive bowel sounds.  Extr: No edema or cyanosis, moves all extremities.  Some desquamation and cyanosis on lower EXTR    Lab Review:   Results from  last 7 days   Lab Units 07/10/25  0357 07/09/25  0812 07/08/25  0344   SODIUM mmol/L 151* 145  144 140   POTASSIUM mmol/L 4.4 4.5  4.4 4.4   CHLORIDE mmol/L 116* 113*  111* 107   CO2 mmol/L 25.4 22.6  24.7 21.5*   BUN mg/dL 25.0* 31.0*  31.0* 44.0*   CREATININE mg/dL 1.17* 1.34*  1.24* 1.74*   GLUCOSE mg/dL 180* 186*  184* 88   CALCIUM mg/dL 7.6* 8.4*  8.3* 8.6   AST (SGOT) U/L  --  27 42*   ALT (SGPT) U/L  --  14 17     Results from last 7 days   Lab Units 07/04/25  1648 07/04/25  1435   HSTROP T ng/L 69* 73*     Results from last 7 days   Lab Units 07/09/25  0345 07/08/25  0344   WBC 10*3/mm3 11.90* 8.23   HEMOGLOBIN g/dL 10.7* 9.8*   HEMATOCRIT % 34.1 29.9*   PLATELETS 10*3/mm3 231 194     Results from last 7 days   Lab Units 07/10/25  0357 07/09/25  0345   INR  1.48* 1.78*     Results from last 7 days   Lab Units 07/07/25  0756 07/06/25  0817   MAGNESIUM mg/dL 2.4 2.5*     Results from last 7 days   Lab Units 07/06/25  0817   CHOLESTEROL mg/dL 115   TRIGLYCERIDES mg/dL 131   HDL CHOL mg/dL 35*     Results from last 7 days   Lab Units 07/06/25  0817   PROBNP pg/mL 16,731.0*         I reviewed the patient's new clinical results.  I personally viewed and interpreted the patient's EKG  Current Medications:   Scheduled Meds:arformoterol, 15 mcg, Nebulization, BID - RT  budesonide, 0.5 mg, Nebulization, BID - RT  buPROPion, 150 mg, Nasogastric, Q12H  cefTRIAXone, 2,000 mg, Intravenous, Q24H  digoxin, 125 mcg, Oral, Every Other Day  famotidine, 20 mg, Nasogastric, Daily  insulin regular, 2-7 Units, Subcutaneous, Q6H  ipratropium-albuterol, 3 mL, Nebulization, 4x Daily - RT  levothyroxine, 50 mcg, Nasogastric, Q AM  melatonin, 2.5 mg, Nasogastric, Nightly  [Held by provider] metoprolol tartrate, 12.5 mg, Nasogastric, Q12H  pramipexole, 0.5 mg, Nasogastric, BID  pregabalin, 50 mg, Nasogastric, Q12H  rosuvastatin, 10 mg, Nasogastric, Nightly  sodium phosphate, 15 mmol, Intravenous, Once      Continuous  Infusions:dexmedetomidine, 0.2-1.5 mcg/kg/hr, Last Rate: 1.5 mcg/kg/hr (07/10/25 0812)  norepinephrine, 0.02-0.3 mcg/kg/min, Last Rate: 0.04 mcg/kg/min (07/10/25 0100)  Pharmacy Consult - Pharmacy to dose,   Pharmacy to dose warfarin,         Allergies:  Allergies   Allergen Reactions    Penicillins Rash     RASH 30 YRS AGO NO HOSPITALIZATION       Assessment:   Left femur fracture status post operative fixation  Atrial fibrillation with RVR  Chronic diastolic heart failure  Mild aortic valve stenosis  Moderate to severe mitral valve stenosis  JAIME on CKD  COPD    Plan:      - POD #5 of hip surgery (left intramedullary nailing of intertrochanteric hip fracture by Dr. Schofield)    - Respiratory status worsened despite BiPAP support, daughter declined intubation and she is now DNR/DNI.    - Known history of atrial fibrillation, rate reasonably controlled in the 90-100s.patient was on digoxin 125 mcg every other day, which was held by pharmacy due to worsening renal function. Cr improved to 1.2 this morning and digoxin was restarted.    - Started on low-dose metoprolol 12.5 bid to help with rate control, being held due to hypotension.  [id number] score 5, she is on warfarin.    - Echo earlier this year 2/2025 showed LVEF greater than 70%, severe mitral annular calcification (MAC) with atypical appearing anterior leaflet echodensity as well as moderate to severe functional MS, also mild AS, moderate pulmonary hypertension.  She appears grossly compensated on exam, diuretic management per nephrology.    - Significant coronary calcification on CTA chest from 12/2019 upon independent image review.  Started on Crestor 10 daily this admission.    -Overall she has significantly deteriorated since yesterday,  is updated at bedside and daughter is coming into the hospital.  Strongly encourage goals of care discussion specifically regarding comfort care.    High risk due to age, frailty, acute hypoxic respiratory failure in  the setting of multiple comorbodities predisposing to major adverse events including myocardial infarction, stroke, hospitalization, and multi-agent drug therapy requiring intensive monitoring for toxicity    Trev Walter MD  07/10/25  08:55 EDT

## 2025-07-10 NOTE — PROGRESS NOTES
Patient Name: Shani Phillip  YOB: 1950  MRN: [id number]  Admission date: 7/3/2025    Reason for Encounter: Follow-up/Progress Note    TF's: IsoSource 1.5 goal at 55ml/hr and water flushes per MD.     RD to follow    [facility] Clinical Nutrition Progress Note       Nutrition Intervention Updates: Continue TF's   MD adjusted water flushes to [id number] due to hypernatremia  RD to follow      Subjective: Diuretics held per nephrology due to hypernatremia       PO Diet: NPO Diet NPO Type: Tube Feeding   PO Supplements: None    PO Intake:  NPO       Current nutrition support: IsoSource 1.5 at 55ml/hr water flushes [id number]    Nutrition support review: TF's verified   Meds Levo, precedex, pepcid, rocephin,   Labs: Glu 176, BUN 25, cr 1.17, phos 2.0-replaced    Drain NG cortrak with NB   GI Function:  BM 7/10        Brief Weight Review:    Wt Readings from Last 1 Encounters:   07/09/25 0600 66.1 kg (145 lb 11.6 oz)   07/08/25 0530 67.4 kg (148 lb 9.4 oz)   07/05/25 0509 67.4 kg (148 lb 9.4 oz)   07/04/25 0600 62.3 kg (137 lb 5.6 oz)   07/03/25 2040 64.6 kg (142 lb 6.7 oz)   07/03/25 1627 64 kg (141 lb)        Results from last 7 days   Lab Units 07/10/25  0357 07/09/25  0812 07/08/25  0344 07/07/25  0756   SODIUM mmol/L 151* 145  144 140 140   POTASSIUM mmol/L 4.4 4.5  4.4 4.4 4.5   CHLORIDE mmol/L 116* 113*  111* 107 104   CO2 mmol/L 25.4 22.6  24.7 21.5* 23.7   BUN mg/dL 25.0* 31.0*  31.0* 44.0* 35.0*   CREATININE mg/dL 1.17* 1.34*  1.24* 1.74* 2.03*   CALCIUM mg/dL 7.6* 8.4*  8.3* 8.6 9.2   BILIRUBIN mg/dL  --  0.5 0.5 0.4   ALK PHOS U/L  --  104 97 103   ALT (SGPT) U/L  --  14 17 21   AST (SGOT) U/L  --  27 42* 53*   GLUCOSE mg/dL 180* 186*  184* 88 82     Results from last 7 days   Lab Units 07/10/25  0357 07/09/25  0812 07/09/25  0345 07/08/25  0344 07/07/25  0756 07/06/25  0817   MAGNESIUM mg/dL  --   --   --   --  2.4 2.5*   PHOSPHORUS mg/dL 2.0*   < >  --   --  2.9  --    PLATELETS 10*3/mm3   --   --  231   < > 196  --    HEMOGLOBIN g/dL  --   --  10.7*   < > 10.0*  --    HEMATOCRIT %  --   --  34.1   < > 29.8*  --    TRIGLYCERIDES mg/dL  --   --   --   --   --  131    < > = values in this interval not displayed.     Lab Results   Component Value Date    HGBA1C 5.80 (H) 06/12/2025       Electronically signed by:  Radha Jaramillo RD  07/10/25 09:45 EDT

## 2025-07-11 NOTE — CASE MANAGEMENT/SOCIAL WORK
Case Management Discharge Note      Final Note:  on 2025.    Provided Post Acute Provider List?: Yes  Post Acute Provider List: Nursing Home, DME Supplier, Home Health, Inpatient Rehab  Provided Post Acute Provider Quality & Resource List?: Yes  Post Acute Provider Quality and Resource List: Home Health, Inpatient Rehab, Nursing Home  Delivered To: Support Person  Method of Delivery: In person    Selected Continued Care - Discharged on 2025 Admission date: 7/3/2025 - Discharge disposition:       Destination    No services have been selected for the patient.                Durable Medical Equipment    No services have been selected for the patient.                Dialysis/Infusion    No services have been selected for the patient.                Home Medical Care    No services have been selected for the patient.                Therapy    No services have been selected for the patient.                Community Resources    No services have been selected for the patient.                Community & DME    No services have been selected for the patient.                    Selected Continued Care - Episodes Includes continued care and service providers with selected services from the active episodes listed below      Chronic Care Management Episode start date: 2025 (Paused)   There are no active outsourced providers for this episode.                      Final Discharge Disposition Code: 20 -

## 2025-07-11 NOTE — PLAN OF CARE
Goal Outcome Evaluation:           Progress: declining  Outcome Evaluation: Patient slept throughout the shift.  , friends remain at bedside.  Oxygen remains on at 4L n/c.  Robinul 0.4mg x2, Morphine 6mg x3 for symptom management.  Patient actively dying, PPS 10%.  Nursing will continue plan of care.

## 2025-07-11 NOTE — PROGRESS NOTES
Family decided to proceed with comfort measures  Nephrology will sign off please call if you have questions or concerns

## 2025-07-14 NOTE — DISCHARGE SUMMARY
Primary Care Physician: Rodríguez Walker MD    Date of Admission:  7/3/2025  Date of Death:  2025  Time of Death:  8:20 AM    Cause of Death: Left Hip Fracture    Final Diagnosis:  Active Hospital Problems    Diagnosis  POA    **Closed left hip fracture [S72.002A]  Yes    Acute respiratory failure with hypoxia [J96.01]  Yes    Stage 3b chronic kidney disease [N18.32]  Yes    Chronic combined systolic and diastolic CHF (congestive heart failure) [I50.42]  Yes    Acquired hypothyroidism [E03.9]  Yes    Depression [F32.A]  Yes      Resolved Hospital Problems   No resolved problems to display.       Presenting Problem/History of Present Illness:  Closed left hip fracture [S72.002A]  Closed displaced intertrochanteric fracture of left femur, initial encounter [S72.142A]      Hospital Course  The Patient was a 74 y.o. female with the above past medical history admitted with a left hip fracture and underwent IM nail on 25. She had a complicated hospital course due to aspiration pneumonia with acute hypoxic respiratory failure, COPD exacerbation, shock requiring pressors, and severe hypoactive delirium. Her condition continued to deteriorate in spite of aggressive measures. Her family decided to pursue comfort measures and she was transferred to the palliative care unit for this. She continued to decline and  peacefully on the morning of 2025.         Rodríguez Sandoval MD  25  09:16 EDT

## (undated) DEVICE — CATH DIAG IMPULSE FL3.5 5F 100CM

## (undated) DEVICE — GW EMR FIX EXCHG J STD .035 3MM 260CM

## (undated) DEVICE — ANTIBACTERIAL UNDYED BRAIDED (POLYGLACTIN 910), SYNTHETIC ABSORBABLE SUTURE: Brand: COATED VICRYL

## (undated) DEVICE — CATH VENT MIV RADL PIG ST TIP 5F 110CM

## (undated) DEVICE — GLV SURG BIOGEL LTX PF 8

## (undated) DEVICE — GLV SURG SIGNATURE ESSENTIAL PF LTX SZ8.5

## (undated) DEVICE — SYR LUERLOK 20CC BX/50

## (undated) DEVICE — PK CATH CARD 40

## (undated) DEVICE — NITINOL WIRE WITH HYDROPHILIC TIP: Brand: SENSOR

## (undated) DEVICE — PROXIMATE RH ROTATING HEAD SKIN STAPLERS (35 REGULAR) CONTAINS 35 STAINLESS STEEL STAPLES: Brand: PROXIMATE

## (undated) DEVICE — BNDG ELAS CO-FLEX SLF ADHR 6IN 5YD LF STRL

## (undated) DEVICE — GLV SURG SENSICARE PI PF LF 8.5 GRN STRL

## (undated) DEVICE — GOWN,SIRUS,POLYRNF,BRTHSLV,XLN/XXL,18/CS: Brand: MEDLINE

## (undated) DEVICE — GUIDE PIN 3.2MM X 343MM: Brand: TRIGEN

## (undated) DEVICE — NEEDLE, QUINCKE, 20GX3.5": Brand: MEDLINE

## (undated) DEVICE — SUT VIC 2/0 CT2 27IN J269H

## (undated) DEVICE — MAT FLR ABS LIQUILOC 28X56IN PNK

## (undated) DEVICE — DRESSING,GAUZE,XEROFORM,CURAD,1"X8",ST: Brand: CURAD

## (undated) DEVICE — 3.0MM X 1000MM BALL TIP GUIDE ROD: Brand: TRIGEN

## (undated) DEVICE — APPL CHLORAPREP HI/LITE 26ML ORNG

## (undated) DEVICE — PK HIP PINNING 40

## (undated) DEVICE — SOL ISO/ALC 70PCT 4OZ

## (undated) DEVICE — MEDICINE CUP, GRADUATED, STER: Brand: MEDLINE

## (undated) DEVICE — CONTAINER,SPECIMEN,OR STERILE,4OZ: Brand: MEDLINE

## (undated) DEVICE — CATH DIAG IMPULSE FR4 5F 100CM

## (undated) DEVICE — TIDISHIELD UROLOGY DRAIN BAGS FROSTY VINYL STERILE FITS SIEMENS UROSKOP ACCESS 20 PER CASE: Brand: TIDISHIELD

## (undated) DEVICE — GLIDESHEATH SLENDER STAINLESS STEEL KIT: Brand: GLIDESHEATH SLENDER

## (undated) DEVICE — GLV SURG PREMIERPRO ORTHO LTX PF SZ8.5 BRN

## (undated) DEVICE — 4.0MM LONG AO PILOT DRILL: Brand: TRIGEN

## (undated) DEVICE — KT MANIFLD CARDIAC

## (undated) DEVICE — 3.2MM X 300MM DRILL BIT WITH QUICK CONNECT

## (undated) DEVICE — INTERTAN LAG SCREW DRILL: Brand: TRIGEN

## (undated) DEVICE — DRP C/ARMOR

## (undated) DEVICE — TR BAND RADIAL ARTERY COMPRESSION DEVICE: Brand: TR BAND

## (undated) DEVICE — DRSNG WND GZ PAD BORDERED 4X8IN STRL

## (undated) DEVICE — LOU CYSTO: Brand: MEDLINE INDUSTRIES, INC.